# Patient Record
Sex: MALE | Race: BLACK OR AFRICAN AMERICAN | Employment: UNEMPLOYED | URBAN - METROPOLITAN AREA
[De-identification: names, ages, dates, MRNs, and addresses within clinical notes are randomized per-mention and may not be internally consistent; named-entity substitution may affect disease eponyms.]

---

## 2018-10-17 ENCOUNTER — HISTORICAL (OUTPATIENT)
Dept: ADMINISTRATIVE | Facility: HOSPITAL | Age: 28
End: 2018-10-17

## 2018-10-23 LAB
FINAL CULTURE: NORMAL
FINAL CULTURE: NORMAL

## 2018-10-29 ENCOUNTER — HOSPITAL ENCOUNTER (OUTPATIENT)
Dept: MEDSURG UNIT | Facility: HOSPITAL | Age: 28
End: 2018-10-31
Attending: INTERNAL MEDICINE | Admitting: FAMILY MEDICINE

## 2018-10-29 LAB
ABS NEUT (OLG): 3.84 X10(3)/MCL
ALBUMIN SERPL-MCNC: 2.3 GM/DL (ref 3.4–5)
ALBUMIN/GLOB SERPL: 1 RATIO (ref 1–2)
ALP SERPL-CCNC: 58 UNIT/L (ref 45–117)
ALT SERPL-CCNC: 16 UNIT/L (ref 12–78)
ANISOCYTOSIS BLD QL SMEAR: ABNORMAL
APPEARANCE, UA: CLEAR
APTT PPP: 33.1 SECOND(S) (ref 23.3–37)
AST SERPL-CCNC: 17 UNIT/L (ref 15–37)
BACTERIA #/AREA URNS AUTO: ABNORMAL /[HPF]
BASOPHILS NFR BLD MANUAL: 0 %
BILIRUB SERPL-MCNC: 0.2 MG/DL (ref 0.2–1)
BILIRUB UR QL STRIP: NEGATIVE
BILIRUBIN DIRECT+TOT PNL SERPL-MCNC: 0.1 MG/DL
BILIRUBIN DIRECT+TOT PNL SERPL-MCNC: 0.1 MG/DL
BUN SERPL-MCNC: 20 MG/DL (ref 7–18)
CALCIUM SERPL-MCNC: 7.8 MG/DL (ref 8.5–10.1)
CHLORIDE SERPL-SCNC: 112 MMOL/L (ref 98–107)
CK SERPL-CCNC: 83 UNIT/L (ref 39–308)
CO2 SERPL-SCNC: 31 MMOL/L (ref 21–32)
COLOR UR: ABNORMAL
CREAT SERPL-MCNC: 1 MG/DL (ref 0.6–1.3)
CRP SERPL-MCNC: 2.1 MG/DL
EOSINOPHIL NFR BLD MANUAL: 1 %
ERYTHROCYTE [DISTWIDTH] IN BLOOD BY AUTOMATED COUNT: 15.3 % (ref 11.5–14.5)
ERYTHROCYTE [SEDIMENTATION RATE] IN BLOOD: 40 MM/HR (ref 0–15)
GLOBULIN SER-MCNC: 3.5 GM/ML (ref 2.3–3.5)
GLUCOSE (UA): NORMAL
GLUCOSE SERPL-MCNC: 96 MG/DL (ref 74–106)
GRANULOCYTES NFR BLD MANUAL: 49 % (ref 43–75)
HCT VFR BLD AUTO: 25.6 % (ref 40–51)
HGB BLD-MCNC: 8.1 GM/DL (ref 13.5–17.5)
HGB UR QL STRIP: NEGATIVE
HYALINE CASTS #/AREA URNS LPF: ABNORMAL /[LPF]
HYPOCHROMIA BLD QL SMEAR: ABNORMAL
INR PPP: 1.18 (ref 0.9–1.2)
KETONES UR QL STRIP: NEGATIVE
LEUKOCYTE ESTERASE UR QL STRIP: NEGATIVE
LYMPHOCYTES NFR BLD MANUAL: 1 %
LYMPHOCYTES NFR BLD MANUAL: 39 % (ref 20.5–51.1)
MCH RBC QN AUTO: 29.3 PG (ref 26–34)
MCHC RBC AUTO-ENTMCNC: 31.6 GM/DL (ref 31–37)
MCV RBC AUTO: 92.8 FL (ref 80–100)
MONOCYTES NFR BLD MANUAL: 10 % (ref 2–9)
NITRITE UR QL STRIP: NEGATIVE
PH UR STRIP: 6 [PH] (ref 4.5–8)
PLATELET # BLD AUTO: 221 X10(3)/MCL (ref 130–400)
PLATELET # BLD EST: ADEQUATE 10*3/UL
PMV BLD AUTO: 11.1 FL (ref 7.4–10.4)
POIKILOCYTOSIS BLD QL SMEAR: ABNORMAL
POTASSIUM SERPL-SCNC: 3.9 MMOL/L (ref 3.5–5.1)
PROT SERPL-MCNC: 5.8 GM/DL (ref 6.4–8.2)
PROT UR QL STRIP: 70 MG/DL
PROTHROMBIN TIME: 14.2 SECOND(S) (ref 11.9–14.4)
RBC # BLD AUTO: 2.76 X10(6)/MCL (ref 4.5–5.9)
RBC #/AREA URNS AUTO: ABNORMAL /[HPF]
RBC MORPH BLD: ABNORMAL
SCHISTOCYTES BLD QL AUTO: ABNORMAL
SODIUM SERPL-SCNC: 145 MMOL/L (ref 136–145)
SP GR UR STRIP: 1.01 (ref 1–1.03)
SQUAMOUS #/AREA URNS LPF: ABNORMAL /[LPF]
UROBILINOGEN UR STRIP-ACNC: NORMAL
WBC # SPEC AUTO: 8 X10(3)/MCL (ref 4.5–11)
WBC #/AREA URNS AUTO: ABNORMAL /HPF

## 2018-10-30 LAB
ABS NEUT (OLG): 3.33 X10(3)/MCL (ref 2.1–9.2)
ALBUMIN SERPL-MCNC: 2.2 GM/DL (ref 3.4–5)
ALBUMIN/GLOB SERPL: 1 RATIO (ref 1–2)
ALP SERPL-CCNC: 61 UNIT/L (ref 45–117)
ALT SERPL-CCNC: 17 UNIT/L (ref 12–78)
AST SERPL-CCNC: 17 UNIT/L (ref 15–37)
BILIRUB SERPL-MCNC: 0.2 MG/DL (ref 0.2–1)
BILIRUBIN DIRECT+TOT PNL SERPL-MCNC: <0.1 MG/DL
BILIRUBIN DIRECT+TOT PNL SERPL-MCNC: ABNORMAL MG/DL
BUN SERPL-MCNC: 23 MG/DL (ref 7–18)
CALCIUM SERPL-MCNC: 7.7 MG/DL (ref 8.5–10.1)
CHLORIDE SERPL-SCNC: 109 MMOL/L (ref 98–107)
CO2 SERPL-SCNC: 29 MMOL/L (ref 21–32)
CREAT SERPL-MCNC: 1 MG/DL (ref 0.6–1.3)
EOSINOPHIL # BLD AUTO: 0.02 X10(3)/MCL
EOSINOPHIL NFR BLD AUTO: 0 %
ERYTHROCYTE [DISTWIDTH] IN BLOOD BY AUTOMATED COUNT: 14.7 % (ref 11.5–14.5)
GLOBULIN SER-MCNC: 3.5 GM/ML (ref 2.3–3.5)
GLUCOSE SERPL-MCNC: 119 MG/DL (ref 74–106)
HCT VFR BLD AUTO: 27.5 % (ref 40–51)
HGB BLD-MCNC: 8.6 GM/DL (ref 13.5–17.5)
IMM GRANULOCYTES # BLD AUTO: 0.01 10*3/UL
IMM GRANULOCYTES NFR BLD AUTO: 0 %
IRON SATN MFR SERPL: 20.3 % (ref 15–50)
IRON SERPL-MCNC: 45 MCG/DL (ref 65–175)
LYMPHOCYTES # BLD AUTO: 0.97 X10(3)/MCL
LYMPHOCYTES NFR BLD AUTO: 21 % (ref 13–40)
MCH RBC QN AUTO: 28.9 PG (ref 26–34)
MCHC RBC AUTO-ENTMCNC: 31.3 GM/DL (ref 31–37)
MCV RBC AUTO: 92.3 FL (ref 80–100)
MONOCYTES # BLD AUTO: 0.24 X10(3)/MCL
MONOCYTES NFR BLD AUTO: 5 % (ref 4–12)
NEUTROPHILS # BLD AUTO: 3.33 X10(3)/MCL
NEUTROPHILS NFR BLD AUTO: 73 X10(3)/MCL
PLATELET # BLD AUTO: 214 X10(3)/MCL (ref 130–400)
PMV BLD AUTO: 10.8 FL (ref 7.4–10.4)
POTASSIUM SERPL-SCNC: 4.8 MMOL/L (ref 3.5–5.1)
PROT SERPL-MCNC: 5.7 GM/DL (ref 6.4–8.2)
RBC # BLD AUTO: 2.98 X10(6)/MCL (ref 4.5–5.9)
SODIUM SERPL-SCNC: 142 MMOL/L (ref 136–145)
TIBC SERPL-MCNC: 222 MCG/DL (ref 250–450)
TRANSFERRIN SERPL-MCNC: 146 MG/DL (ref 200–360)
WBC # SPEC AUTO: 4.6 X10(3)/MCL (ref 4.5–11)

## 2018-10-31 LAB
ABS NEUT (OLG): 2.81 X10(3)/MCL (ref 2.1–9.2)
ALBUMIN SERPL-MCNC: 2.1 GM/DL (ref 3.4–5)
ALBUMIN/GLOB SERPL: 1 RATIO (ref 1–2)
ALP SERPL-CCNC: 59 UNIT/L (ref 45–117)
ALT SERPL-CCNC: 19 UNIT/L (ref 12–78)
APPEARANCE, UA: CLEAR
AST SERPL-CCNC: 18 UNIT/L (ref 15–37)
BACTERIA #/AREA URNS AUTO: ABNORMAL /[HPF]
BILIRUB SERPL-MCNC: 0.2 MG/DL (ref 0.2–1)
BILIRUB UR QL STRIP: NEGATIVE
BILIRUBIN DIRECT+TOT PNL SERPL-MCNC: <0.1 MG/DL
BILIRUBIN DIRECT+TOT PNL SERPL-MCNC: ABNORMAL MG/DL
BUN SERPL-MCNC: 36 MG/DL (ref 7–18)
CALCIUM SERPL-MCNC: 7.7 MG/DL (ref 8.5–10.1)
CHLORIDE SERPL-SCNC: 108 MMOL/L (ref 98–107)
CO2 SERPL-SCNC: 29 MMOL/L (ref 21–32)
COLOR UR: ABNORMAL
CREAT SERPL-MCNC: 1 MG/DL (ref 0.6–1.3)
CREAT UR-MCNC: 50 MG/DL
ERYTHROCYTE [DISTWIDTH] IN BLOOD BY AUTOMATED COUNT: 14.3 % (ref 11.5–14.5)
FERRITIN SERPL-MCNC: 664.4 NG/ML (ref 26–388)
GLOBULIN SER-MCNC: 3.4 GM/ML (ref 2.3–3.5)
GLUCOSE (UA): NORMAL
GLUCOSE SERPL-MCNC: 114 MG/DL (ref 74–106)
HAPTOGLOB SERPL-MCNC: 149 MG/DL (ref 31–200)
HBV CORE AB SERPL QL IA: REACTIVE
HBV SURFACE AG SERPL QL IA: NEGATIVE
HCT VFR BLD AUTO: 25.4 % (ref 40–51)
HGB BLD-MCNC: 7.9 GM/DL (ref 13.5–17.5)
HGB UR QL STRIP: NEGATIVE
HIV 1+2 AB+HIV1 P24 AG SERPL QL IA: NONREACTIVE
HYALINE CASTS #/AREA URNS LPF: ABNORMAL /[LPF]
IMM GRANULOCYTES # BLD AUTO: 0.02 10*3/UL
IMM GRANULOCYTES NFR BLD AUTO: 0 %
KETONES UR QL STRIP: NEGATIVE
LDH SERPL-CCNC: 210 UNIT/L (ref 87–241)
LEUKOCYTE ESTERASE UR QL STRIP: NEGATIVE
LYMPHOCYTES # BLD AUTO: 1.61 X10(3)/MCL
LYMPHOCYTES NFR BLD AUTO: 32 % (ref 13–40)
MCH RBC QN AUTO: 28.5 PG (ref 26–34)
MCHC RBC AUTO-ENTMCNC: 31.1 GM/DL (ref 31–37)
MCV RBC AUTO: 91.7 FL (ref 80–100)
MONOCYTES # BLD AUTO: 0.65 X10(3)/MCL
MONOCYTES NFR BLD AUTO: 13 % (ref 4–12)
NEG CONT SPOT COUNT: NORMAL
NEUTROPHILS # BLD AUTO: 2.81 X10(3)/MCL
NEUTROPHILS NFR BLD AUTO: 55 X10(3)/MCL
NITRITE UR QL STRIP: NEGATIVE
PANEL A SPOT COUNT: 0
PANEL B SPOT COUNT: 0
PH UR STRIP: 6.5 [PH] (ref 4.5–8)
PLATELET # BLD AUTO: 225 X10(3)/MCL (ref 130–400)
PMV BLD AUTO: 11.6 FL (ref 7.4–10.4)
POS CONT SPOT COUNT: NORMAL
POTASSIUM SERPL-SCNC: 4.1 MMOL/L (ref 3.5–5.1)
PROT SERPL-MCNC: 5.5 GM/DL (ref 6.4–8.2)
PROT UR QL STRIP: 30 MG/DL
PROT UR STRIP-MCNC: 48.9 MG/DL
PROT/CREAT UR-RTO: 978 MG/GM
RBC # BLD AUTO: 2.77 X10(6)/MCL (ref 4.5–5.9)
RBC #/AREA URNS AUTO: ABNORMAL /[HPF]
SODIUM SERPL-SCNC: 144 MMOL/L (ref 136–145)
SP GR UR STRIP: 1.01 (ref 1–1.03)
SQUAMOUS #/AREA URNS LPF: ABNORMAL /[LPF]
T-SPOT.TB: NORMAL
UROBILINOGEN UR STRIP-ACNC: NORMAL
WBC # SPEC AUTO: 5.1 X10(3)/MCL (ref 4.5–11)
WBC #/AREA URNS AUTO: ABNORMAL /HPF

## 2018-11-15 ENCOUNTER — HISTORICAL (OUTPATIENT)
Dept: ADMINISTRATIVE | Facility: HOSPITAL | Age: 28
End: 2018-11-15

## 2018-11-15 LAB
ABS NEUT (OLG): 9.8 X10(3)/MCL (ref 2.1–9.2)
ALBUMIN SERPL-MCNC: 2.9 GM/DL (ref 3.4–5)
ALBUMIN/GLOB SERPL: 1 RATIO (ref 1–2)
ALP SERPL-CCNC: 83 UNIT/L (ref 45–117)
ALT SERPL-CCNC: 17 UNIT/L (ref 12–78)
APPEARANCE, UA: CLEAR
AST SERPL-CCNC: 13 UNIT/L (ref 15–37)
BASOPHILS # BLD AUTO: 0.01 X10(3)/MCL
BASOPHILS NFR BLD AUTO: 0 %
BILIRUB SERPL-MCNC: 0.2 MG/DL (ref 0.2–1)
BILIRUB UR QL STRIP: NEGATIVE
BILIRUBIN DIRECT+TOT PNL SERPL-MCNC: <0.1 MG/DL
BILIRUBIN DIRECT+TOT PNL SERPL-MCNC: ABNORMAL MG/DL
BUN SERPL-MCNC: 23 MG/DL (ref 7–18)
CALCIUM SERPL-MCNC: 8.2 MG/DL (ref 8.5–10.1)
CHLORIDE SERPL-SCNC: 106 MMOL/L (ref 98–107)
CO2 SERPL-SCNC: 30 MMOL/L (ref 21–32)
COLOR UR: YELLOW
CREAT SERPL-MCNC: 0.9 MG/DL (ref 0.6–1.3)
CREAT UR-MCNC: 138 MG/DL
EOSINOPHIL # BLD AUTO: 0.05 10*3/UL
EOSINOPHIL NFR BLD AUTO: 0 %
ERYTHROCYTE [DISTWIDTH] IN BLOOD BY AUTOMATED COUNT: 15.5 % (ref 11.5–14.5)
ERYTHROCYTE [SEDIMENTATION RATE] IN BLOOD: 13 MM/HR (ref 0–15)
GLOBULIN SER-MCNC: 3.3 GM/ML (ref 2.3–3.5)
GLUCOSE (UA): NORMAL
GLUCOSE SERPL-MCNC: 79 MG/DL (ref 74–106)
HBV CORE IGM SERPL QL IA: NONREACTIVE
HCT VFR BLD AUTO: 29.7 % (ref 40–51)
HGB BLD-MCNC: 9.1 GM/DL (ref 13.5–17.5)
HGB UR QL STRIP: 0.03 MG/DL
IMM GRANULOCYTES # BLD AUTO: 0.05 10*3/UL
IMM GRANULOCYTES NFR BLD AUTO: 0 %
KETONES UR QL STRIP: NEGATIVE
LEUKOCYTE ESTERASE UR QL STRIP: NEGATIVE
LYMPHOCYTES # BLD AUTO: 2.22 X10(3)/MCL
LYMPHOCYTES NFR BLD AUTO: 17 % (ref 13–40)
MCH RBC QN AUTO: 29.4 PG (ref 26–34)
MCHC RBC AUTO-ENTMCNC: 30.6 GM/DL (ref 31–37)
MCV RBC AUTO: 95.8 FL (ref 80–100)
MONOCYTES # BLD AUTO: 1.21 X10(3)/MCL
MONOCYTES NFR BLD AUTO: 9 % (ref 4–12)
NEUTROPHILS # BLD AUTO: 9.8 X10(3)/MCL
NEUTROPHILS NFR BLD AUTO: 73 X10(3)/MCL
NITRITE UR QL STRIP: NEGATIVE
PH UR STRIP: 6 [PH] (ref 4.5–8)
PLATELET # BLD AUTO: 317 X10(3)/MCL (ref 130–400)
PMV BLD AUTO: 10.1 FL (ref 7.4–10.4)
POTASSIUM SERPL-SCNC: 3.8 MMOL/L (ref 3.5–5.1)
PROT SERPL-MCNC: 6.2 GM/DL (ref 6.4–8.2)
PROT UR QL STRIP: 100 MG/DL
PROT UR STRIP-MCNC: 131.8 MG/DL
PROT/CREAT UR-RTO: 955.1 MG/GM
RBC # BLD AUTO: 3.1 X10(6)/MCL (ref 4.5–5.9)
SODIUM SERPL-SCNC: 141 MMOL/L (ref 136–145)
SP GR UR STRIP: 1.02 (ref 1–1.03)
UROBILINOGEN UR STRIP-ACNC: NORMAL
WBC # SPEC AUTO: 13.3 X10(3)/MCL (ref 4.5–11)

## 2018-11-19 LAB
COLOR STL: ABNORMAL
CONSISTENCY STL: ABNORMAL
HEMOCCULT SP1 STL QL: POSITIVE

## 2018-11-20 ENCOUNTER — HISTORICAL (OUTPATIENT)
Dept: INTERNAL MEDICINE | Facility: CLINIC | Age: 28
End: 2018-11-20

## 2018-11-20 LAB
CREAT 24H UR-MCNC: 1.2 GM/24HR (ref 0.7–2.6)
CREAT UR-MCNC: 81 MG/DL
PROT 24H UR-MCNC: 1699.5 MG/24HR

## 2018-12-11 ENCOUNTER — HISTORICAL (OUTPATIENT)
Dept: ADMINISTRATIVE | Facility: HOSPITAL | Age: 28
End: 2018-12-11

## 2018-12-11 LAB
ABS NEUT (OLG): 9.09 X10(3)/MCL (ref 2.1–9.2)
ALBUMIN SERPL-MCNC: 2.5 GM/DL (ref 3.4–5)
ALBUMIN/GLOB SERPL: 1 RATIO (ref 1–2)
ALP SERPL-CCNC: 73 UNIT/L (ref 45–117)
ALT SERPL-CCNC: 15 UNIT/L (ref 12–78)
APPEARANCE, UA: CLEAR
AST SERPL-CCNC: 9 UNIT/L (ref 15–37)
BACTERIA #/AREA URNS AUTO: ABNORMAL /[HPF]
BASOPHILS # BLD AUTO: 0.01 X10(3)/MCL
BASOPHILS NFR BLD AUTO: 0 %
BILIRUB SERPL-MCNC: 0.2 MG/DL (ref 0.2–1)
BILIRUB UR QL STRIP: NEGATIVE
BILIRUBIN DIRECT+TOT PNL SERPL-MCNC: <0.1 MG/DL
BILIRUBIN DIRECT+TOT PNL SERPL-MCNC: ABNORMAL MG/DL
BUN SERPL-MCNC: 19 MG/DL (ref 7–18)
CALCIUM SERPL-MCNC: 8.5 MG/DL (ref 8.5–10.1)
CHLORIDE SERPL-SCNC: 107 MMOL/L (ref 98–107)
CO2 SERPL-SCNC: 29 MMOL/L (ref 21–32)
COLOR UR: YELLOW
CREAT SERPL-MCNC: 1 MG/DL (ref 0.6–1.3)
CREAT UR-MCNC: 303 MG/DL
EOSINOPHIL # BLD AUTO: 0.02 10*3/UL
EOSINOPHIL NFR BLD AUTO: 0 %
ERYTHROCYTE [DISTWIDTH] IN BLOOD BY AUTOMATED COUNT: 13.9 % (ref 11.5–14.5)
ERYTHROCYTE [SEDIMENTATION RATE] IN BLOOD: 85 MM/HR (ref 0–15)
GLOBULIN SER-MCNC: 4.1 GM/ML (ref 2.3–3.5)
GLUCOSE (UA): NORMAL
GLUCOSE SERPL-MCNC: 79 MG/DL (ref 74–106)
HCT VFR BLD AUTO: 31.7 % (ref 40–51)
HGB BLD-MCNC: 10 GM/DL (ref 13.5–17.5)
HGB UR QL STRIP: 0.03 MG/DL
HYALINE CASTS #/AREA URNS LPF: ABNORMAL /[LPF]
IMM GRANULOCYTES # BLD AUTO: 0.04 10*3/UL
IMM GRANULOCYTES NFR BLD AUTO: 0 %
KETONES UR QL STRIP: NEGATIVE
LEUKOCYTE ESTERASE UR QL STRIP: NEGATIVE
LYMPHOCYTES # BLD AUTO: 2.69 X10(3)/MCL
LYMPHOCYTES NFR BLD AUTO: 21 % (ref 13–40)
MCH RBC QN AUTO: 29.2 PG (ref 26–34)
MCHC RBC AUTO-ENTMCNC: 31.5 GM/DL (ref 31–37)
MCV RBC AUTO: 92.4 FL (ref 80–100)
MONOCYTES # BLD AUTO: 0.98 X10(3)/MCL
MONOCYTES NFR BLD AUTO: 8 % (ref 4–12)
NEUTROPHILS # BLD AUTO: 9.09 X10(3)/MCL
NEUTROPHILS NFR BLD AUTO: 71 X10(3)/MCL
NITRITE UR QL STRIP: NEGATIVE
PH UR STRIP: 6 [PH] (ref 4.5–8)
PLATELET # BLD AUTO: 552 X10(3)/MCL (ref 130–400)
PMV BLD AUTO: 10.1 FL (ref 7.4–10.4)
POTASSIUM SERPL-SCNC: 3.2 MMOL/L (ref 3.5–5.1)
PROT SERPL-MCNC: 6.6 GM/DL (ref 6.4–8.2)
PROT UR QL STRIP: 300 MG/DL
PROT UR STRIP-MCNC: 319.4 MG/DL
PROT/CREAT UR-RTO: 1054.1 MG/GM
RBC # BLD AUTO: 3.43 X10(6)/MCL (ref 4.5–5.9)
RBC #/AREA URNS AUTO: ABNORMAL /[HPF]
SODIUM SERPL-SCNC: 143 MMOL/L (ref 136–145)
SP GR UR STRIP: 1.03 (ref 1–1.03)
SQUAMOUS #/AREA URNS LPF: ABNORMAL /[LPF]
UROBILINOGEN UR STRIP-ACNC: NORMAL
WBC # SPEC AUTO: 12.8 X10(3)/MCL (ref 4.5–11)
WBC #/AREA URNS AUTO: ABNORMAL /HPF

## 2018-12-27 ENCOUNTER — HISTORICAL (OUTPATIENT)
Dept: RADIOLOGY | Facility: HOSPITAL | Age: 28
End: 2018-12-27

## 2019-02-01 ENCOUNTER — HISTORICAL (OUTPATIENT)
Dept: RADIOLOGY | Facility: HOSPITAL | Age: 29
End: 2019-02-01

## 2019-04-03 ENCOUNTER — HISTORICAL (OUTPATIENT)
Dept: ADMINISTRATIVE | Facility: HOSPITAL | Age: 29
End: 2019-04-03

## 2019-04-06 LAB — FINAL CULTURE: NORMAL

## 2019-05-28 ENCOUNTER — HISTORICAL (OUTPATIENT)
Dept: ADMINISTRATIVE | Facility: HOSPITAL | Age: 29
End: 2019-05-28

## 2019-05-30 LAB — FINAL CULTURE: NORMAL

## 2019-06-20 ENCOUNTER — HISTORICAL (OUTPATIENT)
Dept: ADMINISTRATIVE | Facility: HOSPITAL | Age: 29
End: 2019-06-20

## 2019-06-20 LAB
APPEARANCE, UA: CLEAR
BACTERIA #/AREA URNS AUTO: ABNORMAL /[HPF]
BILIRUB UR QL STRIP: NEGATIVE
COLOR UR: YELLOW
CREAT UR-MCNC: 299 MG/DL
GLUCOSE (UA): NORMAL
HGB UR QL STRIP: NEGATIVE
HYALINE CASTS #/AREA URNS LPF: ABNORMAL /[LPF]
KETONES UR QL STRIP: NEGATIVE
LEUKOCYTE ESTERASE UR QL STRIP: NEGATIVE
NITRITE UR QL STRIP: NEGATIVE
PH UR STRIP: 6.5 [PH] (ref 4.5–8)
PROT UR QL STRIP: 300 MG/DL
PROT UR STRIP-MCNC: 382.2 MG/DL
PROT/CREAT UR-RTO: 1278.3 MG/GM
RBC #/AREA URNS AUTO: ABNORMAL /[HPF]
SP GR UR STRIP: 1.03 (ref 1–1.03)
SQUAMOUS #/AREA URNS LPF: ABNORMAL /[LPF]
UROBILINOGEN UR STRIP-ACNC: NORMAL
WBC #/AREA URNS AUTO: ABNORMAL /HPF

## 2019-06-27 ENCOUNTER — HISTORICAL (OUTPATIENT)
Dept: ADMINISTRATIVE | Facility: HOSPITAL | Age: 29
End: 2019-06-27

## 2019-06-30 LAB — FINAL CULTURE: NORMAL

## 2019-07-02 ENCOUNTER — HISTORICAL (OUTPATIENT)
Dept: INFUSION THERAPY | Facility: HOSPITAL | Age: 29
End: 2019-07-02

## 2019-08-05 ENCOUNTER — HISTORICAL (OUTPATIENT)
Dept: ADMINISTRATIVE | Facility: HOSPITAL | Age: 29
End: 2019-08-05

## 2019-08-07 LAB — FINAL CULTURE: NORMAL

## 2019-12-16 ENCOUNTER — HISTORICAL (OUTPATIENT)
Dept: ADMINISTRATIVE | Facility: HOSPITAL | Age: 29
End: 2019-12-16

## 2019-12-16 LAB
ABS NEUT (OLG): 5.46 X10(3)/MCL (ref 2.1–9.2)
ALBUMIN SERPL-MCNC: 2.4 GM/DL (ref 3.4–5)
ALBUMIN/GLOB SERPL: 0.7 RATIO (ref 1.1–2)
ALP SERPL-CCNC: 57 UNIT/L (ref 45–117)
ALT SERPL-CCNC: 11 UNIT/L (ref 12–78)
APPEARANCE, UA: CLEAR
AST SERPL-CCNC: 20 UNIT/L (ref 15–37)
BACTERIA #/AREA URNS AUTO: ABNORMAL /HPF
BASOPHILS # BLD AUTO: 0 X10(3)/MCL (ref 0–0.2)
BASOPHILS NFR BLD AUTO: 0 %
BILIRUB SERPL-MCNC: 0.2 MG/DL (ref 0.2–1)
BILIRUB UR QL STRIP: NEGATIVE
BILIRUBIN DIRECT+TOT PNL SERPL-MCNC: <0.1 MG/DL (ref 0–0.2)
BILIRUBIN DIRECT+TOT PNL SERPL-MCNC: ABNORMAL MG/DL
BUN SERPL-MCNC: 21 MG/DL (ref 7–18)
CALCIUM SERPL-MCNC: 8.3 MG/DL (ref 8.5–10.1)
CHLORIDE SERPL-SCNC: 110 MMOL/L (ref 98–107)
CO2 SERPL-SCNC: 30 MMOL/L (ref 21–32)
COLOR UR: YELLOW
CREAT SERPL-MCNC: 1.3 MG/DL (ref 0.6–1.3)
CREAT UR-MCNC: 321 MG/DL
EOSINOPHIL # BLD AUTO: 0 X10(3)/MCL (ref 0–0.9)
EOSINOPHIL NFR BLD AUTO: 0 %
ERYTHROCYTE [DISTWIDTH] IN BLOOD BY AUTOMATED COUNT: 13.7 % (ref 11.5–14.5)
ERYTHROCYTE [SEDIMENTATION RATE] IN BLOOD: 43 MM/HR (ref 0–15)
GLOBULIN SER-MCNC: 3.4 GM/ML (ref 2.3–3.5)
GLUCOSE (UA): NEGATIVE
GLUCOSE SERPL-MCNC: 98 MG/DL (ref 74–106)
HCT VFR BLD AUTO: 28.7 % (ref 40–51)
HGB BLD-MCNC: 8.9 GM/DL (ref 13.5–17.5)
HGB UR QL STRIP: NEGATIVE
HYALINE CASTS #/AREA URNS LPF: ABNORMAL /LPF
IMM GRANULOCYTES # BLD AUTO: 0.03 10*3/UL
IMM GRANULOCYTES NFR BLD AUTO: 0 %
KETONES UR QL STRIP: ABNORMAL
LEUKOCYTE ESTERASE UR QL STRIP: NEGATIVE
LYMPHOCYTES # BLD AUTO: 1.8 X10(3)/MCL (ref 0.6–4.6)
LYMPHOCYTES NFR BLD AUTO: 22 %
MCH RBC QN AUTO: 28.6 PG (ref 26–34)
MCHC RBC AUTO-ENTMCNC: 31 GM/DL (ref 31–37)
MCV RBC AUTO: 92.3 FL (ref 80–100)
MONOCYTES # BLD AUTO: 0.8 X10(3)/MCL (ref 0.1–1.3)
MONOCYTES NFR BLD AUTO: 10 %
NEUTROPHILS # BLD AUTO: 5.46 X10(3)/MCL (ref 2.1–9.2)
NEUTROPHILS NFR BLD AUTO: 67 %
NITRITE UR QL STRIP: NEGATIVE
PH UR STRIP: 6 [PH] (ref 4.5–8)
PLATELET # BLD AUTO: 330 X10(3)/MCL (ref 130–400)
PMV BLD AUTO: 9.7 FL (ref 7.4–10.4)
POTASSIUM SERPL-SCNC: 4 MMOL/L (ref 3.5–5.1)
PROT SERPL-MCNC: 5.8 GM/DL (ref 6.4–8.2)
PROT UR QL STRIP: 600 MG/DL
PROT UR STRIP-MCNC: 366.6 MG/DL
PROT/CREAT UR-RTO: 1142.1 MG/GM
RBC # BLD AUTO: 3.11 X10(6)/MCL (ref 4.5–5.9)
RBC #/AREA URNS AUTO: >=100 /HPF
SODIUM SERPL-SCNC: 145 MMOL/L (ref 136–145)
SP GR UR STRIP: 1.02 (ref 1–1.03)
SQUAMOUS #/AREA URNS LPF: ABNORMAL /LPF
UROBILINOGEN UR STRIP-ACNC: 2 MG/DL
WBC # SPEC AUTO: 8.2 X10(3)/MCL (ref 4.5–11)
WBC #/AREA URNS AUTO: ABNORMAL /HPF

## 2020-01-08 ENCOUNTER — HISTORICAL (OUTPATIENT)
Dept: ADMINISTRATIVE | Facility: HOSPITAL | Age: 30
End: 2020-01-08

## 2020-01-10 LAB — FINAL CULTURE: NORMAL

## 2020-02-27 ENCOUNTER — HISTORICAL (OUTPATIENT)
Dept: ADMINISTRATIVE | Facility: HOSPITAL | Age: 30
End: 2020-02-27

## 2020-02-27 LAB
ABS NEUT (OLG): 4.75 X10(3)/MCL (ref 2.1–9.2)
ALBUMIN SERPL-MCNC: 2.6 GM/DL (ref 3.4–5)
ALBUMIN/GLOB SERPL: 0.7 RATIO (ref 1.1–2)
ALP SERPL-CCNC: 59 UNIT/L (ref 45–117)
ALT SERPL-CCNC: 20 UNIT/L (ref 12–78)
APPEARANCE, UA: CLEAR
AST SERPL-CCNC: 43 UNIT/L (ref 15–37)
BACTERIA #/AREA URNS AUTO: ABNORMAL /HPF
BASOPHILS # BLD AUTO: 0 X10(3)/MCL (ref 0–0.2)
BASOPHILS NFR BLD AUTO: 0 %
BILIRUB SERPL-MCNC: 0.2 MG/DL (ref 0.2–1)
BILIRUB UR QL STRIP: NEGATIVE
BILIRUBIN DIRECT+TOT PNL SERPL-MCNC: <0.1 MG/DL (ref 0–0.2)
BILIRUBIN DIRECT+TOT PNL SERPL-MCNC: ABNORMAL MG/DL
BUN SERPL-MCNC: 18 MG/DL (ref 7–18)
CALCIUM SERPL-MCNC: 8.5 MG/DL (ref 8.5–10.1)
CHLORIDE SERPL-SCNC: 109 MMOL/L (ref 98–107)
CO2 SERPL-SCNC: 32 MMOL/L (ref 21–32)
COLOR UR: YELLOW
CREAT SERPL-MCNC: 1 MG/DL (ref 0.6–1.3)
CREAT UR-MCNC: 167 MG/DL
CRP SERPL-MCNC: 0.4 MG/DL
ERYTHROCYTE [DISTWIDTH] IN BLOOD BY AUTOMATED COUNT: 14.1 % (ref 11.5–14.5)
ERYTHROCYTE [SEDIMENTATION RATE] IN BLOOD: 28 MM/HR (ref 0–15)
GLOBULIN SER-MCNC: 3.6 GM/ML (ref 2.3–3.5)
GLUCOSE (UA): NEGATIVE
GLUCOSE SERPL-MCNC: 92 MG/DL (ref 74–106)
HCT VFR BLD AUTO: 35.8 % (ref 40–51)
HGB BLD-MCNC: 11.1 GM/DL (ref 13.5–17.5)
HGB UR QL STRIP: 0.06 MG/DL
HYALINE CASTS #/AREA URNS LPF: ABNORMAL /LPF
IMM GRANULOCYTES # BLD AUTO: 0.01 10*3/UL
IMM GRANULOCYTES NFR BLD AUTO: 0 %
KETONES UR QL STRIP: NEGATIVE
LEUKOCYTE ESTERASE UR QL STRIP: NEGATIVE
LYMPHOCYTES # BLD AUTO: 0.9 X10(3)/MCL (ref 0.6–4.6)
LYMPHOCYTES NFR BLD AUTO: 14 %
MCH RBC QN AUTO: 28.8 PG (ref 26–34)
MCHC RBC AUTO-ENTMCNC: 31 GM/DL (ref 31–37)
MCV RBC AUTO: 92.7 FL (ref 80–100)
MONOCYTES # BLD AUTO: 0.4 X10(3)/MCL (ref 0.1–1.3)
MONOCYTES NFR BLD AUTO: 6 %
NEUTROPHILS # BLD AUTO: 4.75 X10(3)/MCL (ref 2.1–9.2)
NEUTROPHILS NFR BLD AUTO: 79 %
NITRITE UR QL STRIP: NEGATIVE
PH UR STRIP: 6.5 [PH] (ref 4.5–8)
PLATELET # BLD AUTO: 316 X10(3)/MCL (ref 130–400)
PMV BLD AUTO: 10.1 FL (ref 7.4–10.4)
POTASSIUM SERPL-SCNC: 4.2 MMOL/L (ref 3.5–5.1)
PROT SERPL-MCNC: 6.2 GM/DL (ref 6.4–8.2)
PROT UR QL STRIP: 300 MG/DL
PROT UR STRIP-MCNC: 360.5 MG/DL
PROT/CREAT UR-RTO: 2158.7 MG/GM
RBC # BLD AUTO: 3.86 X10(6)/MCL (ref 4.5–5.9)
RBC #/AREA URNS AUTO: ABNORMAL /HPF
SODIUM SERPL-SCNC: 141 MMOL/L (ref 136–145)
SP GR UR STRIP: 1.01 (ref 1–1.03)
SQUAMOUS #/AREA URNS LPF: ABNORMAL /LPF
URATE SERPL-MCNC: 8.1 MG/DL (ref 3.5–7.2)
UROBILINOGEN UR STRIP-ACNC: NORMAL
WBC # SPEC AUTO: 6 X10(3)/MCL (ref 4.5–11)
WBC #/AREA URNS AUTO: ABNORMAL /HPF

## 2020-09-03 ENCOUNTER — HISTORICAL (OUTPATIENT)
Dept: ADMINISTRATIVE | Facility: HOSPITAL | Age: 30
End: 2020-09-03

## 2020-09-03 LAB
ABS NEUT (OLG): 3.27 X10(3)/MCL (ref 2.1–9.2)
ALBUMIN SERPL-MCNC: 2.8 GM/DL (ref 3.4–5)
ALBUMIN/GLOB SERPL: 0.6 RATIO (ref 1.1–2)
ALP SERPL-CCNC: 80 UNIT/L (ref 45–117)
ALT SERPL-CCNC: 14 UNIT/L (ref 12–78)
APPEARANCE, UA: CLEAR
AST SERPL-CCNC: 30 UNIT/L (ref 15–37)
BACTERIA #/AREA URNS AUTO: ABNORMAL /HPF
BASOPHILS # BLD AUTO: 0 X10(3)/MCL (ref 0–0.2)
BASOPHILS NFR BLD AUTO: 1 %
BILIRUB SERPL-MCNC: 0.3 MG/DL (ref 0.2–1)
BILIRUB UR QL STRIP: NEGATIVE
BILIRUBIN DIRECT+TOT PNL SERPL-MCNC: 0.1 MG/DL (ref 0–0.2)
BILIRUBIN DIRECT+TOT PNL SERPL-MCNC: 0.2 MG/DL
BUN SERPL-MCNC: 25 MG/DL (ref 7–18)
C3 SERPL-MCNC: 95 MG/DL (ref 80–173)
C4 SERPL-MCNC: 46.8 MG/DL (ref 13–46)
CALCIUM SERPL-MCNC: 8.7 MG/DL (ref 8.5–10.1)
CHLORIDE SERPL-SCNC: 109 MMOL/L (ref 98–107)
CO2 SERPL-SCNC: 30 MMOL/L (ref 21–32)
COLOR UR: COLORLESS
CREAT SERPL-MCNC: 1.3 MG/DL (ref 0.6–1.3)
CREAT UR-MCNC: 23 MG/DL
EOSINOPHIL # BLD AUTO: 0.3 X10(3)/MCL (ref 0–0.9)
EOSINOPHIL NFR BLD AUTO: 4 %
ERYTHROCYTE [DISTWIDTH] IN BLOOD BY AUTOMATED COUNT: 13.9 % (ref 11.5–14.5)
ERYTHROCYTE [SEDIMENTATION RATE] IN BLOOD: 38 MM/HR (ref 0–15)
GLOBULIN SER-MCNC: 4.4 GM/ML (ref 2.3–3.5)
GLUCOSE (UA): NEGATIVE
GLUCOSE SERPL-MCNC: 66 MG/DL (ref 74–106)
HBV SURFACE AG SERPL QL IA: NONREACTIVE
HCT VFR BLD AUTO: 35.5 % (ref 40–51)
HGB BLD-MCNC: 11 GM/DL (ref 13.5–17.5)
HGB UR QL STRIP: 0.03 MG/DL
HYALINE CASTS #/AREA URNS LPF: ABNORMAL /LPF
IMM GRANULOCYTES # BLD AUTO: 0.01 10*3/UL
IMM GRANULOCYTES NFR BLD AUTO: 0 %
KETONES UR QL STRIP: NEGATIVE
LEUKOCYTE ESTERASE UR QL STRIP: NEGATIVE
LYMPHOCYTES # BLD AUTO: 2.2 X10(3)/MCL (ref 0.6–4.6)
LYMPHOCYTES NFR BLD AUTO: 33 %
MCH RBC QN AUTO: 28.4 PG (ref 26–34)
MCHC RBC AUTO-ENTMCNC: 31 GM/DL (ref 31–37)
MCV RBC AUTO: 91.7 FL (ref 80–100)
MONOCYTES # BLD AUTO: 0.9 X10(3)/MCL (ref 0.1–1.3)
MONOCYTES NFR BLD AUTO: 14 %
NEUTROPHILS # BLD AUTO: 3.27 X10(3)/MCL (ref 2.1–9.2)
NEUTROPHILS NFR BLD AUTO: 49 %
NITRITE UR QL STRIP: NEGATIVE
PH UR STRIP: 6.5 [PH] (ref 4.5–8)
PLATELET # BLD AUTO: 303 X10(3)/MCL (ref 130–400)
PMV BLD AUTO: 9.8 FL (ref 7.4–10.4)
POTASSIUM SERPL-SCNC: 4 MMOL/L (ref 3.5–5.1)
PROT SERPL-MCNC: 7.2 GM/DL (ref 6.4–8.2)
PROT UR QL STRIP: 100 MG/DL
PROT UR STRIP-MCNC: 85.5 MG/DL
PROT/CREAT UR-RTO: 3717.4 MG/GM
RBC # BLD AUTO: 3.87 X10(6)/MCL (ref 4.5–5.9)
RBC #/AREA URNS AUTO: ABNORMAL /HPF
SODIUM SERPL-SCNC: 143 MMOL/L (ref 136–145)
SP GR UR STRIP: 1 (ref 1–1.03)
SQUAMOUS #/AREA URNS LPF: ABNORMAL /LPF
UROBILINOGEN UR STRIP-ACNC: NORMAL
WBC # SPEC AUTO: 6.7 X10(3)/MCL (ref 4.5–11)
WBC #/AREA URNS AUTO: ABNORMAL /HPF

## 2020-09-21 ENCOUNTER — HISTORICAL (OUTPATIENT)
Dept: INFUSION THERAPY | Facility: HOSPITAL | Age: 30
End: 2020-09-21

## 2020-09-21 LAB
ALBUMIN SERPL-MCNC: 2.6 GM/DL (ref 3.4–5)
ALBUMIN/GLOB SERPL: 0.7 RATIO (ref 1.1–2)
ALP SERPL-CCNC: 63 UNIT/L (ref 45–117)
ALT SERPL-CCNC: 10 UNIT/L (ref 12–78)
AST SERPL-CCNC: 17 UNIT/L (ref 15–37)
BILIRUB SERPL-MCNC: 0.2 MG/DL (ref 0.2–1)
BILIRUBIN DIRECT+TOT PNL SERPL-MCNC: <0.1 MG/DL (ref 0–0.2)
BILIRUBIN DIRECT+TOT PNL SERPL-MCNC: >0.1 MG/DL
BUN SERPL-MCNC: 20 MG/DL (ref 7–18)
CALCIUM SERPL-MCNC: 8.5 MG/DL (ref 8.5–10.1)
CHLORIDE SERPL-SCNC: 112 MMOL/L (ref 98–107)
CO2 SERPL-SCNC: 30 MMOL/L (ref 21–32)
CREAT SERPL-MCNC: 1.1 MG/DL (ref 0.6–1.3)
GLOBULIN SER-MCNC: 3.5 GM/ML (ref 2.3–3.5)
GLUCOSE SERPL-MCNC: 92 MG/DL (ref 74–100)
POTASSIUM SERPL-SCNC: 4.3 MMOL/L (ref 3.5–5.1)
PROT SERPL-MCNC: 6.1 GM/DL (ref 6.4–8.2)
SODIUM SERPL-SCNC: 144 MMOL/L (ref 136–145)

## 2021-01-01 ENCOUNTER — HISTORICAL (OUTPATIENT)
Dept: ADMINISTRATIVE | Facility: HOSPITAL | Age: 31
End: 2021-01-01

## 2021-01-01 LAB
(HCYS)2 SERPL-MCNC: 22.91 UMOL/L (ref 5.08–15.39)
ABS NEUT (OLG): 2.03 X10(3)/MCL (ref 2.1–9.2)
ALBUMIN SERPL-MCNC: 3.3 GM/DL (ref 3.5–5)
ALBUMIN/GLOB SERPL: 0.9 RATIO (ref 1.1–2)
ALP SERPL-CCNC: 60 UNIT/L (ref 40–150)
ALT SERPL-CCNC: 12 UNIT/L (ref 0–55)
APPEARANCE, UA: CLEAR
AST SERPL-CCNC: 25 UNIT/L (ref 5–34)
BACTERIA #/AREA URNS AUTO: ABNORMAL /HPF
BASOPHILS # BLD AUTO: 0.1 X10(3)/MCL (ref 0–0.2)
BASOPHILS NFR BLD AUTO: 1 %
BILIRUB SERPL-MCNC: 0.3 MG/DL
BILIRUB UR QL STRIP: NEGATIVE
BILIRUBIN DIRECT+TOT PNL SERPL-MCNC: 0.1 MG/DL (ref 0–0.5)
BILIRUBIN DIRECT+TOT PNL SERPL-MCNC: 0.2 MG/DL (ref 0–0.8)
BUN SERPL-MCNC: 33.6 MG/DL (ref 8.9–20.6)
C3 SERPL-MCNC: 88 MG/DL (ref 80–173)
C4 SERPL-MCNC: 45.8 MG/DL (ref 13–46)
CALCIUM SERPL-MCNC: 9 MG/DL (ref 8.7–10.5)
CHLORIDE SERPL-SCNC: 110 MMOL/L (ref 98–107)
CO2 SERPL-SCNC: 25 MMOL/L (ref 22–29)
COLOR UR: YELLOW
CREAT SERPL-MCNC: 1.46 MG/DL (ref 0.73–1.18)
CREAT UR-MCNC: 314.6 MG/DL (ref 58–161)
EOSINOPHIL # BLD AUTO: 0.5 X10(3)/MCL (ref 0–0.9)
EOSINOPHIL NFR BLD AUTO: 9 %
ERYTHROCYTE [DISTWIDTH] IN BLOOD BY AUTOMATED COUNT: 13.8 % (ref 11.5–14.5)
ERYTHROCYTE [SEDIMENTATION RATE] IN BLOOD: 36 MM/HR (ref 0–15)
GLOBULIN SER-MCNC: 3.7 GM/DL (ref 2.4–3.5)
GLUCOSE (UA): NEGATIVE
GLUCOSE SERPL-MCNC: 80 MG/DL (ref 74–100)
HCT VFR BLD AUTO: 35.3 % (ref 40–51)
HGB BLD-MCNC: 11 GM/DL (ref 13.5–17.5)
HGB UR QL STRIP: 0.03 MG/DL
HYALINE CASTS #/AREA URNS LPF: ABNORMAL /LPF
IMM GRANULOCYTES # BLD AUTO: 0.01 10*3/UL
IMM GRANULOCYTES NFR BLD AUTO: 0 %
KETONES UR QL STRIP: NEGATIVE
LEUKOCYTE ESTERASE UR QL STRIP: NEGATIVE
LYMPHOCYTES # BLD AUTO: 2 X10(3)/MCL (ref 0.6–4.6)
LYMPHOCYTES NFR BLD AUTO: 38 %
MCH RBC QN AUTO: 28.1 PG (ref 26–34)
MCHC RBC AUTO-ENTMCNC: 31.2 GM/DL (ref 31–37)
MCV RBC AUTO: 90.1 FL (ref 80–100)
MONOCYTES # BLD AUTO: 0.7 X10(3)/MCL (ref 0.1–1.3)
MONOCYTES NFR BLD AUTO: 13 %
NEUTROPHILS # BLD AUTO: 2.03 X10(3)/MCL (ref 2.1–9.2)
NEUTROPHILS NFR BLD AUTO: 38 %
NITRITE UR QL STRIP: NEGATIVE
NRBC BLD AUTO-RTO: 0 % (ref 0–0.2)
PH UR STRIP: 6 [PH] (ref 4.5–8)
PLATELET # BLD AUTO: 341 X10(3)/MCL (ref 130–400)
PMV BLD AUTO: 10.8 FL (ref 7.4–10.4)
POTASSIUM SERPL-SCNC: 4.6 MMOL/L (ref 3.5–5.1)
PROT SERPL-MCNC: 7 GM/DL (ref 6.4–8.3)
PROT UR QL STRIP: 300 MG/DL
PROT UR STRIP-MCNC: 269.4 MG/DL
PROT/CREAT UR-RTO: 856.3 MG/GM CR
RBC # BLD AUTO: 3.92 X10(6)/MCL (ref 4.5–5.9)
RBC #/AREA URNS AUTO: ABNORMAL /HPF
SODIUM SERPL-SCNC: 141 MMOL/L (ref 136–145)
SP GR UR STRIP: 1.03 (ref 1–1.03)
SQUAMOUS #/AREA URNS LPF: ABNORMAL /LPF
UROBILINOGEN UR STRIP-ACNC: NORMAL
VIT B12 SERPL-MCNC: 649 PG/ML (ref 213–816)
WBC # SPEC AUTO: 5.3 X10(3)/MCL (ref 4.5–11)
WBC #/AREA URNS AUTO: ABNORMAL /HPF

## 2021-01-11 ENCOUNTER — HISTORICAL (OUTPATIENT)
Dept: ADMINISTRATIVE | Facility: HOSPITAL | Age: 31
End: 2021-01-11

## 2021-01-11 LAB
ABS NEUT (OLG): 2.14 X10(3)/MCL (ref 2.1–9.2)
ALBUMIN SERPL-MCNC: 2.8 GM/DL (ref 3.5–5)
ALBUMIN/GLOB SERPL: 0.8 RATIO (ref 1.1–2)
ALP SERPL-CCNC: 62 UNIT/L (ref 40–150)
ALT SERPL-CCNC: 11 UNIT/L (ref 0–55)
APPEARANCE, UA: CLEAR
AST SERPL-CCNC: 21 UNIT/L (ref 5–34)
BACTERIA #/AREA URNS AUTO: ABNORMAL /HPF
BASOPHILS # BLD AUTO: 0 X10(3)/MCL (ref 0–0.2)
BASOPHILS NFR BLD AUTO: 1 %
BILIRUB SERPL-MCNC: 0.2 MG/DL
BILIRUB UR QL STRIP: NEGATIVE
BILIRUBIN DIRECT+TOT PNL SERPL-MCNC: 0.1 MG/DL (ref 0–0.5)
BILIRUBIN DIRECT+TOT PNL SERPL-MCNC: 0.1 MG/DL (ref 0–0.8)
BUN SERPL-MCNC: 23 MG/DL (ref 8.9–20.6)
CALCIUM SERPL-MCNC: 8.6 MG/DL (ref 8.4–10.2)
CHLORIDE SERPL-SCNC: 107 MMOL/L (ref 98–107)
CO2 SERPL-SCNC: 29 MMOL/L (ref 22–29)
COLOR UR: YELLOW
CREAT SERPL-MCNC: 1.18 MG/DL (ref 0.73–1.18)
CREAT UR-MCNC: 200.3 MG/DL (ref 58–161)
CRP SERPL-MCNC: 0.19 MG/DL
EOSINOPHIL # BLD AUTO: 0.2 X10(3)/MCL (ref 0–0.9)
EOSINOPHIL NFR BLD AUTO: 5 %
ERYTHROCYTE [DISTWIDTH] IN BLOOD BY AUTOMATED COUNT: 14.2 % (ref 11.5–14.5)
ERYTHROCYTE [SEDIMENTATION RATE] IN BLOOD: 28 MM/HR (ref 0–15)
GLOBULIN SER-MCNC: 3.3 GM/DL (ref 2.4–3.5)
GLUCOSE (UA): NEGATIVE
GLUCOSE SERPL-MCNC: 78 MG/DL (ref 74–100)
HCT VFR BLD AUTO: 34.6 % (ref 40–51)
HGB BLD-MCNC: 11.2 GM/DL (ref 13.5–17.5)
HGB UR QL STRIP: 0.06 MG/DL
HYALINE CASTS #/AREA URNS LPF: ABNORMAL /LPF
IMM GRANULOCYTES # BLD AUTO: 0.01 10*3/UL
IMM GRANULOCYTES NFR BLD AUTO: 0 %
KETONES UR QL STRIP: NEGATIVE
LEUKOCYTE ESTERASE UR QL STRIP: NEGATIVE
LYMPHOCYTES # BLD AUTO: 1.9 X10(3)/MCL (ref 0.6–4.6)
LYMPHOCYTES NFR BLD AUTO: 37 %
MCH RBC QN AUTO: 29 PG (ref 26–34)
MCHC RBC AUTO-ENTMCNC: 32.4 GM/DL (ref 31–37)
MCV RBC AUTO: 89.6 FL (ref 80–100)
MONOCYTES # BLD AUTO: 0.7 X10(3)/MCL (ref 0.1–1.3)
MONOCYTES NFR BLD AUTO: 14 %
NEUTROPHILS # BLD AUTO: 2.14 X10(3)/MCL (ref 2.1–9.2)
NEUTROPHILS NFR BLD AUTO: 42 %
NITRITE UR QL STRIP: NEGATIVE
PH UR STRIP: 6 [PH] (ref 4.5–8)
PLATELET # BLD AUTO: 307 X10(3)/MCL (ref 130–400)
PMV BLD AUTO: 11.6 FL (ref 7.4–10.4)
POTASSIUM SERPL-SCNC: 4 MMOL/L (ref 3.5–5.1)
PROT SERPL-MCNC: 6.1 GM/DL (ref 6.4–8.3)
PROT UR QL STRIP: 300 MG/DL
PROT UR STRIP-MCNC: 352.1 MG/DL
PROT/CREAT UR-RTO: 1757.9 MG/GM
RBC # BLD AUTO: 3.86 X10(6)/MCL (ref 4.5–5.9)
RBC #/AREA URNS AUTO: ABNORMAL /HPF
SODIUM SERPL-SCNC: 143 MMOL/L (ref 136–145)
SP GR UR STRIP: 1.02 (ref 1–1.03)
SQUAMOUS #/AREA URNS LPF: ABNORMAL /LPF
UROBILINOGEN UR STRIP-ACNC: NORMAL
WBC # SPEC AUTO: 5 X10(3)/MCL (ref 4.5–11)
WBC #/AREA URNS AUTO: ABNORMAL /HPF

## 2021-02-14 ENCOUNTER — HOSPITAL ENCOUNTER (OUTPATIENT)
Dept: MEDSURG UNIT | Facility: HOSPITAL | Age: 31
End: 2021-02-15
Attending: INTERNAL MEDICINE | Admitting: INTERNAL MEDICINE

## 2021-02-14 LAB
ABS NEUT (OLG): 4.1 X10(3)/MCL (ref 2.1–9.2)
ALBUMIN SERPL-MCNC: 2.3 GM/DL (ref 3.5–5)
ALBUMIN SERPL-MCNC: 2.5 GM/DL (ref 3.5–5)
ALBUMIN/GLOB SERPL: 0.6 RATIO (ref 1.1–2)
ALBUMIN/GLOB SERPL: 0.7 RATIO (ref 1.1–2)
ALP SERPL-CCNC: 56 UNIT/L (ref 40–150)
ALP SERPL-CCNC: 63 UNIT/L (ref 40–150)
ALT SERPL-CCNC: 11 UNIT/L (ref 0–55)
ALT SERPL-CCNC: 12 UNIT/L (ref 0–55)
AST SERPL-CCNC: 22 UNIT/L (ref 5–34)
AST SERPL-CCNC: 29 UNIT/L (ref 5–34)
BASOPHILS # BLD AUTO: 0.1 X10(3)/MCL (ref 0–0.2)
BASOPHILS NFR BLD AUTO: 1 %
BILIRUB SERPL-MCNC: 0.2 MG/DL
BILIRUB SERPL-MCNC: 0.3 MG/DL
BILIRUBIN DIRECT+TOT PNL SERPL-MCNC: 0.1 MG/DL (ref 0–0.5)
BILIRUBIN DIRECT+TOT PNL SERPL-MCNC: 0.1 MG/DL (ref 0–0.5)
BILIRUBIN DIRECT+TOT PNL SERPL-MCNC: 0.1 MG/DL (ref 0–0.8)
BILIRUBIN DIRECT+TOT PNL SERPL-MCNC: 0.2 MG/DL (ref 0–0.8)
BNP BLD-MCNC: 1737.8 PG/ML
BUN SERPL-MCNC: 19 MG/DL (ref 8.9–20.6)
BUN SERPL-MCNC: 25 MG/DL (ref 8.9–20.6)
CALCIUM SERPL-MCNC: 8.2 MG/DL (ref 8.4–10.2)
CALCIUM SERPL-MCNC: 8.4 MG/DL (ref 8.4–10.2)
CHLORIDE SERPL-SCNC: 107 MMOL/L (ref 98–107)
CHLORIDE SERPL-SCNC: 110 MMOL/L (ref 98–107)
CO2 SERPL-SCNC: 24 MMOL/L (ref 22–29)
CO2 SERPL-SCNC: 24 MMOL/L (ref 22–29)
CREAT SERPL-MCNC: 1.06 MG/DL (ref 0.73–1.18)
CREAT SERPL-MCNC: 1.51 MG/DL (ref 0.73–1.18)
CRP SERPL-MCNC: 1.76 MG/DL
EOSINOPHIL # BLD AUTO: 0.4 X10(3)/MCL (ref 0–0.9)
EOSINOPHIL NFR BLD AUTO: 6 %
ERYTHROCYTE [DISTWIDTH] IN BLOOD BY AUTOMATED COUNT: 14.6 % (ref 11.5–14.5)
ERYTHROCYTE [SEDIMENTATION RATE] IN BLOOD: 72 MM/HR (ref 0–15)
GLOBULIN SER-MCNC: 3.2 GM/DL (ref 2.4–3.5)
GLOBULIN SER-MCNC: 3.9 GM/DL (ref 2.4–3.5)
GLUCOSE FLD-MCNC: 162 MG/DL
GLUCOSE SERPL-MCNC: 111 MG/DL (ref 74–100)
GLUCOSE SERPL-MCNC: 87 MG/DL (ref 74–100)
HCO3 UR-SCNC: 23.6 MMOL/L (ref 22–26)
HCT VFR BLD AUTO: 29.9 % (ref 40–51)
HGB BLD-MCNC: 9.2 GM/DL (ref 13.5–17.5)
IMM GRANULOCYTES # BLD AUTO: 0.02 10*3/UL
IMM GRANULOCYTES NFR BLD AUTO: 0 %
LACTATE SERPL-SCNC: 0.7 MMOL/L (ref 0.5–2.2)
LDH FLD-CCNC: 92 U/L
LDH SERPL-CCNC: 454 UNIT/L (ref 140–271)
LYMPHOCYTES # BLD AUTO: 2.4 X10(3)/MCL (ref 0.6–4.6)
LYMPHOCYTES NFR BLD AUTO: 30 %
MCH RBC QN AUTO: 28 PG (ref 26–34)
MCHC RBC AUTO-ENTMCNC: 30.8 GM/DL (ref 31–37)
MCV RBC AUTO: 91.2 FL (ref 80–100)
MONOCYTES # BLD AUTO: 1 X10(3)/MCL (ref 0.1–1.3)
MONOCYTES NFR BLD AUTO: 13 %
NEUTROPHILS # BLD AUTO: 4.1 X10(3)/MCL (ref 2.1–9.2)
NEUTROPHILS NFR BLD AUTO: 51 %
O2 HGB ARTERIAL: 85.7 % (ref 94–100)
PCO2 BLDA: 34 MMHG (ref 35–45)
PH BF TYPE: NORMAL
PH FLD: 8 [PH]
PH SMN: 7.45 [PH] (ref 7.35–7.45)
PLATELET # BLD AUTO: 395 X10(3)/MCL (ref 130–400)
PMV BLD AUTO: 10.8 FL (ref 7.4–10.4)
PO2 BLDA: 50 MMHG (ref 80–100)
POC ALLENS TEST: POSITIVE
POC BE: -0.1 (ref -2–2)
POC CO HGB: 3.1 %
POC CO2: 24.6 MMOL/L (ref 22–26)
POC MET HGB: 0.6 % (ref 0.4–1.5)
POC SAMPLESOURCE: ABNORMAL
POC SATURATED O2: 89 %
POC SITE: ABNORMAL
POC THB: 9.2 GM/DL (ref 12–16)
POC TREATMENT: ABNORMAL
POTASSIUM SERPL-SCNC: 3.8 MMOL/L (ref 3.5–5.1)
POTASSIUM SERPL-SCNC: 4.4 MMOL/L (ref 3.5–5.1)
PROT FLD-MCNC: 1 GM/DL
PROT SERPL-MCNC: 5.5 GM/DL (ref 6.4–8.3)
PROT SERPL-MCNC: 6.4 GM/DL (ref 6.4–8.3)
RBC # BLD AUTO: 3.28 X10(6)/MCL (ref 4.5–5.9)
SARS-COV-2 AG RESP QL IA.RAPID: NEGATIVE
SODIUM SERPL-SCNC: 143 MMOL/L (ref 136–145)
SODIUM SERPL-SCNC: 143 MMOL/L (ref 136–145)
TROPONIN I SERPL-MCNC: 0.06 NG/ML (ref 0–0.04)
TROPONIN I SERPL-MCNC: 0.06 NG/ML (ref 0–0.04)
TROPONIN I SERPL-MCNC: 0.07 NG/ML (ref 0–0.04)
TROPONIN I SERPL-MCNC: 0.07 NG/ML (ref 0–0.04)
WBC # SPEC AUTO: 8 X10(3)/MCL (ref 4.5–11)

## 2021-02-15 LAB
ABS NEUT (OLG): 7.61 X10(3)/MCL (ref 2.1–9.2)
ALBUMIN SERPL-MCNC: 2.2 GM/DL (ref 3.5–5)
ALBUMIN/GLOB SERPL: 0.7 RATIO (ref 1.1–2)
ALP SERPL-CCNC: 60 UNIT/L (ref 40–150)
ALT SERPL-CCNC: 9 UNIT/L (ref 0–55)
AST SERPL-CCNC: 18 UNIT/L (ref 5–34)
BASOPHILS # BLD AUTO: 0 X10(3)/MCL (ref 0–0.2)
BASOPHILS NFR BLD AUTO: 0 %
BILIRUB SERPL-MCNC: 0.2 MG/DL
BILIRUBIN DIRECT+TOT PNL SERPL-MCNC: 0.1 MG/DL (ref 0–0.5)
BILIRUBIN DIRECT+TOT PNL SERPL-MCNC: 0.1 MG/DL (ref 0–0.8)
BUN SERPL-MCNC: 34 MG/DL (ref 8.9–20.6)
CALCIUM SERPL-MCNC: 8.1 MG/DL (ref 8.4–10.2)
CHLORIDE SERPL-SCNC: 108 MMOL/L (ref 98–107)
CO2 SERPL-SCNC: 23 MMOL/L (ref 22–29)
CREAT SERPL-MCNC: 1.43 MG/DL (ref 0.73–1.18)
ERYTHROCYTE [DISTWIDTH] IN BLOOD BY AUTOMATED COUNT: 14.8 % (ref 11.5–14.5)
GLOBULIN SER-MCNC: 3.2 GM/DL (ref 2.4–3.5)
GLUCOSE SERPL-MCNC: 111 MG/DL (ref 74–100)
HCT VFR BLD AUTO: 27.6 % (ref 40–51)
HGB BLD-MCNC: 8.5 GM/DL (ref 13.5–17.5)
IMM GRANULOCYTES # BLD AUTO: 0.03 10*3/UL
IMM GRANULOCYTES NFR BLD AUTO: 0 %
LYMPHOCYTES # BLD AUTO: 1.5 X10(3)/MCL (ref 0.6–4.6)
LYMPHOCYTES NFR BLD AUTO: 15 %
MCH RBC QN AUTO: 28.1 PG (ref 26–34)
MCHC RBC AUTO-ENTMCNC: 30.8 GM/DL (ref 31–37)
MCV RBC AUTO: 91.1 FL (ref 80–100)
MONOCYTES # BLD AUTO: 1.2 X10(3)/MCL (ref 0.1–1.3)
MONOCYTES NFR BLD AUTO: 11 %
NEUTROPHILS # BLD AUTO: 7.61 X10(3)/MCL (ref 2.1–9.2)
NEUTROPHILS NFR BLD AUTO: 74 %
PLATELET # BLD AUTO: 422 X10(3)/MCL (ref 130–400)
PMV BLD AUTO: 10.8 FL (ref 7.4–10.4)
POTASSIUM SERPL-SCNC: 4.4 MMOL/L (ref 3.5–5.1)
PROT SERPL-MCNC: 5.4 GM/DL (ref 6.4–8.3)
RBC # BLD AUTO: 3.03 X10(6)/MCL (ref 4.5–5.9)
SODIUM SERPL-SCNC: 140 MMOL/L (ref 136–145)
WBC # SPEC AUTO: 10.4 X10(3)/MCL (ref 4.5–11)

## 2021-02-17 LAB — GRAM STN SPEC: NORMAL

## 2021-02-18 LAB
FINAL CULTURE: NORMAL
GRAM STN SPEC: NORMAL

## 2021-02-19 LAB
FINAL CULTURE: NORMAL

## 2021-02-21 LAB — FINAL CULTURE: NORMAL

## 2021-03-04 ENCOUNTER — HISTORICAL (OUTPATIENT)
Dept: ADMINISTRATIVE | Facility: HOSPITAL | Age: 31
End: 2021-03-04

## 2021-03-04 LAB
INR PPP: 4 (ref 0–1.3)
PROTHROMBIN TIME: 38.6 SECOND(S) (ref 11.1–13.7)

## 2021-03-08 ENCOUNTER — HISTORICAL (OUTPATIENT)
Dept: ADMINISTRATIVE | Facility: HOSPITAL | Age: 31
End: 2021-03-08

## 2021-03-08 LAB
INR PPP: 2.4 (ref 0–1.3)
PROTHROMBIN TIME: 25.7 SECOND(S) (ref 11.1–13.7)

## 2021-03-11 ENCOUNTER — HISTORICAL (OUTPATIENT)
Dept: ADMINISTRATIVE | Facility: HOSPITAL | Age: 31
End: 2021-03-11

## 2021-03-11 LAB
INR PPP: 3.9 (ref 0–1.3)
PROTHROMBIN TIME: 38.2 SECOND(S) (ref 11.1–13.7)

## 2021-03-22 LAB — FINAL CULTURE: NORMAL

## 2021-03-26 ENCOUNTER — HISTORICAL (OUTPATIENT)
Dept: LAB | Facility: HOSPITAL | Age: 31
End: 2021-03-26

## 2021-03-26 LAB
ABS NEUT (OLG): 6.24 X10(3)/MCL (ref 2.1–9.2)
ALBUMIN SERPL-MCNC: 2.4 GM/DL (ref 3.5–5)
ALBUMIN/GLOB SERPL: 0.7 RATIO (ref 1.1–2)
ALP SERPL-CCNC: 83 UNIT/L (ref 40–150)
ALT SERPL-CCNC: 11 UNIT/L (ref 0–55)
APPEARANCE, UA: CLEAR
AST SERPL-CCNC: 18 UNIT/L (ref 5–34)
BACTERIA #/AREA URNS AUTO: ABNORMAL /HPF
BASOPHILS # BLD AUTO: 0 X10(3)/MCL (ref 0–0.2)
BASOPHILS NFR BLD AUTO: 0 %
BILIRUB SERPL-MCNC: 0.2 MG/DL
BILIRUB UR QL STRIP: NEGATIVE
BILIRUBIN DIRECT+TOT PNL SERPL-MCNC: 0.1 MG/DL (ref 0–0.5)
BILIRUBIN DIRECT+TOT PNL SERPL-MCNC: 0.1 MG/DL (ref 0–0.8)
BUN SERPL-MCNC: 16.9 MG/DL (ref 8.9–20.6)
C3 SERPL-MCNC: 118 MG/DL (ref 80–173)
C4 SERPL-MCNC: 47.5 MG/DL (ref 13–46)
CALCIUM SERPL-MCNC: 8.1 MG/DL (ref 8.4–10.2)
CHLORIDE SERPL-SCNC: 106 MMOL/L (ref 98–107)
CO2 SERPL-SCNC: 29 MMOL/L (ref 22–29)
COLOR UR: YELLOW
CREAT SERPL-MCNC: 1.2 MG/DL (ref 0.73–1.18)
CREAT UR-MCNC: 200 MG/DL (ref 58–161)
EOSINOPHIL # BLD AUTO: 0.2 X10(3)/MCL (ref 0–0.9)
EOSINOPHIL NFR BLD AUTO: 2 %
ERYTHROCYTE [DISTWIDTH] IN BLOOD BY AUTOMATED COUNT: 14.8 % (ref 11.5–14.5)
ERYTHROCYTE [SEDIMENTATION RATE] IN BLOOD: 51 MM/HR (ref 0–15)
GLOBULIN SER-MCNC: 3.4 GM/DL (ref 2.4–3.5)
GLUCOSE (UA): NEGATIVE
GLUCOSE SERPL-MCNC: 81 MG/DL (ref 74–100)
HCT VFR BLD AUTO: 29.8 % (ref 40–51)
HGB BLD-MCNC: 9.2 GM/DL (ref 13.5–17.5)
HGB UR QL STRIP: 0.1
HYALINE CASTS #/AREA URNS LPF: ABNORMAL /LPF
IMM GRANULOCYTES # BLD AUTO: 0.04 10*3/UL
IMM GRANULOCYTES NFR BLD AUTO: 0 %
KETONES UR QL STRIP: NEGATIVE
LEUKOCYTE ESTERASE UR QL STRIP: NEGATIVE
LYMPHOCYTES # BLD AUTO: 2.3 X10(3)/MCL (ref 0.6–4.6)
LYMPHOCYTES NFR BLD AUTO: 23 %
MCH RBC QN AUTO: 27.9 PG (ref 26–34)
MCHC RBC AUTO-ENTMCNC: 30.9 GM/DL (ref 31–37)
MCV RBC AUTO: 90.3 FL (ref 80–100)
MONOCYTES # BLD AUTO: 1.4 X10(3)/MCL (ref 0.1–1.3)
MONOCYTES NFR BLD AUTO: 14 %
NEUTROPHILS # BLD AUTO: 6.24 X10(3)/MCL (ref 2.1–9.2)
NEUTROPHILS NFR BLD AUTO: 61 %
NITRITE UR QL STRIP: NEGATIVE
PH UR STRIP: 6 [PH] (ref 4.5–8)
PLATELET # BLD AUTO: 378 X10(3)/MCL (ref 130–400)
PMV BLD AUTO: 9.5 FL (ref 7.4–10.4)
POTASSIUM SERPL-SCNC: 4.1 MMOL/L (ref 3.5–5.1)
PROT SERPL-MCNC: 5.8 GM/DL (ref 6.4–8.3)
PROT UR QL STRIP: 300 MG/DL
PROT UR STRIP-MCNC: 391.1 MG/DL
PROT/CREAT UR-RTO: 1955.5 MG/GM CR
RBC # BLD AUTO: 3.3 X10(6)/MCL (ref 4.5–5.9)
RBC #/AREA URNS AUTO: ABNORMAL /HPF
SODIUM SERPL-SCNC: 144 MMOL/L (ref 136–145)
SP GR UR STRIP: 1.02 (ref 1–1.03)
SQUAMOUS #/AREA URNS LPF: ABNORMAL /LPF
UROBILINOGEN UR STRIP-ACNC: NORMAL
WBC # SPEC AUTO: 10.3 X10(3)/MCL (ref 4.5–11)
WBC #/AREA URNS AUTO: ABNORMAL /HPF

## 2021-03-28 LAB — FINAL CULTURE: NORMAL

## 2021-04-13 ENCOUNTER — HISTORICAL (OUTPATIENT)
Dept: ADMINISTRATIVE | Facility: HOSPITAL | Age: 31
End: 2021-04-13

## 2021-04-13 LAB
APTT PPP: 33.8 SECOND(S) (ref 23.2–33.7)
INR PPP: 1.7 (ref 0–1.3)
PROTHROMBIN TIME: 20 SECOND(S) (ref 11.1–13.7)

## 2021-04-20 ENCOUNTER — HISTORICAL (OUTPATIENT)
Dept: ADMINISTRATIVE | Facility: HOSPITAL | Age: 31
End: 2021-04-20

## 2021-04-20 LAB
INR PPP: 1.2 (ref 0–1.3)
PROTHROMBIN TIME: 14.5 SECOND(S) (ref 11.1–13.7)

## 2021-05-12 ENCOUNTER — HISTORICAL (OUTPATIENT)
Dept: CARDIOLOGY | Facility: HOSPITAL | Age: 31
End: 2021-05-12

## 2021-05-18 ENCOUNTER — HISTORICAL (OUTPATIENT)
Dept: CARDIOLOGY | Facility: HOSPITAL | Age: 31
End: 2021-05-18

## 2022-01-01 ENCOUNTER — ANESTHESIA (OUTPATIENT)
Dept: SURGERY | Facility: HOSPITAL | Age: 32
DRG: 219 | End: 2022-01-01
Payer: MEDICAID

## 2022-01-01 ENCOUNTER — HOSPITAL ENCOUNTER (EMERGENCY)
Facility: HOSPITAL | Age: 32
Discharge: HOME | End: 2022-05-10
Attending: STUDENT IN AN ORGANIZED HEALTH CARE EDUCATION/TRAINING PROGRAM
Payer: MEDICAID

## 2022-01-01 ENCOUNTER — ANESTHESIA EVENT (OUTPATIENT)
Dept: SURGERY | Facility: HOSPITAL | Age: 32
DRG: 219 | End: 2022-01-01
Payer: MEDICAID

## 2022-01-01 ENCOUNTER — HOSPITAL ENCOUNTER (EMERGENCY)
Facility: HOSPITAL | Age: 32
Discharge: ANOTHER HOSPITAL | End: 2022-09-23
Attending: STUDENT IN AN ORGANIZED HEALTH CARE EDUCATION/TRAINING PROGRAM
Payer: MEDICAID

## 2022-01-01 ENCOUNTER — HISTORICAL (OUTPATIENT)
Dept: ADMINISTRATIVE | Facility: HOSPITAL | Age: 32
End: 2022-01-01
Payer: MEDICAID

## 2022-01-01 ENCOUNTER — HISTORICAL (OUTPATIENT)
Dept: RADIOLOGY | Facility: HOSPITAL | Age: 32
End: 2022-01-01

## 2022-01-01 ENCOUNTER — HISTORICAL (OUTPATIENT)
Dept: ADMINISTRATIVE | Facility: HOSPITAL | Age: 32
End: 2022-01-01

## 2022-01-01 ENCOUNTER — HOSPITAL ENCOUNTER (INPATIENT)
Facility: HOSPITAL | Age: 32
LOS: 57 days | DRG: 219 | End: 2022-11-19
Attending: INTERNAL MEDICINE | Admitting: INTERNAL MEDICINE
Payer: MEDICAID

## 2022-01-01 ENCOUNTER — DOCUMENTATION ONLY (OUTPATIENT)
Dept: RHEUMATOLOGY | Facility: CLINIC | Age: 32
End: 2022-01-01
Payer: MEDICAID

## 2022-01-01 ENCOUNTER — HOSPITAL ENCOUNTER (EMERGENCY)
Facility: HOSPITAL | Age: 32
Discharge: HOME | End: 2022-06-12
Attending: INTERNAL MEDICINE
Payer: MEDICAID

## 2022-01-01 VITALS
OXYGEN SATURATION: 96 % | WEIGHT: 132.25 LBS | SYSTOLIC BLOOD PRESSURE: 156 MMHG | RESPIRATION RATE: 20 BRPM | BODY MASS INDEX: 21.25 KG/M2 | HEART RATE: 80 BPM | HEIGHT: 66 IN | TEMPERATURE: 97 F | DIASTOLIC BLOOD PRESSURE: 98 MMHG

## 2022-01-01 VITALS
HEIGHT: 67 IN | DIASTOLIC BLOOD PRESSURE: 103 MMHG | WEIGHT: 105.81 LBS | OXYGEN SATURATION: 100 % | BODY MASS INDEX: 16.61 KG/M2 | SYSTOLIC BLOOD PRESSURE: 156 MMHG

## 2022-01-01 VITALS
RESPIRATION RATE: 18 BRPM | OXYGEN SATURATION: 99 % | WEIGHT: 115 LBS | BODY MASS INDEX: 18.05 KG/M2 | DIASTOLIC BLOOD PRESSURE: 105 MMHG | HEART RATE: 90 BPM | HEIGHT: 67 IN | TEMPERATURE: 100 F | SYSTOLIC BLOOD PRESSURE: 182 MMHG

## 2022-01-01 VITALS
DIASTOLIC BLOOD PRESSURE: 119 MMHG | OXYGEN SATURATION: 100 % | TEMPERATURE: 98 F | RESPIRATION RATE: 18 BRPM | SYSTOLIC BLOOD PRESSURE: 185 MMHG | HEART RATE: 70 BPM

## 2022-01-01 VITALS
DIASTOLIC BLOOD PRESSURE: 53 MMHG | RESPIRATION RATE: 1 BRPM | BODY MASS INDEX: 22.16 KG/M2 | HEIGHT: 65 IN | TEMPERATURE: 97 F | WEIGHT: 133 LBS | OXYGEN SATURATION: 77 % | SYSTOLIC BLOOD PRESSURE: 84 MMHG

## 2022-01-01 DIAGNOSIS — I49.9 ARRHYTHMIA: ICD-10-CM

## 2022-01-01 DIAGNOSIS — R00.1 SYMPTOMATIC BRADYCARDIA: ICD-10-CM

## 2022-01-01 DIAGNOSIS — I82.409 DVT (DEEP VENOUS THROMBOSIS): ICD-10-CM

## 2022-01-01 DIAGNOSIS — R00.1 SINUS BRADYCARDIA: ICD-10-CM

## 2022-01-01 DIAGNOSIS — I50.9 CHF (CONGESTIVE HEART FAILURE): ICD-10-CM

## 2022-01-01 DIAGNOSIS — I71.03 DISSECTION OF THORACOABDOMINAL AORTA: ICD-10-CM

## 2022-01-01 DIAGNOSIS — I10 HYPERTENSION, UNSPECIFIED TYPE: Primary | ICD-10-CM

## 2022-01-01 DIAGNOSIS — R94.31 T WAVE INVERSION IN EKG: ICD-10-CM

## 2022-01-01 DIAGNOSIS — R10.13 EPIGASTRIC PAIN: ICD-10-CM

## 2022-01-01 DIAGNOSIS — I51.7 CARDIOMEGALY: ICD-10-CM

## 2022-01-01 DIAGNOSIS — I10 HYPERTENSION, UNSPECIFIED TYPE: Chronic | ICD-10-CM

## 2022-01-01 DIAGNOSIS — S31.109D OPEN WOUND OF ANTERIOR ABDOMINAL WALL, SUBSEQUENT ENCOUNTER: ICD-10-CM

## 2022-01-01 DIAGNOSIS — T14.8XXA POST-TRAUMATIC WOUND INFECTION: ICD-10-CM

## 2022-01-01 DIAGNOSIS — I16.1 HYPERTENSIVE EMERGENCY: ICD-10-CM

## 2022-01-01 DIAGNOSIS — K65.9 PERITONITIS: ICD-10-CM

## 2022-01-01 DIAGNOSIS — R00.1 BRADYCARDIA: ICD-10-CM

## 2022-01-01 DIAGNOSIS — R07.9 CHEST PAIN: ICD-10-CM

## 2022-01-01 DIAGNOSIS — N18.6 END STAGE RENAL DISEASE: ICD-10-CM

## 2022-01-01 DIAGNOSIS — R50.9 FUO (FEVER OF UNKNOWN ORIGIN): ICD-10-CM

## 2022-01-01 DIAGNOSIS — D68.9 COAGULOPATHY: ICD-10-CM

## 2022-01-01 DIAGNOSIS — S93.409A SPRAINED ANKLE: ICD-10-CM

## 2022-01-01 DIAGNOSIS — I71.00 AORTIC DISSECTION: ICD-10-CM

## 2022-01-01 DIAGNOSIS — S93.492A SPRAIN OF ANTERIOR TALOFIBULAR LIGAMENT OF LEFT ANKLE, INITIAL ENCOUNTER: Primary | ICD-10-CM

## 2022-01-01 DIAGNOSIS — L08.9 SOFT TISSUE INFECTION: ICD-10-CM

## 2022-01-01 DIAGNOSIS — I71.00 DISSECTION OF AORTA, UNSPECIFIED PORTION OF AORTA: Primary | ICD-10-CM

## 2022-01-01 DIAGNOSIS — K65.8 SEROSITIS: ICD-10-CM

## 2022-01-01 DIAGNOSIS — M32.9 SYSTEMIC LUPUS ERYTHEMATOSUS, UNSPECIFIED SLE TYPE, UNSPECIFIED ORGAN INVOLVEMENT STATUS: Primary | ICD-10-CM

## 2022-01-01 DIAGNOSIS — R00.9 HEART BEAT ABNORMALITY: ICD-10-CM

## 2022-01-01 DIAGNOSIS — L08.9 POST-TRAUMATIC WOUND INFECTION: ICD-10-CM

## 2022-01-01 LAB
ABO + RH BLD: NORMAL
ABS NEUT (OLG): 11.9 X10(3)/MCL (ref 2.1–9.2)
ABS NEUT (OLG): 12 X10(3)/MCL (ref 2.1–9.2)
ABS NEUT (OLG): 13.6 X10(3)/MCL (ref 2.1–9.2)
ABS NEUT (OLG): 13.87 X10(3)/MCL (ref 2.1–9.2)
ABS NEUT (OLG): 15.5 X10(3)/MCL (ref 2.1–9.2)
ABS NEUT (OLG): 16.8 X10(3)/MCL (ref 2.1–9.2)
ABS NEUT (OLG): 18.98 X10(3)/MCL (ref 2.1–9.2)
ABS NEUT (OLG): 20.06 X10(3)/MCL (ref 2.1–9.2)
ABS NEUT (OLG): 24.6 X10(3)/MCL (ref 2.1–9.2)
ABS NEUT (OLG): 26.09 X10(3)/MCL (ref 2.1–9.2)
ABS NEUT (OLG): 27.5 X10(3)/MCL (ref 2.1–9.2)
ABS NEUT (OLG): 8.06 X10(3)/MCL (ref 2.1–9.2)
ABS NEUT (OLG): ABNORMAL
ABS NEUT CALC (OHS): 3.46 X10(3)/MCL (ref 2.1–9.2)
ACANTHOCYTES (OLG): ABNORMAL
ALBUMIN FLD-MCNC: 1 G/DL
ALBUMIN SERPL-MCNC: 1.1 GM/DL (ref 3.5–5)
ALBUMIN SERPL-MCNC: 1.2 GM/DL (ref 3.5–5)
ALBUMIN SERPL-MCNC: 1.2 GM/DL (ref 3.5–5)
ALBUMIN SERPL-MCNC: 1.3 GM/DL (ref 3.5–5)
ALBUMIN SERPL-MCNC: 1.4 GM/DL (ref 3.5–5)
ALBUMIN SERPL-MCNC: 1.5 GM/DL (ref 3.5–5)
ALBUMIN SERPL-MCNC: 1.5 GM/DL (ref 3.5–5)
ALBUMIN SERPL-MCNC: 1.6 GM/DL (ref 3.5–5)
ALBUMIN SERPL-MCNC: 1.7 GM/DL (ref 3.5–5)
ALBUMIN SERPL-MCNC: 1.8 GM/DL (ref 3.5–5)
ALBUMIN SERPL-MCNC: 1.9 GM/DL (ref 3.5–5)
ALBUMIN SERPL-MCNC: 2 GM/DL (ref 3.5–5)
ALBUMIN SERPL-MCNC: 2.2 GM/DL (ref 3.5–5)
ALBUMIN SERPL-MCNC: 2.3 GM/DL (ref 3.5–5)
ALBUMIN SERPL-MCNC: 2.4 GM/DL (ref 3.5–5)
ALBUMIN SERPL-MCNC: 2.5 GM/DL (ref 3.5–5)
ALBUMIN/GLOB SERPL: 0.3 RATIO (ref 1.1–2)
ALBUMIN/GLOB SERPL: 0.4 RATIO (ref 1.1–2)
ALBUMIN/GLOB SERPL: 0.5 RATIO (ref 1.1–2)
ALBUMIN/GLOB SERPL: 0.6 RATIO (ref 1.1–2)
ALBUMIN/GLOB SERPL: 0.7 RATIO (ref 1.1–2)
ALBUMIN/GLOB SERPL: 0.8 RATIO (ref 1.1–2)
ALBUMIN/GLOB SERPL: 1 RATIO (ref 1.1–2)
ALBUMIN/GLOB SERPL: 1.1 RATIO (ref 1.1–2)
ALBUMIN/GLOB SERPL: 1.1 RATIO (ref 1.1–2)
ALP SERPL-CCNC: 100 UNIT/L (ref 40–150)
ALP SERPL-CCNC: 103 UNIT/L (ref 40–150)
ALP SERPL-CCNC: 106 UNIT/L (ref 40–150)
ALP SERPL-CCNC: 108 UNIT/L (ref 40–150)
ALP SERPL-CCNC: 108 UNIT/L (ref 40–150)
ALP SERPL-CCNC: 109 UNIT/L (ref 40–150)
ALP SERPL-CCNC: 110 UNIT/L (ref 40–150)
ALP SERPL-CCNC: 112 UNIT/L (ref 40–150)
ALP SERPL-CCNC: 114 UNIT/L (ref 40–150)
ALP SERPL-CCNC: 114 UNIT/L (ref 40–150)
ALP SERPL-CCNC: 120 UNIT/L (ref 40–150)
ALP SERPL-CCNC: 124 UNIT/L (ref 40–150)
ALP SERPL-CCNC: 127 UNIT/L (ref 40–150)
ALP SERPL-CCNC: 129 UNIT/L (ref 40–150)
ALP SERPL-CCNC: 133 UNIT/L (ref 40–150)
ALP SERPL-CCNC: 134 UNIT/L (ref 40–150)
ALP SERPL-CCNC: 137 UNIT/L (ref 40–150)
ALP SERPL-CCNC: 138 UNIT/L (ref 40–150)
ALP SERPL-CCNC: 141 UNIT/L (ref 40–150)
ALP SERPL-CCNC: 145 UNIT/L (ref 40–150)
ALP SERPL-CCNC: 158 UNIT/L (ref 40–150)
ALP SERPL-CCNC: 164 UNIT/L (ref 40–150)
ALP SERPL-CCNC: 170 UNIT/L (ref 40–150)
ALP SERPL-CCNC: 177 UNIT/L (ref 40–150)
ALP SERPL-CCNC: 206 UNIT/L (ref 40–150)
ALP SERPL-CCNC: 209 UNIT/L (ref 40–150)
ALP SERPL-CCNC: 254 UNIT/L (ref 40–150)
ALP SERPL-CCNC: 46 UNIT/L (ref 40–150)
ALP SERPL-CCNC: 53 UNIT/L (ref 40–150)
ALP SERPL-CCNC: 53 UNIT/L (ref 40–150)
ALP SERPL-CCNC: 62 UNIT/L (ref 40–150)
ALP SERPL-CCNC: 66 UNIT/L (ref 40–150)
ALP SERPL-CCNC: 69 UNIT/L (ref 40–150)
ALP SERPL-CCNC: 71 UNIT/L (ref 40–150)
ALP SERPL-CCNC: 74 UNIT/L (ref 40–150)
ALP SERPL-CCNC: 78 UNIT/L (ref 40–150)
ALP SERPL-CCNC: 81 UNIT/L (ref 40–150)
ALP SERPL-CCNC: 82 UNIT/L (ref 40–150)
ALP SERPL-CCNC: 82 UNIT/L (ref 40–150)
ALP SERPL-CCNC: 84 UNIT/L (ref 40–150)
ALP SERPL-CCNC: 86 UNIT/L (ref 40–150)
ALP SERPL-CCNC: 87 UNIT/L (ref 40–150)
ALP SERPL-CCNC: 97 UNIT/L (ref 40–150)
ALT SERPL-CCNC: 10 UNIT/L (ref 0–55)
ALT SERPL-CCNC: 11 UNIT/L (ref 0–55)
ALT SERPL-CCNC: 12 UNIT/L (ref 0–55)
ALT SERPL-CCNC: 13 UNIT/L (ref 0–55)
ALT SERPL-CCNC: 14 UNIT/L (ref 0–55)
ALT SERPL-CCNC: 14 UNIT/L (ref 0–55)
ALT SERPL-CCNC: 15 UNIT/L (ref 0–55)
ALT SERPL-CCNC: 16 UNIT/L (ref 0–55)
ALT SERPL-CCNC: 16 UNIT/L (ref 0–55)
ALT SERPL-CCNC: 17 UNIT/L (ref 0–55)
ALT SERPL-CCNC: 19 UNIT/L (ref 0–55)
ALT SERPL-CCNC: 19 UNIT/L (ref 0–55)
ALT SERPL-CCNC: 21 UNIT/L (ref 0–55)
ALT SERPL-CCNC: 22 UNIT/L (ref 0–55)
ALT SERPL-CCNC: 28 UNIT/L (ref 0–55)
ALT SERPL-CCNC: 6 UNIT/L (ref 0–55)
ALT SERPL-CCNC: 7 UNIT/L (ref 0–55)
ALT SERPL-CCNC: 7 UNIT/L (ref 0–55)
ALT SERPL-CCNC: 8 UNIT/L (ref 0–55)
ALT SERPL-CCNC: 9 UNIT/L (ref 0–55)
AMORPH URATE CRY URNS QL MICRO: ABNORMAL /HPF
AMYLASE FLD-CCNC: 6 U/L
AMYLASE SERPL-CCNC: 14 UNIT/L (ref 25–125)
AMYLASE SERPL-CCNC: 33 UNIT/L (ref 25–125)
ANION GAP SERPL CALC-SCNC: 10 MEQ/L
ANION GAP SERPL CALC-SCNC: 11 MEQ/L
ANION GAP SERPL CALC-SCNC: 12 MEQ/L
ANION GAP SERPL CALC-SCNC: 12 MEQ/L
ANION GAP SERPL CALC-SCNC: 13 MEQ/L
ANION GAP SERPL CALC-SCNC: 13 MEQ/L
ANION GAP SERPL CALC-SCNC: 14 MEQ/L
ANION GAP SERPL CALC-SCNC: 15 MMOL/L (ref 8–16)
ANION GAP SERPL CALC-SCNC: 18 MMOL/L (ref 8–16)
ANION GAP SERPL CALC-SCNC: 5 MEQ/L
ANION GAP SERPL CALC-SCNC: 7 MEQ/L
ANION GAP SERPL CALC-SCNC: 8 MEQ/L
ANION GAP SERPL CALC-SCNC: 9 MEQ/L
ANISOCYTOSIS BLD QL SMEAR: ABNORMAL
APPEARANCE UR: ABNORMAL
APPEARANCE UR: ABNORMAL
APTT PPP: 35.4 SECONDS (ref 23.2–33.7)
APTT PPP: 35.6 SECONDS (ref 23.2–33.7)
APTT PPP: 35.7 SECONDS (ref 23.2–33.7)
APTT PPP: 36.9 SECONDS (ref 23.2–33.7)
APTT PPP: 37.1 SECONDS (ref 23.2–33.7)
AST SERPL-CCNC: 13 UNIT/L (ref 5–34)
AST SERPL-CCNC: 13 UNIT/L (ref 5–34)
AST SERPL-CCNC: 14 UNIT/L (ref 5–34)
AST SERPL-CCNC: 14 UNIT/L (ref 5–34)
AST SERPL-CCNC: 15 UNIT/L (ref 5–34)
AST SERPL-CCNC: 17 UNIT/L (ref 5–34)
AST SERPL-CCNC: 19 UNIT/L (ref 5–34)
AST SERPL-CCNC: 20 UNIT/L (ref 5–34)
AST SERPL-CCNC: 21 UNIT/L (ref 5–34)
AST SERPL-CCNC: 21 UNIT/L (ref 5–34)
AST SERPL-CCNC: 22 UNIT/L (ref 5–34)
AST SERPL-CCNC: 22 UNIT/L (ref 5–34)
AST SERPL-CCNC: 26 UNIT/L (ref 5–34)
AST SERPL-CCNC: 27 UNIT/L (ref 5–34)
AST SERPL-CCNC: 28 UNIT/L (ref 5–34)
AST SERPL-CCNC: 28 UNIT/L (ref 5–34)
AST SERPL-CCNC: 29 UNIT/L (ref 5–34)
AST SERPL-CCNC: 30 UNIT/L (ref 5–34)
AST SERPL-CCNC: 31 UNIT/L (ref 5–34)
AST SERPL-CCNC: 31 UNIT/L (ref 5–34)
AST SERPL-CCNC: 32 UNIT/L (ref 5–34)
AST SERPL-CCNC: 32 UNIT/L (ref 5–34)
AST SERPL-CCNC: 33 UNIT/L (ref 5–34)
AST SERPL-CCNC: 34 UNIT/L (ref 5–34)
AST SERPL-CCNC: 36 UNIT/L (ref 5–34)
AST SERPL-CCNC: 36 UNIT/L (ref 5–34)
AST SERPL-CCNC: 37 UNIT/L (ref 5–34)
AST SERPL-CCNC: 38 UNIT/L (ref 5–34)
AST SERPL-CCNC: 41 UNIT/L (ref 5–34)
AST SERPL-CCNC: 41 UNIT/L (ref 5–34)
AST SERPL-CCNC: 46 UNIT/L (ref 5–34)
AST SERPL-CCNC: 55 UNIT/L (ref 5–34)
AST SERPL-CCNC: 77 UNIT/L (ref 5–34)
AST SERPL-CCNC: 89 UNIT/L (ref 5–34)
AST SERPL-CCNC: 93 UNIT/L (ref 5–34)
AV INDEX (PROSTH): 0.59
AV MEAN GRADIENT: 14 MMHG
AV PEAK GRADIENT: 26 MMHG
AV VALVE AREA: 2.05 CM2
AV VELOCITY RATIO: 0.5
B-OH-BUTYR SERPL-MCNC: 1.3 MMOL/L
BACTERIA #/AREA URNS AUTO: ABNORMAL /HPF
BACTERIA #/AREA URNS AUTO: ABNORMAL /HPF
BACTERIA ASPIRATE CULT: NORMAL
BACTERIA BLD CULT: ABNORMAL
BACTERIA BLD CULT: NORMAL
BACTERIA FLD CULT: ABNORMAL
BACTERIA FLD CULT: ABNORMAL
BACTERIA FLD CULT: NORMAL
BACTERIA SPEC ANAEROBE CULT: NORMAL
BACTERIA SPEC CULT: ABNORMAL
BACTERIA UR CULT: NO GROWTH
BASE EXCESS ARTERIAL: -6.4 MMOL/L (ref -2–2)
BASOPHILS # BLD AUTO: 0 X10(3)/MCL (ref 0–0.2)
BASOPHILS # BLD AUTO: 0.01 X10(3)/MCL (ref 0–0.2)
BASOPHILS # BLD AUTO: 0.02 X10(3)/MCL (ref 0–0.2)
BASOPHILS # BLD AUTO: 0.03 X10(3)/MCL (ref 0–0.2)
BASOPHILS # BLD AUTO: 0.04 X10(3)/MCL (ref 0–0.2)
BASOPHILS # BLD AUTO: 0.05 X10(3)/MCL (ref 0–0.2)
BASOPHILS # BLD AUTO: 0.06 X10(3)/MCL (ref 0–0.2)
BASOPHILS # BLD AUTO: 0.07 X10(3)/MCL (ref 0–0.2)
BASOPHILS # BLD AUTO: 0.09 X10(3)/MCL (ref 0–0.2)
BASOPHILS # BLD AUTO: 0.09 X10(3)/MCL (ref 0–0.2)
BASOPHILS # BLD AUTO: 0.1 X10(3)/MCL (ref 0–0.2)
BASOPHILS # BLD AUTO: 0.11 X10(3)/MCL (ref 0–0.2)
BASOPHILS # BLD AUTO: 0.11 X10(3)/MCL (ref 0–0.2)
BASOPHILS # BLD AUTO: 0.13 X10(3)/MCL (ref 0–0.2)
BASOPHILS # BLD AUTO: 0.13 X10(3)/MCL (ref 0–0.2)
BASOPHILS # BLD AUTO: 0.14 X10(3)/MCL (ref 0–0.2)
BASOPHILS NFR BLD AUTO: 0 %
BASOPHILS NFR BLD AUTO: 0.1 %
BASOPHILS NFR BLD AUTO: 0.2 %
BASOPHILS NFR BLD AUTO: 0.3 %
BASOPHILS NFR BLD AUTO: 0.4 %
BASOPHILS NFR BLD AUTO: 0.5 %
BASOPHILS NFR BLD AUTO: 0.6 %
BASOPHILS NFR BLD AUTO: 0.6 %
BASOPHILS NFR BLD AUTO: 0.9 %
BASOPHILS NFR BLD MANUAL: 0.09 X10(3)/MCL (ref 0–0.2)
BASOPHILS NFR BLD MANUAL: 0.22 X10(3)/MCL (ref 0–0.2)
BASOPHILS NFR BLD MANUAL: 0.24 X10(3)/MCL (ref 0–0.2)
BASOPHILS NFR BLD MANUAL: 1 %
BASOPHILS NFR BLD MANUAL: 1 %
BASOPHILS NFR BLD MANUAL: 1 % (ref 0–2)
BILIRUB UR QL STRIP.AUTO: ABNORMAL MG/DL
BILIRUB UR QL STRIP.AUTO: ABNORMAL MG/DL
BILIRUBIN DIRECT+TOT PNL SERPL-MCNC: 0.1 MG/DL (ref 0–0.5)
BILIRUBIN DIRECT+TOT PNL SERPL-MCNC: 0.2 MG/DL
BILIRUBIN DIRECT+TOT PNL SERPL-MCNC: 0.2 MG/DL (ref 0–0.8)
BILIRUBIN DIRECT+TOT PNL SERPL-MCNC: 0.3 MG/DL
BILIRUBIN DIRECT+TOT PNL SERPL-MCNC: 0.4 MG/DL
BILIRUBIN DIRECT+TOT PNL SERPL-MCNC: 0.5 MG/DL
BILIRUBIN DIRECT+TOT PNL SERPL-MCNC: 0.6 MG/DL
BILIRUBIN DIRECT+TOT PNL SERPL-MCNC: 0.7 MG/DL
BILIRUBIN DIRECT+TOT PNL SERPL-MCNC: 0.7 MG/DL
BILIRUBIN DIRECT+TOT PNL SERPL-MCNC: 0.8 MG/DL
BILIRUBIN DIRECT+TOT PNL SERPL-MCNC: 0.9 MG/DL
BILIRUBIN DIRECT+TOT PNL SERPL-MCNC: 1 MG/DL
BILIRUBIN DIRECT+TOT PNL SERPL-MCNC: 1.1 MG/DL
BILIRUBIN DIRECT+TOT PNL SERPL-MCNC: 1.2 MG/DL
BILIRUBIN DIRECT+TOT PNL SERPL-MCNC: 1.2 MG/DL
BILIRUBIN DIRECT+TOT PNL SERPL-MCNC: 1.3 MG/DL
BILIRUBIN DIRECT+TOT PNL SERPL-MCNC: 1.4 MG/DL
BLD PROD TYP BPU: NORMAL
BLOOD UNIT EXPIRATION DATE: NORMAL
BLOOD UNIT TYPE CODE: 5100
BLOOD UNIT TYPE CODE: 600
BLOOD UNIT TYPE CODE: 6200
BLOOD UNIT TYPE CODE: 9500
BNP BLD-MCNC: 2684.7 PG/ML
BODY FLD TYPE: NORMAL
BSA FOR ECHO PROCEDURE: 1.66 M2
BSA FOR ECHO PROCEDURE: 1.67 M2
BUN SERPL-MCNC: 102.8 MG/DL (ref 8.9–20.6)
BUN SERPL-MCNC: 13.9 MG/DL (ref 8.9–20.6)
BUN SERPL-MCNC: 17.1 MG/DL (ref 8.9–20.6)
BUN SERPL-MCNC: 19.8 MG/DL (ref 8.9–20.6)
BUN SERPL-MCNC: 21.5 MG/DL (ref 8.9–20.6)
BUN SERPL-MCNC: 22.8 MG/DL (ref 8.9–20.6)
BUN SERPL-MCNC: 24 MG/DL (ref 6–30)
BUN SERPL-MCNC: 24.2 MG/DL (ref 8.9–20.6)
BUN SERPL-MCNC: 27 MG/DL (ref 8.9–20.6)
BUN SERPL-MCNC: 28.4 MG/DL (ref 8.9–20.6)
BUN SERPL-MCNC: 31.6 MG/DL (ref 8.9–20.6)
BUN SERPL-MCNC: 31.8 MG/DL (ref 8.9–20.6)
BUN SERPL-MCNC: 32.2 MG/DL (ref 8.9–20.6)
BUN SERPL-MCNC: 35 MG/DL (ref 8.9–20.6)
BUN SERPL-MCNC: 37.9 MG/DL (ref 8.9–20.6)
BUN SERPL-MCNC: 39.5 MG/DL (ref 8.9–20.6)
BUN SERPL-MCNC: 40.1 MG/DL (ref 8.9–20.6)
BUN SERPL-MCNC: 40.9 MG/DL (ref 8.9–20.6)
BUN SERPL-MCNC: 42.9 MG/DL (ref 8.9–20.6)
BUN SERPL-MCNC: 43.5 MG/DL (ref 8.9–20.6)
BUN SERPL-MCNC: 44.4 MG/DL (ref 8.9–20.6)
BUN SERPL-MCNC: 44.4 MG/DL (ref 8.9–20.6)
BUN SERPL-MCNC: 46.8 MG/DL (ref 8.9–20.6)
BUN SERPL-MCNC: 47.3 MG/DL (ref 8.9–20.6)
BUN SERPL-MCNC: 47.6 MG/DL (ref 8.9–20.6)
BUN SERPL-MCNC: 48.7 MG/DL (ref 8.9–20.6)
BUN SERPL-MCNC: 48.7 MG/DL (ref 8.9–20.6)
BUN SERPL-MCNC: 49 MG/DL (ref 8.9–20.6)
BUN SERPL-MCNC: 49.7 MG/DL (ref 8.9–20.6)
BUN SERPL-MCNC: 50 MG/DL (ref 6–30)
BUN SERPL-MCNC: 50.7 MG/DL (ref 8.9–20.6)
BUN SERPL-MCNC: 50.9 MG/DL (ref 8.9–20.6)
BUN SERPL-MCNC: 51.8 MG/DL (ref 8.9–20.6)
BUN SERPL-MCNC: 53.9 MG/DL (ref 8.9–20.6)
BUN SERPL-MCNC: 55.2 MG/DL (ref 8.9–20.6)
BUN SERPL-MCNC: 55.6 MG/DL (ref 8.9–20.6)
BUN SERPL-MCNC: 56.1 MG/DL (ref 8.9–20.6)
BUN SERPL-MCNC: 56.7 MG/DL (ref 8.9–20.6)
BUN SERPL-MCNC: 57.3 MG/DL (ref 8.9–20.6)
BUN SERPL-MCNC: 58 MG/DL (ref 8.9–20.6)
BUN SERPL-MCNC: 58.4 MG/DL (ref 8.9–20.6)
BUN SERPL-MCNC: 58.8 MG/DL (ref 8.9–20.6)
BUN SERPL-MCNC: 61.2 MG/DL (ref 8.9–20.6)
BUN SERPL-MCNC: 61.5 MG/DL (ref 8.9–20.6)
BUN SERPL-MCNC: 62.9 MG/DL (ref 8.9–20.6)
BUN SERPL-MCNC: 63 MG/DL (ref 8.9–20.6)
BUN SERPL-MCNC: 63.1 MG/DL (ref 8.9–20.6)
BUN SERPL-MCNC: 64.2 MG/DL (ref 8.9–20.6)
BUN SERPL-MCNC: 65.1 MG/DL (ref 8.9–20.6)
BUN SERPL-MCNC: 67.9 MG/DL (ref 8.9–20.6)
BUN SERPL-MCNC: 68.1 MG/DL (ref 8.9–20.6)
BUN SERPL-MCNC: 68.6 MG/DL (ref 8.9–20.6)
BUN SERPL-MCNC: 68.8 MG/DL (ref 8.9–20.6)
BUN SERPL-MCNC: 69.7 MG/DL (ref 8.9–20.6)
BUN SERPL-MCNC: 70.3 MG/DL (ref 8.9–20.6)
BUN SERPL-MCNC: 73.5 MG/DL (ref 8.9–20.6)
BUN SERPL-MCNC: 74.7 MG/DL (ref 8.9–20.6)
BUN SERPL-MCNC: 79.4 MG/DL (ref 8.9–20.6)
BUN SERPL-MCNC: 79.4 MG/DL (ref 8.9–20.6)
BUN SERPL-MCNC: 79.7 MG/DL (ref 8.9–20.6)
BUN SERPL-MCNC: 81.4 MG/DL (ref 8.9–20.6)
BUN SERPL-MCNC: 84.9 MG/DL (ref 8.9–20.6)
BUN SERPL-MCNC: 90.6 MG/DL (ref 8.9–20.6)
BUN SERPL-MCNC: 93.1 MG/DL (ref 8.9–20.6)
BURR CELLS (OLG): ABNORMAL
C3 SERPL-MCNC: 60 MG/DL (ref 80–173)
C4 SERPL-MCNC: 23 MG/DL (ref 13–46)
CALCIUM SERPL-MCNC: 4.7 MG/DL (ref 8.4–10.2)
CALCIUM SERPL-MCNC: 5.9 MG/DL (ref 8.4–10.2)
CALCIUM SERPL-MCNC: 6.1 MG/DL (ref 8.4–10.2)
CALCIUM SERPL-MCNC: 6.3 MG/DL (ref 8.4–10.2)
CALCIUM SERPL-MCNC: 6.4 MG/DL (ref 8.4–10.2)
CALCIUM SERPL-MCNC: 7.1 MG/DL (ref 8.4–10.2)
CALCIUM SERPL-MCNC: 7.3 MG/DL (ref 8.4–10.2)
CALCIUM SERPL-MCNC: 7.5 MG/DL (ref 8.4–10.2)
CALCIUM SERPL-MCNC: 7.6 MG/DL (ref 8.4–10.2)
CALCIUM SERPL-MCNC: 7.7 MG/DL (ref 8.4–10.2)
CALCIUM SERPL-MCNC: 7.8 MG/DL (ref 8.4–10.2)
CALCIUM SERPL-MCNC: 7.9 MG/DL (ref 8.4–10.2)
CALCIUM SERPL-MCNC: 8 MG/DL (ref 8.4–10.2)
CALCIUM SERPL-MCNC: 8.1 MG/DL (ref 8.4–10.2)
CALCIUM SERPL-MCNC: 8.2 MG/DL (ref 8.4–10.2)
CALCIUM SERPL-MCNC: 8.3 MG/DL (ref 8.4–10.2)
CALCIUM SERPL-MCNC: 8.4 MG/DL (ref 8.4–10.2)
CALCIUM SERPL-MCNC: 8.4 MG/DL (ref 8.4–10.2)
CALCIUM SERPL-MCNC: 8.5 MG/DL (ref 8.4–10.2)
CALCIUM SERPL-MCNC: 8.6 MG/DL (ref 8.4–10.2)
CALCIUM SERPL-MCNC: 8.8 MG/DL (ref 8.4–10.2)
CALCIUM SERPL-MCNC: 9 MG/DL (ref 8.4–10.2)
CHLORIDE SERPL-SCNC: 100 MMOL/L (ref 98–107)
CHLORIDE SERPL-SCNC: 100 MMOL/L (ref 98–107)
CHLORIDE SERPL-SCNC: 104 MMOL/L (ref 98–107)
CHLORIDE SERPL-SCNC: 105 MMOL/L (ref 98–107)
CHLORIDE SERPL-SCNC: 106 MMOL/L (ref 98–107)
CHLORIDE SERPL-SCNC: 107 MMOL/L (ref 95–110)
CHLORIDE SERPL-SCNC: 108 MMOL/L (ref 98–107)
CHLORIDE SERPL-SCNC: 108 MMOL/L (ref 98–107)
CHLORIDE SERPL-SCNC: 89 MMOL/L (ref 98–107)
CHLORIDE SERPL-SCNC: 90 MMOL/L (ref 98–107)
CHLORIDE SERPL-SCNC: 90 MMOL/L (ref 98–107)
CHLORIDE SERPL-SCNC: 91 MMOL/L (ref 98–107)
CHLORIDE SERPL-SCNC: 92 MMOL/L (ref 98–107)
CHLORIDE SERPL-SCNC: 93 MMOL/L (ref 98–107)
CHLORIDE SERPL-SCNC: 94 MMOL/L (ref 95–110)
CHLORIDE SERPL-SCNC: 94 MMOL/L (ref 98–107)
CHLORIDE SERPL-SCNC: 95 MMOL/L (ref 98–107)
CHLORIDE SERPL-SCNC: 96 MMOL/L (ref 98–107)
CHLORIDE SERPL-SCNC: 97 MMOL/L (ref 98–107)
CHLORIDE SERPL-SCNC: 98 MMOL/L (ref 98–107)
CHLORIDE SERPL-SCNC: 99 MMOL/L (ref 98–107)
CHOLEST SERPL-MCNC: 57 MG/DL
CHOLEST/HDLC SERPL: 11 {RATIO} (ref 0–5)
CLARITY BODY FLUID (OHS): CLEAR
CO2 SERPL-SCNC: 14 MMOL/L (ref 22–29)
CO2 SERPL-SCNC: 15 MMOL/L (ref 22–29)
CO2 SERPL-SCNC: 16 MMOL/L (ref 22–29)
CO2 SERPL-SCNC: 16 MMOL/L (ref 22–29)
CO2 SERPL-SCNC: 17 MMOL/L (ref 22–29)
CO2 SERPL-SCNC: 18 MMOL/L (ref 22–29)
CO2 SERPL-SCNC: 19 MMOL/L (ref 22–29)
CO2 SERPL-SCNC: 20 MMOL/L (ref 22–29)
CO2 SERPL-SCNC: 21 MMOL/L (ref 22–29)
CO2 SERPL-SCNC: 22 MMOL/L (ref 22–29)
CO2 SERPL-SCNC: 23 MMOL/L (ref 22–29)
CO2 SERPL-SCNC: 24 MMOL/L (ref 22–29)
CO2 SERPL-SCNC: 25 MMOL/L (ref 22–29)
CO2 SERPL-SCNC: 26 MMOL/L (ref 22–29)
CO2 SERPL-SCNC: 27 MMOL/L (ref 22–29)
CO2 SERPL-SCNC: 27 MMOL/L (ref 22–29)
CO2 SERPL-SCNC: 28 MMOL/L (ref 22–29)
CO2 SERPL-SCNC: 29 MMOL/L (ref 22–29)
CO2 SERPL-SCNC: 8 MMOL/L (ref 22–29)
COLOR BODY FLUID (OHS): YELLOW
COLOR UR AUTO: ABNORMAL
COLOR UR AUTO: ABNORMAL
CORRECTED TEMPERATURE (PCO2): 30 MMHG (ref 35–45)
CORRECTED TEMPERATURE (PCO2): 31 MMHG (ref 35–45)
CORRECTED TEMPERATURE (PCO2): 33 MMHG (ref 35–45)
CORRECTED TEMPERATURE (PCO2): 37 MMHG (ref 35–45)
CORRECTED TEMPERATURE (PCO2): 37 MMHG (ref 35–45)
CORRECTED TEMPERATURE (PCO2): 44 MMHG (ref 35–45)
CORRECTED TEMPERATURE (PH): 7.22 (ref 7.29–7.61)
CORRECTED TEMPERATURE (PH): 7.41 (ref 7.35–7.45)
CORRECTED TEMPERATURE (PH): 7.43 (ref 7.35–7.45)
CORRECTED TEMPERATURE (PH): 7.44 (ref 7.35–7.45)
CORRECTED TEMPERATURE (PH): 7.48 (ref 7.35–7.45)
CORRECTED TEMPERATURE (PH): 7.54 (ref 7.35–7.45)
CORRECTED TEMPERATURE (PO2): 191 MMHG (ref 80–100)
CORRECTED TEMPERATURE (PO2): 55 MMHG (ref 80–100)
CORRECTED TEMPERATURE (PO2): 70 MMHG (ref 80–100)
CORRECTED TEMPERATURE (PO2): 72 MMHG (ref 80–100)
CORRECTED TEMPERATURE (PO2): 73 MMHG (ref 80–100)
CORRECTED TEMPERATURE (PO2): 75 MMHG (ref 80–100)
CREAT SERPL-MCNC: 0.95 MG/DL (ref 0.73–1.18)
CREAT SERPL-MCNC: 1.19 MG/DL (ref 0.73–1.18)
CREAT SERPL-MCNC: 1.31 MG/DL (ref 0.73–1.18)
CREAT SERPL-MCNC: 1.42 MG/DL (ref 0.73–1.18)
CREAT SERPL-MCNC: 1.43 MG/DL (ref 0.73–1.18)
CREAT SERPL-MCNC: 1.43 MG/DL (ref 0.73–1.18)
CREAT SERPL-MCNC: 1.46 MG/DL (ref 0.73–1.18)
CREAT SERPL-MCNC: 1.46 MG/DL (ref 0.73–1.18)
CREAT SERPL-MCNC: 1.52 MG/DL (ref 0.73–1.18)
CREAT SERPL-MCNC: 1.54 MG/DL (ref 0.73–1.18)
CREAT SERPL-MCNC: 1.59 MG/DL (ref 0.73–1.18)
CREAT SERPL-MCNC: 1.6 MG/DL (ref 0.5–1.4)
CREAT SERPL-MCNC: 1.6 MG/DL (ref 0.73–1.18)
CREAT SERPL-MCNC: 1.62 MG/DL (ref 0.73–1.18)
CREAT SERPL-MCNC: 1.67 MG/DL (ref 0.73–1.18)
CREAT SERPL-MCNC: 1.68 MG/DL (ref 0.73–1.18)
CREAT SERPL-MCNC: 1.71 MG/DL (ref 0.73–1.18)
CREAT SERPL-MCNC: 1.74 MG/DL (ref 0.73–1.18)
CREAT SERPL-MCNC: 1.74 MG/DL (ref 0.73–1.18)
CREAT SERPL-MCNC: 1.78 MG/DL (ref 0.73–1.18)
CREAT SERPL-MCNC: 1.8 MG/DL (ref 0.73–1.18)
CREAT SERPL-MCNC: 1.86 MG/DL (ref 0.73–1.18)
CREAT SERPL-MCNC: 1.91 MG/DL (ref 0.73–1.18)
CREAT SERPL-MCNC: 1.92 MG/DL (ref 0.73–1.18)
CREAT SERPL-MCNC: 2.11 MG/DL (ref 0.73–1.18)
CREAT SERPL-MCNC: 2.17 MG/DL (ref 0.73–1.18)
CREAT SERPL-MCNC: 2.22 MG/DL (ref 0.73–1.18)
CREAT SERPL-MCNC: 2.26 MG/DL (ref 0.73–1.18)
CREAT SERPL-MCNC: 2.3 MG/DL (ref 0.5–1.4)
CREAT SERPL-MCNC: 2.32 MG/DL (ref 0.73–1.18)
CREAT SERPL-MCNC: 2.35 MG/DL (ref 0.73–1.18)
CREAT SERPL-MCNC: 2.37 MG/DL (ref 0.73–1.18)
CREAT SERPL-MCNC: 2.39 MG/DL (ref 0.73–1.18)
CREAT SERPL-MCNC: 2.76 MG/DL (ref 0.73–1.18)
CREAT SERPL-MCNC: 2.76 MG/DL (ref 0.73–1.18)
CREAT SERPL-MCNC: 2.78 MG/DL (ref 0.73–1.18)
CREAT SERPL-MCNC: 2.82 MG/DL (ref 0.73–1.18)
CREAT SERPL-MCNC: 2.95 MG/DL (ref 0.73–1.18)
CREAT SERPL-MCNC: 3.01 MG/DL (ref 0.73–1.18)
CREAT SERPL-MCNC: 3.05 MG/DL (ref 0.73–1.18)
CREAT SERPL-MCNC: 3.06 MG/DL (ref 0.73–1.18)
CREAT SERPL-MCNC: 3.18 MG/DL (ref 0.73–1.18)
CREAT SERPL-MCNC: 3.28 MG/DL (ref 0.73–1.18)
CREAT SERPL-MCNC: 3.29 MG/DL (ref 0.73–1.18)
CREAT SERPL-MCNC: 3.59 MG/DL (ref 0.73–1.18)
CREAT SERPL-MCNC: 3.75 MG/DL (ref 0.73–1.18)
CREAT SERPL-MCNC: 3.93 MG/DL (ref 0.73–1.18)
CREAT SERPL-MCNC: 4.06 MG/DL (ref 0.73–1.18)
CREAT SERPL-MCNC: 4.07 MG/DL (ref 0.73–1.18)
CREAT SERPL-MCNC: 4.07 MG/DL (ref 0.73–1.18)
CREAT SERPL-MCNC: 4.14 MG/DL (ref 0.73–1.18)
CREAT SERPL-MCNC: 4.44 MG/DL (ref 0.73–1.18)
CREAT SERPL-MCNC: 4.45 MG/DL (ref 0.73–1.18)
CREAT SERPL-MCNC: 4.45 MG/DL (ref 0.73–1.18)
CREAT SERPL-MCNC: 4.57 MG/DL (ref 0.73–1.18)
CREAT SERPL-MCNC: 4.83 MG/DL (ref 0.73–1.18)
CREAT SERPL-MCNC: 5.16 MG/DL (ref 0.73–1.18)
CREAT SERPL-MCNC: 5.5 MG/DL (ref 0.73–1.18)
CREAT UR-MCNC: 138.2 MG/DL (ref 63–166)
CREAT/UREA NIT SERPL: 10
CREAT/UREA NIT SERPL: 15
CREAT/UREA NIT SERPL: 16
CREAT/UREA NIT SERPL: 20
CREAT/UREA NIT SERPL: 21
CREAT/UREA NIT SERPL: 22
CREAT/UREA NIT SERPL: 22
CREAT/UREA NIT SERPL: 24
CREAT/UREA NIT SERPL: 25
CREAT/UREA NIT SERPL: 27
CREAT/UREA NIT SERPL: 29
CREAT/UREA NIT SERPL: 30
CREAT/UREA NIT SERPL: 31
CREAT/UREA NIT SERPL: 36
CREAT/UREA NIT SERPL: 40
CREAT/UREA NIT SERPL: 42
CROSSMATCH INTERPRETATION: NORMAL
CRP SERPL-MCNC: 110.2 MG/L
CRP SERPL-MCNC: 132.2 MG/L
CRP SERPL-MCNC: 183.6 MG/L
CRP SERPL-MCNC: 58.7 MG/L
CRP SERPL-MCNC: >240 MG/L
CV ECHO LV RWT: 0.89 CM
CV ECHO LV RWT: 1.22 CM
DISPENSE STATUS: NORMAL
DOP CALC AO PEAK VEL: 2.56 M/S
DOP CALC AO VTI: 35.5 CM
DOP CALC LVOT AREA: 3.5 CM2
DOP CALC LVOT AREA: 3.5 CM2
DOP CALC LVOT DIAMETER: 2.1 CM
DOP CALC LVOT DIAMETER: 2.1 CM
DOP CALC LVOT PEAK VEL: 1.29 M/S
DOP CALC LVOT STROKE VOLUME: 72.7 CM3
DOP CALC MV VTI: 26.2 CM
DOP CALCLVOT PEAK VEL VTI: 21 CM
DSDNA ANTIBODY (OHS): POSITIVE
E WAVE DECELERATION TIME: 172 MSEC
E/A RATIO: 1.43
E/E' RATIO: 22 M/S
ECHO LV POSTERIOR WALL: 1.76 CM (ref 0.6–1.1)
ECHO LV POSTERIOR WALL: 2.57 CM (ref 0.6–1.1)
EJECTION FRACTION: 37 %
EJECTION FRACTION: 55 %
EOSINOPHIL # BLD AUTO: 0 X10(3)/MCL (ref 0–0.9)
EOSINOPHIL # BLD AUTO: 0.01 X10(3)/MCL (ref 0–0.9)
EOSINOPHIL # BLD AUTO: 0.02 X10(3)/MCL (ref 0–0.9)
EOSINOPHIL # BLD AUTO: 0.03 X10(3)/MCL (ref 0–0.9)
EOSINOPHIL # BLD AUTO: 0.04 X10(3)/MCL (ref 0–0.9)
EOSINOPHIL # BLD AUTO: 0.05 X10(3)/MCL (ref 0–0.9)
EOSINOPHIL # BLD AUTO: 0.05 X10(3)/MCL (ref 0–0.9)
EOSINOPHIL # BLD AUTO: 0.06 X10(3)/MCL (ref 0–0.9)
EOSINOPHIL # BLD AUTO: 0.22 X10(3)/MCL (ref 0–0.9)
EOSINOPHIL # BLD AUTO: 0.3 X10(3)/MCL (ref 0–0.9)
EOSINOPHIL # BLD AUTO: 0.43 X10(3)/MCL (ref 0–0.9)
EOSINOPHIL NFR BLD AUTO: 0 %
EOSINOPHIL NFR BLD AUTO: 0.1 %
EOSINOPHIL NFR BLD AUTO: 0.2 %
EOSINOPHIL NFR BLD AUTO: 0.3 %
EOSINOPHIL NFR BLD AUTO: 0.4 %
EOSINOPHIL NFR BLD AUTO: 0.4 %
EOSINOPHIL NFR BLD AUTO: 0.8 %
EOSINOPHIL NFR BLD AUTO: 1.2 %
EOSINOPHIL NFR BLD AUTO: 5.3 %
EOSINOPHIL NFR BLD MANUAL: 0.14 X10(3)/MCL (ref 0–0.9)
EOSINOPHIL NFR BLD MANUAL: 0.23 X10(3)/MCL (ref 0–0.9)
EOSINOPHIL NFR BLD MANUAL: 0.24 X10(3)/MCL (ref 0–0.9)
EOSINOPHIL NFR BLD MANUAL: 0.64 X10(3)/MCL (ref 0–0.9)
EOSINOPHIL NFR BLD MANUAL: 0.9 X10(3)/MCL (ref 0–0.9)
EOSINOPHIL NFR BLD MANUAL: 1 %
EOSINOPHIL NFR BLD MANUAL: 4 %
EOSINOPHIL NFR BLD MANUAL: 7 % (ref 0–8)
ERYTHROCYTE [DISTWIDTH] IN BLOOD BY AUTOMATED COUNT: 14.2 % (ref 11.5–17)
ERYTHROCYTE [DISTWIDTH] IN BLOOD BY AUTOMATED COUNT: 14.6 % (ref 11.5–17)
ERYTHROCYTE [DISTWIDTH] IN BLOOD BY AUTOMATED COUNT: 14.6 % (ref 11.5–17)
ERYTHROCYTE [DISTWIDTH] IN BLOOD BY AUTOMATED COUNT: 14.9 % (ref 11.5–17)
ERYTHROCYTE [DISTWIDTH] IN BLOOD BY AUTOMATED COUNT: 15 % (ref 11.5–17)
ERYTHROCYTE [DISTWIDTH] IN BLOOD BY AUTOMATED COUNT: 15 % (ref 11.5–17)
ERYTHROCYTE [DISTWIDTH] IN BLOOD BY AUTOMATED COUNT: 15.1 % (ref 11.5–17)
ERYTHROCYTE [DISTWIDTH] IN BLOOD BY AUTOMATED COUNT: 15.2 % (ref 11.5–17)
ERYTHROCYTE [DISTWIDTH] IN BLOOD BY AUTOMATED COUNT: 15.3 % (ref 11.5–17)
ERYTHROCYTE [DISTWIDTH] IN BLOOD BY AUTOMATED COUNT: 15.4 % (ref 11.5–17)
ERYTHROCYTE [DISTWIDTH] IN BLOOD BY AUTOMATED COUNT: 15.5 % (ref 11.5–17)
ERYTHROCYTE [DISTWIDTH] IN BLOOD BY AUTOMATED COUNT: 15.6 % (ref 11.5–17)
ERYTHROCYTE [DISTWIDTH] IN BLOOD BY AUTOMATED COUNT: 15.8 % (ref 11.5–17)
ERYTHROCYTE [DISTWIDTH] IN BLOOD BY AUTOMATED COUNT: 15.9 % (ref 11.5–17)
ERYTHROCYTE [DISTWIDTH] IN BLOOD BY AUTOMATED COUNT: 16 % (ref 11.5–17)
ERYTHROCYTE [DISTWIDTH] IN BLOOD BY AUTOMATED COUNT: 16 % (ref 11.5–17)
ERYTHROCYTE [DISTWIDTH] IN BLOOD BY AUTOMATED COUNT: 16.1 % (ref 11.5–17)
ERYTHROCYTE [DISTWIDTH] IN BLOOD BY AUTOMATED COUNT: 16.2 % (ref 11.5–17)
ERYTHROCYTE [DISTWIDTH] IN BLOOD BY AUTOMATED COUNT: 16.4 % (ref 11.5–17)
ERYTHROCYTE [DISTWIDTH] IN BLOOD BY AUTOMATED COUNT: 16.4 % (ref 11.5–17)
ERYTHROCYTE [DISTWIDTH] IN BLOOD BY AUTOMATED COUNT: 16.5 % (ref 11.5–17)
ERYTHROCYTE [DISTWIDTH] IN BLOOD BY AUTOMATED COUNT: 16.7 % (ref 11.5–17)
ERYTHROCYTE [DISTWIDTH] IN BLOOD BY AUTOMATED COUNT: 16.8 % (ref 11.5–17)
ERYTHROCYTE [DISTWIDTH] IN BLOOD BY AUTOMATED COUNT: 17.1 % (ref 11.5–17)
ERYTHROCYTE [DISTWIDTH] IN BLOOD BY AUTOMATED COUNT: 17.4 % (ref 11.5–17)
ERYTHROCYTE [DISTWIDTH] IN BLOOD BY AUTOMATED COUNT: 17.5 % (ref 11.5–17)
ERYTHROCYTE [DISTWIDTH] IN BLOOD BY AUTOMATED COUNT: 17.6 % (ref 11.5–17)
ERYTHROCYTE [DISTWIDTH] IN BLOOD BY AUTOMATED COUNT: 17.7 % (ref 11.5–17)
ERYTHROCYTE [DISTWIDTH] IN BLOOD BY AUTOMATED COUNT: 17.8 % (ref 11.5–17)
ERYTHROCYTE [DISTWIDTH] IN BLOOD BY AUTOMATED COUNT: 18 % (ref 11.5–17)
ERYTHROCYTE [DISTWIDTH] IN BLOOD BY AUTOMATED COUNT: 18 % (ref 11.5–17)
ERYTHROCYTE [DISTWIDTH] IN BLOOD BY AUTOMATED COUNT: 18.1 % (ref 11.5–17)
ERYTHROCYTE [DISTWIDTH] IN BLOOD BY AUTOMATED COUNT: 18.1 % (ref 11.5–17)
ERYTHROCYTE [DISTWIDTH] IN BLOOD BY AUTOMATED COUNT: 18.2 % (ref 11.5–17)
ERYTHROCYTE [DISTWIDTH] IN BLOOD BY AUTOMATED COUNT: 18.3 % (ref 11.5–17)
ERYTHROCYTE [DISTWIDTH] IN BLOOD BY AUTOMATED COUNT: 20.8 % (ref 11.5–17)
ERYTHROCYTE [DISTWIDTH] IN BLOOD BY AUTOMATED COUNT: 20.9 % (ref 11.5–17)
ERYTHROCYTE [SEDIMENTATION RATE] IN BLOOD: 14 MM/HR (ref 0–15)
ERYTHROCYTE [SEDIMENTATION RATE] IN BLOOD: 5 MM/HR (ref 0–15)
ERYTHROCYTE [SEDIMENTATION RATE] IN BLOOD: 9 MM/HR (ref 0–15)
EST. AVERAGE GLUCOSE BLD GHB EST-MCNC: 122.6 MG/DL
FERRITIN SERPL-MCNC: 3017.64 NG/ML (ref 21.81–274.66)
FLUAV AG UPPER RESP QL IA.RAPID: NOT DETECTED
FLUAV AG UPPER RESP QL IA.RAPID: NOT DETECTED
FLUBV AG UPPER RESP QL IA.RAPID: NOT DETECTED
FLUBV AG UPPER RESP QL IA.RAPID: NOT DETECTED
FOLATE SERPL-MCNC: 15.1 NG/ML (ref 7–31.4)
FRACTIONAL SHORTENING: 18 % (ref 28–44)
FRACTIONAL SHORTENING: 22 % (ref 28–44)
FUNGUS SPEC CULT: NORMAL
GFR SERPLBLD CREATININE-BSD FMLA CKD-EPI: 13 MLS/MIN/1.73/M2
GFR SERPLBLD CREATININE-BSD FMLA CKD-EPI: 14 MLS/MIN/1.73/M2
GFR SERPLBLD CREATININE-BSD FMLA CKD-EPI: 15 MLS/MIN/1.73/M2
GFR SERPLBLD CREATININE-BSD FMLA CKD-EPI: 16 MLS/MIN/1.73/M2
GFR SERPLBLD CREATININE-BSD FMLA CKD-EPI: 17 MLS/MIN/1.73/M2
GFR SERPLBLD CREATININE-BSD FMLA CKD-EPI: 19 MLS/MIN/1.73/M2
GFR SERPLBLD CREATININE-BSD FMLA CKD-EPI: 20 MLS/MIN/1.73/M2
GFR SERPLBLD CREATININE-BSD FMLA CKD-EPI: 21 MLS/MIN/1.73/M2
GFR SERPLBLD CREATININE-BSD FMLA CKD-EPI: 22 MLS/MIN/1.73/M2
GFR SERPLBLD CREATININE-BSD FMLA CKD-EPI: 24 MLS/MIN/1.73/M2
GFR SERPLBLD CREATININE-BSD FMLA CKD-EPI: 25 MLS/MIN/1.73/M2
GFR SERPLBLD CREATININE-BSD FMLA CKD-EPI: 25 MLS/MIN/1.73/M2
GFR SERPLBLD CREATININE-BSD FMLA CKD-EPI: 27 MLS/MIN/1.73/M2
GFR SERPLBLD CREATININE-BSD FMLA CKD-EPI: 28 MLS/MIN/1.73/M2
GFR SERPLBLD CREATININE-BSD FMLA CKD-EPI: 29 MLS/MIN/1.73/M2
GFR SERPLBLD CREATININE-BSD FMLA CKD-EPI: 30 MLS/MIN/1.73/M2
GFR SERPLBLD CREATININE-BSD FMLA CKD-EPI: 36 MLS/MIN/1.73/M2
GFR SERPLBLD CREATININE-BSD FMLA CKD-EPI: 36 MLS/MIN/1.73/M2
GFR SERPLBLD CREATININE-BSD FMLA CKD-EPI: 37 MLS/MIN/1.73/M2
GFR SERPLBLD CREATININE-BSD FMLA CKD-EPI: 37 MLS/MIN/1.73/M2
GFR SERPLBLD CREATININE-BSD FMLA CKD-EPI: 38 MLS/MIN/1.73/M2
GFR SERPLBLD CREATININE-BSD FMLA CKD-EPI: 39 MLS/MIN/1.73/M2
GFR SERPLBLD CREATININE-BSD FMLA CKD-EPI: 40 MLS/MIN/1.73/M2
GFR SERPLBLD CREATININE-BSD FMLA CKD-EPI: 42 MLS/MIN/1.73/M2
GFR SERPLBLD CREATININE-BSD FMLA CKD-EPI: 47 MLS/MIN/1.73/M2
GFR SERPLBLD CREATININE-BSD FMLA CKD-EPI: 47 MLS/MIN/1.73/M2
GFR SERPLBLD CREATININE-BSD FMLA CKD-EPI: 48 MLS/MIN/1.73/M2
GFR SERPLBLD CREATININE-BSD FMLA CKD-EPI: 50 MLS/MIN/1.73/M2
GFR SERPLBLD CREATININE-BSD FMLA CKD-EPI: 51 MLS/MIN/1.73/M2
GFR SERPLBLD CREATININE-BSD FMLA CKD-EPI: 52 MLS/MIN/1.73/M2
GFR SERPLBLD CREATININE-BSD FMLA CKD-EPI: 52 MLS/MIN/1.73/M2
GFR SERPLBLD CREATININE-BSD FMLA CKD-EPI: 54 MLS/MIN/1.73/M2
GFR SERPLBLD CREATININE-BSD FMLA CKD-EPI: 55 MLS/MIN/1.73/M2
GFR SERPLBLD CREATININE-BSD FMLA CKD-EPI: 57 MLS/MIN/1.73/M2
GFR SERPLBLD CREATININE-BSD FMLA CKD-EPI: 58 MLS/MIN/1.73/M2
GFR SERPLBLD CREATININE-BSD FMLA CKD-EPI: >60 MLS/MIN/1.73/M2
GLOBULIN SER-MCNC: 2 GM/DL (ref 2.4–3.5)
GLOBULIN SER-MCNC: 2.2 GM/DL (ref 2.4–3.5)
GLOBULIN SER-MCNC: 2.2 GM/DL (ref 2.4–3.5)
GLOBULIN SER-MCNC: 2.3 GM/DL (ref 2.4–3.5)
GLOBULIN SER-MCNC: 2.4 GM/DL (ref 2.4–3.5)
GLOBULIN SER-MCNC: 2.5 GM/DL (ref 2.4–3.5)
GLOBULIN SER-MCNC: 2.8 GM/DL (ref 2.4–3.5)
GLOBULIN SER-MCNC: 2.9 GM/DL (ref 2.4–3.5)
GLOBULIN SER-MCNC: 3.1 GM/DL (ref 2.4–3.5)
GLOBULIN SER-MCNC: 3.2 GM/DL (ref 2.4–3.5)
GLOBULIN SER-MCNC: 3.3 GM/DL (ref 2.4–3.5)
GLOBULIN SER-MCNC: 3.4 GM/DL (ref 2.4–3.5)
GLOBULIN SER-MCNC: 3.5 GM/DL (ref 2.4–3.5)
GLOBULIN SER-MCNC: 3.6 GM/DL (ref 2.4–3.5)
GLOBULIN SER-MCNC: 3.7 GM/DL (ref 2.4–3.5)
GLOBULIN SER-MCNC: 3.8 GM/DL (ref 2.4–3.5)
GLOBULIN SER-MCNC: 3.8 GM/DL (ref 2.4–3.5)
GLOBULIN SER-MCNC: 3.9 GM/DL (ref 2.4–3.5)
GLOBULIN SER-MCNC: 3.9 GM/DL (ref 2.4–3.5)
GLOBULIN SER-MCNC: 4 GM/DL (ref 2.4–3.5)
GLOBULIN SER-MCNC: 4.2 GM/DL (ref 2.4–3.5)
GLOBULIN SER-MCNC: 4.7 GM/DL (ref 2.4–3.5)
GLUCOSE FLD-MCNC: 92 MG/DL
GLUCOSE SERPL-MCNC: 103 MG/DL (ref 70–110)
GLUCOSE SERPL-MCNC: 106 MG/DL (ref 70–110)
GLUCOSE SERPL-MCNC: 107 MG/DL (ref 74–100)
GLUCOSE SERPL-MCNC: 107 MG/DL (ref 74–100)
GLUCOSE SERPL-MCNC: 109 MG/DL (ref 74–100)
GLUCOSE SERPL-MCNC: 113 MG/DL (ref 70–110)
GLUCOSE SERPL-MCNC: 114 MG/DL (ref 74–100)
GLUCOSE SERPL-MCNC: 122 MG/DL (ref 74–100)
GLUCOSE SERPL-MCNC: 122 MG/DL (ref 74–100)
GLUCOSE SERPL-MCNC: 127 MG/DL (ref 74–100)
GLUCOSE SERPL-MCNC: 139 MG/DL (ref 74–100)
GLUCOSE SERPL-MCNC: 141 MG/DL (ref 74–100)
GLUCOSE SERPL-MCNC: 145 MG/DL (ref 74–100)
GLUCOSE SERPL-MCNC: 147 MG/DL (ref 74–100)
GLUCOSE SERPL-MCNC: 147 MG/DL (ref 74–100)
GLUCOSE SERPL-MCNC: 152 MG/DL (ref 74–100)
GLUCOSE SERPL-MCNC: 159 MG/DL (ref 74–100)
GLUCOSE SERPL-MCNC: 165 MG/DL (ref 74–100)
GLUCOSE SERPL-MCNC: 170 MG/DL (ref 70–110)
GLUCOSE SERPL-MCNC: 172 MG/DL (ref 74–100)
GLUCOSE SERPL-MCNC: 173 MG/DL (ref 74–100)
GLUCOSE SERPL-MCNC: 174 MG/DL (ref 74–100)
GLUCOSE SERPL-MCNC: 176 MG/DL (ref 74–100)
GLUCOSE SERPL-MCNC: 181 MG/DL (ref 74–100)
GLUCOSE SERPL-MCNC: 184 MG/DL (ref 74–100)
GLUCOSE SERPL-MCNC: 185 MG/DL (ref 74–100)
GLUCOSE SERPL-MCNC: 194 MG/DL (ref 74–100)
GLUCOSE SERPL-MCNC: 201 MG/DL (ref 74–100)
GLUCOSE SERPL-MCNC: 205 MG/DL (ref 74–100)
GLUCOSE SERPL-MCNC: 205 MG/DL (ref 74–100)
GLUCOSE SERPL-MCNC: 211 MG/DL (ref 74–100)
GLUCOSE SERPL-MCNC: 212 MG/DL (ref 74–100)
GLUCOSE SERPL-MCNC: 216 MG/DL (ref 74–100)
GLUCOSE SERPL-MCNC: 239 MG/DL (ref 74–100)
GLUCOSE SERPL-MCNC: 242 MG/DL (ref 74–100)
GLUCOSE SERPL-MCNC: 247 MG/DL (ref 74–100)
GLUCOSE SERPL-MCNC: 252 MG/DL (ref 74–100)
GLUCOSE SERPL-MCNC: 262 MG/DL (ref 74–100)
GLUCOSE SERPL-MCNC: 278 MG/DL (ref 74–100)
GLUCOSE SERPL-MCNC: 280 MG/DL (ref 74–100)
GLUCOSE SERPL-MCNC: 285 MG/DL (ref 74–100)
GLUCOSE SERPL-MCNC: 290 MG/DL (ref 74–100)
GLUCOSE SERPL-MCNC: 291 MG/DL (ref 74–100)
GLUCOSE SERPL-MCNC: 295 MG/DL (ref 74–100)
GLUCOSE SERPL-MCNC: 304 MG/DL (ref 74–100)
GLUCOSE SERPL-MCNC: 305 MG/DL (ref 74–100)
GLUCOSE SERPL-MCNC: 323 MG/DL (ref 74–100)
GLUCOSE SERPL-MCNC: 335 MG/DL (ref 74–100)
GLUCOSE SERPL-MCNC: 338 MG/DL (ref 74–100)
GLUCOSE SERPL-MCNC: 347 MG/DL (ref 74–100)
GLUCOSE SERPL-MCNC: 39 MG/DL (ref 74–100)
GLUCOSE SERPL-MCNC: 48 MG/DL (ref 74–100)
GLUCOSE SERPL-MCNC: 506 MG/DL (ref 74–100)
GLUCOSE SERPL-MCNC: 606 MG/DL (ref 74–100)
GLUCOSE SERPL-MCNC: 613 MG/DL (ref 74–100)
GLUCOSE SERPL-MCNC: 71 MG/DL (ref 74–100)
GLUCOSE SERPL-MCNC: 77 MG/DL (ref 74–100)
GLUCOSE SERPL-MCNC: 79 MG/DL (ref 74–100)
GLUCOSE SERPL-MCNC: 81 MG/DL (ref 74–100)
GLUCOSE SERPL-MCNC: 86 MG/DL (ref 74–100)
GLUCOSE SERPL-MCNC: 91 MG/DL (ref 74–100)
GLUCOSE SERPL-MCNC: 92 MG/DL (ref 74–100)
GLUCOSE SERPL-MCNC: 99 MG/DL (ref 74–100)
GLUCOSE UR QL STRIP.AUTO: ABNORMAL MG/DL
GLUCOSE UR QL STRIP.AUTO: ABNORMAL MG/DL
GRAM STN SPEC: ABNORMAL
GRAM STN SPEC: NORMAL
GRAN CASTS URNS QL MICRO: ABNORMAL /HPF
GROUP & RH: NORMAL
HBA1C MFR BLD: 5.9 %
HBV CORE AB SERPL QL IA: REACTIVE
HBV CORE IGM SERPL QL IA: NONREACTIVE
HBV DNA SERPL NAA+PROBE-ACNC: NORMAL IU/ML
HBV SURFACE AB SER-ACNC: 368.51 MIU/ML
HBV SURFACE AB SERPL IA-ACNC: REACTIVE M[IU]/ML
HBV SURFACE AG SERPL QL IA: NONREACTIVE
HCO3 ARTERIAL: 18.1 MMOL/L (ref 18–23)
HCO3 UR-SCNC: 12.7 MMOL/L
HCO3 UR-SCNC: 21.9 MMOL/L (ref 22–26)
HCO3 UR-SCNC: 25.1 MMOL/L (ref 22–26)
HCO3 UR-SCNC: 25.7 MMOL/L (ref 22–26)
HCO3 UR-SCNC: 27.6 MMOL/L (ref 22–26)
HCO3 UR-SCNC: 27.9 MMOL/L (ref 22–26)
HCT VFR BLD AUTO: 10.4 % (ref 42–52)
HCT VFR BLD AUTO: 17.4 % (ref 42–52)
HCT VFR BLD AUTO: 19.8 % (ref 42–52)
HCT VFR BLD AUTO: 20.6 % (ref 42–52)
HCT VFR BLD AUTO: 22.1 % (ref 42–52)
HCT VFR BLD AUTO: 22.5 % (ref 42–52)
HCT VFR BLD AUTO: 22.6 % (ref 42–52)
HCT VFR BLD AUTO: 22.6 % (ref 42–52)
HCT VFR BLD AUTO: 22.7 % (ref 42–52)
HCT VFR BLD AUTO: 22.9 % (ref 42–52)
HCT VFR BLD AUTO: 23 % (ref 42–52)
HCT VFR BLD AUTO: 23.1 % (ref 42–52)
HCT VFR BLD AUTO: 23.2 % (ref 42–52)
HCT VFR BLD AUTO: 23.5 % (ref 42–52)
HCT VFR BLD AUTO: 23.5 % (ref 42–52)
HCT VFR BLD AUTO: 23.7 % (ref 42–52)
HCT VFR BLD AUTO: 23.7 % (ref 42–52)
HCT VFR BLD AUTO: 23.8 % (ref 42–52)
HCT VFR BLD AUTO: 24.2 % (ref 42–52)
HCT VFR BLD AUTO: 24.2 % (ref 42–52)
HCT VFR BLD AUTO: 24.5 % (ref 42–52)
HCT VFR BLD AUTO: 24.5 % (ref 42–52)
HCT VFR BLD AUTO: 24.6 % (ref 42–52)
HCT VFR BLD AUTO: 24.8 % (ref 42–52)
HCT VFR BLD AUTO: 25.1 % (ref 42–52)
HCT VFR BLD AUTO: 25.4 % (ref 42–52)
HCT VFR BLD AUTO: 25.5 % (ref 42–52)
HCT VFR BLD AUTO: 25.5 % (ref 42–52)
HCT VFR BLD AUTO: 25.6 % (ref 42–52)
HCT VFR BLD AUTO: 26.1 % (ref 42–52)
HCT VFR BLD AUTO: 26.1 % (ref 42–52)
HCT VFR BLD AUTO: 26.4 % (ref 42–52)
HCT VFR BLD AUTO: 26.6 % (ref 42–52)
HCT VFR BLD AUTO: 26.6 % (ref 42–52)
HCT VFR BLD AUTO: 26.8 % (ref 42–52)
HCT VFR BLD AUTO: 26.9 % (ref 42–52)
HCT VFR BLD AUTO: 27 % (ref 42–52)
HCT VFR BLD AUTO: 27.1 % (ref 42–52)
HCT VFR BLD AUTO: 27.2 % (ref 42–52)
HCT VFR BLD AUTO: 27.6 % (ref 42–52)
HCT VFR BLD AUTO: 27.6 % (ref 42–52)
HCT VFR BLD AUTO: 28.3 % (ref 42–52)
HCT VFR BLD AUTO: 28.4 % (ref 42–52)
HCT VFR BLD AUTO: 28.4 % (ref 42–52)
HCT VFR BLD AUTO: 28.6 % (ref 42–52)
HCT VFR BLD AUTO: 28.8 % (ref 42–52)
HCT VFR BLD AUTO: 28.8 % (ref 42–52)
HCT VFR BLD AUTO: 28.9 % (ref 42–52)
HCT VFR BLD AUTO: 29.1 % (ref 42–52)
HCT VFR BLD AUTO: 29.2 % (ref 42–52)
HCT VFR BLD AUTO: 29.3 % (ref 42–52)
HCT VFR BLD AUTO: 29.5 % (ref 42–52)
HCT VFR BLD AUTO: 29.8 % (ref 42–52)
HCT VFR BLD AUTO: 30 % (ref 42–52)
HCT VFR BLD AUTO: 30 % (ref 42–52)
HCT VFR BLD AUTO: 30.1 % (ref 42–52)
HCT VFR BLD AUTO: 30.2 % (ref 42–52)
HCT VFR BLD AUTO: 30.3 % (ref 42–52)
HCT VFR BLD AUTO: 30.4 % (ref 42–52)
HCT VFR BLD AUTO: 30.5 % (ref 42–52)
HCT VFR BLD AUTO: 30.7 % (ref 42–52)
HCT VFR BLD AUTO: 30.8 % (ref 42–52)
HCT VFR BLD AUTO: 31.2 % (ref 42–52)
HCT VFR BLD AUTO: 31.3 % (ref 42–52)
HCT VFR BLD AUTO: 31.4 % (ref 42–52)
HCT VFR BLD AUTO: 31.5 % (ref 42–52)
HCT VFR BLD AUTO: 31.8 % (ref 42–52)
HCT VFR BLD AUTO: 31.9 % (ref 42–52)
HCT VFR BLD AUTO: 32.3 % (ref 42–52)
HCT VFR BLD AUTO: 33.1 % (ref 42–52)
HCT VFR BLD AUTO: 33.7 % (ref 42–52)
HCT VFR BLD AUTO: 34.2 % (ref 42–52)
HCT VFR BLD AUTO: 35.9 % (ref 42–52)
HCT VFR BLD AUTO: 44.1 % (ref 42–52)
HCT VFR BLD CALC: 28 %PCV (ref 36–54)
HCT VFR BLD CALC: 29 %PCV (ref 36–54)
HCV AB SERPL QL IA: NONREACTIVE
HDLC SERPL-MCNC: 5 MG/DL (ref 35–60)
HEMATOLOGIST REVIEW: NORMAL
HEMATOLOGIST REVIEW: NORMAL
HGB BLD-MCNC: 10 G/DL
HGB BLD-MCNC: 10 G/DL
HGB BLD-MCNC: 10 GM/DL (ref 14–18)
HGB BLD-MCNC: 10.2 GM/DL (ref 14–18)
HGB BLD-MCNC: 10.2 GM/DL (ref 14–18)
HGB BLD-MCNC: 10.3 GM/DL (ref 14–18)
HGB BLD-MCNC: 10.4 GM/DL (ref 14–18)
HGB BLD-MCNC: 10.5 GM/DL (ref 14–18)
HGB BLD-MCNC: 10.6 G/DL (ref 12–16)
HGB BLD-MCNC: 10.6 GM/DL (ref 14–18)
HGB BLD-MCNC: 10.7 GM/DL (ref 14–18)
HGB BLD-MCNC: 10.8 GM/DL (ref 14–18)
HGB BLD-MCNC: 11.2 GM/DL (ref 14–18)
HGB BLD-MCNC: 11.2 GM/DL (ref 14–18)
HGB BLD-MCNC: 11.3 GM/DL (ref 14–18)
HGB BLD-MCNC: 11.6 GM/DL (ref 14–18)
HGB BLD-MCNC: 12.8 GM/DL (ref 14–18)
HGB BLD-MCNC: 15.4 GM/DL (ref 14–18)
HGB BLD-MCNC: 3.6 GM/DL (ref 14–18)
HGB BLD-MCNC: 5.8 GM/DL (ref 14–18)
HGB BLD-MCNC: 6.4 G/DL (ref 12–16)
HGB BLD-MCNC: 6.4 GM/DL (ref 14–18)
HGB BLD-MCNC: 6.7 GM/DL (ref 14–18)
HGB BLD-MCNC: 6.8 GM/DL (ref 14–18)
HGB BLD-MCNC: 7.2 GM/DL (ref 14–18)
HGB BLD-MCNC: 7.6 GM/DL (ref 14–18)
HGB BLD-MCNC: 7.7 GM/DL (ref 14–18)
HGB BLD-MCNC: 7.8 GM/DL (ref 14–18)
HGB BLD-MCNC: 7.9 G/DL (ref 12–16)
HGB BLD-MCNC: 8 GM/DL (ref 14–18)
HGB BLD-MCNC: 8 GM/DL (ref 14–18)
HGB BLD-MCNC: 8.1 GM/DL (ref 14–18)
HGB BLD-MCNC: 8.3 GM/DL (ref 14–18)
HGB BLD-MCNC: 8.4 GM/DL (ref 14–18)
HGB BLD-MCNC: 8.5 GM/DL (ref 14–18)
HGB BLD-MCNC: 8.6 GM/DL (ref 14–18)
HGB BLD-MCNC: 8.7 G/DL (ref 12–16)
HGB BLD-MCNC: 8.7 G/DL (ref 12–16)
HGB BLD-MCNC: 8.7 GM/DL (ref 14–18)
HGB BLD-MCNC: 8.8 GM/DL (ref 14–18)
HGB BLD-MCNC: 8.9 GM/DL (ref 14–18)
HGB BLD-MCNC: 8.9 GM/DL (ref 14–18)
HGB BLD-MCNC: 9.1 GM/DL (ref 14–18)
HGB BLD-MCNC: 9.1 GM/DL (ref 14–18)
HGB BLD-MCNC: 9.2 G/DL (ref 12–16)
HGB BLD-MCNC: 9.2 GM/DL (ref 14–18)
HGB BLD-MCNC: 9.2 GM/DL (ref 14–18)
HGB BLD-MCNC: 9.3 GM/DL (ref 14–18)
HGB BLD-MCNC: 9.3 GM/DL (ref 14–18)
HGB BLD-MCNC: 9.4 GM/DL (ref 14–18)
HGB BLD-MCNC: 9.4 GM/DL (ref 14–18)
HGB BLD-MCNC: 9.5 GM/DL (ref 14–18)
HGB BLD-MCNC: 9.6 GM/DL (ref 14–18)
HGB BLD-MCNC: 9.6 GM/DL (ref 14–18)
HGB BLD-MCNC: 9.8 GM/DL (ref 14–18)
HGB BLD-MCNC: 9.8 GM/DL (ref 14–18)
HGB BLD-MCNC: 9.9 GM/DL (ref 14–18)
HGB BLD-MCNC: ABNORMAL G/DL
HIV 1+2 AB+HIV1 P24 AG SERPL QL IA: NONREACTIVE
HYALINE CASTS URNS QL MICRO: ABNORMAL /HPF
HYPOCHROMIA BLD QL SMEAR: ABNORMAL
IMM GRANULOCYTES # BLD AUTO: 0.02 X10(3)/MCL (ref 0–0.02)
IMM GRANULOCYTES # BLD AUTO: 0.02 X10(3)/MCL (ref 0–0.04)
IMM GRANULOCYTES # BLD AUTO: 0.07 X10(3)/MCL (ref 0–0.04)
IMM GRANULOCYTES # BLD AUTO: 0.07 X10(3)/MCL (ref 0–0.04)
IMM GRANULOCYTES # BLD AUTO: 0.08 X10(3)/MCL (ref 0–0.04)
IMM GRANULOCYTES # BLD AUTO: 0.09 X10(3)/MCL (ref 0–0.04)
IMM GRANULOCYTES # BLD AUTO: 0.1 X10(3)/MCL (ref 0–0.04)
IMM GRANULOCYTES # BLD AUTO: 0.11 X10(3)/MCL (ref 0–0.04)
IMM GRANULOCYTES # BLD AUTO: 0.12 X10(3)/MCL (ref 0–0.04)
IMM GRANULOCYTES # BLD AUTO: 0.13 X10(3)/MCL (ref 0–0.04)
IMM GRANULOCYTES # BLD AUTO: 0.14 X10(3)/MCL (ref 0–0.04)
IMM GRANULOCYTES # BLD AUTO: 0.14 X10(3)/MCL (ref 0–0.04)
IMM GRANULOCYTES # BLD AUTO: 0.15 X10(3)/MCL (ref 0–0.04)
IMM GRANULOCYTES # BLD AUTO: 0.16 X10(3)/MCL (ref 0–0.04)
IMM GRANULOCYTES # BLD AUTO: 0.16 X10(3)/MCL (ref 0–0.04)
IMM GRANULOCYTES # BLD AUTO: 0.17 X10(3)/MCL (ref 0–0.04)
IMM GRANULOCYTES # BLD AUTO: 0.17 X10(3)/MCL (ref 0–0.04)
IMM GRANULOCYTES # BLD AUTO: 0.18 X10(3)/MCL (ref 0–0.04)
IMM GRANULOCYTES # BLD AUTO: 0.18 X10(3)/MCL (ref 0–0.04)
IMM GRANULOCYTES # BLD AUTO: 0.19 X10(3)/MCL (ref 0–0.04)
IMM GRANULOCYTES # BLD AUTO: 0.19 X10(3)/MCL (ref 0–0.04)
IMM GRANULOCYTES # BLD AUTO: 0.2 X10(3)/MCL (ref 0–0.04)
IMM GRANULOCYTES # BLD AUTO: 0.21 X10(3)/MCL (ref 0–0.04)
IMM GRANULOCYTES # BLD AUTO: 0.22 X10(3)/MCL (ref 0–0.04)
IMM GRANULOCYTES # BLD AUTO: 0.23 X10(3)/MCL (ref 0–0.04)
IMM GRANULOCYTES # BLD AUTO: 0.23 X10(3)/MCL (ref 0–0.04)
IMM GRANULOCYTES # BLD AUTO: 0.24 X10(3)/MCL (ref 0–0.04)
IMM GRANULOCYTES # BLD AUTO: 0.26 X10(3)/MCL (ref 0–0.04)
IMM GRANULOCYTES # BLD AUTO: 0.26 X10(3)/MCL (ref 0–0.04)
IMM GRANULOCYTES # BLD AUTO: 0.3 X10(3)/MCL (ref 0–0.04)
IMM GRANULOCYTES # BLD AUTO: 0.36 X10(3)/MCL (ref 0–0.04)
IMM GRANULOCYTES # BLD AUTO: 0.37 X10(3)/MCL (ref 0–0.04)
IMM GRANULOCYTES # BLD AUTO: 0.42 X10(3)/MCL (ref 0–0.04)
IMM GRANULOCYTES # BLD AUTO: 0.51 X10(3)/MCL (ref 0–0.04)
IMM GRANULOCYTES # BLD AUTO: 0.65 X10(3)/MCL (ref 0–0.04)
IMM GRANULOCYTES # BLD AUTO: 0.74 X10(3)/MCL (ref 0–0.04)
IMM GRANULOCYTES # BLD AUTO: 0.8 X10(3)/MCL (ref 0–0.04)
IMM GRANULOCYTES # BLD AUTO: 0.82 X10(3)/MCL (ref 0–0.04)
IMM GRANULOCYTES # BLD AUTO: 0.95 X10(3)/MCL (ref 0–0.04)
IMM GRANULOCYTES # BLD AUTO: 1.1 X10(3)/MCL (ref 0–0.04)
IMM GRANULOCYTES NFR BLD AUTO: 0.2 %
IMM GRANULOCYTES NFR BLD AUTO: 0.2 % (ref 0–0.43)
IMM GRANULOCYTES NFR BLD AUTO: 0.4 %
IMM GRANULOCYTES NFR BLD AUTO: 0.5 %
IMM GRANULOCYTES NFR BLD AUTO: 0.5 %
IMM GRANULOCYTES NFR BLD AUTO: 0.6 %
IMM GRANULOCYTES NFR BLD AUTO: 0.7 %
IMM GRANULOCYTES NFR BLD AUTO: 0.8 %
IMM GRANULOCYTES NFR BLD AUTO: 0.9 %
IMM GRANULOCYTES NFR BLD AUTO: 1 %
IMM GRANULOCYTES NFR BLD AUTO: 1.1 %
IMM GRANULOCYTES NFR BLD AUTO: 1.2 %
IMM GRANULOCYTES NFR BLD AUTO: 1.3 %
IMM GRANULOCYTES NFR BLD AUTO: 1.3 %
IMM GRANULOCYTES NFR BLD AUTO: 1.4 %
IMM GRANULOCYTES NFR BLD AUTO: 1.5 %
IMM GRANULOCYTES NFR BLD AUTO: 1.6 %
IMM GRANULOCYTES NFR BLD AUTO: 1.6 %
IMM GRANULOCYTES NFR BLD AUTO: 1.8 %
IMM GRANULOCYTES NFR BLD AUTO: 2.4 %
IMM GRANULOCYTES NFR BLD AUTO: 2.6 %
IMM GRANULOCYTES NFR BLD AUTO: 2.7 %
IMM GRANULOCYTES NFR BLD AUTO: 3.4 %
IMM GRANULOCYTES NFR BLD AUTO: 3.5 %
IMM GRANULOCYTES NFR BLD AUTO: 3.6 %
IMM GRANULOCYTES NFR BLD AUTO: 6.8 %
INDIRECT COOMBS GEL: NORMAL
INR BLD: 1.19 (ref 0–1.3)
INR BLD: 1.19 (ref 0–1.3)
INR BLD: 1.26 (ref 0–1.3)
INR BLD: 1.26 (ref 0–1.3)
INR BLD: 1.27 (ref 0–1.3)
INR BLD: 1.3 (ref 0–1.3)
INR BLD: 1.31 (ref 0–1.3)
INR BLD: 1.31 (ref 0–1.3)
INR BLD: 1.38 (ref 0–1.3)
INR BLD: 1.47 (ref 0–1.3)
INR BLD: 1.52 (ref 0–1.3)
INR BLD: 1.56 (ref 0–1.3)
INR BLD: 1.63 (ref 0–1.3)
INR BLD: 1.73 (ref 0–1.3)
INR BLD: 1.8 (ref 0–1.3)
INR BLD: 1.91 (ref 0–1.3)
INR BLD: 2.13 (ref 0–1.3)
INR BLD: 2.14 (ref 0–1.3)
INR BLD: 2.22 (ref 0–1.3)
INR BLD: 2.23 (ref 0–1.3)
INR BLD: 2.38 (ref 0–1.3)
INR BLD: 2.81 (ref 0–1.3)
INR BLD: 2.81 (ref 0–1.3)
INR BLD: 2.93 (ref 0–1.3)
INR BLD: 3.01 (ref 0–1.3)
INR BLD: 3.26 (ref 0–1.3)
INR BLD: 3.27 (ref 0–1.3)
INR BLD: 3.31 (ref 0–1.3)
INR BLD: 3.44 (ref 0–1.3)
INR BLD: 3.9 (ref 0–1.3)
INR BLD: 3.93 (ref 0–1.3)
INR BLD: 4.32 (ref 0–1.3)
INR BLD: 4.41 (ref 0–1.3)
INR BLD: 4.63 (ref 0–1.3)
INR BLD: 4.85 (ref 0–1.3)
INR BLD: 5.2 (ref 0–1.3)
INR BLD: 5.7 (ref 0–1.3)
INR BLD: 5.77 (ref 0–1.3)
INR BLD: 5.9 (ref 0–1.3)
INR BLD: 6.69 (ref 0–1.3)
INR BLD: 6.99 (ref 0–1.3)
INR BLD: 7 (ref 0–1.3)
INR BLD: 7.3 (ref 0–1.3)
INR BLD: 7.36 (ref 0–1.3)
INSTRUMENT WBC (OLG): 12.6 X10(3)/MCL
INSTRUMENT WBC (OLG): 14 X10(3)/MCL
INSTRUMENT WBC (OLG): 14.3 X10(3)/MCL
INSTRUMENT WBC (OLG): 16 X10(3)/MCL
INSTRUMENT WBC (OLG): 16 X10(3)/MCL
INSTRUMENT WBC (OLG): 18.9 X10(3)/MCL
INSTRUMENT WBC (OLG): 22.4 X10(3)/MCL
INSTRUMENT WBC (OLG): 22.6 X10(3)/MCL
INSTRUMENT WBC (OLG): 23.6 X10(3)/MCL
INSTRUMENT WBC (OLG): 26.9 X10(3)/MCL
INSTRUMENT WBC (OLG): 27.5 X10(3)/MCL
INSTRUMENT WBC (OLG): 28.6 X10(3)/MCL
INTERVENTRICULAR SEPTUM: 1.39 CM (ref 0.6–1.1)
INTERVENTRICULAR SEPTUM: 1.79 CM (ref 0.6–1.1)
IRON SATN MFR SERPL: 33 % (ref 20–50)
IRON SERPL-MCNC: 23 UG/DL (ref 65–175)
KETONES UR QL STRIP.AUTO: ABNORMAL MG/DL
KETONES UR QL STRIP.AUTO: NEGATIVE MG/DL
LACTATE SERPL-SCNC: 0.7 MMOL/L (ref 0.5–2.2)
LACTATE SERPL-SCNC: 1.5 MMOL/L (ref 0.5–2.2)
LACTATE SERPL-SCNC: 1.7 MMOL/L (ref 0.5–2.2)
LACTATE SERPL-SCNC: 2 MMOL/L (ref 0.5–2.2)
LACTATE SERPL-SCNC: 2.6 MMOL/L (ref 0.5–2.2)
LACTATE SERPL-SCNC: 3.1 MMOL/L (ref 0.5–2.2)
LACTATE SERPL-SCNC: 3.1 MMOL/L (ref 0.5–2.2)
LACTATE SERPL-SCNC: 3.3 MMOL/L (ref 0.5–2.2)
LACTATE SERPL-SCNC: 3.4 MMOL/L (ref 0.5–2.2)
LACTATE SERPL-SCNC: 3.5 MMOL/L (ref 0.5–2.2)
LACTATE SERPL-SCNC: 3.7 MMOL/L (ref 0.5–2.2)
LACTATE SERPL-SCNC: 3.8 MMOL/L (ref 0.5–2.2)
LACTATE SERPL-SCNC: 4.1 MMOL/L (ref 0.5–2.2)
LACTATE SERPL-SCNC: 4.1 MMOL/L (ref 0.5–2.2)
LACTATE SERPL-SCNC: 4.7 MMOL/L (ref 0.5–2.2)
LACTATE SERPL-SCNC: 4.9 MMOL/L (ref 0.5–2.2)
LACTATE SERPL-SCNC: 5.5 MMOL/L (ref 0.5–2.2)
LACTATE SERPL-SCNC: 5.7 MMOL/L (ref 0.5–2.2)
LACTATE SERPL-SCNC: 5.7 MMOL/L (ref 0.5–2.2)
LDH FLD L TO P-CCNC: NORMAL U/L
LDH SERPL-CCNC: 912 U/L (ref 125–220)
LDLC SERPL CALC-MCNC: -5 MG/DL (ref 50–140)
LEFT ATRIUM SIZE: 3.7 CM
LEFT ATRIUM SIZE: 5.7 CM
LEFT ATRIUM VOLUME INDEX MOD: 35.4 ML/M2
LEFT ATRIUM VOLUME MOD: 59.5 CM3
LEFT INTERNAL DIMENSION IN SYSTOLE: 3.08 CM (ref 2.1–4)
LEFT INTERNAL DIMENSION IN SYSTOLE: 3.46 CM (ref 2.1–4)
LEFT VENTRICLE DIASTOLIC VOLUME INDEX: 41.91 ML/M2
LEFT VENTRICLE DIASTOLIC VOLUME INDEX: 46.79 ML/M2
LEFT VENTRICLE DIASTOLIC VOLUME: 67.9 ML
LEFT VENTRICLE DIASTOLIC VOLUME: 78.6 ML
LEFT VENTRICLE MASS INDEX: 153 G/M2
LEFT VENTRICLE MASS INDEX: 274 G/M2
LEFT VENTRICLE SYSTOLIC VOLUME INDEX: 23 ML/M2
LEFT VENTRICLE SYSTOLIC VOLUME INDEX: 29.5 ML/M2
LEFT VENTRICLE SYSTOLIC VOLUME: 37.3 ML
LEFT VENTRICLE SYSTOLIC VOLUME: 49.5 ML
LEFT VENTRICULAR INTERNAL DIMENSION IN DIASTOLE: 3.95 CM (ref 3.5–6)
LEFT VENTRICULAR INTERNAL DIMENSION IN DIASTOLE: 4.2 CM (ref 3.5–6)
LEFT VENTRICULAR MASS: 247.11 G
LEFT VENTRICULAR MASS: 460.81 G
LEUKOCYTE ESTERASE UR QL STRIP.AUTO: ABNORMAL UNIT/L
LEUKOCYTE ESTERASE UR QL STRIP.AUTO: ABNORMAL UNIT/L
LIPASE SERPL-CCNC: 13 U/L
LIPASE SERPL-CCNC: 295 U/L
LIPASE SERPL-CCNC: <4 U/L
LIPASE SERPL-CCNC: <4 U/L
LV LATERAL E/E' RATIO: 16.5 M/S
LV SEPTAL E/E' RATIO: 33 M/S
LVOT MG: 3 MMHG
LVOT MV: 0.79 CM/S
LYMPHOCYTE MANUAL BF (OHS): 72 %
LYMPHOCYTES # BLD AUTO: 0.3 X10(3)/MCL (ref 0.6–4.6)
LYMPHOCYTES # BLD AUTO: 0.4 X10(3)/MCL (ref 0.6–4.6)
LYMPHOCYTES # BLD AUTO: 0.41 X10(3)/MCL (ref 0.6–4.6)
LYMPHOCYTES # BLD AUTO: 0.48 X10(3)/MCL (ref 0.6–4.6)
LYMPHOCYTES # BLD AUTO: 0.52 X10(3)/MCL (ref 0.6–4.6)
LYMPHOCYTES # BLD AUTO: 0.59 X10(3)/MCL (ref 0.6–4.6)
LYMPHOCYTES # BLD AUTO: 0.61 X10(3)/MCL (ref 0.6–4.6)
LYMPHOCYTES # BLD AUTO: 0.65 X10(3)/MCL (ref 0.6–4.6)
LYMPHOCYTES # BLD AUTO: 0.67 X10(3)/MCL (ref 0.6–4.6)
LYMPHOCYTES # BLD AUTO: 0.67 X10(3)/MCL (ref 0.6–4.6)
LYMPHOCYTES # BLD AUTO: 0.68 X10(3)/MCL (ref 0.6–4.6)
LYMPHOCYTES # BLD AUTO: 0.72 X10(3)/MCL (ref 0.6–4.6)
LYMPHOCYTES # BLD AUTO: 0.72 X10(3)/MCL (ref 0.6–4.6)
LYMPHOCYTES # BLD AUTO: 0.74 X10(3)/MCL (ref 0.6–4.6)
LYMPHOCYTES # BLD AUTO: 0.75 X10(3)/MCL (ref 0.6–4.6)
LYMPHOCYTES # BLD AUTO: 0.76 X10(3)/MCL (ref 0.6–4.6)
LYMPHOCYTES # BLD AUTO: 0.77 X10(3)/MCL (ref 0.6–4.6)
LYMPHOCYTES # BLD AUTO: 0.78 X10(3)/MCL (ref 0.6–4.6)
LYMPHOCYTES # BLD AUTO: 0.78 X10(3)/MCL (ref 0.6–4.6)
LYMPHOCYTES # BLD AUTO: 0.82 X10(3)/MCL (ref 0.6–4.6)
LYMPHOCYTES # BLD AUTO: 0.82 X10(3)/MCL (ref 0.6–4.6)
LYMPHOCYTES # BLD AUTO: 0.88 X10(3)/MCL (ref 0.6–4.6)
LYMPHOCYTES # BLD AUTO: 0.88 X10(3)/MCL (ref 0.6–4.6)
LYMPHOCYTES # BLD AUTO: 0.92 X10(3)/MCL (ref 0.6–4.6)
LYMPHOCYTES # BLD AUTO: 0.94 X10(3)/MCL (ref 0.6–4.6)
LYMPHOCYTES # BLD AUTO: 0.95 X10(3)/MCL (ref 0.6–4.6)
LYMPHOCYTES # BLD AUTO: 0.96 X10(3)/MCL (ref 0.6–4.6)
LYMPHOCYTES # BLD AUTO: 0.96 X10(3)/MCL (ref 0.6–4.6)
LYMPHOCYTES # BLD AUTO: 0.97 X10(3)/MCL (ref 0.6–4.6)
LYMPHOCYTES # BLD AUTO: 1.06 X10(3)/MCL (ref 0.6–4.6)
LYMPHOCYTES # BLD AUTO: 1.08 X10(3)/MCL (ref 0.6–4.6)
LYMPHOCYTES # BLD AUTO: 1.09 X10(3)/MCL (ref 0.6–4.6)
LYMPHOCYTES # BLD AUTO: 1.09 X10(3)/MCL (ref 0.6–4.6)
LYMPHOCYTES # BLD AUTO: 1.17 X10(3)/MCL (ref 0.6–4.6)
LYMPHOCYTES # BLD AUTO: 1.25 X10(3)/MCL (ref 0.6–4.6)
LYMPHOCYTES # BLD AUTO: 1.35 X10(3)/MCL (ref 0.6–4.6)
LYMPHOCYTES # BLD AUTO: 1.47 X10(3)/MCL (ref 0.6–4.6)
LYMPHOCYTES # BLD AUTO: 1.54 X10(3)/MCL (ref 0.6–4.6)
LYMPHOCYTES # BLD AUTO: 1.7 X10(3)/MCL (ref 0.6–4.6)
LYMPHOCYTES # BLD AUTO: 1.74 X10(3)/MCL (ref 0.6–4.6)
LYMPHOCYTES # BLD AUTO: 1.92 X10(3)/MCL (ref 0.6–4.6)
LYMPHOCYTES # BLD AUTO: 1.93 X10(3)/MCL (ref 0.6–4.6)
LYMPHOCYTES # BLD AUTO: 1.95 X10(3)/MCL (ref 0.6–4.6)
LYMPHOCYTES # BLD AUTO: 1.99 X10(3)/MCL (ref 0.6–4.6)
LYMPHOCYTES # BLD AUTO: 2.04 X10(3)/MCL (ref 0.6–4.6)
LYMPHOCYTES # BLD AUTO: 2.12 X10(3)/MCL (ref 0.6–4.6)
LYMPHOCYTES # BLD AUTO: 2.34 X10(3)/MCL (ref 0.6–4.6)
LYMPHOCYTES # BLD AUTO: 2.98 X10(3)/MCL (ref 0.6–4.6)
LYMPHOCYTES NFR BLD AUTO: 1.3 %
LYMPHOCYTES NFR BLD AUTO: 1.8 %
LYMPHOCYTES NFR BLD AUTO: 1.9 %
LYMPHOCYTES NFR BLD AUTO: 13.2 %
LYMPHOCYTES NFR BLD AUTO: 14 %
LYMPHOCYTES NFR BLD AUTO: 15.6 %
LYMPHOCYTES NFR BLD AUTO: 16 %
LYMPHOCYTES NFR BLD AUTO: 17.2 %
LYMPHOCYTES NFR BLD AUTO: 19.2 %
LYMPHOCYTES NFR BLD AUTO: 2 %
LYMPHOCYTES NFR BLD AUTO: 2.2 %
LYMPHOCYTES NFR BLD AUTO: 2.3 %
LYMPHOCYTES NFR BLD AUTO: 2.4 %
LYMPHOCYTES NFR BLD AUTO: 28.9 %
LYMPHOCYTES NFR BLD AUTO: 3.1 %
LYMPHOCYTES NFR BLD AUTO: 3.5 %
LYMPHOCYTES NFR BLD AUTO: 3.7 %
LYMPHOCYTES NFR BLD AUTO: 3.8 %
LYMPHOCYTES NFR BLD AUTO: 3.9 %
LYMPHOCYTES NFR BLD AUTO: 4 %
LYMPHOCYTES NFR BLD AUTO: 4.2 %
LYMPHOCYTES NFR BLD AUTO: 4.4 %
LYMPHOCYTES NFR BLD AUTO: 4.5 %
LYMPHOCYTES NFR BLD AUTO: 4.5 %
LYMPHOCYTES NFR BLD AUTO: 4.6 %
LYMPHOCYTES NFR BLD AUTO: 4.7 %
LYMPHOCYTES NFR BLD AUTO: 4.8 %
LYMPHOCYTES NFR BLD AUTO: 5 %
LYMPHOCYTES NFR BLD AUTO: 5.1 %
LYMPHOCYTES NFR BLD AUTO: 5.2 %
LYMPHOCYTES NFR BLD AUTO: 5.4 %
LYMPHOCYTES NFR BLD AUTO: 5.7 %
LYMPHOCYTES NFR BLD AUTO: 5.9 %
LYMPHOCYTES NFR BLD AUTO: 5.9 %
LYMPHOCYTES NFR BLD AUTO: 6 %
LYMPHOCYTES NFR BLD AUTO: 6.1 %
LYMPHOCYTES NFR BLD AUTO: 6.8 %
LYMPHOCYTES NFR BLD AUTO: 6.9 %
LYMPHOCYTES NFR BLD AUTO: 7.3 %
LYMPHOCYTES NFR BLD AUTO: 7.4 %
LYMPHOCYTES NFR BLD AUTO: 7.7 %
LYMPHOCYTES NFR BLD AUTO: 8 %
LYMPHOCYTES NFR BLD AUTO: 8.4 %
LYMPHOCYTES NFR BLD AUTO: 9.5 %
LYMPHOCYTES NFR BLD MANUAL: 0.43 X10(3)/MCL
LYMPHOCYTES NFR BLD MANUAL: 0.54 X10(3)/MCL
LYMPHOCYTES NFR BLD MANUAL: 0.64 X10(3)/MCL
LYMPHOCYTES NFR BLD MANUAL: 1.44 X10(3)/MCL
LYMPHOCYTES NFR BLD MANUAL: 1.65 X10(3)/MCL
LYMPHOCYTES NFR BLD MANUAL: 1.68 X10(3)/MCL
LYMPHOCYTES NFR BLD MANUAL: 1.81 X10(3)/MCL
LYMPHOCYTES NFR BLD MANUAL: 10 %
LYMPHOCYTES NFR BLD MANUAL: 12 %
LYMPHOCYTES NFR BLD MANUAL: 14 %
LYMPHOCYTES NFR BLD MANUAL: 14 %
LYMPHOCYTES NFR BLD MANUAL: 2 %
LYMPHOCYTES NFR BLD MANUAL: 2 %
LYMPHOCYTES NFR BLD MANUAL: 2.65 X10(3)/MCL
LYMPHOCYTES NFR BLD MANUAL: 2.86 X10(3)/MCL
LYMPHOCYTES NFR BLD MANUAL: 24 %
LYMPHOCYTES NFR BLD MANUAL: 3 %
LYMPHOCYTES NFR BLD MANUAL: 3.02 X10(3)/MCL
LYMPHOCYTES NFR BLD MANUAL: 3.14 X10(3)/MCL
LYMPHOCYTES NFR BLD MANUAL: 4 %
LYMPHOCYTES NFR BLD MANUAL: 4.09 X10(3)/MCL
LYMPHOCYTES NFR BLD MANUAL: 45 % (ref 13–40)
LYMPHOCYTES NFR BLD MANUAL: 7 %
LYMPHOCYTES NFR BLD MANUAL: 8 %
LYMPHOCYTES NFR BLD MANUAL: 9 %
MACROCYTES BLD QL SMEAR: ABNORMAL
MACROCYTES BLD QL SMEAR: SLIGHT
MAGNESIUM SERPL-MCNC: 1.34 MG/DL (ref 1.6–2.6)
MAGNESIUM SERPL-MCNC: 1.4 MG/DL (ref 1.6–2.6)
MAGNESIUM SERPL-MCNC: 1.6 MG/DL (ref 1.6–2.6)
MAGNESIUM SERPL-MCNC: 1.6 MG/DL (ref 1.6–2.6)
MAGNESIUM SERPL-MCNC: 1.7 MG/DL (ref 1.6–2.6)
MAGNESIUM SERPL-MCNC: 1.8 MG/DL (ref 1.6–2.6)
MAGNESIUM SERPL-MCNC: 1.9 MG/DL (ref 1.6–2.6)
MAGNESIUM SERPL-MCNC: 2 MG/DL (ref 1.6–2.6)
MAGNESIUM SERPL-MCNC: 2.1 MG/DL (ref 1.6–2.6)
MAGNESIUM SERPL-MCNC: 2.2 MG/DL (ref 1.6–2.6)
MAGNESIUM SERPL-MCNC: 2.3 MG/DL (ref 1.6–2.6)
MAGNESIUM SERPL-MCNC: 2.4 MG/DL (ref 1.6–2.6)
MAGNESIUM SERPL-MCNC: 3 MG/DL (ref 1.6–2.6)
MCH RBC QN AUTO: 27.6 PG (ref 27–31)
MCH RBC QN AUTO: 27.7 PG (ref 27–31)
MCH RBC QN AUTO: 27.8 PG (ref 27–31)
MCH RBC QN AUTO: 27.9 PG (ref 27–31)
MCH RBC QN AUTO: 28 PG (ref 27–31)
MCH RBC QN AUTO: 28.1 PG (ref 27–31)
MCH RBC QN AUTO: 28.2 PG (ref 27–31)
MCH RBC QN AUTO: 28.3 PG (ref 27–31)
MCH RBC QN AUTO: 28.4 PG (ref 27–31)
MCH RBC QN AUTO: 28.5 PG (ref 27–31)
MCH RBC QN AUTO: 28.6 PG (ref 27–31)
MCH RBC QN AUTO: 28.7 PG (ref 27–31)
MCH RBC QN AUTO: 28.8 PG (ref 27–31)
MCH RBC QN AUTO: 28.9 PG (ref 27–31)
MCH RBC QN AUTO: 29 PG (ref 27–31)
MCH RBC QN AUTO: 29.1 PG (ref 27–31)
MCH RBC QN AUTO: 29.2 PG (ref 27–31)
MCH RBC QN AUTO: 29.4 PG (ref 27–31)
MCH RBC QN AUTO: 29.5 PG (ref 27–31)
MCH RBC QN AUTO: 29.6 PG (ref 27–31)
MCH RBC QN AUTO: 29.6 PG (ref 27–31)
MCH RBC QN AUTO: 29.8 PG (ref 27–31)
MCH RBC QN AUTO: 29.9 PG (ref 27–31)
MCH RBC QN AUTO: 30 PG (ref 27–31)
MCH RBC QN AUTO: 30.1 PG (ref 27–31)
MCH RBC QN AUTO: 30.1 PG (ref 27–31)
MCH RBC QN AUTO: 30.3 PG (ref 27–31)
MCH RBC QN AUTO: 30.3 PG (ref 27–31)
MCH RBC QN AUTO: 30.4 PG (ref 27–31)
MCH RBC QN AUTO: 30.6 PG (ref 27–31)
MCH RBC QN AUTO: 30.6 PG (ref 27–31)
MCH RBC QN AUTO: 30.8 PG (ref 27–31)
MCH RBC QN AUTO: 30.8 PG (ref 27–31)
MCH RBC QN AUTO: 30.9 PG (ref 27–31)
MCH RBC QN AUTO: 31.1 PG (ref 27–31)
MCH RBC QN AUTO: 33 PG (ref 27–31)
MCHC RBC AUTO-ENTMCNC: 31.2 MG/DL (ref 33–36)
MCHC RBC AUTO-ENTMCNC: 31.3 MG/DL (ref 33–36)
MCHC RBC AUTO-ENTMCNC: 31.4 MG/DL (ref 33–36)
MCHC RBC AUTO-ENTMCNC: 31.5 MG/DL (ref 33–36)
MCHC RBC AUTO-ENTMCNC: 31.8 MG/DL (ref 33–36)
MCHC RBC AUTO-ENTMCNC: 32 MG/DL (ref 33–36)
MCHC RBC AUTO-ENTMCNC: 32 MG/DL (ref 33–36)
MCHC RBC AUTO-ENTMCNC: 32.1 MG/DL (ref 33–36)
MCHC RBC AUTO-ENTMCNC: 32.2 MG/DL (ref 33–36)
MCHC RBC AUTO-ENTMCNC: 32.3 MG/DL (ref 33–36)
MCHC RBC AUTO-ENTMCNC: 32.5 MG/DL (ref 33–36)
MCHC RBC AUTO-ENTMCNC: 32.5 MG/DL (ref 33–36)
MCHC RBC AUTO-ENTMCNC: 32.6 MG/DL (ref 33–36)
MCHC RBC AUTO-ENTMCNC: 32.7 MG/DL (ref 33–36)
MCHC RBC AUTO-ENTMCNC: 32.7 MG/DL (ref 33–36)
MCHC RBC AUTO-ENTMCNC: 32.8 MG/DL (ref 33–36)
MCHC RBC AUTO-ENTMCNC: 32.9 MG/DL (ref 33–36)
MCHC RBC AUTO-ENTMCNC: 33 MG/DL (ref 33–36)
MCHC RBC AUTO-ENTMCNC: 33 MG/DL (ref 33–36)
MCHC RBC AUTO-ENTMCNC: 33.1 MG/DL (ref 33–36)
MCHC RBC AUTO-ENTMCNC: 33.2 MG/DL (ref 33–36)
MCHC RBC AUTO-ENTMCNC: 33.3 MG/DL (ref 33–36)
MCHC RBC AUTO-ENTMCNC: 33.3 MG/DL (ref 33–36)
MCHC RBC AUTO-ENTMCNC: 33.5 MG/DL (ref 33–36)
MCHC RBC AUTO-ENTMCNC: 33.6 MG/DL (ref 33–36)
MCHC RBC AUTO-ENTMCNC: 33.7 MG/DL (ref 33–36)
MCHC RBC AUTO-ENTMCNC: 33.9 MG/DL (ref 33–36)
MCHC RBC AUTO-ENTMCNC: 33.9 MG/DL (ref 33–36)
MCHC RBC AUTO-ENTMCNC: 34 MG/DL (ref 33–36)
MCHC RBC AUTO-ENTMCNC: 34.1 MG/DL (ref 33–36)
MCHC RBC AUTO-ENTMCNC: 34.4 MG/DL (ref 33–36)
MCHC RBC AUTO-ENTMCNC: 34.6 MG/DL (ref 33–36)
MCHC RBC AUTO-ENTMCNC: 34.7 MG/DL (ref 33–36)
MCHC RBC AUTO-ENTMCNC: 34.7 MG/DL (ref 33–36)
MCHC RBC AUTO-ENTMCNC: 34.9 MG/DL (ref 33–36)
MCHC RBC AUTO-ENTMCNC: 35.1 MG/DL (ref 33–36)
MCHC RBC AUTO-ENTMCNC: 35.4 MG/DL (ref 33–36)
MCHC RBC AUTO-ENTMCNC: 35.5 MG/DL (ref 33–36)
MCHC RBC AUTO-ENTMCNC: 35.7 MG/DL (ref 33–36)
MCHC RBC AUTO-ENTMCNC: 35.8 MG/DL (ref 33–36)
MCHC RBC AUTO-ENTMCNC: 35.9 MG/DL (ref 33–36)
MCHC RBC AUTO-ENTMCNC: 36 MG/DL (ref 33–36)
MCHC RBC AUTO-ENTMCNC: 36.6 MG/DL (ref 33–36)
MCHC RBC AUTO-ENTMCNC: 36.7 MG/DL (ref 33–36)
MCHC RBC AUTO-ENTMCNC: 37 MG/DL (ref 33–36)
MCHC RBC AUTO-ENTMCNC: 37.2 MG/DL (ref 33–36)
MCHC RBC AUTO-ENTMCNC: 37.3 MG/DL (ref 33–36)
MCHC RBC AUTO-ENTMCNC: 37.3 MG/DL (ref 33–36)
MCV RBC AUTO: 82.3 FL (ref 80–94)
MCV RBC AUTO: 82.5 FL (ref 80–94)
MCV RBC AUTO: 82.6 FL (ref 80–94)
MCV RBC AUTO: 82.7 FL (ref 80–94)
MCV RBC AUTO: 83.1 FL (ref 80–94)
MCV RBC AUTO: 83.1 FL (ref 80–94)
MCV RBC AUTO: 83.2 FL (ref 80–94)
MCV RBC AUTO: 83.2 FL (ref 80–94)
MCV RBC AUTO: 83.3 FL (ref 80–94)
MCV RBC AUTO: 83.3 FL (ref 80–94)
MCV RBC AUTO: 83.7 FL (ref 80–94)
MCV RBC AUTO: 83.8 FL (ref 80–94)
MCV RBC AUTO: 83.9 FL (ref 80–94)
MCV RBC AUTO: 83.9 FL (ref 80–94)
MCV RBC AUTO: 84 FL (ref 80–94)
MCV RBC AUTO: 84 FL (ref 80–94)
MCV RBC AUTO: 84.1 FL (ref 80–94)
MCV RBC AUTO: 84.2 FL (ref 80–94)
MCV RBC AUTO: 84.3 FL (ref 80–94)
MCV RBC AUTO: 84.3 FL (ref 80–94)
MCV RBC AUTO: 84.4 FL (ref 80–94)
MCV RBC AUTO: 84.5 FL (ref 80–94)
MCV RBC AUTO: 84.7 FL (ref 80–94)
MCV RBC AUTO: 84.7 FL (ref 80–94)
MCV RBC AUTO: 85 FL (ref 80–94)
MCV RBC AUTO: 85.1 FL (ref 80–94)
MCV RBC AUTO: 85.2 FL (ref 80–94)
MCV RBC AUTO: 85.3 FL (ref 80–94)
MCV RBC AUTO: 85.3 FL (ref 80–94)
MCV RBC AUTO: 85.4 FL (ref 80–94)
MCV RBC AUTO: 85.4 FL (ref 80–94)
MCV RBC AUTO: 85.5 FL (ref 80–94)
MCV RBC AUTO: 85.6 FL (ref 80–94)
MCV RBC AUTO: 85.7 FL (ref 80–94)
MCV RBC AUTO: 86.2 FL (ref 80–94)
MCV RBC AUTO: 86.4 FL (ref 80–94)
MCV RBC AUTO: 86.4 FL (ref 80–94)
MCV RBC AUTO: 86.5 FL (ref 80–94)
MCV RBC AUTO: 86.5 FL (ref 80–94)
MCV RBC AUTO: 86.6 FL (ref 80–94)
MCV RBC AUTO: 86.7 FL (ref 80–94)
MCV RBC AUTO: 87.1 FL (ref 80–94)
MCV RBC AUTO: 87.1 FL (ref 80–94)
MCV RBC AUTO: 87.2 FL (ref 80–94)
MCV RBC AUTO: 87.6 FL (ref 80–94)
MCV RBC AUTO: 87.7 FL (ref 80–94)
MCV RBC AUTO: 88.1 FL (ref 80–94)
MCV RBC AUTO: 88.2 FL (ref 80–94)
MCV RBC AUTO: 88.3 FL (ref 80–94)
MCV RBC AUTO: 88.4 FL (ref 80–94)
MCV RBC AUTO: 89.6 FL (ref 80–94)
MCV RBC AUTO: 90.5 FL (ref 80–94)
MCV RBC AUTO: 90.8 FL (ref 80–94)
MCV RBC AUTO: 91.1 FL (ref 80–94)
MCV RBC AUTO: 92.1 FL (ref 80–94)
METAMYELOCYTES NFR BLD MANUAL: 18 %
METAMYELOCYTES NFR BLD MANUAL: 2 %
METAMYELOCYTES NFR BLD MANUAL: 8 %
METANEPH FREE SERPL-SCNC: 0.74 NMOL/L
MICROCYTES BLD QL SMEAR: ABNORMAL
MICROCYTES BLD QL SMEAR: ABNORMAL
MONOCYTES # BLD AUTO: 0.11 X10(3)/MCL (ref 0.1–1.3)
MONOCYTES # BLD AUTO: 0.25 X10(3)/MCL (ref 0.1–1.3)
MONOCYTES # BLD AUTO: 0.26 X10(3)/MCL (ref 0.1–1.3)
MONOCYTES # BLD AUTO: 0.39 X10(3)/MCL (ref 0.1–1.3)
MONOCYTES # BLD AUTO: 0.43 X10(3)/MCL (ref 0.1–1.3)
MONOCYTES # BLD AUTO: 0.46 X10(3)/MCL (ref 0.1–1.3)
MONOCYTES # BLD AUTO: 0.52 X10(3)/MCL (ref 0.1–1.3)
MONOCYTES # BLD AUTO: 0.52 X10(3)/MCL (ref 0.1–1.3)
MONOCYTES # BLD AUTO: 0.54 X10(3)/MCL (ref 0.1–1.3)
MONOCYTES # BLD AUTO: 0.55 X10(3)/MCL (ref 0.1–1.3)
MONOCYTES # BLD AUTO: 0.55 X10(3)/MCL (ref 0.1–1.3)
MONOCYTES # BLD AUTO: 0.56 X10(3)/MCL (ref 0.1–1.3)
MONOCYTES # BLD AUTO: 0.58 X10(3)/MCL (ref 0.1–1.3)
MONOCYTES # BLD AUTO: 0.6 X10(3)/MCL (ref 0.1–1.3)
MONOCYTES # BLD AUTO: 0.61 X10(3)/MCL (ref 0.1–1.3)
MONOCYTES # BLD AUTO: 0.61 X10(3)/MCL (ref 0.1–1.3)
MONOCYTES # BLD AUTO: 0.62 X10(3)/MCL (ref 0.1–1.3)
MONOCYTES # BLD AUTO: 0.62 X10(3)/MCL (ref 0.1–1.3)
MONOCYTES # BLD AUTO: 0.63 X10(3)/MCL (ref 0.1–1.3)
MONOCYTES # BLD AUTO: 0.63 X10(3)/MCL (ref 0.1–1.3)
MONOCYTES # BLD AUTO: 0.64 X10(3)/MCL (ref 0.1–1.3)
MONOCYTES # BLD AUTO: 0.64 X10(3)/MCL (ref 0.1–1.3)
MONOCYTES # BLD AUTO: 0.65 X10(3)/MCL (ref 0.1–1.3)
MONOCYTES # BLD AUTO: 0.66 X10(3)/MCL (ref 0.1–1.3)
MONOCYTES # BLD AUTO: 0.67 X10(3)/MCL (ref 0.1–1.3)
MONOCYTES # BLD AUTO: 0.68 X10(3)/MCL (ref 0.1–1.3)
MONOCYTES # BLD AUTO: 0.68 X10(3)/MCL (ref 0.1–1.3)
MONOCYTES # BLD AUTO: 0.7 X10(3)/MCL (ref 0.1–1.3)
MONOCYTES # BLD AUTO: 0.72 X10(3)/MCL (ref 0.1–1.3)
MONOCYTES # BLD AUTO: 0.74 X10(3)/MCL (ref 0.1–1.3)
MONOCYTES # BLD AUTO: 0.76 X10(3)/MCL (ref 0.1–1.3)
MONOCYTES # BLD AUTO: 0.8 X10(3)/MCL (ref 0.1–1.3)
MONOCYTES # BLD AUTO: 0.93 X10(3)/MCL (ref 0.1–1.3)
MONOCYTES # BLD AUTO: 0.96 X10(3)/MCL (ref 0.1–1.3)
MONOCYTES # BLD AUTO: 0.98 X10(3)/MCL (ref 0.1–1.3)
MONOCYTES # BLD AUTO: 0.98 X10(3)/MCL (ref 0.1–1.3)
MONOCYTES # BLD AUTO: 1 X10(3)/MCL (ref 0.1–1.3)
MONOCYTES # BLD AUTO: 1.06 X10(3)/MCL (ref 0.1–1.3)
MONOCYTES # BLD AUTO: 1.06 X10(3)/MCL (ref 0.1–1.3)
MONOCYTES # BLD AUTO: 1.11 X10(3)/MCL (ref 0.1–1.3)
MONOCYTES # BLD AUTO: 1.27 X10(3)/MCL (ref 0.1–1.3)
MONOCYTES # BLD AUTO: 1.3 X10(3)/MCL (ref 0.1–1.3)
MONOCYTES # BLD AUTO: 1.38 X10(3)/MCL (ref 0.1–1.3)
MONOCYTES # BLD AUTO: 1.87 X10(3)/MCL (ref 0.1–1.3)
MONOCYTES # BLD AUTO: 2.68 X10(3)/MCL (ref 0.1–1.3)
MONOCYTES # BLD AUTO: 2.84 X10(3)/MCL (ref 0.1–1.3)
MONOCYTES NFR BLD AUTO: 0.5 %
MONOCYTES NFR BLD AUTO: 1.3 %
MONOCYTES NFR BLD AUTO: 1.4 %
MONOCYTES NFR BLD AUTO: 1.6 %
MONOCYTES NFR BLD AUTO: 10.1 %
MONOCYTES NFR BLD AUTO: 10.4 %
MONOCYTES NFR BLD AUTO: 11.1 %
MONOCYTES NFR BLD AUTO: 11.5 %
MONOCYTES NFR BLD AUTO: 12 %
MONOCYTES NFR BLD AUTO: 12.3 %
MONOCYTES NFR BLD AUTO: 2.3 %
MONOCYTES NFR BLD AUTO: 2.4 %
MONOCYTES NFR BLD AUTO: 2.5 %
MONOCYTES NFR BLD AUTO: 2.6 %
MONOCYTES NFR BLD AUTO: 2.8 %
MONOCYTES NFR BLD AUTO: 2.9 %
MONOCYTES NFR BLD AUTO: 3 %
MONOCYTES NFR BLD AUTO: 3.1 %
MONOCYTES NFR BLD AUTO: 3.2 %
MONOCYTES NFR BLD AUTO: 3.3 %
MONOCYTES NFR BLD AUTO: 3.3 %
MONOCYTES NFR BLD AUTO: 3.4 %
MONOCYTES NFR BLD AUTO: 3.5 %
MONOCYTES NFR BLD AUTO: 3.5 %
MONOCYTES NFR BLD AUTO: 3.6 %
MONOCYTES NFR BLD AUTO: 3.6 %
MONOCYTES NFR BLD AUTO: 3.7 %
MONOCYTES NFR BLD AUTO: 3.8 %
MONOCYTES NFR BLD AUTO: 3.9 %
MONOCYTES NFR BLD AUTO: 4 %
MONOCYTES NFR BLD AUTO: 4.1 %
MONOCYTES NFR BLD AUTO: 4.1 %
MONOCYTES NFR BLD AUTO: 4.3 %
MONOCYTES NFR BLD AUTO: 4.4 %
MONOCYTES NFR BLD AUTO: 4.8 %
MONOCYTES NFR BLD AUTO: 6 %
MONOCYTES NFR BLD AUTO: 6.4 %
MONOCYTES NFR BLD AUTO: 7.4 %
MONOCYTES NFR BLD AUTO: 8.1 %
MONOCYTES NFR BLD AUTO: 9.9 %
MONOCYTES NFR BLD MANUAL: 0.14 X10(3)/MCL (ref 0.1–1.3)
MONOCYTES NFR BLD MANUAL: 0.27 X10(3)/MCL (ref 0.1–1.3)
MONOCYTES NFR BLD MANUAL: 0.42 X10(3)/MCL (ref 0.1–1.3)
MONOCYTES NFR BLD MANUAL: 0.73 X10(3)/MCL (ref 0.1–1.3)
MONOCYTES NFR BLD MANUAL: 1 %
MONOCYTES NFR BLD MANUAL: 1 %
MONOCYTES NFR BLD MANUAL: 1.13 X10(3)/MCL (ref 0.1–1.3)
MONOCYTES NFR BLD MANUAL: 1.14 X10(3)/MCL (ref 0.1–1.3)
MONOCYTES NFR BLD MANUAL: 1.34 X10(3)/MCL (ref 0.1–1.3)
MONOCYTES NFR BLD MANUAL: 1.36 X10(3)/MCL (ref 0.1–1.3)
MONOCYTES NFR BLD MANUAL: 1.39 X10(3)/MCL (ref 0.1–1.3)
MONOCYTES NFR BLD MANUAL: 1.65 X10(3)/MCL (ref 0.1–1.3)
MONOCYTES NFR BLD MANUAL: 1.92 X10(3)/MCL (ref 0.1–1.3)
MONOCYTES NFR BLD MANUAL: 11 %
MONOCYTES NFR BLD MANUAL: 12 %
MONOCYTES NFR BLD MANUAL: 16 %
MONOCYTES NFR BLD MANUAL: 2.56 X10(3)/MCL (ref 0.1–1.3)
MONOCYTES NFR BLD MANUAL: 3 %
MONOCYTES NFR BLD MANUAL: 4 %
MONOCYTES NFR BLD MANUAL: 4 %
MONOCYTES NFR BLD MANUAL: 6 %
MONOCYTES NFR BLD MANUAL: 7 %
MONOCYTES NFR BLD MANUAL: 8 % (ref 2–11)
MRSA PCR SCRN (OHS): NOT DETECTED
MV MEAN GRADIENT: 3 MMHG
MV PEAK A VEL: 0.69 M/S
MV PEAK E VEL: 0.99 M/S
MV PEAK GRADIENT: 5 MMHG
MV STENOSIS PRESSURE HALF TIME: 53 MS
MV VALVE AREA BY CONTINUITY EQUATION: 2.77 CM2
MV VALVE AREA P 1/2 METHOD: 4.15 CM2
MYELOCYTES NFR BLD MANUAL: 1 %
MYELOCYTES NFR BLD MANUAL: 4 %
NEUTROPHILS # BLD AUTO: 10.4 X10(3)/MCL (ref 2.1–9.2)
NEUTROPHILS # BLD AUTO: 10.5 X10(3)/MCL (ref 2.1–9.2)
NEUTROPHILS # BLD AUTO: 11.3 X10(3)/MCL (ref 2.1–9.2)
NEUTROPHILS # BLD AUTO: 12.2 X10(3)/MCL (ref 2.1–9.2)
NEUTROPHILS # BLD AUTO: 12.3 X10(3)/MCL (ref 2.1–9.2)
NEUTROPHILS # BLD AUTO: 13.3 X10(3)/MCL (ref 2.1–9.2)
NEUTROPHILS # BLD AUTO: 13.4 X10(3)/MCL (ref 2.1–9.2)
NEUTROPHILS # BLD AUTO: 14 X10(3)/MCL (ref 2.1–9.2)
NEUTROPHILS # BLD AUTO: 14.1 X10(3)/MCL (ref 2.1–9.2)
NEUTROPHILS # BLD AUTO: 14.2 X10(3)/MCL (ref 2.1–9.2)
NEUTROPHILS # BLD AUTO: 14.3 X10(3)/MCL (ref 2.1–9.2)
NEUTROPHILS # BLD AUTO: 14.9 X10(3)/MCL (ref 2.1–9.2)
NEUTROPHILS # BLD AUTO: 15 X10(3)/MCL (ref 2.1–9.2)
NEUTROPHILS # BLD AUTO: 15.6 X10(3)/MCL (ref 2.1–9.2)
NEUTROPHILS # BLD AUTO: 15.7 X10(3)/MCL (ref 2.1–9.2)
NEUTROPHILS # BLD AUTO: 15.7 X10(3)/MCL (ref 2.1–9.2)
NEUTROPHILS # BLD AUTO: 16.2 X10(3)/MCL (ref 2.1–9.2)
NEUTROPHILS # BLD AUTO: 16.3 X10(3)/MCL (ref 2.1–9.2)
NEUTROPHILS # BLD AUTO: 16.6 X10(3)/MCL (ref 2.1–9.2)
NEUTROPHILS # BLD AUTO: 16.7 X10(3)/MCL (ref 2.1–9.2)
NEUTROPHILS # BLD AUTO: 16.7 X10(3)/MCL (ref 2.1–9.2)
NEUTROPHILS # BLD AUTO: 17.1 X10(3)/MCL (ref 2.1–9.2)
NEUTROPHILS # BLD AUTO: 18.1 X10(3)/MCL (ref 2.1–9.2)
NEUTROPHILS # BLD AUTO: 18.3 X10(3)/MCL (ref 2.1–9.2)
NEUTROPHILS # BLD AUTO: 18.4 X10(3)/MCL (ref 2.1–9.2)
NEUTROPHILS # BLD AUTO: 18.4 X10(3)/MCL (ref 2.1–9.2)
NEUTROPHILS # BLD AUTO: 18.6 X10(3)/MCL (ref 2.1–9.2)
NEUTROPHILS # BLD AUTO: 18.9 X10(3)/MCL (ref 2.1–9.2)
NEUTROPHILS # BLD AUTO: 19.4 X10(3)/MCL (ref 2.1–9.2)
NEUTROPHILS # BLD AUTO: 19.7 X10(3)/MCL (ref 2.1–9.2)
NEUTROPHILS # BLD AUTO: 19.9 X10(3)/MCL (ref 2.1–9.2)
NEUTROPHILS # BLD AUTO: 20.2 X10(3)/MCL (ref 2.1–9.2)
NEUTROPHILS # BLD AUTO: 20.7 X10(3)/MCL (ref 2.1–9.2)
NEUTROPHILS # BLD AUTO: 20.8 X10(3)/MCL (ref 2.1–9.2)
NEUTROPHILS # BLD AUTO: 21.1 X10(3)/MCL (ref 2.1–9.2)
NEUTROPHILS # BLD AUTO: 21.7 X10(3)/MCL (ref 2.1–9.2)
NEUTROPHILS # BLD AUTO: 21.7 X10(3)/MCL (ref 2.1–9.2)
NEUTROPHILS # BLD AUTO: 21.8 X10(3)/MCL (ref 2.1–9.2)
NEUTROPHILS # BLD AUTO: 22.9 X10(3)/MCL (ref 2.1–9.2)
NEUTROPHILS # BLD AUTO: 23.2 X10(3)/MCL (ref 2.1–9.2)
NEUTROPHILS # BLD AUTO: 24.8 X10(3)/MCL (ref 2.1–9.2)
NEUTROPHILS # BLD AUTO: 25.8 X10(3)/MCL (ref 2.1–9.2)
NEUTROPHILS # BLD AUTO: 27.6 X10(3)/MCL (ref 2.1–9.2)
NEUTROPHILS # BLD AUTO: 4.2 X10(3)/MCL (ref 2.1–9.2)
NEUTROPHILS # BLD AUTO: 7.2 X10(3)/MCL (ref 2.1–9.2)
NEUTROPHILS # BLD AUTO: 7.4 X10(3)/MCL (ref 2.1–9.2)
NEUTROPHILS # BLD AUTO: 8 X10(3)/MCL (ref 2.1–9.2)
NEUTROPHILS # BLD AUTO: 9.7 X10(3)/MCL (ref 2.1–9.2)
NEUTROPHILS MAN BF (OHS): 28 %
NEUTROPHILS NFR BLD AUTO: 52.4 %
NEUTROPHILS NFR BLD AUTO: 66.8 %
NEUTROPHILS NFR BLD AUTO: 70.1 %
NEUTROPHILS NFR BLD AUTO: 72.5 %
NEUTROPHILS NFR BLD AUTO: 73.3 %
NEUTROPHILS NFR BLD AUTO: 76.5 %
NEUTROPHILS NFR BLD AUTO: 76.6 %
NEUTROPHILS NFR BLD AUTO: 77.9 %
NEUTROPHILS NFR BLD AUTO: 78.5 %
NEUTROPHILS NFR BLD AUTO: 85.3 %
NEUTROPHILS NFR BLD AUTO: 85.7 %
NEUTROPHILS NFR BLD AUTO: 86.1 %
NEUTROPHILS NFR BLD AUTO: 86.8 %
NEUTROPHILS NFR BLD AUTO: 88.1 %
NEUTROPHILS NFR BLD AUTO: 88.1 %
NEUTROPHILS NFR BLD AUTO: 88.3 %
NEUTROPHILS NFR BLD AUTO: 88.3 %
NEUTROPHILS NFR BLD AUTO: 88.6 %
NEUTROPHILS NFR BLD AUTO: 88.8 %
NEUTROPHILS NFR BLD AUTO: 89 %
NEUTROPHILS NFR BLD AUTO: 89.8 %
NEUTROPHILS NFR BLD AUTO: 89.8 %
NEUTROPHILS NFR BLD AUTO: 90.2 %
NEUTROPHILS NFR BLD AUTO: 90.2 %
NEUTROPHILS NFR BLD AUTO: 90.6 %
NEUTROPHILS NFR BLD AUTO: 90.7 %
NEUTROPHILS NFR BLD AUTO: 90.8 %
NEUTROPHILS NFR BLD AUTO: 91 %
NEUTROPHILS NFR BLD AUTO: 91.1 %
NEUTROPHILS NFR BLD AUTO: 91.2 %
NEUTROPHILS NFR BLD AUTO: 91.3 %
NEUTROPHILS NFR BLD AUTO: 91.5 %
NEUTROPHILS NFR BLD AUTO: 91.7 %
NEUTROPHILS NFR BLD AUTO: 91.8 %
NEUTROPHILS NFR BLD AUTO: 91.9 %
NEUTROPHILS NFR BLD AUTO: 92 %
NEUTROPHILS NFR BLD AUTO: 92.8 %
NEUTROPHILS NFR BLD AUTO: 93 %
NEUTROPHILS NFR BLD AUTO: 93.1 %
NEUTROPHILS NFR BLD AUTO: 93.3 %
NEUTROPHILS NFR BLD AUTO: 93.4 %
NEUTROPHILS NFR BLD AUTO: 94 %
NEUTROPHILS NFR BLD AUTO: 94.1 %
NEUTROPHILS NFR BLD AUTO: 94.3 %
NEUTROPHILS NFR BLD AUTO: 94.5 %
NEUTROPHILS NFR BLD AUTO: 94.9 %
NEUTROPHILS NFR BLD MANUAL: 100 %
NEUTROPHILS NFR BLD MANUAL: 38 % (ref 47–80)
NEUTROPHILS NFR BLD MANUAL: 42 %
NEUTROPHILS NFR BLD MANUAL: 75 %
NEUTROPHILS NFR BLD MANUAL: 75 %
NEUTROPHILS NFR BLD MANUAL: 76 %
NEUTROPHILS NFR BLD MANUAL: 81 %
NEUTROPHILS NFR BLD MANUAL: 82 %
NEUTROPHILS NFR BLD MANUAL: 84 %
NEUTROPHILS NFR BLD MANUAL: 84 %
NEUTROPHILS NFR BLD MANUAL: 86 %
NEUTROPHILS NFR BLD MANUAL: 94 %
NEUTROPHILS NFR BLD MANUAL: 97 %
NEUTROPHILS NFR BLD MANUAL: 97 %
NITRITE UR QL STRIP.AUTO: POSITIVE
NITRITE UR QL STRIP.AUTO: POSITIVE
NORMETANEPH FREE SERPL-SCNC: 1.7 NMOL/L
NRBC BLD AUTO-RTO: 0 %
NRBC BLD AUTO-RTO: 0.1 %
NRBC BLD AUTO-RTO: 0.2 %
NRBC BLD AUTO-RTO: 0.3 %
NRBC BLD AUTO-RTO: 0.4 %
NRBC BLD AUTO-RTO: 0.4 %
NRBC BLD AUTO-RTO: 1 %
NRBC BLD AUTO-RTO: 1.3 %
NRBC BLD AUTO-RTO: 2.4 %
NRBC BLD AUTO-RTO: 8.3 %
NRBC BLD MANUAL-RTO: 1 %
NRBC BLD MANUAL-RTO: 1 %
NRBC BLD MANUAL-RTO: 2 %
NRBC BLD MANUAL-RTO: 3 %
NRBC BLD MANUAL-RTO: 5 %
OSMOLALITY SERPL: 306 MOSM/KG (ref 280–300)
PCO2 BLDA: 27 MMHG
PCO2 BLDA: 30 MMHG (ref 35–45)
PCO2 BLDA: 31 MMHG (ref 35–45)
PCO2 BLDA: 32 MM[HG]
PCO2 BLDA: 33 MMHG (ref 35–45)
PCO2 BLDA: 34 MMHG
PCO2 BLDA: 35 MMHG
PCO2 BLDA: 37 MMHG (ref 35–45)
PCO2 BLDA: 37 MMHG (ref 35–45)
PCO2 BLDA: 38 MMHG
PCO2 BLDA: 39 MMHG
PCO2 BLDA: 41 MMHG
PCO2 BLDA: 41 MMHG
PCO2 BLDA: 44 MMHG (ref 35–45)
PCO2 BLDA: ABNORMAL MM[HG]
PEEP: 8 CM H2O
PH SMN: 7.22 [PH] (ref 7.29–7.61)
PH SMN: 7.27 [PH]
PH SMN: 7.28 [PH] (ref 7.29–7.61)
PH SMN: 7.36 [PH]
PH SMN: 7.37 [PH]
PH SMN: 7.39 [PH]
PH SMN: 7.41 [PH] (ref 7.35–7.45)
PH SMN: 7.42 [PH]
PH SMN: 7.43 [PH] (ref 7.35–7.45)
PH SMN: 7.44 [PH] (ref 7.35–7.45)
PH SMN: 7.45 [PH]
PH SMN: 7.46 [PH] (ref 7.35–7.45)
PH SMN: 7.48 [PH] (ref 7.35–7.45)
PH SMN: 7.54 [PH] (ref 7.35–7.45)
PH UR STRIP.AUTO: 5 [PH]
PH UR STRIP.AUTO: 5 [PH]
PHOSPHATE SERPL-MCNC: 2.4 MG/DL (ref 2.3–4.7)
PHOSPHATE SERPL-MCNC: 2.9 MG/DL (ref 2.3–4.7)
PHOSPHATE SERPL-MCNC: 3.2 MG/DL (ref 2.3–4.7)
PHOSPHATE SERPL-MCNC: 3.2 MG/DL (ref 2.3–4.7)
PHOSPHATE SERPL-MCNC: 3.3 MG/DL (ref 2.3–4.7)
PHOSPHATE SERPL-MCNC: 3.6 MG/DL (ref 2.3–4.7)
PHOSPHATE SERPL-MCNC: 3.7 MG/DL (ref 2.3–4.7)
PHOSPHATE SERPL-MCNC: 3.8 MG/DL (ref 2.3–4.7)
PHOSPHATE SERPL-MCNC: 3.8 MG/DL (ref 2.3–4.7)
PHOSPHATE SERPL-MCNC: 4 MG/DL (ref 2.3–4.7)
PHOSPHATE SERPL-MCNC: 4.1 MG/DL (ref 2.3–4.7)
PHOSPHATE SERPL-MCNC: 4.2 MG/DL (ref 2.3–4.7)
PHOSPHATE SERPL-MCNC: 4.4 MG/DL (ref 2.3–4.7)
PHOSPHATE SERPL-MCNC: 4.4 MG/DL (ref 2.3–4.7)
PHOSPHATE SERPL-MCNC: 4.5 MG/DL (ref 2.3–4.7)
PHOSPHATE SERPL-MCNC: 4.7 MG/DL (ref 2.3–4.7)
PHOSPHATE SERPL-MCNC: 5 MG/DL (ref 2.3–4.7)
PHOSPHATE SERPL-MCNC: 5 MG/DL (ref 2.3–4.7)
PHOSPHATE SERPL-MCNC: 5.2 MG/DL (ref 2.3–4.7)
PHOSPHATE SERPL-MCNC: 5.5 MG/DL (ref 2.3–4.7)
PHOSPHATE SERPL-MCNC: 5.7 MG/DL (ref 2.3–4.7)
PHOSPHATE SERPL-MCNC: 5.8 MG/DL (ref 2.3–4.7)
PHOSPHATE SERPL-MCNC: 5.9 MG/DL (ref 2.3–4.7)
PHOSPHATE SERPL-MCNC: 6.1 MG/DL (ref 2.3–4.7)
PHOSPHATE SERPL-MCNC: 6.2 MG/DL (ref 2.3–4.7)
PHOSPHATE SERPL-MCNC: 7 MG/DL (ref 2.3–4.7)
PHOSPHATE SERPL-MCNC: 7.4 MG/DL (ref 2.3–4.7)
PHOSPHATE SERPL-MCNC: 7.7 MG/DL (ref 2.3–4.7)
PHOSPHATE SERPL-MCNC: 8 MG/DL (ref 2.3–4.7)
PHOSPHATE SERPL-MCNC: 8.4 MG/DL (ref 2.3–4.7)
PHOSPHATE SERPL-MCNC: 9.1 MG/DL (ref 2.3–4.7)
PISA TR MAX VEL: 2.49 M/S
PISA TR MAX VEL: 2.95 M/S
PLATELET # BLD AUTO: 100 X10(3)/MCL (ref 130–400)
PLATELET # BLD AUTO: 102 X10(3)/MCL (ref 130–400)
PLATELET # BLD AUTO: 108 X10(3)/MCL (ref 130–400)
PLATELET # BLD AUTO: 111 X10(3)/MCL (ref 130–400)
PLATELET # BLD AUTO: 113 X10(3)/MCL (ref 130–400)
PLATELET # BLD AUTO: 114 X10(3)/MCL (ref 130–400)
PLATELET # BLD AUTO: 117 X10(3)/MCL (ref 130–400)
PLATELET # BLD AUTO: 127 X10(3)/MCL (ref 130–400)
PLATELET # BLD AUTO: 134 X10(3)/MCL (ref 130–400)
PLATELET # BLD AUTO: 145 X10(3)/MCL (ref 130–400)
PLATELET # BLD AUTO: 151 X10(3)/MCL (ref 130–400)
PLATELET # BLD AUTO: 152 X10(3)/MCL (ref 130–400)
PLATELET # BLD AUTO: 153 X10(3)/MCL (ref 130–400)
PLATELET # BLD AUTO: 154 X10(3)/MCL (ref 130–400)
PLATELET # BLD AUTO: 174 X10(3)/MCL (ref 130–400)
PLATELET # BLD AUTO: 180 X10(3)/MCL (ref 130–400)
PLATELET # BLD AUTO: 190 X10(3)/MCL (ref 130–400)
PLATELET # BLD AUTO: 192 X10(3)/MCL (ref 130–400)
PLATELET # BLD AUTO: 195 X10(3)/MCL (ref 130–400)
PLATELET # BLD AUTO: 195 X10(3)/MCL (ref 130–400)
PLATELET # BLD AUTO: 210 X10(3)/MCL (ref 130–400)
PLATELET # BLD AUTO: 214 X10(3)/MCL (ref 130–400)
PLATELET # BLD AUTO: 217 X10(3)/MCL (ref 130–400)
PLATELET # BLD AUTO: 217 X10(3)/MCL (ref 130–400)
PLATELET # BLD AUTO: 222 X10(3)/MCL (ref 130–400)
PLATELET # BLD AUTO: 228 X10(3)/MCL (ref 130–400)
PLATELET # BLD AUTO: 229 X10(3)/MCL (ref 130–400)
PLATELET # BLD AUTO: 237 X10(3)/MCL (ref 130–400)
PLATELET # BLD AUTO: 246 X10(3)/MCL (ref 130–400)
PLATELET # BLD AUTO: 249 X10(3)/MCL (ref 130–400)
PLATELET # BLD AUTO: 251 X10(3)/MCL (ref 130–400)
PLATELET # BLD AUTO: 261 X10(3)/MCL (ref 130–400)
PLATELET # BLD AUTO: 264 X10(3)/MCL (ref 130–400)
PLATELET # BLD AUTO: 267 X10(3)/MCL (ref 130–400)
PLATELET # BLD AUTO: 279 X10(3)/MCL (ref 130–400)
PLATELET # BLD AUTO: 287 X10(3)/MCL (ref 130–400)
PLATELET # BLD AUTO: 288 X10(3)/MCL (ref 130–400)
PLATELET # BLD AUTO: 305 X10(3)/MCL (ref 130–400)
PLATELET # BLD AUTO: 312 X10(3)/MCL (ref 130–400)
PLATELET # BLD AUTO: 313 X10(3)/MCL (ref 130–400)
PLATELET # BLD AUTO: 319 X10(3)/MCL (ref 130–400)
PLATELET # BLD AUTO: 330 X10(3)/MCL (ref 130–400)
PLATELET # BLD AUTO: 339 X10(3)/MCL (ref 130–400)
PLATELET # BLD AUTO: 375 X10(3)/MCL (ref 130–400)
PLATELET # BLD AUTO: 406 X10(3)/MCL (ref 130–400)
PLATELET # BLD AUTO: 430 X10(3)/MCL (ref 130–400)
PLATELET # BLD AUTO: 442 X10(3)/MCL (ref 130–400)
PLATELET # BLD AUTO: 443 X10(3)/MCL (ref 130–400)
PLATELET # BLD AUTO: 448 X10(3)/MCL (ref 130–400)
PLATELET # BLD AUTO: 498 X10(3)/MCL (ref 130–400)
PLATELET # BLD AUTO: 509 X10(3)/MCL (ref 130–400)
PLATELET # BLD AUTO: 512 X10(3)/MCL (ref 130–400)
PLATELET # BLD AUTO: 539 X10(3)/MCL (ref 130–400)
PLATELET # BLD AUTO: 586 X10(3)/MCL (ref 130–400)
PLATELET # BLD AUTO: 610 X10(3)/MCL (ref 130–400)
PLATELET # BLD AUTO: 647 X10(3)/MCL (ref 130–400)
PLATELET # BLD AUTO: 66 X10(3)/MCL (ref 130–400)
PLATELET # BLD AUTO: 73 X10(3)/MCL (ref 130–400)
PLATELET # BLD AUTO: 75 X10(3)/MCL (ref 130–400)
PLATELET # BLD AUTO: 79 X10(3)/MCL (ref 130–400)
PLATELET # BLD AUTO: 84 X10(3)/MCL (ref 130–400)
PLATELET # BLD AUTO: 90 X10(3)/MCL (ref 130–400)
PLATELET # BLD AUTO: 92 X10(3)/MCL (ref 130–400)
PLATELET # BLD AUTO: 94 X10(3)/MCL (ref 130–400)
PLATELET # BLD AUTO: 96 X10(3)/MCL (ref 130–400)
PLATELET # BLD AUTO: 98 X10(3)/MCL (ref 130–400)
PLATELET # BLD EST: ABNORMAL 10*3/UL
PLATELET # BLD EST: ADEQUATE 10*3/UL
PLATELET # BLD EST: NORMAL 10*3/UL
PMV BLD AUTO: 10 FL (ref 7.4–10.4)
PMV BLD AUTO: 10.1 FL (ref 7.4–10.4)
PMV BLD AUTO: 10.1 FL (ref 7.4–10.4)
PMV BLD AUTO: 10.2 FL (ref 7.4–10.4)
PMV BLD AUTO: 10.3 FL (ref 7.4–10.4)
PMV BLD AUTO: 10.4 FL (ref 7.4–10.4)
PMV BLD AUTO: 10.5 FL (ref 7.4–10.4)
PMV BLD AUTO: 10.5 FL (ref 7.4–10.4)
PMV BLD AUTO: 10.6 FL (ref 7.4–10.4)
PMV BLD AUTO: 10.7 FL (ref 7.4–10.4)
PMV BLD AUTO: 10.7 FL (ref 7.4–10.4)
PMV BLD AUTO: 10.8 FL (ref 7.4–10.4)
PMV BLD AUTO: 10.8 FL (ref 9.4–12.4)
PMV BLD AUTO: 10.9 FL (ref 7.4–10.4)
PMV BLD AUTO: 10.9 FL (ref 7.4–10.4)
PMV BLD AUTO: 11 FL (ref 7.4–10.4)
PMV BLD AUTO: 11.1 FL (ref 7.4–10.4)
PMV BLD AUTO: 11.2 FL (ref 7.4–10.4)
PMV BLD AUTO: 11.2 FL (ref 7.4–10.4)
PMV BLD AUTO: 11.3 FL (ref 7.4–10.4)
PMV BLD AUTO: 11.4 FL (ref 7.4–10.4)
PMV BLD AUTO: 11.4 FL (ref 7.4–10.4)
PMV BLD AUTO: 11.5 FL (ref 7.4–10.4)
PMV BLD AUTO: 11.6 FL (ref 7.4–10.4)
PMV BLD AUTO: 11.6 FL (ref 7.4–10.4)
PMV BLD AUTO: 11.8 FL (ref 7.4–10.4)
PMV BLD AUTO: 11.9 FL (ref 7.4–10.4)
PMV BLD AUTO: 12 FL (ref 7.4–10.4)
PMV BLD AUTO: 12.2 FL (ref 7.4–10.4)
PMV BLD AUTO: 12.3 FL (ref 7.4–10.4)
PMV BLD AUTO: 13.3 FL (ref 7.4–10.4)
PMV BLD AUTO: 13.5 FL (ref 7.4–10.4)
PMV BLD AUTO: 9.5 FL (ref 7.4–10.4)
PMV BLD AUTO: 9.7 FL (ref 7.4–10.4)
PMV BLD AUTO: 9.7 FL (ref 7.4–10.4)
PMV BLD AUTO: 9.8 FL (ref 7.4–10.4)
PMV BLD AUTO: 9.8 FL (ref 7.4–10.4)
PMV BLD AUTO: ABNORMAL FL
PO2 BLDA: 118 MMHG
PO2 BLDA: 122 MMHG
PO2 BLDA: 125 MMHG
PO2 BLDA: 160 MMHG
PO2 BLDA: 191 MMHG (ref 80–100)
PO2 BLDA: 55 MMHG (ref 80–100)
PO2 BLDA: 57 MMHG
PO2 BLDA: 70 MMHG (ref 80–100)
PO2 BLDA: 72 MMHG (ref 80–100)
PO2 BLDA: 73 MMHG (ref 80–100)
PO2 BLDA: 75 MMHG
PO2 BLDA: 75 MMHG (ref 80–100)
PO2 BLDA: 75 MM[HG]
PO2 BLDA: 79 MMHG
POC BASE DEFICIT: -0.2 MMOL/L
POC BASE DEFICIT: -12.5 MMOL/L
POC BASE DEFICIT: -13.8 MMOL/L (ref -2–2)
POC BASE DEFICIT: -2 MMOL/L (ref -2–2)
POC BASE DEFICIT: -4.8 MMOL/L
POC BASE DEFICIT: -9.9 MMOL/L
POC BASE DEFICIT: 0.8 MMOL/L
POC BASE DEFICIT: 1 MMOL/L (ref -2–2)
POC BASE DEFICIT: 2.4 MMOL/L
POC BASE DEFICIT: 2.9 MMOL/L (ref -2–2)
POC BASE DEFICIT: 3.5 MMOL/L (ref -2–2)
POC BASE DEFICIT: 3.8 MMOL/L (ref -2–2)
POC BASE DEFICIT: 4.9 MMOL/L
POC COHB: 1.3 %
POC COHB: 1.5 %
POC COHB: 1.7 %
POC COHB: 1.9 %
POC COHB: 1.9 %
POC COHB: 2.5 %
POC COHB: ABNORMAL
POC CREATININE: 1.4 (ref 0.6–1.3)
POC HCO3: 12.7 MMOL/L
POC HCO3: 16.1 MMOL/L
POC HCO3: 19.7 MMOL/L
POC HCO3: 24.8 MMOL/L
POC HCO3: 25.3 MMOL/L
POC HCO3: 26.4 MMOL/L
POC HCO3: 29.2 MMOL/L
POC IONIZED CALCIUM: 0.71 MMOL/L
POC IONIZED CALCIUM: 0.76 MMOL/L (ref 0.79–1.4)
POC IONIZED CALCIUM: 0.78 MMOL/L (ref 0.79–1.4)
POC IONIZED CALCIUM: 0.8 MMOL/L (ref 1.1–1.2)
POC IONIZED CALCIUM: 0.84 MMOL/L (ref 1.1–1.2)
POC IONIZED CALCIUM: 0.94 MMOL/L (ref 1.06–1.42)
POC IONIZED CALCIUM: 0.99 MMOL/L (ref 1.06–1.42)
POC IONIZED CALCIUM: 1.04 MMOL/L (ref 1.12–1.23)
POC IONIZED CALCIUM: 1.08 MMOL/L (ref 1.12–1.23)
POC IONIZED CALCIUM: 1.12 MMOL/L (ref 1.12–1.23)
POC IONIZED CALCIUM: 1.12 MMOL/L (ref 1.12–1.23)
POC IONIZED CALCIUM: 1.13 MMOL/L
POC IONIZED CALCIUM: 1.14 MMOL/L
POC IONIZED CALCIUM: 1.15 MMOL/L
POC IONIZED CALCIUM: 1.17 MMOL/L (ref 1.12–1.23)
POC IONIZED CALCIUM: 1.26
POC METHB: 0.2 % (ref 0.4–1.5)
POC METHB: 0.8 % (ref 0.4–1.5)
POC METHB: 0.8 % (ref 0.4–1.5)
POC METHB: 1 %
POC METHB: 1.2 % (ref 0.4–1.5)
POC METHB: 1.4 % (ref 0.4–1.5)
POC METHB: ABNORMAL
POC O2HB: 91.3 % (ref 94–97)
POC O2HB: 91.9 % (ref 94–97)
POC O2HB: 93.4 % (ref 94–97)
POC O2HB: 94.6 % (ref 94–97)
POC O2HB: 94.9 % (ref 94–97)
POC O2HB: 97.6 % (ref 94–97)
POC O2HB: ABNORMAL
POC SATURATED O2: 90.5 %
POC SATURATED O2: 90.5 %
POC SATURATED O2: 91 %
POC SATURATED O2: 95 %
POC SATURATED O2: 95 %
POC SATURATED O2: 95.1 %
POC SATURATED O2: 95.8 %
POC SATURATED O2: 96 %
POC SATURATED O2: 98 %
POC SATURATED O2: 99 %
POC SATURATED O2: 99.7 %
POC TCO2 (MEASURED): 21 MMOL/L (ref 23–29)
POC TCO2 (MEASURED): 25 MMOL/L (ref 23–29)
POC TEMPERATURE: 37 C
POC TEMPERATURE: 37 °C
POCT GLUCOSE: 100 MG/DL (ref 70–110)
POCT GLUCOSE: 103 MG/DL (ref 70–110)
POCT GLUCOSE: 103 MG/DL (ref 70–110)
POCT GLUCOSE: 105 MG/DL (ref 70–110)
POCT GLUCOSE: 105 MG/DL (ref 70–110)
POCT GLUCOSE: 106 MG/DL (ref 70–110)
POCT GLUCOSE: 106 MG/DL (ref 70–110)
POCT GLUCOSE: 107 MG/DL (ref 70–110)
POCT GLUCOSE: 107 MG/DL (ref 70–110)
POCT GLUCOSE: 108 MG/DL (ref 70–110)
POCT GLUCOSE: 109 MG/DL (ref 70–110)
POCT GLUCOSE: 112 MG/DL (ref 70–110)
POCT GLUCOSE: 113 MG/DL (ref 70–110)
POCT GLUCOSE: 113 MG/DL (ref 70–110)
POCT GLUCOSE: 116 MG/DL (ref 70–110)
POCT GLUCOSE: 118 MG/DL (ref 70–110)
POCT GLUCOSE: 123 MG/DL (ref 70–110)
POCT GLUCOSE: 124 MG/DL (ref 70–110)
POCT GLUCOSE: 128 MG/DL (ref 70–110)
POCT GLUCOSE: 128 MG/DL (ref 70–110)
POCT GLUCOSE: 129 MG/DL (ref 70–110)
POCT GLUCOSE: 130 MG/DL (ref 70–110)
POCT GLUCOSE: 131 MG/DL (ref 70–110)
POCT GLUCOSE: 133 MG/DL (ref 70–110)
POCT GLUCOSE: 135 MG/DL (ref 70–110)
POCT GLUCOSE: 138 MG/DL (ref 70–110)
POCT GLUCOSE: 140 MG/DL (ref 70–110)
POCT GLUCOSE: 142 MG/DL (ref 70–110)
POCT GLUCOSE: 143 MG/DL (ref 70–110)
POCT GLUCOSE: 144 MG/DL (ref 70–110)
POCT GLUCOSE: 144 MG/DL (ref 70–110)
POCT GLUCOSE: 145 MG/DL (ref 70–110)
POCT GLUCOSE: 147 MG/DL (ref 70–110)
POCT GLUCOSE: 148 MG/DL (ref 70–110)
POCT GLUCOSE: 149 MG/DL (ref 70–110)
POCT GLUCOSE: 150 MG/DL (ref 70–110)
POCT GLUCOSE: 151 MG/DL (ref 70–110)
POCT GLUCOSE: 154 MG/DL (ref 70–110)
POCT GLUCOSE: 155 MG/DL (ref 70–110)
POCT GLUCOSE: 156 MG/DL (ref 70–110)
POCT GLUCOSE: 158 MG/DL (ref 70–110)
POCT GLUCOSE: 161 MG/DL (ref 70–110)
POCT GLUCOSE: 161 MG/DL (ref 70–110)
POCT GLUCOSE: 164 MG/DL (ref 70–110)
POCT GLUCOSE: 167 MG/DL (ref 70–110)
POCT GLUCOSE: 167 MG/DL (ref 70–110)
POCT GLUCOSE: 168 MG/DL (ref 70–110)
POCT GLUCOSE: 170 MG/DL (ref 70–110)
POCT GLUCOSE: 172 MG/DL (ref 70–110)
POCT GLUCOSE: 174 MG/DL (ref 70–110)
POCT GLUCOSE: 175 MG/DL (ref 70–110)
POCT GLUCOSE: 176 MG/DL (ref 70–110)
POCT GLUCOSE: 178 MG/DL (ref 70–110)
POCT GLUCOSE: 179 MG/DL (ref 70–110)
POCT GLUCOSE: 180 MG/DL (ref 70–110)
POCT GLUCOSE: 186 MG/DL (ref 70–110)
POCT GLUCOSE: 187 MG/DL (ref 70–110)
POCT GLUCOSE: 187 MG/DL (ref 70–110)
POCT GLUCOSE: 188 MG/DL (ref 70–110)
POCT GLUCOSE: 190 MG/DL (ref 70–110)
POCT GLUCOSE: 191 MG/DL (ref 70–110)
POCT GLUCOSE: 192 MG/DL (ref 70–110)
POCT GLUCOSE: 194 MG/DL (ref 70–110)
POCT GLUCOSE: 197 MG/DL (ref 70–110)
POCT GLUCOSE: 197 MG/DL (ref 70–110)
POCT GLUCOSE: 199 MG/DL (ref 70–110)
POCT GLUCOSE: 199 MG/DL (ref 70–110)
POCT GLUCOSE: 201 MG/DL (ref 70–110)
POCT GLUCOSE: 202 MG/DL (ref 70–110)
POCT GLUCOSE: 203 MG/DL (ref 70–110)
POCT GLUCOSE: 206 MG/DL (ref 70–110)
POCT GLUCOSE: 211 MG/DL (ref 70–110)
POCT GLUCOSE: 213 MG/DL (ref 70–110)
POCT GLUCOSE: 215 MG/DL (ref 70–110)
POCT GLUCOSE: 215 MG/DL (ref 70–110)
POCT GLUCOSE: 216 MG/DL (ref 70–110)
POCT GLUCOSE: 217 MG/DL (ref 70–110)
POCT GLUCOSE: 218 MG/DL (ref 70–110)
POCT GLUCOSE: 218 MG/DL (ref 70–110)
POCT GLUCOSE: 221 MG/DL (ref 70–110)
POCT GLUCOSE: 223 MG/DL (ref 70–110)
POCT GLUCOSE: 225 MG/DL (ref 70–110)
POCT GLUCOSE: 231 MG/DL (ref 70–110)
POCT GLUCOSE: 232 MG/DL (ref 70–110)
POCT GLUCOSE: 232 MG/DL (ref 70–110)
POCT GLUCOSE: 233 MG/DL (ref 70–110)
POCT GLUCOSE: 233 MG/DL (ref 70–110)
POCT GLUCOSE: 236 MG/DL (ref 70–110)
POCT GLUCOSE: 236 MG/DL (ref 70–110)
POCT GLUCOSE: 237 MG/DL (ref 70–110)
POCT GLUCOSE: 238 MG/DL (ref 70–110)
POCT GLUCOSE: 243 MG/DL (ref 70–110)
POCT GLUCOSE: 245 MG/DL (ref 70–110)
POCT GLUCOSE: 248 MG/DL (ref 70–110)
POCT GLUCOSE: 250 MG/DL (ref 70–110)
POCT GLUCOSE: 252 MG/DL (ref 70–110)
POCT GLUCOSE: 252 MG/DL (ref 70–110)
POCT GLUCOSE: 260 MG/DL (ref 70–110)
POCT GLUCOSE: 261 MG/DL (ref 70–110)
POCT GLUCOSE: 261 MG/DL (ref 70–110)
POCT GLUCOSE: 263 MG/DL (ref 70–110)
POCT GLUCOSE: 268 MG/DL (ref 70–110)
POCT GLUCOSE: 272 MG/DL (ref 70–110)
POCT GLUCOSE: 278 MG/DL (ref 70–110)
POCT GLUCOSE: 281 MG/DL (ref 70–110)
POCT GLUCOSE: 281 MG/DL (ref 70–110)
POCT GLUCOSE: 285 MG/DL (ref 70–110)
POCT GLUCOSE: 285 MG/DL (ref 70–110)
POCT GLUCOSE: 292 MG/DL (ref 70–110)
POCT GLUCOSE: 292 MG/DL (ref 70–110)
POCT GLUCOSE: 293 MG/DL (ref 70–110)
POCT GLUCOSE: 302 MG/DL (ref 70–110)
POCT GLUCOSE: 304 MG/DL (ref 70–110)
POCT GLUCOSE: 305 MG/DL (ref 70–110)
POCT GLUCOSE: 308 MG/DL (ref 70–110)
POCT GLUCOSE: 310 MG/DL (ref 70–110)
POCT GLUCOSE: 311 MG/DL (ref 70–110)
POCT GLUCOSE: 312 MG/DL (ref 70–110)
POCT GLUCOSE: 319 MG/DL (ref 70–110)
POCT GLUCOSE: 320 MG/DL (ref 70–110)
POCT GLUCOSE: 321 MG/DL (ref 70–110)
POCT GLUCOSE: 323 MG/DL (ref 70–110)
POCT GLUCOSE: 333 MG/DL (ref 70–110)
POCT GLUCOSE: 338 MG/DL (ref 70–110)
POCT GLUCOSE: 34 MG/DL (ref 70–110)
POCT GLUCOSE: 35 MG/DL (ref 70–110)
POCT GLUCOSE: 352 MG/DL (ref 70–110)
POCT GLUCOSE: 353 MG/DL (ref 70–110)
POCT GLUCOSE: 356 MG/DL (ref 70–110)
POCT GLUCOSE: 36 MG/DL (ref 70–110)
POCT GLUCOSE: 365 MG/DL (ref 70–110)
POCT GLUCOSE: 368 MG/DL (ref 70–110)
POCT GLUCOSE: 369 MG/DL (ref 70–110)
POCT GLUCOSE: 379 MG/DL (ref 70–110)
POCT GLUCOSE: 383 MG/DL (ref 70–110)
POCT GLUCOSE: 39 MG/DL (ref 70–110)
POCT GLUCOSE: 39 MG/DL (ref 70–110)
POCT GLUCOSE: 392 MG/DL (ref 70–110)
POCT GLUCOSE: 397 MG/DL (ref 70–110)
POCT GLUCOSE: 40 MG/DL (ref 70–110)
POCT GLUCOSE: 410 MG/DL (ref 70–110)
POCT GLUCOSE: 424 MG/DL (ref 70–110)
POCT GLUCOSE: 441 MG/DL (ref 70–110)
POCT GLUCOSE: 449 MG/DL (ref 70–110)
POCT GLUCOSE: 456 MG/DL (ref 70–110)
POCT GLUCOSE: 47 MG/DL (ref 70–110)
POCT GLUCOSE: 47 MG/DL (ref 70–110)
POCT GLUCOSE: 478 MG/DL (ref 70–110)
POCT GLUCOSE: 493 MG/DL (ref 70–110)
POCT GLUCOSE: 54 MG/DL (ref 70–110)
POCT GLUCOSE: 55 MG/DL (ref 70–110)
POCT GLUCOSE: 56 MG/DL (ref 70–110)
POCT GLUCOSE: 57 MG/DL (ref 70–110)
POCT GLUCOSE: 59 MG/DL (ref 70–110)
POCT GLUCOSE: 60 MG/DL (ref 70–110)
POCT GLUCOSE: 60 MG/DL (ref 70–110)
POCT GLUCOSE: 62 MG/DL (ref 70–110)
POCT GLUCOSE: 64 MG/DL (ref 70–110)
POCT GLUCOSE: 64 MG/DL (ref 70–110)
POCT GLUCOSE: 65 MG/DL (ref 70–110)
POCT GLUCOSE: 69 MG/DL (ref 70–110)
POCT GLUCOSE: 72 MG/DL (ref 70–110)
POCT GLUCOSE: 73 MG/DL (ref 70–110)
POCT GLUCOSE: 74 MG/DL (ref 70–110)
POCT GLUCOSE: 74 MG/DL (ref 70–110)
POCT GLUCOSE: 75 MG/DL (ref 70–110)
POCT GLUCOSE: 78 MG/DL (ref 70–110)
POCT GLUCOSE: 78 MG/DL (ref 70–110)
POCT GLUCOSE: 80 MG/DL (ref 70–110)
POCT GLUCOSE: 84 MG/DL (ref 70–110)
POCT GLUCOSE: 85 MG/DL (ref 70–110)
POCT GLUCOSE: 85 MG/DL (ref 70–110)
POCT GLUCOSE: 89 MG/DL (ref 70–110)
POCT GLUCOSE: 92 MG/DL (ref 70–110)
POCT GLUCOSE: 92 MG/DL (ref 70–110)
POCT GLUCOSE: 95 MG/DL (ref 70–110)
POCT GLUCOSE: 97 MG/DL (ref 70–110)
POCT GLUCOSE: 97 MG/DL (ref 70–110)
POCT GLUCOSE: 99 MG/DL (ref 70–110)
POCT GLUCOSE: <20 MG/DL (ref 70–110)
POCT GLUCOSE: >500 MG/DL (ref 70–110)
POIKILOCYTOSIS BLD QL SMEAR: ABNORMAL
POLYCHROMASIA BLD QL SMEAR: ABNORMAL
POTASSIUM BLD-SCNC: 3.1 MMOL/L (ref 3.5–5)
POTASSIUM BLD-SCNC: 3.3 MMOL/L (ref 3.5–5)
POTASSIUM BLD-SCNC: 3.4 MMOL/L
POTASSIUM BLD-SCNC: 3.6 MMOL/L (ref 3.5–5.1)
POTASSIUM BLD-SCNC: 3.7 MMOL/L
POTASSIUM BLD-SCNC: 3.7 MMOL/L
POTASSIUM BLD-SCNC: 3.8 MMOL/L
POTASSIUM BLD-SCNC: 4 MMOL/L (ref 3.5–5)
POTASSIUM BLD-SCNC: 4.1 MMOL/L (ref 3.5–5)
POTASSIUM BLD-SCNC: 4.3 MMOL/L (ref 3.5–5.1)
POTASSIUM BLD-SCNC: 4.7 MMOL/L
POTASSIUM BLD-SCNC: 4.8 MMOL/L
POTASSIUM BLD-SCNC: 4.8 MMOL/L (ref 3.5–5)
POTASSIUM BLD-SCNC: 5.2 MMOL/L (ref 3.5–5)
POTASSIUM BLD-SCNC: 5.3 MMOL/L
POTASSIUM BLD-SCNC: 6 MMOL/L
POTASSIUM SERPL-SCNC: 3.3 MMOL/L (ref 3.5–5.1)
POTASSIUM SERPL-SCNC: 3.5 MMOL/L (ref 3.5–5.1)
POTASSIUM SERPL-SCNC: 3.5 MMOL/L (ref 3.5–5.1)
POTASSIUM SERPL-SCNC: 3.6 MMOL/L (ref 3.5–5.1)
POTASSIUM SERPL-SCNC: 3.7 MMOL/L (ref 3.5–5.1)
POTASSIUM SERPL-SCNC: 3.8 MMOL/L (ref 3.5–5.1)
POTASSIUM SERPL-SCNC: 3.8 MMOL/L (ref 3.5–5.1)
POTASSIUM SERPL-SCNC: 3.9 MMOL/L (ref 3.5–5.1)
POTASSIUM SERPL-SCNC: 3.9 MMOL/L (ref 3.5–5.1)
POTASSIUM SERPL-SCNC: 4.1 MMOL/L (ref 3.5–5.1)
POTASSIUM SERPL-SCNC: 4.2 MMOL/L (ref 3.5–5.1)
POTASSIUM SERPL-SCNC: 4.3 MMOL/L (ref 3.5–5.1)
POTASSIUM SERPL-SCNC: 4.4 MMOL/L (ref 3.5–5.1)
POTASSIUM SERPL-SCNC: 4.6 MMOL/L (ref 3.5–5.1)
POTASSIUM SERPL-SCNC: 4.7 MMOL/L (ref 3.5–5.1)
POTASSIUM SERPL-SCNC: 4.8 MMOL/L (ref 3.5–5.1)
POTASSIUM SERPL-SCNC: 4.8 MMOL/L (ref 3.5–5.1)
POTASSIUM SERPL-SCNC: 4.9 MMOL/L (ref 3.5–5.1)
POTASSIUM SERPL-SCNC: 5.1 MMOL/L (ref 3.5–5.1)
POTASSIUM SERPL-SCNC: 5.1 MMOL/L (ref 3.5–5.1)
POTASSIUM SERPL-SCNC: 5.2 MMOL/L (ref 3.5–5.1)
POTASSIUM SERPL-SCNC: 5.2 MMOL/L (ref 3.5–5.1)
POTASSIUM SERPL-SCNC: 5.4 MMOL/L (ref 3.5–5.1)
POTASSIUM SERPL-SCNC: 5.4 MMOL/L (ref 3.5–5.1)
POTASSIUM SERPL-SCNC: 5.5 MMOL/L (ref 3.5–5.1)
POTASSIUM SERPL-SCNC: 5.5 MMOL/L (ref 3.5–5.1)
POTASSIUM SERPL-SCNC: 5.6 MMOL/L (ref 3.5–5.1)
POTASSIUM SERPL-SCNC: 5.8 MMOL/L (ref 3.5–5.1)
POTASSIUM SERPL-SCNC: 5.8 MMOL/L (ref 3.5–5.1)
POTASSIUM SERPL-SCNC: 6.1 MMOL/L (ref 3.5–5.1)
POTASSIUM SERPL-SCNC: 6.1 MMOL/L (ref 3.5–5.1)
POTASSIUM SERPL-SCNC: 6.2 MMOL/L (ref 3.5–5.1)
POTASSIUM SERPL-SCNC: 6.3 MMOL/L (ref 3.5–5.1)
POTASSIUM SERPL-SCNC: 6.5 MMOL/L (ref 3.5–5.1)
POTASSIUM SERPL-SCNC: 6.7 MMOL/L (ref 3.5–5.1)
PREALB SERPL-MCNC: 13.4 MG/DL (ref 18–45)
PREALB SERPL-MCNC: 13.4 MG/DL (ref 18–45)
PREALB SERPL-MCNC: 18.1 MG/DL (ref 18–45)
PROCALCITONIN SERPL-MCNC: 0.41 NG/ML
PROCALCITONIN SERPL-MCNC: 0.68 NG/ML
PROCALCITONIN SERPL-MCNC: 1.6 NG/ML
PROMYELOCYTES # BLD MANUAL: 1 %
PROMYELOCYTES # BLD MANUAL: 2 %
PROT FLD-MCNC: NORMAL G/DL
PROT SERPL-MCNC: 3.6 GM/DL (ref 6.4–8.3)
PROT SERPL-MCNC: 3.9 GM/DL (ref 6.4–8.3)
PROT SERPL-MCNC: 4.1 GM/DL (ref 6.4–8.3)
PROT SERPL-MCNC: 4.2 GM/DL (ref 6.4–8.3)
PROT SERPL-MCNC: 4.2 GM/DL (ref 6.4–8.3)
PROT SERPL-MCNC: 4.3 GM/DL (ref 6.4–8.3)
PROT SERPL-MCNC: 4.3 GM/DL (ref 6.4–8.3)
PROT SERPL-MCNC: 4.4 GM/DL (ref 6.4–8.3)
PROT SERPL-MCNC: 4.5 GM/DL (ref 6.4–8.3)
PROT SERPL-MCNC: 4.5 GM/DL (ref 6.4–8.3)
PROT SERPL-MCNC: 4.6 GM/DL (ref 6.4–8.3)
PROT SERPL-MCNC: 4.7 GM/DL (ref 6.4–8.3)
PROT SERPL-MCNC: 4.8 GM/DL (ref 6.4–8.3)
PROT SERPL-MCNC: 4.9 GM/DL (ref 6.4–8.3)
PROT SERPL-MCNC: 5 GM/DL (ref 6.4–8.3)
PROT SERPL-MCNC: 5.1 GM/DL (ref 6.4–8.3)
PROT SERPL-MCNC: 5.3 GM/DL (ref 6.4–8.3)
PROT SERPL-MCNC: 5.8 GM/DL (ref 6.4–8.3)
PROT SERPL-MCNC: 5.9 GM/DL (ref 6.4–8.3)
PROT SERPL-MCNC: 5.9 GM/DL (ref 6.4–8.3)
PROT SERPL-MCNC: 6.2 GM/DL (ref 6.4–8.3)
PROT SERPL-MCNC: 6.5 GM/DL (ref 6.4–8.3)
PROT UR QL STRIP.AUTO: ABNORMAL MG/DL
PROT UR QL STRIP.AUTO: ABNORMAL MG/DL
PROTHROMBIN TIME: 15 SECONDS (ref 12.5–14.5)
PROTHROMBIN TIME: 15 SECONDS (ref 12.5–14.5)
PROTHROMBIN TIME: 15.6 SECONDS (ref 12.5–14.5)
PROTHROMBIN TIME: 15.7 SECONDS (ref 12.5–14.5)
PROTHROMBIN TIME: 15.7 SECONDS (ref 12.5–14.5)
PROTHROMBIN TIME: 16 SECONDS (ref 12.5–14.5)
PROTHROMBIN TIME: 16.1 SECONDS (ref 12.5–14.5)
PROTHROMBIN TIME: 16.1 SECONDS (ref 12.5–14.5)
PROTHROMBIN TIME: 16.8 SECONDS (ref 12.5–14.5)
PROTHROMBIN TIME: 17.6 SECONDS (ref 12.5–14.5)
PROTHROMBIN TIME: 18.1 SECONDS (ref 12.5–14.5)
PROTHROMBIN TIME: 18.4 SECONDS (ref 12.5–14.5)
PROTHROMBIN TIME: 19.1 SECONDS (ref 12.5–14.5)
PROTHROMBIN TIME: 19.9 SECONDS (ref 12.5–14.5)
PROTHROMBIN TIME: 20.6 SECONDS (ref 12.5–14.5)
PROTHROMBIN TIME: 21.6 SECONDS (ref 12.5–14.5)
PROTHROMBIN TIME: 23.4 SECONDS (ref 12.5–14.5)
PROTHROMBIN TIME: 23.5 SECONDS (ref 12.5–14.5)
PROTHROMBIN TIME: 24.2 SECONDS (ref 12.5–14.5)
PROTHROMBIN TIME: 24.3 SECONDS (ref 12.5–14.5)
PROTHROMBIN TIME: 25.5 SECONDS (ref 12.5–14.5)
PROTHROMBIN TIME: 29 SECONDS (ref 12.5–14.5)
PROTHROMBIN TIME: 29 SECONDS (ref 12.5–14.5)
PROTHROMBIN TIME: 29.9 SECONDS (ref 12.5–14.5)
PROTHROMBIN TIME: 30.6 SECONDS (ref 12.5–14.5)
PROTHROMBIN TIME: 32.5 SECONDS (ref 12.5–14.5)
PROTHROMBIN TIME: 32.6 SECONDS (ref 12.5–14.5)
PROTHROMBIN TIME: 32.9 SECONDS (ref 12.5–14.5)
PROTHROMBIN TIME: 33.9 SECONDS (ref 12.5–14.5)
PROTHROMBIN TIME: 37.3 SECONDS (ref 12.5–14.5)
PROTHROMBIN TIME: 37.6 SECONDS (ref 12.5–14.5)
PROTHROMBIN TIME: 40.4 SECONDS (ref 12.5–14.5)
PROTHROMBIN TIME: 41.1 SECONDS (ref 12.5–14.5)
PROTHROMBIN TIME: 42.6 SECONDS (ref 12.5–14.5)
PROTHROMBIN TIME: 44.1 SECONDS (ref 12.5–14.5)
PROTHROMBIN TIME: 46.5 SECONDS (ref 12.5–14.5)
PROTHROMBIN TIME: 49.7 SECONDS (ref 12.5–14.5)
PROTHROMBIN TIME: 50.5 SECONDS (ref 12.5–14.5)
PROTHROMBIN TIME: 51.3 SECONDS (ref 12.5–14.5)
PROTHROMBIN TIME: 56.5 SECONDS (ref 12.5–14.5)
PROTHROMBIN TIME: 58.3 SECONDS (ref 12.5–14.5)
PROTHROMBIN TIME: 58.5 SECONDS (ref 12.5–14.5)
PROTHROMBIN TIME: 60.4 SECONDS (ref 12.5–14.5)
PROTHROMBIN TIME: 60.9 SECONDS (ref 12.5–14.5)
PV PEAK VELOCITY: 1.18 CM/S
PV PEAK VELOCITY: 1.46 CM/S
RA MAJOR: 6.26 CM
RA PRESSURE: 8 MMHG
RA WIDTH: 5.14 CM
RBC # BLD AUTO: 1.24 X10(6)/MCL (ref 4.7–6.1)
RBC # BLD AUTO: 2.27 X10(6)/MCL (ref 4.7–6.1)
RBC # BLD AUTO: 2.38 X10(6)/MCL (ref 4.7–6.1)
RBC # BLD AUTO: 2.49 X10(6)/MCL (ref 4.7–6.1)
RBC # BLD AUTO: 2.59 X10(6)/MCL (ref 4.7–6.1)
RBC # BLD AUTO: 2.69 X10(6)/MCL (ref 4.7–6.1)
RBC # BLD AUTO: 2.7 X10(6)/MCL (ref 4.7–6.1)
RBC # BLD AUTO: 2.7 X10(6)/MCL (ref 4.7–6.1)
RBC # BLD AUTO: 2.71 X10(6)/MCL (ref 4.7–6.1)
RBC # BLD AUTO: 2.71 X10(6)/MCL (ref 4.7–6.1)
RBC # BLD AUTO: 2.72 X10(6)/MCL (ref 4.7–6.1)
RBC # BLD AUTO: 2.74 X10(6)/MCL (ref 4.7–6.1)
RBC # BLD AUTO: 2.74 X10(6)/MCL (ref 4.7–6.1)
RBC # BLD AUTO: 2.76 X10(6)/MCL (ref 4.7–6.1)
RBC # BLD AUTO: 2.77 X10(6)/MCL (ref 4.7–6.1)
RBC # BLD AUTO: 2.78 X10(6)/MCL (ref 4.7–6.1)
RBC # BLD AUTO: 2.81 X10(6)/MCL (ref 4.7–6.1)
RBC # BLD AUTO: 2.82 X10(6)/MCL (ref 4.7–6.1)
RBC # BLD AUTO: 2.86 X10(6)/MCL (ref 4.7–6.1)
RBC # BLD AUTO: 2.88 X10(6)/MCL (ref 4.7–6.1)
RBC # BLD AUTO: 2.91 X10(6)/MCL (ref 4.7–6.1)
RBC # BLD AUTO: 2.95 X10(6)/MCL (ref 4.7–6.1)
RBC # BLD AUTO: 3.01 X10(6)/MCL (ref 4.7–6.1)
RBC # BLD AUTO: 3.03 X10(6)/MCL (ref 4.7–6.1)
RBC # BLD AUTO: 3.04 X10(6)/MCL (ref 4.7–6.1)
RBC # BLD AUTO: 3.08 X10(6)/MCL (ref 4.7–6.1)
RBC # BLD AUTO: 3.11 X10(6)/MCL (ref 4.7–6.1)
RBC # BLD AUTO: 3.16 X10(6)/MCL (ref 4.7–6.1)
RBC # BLD AUTO: 3.17 X10(6)/MCL (ref 4.7–6.1)
RBC # BLD AUTO: 3.18 X10(6)/MCL (ref 4.7–6.1)
RBC # BLD AUTO: 3.19 X10(6)/MCL (ref 4.7–6.1)
RBC # BLD AUTO: 3.19 X10(6)/MCL (ref 4.7–6.1)
RBC # BLD AUTO: 3.22 X10(6)/MCL (ref 4.7–6.1)
RBC # BLD AUTO: 3.32 X10(6)/MCL (ref 4.7–6.1)
RBC # BLD AUTO: 3.33 X10(6)/MCL (ref 4.7–6.1)
RBC # BLD AUTO: 3.33 X10(6)/MCL (ref 4.7–6.1)
RBC # BLD AUTO: 3.34 X10(6)/MCL (ref 4.7–6.1)
RBC # BLD AUTO: 3.36 X10(6)/MCL (ref 4.7–6.1)
RBC # BLD AUTO: 3.38 X10(6)/MCL (ref 4.7–6.1)
RBC # BLD AUTO: 3.39 X10(6)/MCL (ref 4.7–6.1)
RBC # BLD AUTO: 3.45 X10(6)/MCL (ref 4.7–6.1)
RBC # BLD AUTO: 3.46 X10(6)/MCL (ref 4.7–6.1)
RBC # BLD AUTO: 3.53 X10(6)/MCL (ref 4.7–6.1)
RBC # BLD AUTO: 3.56 X10(6)/MCL (ref 4.7–6.1)
RBC # BLD AUTO: 3.57 X10(6)/MCL (ref 4.7–6.1)
RBC # BLD AUTO: 3.57 X10(6)/MCL (ref 4.7–6.1)
RBC # BLD AUTO: 3.62 X10(6)/MCL (ref 4.7–6.1)
RBC # BLD AUTO: 3.63 X10(6)/MCL (ref 4.7–6.1)
RBC # BLD AUTO: 3.66 X10(6)/MCL (ref 4.7–6.1)
RBC # BLD AUTO: 3.66 X10(6)/MCL (ref 4.7–6.1)
RBC # BLD AUTO: 3.68 X10(6)/MCL (ref 4.7–6.1)
RBC # BLD AUTO: 3.75 X10(6)/MCL (ref 4.7–6.1)
RBC # BLD AUTO: 3.77 X10(6)/MCL (ref 4.7–6.1)
RBC # BLD AUTO: 3.84 X10(6)/MCL (ref 4.7–6.1)
RBC # BLD AUTO: 3.91 X10(6)/MCL (ref 4.7–6.1)
RBC # BLD AUTO: 4.06 X10(6)/MCL (ref 4.7–6.1)
RBC # BLD AUTO: 4.07 X10(6)/MCL (ref 4.7–6.1)
RBC # BLD AUTO: 5.31 X10(6)/MCL (ref 4.7–6.1)
RBC #/AREA URNS AUTO: ABNORMAL /HPF
RBC #/AREA URNS AUTO: ABNORMAL /HPF
RBC MORPH BLD: ABNORMAL
RBC MORPH BLD: NORMAL
RBC UR QL AUTO: ABNORMAL UNIT/L
RBC UR QL AUTO: ABNORMAL UNIT/L
RBCS: NORMAL
RIGHT VENTRICULAR END-DIASTOLIC DIMENSION: 2.53 CM
RIGHT VENTRICULAR END-DIASTOLIC DIMENSION: 2.88 CM
RIGHT VENTRICULAR LENGTH IN DIASTOLE (APICAL 4-CHAMBER VIEW): 10.1 CM
RV MID DIAMA: 48.7 CM
SAMPLE: ABNORMAL
SAMPLE: ABNORMAL
SARS-COV-2 RDRP RESP QL NAA+PROBE: NEGATIVE
SARS-COV-2 RDRP RESP QL NAA+PROBE: NEGATIVE
SARS-COV-2 RNA RESP QL NAA+PROBE: NOT DETECTED
SARS-COV-2 RNA RESP QL NAA+PROBE: NOT DETECTED
SATURATED O2 ARTERIAL, I-STAT: 94
SODIUM BLD-SCNC: 118 MMOL/L (ref 119–161)
SODIUM BLD-SCNC: 120 MMOL/L
SODIUM BLD-SCNC: 120 MMOL/L (ref 137–145)
SODIUM BLD-SCNC: 120 MMOL/L (ref 137–145)
SODIUM BLD-SCNC: 122 MMOL/L (ref 137–145)
SODIUM BLD-SCNC: 124 MMOL/L (ref 137–145)
SODIUM BLD-SCNC: 125 MMOL/L (ref 137–145)
SODIUM BLD-SCNC: 126 MMOL/L (ref 137–145)
SODIUM BLD-SCNC: 127 MMOL/L (ref 136–145)
SODIUM BLD-SCNC: 127 MMOL/L (ref 137–145)
SODIUM BLD-SCNC: 127 MMOL/L (ref 137–145)
SODIUM BLD-SCNC: 128 MMOL/L (ref 137–145)
SODIUM BLD-SCNC: 129 MMOL/L
SODIUM BLD-SCNC: 142 MMOL/L (ref 136–145)
SODIUM SERPL-SCNC: 120 MMOL/L (ref 136–145)
SODIUM SERPL-SCNC: 121 MMOL/L (ref 136–145)
SODIUM SERPL-SCNC: 123 MMOL/L (ref 136–145)
SODIUM SERPL-SCNC: 124 MMOL/L (ref 136–145)
SODIUM SERPL-SCNC: 125 MMOL/L (ref 136–145)
SODIUM SERPL-SCNC: 126 MMOL/L (ref 136–145)
SODIUM SERPL-SCNC: 126 MMOL/L (ref 136–145)
SODIUM SERPL-SCNC: 127 MMOL/L (ref 136–145)
SODIUM SERPL-SCNC: 128 MMOL/L (ref 136–145)
SODIUM SERPL-SCNC: 129 MMOL/L (ref 136–145)
SODIUM SERPL-SCNC: 130 MMOL/L (ref 136–145)
SODIUM SERPL-SCNC: 132 MMOL/L (ref 136–145)
SODIUM SERPL-SCNC: 133 MMOL/L (ref 136–145)
SODIUM SERPL-SCNC: 135 MMOL/L (ref 136–145)
SODIUM SERPL-SCNC: 137 MMOL/L (ref 136–145)
SODIUM SERPL-SCNC: 142 MMOL/L (ref 136–145)
SODIUM UR-SCNC: 26 MMOL/L
SP GR UR STRIP.AUTO: 1.04 (ref 1–1.03)
SP GR UR STRIP.AUTO: >=1.03 (ref 1–1.03)
SPECIMEN SOURCE: ABNORMAL
SPECIMEN SOURCE: NORMAL
SQUAMOUS #/AREA URNS AUTO: <5 /HPF
SQUAMOUS #/AREA URNS AUTO: ABNORMAL /HPF
STOMATOCYTES (OLG): ABNORMAL
STOMATOCYTES (OLG): ABNORMAL
T PALLIDUM AB SER QL: NONREACTIVE
T PALLIDUM AB SER QL: NONREACTIVE
TARGETS BLD QL SMEAR: ABNORMAL
TDI LATERAL: 0.06 M/S
TDI SEPTAL: 0.03 M/S
TDI: 0.05 M/S
TEAR DROP CELL (OLG): ABNORMAL
TIBC SERPL-MCNC: 47 UG/DL (ref 69–240)
TIBC SERPL-MCNC: 70 UG/DL (ref 250–450)
TR MAX PG: 25 MMHG
TR MAX PG: 35 MMHG
TRICUSPID ANNULAR PLANE SYSTOLIC EXCURSION: 1.02 CM
TRIGL SERPL-MCNC: 285 MG/DL (ref 34–140)
TROPONIN I SERPL-MCNC: 0.06 NG/ML (ref 0–0.04)
TROPONIN I SERPL-MCNC: 0.07 NG/ML (ref 0–0.04)
TROPONIN I SERPL-MCNC: 1.07 NG/ML (ref 0–0.04)
TROPONIN I SERPL-MCNC: 1.31 NG/ML (ref 0–0.04)
TROPONIN I SERPL-MCNC: <0.01 NG/ML (ref 0–0.04)
TV REST PULMONARY ARTERY PRESSURE: 33 MMHG
UNIT NUMBER: NORMAL
UROBILINOGEN UR STRIP-ACNC: 0.2 MG/DL
UROBILINOGEN UR STRIP-ACNC: 0.2 MG/DL
VANCOMYCIN SERPL-MCNC: 23.9 UG/ML (ref 15–20)
VANCOMYCIN SERPL-MCNC: 29.4 UG/ML (ref 15–20)
VANCOMYCIN SERPL-MCNC: <1.4 UG/ML (ref 15–20)
VANCOMYCIN TROUGH SERPL-MCNC: 20 UG/ML (ref 15–20)
VANCOMYCIN TROUGH SERPL-MCNC: 26 UG/ML (ref 15–20)
VANCOMYCIN TROUGH SERPL-MCNC: 29.1 UG/ML (ref 15–20)
VIT B12 SERPL-MCNC: >2000 PG/ML (ref 213–816)
VLDLC SERPL CALC-MCNC: 57 MG/DL
WBC # FLD AUTO: 274 /UL
WBC # FLD AUTO: 80 /UL
WBC # SPEC AUTO: 11.4 X10(3)/MCL (ref 4.5–11.5)
WBC # SPEC AUTO: 12 X10(3)/MCL (ref 4.5–11.5)
WBC # SPEC AUTO: 12.6 X10(3)/MCL (ref 4.5–11.5)
WBC # SPEC AUTO: 12.7 X10(3)/MCL (ref 4.5–11.5)
WBC # SPEC AUTO: 13.3 X10(3)/MCL (ref 4.5–11.5)
WBC # SPEC AUTO: 13.8 X10(3)/MCL (ref 4.5–11.5)
WBC # SPEC AUTO: 13.8 X10(3)/MCL (ref 4.5–11.5)
WBC # SPEC AUTO: 14.1 X10(3)/MCL (ref 4.5–11.5)
WBC # SPEC AUTO: 14.3 X10(3)/MCL (ref 4.5–11.5)
WBC # SPEC AUTO: 14.3 X10(3)/MCL (ref 4.5–11.5)
WBC # SPEC AUTO: 14.4 X10(3)/MCL (ref 4.5–11.5)
WBC # SPEC AUTO: 14.9 X10(3)/MCL (ref 4.5–11.5)
WBC # SPEC AUTO: 15 X10(3)/MCL (ref 4.5–11.5)
WBC # SPEC AUTO: 15.4 X10(3)/MCL (ref 4.5–11.5)
WBC # SPEC AUTO: 15.6 X10(3)/MCL (ref 4.5–11.5)
WBC # SPEC AUTO: 15.6 X10(3)/MCL (ref 4.5–11.5)
WBC # SPEC AUTO: 15.7 X10(3)/MCL (ref 4.5–11.5)
WBC # SPEC AUTO: 16 X10(3)/MCL (ref 4.5–11.5)
WBC # SPEC AUTO: 16.2 X10(3)/MCL (ref 4.5–11.5)
WBC # SPEC AUTO: 16.5 X10(3)/MCL (ref 4.5–11.5)
WBC # SPEC AUTO: 17 X10(3)/MCL (ref 4.5–11.5)
WBC # SPEC AUTO: 17.1 X10(3)/MCL (ref 4.5–11.5)
WBC # SPEC AUTO: 17.6 X10(3)/MCL (ref 4.5–11.5)
WBC # SPEC AUTO: 17.7 X10(3)/MCL (ref 4.5–11.5)
WBC # SPEC AUTO: 17.8 X10(3)/MCL (ref 4.5–11.5)
WBC # SPEC AUTO: 17.8 X10(3)/MCL (ref 4.5–11.5)
WBC # SPEC AUTO: 18 X10(3)/MCL (ref 4.5–11.5)
WBC # SPEC AUTO: 18.6 X10(3)/MCL (ref 4.5–11.5)
WBC # SPEC AUTO: 18.6 X10(3)/MCL (ref 4.5–11.5)
WBC # SPEC AUTO: 18.7 X10(3)/MCL (ref 4.5–11.5)
WBC # SPEC AUTO: 18.9 X10(3)/MCL (ref 4.5–11.5)
WBC # SPEC AUTO: 19.2 X10(3)/MCL (ref 4.5–11.5)
WBC # SPEC AUTO: 19.5 X10(3)/MCL (ref 4.5–11.5)
WBC # SPEC AUTO: 19.6 X10(3)/MCL (ref 4.5–11.5)
WBC # SPEC AUTO: 19.8 X10(3)/MCL (ref 4.5–11.5)
WBC # SPEC AUTO: 19.8 X10(3)/MCL (ref 4.5–11.5)
WBC # SPEC AUTO: 20.3 X10(3)/MCL (ref 4.5–11.5)
WBC # SPEC AUTO: 21.1 X10(3)/MCL (ref 4.5–11.5)
WBC # SPEC AUTO: 21.2 X10(3)/MCL (ref 4.5–11.5)
WBC # SPEC AUTO: 21.2 X10(3)/MCL (ref 4.5–11.5)
WBC # SPEC AUTO: 21.4 X10(3)/MCL (ref 4.5–11.5)
WBC # SPEC AUTO: 21.5 X10(3)/MCL (ref 4.5–11.5)
WBC # SPEC AUTO: 22.3 X10(3)/MCL (ref 4.5–11.5)
WBC # SPEC AUTO: 22.4 X10(3)/MCL (ref 4.5–11.5)
WBC # SPEC AUTO: 22.6 X10(3)/MCL (ref 4.5–11.5)
WBC # SPEC AUTO: 22.7 X10(3)/MCL (ref 4.5–11.5)
WBC # SPEC AUTO: 22.9 X10(3)/MCL (ref 4.5–11.5)
WBC # SPEC AUTO: 23.1 X10(3)/MCL (ref 4.5–11.5)
WBC # SPEC AUTO: 23.6 X10(3)/MCL (ref 4.5–11.5)
WBC # SPEC AUTO: 23.7 X10(3)/MCL (ref 4.5–11.5)
WBC # SPEC AUTO: 24 X10(3)/MCL (ref 4.5–11.5)
WBC # SPEC AUTO: 24.1 X10(3)/MCL (ref 4.5–11.5)
WBC # SPEC AUTO: 24.6 X10(3)/MCL (ref 4.5–11.5)
WBC # SPEC AUTO: 25 X10(3)/MCL (ref 4.5–11.5)
WBC # SPEC AUTO: 26.2 X10(3)/MCL (ref 4.5–11.5)
WBC # SPEC AUTO: 26.9 X10(3)/MCL (ref 4.5–11.5)
WBC # SPEC AUTO: 27.5 X10(3)/MCL (ref 4.5–11.5)
WBC # SPEC AUTO: 28 X10(3)/MCL (ref 4.5–11.5)
WBC # SPEC AUTO: 28.1 X10(3)/MCL (ref 4.5–11.5)
WBC # SPEC AUTO: 28.6 X10(3)/MCL (ref 4.5–11.5)
WBC # SPEC AUTO: 29.7 X10(3)/MCL (ref 4.5–11.5)
WBC # SPEC AUTO: 33.4 X10(3)/MCL (ref 4.5–11.5)
WBC # SPEC AUTO: 8.1 X10(3)/MCL (ref 4.5–11.5)
WBC # SPEC AUTO: 9.1 X10(3)/MCL (ref 4.5–11.5)
WBC # SPEC AUTO: 9.5 X10(3)/MCL (ref 4.5–11.5)
WBC # SPEC AUTO: 9.9 X10(3)/MCL (ref 4.5–11.5)
WBC #/AREA URNS AUTO: <5 /HPF
WBC #/AREA URNS AUTO: ABNORMAL /HPF
WBC CLUMPS UR QL AUTO: ABNORMAL /HPF

## 2022-01-01 PROCEDURE — 85027 COMPLETE CBC AUTOMATED: CPT | Performed by: INTERNAL MEDICINE

## 2022-01-01 PROCEDURE — 83605 ASSAY OF LACTIC ACID: CPT

## 2022-01-01 PROCEDURE — 84484 ASSAY OF TROPONIN QUANT: CPT

## 2022-01-01 PROCEDURE — 25000003 PHARM REV CODE 250: Performed by: INTERNAL MEDICINE

## 2022-01-01 PROCEDURE — 63600175 PHARM REV CODE 636 W HCPCS: Performed by: INTERNAL MEDICINE

## 2022-01-01 PROCEDURE — P9017 PLASMA 1 DONOR FRZ W/IN 8 HR: HCPCS | Performed by: STUDENT IN AN ORGANIZED HEALTH CARE EDUCATION/TRAINING PROGRAM

## 2022-01-01 PROCEDURE — 90935 HEMODIALYSIS ONE EVALUATION: CPT

## 2022-01-01 PROCEDURE — 36415 COLL VENOUS BLD VENIPUNCTURE: CPT | Performed by: INTERNAL MEDICINE

## 2022-01-01 PROCEDURE — 86160 COMPLEMENT ANTIGEN: CPT | Performed by: GENERAL PRACTICE

## 2022-01-01 PROCEDURE — 84100 ASSAY OF PHOSPHORUS: CPT | Performed by: HOSPITALIST

## 2022-01-01 PROCEDURE — 25000003 PHARM REV CODE 250

## 2022-01-01 PROCEDURE — 85730 THROMBOPLASTIN TIME PARTIAL: CPT | Performed by: INTERNAL MEDICINE

## 2022-01-01 PROCEDURE — 85610 PROTHROMBIN TIME: CPT | Performed by: SURGERY

## 2022-01-01 PROCEDURE — A4216 STERILE WATER/SALINE, 10 ML: HCPCS | Performed by: INTERNAL MEDICINE

## 2022-01-01 PROCEDURE — 20000000 HC ICU ROOM

## 2022-01-01 PROCEDURE — 86923 COMPATIBILITY TEST ELECTRIC: CPT | Mod: 91 | Performed by: ANESTHESIOLOGY

## 2022-01-01 PROCEDURE — 63600175 PHARM REV CODE 636 W HCPCS: Performed by: STUDENT IN AN ORGANIZED HEALTH CARE EDUCATION/TRAINING PROGRAM

## 2022-01-01 PROCEDURE — 85384 FIBRINOGEN ACTIVITY: CPT | Performed by: STUDENT IN AN ORGANIZED HEALTH CARE EDUCATION/TRAINING PROGRAM

## 2022-01-01 PROCEDURE — 25000003 PHARM REV CODE 250: Performed by: NURSE PRACTITIONER

## 2022-01-01 PROCEDURE — P9016 RBC LEUKOCYTES REDUCED: HCPCS | Performed by: NURSE PRACTITIONER

## 2022-01-01 PROCEDURE — 93010 EKG 12-LEAD: ICD-10-PCS | Mod: ,,, | Performed by: STUDENT IN AN ORGANIZED HEALTH CARE EDUCATION/TRAINING PROGRAM

## 2022-01-01 PROCEDURE — 25000003 PHARM REV CODE 250: Performed by: PHYSICIAN ASSISTANT

## 2022-01-01 PROCEDURE — 85610 PROTHROMBIN TIME: CPT | Performed by: INTERNAL MEDICINE

## 2022-01-01 PROCEDURE — 97116 GAIT TRAINING THERAPY: CPT | Mod: CQ

## 2022-01-01 PROCEDURE — 25000003 PHARM REV CODE 250: Performed by: HOSPITALIST

## 2022-01-01 PROCEDURE — 27000221 HC OXYGEN, UP TO 24 HOURS

## 2022-01-01 PROCEDURE — 37000009 HC ANESTHESIA EA ADD 15 MINS: Performed by: SURGERY

## 2022-01-01 PROCEDURE — 63600175 PHARM REV CODE 636 W HCPCS: Performed by: NURSE PRACTITIONER

## 2022-01-01 PROCEDURE — 85018 HEMOGLOBIN: CPT | Performed by: INTERNAL MEDICINE

## 2022-01-01 PROCEDURE — 84100 ASSAY OF PHOSPHORUS: CPT

## 2022-01-01 PROCEDURE — 82803 BLOOD GASES ANY COMBINATION: CPT

## 2022-01-01 PROCEDURE — 99233 PR SUBSEQUENT HOSPITAL CARE,LEVL III: ICD-10-PCS | Mod: ,,, | Performed by: GENERAL PRACTICE

## 2022-01-01 PROCEDURE — 94003 VENT MGMT INPAT SUBQ DAY: CPT

## 2022-01-01 PROCEDURE — 25000003 PHARM REV CODE 250: Performed by: STUDENT IN AN ORGANIZED HEALTH CARE EDUCATION/TRAINING PROGRAM

## 2022-01-01 PROCEDURE — 83735 ASSAY OF MAGNESIUM: CPT

## 2022-01-01 PROCEDURE — 94660 CPAP INITIATION&MGMT: CPT

## 2022-01-01 PROCEDURE — 80053 COMPREHEN METABOLIC PANEL: CPT

## 2022-01-01 PROCEDURE — 36200 PLACE CATHETER IN AORTA: CPT | Mod: 51,,, | Performed by: THORACIC SURGERY (CARDIOTHORACIC VASCULAR SURGERY)

## 2022-01-01 PROCEDURE — 86900 BLOOD TYPING SEROLOGIC ABO: CPT | Performed by: INTERNAL MEDICINE

## 2022-01-01 PROCEDURE — C9113 INJ PANTOPRAZOLE SODIUM, VIA: HCPCS | Performed by: INTERNAL MEDICINE

## 2022-01-01 PROCEDURE — 80202 ASSAY OF VANCOMYCIN: CPT | Performed by: INTERNAL MEDICINE

## 2022-01-01 PROCEDURE — 63600175 PHARM REV CODE 636 W HCPCS

## 2022-01-01 PROCEDURE — 25000003 PHARM REV CODE 250: Performed by: NURSE ANESTHETIST, CERTIFIED REGISTERED

## 2022-01-01 PROCEDURE — 97530 THERAPEUTIC ACTIVITIES: CPT | Mod: CO

## 2022-01-01 PROCEDURE — 36415 COLL VENOUS BLD VENIPUNCTURE: CPT

## 2022-01-01 PROCEDURE — 84145 PROCALCITONIN (PCT): CPT | Performed by: INTERNAL MEDICINE

## 2022-01-01 PROCEDURE — 36000707: Performed by: COLON & RECTAL SURGERY

## 2022-01-01 PROCEDURE — 85730 THROMBOPLASTIN TIME PARTIAL: CPT | Performed by: SURGERY

## 2022-01-01 PROCEDURE — 25500020 PHARM REV CODE 255: Performed by: THORACIC SURGERY (CARDIOTHORACIC VASCULAR SURGERY)

## 2022-01-01 PROCEDURE — 87205 SMEAR GRAM STAIN: CPT | Performed by: INTERNAL MEDICINE

## 2022-01-01 PROCEDURE — C9248 INJ, CLEVIDIPINE BUTYRATE: HCPCS | Mod: JG | Performed by: STUDENT IN AN ORGANIZED HEALTH CARE EDUCATION/TRAINING PROGRAM

## 2022-01-01 PROCEDURE — 87075 CULTR BACTERIA EXCEPT BLOOD: CPT | Performed by: STUDENT IN AN ORGANIZED HEALTH CARE EDUCATION/TRAINING PROGRAM

## 2022-01-01 PROCEDURE — 80053 COMPREHEN METABOLIC PANEL: CPT | Performed by: INTERNAL MEDICINE

## 2022-01-01 PROCEDURE — 36000713 HC OR TIME LEV V EA ADD 15 MIN: Performed by: THORACIC SURGERY (CARDIOTHORACIC VASCULAR SURGERY)

## 2022-01-01 PROCEDURE — 82042 OTHER SOURCE ALBUMIN QUAN EA: CPT | Performed by: INTERNAL MEDICINE

## 2022-01-01 PROCEDURE — 84100 ASSAY OF PHOSPHORUS: CPT | Performed by: STUDENT IN AN ORGANIZED HEALTH CARE EDUCATION/TRAINING PROGRAM

## 2022-01-01 PROCEDURE — 99233 PR SUBSEQUENT HOSPITAL CARE,LEVL III: ICD-10-PCS | Mod: ,,, | Performed by: NURSE PRACTITIONER

## 2022-01-01 PROCEDURE — 84100 ASSAY OF PHOSPHORUS: CPT | Performed by: INTERNAL MEDICINE

## 2022-01-01 PROCEDURE — 83735 ASSAY OF MAGNESIUM: CPT | Performed by: INTERNAL MEDICINE

## 2022-01-01 PROCEDURE — 86803 HEPATITIS C AB TEST: CPT | Performed by: GENERAL PRACTICE

## 2022-01-01 PROCEDURE — 37799 UNLISTED PX VASCULAR SURGERY: CPT

## 2022-01-01 PROCEDURE — 97168 OT RE-EVAL EST PLAN CARE: CPT

## 2022-01-01 PROCEDURE — 36000705 HC OR TIME LEV I EA ADD 15 MIN: Performed by: COLON & RECTAL SURGERY

## 2022-01-01 PROCEDURE — 94002 VENT MGMT INPAT INIT DAY: CPT

## 2022-01-01 PROCEDURE — 0241U COVID/FLU A&B PCR: CPT | Performed by: INTERNAL MEDICINE

## 2022-01-01 PROCEDURE — 36415 COLL VENOUS BLD VENIPUNCTURE: CPT | Performed by: NURSE PRACTITIONER

## 2022-01-01 PROCEDURE — 80100016 HC MAINTENANCE HEMODIALYSIS

## 2022-01-01 PROCEDURE — 85025 COMPLETE CBC W/AUTO DIFF WBC: CPT | Performed by: STUDENT IN AN ORGANIZED HEALTH CARE EDUCATION/TRAINING PROGRAM

## 2022-01-01 PROCEDURE — 93005 ELECTROCARDIOGRAM TRACING: CPT

## 2022-01-01 PROCEDURE — 63600175 PHARM REV CODE 636 W HCPCS: Mod: JG | Performed by: STUDENT IN AN ORGANIZED HEALTH CARE EDUCATION/TRAINING PROGRAM

## 2022-01-01 PROCEDURE — 37000008 HC ANESTHESIA 1ST 15 MINUTES: Performed by: SURGERY

## 2022-01-01 PROCEDURE — 99900026 HC AIRWAY MAINTENANCE (STAT)

## 2022-01-01 PROCEDURE — 99223 1ST HOSP IP/OBS HIGH 75: CPT | Mod: ,,, | Performed by: INTERNAL MEDICINE

## 2022-01-01 PROCEDURE — 97530 THERAPEUTIC ACTIVITIES: CPT | Mod: CQ

## 2022-01-01 PROCEDURE — 94761 N-INVAS EAR/PLS OXIMETRY MLT: CPT

## 2022-01-01 PROCEDURE — P9035 PLATELET PHERES LEUKOREDUCED: HCPCS | Performed by: STUDENT IN AN ORGANIZED HEALTH CARE EDUCATION/TRAINING PROGRAM

## 2022-01-01 PROCEDURE — 63600175 PHARM REV CODE 636 W HCPCS: Performed by: ANESTHESIOLOGY

## 2022-01-01 PROCEDURE — 63600175 PHARM REV CODE 636 W HCPCS: Performed by: HOSPITALIST

## 2022-01-01 PROCEDURE — 37000009 HC ANESTHESIA EA ADD 15 MINS: Performed by: STUDENT IN AN ORGANIZED HEALTH CARE EDUCATION/TRAINING PROGRAM

## 2022-01-01 PROCEDURE — 36415 COLL VENOUS BLD VENIPUNCTURE: CPT | Performed by: STUDENT IN AN ORGANIZED HEALTH CARE EDUCATION/TRAINING PROGRAM

## 2022-01-01 PROCEDURE — 99900035 HC TECH TIME PER 15 MIN (STAT)

## 2022-01-01 PROCEDURE — 83735 ASSAY OF MAGNESIUM: CPT | Performed by: STUDENT IN AN ORGANIZED HEALTH CARE EDUCATION/TRAINING PROGRAM

## 2022-01-01 PROCEDURE — 21400001 HC TELEMETRY ROOM

## 2022-01-01 PROCEDURE — 82945 GLUCOSE OTHER FLUID: CPT | Performed by: INTERNAL MEDICINE

## 2022-01-01 PROCEDURE — P9047 ALBUMIN (HUMAN), 25%, 50ML: HCPCS | Mod: JG | Performed by: INTERNAL MEDICINE

## 2022-01-01 PROCEDURE — 99233 SBSQ HOSP IP/OBS HIGH 50: CPT | Mod: ,,,

## 2022-01-01 PROCEDURE — P9017 PLASMA 1 DONOR FRZ W/IN 8 HR: HCPCS | Performed by: SURGERY

## 2022-01-01 PROCEDURE — 63600175 PHARM REV CODE 636 W HCPCS: Performed by: NURSE ANESTHETIST, CERTIFIED REGISTERED

## 2022-01-01 PROCEDURE — 99024 PR POST-OP FOLLOW-UP VISIT: ICD-10-PCS | Mod: ,,, | Performed by: PHYSICIAN ASSISTANT

## 2022-01-01 PROCEDURE — 85007 BL SMEAR W/DIFF WBC COUNT: CPT | Performed by: STUDENT IN AN ORGANIZED HEALTH CARE EDUCATION/TRAINING PROGRAM

## 2022-01-01 PROCEDURE — 93010 ELECTROCARDIOGRAM REPORT: CPT | Mod: ,,, | Performed by: STUDENT IN AN ORGANIZED HEALTH CARE EDUCATION/TRAINING PROGRAM

## 2022-01-01 PROCEDURE — 87075 CULTR BACTERIA EXCEPT BLOOD: CPT | Performed by: SURGERY

## 2022-01-01 PROCEDURE — 63600175 PHARM REV CODE 636 W HCPCS: Performed by: GENERAL PRACTICE

## 2022-01-01 PROCEDURE — 99233 PR SUBSEQUENT HOSPITAL CARE,LEVL III: ICD-10-PCS | Mod: ,,,

## 2022-01-01 PROCEDURE — A9698 NON-RAD CONTRAST MATERIALNOC: HCPCS | Performed by: INTERNAL MEDICINE

## 2022-01-01 PROCEDURE — 80053 COMPREHEN METABOLIC PANEL: CPT | Performed by: STUDENT IN AN ORGANIZED HEALTH CARE EDUCATION/TRAINING PROGRAM

## 2022-01-01 PROCEDURE — 87635 SARS-COV-2 COVID-19 AMP PRB: CPT | Performed by: STUDENT IN AN ORGANIZED HEALTH CARE EDUCATION/TRAINING PROGRAM

## 2022-01-01 PROCEDURE — P9016 RBC LEUKOCYTES REDUCED: HCPCS | Performed by: STUDENT IN AN ORGANIZED HEALTH CARE EDUCATION/TRAINING PROGRAM

## 2022-01-01 PROCEDURE — 36000706: Performed by: COLON & RECTAL SURGERY

## 2022-01-01 PROCEDURE — B4185 PARENTERAL SOL 10 GM LIPIDS: HCPCS | Performed by: INTERNAL MEDICINE

## 2022-01-01 PROCEDURE — 99291 CRITICAL CARE FIRST HOUR: CPT | Mod: 25

## 2022-01-01 PROCEDURE — 71000033 HC RECOVERY, INTIAL HOUR: Performed by: STUDENT IN AN ORGANIZED HEALTH CARE EDUCATION/TRAINING PROGRAM

## 2022-01-01 PROCEDURE — 85025 COMPLETE CBC W/AUTO DIFF WBC: CPT

## 2022-01-01 PROCEDURE — 85610 PROTHROMBIN TIME: CPT | Performed by: NURSE PRACTITIONER

## 2022-01-01 PROCEDURE — 80100014 HC HEMODIALYSIS 1:1

## 2022-01-01 PROCEDURE — C1769 GUIDE WIRE: HCPCS | Performed by: THORACIC SURGERY (CARDIOTHORACIC VASCULAR SURGERY)

## 2022-01-01 PROCEDURE — 36415 COLL VENOUS BLD VENIPUNCTURE: CPT | Performed by: SURGERY

## 2022-01-01 PROCEDURE — 80048 BASIC METABOLIC PNL TOTAL CA: CPT | Performed by: INTERNAL MEDICINE

## 2022-01-01 PROCEDURE — 87517 HEPATITIS B DNA QUANT: CPT | Performed by: GENERAL PRACTICE

## 2022-01-01 PROCEDURE — 87205 SMEAR GRAM STAIN: CPT | Performed by: SURGERY

## 2022-01-01 PROCEDURE — 36000712 HC OR TIME LEV V 1ST 15 MIN: Performed by: THORACIC SURGERY (CARDIOTHORACIC VASCULAR SURGERY)

## 2022-01-01 PROCEDURE — 27200966 HC CLOSED SUCTION SYSTEM

## 2022-01-01 PROCEDURE — A4217 STERILE WATER/SALINE, 500 ML: HCPCS | Performed by: INTERNAL MEDICINE

## 2022-01-01 PROCEDURE — 86923 COMPATIBILITY TEST ELECTRIC: CPT | Mod: 91 | Performed by: STUDENT IN AN ORGANIZED HEALTH CARE EDUCATION/TRAINING PROGRAM

## 2022-01-01 PROCEDURE — 85025 COMPLETE CBC W/AUTO DIFF WBC: CPT | Performed by: INTERNAL MEDICINE

## 2022-01-01 PROCEDURE — 97530 THERAPEUTIC ACTIVITIES: CPT

## 2022-01-01 PROCEDURE — 80053 COMPREHEN METABOLIC PANEL: CPT | Performed by: NURSE PRACTITIONER

## 2022-01-01 PROCEDURE — 99233 SBSQ HOSP IP/OBS HIGH 50: CPT | Mod: ,,, | Performed by: GENERAL PRACTICE

## 2022-01-01 PROCEDURE — S0030 INJECTION, METRONIDAZOLE: HCPCS | Performed by: INTERNAL MEDICINE

## 2022-01-01 PROCEDURE — P9012 CRYOPRECIPITATE EACH UNIT: HCPCS | Performed by: STUDENT IN AN ORGANIZED HEALTH CARE EDUCATION/TRAINING PROGRAM

## 2022-01-01 PROCEDURE — 36620 INSERTION CATHETER ARTERY: CPT | Performed by: NURSE ANESTHETIST, CERTIFIED REGISTERED

## 2022-01-01 PROCEDURE — 83615 LACTATE (LD) (LDH) ENZYME: CPT | Performed by: INTERNAL MEDICINE

## 2022-01-01 PROCEDURE — 87340 HEPATITIS B SURFACE AG IA: CPT | Performed by: NURSE PRACTITIONER

## 2022-01-01 PROCEDURE — 63600175 PHARM REV CODE 636 W HCPCS: Performed by: PHYSICIAN ASSISTANT

## 2022-01-01 PROCEDURE — 99291 CRITICAL CARE FIRST HOUR: CPT | Mod: ,,, | Performed by: GENERAL PRACTICE

## 2022-01-01 PROCEDURE — 82150 ASSAY OF AMYLASE: CPT | Performed by: SURGERY

## 2022-01-01 PROCEDURE — 85018 HEMOGLOBIN: CPT | Performed by: NURSE PRACTITIONER

## 2022-01-01 PROCEDURE — 82150 ASSAY OF AMYLASE: CPT | Performed by: INTERNAL MEDICINE

## 2022-01-01 PROCEDURE — 82607 VITAMIN B-12: CPT | Performed by: INTERNAL MEDICINE

## 2022-01-01 PROCEDURE — 36600 WITHDRAWAL OF ARTERIAL BLOOD: CPT

## 2022-01-01 PROCEDURE — 93010 ELECTROCARDIOGRAM REPORT: CPT | Mod: ,,, | Performed by: INTERNAL MEDICINE

## 2022-01-01 PROCEDURE — 51798 US URINE CAPACITY MEASURE: CPT

## 2022-01-01 PROCEDURE — 85014 HEMATOCRIT: CPT | Performed by: INTERNAL MEDICINE

## 2022-01-01 PROCEDURE — 87040 BLOOD CULTURE FOR BACTERIA: CPT | Performed by: NURSE PRACTITIONER

## 2022-01-01 PROCEDURE — 97535 SELF CARE MNGMENT TRAINING: CPT | Mod: CO

## 2022-01-01 PROCEDURE — 25500020 PHARM REV CODE 255: Performed by: INTERNAL MEDICINE

## 2022-01-01 PROCEDURE — 27201423 OPTIME MED/SURG SUP & DEVICES STERILE SUPPLY: Performed by: SURGERY

## 2022-01-01 PROCEDURE — 25000242 PHARM REV CODE 250 ALT 637 W/ HCPCS: Performed by: INTERNAL MEDICINE

## 2022-01-01 PROCEDURE — 83690 ASSAY OF LIPASE: CPT | Performed by: STUDENT IN AN ORGANIZED HEALTH CARE EDUCATION/TRAINING PROGRAM

## 2022-01-01 PROCEDURE — 87641 MR-STAPH DNA AMP PROBE: CPT | Performed by: INTERNAL MEDICINE

## 2022-01-01 PROCEDURE — 99223 PR INITIAL HOSPITAL CARE,LEVL III: ICD-10-PCS | Mod: ,,, | Performed by: INTERNAL MEDICINE

## 2022-01-01 PROCEDURE — 86923 COMPATIBILITY TEST ELECTRIC: CPT | Performed by: INTERNAL MEDICINE

## 2022-01-01 PROCEDURE — 87102 FUNGUS ISOLATION CULTURE: CPT | Performed by: INTERNAL MEDICINE

## 2022-01-01 PROCEDURE — 86140 C-REACTIVE PROTEIN: CPT | Performed by: INTERNAL MEDICINE

## 2022-01-01 PROCEDURE — 27201423 OPTIME MED/SURG SUP & DEVICES STERILE SUPPLY: Performed by: STUDENT IN AN ORGANIZED HEALTH CARE EDUCATION/TRAINING PROGRAM

## 2022-01-01 PROCEDURE — 63600175 PHARM REV CODE 636 W HCPCS: Mod: JG | Performed by: INTERNAL MEDICINE

## 2022-01-01 PROCEDURE — 36000706: Performed by: STUDENT IN AN ORGANIZED HEALTH CARE EDUCATION/TRAINING PROGRAM

## 2022-01-01 PROCEDURE — 96375 TX/PRO/DX INJ NEW DRUG ADDON: CPT | Mod: 59

## 2022-01-01 PROCEDURE — P9047 ALBUMIN (HUMAN), 25%, 50ML: HCPCS | Mod: JG | Performed by: NURSE PRACTITIONER

## 2022-01-01 PROCEDURE — 36000706: Performed by: SURGERY

## 2022-01-01 PROCEDURE — 86923 COMPATIBILITY TEST ELECTRIC: CPT | Performed by: NURSE PRACTITIONER

## 2022-01-01 PROCEDURE — 82746 ASSAY OF FOLIC ACID SERUM: CPT | Performed by: INTERNAL MEDICINE

## 2022-01-01 PROCEDURE — 84134 ASSAY OF PREALBUMIN: CPT | Performed by: INTERNAL MEDICINE

## 2022-01-01 PROCEDURE — 87340 HEPATITIS B SURFACE AG IA: CPT | Performed by: GENERAL PRACTICE

## 2022-01-01 PROCEDURE — 84134 ASSAY OF PREALBUMIN: CPT

## 2022-01-01 PROCEDURE — 99233 SBSQ HOSP IP/OBS HIGH 50: CPT | Mod: ,,, | Performed by: INTERNAL MEDICINE

## 2022-01-01 PROCEDURE — 36000707: Performed by: STUDENT IN AN ORGANIZED HEALTH CARE EDUCATION/TRAINING PROGRAM

## 2022-01-01 PROCEDURE — 36415 COLL VENOUS BLD VENIPUNCTURE: CPT | Performed by: HOSPITALIST

## 2022-01-01 PROCEDURE — 11000001 HC ACUTE MED/SURG PRIVATE ROOM

## 2022-01-01 PROCEDURE — 36000707: Performed by: SURGERY

## 2022-01-01 PROCEDURE — 80202 ASSAY OF VANCOMYCIN: CPT | Performed by: GENERAL PRACTICE

## 2022-01-01 PROCEDURE — 71000033 HC RECOVERY, INTIAL HOUR: Performed by: THORACIC SURGERY (CARDIOTHORACIC VASCULAR SURGERY)

## 2022-01-01 PROCEDURE — 25000003 PHARM REV CODE 250: Performed by: RADIOLOGY

## 2022-01-01 PROCEDURE — 99232 PR SUBSEQUENT HOSPITAL CARE,LEVL II: ICD-10-PCS | Mod: ,,,

## 2022-01-01 PROCEDURE — 86704 HEP B CORE ANTIBODY TOTAL: CPT | Performed by: GENERAL PRACTICE

## 2022-01-01 PROCEDURE — 87040 BLOOD CULTURE FOR BACTERIA: CPT

## 2022-01-01 PROCEDURE — C9248 INJ, CLEVIDIPINE BUTYRATE: HCPCS | Mod: JG

## 2022-01-01 PROCEDURE — 83550 IRON BINDING TEST: CPT | Performed by: INTERNAL MEDICINE

## 2022-01-01 PROCEDURE — 85014 HEMATOCRIT: CPT | Performed by: HOSPITALIST

## 2022-01-01 PROCEDURE — 82150 ASSAY OF AMYLASE: CPT | Performed by: PHYSICIAN ASSISTANT

## 2022-01-01 PROCEDURE — 27201423 OPTIME MED/SURG SUP & DEVICES STERILE SUPPLY: Performed by: COLON & RECTAL SURGERY

## 2022-01-01 PROCEDURE — P9016 RBC LEUKOCYTES REDUCED: HCPCS | Performed by: INTERNAL MEDICINE

## 2022-01-01 PROCEDURE — 85730 THROMBOPLASTIN TIME PARTIAL: CPT | Performed by: STUDENT IN AN ORGANIZED HEALTH CARE EDUCATION/TRAINING PROGRAM

## 2022-01-01 PROCEDURE — 86709 HEPATITIS A IGM ANTIBODY: CPT | Performed by: GENERAL PRACTICE

## 2022-01-01 PROCEDURE — 83930 ASSAY OF BLOOD OSMOLALITY: CPT | Performed by: NURSE PRACTITIONER

## 2022-01-01 PROCEDURE — 27000249 HC VAPOTHERM CIRCUIT

## 2022-01-01 PROCEDURE — 80048 BASIC METABOLIC PNL TOTAL CA: CPT | Performed by: NURSE PRACTITIONER

## 2022-01-01 PROCEDURE — 99900031 HC PATIENT EDUCATION (STAT)

## 2022-01-01 PROCEDURE — 89051 BODY FLUID CELL COUNT: CPT | Performed by: INTERNAL MEDICINE

## 2022-01-01 PROCEDURE — 87077 CULTURE AEROBIC IDENTIFY: CPT | Performed by: GENERAL PRACTICE

## 2022-01-01 PROCEDURE — 97535 SELF CARE MNGMENT TRAINING: CPT

## 2022-01-01 PROCEDURE — 37000008 HC ANESTHESIA 1ST 15 MINUTES: Performed by: STUDENT IN AN ORGANIZED HEALTH CARE EDUCATION/TRAINING PROGRAM

## 2022-01-01 PROCEDURE — 75956 PR  ENDOVASC REPAIR THOR AORTA INCL SUBCLAVIAN: CPT | Mod: 26,,, | Performed by: THORACIC SURGERY (CARDIOTHORACIC VASCULAR SURGERY)

## 2022-01-01 PROCEDURE — 86900 BLOOD TYPING SEROLOGIC ABO: CPT | Performed by: NURSE PRACTITIONER

## 2022-01-01 PROCEDURE — 27000190 HC CPAP FULL FACE MASK W/VALVE

## 2022-01-01 PROCEDURE — C1887 CATHETER, GUIDING: HCPCS | Performed by: THORACIC SURGERY (CARDIOTHORACIC VASCULAR SURGERY)

## 2022-01-01 PROCEDURE — C1769 GUIDE WIRE: HCPCS | Performed by: INTERNAL MEDICINE

## 2022-01-01 PROCEDURE — 83690 ASSAY OF LIPASE: CPT | Performed by: PHYSICIAN ASSISTANT

## 2022-01-01 PROCEDURE — 94799 UNLISTED PULMONARY SVC/PX: CPT

## 2022-01-01 PROCEDURE — 34812 PR AAA REPR,EXPOSE FEMORAL ART,GROIN INCIS: ICD-10-PCS | Mod: RT,,, | Performed by: THORACIC SURGERY (CARDIOTHORACIC VASCULAR SURGERY)

## 2022-01-01 PROCEDURE — 71000039 HC RECOVERY, EACH ADD'L HOUR: Performed by: STUDENT IN AN ORGANIZED HEALTH CARE EDUCATION/TRAINING PROGRAM

## 2022-01-01 PROCEDURE — 99291 CRITICAL CARE FIRST HOUR: CPT | Mod: FS,,, | Performed by: GENERAL PRACTICE

## 2022-01-01 PROCEDURE — 87070 CULTURE OTHR SPECIMN AEROBIC: CPT | Performed by: INTERNAL MEDICINE

## 2022-01-01 PROCEDURE — 87077 CULTURE AEROBIC IDENTIFY: CPT | Performed by: SURGERY

## 2022-01-01 PROCEDURE — 87040 BLOOD CULTURE FOR BACTERIA: CPT | Performed by: STUDENT IN AN ORGANIZED HEALTH CARE EDUCATION/TRAINING PROGRAM

## 2022-01-01 PROCEDURE — 82947 ASSAY GLUCOSE BLOOD QUANT: CPT | Performed by: INTERNAL MEDICINE

## 2022-01-01 PROCEDURE — 80202 ASSAY OF VANCOMYCIN: CPT | Performed by: NURSE PRACTITIONER

## 2022-01-01 PROCEDURE — 85027 COMPLETE CBC AUTOMATED: CPT

## 2022-01-01 PROCEDURE — 81003 URINALYSIS AUTO W/O SCOPE: CPT | Performed by: INTERNAL MEDICINE

## 2022-01-01 PROCEDURE — 37000008 HC ANESTHESIA 1ST 15 MINUTES: Performed by: COLON & RECTAL SURGERY

## 2022-01-01 PROCEDURE — 83880 ASSAY OF NATRIURETIC PEPTIDE: CPT | Performed by: STUDENT IN AN ORGANIZED HEALTH CARE EDUCATION/TRAINING PROGRAM

## 2022-01-01 PROCEDURE — 80069 RENAL FUNCTION PANEL: CPT | Performed by: STUDENT IN AN ORGANIZED HEALTH CARE EDUCATION/TRAINING PROGRAM

## 2022-01-01 PROCEDURE — 25000242 PHARM REV CODE 250 ALT 637 W/ HCPCS

## 2022-01-01 PROCEDURE — 25000003 PHARM REV CODE 250: Performed by: GENERAL PRACTICE

## 2022-01-01 PROCEDURE — P9016 RBC LEUKOCYTES REDUCED: HCPCS | Performed by: SURGERY

## 2022-01-01 PROCEDURE — 85610 PROTHROMBIN TIME: CPT | Performed by: STUDENT IN AN ORGANIZED HEALTH CARE EDUCATION/TRAINING PROGRAM

## 2022-01-01 PROCEDURE — 86920 COMPATIBILITY TEST SPIN: CPT | Performed by: STUDENT IN AN ORGANIZED HEALTH CARE EDUCATION/TRAINING PROGRAM

## 2022-01-01 PROCEDURE — 85014 HEMATOCRIT: CPT | Performed by: STUDENT IN AN ORGANIZED HEALTH CARE EDUCATION/TRAINING PROGRAM

## 2022-01-01 PROCEDURE — C1729 CATH, DRAINAGE: HCPCS | Performed by: STUDENT IN AN ORGANIZED HEALTH CARE EDUCATION/TRAINING PROGRAM

## 2022-01-01 PROCEDURE — 99232 PR SUBSEQUENT HOSPITAL CARE,LEVL II: ICD-10-PCS | Mod: FS,,, | Performed by: GENERAL PRACTICE

## 2022-01-01 PROCEDURE — 87040 BLOOD CULTURE FOR BACTERIA: CPT | Performed by: INTERNAL MEDICINE

## 2022-01-01 PROCEDURE — 37000009 HC ANESTHESIA EA ADD 15 MINS: Performed by: COLON & RECTAL SURGERY

## 2022-01-01 PROCEDURE — 99284 EMERGENCY DEPT VISIT MOD MDM: CPT

## 2022-01-01 PROCEDURE — 36415 COLL VENOUS BLD VENIPUNCTURE: CPT | Performed by: THORACIC SURGERY (CARDIOTHORACIC VASCULAR SURGERY)

## 2022-01-01 PROCEDURE — 96372 THER/PROPH/DIAG INJ SC/IM: CPT | Performed by: STUDENT IN AN ORGANIZED HEALTH CARE EDUCATION/TRAINING PROGRAM

## 2022-01-01 PROCEDURE — 36000705 HC OR TIME LEV I EA ADD 15 MIN: Performed by: SURGERY

## 2022-01-01 PROCEDURE — 85651 RBC SED RATE NONAUTOMATED: CPT

## 2022-01-01 PROCEDURE — 99233 SBSQ HOSP IP/OBS HIGH 50: CPT | Mod: ,,, | Performed by: NURSE PRACTITIONER

## 2022-01-01 PROCEDURE — 96367 TX/PROPH/DG ADDL SEQ IV INF: CPT

## 2022-01-01 PROCEDURE — 99024 POSTOP FOLLOW-UP VISIT: CPT | Mod: ,,, | Performed by: PHYSICIAN ASSISTANT

## 2022-01-01 PROCEDURE — 86225 DNA ANTIBODY NATIVE: CPT

## 2022-01-01 PROCEDURE — 27201247 HC HEMODIALYSIS, SET-UP & CANCEL

## 2022-01-01 PROCEDURE — 36000704 HC OR TIME LEV I 1ST 15 MIN: Performed by: SURGERY

## 2022-01-01 PROCEDURE — 76937 US GUIDE VASCULAR ACCESS: CPT | Performed by: NURSE ANESTHETIST, CERTIFIED REGISTERED

## 2022-01-01 PROCEDURE — L8670 VASCULAR GRAFT, SYNTHETIC: HCPCS | Performed by: THORACIC SURGERY (CARDIOTHORACIC VASCULAR SURGERY)

## 2022-01-01 PROCEDURE — 99232 SBSQ HOSP IP/OBS MODERATE 35: CPT | Mod: FS,,, | Performed by: GENERAL PRACTICE

## 2022-01-01 PROCEDURE — C1894 INTRO/SHEATH, NON-LASER: HCPCS | Performed by: INTERNAL MEDICINE

## 2022-01-01 PROCEDURE — 63600175 PHARM REV CODE 636 W HCPCS: Mod: JG | Performed by: PHYSICIAN ASSISTANT

## 2022-01-01 PROCEDURE — 86900 BLOOD TYPING SEROLOGIC ABO: CPT | Performed by: THORACIC SURGERY (CARDIOTHORACIC VASCULAR SURGERY)

## 2022-01-01 PROCEDURE — 71000039 HC RECOVERY, EACH ADD'L HOUR: Performed by: SURGERY

## 2022-01-01 PROCEDURE — 80053 COMPREHEN METABOLIC PANEL: CPT | Performed by: HOSPITALIST

## 2022-01-01 PROCEDURE — 87077 CULTURE AEROBIC IDENTIFY: CPT | Performed by: STUDENT IN AN ORGANIZED HEALTH CARE EDUCATION/TRAINING PROGRAM

## 2022-01-01 PROCEDURE — 75956 PR  ENDOVASC REPAIR THOR AORTA INCL SUBCLAVIAN: ICD-10-PCS | Mod: 26,,, | Performed by: THORACIC SURGERY (CARDIOTHORACIC VASCULAR SURGERY)

## 2022-01-01 PROCEDURE — 36592 COLLECT BLOOD FROM PICC: CPT | Performed by: STUDENT IN AN ORGANIZED HEALTH CARE EDUCATION/TRAINING PROGRAM

## 2022-01-01 PROCEDURE — 36430 TRANSFUSION BLD/BLD COMPNT: CPT

## 2022-01-01 PROCEDURE — 85060 BLOOD SMEAR INTERPRETATION: CPT | Performed by: STUDENT IN AN ORGANIZED HEALTH CARE EDUCATION/TRAINING PROGRAM

## 2022-01-01 PROCEDURE — 83690 ASSAY OF LIPASE: CPT | Performed by: SURGERY

## 2022-01-01 PROCEDURE — 85730 THROMBOPLASTIN TIME PARTIAL: CPT | Performed by: NURSE PRACTITIONER

## 2022-01-01 PROCEDURE — C1729 CATH, DRAINAGE: HCPCS | Performed by: COLON & RECTAL SURGERY

## 2022-01-01 PROCEDURE — 87389 HIV-1 AG W/HIV-1&-2 AB AG IA: CPT | Performed by: GENERAL PRACTICE

## 2022-01-01 PROCEDURE — C1883 ADAPT/EXT, PACING/NEURO LEAD: HCPCS | Performed by: INTERNAL MEDICINE

## 2022-01-01 PROCEDURE — 97163 PT EVAL HIGH COMPLEX 45 MIN: CPT

## 2022-01-01 PROCEDURE — 87102 FUNGUS ISOLATION CULTURE: CPT | Performed by: SURGERY

## 2022-01-01 PROCEDURE — 96365 THER/PROPH/DIAG IV INF INIT: CPT

## 2022-01-01 PROCEDURE — 63600175 PHARM REV CODE 636 W HCPCS: Performed by: THORACIC SURGERY (CARDIOTHORACIC VASCULAR SURGERY)

## 2022-01-01 PROCEDURE — 87635 SARS-COV-2 COVID-19 AMP PRB: CPT | Performed by: SURGERY

## 2022-01-01 PROCEDURE — 84484 ASSAY OF TROPONIN QUANT: CPT | Performed by: STUDENT IN AN ORGANIZED HEALTH CARE EDUCATION/TRAINING PROGRAM

## 2022-01-01 PROCEDURE — 27201423 OPTIME MED/SURG SUP & DEVICES STERILE SUPPLY: Performed by: THORACIC SURGERY (CARDIOTHORACIC VASCULAR SURGERY)

## 2022-01-01 PROCEDURE — 93010 EKG 12-LEAD: ICD-10-PCS | Mod: ,,, | Performed by: INTERNAL MEDICINE

## 2022-01-01 PROCEDURE — 33880 EVASC RPR TA NDGFT COV LSA: CPT | Mod: ,,, | Performed by: THORACIC SURGERY (CARDIOTHORACIC VASCULAR SURGERY)

## 2022-01-01 PROCEDURE — P9045 ALBUMIN (HUMAN), 5%, 250 ML: HCPCS | Mod: JG | Performed by: STUDENT IN AN ORGANIZED HEALTH CARE EDUCATION/TRAINING PROGRAM

## 2022-01-01 PROCEDURE — 86140 C-REACTIVE PROTEIN: CPT | Performed by: GENERAL PRACTICE

## 2022-01-01 PROCEDURE — 85025 COMPLETE CBC W/AUTO DIFF WBC: CPT | Performed by: NURSE PRACTITIONER

## 2022-01-01 PROCEDURE — 85651 RBC SED RATE NONAUTOMATED: CPT | Performed by: INTERNAL MEDICINE

## 2022-01-01 PROCEDURE — 85060 BLOOD SMEAR INTERPRETATION: CPT | Performed by: INTERNAL MEDICINE

## 2022-01-01 PROCEDURE — 80048 BASIC METABOLIC PNL TOTAL CA: CPT | Performed by: HOSPITALIST

## 2022-01-01 PROCEDURE — 35606 BPG CAROTID-SUBCLAVIAN: CPT | Mod: 51,RT,, | Performed by: THORACIC SURGERY (CARDIOTHORACIC VASCULAR SURGERY)

## 2022-01-01 PROCEDURE — 84484 ASSAY OF TROPONIN QUANT: CPT | Performed by: INTERNAL MEDICINE

## 2022-01-01 PROCEDURE — 99223 PR INITIAL HOSPITAL CARE,LEVL III: ICD-10-PCS | Mod: 57,25,, | Performed by: PHYSICIAN ASSISTANT

## 2022-01-01 PROCEDURE — 86965 POOLING BLOOD PLATELETS: CPT | Performed by: STUDENT IN AN ORGANIZED HEALTH CARE EDUCATION/TRAINING PROGRAM

## 2022-01-01 PROCEDURE — 71000033 HC RECOVERY, INTIAL HOUR: Performed by: COLON & RECTAL SURGERY

## 2022-01-01 PROCEDURE — 99232 SBSQ HOSP IP/OBS MODERATE 35: CPT | Mod: ,,,

## 2022-01-01 PROCEDURE — 85384 FIBRINOGEN ACTIVITY: CPT | Performed by: INTERNAL MEDICINE

## 2022-01-01 PROCEDURE — 27100171 HC OXYGEN HIGH FLOW UP TO 24 HOURS

## 2022-01-01 PROCEDURE — S0030 INJECTION, METRONIDAZOLE: HCPCS | Performed by: NURSE PRACTITIONER

## 2022-01-01 PROCEDURE — 86140 C-REACTIVE PROTEIN: CPT

## 2022-01-01 PROCEDURE — 84300 ASSAY OF URINE SODIUM: CPT | Performed by: NURSE PRACTITIONER

## 2022-01-01 PROCEDURE — 33880 PR ENDOVASC TAA REINCL SUBCL: ICD-10-PCS | Mod: ,,, | Performed by: THORACIC SURGERY (CARDIOTHORACIC VASCULAR SURGERY)

## 2022-01-01 PROCEDURE — 83605 ASSAY OF LACTIC ACID: CPT | Performed by: GENERAL PRACTICE

## 2022-01-01 PROCEDURE — 86706 HEP B SURFACE ANTIBODY: CPT | Performed by: GENERAL PRACTICE

## 2022-01-01 PROCEDURE — 84157 ASSAY OF PROTEIN OTHER: CPT | Performed by: INTERNAL MEDICINE

## 2022-01-01 PROCEDURE — 86780 TREPONEMA PALLIDUM: CPT | Performed by: GENERAL PRACTICE

## 2022-01-01 PROCEDURE — 87205 SMEAR GRAM STAIN: CPT | Performed by: STUDENT IN AN ORGANIZED HEALTH CARE EDUCATION/TRAINING PROGRAM

## 2022-01-01 PROCEDURE — 99233 PR SUBSEQUENT HOSPITAL CARE,LEVL III: ICD-10-PCS | Mod: ,,, | Performed by: INTERNAL MEDICINE

## 2022-01-01 PROCEDURE — 71000039 HC RECOVERY, EACH ADD'L HOUR: Performed by: COLON & RECTAL SURGERY

## 2022-01-01 PROCEDURE — 97110 THERAPEUTIC EXERCISES: CPT | Mod: CQ

## 2022-01-01 PROCEDURE — 25500020 PHARM REV CODE 255: Performed by: STUDENT IN AN ORGANIZED HEALTH CARE EDUCATION/TRAINING PROGRAM

## 2022-01-01 PROCEDURE — 87075 CULTR BACTERIA EXCEPT BLOOD: CPT | Performed by: INTERNAL MEDICINE

## 2022-01-01 PROCEDURE — 85027 COMPLETE CBC AUTOMATED: CPT | Performed by: HOSPITALIST

## 2022-01-01 PROCEDURE — 99223 PR INITIAL HOSPITAL CARE,LEVL III: ICD-10-PCS | Mod: ,,, | Performed by: NURSE PRACTITIONER

## 2022-01-01 PROCEDURE — 99291 PR CRITICAL CARE, E/M 30-74 MINUTES: ICD-10-PCS | Mod: ,,, | Performed by: GENERAL PRACTICE

## 2022-01-01 PROCEDURE — 63600175 PHARM REV CODE 636 W HCPCS: Mod: JG | Performed by: NURSE PRACTITIONER

## 2022-01-01 PROCEDURE — 97110 THERAPEUTIC EXERCISES: CPT | Mod: CO

## 2022-01-01 PROCEDURE — 82570 ASSAY OF URINE CREATININE: CPT | Performed by: NURSE PRACTITIONER

## 2022-01-01 PROCEDURE — 85027 COMPLETE CBC AUTOMATED: CPT | Performed by: STUDENT IN AN ORGANIZED HEALTH CARE EDUCATION/TRAINING PROGRAM

## 2022-01-01 PROCEDURE — 99223 1ST HOSP IP/OBS HIGH 75: CPT | Mod: ,,, | Performed by: NURSE PRACTITIONER

## 2022-01-01 PROCEDURE — 97164 PT RE-EVAL EST PLAN CARE: CPT

## 2022-01-01 PROCEDURE — 99024 PR POST-OP FOLLOW-UP VISIT: ICD-10-PCS | Mod: ,,, | Performed by: THORACIC SURGERY (CARDIOTHORACIC VASCULAR SURGERY)

## 2022-01-01 PROCEDURE — 97110 THERAPEUTIC EXERCISES: CPT

## 2022-01-01 PROCEDURE — 97167 OT EVAL HIGH COMPLEX 60 MIN: CPT

## 2022-01-01 PROCEDURE — 86780 TREPONEMA PALLIDUM: CPT

## 2022-01-01 PROCEDURE — C1894 INTRO/SHEATH, NON-LASER: HCPCS | Performed by: THORACIC SURGERY (CARDIOTHORACIC VASCULAR SURGERY)

## 2022-01-01 PROCEDURE — 80061 LIPID PANEL: CPT | Performed by: STUDENT IN AN ORGANIZED HEALTH CARE EDUCATION/TRAINING PROGRAM

## 2022-01-01 PROCEDURE — 83835 ASSAY OF METANEPHRINES: CPT | Performed by: INTERNAL MEDICINE

## 2022-01-01 PROCEDURE — 83735 ASSAY OF MAGNESIUM: CPT | Performed by: HOSPITALIST

## 2022-01-01 PROCEDURE — 34812 OPN FEM ART EXPOS: CPT | Mod: RT,,, | Performed by: THORACIC SURGERY (CARDIOTHORACIC VASCULAR SURGERY)

## 2022-01-01 PROCEDURE — 86923 COMPATIBILITY TEST ELECTRIC: CPT | Mod: 91 | Performed by: SURGERY

## 2022-01-01 PROCEDURE — 35606 PR BYPASS GRAFT OTHR,CAROT-SUBCL: ICD-10-PCS | Mod: 51,RT,, | Performed by: THORACIC SURGERY (CARDIOTHORACIC VASCULAR SURGERY)

## 2022-01-01 PROCEDURE — 99223 1ST HOSP IP/OBS HIGH 75: CPT | Mod: 57,25,, | Performed by: PHYSICIAN ASSISTANT

## 2022-01-01 PROCEDURE — 37000008 HC ANESTHESIA 1ST 15 MINUTES: Performed by: THORACIC SURGERY (CARDIOTHORACIC VASCULAR SURGERY)

## 2022-01-01 PROCEDURE — 83605 ASSAY OF LACTIC ACID: CPT | Performed by: NURSE PRACTITIONER

## 2022-01-01 PROCEDURE — 86900 BLOOD TYPING SEROLOGIC ABO: CPT | Performed by: STUDENT IN AN ORGANIZED HEALTH CARE EDUCATION/TRAINING PROGRAM

## 2022-01-01 PROCEDURE — 92610 EVALUATE SWALLOWING FUNCTION: CPT

## 2022-01-01 PROCEDURE — 71000033 HC RECOVERY, INTIAL HOUR: Performed by: SURGERY

## 2022-01-01 PROCEDURE — 36000704 HC OR TIME LEV I 1ST 15 MIN: Performed by: COLON & RECTAL SURGERY

## 2022-01-01 PROCEDURE — 37000009 HC ANESTHESIA EA ADD 15 MINS: Performed by: THORACIC SURGERY (CARDIOTHORACIC VASCULAR SURGERY)

## 2022-01-01 PROCEDURE — C1729 CATH, DRAINAGE: HCPCS | Performed by: SURGERY

## 2022-01-01 PROCEDURE — P9047 ALBUMIN (HUMAN), 25%, 50ML: HCPCS | Mod: JG | Performed by: STUDENT IN AN ORGANIZED HEALTH CARE EDUCATION/TRAINING PROGRAM

## 2022-01-01 PROCEDURE — 36600 WITHDRAWAL OF ARTERIAL BLOOD: CPT | Performed by: INTERNAL MEDICINE

## 2022-01-01 PROCEDURE — 36200 PR PLACE CATH AORTA: ICD-10-PCS | Mod: 51,,, | Performed by: THORACIC SURGERY (CARDIOTHORACIC VASCULAR SURGERY)

## 2022-01-01 PROCEDURE — 81001 URINALYSIS AUTO W/SCOPE: CPT | Performed by: INTERNAL MEDICINE

## 2022-01-01 PROCEDURE — 82728 ASSAY OF FERRITIN: CPT | Performed by: INTERNAL MEDICINE

## 2022-01-01 PROCEDURE — 83735 ASSAY OF MAGNESIUM: CPT | Performed by: NURSE PRACTITIONER

## 2022-01-01 PROCEDURE — 99024 POSTOP FOLLOW-UP VISIT: CPT | Mod: ,,, | Performed by: THORACIC SURGERY (CARDIOTHORACIC VASCULAR SURGERY)

## 2022-01-01 PROCEDURE — 36573 INSJ PICC RS&I 5 YR+: CPT

## 2022-01-01 PROCEDURE — 63600175 PHARM REV CODE 636 W HCPCS: Performed by: RADIOLOGY

## 2022-01-01 PROCEDURE — 83036 HEMOGLOBIN GLYCOSYLATED A1C: CPT | Performed by: NURSE PRACTITIONER

## 2022-01-01 PROCEDURE — 92611 MOTION FLUOROSCOPY/SWALLOW: CPT

## 2022-01-01 PROCEDURE — 82010 KETONE BODYS QUAN: CPT | Performed by: NURSE PRACTITIONER

## 2022-01-01 PROCEDURE — 33210 INSERT ELECTRD/PM CATH SNGL: CPT | Performed by: INTERNAL MEDICINE

## 2022-01-01 PROCEDURE — 84484 ASSAY OF TROPONIN QUANT: CPT | Performed by: NURSE PRACTITIONER

## 2022-01-01 PROCEDURE — 85025 COMPLETE CBC W/AUTO DIFF WBC: CPT | Performed by: HOSPITALIST

## 2022-01-01 PROCEDURE — 80047 BASIC METABLC PNL IONIZED CA: CPT

## 2022-01-01 PROCEDURE — 87102 FUNGUS ISOLATION CULTURE: CPT | Performed by: STUDENT IN AN ORGANIZED HEALTH CARE EDUCATION/TRAINING PROGRAM

## 2022-01-01 PROCEDURE — 27100092 HC HIGH FLOW DELIVERY CANNULA

## 2022-01-01 PROCEDURE — 99291 PR CRITICAL CARE, E/M 30-74 MINUTES: ICD-10-PCS | Mod: FS,,, | Performed by: GENERAL PRACTICE

## 2022-01-01 DEVICE — IMPLANTABLE DEVICE: Type: IMPLANTABLE DEVICE | Site: AORTA | Status: FUNCTIONAL

## 2022-01-01 RX ORDER — LIDOCAINE HYDROCHLORIDE 10 MG/ML
1 INJECTION, SOLUTION EPIDURAL; INFILTRATION; INTRACAUDAL; PERINEURAL
Status: ACTIVE | OUTPATIENT
Start: 2022-01-01 | End: 2022-01-01

## 2022-01-01 RX ORDER — MYCOPHENOLATE MOFETIL 500 MG/1
1000 TABLET ORAL 2 TIMES DAILY
COMMUNITY
Start: 2022-01-01 | End: 2022-01-01 | Stop reason: SDUPTHER

## 2022-01-01 RX ORDER — HYDRALAZINE HYDROCHLORIDE 20 MG/ML
10 INJECTION INTRAMUSCULAR; INTRAVENOUS EVERY 4 HOURS PRN
Status: DISCONTINUED | OUTPATIENT
Start: 2022-01-01 | End: 2022-01-01

## 2022-01-01 RX ORDER — PROTAMINE SULFATE 10 MG/ML
INJECTION, SOLUTION INTRAVENOUS
Status: DISCONTINUED | OUTPATIENT
Start: 2022-01-01 | End: 2022-01-01

## 2022-01-01 RX ORDER — HYDROCODONE BITARTRATE AND ACETAMINOPHEN 500; 5 MG/1; MG/1
TABLET ORAL
Status: DISCONTINUED | OUTPATIENT
Start: 2022-01-01 | End: 2022-01-01

## 2022-01-01 RX ORDER — WARFARIN SODIUM 5 MG/1
5 TABLET ORAL ONCE
Status: COMPLETED | OUTPATIENT
Start: 2022-01-01 | End: 2022-01-01

## 2022-01-01 RX ORDER — ROCURONIUM BROMIDE 10 MG/ML
INJECTION, SOLUTION INTRAVENOUS
Status: DISCONTINUED | OUTPATIENT
Start: 2022-01-01 | End: 2022-01-01

## 2022-01-01 RX ORDER — CARVEDILOL 12.5 MG/1
12.5 TABLET ORAL 2 TIMES DAILY
Status: DISCONTINUED | OUTPATIENT
Start: 2022-01-01 | End: 2022-01-01

## 2022-01-01 RX ORDER — CALCIUM GLUCONATE 98 MG/ML
1 INJECTION, SOLUTION INTRAVENOUS ONCE
Status: COMPLETED | OUTPATIENT
Start: 2022-01-01 | End: 2022-01-01

## 2022-01-01 RX ORDER — METRONIDAZOLE 500 MG/1
500 TABLET ORAL EVERY 8 HOURS
Status: DISCONTINUED | OUTPATIENT
Start: 2022-01-01 | End: 2022-01-01

## 2022-01-01 RX ORDER — LOSARTAN POTASSIUM 25 MG/1
25 TABLET ORAL DAILY
Status: DISCONTINUED | OUTPATIENT
Start: 2022-01-01 | End: 2022-01-01

## 2022-01-01 RX ORDER — CLONIDINE 0.2 MG/24H
1 PATCH, EXTENDED RELEASE TRANSDERMAL
Status: DISCONTINUED | OUTPATIENT
Start: 2022-01-01 | End: 2022-01-01

## 2022-01-01 RX ORDER — DEXTROSE MONOHYDRATE 50 MG/ML
INJECTION, SOLUTION INTRAVENOUS CONTINUOUS
Status: DISCONTINUED | OUTPATIENT
Start: 2022-01-01 | End: 2022-01-01

## 2022-01-01 RX ORDER — CALCIUM GLUCONATE 20 MG/ML
1 INJECTION, SOLUTION INTRAVENOUS ONCE
Status: DISCONTINUED | OUTPATIENT
Start: 2022-01-01 | End: 2022-01-01

## 2022-01-01 RX ORDER — BISACODYL 10 MG
10 SUPPOSITORY, RECTAL RECTAL DAILY PRN
Status: DISCONTINUED | OUTPATIENT
Start: 2022-01-01 | End: 2022-01-01

## 2022-01-01 RX ORDER — SUCRALFATE 1 G/1
1 TABLET ORAL 2 TIMES DAILY
Qty: 60 TABLET | Refills: 0 | Status: SHIPPED | OUTPATIENT
Start: 2022-01-01 | End: 2022-01-01 | Stop reason: SDUPTHER

## 2022-01-01 RX ORDER — PROPOFOL 10 MG/ML
VIAL (ML) INTRAVENOUS
Status: DISCONTINUED | OUTPATIENT
Start: 2022-01-01 | End: 2022-01-01

## 2022-01-01 RX ORDER — AMLODIPINE BESYLATE 10 MG/1
10 TABLET ORAL
COMMUNITY
End: 2022-01-01 | Stop reason: SDUPTHER

## 2022-01-01 RX ORDER — CLONIDINE HYDROCHLORIDE 0.1 MG/1
0.2 TABLET ORAL
Status: COMPLETED | OUTPATIENT
Start: 2022-01-01 | End: 2022-01-01

## 2022-01-01 RX ORDER — FENTANYL CITRATE 50 UG/ML
INJECTION, SOLUTION INTRAMUSCULAR; INTRAVENOUS
Status: DISCONTINUED | OUTPATIENT
Start: 2022-01-01 | End: 2022-01-01

## 2022-01-01 RX ORDER — ALBUMIN HUMAN 250 G/1000ML
SOLUTION INTRAVENOUS CONTINUOUS PRN
Status: DISCONTINUED | OUTPATIENT
Start: 2022-01-01 | End: 2022-01-01

## 2022-01-01 RX ORDER — FAMOTIDINE 20 MG/1
20 TABLET, FILM COATED ORAL EVERY OTHER DAY
Qty: 15 TABLET | Refills: 11 | OUTPATIENT
Start: 2022-01-01 | End: 2023-10-18

## 2022-01-01 RX ORDER — DEXTROSE MONOHYDRATE AND SODIUM CHLORIDE 5; .9 G/100ML; G/100ML
INJECTION, SOLUTION INTRAVENOUS CONTINUOUS
Status: DISCONTINUED | OUTPATIENT
Start: 2022-01-01 | End: 2022-01-01

## 2022-01-01 RX ORDER — MIDAZOLAM HYDROCHLORIDE 1 MG/ML
INJECTION INTRAMUSCULAR; INTRAVENOUS
Status: DISCONTINUED | OUTPATIENT
Start: 2022-01-01 | End: 2022-01-01

## 2022-01-01 RX ORDER — SODIUM CHLORIDE 0.9 % (FLUSH) 0.9 %
10 SYRINGE (ML) INJECTION
Status: DISCONTINUED | OUTPATIENT
Start: 2022-01-01 | End: 2022-01-01

## 2022-01-01 RX ORDER — ONDANSETRON 2 MG/ML
4 INJECTION INTRAMUSCULAR; INTRAVENOUS DAILY PRN
Status: DISCONTINUED | OUTPATIENT
Start: 2022-01-01 | End: 2022-01-01

## 2022-01-01 RX ORDER — ALBUTEROL SULFATE 90 UG/1
2 AEROSOL, METERED RESPIRATORY (INHALATION) EVERY 4 HOURS PRN
Qty: 18 G | Refills: 0 | Status: SHIPPED | OUTPATIENT
Start: 2022-01-01

## 2022-01-01 RX ORDER — HYDROMORPHONE HYDROCHLORIDE 2 MG/ML
0.2 INJECTION, SOLUTION INTRAMUSCULAR; INTRAVENOUS; SUBCUTANEOUS EVERY 5 MIN PRN
Status: DISCONTINUED | OUTPATIENT
Start: 2022-01-01 | End: 2022-01-01

## 2022-01-01 RX ORDER — ATROPINE SULFATE 0.4 MG/ML
INJECTION, SOLUTION ENDOTRACHEAL; INTRAMEDULLARY; INTRAMUSCULAR; INTRAVENOUS; SUBCUTANEOUS
Status: DISCONTINUED | OUTPATIENT
Start: 2022-01-01 | End: 2022-01-01

## 2022-01-01 RX ORDER — PROPOFOL 10 MG/ML
INJECTION, EMULSION INTRAVENOUS
Status: DISCONTINUED | OUTPATIENT
Start: 2022-01-01 | End: 2022-01-01

## 2022-01-01 RX ORDER — MIDAZOLAM HYDROCHLORIDE 1 MG/ML
2 INJECTION INTRAMUSCULAR; INTRAVENOUS ONCE AS NEEDED
Status: DISCONTINUED | OUTPATIENT
Start: 2022-01-01 | End: 2022-01-01 | Stop reason: HOSPADM

## 2022-01-01 RX ORDER — METRONIDAZOLE 500 MG/100ML
500 INJECTION, SOLUTION INTRAVENOUS
Status: DISCONTINUED | OUTPATIENT
Start: 2022-01-01 | End: 2022-01-01

## 2022-01-01 RX ORDER — ALBUMIN HUMAN 250 G/1000ML
12.5 SOLUTION INTRAVENOUS ONCE
Status: COMPLETED | OUTPATIENT
Start: 2022-01-01 | End: 2022-01-01

## 2022-01-01 RX ORDER — SODIUM CHLORIDE 9 MG/ML
INJECTION, SOLUTION INTRAVENOUS CONTINUOUS
Status: DISCONTINUED | OUTPATIENT
Start: 2022-01-01 | End: 2022-01-01

## 2022-01-01 RX ORDER — HYDROMORPHONE HYDROCHLORIDE 2 MG/ML
0.5 INJECTION, SOLUTION INTRAMUSCULAR; INTRAVENOUS; SUBCUTANEOUS
Status: DISPENSED | OUTPATIENT
Start: 2022-01-01 | End: 2022-01-01

## 2022-01-01 RX ORDER — TORSEMIDE 20 MG/1
40 TABLET ORAL ONCE
Status: COMPLETED | OUTPATIENT
Start: 2022-01-01 | End: 2022-01-01

## 2022-01-01 RX ORDER — CARVEDILOL 3.12 MG/1
6.25 TABLET ORAL 2 TIMES DAILY
Status: DISCONTINUED | OUTPATIENT
Start: 2022-01-01 | End: 2022-01-01

## 2022-01-01 RX ORDER — DOCUSATE SODIUM 100 MG/1
100 CAPSULE, LIQUID FILLED ORAL 2 TIMES DAILY
Status: DISCONTINUED | OUTPATIENT
Start: 2022-01-01 | End: 2022-01-01

## 2022-01-01 RX ORDER — ATROPINE SULFATE 0.1 MG/ML
0.5 INJECTION INTRAVENOUS ONCE
Status: DISCONTINUED | OUTPATIENT
Start: 2022-01-01 | End: 2022-01-01 | Stop reason: HOSPADM

## 2022-01-01 RX ORDER — ONDANSETRON HYDROCHLORIDE 4 MG/5ML
4 SOLUTION ORAL EVERY 6 HOURS PRN
Status: DISCONTINUED | OUTPATIENT
Start: 2022-01-01 | End: 2022-01-01

## 2022-01-01 RX ORDER — METHOCARBAMOL 500 MG/1
500 TABLET, FILM COATED ORAL 4 TIMES DAILY
Status: DISCONTINUED | OUTPATIENT
Start: 2022-01-01 | End: 2022-01-01

## 2022-01-01 RX ORDER — METOLAZONE 5 MG/1
5 TABLET ORAL ONCE
Status: COMPLETED | OUTPATIENT
Start: 2022-01-01 | End: 2022-01-01

## 2022-01-01 RX ORDER — ALBUMIN HUMAN 250 G/1000ML
25 SOLUTION INTRAVENOUS ONCE
Status: COMPLETED | OUTPATIENT
Start: 2022-01-01 | End: 2022-01-01

## 2022-01-01 RX ORDER — GLUCAGON 1 MG
1 KIT INJECTION
Status: DISCONTINUED | OUTPATIENT
Start: 2022-01-01 | End: 2022-01-01

## 2022-01-01 RX ORDER — HYDROXYCHLOROQUINE SULFATE 200 MG/1
200 TABLET, FILM COATED ORAL 2 TIMES DAILY
Status: DISCONTINUED | OUTPATIENT
Start: 2022-01-01 | End: 2022-01-01

## 2022-01-01 RX ORDER — SODIUM CHLORIDE 0.9 % (FLUSH) 0.9 %
10 SYRINGE (ML) INJECTION
Status: DISCONTINUED | OUTPATIENT
Start: 2022-01-01 | End: 2022-01-01 | Stop reason: HOSPADM

## 2022-01-01 RX ORDER — NIFEDIPINE 30 MG/1
30 TABLET, EXTENDED RELEASE ORAL ONCE
Status: COMPLETED | OUTPATIENT
Start: 2022-01-01 | End: 2022-01-01

## 2022-01-01 RX ORDER — POLYETHYLENE GLYCOL 3350 17 G/17G
17 POWDER, FOR SOLUTION ORAL 2 TIMES DAILY PRN
Status: DISCONTINUED | OUTPATIENT
Start: 2022-01-01 | End: 2022-01-01 | Stop reason: HOSPADM

## 2022-01-01 RX ORDER — AMLODIPINE BESYLATE 5 MG/1
10 TABLET ORAL DAILY
Status: DISCONTINUED | OUTPATIENT
Start: 2022-01-01 | End: 2022-01-01

## 2022-01-01 RX ORDER — ETOMIDATE 2 MG/ML
INJECTION INTRAVENOUS
Status: DISCONTINUED | OUTPATIENT
Start: 2022-01-01 | End: 2022-01-01

## 2022-01-01 RX ORDER — ENOXAPARIN SODIUM 100 MG/ML
40 INJECTION SUBCUTANEOUS EVERY 24 HOURS
Status: DISCONTINUED | OUTPATIENT
Start: 2022-01-01 | End: 2022-01-01

## 2022-01-01 RX ORDER — HYDRALAZINE HYDROCHLORIDE 50 MG/1
50 TABLET, FILM COATED ORAL EVERY 8 HOURS
Qty: 90 TABLET | Refills: 11 | OUTPATIENT
Start: 2022-01-01 | End: 2023-10-17

## 2022-01-01 RX ORDER — GEMFIBROZIL 600 MG/1
600 TABLET, FILM COATED ORAL
Status: DISCONTINUED | OUTPATIENT
Start: 2022-01-01 | End: 2022-01-01

## 2022-01-01 RX ORDER — DEXMEDETOMIDINE HYDROCHLORIDE 4 UG/ML
0-1.4 INJECTION, SOLUTION INTRAVENOUS CONTINUOUS
Status: DISCONTINUED | OUTPATIENT
Start: 2022-01-01 | End: 2022-01-01

## 2022-01-01 RX ORDER — GABAPENTIN 300 MG/1
300 CAPSULE ORAL 3 TIMES DAILY
Status: DISCONTINUED | OUTPATIENT
Start: 2022-01-01 | End: 2022-01-01

## 2022-01-01 RX ORDER — HEPARIN SODIUM 5000 [USP'U]/ML
INJECTION, SOLUTION INTRAVENOUS; SUBCUTANEOUS
Status: DISCONTINUED | OUTPATIENT
Start: 2022-01-01 | End: 2022-01-01 | Stop reason: HOSPADM

## 2022-01-01 RX ORDER — SEVELAMER CARBONATE 800 MG/1
800 TABLET, FILM COATED ORAL
Status: DISCONTINUED | OUTPATIENT
Start: 2022-01-01 | End: 2022-01-01

## 2022-01-01 RX ORDER — FLUCONAZOLE 2 MG/ML
400 INJECTION, SOLUTION INTRAVENOUS
Status: DISCONTINUED | OUTPATIENT
Start: 2022-01-01 | End: 2022-01-01 | Stop reason: HOSPADM

## 2022-01-01 RX ORDER — ONDANSETRON 2 MG/ML
4 INJECTION INTRAMUSCULAR; INTRAVENOUS DAILY PRN
Status: DISCONTINUED | OUTPATIENT
Start: 2022-01-01 | End: 2022-01-01 | Stop reason: HOSPADM

## 2022-01-01 RX ORDER — VANCOMYCIN HCL IN 5 % DEXTROSE 1G/250ML
1000 PLASTIC BAG, INJECTION (ML) INTRAVENOUS
Status: DISCONTINUED | OUTPATIENT
Start: 2022-01-01 | End: 2022-01-01

## 2022-01-01 RX ORDER — INDOMETHACIN 25 MG/1
CAPSULE ORAL
Status: DISCONTINUED | OUTPATIENT
Start: 2022-01-01 | End: 2022-01-01

## 2022-01-01 RX ORDER — ENOXAPARIN SODIUM 100 MG/ML
1 INJECTION SUBCUTANEOUS
Status: DISCONTINUED | OUTPATIENT
Start: 2022-01-01 | End: 2022-01-01

## 2022-01-01 RX ORDER — ONDANSETRON 2 MG/ML
INJECTION INTRAMUSCULAR; INTRAVENOUS
Status: DISCONTINUED | OUTPATIENT
Start: 2022-01-01 | End: 2022-01-01

## 2022-01-01 RX ORDER — CALCIUM GLUCONATE 20 MG/ML
INJECTION, SOLUTION INTRAVENOUS
Status: DISPENSED
Start: 2022-01-01 | End: 2022-01-01

## 2022-01-01 RX ORDER — CARVEDILOL 25 MG/1
25 TABLET ORAL 2 TIMES DAILY
Qty: 60 TABLET | Refills: 11 | OUTPATIENT
Start: 2022-01-01 | End: 2023-10-17

## 2022-01-01 RX ORDER — CLONIDINE HYDROCHLORIDE 0.2 MG/1
0.2 TABLET ORAL 2 TIMES DAILY
Status: DISCONTINUED | OUTPATIENT
Start: 2022-01-01 | End: 2022-01-01

## 2022-01-01 RX ORDER — WARFARIN 2.5 MG/1
2.5 TABLET ORAL DAILY
Status: DISCONTINUED | OUTPATIENT
Start: 2022-01-01 | End: 2022-01-01

## 2022-01-01 RX ORDER — HYDROCODONE BITARTRATE AND ACETAMINOPHEN 5; 325 MG/1; MG/1
1 TABLET ORAL EVERY 6 HOURS PRN
Qty: 28 TABLET | Refills: 0 | OUTPATIENT
Start: 2022-01-01 | End: 2022-01-01

## 2022-01-01 RX ORDER — PROPOFOL 10 MG/ML
INJECTION, EMULSION INTRAVENOUS
Status: COMPLETED
Start: 2022-01-01 | End: 2022-01-01

## 2022-01-01 RX ORDER — HYDROMORPHONE HYDROCHLORIDE 2 MG/ML
1 INJECTION, SOLUTION INTRAMUSCULAR; INTRAVENOUS; SUBCUTANEOUS EVERY 6 HOURS PRN
Status: DISCONTINUED | OUTPATIENT
Start: 2022-01-01 | End: 2022-01-01

## 2022-01-01 RX ORDER — METOPROLOL TARTRATE 50 MG/1
50 TABLET ORAL 2 TIMES DAILY
Qty: 30 TABLET | Refills: 0 | Status: SHIPPED | OUTPATIENT
Start: 2022-01-01

## 2022-01-01 RX ORDER — LACTULOSE 10 G/15ML
10 SOLUTION ORAL 2 TIMES DAILY
Status: DISCONTINUED | OUTPATIENT
Start: 2022-01-01 | End: 2022-01-01

## 2022-01-01 RX ORDER — DEXTROSE MONOHYDRATE 100 MG/ML
25 INJECTION, SOLUTION INTRAVENOUS
Status: DISCONTINUED | OUTPATIENT
Start: 2022-01-01 | End: 2022-01-01

## 2022-01-01 RX ORDER — PHENYLEPHRINE HYDROCHLORIDE 10 MG/ML
INJECTION INTRAVENOUS
Status: DISCONTINUED
Start: 2022-01-01 | End: 2022-01-01 | Stop reason: WASHOUT

## 2022-01-01 RX ORDER — VANCOMYCIN HCL IN 5 % DEXTROSE 1G/250ML
1000 PLASTIC BAG, INJECTION (ML) INTRAVENOUS ONCE
Status: DISCONTINUED | OUTPATIENT
Start: 2022-01-01 | End: 2022-01-01

## 2022-01-01 RX ORDER — ASPIRIN 81 MG/1
81 TABLET ORAL DAILY
Qty: 30 TABLET | Refills: 0 | Status: SHIPPED | OUTPATIENT
Start: 2022-01-01

## 2022-01-01 RX ORDER — ESMOLOL HYDROCHLORIDE 20 MG/ML
0-300 INJECTION, SOLUTION INTRAVENOUS CONTINUOUS
Status: DISCONTINUED | OUTPATIENT
Start: 2022-01-01 | End: 2022-01-01

## 2022-01-01 RX ORDER — PANTOPRAZOLE SODIUM 40 MG/10ML
40 INJECTION, POWDER, LYOPHILIZED, FOR SOLUTION INTRAVENOUS DAILY
Status: DISCONTINUED | OUTPATIENT
Start: 2022-01-01 | End: 2022-01-01

## 2022-01-01 RX ORDER — PHENYLEPHRINE HYDROCHLORIDE 10 MG/ML
INJECTION INTRAVENOUS
Status: DISCONTINUED | OUTPATIENT
Start: 2022-01-01 | End: 2022-01-01

## 2022-01-01 RX ORDER — FUROSEMIDE 10 MG/ML
40 INJECTION INTRAMUSCULAR; INTRAVENOUS ONCE
Status: COMPLETED | OUTPATIENT
Start: 2022-01-01 | End: 2022-01-01

## 2022-01-01 RX ORDER — MORPHINE SULFATE 30 MG/1
30 TABLET, FILM COATED, EXTENDED RELEASE ORAL EVERY 12 HOURS
Qty: 30 TABLET | Refills: 0 | OUTPATIENT
Start: 2022-01-01 | End: 2022-01-01

## 2022-01-01 RX ORDER — EPINEPHRINE 1 MG/ML
INJECTION, SOLUTION INTRACARDIAC; INTRAMUSCULAR; INTRAVENOUS; SUBCUTANEOUS
Status: DISCONTINUED | OUTPATIENT
Start: 2022-01-01 | End: 2022-01-01

## 2022-01-01 RX ORDER — HYDROMORPHONE HYDROCHLORIDE 2 MG/ML
0.5 INJECTION, SOLUTION INTRAMUSCULAR; INTRAVENOUS; SUBCUTANEOUS EVERY 5 MIN PRN
Status: DISCONTINUED | OUTPATIENT
Start: 2022-01-01 | End: 2022-01-01

## 2022-01-01 RX ORDER — HYDROMORPHONE HYDROCHLORIDE 2 MG/ML
1 INJECTION, SOLUTION INTRAMUSCULAR; INTRAVENOUS; SUBCUTANEOUS EVERY 4 HOURS PRN
Status: DISCONTINUED | OUTPATIENT
Start: 2022-01-01 | End: 2022-01-01

## 2022-01-01 RX ORDER — CEFAZOLIN SODIUM 2 G/50ML
2 SOLUTION INTRAVENOUS
Status: CANCELLED | OUTPATIENT
Start: 2022-01-01

## 2022-01-01 RX ORDER — SODIUM CHLORIDE 9 MG/ML
INJECTION, SOLUTION INTRAVENOUS
Status: DISCONTINUED | OUTPATIENT
Start: 2022-01-01 | End: 2022-01-01 | Stop reason: HOSPADM

## 2022-01-01 RX ORDER — NOREPINEPHRINE BITARTRATE/D5W 8 MG/250ML
PLASTIC BAG, INJECTION (ML) INTRAVENOUS
Status: COMPLETED
Start: 2022-01-01 | End: 2022-01-01

## 2022-01-01 RX ORDER — EPINEPHRINE 1 MG/ML
INJECTION, SOLUTION INTRACARDIAC; INTRAMUSCULAR; INTRAVENOUS; SUBCUTANEOUS
Status: DISPENSED
Start: 2022-01-01 | End: 2022-01-01

## 2022-01-01 RX ORDER — CALCIUM GLUCONATE 20 MG/ML
1 INJECTION, SOLUTION INTRAVENOUS
Status: DISPENSED | OUTPATIENT
Start: 2022-01-01 | End: 2022-01-01

## 2022-01-01 RX ORDER — LISINOPRIL 20 MG/1
40 TABLET ORAL DAILY
Status: DISCONTINUED | OUTPATIENT
Start: 2022-01-01 | End: 2022-01-01

## 2022-01-01 RX ORDER — MORPHINE SULFATE 4 MG/ML
2 INJECTION, SOLUTION INTRAMUSCULAR; INTRAVENOUS
Status: DISCONTINUED | OUTPATIENT
Start: 2022-01-01 | End: 2022-01-01

## 2022-01-01 RX ORDER — NITROGLYCERIN 0.4 MG/1
0.4 TABLET SUBLINGUAL EVERY 5 MIN PRN
Status: DISCONTINUED | OUTPATIENT
Start: 2022-01-01 | End: 2022-01-01 | Stop reason: HOSPADM

## 2022-01-01 RX ORDER — AMLODIPINE BESYLATE 10 MG/1
10 TABLET ORAL DAILY
Qty: 30 TABLET | Refills: 0 | Status: SHIPPED | OUTPATIENT
Start: 2022-01-01

## 2022-01-01 RX ORDER — GLIMEPIRIDE 4 MG/1
4 TABLET ORAL
Qty: 90 TABLET | Refills: 3 | OUTPATIENT
Start: 2022-01-01 | End: 2023-10-18

## 2022-01-01 RX ORDER — FENTANYL CITRATE-0.9 % NACL/PF 10 MCG/ML
0-250 PLASTIC BAG, INJECTION (ML) INTRAVENOUS CONTINUOUS
Status: DISCONTINUED | OUTPATIENT
Start: 2022-01-01 | End: 2022-01-01 | Stop reason: HOSPADM

## 2022-01-01 RX ORDER — FENTANYL CITRATE 50 UG/ML
INJECTION, SOLUTION INTRAMUSCULAR; INTRAVENOUS
Status: DISPENSED
Start: 2022-01-01 | End: 2022-01-01

## 2022-01-01 RX ORDER — CALCIUM GLUCONATE 20 MG/ML
1 INJECTION, SOLUTION INTRAVENOUS
Status: COMPLETED | OUTPATIENT
Start: 2022-01-01 | End: 2022-01-01

## 2022-01-01 RX ORDER — INSULIN ASPART 100 [IU]/ML
0-5 INJECTION, SOLUTION INTRAVENOUS; SUBCUTANEOUS EVERY 6 HOURS PRN
Status: DISCONTINUED | OUTPATIENT
Start: 2022-01-01 | End: 2022-01-01

## 2022-01-01 RX ORDER — MEPERIDINE HYDROCHLORIDE 25 MG/ML
12.5 INJECTION INTRAMUSCULAR; INTRAVENOUS; SUBCUTANEOUS EVERY 10 MIN PRN
Status: ACTIVE | OUTPATIENT
Start: 2022-01-01 | End: 2022-01-01

## 2022-01-01 RX ORDER — ALBUTEROL SULFATE 90 UG/1
2 AEROSOL, METERED RESPIRATORY (INHALATION) EVERY 4 HOURS PRN
Qty: 18 G | Refills: 0 | OUTPATIENT
Start: 2022-01-01 | End: 2023-10-17

## 2022-01-01 RX ORDER — GABAPENTIN 100 MG/1
100 CAPSULE ORAL 3 TIMES DAILY
Status: DISCONTINUED | OUTPATIENT
Start: 2022-01-01 | End: 2022-01-01

## 2022-01-01 RX ORDER — CALCIUM CHLORIDE INJECTION 100 MG/ML
INJECTION, SOLUTION INTRAVENOUS
Status: DISCONTINUED | OUTPATIENT
Start: 2022-01-01 | End: 2022-01-01

## 2022-01-01 RX ORDER — FLUCONAZOLE 2 MG/ML
400 INJECTION, SOLUTION INTRAVENOUS
Status: DISCONTINUED | OUTPATIENT
Start: 2022-01-01 | End: 2022-01-01

## 2022-01-01 RX ORDER — PROPOFOL 10 MG/ML
0-50 INJECTION, EMULSION INTRAVENOUS CONTINUOUS
Status: DISCONTINUED | OUTPATIENT
Start: 2022-01-01 | End: 2022-01-01

## 2022-01-01 RX ORDER — METOCLOPRAMIDE HYDROCHLORIDE 5 MG/ML
10 INJECTION INTRAMUSCULAR; INTRAVENOUS EVERY 10 MIN PRN
Status: DISCONTINUED | OUTPATIENT
Start: 2022-01-01 | End: 2022-01-01 | Stop reason: HOSPADM

## 2022-01-01 RX ORDER — DEXTROSE MONOHYDRATE 100 MG/ML
12.5 INJECTION, SOLUTION INTRAVENOUS
Status: DISCONTINUED | OUTPATIENT
Start: 2022-01-01 | End: 2022-01-01

## 2022-01-01 RX ORDER — PHENYLEPHRINE HCL IN 0.9% NACL 1 MG/10 ML
SYRINGE (ML) INTRAVENOUS
Status: DISCONTINUED | OUTPATIENT
Start: 2022-01-01 | End: 2022-01-01

## 2022-01-01 RX ORDER — SUCRALFATE 1 G/1
1 TABLET ORAL 2 TIMES DAILY
Status: DISCONTINUED | OUTPATIENT
Start: 2022-01-01 | End: 2022-01-01

## 2022-01-01 RX ORDER — HYDRALAZINE HYDROCHLORIDE 20 MG/ML
20 INJECTION INTRAMUSCULAR; INTRAVENOUS EVERY 4 HOURS PRN
Status: DISCONTINUED | OUTPATIENT
Start: 2022-01-01 | End: 2022-01-01

## 2022-01-01 RX ORDER — METOPROLOL TARTRATE 50 MG/1
50 TABLET ORAL 2 TIMES DAILY
Status: DISCONTINUED | OUTPATIENT
Start: 2022-01-01 | End: 2022-01-01

## 2022-01-01 RX ORDER — DEXTROSE MONOHYDRATE 100 MG/ML
INJECTION, SOLUTION INTRAVENOUS CONTINUOUS
Status: DISCONTINUED | OUTPATIENT
Start: 2022-01-01 | End: 2022-01-01

## 2022-01-01 RX ORDER — HYDRALAZINE HYDROCHLORIDE 50 MG/1
100 TABLET, FILM COATED ORAL EVERY 8 HOURS
Status: DISCONTINUED | OUTPATIENT
Start: 2022-01-01 | End: 2022-01-01

## 2022-01-01 RX ORDER — ROCURONIUM BROMIDE 10 MG/ML
INJECTION, SOLUTION INTRAVENOUS CODE/TRAUMA/SEDATION MEDICATION
Status: COMPLETED | OUTPATIENT
Start: 2022-01-01 | End: 2022-01-01

## 2022-01-01 RX ORDER — MIDAZOLAM HYDROCHLORIDE 1 MG/ML
INJECTION INTRAMUSCULAR; INTRAVENOUS
Status: COMPLETED | OUTPATIENT
Start: 2022-01-01 | End: 2022-01-01

## 2022-01-01 RX ORDER — INSULIN ASPART 100 [IU]/ML
1-16 INJECTION, SOLUTION INTRAVENOUS; SUBCUTANEOUS EVERY 4 HOURS PRN
Status: DISCONTINUED | OUTPATIENT
Start: 2022-01-01 | End: 2022-01-01

## 2022-01-01 RX ORDER — SODIUM BICARBONATE 650 MG/1
1300 TABLET ORAL DAILY
Status: DISCONTINUED | OUTPATIENT
Start: 2022-01-01 | End: 2022-01-01 | Stop reason: HOSPADM

## 2022-01-01 RX ORDER — HEPARIN SODIUM 5000 [USP'U]/ML
5000 INJECTION, SOLUTION INTRAVENOUS; SUBCUTANEOUS EVERY 12 HOURS
Status: DISCONTINUED | OUTPATIENT
Start: 2022-01-01 | End: 2022-01-01

## 2022-01-01 RX ORDER — HYDROCODONE BITARTRATE AND ACETAMINOPHEN 5; 325 MG/1; MG/1
1 TABLET ORAL EVERY 4 HOURS PRN
Status: DISCONTINUED | OUTPATIENT
Start: 2022-01-01 | End: 2022-01-01

## 2022-01-01 RX ORDER — ALBUTEROL SULFATE 90 UG/1
AEROSOL, METERED RESPIRATORY (INHALATION)
COMMUNITY
Start: 2021-01-01 | End: 2022-01-01 | Stop reason: SDUPTHER

## 2022-01-01 RX ORDER — ALBUMIN HUMAN 250 G/1000ML
12.5 SOLUTION INTRAVENOUS ONCE
Status: DISCONTINUED | OUTPATIENT
Start: 2022-01-01 | End: 2022-01-01

## 2022-01-01 RX ORDER — PANTOPRAZOLE SODIUM 40 MG/10ML
40 INJECTION, POWDER, LYOPHILIZED, FOR SOLUTION INTRAVENOUS ONCE
Status: COMPLETED | OUTPATIENT
Start: 2022-01-01 | End: 2022-01-01

## 2022-01-01 RX ORDER — HYDROMORPHONE HYDROCHLORIDE 2 MG/ML
1 INJECTION, SOLUTION INTRAMUSCULAR; INTRAVENOUS; SUBCUTANEOUS
Status: DISCONTINUED | OUTPATIENT
Start: 2022-01-01 | End: 2022-01-01 | Stop reason: HOSPADM

## 2022-01-01 RX ORDER — HYDROMORPHONE HYDROCHLORIDE 2 MG/ML
INJECTION, SOLUTION INTRAMUSCULAR; INTRAVENOUS; SUBCUTANEOUS
Status: DISCONTINUED | OUTPATIENT
Start: 2022-01-01 | End: 2022-01-01

## 2022-01-01 RX ORDER — ONDANSETRON 2 MG/ML
4 INJECTION INTRAMUSCULAR; INTRAVENOUS EVERY 6 HOURS PRN
Status: DISCONTINUED | OUTPATIENT
Start: 2022-01-01 | End: 2022-01-01

## 2022-01-01 RX ORDER — KETOROLAC TROMETHAMINE 30 MG/ML
15 INJECTION, SOLUTION INTRAMUSCULAR; INTRAVENOUS
Status: COMPLETED | OUTPATIENT
Start: 2022-01-01 | End: 2022-01-01

## 2022-01-01 RX ORDER — HYDRALAZINE HYDROCHLORIDE 20 MG/ML
20 INJECTION INTRAMUSCULAR; INTRAVENOUS EVERY 4 HOURS PRN
Status: DISCONTINUED | OUTPATIENT
Start: 2022-01-01 | End: 2022-01-01 | Stop reason: HOSPADM

## 2022-01-01 RX ORDER — LIDOCAINE HCL/EPINEPHRINE/PF 2%-1:200K
20 VIAL (ML) INJECTION ONCE
Status: DISCONTINUED | OUTPATIENT
Start: 2022-01-01 | End: 2022-01-01

## 2022-01-01 RX ORDER — METOPROLOL TARTRATE 50 MG/1
50 TABLET ORAL 2 TIMES DAILY
COMMUNITY
Start: 2022-01-01 | End: 2022-01-01 | Stop reason: SDUPTHER

## 2022-01-01 RX ORDER — IBUPROFEN 200 MG
24 TABLET ORAL
Status: DISCONTINUED | OUTPATIENT
Start: 2022-01-01 | End: 2022-01-01

## 2022-01-01 RX ORDER — SODIUM CHLORIDE 0.9 % (FLUSH) 0.9 %
10 SYRINGE (ML) INJECTION EVERY 6 HOURS
Status: DISCONTINUED | OUTPATIENT
Start: 2022-01-01 | End: 2022-01-01 | Stop reason: HOSPADM

## 2022-01-01 RX ORDER — LIDOCAINE HYDROCHLORIDE 20 MG/ML
INJECTION, SOLUTION EPIDURAL; INFILTRATION; INTRACAUDAL; PERINEURAL
Status: DISCONTINUED | OUTPATIENT
Start: 2022-01-01 | End: 2022-01-01

## 2022-01-01 RX ORDER — PANTOPRAZOLE SODIUM 40 MG/1
40 TABLET, DELAYED RELEASE ORAL
Status: DISCONTINUED | OUTPATIENT
Start: 2022-01-01 | End: 2022-01-01

## 2022-01-01 RX ORDER — ETOMIDATE 2 MG/ML
INJECTION INTRAVENOUS CODE/TRAUMA/SEDATION MEDICATION
Status: COMPLETED | OUTPATIENT
Start: 2022-01-01 | End: 2022-01-01

## 2022-01-01 RX ORDER — FENTANYL CITRATE 50 UG/ML
50 INJECTION, SOLUTION INTRAMUSCULAR; INTRAVENOUS ONCE
Status: COMPLETED | OUTPATIENT
Start: 2022-01-01 | End: 2022-01-01

## 2022-01-01 RX ORDER — LABETALOL 300 MG/1
300 TABLET, FILM COATED ORAL 2 TIMES DAILY
COMMUNITY
Start: 2022-01-01 | End: 2022-01-01 | Stop reason: SDUPTHER

## 2022-01-01 RX ORDER — ACETAMINOPHEN 10 MG/ML
INJECTION, SOLUTION INTRAVENOUS
Status: DISCONTINUED | OUTPATIENT
Start: 2022-01-01 | End: 2022-01-01

## 2022-01-01 RX ORDER — FUROSEMIDE 10 MG/ML
40 INJECTION INTRAMUSCULAR; INTRAVENOUS EVERY 6 HOURS
Status: COMPLETED | OUTPATIENT
Start: 2022-01-01 | End: 2022-01-01

## 2022-01-01 RX ORDER — ENOXAPARIN SODIUM 100 MG/ML
55 INJECTION SUBCUTANEOUS ONCE
Status: COMPLETED | OUTPATIENT
Start: 2022-01-01 | End: 2022-01-01

## 2022-01-01 RX ORDER — FENTANYL CITRATE 50 UG/ML
INJECTION, SOLUTION INTRAMUSCULAR; INTRAVENOUS
Status: COMPLETED | OUTPATIENT
Start: 2022-01-01 | End: 2022-01-01

## 2022-01-01 RX ORDER — ALBUMIN HUMAN 50 G/1000ML
25 SOLUTION INTRAVENOUS ONCE
Status: COMPLETED | OUTPATIENT
Start: 2022-01-01 | End: 2022-01-01

## 2022-01-01 RX ORDER — PHYTONADIONE 5 MG/1
5 TABLET ORAL ONCE
Status: COMPLETED | OUTPATIENT
Start: 2022-01-01 | End: 2022-01-01

## 2022-01-01 RX ORDER — POLYETHYLENE GLYCOL 3350 17 G/17G
17 POWDER, FOR SOLUTION ORAL 2 TIMES DAILY
Status: DISCONTINUED | OUTPATIENT
Start: 2022-01-01 | End: 2022-01-01

## 2022-01-01 RX ORDER — NIFEDIPINE 90 MG/1
90 TABLET, EXTENDED RELEASE ORAL DAILY
Status: DISCONTINUED | OUTPATIENT
Start: 2022-01-01 | End: 2022-01-01

## 2022-01-01 RX ORDER — GEMFIBROZIL 600 MG/1
600 TABLET, FILM COATED ORAL
COMMUNITY

## 2022-01-01 RX ORDER — HYDRALAZINE HYDROCHLORIDE 10 MG/1
10 TABLET, FILM COATED ORAL EVERY 8 HOURS
Status: DISCONTINUED | OUTPATIENT
Start: 2022-01-01 | End: 2022-01-01

## 2022-01-01 RX ORDER — SUCRALFATE 1 G/1
1 TABLET ORAL 2 TIMES DAILY
Qty: 60 TABLET | Refills: 0 | Status: SHIPPED | OUTPATIENT
Start: 2022-01-01

## 2022-01-01 RX ORDER — DICYCLOMINE HYDROCHLORIDE 10 MG/1
20 CAPSULE ORAL ONCE
Status: COMPLETED | OUTPATIENT
Start: 2022-01-01 | End: 2022-01-01

## 2022-01-01 RX ORDER — LABETALOL HCL 20 MG/4 ML
20 SYRINGE (ML) INTRAVENOUS
Status: COMPLETED | OUTPATIENT
Start: 2022-01-01 | End: 2022-01-01

## 2022-01-01 RX ORDER — MORPHINE SULFATE 2 MG/ML
4 INJECTION, SOLUTION INTRAMUSCULAR; INTRAVENOUS
Status: COMPLETED | OUTPATIENT
Start: 2022-01-01 | End: 2022-01-01

## 2022-01-01 RX ORDER — SODIUM BICARBONATE 1 MEQ/ML
SYRINGE (ML) INTRAVENOUS
Status: DISCONTINUED | OUTPATIENT
Start: 2022-01-01 | End: 2022-01-01

## 2022-01-01 RX ORDER — MAG HYDROX/ALUMINUM HYD/SIMETH 200-200-20
30 SUSPENSION, ORAL (FINAL DOSE FORM) ORAL ONCE
Status: COMPLETED | OUTPATIENT
Start: 2022-01-01 | End: 2022-01-01

## 2022-01-01 RX ORDER — ALBUMIN HUMAN 250 G/1000ML
25 SOLUTION INTRAVENOUS ONCE
Status: DISCONTINUED | OUTPATIENT
Start: 2022-01-01 | End: 2022-01-01

## 2022-01-01 RX ORDER — MAGNESIUM SULFATE HEPTAHYDRATE 40 MG/ML
2 INJECTION, SOLUTION INTRAVENOUS
Status: DISCONTINUED | OUTPATIENT
Start: 2022-01-01 | End: 2022-01-01

## 2022-01-01 RX ORDER — GLYCOPYRROLATE 0.2 MG/ML
INJECTION INTRAMUSCULAR; INTRAVENOUS
Status: DISCONTINUED | OUTPATIENT
Start: 2022-01-01 | End: 2022-01-01

## 2022-01-01 RX ORDER — EPINEPHRINE 0.1 MG/ML
INJECTION INTRAVENOUS
Status: DISPENSED
Start: 2022-01-01 | End: 2022-01-01

## 2022-01-01 RX ORDER — HYDROXYCHLOROQUINE SULFATE 200 MG/1
200 TABLET, FILM COATED ORAL 2 TIMES DAILY
Qty: 60 TABLET | Refills: 11 | OUTPATIENT
Start: 2022-01-01 | End: 2023-10-17

## 2022-01-01 RX ORDER — AMOXICILLIN 250 MG
2 CAPSULE ORAL 2 TIMES DAILY
Status: DISCONTINUED | OUTPATIENT
Start: 2022-01-01 | End: 2022-01-01

## 2022-01-01 RX ORDER — MORPHINE SULFATE 30 MG/1
30 TABLET, FILM COATED, EXTENDED RELEASE ORAL EVERY 12 HOURS
Status: DISCONTINUED | OUTPATIENT
Start: 2022-01-01 | End: 2022-01-01 | Stop reason: HOSPADM

## 2022-01-01 RX ORDER — MIDAZOLAM HYDROCHLORIDE 1 MG/ML
INJECTION INTRAMUSCULAR; INTRAVENOUS
Status: DISPENSED
Start: 2022-01-01 | End: 2022-01-01

## 2022-01-01 RX ORDER — HEPARIN 100 UNIT/ML
SYRINGE INTRAVENOUS
Status: DISPENSED
Start: 2022-01-01 | End: 2022-01-01

## 2022-01-01 RX ORDER — IBUPROFEN 200 MG
16 TABLET ORAL
Status: DISCONTINUED | OUTPATIENT
Start: 2022-01-01 | End: 2022-01-01

## 2022-01-01 RX ORDER — ALBUMIN HUMAN 250 G/1000ML
25 SOLUTION INTRAVENOUS ONCE AS NEEDED
Status: DISCONTINUED | OUTPATIENT
Start: 2022-01-01 | End: 2022-01-01 | Stop reason: HOSPADM

## 2022-01-01 RX ORDER — LABETALOL 300 MG/1
300 TABLET, FILM COATED ORAL 2 TIMES DAILY
Qty: 30 TABLET | Refills: 0 | Status: SHIPPED | OUTPATIENT
Start: 2022-01-01

## 2022-01-01 RX ORDER — MORPHINE SULFATE 10 MG/ML
4 INJECTION INTRAMUSCULAR; INTRAVENOUS; SUBCUTANEOUS EVERY 5 MIN PRN
Status: DISCONTINUED | OUTPATIENT
Start: 2022-01-01 | End: 2022-01-01

## 2022-01-01 RX ORDER — HEPARIN SODIUM 1000 [USP'U]/ML
INJECTION, SOLUTION INTRAVENOUS; SUBCUTANEOUS
Status: DISCONTINUED | OUTPATIENT
Start: 2022-01-01 | End: 2022-01-01

## 2022-01-01 RX ORDER — HYDRALAZINE HYDROCHLORIDE 50 MG/1
50 TABLET, FILM COATED ORAL EVERY 8 HOURS
Status: DISCONTINUED | OUTPATIENT
Start: 2022-01-01 | End: 2022-01-01

## 2022-01-01 RX ORDER — ASPIRIN 81 MG/1
81 TABLET ORAL DAILY
COMMUNITY
Start: 2021-01-01 | End: 2022-01-01 | Stop reason: SDUPTHER

## 2022-01-01 RX ORDER — LISINOPRIL 40 MG/1
40 TABLET ORAL
COMMUNITY
End: 2022-01-01 | Stop reason: SDUPTHER

## 2022-01-01 RX ORDER — LIDOCAINE HYDROCHLORIDE 20 MG/ML
10 SOLUTION OROPHARYNGEAL ONCE
Status: COMPLETED | OUTPATIENT
Start: 2022-01-01 | End: 2022-01-01

## 2022-01-01 RX ORDER — ONDANSETRON 4 MG/1
4 TABLET, ORALLY DISINTEGRATING ORAL
Status: COMPLETED | OUTPATIENT
Start: 2022-01-01 | End: 2022-01-01

## 2022-01-01 RX ORDER — BELIMUMAB 120 MG/1.5ML
INJECTION, POWDER, LYOPHILIZED, FOR SOLUTION INTRAVENOUS
COMMUNITY

## 2022-01-01 RX ORDER — MYCOPHENOLATE MOFETIL 250 MG/1
1000 CAPSULE ORAL 2 TIMES DAILY
Status: DISCONTINUED | OUTPATIENT
Start: 2022-01-01 | End: 2022-01-01

## 2022-01-01 RX ORDER — LABETALOL HYDROCHLORIDE 5 MG/ML
20 INJECTION, SOLUTION INTRAVENOUS EVERY 8 HOURS PRN
Status: DISCONTINUED | OUTPATIENT
Start: 2022-01-01 | End: 2022-01-01

## 2022-01-01 RX ORDER — LIDOCAINE HYDROCHLORIDE 10 MG/ML
1 INJECTION, SOLUTION EPIDURAL; INFILTRATION; INTRACAUDAL; PERINEURAL ONCE
Status: DISCONTINUED | OUTPATIENT
Start: 2022-01-01 | End: 2022-01-01 | Stop reason: HOSPADM

## 2022-01-01 RX ORDER — GLIMEPIRIDE 4 MG/1
4 TABLET ORAL
Status: DISCONTINUED | OUTPATIENT
Start: 2022-01-01 | End: 2022-01-01

## 2022-01-01 RX ORDER — LACTULOSE 10 G/15ML
10 SOLUTION ORAL EVERY 6 HOURS PRN
Status: DISCONTINUED | OUTPATIENT
Start: 2022-01-01 | End: 2022-01-01 | Stop reason: HOSPADM

## 2022-01-01 RX ORDER — PANTOPRAZOLE SODIUM 40 MG/1
40 TABLET, DELAYED RELEASE ORAL
Qty: 60 TABLET | Refills: 11 | OUTPATIENT
Start: 2022-01-01 | End: 2023-10-17

## 2022-01-01 RX ORDER — WARFARIN 3 MG/1
3 TABLET ORAL DAILY
Qty: 30 TABLET | Refills: 0 | Status: SHIPPED | OUTPATIENT
Start: 2022-01-01 | End: 2022-01-01

## 2022-01-01 RX ORDER — INSULIN ASPART 100 [IU]/ML
1-10 INJECTION, SOLUTION INTRAVENOUS; SUBCUTANEOUS
Status: DISCONTINUED | OUTPATIENT
Start: 2022-01-01 | End: 2022-01-01

## 2022-01-01 RX ORDER — MORPHINE SULFATE 4 MG/ML
2 INJECTION, SOLUTION INTRAMUSCULAR; INTRAVENOUS ONCE
Status: COMPLETED | OUTPATIENT
Start: 2022-01-01 | End: 2022-01-01

## 2022-01-01 RX ORDER — SODIUM CHLORIDE, SODIUM GLUCONATE, SODIUM ACETATE, POTASSIUM CHLORIDE AND MAGNESIUM CHLORIDE 30; 37; 368; 526; 502 MG/100ML; MG/100ML; MG/100ML; MG/100ML; MG/100ML
INJECTION, SOLUTION INTRAVENOUS CONTINUOUS
Status: DISCONTINUED | OUTPATIENT
Start: 2022-01-01 | End: 2022-01-01

## 2022-01-01 RX ORDER — ACETAMINOPHEN 325 MG/1
650 TABLET ORAL EVERY 6 HOURS PRN
Status: DISCONTINUED | OUTPATIENT
Start: 2022-01-01 | End: 2022-01-01

## 2022-01-01 RX ORDER — HYDROMORPHONE HYDROCHLORIDE 2 MG/ML
INJECTION, SOLUTION INTRAMUSCULAR; INTRAVENOUS; SUBCUTANEOUS
Status: DISPENSED
Start: 2022-01-01 | End: 2022-01-01

## 2022-01-01 RX ORDER — MELOXICAM 7.5 MG/1
7.5 TABLET ORAL DAILY
Qty: 10 TABLET | Refills: 0 | Status: SHIPPED | OUTPATIENT
Start: 2022-01-01 | End: 2022-01-01

## 2022-01-01 RX ORDER — METOCLOPRAMIDE HYDROCHLORIDE 5 MG/ML
5 INJECTION INTRAMUSCULAR; INTRAVENOUS
Status: DISCONTINUED | OUTPATIENT
Start: 2022-01-01 | End: 2022-01-01

## 2022-01-01 RX ORDER — LABETALOL HCL 20 MG/4 ML
10 SYRINGE (ML) INTRAVENOUS
Status: COMPLETED | OUTPATIENT
Start: 2022-01-01 | End: 2022-01-01

## 2022-01-01 RX ORDER — ACETAMINOPHEN 325 MG/1
650 TABLET ORAL EVERY 6 HOURS
Status: DISCONTINUED | OUTPATIENT
Start: 2022-01-01 | End: 2022-01-01

## 2022-01-01 RX ORDER — LISINOPRIL 40 MG/1
40 TABLET ORAL DAILY
Qty: 30 TABLET | Refills: 0 | Status: SHIPPED | OUTPATIENT
Start: 2022-01-01

## 2022-01-01 RX ORDER — TALC
6 POWDER (GRAM) TOPICAL NIGHTLY PRN
Status: DISCONTINUED | OUTPATIENT
Start: 2022-01-01 | End: 2022-01-01 | Stop reason: HOSPADM

## 2022-01-01 RX ORDER — HYDROMORPHONE HYDROCHLORIDE 2 MG/ML
2 INJECTION, SOLUTION INTRAMUSCULAR; INTRAVENOUS; SUBCUTANEOUS ONCE
Status: COMPLETED | OUTPATIENT
Start: 2022-01-01 | End: 2022-01-01

## 2022-01-01 RX ORDER — NIFEDIPINE 30 MG/1
30 TABLET, EXTENDED RELEASE ORAL DAILY
Status: DISCONTINUED | OUTPATIENT
Start: 2022-01-01 | End: 2022-01-01

## 2022-01-01 RX ORDER — HYDROMORPHONE HYDROCHLORIDE 2 MG/ML
2 INJECTION, SOLUTION INTRAMUSCULAR; INTRAVENOUS; SUBCUTANEOUS EVERY 6 HOURS PRN
Status: DISCONTINUED | OUTPATIENT
Start: 2022-01-01 | End: 2022-01-01

## 2022-01-01 RX ORDER — LIDOCAINE HYDROCHLORIDE 10 MG/ML
INJECTION, SOLUTION EPIDURAL; INFILTRATION; INTRACAUDAL; PERINEURAL
Status: COMPLETED | OUTPATIENT
Start: 2022-01-01 | End: 2022-01-01

## 2022-01-01 RX ORDER — WARFARIN SODIUM 5 MG/1
5 TABLET ORAL DAILY
Status: DISCONTINUED | OUTPATIENT
Start: 2022-01-01 | End: 2022-01-01

## 2022-01-01 RX ORDER — MORPHINE SULFATE 4 MG/ML
2 INJECTION, SOLUTION INTRAMUSCULAR; INTRAVENOUS EVERY 4 HOURS PRN
Status: DISCONTINUED | OUTPATIENT
Start: 2022-01-01 | End: 2022-01-01

## 2022-01-01 RX ORDER — OXYCODONE HYDROCHLORIDE 5 MG/1
10 TABLET ORAL EVERY 6 HOURS PRN
Status: DISPENSED | OUTPATIENT
Start: 2022-01-01 | End: 2022-01-01

## 2022-01-01 RX ORDER — METOPROLOL TARTRATE 25 MG/1
25 TABLET, FILM COATED ORAL 2 TIMES DAILY
Status: DISCONTINUED | OUTPATIENT
Start: 2022-01-01 | End: 2022-01-01

## 2022-01-01 RX ORDER — FAMOTIDINE 20 MG/1
20 TABLET, FILM COATED ORAL EVERY OTHER DAY
Status: DISCONTINUED | OUTPATIENT
Start: 2022-01-01 | End: 2022-01-01

## 2022-01-01 RX ORDER — MUPIROCIN 20 MG/G
OINTMENT TOPICAL 2 TIMES DAILY
Status: DISPENSED | OUTPATIENT
Start: 2022-01-01 | End: 2022-01-01

## 2022-01-01 RX ORDER — VASOPRESSIN 20 [USP'U]/ML
INJECTION, SOLUTION INTRAMUSCULAR; SUBCUTANEOUS
Status: DISCONTINUED | OUTPATIENT
Start: 2022-01-01 | End: 2022-01-01

## 2022-01-01 RX ORDER — PROPOFOL 10 MG/ML
0-50 INJECTION, EMULSION INTRAVENOUS CONTINUOUS
Status: DISCONTINUED | OUTPATIENT
Start: 2022-01-01 | End: 2022-01-01 | Stop reason: HOSPADM

## 2022-01-01 RX ORDER — MYCOPHENOLATE MOFETIL 500 MG/1
1000 TABLET ORAL 2 TIMES DAILY
Qty: 30 TABLET | Refills: 0 | Status: SHIPPED | OUTPATIENT
Start: 2022-01-01

## 2022-01-01 RX ORDER — FAMOTIDINE 20 MG/1
20 TABLET, FILM COATED ORAL 2 TIMES DAILY
Qty: 60 TABLET | Refills: 0 | Status: SHIPPED | OUTPATIENT
Start: 2022-01-01 | End: 2022-01-01

## 2022-01-01 RX ORDER — HYDRALAZINE HYDROCHLORIDE 10 MG/1
10 TABLET, FILM COATED ORAL
COMMUNITY

## 2022-01-01 RX ORDER — CALCIUM GLUCONATE 98 MG/ML
INJECTION, SOLUTION INTRAVENOUS
Status: COMPLETED
Start: 2022-01-01 | End: 2022-01-01

## 2022-01-01 RX ORDER — SUCCINYLCHOLINE CHLORIDE 20 MG/ML
INJECTION INTRAMUSCULAR; INTRAVENOUS
Status: DISCONTINUED | OUTPATIENT
Start: 2022-01-01 | End: 2022-01-01

## 2022-01-01 RX ORDER — FENTANYL CITRATE-0.9 % NACL/PF 10 MCG/ML
0-200 PLASTIC BAG, INJECTION (ML) INTRAVENOUS CONTINUOUS
Status: DISCONTINUED | OUTPATIENT
Start: 2022-01-01 | End: 2022-01-01

## 2022-01-01 RX ORDER — LORAZEPAM 2 MG/ML
1 INJECTION INTRAMUSCULAR
Status: DISCONTINUED | OUTPATIENT
Start: 2022-01-01 | End: 2022-01-01

## 2022-01-01 RX ORDER — NIFEDIPINE 90 MG/1
90 TABLET, EXTENDED RELEASE ORAL DAILY
Qty: 30 TABLET | Refills: 11 | OUTPATIENT
Start: 2022-01-01 | End: 2023-10-18

## 2022-01-01 RX ORDER — LIDOCAINE HYDROCHLORIDE 20 MG/ML
INJECTION, SOLUTION EPIDURAL; INFILTRATION; INTRACAUDAL; PERINEURAL
Status: COMPLETED | OUTPATIENT
Start: 2022-01-01 | End: 2022-01-01

## 2022-01-01 RX ORDER — SODIUM CHLORIDE 9 MG/ML
INJECTION, SOLUTION INTRAVENOUS ONCE
Status: DISCONTINUED | OUTPATIENT
Start: 2022-01-01 | End: 2022-01-01

## 2022-01-01 RX ORDER — ASPIRIN 81 MG/1
81 TABLET ORAL DAILY
Status: DISCONTINUED | OUTPATIENT
Start: 2022-01-01 | End: 2022-01-01

## 2022-01-01 RX ORDER — FAMOTIDINE 20 MG/1
20 TABLET, FILM COATED ORAL 2 TIMES DAILY
Qty: 60 TABLET | Refills: 0 | Status: SHIPPED | OUTPATIENT
Start: 2022-01-01 | End: 2022-01-01 | Stop reason: SDUPTHER

## 2022-01-01 RX ORDER — CARVEDILOL 12.5 MG/1
25 TABLET ORAL 2 TIMES DAILY
Status: DISCONTINUED | OUTPATIENT
Start: 2022-01-01 | End: 2022-01-01

## 2022-01-01 RX ORDER — MAGNESIUM SULFATE HEPTAHYDRATE 40 MG/ML
2 INJECTION, SOLUTION INTRAVENOUS ONCE
Status: COMPLETED | OUTPATIENT
Start: 2022-01-01 | End: 2022-01-01

## 2022-01-01 RX ORDER — MAG HYDROX/ALUMINUM HYD/SIMETH 200-200-20
30 SUSPENSION, ORAL (FINAL DOSE FORM) ORAL
Status: DISCONTINUED | OUTPATIENT
Start: 2022-01-01 | End: 2022-01-01

## 2022-01-01 RX ORDER — ONDANSETRON 4 MG/1
4 TABLET, ORALLY DISINTEGRATING ORAL ONCE
Status: DISCONTINUED | OUTPATIENT
Start: 2022-01-01 | End: 2022-01-01 | Stop reason: HOSPADM

## 2022-01-01 RX ORDER — MEPERIDINE HYDROCHLORIDE 25 MG/ML
INJECTION INTRAMUSCULAR; INTRAVENOUS; SUBCUTANEOUS
Status: DISPENSED
Start: 2022-01-01 | End: 2022-01-01

## 2022-01-01 RX ORDER — AMLODIPINE BESYLATE 5 MG/1
5 TABLET ORAL
Status: COMPLETED | OUTPATIENT
Start: 2022-01-01 | End: 2022-01-01

## 2022-01-01 RX ORDER — WARFARIN 1 MG/1
3 TABLET ORAL DAILY
Status: DISCONTINUED | OUTPATIENT
Start: 2022-01-01 | End: 2022-01-01

## 2022-01-01 RX ORDER — ALBUTEROL SULFATE 90 UG/1
2 AEROSOL, METERED RESPIRATORY (INHALATION) EVERY 4 HOURS PRN
Status: DISCONTINUED | OUTPATIENT
Start: 2022-01-01 | End: 2022-01-01

## 2022-01-01 RX ORDER — CLONIDINE HYDROCHLORIDE 0.1 MG/1
0.1 TABLET ORAL 2 TIMES DAILY
Status: DISCONTINUED | OUTPATIENT
Start: 2022-01-01 | End: 2022-01-01

## 2022-01-01 RX ORDER — PREDNISONE 20 MG/1
20 TABLET ORAL 2 TIMES DAILY
Status: DISCONTINUED | OUTPATIENT
Start: 2022-01-01 | End: 2022-01-01 | Stop reason: HOSPADM

## 2022-01-01 RX ORDER — FENTANYL CITRATE 50 UG/ML
INJECTION, SOLUTION INTRAMUSCULAR; INTRAVENOUS
Status: COMPLETED
Start: 2022-01-01 | End: 2022-01-01

## 2022-01-01 RX ORDER — SEVELAMER CARBONATE 800 MG/1
800 TABLET, FILM COATED ORAL
Qty: 90 TABLET | Refills: 11 | OUTPATIENT
Start: 2022-01-01 | End: 2023-10-17

## 2022-01-01 RX ORDER — NITROGLYCERIN 0.4 MG/1
TABLET SUBLINGUAL
Status: COMPLETED
Start: 2022-01-01 | End: 2022-01-01

## 2022-01-01 RX ORDER — NOREPINEPHRINE BITARTRATE/D5W 8 MG/250ML
0-3 PLASTIC BAG, INJECTION (ML) INTRAVENOUS CONTINUOUS
Status: DISCONTINUED | OUTPATIENT
Start: 2022-01-01 | End: 2022-01-01 | Stop reason: HOSPADM

## 2022-01-01 RX ORDER — LABETALOL HYDROCHLORIDE 5 MG/ML
20 INJECTION, SOLUTION INTRAVENOUS EVERY 8 HOURS PRN
Status: DISCONTINUED | OUTPATIENT
Start: 2022-01-01 | End: 2022-01-01 | Stop reason: HOSPADM

## 2022-01-01 RX ORDER — MORPHINE SULFATE 4 MG/ML
3 INJECTION, SOLUTION INTRAMUSCULAR; INTRAVENOUS
Status: DISCONTINUED | OUTPATIENT
Start: 2022-01-01 | End: 2022-01-01 | Stop reason: HOSPADM

## 2022-01-01 RX ORDER — MORPHINE SULFATE 4 MG/ML
INJECTION, SOLUTION INTRAMUSCULAR; INTRAVENOUS
Status: DISPENSED
Start: 2022-01-01 | End: 2022-01-01

## 2022-01-01 RX ORDER — ESMOLOL HYDROCHLORIDE 20 MG/ML
0-300 INJECTION, SOLUTION INTRAVENOUS CONTINUOUS
Status: DISCONTINUED | OUTPATIENT
Start: 2022-01-01 | End: 2022-01-01 | Stop reason: HOSPADM

## 2022-01-01 RX ORDER — ATROPINE SULFATE 0.1 MG/ML
INJECTION INTRAVENOUS
Status: DISPENSED
Start: 2022-01-01 | End: 2022-01-01

## 2022-01-01 RX ORDER — DIPHENHYDRAMINE HYDROCHLORIDE 50 MG/ML
25 INJECTION INTRAMUSCULAR; INTRAVENOUS EVERY 6 HOURS PRN
Status: DISCONTINUED | OUTPATIENT
Start: 2022-01-01 | End: 2022-01-01 | Stop reason: HOSPADM

## 2022-01-01 RX ORDER — PREDNISONE 2 MG/1
1 TABLET, DELAYED RELEASE ORAL DAILY
COMMUNITY
Start: 2021-01-01

## 2022-01-01 RX ORDER — LACTOBACILLUS ACIDOPHILUS 500MM CELL
2 CAPSULE ORAL
Status: DISCONTINUED | OUTPATIENT
Start: 2022-01-01 | End: 2022-01-01

## 2022-01-01 RX ORDER — LIDOCAINE HYDROCHLORIDE 20 MG/ML
INJECTION, SOLUTION EPIDURAL; INFILTRATION; INTRACAUDAL; PERINEURAL
Status: COMPLETED
Start: 2022-01-01 | End: 2022-01-01

## 2022-01-01 RX ORDER — LIDOCAINE HYDROCHLORIDE 20 MG/ML
INJECTION INTRAVENOUS
Status: DISCONTINUED | OUTPATIENT
Start: 2022-01-01 | End: 2022-01-01

## 2022-01-01 RX ORDER — NOREPINEPHRINE BITARTRATE/D5W 8 MG/250ML
0-3 PLASTIC BAG, INJECTION (ML) INTRAVENOUS CONTINUOUS
Status: DISCONTINUED | OUTPATIENT
Start: 2022-01-01 | End: 2022-01-01

## 2022-01-01 RX ORDER — HYDROMORPHONE HYDROCHLORIDE 2 MG/ML
1 INJECTION, SOLUTION INTRAMUSCULAR; INTRAVENOUS; SUBCUTANEOUS ONCE
Status: COMPLETED | OUTPATIENT
Start: 2022-01-01 | End: 2022-01-01

## 2022-01-01 RX ORDER — WARFARIN 3 MG/1
3 TABLET ORAL DAILY
Qty: 30 TABLET | Refills: 11 | OUTPATIENT
Start: 2022-01-01 | End: 2023-10-17

## 2022-01-01 RX ORDER — FENTANYL CITRATE 50 UG/ML
100 INJECTION, SOLUTION INTRAMUSCULAR; INTRAVENOUS
Status: COMPLETED | OUTPATIENT
Start: 2022-01-01 | End: 2022-01-01

## 2022-01-01 RX ORDER — MAGNESIUM SULFATE 1 G/100ML
1 INJECTION INTRAVENOUS ONCE
Status: COMPLETED | OUTPATIENT
Start: 2022-01-01 | End: 2022-01-01

## 2022-01-01 RX ORDER — MYCOPHENOLATE MOFETIL 250 MG/1
1000 CAPSULE ORAL 2 TIMES DAILY
Qty: 240 CAPSULE | Refills: 11 | OUTPATIENT
Start: 2022-01-01 | End: 2023-10-17

## 2022-01-01 RX ORDER — DEXAMETHASONE SODIUM PHOSPHATE 4 MG/ML
INJECTION, SOLUTION INTRA-ARTICULAR; INTRALESIONAL; INTRAMUSCULAR; INTRAVENOUS; SOFT TISSUE
Status: DISCONTINUED | OUTPATIENT
Start: 2022-01-01 | End: 2022-01-01

## 2022-01-01 RX ORDER — LIDOCAINE HYDROCHLORIDE 20 MG/ML
INJECTION, SOLUTION EPIDURAL; INFILTRATION; INTRACAUDAL; PERINEURAL
Status: DISPENSED
Start: 2022-01-01 | End: 2022-01-01

## 2022-01-01 RX ORDER — POTASSIUM CHLORIDE 14.9 MG/ML
20 INJECTION INTRAVENOUS ONCE
Status: COMPLETED | OUTPATIENT
Start: 2022-01-01 | End: 2022-01-01

## 2022-01-01 RX ORDER — FAMOTIDINE 20 MG/1
20 TABLET, FILM COATED ORAL 2 TIMES DAILY
Status: DISCONTINUED | OUTPATIENT
Start: 2022-01-01 | End: 2022-01-01

## 2022-01-01 RX ADMIN — FUROSEMIDE 40 MG: 10 INJECTION, SOLUTION INTRAMUSCULAR; INTRAVENOUS at 06:11

## 2022-01-01 RX ADMIN — MORPHINE SULFATE 30 MG: 30 TABLET, FILM COATED, EXTENDED RELEASE ORAL at 08:11

## 2022-01-01 RX ADMIN — GABAPENTIN 100 MG: 100 CAPSULE ORAL at 09:11

## 2022-01-01 RX ADMIN — ALUMINUM HYDROXIDE, MAGNESIUM HYDROXIDE, AND DIMETHICONE 30 ML: 200; 20; 200 SUSPENSION ORAL at 01:10

## 2022-01-01 RX ADMIN — NIFEDIPINE 90 MG: 90 TABLET, FILM COATED, EXTENDED RELEASE ORAL at 09:10

## 2022-01-01 RX ADMIN — ALUMINUM HYDROXIDE, MAGNESIUM HYDROXIDE, AND DIMETHICONE 30 ML: 200; 20; 200 SUSPENSION ORAL at 05:10

## 2022-01-01 RX ADMIN — ONDANSETRON 4 MG: 2 INJECTION INTRAMUSCULAR; INTRAVENOUS at 07:09

## 2022-01-01 RX ADMIN — HYDROMORPHONE HYDROCHLORIDE 1 MG: 2 INJECTION INTRAMUSCULAR; INTRAVENOUS; SUBCUTANEOUS at 11:10

## 2022-01-01 RX ADMIN — HYDRALAZINE HYDROCHLORIDE 50 MG: 50 TABLET, FILM COATED ORAL at 05:10

## 2022-01-01 RX ADMIN — DOXYCYCLINE 100 MG: 100 INJECTION, POWDER, LYOPHILIZED, FOR SOLUTION INTRAVENOUS at 05:11

## 2022-01-01 RX ADMIN — CARVEDILOL 25 MG: 12.5 TABLET, FILM COATED ORAL at 09:10

## 2022-01-01 RX ADMIN — ASPIRIN 81 MG: 81 TABLET, COATED ORAL at 08:11

## 2022-01-01 RX ADMIN — SODIUM CHLORIDE, PRESERVATIVE FREE 10 ML: 5 INJECTION INTRAVENOUS at 12:10

## 2022-01-01 RX ADMIN — INSULIN DETEMIR 10 UNITS: 100 INJECTION, SOLUTION SUBCUTANEOUS at 10:10

## 2022-01-01 RX ADMIN — METOCLOPRAMIDE 5 MG: 5 INJECTION, SOLUTION INTRAMUSCULAR; INTRAVENOUS at 05:10

## 2022-01-01 RX ADMIN — HYDROMORPHONE HYDROCHLORIDE 1 MG: 2 INJECTION INTRAMUSCULAR; INTRAVENOUS; SUBCUTANEOUS at 11:11

## 2022-01-01 RX ADMIN — HYDROMORPHONE HYDROCHLORIDE 0.5 MG: 2 INJECTION, SOLUTION INTRAMUSCULAR; INTRAVENOUS; SUBCUTANEOUS at 08:11

## 2022-01-01 RX ADMIN — SODIUM ZIRCONIUM CYCLOSILICATE 5 G: 5 POWDER, FOR SUSPENSION ORAL at 04:10

## 2022-01-01 RX ADMIN — HYDROMORPHONE HYDROCHLORIDE 1 MG: 2 INJECTION INTRAMUSCULAR; INTRAVENOUS; SUBCUTANEOUS at 02:10

## 2022-01-01 RX ADMIN — MORPHINE SULFATE 30 MG: 30 TABLET, FILM COATED, EXTENDED RELEASE ORAL at 09:10

## 2022-01-01 RX ADMIN — VASOPRESSIN 1 UNITS: 20 INJECTION INTRAVENOUS at 05:11

## 2022-01-01 RX ADMIN — HYDRALAZINE HYDROCHLORIDE 50 MG: 50 TABLET, FILM COATED ORAL at 09:10

## 2022-01-01 RX ADMIN — MORPHINE SULFATE 2 MG: 4 INJECTION INTRAVENOUS at 11:11

## 2022-01-01 RX ADMIN — IOPAMIDOL 100 ML: 755 INJECTION, SOLUTION INTRAVENOUS at 04:10

## 2022-01-01 RX ADMIN — HYDRALAZINE HYDROCHLORIDE 100 MG: 50 TABLET, FILM COATED ORAL at 09:10

## 2022-01-01 RX ADMIN — INSULIN ASPART 10 UNITS: 100 INJECTION, SOLUTION INTRAVENOUS; SUBCUTANEOUS at 06:10

## 2022-01-01 RX ADMIN — SODIUM CHLORIDE, PRESERVATIVE FREE 10 ML: 5 INJECTION INTRAVENOUS at 12:11

## 2022-01-01 RX ADMIN — HYDROMORPHONE HYDROCHLORIDE 1 MG: 2 INJECTION INTRAMUSCULAR; INTRAVENOUS; SUBCUTANEOUS at 12:10

## 2022-01-01 RX ADMIN — SODIUM BICARBONATE 650 MG TABLET 1300 MG: at 02:11

## 2022-01-01 RX ADMIN — PANTOPRAZOLE SODIUM 40 MG: 40 INJECTION, POWDER, FOR SOLUTION INTRAVENOUS at 09:10

## 2022-01-01 RX ADMIN — CLEVIPIDINE 2 MG/HR: 0.5 EMULSION INTRAVENOUS at 09:09

## 2022-01-01 RX ADMIN — METHYLPREDNISOLONE SODIUM SUCCINATE 40 MG: 40 INJECTION, POWDER, FOR SOLUTION INTRAMUSCULAR; INTRAVENOUS at 09:10

## 2022-01-01 RX ADMIN — CARVEDILOL 25 MG: 12.5 TABLET, FILM COATED ORAL at 10:10

## 2022-01-01 RX ADMIN — CLONIDINE HYDROCHLORIDE 0.1 MG: 0.1 TABLET ORAL at 10:11

## 2022-01-01 RX ADMIN — ALUMINUM HYDROXIDE, MAGNESIUM HYDROXIDE, AND DIMETHICONE 30 ML: 200; 20; 200 SUSPENSION ORAL at 04:10

## 2022-01-01 RX ADMIN — SODIUM ZIRCONIUM CYCLOSILICATE 10 G: 10 POWDER, FOR SUSPENSION ORAL at 11:10

## 2022-01-01 RX ADMIN — NALOXEGOL OXALATE 25 MG: 25 TABLET, FILM COATED ORAL at 09:11

## 2022-01-01 RX ADMIN — SODIUM CHLORIDE, PRESERVATIVE FREE 10 ML: 5 INJECTION INTRAVENOUS at 06:11

## 2022-01-01 RX ADMIN — SODIUM CHLORIDE, PRESERVATIVE FREE 10 ML: 5 INJECTION INTRAVENOUS at 05:11

## 2022-01-01 RX ADMIN — INSULIN ASPART 12 UNITS: 100 INJECTION, SOLUTION INTRAVENOUS; SUBCUTANEOUS at 04:10

## 2022-01-01 RX ADMIN — HYDROXYCHLOROQUINE SULFATE 200 MG: 200 TABLET, FILM COATED ORAL at 08:10

## 2022-01-01 RX ADMIN — METRONIDAZOLE 500 MG: 500 INJECTION, SOLUTION INTRAVENOUS at 10:09

## 2022-01-01 RX ADMIN — METHYLPREDNISOLONE SODIUM SUCCINATE 40 MG: 40 INJECTION, POWDER, FOR SOLUTION INTRAMUSCULAR; INTRAVENOUS at 08:10

## 2022-01-01 RX ADMIN — LACTULOSE 10 G: 10 SOLUTION ORAL at 09:11

## 2022-01-01 RX ADMIN — HYDRALAZINE HYDROCHLORIDE 100 MG: 50 TABLET, FILM COATED ORAL at 03:11

## 2022-01-01 RX ADMIN — HYDRALAZINE HYDROCHLORIDE 50 MG: 50 TABLET, FILM COATED ORAL at 02:10

## 2022-01-01 RX ADMIN — MORPHINE SULFATE 3 MG: 4 INJECTION INTRAVENOUS at 06:11

## 2022-01-01 RX ADMIN — PROPOFOL 35 MCG/KG/MIN: 10 INJECTION, EMULSION INTRAVENOUS at 01:11

## 2022-01-01 RX ADMIN — METOCLOPRAMIDE 5 MG: 5 INJECTION, SOLUTION INTRAMUSCULAR; INTRAVENOUS at 12:10

## 2022-01-01 RX ADMIN — METHYLPREDNISOLONE SODIUM SUCCINATE 40 MG: 40 INJECTION, POWDER, FOR SOLUTION INTRAMUSCULAR; INTRAVENOUS at 03:10

## 2022-01-01 RX ADMIN — HYDRALAZINE HYDROCHLORIDE 50 MG: 50 TABLET, FILM COATED ORAL at 04:10

## 2022-01-01 RX ADMIN — PROPOFOL 50 MCG/KG/MIN: 10 INJECTION, EMULSION INTRAVENOUS at 12:09

## 2022-01-01 RX ADMIN — PANTOPRAZOLE SODIUM 40 MG: 40 INJECTION, POWDER, FOR SOLUTION INTRAVENOUS at 08:10

## 2022-01-01 RX ADMIN — MORPHINE SULFATE 30 MG: 30 TABLET, FILM COATED, EXTENDED RELEASE ORAL at 10:10

## 2022-01-01 RX ADMIN — SODIUM CHLORIDE, PRESERVATIVE FREE 10 ML: 5 INJECTION INTRAVENOUS at 11:11

## 2022-01-01 RX ADMIN — VASOPRESSIN 1 UNITS: 20 INJECTION INTRAVENOUS at 01:09

## 2022-01-01 RX ADMIN — SODIUM CHLORIDE, PRESERVATIVE FREE 10 ML: 5 INJECTION INTRAVENOUS at 01:11

## 2022-01-01 RX ADMIN — PANTOPRAZOLE SODIUM 40 MG: 40 TABLET, DELAYED RELEASE ORAL at 06:10

## 2022-01-01 RX ADMIN — DOCUSATE SODIUM 100 MG: 100 CAPSULE, LIQUID FILLED ORAL at 01:10

## 2022-01-01 RX ADMIN — MORPHINE SULFATE 2 MG: 4 INJECTION INTRAVENOUS at 09:09

## 2022-01-01 RX ADMIN — POLYETHYLENE GLYCOL 3350 17 G: 17 POWDER, FOR SOLUTION ORAL at 09:10

## 2022-01-01 RX ADMIN — LACTULOSE 10 G: 10 SOLUTION ORAL at 12:10

## 2022-01-01 RX ADMIN — INSULIN DETEMIR 15 UNITS: 100 INJECTION, SOLUTION SUBCUTANEOUS at 11:11

## 2022-01-01 RX ADMIN — SEVELAMER CARBONATE 800 MG: 800 TABLET, FILM COATED ORAL at 11:10

## 2022-01-01 RX ADMIN — HYDROMORPHONE HYDROCHLORIDE 1 MG: 2 INJECTION INTRAMUSCULAR; INTRAVENOUS; SUBCUTANEOUS at 01:10

## 2022-01-01 RX ADMIN — CARVEDILOL 25 MG: 12.5 TABLET, FILM COATED ORAL at 11:11

## 2022-01-01 RX ADMIN — FAMOTIDINE 20 MG: 20 TABLET, FILM COATED ORAL at 11:10

## 2022-01-01 RX ADMIN — CARVEDILOL 12.5 MG: 12.5 TABLET, FILM COATED ORAL at 08:10

## 2022-01-01 RX ADMIN — GABAPENTIN 100 MG: 100 CAPSULE ORAL at 08:11

## 2022-01-01 RX ADMIN — MYCOPHENOLATE MOFETIL 1000 MG: 250 CAPSULE ORAL at 09:10

## 2022-01-01 RX ADMIN — HYDRALAZINE HYDROCHLORIDE 100 MG: 50 TABLET, FILM COATED ORAL at 06:10

## 2022-01-01 RX ADMIN — HYDROMORPHONE HYDROCHLORIDE 2 MG: 2 INJECTION INTRAMUSCULAR; INTRAVENOUS; SUBCUTANEOUS at 11:10

## 2022-01-01 RX ADMIN — Medication 2 CAPSULE: at 04:10

## 2022-01-01 RX ADMIN — DEXMEDETOMIDINE HYDROCHLORIDE 0.06 MCG/KG/HR: 400 INJECTION INTRAVENOUS at 09:10

## 2022-01-01 RX ADMIN — HYDROMORPHONE HYDROCHLORIDE 1 MG: 2 INJECTION INTRAMUSCULAR; INTRAVENOUS; SUBCUTANEOUS at 04:10

## 2022-01-01 RX ADMIN — INSULIN DETEMIR 14 UNITS: 100 INJECTION, SOLUTION SUBCUTANEOUS at 08:10

## 2022-01-01 RX ADMIN — ROCURONIUM BROMIDE 50 MG: 10 SOLUTION INTRAVENOUS at 02:09

## 2022-01-01 RX ADMIN — CARVEDILOL 25 MG: 12.5 TABLET, FILM COATED ORAL at 08:11

## 2022-01-01 RX ADMIN — ALUMINUM HYDROXIDE, MAGNESIUM HYDROXIDE, AND DIMETHICONE 30 ML: 200; 20; 200 SUSPENSION ORAL at 06:10

## 2022-01-01 RX ADMIN — Medication: at 09:11

## 2022-01-01 RX ADMIN — MORPHINE SULFATE 30 MG: 30 TABLET, FILM COATED, EXTENDED RELEASE ORAL at 10:11

## 2022-01-01 RX ADMIN — MIDAZOLAM HYDROCHLORIDE 3 MG: 1 INJECTION, SOLUTION INTRAMUSCULAR; INTRAVENOUS at 11:09

## 2022-01-01 RX ADMIN — Medication 16 G: at 11:11

## 2022-01-01 RX ADMIN — METHYLPREDNISOLONE SODIUM SUCCINATE 40 MG: 40 INJECTION, POWDER, FOR SOLUTION INTRAMUSCULAR; INTRAVENOUS at 08:11

## 2022-01-01 RX ADMIN — POLYETHYLENE GLYCOL 3350 17 G: 17 POWDER, FOR SOLUTION ORAL at 08:10

## 2022-01-01 RX ADMIN — INSULIN ASPART 10 UNITS: 100 INJECTION, SOLUTION INTRAVENOUS; SUBCUTANEOUS at 05:11

## 2022-01-01 RX ADMIN — METHYLPREDNISOLONE SODIUM SUCCINATE 40 MG: 40 INJECTION, POWDER, FOR SOLUTION INTRAMUSCULAR; INTRAVENOUS at 10:10

## 2022-01-01 RX ADMIN — LEUCINE, PHENYLALANINE, LYSINE, METHIONINE, ISOLEUCINE, VALINE, HISTIDINE, THREONINE, TRYPTOPHAN, ALANINE, GLYCINE, ARGININE, PROLINE, SERINE, TYROSINE, SODIUM ACETATE, DIBASIC POTASSIUM PHOSPHATE, MAGNESIUM CHLORIDE, SODIUM CHLORIDE, CALCIUM CHLORIDE, DEXTROSE
365; 280; 290; 200; 300; 290; 240; 210; 90; 1035; 515; 575; 340; 250; 20; 340; 261; 51; 59; 33; 15 INJECTION INTRAVENOUS at 05:10

## 2022-01-01 RX ADMIN — HYDRALAZINE HYDROCHLORIDE 20 MG: 20 INJECTION INTRAMUSCULAR; INTRAVENOUS at 12:10

## 2022-01-01 RX ADMIN — IOPAMIDOL 100 ML: 755 INJECTION, SOLUTION INTRAVENOUS at 12:10

## 2022-01-01 RX ADMIN — MYCOPHENOLATE MOFETIL 1000 MG: 250 CAPSULE ORAL at 10:10

## 2022-01-01 RX ADMIN — ALUMINUM HYDROXIDE, MAGNESIUM HYDROXIDE, AND DIMETHICONE 30 ML: 200; 20; 200 SUSPENSION ORAL at 08:10

## 2022-01-01 RX ADMIN — FENTANYL CITRATE 100 MCG: 50 INJECTION, SOLUTION INTRAMUSCULAR; INTRAVENOUS at 04:10

## 2022-01-01 RX ADMIN — SUCRALFATE 1 G: 1 TABLET ORAL at 08:10

## 2022-01-01 RX ADMIN — SODIUM CHLORIDE, PRESERVATIVE FREE 10 ML: 5 INJECTION INTRAVENOUS at 03:11

## 2022-01-01 RX ADMIN — SODIUM BICARBONATE 50 MEQ: 84 INJECTION INTRAVENOUS at 01:09

## 2022-01-01 RX ADMIN — SODIUM CHLORIDE, PRESERVATIVE FREE 10 ML: 5 INJECTION INTRAVENOUS at 01:10

## 2022-01-01 RX ADMIN — INSULIN ASPART 2 UNITS: 100 INJECTION, SOLUTION INTRAVENOUS; SUBCUTANEOUS at 04:09

## 2022-01-01 RX ADMIN — CLONIDINE HYDROCHLORIDE 0.1 MG: 0.1 TABLET ORAL at 09:11

## 2022-01-01 RX ADMIN — MYCOPHENOLATE MOFETIL 1000 MG: 250 CAPSULE ORAL at 08:10

## 2022-01-01 RX ADMIN — MORPHINE SULFATE 2 MG: 4 INJECTION INTRAVENOUS at 02:09

## 2022-01-01 RX ADMIN — GEMFIBROZIL 600 MG: 600 TABLET ORAL at 04:09

## 2022-01-01 RX ADMIN — DOXYCYCLINE 100 MG: 100 INJECTION, POWDER, LYOPHILIZED, FOR SOLUTION INTRAVENOUS at 11:11

## 2022-01-01 RX ADMIN — WARFARIN SODIUM 3 MG: 2 TABLET ORAL at 03:10

## 2022-01-01 RX ADMIN — CLEVIPIDINE 32 MG/HR: 0.5 EMULSION INTRAVENOUS at 10:09

## 2022-01-01 RX ADMIN — INSULIN ASPART 10 UNITS: 100 INJECTION, SOLUTION INTRAVENOUS; SUBCUTANEOUS at 05:10

## 2022-01-01 RX ADMIN — PROPOFOL 25 MG: 10 INJECTION, EMULSION INTRAVENOUS at 01:11

## 2022-01-01 RX ADMIN — HYDROMORPHONE HYDROCHLORIDE 2 MG: 2 INJECTION INTRAMUSCULAR; INTRAVENOUS; SUBCUTANEOUS at 05:10

## 2022-01-01 RX ADMIN — HYDROCODONE BITARTRATE AND ACETAMINOPHEN 1 TABLET: 5; 325 TABLET ORAL at 06:10

## 2022-01-01 RX ADMIN — CEFEPIME 2 G: 2 INJECTION, POWDER, FOR SOLUTION INTRAVENOUS at 05:10

## 2022-01-01 RX ADMIN — SODIUM ZIRCONIUM CYCLOSILICATE 10 G: 10 POWDER, FOR SUSPENSION ORAL at 09:10

## 2022-01-01 RX ADMIN — SODIUM CHLORIDE: 9 INJECTION, SOLUTION INTRAVENOUS at 11:09

## 2022-01-01 RX ADMIN — GABAPENTIN 300 MG: 300 CAPSULE ORAL at 09:11

## 2022-01-01 RX ADMIN — VANCOMYCIN HYDROCHLORIDE 1000 MG: 1 INJECTION, POWDER, LYOPHILIZED, FOR SOLUTION INTRAVENOUS at 10:11

## 2022-01-01 RX ADMIN — SUCRALFATE 1 G: 1 TABLET ORAL at 08:09

## 2022-01-01 RX ADMIN — MORPHINE SULFATE 30 MG: 30 TABLET, FILM COATED, EXTENDED RELEASE ORAL at 08:10

## 2022-01-01 RX ADMIN — INSULIN ASPART 4 UNITS: 100 INJECTION, SOLUTION INTRAVENOUS; SUBCUTANEOUS at 05:11

## 2022-01-01 RX ADMIN — NOREPINEPHRINE BITARTRATE 0.03 MCG/KG/MIN: 8 INJECTION, SOLUTION INTRAVENOUS at 12:09

## 2022-01-01 RX ADMIN — SUCRALFATE 1 G: 1 TABLET ORAL at 09:10

## 2022-01-01 RX ADMIN — CARVEDILOL 25 MG: 12.5 TABLET, FILM COATED ORAL at 08:10

## 2022-01-01 RX ADMIN — TORSEMIDE 40 MG: 20 TABLET ORAL at 03:11

## 2022-01-01 RX ADMIN — INSULIN DETEMIR 14 UNITS: 100 INJECTION, SOLUTION SUBCUTANEOUS at 11:10

## 2022-01-01 RX ADMIN — HYDROMORPHONE HYDROCHLORIDE 1 MG: 2 INJECTION INTRAMUSCULAR; INTRAVENOUS; SUBCUTANEOUS at 03:10

## 2022-01-01 RX ADMIN — LIDOCAINE HYDROCHLORIDE 80 MG: 20 INJECTION INTRAVENOUS at 07:11

## 2022-01-01 RX ADMIN — Medication 24 G: at 05:10

## 2022-01-01 RX ADMIN — ESMOLOL HYDROCHLORIDE 300 MCG/KG/MIN: 20 INJECTION INTRAVENOUS at 02:09

## 2022-01-01 RX ADMIN — INSULIN ASPART 4 UNITS: 100 INJECTION, SOLUTION INTRAVENOUS; SUBCUTANEOUS at 06:11

## 2022-01-01 RX ADMIN — ERYTHROPOIETIN 10000 UNITS: 10000 INJECTION, SOLUTION INTRAVENOUS; SUBCUTANEOUS at 05:11

## 2022-01-01 RX ADMIN — ACETAMINOPHEN 650 MG: 325 TABLET, FILM COATED ORAL at 09:10

## 2022-01-01 RX ADMIN — DOCUSATE SODIUM 100 MG: 100 CAPSULE, LIQUID FILLED ORAL at 11:10

## 2022-01-01 RX ADMIN — METRONIDAZOLE 500 MG: 500 INJECTION, SOLUTION INTRAVENOUS at 05:09

## 2022-01-01 RX ADMIN — GLIMEPIRIDE 4 MG: 4 TABLET ORAL at 07:10

## 2022-01-01 RX ADMIN — HYDROMORPHONE HYDROCHLORIDE 1 MG: 2 INJECTION INTRAMUSCULAR; INTRAVENOUS; SUBCUTANEOUS at 12:11

## 2022-01-01 RX ADMIN — CLONIDINE HYDROCHLORIDE 0.1 MG: 0.1 TABLET ORAL at 08:10

## 2022-01-01 RX ADMIN — METOCLOPRAMIDE 5 MG: 5 INJECTION, SOLUTION INTRAMUSCULAR; INTRAVENOUS at 04:10

## 2022-01-01 RX ADMIN — INSULIN ASPART 4 UNITS: 100 INJECTION, SOLUTION INTRAVENOUS; SUBCUTANEOUS at 05:10

## 2022-01-01 RX ADMIN — MEROPENEM 1 G: 1 INJECTION, POWDER, FOR SOLUTION INTRAVENOUS at 11:10

## 2022-01-01 RX ADMIN — METOLAZONE 5 MG: 5 TABLET ORAL at 11:11

## 2022-01-01 RX ADMIN — DOCUSATE SODIUM 100 MG: 100 CAPSULE, LIQUID FILLED ORAL at 09:10

## 2022-01-01 RX ADMIN — SODIUM ZIRCONIUM CYCLOSILICATE 10 G: 10 POWDER, FOR SUSPENSION ORAL at 08:10

## 2022-01-01 RX ADMIN — MEROPENEM 1 G: 1 INJECTION, POWDER, FOR SOLUTION INTRAVENOUS at 11:11

## 2022-01-01 RX ADMIN — METOCLOPRAMIDE 5 MG: 5 INJECTION, SOLUTION INTRAMUSCULAR; INTRAVENOUS at 08:10

## 2022-01-01 RX ADMIN — HYDRALAZINE HYDROCHLORIDE 20 MG: 20 INJECTION INTRAMUSCULAR; INTRAVENOUS at 12:09

## 2022-01-01 RX ADMIN — HYDRALAZINE HYDROCHLORIDE 100 MG: 50 TABLET, FILM COATED ORAL at 01:11

## 2022-01-01 RX ADMIN — ATROPINE SULFATE 0.2 MG: 0.4 INJECTION, SOLUTION INTRAMUSCULAR; INTRAVENOUS; SUBCUTANEOUS at 01:09

## 2022-01-01 RX ADMIN — CLONIDINE HYDROCHLORIDE 0.1 MG: 0.1 TABLET ORAL at 10:10

## 2022-01-01 RX ADMIN — DOCUSATE SODIUM 100 MG: 100 CAPSULE, LIQUID FILLED ORAL at 08:10

## 2022-01-01 RX ADMIN — METRONIDAZOLE 500 MG: 500 INJECTION, SOLUTION INTRAVENOUS at 06:09

## 2022-01-01 RX ADMIN — SODIUM CHLORIDE: 9 INJECTION, SOLUTION INTRAVENOUS at 10:09

## 2022-01-01 RX ADMIN — METRONIDAZOLE 500 MG: 500 TABLET ORAL at 10:10

## 2022-01-01 RX ADMIN — SODIUM CHLORIDE, PRESERVATIVE FREE 10 ML: 5 INJECTION INTRAVENOUS at 10:11

## 2022-01-01 RX ADMIN — FLUCONAZOLE 400 MG: 2 INJECTION, SOLUTION INTRAVENOUS at 10:11

## 2022-01-01 RX ADMIN — SODIUM CHLORIDE, PRESERVATIVE FREE 10 ML: 5 INJECTION INTRAVENOUS at 06:10

## 2022-01-01 RX ADMIN — SODIUM BICARBONATE 650 MG TABLET 1300 MG: at 08:10

## 2022-01-01 RX ADMIN — HYDRALAZINE HYDROCHLORIDE 100 MG: 50 TABLET, FILM COATED ORAL at 02:10

## 2022-01-01 RX ADMIN — INSULIN ASPART 4 UNITS: 100 INJECTION, SOLUTION INTRAVENOUS; SUBCUTANEOUS at 09:11

## 2022-01-01 RX ADMIN — INSULIN DETEMIR 14 UNITS: 100 INJECTION, SOLUTION SUBCUTANEOUS at 09:10

## 2022-01-01 RX ADMIN — HYDROMORPHONE HYDROCHLORIDE 1 MG: 2 INJECTION INTRAMUSCULAR; INTRAVENOUS; SUBCUTANEOUS at 05:10

## 2022-01-01 RX ADMIN — METOPROLOL TARTRATE 25 MG: 25 TABLET, FILM COATED ORAL at 08:10

## 2022-01-01 RX ADMIN — METRONIDAZOLE 500 MG: 500 INJECTION, SOLUTION INTRAVENOUS at 02:09

## 2022-01-01 RX ADMIN — INSULIN DETEMIR 10 UNITS: 100 INJECTION, SOLUTION SUBCUTANEOUS at 09:10

## 2022-01-01 RX ADMIN — SODIUM ZIRCONIUM CYCLOSILICATE 5 G: 5 POWDER, FOR SUSPENSION ORAL at 10:10

## 2022-01-01 RX ADMIN — CARVEDILOL 12.5 MG: 12.5 TABLET, FILM COATED ORAL at 08:09

## 2022-01-01 RX ADMIN — ACETAMINOPHEN 650 MG: 325 TABLET, FILM COATED ORAL at 04:10

## 2022-01-01 RX ADMIN — INSULIN ASPART 2 UNITS: 100 INJECTION, SOLUTION INTRAVENOUS; SUBCUTANEOUS at 05:10

## 2022-01-01 RX ADMIN — GEMFIBROZIL 600 MG: 600 TABLET ORAL at 06:09

## 2022-01-01 RX ADMIN — NOREPINEPHRINE BITARTRATE 0.02 MCG/KG/MIN: 8 INJECTION, SOLUTION INTRAVENOUS at 09:11

## 2022-01-01 RX ADMIN — ALUMINUM HYDROXIDE, MAGNESIUM HYDROXIDE, AND DIMETHICONE 30 ML: 200; 20; 200 SUSPENSION ORAL at 10:10

## 2022-01-01 RX ADMIN — INSULIN ASPART 2 UNITS: 100 INJECTION, SOLUTION INTRAVENOUS; SUBCUTANEOUS at 09:09

## 2022-01-01 RX ADMIN — SUCRALFATE 1 G: 1 TABLET ORAL at 11:09

## 2022-01-01 RX ADMIN — PANTOPRAZOLE SODIUM 40 MG: 40 TABLET, DELAYED RELEASE ORAL at 03:10

## 2022-01-01 RX ADMIN — METHYLPREDNISOLONE SODIUM SUCCINATE 40 MG: 40 INJECTION, POWDER, FOR SOLUTION INTRAMUSCULAR; INTRAVENOUS at 04:10

## 2022-01-01 RX ADMIN — SUCRALFATE 1 G: 1 TABLET ORAL at 09:09

## 2022-01-01 RX ADMIN — HYDROXYCHLOROQUINE SULFATE 200 MG: 200 TABLET, FILM COATED ORAL at 10:10

## 2022-01-01 RX ADMIN — ENOXAPARIN SODIUM 60 MG: 80 INJECTION SUBCUTANEOUS at 05:11

## 2022-01-01 RX ADMIN — ONDANSETRON 4 MG: 2 INJECTION INTRAMUSCULAR; INTRAVENOUS at 12:09

## 2022-01-01 RX ADMIN — LACTULOSE 10 G: 10 SOLUTION ORAL at 09:10

## 2022-01-01 RX ADMIN — HYDROCODONE BITARTRATE AND ACETAMINOPHEN 1 TABLET: 5; 325 TABLET ORAL at 07:10

## 2022-01-01 RX ADMIN — MORPHINE SULFATE 30 MG: 30 TABLET, FILM COATED, EXTENDED RELEASE ORAL at 09:11

## 2022-01-01 RX ADMIN — MORPHINE SULFATE 30 MG: 30 TABLET, FILM COATED, EXTENDED RELEASE ORAL at 01:10

## 2022-01-01 RX ADMIN — PROPOFOL 20 MCG/KG/MIN: 10 INJECTION, EMULSION INTRAVENOUS at 10:09

## 2022-01-01 RX ADMIN — INSULIN DETEMIR 18 UNITS: 100 INJECTION, SOLUTION SUBCUTANEOUS at 09:10

## 2022-01-01 RX ADMIN — DEXTROSE MONOHYDRATE 250 ML: 100 INJECTION, SOLUTION INTRAVENOUS at 04:09

## 2022-01-01 RX ADMIN — SODIUM BICARBONATE 650 MG TABLET 1300 MG: at 09:11

## 2022-01-01 RX ADMIN — HYDRALAZINE HYDROCHLORIDE 100 MG: 50 TABLET, FILM COATED ORAL at 05:11

## 2022-01-01 RX ADMIN — PHYTONADIONE 10 MG: 10 INJECTION, EMULSION INTRAMUSCULAR; INTRAVENOUS; SUBCUTANEOUS at 10:11

## 2022-01-01 RX ADMIN — GLIMEPIRIDE 4 MG: 4 TABLET ORAL at 01:10

## 2022-01-01 RX ADMIN — PROPOFOL 30 MG: 10 INJECTION, EMULSION INTRAVENOUS at 11:09

## 2022-01-01 RX ADMIN — HYDROXYCHLOROQUINE SULFATE 200 MG: 200 TABLET, FILM COATED ORAL at 09:10

## 2022-01-01 RX ADMIN — HYDROMORPHONE HYDROCHLORIDE 1 MG: 2 INJECTION INTRAMUSCULAR; INTRAVENOUS; SUBCUTANEOUS at 08:10

## 2022-01-01 RX ADMIN — PANCRELIPASE 3 CAPSULE: 60000; 12000; 38000 CAPSULE, DELAYED RELEASE PELLETS ORAL at 09:11

## 2022-01-01 RX ADMIN — AMLODIPINE BESYLATE 10 MG: 5 TABLET ORAL at 08:10

## 2022-01-01 RX ADMIN — ENOXAPARIN SODIUM 60 MG: 80 INJECTION SUBCUTANEOUS at 06:11

## 2022-01-01 RX ADMIN — CLEVIPIDINE 32 MG/HR: 0.5 EMULSION INTRAVENOUS at 01:09

## 2022-01-01 RX ADMIN — PANTOPRAZOLE SODIUM 40 MG: 40 INJECTION, POWDER, FOR SOLUTION INTRAVENOUS at 10:11

## 2022-01-01 RX ADMIN — CLONIDINE HYDROCHLORIDE 0.1 MG: 0.1 TABLET ORAL at 11:11

## 2022-01-01 RX ADMIN — CLEVIPIDINE 3 MG/HR: 0.5 EMULSION INTRAVENOUS at 04:11

## 2022-01-01 RX ADMIN — DEXMEDETOMIDINE HYDROCHLORIDE 1.4 MCG/KG/HR: 400 INJECTION INTRAVENOUS at 08:09

## 2022-01-01 RX ADMIN — PROPOFOL 15 MCG/KG/MIN: 10 INJECTION, EMULSION INTRAVENOUS at 03:09

## 2022-01-01 RX ADMIN — INSULIN DETEMIR 25 UNITS: 100 INJECTION, SOLUTION SUBCUTANEOUS at 09:10

## 2022-01-01 RX ADMIN — INSULIN ASPART 5 UNITS: 100 INJECTION, SOLUTION INTRAVENOUS; SUBCUTANEOUS at 09:10

## 2022-01-01 RX ADMIN — PROPOFOL 50 MCG/KG/MIN: 10 INJECTION, EMULSION INTRAVENOUS at 03:09

## 2022-01-01 RX ADMIN — Medication 100 MCG: at 05:11

## 2022-01-01 RX ADMIN — HYDRALAZINE HYDROCHLORIDE 20 MG: 20 INJECTION INTRAMUSCULAR; INTRAVENOUS at 04:10

## 2022-01-01 RX ADMIN — ALUMINUM HYDROXIDE, MAGNESIUM HYDROXIDE, AND DIMETHICONE 30 ML: 200; 20; 200 SUSPENSION ORAL at 11:10

## 2022-01-01 RX ADMIN — MORPHINE SULFATE 2 MG: 4 INJECTION INTRAVENOUS at 12:10

## 2022-01-01 RX ADMIN — SODIUM ZIRCONIUM CYCLOSILICATE 5 G: 5 POWDER, FOR SUSPENSION ORAL at 12:10

## 2022-01-01 RX ADMIN — CLONIDINE HYDROCHLORIDE 0.1 MG: 0.1 TABLET ORAL at 08:11

## 2022-01-01 RX ADMIN — SODIUM CHLORIDE: 9 INJECTION, SOLUTION INTRAVENOUS at 01:10

## 2022-01-01 RX ADMIN — NALOXEGOL OXALATE 25 MG: 25 TABLET, FILM COATED ORAL at 08:11

## 2022-01-01 RX ADMIN — HYDROMORPHONE HYDROCHLORIDE 1 MG: 2 INJECTION INTRAMUSCULAR; INTRAVENOUS; SUBCUTANEOUS at 01:11

## 2022-01-01 RX ADMIN — PROPOFOL 30 MCG/KG/MIN: 10 INJECTION, EMULSION INTRAVENOUS at 06:09

## 2022-01-01 RX ADMIN — SODIUM BICARBONATE 650 MG TABLET 1300 MG: at 08:11

## 2022-01-01 RX ADMIN — POTASSIUM BICARBONATE 40 MEQ: 391 TABLET, EFFERVESCENT ORAL at 12:09

## 2022-01-01 RX ADMIN — ALBUMIN HUMAN: 250 SOLUTION INTRAVENOUS at 12:09

## 2022-01-01 RX ADMIN — INSULIN ASPART 8 UNITS: 100 INJECTION, SOLUTION INTRAVENOUS; SUBCUTANEOUS at 05:10

## 2022-01-01 RX ADMIN — LIDOCAINE HYDROCHLORIDE: 20 INJECTION, SOLUTION EPIDURAL; INFILTRATION; INTRACAUDAL; PERINEURAL at 11:10

## 2022-01-01 RX ADMIN — MAGNESIUM SULFATE HEPTAHYDRATE: 500 INJECTION, SOLUTION INTRAMUSCULAR; INTRAVENOUS at 12:11

## 2022-01-01 RX ADMIN — NALOXEGOL OXALATE 25 MG: 25 TABLET, FILM COATED ORAL at 10:11

## 2022-01-01 RX ADMIN — SODIUM CHLORIDE 500 ML: 9 INJECTION, SOLUTION INTRAVENOUS at 06:09

## 2022-01-01 RX ADMIN — HYDROMORPHONE HYDROCHLORIDE 1 MG: 2 INJECTION, SOLUTION INTRAMUSCULAR; INTRAVENOUS; SUBCUTANEOUS at 12:11

## 2022-01-01 RX ADMIN — HYDROMORPHONE HYDROCHLORIDE 1 MG: 2 INJECTION INTRAMUSCULAR; INTRAVENOUS; SUBCUTANEOUS at 07:10

## 2022-01-01 RX ADMIN — INSULIN ASPART 6 UNITS: 100 INJECTION, SOLUTION INTRAVENOUS; SUBCUTANEOUS at 04:10

## 2022-01-01 RX ADMIN — HYDROMORPHONE HYDROCHLORIDE 0.5 MG: 2 INJECTION INTRAMUSCULAR; INTRAVENOUS; SUBCUTANEOUS at 09:09

## 2022-01-01 RX ADMIN — MYCOPHENOLATE MOFETIL 1000 MG: 250 CAPSULE ORAL at 11:10

## 2022-01-01 RX ADMIN — MUPIROCIN: 20 OINTMENT TOPICAL at 09:09

## 2022-01-01 RX ADMIN — ACETAMINOPHEN 1000 MG: 10 INJECTION, SOLUTION INTRAVENOUS at 05:09

## 2022-01-01 RX ADMIN — PANCRELIPASE 3 CAPSULE: 60000; 12000; 38000 CAPSULE, DELAYED RELEASE PELLETS ORAL at 02:11

## 2022-01-01 RX ADMIN — CLEVIPIDINE 24 MG/HR: 0.5 EMULSION INTRAVENOUS at 01:09

## 2022-01-01 RX ADMIN — ALUMINUM HYDROXIDE, MAGNESIUM HYDROXIDE, AND DIMETHICONE 30 ML: 200; 20; 200 SUSPENSION ORAL at 09:10

## 2022-01-01 RX ADMIN — PROTAMINE SULFATE 100 MG: 10 INJECTION, SOLUTION INTRAVENOUS at 07:09

## 2022-01-01 RX ADMIN — I.V. FAT EMULSION 250 ML: 20 EMULSION INTRAVENOUS at 05:11

## 2022-01-01 RX ADMIN — CARVEDILOL 25 MG: 12.5 TABLET, FILM COATED ORAL at 09:11

## 2022-01-01 RX ADMIN — SODIUM CHLORIDE, PRESERVATIVE FREE 10 ML: 5 INJECTION INTRAVENOUS at 05:10

## 2022-01-01 RX ADMIN — HYDROMORPHONE HYDROCHLORIDE 1 MG: 2 INJECTION INTRAMUSCULAR; INTRAVENOUS; SUBCUTANEOUS at 09:10

## 2022-01-01 RX ADMIN — FENTANYL CITRATE 50 MCG: 50 INJECTION, SOLUTION INTRAMUSCULAR; INTRAVENOUS at 11:09

## 2022-01-01 RX ADMIN — INSULIN ASPART 4 UNITS: 100 INJECTION, SOLUTION INTRAVENOUS; SUBCUTANEOUS at 12:10

## 2022-01-01 RX ADMIN — INSULIN HUMAN 10 UNITS: 100 INJECTION, SOLUTION PARENTERAL at 09:10

## 2022-01-01 RX ADMIN — POLYETHYLENE GLYCOL 3350 17 G: 17 POWDER, FOR SOLUTION ORAL at 11:10

## 2022-01-01 RX ADMIN — ACETAMINOPHEN 650 MG: 325 TABLET, FILM COATED ORAL at 09:09

## 2022-01-01 RX ADMIN — HYDROMORPHONE HYDROCHLORIDE 1 MG: 2 INJECTION INTRAMUSCULAR; INTRAVENOUS; SUBCUTANEOUS at 06:10

## 2022-01-01 RX ADMIN — INSULIN HUMAN 10 UNITS: 100 INJECTION, SOLUTION PARENTERAL at 04:10

## 2022-01-01 RX ADMIN — NIFEDIPINE 90 MG: 90 TABLET, FILM COATED, EXTENDED RELEASE ORAL at 01:10

## 2022-01-01 RX ADMIN — OXYCODONE 10 MG: 5 TABLET ORAL at 03:09

## 2022-01-01 RX ADMIN — HYDROMORPHONE HYDROCHLORIDE 2 MG: 2 INJECTION INTRAMUSCULAR; INTRAVENOUS; SUBCUTANEOUS at 12:10

## 2022-01-01 RX ADMIN — METHYLPREDNISOLONE SODIUM SUCCINATE 40 MG: 40 INJECTION, POWDER, FOR SOLUTION INTRAMUSCULAR; INTRAVENOUS at 11:11

## 2022-01-01 RX ADMIN — HYDROMORPHONE HYDROCHLORIDE 2 MG: 2 INJECTION INTRAMUSCULAR; INTRAVENOUS; SUBCUTANEOUS at 09:10

## 2022-01-01 RX ADMIN — HYDRALAZINE HYDROCHLORIDE 20 MG: 20 INJECTION INTRAMUSCULAR; INTRAVENOUS at 07:09

## 2022-01-01 RX ADMIN — DEXMEDETOMIDINE HYDROCHLORIDE 0.2 MCG/KG/HR: 400 INJECTION INTRAVENOUS at 06:09

## 2022-01-01 RX ADMIN — CLEVIPIDINE 2 MG/HR: 0.5 EMULSION INTRAVENOUS at 05:11

## 2022-01-01 RX ADMIN — SODIUM ZIRCONIUM CYCLOSILICATE 10 G: 10 POWDER, FOR SUSPENSION ORAL at 11:11

## 2022-01-01 RX ADMIN — LEUCINE, PHENYLALANINE, LYSINE, METHIONINE, ISOLEUCINE, VALINE, HISTIDINE, THREONINE, TRYPTOPHAN, ALANINE, GLYCINE, ARGININE, PROLINE, SERINE, TYROSINE, SODIUM ACETATE, DIBASIC POTASSIUM PHOSPHATE, MAGNESIUM CHLORIDE, SODIUM CHLORIDE, CALCIUM CHLORIDE, DEXTROSE
365; 280; 290; 200; 300; 290; 240; 210; 90; 1035; 515; 575; 340; 250; 20; 340; 261; 51; 59; 33; 15 INJECTION INTRAVENOUS at 11:11

## 2022-01-01 RX ADMIN — SODIUM ZIRCONIUM CYCLOSILICATE 10 G: 10 POWDER, FOR SUSPENSION ORAL at 01:10

## 2022-01-01 RX ADMIN — PROTAMINE SULFATE 100 MG: 10 INJECTION, SOLUTION INTRAVENOUS at 05:09

## 2022-01-01 RX ADMIN — CLEVIPIDINE 4 MG/HR: 0.5 EMULSION INTRAVENOUS at 06:11

## 2022-01-01 RX ADMIN — CLEVIPIDINE 32 MG/HR: 0.5 EMULSION INTRAVENOUS at 07:09

## 2022-01-01 RX ADMIN — MORPHINE SULFATE 2 MG: 4 INJECTION INTRAVENOUS at 03:11

## 2022-01-01 RX ADMIN — CLEVIPIDINE 1 MG/HR: 0.5 EMULSION INTRAVENOUS at 03:11

## 2022-01-01 RX ADMIN — CLEVIPIDINE 20 MG/HR: 0.5 EMULSION INTRAVENOUS at 07:09

## 2022-01-01 RX ADMIN — FAMOTIDINE 20 MG: 20 TABLET, FILM COATED ORAL at 08:09

## 2022-01-01 RX ADMIN — FAMOTIDINE 20 MG: 20 TABLET, FILM COATED ORAL at 08:10

## 2022-01-01 RX ADMIN — CLONIDINE HYDROCHLORIDE 0.1 MG: 0.1 TABLET ORAL at 09:10

## 2022-01-01 RX ADMIN — RETINOL, ERGOCALCIFEROL, .ALPHA.-TOCOPHEROL ACETATE, DL-, PHYTONADIONE, ASCORBIC ACID, NIACINAMIDE, RIBOFLAVIN 5-PHOSPHATE SODIUM, THIAMINE HYDROCHLORIDE, PYRIDOXINE HYDROCHLORIDE, DEXPANTHENOL, BIOTIN, FOLIC ACID, AND CYANOCOBALAMIN: KIT at 09:09

## 2022-01-01 RX ADMIN — GLUCAGON 1 MG: 1 INJECTION, POWDER, LYOPHILIZED, FOR SOLUTION INTRAMUSCULAR; INTRAVENOUS at 09:10

## 2022-01-01 RX ADMIN — DEXMEDETOMIDINE HYDROCHLORIDE 1.4 MCG/KG/HR: 400 INJECTION INTRAVENOUS at 07:10

## 2022-01-01 RX ADMIN — Medication 2 CAPSULE: at 05:10

## 2022-01-01 RX ADMIN — METHYLPREDNISOLONE SODIUM SUCCINATE 80 MG: 40 INJECTION, POWDER, FOR SOLUTION INTRAMUSCULAR; INTRAVENOUS at 10:10

## 2022-01-01 RX ADMIN — GLIMEPIRIDE 4 MG: 4 TABLET ORAL at 09:10

## 2022-01-01 RX ADMIN — VANCOMYCIN HYDROCHLORIDE 1250 MG: 1.25 INJECTION, POWDER, LYOPHILIZED, FOR SOLUTION INTRAVENOUS at 03:09

## 2022-01-01 RX ADMIN — I.V. FAT EMULSION 250 ML: 20 EMULSION INTRAVENOUS at 11:11

## 2022-01-01 RX ADMIN — CLEVIPIDINE 18 MG/HR: 0.5 EMULSION INTRAVENOUS at 08:09

## 2022-01-01 RX ADMIN — PANTOPRAZOLE SODIUM 40 MG: 40 INJECTION, POWDER, FOR SOLUTION INTRAVENOUS at 09:11

## 2022-01-01 RX ADMIN — GABAPENTIN 100 MG: 100 CAPSULE ORAL at 03:11

## 2022-01-01 RX ADMIN — LABETALOL HYDROCHLORIDE 20 MG: 5 INJECTION, SOLUTION INTRAVENOUS at 04:09

## 2022-01-01 RX ADMIN — SODIUM ZIRCONIUM CYCLOSILICATE 10 G: 10 POWDER, FOR SUSPENSION ORAL at 08:11

## 2022-01-01 RX ADMIN — CEFTRIAXONE SODIUM 1 G: 1 INJECTION, POWDER, FOR SOLUTION INTRAMUSCULAR; INTRAVENOUS at 04:10

## 2022-01-01 RX ADMIN — CLEVIPIDINE 18 MG/HR: 0.5 EMULSION INTRAVENOUS at 07:09

## 2022-01-01 RX ADMIN — HYDRALAZINE HYDROCHLORIDE 50 MG: 50 TABLET, FILM COATED ORAL at 08:10

## 2022-01-01 RX ADMIN — GABAPENTIN 100 MG: 100 CAPSULE ORAL at 05:11

## 2022-01-01 RX ADMIN — INSULIN DETEMIR 15 UNITS: 100 INJECTION, SOLUTION SUBCUTANEOUS at 09:11

## 2022-01-01 RX ADMIN — CEFEPIME 1 G: 1 INJECTION, POWDER, FOR SOLUTION INTRAMUSCULAR; INTRAVENOUS at 02:09

## 2022-01-01 RX ADMIN — CALCIUM CHLORIDE INJECTION 1 G: 100 INJECTION, SOLUTION INTRAVENOUS at 01:11

## 2022-01-01 RX ADMIN — CLEVIPIDINE 10 MG/HR: 0.5 EMULSION INTRAVENOUS at 09:09

## 2022-01-01 RX ADMIN — ONDANSETRON 4 MG: 2 INJECTION INTRAMUSCULAR; INTRAVENOUS at 01:11

## 2022-01-01 RX ADMIN — HYDROMORPHONE HYDROCHLORIDE 1 MG: 2 INJECTION INTRAMUSCULAR; INTRAVENOUS; SUBCUTANEOUS at 04:11

## 2022-01-01 RX ADMIN — WARFARIN SODIUM 5 MG: 5 TABLET ORAL at 10:09

## 2022-01-01 RX ADMIN — ONDANSETRON 4 MG: 2 INJECTION INTRAMUSCULAR; INTRAVENOUS at 11:11

## 2022-01-01 RX ADMIN — NIFEDIPINE 90 MG: 90 TABLET, FILM COATED, EXTENDED RELEASE ORAL at 04:10

## 2022-01-01 RX ADMIN — WARFARIN SODIUM 5 MG: 5 TABLET ORAL at 04:10

## 2022-01-01 RX ADMIN — PHENYLEPHRINE HYDROCHLORIDE 50 MCG: 10 INJECTION INTRAVENOUS at 04:10

## 2022-01-01 RX ADMIN — HYDRALAZINE HYDROCHLORIDE 50 MG: 50 TABLET, FILM COATED ORAL at 03:10

## 2022-01-01 RX ADMIN — INSULIN ASPART 4 UNITS: 100 INJECTION, SOLUTION INTRAVENOUS; SUBCUTANEOUS at 10:10

## 2022-01-01 RX ADMIN — INSULIN ASPART 3 UNITS: 100 INJECTION, SOLUTION INTRAVENOUS; SUBCUTANEOUS at 10:11

## 2022-01-01 RX ADMIN — ESMOLOL HYDROCHLORIDE 125 MCG/KG/MIN: 20 INJECTION INTRAVENOUS at 12:09

## 2022-01-01 RX ADMIN — SODIUM ZIRCONIUM CYCLOSILICATE 5 G: 5 POWDER, FOR SUSPENSION ORAL at 09:10

## 2022-01-01 RX ADMIN — ALUMINUM HYDROXIDE, MAGNESIUM HYDROXIDE, AND DIMETHICONE 30 ML: 200; 20; 200 SUSPENSION ORAL at 07:10

## 2022-01-01 RX ADMIN — SODIUM ZIRCONIUM CYCLOSILICATE 5 G: 5 POWDER, FOR SUSPENSION ORAL at 05:10

## 2022-01-01 RX ADMIN — MEROPENEM 500 MG: 500 INJECTION, POWDER, FOR SOLUTION INTRAVENOUS at 12:11

## 2022-01-01 RX ADMIN — ALUMINUM HYDROXIDE, MAGNESIUM HYDROXIDE, AND SIMETHICONE 30 ML: 200; 200; 20 SUSPENSION ORAL at 02:05

## 2022-01-01 RX ADMIN — CLEVIPIDINE 16 MG/HR: 0.5 EMULSION INTRAVENOUS at 09:09

## 2022-01-01 RX ADMIN — ALBUMIN (HUMAN) 25 G: 12.5 SOLUTION INTRAVENOUS at 01:11

## 2022-01-01 RX ADMIN — LACTULOSE 10 G: 10 SOLUTION ORAL at 10:10

## 2022-01-01 RX ADMIN — SEVELAMER CARBONATE 800 MG: 800 TABLET, FILM COATED ORAL at 12:10

## 2022-01-01 RX ADMIN — HYDROCODONE BITARTRATE AND ACETAMINOPHEN 1 TABLET: 5; 325 TABLET ORAL at 11:10

## 2022-01-01 RX ADMIN — SODIUM CHLORIDE: 9 INJECTION, SOLUTION INTRAVENOUS at 07:11

## 2022-01-01 RX ADMIN — WARFARIN SODIUM 3 MG: 2 TABLET ORAL at 04:10

## 2022-01-01 RX ADMIN — NIFEDIPINE 90 MG: 90 TABLET, FILM COATED, EXTENDED RELEASE ORAL at 10:10

## 2022-01-01 RX ADMIN — ONDANSETRON 4 MG: 2 INJECTION INTRAMUSCULAR; INTRAVENOUS at 04:11

## 2022-01-01 RX ADMIN — ROCURONIUM BROMIDE 50 MG: 10 SOLUTION INTRAVENOUS at 01:09

## 2022-01-01 RX ADMIN — METHOCARBAMOL 500 MG: 500 TABLET ORAL at 08:11

## 2022-01-01 RX ADMIN — ESMOLOL HYDROCHLORIDE 350 MCG/KG/MIN: 20 INJECTION INTRAVENOUS at 08:09

## 2022-01-01 RX ADMIN — INSULIN DETEMIR 15 UNITS: 100 INJECTION, SOLUTION SUBCUTANEOUS at 08:11

## 2022-01-01 RX ADMIN — NIFEDIPINE 90 MG: 90 TABLET, FILM COATED, EXTENDED RELEASE ORAL at 09:11

## 2022-01-01 RX ADMIN — PHENYLEPHRINE HYDROCHLORIDE 100 MCG: 10 INJECTION INTRAVENOUS at 04:10

## 2022-01-01 RX ADMIN — ACETAMINOPHEN 650 MG: 325 TABLET, FILM COATED ORAL at 12:09

## 2022-01-01 RX ADMIN — INSULIN DETEMIR 10 UNITS: 100 INJECTION, SOLUTION SUBCUTANEOUS at 12:10

## 2022-01-01 RX ADMIN — CLEVIPIDINE 2 MG/HR: 0.5 EMULSION INTRAVENOUS at 09:11

## 2022-01-01 RX ADMIN — CEFEPIME 2 G: 2 INJECTION, POWDER, FOR SOLUTION INTRAVENOUS at 06:10

## 2022-01-01 RX ADMIN — INSULIN ASPART 16 UNITS: 100 INJECTION, SOLUTION INTRAVENOUS; SUBCUTANEOUS at 11:10

## 2022-01-01 RX ADMIN — FAMOTIDINE 20 MG: 20 TABLET, FILM COATED ORAL at 09:10

## 2022-01-01 RX ADMIN — LACTULOSE 10 G: 10 SOLUTION ORAL at 11:11

## 2022-01-01 RX ADMIN — ROCURONIUM BROMIDE 70 MG: 10 INJECTION, SOLUTION INTRAVENOUS at 11:09

## 2022-01-01 RX ADMIN — HEPARIN SODIUM 5000 UNITS: 5000 INJECTION, SOLUTION INTRAVENOUS; SUBCUTANEOUS at 09:10

## 2022-01-01 RX ADMIN — VANCOMYCIN HYDROCHLORIDE 1000 MG: 1 INJECTION, POWDER, LYOPHILIZED, FOR SOLUTION INTRAVENOUS at 04:11

## 2022-01-01 RX ADMIN — CARVEDILOL 25 MG: 12.5 TABLET, FILM COATED ORAL at 01:10

## 2022-01-01 RX ADMIN — Medication 0.02 MCG/KG/MIN: at 09:11

## 2022-01-01 RX ADMIN — SODIUM ZIRCONIUM CYCLOSILICATE 10 G: 10 POWDER, FOR SUSPENSION ORAL at 12:10

## 2022-01-01 RX ADMIN — HYDRALAZINE HYDROCHLORIDE 100 MG: 50 TABLET, FILM COATED ORAL at 09:11

## 2022-01-01 RX ADMIN — NITROGLYCERIN 0.4 MG: 0.4 TABLET, ORALLY DISINTEGRATING SUBLINGUAL at 07:10

## 2022-01-01 RX ADMIN — PANTOPRAZOLE SODIUM 40 MG: 40 TABLET, DELAYED RELEASE ORAL at 04:10

## 2022-01-01 RX ADMIN — CLEVIPIDINE 2 MG/HR: 0.5 EMULSION INTRAVENOUS at 07:11

## 2022-01-01 RX ADMIN — ALBUMIN (HUMAN) 25 G: 12.5 SOLUTION INTRAVENOUS at 09:11

## 2022-01-01 RX ADMIN — METHOCARBAMOL 500 MG: 500 TABLET ORAL at 09:11

## 2022-01-01 RX ADMIN — INSULIN ASPART 3 UNITS: 100 INJECTION, SOLUTION INTRAVENOUS; SUBCUTANEOUS at 09:10

## 2022-01-01 RX ADMIN — PROPOFOL 50 MCG/KG/MIN: 10 INJECTION, EMULSION INTRAVENOUS at 12:11

## 2022-01-01 RX ADMIN — HYDRALAZINE HYDROCHLORIDE 20 MG: 20 INJECTION INTRAMUSCULAR; INTRAVENOUS at 09:11

## 2022-01-01 RX ADMIN — HYDRALAZINE HYDROCHLORIDE 50 MG: 50 TABLET, FILM COATED ORAL at 01:10

## 2022-01-01 RX ADMIN — ROCURONIUM BROMIDE 50 MG: 10 INJECTION, SOLUTION INTRAVENOUS at 04:09

## 2022-01-01 RX ADMIN — INSULIN ASPART 4 UNITS: 100 INJECTION, SOLUTION INTRAVENOUS; SUBCUTANEOUS at 11:11

## 2022-01-01 RX ADMIN — ACETAMINOPHEN 650 MG: 325 TABLET, FILM COATED ORAL at 12:11

## 2022-01-01 RX ADMIN — ENOXAPARIN SODIUM 60 MG: 80 INJECTION SUBCUTANEOUS at 01:11

## 2022-01-01 RX ADMIN — MAGNESIUM SULFATE HEPTAHYDRATE: 500 INJECTION, SOLUTION INTRAMUSCULAR; INTRAVENOUS at 09:11

## 2022-01-01 RX ADMIN — NITROGLYCERIN: 0.4 TABLET, ORALLY DISINTEGRATING SUBLINGUAL at 06:10

## 2022-01-01 RX ADMIN — HYDROMORPHONE HYDROCHLORIDE 1 MG: 2 INJECTION INTRAMUSCULAR; INTRAVENOUS; SUBCUTANEOUS at 09:11

## 2022-01-01 RX ADMIN — SODIUM CHLORIDE: 0.9 INJECTION, SOLUTION INTRAVENOUS at 01:11

## 2022-01-01 RX ADMIN — GEMFIBROZIL 600 MG: 600 TABLET ORAL at 05:09

## 2022-01-01 RX ADMIN — SENNOSIDES AND DOCUSATE SODIUM 2 TABLET: 50; 8.6 TABLET ORAL at 09:11

## 2022-01-01 RX ADMIN — SEVELAMER CARBONATE 800 MG: 800 TABLET, FILM COATED ORAL at 04:10

## 2022-01-01 RX ADMIN — CARVEDILOL 25 MG: 12.5 TABLET, FILM COATED ORAL at 10:11

## 2022-01-01 RX ADMIN — PHYTONADIONE 5 MG: 5 TABLET ORAL at 12:11

## 2022-01-01 RX ADMIN — FENTANYL CITRATE 50 MCG: 50 INJECTION, SOLUTION INTRAMUSCULAR; INTRAVENOUS at 01:11

## 2022-01-01 RX ADMIN — FENTANYL CITRATE 50 MCG: 50 INJECTION, SOLUTION INTRAMUSCULAR; INTRAVENOUS at 02:11

## 2022-01-01 RX ADMIN — SODIUM CHLORIDE, PRESERVATIVE FREE 10 ML: 5 INJECTION INTRAVENOUS at 08:10

## 2022-01-01 RX ADMIN — FAMOTIDINE 20 MG: 20 TABLET, FILM COATED ORAL at 09:09

## 2022-01-01 RX ADMIN — PANTOPRAZOLE SODIUM 40 MG: 40 INJECTION, POWDER, FOR SOLUTION INTRAVENOUS at 11:11

## 2022-01-01 RX ADMIN — HYDRALAZINE HYDROCHLORIDE 50 MG: 50 TABLET, FILM COATED ORAL at 10:10

## 2022-01-01 RX ADMIN — MEROPENEM 500 MG: 500 INJECTION, POWDER, FOR SOLUTION INTRAVENOUS at 11:11

## 2022-01-01 RX ADMIN — HEPARIN SODIUM 10000 UNITS: 1000 INJECTION, SOLUTION INTRAVENOUS; SUBCUTANEOUS at 05:09

## 2022-01-01 RX ADMIN — METHOCARBAMOL 500 MG: 500 TABLET ORAL at 09:10

## 2022-01-01 RX ADMIN — CALCIUM GLUCONATE 1 G: 20 INJECTION, SOLUTION INTRAVENOUS at 05:09

## 2022-01-01 RX ADMIN — HYDROCODONE BITARTRATE AND ACETAMINOPHEN 1 TABLET: 5; 325 TABLET ORAL at 09:10

## 2022-01-01 RX ADMIN — INSULIN ASPART 2 UNITS: 100 INJECTION, SOLUTION INTRAVENOUS; SUBCUTANEOUS at 08:10

## 2022-01-01 RX ADMIN — GLIMEPIRIDE 4 MG: 4 TABLET ORAL at 10:10

## 2022-01-01 RX ADMIN — SODIUM BICARBONATE 650 MG TABLET 1300 MG: at 09:10

## 2022-01-01 RX ADMIN — INSULIN ASPART 2 UNITS: 100 INJECTION, SOLUTION INTRAVENOUS; SUBCUTANEOUS at 09:11

## 2022-01-01 RX ADMIN — MORPHINE SULFATE 2 MG: 4 INJECTION INTRAVENOUS at 06:10

## 2022-01-01 RX ADMIN — PROPOFOL 35 MCG/KG/MIN: 10 INJECTION, EMULSION INTRAVENOUS at 05:11

## 2022-01-01 RX ADMIN — LEUCINE, PHENYLALANINE, LYSINE, METHIONINE, ISOLEUCINE, VALINE, HISTIDINE, THREONINE, TRYPTOPHAN, ALANINE, GLYCINE, ARGININE, PROLINE, SERINE, TYROSINE, SODIUM ACETATE, DIBASIC POTASSIUM PHOSPHATE, MAGNESIUM CHLORIDE, SODIUM CHLORIDE, CALCIUM CHLORIDE, DEXTROSE
311; 238; 247; 170; 255; 247; 204; 179; 77; 880; 438; 489; 289; 213; 17; 297; 261; 51; 77; 33; 5 INJECTION INTRAVENOUS at 12:10

## 2022-01-01 RX ADMIN — MEPERIDINE HYDROCHLORIDE 12.5 MG: 25 INJECTION INTRAMUSCULAR; INTRAVENOUS; SUBCUTANEOUS at 06:10

## 2022-01-01 RX ADMIN — ASPIRIN 81 MG: 81 TABLET, COATED ORAL at 11:11

## 2022-01-01 RX ADMIN — PANCRELIPASE 3 CAPSULE: 60000; 12000; 38000 CAPSULE, DELAYED RELEASE PELLETS ORAL at 06:11

## 2022-01-01 RX ADMIN — ETOMIDATE 14 MG: 2 INJECTION INTRAVENOUS at 01:11

## 2022-01-01 RX ADMIN — NIFEDIPINE 90 MG: 90 TABLET, FILM COATED, EXTENDED RELEASE ORAL at 08:11

## 2022-01-01 RX ADMIN — MUPIROCIN: 20 OINTMENT TOPICAL at 08:09

## 2022-01-01 RX ADMIN — POTASSIUM CHLORIDE 20 MEQ: 14.9 INJECTION, SOLUTION INTRAVENOUS at 12:09

## 2022-01-01 RX ADMIN — CLEVIPIDINE 16 MG/HR: 0.5 EMULSION INTRAVENOUS at 10:09

## 2022-01-01 RX ADMIN — PROPOFOL 20 MCG/KG/MIN: 10 INJECTION, EMULSION INTRAVENOUS at 09:09

## 2022-01-01 RX ADMIN — HYDROMORPHONE HYDROCHLORIDE 1 MG: 2 INJECTION INTRAMUSCULAR; INTRAVENOUS; SUBCUTANEOUS at 08:11

## 2022-01-01 RX ADMIN — PROPOFOL 15 MCG/KG/MIN: 10 INJECTION, EMULSION INTRAVENOUS at 12:09

## 2022-01-01 RX ADMIN — DOCUSATE SODIUM 100 MG: 100 CAPSULE, LIQUID FILLED ORAL at 10:10

## 2022-01-01 RX ADMIN — PREDNISONE 20 MG: 20 TABLET ORAL at 09:11

## 2022-01-01 RX ADMIN — GABAPENTIN 100 MG: 100 CAPSULE ORAL at 11:11

## 2022-01-01 RX ADMIN — HYDRALAZINE HYDROCHLORIDE 100 MG: 50 TABLET, FILM COATED ORAL at 06:11

## 2022-01-01 RX ADMIN — CLEVIPIDINE 2 MG/HR: 0.5 EMULSION INTRAVENOUS at 06:11

## 2022-01-01 RX ADMIN — LABETALOL HYDROCHLORIDE 20 MG: 5 INJECTION INTRAVENOUS at 07:10

## 2022-01-01 RX ADMIN — MORPHINE SULFATE 2 MG: 4 INJECTION INTRAVENOUS at 12:09

## 2022-01-01 RX ADMIN — HYDROXYCHLOROQUINE SULFATE 200 MG: 200 TABLET, FILM COATED ORAL at 01:10

## 2022-01-01 RX ADMIN — MORPHINE SULFATE 2 MG: 4 INJECTION INTRAVENOUS at 03:10

## 2022-01-01 RX ADMIN — INSULIN ASPART 10 UNITS: 100 INJECTION, SOLUTION INTRAVENOUS; SUBCUTANEOUS at 11:10

## 2022-01-01 RX ADMIN — SEVELAMER CARBONATE 800 MG: 800 TABLET, FILM COATED ORAL at 07:10

## 2022-01-01 RX ADMIN — SUCRALFATE 1 G: 1 TABLET ORAL at 11:10

## 2022-01-01 RX ADMIN — HYDROMORPHONE HYDROCHLORIDE 2 MG: 2 INJECTION INTRAMUSCULAR; INTRAVENOUS; SUBCUTANEOUS at 08:10

## 2022-01-01 RX ADMIN — PROPOFOL 50 MCG/KG/MIN: 10 INJECTION, EMULSION INTRAVENOUS at 04:11

## 2022-01-01 RX ADMIN — ETOMIDATE 15 MG: 2 INJECTION INTRAVENOUS at 01:11

## 2022-01-01 RX ADMIN — HYDROMORPHONE HYDROCHLORIDE 2 MG: 2 INJECTION INTRAMUSCULAR; INTRAVENOUS; SUBCUTANEOUS at 04:10

## 2022-01-01 RX ADMIN — HYDRALAZINE HYDROCHLORIDE 20 MG: 20 INJECTION INTRAMUSCULAR; INTRAVENOUS at 06:10

## 2022-01-01 RX ADMIN — WARFARIN SODIUM 3 MG: 1 TABLET ORAL at 04:10

## 2022-01-01 RX ADMIN — MORPHINE SULFATE 2 MG: 4 INJECTION INTRAVENOUS at 10:09

## 2022-01-01 RX ADMIN — HYDROMORPHONE HYDROCHLORIDE 1 MG: 2 INJECTION INTRAMUSCULAR; INTRAVENOUS; SUBCUTANEOUS at 05:11

## 2022-01-01 RX ADMIN — FENTANYL CITRATE 25 MCG: 50 INJECTION, SOLUTION INTRAMUSCULAR; INTRAVENOUS at 05:11

## 2022-01-01 RX ADMIN — MORPHINE SULFATE 30 MG: 30 TABLET, FILM COATED, EXTENDED RELEASE ORAL at 11:10

## 2022-01-01 RX ADMIN — HYDRALAZINE HYDROCHLORIDE 10 MG: 10 TABLET ORAL at 09:10

## 2022-01-01 RX ADMIN — METHOCARBAMOL 500 MG: 500 TABLET ORAL at 12:10

## 2022-01-01 RX ADMIN — DEXTROSE MONOHYDRATE 12.5 G: 100 INJECTION, SOLUTION INTRAVENOUS at 05:10

## 2022-01-01 RX ADMIN — METHYLPREDNISOLONE SODIUM SUCCINATE 80 MG: 40 INJECTION, POWDER, FOR SOLUTION INTRAMUSCULAR; INTRAVENOUS at 08:10

## 2022-01-01 RX ADMIN — HYDRALAZINE HYDROCHLORIDE 10 MG: 10 TABLET ORAL at 06:10

## 2022-01-01 RX ADMIN — SODIUM CHLORIDE: 9 INJECTION, SOLUTION INTRAVENOUS at 01:09

## 2022-01-01 RX ADMIN — PANCRELIPASE 3 CAPSULE: 60000; 12000; 38000 CAPSULE, DELAYED RELEASE PELLETS ORAL at 12:11

## 2022-01-01 RX ADMIN — HYDRALAZINE HYDROCHLORIDE 100 MG: 50 TABLET, FILM COATED ORAL at 10:11

## 2022-01-01 RX ADMIN — PANTOPRAZOLE SODIUM 40 MG: 40 TABLET, DELAYED RELEASE ORAL at 05:10

## 2022-01-01 RX ADMIN — PREDNISONE 20 MG: 20 TABLET ORAL at 08:11

## 2022-01-01 RX ADMIN — SODIUM CHLORIDE, SODIUM GLUCONATE, SODIUM ACETATE, POTASSIUM CHLORIDE AND MAGNESIUM CHLORIDE: 526; 502; 368; 37; 30 INJECTION, SOLUTION INTRAVENOUS at 01:09

## 2022-01-01 RX ADMIN — NIFEDIPINE 90 MG: 90 TABLET, FILM COATED, EXTENDED RELEASE ORAL at 11:11

## 2022-01-01 RX ADMIN — METHYLPREDNISOLONE SODIUM SUCCINATE 40 MG: 40 INJECTION, POWDER, FOR SOLUTION INTRAMUSCULAR; INTRAVENOUS at 01:10

## 2022-01-01 RX ADMIN — CLEVIPIDINE 20 MG/HR: 0.5 EMULSION INTRAVENOUS at 09:09

## 2022-01-01 RX ADMIN — WARFARIN SODIUM 3 MG: 2 TABLET ORAL at 04:11

## 2022-01-01 RX ADMIN — METHYLPREDNISOLONE SODIUM SUCCINATE 40 MG: 40 INJECTION, POWDER, FOR SOLUTION INTRAMUSCULAR; INTRAVENOUS at 06:10

## 2022-01-01 RX ADMIN — FAMOTIDINE 20 MG: 20 TABLET, FILM COATED ORAL at 10:10

## 2022-01-01 RX ADMIN — MORPHINE SULFATE 2 MG: 4 INJECTION INTRAVENOUS at 01:11

## 2022-01-01 RX ADMIN — HYDROMORPHONE HYDROCHLORIDE 1 MG: 2 INJECTION INTRAMUSCULAR; INTRAVENOUS; SUBCUTANEOUS at 06:11

## 2022-01-01 RX ADMIN — Medication 25 MCG/HR: at 05:11

## 2022-01-01 RX ADMIN — SEVELAMER CARBONATE 800 MG: 800 TABLET, FILM COATED ORAL at 05:10

## 2022-01-01 RX ADMIN — FLUCONAZOLE 400 MG: 2 INJECTION, SOLUTION INTRAVENOUS at 08:10

## 2022-01-01 RX ADMIN — BISACODYL 10 MG: 10 SUPPOSITORY RECTAL at 04:10

## 2022-01-01 RX ADMIN — SEVELAMER CARBONATE 800 MG: 800 TABLET, FILM COATED ORAL at 01:10

## 2022-01-01 RX ADMIN — CLEVIPIDINE 32 MG/HR: 0.5 EMULSION INTRAVENOUS at 12:09

## 2022-01-01 RX ADMIN — ENOXAPARIN SODIUM 60 MG: 80 INJECTION SUBCUTANEOUS at 02:11

## 2022-01-01 RX ADMIN — GABAPENTIN 100 MG: 100 CAPSULE ORAL at 10:11

## 2022-01-01 RX ADMIN — DEXMEDETOMIDINE HYDROCHLORIDE 1.4 MCG/KG/HR: 400 INJECTION INTRAVENOUS at 03:09

## 2022-01-01 RX ADMIN — NIFEDIPINE 90 MG: 90 TABLET, FILM COATED, EXTENDED RELEASE ORAL at 08:10

## 2022-01-01 RX ADMIN — ROCURONIUM BROMIDE 50 MG: 10 INJECTION, SOLUTION INTRAVENOUS at 03:11

## 2022-01-01 RX ADMIN — KETOROLAC TROMETHAMINE 15 MG: 30 INJECTION, SOLUTION INTRAMUSCULAR at 03:06

## 2022-01-01 RX ADMIN — PANCRELIPASE 3 CAPSULE: 60000; 12000; 38000 CAPSULE, DELAYED RELEASE PELLETS ORAL at 05:11

## 2022-01-01 RX ADMIN — MORPHINE SULFATE 30 MG: 30 TABLET, FILM COATED, EXTENDED RELEASE ORAL at 11:11

## 2022-01-01 RX ADMIN — PROPOFOL 50 MCG/KG/MIN: 10 INJECTION, EMULSION INTRAVENOUS at 05:09

## 2022-01-01 RX ADMIN — INSULIN DETEMIR 10 UNITS: 100 INJECTION, SOLUTION SUBCUTANEOUS at 09:11

## 2022-01-01 RX ADMIN — HYDRALAZINE HYDROCHLORIDE 100 MG: 50 TABLET, FILM COATED ORAL at 10:10

## 2022-01-01 RX ADMIN — INSULIN ASPART 5 UNITS: 100 INJECTION, SOLUTION INTRAVENOUS; SUBCUTANEOUS at 02:10

## 2022-01-01 RX ADMIN — WARFARIN SODIUM 3 MG: 2 TABLET ORAL at 05:10

## 2022-01-01 RX ADMIN — HYDRALAZINE HYDROCHLORIDE 100 MG: 50 TABLET, FILM COATED ORAL at 11:11

## 2022-01-01 RX ADMIN — SODIUM BICARBONATE 650 MG TABLET 1300 MG: at 11:11

## 2022-01-01 RX ADMIN — ENOXAPARIN SODIUM 60 MG: 80 INJECTION SUBCUTANEOUS at 06:10

## 2022-01-01 RX ADMIN — AMLODIPINE BESYLATE 10 MG: 5 TABLET ORAL at 09:09

## 2022-01-01 RX ADMIN — PROTHROMBIN, COAGULATION FACTOR VII HUMAN, COAGULATION FACTOR IX HUMAN, COAGULATION FACTOR X HUMAN, PROTEIN C, PROTEIN S HUMAN, AND WATER 3015 UNITS: KIT at 12:11

## 2022-01-01 RX ADMIN — LIDOCAINE HYDROCHLORIDE 10 ML: 20 SOLUTION ORAL at 02:05

## 2022-01-01 RX ADMIN — MUPIROCIN: 20 OINTMENT TOPICAL at 10:09

## 2022-01-01 RX ADMIN — INSULIN ASPART 6 UNITS: 100 INJECTION, SOLUTION INTRAVENOUS; SUBCUTANEOUS at 12:11

## 2022-01-01 RX ADMIN — PROPOFOL 50 MCG/KG/MIN: 10 INJECTION, EMULSION INTRAVENOUS at 08:11

## 2022-01-01 RX ADMIN — PROPOFOL 30 MCG/KG/MIN: 10 INJECTION, EMULSION INTRAVENOUS at 01:09

## 2022-01-01 RX ADMIN — DEXMEDETOMIDINE HYDROCHLORIDE 0.6 MCG/KG/HR: 400 INJECTION INTRAVENOUS at 10:09

## 2022-01-01 RX ADMIN — INSULIN ASPART 2 UNITS: 100 INJECTION, SOLUTION INTRAVENOUS; SUBCUTANEOUS at 06:11

## 2022-01-01 RX ADMIN — SEVELAMER CARBONATE 800 MG: 800 TABLET, FILM COATED ORAL at 08:10

## 2022-01-01 RX ADMIN — ESMOLOL HYDROCHLORIDE 175 MCG/KG/MIN: 20 INJECTION INTRAVENOUS at 08:09

## 2022-01-01 RX ADMIN — CLEVIPIDINE 10 MG/HR: 0.5 EMULSION INTRAVENOUS at 02:09

## 2022-01-01 RX ADMIN — MAGNESIUM SULFATE HEPTAHYDRATE 2 G: 40 INJECTION, SOLUTION INTRAVENOUS at 11:09

## 2022-01-01 RX ADMIN — CLEVIPIDINE 2 MG/HR: 0.5 EMULSION INTRAVENOUS at 04:09

## 2022-01-01 RX ADMIN — Medication 200 MCG: at 12:09

## 2022-01-01 RX ADMIN — MORPHINE SULFATE 2 MG: 4 INJECTION INTRAVENOUS at 01:09

## 2022-01-01 RX ADMIN — LIDOCAINE HYDROCHLORIDE 5 ML: 20 INJECTION, SOLUTION EPIDURAL; INFILTRATION; INTRACAUDAL; PERINEURAL at 08:10

## 2022-01-01 RX ADMIN — DEXTROSE MONOHYDRATE 250 ML: 100 INJECTION, SOLUTION INTRAVENOUS at 09:10

## 2022-01-01 RX ADMIN — CLEVIPIDINE 10 MG/HR: 0.5 EMULSION INTRAVENOUS at 12:09

## 2022-01-01 RX ADMIN — FUROSEMIDE 40 MG: 10 INJECTION, SOLUTION INTRAMUSCULAR; INTRAVENOUS at 08:11

## 2022-01-01 RX ADMIN — DEXAMETHASONE SODIUM PHOSPHATE 4 MG: 4 INJECTION, SOLUTION INTRA-ARTICULAR; INTRALESIONAL; INTRAMUSCULAR; INTRAVENOUS; SOFT TISSUE at 05:11

## 2022-01-01 RX ADMIN — LACTULOSE 10 G: 10 SOLUTION ORAL at 08:11

## 2022-01-01 RX ADMIN — MEROPENEM 500 MG: 500 INJECTION, POWDER, FOR SOLUTION INTRAVENOUS at 11:10

## 2022-01-01 RX ADMIN — MORPHINE SULFATE 3 MG: 4 INJECTION INTRAVENOUS at 12:11

## 2022-01-01 RX ADMIN — ACETAMINOPHEN 1000 MG: 10 INJECTION, SOLUTION INTRAVENOUS at 06:11

## 2022-01-01 RX ADMIN — CLONIDINE HYDROCHLORIDE 0.1 MG: 0.1 TABLET ORAL at 12:10

## 2022-01-01 RX ADMIN — INSULIN ASPART 8 UNITS: 100 INJECTION, SOLUTION INTRAVENOUS; SUBCUTANEOUS at 06:10

## 2022-01-01 RX ADMIN — DEXMEDETOMIDINE HYDROCHLORIDE 1 MCG/KG/HR: 400 INJECTION INTRAVENOUS at 11:10

## 2022-01-01 RX ADMIN — SUGAMMADEX 200 MG: 100 INJECTION, SOLUTION INTRAVENOUS at 05:10

## 2022-01-01 RX ADMIN — HEPARIN SODIUM 10000 UNITS: 1000 INJECTION, SOLUTION INTRAVENOUS; SUBCUTANEOUS at 06:09

## 2022-01-01 RX ADMIN — Medication: at 08:11

## 2022-01-01 RX ADMIN — SODIUM ZIRCONIUM CYCLOSILICATE 5 G: 5 POWDER, FOR SUSPENSION ORAL at 08:10

## 2022-01-01 RX ADMIN — INSULIN ASPART 8 UNITS: 100 INJECTION, SOLUTION INTRAVENOUS; SUBCUTANEOUS at 07:11

## 2022-01-01 RX ADMIN — ASPIRIN 81 MG: 81 TABLET, COATED ORAL at 09:11

## 2022-01-01 RX ADMIN — SEVELAMER CARBONATE 800 MG: 800 TABLET, FILM COATED ORAL at 03:10

## 2022-01-01 RX ADMIN — MIDAZOLAM HYDROCHLORIDE 5 MG: 1 INJECTION, SOLUTION INTRAMUSCULAR; INTRAVENOUS at 11:09

## 2022-01-01 RX ADMIN — CALCIUM CHLORIDE INJECTION 250 MG: 100 INJECTION, SOLUTION INTRAVENOUS at 11:09

## 2022-01-01 RX ADMIN — ALBUMIN (HUMAN) 12.5 G: 5 SOLUTION INTRAVENOUS at 10:11

## 2022-01-01 RX ADMIN — SODIUM CHLORIDE, SODIUM GLUCONATE, SODIUM ACETATE, POTASSIUM CHLORIDE AND MAGNESIUM CHLORIDE: 526; 502; 368; 37; 30 INJECTION, SOLUTION INTRAVENOUS at 04:09

## 2022-01-01 RX ADMIN — HYDRALAZINE HYDROCHLORIDE 20 MG: 20 INJECTION INTRAMUSCULAR; INTRAVENOUS at 08:09

## 2022-01-01 RX ADMIN — HYDRALAZINE HYDROCHLORIDE 20 MG: 20 INJECTION INTRAMUSCULAR; INTRAVENOUS at 05:11

## 2022-01-01 RX ADMIN — MYCOPHENOLATE MOFETIL 1000 MG: 250 CAPSULE ORAL at 01:10

## 2022-01-01 RX ADMIN — HYDROMORPHONE HYDROCHLORIDE 0.5 MG: 2 INJECTION, SOLUTION INTRAMUSCULAR; INTRAVENOUS; SUBCUTANEOUS at 06:11

## 2022-01-01 RX ADMIN — AMLODIPINE BESYLATE 10 MG: 5 TABLET ORAL at 10:10

## 2022-01-01 RX ADMIN — DEXTROSE AND SODIUM CHLORIDE: 5; 900 INJECTION, SOLUTION INTRAVENOUS at 09:09

## 2022-01-01 RX ADMIN — WARFARIN SODIUM 3 MG: 2 TABLET ORAL at 05:11

## 2022-01-01 RX ADMIN — Medication 25 MCG/HR: at 07:11

## 2022-01-01 RX ADMIN — BISACODYL 10 MG: 10 SUPPOSITORY RECTAL at 10:10

## 2022-01-01 RX ADMIN — MORPHINE SULFATE 2 MG: 4 INJECTION INTRAVENOUS at 08:09

## 2022-01-01 RX ADMIN — FUROSEMIDE 40 MG: 10 INJECTION, SOLUTION INTRAMUSCULAR; INTRAVENOUS at 05:11

## 2022-01-01 RX ADMIN — PROPOFOL 25 MCG/KG/MIN: 10 INJECTION, EMULSION INTRAVENOUS at 11:09

## 2022-01-01 RX ADMIN — PROPOFOL 100 MG: 10 INJECTION, EMULSION INTRAVENOUS at 04:10

## 2022-01-01 RX ADMIN — HYDROMORPHONE HYDROCHLORIDE 2 MG: 2 INJECTION, SOLUTION INTRAMUSCULAR; INTRAVENOUS; SUBCUTANEOUS at 05:11

## 2022-01-01 RX ADMIN — GLIMEPIRIDE 4 MG: 4 TABLET ORAL at 06:10

## 2022-01-01 RX ADMIN — PROPOFOL 5 MCG/KG/MIN: 10 INJECTION, EMULSION INTRAVENOUS at 07:11

## 2022-01-01 RX ADMIN — HYDROMORPHONE HYDROCHLORIDE 1 MG: 2 INJECTION INTRAMUSCULAR; INTRAVENOUS; SUBCUTANEOUS at 10:10

## 2022-01-01 RX ADMIN — FAMOTIDINE 20 MG: 20 TABLET, FILM COATED ORAL at 01:10

## 2022-01-01 RX ADMIN — ALBUMIN HUMAN 25 G: 0.25 SOLUTION INTRAVENOUS at 05:10

## 2022-01-01 RX ADMIN — SODIUM CHLORIDE: 9 INJECTION, SOLUTION INTRAVENOUS at 09:09

## 2022-01-01 RX ADMIN — METHOCARBAMOL 500 MG: 500 TABLET ORAL at 01:11

## 2022-01-01 RX ADMIN — HYDROCODONE BITARTRATE AND ACETAMINOPHEN 1 TABLET: 5; 325 TABLET ORAL at 12:10

## 2022-01-01 RX ADMIN — HYDRALAZINE HYDROCHLORIDE 100 MG: 50 TABLET, FILM COATED ORAL at 05:10

## 2022-01-01 RX ADMIN — ENOXAPARIN SODIUM 60 MG: 80 INJECTION SUBCUTANEOUS at 10:11

## 2022-01-01 RX ADMIN — I.V. FAT EMULSION 250 ML: 20 EMULSION INTRAVENOUS at 10:10

## 2022-01-01 RX ADMIN — ROCURONIUM BROMIDE 42 MG: 10 INJECTION, SOLUTION INTRAVENOUS at 01:11

## 2022-01-01 RX ADMIN — CLEVIPIDINE 3 MG/HR: 0.5 EMULSION INTRAVENOUS at 10:11

## 2022-01-01 RX ADMIN — CLEVIPIDINE 24 MG/HR: 0.5 EMULSION INTRAVENOUS at 10:09

## 2022-01-01 RX ADMIN — CARVEDILOL 25 MG: 12.5 TABLET, FILM COATED ORAL at 02:11

## 2022-01-01 RX ADMIN — HYDROMORPHONE HYDROCHLORIDE 1 MG: 2 INJECTION, SOLUTION INTRAMUSCULAR; INTRAVENOUS; SUBCUTANEOUS at 08:11

## 2022-01-01 RX ADMIN — INSULIN ASPART 6 UNITS: 100 INJECTION, SOLUTION INTRAVENOUS; SUBCUTANEOUS at 12:10

## 2022-01-01 RX ADMIN — DEXTROSE AND SODIUM CHLORIDE: 5; 900 INJECTION, SOLUTION INTRAVENOUS at 10:09

## 2022-01-01 RX ADMIN — METOCLOPRAMIDE 5 MG: 5 INJECTION, SOLUTION INTRAMUSCULAR; INTRAVENOUS at 11:10

## 2022-01-01 RX ADMIN — BELIMUMAB 200 MG: 200 SOLUTION SUBCUTANEOUS at 05:10

## 2022-01-01 RX ADMIN — HYDRALAZINE HYDROCHLORIDE 20 MG: 20 INJECTION INTRAMUSCULAR; INTRAVENOUS at 05:10

## 2022-01-01 RX ADMIN — FLUCONAZOLE 400 MG: 2 INJECTION, SOLUTION INTRAVENOUS at 03:11

## 2022-01-01 RX ADMIN — PROPOFOL 50 MCG/KG/MIN: 10 INJECTION, EMULSION INTRAVENOUS at 04:09

## 2022-01-01 RX ADMIN — METRONIDAZOLE 500 MG: 5 INJECTION, SOLUTION INTRAVENOUS at 06:10

## 2022-01-01 RX ADMIN — GABAPENTIN 300 MG: 300 CAPSULE ORAL at 10:10

## 2022-01-01 RX ADMIN — AMLODIPINE BESYLATE 10 MG: 5 TABLET ORAL at 04:10

## 2022-01-01 RX ADMIN — MORPHINE SULFATE 3 MG: 4 INJECTION INTRAVENOUS at 11:11

## 2022-01-01 RX ADMIN — NITROGLYCERIN 0.4 MG: 0.4 TABLET, ORALLY DISINTEGRATING SUBLINGUAL at 07:11

## 2022-01-01 RX ADMIN — EPINEPHRINE 150 MCG: 1 INJECTION INTRAMUSCULAR; INTRAVENOUS; SUBCUTANEOUS at 01:09

## 2022-01-01 RX ADMIN — METHYLPREDNISOLONE SODIUM SUCCINATE 40 MG: 40 INJECTION, POWDER, FOR SOLUTION INTRAMUSCULAR; INTRAVENOUS at 11:10

## 2022-01-01 RX ADMIN — INSULIN ASPART 6 UNITS: 100 INJECTION, SOLUTION INTRAVENOUS; SUBCUTANEOUS at 05:10

## 2022-01-01 RX ADMIN — SODIUM CHLORIDE, SODIUM GLUCONATE, SODIUM ACETATE, POTASSIUM CHLORIDE AND MAGNESIUM CHLORIDE: 526; 502; 368; 37; 30 INJECTION, SOLUTION INTRAVENOUS at 01:11

## 2022-01-01 RX ADMIN — CLEVIPIDINE 8 MG/HR: 0.5 EMULSION INTRAVENOUS at 05:09

## 2022-01-01 RX ADMIN — MORPHINE SULFATE 2 MG: 4 INJECTION INTRAVENOUS at 10:11

## 2022-01-01 RX ADMIN — MEROPENEM 500 MG: 500 INJECTION, POWDER, FOR SOLUTION INTRAVENOUS at 01:11

## 2022-01-01 RX ADMIN — GABAPENTIN 100 MG: 100 CAPSULE ORAL at 04:11

## 2022-01-01 RX ADMIN — MELATONIN TAB 3 MG 6 MG: 3 TAB at 12:10

## 2022-01-01 RX ADMIN — NIFEDIPINE 30 MG: 30 TABLET, FILM COATED, EXTENDED RELEASE ORAL at 09:10

## 2022-01-01 RX ADMIN — FENTANYL CITRATE 100 MCG: 50 INJECTION, SOLUTION INTRAMUSCULAR; INTRAVENOUS at 04:09

## 2022-01-01 RX ADMIN — Medication: at 10:11

## 2022-01-01 RX ADMIN — ACETAMINOPHEN 650 MG: 325 TABLET, FILM COATED ORAL at 03:11

## 2022-01-01 RX ADMIN — LABETALOL HYDROCHLORIDE 20 MG: 5 INJECTION INTRAVENOUS at 03:10

## 2022-01-01 RX ADMIN — DEXMEDETOMIDINE HYDROCHLORIDE 1.4 MCG/KG/HR: 400 INJECTION INTRAVENOUS at 09:09

## 2022-01-01 RX ADMIN — Medication: at 11:11

## 2022-01-01 RX ADMIN — CLEVIPIDINE 32 MG/HR: 0.5 EMULSION INTRAVENOUS at 05:09

## 2022-01-01 RX ADMIN — ONDANSETRON 4 MG: 2 INJECTION INTRAMUSCULAR; INTRAVENOUS at 09:11

## 2022-01-01 RX ADMIN — METHYLPREDNISOLONE SODIUM SUCCINATE 40 MG: 40 INJECTION, POWDER, FOR SOLUTION INTRAMUSCULAR; INTRAVENOUS at 09:11

## 2022-01-01 RX ADMIN — MIDAZOLAM 2 MG: 1 INJECTION INTRAMUSCULAR; INTRAVENOUS at 07:11

## 2022-01-01 RX ADMIN — ESMOLOL HYDROCHLORIDE 350 MCG/KG/MIN: 20 INJECTION INTRAVENOUS at 10:09

## 2022-01-01 RX ADMIN — HYDROMORPHONE HYDROCHLORIDE 0.5 MG: 2 INJECTION, SOLUTION INTRAMUSCULAR; INTRAVENOUS; SUBCUTANEOUS at 07:11

## 2022-01-01 RX ADMIN — ROCURONIUM BROMIDE 25 MG: 10 INJECTION, SOLUTION INTRAVENOUS at 06:09

## 2022-01-01 RX ADMIN — CARVEDILOL 12.5 MG: 12.5 TABLET, FILM COATED ORAL at 12:09

## 2022-01-01 RX ADMIN — METHYLPREDNISOLONE SODIUM SUCCINATE 40 MG: 40 INJECTION, POWDER, FOR SOLUTION INTRAMUSCULAR; INTRAVENOUS at 10:11

## 2022-01-01 RX ADMIN — ACETAMINOPHEN 650 MG: 325 TABLET, FILM COATED ORAL at 08:10

## 2022-01-01 RX ADMIN — SUCRALFATE 1 G: 1 TABLET ORAL at 10:10

## 2022-01-01 RX ADMIN — HYDROCODONE BITARTRATE AND ACETAMINOPHEN 1 TABLET: 5; 325 TABLET ORAL at 04:10

## 2022-01-01 RX ADMIN — FLUCONAZOLE 400 MG: 2 INJECTION, SOLUTION INTRAVENOUS at 09:11

## 2022-01-01 RX ADMIN — INSULIN ASPART 4 UNITS: 100 INJECTION, SOLUTION INTRAVENOUS; SUBCUTANEOUS at 08:11

## 2022-01-01 RX ADMIN — SODIUM CHLORIDE: 234 INJECTION, SOLUTION INTRAVENOUS at 10:11

## 2022-01-01 RX ADMIN — PROPOFOL 40 MCG/KG/MIN: 10 INJECTION, EMULSION INTRAVENOUS at 01:11

## 2022-01-01 RX ADMIN — ROCURONIUM BROMIDE 50 MG: 10 INJECTION, SOLUTION INTRAVENOUS at 07:11

## 2022-01-01 RX ADMIN — INSULIN ASPART 2 UNITS: 100 INJECTION, SOLUTION INTRAVENOUS; SUBCUTANEOUS at 06:09

## 2022-01-01 RX ADMIN — FENTANYL CITRATE 100 MCG: 50 INJECTION, SOLUTION INTRAMUSCULAR; INTRAVENOUS at 06:09

## 2022-01-01 RX ADMIN — HYDROMORPHONE HYDROCHLORIDE 1 MG: 2 INJECTION INTRAMUSCULAR; INTRAVENOUS; SUBCUTANEOUS at 07:11

## 2022-01-01 RX ADMIN — MORPHINE SULFATE 2 MG: 4 INJECTION INTRAVENOUS at 05:11

## 2022-01-01 RX ADMIN — MIDAZOLAM HYDROCHLORIDE 1 MG: 1 INJECTION, SOLUTION INTRAMUSCULAR; INTRAVENOUS at 08:10

## 2022-01-01 RX ADMIN — EPINEPHRINE 0.4 MCG/KG/MIN: 1 INJECTION INTRAMUSCULAR; INTRAVENOUS; SUBCUTANEOUS at 07:09

## 2022-01-01 RX ADMIN — CLEVIPIDINE 8 MG/HR: 0.5 EMULSION INTRAVENOUS at 11:09

## 2022-01-01 RX ADMIN — WARFARIN SODIUM 3 MG: 1 TABLET ORAL at 06:10

## 2022-01-01 RX ADMIN — METHOCARBAMOL 500 MG: 500 TABLET ORAL at 10:10

## 2022-01-01 RX ADMIN — MORPHINE SULFATE 3 MG: 4 INJECTION INTRAVENOUS at 03:11

## 2022-01-01 RX ADMIN — CLEVIPIDINE 20 MG/HR: 0.5 EMULSION INTRAVENOUS at 03:09

## 2022-01-01 RX ADMIN — METRONIDAZOLE 500 MG: 500 TABLET ORAL at 06:10

## 2022-01-01 RX ADMIN — ONDANSETRON 4 MG: 2 INJECTION INTRAMUSCULAR; INTRAVENOUS at 10:11

## 2022-01-01 RX ADMIN — ACETAMINOPHEN 650 MG: 325 TABLET, FILM COATED ORAL at 05:10

## 2022-01-01 RX ADMIN — ONDANSETRON 4 MG: 4 TABLET, ORALLY DISINTEGRATING ORAL at 02:05

## 2022-01-01 RX ADMIN — ETOMIDATE 10 MG: 2 INJECTION INTRAVENOUS at 07:11

## 2022-01-01 RX ADMIN — WARFARIN SODIUM 3 MG: 1 TABLET ORAL at 05:10

## 2022-01-01 RX ADMIN — PROPOFOL 50 MCG/KG/MIN: 10 INJECTION, EMULSION INTRAVENOUS at 11:09

## 2022-01-01 RX ADMIN — METOPROLOL TARTRATE 25 MG: 25 TABLET, FILM COATED ORAL at 04:10

## 2022-01-01 RX ADMIN — FENTANYL CITRATE: 50 INJECTION, SOLUTION INTRAMUSCULAR; INTRAVENOUS at 09:09

## 2022-01-01 RX ADMIN — Medication 2 CAPSULE: at 08:10

## 2022-01-01 RX ADMIN — GEMFIBROZIL 600 MG: 600 TABLET ORAL at 06:10

## 2022-01-01 RX ADMIN — CLEVIPIDINE 3 MG/HR: 0.5 EMULSION INTRAVENOUS at 01:11

## 2022-01-01 RX ADMIN — FENTANYL CITRATE 100 MCG: 50 INJECTION, SOLUTION INTRAMUSCULAR; INTRAVENOUS at 11:09

## 2022-01-01 RX ADMIN — CEFTRIAXONE SODIUM 1 G: 1 INJECTION, POWDER, FOR SOLUTION INTRAMUSCULAR; INTRAVENOUS at 05:10

## 2022-01-01 RX ADMIN — CLEVIPIDINE 32 MG/HR: 0.5 EMULSION INTRAVENOUS at 11:09

## 2022-01-01 RX ADMIN — CLEVIPIDINE 32 MG/HR: 0.5 EMULSION INTRAVENOUS at 08:09

## 2022-01-01 RX ADMIN — MEROPENEM 1 G: 1 INJECTION, POWDER, FOR SOLUTION INTRAVENOUS at 10:11

## 2022-01-01 RX ADMIN — ACETAMINOPHEN 650 MG: 325 TABLET, FILM COATED ORAL at 06:10

## 2022-01-01 RX ADMIN — DEXMEDETOMIDINE HYDROCHLORIDE 0.2 MCG/KG/HR: 400 INJECTION INTRAVENOUS at 04:11

## 2022-01-01 RX ADMIN — IOPAMIDOL 100 ML: 755 INJECTION, SOLUTION INTRAVENOUS at 11:11

## 2022-01-01 RX ADMIN — VANCOMYCIN HYDROCHLORIDE 500 MG: 500 INJECTION, POWDER, LYOPHILIZED, FOR SOLUTION INTRAVENOUS at 08:10

## 2022-01-01 RX ADMIN — CLEVIPIDINE 32 MG/HR: 0.5 EMULSION INTRAVENOUS at 09:09

## 2022-01-01 RX ADMIN — MEROPENEM 1 G: 1 INJECTION, POWDER, FOR SOLUTION INTRAVENOUS at 02:11

## 2022-01-01 RX ADMIN — SODIUM CHLORIDE: 234 INJECTION, SOLUTION INTRAVENOUS at 11:11

## 2022-01-01 RX ADMIN — MAGNESIUM SULFATE IN DEXTROSE 1 G: 10 INJECTION, SOLUTION INTRAVENOUS at 03:11

## 2022-01-01 RX ADMIN — GABAPENTIN 100 MG: 100 CAPSULE ORAL at 02:11

## 2022-01-01 RX ADMIN — EPINEPHRINE 150 MCG: 1 INJECTION INTRAMUSCULAR; INTRAVENOUS; SUBCUTANEOUS at 02:09

## 2022-01-01 RX ADMIN — CLEVIPIDINE 20 MG/HR: 0.5 EMULSION INTRAVENOUS at 04:09

## 2022-01-01 RX ADMIN — PROPOFOL 200 MG: 10 INJECTION, EMULSION INTRAVENOUS at 04:09

## 2022-01-01 RX ADMIN — HYDRALAZINE HYDROCHLORIDE 20 MG: 20 INJECTION INTRAMUSCULAR; INTRAVENOUS at 09:10

## 2022-01-01 RX ADMIN — SEVELAMER CARBONATE 800 MG: 800 TABLET, FILM COATED ORAL at 06:10

## 2022-01-01 RX ADMIN — ALBUMIN (HUMAN) 25 G: 12.5 SOLUTION INTRAVENOUS at 08:11

## 2022-01-01 RX ADMIN — SUCRALFATE 1 G: 1 TABLET ORAL at 01:10

## 2022-01-01 RX ADMIN — GLIMEPIRIDE 4 MG: 4 TABLET ORAL at 08:10

## 2022-01-01 RX ADMIN — AMLODIPINE BESYLATE 5 MG: 5 TABLET ORAL at 03:06

## 2022-01-01 RX ADMIN — ROCURONIUM BROMIDE 60 MG: 10 INJECTION, SOLUTION INTRAVENOUS at 04:10

## 2022-01-01 RX ADMIN — INSULIN ASPART 3 UNITS: 100 INJECTION, SOLUTION INTRAVENOUS; SUBCUTANEOUS at 08:09

## 2022-01-01 RX ADMIN — MIDAZOLAM HYDROCHLORIDE 2 MG: 1 INJECTION, SOLUTION INTRAMUSCULAR; INTRAVENOUS at 04:09

## 2022-01-01 RX ADMIN — INSULIN DETEMIR 15 UNITS: 100 INJECTION, SOLUTION SUBCUTANEOUS at 10:11

## 2022-01-01 RX ADMIN — ESMOLOL HYDROCHLORIDE 175 MCG/KG/MIN: 20 INJECTION INTRAVENOUS at 05:09

## 2022-01-01 RX ADMIN — VASOPRESSIN 1 UNITS: 20 INJECTION INTRAVENOUS at 04:10

## 2022-01-01 RX ADMIN — PROPOFOL 45 MCG/KG/MIN: 10 INJECTION, EMULSION INTRAVENOUS at 05:11

## 2022-01-01 RX ADMIN — I.V. FAT EMULSION 250 ML: 20 EMULSION INTRAVENOUS at 10:11

## 2022-01-01 RX ADMIN — PREDNISONE 20 MG: 20 TABLET ORAL at 02:11

## 2022-01-01 RX ADMIN — INSULIN ASPART 2 UNITS: 100 INJECTION, SOLUTION INTRAVENOUS; SUBCUTANEOUS at 09:10

## 2022-01-01 RX ADMIN — Medication: at 12:11

## 2022-01-01 RX ADMIN — Medication 150 MCG/HR: at 12:11

## 2022-01-01 RX ADMIN — CLEVIPIDINE 24 MG/HR: 0.5 EMULSION INTRAVENOUS at 02:09

## 2022-01-01 RX ADMIN — CALCIUM GLUCONATE 1 G: 20 INJECTION, SOLUTION INTRAVENOUS at 06:09

## 2022-01-01 RX ADMIN — CALCIUM CHLORIDE INJECTION 1 G: 100 INJECTION, SOLUTION INTRAVENOUS at 02:11

## 2022-01-01 RX ADMIN — VASOPRESSIN 1 UNITS: 20 INJECTION INTRAVENOUS at 02:09

## 2022-01-01 RX ADMIN — Medication 2 CAPSULE: at 06:10

## 2022-01-01 RX ADMIN — ACETAMINOPHEN 650 MG: 325 TABLET, FILM COATED ORAL at 11:11

## 2022-01-01 RX ADMIN — SODIUM ZIRCONIUM CYCLOSILICATE 10 G: 10 POWDER, FOR SUSPENSION ORAL at 10:11

## 2022-01-01 RX ADMIN — DOXYCYCLINE 100 MG: 100 INJECTION, POWDER, LYOPHILIZED, FOR SOLUTION INTRAVENOUS at 06:11

## 2022-01-01 RX ADMIN — FENTANYL CITRATE 100 MCG: 50 INJECTION, SOLUTION INTRAMUSCULAR; INTRAVENOUS at 07:11

## 2022-01-01 RX ADMIN — PROPOFOL 35 MCG/KG/MIN: 10 INJECTION, EMULSION INTRAVENOUS at 02:11

## 2022-01-01 RX ADMIN — SODIUM NITROPRUSSIDE 0.3 MCG/KG/MIN: 25 INJECTION, SOLUTION, CONCENTRATE INTRAVENOUS at 04:09

## 2022-01-01 RX ADMIN — ESMOLOL HYDROCHLORIDE 300 MCG/KG/MIN: 20 INJECTION INTRAVENOUS at 06:09

## 2022-01-01 RX ADMIN — LOSARTAN POTASSIUM 25 MG: 25 TABLET, FILM COATED ORAL at 09:10

## 2022-01-01 RX ADMIN — CLEVIPIDINE 24 MG/HR: 0.5 EMULSION INTRAVENOUS at 12:09

## 2022-01-01 RX ADMIN — METRONIDAZOLE 500 MG: 5 INJECTION, SOLUTION INTRAVENOUS at 01:10

## 2022-01-01 RX ADMIN — FLUCONAZOLE 400 MG: 2 INJECTION, SOLUTION INTRAVENOUS at 03:10

## 2022-01-01 RX ADMIN — Medication 2 CAPSULE: at 11:10

## 2022-01-01 RX ADMIN — CLEVIPIDINE 32 MG/HR: 0.5 EMULSION INTRAVENOUS at 03:09

## 2022-01-01 RX ADMIN — CEFUROXIME SODIUM 1.5 G: 1.5 INJECTION, POWDER, FOR SOLUTION INTRAVENOUS at 05:09

## 2022-01-01 RX ADMIN — DESMOPRESSIN ACETATE 6.03 MCG: 4 SOLUTION INTRAVENOUS at 01:11

## 2022-01-01 RX ADMIN — HYDROMORPHONE HYDROCHLORIDE 1 MG: 2 INJECTION INTRAMUSCULAR; INTRAVENOUS; SUBCUTANEOUS at 10:11

## 2022-01-01 RX ADMIN — INSULIN ASPART 1 UNITS: 100 INJECTION, SOLUTION INTRAVENOUS; SUBCUTANEOUS at 09:10

## 2022-01-01 RX ADMIN — NALOXEGOL OXALATE 25 MG: 25 TABLET, FILM COATED ORAL at 06:11

## 2022-01-01 RX ADMIN — SUCCINYLCHOLINE CHLORIDE 90 MG: 20 INJECTION, SOLUTION INTRAMUSCULAR; INTRAVENOUS at 01:11

## 2022-01-01 RX ADMIN — CLONIDINE 1 PATCH: 0.2 PATCH TRANSDERMAL at 10:11

## 2022-01-01 RX ADMIN — INSULIN HUMAN 5 UNITS: 100 INJECTION, SOLUTION PARENTERAL at 10:10

## 2022-01-01 RX ADMIN — ESMOLOL HYDROCHLORIDE IN SODIUM CHLORIDE 50 MCG/KG/MIN: 20 INJECTION INTRAVENOUS at 03:09

## 2022-01-01 RX ADMIN — PROPOFOL 30 MG: 10 INJECTION, EMULSION INTRAVENOUS at 07:11

## 2022-01-01 RX ADMIN — PROPOFOL 5 MCG/KG/MIN: 10 INJECTION, EMULSION INTRAVENOUS at 04:11

## 2022-01-01 RX ADMIN — ACETAMINOPHEN 650 MG: 325 TABLET, FILM COATED ORAL at 06:11

## 2022-01-01 RX ADMIN — CARVEDILOL 12.5 MG: 12.5 TABLET, FILM COATED ORAL at 09:10

## 2022-01-01 RX ADMIN — INSULIN ASPART 10 UNITS: 100 INJECTION, SOLUTION INTRAVENOUS; SUBCUTANEOUS at 11:11

## 2022-01-01 RX ADMIN — DEXMEDETOMIDINE HYDROCHLORIDE 1.4 MCG/KG/HR: 400 INJECTION INTRAVENOUS at 02:10

## 2022-01-01 RX ADMIN — MEROPENEM 1 G: 1 INJECTION, POWDER, FOR SOLUTION INTRAVENOUS at 01:10

## 2022-01-01 RX ADMIN — CALCIUM CHLORIDE INJECTION 1 G: 100 INJECTION, SOLUTION INTRAVENOUS at 07:09

## 2022-01-01 RX ADMIN — INSULIN ASPART 10 UNITS: 100 INJECTION, SOLUTION INTRAVENOUS; SUBCUTANEOUS at 09:10

## 2022-01-01 RX ADMIN — CLEVIPIDINE 20 MG/HR: 0.5 EMULSION INTRAVENOUS at 08:09

## 2022-01-01 RX ADMIN — NALOXEGOL OXALATE 25 MG: 25 TABLET, FILM COATED ORAL at 02:11

## 2022-01-01 RX ADMIN — DOXYCYCLINE 100 MG: 100 INJECTION, POWDER, LYOPHILIZED, FOR SOLUTION INTRAVENOUS at 01:11

## 2022-01-01 RX ADMIN — GEMFIBROZIL 600 MG: 600 TABLET ORAL at 03:09

## 2022-01-01 RX ADMIN — CLEVIPIDINE 4 MG/HR: 0.5 EMULSION INTRAVENOUS at 02:11

## 2022-01-01 RX ADMIN — Medication 100 MCG: at 11:09

## 2022-01-01 RX ADMIN — SODIUM BICARBONATE 650 MG TABLET 1300 MG: at 10:11

## 2022-01-01 RX ADMIN — PHENYLEPHRINE HYDROCHLORIDE 100 MCG: 10 INJECTION INTRAVENOUS at 04:09

## 2022-01-01 RX ADMIN — CLEVIPIDINE 32 MG/HR: 0.5 EMULSION INTRAVENOUS at 02:09

## 2022-01-01 RX ADMIN — MEROPENEM 500 MG: 500 INJECTION, POWDER, FOR SOLUTION INTRAVENOUS at 02:11

## 2022-01-01 RX ADMIN — MEROPENEM 500 MG: 500 INJECTION, POWDER, FOR SOLUTION INTRAVENOUS at 10:11

## 2022-01-01 RX ADMIN — METHYLPREDNISOLONE SODIUM SUCCINATE 40 MG: 40 INJECTION, POWDER, FOR SOLUTION INTRAMUSCULAR; INTRAVENOUS at 02:10

## 2022-01-01 RX ADMIN — ESMOLOL HYDROCHLORIDE 175 MCG/KG/MIN: 20 INJECTION INTRAVENOUS at 02:09

## 2022-01-01 RX ADMIN — SODIUM CHLORIDE, SODIUM GLUCONATE, SODIUM ACETATE, POTASSIUM CHLORIDE AND MAGNESIUM CHLORIDE: 526; 502; 368; 37; 30 INJECTION, SOLUTION INTRAVENOUS at 03:10

## 2022-01-01 RX ADMIN — ENOXAPARIN SODIUM 60 MG: 80 INJECTION SUBCUTANEOUS at 04:11

## 2022-01-01 RX ADMIN — LABETALOL HYDROCHLORIDE 20 MG: 5 INJECTION INTRAVENOUS at 05:10

## 2022-01-01 RX ADMIN — PANTOPRAZOLE SODIUM 40 MG: 40 INJECTION, POWDER, FOR SOLUTION INTRAVENOUS at 08:11

## 2022-01-01 RX ADMIN — Medication 100 MCG/HR: at 10:11

## 2022-01-01 RX ADMIN — LABETALOL HYDROCHLORIDE 10 MG: 5 INJECTION, SOLUTION INTRAVENOUS at 04:09

## 2022-01-01 RX ADMIN — ONDANSETRON 4 MG: 2 INJECTION INTRAMUSCULAR; INTRAVENOUS at 12:11

## 2022-01-01 RX ADMIN — HYDRALAZINE HYDROCHLORIDE 50 MG: 50 TABLET, FILM COATED ORAL at 06:10

## 2022-01-01 RX ADMIN — PROPOFOL 30 MCG/KG/MIN: 10 INJECTION, EMULSION INTRAVENOUS at 10:09

## 2022-01-01 RX ADMIN — SODIUM CHLORIDE: 0.9 INJECTION, SOLUTION INTRAVENOUS at 04:11

## 2022-01-01 RX ADMIN — ROCURONIUM BROMIDE 50 MG: 10 SOLUTION INTRAVENOUS at 11:09

## 2022-01-01 RX ADMIN — AMLODIPINE BESYLATE 10 MG: 5 TABLET ORAL at 09:10

## 2022-01-01 RX ADMIN — HYDROXYCHLOROQUINE SULFATE 200 MG: 200 TABLET, FILM COATED ORAL at 11:10

## 2022-01-01 RX ADMIN — ROCURONIUM BROMIDE 8 MG: 10 INJECTION, SOLUTION INTRAVENOUS at 01:11

## 2022-01-01 RX ADMIN — INSULIN DETEMIR 10 UNITS: 100 INJECTION, SOLUTION SUBCUTANEOUS at 10:11

## 2022-01-01 RX ADMIN — PREDNISONE 20 MG: 20 TABLET ORAL at 10:11

## 2022-01-01 RX ADMIN — HYDRALAZINE HYDROCHLORIDE 10 MG: 20 INJECTION, SOLUTION INTRAMUSCULAR; INTRAVENOUS at 09:09

## 2022-01-01 RX ADMIN — ONDANSETRON 4 MG: 2 INJECTION INTRAMUSCULAR; INTRAVENOUS at 09:10

## 2022-01-01 RX ADMIN — SENNOSIDES AND DOCUSATE SODIUM 2 TABLET: 50; 8.6 TABLET ORAL at 10:11

## 2022-01-01 RX ADMIN — HYDROCODONE BITARTRATE AND ACETAMINOPHEN 1 TABLET: 5; 325 TABLET ORAL at 05:10

## 2022-01-01 RX ADMIN — ONDANSETRON 4 MG: 2 INJECTION INTRAMUSCULAR; INTRAVENOUS at 08:11

## 2022-01-01 RX ADMIN — PROPOFOL 40 MCG/KG/MIN: 10 INJECTION, EMULSION INTRAVENOUS at 06:11

## 2022-01-01 RX ADMIN — DOXYCYCLINE 100 MG: 100 INJECTION, POWDER, LYOPHILIZED, FOR SOLUTION INTRAVENOUS at 10:11

## 2022-01-01 RX ADMIN — INSULIN ASPART 8 UNITS: 100 INJECTION, SOLUTION INTRAVENOUS; SUBCUTANEOUS at 12:10

## 2022-01-01 RX ADMIN — INSULIN HUMAN 12 UNITS: 100 INJECTION, SOLUTION PARENTERAL at 06:10

## 2022-01-01 RX ADMIN — METHYLPREDNISOLONE SODIUM SUCCINATE 80 MG: 40 INJECTION, POWDER, FOR SOLUTION INTRAMUSCULAR; INTRAVENOUS at 04:10

## 2022-01-01 RX ADMIN — MORPHINE SULFATE 2 MG: 4 INJECTION INTRAVENOUS at 09:11

## 2022-01-01 RX ADMIN — MEROPENEM 500 MG: 500 INJECTION, POWDER, FOR SOLUTION INTRAVENOUS at 07:10

## 2022-01-01 RX ADMIN — Medication 50 MCG: at 05:11

## 2022-01-01 RX ADMIN — ETOMIDATE 20 MG: 2 INJECTION INTRAVENOUS at 02:09

## 2022-01-01 RX ADMIN — FUROSEMIDE 40 MG: 10 INJECTION, SOLUTION INTRAMUSCULAR; INTRAVENOUS at 11:11

## 2022-01-01 RX ADMIN — SODIUM ZIRCONIUM CYCLOSILICATE 5 G: 5 POWDER, FOR SUSPENSION ORAL at 03:10

## 2022-01-01 RX ADMIN — METRONIDAZOLE 500 MG: 500 TABLET ORAL at 02:10

## 2022-01-01 RX ADMIN — CALCIUM GLUCONATE 1 G: 98 INJECTION, SOLUTION INTRAVENOUS at 03:10

## 2022-01-01 RX ADMIN — ACETAMINOPHEN 650 MG: 325 TABLET, FILM COATED ORAL at 04:11

## 2022-01-01 RX ADMIN — IOPAMIDOL 100 ML: 755 INJECTION, SOLUTION INTRAVENOUS at 08:09

## 2022-01-01 RX ADMIN — METHOCARBAMOL 500 MG: 500 TABLET ORAL at 10:11

## 2022-01-01 RX ADMIN — HYDROCODONE BITARTRATE AND ACETAMINOPHEN 1 TABLET: 5; 325 TABLET ORAL at 08:10

## 2022-01-01 RX ADMIN — HYDRALAZINE HYDROCHLORIDE 10 MG: 10 TABLET ORAL at 12:10

## 2022-01-01 RX ADMIN — ACETAMINOPHEN 650 MG: 325 TABLET, FILM COATED ORAL at 07:10

## 2022-01-01 RX ADMIN — NIFEDIPINE 30 MG: 30 TABLET, FILM COATED, EXTENDED RELEASE ORAL at 01:10

## 2022-01-01 RX ADMIN — HYDRALAZINE HYDROCHLORIDE 50 MG: 50 TABLET, FILM COATED ORAL at 11:10

## 2022-01-01 RX ADMIN — CALCIUM GLUCONATE 1 G: 20 INJECTION, SOLUTION INTRAVENOUS at 02:09

## 2022-01-01 RX ADMIN — DEXTROSE: 10 SOLUTION INTRAVENOUS at 09:09

## 2022-01-01 RX ADMIN — CALCIUM CHLORIDE INJECTION 300 MG: 100 INJECTION, SOLUTION INTRAVENOUS at 07:11

## 2022-01-01 RX ADMIN — FLUCONAZOLE 400 MG: 2 INJECTION, SOLUTION INTRAVENOUS at 04:10

## 2022-01-01 RX ADMIN — INSULIN ASPART 10 UNITS: 100 INJECTION, SOLUTION INTRAVENOUS; SUBCUTANEOUS at 08:10

## 2022-01-01 RX ADMIN — INSULIN ASPART 4 UNITS: 100 INJECTION, SOLUTION INTRAVENOUS; SUBCUTANEOUS at 01:10

## 2022-01-01 RX ADMIN — MORPHINE SULFATE 2 MG: 4 INJECTION INTRAVENOUS at 07:11

## 2022-01-01 RX ADMIN — VASOPRESSIN 1 UNITS: 20 INJECTION INTRAVENOUS at 06:11

## 2022-01-01 RX ADMIN — PROPOFOL 50 MCG/KG/MIN: 10 INJECTION, EMULSION INTRAVENOUS at 09:11

## 2022-01-01 RX ADMIN — CLEVIPIDINE 3 MG/HR: 0.5 EMULSION INTRAVENOUS at 11:11

## 2022-01-01 RX ADMIN — DEXTROSE: 10 SOLUTION INTRAVENOUS at 02:11

## 2022-01-01 RX ADMIN — Medication 150 MCG: at 05:11

## 2022-01-01 RX ADMIN — SODIUM POLYSTYRENE SULFONATE 30 G: 15 SUSPENSION ORAL; RECTAL at 08:10

## 2022-01-01 RX ADMIN — HYDRALAZINE HYDROCHLORIDE 100 MG: 50 TABLET, FILM COATED ORAL at 04:10

## 2022-01-01 RX ADMIN — SODIUM CHLORIDE 500 ML: 9 INJECTION, SOLUTION INTRAVENOUS at 11:10

## 2022-01-01 RX ADMIN — ROCURONIUM BROMIDE 30 MG: 10 INJECTION, SOLUTION INTRAVENOUS at 01:11

## 2022-01-01 RX ADMIN — METHOCARBAMOL 500 MG: 500 TABLET ORAL at 12:11

## 2022-01-01 RX ADMIN — PROPOFOL 50 MCG/KG/MIN: 10 INJECTION, EMULSION INTRAVENOUS at 08:09

## 2022-01-01 RX ADMIN — CLEVIPIDINE 1 MG/HR: 0.5 EMULSION INTRAVENOUS at 12:11

## 2022-01-01 RX ADMIN — METRONIDAZOLE 500 MG: 500 TABLET ORAL at 09:10

## 2022-01-01 RX ADMIN — MEROPENEM 1 G: 1 INJECTION, POWDER, FOR SOLUTION INTRAVENOUS at 03:10

## 2022-01-01 RX ADMIN — DOXYCYCLINE 100 MG: 100 INJECTION, POWDER, LYOPHILIZED, FOR SOLUTION INTRAVENOUS at 08:11

## 2022-01-01 RX ADMIN — HYDRALAZINE HYDROCHLORIDE 100 MG: 50 TABLET, FILM COATED ORAL at 08:10

## 2022-01-01 RX ADMIN — METHYLNALTREXONE BROMIDE 8 MG: 12 INJECTION, SOLUTION SUBCUTANEOUS at 08:11

## 2022-01-01 RX ADMIN — HYDROMORPHONE HYDROCHLORIDE 2 MG: 2 INJECTION INTRAMUSCULAR; INTRAVENOUS; SUBCUTANEOUS at 04:11

## 2022-01-01 RX ADMIN — MIDAZOLAM HYDROCHLORIDE 2 MG: 1 INJECTION, SOLUTION INTRAMUSCULAR; INTRAVENOUS at 12:09

## 2022-01-01 RX ADMIN — POLYETHYLENE GLYCOL 3350 17 G: 17 POWDER, FOR SOLUTION ORAL at 10:10

## 2022-01-01 RX ADMIN — CLEVIPIDINE 6 MG/HR: 0.5 EMULSION INTRAVENOUS at 02:09

## 2022-01-01 RX ADMIN — LACTULOSE 10 G: 10 SOLUTION ORAL at 10:11

## 2022-01-01 RX ADMIN — ESMOLOL HYDROCHLORIDE 325 MCG/KG/MIN: 20 INJECTION INTRAVENOUS at 12:09

## 2022-01-01 RX ADMIN — ENOXAPARIN SODIUM 60 MG: 80 INJECTION SUBCUTANEOUS at 04:10

## 2022-01-01 RX ADMIN — MORPHINE SULFATE 2 MG: 4 INJECTION INTRAVENOUS at 06:09

## 2022-01-01 RX ADMIN — MEROPENEM 1 G: 1 INJECTION, POWDER, FOR SOLUTION INTRAVENOUS at 12:10

## 2022-01-01 RX ADMIN — LACTULOSE 10 G: 10 SOLUTION ORAL at 08:10

## 2022-01-01 RX ADMIN — DEXTROSE MONOHYDRATE: 50 INJECTION, SOLUTION INTRAVENOUS at 01:09

## 2022-01-01 RX ADMIN — MIDAZOLAM 5 MG: 1 INJECTION INTRAMUSCULAR; INTRAVENOUS at 07:11

## 2022-01-01 RX ADMIN — HYDROMORPHONE HYDROCHLORIDE 2 MG: 2 INJECTION INTRAMUSCULAR; INTRAVENOUS; SUBCUTANEOUS at 02:10

## 2022-01-01 RX ADMIN — DICYCLOMINE HYDROCHLORIDE 20 MG: 10 CAPSULE ORAL at 05:10

## 2022-01-01 RX ADMIN — METHYLNALTREXONE BROMIDE 8 MG: 12 INJECTION, SOLUTION SUBCUTANEOUS at 10:11

## 2022-01-01 RX ADMIN — MIDAZOLAM 2 MG: 1 INJECTION INTRAMUSCULAR; INTRAVENOUS at 03:10

## 2022-01-01 RX ADMIN — CLEVIPIDINE 3 MG/HR: 0.5 EMULSION INTRAVENOUS at 05:11

## 2022-01-01 RX ADMIN — BELIMUMAB 200 MG: 200 SOLUTION SUBCUTANEOUS at 12:10

## 2022-01-01 RX ADMIN — FENTANYL CITRATE 50 MCG: 50 INJECTION, SOLUTION INTRAMUSCULAR; INTRAVENOUS at 08:10

## 2022-01-01 RX ADMIN — INSULIN ASPART 8 UNITS: 100 INJECTION, SOLUTION INTRAVENOUS; SUBCUTANEOUS at 10:10

## 2022-01-01 RX ADMIN — HEPARIN SODIUM 5000 UNITS: 5000 INJECTION, SOLUTION INTRAVENOUS; SUBCUTANEOUS at 08:10

## 2022-01-01 RX ADMIN — BARIUM SULFATE 10 ML: 0.81 POWDER, FOR SUSPENSION ORAL at 11:10

## 2022-01-01 RX ADMIN — INSULIN ASPART 8 UNITS: 100 INJECTION, SOLUTION INTRAVENOUS; SUBCUTANEOUS at 06:11

## 2022-01-01 RX ADMIN — CLONIDINE HYDROCHLORIDE 0.2 MG: 0.1 TABLET ORAL at 03:06

## 2022-01-01 RX ADMIN — METHYLPREDNISOLONE SODIUM SUCCINATE 40 MG: 40 INJECTION, POWDER, FOR SOLUTION INTRAMUSCULAR; INTRAVENOUS at 05:10

## 2022-01-01 RX ADMIN — CLEVIPIDINE 32 MG/HR: 0.5 EMULSION INTRAVENOUS at 06:09

## 2022-01-01 RX ADMIN — SODIUM CHLORIDE, PRESERVATIVE FREE 10 ML: 5 INJECTION INTRAVENOUS at 02:11

## 2022-01-01 RX ADMIN — VANCOMYCIN HYDROCHLORIDE 1250 MG: 1.25 INJECTION, POWDER, LYOPHILIZED, FOR SOLUTION INTRAVENOUS at 12:10

## 2022-01-01 RX ADMIN — IOPAMIDOL 100 ML: 755 INJECTION, SOLUTION INTRAVENOUS at 10:11

## 2022-01-01 RX ADMIN — HYDROCODONE BITARTRATE AND ACETAMINOPHEN 1 TABLET: 5; 325 TABLET ORAL at 03:10

## 2022-01-01 RX ADMIN — LEUCINE, PHENYLALANINE, LYSINE, METHIONINE, ISOLEUCINE, VALINE, HISTIDINE, THREONINE, TRYPTOPHAN, ALANINE, GLYCINE, ARGININE, PROLINE, SERINE, TYROSINE, SODIUM ACETATE, DIBASIC POTASSIUM PHOSPHATE, MAGNESIUM CHLORIDE, SODIUM CHLORIDE, CALCIUM CHLORIDE, DEXTROSE
365; 280; 290; 200; 300; 290; 240; 210; 90; 1035; 515; 575; 340; 250; 20; 340; 261; 51; 59; 33; 15 INJECTION INTRAVENOUS at 02:10

## 2022-01-01 RX ADMIN — MUPIROCIN: 20 OINTMENT TOPICAL at 02:09

## 2022-01-01 RX ADMIN — ALBUMIN HUMAN: 250 SOLUTION INTRAVENOUS at 04:10

## 2022-01-01 RX ADMIN — SENNOSIDES AND DOCUSATE SODIUM 2 TABLET: 50; 8.6 TABLET ORAL at 02:11

## 2022-01-01 RX ADMIN — HYDROMORPHONE HYDROCHLORIDE 2 MG: 2 INJECTION INTRAMUSCULAR; INTRAVENOUS; SUBCUTANEOUS at 03:10

## 2022-01-01 RX ADMIN — PROPOFOL 45 MCG/KG/MIN: 10 INJECTION, EMULSION INTRAVENOUS at 11:11

## 2022-01-01 RX ADMIN — DEXTROSE MONOHYDRATE 12.5 G: 100 INJECTION, SOLUTION INTRAVENOUS at 03:10

## 2022-01-01 RX ADMIN — MIDAZOLAM HYDROCHLORIDE 2 MG: 1 INJECTION, SOLUTION INTRAMUSCULAR; INTRAVENOUS at 04:11

## 2022-01-01 RX ADMIN — METHOCARBAMOL 500 MG: 500 TABLET ORAL at 05:11

## 2022-01-01 RX ADMIN — SODIUM CHLORIDE, PRESERVATIVE FREE 10 ML: 5 INJECTION INTRAVENOUS at 04:10

## 2022-01-01 RX ADMIN — DEXTROSE MONOHYDRATE 12.5 G: 100 INJECTION, SOLUTION INTRAVENOUS at 08:10

## 2022-01-01 RX ADMIN — SEVELAMER CARBONATE 800 MG: 800 TABLET, FILM COATED ORAL at 09:10

## 2022-01-01 RX ADMIN — HEPARIN SODIUM 5000 UNITS: 5000 INJECTION, SOLUTION INTRAVENOUS; SUBCUTANEOUS at 09:09

## 2022-01-01 RX ADMIN — CALCIUM CHLORIDE INJECTION 250 MG: 100 INJECTION, SOLUTION INTRAVENOUS at 12:09

## 2022-01-01 RX ADMIN — CALCIUM CHLORIDE INJECTION 0.5 G: 100 INJECTION, SOLUTION INTRAVENOUS at 02:11

## 2022-01-01 RX ADMIN — PROPOFOL 50 MCG/KG/MIN: 10 INJECTION, EMULSION INTRAVENOUS at 02:11

## 2022-01-01 RX ADMIN — OXYCODONE 10 MG: 5 TABLET ORAL at 12:09

## 2022-01-01 RX ADMIN — HYDROMORPHONE HYDROCHLORIDE 2 MG: 2 INJECTION INTRAMUSCULAR; INTRAVENOUS; SUBCUTANEOUS at 10:10

## 2022-01-01 RX ADMIN — SUGAMMADEX 100 MG: 100 INJECTION, SOLUTION INTRAVENOUS at 08:11

## 2022-01-01 RX ADMIN — HYDROMORPHONE HYDROCHLORIDE 1 MG: 2 INJECTION, SOLUTION INTRAMUSCULAR; INTRAVENOUS; SUBCUTANEOUS at 11:11

## 2022-01-01 RX ADMIN — PROPOFOL 30 MCG/KG/MIN: 10 INJECTION, EMULSION INTRAVENOUS at 11:09

## 2022-01-01 RX ADMIN — SODIUM ZIRCONIUM CYCLOSILICATE 10 G: 10 POWDER, FOR SUSPENSION ORAL at 09:11

## 2022-01-01 RX ADMIN — INSULIN ASPART 3 UNITS: 100 INJECTION, SOLUTION INTRAVENOUS; SUBCUTANEOUS at 12:09

## 2022-01-01 RX ADMIN — INSULIN DETEMIR 20 UNITS: 100 INJECTION, SOLUTION SUBCUTANEOUS at 09:10

## 2022-01-01 RX ADMIN — INSULIN DETEMIR 10 UNITS: 100 INJECTION, SOLUTION SUBCUTANEOUS at 11:10

## 2022-01-01 RX ADMIN — INSULIN ASPART 5 UNITS: 100 INJECTION, SOLUTION INTRAVENOUS; SUBCUTANEOUS at 10:10

## 2022-01-01 RX ADMIN — TORSEMIDE 40 MG: 20 TABLET ORAL at 11:11

## 2022-01-01 RX ADMIN — INSULIN ASPART 4 UNITS: 100 INJECTION, SOLUTION INTRAVENOUS; SUBCUTANEOUS at 08:10

## 2022-01-01 RX ADMIN — INSULIN ASPART 2 UNITS: 100 INJECTION, SOLUTION INTRAVENOUS; SUBCUTANEOUS at 12:10

## 2022-01-01 RX ADMIN — MORPHINE SULFATE 2 MG: 4 INJECTION INTRAVENOUS at 08:10

## 2022-01-01 RX ADMIN — GLYCOPYRROLATE 0.2 MG: 0.2 INJECTION INTRAMUSCULAR; INTRAVENOUS at 05:11

## 2022-01-01 RX ADMIN — SODIUM CHLORIDE 1000 ML: 9 INJECTION, SOLUTION INTRAVENOUS at 09:11

## 2022-01-01 RX ADMIN — MELATONIN TAB 3 MG 6 MG: 3 TAB at 08:11

## 2022-01-01 RX ADMIN — LIDOCAINE HYDROCHLORIDE 4 ML: 20 INJECTION, SOLUTION EPIDURAL; INFILTRATION; INTRACAUDAL; PERINEURAL at 04:09

## 2022-01-01 RX ADMIN — HYDROCODONE BITARTRATE AND ACETAMINOPHEN 1 TABLET: 5; 325 TABLET ORAL at 01:10

## 2022-01-01 RX ADMIN — HYDRALAZINE HYDROCHLORIDE 20 MG: 20 INJECTION INTRAMUSCULAR; INTRAVENOUS at 03:10

## 2022-01-01 RX ADMIN — NIFEDIPINE 90 MG: 90 TABLET, FILM COATED, EXTENDED RELEASE ORAL at 11:10

## 2022-01-01 RX ADMIN — GABAPENTIN 300 MG: 300 CAPSULE ORAL at 02:10

## 2022-01-01 RX ADMIN — ASPIRIN 81 MG: 81 TABLET, COATED ORAL at 10:11

## 2022-01-01 RX ADMIN — INSULIN DETEMIR 20 UNITS: 100 INJECTION, SOLUTION SUBCUTANEOUS at 10:10

## 2022-01-01 RX ADMIN — METHYLPREDNISOLONE SODIUM SUCCINATE 40 MG: 40 INJECTION, POWDER, FOR SOLUTION INTRAMUSCULAR; INTRAVENOUS at 12:10

## 2022-01-01 RX ADMIN — CALCIUM GLUCONATE 1 G: 20 INJECTION, SOLUTION INTRAVENOUS at 04:09

## 2022-01-01 RX ADMIN — ASPIRIN 81 MG: 81 TABLET, COATED ORAL at 02:11

## 2022-01-01 RX ADMIN — MORPHINE SULFATE 2 MG: 4 INJECTION INTRAVENOUS at 11:09

## 2022-01-01 RX ADMIN — Medication 125 MCG/HR: at 04:11

## 2022-01-01 RX ADMIN — METRONIDAZOLE 500 MG: 500 INJECTION, SOLUTION INTRAVENOUS at 11:09

## 2022-01-01 RX ADMIN — PROPOFOL 50 MG: 10 INJECTION, EMULSION INTRAVENOUS at 04:11

## 2022-01-01 RX ADMIN — SEVELAMER CARBONATE 800 MG: 800 TABLET, FILM COATED ORAL at 10:10

## 2022-01-01 RX ADMIN — METOLAZONE 5 MG: 5 TABLET ORAL at 03:11

## 2022-01-01 RX ADMIN — CLEVIPIDINE 20 MG/HR: 0.5 EMULSION INTRAVENOUS at 05:09

## 2022-01-01 RX ADMIN — MORPHINE SULFATE 4 MG: 2 INJECTION, SOLUTION INTRAMUSCULAR; INTRAVENOUS at 03:09

## 2022-01-01 RX ADMIN — HYDROMORPHONE HYDROCHLORIDE 1 MG: 2 INJECTION INTRAMUSCULAR; INTRAVENOUS; SUBCUTANEOUS at 03:11

## 2022-01-01 RX ADMIN — CLEVIPIDINE 3 MG/HR: 0.5 EMULSION INTRAVENOUS at 09:09

## 2022-01-01 RX ADMIN — ENOXAPARIN SODIUM 60 MG: 80 INJECTION SUBCUTANEOUS at 05:10

## 2022-01-01 RX ADMIN — IOPAMIDOL 100 ML: 755 INJECTION, SOLUTION INTRAVENOUS at 03:09

## 2022-01-01 RX ADMIN — SODIUM BICARBONATE 650 MG TABLET 1300 MG: at 02:10

## 2022-01-01 RX ADMIN — PANCRELIPASE 3 CAPSULE: 60000; 12000; 38000 CAPSULE, DELAYED RELEASE PELLETS ORAL at 08:11

## 2022-01-01 RX ADMIN — HYDROMORPHONE HYDROCHLORIDE 1 MG: 2 INJECTION INTRAMUSCULAR; INTRAVENOUS; SUBCUTANEOUS at 02:11

## 2022-01-01 RX ADMIN — LIDOCAINE HYDROCHLORIDE 80 MG: 20 INJECTION, SOLUTION EPIDURAL; INFILTRATION; INTRACAUDAL; PERINEURAL at 04:11

## 2022-01-01 RX ADMIN — ALUMINUM HYDROXIDE, MAGNESIUM HYDROXIDE, AND DIMETHICONE 30 ML: 200; 20; 200 SUSPENSION ORAL at 12:10

## 2022-01-01 RX ADMIN — ACETAMINOPHEN 650 MG: 325 TABLET, FILM COATED ORAL at 11:09

## 2022-01-01 RX ADMIN — DEXTROSE MONOHYDRATE 12.5 G: 100 INJECTION, SOLUTION INTRAVENOUS at 12:10

## 2022-01-01 RX ADMIN — ESMOLOL HYDROCHLORIDE 175 MCG/KG/MIN: 20 INJECTION INTRAVENOUS at 11:09

## 2022-01-01 RX ADMIN — HYDROMORPHONE HYDROCHLORIDE 1 MG: 2 INJECTION, SOLUTION INTRAMUSCULAR; INTRAVENOUS; SUBCUTANEOUS at 05:10

## 2022-01-01 RX ADMIN — FLUCONAZOLE 400 MG: 2 INJECTION, SOLUTION INTRAVENOUS at 06:10

## 2022-01-01 RX ADMIN — SODIUM CHLORIDE, SODIUM GLUCONATE, SODIUM ACETATE, POTASSIUM CHLORIDE AND MAGNESIUM CHLORIDE: 526; 502; 368; 37; 30 INJECTION, SOLUTION INTRAVENOUS at 07:11

## 2022-01-01 RX ADMIN — FLUCONAZOLE 400 MG: 2 INJECTION, SOLUTION INTRAVENOUS at 04:11

## 2022-01-01 RX ADMIN — LABETALOL HYDROCHLORIDE 20 MG: 5 INJECTION INTRAVENOUS at 02:10

## 2022-01-01 RX ADMIN — SODIUM ZIRCONIUM CYCLOSILICATE 5 G: 5 POWDER, FOR SUSPENSION ORAL at 11:10

## 2022-01-01 RX ADMIN — PROPOFOL 20 MCG/KG/MIN: 10 INJECTION, EMULSION INTRAVENOUS at 03:09

## 2022-01-01 RX ADMIN — HYDRALAZINE HYDROCHLORIDE 100 MG: 50 TABLET, FILM COATED ORAL at 12:10

## 2022-01-01 RX ADMIN — METHYLNALTREXONE BROMIDE 8 MG: 12 INJECTION, SOLUTION SUBCUTANEOUS at 11:11

## 2022-01-01 RX ADMIN — GABAPENTIN 300 MG: 300 CAPSULE ORAL at 09:10

## 2022-01-01 RX ADMIN — HYDRALAZINE HYDROCHLORIDE 50 MG: 50 TABLET, FILM COATED ORAL at 07:10

## 2022-01-01 RX ADMIN — HYDRALAZINE HYDROCHLORIDE 20 MG: 20 INJECTION INTRAMUSCULAR; INTRAVENOUS at 01:11

## 2022-01-01 RX ADMIN — SODIUM ZIRCONIUM CYCLOSILICATE 10 G: 10 POWDER, FOR SUSPENSION ORAL at 04:10

## 2022-01-01 RX ADMIN — ONDANSETRON 4 MG: 2 INJECTION INTRAMUSCULAR; INTRAVENOUS at 05:11

## 2022-01-01 RX ADMIN — INSULIN ASPART 8 UNITS: 100 INJECTION, SOLUTION INTRAVENOUS; SUBCUTANEOUS at 09:10

## 2022-01-01 RX ADMIN — ASPIRIN 81 MG: 81 TABLET, COATED ORAL at 12:10

## 2022-01-01 RX ADMIN — INSULIN DETEMIR 18 UNITS: 100 INJECTION, SOLUTION SUBCUTANEOUS at 06:10

## 2022-01-01 RX ADMIN — LABETALOL HYDROCHLORIDE 20 MG: 5 INJECTION, SOLUTION INTRAVENOUS at 01:09

## 2022-01-01 RX ADMIN — MAGNESIUM SULFATE HEPTAHYDRATE: 500 INJECTION, SOLUTION INTRAMUSCULAR; INTRAVENOUS at 11:11

## 2022-01-01 RX ADMIN — ENOXAPARIN SODIUM 60 MG: 80 INJECTION SUBCUTANEOUS at 08:11

## 2022-01-01 RX ADMIN — CLONIDINE HYDROCHLORIDE 0.2 MG: 0.1 TABLET ORAL at 03:05

## 2022-04-30 NOTE — ED PROVIDER NOTES
Patient:   Stuart Guevara            MRN: [id number]            FIN: [id number]               Age:   31 years     Sex:  Male     :  1990   Associated Diagnoses:   Bacterial pneumonia   Author:   Kapil Moore MD      Basic Information   Additional information: Chief Complaint from Nursing Triage Note : Chief Complaint   2021 3:39 CST       Chief Complaint           SOB x today.  .      History of Present Illness   The patient presents with cough.  The onset was 5  days ago.  The course/duration of symptoms is constant.  Character dry.  The degree at onset was moderate.  The degree at present is moderate.  The exacerbating factor is none.  The relieving factor is none.  Risk factors consist of hypertension and Lupus on immunomodulators.        Review of Systems   Constitutional symptoms:  Negative except as documented in HPI, no fever, no chills.    Skin symptoms:  Negative except as documented in HPI, No rash,    Eye symptoms:  Negative except as documented in HPI, No blurred vision,    ENMT symptoms:  Negative except as documented in HPI, no sore throat, no nasal congestion.    Respiratory symptoms:  Cough, No shortness of breath,    Cardiovascular symptoms:  Negative except as documented in HPI, no chest pain, no palpitations, no peripheral edema.    Gastrointestinal symptoms:  Negative except as documented in HPI, no abdominal pain, no vomiting, no diarrhea.    Genitourinary symptoms:  Negative except as documented in HPI, No dysuria,    Musculoskeletal symptoms:  Negative except as documented in HPI, No back pain,    Neurologic symptoms:  Negative except as documented in HPI, No headache,    Psychiatric symptoms:  Negative except as documented in HPI, No depression,              Additional review of systems information: All other systems reviewed and otherwise negative.      Health Status   Allergies:    Allergic Reactions (Selected)  High  Sulfamethoxazole- Bactrim.  Severity Not  Documented  Doxycycline- Rash.  Levaquin- Unknown.,    Allergies (3) Active Reaction  sulfamethoxazole bactrim  doxycycline Rash  Levaquin unknown  .   Medications:  (Selected)   Prescriptions  Prescribed  Benlysta 200 mg/mL subcutaneous solution: 200 mg, Subcutaneous, qWeek, # 12 mL, 1 Refill(s), Pharmacy: [facility], 170, cm, Height/Length Dosing, 09/03/20 14:42:00 CDT, 48.7, kg, Weight Dosing, 09/03/20 14:42:00 CDT  CellCept 500 mg oral tablet: 1,000 mg = 2 tab(s), Oral, BID, # 360 tab(s), 1 Refill(s), Pharmacy: [facility], 170, cm, Height/Length Dosing, 09/03/20 14:42:00 CDT, 48.7, kg, Weight Dosing, 09/03/20 14:42:00 CDT  Pepcid 20 mg oral tablet: 20 mg = 1 tab(s), Oral, BID, # 28 tab(s), 0 Refill(s)  Plaquenil 200 mg oral tablet: 200 mg = 1 tab(s), Oral, Daily, # 90 tab(s), 1 Refill(s), Pharmacy: [facility], 170, cm, Height/Length Dosing, 09/03/20 14:42:00 CDT, 48.7, kg, Weight Dosing, 09/03/20 14:42:00 CDT  Radha 2 mg oral delayed release tablet: See Instructions, 1 tab(s) Oral Daily at night. Do not break. Take with food. Replaces prednisone, # 30 tab(s), 2 Refill(s), Pharmacy: [facility], 171, cm, Height/Length Dosing, 09/24/20 16:02:00 CDT, 49.25, kg, Weight Dosing, 09/...  Radha 2 mg oral delayed release tablet: See Instructions, TAKE 1 TABLET BY MOUTH DAILY TAKE WITH DINNER.(REPLACES PREDNISONE), # 30 tab(s), 2 Refill(s), Pharmacy: [facility], 171, cm, Height/Length Dosing, 09/24/20 16:02:00 CDT, 49.25, kg, Weight Dosing, 09/24/20 16:02:00 CDT  amLODIPine 10 mg oral tablet: See Instructions, TAKE 1 TABLET BY MOUTH EVERY DAY, # 30 tab(s), 0 Refill(s), Pharmacy: [facility], 171, cm, Height/Length Dosing, 09/24/20 16:02:00 CDT, 48.6, kg, Weight Dosing, 01/31/21 22:54:00 CST  diclofenac sodium 50 mg oral delayed release tablet: 50 mg = 1 tab(s), Oral, BID, PRN PRN as needed for pain, with food, # 14 tab(s), 0 Refill(s), Pharmacy: [facility], 171, cm, Height/Length Dosing, 09/24/20 16:02:00 CDT, 49.25, kg, Weight Dosing, 09/24/20 16:02:00 CDT  lisinopril 40 mg oral tablet: See Instructions, TAKE 1 TABLET BY MOUTH EVERY DAY, # 30 tab(s), 3 Refill(s), Pharmacy: [facility], 171, cm, Height/Length Dosing, 09/24/20 16:02:00 CDT, 48.6, kg, Weight Dosing, 01/31/21 22:54:00 CST  metoprolol tartrate 50 mg oral tab: 50 mg = 1 tab(s), Oral, BID, # 60 tab(s), 3 Refill(s), Pharmacy: [facility], 170, cm, Height/Length Dosing, 09/03/20 14:42:00 CDT, 48.7, kg, Weight Dosing, 09/03/20 14:42:00 CDT  omeprazole 20 mg oral DR capsule: 20 mg = 1 cap(s), Oral, Daily, take in am 30 min before breakfast on empty stomach to prevent heartburn, # 90 cap(s), 0 Refill(s)  Documented Medications  Documented  Aspir-Low 81 mg oral delayed release tablet: 81 mg = 1 tab(s), Oral, Daily, # 90 tab(s), 0 Refill(s).      Past Medical/ Family/ Social History   Medical history:    Active  Lupus ([id number]): Onset on 8/1/2010 at 20 years.  Hypertension ([id number])  Resolved  DVT of upper extremity (deep vein thrombosis) ([id number]): Onset on 5/31/2012 at 22 years.  Resolved.  Comments:  8/1/2012 CDT 12:26 CDT - Deny LONDON, Christopher HUI Pt still on Coumadin  Lupus ([id number]):  Resolved..   Surgical history:    LEFT MIDDLE FINGER AMPUTATION.  Right 2nd toe amputation..   Family history:    Hypertension.  Mother  .   Social history:    Social & Psychosocial Habits    Alcohol  08/01/2012 Risk Assessment: Denies Alcohol Use    10/27/2018  Use: Never    Employment/School  11/15/2018  Description: disabled.    Exercise  01/31/2019  Exercise type: Walking    Home/Environment  11/15/2018  Lives with: Children, Significant other    Nutrition/Health  11/15/2018  Type of diet: Regular    Sexual  01/31/2019  Do you think of your sexual orientation as: Straight or heterosexual    What is your current gender identity? (Check  all that apply) Identifies as male    Substance Use  12/26/2012 Risk Assessment: Medium Risk    12/26/2012  Use: Current    Type: Marijuana    09/24/2020  Use: Current    Type: Marijuana    Tobacco  09/24/2020  Use: 5-9 cigarettes (between 1    Patient Wants Consult For Cessation Counseling No    01/31/2021  Use: 5-9 cigarettes (between 1    Patient Wants Consult For Cessation Counseling No    02/14/2021  Use: 5-9 cigarettes (between 1    Patient Wants Consult For Cessation Counseling No    Abuse/Neglect  09/24/2020  SHX Any signs of abuse or neglect No    01/31/2021  SHX Any signs of abuse or neglect No    02/14/2021  SHX Any signs of abuse or neglect No    Spiritual/Cultural  12/16/2019  Christianity Preference [employer]oration  .   Problem list:    Active Problems (8)  Anemia   High risk medication use   Hypertension   Lupus   Microscopic hematuria   Pleural pain   Tobacco user   Tobacco user   .      Physical Examination               Vital Signs      Vital Signs (last 24 hrs)_____  Last Charted___________  Heart Rate Peripheral   68 bpm  (FEB 14 03:39)  Resp Rate         19 br/min  (FEB 14 04:30)  SBP      H 180mmHg  (FEB 14 03:39)  DBP      75 mmHg  (FEB 14 03:39)  SpO2      100 %  (FEB 14 04:30)  Weight      53 kg  (FEB 14 03:39)  Height      170 cm  (FEB 14 03:39)  BMI      18.34  (FEB 14 03:39)  .   Measurements   2/14/2021 3:39 CST       Weight Dosing             53 kg                             Weight Measured           53 kg                             Weight Measured and Calculated in Lbs     116.84 lb                             Height/Length Dosing      170 cm                             Height/Length Measured    170 cm                             Body Mass Index Measured  18.34 kg/m2  .   General:  Alert, no acute distress.    Skin:  Warm, dry.    Head:  Normocephalic, atraumatic.    Neck:  Supple.   Eye:  Pupils are equal, round and reactive to light, normal conjunctiva.    Ears, nose, mouth and throat:  Oral  mucosa moist, no pharyngeal erythema or exudate.    Cardiovascular:  Regular rate and rhythm, No murmur.    Respiratory:  Lungs are clear to auscultation, respirations are non-labored.    Gastrointestinal:  Soft, Nontender.    Back:  Nontender, Normal range of motion.    Musculoskeletal:  Normal ROM.   Neurological:  Alert and oriented to person, place, time, and situation, No focal neurological deficit observed.    Psychiatric:  Cooperative.      Medical Decision Making   Electrocardiogram:  Time 2/14/2021 12:55:00, rate 84, normal sinus rhythm, Anterior infarct of indeterminate age, nonspecific T and ST change..    Results review:  Lab results : Lab View   2/14/2021 6:30 CST       Est Creat Clearance Ser   93.31 mL/min    2/14/2021 5:55 CST       Sodium Lvl                143 mmol/L                             Potassium Lvl             3.8 mmol/L                             Chloride                  110 mmol/L  HI                             CO2                       24 mmol/L                             Calcium Lvl               8.2 mg/dL  LOW                             Glucose Lvl               87 mg/dL                             BUN                       19.0 mg/dL                             Creatinine                1.06 mg/dL                             eGFR-AA                   104                             eGFR-TERRA                  86 mL/min/1.73 m2  LOW                             Bili Total                0.3 mg/dL                             Bili Direct               0.1 mg/dL                             Bili Indirect             0.20 mg/dL                             AST                       29 unit/L                             ALT                       12 unit/L                             Alk Phos                  56 unit/L                             Total Protein             5.5 gm/dL  LOW                             Albumin Lvl               2.3 gm/dL  LOW                              Globulin                  3.2 gm/dL                             A/G Ratio                 0.7 ratio  LOW                             BNP                       1,737.8 pg/mL  HI                             Lactic Acid Lvl           0.7 mmol/L                             WBC                       8.0 x10(3)/mcL                             RBC                       3.28 x10(6)/mcL  LOW                             Hgb                       9.2 gm/dL  LOW                             Hct                       29.9 %  LOW                             Platelet                  395 x10(3)/mcL                             MCV                       91.2 fL                             MCH                       28.0 pg                             MCHC                      30.8 gm/dL  LOW                             RDW                       14.6 %  HI                             MPV                       10.8 fL  HI                             Abs Neut                  4.10 x10(3)/mcL                             Neutro Auto               51 %  NA                             Lymph Auto                30 %  NA                             Mono Auto                 13 %  NA                             Eos Auto                  6 %  NA                             Abs Eos                   0.4 x10(3)/mcL                             Basophil Auto             1 %  NA                             Abs Neutro                4.10 x10(3)/mcL                             Abs Lymph                 2.4 x10(3)/mcL                             Abs Mono                  1.0 x10(3)/mcL                             Abs Baso                  0.1 x10(3)/mcL                             IG%                       0 %  NA                             IG#                       0.020  NA    2/14/2021 5:55 CST       COVID-19 Rapid            NEGATIVE    2/14/2021 5:20 CST       Sample ABG                arterial                             Treatment                  room air                             Site                      Radial Lt                             pH Art                    7.45                             pCO2 Art                  34.0 mmHg  LOW                             pO2 Art                   50.0 mmHg  CRIT                             HCO3 Art                  23.6 mmol/L                             CO2 Totl Art              24.6 mmol/L                             O2 Sat Art                89.0 %  LOW                             D base                    -0.1                             THB ABG                   9.2 gm/dL  LOW                             CO Hgb                    3.1 %  NA                             Met Hgb Art               0.6 %                             O2 Hgb                    85.7 %  LOW                             Allens                    Positive                             FIO2 ABG                  21% %    ,    No qualifying data available.    Chest X-Ray:  IMPRESSION: Changes of right-sided pleural effusion.     Confluent airspace opacities in both bases might be related to  infiltrates.     Mild cardiomegaly    Signature Line  Electronically Signed By: Ramón Ying MD  Date/Time Signed: 02/14/2021 09:49      This document has an image    Result type: XR Chest 2 Views.   Notes:  Chest x-ray shows right lower lung field pneumonia.  Lungs fully inflated, normal diaphragms, normal cardiac silhouette.      Reexamination/ Reevaluation   Time: 2/14/2021 05:00:00 .   Notes: Initial O2 sat was 100%, however when I went in to see him, he was asleep, O2 sat was 85%, heart rate 90, excellent waveform.  He roused to voice, and is only very slightly dyspneic in conversation, and his O2 sat raised to 89%.  His initial O2 sat on presentation was 100%, however the nurse qualifies it it is difficult to get an accurate O2 sat on him due to Lupus.      Procedure   Critical care note   Total time: 30 minutes spent engaged in work  directly related to patient care and/ or available for direct patient care.   Critical condition(s) addressed for impending deterioration include: respiratory.   Associated risk factors: hypoxia.   Management: Interpretation (chest x-ray, electrocardiogram).      Impression and Plan   Diagnosis   Bacterial pneumonia ([id number] J15.9)      Calls-Consults   -  2/14/2021 06:49:00 , I d/w IM resident for admission..    Plan   Condition: Stable.    Disposition: Admit time  2/14/2021 06:49:00.

## 2022-04-30 NOTE — ED PROVIDER NOTES
Patient:   Stuart Guevara            MRN: [id number]            FIN: [id number]               Age:   28 years     Sex:  Male     :  1990   Associated Diagnoses:   DLE (disseminated lupus erythematosus)   Author:   Malachi Yeung MD      Basic Information   Time seen: Date 10/29/2018, Immediately upon arrival.   History source: Patient.   Arrival mode: Private vehicle.   History limitation: None.   Additional information: Chief Complaint from Nursing Triage Note : Chief Complaint   10/29/2018 12:21 CDT     Chief Complaint           PT C/O GENERALIZED RASH/BLISTERS X 1 MONTH, HX LUPUS. SAW PCP AND ED RECENTLY, GIVEN ABX AND PAIN MEDS, TOLD TO RETURN FOR ADMISSION IF WORSENS  .      History of Present Illness   The patient presents with rash and skin problem.  The onset was 4  weeks ago.  The course/duration of symptoms is constant and fluctuating in intensity.  Location: generalized . The character of symptoms is itching, swelling, redness and dry.  Radiating symptom(s): none.  The degree of symptoms is moderate.  Risk factors consist of Lupus .  Prior episodes: none.  Therapy today: see nurses notes.  Associated symptoms: denies fever, denies chills, denies shortness of breath, denies cough, denies headache, denies weakness, denies fatigue and denies myalgia.        Review of Systems   Constitutional symptoms:  No fever, no chills, no sweats, no weakness, no fatigue, no decreased activity.    Skin symptoms:  Rash, pruritus, breakdown.    Cardiovascular symptoms:  No chest pain, no palpitations, no tachycardia, no syncope, no peripheral edema.    Gastrointestinal symptoms:  No abdominal pain, no nausea, no vomiting, no diarrhea, no constipation, no rectal bleeding.    Genitourinary symptoms:  No dysuria, no hematuria.    Musculoskeletal symptoms:  No back pain, no Muscle pain, no Joint pain.    Neurologic symptoms:  No headache, no dizziness, no altered level of consciousness, no numbness, no tingling, no  weakness.              Additional review of systems information: All systems reviewed as documented in chart.      Health Status   Allergies:    Allergic Reactions (Selected)  No Known Allergies,    Allergies (1) Active Reaction  No Known Allergies None Documented.   Medications:  (Selected)   Documented Medications  Documented  ALLERGY (LORATADINE) 10 MG TAB: 10 mg = 1 tab(s), Oral, Daily  AMLODIPINE BESYLATE 10 MG TAB: 10 mg = 1 tab(s), Oral, Daily  CellCept 500 mg oral tablet: 0 Refill(s)  HYDROCHLOROTHIAZIDE 25 MG TAB: 25 mg = 1 tab(s), Oral, Daily  XARELTO 20 MG TABLET: 20 mg = 1 tab(s), Oral, qPM  aspirin 81 mg oral tablet: 81 mg, Oral, Daily, 0 Refill(s)  metoprolol TARTrate 50 mg oral tablet (for Lopressor): BID.      Past Medical/ Family/ Social History   Medical history:    Active  Lupus ([id number]): Onset on 8/1/2010 at 20 years.  Hypertension ([id number])  Resolved  DVT of upper extremity (deep vein thrombosis) ([id number]): Onset on 5/31/2012 at 22 years.  Resolved.  Comments:  8/1/2012 CDT 12:26 CDT - Deny LONDON, Christopher HUI  Pt still on Coumadin, Reviewed as documented in chart.   Surgical history:    LEFT MIDDLE FINGER AMPUTATION., Reviewed as documented in chart.   Family history:    No family history items have been selected or recorded., Reviewed as documented in chart.   Social history: Reviewed as documented in chart.      Physical Examination               Vital Signs   Vital Signs   10/29/2018 12:41 CDT     Peripheral Pulse Rate     90 bpm                             Respiratory Rate          20 br/min                             SpO2                      96 %                             Oxygen Therapy            Room air                             Systolic Blood Pressure   169 mmHg  HI                             Diastolic Blood Pressure  79 mmHg    10/29/2018 12:21 CDT     Temperature Oral          36.9 DegC                              "Temperature Oral (calculated)             98.42 DegF                             Peripheral Pulse Rate     93 bpm  (Modified)                            Respiratory Rate          20 br/min                             SpO2                      100 %                             Oxygen Therapy            Room air                             Systolic Blood Pressure   152 mmHg  HI                             Diastolic Blood Pressure  93 mmHg  HI  .   General:  Alert, no acute distress.    Skin:  diffuse generalized  dryness and flaking of skin- hypopigmentation and hyperpigmentation noted.  malar rash present, 1 bulla present to left anterior thigh .   Head:  Normocephalic.   Neck:  Supple.   Eye:  Normal conjunctiva.   Ears, nose, mouth and throat:  Oral mucosa moist.   Cardiovascular:  Regular rate and rhythm, Normal peripheral perfusion.    Respiratory:  Lungs are clear to auscultation, respirations are non-labored, breath sounds are equal.    Gastrointestinal:  Soft, Nontender, Non distended, Normal bowel sounds.    Neurological:  Alert and oriented to person, place, time, and situation, No focal neurological deficit observed.    Psychiatric:  Cooperative.      Medical Decision Making   Documents reviewed:  Emergency department nurses' notes, emergency department records, prior records.    Electrocardiogram:  Pt refused EKG secondary "to my skin peeling off" .   Results review:  Lab results : Lab View   10/29/2018 14:51 CDT     Total CK                  83 unit/L    10/29/2018 14:36 CDT     UA Appear                 Clear                             UA Color                  LIGHT YELLOW                             UA Spec Grav              1.007                             UA Bili                   Negative                             UA pH                     6.0                             UA Urobilinogen           Normal                             UA Blood                  Negative                             UA " Glucose                Normal                             UA Ketones                Negative                             UA Protein                70 mg/dL                             UA Nitrite                Negative                             UA Leuk Est               Negative                             UA WBC Interp             0-2 /HPF                             UA RBC Interp             3-5                             UA Bact Interp            None Seen                             UA Squam Epi Interp       2-20                             UA Hyal Cast Interp       0-2    10/29/2018 13:30 CDT     Sodium Lvl                145 mmol/L                             Potassium Lvl             3.9 mmol/L                             Chloride                  112 mmol/L  HI                             CO2                       31 mmol/L                             Calcium Lvl               7.8 mg/dL  LOW                             Glucose Lvl               96 mg/dL                             BUN                       20 mg/dL  HI                             Creatinine                1.00 mg/dL                             eGFR-AA                   >105 mL/min                             eGFR-TERRA                  94 mL/min                             Bili Total                0.2 mg/dL                             Bili Direct               0.1 mg/dL                             Bili Indirect             0.1 mg/dL                             AST                       17 unit/L                             ALT                       16 unit/L                             Alk Phos                  58 unit/L                             Total Protein             5.8 gm/dL  LOW                             Albumin Lvl               2.3 gm/dL  LOW                             Globulin                  3.50 gm/mL                             A/G Ratio                 1 ratio                             CRP                       2.1  mg/dL  HI                             PT                        14.2 second(s)                             INR                       1.18                             PTT                       33.1 second(s)                             WBC                       8.0 x10(3)/mcL                             RBC                       2.76 x10(6)/mcL  LOW                             Hgb                       8.1 gm/dL  LOW                             Hct                       25.6 %  LOW                             Platelet                  221 x10(3)/mcL                             MCV                       92.8 fL                             MCH                       29.3 pg                             MCHC                      31.6 gm/dL                             RDW                       15.3 %  HI                             MPV                       11.1 fL  HI                             Abs Neut                  3.84 x10(3)/mcL                             Segs Man                  49 %                             Lymph Man                 39.0 %                             Monocyte Man              10 %  HI                             Eos Man                   1 %                             Basophil Man              0 %                             Abn Lymph Man             1 %  HI                             Hypochrom                 1+                             Platelet Est              Adequate                             Anisocyte                 1+                             Poik                      1+                             RBC Morph                 Abnormal                             Schistocyte               1+                             Sed Rate                  40 mm/hr  HI  .   Radiology results:  Rad Results (ST)  < 12 hrs   Accession: [id number]  Order: XR Chest 2 Views  Report Dt/Tm: 10/29/2018 14:51  Report:      History:  Lupus     Reference:  14 September 2011     Findings:  Frontal  and lateral views of the chest were obtained. Heart and  mediastinum within normal limits. The lungs are clear. No pneumothorax  or significant effusion.     Impression:   No acute cardiopulmonary abnormality.    .      Reexamination/ Reevaluation   Time: 10/29/2018 15:20:00 .   Vital signs   results included from flowsheet : Vital Signs   10/29/2018 12:41 CDT     Peripheral Pulse Rate     90 bpm                             Respiratory Rate          20 br/min                             SpO2                      96 %                             Oxygen Therapy            Room air                             Systolic Blood Pressure   169 mmHg  HI                             Diastolic Blood Pressure  79 mmHg    10/29/2018 12:21 CDT     Temperature Oral          36.9 DegC                             Temperature Oral (calculated)             98.42 DegF                             Peripheral Pulse Rate     93 bpm  (Modified)                            Respiratory Rate          20 br/min                             SpO2                      100 %                             Oxygen Therapy            Room air                             Systolic Blood Pressure   152 mmHg  HI                             Diastolic Blood Pressure  93 mmHg  HI     Course: progressing as expected.   Assessment: Pt with SLE-diffuse- consult medicine for admission .      Impression and Plan   Diagnosis   DLE (disseminated lupus erythematosus) ([id number] M32.9)      Calls-Consults   -  10/29/2018 15:31:00 , Jadiel VANEGAS, [facility], Internal Medicine , recommends will come and evaluate patient in ER .    Plan   Condition: Stable.    Disposition: Place in Observation Telemetry Unit.    Counseled: Patient, Regarding diagnosis, Regarding diagnostic results, Regarding treatment plan, Patient indicated understanding of instructions.

## 2022-05-04 NOTE — HISTORICAL [facility]ER
This is a historical note converted from [employer]. Formatting and pictures may have been removed.  Please reference Pieter for original formatting and attached multimedia. Chief Complaint  PT C/O GENERALIZED RASH/BLISTERS X 1 MONTH, HX LUPUS. SAW PCP AND ED RECENTLY, GIVEN ABX AND PAIN MEDS, TOLD TO RETURN FOR ADMISSION IF WORSENS  History of Present Illness  28yoM with SLE,?HTN?and history of acute limb ischemia?secondary to SLE presenting with a diffuse, scaling rash. He reports the rash first appeared 1 month ago on his arms b/l and have since spread to his trunk, back, face, and lower extremities;?it has also?spread more diffusely this week and has become more painful the past 2-3 days. He also noticed that his skin began to peel and blister this week which prompted him to come to the ED for treatment.?The rash is painful with a burning quality, 7/10 intensity, and constant in duration. There have been no alleviating or aggravating factors for the rash. ?He also states that the rash has been?peeling?lately. ?he endorses no previous diffuse rashes in the past. He was diagnosed with Lupus 10 years ago and was originally treated with Plaquemil IV and Methylprednisone 20mg daily, however due to side effects he now takes mycophenelate which he states he is compliant with taking. He recently went to his PCP on 10/6 for treatment of the rash, and was prescribed bactrim and prednisone 10mg x7 days, which showed no improvement of his symptoms. he also went to the ED in [facility] 2 days prior and was given Norco for pain control which he states he did not take to completion.  ?   ED course: Labs:?WBC 8.0, H&H 8.1/25.6, platelets 221. ?BUN/CR 20/1.0, albumin 2.3, CRP 2.1. ?CXR shows no acute abnormality. ?Patient was consulted to medicine for management of acute SLE flare with disseminated rash.  ?  ?  Review of Systems  Constitutional:?Positive for nighttime?sweats.?Negative for significant unexpected weight change. No fever,  chills.  HENT: Negative for sore throat.?  Eyes: Negative for diplopia and pain.?  Respiratory: No SOB, no cough.  CV: Negative for chest pain, palpitations.  GI: No abdominal pain, diarrhea, emesis.?Positive for constipation  MSK: Negative for muscle ache and back pain.  Skin: +diffuse rash.  ?  Physical Exam  Vitals: Temperature?98.4, HR 85, RR 18, SPO2 100% on RA, /116  ?  Constitutional: A&Ox3. No acute distress.  HEENT: Atraumatic. PERRLA. No scleral icterus. No oropharyngeal exudate. Mucus membranes moist. Neck supple.?No mucosal blisters.?  Cardiovascular: Normal S1 snd S2. No murmur, rub or gallop.  Lungs: Clear to auscultation bilaterally.  Abdomen: Soft, nontender, nondistended, no guarding.  Extremities: No cyanosis, clubbing or edema. Radial pulse intact and regular. Capillary refill <2sec.  Skin:?Diffuse patches of hyperpigmentation with accompanying patches of erythema and?scaling involving torso, bilateral UE, Chest, back, neck and?face.?There are also diffuse hypopigmented macules.?There are dry, flaccid bullae predominantly on the buttocks and back. Nikolsky sign negative.?  ?  Assessment/Plan  1) Acute cutaneous lupus  - With h/o of SLE, will consider this to be Lupus Flare  - Start Solumedrol 80mg/day. First dose 80mg now, and will start 40mg BID tomorrow.  - Patient reports h/o arterial clots. Continue Xarelto and ASA.  - Rheumatology consult tomorrow  ?  2) HTN  - Continue metoprolol 50mg BID, Norvasc 10mg daily, HCTZ 25mg daily  - Hyralazine PRN  ?  Prophylaxis: Xarelto  ?  Disposition: 29 y/o AAM with SLE admitted to medicine for management of diffuse cutaneous lupus. Will start on IV steroids. Consult Rheumatology in AM.  ?   Problem List/Past Medical History  Ongoing  Hypertension  Lupus  Historical  DVT of upper extremity (deep vein thrombosis)  Procedure/Surgical History  LEFT MIDDLE FINGER AMPUTATION  Right 2nd toe amputation   Medications  Inpatient  amlodipine 10 mg oral tablet,  10 mg= 1 tab(s), Oral, Daily  aspirin 81 mg oral tablet, CHEWABLE, 81 mg= 1 tab(s), Oral, Daily  hydrALAZINE (Apresoline) Inj., 10 mg= 0.5 mL, IV Push, q2hr, PRN  hydrochlorothiazide 25 mg oral tablet, 25 mg= 1 tab(s), Oral, Daily  metoprolol tartrate 50 mg oral tab, 50 mg= 1 tab(s), Oral, BID  rivaroxaban 10mg tablet, 20 mg= 2 tab(s), Oral, qPM  Solumedrol IV push / IM, 40 mg= 1 mL, IV Push, BID  Solumedrol IV push / IM, 80 mg= 2 mL, IV Push, Once-NOW  Toradol 15 mg for IV Push, 15 mg= 0.5 mL, IV Push, q8hr, PRN  Home  ALLERGY (LORATADINE) 10 MG TAB, 10 mg= 1 tab(s), Oral, Daily  AMLODIPINE BESYLATE 10 MG TAB, 10 mg= 1 tab(s), Oral, Daily  aspirin 81 mg oral tablet, 81 mg, Oral, Daily  CellCept 500 mg oral tablet  HYDROCHLOROTHIAZIDE 25 MG TAB, 25 mg= 1 tab(s), Oral, Daily  metoprolol TARTrate 50 mg oral tablet (for Lopressor), BID  XARELTO 20 MG TABLET, 20 mg= 1 tab(s), Oral, qPM  Allergies  No Known Allergies  Social History  Alcohol - Denies Alcohol Use, 08/01/2012  Never, 10/27/2018  Substance Abuse - Medium Risk, 12/26/2012  Current, Marijuana, 12/26/2012  Tobacco  Current every day smoker, Cigarettes, N/A, 10/29/2018  Current every day smoker, No, 10/27/2018  Current, Cigarettes, 20 per day. Previous treatment: None. Ready to change: No. Household tobacco concerns: No., 10/29/2012  Current, Cigarettes, 10 per day., 08/01/2012  Family History  Hypertension.: Mother.      Labs:  Labs Last 24 Hours?  ?Chemistry? Hematology/Coagulation?   Sodium Lvl: 145 mmol/L (10/29/18 13:30:00) PT: 14.2 second(s) (10/29/18 13:30:00)   Potassium Lvl: 3.9 mmol/L (10/29/18 13:30:00) INR: 1.18 (10/29/18 13:30:00)   Chloride:?112 mmol/L?High (10/29/18 13:30:00) PTT: 33.1 second(s) (10/29/18 13:30:00)   CO2: 31 mmol/L (10/29/18 13:30:00) WBC: 8 x10(3)/mcL (10/29/18 13:30:00)   Calcium Lvl:?7.8 mg/dL?Low (10/29/18 13:30:00) RBC:?2.76 x10(6)/mcL?Low (10/29/18 13:30:00)   Glucose Lvl: 96 mg/dL (10/29/18 13:30:00) Hgb:?8.1 gm/dL?Low  (10/29/18 13:30:00)   BUN:?20 mg/dL?High (10/29/18 13:30:00) Hct:?25.6 %?Low (10/29/18 13:30:00)   Creatinine: 1 mg/dL (10/29/18 13:30:00) Platelet: 221 x10(3)/mcL (10/29/18 13:30:00)   eGFR-AA: >105 (10/29/18 13:30:00) MCV: 92.8 fL (10/29/18 13:30:00)   eGFR-TERRA: 94 mL/min (10/29/18 13:30:00) MCH: 29.3 pg (10/29/18 13:30:00)   Bili Total: 0.2 mg/dL (10/29/18 13:30:00) MCHC: 31.6 gm/dL (10/29/18 13:30:00)   Bili Direct: 0.1 mg/dL (10/29/18 13:30:00) RDW:?15.3 %?High (10/29/18 13:30:00)   Bili Indirect: 0.1 mg/dL (10/29/18 13:30:00) MPV:?11.1 fL?High (10/29/18 13:30:00)   AST: 17 unit/L (10/29/18 13:30:00) Abs Neut: 3.84 x10(3)/mcL (10/29/18 13:30:00)   ALT: 16 unit/L (10/29/18 13:30:00) Segs Man: 49 % (10/29/18 13:30:00)   Alk Phos: 58 unit/L (10/29/18 13:30:00) Lymph Man: 39 % (10/29/18 13:30:00)   Total Protein:?5.8 gm/dL?Low (10/29/18 13:30:00) Monocyte Man:?10 %?High (10/29/18 13:30:00)   Albumin Lvl:?2.3 gm/dL?Low (10/29/18 13:30:00) Eos Man: 1 % (10/29/18 13:30:00)   Globulin: 3.5 gm/mL (10/29/18 13:30:00) Basophil Man: 0 % (10/29/18 13:30:00)   A/G Ratio: 1 ratio (10/29/18 13:30:00) Abn Lymph Man:?1 %?High (10/29/18 13:30:00)   CRP:?2.1 mg/dL?High (10/29/18 13:30:00) Hypochrom: 1+ (10/29/18 13:30:00)   Total CK: 83 unit/L (10/29/18 14:51:00) Platelet Est: Adequate (10/29/18 13:30:00)    Anisocyte: 1+ (10/29/18 13:30:00)    Poik: 1+ (10/29/18 13:30:00)    RBC Morph: Abnormal (10/29/18 13:30:00)    Schistocyte: 1+ (10/29/18 13:30:00)    Sed Rate:?40 mm/hr?High (10/29/18 13:30:00)   ?  ?Imaging:  Accession:?[id number]  Order:?XR Chest 2 Views  Report Dt/Tm:?10/29/2018 14:51  Report:?  ?  History:  Lupus  ?  Reference:  14 September 2011  ?  Findings:  Frontal and lateral views of the chest were obtained. Heart and  mediastinum within normal limits. The lungs are clear. No pneumothorax  or significant effusion.  ?  Impression:?  No acute cardiopulmonary abnormality.  ?  ?  ?   Patient seen and examined together  with Dr. Alejandro. Agree with above assessment and plan. Patient with SLE who presents with diffuse worsening of rash x 1 month. Unsure what the source of rash is but suspecting possible lupus flare. Will consult Rheumatology tomorrow for rash and optimization of SLE therapy. We put him on Steroids for acute flare. Continue with home medications for co-morbidities. Continue to monitor closely in the medical unit.

## 2022-05-04 NOTE — HISTORICAL [facility] CERNER
This is a historical note converted from [place]. Formatting and pictures may have been removed.  Please reference [employer] for original formatting and attached multimedia. Chief Complaint  established  History of Present Illness  Mr. 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 He recalls following up at [place] and receiving an IV medication?every 6 months for 2 infusions (? Rituxan). After that he was transitioned to CellCept 1000 mg twice daily?and prednisone 20mg/d, which was working well until his previous primary care physician stopped the prednisone in May 2018. A few months after that, he developed ulcers on his?arms followed by patchy hair loss. He sought a new PCP who started Bactrim followed by Doxy for infxn. I saw him as an inpt 10/29/18 for evaluation of erythematous rash w/ bullous lesions. I favored drug rxn, and?he underwent skin biopsy that was read as possible SLE, but I think that may be is underlying discoid lupus.?His symptoms resolved over days w/ removal of bactrim and institution of steroids arguing for drug rxn.?I added plaquenil to his regimen.?At f/u in November, we discussed that he has been on 20 mg of prednisone for roughly 10 years in combination with CellCept?1000mg bid. With withdrawal of prednisone, his disease certainly has flared. ?Likewise, I increased immunosuppression?- maximized cellcept to 1500mg bid and started process for benlysta. He has been on baseline dose of 20mg/d prednisone. Work up did reveal +Hep Bc IgG, PCR and Hep B sAg negative.  ?  Today he f/u c/o cough productive green sputum, SOB. He also feels like his food is sticking when he swallows - only food, not liquids. He is vomiting q3-4d - doesnt seem to be related to any specific food. He thinks  maybe cellcept 1500mg bid may have worsened it. He missed a dose of cellcept, his GI symptoms dont change. Benlysta is still pending. Took BP meds this am - last few visits BP has been high. Not checking at home. Skin is stable, no new ulcers, LAD, hair loss, joint pain, weakness. Raynauds is slightly worse w/ cold weather. No ulcers.  Review of Systems  General: Denies chills and fever  Ophthalmologic: Denies discharge. ?Denies flashes of light in visual field  ENT: Denies decreased sense of smell. ?Denies nosebleeds.?  Endocrine: Denies excessive sweating. Denies weight loss  Respiratory:?Denies hemoptysis. ?Denies tuberculosis.  Cardiovascular:?Denies cyanosis. ?Denies rheumatic fever.  Gastrointestinal: Denies difficulty swallowing. ?Denies hematemesis.  Hematology: Denies bleeding problems.? Denies recent transfusion.  Peripheral vascular: Denies absent pulses in feet.? Denies ulceration of the feet  Skin: Denies blistering of skin. ?Denies skin cancer.  Neurologic denies seizures.? Denies stroke.  Psychiatric: Denies eating disorder. ?Denies loss of appetite  Physical Exam  Vitals & Measurements  T:?36.6? ?C (Oral)? HR:?84(Peripheral)? RR:?16? BP:?169/119?  HT:?172?cm? HT:?172?cm? WT:?46.8?kg? WT:?46.8?kg? BMI:?15.82?  General examination: Alert, pleasant, in no acute distress.  Head: patchy alopecia, NC/AT  Eyes:?EEOMI, Conjunctiva clear  Oral cavity: Mucosa moist, no lesions, fair dentition.  Throat: No exudate, no erythema.  Neck/thyroid: Neck supple, no lymphadenopathy, no thyroid nodules  Skin: +Raynauds. +hyper and hypopigmented skin lesions. Resolution of bullae  Heart:?S1, S2 normal, regular rate and rhythm. ?No JVD.  Lungs:?Coarse?BS?bases to?lower 1/3 lungs.?no accessory muscle use.  Musculoskeletal:?No synovitis,?no deformities. Amputation of toe and finger  Extremities: questionable clubbing and some active raynuads. Some digital pitting  Neurologic: Alert and oriented, cranial nerves II through  XII grossly intact.? Muscle strength grossly intact. ?Sensation grossly intact.  Psych:?Cognitive function intact, mood/affect full range.  ?   09/11/2011 00:36 CDT MRI Lumbar Spine W W/O Contrast  T2 hyperintensity is seen in the conus medullaris at T12. ?The cord is not significantly expanded. ?There is suggestion of minimal patchy enhancement in this area of edema. ?There is no epidural fluid collection. ?No extradural abnormality is seen. ?There is no spinal stenosis or disc herniation.  IMPRESSION:  CENTRAL CORD T2 HYPERINTENSITY IS CONSISTENT WITH EDEMA. ?CONSIDERATIONS INCLUDE INFECTIOUS OR NON-INFECTIOUS MYELITIS SUCH AS TRANSVERSE MYELITIS. ?OTHER ETIOLOGIES INCLUDING ISCHEMIC CHANGE, DEMYELINATION OR NEOPLASM.  ?  Kidney, biopsy:  ?? ? Lupus membranous nephritis, INS/RPS class V, see note.  ?   Comment/Note  In this case, both activity and chronicity indexes are lower.  ?  Microscopic  ?  Sections and special stains (PAS, trichrome and silver) reveal renal parenchyma that showed predominant medulla. ?Three glomeruli are identified. ?All glomeruli show diffuse thickening of capillary membranes and open capillary loops. ?There are no significant expansion of mesangium, increase in mesangial cellularity or proliferation of capillary cells. ?No crescents or necrosis of capillary lyndon are identified. ?The tubulars are in a back to back pattern. ?There is no significant interstitial fibrosis or inflammatory infiltration. ?The tubular epithelial cells are unremarkable. ?The vasculatures are unremarkable.  ?  Examination of 1 micron sections that are prepared for ultrastructural survey reveals two glomeruli. ?The glomeruli show similar morphologic changes as above described.  ?  IMMUNOFLUORESCENCE:  ?  The frozen tissue contains several glomeruli on albumin stained sections. ?There is a diffuse 2 to 3+ granular capillary loop and mesangium reaction for IgG, IgA, IgM, C3, C1q, kappa and lambda. ?There is no  significant reaction for fibrin.  ?  Interpretation:  ?  These findings indicate diffuse capillary loop immune complex deposits.  ?  ELECTRON MICROSCOPY:  ?  The one-micron sections contain 2 glomeruli and ultrastructural preservation is excellent. ?Ultrastructural examination shows diffuse effacement of podocyte foot processes. ?The capillary loops are thickened by the presence of numerous subepithelial electron dense deposits associated with adjacent basement membrane response. ?No subendothelial deposits, capillary loop sclerosis or hypercellularity is present. ?The mesangial matrix is minimally expanded without hypercellularity. ?Some small electron deposits are noted in the mesangium. Also several tubular reticular inclusions are noted in the endothelial cells.  ?  Interpretation:  These findings indicate diffuse subepitheliimmune and mesangial complex deposition.  ?  ?   09/15/2011 13:40 CDT [facility] Bone Imaging 3 Phase  26.2 mCi MDP was injected intravenously. ?There is uniform distribution of isotope. ?Only abnormality seen is symmetrical increased tracer activity in the growth plates throughout the skeletal structures. ?This can be seen in setting of sickle cell disease or other hemoglobinopathies. ?Study shows no evidence of infectious or inflammatory process [1]  Assessment/Plan  1. 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 He was also found to have?class V lupus nephritis. He recalls following up at [employer] and receiving an IV medication?every 6 months for 2 infusions (? Rituxan). After that he was transitioned to CellCept 1000 mg twice daily?and prednisone 20mg/d, which was working well until his previous primary care physician stopped the prednisone in May 2018. A few months after that, he developed ulcers on his?arms followed by patchy hair loss. He sought a new PCP who started  Bactrim followed by Doxy for infxn. I saw him as an inpt 10/29/18 for evaluation of erythematous rash w/ bullous lesions. I favored drug rxn, and?he underwent skin biopsy that was read as possible SLE, but I think that may be is underlying discoid lupus.?His symptoms resolved over days w/ removal of bactrim and institution of steroids arguing for drug rxn.?I added plaquenil to his regimen.?At f/u in November, we discussed that he has been on 20 mg of prednisone for roughly 10 years in combination with CellCept?1000mg bid. ?With withdrawal of prednisone, his disease certainly has flared. ?Likewise, I increased immunosuppression?- maximized cellcept to 1500mg bid. Today he c/o worsening GI symptoms, but it is not clearly associated. Will check continue plaquenil (will be beneficial for discoid lesions and prevention of lupus flares long-term) and await PA?for benlysta. Will try to very slowly taper prednisone from 20mg/d by 2.5mg every 3-4 wks as tolerated, holding at 10mg/d.  ?   2. URI, concern for PNA vs ILD given exam (crackles as bases). Check CXR now?and?review. If concerning, will check HRCT.?Hold cellcept for a few days until improves and start levaquin. Discussed that if symptoms worsen he should go to ED.  ?   3. HTN. Poorly controlled last few visits. DId not notice at visit - will ask staff to call pt to f/u w/ PCP re-check and optimize medication. Only on Norvasc 10mg/d and Metoprolol 50mg bid.  ?   4. Anemia. Consider hemoglobinopathy given results from [facility] bone scan 20011. MOJGAN negative. However, pt does report dark stools and GERD. ?He is on Xarelto and aspirin, and has been on steroids for many years. ?Likewise, he is at high risk for?gastritis and ulcerative disease. On PPI twice daily but hemoccult was +. Start iron supplementation and monitor closely. If worsens, refer to GI.  ?   5. ?History of lupus nephritis class V.? Patient reports increasing foamy urine over the last 2 months which coincides  with?cessation of 20 mg a day of prednisone.??24-hour urine 1.7g. ?Increased CellCept 1500 mg twice daily and monitor, as preivous? baseline unknown. Scr 0.9  ?   6. Hep Bc IgG +, Hep Bs Ag neg. Hep Bc IgM and PCR neg. For now monitor but will likely refer to ID for viread, particularly w/ prednisone use and risk of activation.  ?  7. Dysphagia and GERD.?On PPI bid. Will check esophagram?as pts w/ MCTD have features of systemic sclerosis?  ?  8. Hx of blood clot in arm. Pt reports having had surgery for removal in past. Will try to obtain records for further eval.? DRVVT and APLA by LC negative. Repeat in 12 wks w/ [facility].  ?  9. Hx of amputation of several digits. Sounds like severe raynauds or possibly related to clotting given above. Systemic Sclerosis panel negative but he does have MCTD.  ?  10. High risk med use. Will refer for eye exam w/ optho and follow labs monthly w/ Cellcept.  ?  11. Chronic steroid use. Discussed ca supplementation?and vitamin D. ?Will check DEXA today. Need vit D.  ?  12. Vaccinations. Had flu shot in Oct. Prevnar Nov 2018.   Problem List/Past Medical History  Ongoing  Hypertension  Lupus  Historical  DVT of upper extremity (deep vein thrombosis)  Procedure/Surgical History  LEFT MIDDLE FINGER AMPUTATION  Right 2nd toe amputation   Medications  ALLERGY (LORATADINE) 10 MG TAB, 10 mg= 1 tab(s), Oral, Daily  AMLODIPINE BESYLATE 10 MG TAB, 10 mg= 1 tab(s), Oral, Daily  Aquaphor topical ointment, 1 EA, TOP, BID, PRN, 2 refills  aspirin 81 mg oral tablet, 81 mg, Oral, Daily  Benlysta 200 mg/mL subcutaneous solution, 200 mg, Subcutaneous, qWeek, 11 refills  CellCept 500 mg oral tablet, 1500 mg= 3 tab(s), Oral, BID, 3 refills  HYDROCHLOROTHIAZIDE 25 MG TAB, 25 mg= 1 tab(s), Oral, Daily  Levaquin 500 mg oral tablet, 500 mg= 1 tab(s), Oral, q24hr  metoprolol TARTrate 50 mg oral tablet (for Lopressor), BID  omeprazole 20 mg oral DR capsule, 20 mg= 1 cap(s), Oral, BID, 6 refills  Plaquenil 200 mg  oral tablet, 200 mg= 1 tab(s), Oral, BID, 5 refills  prednisONE 10 mg oral tablet, 20 mg= 2 tab(s), Oral, Daily, 1 refills  prednisONE 5 mg oral tablet, See Instructions, 3 refills  SULFAMETHOXAZOLE-TMP DS TABLET, 1 tab(s), Oral, BID,? ?Not Taking, Completed Rx  XARELTO 20 MG TABLET, 20 mg= 1 tab(s), Oral, qPM  Allergies  sulfamethoxazole?(bactrim)  doxycycline?(Rash)  Social History  Alcohol - Denies Alcohol Use, 08/01/2012  Never, 10/27/2018  Employment/School  Work/School description: disabled.., 11/15/2018  Home/Environment  Lives with Children, Significant other., 11/15/2018  Nutrition/Health  Regular, 11/15/2018  Substance Abuse - Medium Risk, 12/26/2012  Current, Marijuana, 12/26/2012  Tobacco  Current some day smoker, N/A, Previous treatment: None. Ready to change: No., 12/11/2018  Family History  Hypertension.: Mother.  Immunizations  Vaccine Date Status   pneumococcal 13-valent conjugate vaccine 11/15/2018 Given   Health Maintenance  Health Maintenance  ???Pending?(in the next year)  ??? ??OverDue  ??? ? ? ?Smoking Cessation due??12/01/18??and every 1??year(s)  ??? ??Due?  ??? ? ? ?ADL Screening due??12/14/18??and every 1??year(s)  ??? ? ? ?Alcohol Misuse Screening due??12/14/18??and every 1??year(s)  ??? ? ? ?Hypertension Management-Education due??12/14/18??and every 1??year(s)  ??? ? ? ?Influenza Vaccine due??12/14/18??and every?  ??? ? ? ?Tetanus Vaccine due??12/14/18??and every 10??year(s)  ??? ??Due In Future?  ??? ? ? ?Blood Pressure Screening not due until??12/11/19??and every 1??year(s)  ??? ? ? ?Body Mass Index Check not due until??12/11/19??and every 1??year(s)  ??? ? ? ?Depression Screening not due until??12/11/19??and every 1??year(s)  ??? ? ? ?Hypertension Management-Blood Pressure not due until??12/11/19??and every 1??year(s)  ??? ? ? ?Hypertension Management-BMP not due until??12/11/19??and every 1??year(s)  ??? ? ? ?Obesity Screening not due until??12/11/19??and every  1??year(s)  ???Satisfied?(in the past 1 year)  ??? ??Satisfied?  ??? ? ? ?Blood Pressure Screening on??12/11/18.??Satisfied by Sheron Holbrook LPN  ??? ? ? ?Body Mass Index Check on??12/11/18.??Satisfied by Sheron Holbrook LPN  ??? ? ? ?Depression Screening on??12/11/18.??Satisfied by Sheron Holbrook LPN  ??? ? ? ?Hypertension Management-BMP on??12/11/18.??Satisfied by Christopher Diane Jr.  ??? ? ? ?Obesity Screening on??12/11/18.??Satisfied by Sheron Holbrook LPN  ?  ?  Lab Results  Test Name Test Result Date/Time Comments   Creatinine 0.90 mg/dL 11/15/2018 11:20 CST    AST 13 unit/L (Low) 11/15/2018 11:20 CST    ALT 17 unit/L 11/15/2018 11:20 CST    Alk Phos 83 unit/L 11/15/2018 11:20 CST    Total Protein 6.2 gm/dL (Low) 11/15/2018 11:20 CST    Albumin Lvl 2.9 gm/dL (Low) 11/15/2018 11:20 CST    C3 Complement-LC 66 mg/dL (Low) 11/15/2018 11:20 CST Performed At: [facility]<br/>[facility] [place], [place] [id number]<br/>Tessy PELLETIER MD Ph:[phone]   C4 Complement-LC 16 mg/dL 11/15/2018 11:20 CST Performed At:  [facility]<br/>[facility] [place], [place] [id number]<br/>Tessy PELLETIER MD Ph:[phone]   U Prot/Creat 955.1 mg/gm (High) 11/15/2018 11:20 CST    U24 Protein 1699.5 mg/24hr (High) 11/20/2018 11:34 CST    WBC 13.3 x10(3)/mcL (High) 11/15/2018 11:20 CST    Hgb 9.1 gm/dL (Low) 11/15/2018 11:20 CST    Platelet 317 x10(3)/mcL 11/15/2018 11:20 CST    Sed Rate 13 mm/hr 11/15/2018 11:20 CST Note: reference ranges for ages 50 and over have changed.   Occult Bld Stl Positive (Abnormal) 11/19/2018 10:09 CST    UA Blood 0.03 (Abnormal) 12/11/2018 10:55 CST    UA Blood 0.03 (Abnormal) 11/15/2018 11:20 CST    UA Protein 100 (Abnormal) 11/15/2018 11:20 CST       [1]?Rheumatology Office Visit Note; Laure Valiente DO 11/15/2018 11:14 CST   [facility] labs: +ALVIN 1:640 homo & 1:320 cytoplasmic?, +DsDNA 1:10, +Sm,+RNP. Neg?systemic sclerosis panel

## 2022-05-04 NOTE — HISTORICAL [facility] CERNER
This is a historical note converted from Pieter. Formatting and pictures may have been removed.  Please reference Pieter for original formatting and attached multimedia. Chief Complaint  PT C/O GENERALIZED RASH/BLISTERS X 1 MONTH, HX LUPUS. SAW PCP AND ED RECENTLY, GIVEN ABX AND PAIN MEDS, TOLD TO RETURN FOR ADMISSION IF WORSENS  History of Present Illness  Mr. Guevara is a 27 y/o AAM admitted w/ diffuse rash in the setting of SLE. ?He reports being diagnosed with SLE roughly 10 years ago when he woke up paralyzed from the neck down.??He reports being seen at [facility]?and being diagnosed with lupus at that time. ?He recalls undergoing a kidney biopsy and being?transfused PRBCs. 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 He recalls following up at [place] and receiving an IV medication?every 6 months for 2 infusions. ?After that he was transitioned to CellCept 1000 mg twice daily?and prednisone 20mg/d which she has been on since that time.?He does recall being?on plaquenil but?thinks it?was stopped a few years ago (usure why).?He was doing well until his previous primary care physician stopped the prednisone in May 2018. He noticed that he developed some ulcers on his?arms followed by patchy hair loss a few months later. He sought a new PCP who started Bactrim for his arm lesions in early Oct. ?He took it for a week?but the lesions persisted so PCP started Doxy on 10th. He noticed diffuse red rash w/ blisters on chest around 18th. The lesions were painful. He sought eval at [facility] but continued on the Doxy, completed 1 wk tx. He has had some lip crusting but no other mucocutaneous involvment. No hx of similar rash.  ?  AI ROS: + raynauds.?1 finger and 1 toe amputation d/t blood clots vs ulcerations. No DVTs or PE, ?seizures, serositis. + hx of leukopenia and anemia. No hx of oral  ulcers.  ?  No FH of AI dz  Review of Systems  General:?+ subj?fever, no night sweats  Ophthalmologic: Denies discharge. ?Denies flashes of light in visual field  ENT: Denies decreased sense of smell. ?Denies nosebleeds.?  Endocrine: Denies excessive sweating. Denies weight loss  Respiratory:?Denies hemoptysis. ?Denies tuberculosis.  Cardiovascular:?Denies cyanosis. ?Denies rheumatic fever.  Gastrointestinal: Denies difficulty swallowing. ?Denies hematemesis.  Hematology: Denies bleeding problems.? Denies recent transfusion.  Peripheral vascular: Denies absent pulses in feet.? No recent digital ulcers  Skin: Denies blistering of skin. ?Denies skin cancer.  Neurologic denies seizures.? Denies stroke.  Psychiatric: Denies eating disorder. ?Denies loss of appetite.  ?  ?  Physical Exam  Vitals & Measurements  T:?36.8? ?C (Oral)? TMIN:?36.5? ?C (Oral)? TMAX:?37.2? ?C (Oral)? HR:?66(Peripheral)? RR:?18? BP:?171/94? SpO2:?100%?  General examination: Alert, pleasant, in no acute distress.  Head: patchy alopecia, NC/AT  Eyes: swelling of lids w/ erythematous skin. Conjunctiva clear  Oral cavity: Some crusting on lips. Mucosa moist, no lesions, fair dentition.  Throat: No exudate, no erythema.  Neck/thyroid: Neck supple, no lymphadenopathy, no thyroid nodules  Skin: Non blanching erythematous rash w/ blisters scattered mainly on proximal appendages. Large macular erythematous lesions on chest and back w/ flaking.  Heart:?S1, S2 normal, regular rate and rhythm. ?No JVD.  Lungs: Clear to auscultation, no accessory muscle use.  Musculoskeletal:?No synovitis,?no deformities. Amputation of toe and finger  Extremities: questionable clubbing and some active raynuads. Some digital pitting  Neurologic: Alert and oriented, cranial nerves II through XII grossly intact.? Muscle strength grossly intact. ?Sensation grossly intact.  Psych:?Cognitive function intact, mood/affect full range.  ?  09/11/2011 00:36 CDT MRI Lumbar Spine W W/O  Contrast  T2 hyperintensity is seen in the conus medullaris at T12. ?The cord is not significantly expanded. ?There is suggestion of minimal patchy enhancement in this area of edema. ?There is no epidural fluid collection. ?No extradural abnormality is seen. ?There is no spinal stenosis or disc herniation.  IMPRESSION:  CENTRAL CORD T2 HYPERINTENSITY IS CONSISTENT WITH EDEMA. ?CONSIDERATIONS INCLUDE INFECTIOUS OR NON-INFECTIOUS MYELITIS SUCH AS TRANSVERSE MYELITIS. ?OTHER ETIOLOGIES INCLUDING ISCHEMIC CHANGE, DEMYELINATION OR NEOPLASM.  ?  Kidney, biopsy:  ?? ? Lupus membranous nephritis, INS/RPS class V, see note.  ?   Comment/Note  In this case, both activity and chronicity indexes are lower.  ?  Microscopic  ?  Sections and special stains (PAS, trichrome and silver) reveal renal parenchyma that showed predominant medulla. ?Three glomeruli are identified. ?All glomeruli show diffuse thickening of capillary membranes and open capillary loops. ?There are no significant expansion of mesangium, increase in mesangial cellularity or proliferation of capillary cells. ?No crescents or necrosis of capillary lyndon are identified. ?The tubulars are in a back to back pattern. ?There is no significant interstitial fibrosis or inflammatory infiltration. ?The tubular epithelial cells are unremarkable. ?The vasculatures are unremarkable.  ?  Examination of 1 micron sections that are prepared for ultrastructural survey reveals two glomeruli. ?The glomeruli show similar morphologic changes as above described.  ?  IMMUNOFLUORESCENCE:  ?  The frozen tissue contains several glomeruli on albumin stained sections. ?There is a diffuse 2 to 3+ granular capillary loop and mesangium reaction for IgG, IgA, IgM, C3, C1q, kappa and lambda. ?There is no significant reaction for fibrin.  ?  Interpretation:  ?  These findings indicate diffuse capillary loop immune complex deposits.  ?  ELECTRON MICROSCOPY:  ?  The one-micron sections contain 2  glomeruli and ultrastructural preservation is excellent. ?Ultrastructural examination shows diffuse effacement of podocyte foot processes. ?The capillary loops are thickened by the presence of numerous subepithelial electron dense deposits associated with adjacent basement membrane response. ?No subendothelial deposits, capillary loop sclerosis or hypercellularity is present. ?The mesangial matrix is minimally expanded without hypercellularity. ?Some small electron deposits are noted in the mesangium. Also several tubular reticular inclusions are noted in the endothelial cells.  ?  Interpretation:  ?  These findings indicate diffuse subepitheliimmune and mesangial complex deposition.  ?  ?  09/15/2011 13:40 CDT [facility] Bone Imaging 3 Phase  ?   26.2 mCi MDP was injected intravenously. ?There is uniform distribution of isotope. ?Only abnormality seen is symmetrical increased tracer activity in the growth plates throughout the skeletal structures. ?This can be seen in setting of sickle cell disease or other hemoglobinopathies. ?Study shows no evidence of infectious or inflammatory process  Assessment/Plan  Mr. Guevara is a 29 y/o AAM with SLE dx 2011 after he developed transverse myelitis. He was also found to have?class V lupus nephritis. He recalls following up at [place] and receiving an IV medication?every 6 months for 2 infusions (? Rituxan vs Cyclophosphamide). After that he was transitioned to CellCept 1000 mg twice daily?and prednisone 20mg/d, which was working well until his previous primary care physician stopped the prednisone in May 2018. A few months after that, he developed ulcers on his?arms followed by patchy hair loss. He sought a new PCP who started Bactrim followed by Doxy. He developed a much more diffuse, erythematous rash w/ blisters w/in a few wks of the abx. The rash is very itchy initially but has become painful.  ?   It is possible that he has a rather unusual variant,?bullous lupus, but I  wouldnt expect it to be itchy.?I think it is more likely that he has had a drug allergy. He underwent punch biopsy today and a specimen was sent for immunofluorescence. He rash has improved significantly w/ IV steroids, which I would continue. I have also added plaquenil 200mg bid, but he will likely require additional immunosuppression at f/u, possibly w/ benlysta.? Other than his anemia, his labs look reassuring. When his rash improves, I would recommend d/c on prednisone 30mg/d x7d, then back to 20mg/d until I see him in f/u.?I have ordered ALVIN panel, iron studies, luther, haptoglobin, LDH, C3/C4, dsDNA, UPC, t-spot, HIV, hep b/c for further eval and in preparation for additional immunosuppression.?I will arrange for him to f/u w/ me in clinic.   Problem List/Past Medical History  Ongoing  Hypertension  Lupus  Historical  DVT of upper extremity (deep vein thrombosis)  Procedure/Surgical History  LEFT MIDDLE FINGER AMPUTATION  Right 2nd toe amputation   Medications  Inpatient  amlodipine 10 mg oral tablet, 10 mg= 1 tab(s), Oral, Daily  aspirin 81 mg oral tablet, CHEWABLE, 81 mg= 1 tab(s), Oral, Daily  hydrALAZINE (Apresoline) Inj., 10 mg= 0.5 mL, IV Push, q2hr, PRN  hydrochlorothiazide 25 mg oral tablet, 25 mg= 1 tab(s), Oral, Daily  lidocaine 1% injectable solution, 20 mL, Subcutaneous, Once-NOW  metoprolol tartrate 50 mg oral tab, 50 mg= 1 tab(s), Oral, BID  MiraLax (polyethylene glycol 3350), 17 gm= 1 packet(s), Oral, BID  Plaquenil 200 mg oral tablet, 200 mg= 1 tab(s), Oral, BID  rivaroxaban 10mg tablet, 20 mg= 2 tab(s), Oral, qPM  Solumedrol IV push / IM, 40 mg= 1 mL, IV Push, BID  Toradol 15 mg for IV Push, 15 mg= 0.5 mL, IV Push, q8hr, PRN  Home  ALLERGY (LORATADINE) 10 MG TAB, 10 mg= 1 tab(s), Oral, Daily  AMLODIPINE BESYLATE 10 MG TAB, 10 mg= 1 tab(s), Oral, Daily  aspirin 81 mg oral tablet, 81 mg, Oral, Daily  CellCept 500 mg oral tablet  HYDROCHLOROTHIAZIDE 25 MG TAB, 25 mg= 1 tab(s), Oral,  Daily  metoprolol TARTrate 50 mg oral tablet (for Lopressor), BID  XARELTO 20 MG TABLET, 20 mg= 1 tab(s), Oral, qPM  Allergies  No Known Allergies  Social History  Alcohol - Denies Alcohol Use, 08/01/2012  Never, 10/27/2018  Substance Abuse - Medium Risk, 12/26/2012  Current, Marijuana, 12/26/2012  Tobacco  Current every day smoker, Cigarettes, N/A, 10/29/2018  Current every day smoker, No, 10/27/2018  Current, Cigarettes, 20 per day. Previous treatment: None. Ready to change: No. Household tobacco concerns: No., 10/29/2012  Current, Cigarettes, 10 per day., 08/01/2012  Family History  Hypertension.: Mother.

## 2022-05-04 NOTE — HISTORICAL [facility] CERNER
This is a historical note converted from [employer]ner. Formatting and pictures may have been removed.  Please reference [employer]ner for original formatting and attached multimedia. 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  Immunizations  No immunizations recorded for this visit.  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 ?Patient states he began experiencing chest tightness 3 days ago that was aggravated with coughing, constant, not associated with radiation/palpitations/nausea or vomiting. ?Describes it as a tight sensation, 5/10 at its worst. ?The pain did not increase intensity since its onset. ?He also states he began feeling short of breath around this time. ?Last night he was unable to fall asleep due to unable to breathe while lying flat, used his nieces nebulizer which gave him no relief. ?Denies PND, lower extremity edema, or history of heart failure in the past. ?He also endorses a productive cough with white frothy sputum that has been ongoing for the past 2 days as well. ?when he arrived to the ED he was noted to be hypoxic satting 90% on room air, improved with Ventimask to 95%. ?CXR done showed new  cardiomegaly, new right pleural effusion, and new bilateral infiltrates. ?He denies any sick contacts, recent travel, states he tested COVID-19 negative about a week ago. ?He denies any fever, chills, rashes, lower extremity pain. He reports he has been taking chronic prednisone 5mg for the past 13 years as he cannot be weaned off due to symptoms. Also endorses 2 black stools that occurred with diarrhea yesterday. Takes ibuprofen intermittently for pain although hasnt taken any in the past week.  Hospital Course  Patient was initially admitted for acute hypoxemic respiratory failure placed on Ventimask, new R pleural effusion, elevated BNP with concern for new onset CHF. CXR on admit showed new cardiomegaly and effusion compared to previous film in 09/2020 along with bilateral infiltrates. Patient was given 40mg Lasix IV once. There was concern for new pericardial effusion vs pericarditis. At the time, there was No clinical evidence of tamponade physiology as he lacks JVD, electrical alternans on EKG, and tachycardia at the moment. EKG also not low voltage, no t wave inversions, again no alternans present. CT thorax showed pericardial effusion and moderate-large R pleural effusion. The case was discussed with CIS yesterday, and CIS did not feel STAT Echo was warranted at the time given patient stable and no signs of tamponade physiology. Patient was started on Azithromycin and Rocephin for concerning CAP. Troponins have been downtrending since admission. Patient did undergo right thoracentesis on 2/14/21?with removal of 550 mL of serosanguineous fluid, fluid studies consistent with transudative effusion. His home meds plaquenil, cellcept, and Benlysta were held while inpatient, but patient did receive one dose of his home med Xarelto 20mg yesterday. This morning TTE was performed, which Echo tech informed me that he saw findings concerning for aortic root dissection. Cardiology, Dr. Pearson, was immediately  informed and asked to read Echo STAT. Dr. Pearson called to confirm that TTE showed proximal aortic dissection, and he recommended immediate transfer for CV surgery evaluation along with?tight BP and HR control in the meantime. Patient is stable at this point, he is on 3L NC with spO2 100%, HR noted to be in high 90s and BP 170s/90s. Patient was started on Esmolol gtt and upgraded to the ICU. CV surgery, Dr. Baker has accepted patient at [facility] and there are beds. Confirmed with CM that patient is being transferred to [facility] for further eval and management of likely aortic dissection. Patient was made NPO and has verbalized understanding of his condition and plan.  Significant Findings  See hospital course  Time Spent on discharge  > 30 minutes  Objective  Vitals & Measurements  T:?36.9? ?C (Oral)? TMIN:?36.6? ?C (Oral)? TMAX:?36.9? ?C (Oral)? HR:?91(Peripheral)? RR:?20? BP:?163/100? SpO2:?100%? WT:?53?kg? BMI:?18.34?  Physical Exam  General: AAOx3, NAD, alert and cooperative. Comfortable wearing 3L NC. Speaks in full sentences  HEENT: PERRL, EOMI  Neck: Supple, no JVD, no carotid bruit  CVS: S1/S2 nml, RRR, diastolic early decrescendo murmur heard best?in left lower sternal border  Resp: Crackles present in b/l lung bases, decreased breath sounds over R lower lung base. No wheezing.  GI: Not distended, not tender, BS+, no guarding  Skin: not jaundiced, warm, no rashes  Musculoskeletal: normal ROM  Extremities: No edema, peripheral pulses intact  Neuro: no focal neurological deficits  Patient Discharge Condition  Clinically and hemodynamically stable for transport to [facility] but with guarded prognosis.  Discharge Disposition  Being transferred to [facility] for CV surgery evaluation.   Follow up  Being transferred to [facility] for CV surgery eval.      I was present with the resident during the history and physical examination.  ???  [ x] I discussed the case with the resident and agree with the findings and plan as  documented in the residents note.  [ ] I discussed the case with the resident and agree with the findings and plan as documented in the residents note except:  Admitted for dypsnea on exertion and orthopnea for 2-3 days  PMHx MCTD- SLE/Scleroderama  163/100 pulse 91 afebrile  No JVD noted  heart-rrr with 3/6 diastolic murmor 2nd left intercostal space, S3  lungs-decreased BS at bases r>l  Ext- no edema  Cxr with r>L pleural effusions? thoracentesis-transudate  echo-ascending aortic route dissection, minimal pericardial effusion  Cardiology recommending transfer for cardiovascular evaluation  Esmolol drip for systolic BP goal of 120   Patient on xarelto for hx of DVT upper extremity, was discontinued pending CV surgery evaluation

## 2022-05-04 NOTE — HISTORICAL [facility] CERNER
This is a historical note converted from Pieter. Formatting and pictures may have been removed.  Please reference Pieter for original formatting and attached multimedia. Chief Complaint: SOB x2 days  ?   History of Present Illness  ?  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 ?Patient states he began experiencing chest tightness 3 days ago that was aggravated with coughing, constant, not associated with radiation/palpitations/nausea or vomiting. ?Describes it as a tight sensation, 5/10 at its worst. ?The pain did not increase intensity since its onset. ?He also states he began feeling short of breath around this time. ?Last night he was unable to fall asleep due to unable to breathe while lying flat, used his nieces nebulizer which gave him no relief. ?Denies PND, lower extremity edema, or history of heart failure in the past. ?He also endorses a productive cough with white frothy sputum that has been ongoing for the past 2 days as well. ?when he arrived to the ED he was noted to be hypoxic satting 90% on room air, improved with Ventimask to 95%. ?CXR done showed new cardiomegaly, new right pleural effusion, and new bilateral infiltrates. ?He denies any sick contacts, recent travel, states he tested COVID-19 negative about a week ago. ?He denies any fever, chills, rashes, lower extremity pain. He reports he has been taking chronic prednisone 5mg for the past 13 years as he cannot be weaned off due to symptoms. Also endorses 2 black stools that occurred with diarrhea yesterday. Takes ibuprofen intermittently for pain although hasnt taken any in the past week.  ?  ?  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  PSX:  LEFT MIDDLE FINGER AMPUTATION  Right 2nd toe  amputation  ?  FMH: Mother: Hypertension.  ?  Allergies: Levaquin; sulfamethoxazole; doxycycline  ?  Social  Tobacco: Currently smokes 4-5 cig/day  ETOH abuse: Denies  Illicit drug use: Denies  ?   ROS  General:?no fever, no night sweats, chills, fatigue, changes in weight.  Skin: no rashes, bruises, petechia  HEENT: no headache, head injury, no acute vision changes.  CVS: +chest pain, no palpitations, +orthopnea, no PND, edema  Resp: +SOB, +cough, no wheezing  GI: no dysphagia, +melena, no?hematochezia, abdominal pain, nausea, vomiting, constipation.  : no dysuria, hematuria, polyuria, CVA pain, nocturia  Musculoskeletal: no joint stiffness, pain, swelling or weakness  Neuro: no headaches, syncope, seizures, numbness, tingling, vertigo, dizziness  ?   Home medications  Home Medications (12) Active  amLODIPine 10 mg oral tablet?See Instructions  Aspir-Low 81 mg oral delayed release tablet?81 mg = 1 tab(s), Oral, Daily  Benlysta 200 mg/mL subcutaneous solution?200 mg, Subcutaneous, qWeek  CellCept 500 mg oral tablet?1,000 mg = 2 tab(s), Oral, BID  diclofenac sodium 50 mg oral delayed release tablet?50 mg = 1 tab(s), PRN, Oral, BID  lisinopril 40 mg oral tablet?See Instructions  metoprolol tartrate 50 mg oral tab?50 mg = 1 tab(s), Oral, BID  omeprazole 20 mg oral DR capsule?20 mg = 1 cap(s), Oral, Daily  Pepcid 20 mg oral tablet?20 mg = 1 tab(s), Oral, BID  Plaquenil 200 mg oral tablet?200 mg = 1 tab(s), Oral, Daily  Radha 2 mg oral delayed release tablet?See Instructions  Radha 2 mg oral delayed release tablet?See Instructions  ?  Physical exam  Vital Signs (last 24 hrs)_____??????????Last Charted___________  Heart Rate Peripheral?????????????????69 bpm ?(FEB 14 11:30)  Resp Rate???????????????????????H?28br/min ?(FEB 14 11:30)  SBP??????????????????????H?155mmHg ?(FEB 14 11:30)  DBP??????????????????????77 mmHg ?(FEB 14 11:30)  SpO2??????????????????????97 % ?(FEB 14 11:30)  Weight??????????????????????53 kg ?(FEB  14 03:39)  Height??????????????????????170 cm ?(FEB 14 03:39)  BMI??????????????????????18.34 ?(FEB 14 03:39)  ?  General: AAOx3, NAD, alert and cooperative. Comfortable wearning Ventimask. Speaks in full sentences  HEENT: PERRLA, EOMI  Neck: Supple  CVS: S1/S2 nml, RRR, no murmurs, rubs or gallops, no carotid bruits. No JVD  Resp: Crackles present in b/l lung bases, decreased breath sounds over R lower lung base. No wheezing.  GI: Not distended, not tender, BS+, no guarding  Skin: not jaundiced, warm, no rashes  Musculoskeletal: normal ROM  Extremities: No edema, peripheral pulses intact  Neuro: no focal neurological deficits  ?   Labs  Labs Last 24 Hours?  ?Chemistry? Hematology/Coagulation? Blood Gases?   Sodium Lvl: 143 mmol/L (02/14/21 05:55:12) WBC: 8 x10(3)/mcL (02/14/21 05:55:12) Sample ABG: arterial (02/14/21 05:20:00)   Potassium Lvl: 3.8 mmol/L (02/14/21 05:55:12) RBC:?3.28 x10(6)/mcL?Low (02/14/21 05:55:12) Treatment: room air (02/14/21 05:20:00)   Chloride:?110 mmol/L?High (02/14/21 05:55:12) Hgb:?9.2 gm/dL?Low (02/14/21 05:55:12) Site: Radial Lt (02/14/21 05:20:00)   CO2: 24 mmol/L (02/14/21 05:55:12) Hct:?29.9 %?Low (02/14/21 05:55:12) pH Art: 7.45 (02/14/21 05:20:00)   Calcium Lvl:?8.2 mg/dL?Low (02/14/21 05:55:12) Platelet: 395 x10(3)/mcL (02/14/21 05:55:12) pCO2 Art:?34 mmHg?Low (02/14/21 05:20:00)   Glucose Lvl: 87 mg/dL (02/14/21 05:55:12) MCV: 91.2 fL (02/14/21 05:55:12) pO2 Art:?50 mmHg?Critical (02/14/21 05:20:00)   BUN: 19 mg/dL (02/14/21 05:55:12) MCH: 28 pg (02/14/21 05:55:12) HCO3 Art: 23.6 mmol/L (02/14/21 05:20:00)   Creatinine: 1.06 mg/dL (02/14/21 05:55:12) MCHC:?30.8 gm/dL?Low (02/14/21 05:55:12) CO2 Totl Art: 24.6 mmol/L (02/14/21 05:20:00)   Est Creat Clearance Ser: 93.31 mL/min (02/14/21 06:30:12) RDW:?14.6 %?High (02/14/21 05:55:12) O2 Sat Art:?89 %?Low (02/14/21 05:20:00)   eGFR-AA: 104 (02/14/21 05:55:12) MPV:?10.8 fL?High (02/14/21 05:55:12) D base: -0.1 (02/14/21 05:20:00)    eGFR-TERRA:?86 mL/min/1.73 m2?Low (21 05:55:12) Abs Neut: 4.1 x10(3)/mcL (21 05:55:12) THB ABG:?9.2 gm/dL?Low (21 05:20:00)   Bili Total: 0.3 mg/dL (21 05:55:12) Neutro Auto: 51 % (21 05:55:12) CO Hgb: 3.1 % (21 05:20:00)   Bili Direct: 0.1 mg/dL (21 05:55:12) Lymph Auto: 30 % (21 05:55:12) Met Hgb Art: 0.6 % (21 05:20:00)   Bili Indirect: 0.2 mg/dL (21 05:55:12) Mono Auto: 13 % (21 05:55:12) O2 Hgb:?85.7 %?Low (21 05:20:00)   AST: 29 unit/L (21 05:55:12) Eos Auto: 6 % (21 05:55:12) Allens: Positive (21 05:20:00)   ALT: 12 unit/L (21 05:55:12) Abs Eos: 0.4 x10(3)/mcL (21 05:55:12) FIO2 AB% (21 05:20:00)   Alk Phos: 56 unit/L (21 05:55:12) Basophil Auto: 1 % (21 05:55:12)    Total Protein:?5.5 gm/dL?Low (21 05:55:12) Abs Neutro: 4.1 x10(3)/mcL (21 05:55:12)    Albumin Lvl:?2.3 gm/dL?Low (21 05:55:12) Abs Lymph: 2.4 x10(3)/mcL (21 05:55:12)    Globulin: 3.2 gm/dL (21 05:55:12) Abs Mono: 1 x10(3)/mcL (21 05:55:12)    A/G Ratio:?0.7 ratio?Low (21 05:55:12) Abs Baso: 0.1 x10(3)/mcL (21 05:55:12)    BNP:?1737.8 pg/mL?High (21 05:55:12) IG%: 0 % (21 05:55:12)    Lactic Acid Lvl: 0.7 mmol/L (21 05:55:12) IG#: 0.02 (21 05:55:12)    Troponin-I:?0.075 ng/mL?High (21 05:55:00)     ?  ?  Radiology  Accession:?[id number]  Order:?XR Chest 2 Views  Report Dt/Tm:?2021 09:48  Report:?  ?  CHEST X-RAYS (2 Views):  ?  CPT: 58589  ?  HISTORY:  Cough  ?  FINDINGS:  Examination reveals mediastinal silhouette to be within normal limits  cardiac silhouette is mildly enlarged.  ?  There is blunting of the right costophrenic angle which might indicate  the presence of a right-sided pleural effusion.  ?  Slightly more confluent airspace opacities identified in both bases  might represent early infiltrates.  ?  No other focal  consolidative changes.  ?  IMPRESSION: Changes of right-sided pleural effusion.  ?  Confluent airspace opacities in both bases might be related to  infiltrates.  ?  Mild cardiomegaly  ?  ?  Assessment and Plan  ?  Acute hypoxemic respiratory failure on Ventimask  Atypical chest pain  CAP  New R pleural effusion  Elevated BNP, concern for new onset CHF?  Elevated troponin  h/o MCTD with SLE features on chronic prednisone  Chronic normocytic anemia  h/o RUE DVT on chronic xarelto  h/o lupus nephritis  HTN  ?  - Etiology unknown at this time, CXR shows new cardiomegaly and effusion compared to previous film in 9/2020. Also b/l infiltrates give concern for developing pneumonia  - Concern for new pericardial effusion vs pericarditis. No clinical evidence of tamponade physiology as he lacks JVD, electrical alternans on EKG, and tachycardia at the moment  - EKG also not low voltage, no t wave inversions, again no alternans present?  - Will order CT thorax w/ PE protocol (given acute dyspnea and hypoxia) to assess size of pleural effusion, may warrant thoracentesis  - If pericardial effusion is present, will call [facility] for STAT echocardiogram to assess for tamponade  - COVID-19 negative in ED. However given patient is on biologics with h/o SLE with questionable infiltrates, will keep on precautions and reorder COVID-19 in 24 hours from initiate negative  - Starting Rocephin and azithromycin for CAP empiric coverage?  - Trending troponin and EKG q6h. If elevated can start ACS protocol  - Continue use of Ventimask to maintain oxygen saturation >92%  - Will DC 2L bolus ordered by ED as there is no clear indication. Giving 40mg IV lasix given effusion on CXR  - Ordering Echocardiogram in AM to assess for new dx of CHF  - Holding plaquenil, cellcept, and Benlysta while inpatient  - Continue home xarelto  ?  ?  Prophylaxis: Xarelto  Nutrition: Regular Diet  Antimicrobials:?Rocephin, azithromycin  Fluids: None  ?  Disposition: Admit  to telemetry unit for workup and treatment of new hypoxia with elevated troponin. Concern for pericarditis and effusion, will order CT thorax and assess for PE. Continue abx for CAP empiric coverage.  ?  ?   CT thorax shows pericardial effusion and moderate-large R pleural effusion. Called [facility] on call to discuss case for STAT echocardiogram to assess for tamponade physiology. Given that patient is stable and has no clinical signs of tamponade, [facility] does not feel echocardiogram is warranted in a STAT fashion, but agree to have one in AM tomorrow. If clinical status changes, can call back to discuss case.  ?  Will avoid further use of diurects, can give IVF if he becomes unstable. Will attempt R thoracentesis for symptomatic relief and for therapeutic needs given this is new.   I was present with the resident during the history and physical examination.  ???  [ x] I discussed the case with the resident and agree with the findings and plan as documented in the residents note.  [ ] I discussed the case with the resident and agree with the findings and plan as documented in the residents note except:  See discharge note as patient seen and discharged on same day   I have reviewed the patients history, residents? findings on physical examination, diagnosis and treatment plan. Care provided was reasonable and necessary.

## 2022-05-10 NOTE — ED PROVIDER NOTES
Encounter Date: 5/10/2022       History     Chief Complaint   Patient presents with    Abdominal Pain     Pt w co epigastric/abd pain w nausea x 3 days.  No diarrhea or constipation reported.  Pt hx of lupus/htn, out of meds x 4 days.  Bp 192/135.  Slight HA, NO BLURRED VISION.  DENIES CP/SOB.  EKG OBTAINED.      Hypertension     32-year-old male presents to the ED for epigastric pain/cramping. has been present for several days. associated nausea but no vomiting. No association with food. Reports normal bowel movements.  additionally has multiple chronic medical conditions and has been out of all of his medications for about 5 days.  denies any other complaints at this time.  no fever.  No shortness of breath.  No chest pains.  No trauma. He would like refills on his medications since he has been unable to get in touch with his primary care physician for the last several days. No other complaints at this time.        Review of patient's allergies indicates:   Allergen Reactions    Levofloxacin     Sulfamethoxazole-trimethoprim      Past Medical History:   Diagnosis Date    Hypertension     Systemic lupus erythematosus, organ or system involvement unspecified      Past Surgical History:   Procedure Laterality Date    CARDIAC SURGERY      FINGER AMPUTATION      TOE AMPUTATION       No family history on file.     Review of Systems   Constitutional: Negative for chills and fever.   HENT: Negative for congestion, rhinorrhea and sore throat.    Eyes: Negative for pain, discharge and itching.   Respiratory: Negative for chest tightness and shortness of breath.    Cardiovascular: Negative for chest pain and palpitations.   Gastrointestinal: Positive for abdominal pain and nausea. Negative for vomiting.        Cramping   Genitourinary: Negative for dysuria and hematuria.   Musculoskeletal: Negative for myalgias and neck pain.   Skin: Negative for color change and rash.   Neurological: Negative for dizziness,  weakness and headaches.   Psychiatric/Behavioral: Negative for confusion. The patient is not hyperactive.        Physical Exam     Initial Vitals   BP Pulse Resp Temp SpO2   05/10/22 1252 05/10/22 1252 05/10/22 1252 05/10/22 1252 05/10/22 1306   (!) 192/135 107 18 97.9 °F (36.6 °C) 95 %      MAP       --                Physical Exam    Constitutional: He appears well-developed and well-nourished. He is not diaphoretic. No distress.   HENT:   Head: Normocephalic and atraumatic.   Eyes: Conjunctivae and EOM are normal. Pupils are equal, round, and reactive to light.   Neck: Neck supple. No tracheal deviation present.   Normal range of motion.  Cardiovascular: Normal rate, regular rhythm and normal heart sounds.   Pulmonary/Chest: Breath sounds normal. No respiratory distress.   Abdominal: Abdomen is soft. Bowel sounds are normal. There is no abdominal tenderness.   No right CVA tenderness.  No left CVA tenderness. There is no rebound, no guarding, no tenderness at McBurney's point and negative Reyna's sign. negative Rovsing's sign  Musculoskeletal:         General: No tenderness. Normal range of motion.      Cervical back: Normal range of motion and neck supple.     Neurological: He is alert and oriented to person, place, and time. He has normal strength. GCS score is 15. GCS eye subscore is 4. GCS verbal subscore is 5. GCS motor subscore is 6.   Skin: Skin is warm and dry. Capillary refill takes less than 2 seconds. No rash noted.   Psychiatric: He has a normal mood and affect. His behavior is normal. Judgment and thought content normal.         ED Course   Procedures  Labs Reviewed   COMPREHENSIVE METABOLIC PANEL - Abnormal; Notable for the following components:       Result Value    Creatinine 1.31 (*)     Calcium Level Total 8.3 (*)     Protein Total 6.2 (*)     Albumin Level 2.3 (*)     Globulin 3.9 (*)     Albumin/Globulin Ratio 0.6 (*)     All other components within normal limits   CBC WITH DIFFERENTIAL -  Abnormal; Notable for the following components:    RBC 4.06 (*)     Hgb 11.2 (*)     Hct 35.9 (*)     MCHC 31.2 (*)     IG# 0.02 (*)     All other components within normal limits   LIPASE - Normal   CBC W/ AUTO DIFFERENTIAL    Narrative:     The following orders were created for panel order CBC W/ AUTO DIFFERENTIAL.  Procedure                               Abnormality         Status                     ---------                               -----------         ------                     CBC with Differential[[id number]]        Abnormal            Final result                 Please view results for these tests on the individual orders.   EXTRA TUBES    Narrative:     The following orders were created for panel order EXTRA TUBES.  Procedure                               Abnormality         Status                     ---------                               -----------         ------                     Light Blue Top Hold[[id number]]                              In process                 Gold Top Hold[[id number]]                                    In process                   Please view results for these tests on the individual orders.   LIGHT BLUE TOP HOLD   GOLD TOP HOLD     EKG Readings: (Independently Interpreted)   Initial Reading: No STEMI. Rhythm: Normal Sinus Rhythm. Ectopy: No Ectopy. Conduction: Normal. Axis: Normal. Clinical Impression: Normal Sinus Rhythm Other Impression: LVH     ECG Results          EKG 12-lead (Chest Pain) Age >30 (Edited Result - FINAL)  Result time 05/20/22 14:27:12    Edited Result - FINAL by Interface, Lab In [facility] (05/20/22 14:27:12)                 Narrative:    Test Reason : R07.9,    Vent. Rate : 096 BPM     Atrial Rate : 096 BPM     P-R Int : 172 ms          QRS Dur : 080 ms      QT Int : 360 ms       P-R-T Axes : 058 024 139 degrees     QTc Int : 454 ms    Normal sinus rhythm  LVH with repolarization abnormality  Abnormal ECG  No previous ECGs available  Confirmed by Swathi  Bella VANEGAS ([id number]) on 5/10/2022 1:24:25 PM  Also confirmed by eBlla Echevarria MD ([id number]),  CARLY WEBB ([id number])  on  5/20/2022 2:27:02 PM    Referred By:             Confirmed By:Bella Echevarria MD                            Imaging Results    None          Medications   aluminum-magnesium hydroxide-simethicone 200-200-20 mg/5 mL suspension 30 mL (30 mLs Oral Given 5/10/22 1415)     And   LIDOcaine HCl 2% oral solution 10 mL (10 mLs Oral Given 5/10/22 1414)   ondansetron disintegrating tablet 4 mg (4 mg Oral Given 5/10/22 1414)   cloNIDine tablet 0.2 mg (0.2 mg Oral Given 5/10/22 1534)     Medical Decision Making:   Clinical Tests:   Lab Tests: Ordered and Reviewed  Radiological Study: Ordered and Reviewed  Medical Tests: Ordered and Reviewed  ED Management:  32-year-old male who has been out of all of his medications for five days presents for vague abdominal cramping and discomfort.  Vital stable besides initial hypertension and tachycardia.  Tachycardia resolved without intervention.  Given clonidine which significantly improved pressure.  Patient upon multiple reexaminations was resting comfortably.  abdomen soft nontender without rebound guarding or rigidity.  Negative Reyna's.  No association with food.  Labs returned including abdominal labs grossly unremarkable.  no acute ischemic changes on EKG.  Patient requesting refills for his medicines which were provided.  Will follow-up closely with PCP.  Provided strict return precautions and discharged stable. (zmora)                      Clinical Impression:   Final diagnoses:  [R07.9] Chest pain  [I10] Hypertension, unspecified type (Primary)  [R10.13] Epigastric pain          ED Disposition Condition    Discharge Stable        ED Prescriptions     Medication Sig Dispense Start Date End Date Auth. Provider    albuterol (PROVENTIL/VENTOLIN HFA) 90 mcg/actuation inhaler Inhale 2 puffs into the lungs every 4 (four) hours as needed for Wheezing. Rescue 18 g  5/10/2022  Martín Nelson MD    amLODIPine (NORVASC) 10 MG tablet Take 1 tablet (10 mg total) by mouth once daily. 30 tablet 5/10/2022  Martín Nelson MD    aspirin (ECOTRIN) 81 MG EC tablet Take 1 tablet (81 mg total) by mouth once daily. 30 tablet 5/10/2022  Martín Nelson MD    labetaloL (NORMODYNE) 300 MG tablet Take 1 tablet (300 mg total) by mouth 2 (two) times daily. 30 tablet 5/10/2022  Martín Nelson MD    lisinopriL (PRINIVIL,ZESTRIL) 40 MG tablet Take 1 tablet (40 mg total) by mouth once daily. 30 tablet 5/10/2022  Martín Nelson MD    metoprolol tartrate (LOPRESSOR) 50 MG tablet Take 1 tablet (50 mg total) by mouth 2 (two) times daily. 30 tablet 5/10/2022  Martín Nelson MD    mycophenolate (CELLCEPT) 500 mg Tab Take 2 tablets (1,000 mg total) by mouth 2 (two) times daily. 30 tablet 5/10/2022  Martín Nelson MD    warfarin (COUMADIN) 3 MG tablet Take 1 tablet (3 mg total) by mouth Daily. 30 tablet 5/10/2022 6/9/2022 Martín Nelson MD    sucralfate (CARAFATE) 1 gram tablet  (Status: Discontinued) Take 1 tablet (1 g total) by mouth 2 (two) times daily. 60 tablet 5/10/2022 5/10/2022 Martín Nelson MD    famotidine (PEPCID) 20 MG tablet  (Status: Discontinued) Take 1 tablet (20 mg total) by mouth 2 (two) times daily. 60 tablet 5/10/2022 5/10/2022 Martín Nelson MD    famotidine (PEPCID) 20 MG tablet Take 1 tablet (20 mg total) by mouth 2 (two) times daily. 60 tablet 5/10/2022 6/9/2022 Martín Nelson MD    sucralfate (CARAFATE) 1 gram tablet Take 1 tablet (1 g total) by mouth 2 (two) times daily. 60 tablet 5/10/2022  Martín Nelson MD        Follow-up Information     Follow up With Specialties Details Why Contact Info    [facility] Emergency Medicine  As needed, If symptoms worsen [address] [facility] [id number] [phone]    PCP    see your primary doctor within 2-3 days.           Martín Nelson MD  06/03/22 1810       Martín Nelson MD  06/16/22 [id number]

## 2022-06-12 NOTE — ED PROVIDER NOTES
Encounter Date: 6/12/2022       History     Chief Complaint   Patient presents with    Ankle Pain     Pt reports his left ankle began to hurt around 1900 hrs yesterday, on 06/11/22, he reports waking up just prior to arrival with severe ankle pain, describes ankle as feeling hot and describes pain as constant and sharp. Pt denies recalling an injury to the ankle, pt's bp 214/124, reports he is compliant with bp meds.      32 year old male with pmh of HTN and lupus presents to ED with left lateral  ankle pain and swelling after rolling his ankle while he was walking around 11 am. He stated that he took 2 tylenol with not much improvement. He stated that he went to sleep right after but woke up with pain and swelling. He endorses fever at this time. Denies any leg weakness, numbness or tingling sensation on his foot, denies headache, blurry vision at this time.     The history is provided by the patient.     Review of patient's allergies indicates:   Allergen Reactions    Levofloxacin     Sulfamethoxazole-trimethoprim      Past Medical History:   Diagnosis Date    Hypertension     Systemic lupus erythematosus, organ or system involvement unspecified     Systemic lupus erythematosus, organ or system involvement unspecified      Past Surgical History:   Procedure Laterality Date    CARDIAC SURGERY      FINGER AMPUTATION      THROMBECTOMY Right     TOE AMPUTATION       History reviewed. No pertinent family history.  Social History     Tobacco Use    Smoking status: Current Every Day Smoker     Types: Cigarettes    Smokeless tobacco: Never Used     Review of Systems   Constitutional: Positive for chills and fever. Negative for diaphoresis.   Respiratory: Negative for apnea, chest tightness, shortness of breath and wheezing.    Cardiovascular: Negative for chest pain and palpitations.   Gastrointestinal: Negative for abdominal distention, abdominal pain, diarrhea and nausea.   Musculoskeletal: Positive for  joint swelling.        Lateral Ankle tenderness on palpation    Skin: Negative for color change and rash.   Psychiatric/Behavioral: Negative for confusion.       Physical Exam     Initial Vitals [06/12/22 0302]   BP Pulse Resp Temp SpO2   (!) 214/124 71 19 100 °F (37.8 °C) (!) 94 %      MAP       --         Physical Exam    Nursing note and vitals reviewed.  Constitutional: He appears well-developed and well-nourished. He is not diaphoretic. No distress.   HENT:   Head: Normocephalic and atraumatic.   Eyes: Conjunctivae are normal.   Cardiovascular: Normal rate, regular rhythm and normal heart sounds. Exam reveals no gallop and no friction rub.    No murmur heard.  Pulmonary/Chest: No respiratory distress. He has no wheezes. He has no rhonchi. He exhibits no tenderness.   Abdominal: He exhibits no distension. There is no abdominal tenderness.   Musculoskeletal:         General: Tenderness and edema present.      Left ankle: Swelling present. No ecchymosis or lacerations. Tenderness present over the lateral malleolus.      Left Achilles Tendon: Normal.     Neurological: He is alert and oriented to person, place, and time.   Skin: No abscess noted. No erythema.   Psychiatric: He has a normal mood and affect.         ED Course   Procedures  Labs Reviewed - No data to display          Imaging Results          X-Ray Ankle Complete Left (Preliminary result)  Result time 06/12/22 04:36:55    ED Interpretation by Sundeep Navarro MD (06/12/22 04:36:55, [facility], Emergency Medicine)    No acute traumatic injuries                             X-Ray Foot Complete Left (Preliminary result)  Result time 06/12/22 04:37:06    ED Interpretation by Sundeep Navarro MD (06/12/22 04:37:06, [facility], Emergency Medicine)    No acute traumatic injuries                            X-Rays:   Independently Interpreted Readings:   Other Readings:  Xray of left foot  and ankle  - No fracture, dislocation or  effusion noted per my read      Medications   ketorolac injection 15 mg (15 mg Intramuscular Given 6/12/22 0344)   cloNIDine tablet 0.2 mg (0.2 mg Oral Given 6/12/22 0344)   amLODIPine tablet 5 mg (5 mg Oral Given 6/12/22 0344)                Attending Attestation:   Physician Attestation Statement for Resident:  As the supervising MD   Physician Attestation Statement: I have personally seen and examined this patient.   I agree with the above history. -:   As the supervising MD I agree with the above PE.    As the supervising MD I agree with the above treatment, course, plan, and disposition.  I have reviewed and agree with the residents interpretation of the following: x-rays.            Attending ED Notes:   I performed a face to face evaluation of the patient Stuart Guevara and my history reveal ankle injury after twist while walking yesterday the physical exam was remarkable for tender to palpation, stable joint, no deformity.  I reviewed the history, physical exam and diagnostic studies performed by the resident Dr. Roger and I concur with the diagnosis and assessment. This case was initially evaluated by Dr. Roger  under my supervision and I agree with the documentation and plan.    Total teaching time: 10 min                 Clinical Impression:   Final diagnoses:  [S93.409A] Sprained ankle  [S93.492A] Sprain of anterior talofibular ligament of left ankle, initial encounter (Primary)          ED Disposition Condition    Discharge Stable        ED Prescriptions     Medication Sig Dispense Start Date End Date Auth. Provider    meloxicam (MOBIC) 7.5 MG tablet Take 1 tablet (7.5 mg total) by mouth once daily. for 10 days 10 tablet 6/12/2022 6/22/2022 Hossein Roger DO        Follow-up Information     Follow up With Specialties Details Why Contact Info    [facility] Emergency Medicine  If symptoms worsen [address]  [address]  [phone]    [facility] [place] - ED    [address] [address]           Hossein Roger DO  Resident  06/12/22 0453       Sundeep Navarro MD  06/12/22 0550

## 2022-06-12 NOTE — Clinical Note
"Stuart Guevara (Paul) was seen and treated in our emergency department on 6/12/2022.  He may return to work on 06/15/2022.       If you have any questions or concerns, please don't hesitate to call.       RN    "

## 2022-07-29 NOTE — PROGRESS NOTES
JATINDER Dickinson submitted on Cover my Meds  Key: [id number] - [place] Case ID: [id number]  PA approved - approval letter scanned into

## 2022-09-23 PROBLEM — M32.9 SYSTEMIC LUPUS ERYTHEMATOSUS: Chronic | Status: ACTIVE | Noted: 2022-01-01

## 2022-09-23 PROBLEM — I16.1 HYPERTENSIVE EMERGENCY: Status: ACTIVE | Noted: 2022-01-01

## 2022-09-23 PROBLEM — Z86.718 HISTORY OF DVT IN ADULTHOOD: Chronic | Status: ACTIVE | Noted: 2022-01-01

## 2022-09-23 PROBLEM — I71.00 AORTIC DISSECTION: Status: ACTIVE | Noted: 2022-01-01

## 2022-09-23 PROBLEM — I10 HTN (HYPERTENSION): Chronic | Status: ACTIVE | Noted: 2022-01-01

## 2022-09-23 NOTE — ANESTHESIA PROCEDURE NOTES
Intubation    Date/Time: 9/23/2022 4:50 PM  Performed by: Nolberto Hanson CRNA  Authorized by: Galen Dalton MD     Intubation:     Induction:  Intravenous    Intubated:  Postinduction    Mask Ventilation:  Easy mask    Attempts:  1    Attempted By:  CRNA    Method of Intubation:  Direct    Blade:  N[place]and 2    Laryngeal View Grade: Grade I - full view of cords      Difficult Airway Encountered?: No      Complications:  None    Airway Device:  Oral endotracheal tube    Airway Device Size:  8.0    Style/Cuff Inflation:  Cuffed    Tube secured:  22    Secured at:  The lips    Placement Verified By:  Capnometry    Complicating Factors:  None    Findings Post-Intubation:  BS equal bilateral and atraumatic/condition of teeth unchanged

## 2022-09-23 NOTE — CONSULTS
CT SURGERY PROGRESS NOTE  Stuart Guevara  32 y.o.  1990    Patients Procedure: * No surgery found *    Subjective  Interval History: 31yo male with history of systemic lupus erythematosus, type AI aortic dissection status post repair 02/2021, history of right upper extremity DVT presented with complaints of tearing chest pain and subsequent syncopal event.      He was found have a type B aortic dissection and significant hypertension noted on presentation.      He has since been initiated on appropriate medical therapy, with high-dose intravenous beta-blocker, intravenous nitrate and calcium channel blocker for blood pressure suppression.      Plan- continue aggressive medical management for type B aortic dissection, eval for TEVAR in progress    Review of Systems   Constitutional: Negative.    HENT: Negative.     Eyes: Negative.    Respiratory: Negative.     Cardiovascular:         HPI   Gastrointestinal: Negative.    Genitourinary: Negative.    Musculoskeletal: Negative.    Skin: Negative.    Neurological: Negative.    Psychiatric/Behavioral: Negative.       Medication List  Infusions   clevidipine 20 mg/hr (09/23/22 0821)    esmolol 350 mcg/kg/min (09/23/22 0833)     Scheduled   amLODIPine  10 mg Oral Daily    famotidine  20 mg Oral BID    gemfibroziL  600 mg Oral BID AC    hydrALAZINE  10 mg Oral Q8H    labetaloL  300 mg Oral BID    lisinopriL  40 mg Oral Daily    metoprolol tartrate  50 mg Oral BID    morphine        mupirocin   Nasal BID    sucralfate  1 g Oral BID       Objective:  Recent Vitals:  Temp:  [97.2 °F (36.2 °C)-98 °F (36.7 °C)] 98 °F (36.7 °C)  Pulse:  [] 70  Resp:  [12-28] 18  SpO2:  [90 %-100 %] 90 %  BP: ()/() 97/57    Physical Exam  Constitutional:       Appearance: Normal appearance.   HENT:      Head: Normocephalic.      Nose: Nose normal.   Cardiovascular:      Rate and Rhythm: Normal rate and regular rhythm.      Pulses: Normal pulses.   Pulmonary:      Effort:  Pulmonary effort is normal.      Breath sounds: Normal breath sounds.   Abdominal:      Palpations: Abdomen is soft.   Musculoskeletal:         General: Normal range of motion.      Cervical back: Normal range of motion.   Skin:     General: Skin is warm.      Capillary Refill: Capillary refill takes less than 2 seconds.   Neurological:      General: No focal deficit present.      Mental Status: He is alert and oriented to person, place, and time.   Psychiatric:         Mood and Affect: Mood normal.         Behavior: Behavior normal.        I/O last 24 hrs:  Intake/Output - Last 3 Shifts       None            Labs  BMP:   Recent Labs   Lab 09/23/22  0257      K 3.6   CO2 26   BUN 24.2*   CREATININE 1.59*   CALCIUM 8.3*     CBC:   Recent Labs   Lab 09/23/22 0257 09/23/22  0304   WBC 9.1  --    RBC 3.36*  --    HGB 9.5*  --    HCT 30.4* 28*     --    MCV 90.5  --    MCH 28.3  --    MCHC 31.3*  --      CMP:   Recent Labs   Lab 09/23/22  0257   CALCIUM 8.3*   ALBUMIN 2.3*      K 3.6   CO2 26   BUN 24.2*   CREATININE 1.59*   ALKPHOS 71   ALT 14   AST 22   BILITOT 0.2         Imaging:   CXR: No results found in the last 24 hours.    EXAMINATION:  CTA CHEST AORTA NON CORONARY     CLINICAL HISTORY:  severe chest pain, HTN, concern for dissection;     TECHNIQUE:  Helical acquisition through the chest without and with IV contrast targeting the arterial phase.  Multiplanar and 3D MIP reconstructed images were provided for review.  mGycm. Automatic exposure control, adjustment of mA/kV or iterative reconstruction technique was used to reduce radiation.     COMPARISON:  30 March 2022.     FINDINGS:  There is excellent opacification of the thoracic aorta and pulmonary arterial tree.  No pulmonary embolus.  There is aortic dissection which is new compared to prior.  This begins just distal to the left subclavian origin.  Dissection flap ends distal descending thoracic aorta.  Dissection does not involve  the great vessels.  Mild dilatation of the proximal descending thoracic aorta up to 3.6 cm.     There is no mediastinal, hilar or axillary lymphadenopathy by size criteria.     The heart is mildly enlarged.  There is prosthetic aortic valve.  No pericardial effusion.  There is some mild mediastinal stranding but no large hematoma.     Small bilateral pleural effusions, right larger than left.  There is some septal thickening suggestive of pulmonary edema.  No dense consolidation.     Upper abdomen discussed on same day CT.  No acute osseous findings.     Impression:     1. [place] type B aortic dissection which is new compared to March 2022.  2. Pulmonary edema with small bilateral pleural effusions, right larger than left.  3.  No significant discrepancy with the preliminary report.        Electronically signed by: Kapil Ayers  Date:                                            09/23/2022  Time:                                           09:10    ASSESSMENT/PLAN:    Cont aggressive med mgt of HTN for type B aortic dissection     Eval for TEVAR in progress Dr Espinal     Case and plan of care discussed with MD Grabiel Huerta PA-C

## 2022-09-23 NOTE — ANESTHESIA PREPROCEDURE EVALUATION
09/23/2022  Stuart Guevara is a 32 y.o., male.    Interval History: 33yo male with history of systemic lupus erythematosus, type AI aortic dissection status post repair 02/2021, history of right upper extremity DVT presented with complaints of tearing chest pain and subsequent syncopal event.       He was found have a type B aortic dissection and significant hypertension noted on presentation.       He has since been initiated on appropriate medical therapy, with high-dose intravenous beta-blocker, intravenous nitrate and calcium channel blocker for blood pressure suppression.       Plan- continue aggressive medical management for type B aortic dissection, eval for TEVAR in progress      Pre-op Assessment    I have reviewed the Patient Summary Reports.     I have reviewed the Nursing Notes. I have reviewed the NPO Status.   I have reviewed the Medications.     Review of Systems  Hematology/Oncology:         -- Anemia:   Cardiovascular:   Hypertension Valvular problems/Murmurs   Hx of Valve Repair   Hypertension    Renal/:   Chronic Renal Disease (LUPUS), CKD        Physical Exam  General: Well nourished    Airway:  Mallampati: II / II  Mouth Opening: Normal  TM Distance: Normal  Neck ROM: Normal ROM    Dental:  Periodontal disease    Chest/Lungs:  Clear to auscultation    Heart:  Rate: Normal        Anesthesia Plan  Type of Anesthesia, risks & benefits discussed:    Anesthesia Type: Gen ETT  Intra-op Monitoring Plan: Standard ASA Monitors and Art Line  Post Op Pain Control Plan: multimodal analgesia  Induction:  IV  Airway Plan: Direct  Informed Consent: Informed consent signed with the Patient and all parties understand the risks and agree with anesthesia plan.  All questions answered. Patient consented to blood products? Yes  ASA Score: 4 Emergent    Ready For Surgery From Anesthesia Perspective.      .

## 2022-09-23 NOTE — Clinical Note
The ECG showed paced rhythm. The patient has an external pacemaker. The pacemaker rate was 70 bpm. The pacemaker output was 82 mA.

## 2022-09-23 NOTE — ED PROVIDER NOTES
Encounter Date: 9/23/2022       History     Chief Complaint   Patient presents with    Chest Pain     CP, SOB, nausea while asleep PTA.      32-year-old male with past medical history of dissection, hypertension, lupus presenting today with chest pain.  He was laying in his bed when immediately prior to arrival he had a ripping tearing chest pain radiating to the back.  He came to the emergency department had a syncopal episode up in the waiting room.  All he can tell me is that his chest hurts at this time.    The history is provided by the patient. No  was used.   Chest Pain  The current episode started just prior to arrival. Duration of episode(s) is 30 minutes. Chest pain occurs constantly. The chest pain is unchanged. At its most intense, the chest pain is at 10/10. The chest pain is currently at 10/10. The quality of the pain is described as tearing. The pain radiates to the upper back. Primary symptoms include shortness of breath.   His past medical history is significant for aortic dissection.   Review of patient's allergies indicates:   Allergen Reactions    Levofloxacin     Sulfamethoxazole-trimethoprim      Past Medical History:   Diagnosis Date    Hypertension     Systemic lupus erythematosus, organ or system involvement unspecified     Systemic lupus erythematosus, organ or system involvement unspecified      Past Surgical History:   Procedure Laterality Date    CARDIAC SURGERY      FINGER AMPUTATION      THROMBECTOMY Right     TOE AMPUTATION       No family history on file.  Social History     Tobacco Use    Smoking status: Every Day     Types: Cigarettes    Smokeless tobacco: Never     Review of Systems   Unable to perform ROS: Acuity of condition   Respiratory:  Positive for shortness of breath.    Cardiovascular:  Positive for chest pain.     Physical Exam     Initial Vitals [09/23/22 0302]   BP Pulse Resp Temp SpO2   (!) 236/108 (!) 114 (!) 28 97.2 °F (36.2 °C) 98 %       MAP       --         Physical Exam    Nursing note and vitals reviewed.  Constitutional: He appears well-developed. He is diaphoretic. He appears distressed.   HENT:   Head: Normocephalic and atraumatic.   Eyes: Right eye exhibits no discharge. Left eye exhibits no discharge.   Neck: Neck supple.   Normal range of motion.  Cardiovascular:  Regular rhythm and normal heart sounds.   Tachycardia present.   Exam reveals no friction rub.       No murmur heard.  Unequal upper extremity pulses   Pulmonary/Chest: Breath sounds normal. No respiratory distress. He has no wheezes. He has no rhonchi. He has no rales.   Midline sternotomy scar   Abdominal: Abdomen is soft. Bowel sounds are normal. He exhibits no distension. There is no abdominal tenderness. There is no rebound.   Musculoskeletal:         General: No tenderness. Normal range of motion.      Cervical back: Normal range of motion and neck supple.     Neurological: He is alert and oriented to person, place, and time.   Skin: Skin is warm.       ED Course   Critical Care    Date/Time: 9/23/2022 4:15 AM  Performed by: Tom Lynn MD  Authorized by: Tom Lynn MD   Total critical care time (exclusive of procedural time) : 70 minutes  Critical care time was exclusive of separately billable procedures and treating other patients and teaching time.  Critical care was necessary to treat or prevent imminent or life-threatening deterioration of the following conditions: cardiac failure.  Critical care was time spent personally by me on the following activities: blood draw for specimens, evaluation of patient's response to treatment, examination of patient, obtaining history from patient or surrogate, ordering and performing treatments and interventions, ordering and review of laboratory studies, re-evaluation of patient's condition, development of treatment plan with patient or surrogate, discussions with consultants, ordering and review of radiographic  studies, pulse oximetry and review of old charts.      Labs Reviewed   COMPREHENSIVE METABOLIC PANEL - Abnormal; Notable for the following components:       Result Value    Chloride 108 (*)     Glucose Level 107 (*)     Blood Urea Nitrogen 24.2 (*)     Creatinine 1.59 (*)     Calcium Level Total 8.3 (*)     Albumin Level 2.3 (*)     Globulin 4.2 (*)     Albumin/Globulin Ratio 0.5 (*)     All other components within normal limits   TROPONIN I - Abnormal; Notable for the following components:    Troponin-I 0.067 (*)     All other components within normal limits   B-TYPE NATRIURETIC PEPTIDE - Abnormal; Notable for the following components:    Natriuretic Peptide 2,684.7 (*)     All other components within normal limits   CBC WITH DIFFERENTIAL - Abnormal; Notable for the following components:    RBC 3.36 (*)     Hgb 9.5 (*)     Hct 30.4 (*)     MCHC 31.3 (*)     MPV 11.5 (*)     All other components within normal limits   MANUAL DIFFERENTIAL - Abnormal; Notable for the following components:    Neut Man 38 (*)     Lymph Man 45 (*)     RBC Morph Abnormal (*)     Hypochrom 1+ (*)     All other components within normal limits   SARS-COV-2 RNA AMPLIFICATION, QUAL - Normal   CBC W/ AUTO DIFFERENTIAL    Narrative:     The following orders were created for panel order CBC auto differential.  Procedure                               Abnormality         Status                     ---------                               -----------         ------                     CBC with Differential[[id number]]        Abnormal            Final result               Manual Differential[[id number]]          Abnormal            Final result                 Please view results for these tests on the individual orders.   ISTAT CHEM8     EKG Readings: (Independently Interpreted)   Initial Reading: No STEMI. Rhythm: Normal Sinus Rhythm. Heart Rate: 84. Ectopy: No Ectopy. Conduction: Normal. ST Segments: Normal ST Segments. T Waves Flipped: I, II and V6.  Clinical Impression: Normal Sinus Rhythm     Imaging Results              CT Abdomen Pelvis With Contrast (Preliminary result)  Result time 09/23/22 03:27:08      Preliminary result by Ricki Tobin Jr., MD (09/23/22 03:27:08)                   Narrative:    START OF REPORT:  Technique: CT of the abdomen and pelvis was performed with axial images as well as sagittal and coronal reconstruction images with intravenous contrast.    Comparison: None available.    Clinical History: Abd pain severe chest pain, HTN, concern for dissection.    Dosage Information: Automated Exposure Control was utilized.    Findings:  Thorax: Chest findings are discussed separately in the CT chest report.  Abdomen:  Abdominal Wall: No abdominal wall pathology is seen.  Liver: The liver appears unremarkable.  Biliary System: No intrahepatic or extrahepatic biliary duct dilatation is seen.  Gallbladder: The gallbladder appears unremarkable.  Pancreas: The pancreas appears unremarkable.  Spleen: The spleen appears unremarkable.  Adrenals: The adrenal glands appear unremarkable.  Kidneys: There is mild bilateral renal parenchymal scarring. The kidneys otherwise appear unremarkable. On the excretory phase images, there is symmetric excretion of contrast and no filling defects are identified in the collecting systems.  Aorta: There is mild calcification of the abdominal aorta and its branches. No aortic aneurysm or dissection is seen.  IVC: Unremarkable.  Bowel:  Esophagus: The visualized esophagus appears unremarkable.  Stomach: The stomach appears unremarkable.  Duodenum: Unremarkable appearing duodenum.  Small Bowel: The small bowel appears unremarkable.  Colon: Nondistended.  Appendix: The appendix appears unremarkable.  Peritoneum: No intraperitoneal free air or ascites is seen.    Pelvis:  Bladder: The bladder appears unremarkable.  Male:  Prostate gland: The prostate gland appears unremarkable.    Bony structures:  Dorsal Spine: The  visualized dorsal spine appears unremarkable.      Impression:  1. No acute intraabdominal or pelvic pathology is identified. Details as above.                          Preliminary result by Interface, Rad Results In (09/23/22 03:27:08)                   Narrative:    START OF REPORT:  Technique: CT of the abdomen and pelvis was performed with axial images as well as sagittal and coronal reconstruction images with intravenous contrast.    Comparison: None available.    Clinical History: Abd pain severe chest pain, HTN, concern for dissection.    Dosage Information: Automated Exposure Control was utilized.    Findings:  Thorax: Chest findings are discussed separately in the CT chest report.  Abdomen:  Abdominal Wall: No abdominal wall pathology is seen.  Liver: The liver appears unremarkable.  Biliary System: No intrahepatic or extrahepatic biliary duct dilatation is seen.  Gallbladder: The gallbladder appears unremarkable.  Pancreas: The pancreas appears unremarkable.  Spleen: The spleen appears unremarkable.  Adrenals: The adrenal glands appear unremarkable.  Kidneys: There is mild bilateral renal parenchymal scarring. The kidneys otherwise appear unremarkable. On the excretory phase images, there is symmetric excretion of contrast and no filling defects are identified in the collecting systems.  Aorta: There is mild calcification of the abdominal aorta and its branches. No aortic aneurysm or dissection is seen.  IVC: Unremarkable.  Bowel:  Esophagus: The visualized esophagus appears unremarkable.  Stomach: The stomach appears unremarkable.  Duodenum: Unremarkable appearing duodenum.  Small Bowel: The small bowel appears unremarkable.  Colon: Nondistended.  Appendix: The appendix appears unremarkable.  Peritoneum: No intraperitoneal free air or ascites is seen.    Pelvis:  Bladder: The bladder appears unremarkable.  Male:  Prostate gland: The prostate gland appears unremarkable.    Bony structures:  Dorsal Spine:  The visualized dorsal spine appears unremarkable.      Impression:  1. No acute intraabdominal or pelvic pathology is identified. Details as above.                                         CTA Chest Aorta Non Coronary (Preliminary result)  Result time 09/23/22 03:23:01      Preliminary result by Ricki Tobin Jr., MD (09/23/22 03:23:01)                   Narrative:    START OF REPORT:  Technique: CT Scan of the chest was performed without and with intravenous contrast with direct axial images as well as sagittal and coronal reconstruction images.    Dosage Information: Automated Exposure Control was utilized.    Comparison: Comparison is with study dated 2022-03-30 16:03:42.    Clinical History: Severe chest pain, HTN, concern for dissection.    Findings:  Neck: The thyroid gland appear unremarkable.  Mediastinum: The mediastinal structures are within normal limits.  Heart: Mild stable cardiomegaly is seen. The patient is status post median sternotomy. There is reflux of contrast into the inferior vena cava and hepatic veins, suggestive of congestive hepatomegaly.  Aorta: There is Warren type B aortic dissection with its inferior extent in the mid descending thoracic aorta. The true and false lumen of the aorta are patent. These findings are new since the prior examination. There is dilatation of the descending thoracic aorta measuring 3.7 cm in the proximal segment and 3.4 cm in the mid segment, new since the prior examination. The distal descending segment measures 2.5 cm at the diaphragmatic hiatus. The origin of the left subclavian, left common carotid and right brachiocephalic arteries are unremarkable.  Pulmonary Arteries: The main pulmonary arteries are patent.  Lungs: There is mild diffuse interlobular interstitial septal thickening in both lungs, new since the prior examination. These findings may reflect interstitial edema.  Pleura: Again noted is small right pleural effusion. There is interval  development of trace left pleural effusion. No pneumothorax is seen.  Bony Structures:  Spine: The visualized dorsal spine appears unremarkable.  Abdomen: Abdominal findings will be discussed separately in the abdomen CT report.    Notifications: The results were discussed with the emergency room physician Dr. Lynn prior to dictation at 2022-09-23 04:04:46 CDT.      Impression:  1. There is Jacob type B aortic dissection with its inferior extent in the mid descending thoracic aorta. The true and false lumen of the aorta are patent. These findings are new since the prior examination. There is dilatation of the descending thoracic aorta measuring 3.7 cm in the proximal segment and 3.4 cm in the mid segment, new since the prior examination. The distal descending segment measures 2.5 cm at the diaphragmatic hiatus.  2. Again noted is small right pleural effusion. There is interval development of trace left pleural effusion.  3. There is mild diffuse interlobular interstitial septal thickening in both lungs, new since the prior examination. These findings may reflect interstitial edema.  4. Details as above.                          Preliminary result by Interface, Rad Results In (09/23/22 03:23:01)                   Narrative:    START OF REPORT:  Technique: CT Scan of the chest was performed without and with intravenous contrast with direct axial images as well as sagittal and coronal reconstruction images.    Dosage Information: Automated Exposure Control was utilized.    Comparison: Comparison is with study dated 2022-03-30 16:03:42.    Clinical History: Severe chest pain, HTN, concern for dissection.    Findings:  Neck: The thyroid gland appear unremarkable.  Mediastinum: The mediastinal structures are within normal limits.  Heart: Mild stable cardiomegaly is seen. The patient is status post median sternotomy. There is reflux of contrast into the inferior vena cava and hepatic veins, suggestive of congestive  hepatomegaly.  Aorta: There is Broadview type B aortic dissection with its inferior extent in the mid descending thoracic aorta. The true and false lumen of the aorta are patent. These findings are new since the prior examination. There is dilatation of the descending thoracic aorta measuring 3.7 cm in the proximal segment and 3.4 cm in the mid segment, new since the prior examination. The distal descending segment measures 2.5 cm at the diaphragmatic hiatus. The origin of the left subclavian, left common carotid and right brachiocephalic arteries are unremarkable.  Pulmonary Arteries: The main pulmonary arteries are patent.  Lungs: There is mild diffuse interlobular interstitial septal thickening in both lungs, new since the prior examination. These findings may reflect interstitial edema.  Pleura: Again noted is small right pleural effusion. There is interval development of trace left pleural effusion. No pneumothorax is seen.  Bony Structures:  Spine: The visualized dorsal spine appears unremarkable.  Abdomen: Abdominal findings will be discussed separately in the abdomen CT report.    Notifications: The results were discussed with the emergency room physician Dr. Lynn prior to dictation at 2022-09-23 04:04:46 CDT.      Impression:  1. There is Jacob type B aortic dissection with its inferior extent in the mid descending thoracic aorta. The true and false lumen of the aorta are patent. These findings are new since the prior examination. There is dilatation of the descending thoracic aorta measuring 3.7 cm in the proximal segment and 3.4 cm in the mid segment, new since the prior examination. The distal descending segment measures 2.5 cm at the diaphragmatic hiatus.  2. Again noted is small right pleural effusion. There is interval development of trace left pleural effusion.  3. There is mild diffuse interlobular interstitial septal thickening in both lungs, new since the prior examination. These findings may  reflect interstitial edema.  4. Details as above.                                         Medications   esmolol 2000 mg in sodium chloride 0.9% 100 mL (20 mg/mL) (300 mcg/kg/min × 60 kg Intravenous Rate/Dose Change 9/23/22 0348)   iopamidoL (ISOVUE-370) 76 % injection (has no administration in time range)   nitroPRUSSide (NIPRIDE) 0.5 mg/mL in dextrose 5 % 100 mL infusion (0.6 mcg/kg/min × 60 kg Intravenous Rate/Dose Change 9/23/22 0419)   iopamidoL (ISOVUE-370) injection 100 mL (100 mLs Intravenous Given 9/23/22 0324)   morphine injection 4 mg (4 mg Intravenous Given 9/23/22 0336)   labetalol 20 mg/4 mL (5 mg/mL) IV syring (10 mg Intravenous Given 9/23/22 0409)   labetalol 20 mg/4 mL (5 mg/mL) IV syring (20 mg Intravenous Given 9/23/22 0422)   fentaNYL injection 100 mcg (100 mcg Intravenous Given 9/23/22 0431)     Medical Decision Making:   ED Management:  Pt presents to the ED with complaints of chest pain    I immediately presented at the patient's bedside.  He was in severe pain and writhing on the bed.  His blood pressure was elevated he was tachycardic.  He had a midline sternotomy incision.  On chart review that was due to an old dissection.  I immediately took him to CT scanner for concern for dissection.  His CT scan showed a type B dissection.  He was started on esmolol drip and is currently maxed out on it.  His heart rate is still in the 80s so I am giving him floats of labetalol to try to bring his heart rate down.  He is also on a nitroprusside drip to try to bring his blood pressure down. The patient was accepted by Dr Santiago at [facility]. The patient is being transferred in a serious but stable condition.                           Clinical Impression:   Final diagnoses:  [R07.9] Chest pain  [I71.00] Dissection of aorta, unspecified portion of aorta (Primary)        ED Disposition Condition    Transfer to Another Facility Stable                Tom Lynn MD  09/23/22 0434       Tom Lynn  MD  09/23/22 0456

## 2022-09-23 NOTE — Clinical Note
The catheter was inserted in the right ventricle. The transvenous pacing lead was secured in place in the RV, was inserted into the RV and was placed and capture was confirmed. The pacer was paced at 60 BPM 5 mA 1 mV.

## 2022-09-23 NOTE — PLAN OF CARE
Problem: Adult Inpatient Plan of Care  Goal: Plan of Care Review  9/23/2022 1659 by Adis Feldman RN  Outcome: Ongoing, Progressing  9/23/2022 1658 by Adis Feldman RN  Outcome: Ongoing, Progressing  Goal: Patient-Specific Goal (Individualized)  9/23/2022 1659 by Adis Feldman RN  Outcome: Ongoing, Progressing  9/23/2022 1658 by Adis Feldman RN  Outcome: Ongoing, Progressing  Goal: Absence of Hospital-Acquired Illness or Injury  9/23/2022 1659 by Adis Feldman RN  Outcome: Ongoing, Progressing  9/23/2022 1658 by Adis Feldman RN  Outcome: Ongoing, Progressing  Goal: Optimal Comfort and Wellbeing  9/23/2022 1659 by Adis Feldman RN  Outcome: Ongoing, Progressing  9/23/2022 1658 by Adis Feldman RN  Outcome: Ongoing, Progressing  Goal: Readiness for Transition of Care  9/23/2022 1659 by Adis Feldman RN  Outcome: Ongoing, Progressing  9/23/2022 1658 by Adis Feldman RN  Outcome: Ongoing, Progressing     Problem: Infection  Goal: Absence of Infection Signs and Symptoms  9/23/2022 1659 by Adis Feldman RN  Outcome: Ongoing, Progressing  9/23/2022 1658 by Adis Feldman RN  Outcome: Ongoing, Progressing

## 2022-09-23 NOTE — ANESTHESIA PROCEDURE NOTES
Arterial    Diagnosis: AAA    Patient location during procedure: holding area  Procedure start time: 9/23/2022 4:23 PM  Timeout: 9/23/2022 4:20 PM  Procedure end time: 9/23/2022 1:25 PM    Staffing  Authorizing Provider: Galen Dalton MD  Performing Provider: Nolberto Hanson CRNA      Preanesthetic Checklist  Completed: patient identified, site marked, risks and benefits discussed, surgical consent, monitors and equipment checked, pre-op evaluation, timeout performed and anesthesia consent givenArterial  Skin Prep: chlorhexidine gluconate  Local Infiltration: lidocaine  Orientation: right  Location: radial    Catheter Size: 20 G  Catheter placement by Anatomical landmarks. Heme positive aspiration all ports. Insertion Attempts: 1  Assessment  Dressing: secured with tape and tegaderm  Patient: Tolerated well

## 2022-09-23 NOTE — H&P
"[facility]  Pulmonary Critical Care Note    Patient Name: Stuart Guevara  MRN: [id number]  Admission Date: 9/23/2022  Hospital Length of Stay: 0 days  Code Status: Full Code  Attending Provider: Nolberto Calvillo MD  Primary Care Provider: Primary Doctor No     Subjective:     HPI:   Stuart Guevara is a 32-year-old male with PMH of systemic lupus erythematosus, HTN, hx of Type A aortic dissection repaired in Feb 2021, hx of RUE DVT, who presented to [facility] ED on 9/23/22 with complaints of ripping, tearing chest pain that radiates to the back that began on 9/22. He was noted to have BP at that time of 232/126. He had a syncopal episode witnessed at the . He was brought immediately to CT scan where Type B aortic dissection (not involving aortic arch) was confirmed. Esmolol and nitroprusside were initiated at [facility]. Transferred to [facility] for CT surgery services and admitted to ICU directly.  On arrival to [facility] ICU his BP is 156/98, cleviprex initiated and BP is now 101/58. He is in significant pain, somewhat lethargic but redirectable and oriented x3. He states he keeps having "hot flashes" and feels that "he may pass out." Of note, he states that he has been out of all of his medications (including all antihypertensives and anticoagulants) for about 2 months.    Hospital Course/Significant events:  9/23/22 CTA chest confirmed [place] type B aortic dissection with its inferior extent in the mid descending thoracic aorta. 3.7 cm dilation. Transferred from [facility] to [facility]. Admitted to ICU.    24 Hour Interval History:  Please see above.    Past Medical History:   Diagnosis Date    Hypertension     Systemic lupus erythematosus, organ or system involvement unspecified     Systemic lupus erythematosus, organ or system involvement unspecified        Past Surgical History:   Procedure Laterality Date    CARDIAC SURGERY      FINGER AMPUTATION      THROMBECTOMY Right     TOE AMPUTATION   " "      Social History     Socioeconomic History    Marital status: Single   Tobacco Use    Smoking status: Every Day     Types: Cigarettes    Smokeless tobacco: Never           Current Outpatient Medications   Medication Instructions    albuterol (PROVENTIL/VENTOLIN HFA) 90 mcg/actuation inhaler 2 puffs, Inhalation, Every 4 hours PRN, Rescue    amLODIPine (NORVASC) 10 mg, Oral, Daily    aspirin (ECOTRIN) 81 mg, Oral, Daily    belimumab (BENLYSTA) 120 mg SolR Benlysta Take No date recorded No form recorded No frequency recorded No route recorded No set duration recorded No set duration amount recorded active No dosage strength recorded No dosage strength units of measure recorded    famotidine (PEPCID) 20 mg, Oral, 2 times daily    gemfibroziL (LOPID) 600 mg, Oral    hydrALAZINE (APRESOLINE) 10 mg, Oral    labetaloL (NORMODYNE) 300 mg, Oral, 2 times daily    lisinopriL (PRINIVIL,ZESTRIL) 40 mg, Oral, Daily    metoprolol tartrate (LOPRESSOR) 50 mg, Oral, 2 times daily    mycophenolate (CELLCEPT) 1,000 mg, Oral, 2 times daily    MEÑO 2 mg TbEC 1 tablet, Oral, Daily    rivaroxaban (XARELTO ORAL) Xarelto Take No date recorded No form recorded No frequency recorded No route recorded No set duration recorded No set duration amount recorded active No dosage strength recorded No dosage strength units of measure recorded    sucralfate (CARAFATE) 1 g, Oral, 2 times daily    warfarin (COUMADIN) 3 mg, Oral, Daily       Current Inpatient Medications   amLODIPine  10 mg Oral Daily    aspirin  81 mg Oral Daily    famotidine  20 mg Oral BID    gemfibroziL  600 mg Oral BID AC    hydrALAZINE  10 mg Oral Q8H    labetaloL  300 mg Oral BID    lisinopriL  40 mg Oral Daily    metoprolol tartrate  50 mg Oral BID    morphine        mupirocin   Nasal BID    sucralfate  1 g Oral BID       Current Intravenous Infusions   esmolol           Review of Systems   Constitutional:  Negative for chills, fever and weight loss.        Feels "like he is " "going to pass out"   HENT: Negative.     Eyes:  Negative for blurred vision and double vision.   Respiratory:  Negative for shortness of breath and wheezing.    Cardiovascular:  Positive for chest pain and palpitations.   Gastrointestinal:  Negative for abdominal pain, nausea and vomiting.   Genitourinary: Negative.    Musculoskeletal:  Positive for back pain.   Skin: Negative.    Neurological:  Positive for dizziness and weakness.   Psychiatric/Behavioral: Negative.          Objective:     No intake or output data in the 24 hours ending 09/23/22 0627      Vital Signs (Most Recent):     There is no height or weight on file to calculate BMI.    Vital Signs (24h Range):  Temp:  [97.2 °F (36.2 °C)] 97.2 °F (36.2 °C)  Pulse:  [] 80  Resp:  [12-28] 20  SpO2:  [90 %-100 %] 96 %  BP: (156-236)/() 156/98     Physical Exam  Constitutional:       Comments: Thin, lethargic male in no respiratory distress, complaining of pain   HENT:      Head: Normocephalic and atraumatic.      Nose: Nose normal.      Mouth/Throat:      Mouth: Mucous membranes are moist.      Pharynx: Oropharynx is clear.   Eyes:      Extraocular Movements: Extraocular movements intact.      Pupils: Pupils are equal, round, and reactive to light.   Neck:      Vascular: No carotid bruit.   Cardiovascular:      Rate and Rhythm: Normal rate and regular rhythm.      Pulses: Normal pulses.      Heart sounds: No murmur heard.  Pulmonary:      Effort: Pulmonary effort is normal. No respiratory distress.      Breath sounds: No wheezing.   Abdominal:      General: Abdomen is flat. Bowel sounds are normal. There is no distension.      Palpations: Abdomen is soft.      Tenderness: There is no abdominal tenderness. There is no guarding.   Musculoskeletal:         General: Normal range of motion.      Cervical back: Normal range of motion and neck supple.      Right lower leg: No edema.      Left lower leg: No edema.   Skin:     General: Skin is warm and dry. "      Capillary Refill: Capillary refill takes less than 2 seconds.   Neurological:      General: No focal deficit present.      Comments: Lethargic, falls asleep during interview, redirectable, no deficit noted   Psychiatric:         Mood and Affect: Mood normal.         Lines/Drains/Airways       Peripheral Intravenous Line  Duration                  Peripheral IV - Single Lumen 09/23/22 0300 18 G Right Antecubital <1 day         Peripheral IV - Single Lumen 09/23/22 0304 18 G Anterior;Left;Proximal Forearm <1 day                    Significant Labs:    Lab Results   Component Value Date    WBC 9.1 09/23/2022    HGB 9.5 (L) 09/23/2022    HCT 30.4 (L) 09/23/2022    MCV 90.5 09/23/2022     09/23/2022         BMP  Lab Results   Component Value Date     09/23/2022    K 3.6 09/23/2022    CO2 26 09/23/2022    BUN 24.2 (H) 09/23/2022    CREATININE 1.59 (H) 09/23/2022    CALCIUM 8.3 (L) 09/23/2022    EGFRNONAA 80 01/31/2022       ABG  No results for input(s): PH, PO2, PCO2, HCO3, BE in the last 168 hours.    Mechanical Ventilation Support:       Significant Imaging:  I have reviewed the pertinent imaging within the past 24 hours.        Assessment/Plan:     Assessment  Type B Aortic dissection  CT at [facility] ED showed Jacob type B aortic dissection with its inferior extent in the mid descending thoracic aorta. With 3.7 cm dilation  Hypertensive emergency  Has been out of all antihypertensive medications ~2 months  On presentation to [facility] /136, on esmolol  On presentation to [facility] maxed out on esmolol, BP now 101/58  Hx of RUE DVT, on xarelto, has not taken for past 2 months  Systemic lupus erythematosus, not currently seeing a rheumatologist or taking medication      Plan  - cleviprex   - maxed out on esmolol on arrival   - consult CT surgery placed, he saw pt this morning   - per CT surgery, surgery is not emergently indicated at this time. Will monitor BP closely and keep HR < 70, BP < 110. Keep NPO. If  arterial line is needed, right radial is preferred by CT surgeon  - morphine 2 q2h for pain control  - continue home BP meds  - hold blood thinners (on xarelto, hx of RUE DVT)    DVT Prophylaxis: none  GI Prophylaxis: pepcid     33 minutes of critical care was time spent personally by me on the following activities: development of treatment plan with patient or surrogate and bedside caregivers, discussions with consultants, evaluation of patient's response to treatment, examination of patient, ordering and performing treatments and interventions, ordering and review of laboratory studies, ordering and review of radiographic studies, pulse oximetry, re-evaluation of patient's condition.  This critical care time did not overlap with that of any other provider or involve time for any procedures.     Francia Marcus DO  Pulmonary Critical Care Medicine  [facility] ICU

## 2022-09-23 NOTE — Clinical Note
The left groin and right neck was prepped. The site was prepped with ChloraPrep. The patient was draped. The patient was positioned supine.

## 2022-09-24 NOTE — BRIEF OP NOTE
[facility]  Brief Operative Note    SUMMARY     Surgery Date: 9/23/2022     Surgeon(s) and Role:  Panel 1:     * Vic Martines MD - Primary  Panel 2:     * Vic Martines MD - Primary     * Evelio Marshall MD - Resident - Chief  Pre-op Diagnosis:  Dissection of aorta, unspecified portion of aorta [I71.00]    Post-op Diagnosis:  Post-Op Diagnosis Codes:     * Dissection of aorta, unspecified portion of aorta [I71.00]    Procedure(s) (LRB):  REPAIR, AORTA, THORACIC, ENDOVASCULAR (N/A)  CREATION, BYPASS, ARTERIAL, INTERNAL CAROTID TO SUBCLAVIAN (Left)    Anesthesia: General    Operative Findings: thoracic aortic dissection, covered valiant thoracic stent graft deployed. Completion angiography of aorta and arch showed good outflow to bovine arch and left subclavian artery. However, left arm blood pressure cuff showed significant discrepancy of blood pressures raising concern for diminshed outflow through left subclavian. Decision made to perform left carotid artery to left subclavian artery bypass with 8 mm ePTFE [place] conduit. LUE blood pressure WNL s/p left carotid to subclavian bypass.    Estimated Blood Loss: 250 mL    Estimated Blood Loss has been documented.         Specimens:   Specimen (24h ago, onward)      None            [id number]    Implant Name Type Inv. Item Serial No.  Lot No. LRB No. Used Action   valiant thoracic stent graft   [id number] [employer]  N/A 1 Implanted   valiant thoracic stent graft   [id number] [employer]  N/A 1 Implanted   GRAFT VASCULAR 8X40 CM EPTFE - [id number]  GRAFT VASCULAR 8X40 CM EPTFE  [employer] [id number] N/A 1 Implanted       Evelio Marshall MD   [facility] Surgery, PGY-5  09/23/2022

## 2022-09-24 NOTE — TRANSFER OF CARE
Anesthesia Transfer of Care Note    Patient: Stuart Guevara    Procedure(s) Performed: Procedure(s) (LRB):  REPAIR, AORTA, THORACIC, ENDOVASCULAR (N/A)  CREATION, BYPASS, ARTERIAL, INTERNAL CAROTID TO SUBCLAVIAN (Left)    Anesthesia Type: CSE          Last vitals:   Visit Vitals  /67   Pulse 60   Temp 37.1 °C (98.7 °F) (Oral)   Resp 14   Wt 60 kg (132 lb 4.4 oz)   SpO2 97%   BMI 21.35 kg/m²

## 2022-09-24 NOTE — PROGRESS NOTES
Cardiothoracic Surgery  Progress Note    Subjective:  POD 1 s/p TEVAR and left carotid to subclavian artery bypass.  NAEON, blood pressures controlled, some mild weakness to LUE but gross motor intact to LUE and L hand    Objective:  Temp:  [98 °F (36.7 °C)-98.8 °F (37.1 °C)] 98.7 °F (37.1 °C)  Pulse:  [58-85] 71  Resp:  [10-25] 18  SpO2:  [90 %-100 %] 99 %  BP: ()/(53-96) 116/65  Arterial Line BP: (127-184)/(48-87) 127/53    Physical Exam:  Gen: no acute distress. Alert and oriented x3.  HEENT: normocephalic and atraumatic. L neck incision dressings intact without strikethrough  Resp: unlabored respirations  CV: regular rate, distal pulses intact 2+ to bilateral radial and bilateral PT  Abd: soft, non-tender, non-distended, surgical incision well approximated C/D/I  Ext: warm and well perfused  MSK: strength grossly intact to bilateral upper and lower extremities.   Neuro: no focal deficits, normal sensation    I/O (last 24 hours):  UOP: 355 (0.2 cc/kg/hr)      Labs:   CBC:   Recent Labs     09/23/22  0257 09/23/22  0304 09/24/22  0126   WBC 9.1  --  13.8*   HGB 9.5*  --  9.6*   HCT 30.4*   < > 26.8*     --  261    < > = values in this interval not displayed.     CMP:  Recent Labs     09/24/22  0125   *   K 4.7   CO2 14*   BUN 31.6*   CREATININE 2.39*   CALCIUM 7.8*   BILITOT 0.2   ALKPHOS 62   AST 22   ALT 10     Recent Labs     09/24/22  0125   MG 1.80   PHOS 4.4     Assessment/Plan: 32 y.o. male w/ hx of SLE and Bent[place] procedure 2/15/21 admitted 9/23/2022 for type B aortic dissection now s/p TEVAR and left carotid artery to subclavian bypass with 8 mm GORE conduit    Neuro: pain control PRN  CV: blood pressure goals systolic <130  Resp: encourage IS, wean supplemental O2 as able  FEN/GI: ADAT  Heme: therapeutic lovenox tonight for bridging if patient continues to have no evidence of bleeding, will continue to require long term coumadin for mechanical aortic valve  Renal: hydrate, monitor  TAY  ID: no acute needs  PPx: SCDs    Dispo: continue ICU, additional management per ICU    Evelio Marshall MD   [employer] General Surgery, PGY-5  09/24/2022

## 2022-09-24 NOTE — ANESTHESIA POSTPROCEDURE EVALUATION
Anesthesia Post Evaluation    Patient: Stuart Guevara    Procedure(s) Performed: Procedure(s) (LRB):  REPAIR, AORTA, THORACIC, ENDOVASCULAR (N/A)  CREATION, BYPASS, ARTERIAL, INTERNAL CAROTID TO SUBCLAVIAN (Left)    Final Anesthesia Type: general      Patient location during evaluation: PACU  Patient participation: Yes- Able to Participate  Level of consciousness: awake and alert  Post-procedure vital signs: reviewed and stable  Pain management: adequate  Airway patency: patent  SATISH mitigation strategies: Multimodal analgesia    Anesthetic complications: no      Cardiovascular status: stable  Respiratory status: unassisted  Hydration status: euvolemic  Follow-up not needed.          Vitals Value Taken Time   /64 09/23/22 2215   Temp 37.1 °C (98.7 °F) 09/23/22 2200   Pulse 62 09/23/22 2229   Resp 13 09/23/22 2229   SpO2 96 % 09/23/22 2229   Vitals shown include unvalidated device data.      Event Time   Out of Recovery 20:43:00         Pain/Marcin Score: Pain Rating Prior to Med Admin: 6 (9/23/2022  8:56 PM)  Pain Rating Post Med Admin: 5 (9/23/2022  3:00 PM)  Marcin Score: 8 (9/23/2022  7:55 PM)

## 2022-09-24 NOTE — PROGRESS NOTES
[address]  Pulmonary Critical Care Note    Patient Name: Stuart Guevara  MRN: [id number]  Admission Date: 9/23/2022  Hospital Length of Stay: 1 days  Code Status: Full Code  Attending Provider: Nolberto Calvillo MD  Primary Care Provider: Primary Doctor No     Subjective:     HPI: Mr. Guevara is a 32-year-old male with past medical history significant for SLE, HTN, HX of type a aortic dissection repaired in February 2021, HX of right upper extremity DVT who presented to [facility] Emergency Department on 09/23/2022 with complaints of tearing, ripping chest pain with radiation to the back.  Initial BP noted to be 232/127.  CT scan significant for type B aortic dissection, started on esmolol drip and transferred to [facility] for CT surgery services.  Admitted to the ICU with CT surgery consulted.    Hospital Course/Significant events:  09/23/2022:  With concern for left arm blood pressure discrepancy and concern for diminished outflow through left subclavian patient was brought to OR with CT surgery for left carotid to left subclavian artery bypass    24 Hour Interval History: No acute events overnight.  Patient continues to report chest pain and incision site pain, minimal relief with PRN morphine.  He also reports left arm weakness with abduction since surgery.        Past Medical History:   Diagnosis Date    Hypertension     Systemic lupus erythematosus, organ or system involvement unspecified     Systemic lupus erythematosus, organ or system involvement unspecified        Past Surgical History:   Procedure Laterality Date    CARDIAC SURGERY      FINGER AMPUTATION      THROMBECTOMY Right     TOE AMPUTATION         Social History     Socioeconomic History    Marital status: Single   Tobacco Use    Smoking status: Every Day     Types: Cigarettes    Smokeless tobacco: Never           Current Outpatient Medications   Medication Instructions    albuterol (PROVENTIL/VENTOLIN HFA) 90 mcg/actuation  inhaler 2 puffs, Inhalation, Every 4 hours PRN, Rescue    amLODIPine (NORVASC) 10 mg, Oral, Daily    aspirin (ECOTRIN) 81 mg, Oral, Daily    belimumab (BENLYSTA) 120 mg SolR Benlysta Take No date recorded No form recorded No frequency recorded No route recorded No set duration recorded No set duration amount recorded active No dosage strength recorded No dosage strength units of measure recorded    famotidine (PEPCID) 20 mg, Oral, 2 times daily    gemfibroziL (LOPID) 600 mg, Oral    hydrALAZINE (APRESOLINE) 10 mg, Oral    labetaloL (NORMODYNE) 300 mg, Oral, 2 times daily    lisinopriL (PRINIVIL,ZESTRIL) 40 mg, Oral, Daily    metoprolol tartrate (LOPRESSOR) 50 mg, Oral, 2 times daily    mycophenolate (CELLCEPT) 1,000 mg, Oral, 2 times daily    MEÑO 2 mg TbEC 1 tablet, Oral, Daily    rivaroxaban (XARELTO ORAL) Xarelto Take No date recorded No form recorded No frequency recorded No route recorded No set duration recorded No set duration amount recorded active No dosage strength recorded No dosage strength units of measure recorded    sucralfate (CARAFATE) 1 g, Oral, 2 times daily    warfarin (COUMADIN) 3 mg, Oral, Daily       Current Inpatient Medications   cefUROXime (ZINACEF) IVPB  1.5 g Intravenous Once    famotidine  20 mg Oral BID    gemfibroziL  600 mg Oral BID AC    mupirocin   Nasal BID    sucralfate  1 g Oral BID       Current Intravenous Infusions   sodium chloride 0.9% 50 mL/hr at 09/23/22 2200    clevidipine 32 mg/hr (09/24/22 0320)    esmolol 175 mcg/kg/min (09/24/22 0220)         Review of Systems   Constitutional:  Negative for chills and fever.   Respiratory:  Negative for cough, shortness of breath and wheezing.    Cardiovascular:  Positive for chest pain. Negative for palpitations, orthopnea and leg swelling.   Gastrointestinal:  Negative for abdominal pain, heartburn, nausea and vomiting.   Musculoskeletal:  Positive for neck pain. Negative for back pain.   Skin:  Negative for rash.   Neurological:   Positive for weakness. Negative for sensory change and headaches.        Objective:       Intake/Output Summary (Last 24 hours) at 9/24/2022 0615  Last data filed at 9/24/2022 0600  Gross per 24 hour   Intake 3272 ml   Output 605 ml   Net 2667 ml         Vital Signs (Most Recent):  Temp: 98.8 °F (37.1 °C) (09/24/22 0000)  Pulse: 63 (09/23/22 2200)  Resp: 18 (09/24/22 0130)  BP: 117/66 (09/23/22 2200)  SpO2: (!) 93 % (09/23/22 2200)    Body mass index is 21.35 kg/m².  Weight: 60 kg (132 lb 4.4 oz) Vital Signs (24h Range):  Temp:  [98.1 °F (36.7 °C)-98.8 °F (37.1 °C)] 98.8 °F (37.1 °C)  Pulse:  [58-85] 63  Resp:  [10-27] 18  SpO2:  [88 %-100 %] 93 %  BP: ()/() 117/66  Arterial Line BP: (131-160)/(61-72) 131/61     Physical Exam  Constitutional:       General: He is not in acute distress.     Appearance: He is not ill-appearing.   HENT:      Head: Normocephalic and atraumatic.      Right Ear: External ear normal.      Left Ear: External ear normal.   Neck:      Comments: Left sided bandage in place without oozing or bleeding   Cardiovascular:      Rate and Rhythm: Normal rate and regular rhythm.      Heart sounds: No murmur heard.    No friction rub.   Pulmonary:      Effort: Pulmonary effort is normal. No respiratory distress.      Breath sounds: Normal breath sounds. No wheezing.   Chest:      Comments: Well healed mid-sternal surgical scar  Abdominal:      General: Abdomen is flat.      Palpations: Abdomen is soft.   Musculoskeletal:         General: No swelling, tenderness or deformity.   Skin:     General: Skin is warm and dry.   Neurological:      General: No focal deficit present.      Mental Status: He is alert and oriented to person, place, and time.      Sensory: No sensory deficit.   Psychiatric:         Mood and Affect: Mood normal.         Behavior: Behavior normal.         Thought Content: Thought content normal.         Judgment: Judgment normal.         Lines/Drains/Airways       Drain   Duration                  Urethral Catheter 09/23/22 1700 Silicone 16 Fr. <1 day              Arterial Line  Duration             Arterial Line 09/23/22 1623 Right Radial <1 day              Peripheral Intravenous Line  Duration                  Peripheral IV - Single Lumen 09/23/22 0300 18 G Right Antecubital 1 day         Peripheral IV - Single Lumen 09/23/22 0304 18 G Anterior;Left;Proximal Forearm 1 day         Peripheral IV - Single Lumen 09/23/22 0630 18 G Anterior;Distal;Right Forearm <1 day                    Significant Labs:    Lab Results   Component Value Date    WBC 13.8 (H) 09/24/2022    HGB 9.6 (L) 09/24/2022    HCT 26.8 (L) 09/24/2022    MCV 92.1 09/24/2022     09/24/2022         BMP  Lab Results   Component Value Date     (L) 09/24/2022    K 4.7 09/24/2022    CO2 14 (L) 09/24/2022    BUN 31.6 (H) 09/24/2022    CREATININE 2.39 (H) 09/24/2022    CALCIUM 7.8 (L) 09/24/2022    EGFRNONAA 80 01/31/2022       ABG  No results for input(s): PH, PO2, PCO2, HCO3, BE in the last 168 hours.    Mechanical Ventilation Support:       Significant Imaging:  I have reviewed the pertinent imaging within the past 24 hours.        Assessment/Plan:     Assessment  Type B aortic dissection  CT at outside ED significant for Jacob type B aortic dissection with anterior extensive mid descending thoracic aorta, 3.7 cm dilation  Hypertensive emergency  Acute renal failure  History right upper extremity DVT, previously on Xarelto, noncompliance for past 2 months  SLE, currently unmedicated      Plan  -postop day 1 left carotid artery to left subclavian artery bypass  -keep SBP <130, per CT surgery recommendations, currently maxed on Cleviprex, esmolol currently at 175 mcg/kg/min; PRN hydralazine  -restart home medications, in regards to previous Hx of non-compliance, will slowly titrate back to previously prescribed doses  -neurovascular checks q1hr per CT surgery  -pain control via PRN morphine 2mg  q2hr  -worsening renal indices this am with Cr. 2.39 (from 1.59 yesterday).  Have increased his maintenance IVF from 50 to 100ml/hr, will monitor urine output throughout the day  -RUE US not completed yesterday for unknown reason, given Hx of DVT, will order today    DVT Prophylaxis: SCDs  GI Prophylaxis: famotidine     33 minutes of critical care was time spent personally by me on the following activities: development of treatment plan with patient or surrogate and bedside caregivers, discussions with consultants, evaluation of patient's response to treatment, examination of patient, ordering and performing treatments and interventions, ordering and review of laboratory studies, ordering and review of radiographic studies, pulse oximetry, re-evaluation of patient's condition.  This critical care time did not overlap with that of any other provider or involve time for any procedures.     Cong Velez DO  Pulmonary Critical Care Medicine Resident  [facility] [address]

## 2022-09-25 NOTE — ANESTHESIA PREPROCEDURE EVALUATION
09/25/2022  Stuart Guevara is a 32 y.o., male.    Interval History: 31yo male with history of systemic lupus erythematosus, type AI aortic dissection status post repair 02/2021, history of right upper extremity DVT presented with complaints of tearing chest pain and subsequent syncopal event.       He was found have a type B aortic dissection and significant hypertension noted on presentation.       He has since been initiated on appropriate medical therapy, with high-dose intravenous beta-blocker, intravenous nitrate and calcium channel blocker for blood pressure suppression.         Subjective:    POD #2  Overnight patient developed worsening pain,  Shortness of breath and abdominal distension  He had a CT of the abdomen, reviewed by Dr. Martines showing stable stent with perfusion to renal and mesenteric arteries.  General surgery is being consulted for abdominal findings  TAY worsening  Transfusing PRBCs     Objective:   Awake.   HR: 75; BP: 109/70; Respirations 22; O2: 90s on BiPAP  Heart: RRR  Lungs: on BiPaP  Incisions: dressed, dry  Abd: Rigid, distended      Assesment/Plan:    S/P TEVAR; Left carotid to sublavian artery bypass NOW for emergent EX LAP        Pre-op Assessment    I have reviewed the Patient Summary Reports.     I have reviewed the Nursing Notes. I have reviewed the NPO Status.   I have reviewed the Medications.     Review of Systems  Hematology/Oncology:         -- Anemia:   Cardiovascular:   Hypertension Valvular problems/Murmurs   Hx of Valve Repair   Hypertension    Renal/:   Chronic Renal Disease (LUPUS), CKD        Physical Exam  General: Well nourished    Airway:  Mallampati: II / II  Mouth Opening: Normal  TM Distance: Normal  Pre-Existing Airway: Oral Endotracheal tube    Dental:  Periodontal disease    Chest/Lungs:  Clear to auscultation    Heart:  Rate:  Normal        Anesthesia Plan  Type of Anesthesia, risks & benefits discussed:    Anesthesia Type: Gen ETT  Intra-op Monitoring Plan: Standard ASA Monitors and Art Line  Post Op Pain Control Plan: multimodal analgesia  Induction:  IV  Airway Plan: Direct  Informed Consent: Informed consent signed with the Patient representative and all parties understand the risks and agree with anesthesia plan.  All questions answered. Patient consented to blood products? Yes  ASA Score: 4 Emergent    Ready For Surgery From Anesthesia Perspective.     .

## 2022-09-25 NOTE — PROGRESS NOTES
Pharmacokinetic Initial Assessment: IV Vancomycin    Assessment/Plan:  Due to patient's poor renal function, initiate pulse-dosing of intravenous vancomycin.  Give vancomycin 1000 mg IV x 1, scheduled for 1600  Desired empiric serum trough concentration is 10 to 20 mcg/mL  Draw vancomycin random level on 9/26 at 1500.  Pharmacy will continue to follow and monitor vancomycin.      Please contact pharmacy at extension [phone] with any questions regarding this assessment.     Thank you for the consult,   Namita Espinoza, WernerD       Patient brief summary:  Stuart Guevara is a 32 y.o. male initiated on antimicrobial therapy with IV Vancomycin for treatment of suspected intra-abdominal infection    Drug Allergies:   Review of patient's allergies indicates:   Allergen Reactions    Levofloxacin     Sulfamethoxazole-trimethoprim        Actual Body Weight:   60 kg    Renal Function:   Estimated Creatinine Clearance: 22 mL/min (A) (based on SCr of 4.06 mg/dL (H)).,     Dialysis Method (if applicable):  N/A    CBC (last 72 hours):  Recent Labs   Lab Result Units 09/23/22 0257 09/24/22  0126 09/25/22  0534 09/25/22  1200   WBC x10(3)/mcL 9.1 13.8* 18.9*  --    Hgb gm/dL 9.5* 9.6* 6.8* 12.8*   Hct % 30.4* 26.8* 22.6* 31.9*   Platelet x10(3)/mcL 319 261 246  --    Mono % %  --  8.1  --   --    Monocyte Man % 8  --  6  --    Eos % %  --  0.4  --   --    Eos Man % 7  --   --   --    Basophil % %  --  0.4  --   --    Basophil Man % 1  --   --   --        Metabolic Panel (last 72 hours):  Recent Labs   Lab Result Units 09/23/22  0257 09/24/22  0125 09/25/22  0533   Sodium Level mmol/L 142 135* 127*   Potassium Level mmol/L 3.6 4.7 4.7   Chloride mmol/L 108* 108* 105   Carbon Dioxide mmol/L 26 14* 8*   Glucose Level mg/dL 107* 109* 114*   Blood Urea Nitrogen mg/dL 24.2* 31.6* 40.9*   Creatinine mg/dL 1.59* 2.39* 4.06*   Albumin Level gm/dL 2.3* 2.0* 1.5*   Bilirubin Total mg/dL 0.2 0.2 0.2   Alkaline Phosphatase unit/L 71 62 46    Aspartate Aminotransferase unit/L 22 22 46*   Alanine Aminotransferase unit/L 14 10 13   Magnesium Level mg/dL  --  1.80 1.34*   Phosphorus Level mg/dL  --  4.4 5.7*       Drug levels (last 3 results):  No results for input(s): VANCOMYCINRA, VANCORANDOM, VANCOMYCINPE, VANCOPEAK, VANCOMYCINTR, VANCOTROUGH in the last 72 hours.    Microbiologic Results:  Microbiology Results (last 7 days)       Procedure Component Value Units Date/Time    Blood Culture [[id number]] Collected: 09/25/22 0226    Order Status: Resulted Specimen: Blood Updated: 09/25/22 0455    Blood Culture [[id number]] Collected: 09/25/22 0226    Order Status: Resulted Specimen: Blood Updated: 09/25/22 0455

## 2022-09-25 NOTE — BRIEF OP NOTE
[facility]  General Surgery  Brief Operative Note    Date of Procedure: 9/25/22     Procedure:   Exploratory laparotomy  Right chest tube insertion    Surgeon(s) and Role:  Staff: Saran Marr MD  Resident(s): Alexis Scheuermann, MD    Pre-Operative Diagnosis:   Acute abdomen  Right pleural effusion    Post-Operative Diagnosis: Same    Anesthesia: GETA    Operative Findings:   Clear/mucinous ascites in peritoneal cavity and in lesser sac  Mottled liver  Viable small bowel and colon  Simple right pleural effusion    Description of Technical Procedures: Refer to full dictated operative report    Estimated Blood Loss (EBL): 10 cc           Implants: None    Drains: 28 Fr chest tube    Specimens: None    Complications: None            Condition: Critical    Disposition: ICU    Alexis Scheuermann, MD   [employer] General Surgery, PGY4  9/25/22 2:33 PM

## 2022-09-25 NOTE — NURSING
1425: Pt returned from surgery with the surgery team. CRNA was pushing epi, atropine, and vasopressin upon entering the room. Epi was mixed into a a drip and initiated. 2 amps of atropine was given. Refer to V/S. Dr. Chopra was called to the bedside and a central line was placed and also began transcutaneous pacing via the Zoll, paced at 70. At this time we notified [facility] for a transvenous pacer. Pt is unstable at this time.    1515: central line placed. Xray and ABG were done. Labs and pleural fluid were sent for evaluation. Pt was not oxygenating well at this time and an Ambu was used to properly ventilate the patient. Oxygenation was achieved at this time and pressors are being titrated at this time. Pt is stable with the support of ventilation and pressor support. Family is being contacted by Dr. Chopra at this time.

## 2022-09-25 NOTE — CONSULTS
"[facility]   Consult Note  Trauma and Acute Care Surgery    Patient Name: Stuart Guevara  YOB: 1990  Date of Admission: 9/23/2022  Date: 09/25/2022 9:46 AM  Consulting Service: general surgery  Reason for Consult: rigid abdomen  HD#2  POD#2 Days Post-Op    PRESENTING HISTORY     Chief Complaint/Reason for Admission: Aortic dissection    History of Present Illness:   Mr. Guevara is a 32-year-old male with past medical history significant for SLE, HTN, HX of type a aortic dissection repaired in February 2021, HX of right upper extremity DVT who presented to [facility] on 09/23/2022 with complaints of tearing, ripping chest pain with radiation to the back.  Initial BP noted to be 232/127.  CT scan significant for type B aortic dissection, started on esmolol drip and transferred to [facility] for CT surgery services.  Admitted to the ICU with CT surgery consulted.    This morning upon initial evaluation around 0415 pt was sleeping, breathing deeply, unable to awaken despite multiple attempts. Approximately 5 minutes later morning labs came back notable for BG of 27, lab states sample was "milky" (drawn from arterial line, pt on cleviprex). POC glucose confirmed low at 20. Immediately initiated D10 250 bolus, after a few minutes pt woke up and began pulling at lines, was confused. After redirection, pt was able to state name and was oriented to place. Per nursing, neuro check around midnight revealed no changes in mental status. Patient has not had any issues with low glucose during this admission.    Pt was complaining of abdominal distension and pain this am.    Review of Systems:  12 point ROS negative except as stated in HPI    PAST HISTORY:   Past medical history:  Past Medical History:   Diagnosis Date    Hypertension     Systemic lupus erythematosus, organ or system involvement unspecified     Systemic lupus erythematosus, organ or system involvement unspecified      Past " Medical History:   Diagnosis Date    Hypertension     Systemic lupus erythematosus, organ or system involvement unspecified     Systemic lupus erythematosus, organ or system involvement unspecified        Past surgical history:  Past Surgical History:   Procedure Laterality Date    CARDIAC SURGERY      FINGER AMPUTATION      THROMBECTOMY Right     TOE AMPUTATION       Past Surgical History:   Procedure Laterality Date    CARDIAC SURGERY      FINGER AMPUTATION      THROMBECTOMY Right     TOE AMPUTATION         Family history:  No family history on file.    Social history:  Social History     Socioeconomic History    Marital status: Single   Tobacco Use    Smoking status: Every Day     Types: Cigarettes    Smokeless tobacco: Never     Social History     Tobacco Use   Smoking Status Every Day    Types: Cigarettes   Smokeless Tobacco Never         MEDICATIONS & ALLERGIES:   Allergies:   Review of patient's allergies indicates:   Allergen Reactions    Levofloxacin     Sulfamethoxazole-trimethoprim        No current facility-administered medications on file prior to encounter.     Current Outpatient Medications on File Prior to Encounter   Medication Sig    albuterol (PROVENTIL/VENTOLIN HFA) 90 mcg/actuation inhaler Inhale 2 puffs into the lungs every 4 (four) hours as needed for Wheezing. Rescue    amLODIPine (NORVASC) 10 MG tablet Take 1 tablet (10 mg total) by mouth once daily.    aspirin (ECOTRIN) 81 MG EC tablet Take 1 tablet (81 mg total) by mouth once daily.    belimumab (BENLYSTA) 120 mg SolR Benlysta Take No date recorded No form recorded No frequency recorded No route recorded No set duration recorded No set duration amount recorded active No dosage strength recorded No dosage strength units of measure recorded    famotidine (PEPCID) 20 MG tablet Take 1 tablet (20 mg total) by mouth 2 (two) times daily.    gemfibroziL (LOPID) 600 MG tablet Take 600 mg by mouth.    hydrALAZINE (APRESOLINE) 10 MG tablet Take 10 mg  by mouth.    labetaloL (NORMODYNE) 300 MG tablet Take 1 tablet (300 mg total) by mouth 2 (two) times daily.    lisinopriL (PRINIVIL,ZESTRIL) 40 MG tablet Take 1 tablet (40 mg total) by mouth once daily.    metoprolol tartrate (LOPRESSOR) 50 MG tablet Take 1 tablet (50 mg total) by mouth 2 (two) times daily.    mycophenolate (CELLCEPT) 500 mg Tab Take 2 tablets (1,000 mg total) by mouth 2 (two) times daily.    MEÑO 2 mg TbEC Take 1 tablet by mouth once daily.    rivaroxaban (XARELTO ORAL) Xarelto Take No date recorded No form recorded No frequency recorded No route recorded No set duration recorded No set duration amount recorded active No dosage strength recorded No dosage strength units of measure recorded    sucralfate (CARAFATE) 1 gram tablet Take 1 tablet (1 g total) by mouth 2 (two) times daily.    warfarin (COUMADIN) 3 MG tablet Take 1 tablet (3 mg total) by mouth Daily.       Scheduled Meds:   amLODIPine  10 mg Oral Daily    carvediloL  12.5 mg Oral BID    cefUROXime (ZINACEF) IVPB  1.5 g Intravenous Once    famotidine  20 mg Oral BID    gemfibroziL  600 mg Oral BID AC    mupirocin   Nasal BID    sucralfate  1 g Oral BID    warfarin  5 mg Oral Daily       Continuous Infusions:   sodium chloride 0.9% 150 mL/hr at 09/24/22 2246    clevidipine 0 mg/hr (09/25/22 0257)    esmolol 125 mcg/kg/min (09/24/22 1217)       PRN Meds:sodium chloride, acetaminophen, albuterol, hydrALAZINE, HYDROmorphone, lorazepam, morphine, ondansetron, oxyCODONE, sodium chloride 0.9%, sodium chloride 0.9%, sodium chloride 0.9%    OBJECTIVE:     Vital Signs:  Temp:  [98 °F (36.7 °C)-101.6 °F (38.7 °C)] 98 °F (36.7 °C)  Pulse:  [60-90] 75  Resp:  [13-35] 24  SpO2:  [84 %-100 %] 89 %  BP: ()/(53-77) 119/69  Arterial Line BP: (0-140)/(0-70) 113/62  Body mass index is 21.35 kg/m².     I/O last 3 completed shifts:  In: 4667 [P.O.:150; I.V.:2392; IV Piggyback:2125]  Out: 1010 [Urine:610; Emesis/NG output:150; Blood:250]  No intake/output  data recorded.    Physical Exam:  General:  Well developed, well nourished, no acute distress  HEENT:  Normocephalic, atraumatic  CVS:  RR  Resp:  on bipap  GI:  Abdomen rigid, distended, diffusely tender  :  Deferred  MSK: moving all extremities spontaneously  Skin:  No rashes, ulcers, erythema  Neuro:  CNII-XII grossly intact, alert and oriented to person, place, and time    Laboratory:  Recent Labs     09/23/22  0257 09/23/22  0304 09/24/22  0126 09/24/22 2059 09/25/22  0534   WBC 9.1  --  13.8*  --  18.9*   HGB 9.5*  --  9.6*  --  6.8*   HCT 30.4* 28* 26.8*  --  22.6*     --  261  --  246   INR  --   --   --  1.52*  --      Recent Labs     09/23/22 0257 09/24/22  0125 09/25/22  0533    135* 127*   K 3.6 4.7 4.7   CO2 26 14* 8*   BUN 24.2* 31.6* 40.9*   CREATININE 1.59* 2.39* 4.06*   CALCIUM 8.3* 7.8* 5.9*   MG  --  1.80 1.34*   PHOS  --  4.4 5.7*   ALBUMIN 2.3* 2.0* 1.5*   BILITOT 0.2 0.2 0.2   AST 22 22 46*   ALKPHOS 71 62 46   ALT 14 10 13     Troponin:  Recent Labs     09/23/22 0257 09/25/22  0533   TROPONINI 0.067* 1.074*     CBC:  Recent Labs     09/23/22 0257 09/23/22  0304 09/24/22 0126 09/25/22  0534   WBC 9.1  --  13.8* 18.9*   RBC 3.36*  --  2.91* 2.49*   HGB 9.5*  --  9.6* 6.8*   HCT 30.4*   < > 26.8* 22.6*     --  261 246   MCV 90.5  --  92.1 91.1   MCH 28.3  --  33.0* 30.4   MCHC 31.3*  --  35.8 33.3    < > = values in this interval not displayed.     CMP:  Recent Labs     09/23/22  0257 09/24/22  0125 09/25/22  0533   CALCIUM 8.3* 7.8* 5.9*   ALBUMIN 2.3* 2.0* 1.5*    135* 127*   K 3.6 4.7 4.7   CO2 26 14* 8*   BUN 24.2* 31.6* 40.9*   CREATININE 1.59* 2.39* 4.06*   ALKPHOS 71 62 46   ALT 14 10 13   AST 22 22 46*   BILITOT 0.2 0.2 0.2     Lactic Acid:  No results for input(s): LACTATE in the last 72 hours.  Etoh:  No results for input(s): ALCOHOLMEDIC in the last 72 hours.  Drug Screen:  No results for input(s): PCDSCOMETHA, COCAINEMETAB, OPIATESCREEN, BARBITURATES,  AMPHETAMINES, MARIJUANATHC, PCDSOPHENCYN, CREATRANDUR, TOXINFO in the last 72 hours.    ABG:  Recent Labs     09/25/22  0548   PH 7.22*   PCO2 31*   PO2 72*   HCO3 12.7*   POCSATURATED 90.5       Diagnostic Results:  X-Ray Chest 1 View    Result Date: 9/25/2022  Changes suggestive of right-sided pleural effusion. Increased left retrocardiac density and silhouetting of the left hemidiaphragm which might be related to an infiltrate/atelectasis. Interval placement of thoracic endograft. No focal consolidative changes Electronically signed by: Ramón Ying Date:    09/25/2022 Time:    09:10    CTA Chest Abdomen Non Coronary    Result Date: 9/25/2022  1. Thoracic aortic endograft placement since 09/23/2022 with the dissection flap extending about 3 cm inferior to the distal portion of the graft.  Opacification of the false lumen along the mid and distal portions of the graft. 2. Moderate volume hemoperitoneum.  Changes involving the liver, spleen, pancreas and bowel are suggestive of overall hypoperfusion.  Hyperdense kidneys suggest acute kidney injury. 3. Small to moderate bilateral pleural effusions, larger on the right. Findings discussed with Mr. Guevara ICU nurseDulce at 08:35 hours on 09/25/2022. Electronically signed by: Darius Rosario Date:    09/25/2022 Time:    08:42    CTA Chest Abdomen Non Coronary   Final Result      1. Thoracic aortic endograft placement since 09/23/2022 with the dissection flap extending about 3 cm inferior to the distal portion of the graft.  Opacification of the false lumen along the mid and distal portions of the graft.   2. Moderate volume hemoperitoneum.  Changes involving the liver, spleen, pancreas and bowel are suggestive of overall hypoperfusion.  Hyperdense kidneys suggest acute kidney injury.   3. Small to moderate bilateral pleural effusions, larger on the right.   Findings discussed with Mr. Guevara ICU nurseDulce at 08:35 hours on 09/25/2022.          Electronically signed by: Darius Rosario   Date:    09/25/2022   Time:    08:42      X-Ray Chest 1 View   Final Result      Changes suggestive of right-sided pleural effusion.      Increased left retrocardiac density and silhouetting of the left hemidiaphragm which might be related to an infiltrate/atelectasis.      Interval placement of thoracic endograft.      No focal consolidative changes         Electronically signed by: Ramón Ying   Date:    09/25/2022   Time:    09:10      IR Abdominal Aortobifemoral    (Results Pending)   X-Ray Abdomen AP 1 View    (Results Pending)       Microbiology:  Microbiology Results (last 7 days)       Procedure Component Value Units Date/Time    Blood Culture [[id number]] Collected: 09/25/22 0226    Order Status: Resulted Specimen: Blood Updated: 09/25/22 0455    Blood Culture [[id number]] Collected: 09/25/22 0226    Order Status: Resulted Specimen: Blood Updated: 09/25/22 0455             ASSESSMENT & PLAN:   Mr. Guevara is a 31 yo M w/ hx of Lupus, HTN, type A aortic dissection repaired in February 2021, and type B dissection w/ TEVAR this admission presenting w/ rigid abdomen and hypoperfusion of kidneys, liver, spleen, pancreas, and bowel on CT.    Plan:  - Continue NPO  - Transfuse 3 units; follow up labs and clinical status afterwards  - Possible ex lap today pending status after blood transfusion    Pearl Aguilera MD  Trauma/Acute Care Surgery  9/25/2022  9:46 AM

## 2022-09-25 NOTE — OP NOTE
[facility] - [facility] ICU  Operative Note      Date of Procedure: 9/25/2022     Procedure: 1. Exploratory laparotomy  2. Right chest tube placement     Surgeon(s) and Role:     * Saran Marr MD - Primary    Assisting Surgeon: Alexis Scheuermann, MD    Pre-Operative Diagnosis:   1. Acute abdomen  2. Lactic acidosis  3. Right pleural effusion    Post-Operative Diagnosis: Same    Anesthesia: General    Estimated Blood Loss (EBL): Less than 10 mL           Specimens: None     Description of Technical Procedures:  After informed consent was obtained from the patient's sister as he was intubated, he was brought directly to the operating room from the ICU.  He was placed on the operating room table in the supine position.  General anesthesia was administered by member of the anesthesia team.  The abdomen was prepped and draped in sterile surgical fashion.  A midline laparotomy incision was made with a 10 blade.  Incision was deepened with electrocautery.  The fascia was divided vertically in the midline and the peritoneum was incised sharply.  Clear thin fluid was noted throughout the abdominal cavity.  There was some gelatinous component to the fluid.  Ligament of Treitz was identified and run distally to the cecum.  Small bowel was all viable.  The entire colon in intra-abdominal rectum were closely inspected and appeared normal as well.  The liver was swollen, enlarged, and mottled but viable.  The majority of the gelatinous fluid was in the lesser sac.  At this point we terminated the procedure.  The midline fascial defect was repaired with running PDS suture.  Skin edges were reapproximated with skin staples.  The right chest wall was then prepped and draped in sterile surgical fashion.  A small transverse incision was made in the right anterior axillary line with a 10 blade and the right pleural cavity was entered with a Rosie clamp.  A 28 Burkinan chest tube was placed and similar appearing clear  fluid output was noted.  The tube was secured with silk suture, dry gauze, and tape.  There were no apparent complications.  Subsequently patient was brought back to the ICU intubated on vasopressors in critical condition.

## 2022-09-25 NOTE — ANESTHESIA POSTPROCEDURE EVALUATION
Anesthesia Post Evaluation    Patient: Stuart Guevara    Procedure(s) Performed: Procedure(s) (LRB):  LAPAROTOMY, EXPLORATORY (N/A)    Final Anesthesia Type: general      Patient location during evaluation: ICU  Patient participation: No - Unable to Participate, Coma/Other Inability to Communicate  Level of consciousness: sedated  Post-procedure vital signs: reviewed and stable  Pain management: adequate  Airway patency: patent  SATISH mitigation strategies: Multimodal analgesia    Anesthetic complications: no      Cardiovascular status: stable  Respiratory status: ventilator and intubated  Hydration status: euvolemic  Follow-up not needed.          Vitals Value Taken Time   /27 09/25/22 1617   Temp 36.5 °C (97.7 °F) 09/25/22 1600   Pulse 71 09/25/22 1630   Resp 32 09/25/22 1630   SpO2 78 % 09/25/22 1630         No case tracking events are documented in the log.      Pain/Marcin Score: Pain Rating Prior to Med Admin: 10 (9/25/2022  9:58 AM)  Pain Rating Post Med Admin: 3 (9/24/2022  8:00 AM)

## 2022-09-25 NOTE — PROCEDURES
Procedure: Central venous catheter insertion    Performed by: Ivan Chopra MD    Indication: Shock    Procedure description: The site was examined under bedside ultrasound and anatomic landmarks were identified. The site was prepped and draped in sterile fashion. The right femoral vein was accessed via direct ultrasound visualization and a 7Fr triple lumen catheter was inserted via Seldinger technique. All three ports demonstrated adequate blood return and were able to be flushed. The line was sutured in place and a clear dressing was applied.     The patient tolerated the procedure well. There were no complications.             Ivan Chopra MD  Pulmonary Disease and Critical Care Medicine

## 2022-09-25 NOTE — PROGRESS NOTES
"[facility] - [facility] ICU  Pulmonary Critical Care Note    Patient Name: Stuart Guevara  MRN: [id number]  Admission Date: 9/23/2022  Hospital Length of Stay: 2 days  Code Status: Full Code  Attending Provider: Nolberto Calvillo MD  Primary Care Provider: Primary Doctor No     Subjective:     HPI: Mr. Guevara is a 32-year-old male with past medical history significant for SLE, HTN, HX of type a aortic dissection repaired in February 2021, HX of right upper extremity DVT who presented to [facility] Emergency Department on 09/23/2022 with complaints of tearing, ripping chest pain with radiation to the back.  Initial BP noted to be 232/127.  CT scan significant for type B aortic dissection, started on esmolol drip and transferred to [facility] for CT surgery services.  Admitted to the ICU with CT surgery consulted.    Hospital Course/Significant events:  09/23/2022:  With concern for left arm blood pressure discrepancy and concern for diminished outflow through left subclavian patient was brought to OR with CT surgery for left carotid to left subclavian artery bypass    24 Hour Interval History:   This morning upon initial evaluation around 0415 pt was sleeping, breathing deeply, unable to awaken despite multiple attempts. Approximately 5 minutes later morning labs came back notable for BG of 27, lab states sample was "milky" (drawn from arterial line, pt on cleviprex). POC glucose confirmed low at 20. Immediately initiated D10 250 bolus, after a few minutes pt woke up and began pulling at lines, was confused. After redirection, pt was able to state name and was oriented to place. Per nursing, neuro check around midnight revealed no changes in mental status. Patient has not had any issues with low glucose during this admission.      Past Medical History:   Diagnosis Date    Hypertension     Systemic lupus erythematosus, organ or system involvement unspecified     Systemic lupus erythematosus, organ or system " involvement unspecified        Past Surgical History:   Procedure Laterality Date    CARDIAC SURGERY      FINGER AMPUTATION      THROMBECTOMY Right     TOE AMPUTATION     Mechanical aortic valve    Social History     Socioeconomic History    Marital status: Single   Tobacco Use    Smoking status: Every Day     Types: Cigarettes    Smokeless tobacco: Never           Current Outpatient Medications   Medication Instructions    albuterol (PROVENTIL/VENTOLIN HFA) 90 mcg/actuation inhaler 2 puffs, Inhalation, Every 4 hours PRN, Rescue    amLODIPine (NORVASC) 10 mg, Oral, Daily    aspirin (ECOTRIN) 81 mg, Oral, Daily    belimumab (BENLYSTA) 120 mg SolR Benlysta Take No date recorded No form recorded No frequency recorded No route recorded No set duration recorded No set duration amount recorded active No dosage strength recorded No dosage strength units of measure recorded    famotidine (PEPCID) 20 mg, Oral, 2 times daily    gemfibroziL (LOPID) 600 mg, Oral    hydrALAZINE (APRESOLINE) 10 mg, Oral    labetaloL (NORMODYNE) 300 mg, Oral, 2 times daily    lisinopriL (PRINIVIL,ZESTRIL) 40 mg, Oral, Daily    metoprolol tartrate (LOPRESSOR) 50 mg, Oral, 2 times daily    mycophenolate (CELLCEPT) 1,000 mg, Oral, 2 times daily    MEÑO 2 mg TbEC 1 tablet, Oral, Daily    rivaroxaban (XARELTO ORAL) Xarelto Take No date recorded No form recorded No frequency recorded No route recorded No set duration recorded No set duration amount recorded active No dosage strength recorded No dosage strength units of measure recorded    sucralfate (CARAFATE) 1 g, Oral, 2 times daily    warfarin (COUMADIN) 3 mg, Oral, Daily       Current Inpatient Medications   amLODIPine  10 mg Oral Daily    carvediloL  12.5 mg Oral BID    cefUROXime (ZINACEF) IVPB  1.5 g Intravenous Once    famotidine  20 mg Oral BID    gemfibroziL  600 mg Oral BID AC    mupirocin   Nasal BID    sucralfate  1 g Oral BID    warfarin  5 mg Oral Daily       Current Intravenous  Infusions   sodium chloride 0.9% 150 mL/hr at 09/24/22 2246    clevidipine 0 mg/hr (09/25/22 0257)    esmolol 125 mcg/kg/min (09/24/22 1217)         Review of Systems   Constitutional:  Negative for chills and fever.   Respiratory:  Negative for cough, shortness of breath and wheezing.    Cardiovascular:  Positive for chest pain. Negative for palpitations, orthopnea and leg swelling.   Gastrointestinal:  Negative for abdominal pain, heartburn, nausea and vomiting.   Musculoskeletal:  Positive for neck pain. Negative for back pain.   Skin:  Negative for rash.   Neurological:  Positive for weakness. Negative for sensory change and headaches.        Objective:       Intake/Output Summary (Last 24 hours) at 9/25/2022 0404  Last data filed at 9/25/2022 0200  Gross per 24 hour   Intake 2170 ml   Output 290 ml   Net 1880 ml           Vital Signs (Most Recent):  Temp: 98 °F (36.7 °C) (09/25/22 0000)  Pulse: 62 (09/25/22 0215)  Resp: 15 (09/25/22 0215)  BP: 109/64 (09/25/22 0215)  SpO2: 99 % (09/25/22 0215)    Body mass index is 21.35 kg/m².  Weight: 60 kg (132 lb 4.4 oz) Vital Signs (24h Range):  Temp:  [98 °F (36.7 °C)-101.6 °F (38.7 °C)] 98 °F (36.7 °C)  Pulse:  [60-90] 62  Resp:  [13-25] 15  SpO2:  [84 %-100 %] 99 %  BP: ()/(53-72) 109/64  Arterial Line BP: (0-163)/(0-73) 108/55     Physical Exam  Constitutional:       General: He is not in acute distress.     Appearance: He is not ill-appearing.      Comments: Pt oriented to self and place but confused, pulling at lines, redirectable   HENT:      Head: Normocephalic and atraumatic.      Right Ear: External ear normal.      Left Ear: External ear normal.   Neck:      Comments: Left sided bandage in place without oozing or bleeding   Cardiovascular:      Rate and Rhythm: Normal rate and regular rhythm.      Heart sounds: No murmur heard.    No friction rub.   Pulmonary:      Effort: Pulmonary effort is normal. No respiratory distress.      Breath sounds: Normal  breath sounds. No wheezing.   Chest:      Comments: Well healed mid-sternal surgical scar  Abdominal:      General: Abdomen is flat.      Palpations: Abdomen is soft.   Musculoskeletal:         General: No swelling, tenderness or deformity.   Skin:     General: Skin is warm and dry.   Neurological:      General: No focal deficit present.      Mental Status: He is oriented to person, place, and time.      Sensory: No sensory deficit.   Psychiatric:         Mood and Affect: Mood normal.         Behavior: Behavior normal.         Thought Content: Thought content normal.         Judgment: Judgment normal.         Lines/Drains/Airways       Drain  Duration                  Urethral Catheter 09/23/22 1700 Silicone 16 Fr. 1 day              Arterial Line  Duration             Arterial Line 09/23/22 1623 Right Radial 1 day              Peripheral Intravenous Line  Duration                  Peripheral IV - Single Lumen 09/23/22 0300 18 G Right Antecubital 2 days         Peripheral IV - Single Lumen 09/23/22 0630 18 G Anterior;Distal;Right Forearm 1 day                    Significant Labs:    Lab Results   Component Value Date    WBC 13.8 (H) 09/24/2022    HGB 9.6 (L) 09/24/2022    HCT 26.8 (L) 09/24/2022    MCV 92.1 09/24/2022     09/24/2022         BMP  Lab Results   Component Value Date     (L) 09/24/2022    K 4.7 09/24/2022    CO2 14 (L) 09/24/2022    BUN 31.6 (H) 09/24/2022    CREATININE 2.39 (H) 09/24/2022    CALCIUM 7.8 (L) 09/24/2022    EGFRNONAA 80 01/31/2022       ABG  No results for input(s): PH, PO2, PCO2, HCO3, BE in the last 168 hours.    Mechanical Ventilation Support:       Significant Imaging:  I have reviewed the pertinent imaging within the past 24 hours.        Assessment/Plan:     Assessment  Type B aortic dissection  CT at outside ED significant for [place] type B aortic dissection with anterior extensive mid descending thoracic aorta, 3.7 cm dilation  Hypertensive emergency  Acute renal  failure  History right upper extremity DVT, previously on Xarelto, noncompliance for past 2 months  RUE US done on 9/24/22 negative for thrombus  SLE, currently unmedicated  New onset hypoglycemia      Plan  - significantly hypoglycemic this morning, unsure of etiology at this time. Will check another CMP. Currently receiving D10 bolus of 250 cc. Will initiate q4h glucose checks.  - complaining of dyspnea this am, ordering CXR, ABG, troponin  -postop day 2 left carotid artery to left subclavian artery bypass  -keep SBP <130, per CT surgery recommendations  -currently on Cleviprex, off esmolol; PRN hydralazine   - was able to stay off for around ~2 hours overnight  -restart home medications, in regards to previous Hx of non-compliance, will slowly titrate back to previously prescribed doses  -neurovascular checks q4hr  -pain control via PRN morphine 2mg q2hr, oxycodone 10 mg q6h prn  -renal indices pending this AM    DVT Prophylaxis: SCDs  GI Prophylaxis: famotidine     33 minutes of critical care was time spent personally by me on the following activities: development of treatment plan with patient or surrogate and bedside caregivers, discussions with consultants, evaluation of patient's response to treatment, examination of patient, ordering and performing treatments and interventions, ordering and review of laboratory studies, ordering and review of radiographic studies, pulse oximetry, re-evaluation of patient's condition.  This critical care time did not overlap with that of any other provider or involve time for any procedures.     Francia Marcus DO  Pulmonary Critical Care Medicine Resident  [facility] - [address] [place] [address]

## 2022-09-25 NOTE — PROCEDURES
Procedure: Central venous catheter insertion    Indication:  Hemodialysis access    Performed by: Ivan Chopra MD    Procedure description:  The site was prepped and draped in sterile fashion.  The decision was made to exchange recently placed central venous catheter over guidewire in favor of 12 [place] 24 cm triple-lumen hemodialysis catheter for dialysis access.  The wire was advanced to the catheter easily without resistance, and the 7 [place] triple-lumen catheter was removed.  Serial dilation was performed to allow for passage of larger diameter catheter.  The dialysis catheter was easily placed within the vascular lumen over the guidewire.  The wire was removed, and all ports were confirmed to have adequate blood draw and flush.    The patient tolerated the procedure well.  There were no complications.          Ivan Chopra MD  Pulmonary Disease and Critical Care Medicine

## 2022-09-25 NOTE — SIGNIFICANT EVENT
Unfortunately, patient has continued to suffer clinical decline throughout the course of the morning and early afternoon.  Lactic acidosis has worsened and he has developed shock requiring norepinephrine support.  He was transfused 2 units PRBC with some improvement in lactate from 5.5-->4.7, but continued to suffer worsening abdominal distension, rigidity and progressive respiratory distress.  Decision was made to intubate patient for stabilization.  He continues to require norepinephrine for support of blood pressure, but is oxygenating well on the ventilator.  The case was discussed with general surgery and, upon review of imaging, feel that he has global hypoperfusion to his abdomen and will likely is suffering from severe ischemic disease of the liver and GI tract, with some hemoperitoneum noted on imaging.  After discussing with his family the risks, the decision has been made to take the patient for exploratory laparotomy.      I spent an additional 45 minutes providing critical care services to this patient.  This does not include time spent for separately billed procedures.      Ivan Chopra MD  Pulmonary Disease and Critical Care Medicine

## 2022-09-25 NOTE — TRANSFER OF CARE
Anesthesia Transfer of Care Note    Patient: Stuart Guevara    Procedure(s) Performed: Procedure(s) (LRB):  LAPAROTOMY, EXPLORATORY (N/A)    Patient location: ICU    Anesthesia Type: general    Transport from OR: Continuos invasive BP monitoring in transport. Transported from OR intubated on 100% O2 by AMBU with assisted ventilation. Continuous ECG monitoring in transport. Continuous SpO2 monitoring in transport. Upon arrival to PACU/ICU, patient attached to ventilator and auscultated to confirm bilateral breath sounds and adequate TV    Post pain: adequate analgesia    Post assessment: no apparent anesthetic complications    Post vital signs: stable    Level of consciousness: sedated    Nausea/Vomiting: no nausea/vomiting    Complications: none    Transfer of care protocol was followed      Last vitals:   Visit Vitals  /74   Pulse 70   Temp 37 °C (98.6 °F) (Oral)   Resp (!) 32   Wt 60 kg (132 lb 4.4 oz)   SpO2 99%   BMI 21.35 kg/m²

## 2022-09-25 NOTE — SIGNIFICANT EVENT
Was present at bedside upon arrival from the operating room after surgery.  Case was discussed with surgery team, who expressed that there was no evidence of significant bowel ischemia, with significant free fluid was noted within the abdomen.  His liver was noted to be enlarged and mottled appearing, but appeared viable.  There was concern for significant pancreatic swelling, but otherwise no surgical interventions were required.  His abdomen was closed he was subsequently returned to the intensive care unit.  Upon arrival, he was noted to have developed worsening hemodynamic instability and bradycardia.  On bedside telemetry review, there appeared to be complete atrioventricular dissociation consistent with 3rd degree heart block.  EKG confirmed independent conduction of P waves and QRS complex is consistent with complete heart block.  Attempts were made at trans cutaneous pacing, which ultimately was successful in obtaining both electrical and mechanical capture.  There was improvement noted in his heart rate, but minimal effect on his overall hemodynamics at this point.  The case was discussed with Cardiology and plans are being made for insertion of transvenous pacemaking wire.  Family called and updated on patient's critical illness, all questions answered.      Spent an additional 45 minutes providing critical care services to this patient.  This does not include time spent for separately billed procedures.          Ivan Chopra MD  Pulmonary Disease and Critical Care Medicine

## 2022-09-25 NOTE — CONSULTS
Nephrology Consultation      History of Present Illness:  This is a 32 y.o. male presented to the ER at [facility] on 09/23/2022 with complaints of tearing, ripping, chest pain with radiation to the back.  On CT scan and aortic dissection was noted and patient was transferred to [facility] for CT services.  On 09/23/2022 patient had a repair of aorta, thoracic, endovascular creation, bypass, arterial, anterior carotid to subclavian left per Dr Martines.  Today the patient had a clinical decline which required Levophed support.  Patient had worsening abdominal distention, rigidity and progressive respiratory distress.  Patient became unresponsive and was intubated. He was later brought to the OR for an exploratory lap and and was chemically resuscitated on the way back from OR.  No significant bowel ischemia was noted.  When patient returned to the ICU a third-degree heart block was noted and pacer was placed in cath lab.  We were consulted tonight for acute kidney injury with electrolyte imbalance.  Spoke with patient's sister Gil and she consented for dialysis.  We will do hemodialysis tonight.  Past medical history includes  SLE, HTN, HX of type a aortic dissection repaired in February 2021, HX of right upper extremity DVT.        Review of patient's allergies indicates:   Allergen Reactions    Levofloxacin     Sulfamethoxazole-trimethoprim        Review of systems:    Review of Systems   Unable to perform ROS: Medical condition     Past Medical History:   Past Medical History:   Diagnosis Date    Hypertension     Systemic lupus erythematosus, organ or system involvement unspecified     Systemic lupus erythematosus, organ or system involvement unspecified        Procedure History:   Past Surgical History:   Procedure Laterality Date    CARDIAC SURGERY      FINGER AMPUTATION      THROMBECTOMY Right     TOE AMPUTATION         Family History: family history is not on file.    Social History:  reports that he has been smoking  cigarettes. He has never used smokeless tobacco.    Home Medications:     Medications Prior to Admission   Medication Sig Dispense Refill Last Dose    albuterol (PROVENTIL/VENTOLIN HFA) 90 mcg/actuation inhaler Inhale 2 puffs into the lungs every 4 (four) hours as needed for Wheezing. Rescue 18 g 0     amLODIPine (NORVASC) 10 MG tablet Take 1 tablet (10 mg total) by mouth once daily. 30 tablet 0     aspirin (ECOTRIN) 81 MG EC tablet Take 1 tablet (81 mg total) by mouth once daily. 30 tablet 0     belimumab (BENLYSTA) 120 mg SolR Benlysta Take No date recorded No form recorded No frequency recorded No route recorded No set duration recorded No set duration amount recorded active No dosage strength recorded No dosage strength units of measure recorded       famotidine (PEPCID) 20 MG tablet Take 1 tablet (20 mg total) by mouth 2 (two) times daily. 60 tablet 0     gemfibroziL (LOPID) 600 MG tablet Take 600 mg by mouth.       hydrALAZINE (APRESOLINE) 10 MG tablet Take 10 mg by mouth.       labetaloL (NORMODYNE) 300 MG tablet Take 1 tablet (300 mg total) by mouth 2 (two) times daily. 30 tablet 0     lisinopriL (PRINIVIL,ZESTRIL) 40 MG tablet Take 1 tablet (40 mg total) by mouth once daily. 30 tablet 0     metoprolol tartrate (LOPRESSOR) 50 MG tablet Take 1 tablet (50 mg total) by mouth 2 (two) times daily. 30 tablet 0     mycophenolate (CELLCEPT) 500 mg Tab Take 2 tablets (1,000 mg total) by mouth 2 (two) times daily. 30 tablet 0     MEÑO 2 mg TbEC Take 1 tablet by mouth once daily.       rivaroxaban (XARELTO ORAL) Xarelto Take No date recorded No form recorded No frequency recorded No route recorded No set duration recorded No set duration amount recorded active No dosage strength recorded No dosage strength units of measure recorded       sucralfate (CARAFATE) 1 gram tablet Take 1 tablet (1 g total) by mouth 2 (two) times daily. 60 tablet 0     warfarin (COUMADIN) 3 MG tablet Take 1 tablet (3 mg total) by mouth Daily.  30 tablet 0        Inpatient Medications:       Current Facility-Administered Medications:     0.9%  NaCl infusion (for blood administration), , Intravenous, Q24H PRN, Alexis Scheuermann, MD    0.9%  NaCl infusion (for blood administration), , Intravenous, Q24H PRN, Luca Rae DO    0.9%  NaCl infusion (for blood administration), , Intravenous, Q24H PRN, Nolberto Calvillo MD    0.9%  NaCl infusion, , Intravenous, Continuous, Cong Velez DO, Last Rate: 150 mL/hr at 09/24/22 2246, Rate Change at 09/24/22 2246    0.9%  NaCl infusion, , Intravenous, Continuous, Adriana Tong, ACNP    acetaminophen tablet 650 mg, 650 mg, Oral, Q6H PRN, Nolberto Calvillo MD, 650 mg at 09/24/22 2101    albuterol inhaler 2 puff, 2 puff, Inhalation, Q4H PRN, Evelio Marshall MD    atropine 0.1 mg/mL injection, , , ,     ceFEPIme (MAXIPIME) 1 g in dextrose 5 % in water (D5W) 5 % 50 mL IVPB (MB+), 1 g, Intravenous, Q12H, Ivan Chopra MD    cefUROXime sodium 1.5 g in dextrose 5 % in water (D5W) 5 % 100 mL IVPB (MB+), 1.5 g, Intravenous, Once, Evelio Marshall MD    clevidipine (CLEVIPREX) 25 mg/50 mL infusion, 0-16 mg/hr, Intravenous, Continuous, Evelio Marshall MD, Last Rate: 0 mL/hr at 09/25/22 0257, 0 mg/hr at 09/25/22 0257    EPINEPHrine (ADRENALIN) 0.1 mg/mL injection, , , ,     EPINEPHrine (ADRENALIN) 1 mg/mL (1 mL) injection, , , ,     esmolol 2000 mg in sodium chloride 0.9% 100 mL (20 mg/mL), 0-300 mcg/kg/min, Intravenous, Continuous, Evelio Marshall MD, Last Rate: 22.5 mL/hr at 09/24/22 1217, 125 mcg/kg/min at 09/24/22 1217    famotidine tablet 20 mg, 20 mg, Oral, BID, Francia Marcus DO, 20 mg at 09/24/22 2023    gemfibroziL tablet 600 mg, 600 mg, Oral, BID AC, Francia Marcus DO, 600 mg at 09/24/22 0642    hydrALAZINE injection 20 mg, 20 mg, Intravenous, Q4H PRN, Cong Velez DO, 20 mg at 09/24/22 0013    HYDROmorphone (PF) injection 0.5 mg, 0.5 mg, Intravenous, Q2H PRN, Ivan Chopra MD, 0.5 mg  at 09/25/22 0958    LIDOcaine (PF) 10 mg/ml (1%) injection 10 mg, 1 mL, Other, Once Pre-Op, Ivan Chopra MD    LIDOcaine (PF) 10 mg/ml (1%) injection, , , PRN, Ruben Arciniega MD, 5 mL at 09/25/22 1656    lorazepam injection 1 mg, 1 mg, Intravenous, Q2H PRN, Cong Velez DO    magnesium sulfate 2g in water 50mL IVPB (premix), 2 g, Intravenous, PRN, OSMIN Pleitez    metronidazole IVPB 500 mg, 500 mg, Intravenous, Q8H, Ivan Chopra MD    morphine injection 2 mg, 2 mg, Intravenous, Q2H PRN, Evelio Marshall MD, 2 mg at 09/24/22 1028    mupirocin 2 % ointment, , Nasal, BID, Evelio Marshall MD, Given at 09/25/22 0900    NORepinephrine 8 mg in dextrose 5% 250 mL infusion, 0-3 mcg/kg/min, Intravenous, Continuous, Luca Rae DO, Last Rate: 3.4 mL/hr at 09/25/22 1220, 0.03 mcg/kg/min at 09/25/22 1220    ondansetron injection 4 mg, 4 mg, Intravenous, Q6H PRN, Evelio Marshall MD, 4 mg at 09/23/22 1236    oxyCODONE immediate release tablet 10 mg, 10 mg, Oral, Q6H PRN, Ivan Chopra MD, 10 mg at 09/24/22 1217    propofol (DIPRIVAN) 10 mg/mL infusion, 0-50 mcg/kg/min, Intravenous, Continuous, Luca Rae DO, Last Rate: 5.4 mL/hr at 09/25/22 1500, 15 mcg/kg/min at 09/25/22 1500    sodium chloride 0.9% flush 10 mL, 10 mL, Intravenous, PRN, Francia Marcus DO    sodium chloride 0.9% flush 10 mL, 10 mL, Intravenous, PRN, Evelio Marshall MD    sodium chloride 0.9% flush 10 mL, 10 mL, Intravenous, PRN, Evelio Marshall MD    sodium phosphate 20.01 mmol in dextrose 5 % 250 mL IVPB, 20.01 mmol, Intravenous, PRN, Adriana Tong, ACNP    sodium phosphate 30 mmol in dextrose 5 % 250 mL IVPB, 30 mmol, Intravenous, PRN, Adriana Tong, ACNP    sodium phosphate 39.99 mmol in dextrose 5 % 250 mL IVPB, 39.99 mmol, Intravenous, PRN, Adriana Tong, ACNP    sucralfate tablet 1 g, 1 g, Oral, BID, Francia Marcus DO, 1 g at 09/24/22 2023    Pharmacy to dose Vancomycin consult, , , Once **AND**  vancomycin - pharmacy to dose, , Intravenous, pharmacy to manage frequency, Ivan Chopra MD    vancomycin in dextrose 5 % 1 gram/250 mL IVPB 1,000 mg, 1,000 mg, Intravenous, Once, Ivan Chopra MD     Laboratory Data:         Component Value Date/Time    CHLORIDE 98 09/25/2022 1525    CHLORIDE 105 09/25/2022 0533    CO2 16 (L) 09/25/2022 1525    CO2 8 (LL) 09/25/2022 0533    BUN 40.1 (H) 09/25/2022 1525    BUN 40.9 (H) 09/25/2022 0533    CREATININE 4.14 (H) 09/25/2022 1525    CREATININE 4.06 (H) 09/25/2022 0533    CALCIUM 4.7 (LL) 09/25/2022 1525    CALCIUM 5.9 (LL) 09/25/2022 0533            Component Value Date/Time    WBC 14.1 (H) 09/25/2022 1525    WBC 18.9 (H) 09/25/2022 0534    HGB 9.9 (L) 09/25/2022 1525    HGB 12.8 (L) 09/25/2022 1200    HCT 30.7 (L) 09/25/2022 1525    HCT 31.9 (L) 09/25/2022 1200    HCT 28 (L) 09/23/2022 0304    HCT 30.0 (L) 02/15/2021 1751       Imaging:  X-Ray Chest 1 View   Final Result      Increased volume status      Interval intubation with endotracheal tube at the josie.  I recommend retracting      Right-sided chest tube in good position         Electronically signed by: Too Reyes   Date:    09/25/2022   Time:    15:49      X-Ray Abdomen AP 1 View   Final Result      There is slight increased density of the kidneys of questionable etiology and or significance.      Otherwise nonspecific gas pattern         Electronically signed by: Ramón Ying   Date:    09/25/2022   Time:    10:51      CTA Chest Abdomen Non Coronary   Final Result      1. Thoracic aortic endograft placement since 09/23/2022 with the dissection flap extending about 3 cm inferior to the distal portion of the graft.  Opacification of the false lumen along the mid and distal portions of the graft.   2. Moderate volume hemoperitoneum.  Changes involving the liver, spleen, pancreas and bowel are suggestive of overall hypoperfusion.  Hyperdense kidneys suggest acute kidney injury.   3. Small  to moderate bilateral pleural effusions, larger on the right.   Findings discussed with Mr. Guevara ICU nurse, Dulce at 08:35 hours on 09/25/2022.         Electronically signed by: Darius Rosario   Date:    09/25/2022   Time:    08:42      X-Ray Chest 1 View   Final Result      Changes suggestive of right-sided pleural effusion.      Increased left retrocardiac density and silhouetting of the left hemidiaphragm which might be related to an infiltrate/atelectasis.      Interval placement of thoracic endograft.      No focal consolidative changes         Electronically signed by: Ramón Ying   Date:    09/25/2022   Time:    09:10      IR Abdominal Aortobifemoral    (Results Pending)       Physical Exam:     Physical Exam  Vitals reviewed.   Constitutional:       General: He is not in acute distress.     Appearance: He is ill-appearing.   HENT:      Head: Normocephalic.   Cardiovascular:      Comments: paced  Right CT  Pulmonary:      Breath sounds: Normal breath sounds.      Comments: Intubated  coarse  Abdominal:      General: There is distension.      Comments: Rigid  Surgical wound with dressing     Musculoskeletal:      Right lower leg: No edema.      Left lower leg: No edema.      Comments: triple-lumen hemodialysis catheter right groin   Skin:     General: Skin is warm and dry.       VITAL SIGNS: 24 HR MIN & MAX LAST    Temp  Min: 97.7 °F (36.5 °C)  Max: 101.6 °F (38.7 °C)  97.7 °F (36.5 °C)        BP  Min: 85/70  Max: 171/27  (!) 171/27     Pulse  Min: 60  Max: 90  71     Resp  Min: 11  Max: 35  (!) 32    SpO2  Min: 75 %  Max: 100 %  (!) 78 %        Impression:     Acute kidney injury  Hyperkalemia - HD tonight  Third-degree heart block - paced  Metabolic acidosis - HD tonight  Hypocalcemia  - HD tonight  Pleural effusion - CT was placed    IVF - Levophed          Epinephrine          D10         Plan:    We will do hemodialysis this evening for 2 hours, plan to do a 1 K bath for 1 hour than 2 K bath.   Calcium gluconate IV x 1 given   Reevaluate labs    Thank you very much for your consultation

## 2022-09-25 NOTE — NURSING
04:15- Nurse went in to round with resident and perform 0400 neuro/neurovascular assessment. Pt was unresponsive to vigorous stimulation. Chemistry notified nurse that critical labs resulted of Glucose of 27, Calcium of 5.9. Nurse rechecked CBG and it read <20. Nurse gave 500cc bolus of D10 as instructed per resident. Nurse called back chemistry for a redraw of labs.   04:50- Pt is awake and oriented x3. Waiting on lab to repeat labs, new orders for q4h CBG put in per resident. D10 bolus still being given, will recheck CBG after bolus.     Lisbeth De Luna RN

## 2022-09-25 NOTE — PROGRESS NOTES
Stuart Guevara is a 32 y.o. male patient.   1. Aortic dissection    2. DVT (deep venous thrombosis)      Past Medical History:   Diagnosis Date    Hypertension     Systemic lupus erythematosus, organ or system involvement unspecified     Systemic lupus erythematosus, organ or system involvement unspecified      No past surgical history pertinent negatives on file.  Scheduled Meds:   amLODIPine  10 mg Oral Daily    carvediloL  12.5 mg Oral BID    cefUROXime (ZINACEF) IVPB  1.5 g Intravenous Once    famotidine  20 mg Oral BID    gemfibroziL  600 mg Oral BID AC    magnesium sulfate IVPB  2 g Intravenous Once    mupirocin   Nasal BID    sucralfate  1 g Oral BID    warfarin  5 mg Oral Daily     Continuous Infusions:   sodium chloride 0.9% 150 mL/hr at 09/24/22 2246    clevidipine 0 mg/hr (09/25/22 0257)    esmolol 125 mcg/kg/min (09/24/22 1217)     PRN Meds:sodium chloride, sodium chloride, acetaminophen, albuterol, hydrALAZINE, HYDROmorphone, lorazepam, morphine, ondansetron, oxyCODONE, sodium chloride 0.9%, sodium chloride 0.9%, sodium chloride 0.9%    Review of patient's allergies indicates:   Allergen Reactions    Levofloxacin     Sulfamethoxazole-trimethoprim      Active Hospital Problems    Diagnosis  POA    *Aortic dissection [I71.00]  Yes    Systemic lupus erythematosus [M32.9]  Yes     Chronic    HTN (hypertension) [I10]  Yes     Chronic    History of DVT in adulthood [Z86.718]  Not Applicable     Chronic    Hypertensive emergency [I16.1]  Yes      Resolved Hospital Problems   No resolved problems to display.     Blood pressure 113/65, pulse 79, temperature 98 °F (36.7 °C), temperature source Oral, resp. rate 19, weight 60 kg (132 lb 4.4 oz), SpO2 98 %.    Subjective:    POD #2  Overnight patient developed worsening pain,  Shortness of breath and abdominal distension  He had a CT of the abdomen, reviewed by Dr. Martines showing stable stent with perfusion to renal and mesenteric arteries.  General surgery is  being consulted for abdominal findings  TAY worsening  Transfusing PRBCs    Objective:   Awake.   HR: 75; BP: 109/70; Respirations 22; O2: 90s on BiPAP  Heart: RRR  Lungs: on BiPaP  Incisions: dressed, dry  Abd: Rigid, distended     Assesment/Plan:    S/P TEVAR; Left carotid to sublavian artery bypass  Await general surgery recommendations  Continue care  Patient seen by Dr. Martines and films reviewed and discussed with patient, family, Intensivist and General surgery            JATINDER Hodge  9/25/2022

## 2022-09-26 NOTE — NURSING
"0645: Dr. Chopra at bedside pt having a increase work of breathing and is having belly distension, CT was ordered of the abdomen. Pt stable but still in resp distress. Consulted general surgery and cardiothroacic.    0800: Dr. Martines rounded on pt and said he was a general surgery case due to the scan.     0850: Surgery resident rounded and said she needed to discuss the CT scan results with Dr. Marr and stated "we will not be taking a stone cold stable patient to surgery" Pt remains on bipap with increase work of breathing.    1000: respiratory status declining and ABG's did not improve with Bipap, spoke with Dr. Chopra to intubate and wanted to transfuse blood first.      1130: Transfused 2 units of PRBC, and pt became obtunded with minimal communication.    12:00: Pt was unresponsive and started to jonathon to the 20s. Dr. Chopra was at bedside to intubate. Pt was intubated at 1220. Surgery was recalled because pt was not hemodynamically stable at this point.     1315:pt was brought to surgery for exploratory surgery      1500: returned to the unit being chemically resuscitated by the anesthesia team; transcutaneous pacing initiated at 70 and [facility] was called for transvenous pacer.     1545: Cath lab present at bedside to take patient to cath lab; returned with a pacer at 60; pt remains on pressors 100% paced.    1630: Nephrology was consulted for CRRT due to worsening kidney function    1815: line was placed in R groin; HD started; pt remain stable on pressors, mechanically ventilated at this time.              "

## 2022-09-26 NOTE — PROGRESS NOTES
Inpatient Nutrition Assessment    Admit Date: 9/23/2022   Total duration of encounter: 3 days     Nutrition Recommendation/Prescription     If/when appropriate. Start tube feeding.  Tube feeding recommendation:  Peptamen Intense VHP goal rate 35 ml/hr to provide  700 kcal/d (35% est needs, 106% with meds)  65 g protein/d (72% est needs)  588 ml free water/d (29% est needs)  (calculations based on estimated 20 hr/d run time)     Communication of Recommendations: reviewed with nurse    Nutrition Assessment     Malnutrition Assessment/Nutrition-Focused Physical Exam    Malnutrition in the context of acute illness or injury  Degree of Malnutrition: does not meet criteria  Energy Intake: unable to obtain  Interpretation of Weight Loss: unable to obtain  Body Fat:does not meet criteria  Area of Body Fat Loss: does not meet criteria  Muscle Mass Loss: does not meet criteria  Area of Muscle Mass Loss: does not meet criteria  Fluid Accumulation: does not meet criteria  Edema: does not meet criteria   Reduced  Strength: unable to obtain  A minimum of two characteristics is recommended for diagnosis of either severe or non-severe malnutrition.    Chart Review    Reason Seen: continuous nutrition monitoring    Diagnosis:  Type B Aortic dissection  Hypertensive emergency  Systemic lupus erythematosus    Relevant Medical History: HTN, lupus    Nutrition-Related Medications: cleviprex, D5-1/2NS @ 50ml/hr, diprivan  Calorie Containing IV Medications: Diprivan @ 18 ml/hr (provides 475 kcal/d) and Cleviprex @ 20 ml/hr (provides 960 kcal/d)    Nutrition-Related Labs:  9/26 BUN 27, Crea 3.06, Glu 174, Phos 6.2    Diet/PN Order: Diet NPO  Oral Supplement Order: none at this time  Tube Feeding Order: none at this time  Appetite/Oral Intake: not applicable/not applicable  Factors Affecting Nutritional Intake: on mechanical ventilation  Food/Adventist/Cultural Preferences: unable to obtain at this time    Skin Integrity: blister,  "drain/device(s), incision  Wound(s):   incision noted    Comments    9/26/22: Discussed with RN. Will provide tube feeding recommendations for when appropriate to start tube feeding. Receiving kcal from meds. Noted Phos, will monitor for need for renal formula.     Anthropometrics    Height: 5' 5.98" (167.6 cm)    Last Weight: 60 kg (132 lb 4.4 oz) (09/23/22 1219)    BMI (Calculated): 21.4  BMI Classification: normal (BMI 18.5-24.9)        Ideal Body Weight (IBW), Male: 141.88 lb                                Usual Weight Provided By: unable to obtain usual weight at this time    Wt Readings from Last 5 Encounters:   09/23/22 60 kg (132 lb 4.4 oz)   09/23/22 60 kg (132 lb 4.4 oz)   12/09/21 48 kg (105 lb 13.1 oz)     Weight Change(s) Since Admission:  Admit Weight: 60 kg (132 lb 4.4 oz) (09/23/22 1219)    Estimated Needs    Weight Used For Calorie Calculations: 60 kg (132 lb 4.4 oz)  Energy Calorie Requirements (kcal): 2018kcal  Energy Need Method: Marcial State  Weight Used For Protein Calculations: 60 kg (132 lb 4.4 oz)  Protein Requirements: 90gm (1.5g/kg)  Fluid Requirements (mL): 2018ml (1ml/kcal)  Temp: (!) 100.8 °F (38.2 °C)  Vtot (L/Min) for Marcial State Equation Calculation: 13.5    Enteral Nutrition    Patient not receiving enteral nutrition at this time.    Parenteral Nutrition    Patient not receiving parenteral nutrition support at this time.    Evaluation of Received Nutrient Intake    Calories: not meeting estimated needs  Protein: not meeting estimated needs    Patient Education    Not applicable.    Nutrition Diagnosis     PES: Inadequate oral intake related to current condition   as evidenced by intubation since admit. (new)    Interventions/Goals     Intervention(s): modified composition of enteral nutrition, modified rate of enteral nutrition, and collaboration with other providers  Goal: Meet greater than 75% of nutritional needs by follow-up. (new)    Monitoring & Evaluation     Dietitian will " monitor energy intake.  Nutrition Risk/Follow-Up: high (follow-up in 1-4 days)

## 2022-09-26 NOTE — PROGRESS NOTES
09/25/22 9267   Post-Hemodialysis Assessment   Blood Volume Processed (Liters) 30 L   Dialyzer Clearance Clear   Duration of Treatment 120 minutes   Additional Fluid Intake (mL) 500 mL   Total UF (mL) 500 mL   Net Fluid Removal 0   Patient Response to Treatment tolerated well   Post-Hemodialysis Comments completed 2hr HD tx. No fluid removed. HD cath positional at times needed to be flushed.

## 2022-09-26 NOTE — PROGRESS NOTES
Pharmacist Renal Dose Adjustment Note    Stuart Guevara is a 32 y.o. male being treated with the medication famotidine    Patient Data:    Vital Signs (Most Recent):  Temp: (!) 100.8 °F (38.2 °C) (09/26/22 0915)  Pulse: 86 (09/26/22 1155)  Resp: 16 (09/26/22 1200)  BP: (!) 171/27 (09/25/22 1615)  SpO2: 100 % (09/26/22 1155)   Vital Signs (72h Range):  Temp:  [97.7 °F (36.5 °C)-101.6 °F (38.7 °C)]   Pulse:  [58-94]   Resp:  [10-35]   BP: ()/(27-96)   SpO2:  [75 %-100 %]   Arterial Line BP: (0-188)/(0-88)      Recent Labs   Lab 09/25/22  0533 09/25/22  1525 09/26/22  0012   CREATININE 4.06* 4.14* 3.06*     Serum creatinine: 3.06 mg/dL (H) 09/26/22 0012  Estimated creatinine clearance: 29.1 mL/min (A)    Medication:famotidine dose: 20mg frequency bid will be changed to medication:famotidine dose:20mg frequency:every other day    Pharmacist's Name: Manda Warren  Pharmacist's Extension: [phone]

## 2022-09-26 NOTE — PROCEDURES
Procedure:  Endotracheal intubation     Indication:  Shock, respiratory failure     Performed by: Ivan Chopra MD    Procedure description:  The patient was positioned appropriately in bed and remain on noninvasive positive pressure ventilation in preparation for procedure.  Anesthesia was induced with a total of 5 mg midazolam and 20 mg etomidate.  Paralysis was achieved with 50 mg rocuronium.  The vocal cords were visualized with video laryngoscopy and a size 7.5 endotracheal tube was easily passed through the cords under direct visualization.  Endotracheal tube placement confirmed by auscultation of bilateral breath sounds and color change.    The patient tolerated the procedure well.  There were no complications.        Ivan Chopra MD  Pulmonary Disease and Critical Care Medicine

## 2022-09-26 NOTE — PROGRESS NOTES
NEPHROLOGY: Progress Note    Hospital Course:    32-year-old a.m. with a history of systemic lupus, severe hypertension, previous type a aortic dissection repair in February of 2021 with aortic valve replacement at that time.  Initially presented to [facility] with severe tearing chest pain radiating into his back with a blood pressure of 232/127.  CT confirmed type B aortic dissection, he was transferred to [facility] ICU on esmolol drip and underwent TEVAR of the dissection as well as a subclavian artery to left carotid artery bypass on 09/23/2022.    On 09/25 due to worsening abdominal distention and rigidity and concern for ischemic bowel the patient underwent exploratory lap, right chest tube placement for pleural effusion without clear evidence of ischemic bowel.  He subsequently was noted to be severely hyperkalemic and urgent dialysis was performed late on the evening of the 25th via temporary right femoral cath.    Initially requiring vasopressors and now on Cleviprex.            Current Facility-Administered Medications:     0.9%  NaCl infusion, , Intravenous, Continuous, Cong Velez DO, Last Rate: 150 mL/hr at 09/24/22 2246, Rate Change at 09/24/22 2246    acetaminophen tablet 650 mg, 650 mg, Oral, Q6H PRN, Nolberto Calvillo MD, 650 mg at 09/24/22 2101    albuterol inhaler 2 puff, 2 puff, Inhalation, Q4H PRN, Evelio Marshall MD    ceFEPIme (MAXIPIME) 1 g in dextrose 5 % in water (D5W) 5 % 50 mL IVPB (MB+), 1 g, Intravenous, Q12H, Ivan Chopra MD, Stopped at 09/26/22 0254    cefUROXime sodium 1.5 g in dextrose 5 % in water (D5W) 5 % 100 mL IVPB (MB+), 1.5 g, Intravenous, Once, Evelio Marshall MD    clevidipine (CLEVIPREX) 25 mg/50 mL infusion, 0-16 mg/hr, Intravenous, Continuous, Evelio Marshall MD, Last Rate: 24 mL/hr at 09/26/22 0725, 12 mg/hr at 09/26/22 0725    dextrose 5 %  infusion, , Intravenous, Continuous, Cong Velez DO, Last Rate: 125 mL/hr at 09/26/22 0143, New Bag at 09/26/22 0143    EPINEPHrine (ADRENALIN) 5 mg in dextrose 5 % 250 mL infusion, 0-2 mcg/kg/min, Intravenous, Continuous, Ivan Chopra MD, Last Rate: 0 mL/hr at 09/25/22 2000, 0 mcg/kg/min at 09/25/22 2000    esmolol 2000 mg in sodium chloride 0.9% 100 mL (20 mg/mL), 0-300 mcg/kg/min, Intravenous, Continuous, Evelio Marshall MD, Last Rate: 22.5 mL/hr at 09/24/22 1217, 125 mcg/kg/min at 09/24/22 1217    famotidine tablet 20 mg, 20 mg, Oral, BID, Francia Marcus DO, 20 mg at 09/24/22 2023    gemfibroziL tablet 600 mg, 600 mg, Oral, BID AC, Francia Marcus DO, 600 mg at 09/24/22 0642    hydrALAZINE injection 20 mg, 20 mg, Intravenous, Q4H PRN, Cong Velez DO, 20 mg at 09/25/22 2026    HYDROmorphone (PF) injection 0.5 mg, 0.5 mg, Intravenous, Q2H PRN, Ivan Chopra MD, 0.5 mg at 09/25/22 2101    LIDOcaine (PF) 10 mg/ml (1%) injection, , , PRN, Ruben Arciniega MD, 5 mL at 09/25/22 1656    lorazepam injection 1 mg, 1 mg, Intravenous, Q2H PRN, Cong Velez DO    metronidazole IVPB 500 mg, 500 mg, Intravenous, Q8H, Ivan Chopra MD, Last Rate: 100 mL/hr at 09/26/22 0545, 500 mg at 09/26/22 0545    morphine injection 2 mg, 2 mg, Intravenous, Q2H PRN, Evelio Marshall MD, 2 mg at 09/25/22 2019    mupirocin 2 % ointment, , Nasal, BID, Evelio Marshall MD, Given at 09/26/22 0227    NORepinephrine 8 mg in dextrose 5% 250 mL infusion, 0-3 mcg/kg/min, Intravenous, Continuous, Luca Rae DO, Last Rate: 0 mL/hr at 09/25/22 2000, 0 mcg/kg/min at 09/25/22 2000    ondansetron injection 4 mg, 4 mg, Intravenous, Q6H PRN, Evelio Marshall MD, 4 mg at 09/23/22 1236    oxyCODONE immediate release tablet 10 mg, 10 mg, Oral, Q6H PRN, Ivan Chopra MD, 10 mg at 09/24/22 1217    propofol (DIPRIVAN) 10 mg/mL infusion, 0-50 mcg/kg/min, Intravenous, Continuous, Luca Rae DO, Last Rate: 18  mL/hr at 09/26/22 0524, 50 mcg/kg/min at 09/26/22 0524    sodium chloride 0.9% bolus 500 mL, 500 mL, Intravenous, Once, Francia Marcus DO    sodium chloride 0.9% flush 10 mL, 10 mL, Intravenous, PRN, Francia Marcus,     sodium chloride 0.9% flush 10 mL, 10 mL, Intravenous, PRN, Evelio Marshall MD    sodium chloride 0.9% flush 10 mL, 10 mL, Intravenous, PRN, Evelio Marshall MD    sucralfate tablet 1 g, 1 g, Oral, BID, Francia Marcus DO, 1 g at 09/24/22 2023    Pharmacy to dose Vancomycin consult, , , Once **AND** vancomycin - pharmacy to dose, , Intravenous, pharmacy to manage frequency, Ivan Chopra MD    vancomycin in dextrose 5 % 1 gram/250 mL IVPB 1,000 mg, 1,000 mg, Intravenous, Once, Ivan Chopra MD        BP (!) 171/27   Pulse 90   Temp 99.3 °F (37.4 °C) (Oral)   Resp (!) 32   Wt 60 kg (132 lb 4.4 oz)   SpO2 100%   BMI 21.35 kg/m²     Physical Exam:    GEN:  Patient is currently intubated, Cleviprex infusion and propofol sedation in progress.  Awake and appropriate.   HEENT: Atraumatic.  OG tube in place.  Pupils are equal and reactive.  NECK : No JVD, right-sided IJ Cordis  CARD : RRR with aortic click.  No appreciable rub.  LUNGS : Clear to auscultation.  Right chest tube in place  ABD :  Firm and silent.  No distention noted.  EXT : No pitting edema.   NEURO :  Unable to assess        Intake/Output Summary (Last 24 hours) at 9/26/2022 0853  Last data filed at 9/26/2022 0600  Gross per 24 hour   Intake 3272.7 ml   Output 1350 ml   Net 1922.7 ml         Laboratory:  Recent Results (from the past 24 hour(s))   Lactic Acid, Plasma    Collection Time: 09/25/22  9:26 AM   Result Value Ref Range    Lactic Acid Level 5.5 (HH) 0.5 - 2.2 mmol/L   POCT glucose    Collection Time: 09/25/22 11:03 AM   Result Value Ref Range    POCT Glucose 74 70 - 110 mg/dL   POCT ARTERIAL BLOOD GAS    Collection Time: 09/25/22 11:10 AM   Result Value Ref Range    POC PH 7.28 (AA) 7.29 - 7.61    POC PCO2 27  (A) mmHg    POC PO2 160 (A) mmHg    POC SATURATED O2 99 %    POC Potassium 5.3 (A) mmol/l    POC Sodium 120 (A) 137 - 145 mmol/l    POC Ionized Calcium 0.76 (AA) 0.79 - 1.4 mmol/l    POC HCO3 12.7 (AA) 14.9 - mmol/l    Base Deficit -12.5 mmol/l    POC Temp 37.0 C    Specimen source Arterial sample    Lactic Acid, Plasma    Collection Time: 09/25/22 12:00 PM   Result Value Ref Range    Lactic Acid Level 4.7 (HH) 0.5 - 2.2 mmol/L   Hemoglobin and Hematocrit    Collection Time: 09/25/22 12:00 PM   Result Value Ref Range    Hgb 12.8 (L) 14.0 - 18.0 gm/dL    Hct 31.9 (L) 42.0 - 52.0 %   Body Fluid Culture    Collection Time: 09/25/22 12:00 PM    Specimen: Pleural Fluid, Right; Body Fluid   Result Value Ref Range    Body Fluid Culture No Growth At 24 Hours    Gram Stain    Collection Time: 09/25/22 12:00 PM    Specimen: Pleural Fluid, Right; Body Fluid   Result Value Ref Range    GRAM STAIN Rare WBC observed     GRAM STAIN No bacteria seen    Cell Count, Body Fluid    Collection Time: 09/25/22 12:00 PM   Result Value Ref Range    WBC  /uL   POCT Capillary Blood Gas-Resp    Collection Time: 09/25/22  3:17 PM   Result Value Ref Range    POC PH 7.27     POC PCO2 35 mmHg    POC PO2 118 mmHg    POC SATURATED O2 98 %    POC Potassium 6.0 mmol/l    POC Sodium 120 mmol/l    POC Ionized Calcium 0.71 mmol/l    POC HCO3 16.1 mmol/l    Base Deficit -9.9 mmol/l    POC Temp 37.0 C    Specimen source Venous sample    Lipid Panel    Collection Time: 09/25/22  3:25 PM   Result Value Ref Range    Cholesterol Total 57 <=200 mg/dL    HDL Cholesterol 5 (L) 35 - 60 mg/dL    Triglyceride 285 (H) 34 - 140 mg/dL    Cholesterol/HDL Ratio 11 (H) 0 - 5    Very Low Density Lipoprotein 57     LDL Cholesterol -5.00 (L) 50.00 - 140.00 mg/dL   Lipase    Collection Time: 09/25/22  3:25 PM   Result Value Ref Range    Lipase Level 295 (H) <=60 U/L   Comprehensive Metabolic Panel    Collection Time: 09/25/22  3:25 PM   Result Value Ref Range    Sodium  Level 127 (L) 136 - 145 mmol/L    Potassium Level 6.7 (HH) 3.5 - 5.1 mmol/L    Chloride 98 98 - 107 mmol/L    Carbon Dioxide 16 (L) 22 - 29 mmol/L    Glucose Level 99 74 - 100 mg/dL    Blood Urea Nitrogen 40.1 (H) 8.9 - 20.6 mg/dL    Creatinine 4.14 (H) 0.73 - 1.18 mg/dL    Calcium Level Total 4.7 (LL) 8.4 - 10.2 mg/dL    Protein Total 4.5 (L) 6.4 - 8.3 gm/dL    Albumin Level 1.4 (L) 3.5 - 5.0 gm/dL    Globulin 3.1 2.4 - 3.5 gm/dL    Albumin/Globulin Ratio 0.5 (L) 1.1 - 2.0 ratio    Bilirubin Total 0.5 <=1.5 mg/dL    Alkaline Phosphatase 53 40 - 150 unit/L    Alanine Aminotransferase 28 0 - 55 unit/L    Aspartate Aminotransferase 77 (H) 5 - 34 unit/L    eGFR 19 mls/min/1.73/m2   Magnesium    Collection Time: 09/25/22  3:25 PM   Result Value Ref Range    Magnesium Level 1.90 1.60 - 2.60 mg/dL   Phosphorus    Collection Time: 09/25/22  3:25 PM   Result Value Ref Range    Phosphorus Level 5.7 (H) 2.3 - 4.7 mg/dL   Troponin I    Collection Time: 09/25/22  3:25 PM   Result Value Ref Range    Troponin-I 1.313 (H) 0.000 - 0.045 ng/mL   CBC with Differential    Collection Time: 09/25/22  3:25 PM   Result Value Ref Range    WBC 14.1 (H) 4.5 - 11.5 x10(3)/mcL    RBC 3.57 (L) 4.70 - 6.10 x10(6)/mcL    Hgb 9.9 (L) 14.0 - 18.0 gm/dL    Hct 30.7 (L) 42.0 - 52.0 %    MCV 90.8 80.0 - 94.0 fL    MCH 29.6 27.0 - 31.0 pg    MCHC 32.2 (L) 33.0 - 36.0 mg/dL    RDW 15.4 11.5 - 17.0 %    Platelet 237 130 - 400 x10(3)/mcL    MPV 11.8 (H) 7.4 - 10.4 fL    IG# 0.21 (H) 0 - 0.04 x10(3)/mcL    IG% 1.5 %    NRBC% 0.4 %   Lactate Dehydrogenase    Collection Time: 09/25/22  3:25 PM   Result Value Ref Range    Lactate Dehydrogenase 912 (H) 125 - 220 U/L   Manual Differential    Collection Time: 09/25/22  3:25 PM   Result Value Ref Range    Neut Man 76 %    Lymph Man 12 %    Monocyte Man 3 %    Eos Man 1 %    [place] Man 8 %    Myelo Man 1 %    Instr WBC 14 x10(3)/mcL    Abs Mono 0.42 0.1 - 1.3 x10(3)/mcL    Abs Eos  0.14 0 - 0.9 x10(3)/mcL    Abs  Lymp 1.68 0.6 - 4.6 x10(3)/mcL    Abs Neut 11.9 (H) 2.1 - 9.2 x10(3)/mcL    NRBC Man 1 %    RBC Morph Abnormal (A) Normal    Anisocyte 1+ (A) (none)    Poik 1+ (A) (none)    Macrocyte 1+ (A) (none)    [place] Cells 1+ (A) (none)    Platelet Est Normal Normal, Adequate   Hepatitis B Surface Antigen    Collection Time: 09/25/22  3:25 PM   Result Value Ref Range    Hepatitis B Surface Antigen Nonreactive Nonreactive   Lactic Acid, Plasma    Collection Time: 09/25/22  3:42 PM   Result Value Ref Range    Lactic Acid Level 4.1 (HH) 0.5 - 2.2 mmol/L   Lactic Acid, Plasma    Collection Time: 09/25/22  8:03 PM   Result Value Ref Range    Lactic Acid Level 2.0 0.5 - 2.2 mmol/L   POCT glucose    Collection Time: 09/25/22  8:29 PM   Result Value Ref Range    POCT Glucose 203 (H) 70 - 110 mg/dL   POCT glucose    Collection Time: 09/25/22 10:53 PM   Result Value Ref Range    POCT Glucose 158 (H) 70 - 110 mg/dL   Lactic Acid, Plasma    Collection Time: 09/26/22 12:12 AM   Result Value Ref Range    Lactic Acid Level 3.5 (HH) 0.5 - 2.2 mmol/L   Phosphorus    Collection Time: 09/26/22 12:12 AM   Result Value Ref Range    Phosphorus Level 6.2 (H) 2.3 - 4.7 mg/dL   Magnesium    Collection Time: 09/26/22 12:12 AM   Result Value Ref Range    Magnesium Level 1.70 1.60 - 2.60 mg/dL   Comprehensive Metabolic Panel    Collection Time: 09/26/22 12:12 AM   Result Value Ref Range    Sodium Level 125 (L) 136 - 145 mmol/L    Potassium Level 4.7 3.5 - 5.1 mmol/L    Chloride 98 98 - 107 mmol/L    Carbon Dioxide 16 (L) 22 - 29 mmol/L    Glucose Level 174 (H) 74 - 100 mg/dL    Blood Urea Nitrogen 27.0 (H) 8.9 - 20.6 mg/dL    Creatinine 3.06 (H) 0.73 - 1.18 mg/dL    Calcium Level Total 6.4 (LL) 8.4 - 10.2 mg/dL    Protein Total 4.7 (L) 6.4 - 8.3 gm/dL    Albumin Level 1.3 (L) 3.5 - 5.0 gm/dL    Globulin 3.4 2.4 - 3.5 gm/dL    Albumin/Globulin Ratio 0.4 (L) 1.1 - 2.0 ratio    Bilirubin Total 0.4 <=1.5 mg/dL    Alkaline Phosphatase 53 40 - 150 unit/L     Alanine Aminotransferase 21 0 - 55 unit/L    Aspartate Aminotransferase 93 (H) 5 - 34 unit/L    eGFR 27 mls/min/1.73/m2   CBC with Differential    Collection Time: 09/26/22 12:12 AM   Result Value Ref Range    WBC 12.6 (H) 4.5 - 11.5 x10(3)/mcL    RBC 3.53 (L) 4.70 - 6.10 x10(6)/mcL    Hgb 10.7 (L) 14.0 - 18.0 gm/dL    Hct 30.2 (L) 42.0 - 52.0 %    MCV 85.6 80.0 - 94.0 fL    MCH 30.3 27.0 - 31.0 pg    MCHC 35.4 33.0 - 36.0 mg/dL    RDW 15.1 11.5 - 17.0 %    Platelet 174 130 - 400 x10(3)/mcL    MPV      IG# 0.20 (H) 0 - 0.04 x10(3)/mcL    IG% 1.6 %    NRBC% 2.4 %   Manual Differential    Collection Time: 09/26/22 12:12 AM   Result Value Ref Range    Neut Man 42 %    Lymph Man 24 %    Monocyte Man 11 %    [place] Man 18 %    Myelo Man 4 %    Instr WBC 12.6 x10(3)/mcL    Abs Mono 1.386 (H) 0.1 - 1.3 x10(3)/mcL    Abs Lymp 3.024 0.6 - 4.6 x10(3)/mcL    Abs Neut 8.064 2.1 - 9.2 x10(3)/mcL    NRBC Man 5 %    RBC Morph Abnormal (A) Normal    Anisocyte 1+ (A) (none)    Poik 1+ (A) (none)    Platelet Est Adequate Normal, Adequate   Lactic Acid, Plasma    Collection Time: 09/26/22  4:18 AM   Result Value Ref Range    Lactic Acid Level 5.7 (HH) 0.5 - 2.2 mmol/L   POCT ARTERIAL BLOOD GAS    Collection Time: 09/26/22  4:59 AM   Result Value Ref Range    POC PH 7.37     POC PCO2 34 (A) mmHg    POC PO2 122 (A) mmHg    POC SATURATED O2 99 %    POC Potassium 4.7 mmol/l    POC Sodium 118 (AA) 119 - 161 mmol/l    POC Ionized Calcium 0.84 (A) 1.1 - 1.2 mmol/l    POC HCO3 19.7 mmol/l    Base Deficit -4.8 mmol/l    POC Temp 37.0 C    Specimen source Arterial sample    Sedimentation rate    Collection Time: 09/26/22  6:26 AM   Result Value Ref Range    Sed Rate 5 0 - 15 mm/hr   C-Reactive Protein    Collection Time: 09/26/22  6:26 AM   Result Value Ref Range    C-Reactive Protein >240.00 (H) <5.00 mg/L   SYPHILIS ANTIBODY (WITH REFLEX RPR)    Collection Time: 09/26/22  6:26 AM   Result Value Ref Range    Syphilis Antibody Nonreactive  Nonreactive, Equivocal         Assessment/Plan:   Acute kidney injury-multifactorial-shock-now aneuric   S/p TEVAR procedure for type A dissection  Prior history of SLE and type a dissection  Mild hyponatremia  Severe hypertension    Due to his hyponatremia I will recommend dialysis to prevent further drop in sodium and change fluids to D5 normal to continue to treat his hypoglycemia.  Patient will likely need prolonged dialysis for a period of time due to the multiple factors involved in his TAY.  We will continue to follow closely with you.      Shahzad Lundberg MD, PETERN

## 2022-09-26 NOTE — PROGRESS NOTES
Trauma/Acute Care Surgery   Daily Progress Note     HD#3  POD#1 Day Post-Op    Subjective  On Cleviprex, Propofol. LA increased this AM. Abd firm but does not appera tender. It is not rigid. Only 140 cc out urine overnight. 500cc out on HD. CT with 570 out.      Scheduled Meds:   calcium gluconate IVPB  1 g Intravenous Once    ceFEPime (MAXIPIME) IVPB  1 g Intravenous Q12H    cefUROXime (ZINACEF) IVPB  1.5 g Intravenous Once    famotidine  20 mg Oral BID    gemfibroziL  600 mg Oral BID AC    metronidazole  500 mg Intravenous Q8H    mupirocin   Nasal BID    sodium chloride 0.9%  500 mL Intravenous Once    sucralfate  1 g Oral BID    vancomycin (VANCOCIN) IVPB  1,000 mg Intravenous Once       Continuous Infusions:   sodium chloride 0.9% 150 mL/hr at 09/24/22 2246    clevidipine 8 mg/hr (09/26/22 0531)    dextrose 5 % 125 mL/hr at 09/26/22 0143    EPINEPHrine 0 mcg/kg/min (09/25/22 2000)    esmolol 125 mcg/kg/min (09/24/22 1217)    NORepinephrine bitartrate-D5W 0 mcg/kg/min (09/25/22 2000)    propofoL 50 mcg/kg/min (09/26/22 0524)       PRN Meds:sodium chloride, sodium chloride, sodium chloride, acetaminophen, albuterol, hydrALAZINE, HYDROmorphone, LIDOcaine (PF) 10 mg/ml (1%), lorazepam, morphine, ondansetron, oxyCODONE, sodium chloride 0.9%, sodium chloride 0.9%, sodium chloride 0.9%, Pharmacy to dose Vancomycin consult **AND** vancomycin - pharmacy to dose     Objective  Temp:  [97.7 °F (36.5 °C)-99.3 °F (37.4 °C)] 99.3 °F (37.4 °C)  Pulse:  [60-94] 81  Resp:  [15-33] 32  SpO2:  [75 %-100 %] 100 %  BP: ()/(27-91) 171/27  Arterial Line BP: ()/(51-88) 111/54     I/O last 3 completed shifts:  In: 4303.9 [I.V.:2812; Blood:600; Other:500; IV Piggyback:391.9]  Out: 1290 [Urine:220; Other:500; Chest Tube:570]  No intake/output data recorded.       Hemodialysis Catheter 09/25/22 1900 right femoral (Active)   Site Assessment No drainage;No redness;No swelling;No warmth 09/25/22 2000   Line Securement Device  Secured with sutures 09/25/22 2000   Dressing Type Central line dressing 09/25/22 2000   Dressing Status Clean;Dry;Intact 09/25/22 2000   Dressing Intervention First dressing 09/25/22 2000   Date on Dressing 09/25/22 09/25/22 2000   Number of days: 0            Peripheral IV - Single Lumen 09/23/22 0300 18 G Right Antecubital (Active)   Site Assessment Clean;Dry;Intact;No redness;No swelling;No warmth;No drainage 09/26/22 0400   Extremity Assessment Distal to IV No abnormal discoloration;No redness;No swelling;No warmth 09/26/22 0400   Line Status Infusing 09/26/22 0400   Dressing Status Clean;Dry;Intact 09/26/22 0400   Dressing Intervention Integrity maintained 09/26/22 0400   Number of days: 3            Peripheral IV - Single Lumen 09/23/22 0630 18 G Anterior;Distal;Right Forearm (Active)   Site Assessment Clean;Dry;Intact;No redness;No swelling;No warmth;No drainage 09/26/22 0400   Extremity Assessment Distal to IV No abnormal discoloration;No redness;No swelling;No warmth 09/26/22 0400   Line Status Infusing 09/26/22 0400   Dressing Status Clean;Dry;Intact 09/26/22 0400   Dressing Intervention Integrity maintained 09/26/22 0400   Number of days: 3            Peripheral IV - Single Lumen 09/25/22 1952 18 G;Other (Comments) Anterior;Left;Proximal Forearm (Active)   Site Assessment Clean;Dry;Intact;No redness;No swelling;No warmth;No drainage 09/26/22 0400   Extremity Assessment Distal to IV No abnormal discoloration;No redness;No swelling;No warmth 09/26/22 0400   Line Status Infusing 09/26/22 0400   Dressing Status Clean;Dry;Intact 09/26/22 0400   Dressing Intervention Integrity maintained 09/26/22 0400   Number of days: 0            Peripheral IV - Single Lumen 09/25/22 1953 20 G;Other (Comments) Anterior;Distal;Left Forearm (Active)   Site Assessment Clean;Dry;Intact;No redness;No swelling;No warmth;No drainage 09/26/22 0400   Extremity Assessment Distal to IV No abnormal discoloration;No redness;No  swelling;No warmth 09/26/22 0400   Line Status Infusing 09/26/22 0400   Dressing Status Clean;Dry;Intact 09/26/22 0400   Dressing Intervention Integrity maintained 09/26/22 0400   Number of days: 0       Arterial Line 09/23/22 1623 Right Radial (Active)   Site Assessment Clean;Dry;Intact;No redness;No swelling;No warmth;No drainage 09/26/22 0400   Line Status Pulsatile blood flow 09/26/22 0400   Art Line Waveform Appropriate;Square wave test performed 09/26/22 0400   Arterial Line Interventions Zeroed and calibrated;Leveled;Connections checked and tightened;Flushed per protocol;Line pulled back 09/26/22 0400   Color/Movement/Sensation Capillary refill less than 3 sec 09/26/22 0400   Dressing Type Central line dressing 09/26/22 0400   Dressing Status Clean;Dry;Intact 09/26/22 0400   Dressing Intervention Integrity maintained 09/26/22 0400   Number of days: 2            Chest Tube 09/25/22 1419 1 Right Mediastinal 28 Fr. (Active)   Chest Tube WDL WDL 09/25/22 2000   Function -20 cm H2O 09/25/22 2000   Air Leak/Fluctuation air leak not present 09/25/22 2000   Safety all tubing connections taped;all connections secured;suction checked 09/25/22 2000   Securement tubing secured to body distal to insertion site w/ tape 09/25/22 2000   Output (mL) 570 mL 09/25/22 2200   Number of days: 0            NG/OG Tube 09/25/22 1220 [place] sump 18 Fr. Left mouth (Active)   Placement Check placement verified by aspirate characteristics 09/25/22 2000   Distal Tube Length (cm) 60 09/25/22 2000   Tolerance no signs/symptoms of discomfort 09/25/22 2000   Securement secured to commercial device 09/25/22 2000   Clamp Status/Tolerance unclamped;abdominal distention 09/25/22 2000   Suction Setting/Drainage Method suction at the bedside;suction at;low;intermittent setting 09/25/22 2000   Insertion Site Appearance no redness, warmth, tenderness, skin breakdown, drainage 09/25/22 2000   Number of days: 0            Urethral Catheter 09/23/22 1700  "Silicone 16 Fr. (Active)   Site Assessment Clean;Intact 09/25/22 2000   Collection Container Urimeter 09/25/22 2000   Securement Method secured to top of thigh w/ adhesive device 09/25/22 2000   Catheter Care Performed yes 09/25/22 2000   Reason for Continuing Urinary Catheterization Critically ill in ICU and requiring hourly monitoring of intake/output 09/25/22 2000   CAUTI Prevention Bundle Securement Device in place with 1" slack;Intact seal between catheter & drainage tubing;Drainage bag/urimeter off the floor;Sheeting clip in use;No dependent loops or kinks;Drainage bag/urimeter not overfilled (<2/3 full);Drainage bag/urimeter below bladder 09/25/22 2000   Output (mL) 20 mL 09/26/22 0400   Number of days: 2            Sheath 09/25/22 1657 Right (Active)   Insertion Site clean and dry;dressing intact;suture(s) intact;no hematoma 09/25/22 2000   Drainage Amount None 09/25/22 2000   Number of days: 0        General:  Well developed, well nourished, no acute distress  HEENT:  Normocephalic, atraumatic  CVS:  RR, pacer in place but not currently paced  Resp:  on minimal vent settings   GI:  Abdomen firm but not rigid, distended  :  villalboos in place, some clear yellow urine in tubing  MSK: moving all extremities spontaneously  Skin:  No rashes, ulcers, erythema  Neuro:  Adequately sedated. RASS 0     Labs  Recent Labs     09/24/22  0126 09/24/22  2059 09/25/22  0534 09/25/22  1200 09/25/22  1525 09/26/22  0012   WBC 13.8*  --  18.9*  --  14.1* 12.6*   HGB 9.6*  --  6.8* 12.8* 9.9* 10.7*   HCT 26.8*  --  22.6* 31.9* 30.7* 30.2*     --  246  --  237 174   INR  --  1.52*  --   --   --   --      Recent Labs     09/24/22  0125 09/25/22  0533 09/25/22  1525 09/26/22  0012   * 127* 127* 125*   K 4.7 4.7 6.7* 4.7   CO2 14* 8* 16* 16*   BUN 31.6* 40.9* 40.1* 27.0*   CREATININE 2.39* 4.06* 4.14* 3.06*   CALCIUM 7.8* 5.9* 4.7* 6.4*   MG 1.80 1.34* 1.90 1.70   PHOS 4.4 5.7* 5.7* 6.2*   ALBUMIN 2.0* 1.5* 1.4* 1.3* "   BILITOT 0.2 0.2 0.5 0.4   AST 22 46* 77* 93*   ALKPHOS 62 46 53 53   ALT 10 13 28 21       Imaging  X-Ray Chest 1 View    Result Date: 9/25/2022  Increased volume status Interval intubation with endotracheal tube at the josie.  I recommend retracting Right-sided chest tube in good position Electronically signed by: Too Reyes Date:    09/25/2022 Time:    15:49    X-Ray Abdomen AP 1 View    Result Date: 9/25/2022  There is slight increased density of the kidneys of questionable etiology and or significance. Otherwise nonspecific gas pattern Electronically signed by: Ramón Ying Date:    09/25/2022 Time:    10:51    CTA Chest Abdomen Non Coronary    Result Date: 9/25/2022  1. Thoracic aortic endograft placement since 09/23/2022 with the dissection flap extending about 3 cm inferior to the distal portion of the graft.  Opacification of the false lumen along the mid and distal portions of the graft. 2. Moderate volume hemoperitoneum.  Changes involving the liver, spleen, pancreas and bowel are suggestive of overall hypoperfusion.  Hyperdense kidneys suggest acute kidney injury. 3. Small to moderate bilateral pleural effusions, larger on the right. Findings discussed with Mr. Guevara ICU nurse, [place] at 08:35 hours on 09/25/2022. Electronically signed by: Darius Rosario Date:    09/25/2022 Time:    08:42         Assessment/Plan  Mr. Guevara is a 31 yo M w/ hx of Lupus, HTN, type A aortic dissection repaired in February 2021, and type B dissection w/ TEVAR this admission presenting w/ rigid abdomen and hypoperfusion of kidneys, liver, spleen, pancreas, and bowel on CT.     Plan:  - Continue NPO  - Obtain KUB  - NG to LIWS  -Trend LA  - Will follow    Kale Gooden  Trauma/Acute Care Surgery   c - [phone]    9/26/2022  7:02 AM

## 2022-09-26 NOTE — OP NOTE
[facility]                       [address]                      [place] [address]    PATIENT NAME:      SANDOVAL FUENTES  YOB: 1990  CSN:               [id number]  MRN:               [id number]  ADMIT DATE:        09/23/2022 06:12:00  PHYSICIAN:         Vic Martines MD                          OPERATIVE REPORT      DATE OF SURGERY:    09/23/2022 00:00:00    SURGEON:  Vic Martines MD    PREOPERATIVE DIAGNOSIS:  Descending type-B aortic dissection.    PROCEDURES:    1. Open exposure of left common femoral artery.  2. Endovascular exclusion of type-B dissection.  3. Left carotid subclavian bypass.    POSTOPERATIVE DIAGNOSIS:  Type-B dissection.    BLOOD LOSS:  Per perfusion.    ANESTHESIA:  General.    ASSISTANT:  THOMAS Echevarria.    TECHNIQUE:  Under informed consent, the patient was taken to the OR and put in   supine position.  General anesthesia was induced and therefore maintained for   remainder of procedure.  All pressure points were padded equally.  The skin over   the chest, abdomen and left lower extremity was prepped and draped in the usual   sterile fashion.  IV antibiotics were administered.  A small incision was made   over the groin, followed by exposure of the common femoral artery, the SFA and   the profunda femoris.  Pursestring was placed on the common femoral artery.    This was followed by introduction of a needle and using Seldinger technique to   place an 8-Lithuanian sheath in the groin.  Wire was advanced all the way into   ascending aorta.  An aortogram was shot using the pigtail, revealing the area of   the dissection, the innominate trunk and the left subclavian.  Of note, the   patient had a Bovine arch.  After measurements, a 28 graft was placed and   deployed just distal to the innominate bovine branch.  This did cover the   subclavian.  An extension piece was placed all the way down to the mid  thoracic   area.  The completion angiogram performed at the end of the procedure revealed very   well seated graft with exclusion of the entry side.  The sheath was removed.    The pursestring was tied.  Heparin was reversed with protamine.  The wounds were   closed in layers of absorbable sutures.  Blood pressure on the left arm was   measuring really low in the 20s systolic, and there was really poor capillary   refill.  This patient will benefit from a carotid subclavian bypass.  The neck   and the chest were prepped.  Supraclavicular incision was made to expose the   subclavian artery.  Carotid incision was made to expose the common carotid   artery.  A tunnel was made between the 2 incisions.  The patient was heparinized   again.  Clamps were applied on the subclavian 3 minutes after, followed by   making an arteriotomy.  8 mm [place]-Carloz graft was used and sutured to the   subclavian.  There was actually good backflow from this vessel.  Next, the graft   was clamped.  It was cut to the appropriate length for the carotid.  The   carotid was clamped proximally and distally.  This was opened in an oblique way.    This was sutured to the graft in running Prolene fashion.  The whole graft,   carotids were de-aired.  All clamps were removed.  Heparin was reversed with   protamine.  The wounds were irrigated and closed in layers of absorbable   sutures.        ______________________________  MD BRIDGET Larson/VICKI  DD:  09/24/2022  Time:  11:14AM  DT:  09/24/2022  Time:  11:43AM  Job #:  [id number]      OPERATIVE REPORT

## 2022-09-26 NOTE — NURSING
09/26/22 1515   Post-Hemodialysis Assessment   Rinseback Volume (mL) 250 mL   Blood Volume Processed (Liters) 54.7 L   Dialyzer Clearance Lightly streaked   Duration of Treatment 180 minutes   Total UF (mL) 500 mL   Net Fluid Removal 0   Patient Response to Treatment Tolerated well.   Post-Hemodialysis Comments HD completed.  Net UF: 0. Left femeral CVC was accessed and used as per Pand P without difficulty.  Pt tolerated well.

## 2022-09-26 NOTE — PLAN OF CARE
Problem: Adult Inpatient Plan of Care  Goal: Plan of Care Review  Outcome: Ongoing, Progressing  Goal: Patient-Specific Goal (Individualized)  Outcome: Ongoing, Progressing  Goal: Absence of Hospital-Acquired Illness or Injury  Outcome: Ongoing, Progressing  Goal: Optimal Comfort and Wellbeing  Outcome: Ongoing, Progressing  Goal: Readiness for Transition of Care  Outcome: Ongoing, Progressing     Problem: Infection  Goal: Absence of Infection Signs and Symptoms  Outcome: Ongoing, Progressing     Problem: Skin Injury Risk Increased  Goal: Skin Health and Integrity  Outcome: Ongoing, Progressing     Problem: Fall Injury Risk  Goal: Absence of Fall and Fall-Related Injury  Outcome: Ongoing, Progressing     Problem: Communication Impairment (Mechanical Ventilation, Invasive)  Goal: Effective Communication  Outcome: Ongoing, Progressing     Problem: Device-Related Complication Risk (Mechanical Ventilation, Invasive)  Goal: Optimal Device Function  Outcome: Ongoing, Progressing     Problem: Inability to Wean (Mechanical Ventilation, Invasive)  Goal: Mechanical Ventilation Liberation  Outcome: Ongoing, Progressing     Problem: Skin and Tissue Injury (Mechanical Ventilation, Invasive)  Goal: Absence of Device-Related Skin and Tissue Injury  Outcome: Ongoing, Progressing     Problem: Ventilator-Induced Lung Injury (Mechanical Ventilation, Invasive)  Goal: Absence of Ventilator-Induced Lung Injury  Outcome: Ongoing, Progressing

## 2022-09-26 NOTE — CONSULTS
Inpatient consult to Cardiology  Consult performed by: BRITNI Cutler  Consult ordered by: DENIA Cochran  Reason for consult: complete heart block      [facility]  Cardiology  Consult Note    Patient Name: Stuart Guevara  MRN: [id number]  Admission Date: 9/23/2022  Hospital Length of Stay: 3 days  Code Status: Full Code   Attending Provider: Nolberto Calvillo MD   Consulting Provider: BRITNI Cutler  Primary Care Physician: Primary Doctor No  Principal Problem:Aortic dissection    Patient information was obtained from patient, past medical records, and ER records.     Subjective:     Chief Complaint:  Consulted for CHB     HPI:    This is a 32-year-old male, who is known to [facility] as an inpatient only,  with history of HTN, lupus nephritis, chronic anemia, right upper extremity DVT, SLE, type a aortic dissection with repair.  He presents to [facility] ER on 09/23/2022 with complaints of ripping /tearing chest pain that radiated to his back began on 09/22/2022.  He was noted to have a BP  of 232/126.  He had a syncopal episode witnessed at the .  Patient was brought immediately back to CT where he was found to have a type B aortic dissection.  Esmolol and nitroprusside were initiated at [facility].   Patient was transferred to [facility] for CT surgery  the services and was admitted directly to ICU.   On 09/25/2022 he was found to have high-grade AV block with bradycardia.  Dr. Maldonadoha the patient down for TVP.    Patient underwent exploratory laparotomy.  He h.ad to be intubated and placed on ventilator secondary to respiratory failure and shockCIS has been consulted for bradycardia.      PMH: HTN, lupus nephritis, chronic anemia, right upper extremity DVT, SLE  PSH: Left middle finger amputation, right second toe amputation  Social History: Denies EtOH and illicit drug use, current tobacco use approximately 4-5 cigarettes/day  Family History: Mother-hypertension      Previous Diagnostics:   2.15.21 Echo  Moderate concentric left ventricular hypertrophy.  Left ventricular ejection fraction is measured at approximately 55-60 %.  Structurally aortic valve is trileaflet.  Moderate aortic regurgitation is noted..  Turbulent flow in aortic root with possible aortic dissection flap visualized. (Best images at end of study)  Aortic root measures approximately 4.1cm.         Review of patient's allergies indicates:   Allergen Reactions    Levofloxacin     Sulfamethoxazole-trimethoprim        No current facility-administered medications on file prior to encounter.     Current Outpatient Medications on File Prior to Encounter   Medication Sig    albuterol (PROVENTIL/VENTOLIN HFA) 90 mcg/actuation inhaler Inhale 2 puffs into the lungs every 4 (four) hours as needed for Wheezing. Rescue    amLODIPine (NORVASC) 10 MG tablet Take 1 tablet (10 mg total) by mouth once daily.    aspirin (ECOTRIN) 81 MG EC tablet Take 1 tablet (81 mg total) by mouth once daily.    belimumab (BENLYSTA) 120 mg SolR Benlysta Take No date recorded No form recorded No frequency recorded No route recorded No set duration recorded No set duration amount recorded active No dosage strength recorded No dosage strength units of measure recorded    famotidine (PEPCID) 20 MG tablet Take 1 tablet (20 mg total) by mouth 2 (two) times daily.    gemfibroziL (LOPID) 600 MG tablet Take 600 mg by mouth.    hydrALAZINE (APRESOLINE) 10 MG tablet Take 10 mg by mouth.    labetaloL (NORMODYNE) 300 MG tablet Take 1 tablet (300 mg total) by mouth 2 (two) times daily.    lisinopriL (PRINIVIL,ZESTRIL) 40 MG tablet Take 1 tablet (40 mg total) by mouth once daily.    metoprolol tartrate (LOPRESSOR) 50 MG tablet Take 1 tablet (50 mg total) by mouth 2 (two) times daily.    mycophenolate (CELLCEPT) 500 mg Tab Take 2 tablets (1,000 mg total) by mouth 2 (two) times daily.    MEÑO 2 mg TbEC Take 1 tablet by mouth once daily.    rivaroxaban  (XARELTO ORAL) Xarelto Take No date recorded No form recorded No frequency recorded No route recorded No set duration recorded No set duration amount recorded active No dosage strength recorded No dosage strength units of measure recorded    sucralfate (CARAFATE) 1 gram tablet Take 1 tablet (1 g total) by mouth 2 (two) times daily.    warfarin (COUMADIN) 3 MG tablet Take 1 tablet (3 mg total) by mouth Daily.       Review of Systems:  Review of Systems   Unable to perform ROS: Acuity of condition     Objective:     Vital Signs (Most Recent):  Temp: 99.3 °F (37.4 °C) (09/26/22 0000)  Pulse: 90 (09/26/22 0700)  Resp: (!) 25 (09/26/22 0700)  BP: (!) 171/27 (09/25/22 1615)  SpO2: 96 % (09/26/22 0700)   Vital Signs (24h Range):  Temp:  [97.7 °F (36.5 °C)-99.3 °F (37.4 °C)] 99.3 °F (37.4 °C)  Pulse:  [60-94] 90  Resp:  [15-33] 25  SpO2:  [75 %-100 %] 96 %  BP: ()/(27-91) 171/27  Arterial Line BP: ()/(51-88) 123/59     Weight: 60 kg (132 lb 4.4 oz)  Body mass index is 21.35 kg/m².    SpO2: 96 %  O2 Device (Oxygen Therapy): ventilator      Intake/Output Summary (Last 24 hours) at 9/26/2022 0832  Last data filed at 9/26/2022 0600  Gross per 24 hour   Intake 3272.7 ml   Output 1350 ml   Net 1922.7 ml       Lines/Drains/Airways       Central Venous Catheter Line  Duration                  Hemodialysis Catheter 09/25/22 1900 right femoral <1 day              Drain  Duration                  Urethral Catheter 09/23/22 1700 Silicone 16 Fr. 2 days         Chest Tube 09/25/22 1419 1 Right Mediastinal 28 Fr. <1 day         NG/OG Tube 09/25/22 1220 Hudspeth sump 18 Fr. Left mouth <1 day         Sheath 09/25/22 1657 Right <1 day              Airway  Duration                  Airway - Non-Surgical 09/25/22 1220 Endotracheal Tube <1 day              Arterial Line  Duration             Arterial Line 09/23/22 1623 Right Radial 2 days              Peripheral Intravenous Line  Duration                  Peripheral IV - Single Lumen  09/23/22 0300 18 G Right Antecubital 3 days         Peripheral IV - Single Lumen 09/23/22 0630 18 G Anterior;Distal;Right Forearm 3 days         Peripheral IV - Single Lumen 09/25/22 1952 18 G;Other (Comments) Anterior;Left;Proximal Forearm <1 day         Peripheral IV - Single Lumen 09/25/22 1953 20 G;Other (Comments) Anterior;Distal;Left Forearm <1 day                      Significant Labs: CMP   Recent Labs   Lab 09/25/22  0533 09/25/22  1525 09/26/22  0012   * 127* 125*   K 4.7 6.7* 4.7   CO2 8* 16* 16*   BUN 40.9* 40.1* 27.0*   CREATININE 4.06* 4.14* 3.06*   CALCIUM 5.9* 4.7* 6.4*   ALBUMIN 1.5* 1.4* 1.3*   BILITOT 0.2 0.5 0.4   ALKPHOS 46 53 53   AST 46* 77* 93*   ALT 13 28 21    and CBC   Recent Labs   Lab 09/25/22  0534 09/25/22  1200 09/25/22  1525 09/26/22  0012   WBC 18.9*  --  14.1* 12.6*   HGB 6.8* 12.8* 9.9* 10.7*   HCT 22.6* 31.9* 30.7* 30.2*     --  237 174         Significant Imaging:       EKG:        Telemetry:  SR    Physical Exam:  Physical Exam  Constitutional:       Appearance: Normal appearance.      Comments: Sedated and mechanically ventilated   HENT:      Head: Atraumatic.   Cardiovascular:      Rate and Rhythm: Normal rate and regular rhythm.      Pulses: Normal pulses.      Heart sounds: Normal heart sounds.      Comments: Right IJ TVP in place  Pulmonary:      Comments: Orally intubated, mechanically ventilated.  Ventilator associated breath sounds  Abdominal:      Palpations: Abdomen is soft.   Musculoskeletal:      Cervical back: Neck supple.   Skin:     General: Skin is warm and dry.       Home Medications:   No current facility-administered medications on file prior to encounter.     Current Outpatient Medications on File Prior to Encounter   Medication Sig Dispense Refill    albuterol (PROVENTIL/VENTOLIN HFA) 90 mcg/actuation inhaler Inhale 2 puffs into the lungs every 4 (four) hours as needed for Wheezing. Rescue 18 g 0    amLODIPine (NORVASC) 10 MG tablet Take 1  tablet (10 mg total) by mouth once daily. 30 tablet 0    aspirin (ECOTRIN) 81 MG EC tablet Take 1 tablet (81 mg total) by mouth once daily. 30 tablet 0    belimumab (BENLYSTA) 120 mg SolR Benlysta Take No date recorded No form recorded No frequency recorded No route recorded No set duration recorded No set duration amount recorded active No dosage strength recorded No dosage strength units of measure recorded      famotidine (PEPCID) 20 MG tablet Take 1 tablet (20 mg total) by mouth 2 (two) times daily. 60 tablet 0    gemfibroziL (LOPID) 600 MG tablet Take 600 mg by mouth.      hydrALAZINE (APRESOLINE) 10 MG tablet Take 10 mg by mouth.      labetaloL (NORMODYNE) 300 MG tablet Take 1 tablet (300 mg total) by mouth 2 (two) times daily. 30 tablet 0    lisinopriL (PRINIVIL,ZESTRIL) 40 MG tablet Take 1 tablet (40 mg total) by mouth once daily. 30 tablet 0    metoprolol tartrate (LOPRESSOR) 50 MG tablet Take 1 tablet (50 mg total) by mouth 2 (two) times daily. 30 tablet 0    mycophenolate (CELLCEPT) 500 mg Tab Take 2 tablets (1,000 mg total) by mouth 2 (two) times daily. 30 tablet 0    MEÑO 2 mg TbEC Take 1 tablet by mouth once daily.      rivaroxaban (XARELTO ORAL) Xarelto Take No date recorded No form recorded No frequency recorded No route recorded No set duration recorded No set duration amount recorded active No dosage strength recorded No dosage strength units of measure recorded      sucralfate (CARAFATE) 1 gram tablet Take 1 tablet (1 g total) by mouth 2 (two) times daily. 60 tablet 0    warfarin (COUMADIN) 3 MG tablet Take 1 tablet (3 mg total) by mouth Daily. 30 tablet 0       Current Inpatient Medications:    Current Facility-Administered Medications:     0.9%  NaCl infusion, , Intravenous, Continuous, Cong Velez DO, Last Rate: 150 mL/hr at 09/24/22 2246, Rate Change at 09/24/22 2246    acetaminophen tablet 650 mg, 650 mg, Oral, Q6H PRN, Nolberto Calvillo MD, 650 mg at 09/24/22 2101    albuterol inhaler  2 puff, 2 puff, Inhalation, Q4H PRN, Evelio Marshall MD    ceFEPIme (MAXIPIME) 1 g in dextrose 5 % in water (D5W) 5 % 50 mL IVPB (MB+), 1 g, Intravenous, Q12H, Ivan Chopra MD, Stopped at 09/26/22 0254    cefUROXime sodium 1.5 g in dextrose 5 % in water (D5W) 5 % 100 mL IVPB (MB+), 1.5 g, Intravenous, Once, Evelio Marshall MD    clevidipine (CLEVIPREX) 25 mg/50 mL infusion, 0-16 mg/hr, Intravenous, Continuous, Evelio Marshall MD, Last Rate: 24 mL/hr at 09/26/22 0725, 12 mg/hr at 09/26/22 0725    dextrose 5 % infusion, , Intravenous, Continuous, Cong Velez DO, Last Rate: 125 mL/hr at 09/26/22 0143, New Bag at 09/26/22 0143    EPINEPHrine (ADRENALIN) 5 mg in dextrose 5 % 250 mL infusion, 0-2 mcg/kg/min, Intravenous, Continuous, Ivan Chopra MD, Last Rate: 0 mL/hr at 09/25/22 2000, 0 mcg/kg/min at 09/25/22 2000    esmolol 2000 mg in sodium chloride 0.9% 100 mL (20 mg/mL), 0-300 mcg/kg/min, Intravenous, Continuous, Evelio Marshall MD, Last Rate: 22.5 mL/hr at 09/24/22 1217, 125 mcg/kg/min at 09/24/22 1217    famotidine tablet 20 mg, 20 mg, Oral, BID, Francia Marcus DO, 20 mg at 09/24/22 2023    gemfibroziL tablet 600 mg, 600 mg, Oral, BID AC, Francia Marcus DO, 600 mg at 09/24/22 0642    hydrALAZINE injection 20 mg, 20 mg, Intravenous, Q4H PRN, Cong Velez DO, 20 mg at 09/25/22 2026    HYDROmorphone (PF) injection 0.5 mg, 0.5 mg, Intravenous, Q2H PRN, Ivan Chopra MD, 0.5 mg at 09/25/22 2101    LIDOcaine (PF) 10 mg/ml (1%) injection, , , PRN, Ruben Arciniega MD, 5 mL at 09/25/22 1656    lorazepam injection 1 mg, 1 mg, Intravenous, Q2H PRN, Cong Velez,     metronidazole IVPB 500 mg, 500 mg, Intravenous, Q8H, Ivan Chopra MD, Last Rate: 100 mL/hr at 09/26/22 0545, 500 mg at 09/26/22 0545    morphine injection 2 mg, 2 mg, Intravenous, Q2H PRN, Evelio Marshall MD, 2 mg at 09/25/22 2019    mupirocin 2 % ointment, , Nasal, BID, Evelio Marshall MD, Given at 09/26/22  0227    NORepinephrine 8 mg in dextrose 5% 250 mL infusion, 0-3 mcg/kg/min, Intravenous, Continuous, Luca Rae DO, Last Rate: 0 mL/hr at 09/25/22 2000, 0 mcg/kg/min at 09/25/22 2000    ondansetron injection 4 mg, 4 mg, Intravenous, Q6H PRN, Evelio Marshall MD, 4 mg at 09/23/22 1236    oxyCODONE immediate release tablet 10 mg, 10 mg, Oral, Q6H PRN, Ivan Chopra MD, 10 mg at 09/24/22 1217    propofol (DIPRIVAN) 10 mg/mL infusion, 0-50 mcg/kg/min, Intravenous, Continuous, Luca Rae DO, Last Rate: 18 mL/hr at 09/26/22 0524, 50 mcg/kg/min at 09/26/22 0524    sodium chloride 0.9% bolus 500 mL, 500 mL, Intravenous, Once, Francia Marcus DO    sodium chloride 0.9% flush 10 mL, 10 mL, Intravenous, PRN, Francia Marcus,     sodium chloride 0.9% flush 10 mL, 10 mL, Intravenous, PRN, Evelio Marshall MD    sodium chloride 0.9% flush 10 mL, 10 mL, Intravenous, PRN, Evelio Marshall MD    sucralfate tablet 1 g, 1 g, Oral, BID, Francia Marcus DO, 1 g at 09/24/22 2023    Pharmacy to dose Vancomycin consult, , , Once **AND** vancomycin - pharmacy to dose, , Intravenous, pharmacy to manage frequency, Ivan Chopra MD    vancomycin in dextrose 5 % 1 gram/250 mL IVPB 1,000 mg, 1,000 mg, Intravenous, Once, Ivan Chopra MD           Assessment:     IMPRESSION:  CHB/TVP likely secondary to electrolyte abnormalities  Respiratory failure requiring mechanical ventilation  Type B aortic dissection/TEVAR  Hypertensive emergency  Acute renal failure  Acute abdomen with evidence of global hypoperfusion/Exp Lap  SLE          PLAN:     PLAN:  Continue TVP for now, decrease rate to 40bpm  Wean vent per intensivist  Wean Cleviprex to keep SBP <130 and DBP <90  HD/CRRT per renal   Continue supportive care    Thank you for your consult.     Manuel Pandey [facility]  Cardiology  [facility]  09/26/2022 8:32 AM

## 2022-09-26 NOTE — PROGRESS NOTES
[facility] [address]  Pulmonary Critical Care Note    Patient Name: Stuart Guevara  MRN: [id number]  Admission Date: 9/23/2022  Hospital Length of Stay: 3 days  Code Status: Full Code  Attending Provider: Nolberto Calvillo MD  Primary Care Provider: Primary Doctor No     Subjective:     HPI: Mr. Guevara is a 32-year-old male with past medical history significant for SLE, HTN, HX of type a aortic dissection repaired in February 2021, HX of right upper extremity DVT who presented to [facility] Emergency Department on 09/23/2022 with complaints of tearing, ripping chest pain with radiation to the back.  Initial BP noted to be 232/127.  CT scan significant for type B aortic dissection, started on esmolol drip and transferred to [facility] for CT surgery services.  Admitted to the ICU with CT surgery consulted.    Hospital Course/Significant events:    09/23/2022:  With concern for left arm blood pressure discrepancy and concern for diminished outflow through left subclavian patient was brought to OR with CT surgery for TECAR; left carotid to left subclavian artery bypass  9/25/22: patient had significant abdominal pain, acute abdomen, shortness of breath. CT scan showed global hypoperfusion. Received 2 units pRBC. In the setting of shock and respiratory failure, intubated and placed on ventilator. Brought to OR with general surgery for exploratory laparotomy which revealed ascites in peritoneal cavity, mottled liver, viable small bowel and right pleural effusion. Chest tube placed. Vancomycin initiated. CRRT initiated due to electrolyte derangements.    24 Hour Interval History:   S/p exploratory laparotomy yesterday, now intubated on ventilator in the setting of shock and respiratory failure. Receiving CRRT due to worsening kidney function. Now being paced transcutaneously at 60. Not requiring pressors at this point, no major fluctuations in BP overnight. On cleviprex but requiring less than before. BP this  morning around 110/80. Pt is sedated. No acute events overnight.        Past Medical History:   Diagnosis Date    Hypertension     Systemic lupus erythematosus, organ or system involvement unspecified     Systemic lupus erythematosus, organ or system involvement unspecified        Past Surgical History:   Procedure Laterality Date    CARDIAC SURGERY      FINGER AMPUTATION      THROMBECTOMY Right     TOE AMPUTATION     Mechanical aortic valve    Social History     Socioeconomic History    Marital status: Single   Tobacco Use    Smoking status: Every Day     Types: Cigarettes    Smokeless tobacco: Never           Current Outpatient Medications   Medication Instructions    albuterol (PROVENTIL/VENTOLIN HFA) 90 mcg/actuation inhaler 2 puffs, Inhalation, Every 4 hours PRN, Rescue    amLODIPine (NORVASC) 10 mg, Oral, Daily    aspirin (ECOTRIN) 81 mg, Oral, Daily    belimumab (BENLYSTA) 120 mg SolR Benlysta Take No date recorded No form recorded No frequency recorded No route recorded No set duration recorded No set duration amount recorded active No dosage strength recorded No dosage strength units of measure recorded    famotidine (PEPCID) 20 mg, Oral, 2 times daily    gemfibroziL (LOPID) 600 mg, Oral    hydrALAZINE (APRESOLINE) 10 mg, Oral    labetaloL (NORMODYNE) 300 mg, Oral, 2 times daily    lisinopriL (PRINIVIL,ZESTRIL) 40 mg, Oral, Daily    metoprolol tartrate (LOPRESSOR) 50 mg, Oral, 2 times daily    mycophenolate (CELLCEPT) 1,000 mg, Oral, 2 times daily    MEÑO 2 mg TbEC 1 tablet, Oral, Daily    rivaroxaban (XARELTO ORAL) Xarelto Take No date recorded No form recorded No frequency recorded No route recorded No set duration recorded No set duration amount recorded active No dosage strength recorded No dosage strength units of measure recorded    sucralfate (CARAFATE) 1 g, Oral, 2 times daily    warfarin (COUMADIN) 3 mg, Oral, Daily       Current Inpatient Medications   calcium gluconate IVPB  1 g Intravenous Q1H     ceFEPime (MAXIPIME) IVPB  1 g Intravenous Q12H    cefUROXime (ZINACEF) IVPB  1.5 g Intravenous Once    EPINEPHrine        famotidine  20 mg Oral BID    gemfibroziL  600 mg Oral BID AC    LIDOcaine (PF) 10 mg/ml (1%)  1 mL Other Once Pre-Op    metronidazole  500 mg Intravenous Q8H    mupirocin   Nasal BID    sucralfate  1 g Oral BID    vancomycin (VANCOCIN) IVPB  1,000 mg Intravenous Once       Current Intravenous Infusions   sodium chloride 0.9% 150 mL/hr at 09/24/22 2246    clevidipine 10 mg/hr (09/26/22 0224)    dextrose 5 % 125 mL/hr at 09/26/22 0143    EPINEPHrine 0 mcg/kg/min (09/25/22 2000)    esmolol 125 mcg/kg/min (09/24/22 1217)    NORepinephrine bitartrate-D5W 0 mcg/kg/min (09/25/22 2000)    propofoL 50 mcg/kg/min (09/26/22 0045)         Review of Systems   Reason unable to perform ROS: intubated, sedated.        Objective:       Intake/Output Summary (Last 24 hours) at 9/26/2022 0440  Last data filed at 9/26/2022 0200  Gross per 24 hour   Intake 2133.9 ml   Output 1190 ml   Net 943.9 ml           Vital Signs (Most Recent):  Temp: 99.3 °F (37.4 °C) (09/26/22 0000)  Pulse: 83 (09/26/22 0400)  Resp: (!) 30 (09/26/22 0400)  BP: (!) 171/27 (09/25/22 1615)  SpO2: 100 % (09/26/22 0400)    Body mass index is 21.35 kg/m².  Weight: 60 kg (132 lb 4.4 oz) Vital Signs (24h Range):  Temp:  [97.7 °F (36.5 °C)-99.3 °F (37.4 °C)] 99.3 °F (37.4 °C)  Pulse:  [60-94] 83  Resp:  [11-33] 30  SpO2:  [75 %-100 %] 100 %  BP: ()/(27-91) 171/27  Arterial Line BP: ()/(46-88) 118/56     Physical Exam  Constitutional:       Appearance: He is ill-appearing.      Comments: Intubated, sedated, on ventilator   HENT:      Head: Normocephalic.      Right Ear: External ear normal.      Left Ear: External ear normal.   Neck:      Comments: Left sided bandage in place without oozing or bleeding   Cardiovascular:      Rate and Rhythm: Regular rhythm. Tachycardia present.      Heart sounds: No murmur heard.    No friction rub.    Pulmonary:      Breath sounds: Normal breath sounds. No wheezing.   Chest:      Comments: Well healed mid-sternal surgical scar  Abdominal:      General: Abdomen is flat.      Palpations: Abdomen is soft.   Musculoskeletal:         General: No swelling, tenderness or deformity.   Skin:     General: Skin is warm and dry.   Neurological:      General: No focal deficit present.      Mental Status: He is oriented to person, place, and time.      Sensory: No sensory deficit.   Psychiatric:         Mood and Affect: Mood normal.         Behavior: Behavior normal.         Thought Content: Thought content normal.         Judgment: Judgment normal.         Lines/Drains/Airways       Central Venous Catheter Line  Duration                  Hemodialysis Catheter 09/25/22 1900 right femoral <1 day              Drain  Duration                  Urethral Catheter 09/23/22 1700 Silicone 16 Fr. 2 days         Chest Tube 09/25/22 1419 1 Right Mediastinal 28 Fr. <1 day         NG/OG Tube 09/25/22 1220 [place] sump 18 Fr. Left mouth <1 day         Sheath 09/25/22 1657 Right <1 day              Airway  Duration                  Airway - Non-Surgical 09/25/22 1220 Endotracheal Tube <1 day              Arterial Line  Duration             Arterial Line 09/23/22 1623 Right Radial 2 days              Peripheral Intravenous Line  Duration                  Peripheral IV - Single Lumen 09/23/22 0300 18 G Right Antecubital 3 days         Peripheral IV - Single Lumen 09/23/22 0630 18 G Anterior;Distal;Right Forearm 2 days         Peripheral IV - Single Lumen 09/25/22 1952 18 G;Other (Comments) Anterior;Left;Proximal Forearm <1 day         Peripheral IV - Single Lumen 09/25/22 1953 20 G;Other (Comments) Anterior;Distal;Left Forearm <1 day                    Significant Labs:    Lab Results   Component Value Date    WBC 12.6 (H) 09/26/2022    HGB 10.7 (L) 09/26/2022    HCT 30.2 (L) 09/26/2022    MCV 85.6 09/26/2022     09/26/2022          BMP  Lab Results   Component Value Date     (L) 09/26/2022    K 4.7 09/26/2022    CO2 16 (L) 09/26/2022    BUN 27.0 (H) 09/26/2022    CREATININE 3.06 (H) 09/26/2022    CALCIUM 6.4 (LL) 09/26/2022    EGFRNONAA 80 01/31/2022       ABG  Recent Labs   Lab 09/25/22  1517   PH 7.27   PO2 118   PCO2 35   HCO3 16.1       Mechanical Ventilation Support:  Vent Mode: PRVC A/C (09/26/22 0220)  Set Rate: 32 BPM (09/26/22 0220)  Vt Set: 430 mL (09/26/22 0220)  PEEP/CPAP: 10 cmH20 (09/26/22 0220)  Oxygen Concentration (%): 80 (weaned per spo2, & protocol, tolerated well) (09/26/22 0023)  Peak Airway Pressure: 30 cmH2O (09/26/22 0220)  Total Ve: 13.5 mL (09/25/22 2215)  F/VT Ratio<105 (RSBI): (!) 73.11 (09/25/22 2215)    Significant Imaging:  I have reviewed the pertinent imaging within the past 24 hours.        Assessment/Plan:     Assessment  Type B aortic dissection  CT at outside ED significant for Jacob type B aortic dissection with anterior extensive mid descending thoracic aorta, 3.7 cm dilation  Hypertensive emergency  Acute renal failure  History right upper extremity DVT, previously on Xarelto, noncompliance for past 2 months  RUE US done on 9/24/22 negative for thrombus  SLE, currently unmedicated  S/p exploratory laparotomy 9/25/22 due to acute abdomen and evidence of global hypoperfusion on CT  Revealed mottled liver, ascites in peritoneal cavity, viable small bowel and right pleural effusion, pancreatic swelling  Chest tube placed  Acute hypoxemic respiratory failure  On ventilator        Plan  -postop day 3 left carotid artery to left subclavian artery bypass with CT surgery  -postop day 1 s/p exploratory laparotomy with general surgery   - chest tube output since surgery 690 mL   -BP parameters have increased in the setting of global hyperperfusion on CT abdomen, now SBP<130, HR<90.  -not requiring pressors at this point. On cleviprex for BP control.  -oxygenating well on ventilator.  -received 3  units pRBC 9/26/22, H/H stable this morning  -hyponatremic, hypocalcemic, elevated phosphorus on AM labs.  -hyperkalemia is resolved following initiation of CRRT, renal indices improved this morning.  -lactic acidosis worsening this morning. Ordering 500cc bolus NS  -CRRT yesterday, to receive HD as needed, nephrology on board, appreciate recs.  -continue NPO  -antibiotics initiated in the setting of shock (cefepime, flagyl)  -transcutaneously paced at 60 due to complete heart block, HR this AM around 80. cardiology on board, appreciate recs.  -continue neurovascular checks [id number]  -continue ICU care    DVT Prophylaxis: SCDs  GI Prophylaxis: famotidine     33 minutes of critical care was time spent personally by me on the following activities: development of treatment plan with patient or surrogate and bedside caregivers, discussions with consultants, evaluation of patient's response to treatment, examination of patient, ordering and performing treatments and interventions, ordering and review of laboratory studies, ordering and review of radiographic studies, pulse oximetry, re-evaluation of patient's condition.  This critical care time did not overlap with that of any other provider or involve time for any procedures.     Francia Marcus DO  Pulmonary Critical Care Medicine Resident  [facility] - [address] [place] [address]

## 2022-09-27 NOTE — PROGRESS NOTES
[facility] - [facility] ICU  Cardiology  Progress Note    Patient Name: Stuart Guevara  MRN: [id number]  Admission Date: 9/23/2022  Hospital Length of Stay: 4 days  Code Status: Full Code   Attending Physician: Ivan Chopra MD   Primary Care Physician: Primary Doctor No  Expected Discharge Date:   Principal Problem:Aortic dissection    Subjective:     Brief HPI:    This is a 32-year-old male, who is known to [facility] as an inpatient only,  with history of HTN, lupus nephritis, chronic anemia, right upper extremity DVT, SLE, type a aortic dissection with repair.  He presents to [facility] ER on 09/23/2022 with complaints of ripping /tearing chest pain that radiated to his back began on 09/22/2022.  He was noted to have a BP  of 232/126.  He had a syncopal episode witnessed at the .  Patient was brought immediately back to CT where he was found to have a type B aortic dissection.  Esmolol and nitroprusside were initiated at [facility].   Patient was transferred to [facility] for CT surgery  the services and was admitted directly to ICU.   On 09/25/2022 he was found to have high-grade AV block with bradycardia.  Dr. Maldonadoha the patient down for TVP.    Patient underwent exploratory laparotomy.  He h.ad to be intubated and placed on ventilator secondary to respiratory failure and shockCIS has been consulted for bradycardia.     Hospital Course:   9.27.22: Remains vented/sedated. SR on tele. NO further pacing noted. VSS.     PMH: HTN, lupus nephritis, chronic anemia, right upper extremity DVT, SLE  PSH: Left middle finger amputation, right second toe amputation  Social History: Denies EtOH and illicit drug use, current tobacco use approximately 4-5 cigarettes/day  Family History: Mother-hypertension     Previous Diagnostics:   Echo 9.26.22  HO AAA surgery and aortic valve replacement.  The left ventricle is normal in size with severe concentric hypertrophy and moderately decreased systolic function.  The estimated  ejection fraction is 37%.  There is moderate left ventricular global hypokinesis.  Grade I left ventricular diastolic dysfunction.  Moderate right ventricular enlargement with mildly to moderately reduced right ventricular systolic function.  There is a bioprosthetic aortic valve present. There is trivial paravalvular aortic insufficiency present. Prosthetic aortic valve is normal.  The estimated PA systolic pressure is 33 mmHg.  Intermediate central venous pressure (8 mmHg    2.15.21 Echo  Moderate concentric left ventricular hypertrophy.  Left ventricular ejection fraction is measured at approximately 55-60 %.  Structurally aortic valve is trileaflet.  Moderate aortic regurgitation is noted..  Turbulent flow in aortic root with possible aortic dissection flap visualized. (Best images at end of study)  Aortic root measures approximately 4.1cm.            Review of Systems   Unable to perform ROS: Acuity of condition         Objective:     Vital Signs (Most Recent):  Temp: 98.9 °F (37.2 °C) (09/27/22 0800)  Pulse: 90 (09/27/22 0808)  Resp: (!) 32 (09/27/22 0800)  BP: 100/61 (09/27/22 0800)  SpO2: 96 % (09/27/22 0808)   Vital Signs (24h Range):  Temp:  [98.2 °F (36.8 °C)-100.9 °F (38.3 °C)] 98.9 °F (37.2 °C)  Pulse:  [] 90  Resp:  [16-35] 32  SpO2:  [92 %-100 %] 96 %  BP: ()/(48-74) 100/61  Arterial Line BP: ()/(46-71) 111/67     Weight: 60 kg (132 lb 4.4 oz)  Body mass index is 21.36 kg/m².    SpO2: 96 %  O2 Device (Oxygen Therapy): ventilator      Intake/Output Summary (Last 24 hours) at 9/27/2022 0954  Last data filed at 9/27/2022 0637  Gross per 24 hour   Intake 1201.8 ml   Output 1242 ml   Net -40.2 ml       Lines/Drains/Airways       Central Venous Catheter Line  Duration                  Hemodialysis Catheter 09/25/22 1900 right femoral 1 day              Drain  Duration                  Urethral Catheter 09/23/22 1700 Silicone 16 Fr. 3 days         Chest Tube 09/25/22 1419 1 Right  Mediastinal 28 Fr. 1 day         NG/OG Tube 09/25/22 1220 [place] sump 18 Fr. Left mouth 1 day         Sheath 09/25/22 1657 Right 1 day              Airway  Duration                  Airway - Non-Surgical 09/25/22 1220 Endotracheal Tube 1 day              Arterial Line  Duration             Arterial Line 09/23/22 1623 Right Radial 3 days              Peripheral Intravenous Line  Duration                  Peripheral IV - Single Lumen 09/23/22 0300 18 G Right Antecubital 4 days         Peripheral IV - Single Lumen 09/23/22 0630 18 G Anterior;Distal;Right Forearm 4 days         Peripheral IV - Single Lumen 09/25/22 1952 18 G;Other (Comments) Anterior;Left;Proximal Forearm 1 day         Peripheral IV - Single Lumen 09/25/22 1953 20 G;Other (Comments) Anterior;Distal;Left Forearm 1 day                    Significant Labs: CMP   Recent Labs   Lab 09/25/22  1525 09/26/22  0012 09/27/22  0124   * 125* 127*   K 6.7* 4.7 4.1   CO2 16* 16* 20*   BUN 40.1* 27.0* 19.8   CREATININE 4.14* 3.06* 3.29*   CALCIUM 4.7* 6.4* 6.1*   ALBUMIN 1.4* 1.3* 1.1*   BILITOT 0.5 0.4 0.6   ALKPHOS 53 53 74   AST 77* 93* 89*   ALT 28 21 22    and CBC   Recent Labs   Lab 09/25/22  1525 09/26/22  0012 09/27/22  0124   WBC 14.1* 12.6* 17.7*   HGB 9.9* 10.7* 11.3*   HCT 30.7* 30.2* 33.1*    174 180       Telemetry:  SR    Physical Exam:  Physical Exam  Constitutional:       Appearance: Normal appearance.      Comments: Sedated and mechanically ventilated   HENT:      Head: Atraumatic.   Cardiovascular:      Rate and Rhythm: Normal rate and regular rhythm.      Pulses: Normal pulses.      Heart sounds: Normal heart sounds.      Comments: Right IJ TVP in place  Pulmonary:      Comments: Orally intubated, mechanically ventilated.  Ventilator associated breath sounds  Abdominal:      Palpations: Abdomen is soft.   Musculoskeletal:      Cervical back: Neck supple.   Skin:     General: Skin is warm and dry.       Current Inpatient  Medications:    Current Facility-Administered Medications:     acetaminophen tablet 650 mg, 650 mg, Oral, Q6H PRN, Nolberto Calvillo MD, 650 mg at 09/26/22 2301    albuterol inhaler 2 puff, 2 puff, Inhalation, Q4H PRN, Evelio Marshall MD    ceFEPIme (MAXIPIME) 1 g in dextrose 5 % in water (D5W) 5 % 50 mL IVPB (MB+), 1 g, Intravenous, Q12H, Ivan Chopra MD, Stopped at 09/27/22 0313    cefUROXime sodium 1.5 g in dextrose 5 % in water (D5W) 5 % 100 mL IVPB (MB+), 1.5 g, Intravenous, Once, Evelio Marshall MD    clevidipine (CLEVIPREX) 25 mg/50 mL infusion, 0-16 mg/hr, Intravenous, Continuous, Evelio Marshall MD, Last Rate: 0 mL/hr at 09/26/22 0918, 0 mg/hr at 09/26/22 0918    dextrose 5 % and 0.9 % NaCl infusion, , Intravenous, Continuous, Shahzad Lundberg MD, Last Rate: 50 mL/hr at 09/26/22 0918, New Bag at 09/26/22 0918    EPINEPHrine (ADRENALIN) 5 mg in dextrose 5 % 250 mL infusion, 0-2 mcg/kg/min, Intravenous, Continuous, Ivan Chopra MD, Last Rate: 0 mL/hr at 09/25/22 2000, 0 mcg/kg/min at 09/25/22 2000    esmolol 2000 mg in sodium chloride 0.9% 100 mL (20 mg/mL), 0-300 mcg/kg/min, Intravenous, Continuous, Evelio Marshall MD, Last Rate: 22.5 mL/hr at 09/24/22 1217, 125 mcg/kg/min at 09/24/22 1217    [START ON 9/28/2022] famotidine tablet 20 mg, 20 mg, Oral, Every other day, Ivan Chopra MD    gemfibroziL tablet 600 mg, 600 mg, Oral, BID AC, Francia Marcus DO, 600 mg at 09/26/22 1547    hydrALAZINE injection 20 mg, 20 mg, Intravenous, Q4H PRN, Cong Velez DO, 20 mg at 09/25/22 2026    HYDROmorphone (PF) injection 0.5 mg, 0.5 mg, Intravenous, Q2H PRN, Ivan Chopra MD, 0.5 mg at 09/25/22 2101    LIDOcaine (PF) 10 mg/ml (1%) injection, , , PRN, Ruben Arciniega MD, 5 mL at 09/25/22 1656    lorazepam injection 1 mg, 1 mg, Intravenous, Q2H PRN, Cong Velez DO    metronidazole IVPB 500 mg, 500 mg, Intravenous, Q8H, Ivan Chopra MD, Stopped at 09/27/22 0737    morphine  injection 2 mg, 2 mg, Intravenous, Q2H PRN, Evelio Marshall MD, 2 mg at 09/26/22 1416    mupirocin 2 % ointment, , Nasal, BID, Evelio Marshall MD, Given at 09/26/22 2100    NORepinephrine 8 mg in dextrose 5% 250 mL infusion, 0-3 mcg/kg/min, Intravenous, Continuous, Luca Rae DO, Last Rate: 0 mL/hr at 09/25/22 2000, 0 mcg/kg/min at 09/25/22 2000    ondansetron injection 4 mg, 4 mg, Intravenous, Q6H PRN, Evelio Marshall MD, 4 mg at 09/23/22 1236    oxyCODONE immediate release tablet 10 mg, 10 mg, Oral, Q6H PRN, Ivan Chopra MD, 10 mg at 09/26/22 1557    propofol (DIPRIVAN) 10 mg/mL infusion, 0-50 mcg/kg/min, Intravenous, Continuous, Luca Rae DO, Last Rate: 18 mL/hr at 09/27/22 0421, 50 mcg/kg/min at 09/27/22 0421    sodium chloride 0.9% bolus 250 mL, 250 mL, Intravenous, PRN, Shahzad Lundberg MD    sodium chloride 0.9% bolus 250 mL, 250 mL, Intravenous, PRN, Shahzad Lundberg MD    sodium chloride 0.9% flush 10 mL, 10 mL, Intravenous, PRN, Francia Marcus DO    sodium chloride 0.9% flush 10 mL, 10 mL, Intravenous, PRN, Evelio Marshall MD    sodium chloride 0.9% flush 10 mL, 10 mL, Intravenous, PRN, Evelio Marshlal MD    sucralfate tablet 1 g, 1 g, Oral, BID, Francia Marcus DO, 1 g at 09/26/22 2302    Pharmacy to dose Vancomycin consult, , , Once **AND** vancomycin - pharmacy to dose, , Intravenous, pharmacy to manage frequency, Ivan Chopra MD        Assessment:     IMPRESSION:  CHB/TVP likely secondary to electrolyte abnormalities  Respiratory failure requiring mechanical ventilation  Type B aortic dissection/TEVAR  CMO (unknown etiology)  Hypertensive emergency  Acute renal failure  Acute abdomen with evidence of global hypoperfusion/Exp Lap  SLE      Plan:     PLAN:  DC TVP   Wean vent per intensivist  Will start GDMT as tolerated prior to DC  Outpatient work-up of CMO  HD/CRRT per renal   Continue supportive care    Manuel Pandey, [facility]-BC  Cardiology  [facility]  [facility] - [facility]  09/27/2022

## 2022-09-27 NOTE — PROGRESS NOTES
Trauma/Acute Care Surgery   Daily Progress Note     HD#4  POD#2 Days Post-Op    Subjective  Increased abdominal distention.  Bladder pressures on paralyze this morning are 18 and 20.  Urine output continues decreasing.  Lactic acid back up to 5.7.  Creatinine elevated from yesterday despite dialysis in the interim.  No longer on pressors and no longer on Cleviprex either.  Minimal vent settings.  Does follow some commands to the bilateral upper extremities off sedation per nursing.  On propofol.  Calcium replace this morning.  Scheduled Meds:   ceFEPime (MAXIPIME) IVPB  1 g Intravenous Q12H    cefUROXime (ZINACEF) IVPB  1.5 g Intravenous Once    [START ON 9/28/2022] famotidine  20 mg Oral Every other day    gemfibroziL  600 mg Oral BID AC    metronidazole  500 mg Intravenous Q8H    mupirocin   Nasal BID    sucralfate  1 g Oral BID       Continuous Infusions:   clevidipine 0 mg/hr (09/26/22 0918)    dextrose 5 % and 0.9 % NaCl 50 mL/hr at 09/26/22 0918    EPINEPHrine 0 mcg/kg/min (09/25/22 2000)    esmolol 125 mcg/kg/min (09/24/22 1217)    NORepinephrine bitartrate-D5W 0 mcg/kg/min (09/25/22 2000)    propofoL 50 mcg/kg/min (09/27/22 0421)       PRN Meds:acetaminophen, albuterol, hydrALAZINE, HYDROmorphone, LIDOcaine (PF) 10 mg/ml (1%), lorazepam, morphine, ondansetron, oxyCODONE, sodium chloride 0.9%, sodium chloride 0.9%, sodium chloride 0.9%, sodium chloride 0.9%, sodium chloride 0.9%, Pharmacy to dose Vancomycin consult **AND** vancomycin - pharmacy to dose     Objective  Temp:  [98.2 °F (36.8 °C)-100.9 °F (38.3 °C)] 98.9 °F (37.2 °C)  Pulse:  [] 90  Resp:  [16-35] 32  SpO2:  [87 %-100 %] 96 %  BP: ()/(48-74) 100/61  Arterial Line BP: ()/(46-71) 111/67     I/O last 3 completed shifts:  In: 4892.5 [I.V.:3432.1; Other:650; IV Piggyback:810.4]  Out: 2632 [Urine:322; Drains:395; Other:1000; Chest Tube:915]  No intake/output data recorded.       Hemodialysis Catheter 09/25/22 1900 right femoral  (Active)   Site Assessment No drainage;No redness;No swelling;No warmth 09/26/22 0800   Line Securement Device Secured with sutures 09/26/22 0800   Dressing Type Central line dressing 09/26/22 1600   Dressing Status Clean;Dry;Intact 09/26/22 1600   Dressing Intervention Integrity maintained 09/26/22 1600   Date on Dressing 09/25/22 09/26/22 0800   Dressing Due to be Changed 10/05/22 09/26/22 0800   Line Necessity Review CRRT/HD 09/26/22 0800   Number of days: 1            Peripheral IV - Single Lumen 09/23/22 0300 18 G Right Antecubital (Active)   Site Assessment Clean;Dry;Intact;No redness;No swelling;No warmth;No drainage 09/27/22 0600   Extremity Assessment Distal to IV No abnormal discoloration;No redness;No swelling;No warmth 09/27/22 0600   Line Status Saline locked;Infusing 09/27/22 0600   Dressing Status Clean;Dry;Intact 09/27/22 0600   Dressing Intervention Integrity maintained 09/27/22 0600   Number of days: 4            Peripheral IV - Single Lumen 09/23/22 0630 18 G Anterior;Distal;Right Forearm (Active)   Site Assessment Clean;Dry;Intact;No redness;No swelling;No warmth;No drainage 09/27/22 0600   Extremity Assessment Distal to IV No abnormal discoloration;No redness;No swelling;No warmth 09/27/22 0600   Line Status Infusing 09/27/22 0600   Dressing Status Dry;Clean;Intact 09/27/22 0600   Dressing Intervention Integrity maintained 09/27/22 0600   Number of days: 4            Peripheral IV - Single Lumen 09/25/22 1952 18 G;Other (Comments) Anterior;Left;Proximal Forearm (Active)   Site Assessment Clean;Dry;Intact;No redness;No swelling;No warmth;No drainage 09/27/22 0600   Extremity Assessment Distal to IV No abnormal discoloration;No redness;No swelling;No warmth 09/27/22 0600   Line Status Infusing 09/27/22 0600   Dressing Status Clean;Dry;Intact 09/27/22 0600   Dressing Intervention Integrity maintained 09/27/22 0600   Number of days: 1            Peripheral IV - Single Lumen 09/25/22 1953 20 G;Other  (Comments) Anterior;Distal;Left Forearm (Active)   Site Assessment Clean;Dry;Intact;No redness;No swelling;No warmth;No drainage 09/27/22 0600   Extremity Assessment Distal to IV No abnormal discoloration;No redness;No swelling;No warmth 09/27/22 0600   Line Status Infusing 09/27/22 0600   Dressing Status Clean;Dry;Intact 09/27/22 0600   Dressing Intervention Integrity maintained 09/27/22 0600   Number of days: 1       Arterial Line 09/23/22 1623 Right Radial (Active)   Site Assessment Clean;Dry;Intact;No redness;No swelling;No warmth;No drainage 09/27/22 0600   Line Status Pulsatile blood flow 09/27/22 0600   Art Line Waveform Not being transduced 09/27/22 0600   Arterial Line Interventions Line pulled back 09/27/22 0600   Color/Movement/Sensation Capillary refill less than 3 sec 09/27/22 0600   Dressing Type Central line dressing 09/27/22 0600   Dressing Status Clean;Dry;Intact 09/27/22 0600   Dressing Intervention Integrity maintained 09/27/22 0600   Number of days: 3            Chest Tube 09/25/22 1419 1 Right Mediastinal 28 Fr. (Active)   Chest Tube WDL WDL 09/26/22 2000   Function -20 cm H2O 09/26/22 2000   Air Leak/Fluctuation air leak not present;connections tightened;dependent drainage cleared 09/26/22 2000   Safety all connections secured;suction checked 09/26/22 2000   Securement tubing secured to body distal to insertion site w/ tape 09/26/22 2000   Drainage Description Bright red;Serosanguineous 09/26/22 1600   Dressing Appearance occlusive gauze dressing intact;w/ dried drainage 09/26/22 2000   Dressing Care dressing changed 09/26/22 1600   Site Assessment Clean;Dry;Intact;No redness;No swelling;No warmth;No drainage 09/26/22 2000   Surrounding Skin Dry;Intact 09/26/22 2000   Output (mL) 140 mL 09/27/22 0600   Number of days: 1            NG/OG Tube 09/25/22 1220 [place] sump 18 Fr. Left mouth (Active)   Placement Check placement verified by aspirate characteristics 09/25/22 2000   Distal Tube Length  "(cm) 60 09/25/22 2000   Tolerance no signs/symptoms of discomfort 09/26/22 2000   Securement secured to commercial device 09/26/22 2000   Clamp Status/Tolerance abdominal distention 09/26/22 2000   Suction Setting/Drainage Method suction at the bedside;suction at;intermittent setting 09/26/22 2000   Insertion Site Appearance no redness, warmth, tenderness, skin breakdown, drainage 09/26/22 2000   Drainage Green 09/26/22 2000   Flush/Irrigation flushed w/;water;no resistance met 09/26/22 1600   Tube Output(mL)(Include Discarded Residual) 150 mL 09/27/22 0600   Number of days: 1            Urethral Catheter 09/23/22 1700 Silicone 16 Fr. (Active)   Site Assessment Clean;Intact 09/26/22 2000   Collection Container Urimeter 09/26/22 2000   Securement Method secured to top of thigh w/ adhesive device 09/26/22 2000   Catheter Care Performed yes 09/26/22 2000   Reason for Continuing Urinary Catheterization Critically ill in ICU and requiring hourly monitoring of intake/output 09/26/22 2000   CAUTI Prevention Bundle Securement Device in place with 1" slack;Intact seal between catheter & drainage tubing;Drainage bag/urimeter off the floor;Sheeting clip in use;No dependent loops or kinks;Drainage bag/urimeter not overfilled (<2/3 full);Drainage bag/urimeter below bladder 09/26/22 2000   Output (mL) 45 mL 09/27/22 0637   Number of days: 3            Sheath 09/25/22 1657 Right (Active)   Insertion Site dressing changed;clean and dry;dressing intact;no hematoma 09/26/22 1600   Drainage Amount None 09/26/22 0800   Number of days: 1      General:  Well developed, well nourished  HEENT:  Normocephalic, atraumatic  CVS:  RR, pacer in place   Resp:  on minimal vent settings   GI:  Abdomen more firm, distended. Midline c/d/I.    :  villalobos in place, decreased UOP  MSK: moving BUE to command off sedation per nursing  Skin:  No rashes, ulcers, erythema  Neuro:  Adequately sedated.       Labs  Recent Labs     09/24/22 2059 09/25/22  0534 " 09/25/22  0534 09/25/22  1200 09/25/22  1525 09/26/22  0012 09/27/22  0124   WBC  --  18.9*  --   --  14.1* 12.6* 17.7*   HGB  --  6.8*   < > 12.8* 9.9* 10.7* 11.3*   HCT  --  22.6*   < > 31.9* 30.7* 30.2* 33.1*   PLT  --  246  --   --  237 174 180   INR 1.52*  --   --   --   --   --   --     < > = values in this interval not displayed.     Recent Labs     09/25/22  0533 09/25/22  1525 09/26/22  0012 09/27/22  0124   * 127* 125* 127*   K 4.7 6.7* 4.7 4.1   CO2 8* 16* 16* 20*   BUN 40.9* 40.1* 27.0* 19.8   CREATININE 4.06* 4.14* 3.06* 3.29*   CALCIUM 5.9* 4.7* 6.4* 6.1*   MG 1.34* 1.90 1.70 1.60   PHOS 5.7* 5.7* 6.2* 4.0   ALBUMIN 1.5* 1.4* 1.3* 1.1*   BILITOT 0.2 0.5 0.4 0.6   AST 46* 77* 93* 89*   ALKPHOS 46 53 53 74   ALT 13 28 21 22       Imaging  X-Ray Abdomen AP 1 View    Result Date: 9/26/2022  Satisfactory central femoral line placement. Electronically signed by: Emily Candelario MD Date:    09/26/2022 Time:    13:54         Assessment/Plan  Mr. Guevara is a 31 yo M w/ hx of Lupus, HTN, type A aortic dissection repaired in February 2021, and type B dissection w/ TEVAR this admission presenting w/ rigid abdomen and hypoperfusion of kidneys, liver, spleen, pancreas, and bowel on CT.     Plan:  - To OR for decompressive laparotomy, more to follow. Mother Darling has given consent.     Kale Gooden  Trauma/Acute Care Surgery   c - [phone]    9/27/2022  8:48 AM

## 2022-09-27 NOTE — PROGRESS NOTES
Pharmacokinetic Assessment Follow Up: IV Vancomycin    Vancomycin serum concentration assessment(s):    The random level was drawn correctly and can be used to guide therapy at this time. The measurement is above the desired definitive target range of 15 to 20 mcg/mL.    Vancomycin Regimen Plan:  Pulse dosing due to poor renal function  Re-dose when the random level is less than 20 mcg/mL, next level to be drawn at 0300 on 09/28    Drug levels (last 3 results):  Recent Labs   Lab Result Units 09/26/22  1241 09/27/22  0124   Vanc Lvl Random ug/ml <1.4* 29.4*       Pharmacy will continue to follow and monitor vancomycin.    Please contact pharmacy at extension [phone] for questions regarding this assessment.    Thank you for the consult,   Dash Bess       Patient brief summary:  Stuart Guevara is a 32 y.o. male initiated on antimicrobial therapy with IV Vancomycin for treatment of intra-abdominal infection        Drug Allergies:   Review of patient's allergies indicates:   Allergen Reactions    Levofloxacin     Sulfamethoxazole-trimethoprim        Actual Body Weight:   60 kg    Renal Function:   Estimated Creatinine Clearance: 27.1 mL/min (A) (based on SCr of 3.29 mg/dL (H)).,     Dialysis Method (if applicable):  intermittent HD: as needed per Renal  Follow notes to determine when HD given    CBC (last 72 hours):  Recent Labs   Lab Result Units 09/25/22  0534 09/25/22  1200 09/25/22  1525 09/26/22  0012 09/27/22  0124   WBC x10(3)/mcL 18.9*  --  14.1* 12.6* 17.7*   Hgb gm/dL 6.8* 12.8* 9.9* 10.7* 11.3*   Hct % 22.6* 31.9* 30.7* 30.2* 33.1*   Platelet x10(3)/mcL 246  --  237 174 180   Monocyte Man % 6  --  3 11  --    Eos Man %  --   --  1  --   --        Metabolic Panel (last 72 hours):  Recent Labs   Lab Result Units 09/25/22  0533 09/25/22  1525 09/26/22  0012 09/27/22  0124   Sodium Level mmol/L 127* 127* 125* 127*   Potassium Level mmol/L 4.7 6.7* 4.7 4.1   Chloride mmol/L 105 98 98 94*   Carbon Dioxide mmol/L  8* 16* 16* 20*   Glucose Level mg/dL 114* 99 174* 141*   Blood Urea Nitrogen mg/dL 40.9* 40.1* 27.0* 19.8   Creatinine mg/dL 4.06* 4.14* 3.06* 3.29*   Albumin Level gm/dL 1.5* 1.4* 1.3* 1.1*   Bilirubin Total mg/dL 0.2 0.5 0.4 0.6   Alkaline Phosphatase unit/L 46 53 53 74   Aspartate Aminotransferase unit/L 46* 77* 93* 89*   Alanine Aminotransferase unit/L 13 28 21 22   Magnesium Level mg/dL 1.34* 1.90 1.70 1.60   Phosphorus Level mg/dL 5.7* 5.7* 6.2* 4.0       Vancomycin Administrations:  vancomycin given in the last 96 hours                     vancomycin 1.25 g in dextrose 5% 250 mL IVPB (ready to mix) (mg) 1,250 mg New Bag 09/26/22 1547                    Microbiologic Results:  Microbiology Results (last 7 days)       Procedure Component Value Units Date/Time    Respiratory Culture [[id number]]     Order Status: Sent Specimen: Sputum     Respiratory Culture [[id number]]     Order Status: Sent Specimen: Sputum from Lung Aspirate     Blood Culture [[id number]]  (Normal) Collected: 09/25/22 0226    Order Status: Completed Specimen: Blood Updated: 09/26/22 0900     CULTURE, BLOOD ([facility]) No Growth At 24 Hours    Blood Culture [[id number]]  (Normal) Collected: 09/25/22 0226    Order Status: Completed Specimen: Blood Updated: 09/26/22 0900     CULTURE, BLOOD ([facility]) No Growth At 24 Hours    Gram Stain [[id number]] Collected: 09/25/22 1200    Order Status: Completed Specimen: Body Fluid from Pleural Fluid, Right Updated: 09/26/22 0844     GRAM STAIN Rare WBC observed      No bacteria seen    Body Fluid Culture [[id number]] Collected: 09/25/22 1200    Order Status: Completed Specimen: Body Fluid from Pleural Fluid, Right Updated: 09/26/22 0700     Body Fluid Culture No Growth At 24 Hours    Mycobacteria and Nocardia Culture [[id number]] Updated: 09/25/22 1815    Order Status: Sent Specimen: Body Fluid from Pleural Fluid, Right     Fungal Culture [[id number]] Collected: 09/25/22 1200    Order Status: Sent Specimen: Body Fluid from  Pleural Fluid, Right Updated: 09/25/22 7022

## 2022-09-27 NOTE — PROGRESS NOTES
[facility] - [facility] ICU  Pulmonary Critical Care Note    Patient Name: Stuart Guevara  MRN: [id number]  Admission Date: 9/23/2022  Hospital Length of Stay: 4 days  Code Status: Full Code  Attending Provider: Ivan Chopra MD  Primary Care Provider: Primary Doctor No     Subjective:     HPI: Mr. Guevara is a 32-year-old male with past medical history significant for SLE, HTN, HX of type a aortic dissection repaired in February 2021, HX of right upper extremity DVT who presented to [facility] Emergency Department on 09/23/2022 with complaints of tearing, ripping chest pain with radiation to the back.  Initial BP noted to be 232/127.  CT scan significant for type B aortic dissection, started on esmolol drip and transferred to [facility] for CT surgery services.  Admitted to the ICU with CT surgery consulted.    Hospital Course/Significant events:    09/23/2022:  With concern for left arm blood pressure discrepancy and concern for diminished outflow through left subclavian patient was brought to OR with CT surgery for TECAR; left carotid to left subclavian artery bypass  9/25/22: patient had significant abdominal pain, acute abdomen, shortness of breath. CT scan showed global hypoperfusion. Received 2 units pRBC. In the setting of shock and respiratory failure, intubated and placed on ventilator. Brought to OR with general surgery for exploratory laparotomy which revealed ascites in peritoneal cavity, mottled liver, viable small bowel and right pleural effusion. Chest tube placed. Vancomycin initiated. HD initiated due to electrolyte derangements.    24 Hour Interval History:  Patient received dialysis yesterday, tolerated well. Labs this AM significant for hypocalcemia, lactate remains elevated but decreased from prior. Currently vital signs are stable. Not requiring pressors. Off cleviprex. Remains sedated. No acute events overnight.        Past Medical History:   Diagnosis Date    Hypertension     Systemic  lupus erythematosus, organ or system involvement unspecified     Systemic lupus erythematosus, organ or system involvement unspecified        Past Surgical History:   Procedure Laterality Date    CARDIAC SURGERY      FINGER AMPUTATION      THROMBECTOMY Right     TOE AMPUTATION     Mechanical aortic valve    Social History     Socioeconomic History    Marital status: Single   Tobacco Use    Smoking status: Every Day     Types: Cigarettes    Smokeless tobacco: Never           Current Outpatient Medications   Medication Instructions    albuterol (PROVENTIL/VENTOLIN HFA) 90 mcg/actuation inhaler 2 puffs, Inhalation, Every 4 hours PRN, Rescue    amLODIPine (NORVASC) 10 mg, Oral, Daily    aspirin (ECOTRIN) 81 mg, Oral, Daily    belimumab (BENLYSTA) 120 mg SolR Benlysta Take No date recorded No form recorded No frequency recorded No route recorded No set duration recorded No set duration amount recorded active No dosage strength recorded No dosage strength units of measure recorded    famotidine (PEPCID) 20 mg, Oral, 2 times daily    gemfibroziL (LOPID) 600 mg, Oral    hydrALAZINE (APRESOLINE) 10 mg, Oral    labetaloL (NORMODYNE) 300 mg, Oral, 2 times daily    lisinopriL (PRINIVIL,ZESTRIL) 40 mg, Oral, Daily    metoprolol tartrate (LOPRESSOR) 50 mg, Oral, 2 times daily    mycophenolate (CELLCEPT) 1,000 mg, Oral, 2 times daily    MEÑO 2 mg TbEC 1 tablet, Oral, Daily    rivaroxaban (XARELTO ORAL) Xarelto Take No date recorded No form recorded No frequency recorded No route recorded No set duration recorded No set duration amount recorded active No dosage strength recorded No dosage strength units of measure recorded    sucralfate (CARAFATE) 1 g, Oral, 2 times daily    warfarin (COUMADIN) 3 mg, Oral, Daily       Current Inpatient Medications   ceFEPime (MAXIPIME) IVPB  1 g Intravenous Q12H    cefUROXime (ZINACEF) IVPB  1.5 g Intravenous Once    [START ON 9/28/2022] famotidine  20 mg Oral Every other day    gemfibroziL  600  mg Oral BID AC    metronidazole  500 mg Intravenous Q8H    mupirocin   Nasal BID    sucralfate  1 g Oral BID       Current Intravenous Infusions   clevidipine 0 mg/hr (09/26/22 0918)    dextrose 5 % and 0.9 % NaCl 50 mL/hr at 09/26/22 0918    EPINEPHrine 0 mcg/kg/min (09/25/22 2000)    esmolol 125 mcg/kg/min (09/24/22 1217)    NORepinephrine bitartrate-D5W 0 mcg/kg/min (09/25/22 2000)    propofoL 50 mcg/kg/min (09/26/22 2346)         Review of Systems   Reason unable to perform ROS: intubated, sedated.        Objective:       Intake/Output Summary (Last 24 hours) at 9/27/2022 0102  Last data filed at 9/26/2022 1900  Gross per 24 hour   Intake 2799.8 ml   Output 1092 ml   Net 1707.8 ml           Vital Signs (Most Recent):  Temp: (!) 100.8 °F (38.2 °C) (09/27/22 0000)  Pulse: 100 (09/27/22 0030)  Resp: (!) 32 (09/27/22 0030)  BP: (!) 116/55 (09/27/22 0030)  SpO2: 95 % (09/27/22 0030)    Body mass index is 21.36 kg/m².  Weight: 60 kg (132 lb 4.4 oz) Vital Signs (24h Range):  Temp:  [98.6 °F (37 °C)-100.9 °F (38.3 °C)] 100.8 °F (38.2 °C)  Pulse:  [] 100  Resp:  [16-35] 32  SpO2:  [87 %-100 %] 95 %  BP: ()/(53-74) 116/55  Arterial Line BP: ()/(46-71) 111/67     Physical Exam  Constitutional:       Appearance: He is ill-appearing.      Comments: Intubated, sedated, on ventilator   HENT:      Head: Normocephalic.      Right Ear: External ear normal.      Left Ear: External ear normal.   Neck:      Comments: Left sided bandage in place without oozing or bleeding   Cardiovascular:      Rate and Rhythm: Normal rate and regular rhythm.      Heart sounds: No murmur heard.    No friction rub.   Pulmonary:      Breath sounds: Normal breath sounds. No wheezing.   Chest:      Comments: Well healed mid-sternal surgical scar  Abdominal:      General: Abdomen is flat.      Palpations: Abdomen is soft.   Musculoskeletal:         General: No swelling, tenderness or deformity.   Skin:     General: Skin is warm and dry.    Neurological:      General: No focal deficit present.      Mental Status: He is oriented to person, place, and time.      Sensory: No sensory deficit.   Psychiatric:         Mood and Affect: Mood normal.         Behavior: Behavior normal.         Thought Content: Thought content normal.         Judgment: Judgment normal.         Lines/Drains/Airways       Central Venous Catheter Line  Duration                  Hemodialysis Catheter 09/25/22 1900 right femoral 1 day              Drain  Duration                  Urethral Catheter 09/23/22 1700 Silicone 16 Fr. 3 days         Chest Tube 09/25/22 1419 1 Right Mediastinal 28 Fr. 1 day         NG/OG Tube 09/25/22 1220 [place] sump 18 Fr. Left mouth 1 day         Sheath 09/25/22 1657 Right 1 day              Airway  Duration                  Airway - Non-Surgical 09/25/22 1220 Endotracheal Tube 1 day              Arterial Line  Duration             Arterial Line 09/23/22 1623 Right Radial 3 days              Peripheral Intravenous Line  Duration                  Peripheral IV - Single Lumen 09/23/22 0300 18 G Right Antecubital 3 days         Peripheral IV - Single Lumen 09/23/22 0630 18 G Anterior;Distal;Right Forearm 3 days         Peripheral IV - Single Lumen 09/25/22 1952 18 G;Other (Comments) Anterior;Left;Proximal Forearm 1 day         Peripheral IV - Single Lumen 09/25/22 1953 20 G;Other (Comments) Anterior;Distal;Left Forearm 1 day                    Significant Labs:    Lab Results   Component Value Date    WBC 12.6 (H) 09/26/2022    HGB 10.7 (L) 09/26/2022    HCT 30.2 (L) 09/26/2022    MCV 85.6 09/26/2022     09/26/2022         BMP  Lab Results   Component Value Date     (L) 09/26/2022    K 4.7 09/26/2022    CO2 16 (L) 09/26/2022    BUN 27.0 (H) 09/26/2022    CREATININE 3.06 (H) 09/26/2022    CALCIUM 6.4 (LL) 09/26/2022    EGFRNONAA 80 01/31/2022       ABG  Recent Labs   Lab 09/26/22  0459   PH 7.37   PO2 122*   PCO2 34*   HCO3 19.7         Mechanical  Ventilation Support:  Vent Mode: PRVC A/C (09/27/22 0015)  Set Rate: 32 BPM (09/27/22 0015)  Vt Set: 430 mL (09/27/22 0015)  PEEP/CPAP: 8 cmH20 (09/27/22 0015)  Oxygen Concentration (%): 40 (09/26/22 1948)  Peak Airway Pressure: 33 cmH2O (09/27/22 0015)  Total Ve: 13.5 mL (09/27/22 0015)  F/VT Ratio<105 (RSBI): (!) 77.11 (09/27/22 0015)    Significant Imaging:  I have reviewed the pertinent imaging within the past 24 hours.        Assessment/Plan:     Assessment  Type B aortic dissection  CT at outside ED significant for Jacob type B aortic dissection with anterior extensive mid descending thoracic aorta, 3.7 cm dilation  Hypertensive emergency  Acute renal failure  History right upper extremity DVT, previously on Xarelto, noncompliance for past 2 months  RUE US done on 9/24/22 negative for thrombus  SLE, currently unmedicated  S/p exploratory laparotomy 9/25/22 due to acute abdomen and evidence of global hypoperfusion on CT  Revealed mottled liver, ascites in peritoneal cavity, viable small bowel and right pleural effusion, pancreatic swelling  Chest tube placed  Acute hypoxemic respiratory failure  On ventilator  Pancreatitis, presumptive   Elevated lipase, saponification of pancreas noted during exploratory laparotomy        Plan  -postop day 4 left carotid artery to left subclavian artery bypass with CT surgery  -postop day 2 s/p exploratory laparotomy with general surgery  -BP parameters have increased in the setting of global hyperperfusion on CT abdomen, now SBP<150 goal.  -not requiring pressors at this point. On cleviprex for BP control.  -oxygenating well on ventilator.  -due to development of severe bradycardia after surgery on 9/22, being transvenously paced at 40 bpm, current heart rate is 92. Cardiology following, appreciate recs.  - echo performed 9/26/22 significant for reduced EF of 34%.  -H/H stable this morning  -AM labs significant for hypocalcemia, will replete  - creatinine elevated from  yesterday (3.06 --> 3.29), BUN within normal limits now since dialysis  -lactate level 3.8 this morning. Trending.  -to receive HD as needed, nephrology following, appreciate recs  -fluids: receiving D5  -continue NPO  -antibiotics initiated in the setting of shock (cefepime, flagyl, cefuroxime). Cultures show no growth to date.  -continue ICU care    DVT Prophylaxis: SCDs  GI Prophylaxis: famotidine     33 minutes of critical care was time spent personally by me on the following activities: development of treatment plan with patient or surrogate and bedside caregivers, discussions with consultants, evaluation of patient's response to treatment, examination of patient, ordering and performing treatments and interventions, ordering and review of laboratory studies, ordering and review of radiographic studies, pulse oximetry, re-evaluation of patient's condition.  This critical care time did not overlap with that of any other provider or involve time for any procedures.     Francia Marcus DO  Pulmonary Critical Care Medicine Resident  [facility] - [address] [place] [address]

## 2022-09-27 NOTE — ANESTHESIA PREPROCEDURE EVALUATION
"                                                                                                             09/27/2022  Stuart Guevara is a 32 y.o., male.    "32-year-old a.m. with a history of SLE, severe hypertension, previous type A aortic dissection repair in February of 2021 with aortic valve replacement at that time.  Initially presented to [facility] with severe tearing chest pain radiating into his back with a blood pressure of 232/127.  CT confirmed type B aortic dissection, he was transferred to [facility] ICU on esmolol drip and underwent TEVAR  as well as a carotid artery to subclavian bypass on the left on 09/23/2022. On 09/25 due to worsening abdominal distention and rigidity and concern for ischemic bowel the patient underwent exploratory lap and right chest tube placement for pleural effusion .  No clear ischemic bowel was found.  Due to the development of  TAY 3 and hyperkalemia urgent dialysis was performed late on the evening of the 25th via temporary right femoral cath.  We repeated dialysis on the 26th for mild hyponatremia.  He currently remains intubated and sedated with propofol.  Cleviprex has been turned off."    TTE 9/26/22:   HO AAA surgery and aortic valve replacement.   The left ventricle is normal in size with severe concentric hypertrophy and moderately decreased systolic function.   The estimated ejection fraction is 37%.   There is moderate left ventricular global hypokinesis.   Grade I left ventricular diastolic dysfunction.   Moderate right ventricular enlargement with mildly to moderately reduced right ventricular systolic function.   There is a bioprosthetic aortic valve present. There is trivial paravalvular aortic insufficiency present. Prosthetic aortic valve is normal.   The estimated PA systolic pressure is 33 mmHg.   Intermediate central venous pressure (8 mmHg).      CURRENT VENT SETTINGS: A/C, 8 PEEP, 50% FiO2, PAP 29    Pre-op Assessment    I have reviewed the Patient " Summary Reports.     I have reviewed the Nursing Notes. I have reviewed the NPO Status.   I have reviewed the Medications.     Review of Systems  Hematology/Oncology:         -- Anemia:   Cardiovascular:   Hypertension Valvular problems/Murmurs   Hx of Valve Repair   Hypertension    Pulmonary:     Hypoxic respiratory failure; intubated and sedated   Renal/:   Chronic Renal Disease (LUPUS), Dialysis, ARF    Hepatic/GI:  Hepatic/GI Normal    Musculoskeletal:  Musculoskeletal Normal    Neurological:  Neurology Normal    Endocrine:  Endocrine Normal    Psych:  Psychiatric Normal           Physical Exam  General: Unconscious    Airway:  Mallampati: unable to assess   Pre-Existing Airway: Oral Endotracheal tube    Chest/Lungs:Mechanical breath sounds      Anesthesia Plan  Type of Anesthesia, risks & benefits discussed:    Anesthesia Type: Gen ETT  Intra-op Monitoring Plan: Standard ASA Monitors and Central Line  Post Op Pain Control Plan: IV/PO Opioids PRN  Induction:  IV and Inhalation  Informed Consent: Informed consent signed with the Patient representative and all parties understand the risks and agree with anesthesia plan.  All questions answered. Patient consented to blood products? Yes  ASA Score: 4  Day of Surgery Review of History & Physical: H&P Update referred to the surgeon/provider.  Anesthesia Plan Notes:   Complex patient with multiple comorbidities detailed above.  Briefly, hx of SLE, aortic dissection and AVR -- underwent TEVAR on 9/23 after type B dissection diagnosed.  Pt required exlap on 9/25 for an acute abdomen, along with chest tube placement for pleural effusion.  Pt is intubated and sedated currently, on 8 PEEP, 50% FiO2, PAP 29.  Pt has a reduced EF 35% and did previously require TVP for CHB, though discontinued this morning as native rhythm was present.  CHB thought to be 2/2 hyperkalemia which has since resolved with HD.      Na 127, Ca 6.0, potassium ~ 4  Lactic acid 5.7, trending  upwards  11/33/180k    Oj VANEGAS     Ready For Surgery From Anesthesia Perspective.     .

## 2022-09-27 NOTE — ANESTHESIA POSTPROCEDURE EVALUATION
Anesthesia Post Evaluation    Patient: Stuart Guevara    Procedure(s) Performed: Procedure(s) (LRB):  LAPAROTOMY, EXPLORATORY (N/A)    Final Anesthesia Type: general (/Regional//MAC)      Patient location during evaluation: PACU  Post-procedure mental status: @ basline.  Post-procedure vital signs: reviewed and stable  Pain management: adequate      Anesthetic complications: no      Cardiovascular status: blood pressure returned to baseline  Respiratory status: @ baseline.  Hydration status: euvolemic        Pt transported to ICU, post anesthesia note deferred.    Vitals Value Taken Time   /59 09/27/22 1331   Temp 36.8 °C (98.2 °F) 09/27/22 1300   Pulse 74 09/27/22 1335   Resp 32 09/27/22 1335   SpO2 94 % 09/27/22 1335   Vitals shown include unvalidated device data.      No case tracking events are documented in the log.      Pain/Marcin Score: Pain Rating Prior to Med Admin: 0 (9/26/2022 11:01 PM)  Pain Rating Post Med Admin: 0 (9/26/2022  4:57 PM)

## 2022-09-27 NOTE — PROCEDURES
TVP removal:    The patient was in NSR, HR in the 80s (intrinsic rhythm) without requiring any .  TVP lower rate set at 40 ppm.  Decision was to remove TVP as I strongly believe that his CHB was secondary to Hyperkalemia which is corrected now.  Suture removed.  TVP removed, slight pressure applied at vascular access site (Right IJ) without evidence of bleeding.  The patient remains intubated but tolerate TVP removal without any immediate complications.

## 2022-09-27 NOTE — PROGRESS NOTES
NEPHROLOGY: Progress Note    Hospital Course:    32-year-old a.m. with a history of SLE, severe hypertension, previous type A aortic dissection repair in February of 2021 with aortic valve replacement at that time.  Initially presented to [facility] with severe tearing chest pain radiating into his back with a blood pressure of 232/127.  CT confirmed type B aortic dissection, he was transferred to [facility] ICU on esmolol drip and underwent TEVAR  as well as a carotid artery to subclavian bypass on the left on 09/23/2022.    On 09/25 due to worsening abdominal distention and rigidity and concern for ischemic bowel the patient underwent exploratory lap and right chest tube placement for pleural effusion .  No clear ischemic bowel was found.  Due to the development of  TAY 3 and hyperkalemia urgent dialysis was performed late on the evening of the 25th via temporary right femoral cath.  We repeated dialysis on the 26th for mild hyponatremia.    He currently remains intubated and sedated with propofol.  Cleviprex has been turned off.            Current Facility-Administered Medications:     acetaminophen tablet 650 mg, 650 mg, Oral, Q6H PRN, Nolberto Calvillo MD, 650 mg at 09/26/22 2301    albuterol inhaler 2 puff, 2 puff, Inhalation, Q4H PRN, Evelio Marshall MD    ceFEPIme (MAXIPIME) 1 g in dextrose 5 % in water (D5W) 5 % 50 mL IVPB (MB+), 1 g, Intravenous, Q12H, Ivan Chopra MD, Stopped at 09/27/22 0313    cefUROXime sodium 1.5 g in dextrose 5 % in water (D5W) 5 % 100 mL IVPB (MB+), 1.5 g, Intravenous, Once, Evelio Marshall MD    clevidipine (CLEVIPREX) 25 mg/50 mL infusion, 0-16 mg/hr, Intravenous, Continuous, Evelio Marshall MD, Last Rate: 0 mL/hr at 09/26/22 0918, 0 mg/hr at 09/26/22 0918    dextrose 5 % and 0.9 % NaCl infusion, , Intravenous, Continuous, Shahzad Lundberg MD, Last Rate: 50 mL/hr  at 09/26/22 0918, New Bag at 09/26/22 0918    EPINEPHrine (ADRENALIN) 5 mg in dextrose 5 % 250 mL infusion, 0-2 mcg/kg/min, Intravenous, Continuous, Ivan Chopra MD, Last Rate: 0 mL/hr at 09/25/22 2000, 0 mcg/kg/min at 09/25/22 2000    esmolol 2000 mg in sodium chloride 0.9% 100 mL (20 mg/mL), 0-300 mcg/kg/min, Intravenous, Continuous, Evelio Marshall MD, Last Rate: 22.5 mL/hr at 09/24/22 1217, 125 mcg/kg/min at 09/24/22 1217    [START ON 9/28/2022] famotidine tablet 20 mg, 20 mg, Oral, Every other day, Ivan Chopra MD    gemfibroziL tablet 600 mg, 600 mg, Oral, BID AC, Francia Marcus DO, 600 mg at 09/26/22 1547    hydrALAZINE injection 20 mg, 20 mg, Intravenous, Q4H PRN, Cong Velez DO, 20 mg at 09/25/22 2026    HYDROmorphone (PF) injection 0.5 mg, 0.5 mg, Intravenous, Q2H PRN, Ivan Chopra MD, 0.5 mg at 09/25/22 2101    LIDOcaine (PF) 10 mg/ml (1%) injection, , , PRN, Ruben Arciniega MD, 5 mL at 09/25/22 1656    lorazepam injection 1 mg, 1 mg, Intravenous, Q2H PRN, Cong Velez DO    metronidazole IVPB 500 mg, 500 mg, Intravenous, Q8H, Ivan Chopra MD, Last Rate: 100 mL/hr at 09/27/22 0637, 500 mg at 09/27/22 0637    morphine injection 2 mg, 2 mg, Intravenous, Q2H PRN, Evelio Marshall MD, 2 mg at 09/26/22 1416    mupirocin 2 % ointment, , Nasal, BID, Evelio Marshall MD, Given at 09/26/22 2100    NORepinephrine 8 mg in dextrose 5% 250 mL infusion, 0-3 mcg/kg/min, Intravenous, Continuous, Luca Rae DO, Last Rate: 0 mL/hr at 09/25/22 2000, 0 mcg/kg/min at 09/25/22 2000    ondansetron injection 4 mg, 4 mg, Intravenous, Q6H PRN, Evelio Marshall MD, 4 mg at 09/23/22 1236    oxyCODONE immediate release tablet 10 mg, 10 mg, Oral, Q6H PRN, Ivan Chopra MD, 10 mg at 09/26/22 1557    propofol (DIPRIVAN) 10 mg/mL infusion, 0-50 mcg/kg/min, Intravenous, Continuous, Luca Rae DO, Last Rate: 18 mL/hr at 09/27/22 0421, 50 mcg/kg/min at 09/27/22 0421    sodium  "chloride 0.9% bolus 250 mL, 250 mL, Intravenous, PRN, Shahzad Lundberg MD    sodium chloride 0.9% flush 10 mL, 10 mL, Intravenous, PRN, Francia Marcus DO    sodium chloride 0.9% flush 10 mL, 10 mL, Intravenous, PRN, Evelio Marshall MD    sodium chloride 0.9% flush 10 mL, 10 mL, Intravenous, PRN, Evelio Marshall MD    sucralfate tablet 1 g, 1 g, Oral, BID, Francia Marcus DO, 1 g at 09/26/22 2302    Pharmacy to dose Vancomycin consult, , , Once **AND** vancomycin - pharmacy to dose, , Intravenous, pharmacy to manage frequency, Ivan Chopra MD        BP (!) 108/55   Pulse 91   Temp 98.2 °F (36.8 °C) (Oral)   Resp (!) 28   Ht 5' 5.98" (1.676 m)   Wt 60 kg (132 lb 4.4 oz)   SpO2 98%   BMI 21.36 kg/m²     Physical Exam:    GEN:  Patient is currently intubated, OG tube in place.    HEENT: Atraumatic.  Pupils are 2 mm but equal and reactive.  NECK : No JVD, right-sided IJ Cordis  CARD : RRR with aortic click.  No appreciable rub.  LUNGS : Clear to auscultation.  Right chest tube in place  ABD :  Firm and silent.   EXT : No pitting edema, but he is becoming diffusely puffy in his arms especially.  NEURO :  Unable to assess        Intake/Output Summary (Last 24 hours) at 9/27/2022 0734  Last data filed at 9/27/2022 0600  Gross per 24 hour   Intake 2069.8 ml   Output 1237 ml   Net 832.8 ml         Laboratory:  Recent Results (from the past 24 hour(s))   Lactic Acid, Plasma    Collection Time: 09/26/22  8:10 AM   Result Value Ref Range    Lactic Acid Level 4.1 (HH) 0.5 - 2.2 mmol/L   POCT glucose    Collection Time: 09/26/22  9:23 AM   Result Value Ref Range    POCT Glucose 203 (H) 70 - 110 mg/dL   Echo    Collection Time: 09/26/22  9:30 AM   Result Value Ref Range    BSA 1.67 m2    TDI SEPTAL 0.03 m/s    LV LATERAL E/E' RATIO 16.50 m/s    LV SEPTAL E/E' RATIO 33.00 m/s    Right Atrial Pressure (from IVC) 8 mmHg    RV mid diameter 48.70 cm    EF 37 %    Left Ventricular Outflow Tract Mean Velocity 0.79 cm/s    " Left Ventricular Outflow Tract Mean Gradient 3.00 mmHg    TDI LATERAL 0.06 m/s    PV PEAK VELOCITY 1.18 cm/s    LVIDd 4.20 3.5 - 6.0 cm    IVS 1.79 (A) 0.6 - 1.1 cm    Posterior Wall 2.57 (A) 0.6 - 1.1 cm    LVIDs 3.46 2.1 - 4.0 cm    FS 18 28 - 44 %    LV mass 460.81 g    LA size 5.70 cm    RVDD 2.53 cm    TAPSE 1.02 cm    Right ventricular length in diastole (apical 4-chamber view) 10.10 cm    Left Ventricle Relative Wall Thickness 1.22 cm    AV mean gradient 14 mmHg    AV valve area 2.05 cm2    AV Velocity Ratio 0.50     AV index (prosthetic) 0.59     MV mean gradient 3 mmHg    MV valve area p 1/2 method 4.15 cm2    MV valve area by continuity eq 2.77 cm2    E/A ratio 1.43     Mean e' 0.05 m/s    E wave deceleration time 172.00 msec    LVOT diameter 2.10 cm    LVOT area 3.5 cm2    LVOT peak jasmeet 1.29 m/s    LVOT peak VTI 21.00 cm    Ao peak jasmeet 2.56 m/s    Ao VTI 35.5 cm    LVOT stroke volume 72.70 cm3    AV peak gradient 26 mmHg    MV peak gradient 5 mmHg    TV rest pulmonary artery pressure 33 mmHg    E/E' ratio 22.00 m/s    MV Peak E Jasmeet 0.99 m/s    TR Max Jasmeet 2.49 m/s    MV VTI 26.2 cm    MV stenosis pressure 1/2 time 53.00 ms    MV Peak A Jasmeet 0.69 m/s    LV Systolic Volume 49.50 mL    LV Systolic Volume Index 29.5 mL/m2    LV Diastolic Volume 78.60 mL    LV Diastolic Volume Index 46.79 mL/m2    LV Mass Index 274 g/m2    RA Major Axis 6.26 cm    Triscuspid Valve Regurgitation Peak Gradient 25 mmHg    LA Volume Index (Mod) 35.4 mL/m2    LA volume (mod) 59.50 cm3    RA Width 5.14 cm   POCT glucose    Collection Time: 09/26/22 10:30 AM   Result Value Ref Range    POCT Glucose 186 (H) 70 - 110 mg/dL   POCT glucose    Collection Time: 09/26/22 12:13 PM   Result Value Ref Range    POCT Glucose 167 (H) 70 - 110 mg/dL   Vancomycin, Random    Collection Time: 09/26/22 12:41 PM   Result Value Ref Range    Vanc Lvl Random <1.4 (L) 15.0 - 20.0 ug/ml   Lactic Acid, Plasma    Collection Time: 09/26/22 12:41 PM   Result  Value Ref Range    Lactic Acid Level 3.7 (HH) 0.5 - 2.2 mmol/L   POCT glucose    Collection Time: 09/26/22  2:40 PM   Result Value Ref Range    POCT Glucose 133 (H) 70 - 110 mg/dL   Lactic Acid, Plasma    Collection Time: 09/26/22  4:51 PM   Result Value Ref Range    Lactic Acid Level 3.3 (H) 0.5 - 2.2 mmol/L   POCT glucose    Collection Time: 09/26/22  6:39 PM   Result Value Ref Range    POCT Glucose 154 (H) 70 - 110 mg/dL   Lactic Acid, Plasma    Collection Time: 09/26/22  9:19 PM   Result Value Ref Range    Lactic Acid Level 3.4 (H) 0.5 - 2.2 mmol/L   Comprehensive Metabolic Panel    Collection Time: 09/27/22  1:24 AM   Result Value Ref Range    Sodium Level 127 (L) 136 - 145 mmol/L    Potassium Level 4.1 3.5 - 5.1 mmol/L    Chloride 94 (L) 98 - 107 mmol/L    Carbon Dioxide 20 (L) 22 - 29 mmol/L    Glucose Level 141 (H) 74 - 100 mg/dL    Blood Urea Nitrogen 19.8 8.9 - 20.6 mg/dL    Creatinine 3.29 (H) 0.73 - 1.18 mg/dL    Calcium Level Total 6.1 (LL) 8.4 - 10.2 mg/dL    Protein Total 5.0 (L) 6.4 - 8.3 gm/dL    Albumin Level 1.1 (L) 3.5 - 5.0 gm/dL    Globulin 3.9 (H) 2.4 - 3.5 gm/dL    Albumin/Globulin Ratio 0.3 (L) 1.1 - 2.0 ratio    Bilirubin Total 0.6 <=1.5 mg/dL    Alkaline Phosphatase 74 40 - 150 unit/L    Alanine Aminotransferase 22 0 - 55 unit/L    Aspartate Aminotransferase 89 (H) 5 - 34 unit/L    eGFR 24 mls/min/1.73/m2   Magnesium    Collection Time: 09/27/22  1:24 AM   Result Value Ref Range    Magnesium Level 1.60 1.60 - 2.60 mg/dL   Phosphorus    Collection Time: 09/27/22  1:24 AM   Result Value Ref Range    Phosphorus Level 4.0 2.3 - 4.7 mg/dL   Vancomycin, Random    Collection Time: 09/27/22  1:24 AM   Result Value Ref Range    Vanc Lvl Random 29.4 (H) 15.0 - 20.0 ug/ml   Lactic Acid, Plasma    Collection Time: 09/27/22  1:24 AM   Result Value Ref Range    Lactic Acid Level 3.8 (HH) 0.5 - 2.2 mmol/L   CBC with Differential    Collection Time: 09/27/22  1:24 AM   Result Value Ref Range    WBC 17.7  (H) 4.5 - 11.5 x10(3)/mcL    RBC 3.84 (L) 4.70 - 6.10 x10(6)/mcL    Hgb 11.3 (L) 14.0 - 18.0 gm/dL    Hct 33.1 (L) 42.0 - 52.0 %    MCV 86.2 80.0 - 94.0 fL    MCH 29.4 27.0 - 31.0 pg    MCHC 34.1 33.0 - 36.0 mg/dL    RDW 15.4 11.5 - 17.0 %    Platelet 180 130 - 400 x10(3)/mcL    MPV 13.5 (H) 7.4 - 10.4 fL    IG# 0.20 (H) 0 - 0.04 x10(3)/mcL    IG% 1.1 %    NRBC% 1.3 %   POCT ARTERIAL BLOOD GAS    Collection Time: 09/27/22  4:38 AM   Result Value Ref Range    POC PH 7.45     POC PCO2 38 mmHg    POC PO2 57 (A) mmHg    POC SATURATED O2 91 %    POC Potassium 3.7 mmol/l    POC Sodium 122 (A) 137 - 145 mmol/l    POC Ionized Calcium 0.78 (AA) 0.79 - 1.4 mmol/l    POC HCO3 26.4 mmol/l    Base Deficit 2.4 mmol/l    POC Temp 37.0 C    Specimen source Arterial sample    Lactic Acid, Plasma    Collection Time: 09/27/22  5:29 AM   Result Value Ref Range    Lactic Acid Level 5.7 (HH) 0.5 - 2.2 mmol/L         Assessment/Plan:  Acute kidney injury-multifactorial shock-now anuric   Persistent lactic acidosis-probable regional ischemia.  s/p TEVAR procedure for type B dissection  Prior history of SLE and type A dissection  Mild hyponatremia  Severe hypertension-now off Cleviprex  Mild hypocalcemia    Patient had dialysis September 26th for mild hyponatremia and his D5 water was discontinued.    Patient will likely need prolonged dialysis due to the severity of his TAY.  His lactic acidosis is concerning for some regional ischemia.  Will plan on dialysis today with higher calcium dialysate and continue TTS thereafter.  We will continue to follow closely with you.      Shahzad Lundberg MD, BERNADETTE

## 2022-09-27 NOTE — TRANSFER OF CARE
"Anesthesia Transfer of Care Note    Patient: Stuart Guevara    Procedure(s) Performed: Procedure(s) (LRB):  LAPAROTOMY, EXPLORATORY (N/A)    Patient location: ICU    Anesthesia Type: general    Transport from OR: Transported from OR intubated on 100% O2 by AMBU with assisted ventilation    Post pain: adequate analgesia    Post assessment: no apparent anesthetic complications    Post vital signs: stable    Level of consciousness: sedated    Nausea/Vomiting: no nausea/vomiting    Complications: none    Transfer of care protocol was followed      Last vitals:   Visit Vitals  BP (!) 106/58 (BP Location: Right arm)   Pulse 82   Temp 37 °C (98.6 °F) (Skin)   Resp 14   Ht 5' 5.98" (1.676 m)   Wt 60 kg (132 lb 4.4 oz)   SpO2 100%   BMI 21.36 kg/m²     "

## 2022-09-27 NOTE — PROGRESS NOTES
09/27/22 1815   Post-Hemodialysis Assessment   Blood Volume Processed (Liters) 70 L   Dialyzer Clearance Clear   Duration of Treatment 210 minutes   Additional Fluid Intake (mL) 500 mL   Total UF (mL) 2000 mL   Net Fluid Removal 1500   Patient Response to Treatment tolerated well.   Post-Hemodialysis Comments attempted 2L of fluid removal, Pt became hypotensive and reduced UF goal to 1.5L net. HD cath was able to achieve BFR of 320      Surgeon: Brody

## 2022-09-27 NOTE — PLAN OF CARE
Called patient's mother, Darling.  Updated her on patient's current status and plan of care.  All questions were encouraged and answered.

## 2022-09-27 NOTE — OP NOTE
Operative Note     Date of Procedure: 09/27/2022      Procedure: decompressive laparotomy, Abthera wound vac placement      Surgeon(s) and Role:  Staff: Dr. Espinal   Resident(s): Keyonna Castro, PGY-2     Pre-Operative Diagnosis: abdominal compartment syndrome, pancreatitis     Post-Operative Diagnosis: Same     Anesthesia: GETA     Operative Findings: Large volume retroperitoneal ascitic fluid had accumulated, causing anterior displacement of abdominal viscera. Small openings were made into retroperitoneum to relieve fluid. There was no evidence of bowel compromise. Fascia was closed at superior and inferior aspects with Vicryl suture. Remainder of abdomen was dressed with ABThera.      Description of Technical Procedures:  patient was brought from the ICU to the operating room and transferred to the operating room table.  The patient came to the OR already intubated and his abdomen abdomen was prepped and draped in the usual sterile fashion     General anesthesia was administered and his abdominal staplers were removed and the running looped PDS suture and a previously been used to reapproximate his fascia was cut and the abdomen was re-entered     Upon entering the abdomen about a L and a half of thin ascites was suctioned out, the bowel however showed no signs of perforation necrosis or compromise blood flow.  The large and small intestine and stomach all appeared to have optimal blood flow with no evidence of ischemia or necrosis or perforation.  There was no succus entericus or fecal contamination of the abdomen     The abdominal viscera had been displaced anteriorly by the large amount of retroperitoneal thin ascitic fluid and retroperitoneal and mesenteric edema likely related to his pancreatitis     Some windows were created to alleviate the pressure in the retroperitoneal space and some additional fluid was suctioned out from this location which was clear in appearance and had no bilious tinge and did not appear  to be free pancreatic fluid     The ABThera wound VAC was readied     The liver gallbladder all appeared normal, the pancreas appeared thickened and there was opacification within the lesser sac about the pancreas     The abdomen was closed only partially with 2 superior and 2 inferior figure-of-eight sutures and then the ABThera was placed and hooked up to the suction device with good seal patient tolerated procedure well he does remain in critical guarded condition and will go back to the ICU intubated     Estimated Blood Loss (EBL): minimal            Implants: none     Drains: ABThera     Specimens: none     Complications: None            Condition: stable     Disposition: transferred to PACU in stable condition  - Will need surgical abdominal closure in near future

## 2022-09-27 NOTE — BRIEF OP NOTE
[facility]  General Surgery  Brief Operative Note    Date of Procedure: 09/27/2022     Procedure: decompressive laparotomy, Abthera wound vac placement     Surgeon(s) and Role:  Staff: Dr. Espinal   Resident(s): Keyonna Castro, PGY-2    Pre-Operative Diagnosis: abdominal compartment syndrome    Post-Operative Diagnosis: Same    Anesthesia: GETA    Operative Findings: Large volume retroperitoneal ascitic fluid had accumulated, causing anterior displacement of abdominal viscera. Small opening was made into retroperitoneum to relieve fluid. There was no evidence of bowel compromise. Fascia was closed at superior and inferior aspects with Vicryl suture. Remainder of abdomen was dressed with ABThera.     Description of Technical Procedures: Refer to full dictated operative report    Estimated Blood Loss (EBL): minimal            Implants: none    Drains: ABThera    Specimens: none    Complications: None            Condition: stable    Disposition: transferred to PACU in stable condition  - Will need surgical abdominal closure in near future      Keyonna Castro  U General Surgery, PGY2  09/27/2022 12:24 PM

## 2022-09-28 NOTE — PLAN OF CARE
Problem: Adult Inpatient Plan of Care  Goal: Plan of Care Review  Outcome: Ongoing, Progressing  Goal: Patient-Specific Goal (Individualized)  Outcome: Ongoing, Progressing  Goal: Absence of Hospital-Acquired Illness or Injury  Outcome: Ongoing, Progressing  Goal: Optimal Comfort and Wellbeing  Outcome: Ongoing, Progressing  Goal: Readiness for Transition of Care  Outcome: Ongoing, Progressing     Problem: Infection  Goal: Absence of Infection Signs and Symptoms  Outcome: Ongoing, Progressing     Problem: Skin Injury Risk Increased  Goal: Skin Health and Integrity  Outcome: Ongoing, Progressing     Problem: Fall Injury Risk  Goal: Absence of Fall and Fall-Related Injury  Outcome: Ongoing, Progressing     Problem: Communication Impairment (Mechanical Ventilation, Invasive)  Goal: Effective Communication  Outcome: Ongoing, Progressing     Problem: Device-Related Complication Risk (Mechanical Ventilation, Invasive)  Goal: Optimal Device Function  Outcome: Ongoing, Progressing     Problem: Inability to Wean (Mechanical Ventilation, Invasive)  Goal: Mechanical Ventilation Liberation  Outcome: Ongoing, Progressing     Problem: Nutrition Impairment (Mechanical Ventilation, Invasive)  Goal: Optimal Nutrition Delivery  Outcome: Ongoing, Progressing     Problem: Skin and Tissue Injury (Mechanical Ventilation, Invasive)  Goal: Absence of Device-Related Skin and Tissue Injury  Outcome: Ongoing, Progressing     Problem: Ventilator-Induced Lung Injury (Mechanical Ventilation, Invasive)  Goal: Absence of Ventilator-Induced Lung Injury  Outcome: Ongoing, Progressing     Problem: Communication Impairment (Artificial Airway)  Goal: Effective Communication  Outcome: Ongoing, Progressing     Problem: Device-Related Complication Risk (Artificial Airway)  Goal: Optimal Device Function  Outcome: Ongoing, Progressing     Problem: Skin and Tissue Injury (Artificial Airway)  Goal: Absence of Device-Related Skin or Tissue Injury  Outcome:  Ongoing, Progressing     Problem: Noninvasive Ventilation Acute  Goal: Effective Unassisted Ventilation and Oxygenation  Outcome: Ongoing, Progressing     Problem: Device-Related Complication Risk (CRRT (Continuous Renal Replacement Therapy))  Goal: Safe, Effective Therapy Delivery  Outcome: Ongoing, Progressing     Problem: Hypothermia (CRRT (Continuous Renal Replacement Therapy))  Goal: Body Temperature Maintained in Desired Range  Outcome: Ongoing, Progressing     Problem: Infection (CRRT (Continuous Renal Replacement Therapy))  Goal: Absence of Infection Signs and Symptoms  Outcome: Ongoing, Progressing     Problem: Device-Related Complication Risk (Hemodialysis)  Goal: Safe, Effective Therapy Delivery  Outcome: Ongoing, Progressing     Problem: Hemodynamic Instability (Hemodialysis)  Goal: Effective Tissue Perfusion  Outcome: Ongoing, Progressing     Problem: Infection (Hemodialysis)  Goal: Absence of Infection Signs and Symptoms  Outcome: Ongoing, Progressing

## 2022-09-28 NOTE — NURSING
Nurses Note -- 4 Eyes      9/23/2022   06:15 AM      Skin assessed during: Admit      [x] No Pressure Injuries Present    []Prevention Measures Documented      [] Yes- Altered Skin Integrity Present or Discovered   [] LDA Added if Not in Epic (Describe Wound)   [] New Altered Skin Integrity was Present on Admit and Documented in LDA   [] Wound Image Taken    Wound Care Consulted? No    Attending Nurse:  Luis Daniel Conley RN     Second RN/Staff Member:  Christine Giron RN

## 2022-09-28 NOTE — PROGRESS NOTES
[facility] - [facility] ICU  Cardiology  Progress Note    Patient Name: Stuart Guevara  MRN: [id number]  Admission Date: 9/23/2022  Hospital Length of Stay: 5 days  Code Status: Full Code   Attending Physician: Ivan Chopra MD   Primary Care Physician: Primary Doctor No  Expected Discharge Date:   Principal Problem:Aortic dissection    Subjective:     Brief HPI:    This is a 32-year-old male, who is known to [facility] as an inpatient only,  with history of HTN, lupus nephritis, chronic anemia, right upper extremity DVT, SLE, type a aortic dissection with repair.  He presents to [facility] ER on 09/23/2022 with complaints of ripping /tearing chest pain that radiated to his back began on 09/22/2022.  He was noted to have a BP  of 232/126.  He had a syncopal episode witnessed at the .  Patient was brought immediately back to CT where he was found to have a type B aortic dissection.  Esmolol and nitroprusside were initiated at [facility].   Patient was transferred to [facility] for CT surgery  the services and was admitted directly to ICU.   On 09/25/2022 he was found to have high-grade AV block with bradycardia.  Dr. Maldonadoha the patient down for TVP.    Patient underwent exploratory laparotomy.  He h.ad to be intubated and placed on ventilator secondary to respiratory failure and shockCIS has been consulted for bradycardia.     Hospital Course:   9.27.22: Remains vented/sedated. SR on tele. NO further pacing noted. VSS.   9.28.22: Vented/sedated. SR on tele. No bradycardia or arrhythmia noted.    PMH: HTN, lupus nephritis, chronic anemia, right upper extremity DVT, SLE  PSH: Left middle finger amputation, right second toe amputation  Social History: Denies EtOH and illicit drug use, current tobacco use approximately 4-5 cigarettes/day  Family History: Mother-hypertension     Previous Diagnostics:   Echo 9.26.22  HO AAA surgery and aortic valve replacement.  The left ventricle is normal in size with severe concentric  hypertrophy and moderately decreased systolic function.  The estimated ejection fraction is 37%.  There is moderate left ventricular global hypokinesis.  Grade I left ventricular diastolic dysfunction.  Moderate right ventricular enlargement with mildly to moderately reduced right ventricular systolic function.  There is a bioprosthetic aortic valve present. There is trivial paravalvular aortic insufficiency present. Prosthetic aortic valve is normal.  The estimated PA systolic pressure is 33 mmHg.  Intermediate central venous pressure (8 mmHg    2.15.21 Echo  Moderate concentric left ventricular hypertrophy.  Left ventricular ejection fraction is measured at approximately 55-60 %.  Structurally aortic valve is trileaflet.  Moderate aortic regurgitation is noted..  Turbulent flow in aortic root with possible aortic dissection flap visualized. (Best images at end of study)  Aortic root measures approximately 4.1cm.            Review of Systems   Unable to perform ROS: Acuity of condition         Objective:     Vital Signs (Most Recent):  Temp: 99.1 °F (37.3 °C) (09/28/22 0915)  Pulse: 82 (09/28/22 1100)  Resp: (!) 32 (09/28/22 1100)  BP: 124/64 (09/28/22 1100)  SpO2: 100 % (09/28/22 1100)   Vital Signs (24h Range):  Temp:  [98.2 °F (36.8 °C)-102.5 °F (39.2 °C)] 99.1 °F (37.3 °C)  Pulse:  [] 82  Resp:  [14-64] 32  SpO2:  [93 %-100 %] 100 %  BP: ()/(46-82) 124/64     Weight: 60 kg (132 lb 4.4 oz)  Body mass index is 21.36 kg/m².    SpO2: 100 %  O2 Device (Oxygen Therapy): ventilator      Intake/Output Summary (Last 24 hours) at 9/28/2022 1105  Last data filed at 9/28/2022 0606  Gross per 24 hour   Intake 2099.84 ml   Output 3141 ml   Net -1041.16 ml         Lines/Drains/Airways       Central Venous Catheter Line  Duration                  Hemodialysis Catheter 09/25/22 1900 right femoral 2 days              Drain  Duration                  Urethral Catheter 09/23/22 1700 Silicone 16 Fr. 4 days         Chest  Tube 09/25/22 1419 1 Right Mediastinal 28 Fr. 2 days         NG/OG Tube 09/25/22 1220 [place] sump 18 Fr. Left mouth 2 days              Airway  Duration                  Airway - Non-Surgical 09/25/22 1220 Endotracheal Tube 2 days              Peripheral Intravenous Line  Duration                  Peripheral IV - Single Lumen 09/23/22 0630 18 G Anterior;Distal;Right Forearm 5 days         Peripheral IV - Single Lumen 09/25/22 1952 18 G;Other (Comments) Anterior;Left;Proximal Forearm 2 days         Peripheral IV - Single Lumen 09/25/22 1953 20 G;Other (Comments) Anterior;Distal;Left Forearm 2 days                    Significant Labs: CMP   Recent Labs   Lab 09/27/22  0124 09/28/22  0233   * 130*   K 4.1 3.7   CO2 20* 23   BUN 19.8 13.9   CREATININE 3.29* 3.05*   CALCIUM 6.1* 8.0*   ALBUMIN 1.1* 1.3*   BILITOT 0.6 0.9   ALKPHOS 74 86   AST 89* 55*   ALT 22 19      and CBC   Recent Labs   Lab 09/27/22  0124 09/28/22  0233   WBC 17.7* 16.2*   HGB 11.3* 9.8*   HCT 33.1* 27.2*    195         Telemetry:  SR    Physical Exam:  Physical Exam  Constitutional:       Appearance: Normal appearance.      Comments: Sedated and mechanically ventilated   HENT:      Head: Atraumatic.   Cardiovascular:      Rate and Rhythm: Normal rate and regular rhythm.      Pulses: Normal pulses.      Heart sounds: Normal heart sounds.      Comments: Right IJ TVP in place  Pulmonary:      Comments: Orally intubated, mechanically ventilated.  Ventilator associated breath sounds  Abdominal:      Palpations: Abdomen is soft.   Musculoskeletal:      Cervical back: Neck supple.   Skin:     General: Skin is warm and dry.       Current Inpatient Medications:    Current Facility-Administered Medications:     0.9%  NaCl infusion, , Intravenous, Continuous, Alexander Simpson MD, Last Rate: 60 mL/hr at 09/28/22 0950, New Bag at 09/28/22 0950    acetaminophen tablet 650 mg, 650 mg, Oral, Q6H PRN, Nolberto Calvillo MD, 650 mg at 09/28/22 0025     albuterol inhaler 2 puff, 2 puff, Inhalation, Q4H PRN, Evelio Marshall MD    ceFEPIme (MAXIPIME) 1 g in dextrose 5 % in water (D5W) 5 % 50 mL IVPB (MB+), 1 g, Intravenous, Q12H, Ivan Chopra MD, Stopped at 09/28/22 0231    clevidipine (CLEVIPREX) 25 mg/50 mL infusion, 0-16 mg/hr, Intravenous, Continuous, Evelio Marshall MD, Last Rate: 0 mL/hr at 09/26/22 0918, 0 mg/hr at 09/26/22 0918    dextrose 10% bolus 125 mL, 12.5 g, Intravenous, PRN, Ivan Chopra MD    dextrose 5 % and 0.9 % NaCl infusion, , Intravenous, Continuous, Shahzad Lundberg MD, Last Rate: 50 mL/hr at 09/27/22 1000, New Bag at 09/27/22 1000    EPINEPHrine (ADRENALIN) 5 mg in dextrose 5 % 250 mL infusion, 0-2 mcg/kg/min, Intravenous, Continuous, Ivan Chopra MD, Last Rate: 0 mL/hr at 09/25/22 2000, 0 mcg/kg/min at 09/25/22 2000    esmolol 2000 mg in sodium chloride 0.9% 100 mL (20 mg/mL), 0-300 mcg/kg/min, Intravenous, Continuous, Evelio Marshall MD, Last Rate: 22.5 mL/hr at 09/24/22 1217, 125 mcg/kg/min at 09/24/22 1217    famotidine tablet 20 mg, 20 mg, Oral, Every other day, Ivan Chopra MD    gemfibroziL tablet 600 mg, 600 mg, Oral, BID AC, Francia Marcus DO, 600 mg at 09/28/22 0606    glucagon (human recombinant) injection 1 mg, 1 mg, Intramuscular, PRN, Francia Marcus DO    hydrALAZINE injection 20 mg, 20 mg, Intravenous, Q4H PRN, Cong Velez DO, 20 mg at 09/25/22 2026    insulin aspart U-100 injection 0-5 Units, 0-5 Units, Subcutaneous, Q6H PRN, Francia Marcus DO    LIDOcaine (PF) 10 mg/ml (1%) injection, , , PRN, Ruben Arciniega MD, 5 mL at 09/25/22 1656    lorazepam injection 1 mg, 1 mg, Intravenous, Q2H PRN, Cong Velez DO    metronidazole IVPB 500 mg, 500 mg, Intravenous, Q8H, Ivan Chopra MD, Stopped at 09/28/22 0706    morphine injection 2 mg, 2 mg, Intravenous, Q2H PRN, Evelio Marshall MD, 2 mg at 09/26/22 1416    NORepinephrine 8 mg in dextrose 5% 250 mL infusion, 0-3 mcg/kg/min,  Intravenous, Continuous, Luca Rae DO, Last Rate: 0 mL/hr at 09/25/22 2000, 0 mcg/kg/min at 09/25/22 2000    ondansetron injection 4 mg, 4 mg, Intravenous, Q6H PRN, Evelio Marshall MD, 4 mg at 09/23/22 1236    propofol (DIPRIVAN) 10 mg/mL infusion, 0-50 mcg/kg/min, Intravenous, Continuous, Luca Rae DO, Last Rate: 7.2 mL/hr at 09/28/22 0400, 20 mcg/kg/min at 09/28/22 0400    sodium chloride 0.9% bolus 250 mL, 250 mL, Intravenous, PRN, Shahzad Lundberg MD    sodium chloride 0.9% bolus 250 mL, 250 mL, Intravenous, PRN, Shahzad Lundberg MD    sodium chloride 0.9% flush 10 mL, 10 mL, Intravenous, PRN, Francia Marcus DO    sodium chloride 0.9% flush 10 mL, 10 mL, Intravenous, PRN, Evelio Marshall MD    sodium chloride 0.9% flush 10 mL, 10 mL, Intravenous, PRN, Evelio Marshall MD    sucralfate tablet 1 g, 1 g, Oral, BID, Francia Marcus DO, 1 g at 09/27/22 2111        Assessment:     IMPRESSION:  CHB/TVP likely secondary to electrolyte abnormalities  Respiratory failure requiring mechanical ventilation  Type B aortic dissection/TEVAR  CMO (unknown etiology)  Hypertensive emergency  Acute renal failure  Acute abdomen with evidence of global hypoperfusion/Exp Lap  SLE      Plan:     PLAN:  Wean vent per intensivist  Start GDMT as tolerated prior to DC (Toprol XL 25mg daily and Entresto 24/26mg BID)  Outpatient work-up of CMO   HD/CRRT per renal   Continue supportive care  F/U at [facility] cardiology clinic in 7-10 days  Will sign off. Thank you for allowing us to participate in the care of this patient. Please call us with any questions.      Manuel Pandey, [facility]  Cardiology  [facility] - [facility] ICU  09/28/2022

## 2022-09-28 NOTE — PROGRESS NOTES
Trauma/Acute Care Surgery   Daily Progress Note     HD#5  POD#1 Day Post-Op    Subjective  Postop day 1 decompressive laparotomy.  Febrile to 102.5 overnight.  White blood cell count slightly down.  Hemoglobin down as expected for postop day 1.  Creatinine slightly improved but did receive dialysis yesterday.  Still anuric.  Abdominal exam unchanged.  Patient is reaching for wound VAC. lactic acid has cleared.     Scheduled Meds:   ceFEPime (MAXIPIME) IVPB  1 g Intravenous Q12H    cefUROXime (ZINACEF) IVPB  1.5 g Intravenous Once    famotidine  20 mg Oral Every other day    gemfibroziL  600 mg Oral BID AC    metronidazole  500 mg Intravenous Q8H    mupirocin   Nasal BID    sucralfate  1 g Oral BID       Continuous Infusions:   clevidipine 0 mg/hr (09/26/22 0918)    dextrose 5 % and 0.9 % NaCl 50 mL/hr at 09/27/22 1000    EPINEPHrine 0 mcg/kg/min (09/25/22 2000)    esmolol 125 mcg/kg/min (09/24/22 1217)    NORepinephrine bitartrate-D5W 0 mcg/kg/min (09/25/22 2000)    propofoL 20 mcg/kg/min (09/28/22 0400)       PRN Meds:acetaminophen, albuterol, dextrose 10%, glucagon (human recombinant), hydrALAZINE, HYDROmorphone, insulin aspart U-100, LIDOcaine (PF) 10 mg/ml (1%), lorazepam, morphine, ondansetron, oxyCODONE, sodium chloride 0.9%, sodium chloride 0.9%, sodium chloride 0.9%, sodium chloride 0.9%, sodium chloride 0.9%     Objective  Temp:  [98.2 °F (36.8 °C)-102.5 °F (39.2 °C)] 99.5 °F (37.5 °C)  Pulse:  [] 93  Resp:  [14-64] 32  SpO2:  [93 %-100 %] 100 %  BP: ()/(46-76) 126/58     I/O last 3 completed shifts:  In: 3552.6 [I.V.:1984.6; Other:650; IV Piggyback:918]  Out: 3833 [Urine:183; Drains:395; Other:2950; Chest Tube:305]  I/O this shift:  In: 767 [I.V.:767]  Out: 590 [Urine:20; Other:500; Chest Tube:70]       Hemodialysis Catheter 09/25/22 1900 right femoral (Active)   Site Assessment No drainage;No redness;No swelling;No warmth 09/28/22 0400   Line Securement Device Secured with sutures 09/28/22  0400   Dressing Type Central line dressing 09/28/22 0400   Dressing Status Clean;Dry;Intact 09/28/22 0400   Dressing Intervention Integrity maintained 09/28/22 0400   Date on Dressing 09/28/22 09/28/22 0400   Dressing Due to be Changed 10/05/22 09/28/22 0400   Line Necessity Review CRRT/HD 09/28/22 0400   Number of days: 2            Peripheral IV - Single Lumen 09/23/22 0630 18 G Anterior;Distal;Right Forearm (Active)   Site Assessment Clean;Dry;Intact;No redness;No swelling 09/28/22 0600   Extremity Assessment Distal to IV No abnormal discoloration;No redness;No swelling;No warmth 09/28/22 0600   Line Status Infusing 09/28/22 0600   Dressing Status Clean;Dry;Intact 09/28/22 0600   Dressing Intervention Integrity maintained 09/28/22 0600   Number of days: 5            Peripheral IV - Single Lumen 09/25/22 1952 18 G;Other (Comments) Anterior;Left;Proximal Forearm (Active)   Site Assessment Clean;Dry;Intact;No redness;No swelling 09/28/22 0600   Extremity Assessment Distal to IV No abnormal discoloration;No redness;No swelling;No warmth 09/28/22 0600   Line Status Flushed;Saline locked 09/28/22 0600   Dressing Status Clean;Dry;Intact 09/28/22 0600   Dressing Intervention Integrity maintained 09/28/22 0600   Number of days: 2            Peripheral IV - Single Lumen 09/25/22 1953 20 G;Other (Comments) Anterior;Distal;Left Forearm (Active)   Site Assessment Clean;Dry;Intact;No redness;No swelling 09/28/22 0600   Extremity Assessment Distal to IV No abnormal discoloration;No redness;No swelling;No warmth 09/28/22 0600   Line Status Flushed;Saline locked 09/28/22 0600   Dressing Status Clean;Intact;Dry 09/28/22 0600   Dressing Intervention Integrity maintained 09/28/22 0600   Number of days: 2            Chest Tube 09/25/22 1419 1 Right Mediastinal 28 Fr. (Active)   Chest Tube WDL WDL 09/28/22 0400   Function -20 cm H2O 09/28/22 0400   Air Leak/Fluctuation air leak not present 09/28/22 0400   Safety all tubing connections  "taped;all connections secured;suction checked 09/28/22 0400   Securement tubing secured to body distal to insertion site w/ tape 09/28/22 0400   Patency Intervention Tip/tilt 09/28/22 0400   Drainage Description Yellow;Sanguineous 09/28/22 0400   Dressing Appearance clean and dry 09/28/22 0400   Dressing Care dressing changed 09/28/22 0400   Site Assessment Clean;Dry;Intact;No redness;No swelling 09/28/22 0400   Surrounding Skin Dry;Intact 09/28/22 0400   Output (mL) 10 mL 09/28/22 0400   Number of days: 2            NG/OG Tube 09/25/22 1220 [place] sump 18 Fr. Left mouth (Active)   Placement Check placement verified by aspirate characteristics 09/27/22 2000   Distal Tube Length (cm) 55 09/27/22 2000   Tolerance no signs/symptoms of discomfort 09/27/22 2000   Securement secured to commercial device 09/27/22 2000   Clamp Status/Tolerance abdominal distention 09/27/22 2000   Suction Setting/Drainage Method suction at;low;intermittent setting 09/27/22 2000   Insertion Site Appearance no redness, warmth, tenderness, skin breakdown, drainage 09/27/22 2000   Drainage Bile 09/27/22 2000   Flush/Irrigation flushed w/;water 09/27/22 2000   Tube Output(mL)(Include Discarded Residual) 150 mL 09/27/22 0600   Number of days: 2            Urethral Catheter 09/23/22 1700 Silicone 16 Fr. (Active)   Site Assessment Clean;Intact 09/27/22 2000   Collection Container Urimeter 09/27/22 2000   Securement Method secured to top of thigh w/ adhesive device 09/27/22 2000   Catheter Care Performed yes 09/27/22 2000   Reason for Continuing Urinary Catheterization Critically ill in ICU and requiring hourly monitoring of intake/output 09/27/22 2000   CAUTI Prevention Bundle Securement Device in place with 1" slack 09/27/22 2000   Output (mL) 10 mL 09/28/22 0606   Number of days: 4         General:  Well developed, well nourished  HEENT:  Normocephalic, atraumatic  CVS:  RR, pacer in place   Resp:  on minimal vent settings   GI:  Abdomen les firm, " distended. Midline Abthera with good seal  :  villalobos in place, decreased UOP  MSK: moving BUE to command off sedation per nursing  Skin:  No rashes, ulcers, erythema  Neuro:  Adequately sedated.     Labs  Recent Labs     09/25/22  1525 09/26/22  0012 09/27/22  0124 09/28/22  0233   WBC 14.1* 12.6* 17.7* 16.2*   HGB 9.9* 10.7* 11.3* 9.8*   HCT 30.7* 30.2* 33.1* 27.2*    174 180 195     Recent Labs     09/25/22  1525 09/26/22  0012 09/27/22  0124 09/28/22  0233   * 125* 127* 130*   K 6.7* 4.7 4.1 3.7   CO2 16* 16* 20* 23   BUN 40.1* 27.0* 19.8 13.9   CREATININE 4.14* 3.06* 3.29* 3.05*   CALCIUM 4.7* 6.4* 6.1* 8.0*   MG 1.90 1.70 1.60 1.80   PHOS 5.7* 6.2* 4.0 3.2   ALBUMIN 1.4* 1.3* 1.1* 1.3*   BILITOT 0.5 0.4 0.6 0.9   AST 77* 93* 89* 55*   ALKPHOS 53 53 74 86   ALT 28 21 22 19       Imaging  X-Ray Chest 1 View    Result Date: 9/28/2022  Similar appearance to prior exam with retrocardiac atelectasis Electronically signed by: Rocio White Date:    09/28/2022 Time:    06:05    X-Ray Chest 1 View    Result Date: 9/27/2022  No significant change Electronically signed by: Ramón Ying Date:    09/27/2022 Time:    08:47         Assessment/Plan  Mr. Guevara is a 31 yo M w/ hx of Lupus, HTN, type A aortic dissection repaired in February 2021, and type B dissection w/ TEVAR this admission presenting w/ rigid abdomen and hypoperfusion of kidneys, liver, spleen, pancreas, and bowel on CT.     Plan:  - continue Abthera VAC  - unclear source of fever  - dialysis per renal  - remainder of care per critical care team  - no need to continue trending lactic acid from surgical standpoint    Kale Gooden  Trauma/Acute Care Surgery   c - [phone]    9/28/2022  6:43 AM

## 2022-09-28 NOTE — PROGRESS NOTES
[facility] - [facility] ICU  Pulmonary Critical Care Note    Patient Name: Stuart Guevara  MRN: [id number]  Admission Date: 9/23/2022  Hospital Length of Stay: 5 days  Code Status: Full Code  Attending Provider: Ivan Chopra MD  Primary Care Provider: Primary Doctor No     Subjective:     HPI: Mr. Guevara is a 32-year-old male with past medical history significant for SLE, HTN, HX of type a aortic dissection repaired in February 2021, HX of right upper extremity DVT who presented to [facility] Emergency Department on 09/23/2022 with complaints of tearing, ripping chest pain with radiation to the back.  Initial BP noted to be 232/127.  CT scan significant for type B aortic dissection, started on esmolol drip and transferred to [facility] for CT surgery services.  Admitted to the ICU with CT surgery consulted.    Hospital Course/Significant events:    09/23/2022:  With concern for left arm blood pressure discrepancy and concern for diminished outflow through left subclavian patient was brought to OR with CT surgery for TECAR; left carotid to left subclavian artery bypass  9/25/22: patient had significant abdominal pain, acute abdomen, shortness of breath. CT scan showed global hypoperfusion. Received 2 units pRBC. In the setting of shock and respiratory failure, intubated and placed on ventilator. Brought to OR with general surgery for exploratory laparotomy which revealed ascites in peritoneal cavity, mottled liver, viable small bowel and right pleural effusion. Chest tube placed. Vancomycin initiated. HD initiated due to electrolyte derangements.  9/27/22 patient noted to have firm abdomen, brought to OR for decompressive laparotomy. Large volume retroperitoneal ascitic fluid drained.    24 Hour Interval History:  Patient brought to OR yesterday for decompressive laparotomy. It was found that ascitic fluid had accumulated retroperitoneally and was drained. Patient received dialysis yesterday, tolerated well.  Labs this AM significant for hyponatremia and elevated creatinine however both are improved from yesterday. No longer being transvenously paced, removed yesterday without difficulty. Current heart rate 86. Overnight, spiked a fever of 102.5 that resolved with ice and tylenol. No other issues.        Past Medical History:   Diagnosis Date    Hypertension     Systemic lupus erythematosus, organ or system involvement unspecified     Systemic lupus erythematosus, organ or system involvement unspecified        Past Surgical History:   Procedure Laterality Date    CARDIAC SURGERY      FINGER AMPUTATION      THROMBECTOMY Right     TOE AMPUTATION     Mechanical aortic valve    Social History     Socioeconomic History    Marital status: Single   Tobacco Use    Smoking status: Every Day     Types: Cigarettes    Smokeless tobacco: Never           Current Outpatient Medications   Medication Instructions    albuterol (PROVENTIL/VENTOLIN HFA) 90 mcg/actuation inhaler 2 puffs, Inhalation, Every 4 hours PRN, Rescue    amLODIPine (NORVASC) 10 mg, Oral, Daily    aspirin (ECOTRIN) 81 mg, Oral, Daily    belimumab (BENLYSTA) 120 mg SolR Benlysta Take No date recorded No form recorded No frequency recorded No route recorded No set duration recorded No set duration amount recorded active No dosage strength recorded No dosage strength units of measure recorded    famotidine (PEPCID) 20 mg, Oral, 2 times daily    gemfibroziL (LOPID) 600 mg, Oral    hydrALAZINE (APRESOLINE) 10 mg, Oral    labetaloL (NORMODYNE) 300 mg, Oral, 2 times daily    lisinopriL (PRINIVIL,ZESTRIL) 40 mg, Oral, Daily    metoprolol tartrate (LOPRESSOR) 50 mg, Oral, 2 times daily    mycophenolate (CELLCEPT) 1,000 mg, Oral, 2 times daily    MEÑO 2 mg TbEC 1 tablet, Oral, Daily    rivaroxaban (XARELTO ORAL) Xarelto Take No date recorded No form recorded No frequency recorded No route recorded No set duration recorded No set duration amount recorded active No dosage  strength recorded No dosage strength units of measure recorded    sucralfate (CARAFATE) 1 g, Oral, 2 times daily    warfarin (COUMADIN) 3 mg, Oral, Daily       Current Inpatient Medications   ceFEPime (MAXIPIME) IVPB  1 g Intravenous Q12H    cefUROXime (ZINACEF) IVPB  1.5 g Intravenous Once    famotidine  20 mg Oral Every other day    gemfibroziL  600 mg Oral BID AC    metronidazole  500 mg Intravenous Q8H    mupirocin   Nasal BID    sucralfate  1 g Oral BID       Current Intravenous Infusions   clevidipine 0 mg/hr (09/26/22 0918)    dextrose 5 % and 0.9 % NaCl 50 mL/hr at 09/27/22 1000    EPINEPHrine 0 mcg/kg/min (09/25/22 2000)    esmolol 125 mcg/kg/min (09/24/22 1217)    NORepinephrine bitartrate-D5W 0 mcg/kg/min (09/25/22 2000)    propofoL 20 mcg/kg/min (09/28/22 0400)         Review of Systems   Reason unable to perform ROS: intubated, sedated.        Objective:       Intake/Output Summary (Last 24 hours) at 9/28/2022 0544  Last data filed at 9/28/2022 0446  Gross per 24 hour   Intake 2658.64 ml   Output 3472 ml   Net -813.36 ml           Vital Signs (Most Recent):  Temp: 99.5 °F (37.5 °C) (09/28/22 0400)  Pulse: 91 (09/28/22 0430)  Resp: (!) 32 (09/28/22 0430)  BP: 128/64 (09/28/22 0415)  SpO2: 100 % (09/28/22 0430)    Body mass index is 21.36 kg/m².  Weight: 60 kg (132 lb 4.4 oz) Vital Signs (24h Range):  Temp:  [98.2 °F (36.8 °C)-102.5 °F (39.2 °C)] 99.5 °F (37.5 °C)  Pulse:  [] 91  Resp:  [14-64] 32  SpO2:  [93 %-100 %] 100 %  BP: ()/(46-76) 128/64     Physical Exam  Constitutional:       Appearance: He is ill-appearing.      Comments: Intubated, sedated, on ventilator   HENT:      Head: Normocephalic.      Right Ear: External ear normal.      Left Ear: External ear normal.   Neck:      Comments: Left sided bandage in place without oozing or bleeding   Cardiovascular:      Rate and Rhythm: Normal rate and regular rhythm.      Heart sounds: No murmur heard.    No friction rub.   Pulmonary:       Breath sounds: Normal breath sounds. No wheezing.   Chest:      Comments: Well healed mid-sternal surgical scar  Abdominal:      Comments: Firm. Wound vac in place.    Musculoskeletal:         General: No swelling, tenderness or deformity.      Comments: Symmetrical RUE and LUE edema noted    Skin:     General: Skin is warm and dry.      Comments: Bullae noted on right arm   Neurological:      General: No focal deficit present.      Sensory: No sensory deficit.      Comments: Opens eyes, localizes to pain   Psychiatric:         Mood and Affect: Mood normal.         Behavior: Behavior normal.         Thought Content: Thought content normal.         Judgment: Judgment normal.         Lines/Drains/Airways       Central Venous Catheter Line  Duration                  Hemodialysis Catheter 09/25/22 1900 right femoral 2 days              Drain  Duration                  Urethral Catheter 09/23/22 1700 Silicone 16 Fr. 4 days         Chest Tube 09/25/22 1419 1 Right Mediastinal 28 Fr. 2 days         NG/OG Tube 09/25/22 1220 [place] sump 18 Fr. Left mouth 2 days              Airway  Duration                  Airway - Non-Surgical 09/25/22 1220 Endotracheal Tube 2 days              Peripheral Intravenous Line  Duration                  Peripheral IV - Single Lumen 09/23/22 0630 18 G Anterior;Distal;Right Forearm 4 days         Peripheral IV - Single Lumen 09/25/22 1952 18 G;Other (Comments) Anterior;Left;Proximal Forearm 2 days         Peripheral IV - Single Lumen 09/25/22 1953 20 G;Other (Comments) Anterior;Distal;Left Forearm 2 days                    Significant Labs:    Lab Results   Component Value Date    WBC 16.2 (H) 09/28/2022    HGB 9.8 (L) 09/28/2022    HCT 27.2 (L) 09/28/2022    MCV 85.5 09/28/2022     09/28/2022         BMP  Lab Results   Component Value Date     (L) 09/28/2022    K 3.7 09/28/2022    CO2 23 09/28/2022    BUN 13.9 09/28/2022    CREATININE 3.05 (H) 09/28/2022    CALCIUM 8.0 (L) 09/28/2022     EGFRNONAA 80 01/31/2022       ABG  Recent Labs   Lab 09/27/22  0438   PH 7.45   PO2 57*   PCO2 38   HCO3 26.4         Mechanical Ventilation Support:  Vent Mode: A/C (09/28/22 0420)  Set Rate: 32 BPM (09/28/22 0420)  Vt Set: 430 mL (09/28/22 0420)  PEEP/CPAP: 8 cmH20 (09/28/22 0420)  Oxygen Concentration (%): 35 (09/28/22 0420)  Peak Airway Pressure: 29 cmH2O (09/28/22 0420)  Total Ve: 13.5 mL (09/28/22 0420)  F/VT Ratio<105 (RSBI): (!) 76.37 (09/28/22 0420)    Significant Imaging:  I have reviewed the pertinent imaging within the past 24 hours.        Assessment/Plan:     Assessment  Type B aortic dissection  CT at outside ED significant for [place] type B aortic dissection with anterior extensive mid descending thoracic aorta, 3.7 cm dilation  Hypertensive emergency  Acute renal failure  History right upper extremity DVT, previously on Xarelto, noncompliance for past 2 months  RUE US done on 9/24/22 negative for thrombus  SLE, currently unmedicated  S/p exploratory laparotomy 9/25/22 due to acute abdomen and evidence of global hypoperfusion on CT  Revealed mottled liver, ascites in peritoneal cavity, viable small bowel and right pleural effusion, pancreatic swelling  Chest tube placed  Acute hypoxemic respiratory failure  On ventilator  Pancreatitis, presumptive   Elevated lipase, saponification of pancreas noted during exploratory laparotomy        Plan  -postop day 4 left carotid artery to left subclavian artery bypass with CT surgery  -postop day 2 s/p exploratory laparotomy with general surgery, postop day 1 s/p decompressive laparotomy 2/2 ascitic fluid buildup.  -BP parameters have increased in the setting of global hyperperfusion on CT abdomen, now SBP<150 goal.  -now off of cleviprex, has been off of pressors. Maintaining BP well. Currently 133/69.  -no longer being transvenously paced.  - echo performed 9/26/22 significant for reduced EF of 34%.  -H/H stable this morning  -hypocalcemia has improved  -  dialyzed yesterday, kidney function slightly improved this morning. Minimal urine output noted.  -lactate slightly improved this am, 2.6  -to receive HD as needed, nephrology following, appreciate recs  -continue NPO  -antibiotics initiated in the setting of shock (cefepime and flagyl). Cultures show no growth to date. Now off of vanc.  -repeat right upper extremity ultrasound negative--> has a reported history of DVT but has been noncompliant with anticoagulation  -continue ICU care    DVT Prophylaxis: SCDs  GI Prophylaxis: famotidine     33 minutes of critical care was time spent personally by me on the following activities: development of treatment plan with patient or surrogate and bedside caregivers, discussions with consultants, evaluation of patient's response to treatment, examination of patient, ordering and performing treatments and interventions, ordering and review of laboratory studies, ordering and review of radiographic studies, pulse oximetry, re-evaluation of patient's condition.  This critical care time did not overlap with that of any other provider or involve time for any procedures.     Francia Marcus DO  Pulmonary Critical Care Medicine Resident  [facility] [address]

## 2022-09-28 NOTE — PROGRESS NOTES
NEPHROLOGY: Progress Note    Hospital Course:    32-year-old a.m. with a history of SLE, severe hypertension, previous type A aortic dissection repair in February of 2021 with aortic valve replacement at that time.  Initially presented to [facility] with severe tearing chest pain radiating into his back with a blood pressure of 232/127.  CT confirmed type B aortic dissection, he was transferred to [facility] ICU on esmolol drip and underwent TEVAR  as well as a carotid artery to subclavian bypass on the left on 09/23/2022.    On 09/25 due to worsening abdominal distention and rigidity and concern for ischemic bowel the patient underwent exploratory lap and right chest tube placement for pleural effusion .  No clear ischemic bowel was found.  Due to the development of  TAY 3 and hyperkalemia urgent dialysis was performed late on the evening of the 25th via temporary right femoral cath.  We repeated dialysis on the 26th for mild hyponatremia.    He currently remains intubated and sedated with propofol.      9/28/2022 remains intubated and sedated on the ventilator.  Comfortable.  On propofol.  No other IV fluids or IV nutrition going on.  Still has chest tube which is draining and wound VAC draining.  Nurses told me that there is a plan to take him to the operating room for abdominal wound closer.  And a new wound VAC placement.            Current Facility-Administered Medications:     acetaminophen tablet 650 mg, 650 mg, Oral, Q6H PRN, Nolberto Calvillo MD, 650 mg at 09/28/22 0025    albuterol inhaler 2 puff, 2 puff, Inhalation, Q4H PRN, Evelio Marshall MD    ceFEPIme (MAXIPIME) 1 g in dextrose 5 % in water (D5W) 5 % 50 mL IVPB (MB+), 1 g, Intravenous, Q12H, Iavn Chopra MD, Stopped at 09/28/22 0231    cefUROXime sodium 1.5 g in dextrose 5 % in water (D5W) 5 % 100 mL IVPB (MB+), 1.5 g, Intravenous,  Once, Evelio Marshall MD    clevidipine (CLEVIPREX) 25 mg/50 mL infusion, 0-16 mg/hr, Intravenous, Continuous, Evelio Marshall MD, Last Rate: 0 mL/hr at 09/26/22 0918, 0 mg/hr at 09/26/22 0918    dextrose 10% bolus 125 mL, 12.5 g, Intravenous, PRN, Ivan Chopra MD    dextrose 5 % and 0.9 % NaCl infusion, , Intravenous, Continuous, Shahzad Lundberg MD, Last Rate: 50 mL/hr at 09/27/22 1000, New Bag at 09/27/22 1000    EPINEPHrine (ADRENALIN) 5 mg in dextrose 5 % 250 mL infusion, 0-2 mcg/kg/min, Intravenous, Continuous, Ivan Chopra MD, Last Rate: 0 mL/hr at 09/25/22 2000, 0 mcg/kg/min at 09/25/22 2000    esmolol 2000 mg in sodium chloride 0.9% 100 mL (20 mg/mL), 0-300 mcg/kg/min, Intravenous, Continuous, Evelio Marshall MD, Last Rate: 22.5 mL/hr at 09/24/22 1217, 125 mcg/kg/min at 09/24/22 1217    famotidine tablet 20 mg, 20 mg, Oral, Every other day, Ivan Chopra MD    gemfibroziL tablet 600 mg, 600 mg, Oral, BID AC, Francia Marcus DO, 600 mg at 09/28/22 0606    glucagon (human recombinant) injection 1 mg, 1 mg, Intramuscular, PRN, Francia Marcus DO    hydrALAZINE injection 20 mg, 20 mg, Intravenous, Q4H PRN, Cong Velez DO, 20 mg at 09/25/22 2026    HYDROmorphone (PF) injection 0.5 mg, 0.5 mg, Intravenous, Q2H PRN, Ivan Chopra MD, 0.5 mg at 09/25/22 2101    insulin aspart U-100 injection 0-5 Units, 0-5 Units, Subcutaneous, Q6H PRN, Francia Marcus DO    LIDOcaine (PF) 10 mg/ml (1%) injection, , , PRN, Ruben Arciniega MD, 5 mL at 09/25/22 1656    lorazepam injection 1 mg, 1 mg, Intravenous, Q2H PRN, Cong Velez,     metronidazole IVPB 500 mg, 500 mg, Intravenous, Q8H, Ivan Chopra MD, Stopped at 09/28/22 0706    morphine injection 2 mg, 2 mg, Intravenous, Q2H PRN, Evelio Marshall MD, 2 mg at 09/26/22 1416    mupirocin 2 % ointment, , Nasal, BID, Evelio Marshall MD, Given at 09/27/22 2111    NORepinephrine 8 mg in dextrose 5% 250 mL infusion, 0-3 mcg/kg/min,  "Intravenous, Continuous, Luca Rae DO, Last Rate: 0 mL/hr at 09/25/22 2000, 0 mcg/kg/min at 09/25/22 2000    ondansetron injection 4 mg, 4 mg, Intravenous, Q6H PRN, Evelio Marshall MD, 4 mg at 09/23/22 1236    oxyCODONE immediate release tablet 10 mg, 10 mg, Oral, Q6H PRN, Ivan Chopra MD, 10 mg at 09/26/22 1557    propofol (DIPRIVAN) 10 mg/mL infusion, 0-50 mcg/kg/min, Intravenous, Continuous, Luca Rae DO, Last Rate: 7.2 mL/hr at 09/28/22 0400, 20 mcg/kg/min at 09/28/22 0400    sodium chloride 0.9% bolus 250 mL, 250 mL, Intravenous, PRN, Shahzad Lundberg MD    sodium chloride 0.9% bolus 250 mL, 250 mL, Intravenous, PRN, Shahzad Lundberg MD    sodium chloride 0.9% flush 10 mL, 10 mL, Intravenous, PRN, Francia Marcus DO    sodium chloride 0.9% flush 10 mL, 10 mL, Intravenous, PRN, Evelio Marshall MD    sodium chloride 0.9% flush 10 mL, 10 mL, Intravenous, PRN, Evelio Marshall MD    sucralfate tablet 1 g, 1 g, Oral, BID, Francia Marcus DO, 1 g at 09/27/22 2111        /61   Pulse 87   Temp 99.5 °F (37.5 °C) (Oral)   Resp (!) 32   Ht 5' 5.98" (1.676 m)   Wt 60 kg (132 lb 4.4 oz)   SpO2 100%   BMI 21.36 kg/m²     Physical Exam:    GEN:  Patient is currently intubated, OG tube in place.    HEENT: Atraumatic.  Pupils are  reactive.  NECK : No JVD,   CARD : RRR with aortic click.  No appreciable rub.  LUNGS : Clear to auscultation.  Right chest tube in place  ABD :  Firm and silent.  Wound VAC noted.  EXT : No pitting edema,   NEURO :  Unable to assess  1500 cc of fluid was removed with dialysis     yesterday and the rest came from the wound VAC and from the chest tube.      Intake/Output Summary (Last 24 hours) at 9/28/2022 0822  Last data filed at 9/28/2022 0606  Gross per 24 hour   Intake 2099.84 ml   Output 3141 ml   Net -1041.16 ml         Laboratory:  Recent Results (from the past 24 hour(s))   Lactic Acid, Plasma    Collection Time: 09/27/22  9:35 AM   Result Value Ref Range    " Lactic Acid Level 3.5 (HH) 0.5 - 2.2 mmol/L   Lactic Acid, Plasma    Collection Time: 09/27/22  2:30 PM   Result Value Ref Range    Lactic Acid Level 4.9 (HH) 0.5 - 2.2 mmol/L   POCT glucose    Collection Time: 09/27/22  4:28 PM   Result Value Ref Range    POCT Glucose 140 (H) 70 - 110 mg/dL   Lactic Acid, Plasma    Collection Time: 09/27/22  6:57 PM   Result Value Ref Range    Lactic Acid Level 3.1 (H) 0.5 - 2.2 mmol/L   POCT glucose    Collection Time: 09/27/22  8:01 PM   Result Value Ref Range    POCT Glucose 149 (H) 70 - 110 mg/dL   Lactic Acid, Plasma    Collection Time: 09/27/22 10:58 PM   Result Value Ref Range    Lactic Acid Level 3.1 (H) 0.5 - 2.2 mmol/L   POCT glucose    Collection Time: 09/28/22 12:34 AM   Result Value Ref Range    POCT Glucose 175 (H) 70 - 110 mg/dL   Lactic Acid, Plasma    Collection Time: 09/28/22  2:33 AM   Result Value Ref Range    Lactic Acid Level 2.6 (H) 0.5 - 2.2 mmol/L   Phosphorus    Collection Time: 09/28/22  2:33 AM   Result Value Ref Range    Phosphorus Level 3.2 2.3 - 4.7 mg/dL   Magnesium    Collection Time: 09/28/22  2:33 AM   Result Value Ref Range    Magnesium Level 1.80 1.60 - 2.60 mg/dL   Comprehensive Metabolic Panel    Collection Time: 09/28/22  2:33 AM   Result Value Ref Range    Sodium Level 130 (L) 136 - 145 mmol/L    Potassium Level 3.7 3.5 - 5.1 mmol/L    Chloride 98 98 - 107 mmol/L    Carbon Dioxide 23 22 - 29 mmol/L    Glucose Level 185 (H) 74 - 100 mg/dL    Blood Urea Nitrogen 13.9 8.9 - 20.6 mg/dL    Creatinine 3.05 (H) 0.73 - 1.18 mg/dL    Calcium Level Total 8.0 (L) 8.4 - 10.2 mg/dL    Protein Total 5.3 (L) 6.4 - 8.3 gm/dL    Albumin Level 1.3 (L) 3.5 - 5.0 gm/dL    Globulin 4.0 (H) 2.4 - 3.5 gm/dL    Albumin/Globulin Ratio 0.3 (L) 1.1 - 2.0 ratio    Bilirubin Total 0.9 <=1.5 mg/dL    Alkaline Phosphatase 86 40 - 150 unit/L    Alanine Aminotransferase 19 0 - 55 unit/L    Aspartate Aminotransferase 55 (H) 5 - 34 unit/L    eGFR 27 mls/min/1.73/m2   CBC with  Differential    Collection Time: 09/28/22  2:33 AM   Result Value Ref Range    WBC 16.2 (H) 4.5 - 11.5 x10(3)/mcL    RBC 3.18 (L) 4.70 - 6.10 x10(6)/mcL    Hgb 9.8 (L) 14.0 - 18.0 gm/dL    Hct 27.2 (L) 42.0 - 52.0 %    MCV 85.5 80.0 - 94.0 fL    MCH 30.8 27.0 - 31.0 pg    MCHC 36.0 33.0 - 36.0 mg/dL    RDW 15.3 11.5 - 17.0 %    Platelet 195 130 - 400 x10(3)/mcL    MPV 12.3 (H) 7.4 - 10.4 fL    IG# 1.10 (H) 0 - 0.04 x10(3)/mcL    IG% 6.8 %    NRBC% 1.0 %   Manual Differential    Collection Time: 09/28/22  2:33 AM   Result Value Ref Range    Neut Man 81 %    Lymph Man 4 %    Monocyte Man 12 %    [place] Man 2 %    Promyelo Man 2 %    Instr WBC 16 x10(3)/mcL    Abs Mono 1.92 (H) 0.1 - 1.3 x10(3)/mcL    Abs Lymp 0.64 0.6 - 4.6 x10(3)/mcL    Abs Neut 13.6 (H) 2.1 - 9.2 x10(3)/mcL    NRBC Man 2 %    Polychrom 1+ (A) (none)    RBC Morph Abnormal (A) Normal    Anisocyte 3+ (A) (none)    Poik 3+ (A) (none)    Microcyte 1+ (A) (none)    Macrocyte 1+ (A) (none)    Toi Cells 1+ (A) (none)    Acanthocytes 2+ (A) (none)    Platelet Est Adequate Normal, Adequate   POCT glucose    Collection Time: 09/28/22  4:07 AM   Result Value Ref Range    POCT Glucose 199 (H) 70 - 110 mg/dL   Lactic Acid, Plasma    Collection Time: 09/28/22  5:40 AM   Result Value Ref Range    Lactic Acid Level 1.7 0.5 - 2.2 mmol/L         Assessment/Plan:  TAY secondary to ATN secondary to multifactorial shock-now anuric   Improved metabolic acidosis  Improved hypocalcemia  s/p TEVAR procedure for type B dissection  Prior history of SLE and type A dissection  Mild hyponatremia  Severe hypertension-now the blood pressure 127/64.  Acute respiratory failure    Recommendations  1. Since patient is having lot of wound VAC output and chest tube output and clinically at present does not have any total body fluid excess,  I think it will be beneficial to give him some maintenance IV fluids.    2. Discussed with the intensivist  and he does not want to give  patient any IV nutrition.    3. Check labs tomorrow  4. No acute indication for any dialysis today.

## 2022-09-29 NOTE — NURSING
09/29/22 1008   Post-Hemodialysis Assessment   Rinseback Volume (mL) 250 mL   Blood Volume Processed (Liters) 84 L   Dialyzer Clearance Moderately streaked   Duration of Treatment 210 minutes   Total UF (mL) 2500 mL   Net Fluid Removal 2000

## 2022-09-29 NOTE — OP NOTE
Operation:  Abdominal closure     Surgeon:  Christopher Espinal MD    Indications:  This is a 32-year-old male with lupus, aortic dissection, pancreatitis, multiorgan failure, open abdomen for abdominal compartment syndrome    Preop diagnosis:  Multiorgan failure, abdominal compartment syndrome, lupus    Postop diagnosis same     Findings:  No bowel edema, no succus entericus or bowel perforation, bowel is well-perfused with with no evidence of ischemia, decreased retroperitoneal and mesenteric edema     Anesthesia:  General endotracheal     Complications:  None     Specimens:  None     Specimens:  None    Procedure patient was brought to the operating room from the ICU where he would already been intubated.  His abdomen had been open with an ABThera wound VAC.      General anesthesia was administered and the abdomen was prepped and draped in the usual sterile fashion after the ABThera wound VAC device was removed     The bowels were then inspected, there was no succus entericus or evidence of bowel ischemia and they appeared optimal with minimal 3rd spacing and the abdomen appeared ready to be closed and could be closed easily without undue tension     The lesser sac was again examined where there which had been the site of some pancreatic edema which seemed to be largely resolved however this seemed to be the area where most of the ascitic fluid was originating from so we therefore placed a 19 [address] Prabhu drain within the lesser sac near the pancreas    We then closed the abdomen with a running loop PDS suture, the at the abdominal fascia came together very easily without undue tension    The skin was closed with multiple skin staples    Patient tolerated procedure well he does remain intubated and will go back to the ICU he remains in critical guarded condition but however his clinical course continues to improve modestly each day

## 2022-09-29 NOTE — PROGRESS NOTES
Trauma/Acute Care Surgery   Daily Progress Note     HD#6  POD#2 Days Post-Op    SUBJECTIVE / INTERVAL EVENTS  NAEO, AF, tachy in the 90s, satting well on 30% A/C.  Waking up intermittently throughout the night. No agitation    OBJECTIVE    Vitals  Temp:  [98.6 °F (37 °C)-99.5 °F (37.5 °C)] 98.6 °F (37 °C)  Pulse:  [] 101  Resp:  [13-33] 26  SpO2:  [93 %-100 %] 94 %  BP: (114-194)/(54-98) 135/73    Intake / Output  Last Bowel Movement: 09/22/22  Drain Output: 400 cc from midline incision WV    Physical Exam:  GEN: Intubated, sedated. NAD  NEURO: Responsive to pain  HEENT: NCAT  RESP: On ventilator (30% FiO2 A/C)  CV: RRR  ABD: Firm, mildly distended, midline incision WV in place w good seal draining serosanguinous fluid  : Galarza  MSK: All extremities WWP     Labs    Hgb - 9.2 / 9.8  WBC - 21.5 / 16.2  Cr - 4.45 / 3.05    Micro  Resp Cx - NG at 24hr  Body fluid Cx - NG at 72 hr    Imaging  CXR  :  Possible mild left perihilar/lower lung atelectasis. Suspect tiny right apical pneumothorax    ASSESSMENT & PLAN  33 y/o M w Hx of Type B aortic dissection, s/p TEVAR + L carotid-subclavian bypass, p/w acute abdomen 2/2 abdominal compartment syndrome of undetermined etiology s/p ex-lap c/b abdominal compartment syndrome s/p decompressive ex-lap and R chest tube insertion for R pleural effusion.    - OR today for abd re-exploration, possible closure  - Nephrology following for HD needs  - Remainder of care per primary team    Trauma Surgery Checklist  DVT Ppx: Lovenox 40mg daily  Abx: Cefepime, Flagyl  Bowel Regimen: None  Diet: NPO  mIVF: NS @ 50 cc/hr  GI Ppx: Famotidine BID  MM Pain Control: Morphine,   Consulted Services: ANCA Alvarado MD  [facility]    9/29/2022  10:17 AM

## 2022-09-29 NOTE — ANESTHESIA PROCEDURE NOTES
Intubation    Date/Time: 9/29/2022 11:20 AM  Performed by: Bin Soler CRNA  Authorized by: Tito Miller DO     Intubation:     Induction:  Inhalational - ETT/trach    Intubated:  Arrived to OR intubated    Difficult Airway Encountered?: No      Airway Device:  Oral endotracheal tube    Airway Device Size:  7.5    Style/Cuff Inflation:  Cuffed (inflated to minimal occlusive pressure)    Tube secured:  23    Secured at:  The teeth    Placement Verified By:  Capnometry    Complicating Factors:  None    Findings Post-Intubation:  BS equal bilateral

## 2022-09-29 NOTE — ANESTHESIA PREPROCEDURE EVALUATION
09/29/2022  Stuart Guevara is a 32 y.o., male.      Pre-op Assessment    I have reviewed the Patient Summary Reports.     I have reviewed the Nursing Notes. I have reviewed the NPO Status.   I have reviewed the Medications.     Review of Systems  Hematology/Oncology:        Hematology Comments: Hx DVT   Cardiovascular:   Hypertension, poorly controlled Hx Aortic dissection   Pulmonary:   On vent, ETT in place   Renal/:   Currently getting dialysis  Dialysis to finish at 10:10am   Neurological:   sedated   Psych:   Sedated, on vent         Physical Exam  General: Unconscious    Airway:  Mallampati: unable to assess   Pre-Existing Airway: Oral Endotracheal tube        Anesthesia Plan  Type of Anesthesia, risks & benefits discussed:    Anesthesia Type: Gen ETT  Intra-op Monitoring Plan: Standard ASA Monitors  Induction:  Inhalation  Informed Consent: Informed consent signed with the Patient representative and all parties understand the risks and agree with anesthesia plan.  All questions answered.   ASA Score: 3 Emergent  Day of Surgery Review of History & Physical: H&P Update referred to the surgeon/provider.  Anesthesia Plan Notes: Phone consent with nursing and his sister on the chart.    Ready For Surgery From Anesthesia Perspective.     .

## 2022-09-29 NOTE — PROGRESS NOTES
NEPHROLOGY: Progress Note    Hospital Course:    32-year-old a.m. with a history of SLE, severe hypertension, previous type A aortic dissection repair in February of 2021 with aortic valve replacement at that time.  Initially presented to [facility] with severe tearing chest pain radiating into his back with a blood pressure of 232/127.  CT confirmed type B aortic dissection, he was transferred to [facility] ICU on esmolol drip and underwent TEVAR  as well as a carotid artery to subclavian bypass on the left on 09/23/2022.    On 09/25 due to worsening abdominal distention and rigidity and concern for ischemic bowel the patient underwent exploratory lap and right chest tube placement for pleural effusion .  No clear ischemic bowel was found, but, he was initiated on dialysis due to hyperkalemia via temporary right femoral cath.      He currently remains intubated and sedated with propofol.  Currently on dialysis.    Patient requiring repeat exploratory lap on the 27th for evacuation of a large retroperitoneal fluid collection with displaced abdominal viscera anteriorly fortunately no clear-cut evidence of ischemia.          Current Facility-Administered Medications:     0.9%  NaCl infusion, , Intravenous, Continuous, Alexander Simpson MD, Last Rate: 60 mL/hr at 09/28/22 1758, Rate Verify at 09/28/22 1758    acetaminophen tablet 650 mg, 650 mg, Oral, Q6H PRN, Nolberto Calvillo MD, 650 mg at 09/28/22 0025    albuterol inhaler 2 puff, 2 puff, Inhalation, Q4H PRN, Evelio Marshall MD    ceFEPIme (MAXIPIME) 1 g in dextrose 5 % in water (D5W) 5 % 50 mL IVPB (MB+), 1 g, Intravenous, Q12H, Ivan Chopra MD, Stopped at 09/29/22 0248    clevidipine (CLEVIPREX) 25 mg/50 mL infusion, 0-16 mg/hr, Intravenous, Continuous, Evelio Marshall MD, Last Rate: 0 mL/hr at 09/26/22 0918, 0 mg/hr at 09/26/22 0918    dextrose  10% bolus 125 mL, 12.5 g, Intravenous, PRN, Ivan Chopra MD    dextrose 5 % and 0.9 % NaCl infusion, , Intravenous, Continuous, Shahzad Lundberg MD, Last Rate: 50 mL/hr at 09/28/22 1758, Rate Verify at 09/28/22 1758    EPINEPHrine (ADRENALIN) 5 mg in dextrose 5 % 250 mL infusion, 0-2 mcg/kg/min, Intravenous, Continuous, Ivan Chopra MD, Last Rate: 0 mL/hr at 09/25/22 2000, 0 mcg/kg/min at 09/25/22 2000    esmolol 2000 mg in sodium chloride 0.9% 100 mL (20 mg/mL), 0-300 mcg/kg/min, Intravenous, Continuous, Evelio Marshall MD, Last Rate: 22.5 mL/hr at 09/28/22 1758, 125 mcg/kg/min at 09/28/22 1758    famotidine tablet 20 mg, 20 mg, Oral, Every other day, Ivan Chopra MD    gemfibroziL tablet 600 mg, 600 mg, Oral, BID AC, Francia Marcus DO, 600 mg at 09/29/22 0555    glucagon (human recombinant) injection 1 mg, 1 mg, Intramuscular, PRN, Francia Marcus DO    hydrALAZINE injection 20 mg, 20 mg, Intravenous, Q4H PRN, Cong Velez DO, 20 mg at 09/29/22 0733    insulin aspart U-100 injection 0-5 Units, 0-5 Units, Subcutaneous, Q6H PRN, Francia Marucs DO, 2 Units at 09/29/22 0432    LIDOcaine (PF) 10 mg/ml (1%) injection, , , PRN, Ruben Arciniega MD, 5 mL at 09/25/22 1656    lorazepam injection 1 mg, 1 mg, Intravenous, Q2H PRN, Cong Velez DO    metronidazole IVPB 500 mg, 500 mg, Intravenous, Q8H, Ivan Chopra MD, Stopped at 09/29/22 0655    morphine injection 2 mg, 2 mg, Intravenous, Q2H PRN, Evelio Marshall MD, 2 mg at 09/26/22 1416    NORepinephrine 8 mg in dextrose 5% 250 mL infusion, 0-3 mcg/kg/min, Intravenous, Continuous, Luca Rae DO, Last Rate: 0 mL/hr at 09/25/22 2000, 0 mcg/kg/min at 09/25/22 2000    ondansetron injection 4 mg, 4 mg, Intravenous, Q6H PRN, Evelio Marshall MD, 4 mg at 09/23/22 1236    propofol (DIPRIVAN) 10 mg/mL infusion, 0-50 mcg/kg/min, Intravenous, Continuous, Luca Rae DO, Last Rate: 18 mL/hr at 09/29/22 0736, 50 mcg/kg/min at 09/29/22  "0736    sodium chloride 0.9% bolus 250 mL, 250 mL, Intravenous, PRN, Shahzad Lundberg MD    sodium chloride 0.9% bolus 250 mL, 250 mL, Intravenous, PRN, Shhazad Lundberg MD    sodium chloride 0.9% flush 10 mL, 10 mL, Intravenous, PRN, Francia Marcus DO    sodium chloride 0.9% flush 10 mL, 10 mL, Intravenous, PRN, Evelio Marshall MD    sodium chloride 0.9% flush 10 mL, 10 mL, Intravenous, PRN, Evelio Marshall MD    sucralfate tablet 1 g, 1 g, Oral, BID, Francia Marcus DO, 1 g at 09/28/22 2030        BP (!) 190/93 Comment: hydralazine given  Pulse 108   Temp 99.5 °F (37.5 °C) (Oral)   Resp (!) 27   Ht 5' 5.98" (1.676 m)   Wt 60 kg (132 lb 4.4 oz)   SpO2 (!) 93%   BMI 21.36 kg/m²     Physical Exam:    GEN:  Patient is currently intubated, OG tube in place.    HEENT: Atraumatic.  Pupils are 2 mm but equal and reactive.  NECK : No JVD, right-sided IJ Cordis  CARD : RRR with aortic click.  No appreciable rub.  LUNGS : Clear to auscultation.  Right chest tube in place  ABD :  Soft, nondistended, midline wound VAC in place.   EXT : No pitting edema, but he is becoming diffusely puffy in his arms especially.  Right femoral temporary dialysis catheter currently functioning well for dialysis  NEURO :  Patient starting to open his eyes to verbal and tactile stimuli.      Intake/Output Summary (Last 24 hours) at 9/29/2022 0802  Last data filed at 9/29/2022 0600  Gross per 24 hour   Intake 3510.89 ml   Output 1145 ml   Net 2365.89 ml         Laboratory:  Recent Results (from the past 24 hour(s))   POCT ARTERIAL BLOOD GAS    Collection Time: 09/28/22 12:22 PM   Result Value Ref Range    POC PH 7.39     POC PCO2 41 mmHg    POC PO2 75 (A) mmHg    POC SATURATED O2 95 %    POC Potassium 3.8 mmol/l    POC Sodium 128 (A) 137 - 145 mmol/l    POC Ionized Calcium 1.13 mmol/l    POC HCO3 24.8 mmol/l    Base Deficit -0.20 mmol/l    POC Temp 37.0 C    Specimen source Arterial sample    POCT glucose    Collection Time: 09/28/22  8:06 PM "   Result Value Ref Range    POCT Glucose 292 (H) 70 - 110 mg/dL   COVID-19 Rapid Screening    Collection Time: 09/28/22 10:41 PM   Result Value Ref Range    SARS COV-2 MOLECULAR Negative Negative   Urinalysis    Collection Time: 09/28/22 10:55 PM   Result Value Ref Range    Color, UA Dark-Orange (A) Yellow, Colorless, Other, Clear    Appearance, UA Cloudy (A) Clear    Specific Gravity, UA >=1.030 1.001 - 1.030    pH, UA 5.0 5.0, 5.5, 6.0, 6.5, 7.0, 7.5, 8.0, 8.5    Protein, UA 3+ (A) Negative, 300  mg/dL    Glucose, UA Trace (A) Negative, Normal mg/dL    Ketones, UA 1+ (A) Negative, +1, +2, +3, +4, +5, >=160, >=80 mg/dL    Blood, UA 3+ (A) Negative unit/L    Bilirubin, UA 2+ (A) Negative mg/dL    Urobilinogen, UA 0.2 0.2, 1.0, Normal mg/dL    Nitrites, UA Positive (A) Negative    Leukocyte Esterase, UA Trace (A) Negative, 75  unit/L   Urinalysis, Microscopic    Collection Time: 09/28/22 10:55 PM   Result Value Ref Range    RBC, UA 11-20 (A) None Seen, 0-2, 3-5, 0-5 /HPF    WBC, UA 21-50 (A) None Seen, 0-2, 3-5, 0-5 /HPF    Squamous Epithelial Cells, UA 0-4 0-4, None Seen /HPF    Bacteria, UA Few (A) None Seen, Rare, Occasional /HPF    Hyaline Casts, UA Few (A) None Seen /HPF    Granular Casts, UA Moderate (A) None Seen /HPF    WBC Clumps, UA Occ (A) None Seen /HPF   POCT glucose    Collection Time: 09/29/22 12:18 AM   Result Value Ref Range    POCT Glucose 252 (H) 70 - 110 mg/dL   Comprehensive Metabolic Panel    Collection Time: 09/29/22  1:41 AM   Result Value Ref Range    Sodium Level 130 (L) 136 - 145 mmol/L    Potassium Level 3.5 3.5 - 5.1 mmol/L    Chloride 100 98 - 107 mmol/L    Carbon Dioxide 20 (L) 22 - 29 mmol/L    Glucose Level 252 (H) 74 - 100 mg/dL    Blood Urea Nitrogen 22.8 (H) 8.9 - 20.6 mg/dL    Creatinine 4.45 (H) 0.73 - 1.18 mg/dL    Calcium Level Total 8.1 (L) 8.4 - 10.2 mg/dL    Protein Total 4.9 (L) 6.4 - 8.3 gm/dL    Albumin Level 1.1 (L) 3.5 - 5.0 gm/dL    Globulin 3.8 (H) 2.4 - 3.5 gm/dL     Albumin/Globulin Ratio 0.3 (L) 1.1 - 2.0 ratio    Bilirubin Total 0.6 <=1.5 mg/dL    Alkaline Phosphatase 97 40 - 150 unit/L    Alanine Aminotransferase 17 0 - 55 unit/L    Aspartate Aminotransferase 38 (H) 5 - 34 unit/L    eGFR 17 mls/min/1.73/m2   Magnesium    Collection Time: 09/29/22  1:41 AM   Result Value Ref Range    Magnesium Level 2.10 1.60 - 2.60 mg/dL   Phosphorus    Collection Time: 09/29/22  1:41 AM   Result Value Ref Range    Phosphorus Level 3.6 2.3 - 4.7 mg/dL   CBC with Differential    Collection Time: 09/29/22  1:41 AM   Result Value Ref Range    WBC 21.5 (H) 4.5 - 11.5 x10(3)/mcL    RBC 3.16 (L) 4.70 - 6.10 x10(6)/mcL    Hgb 9.2 (L) 14.0 - 18.0 gm/dL    Hct 27.0 (L) 42.0 - 52.0 %    MCV 85.4 80.0 - 94.0 fL    MCH 29.1 27.0 - 31.0 pg    MCHC 34.1 33.0 - 36.0 mg/dL    RDW 15.6 11.5 - 17.0 %    Platelet 192 130 - 400 x10(3)/mcL    MPV 13.3 (H) 7.4 - 10.4 fL    IG# 0.51 (H) 0 - 0.04 x10(3)/mcL    IG% 2.4 %    NRBC% 0.4 %   Manual Differential    Collection Time: 09/29/22  1:41 AM   Result Value Ref Range    Neut Man 84 %    Lymph Man 8 %    Monocyte Man 6 %    Eos Man 1 %    Instr WBC 22.6 x10(3)/mcL    Abs Mono 1.356 (H) 0.1 - 1.3 x10(3)/mcL    Abs Eos  0.226 0 - 0.9 x10(3)/mcL    Abs Lymp 1.808 0.6 - 4.6 x10(3)/mcL    Abs Neut 18.984 (H) 2.1 - 9.2 x10(3)/mcL    NRBC Man 3 %    RBC Morph Abnormal (A) Normal    Anisocyte 1+ (A) (none)    Poik 2+ (A) (none)    Macrocyte 2+ (A) (none)    Target Cell 1+ (A) (none)    Tear drop cell 1+ (A) (none)    Platelet Est Normal Normal, Adequate   POCT ARTERIAL BLOOD GAS    Collection Time: 09/29/22  4:25 AM   Result Value Ref Range    POC PH 7.42     POC PCO2 39 mmHg    POC PO2 79 (A) mmHg    POC SATURATED O2 96 %    POC Potassium 3.4 (A) mmol/l    POC Sodium 128 (A) 137 - 145 mmol/l    POC Ionized Calcium 1.15 mmol/l    POC HCO3 25.3 mmol/l    Base Deficit 0.80 mmol/l    POC Temp 37.0 C    Specimen source Arterial sample    POCT glucose    Collection Time:  09/29/22  4:30 AM   Result Value Ref Range    POCT Glucose 232 (H) 70 - 110 mg/dL         Assessment/Plan:  Acute kidney injury-multifactorial shock-now anuric   Post exploratory lap for decompression 09/27/2022  s/p TEVAR procedure for type B dissection 09/23/2022  Prior history of SLE and type A dissection  Mild hyponatremia  Severe hypertension  Mild hypocalcemia    Patient was initiated on dialysis on the 25th.  He is currently being dialyzed on a 3.5 mEq calcium dialysate to prevent hypocalcemia.  He is set for 2 L UF today.  He remains hypertensive but still off Cleviprex.    We will continue dialysis as needed.   Will plan on dialysis today with higher calcium dialysate and continue TTS thereafter.  We will continue to follow closely with you.      Shahzad Lundberg MD, [facility]

## 2022-09-29 NOTE — PROGRESS NOTES
[facility] - 7 North ICU  Pulmonary Critical Care Note    Patient Name: Stuart Guevara  MRN: [id number]  Admission Date: 9/23/2022  Hospital Length of Stay: 6 days  Code Status: Full Code  Attending Provider: Ivan Chopra MD  Primary Care Provider: Primary Doctor No     Subjective:     HPI: Mr. Guevara is a 32-year-old male with past medical history significant for SLE, HTN, HX of type a aortic dissection repaired in February 2021, HX of right upper extremity DVT who presented to [facility] Emergency Department on 09/23/2022 with complaints of tearing, ripping chest pain with radiation to the back.  Initial BP noted to be 232/127.  CT scan significant for type B aortic dissection, started on esmolol drip and transferred to [facility] for CT surgery services.  Admitted to the ICU with CT surgery consulted.    Hospital Course/Significant events:    09/23/2022:  With concern for left arm blood pressure discrepancy and concern for diminished outflow through left subclavian patient was brought to OR with CT surgery for TECAR; left carotid to left subclavian artery bypass  9/25/22: patient had significant abdominal pain, acute abdomen, shortness of breath. CT scan showed global hypoperfusion. Received 2 units pRBC. In the setting of shock and respiratory failure, intubated and placed on ventilator. Brought to OR with general surgery for exploratory laparotomy which revealed ascites in peritoneal cavity, mottled liver, viable small bowel and right pleural effusion. Chest tube placed. Vancomycin initiated. HD initiated due to electrolyte derangements.  9/27/22 patient noted to have firm abdomen, brought to OR for decompressive laparotomy. Large volume retroperitoneal ascitic fluid drained.    24 Hour Interval History:  Overnight, no acute events, afebrile, vss. Patient has slowly become anuric during this admission. Chest tube was clamped yesterday, plans to remove later today. Patient is also planned for general  surgery for partial closure of wound later today, with  changing of wound vac. Patient scheduled for dialysis today, on TTS schedule.        Past Medical History:   Diagnosis Date    Hypertension     Systemic lupus erythematosus, organ or system involvement unspecified     Systemic lupus erythematosus, organ or system involvement unspecified        Past Surgical History:   Procedure Laterality Date    APPLICATION OF WOUND VACUUM-ASSISTED CLOSURE DEVICE  9/27/2022    Procedure: APPLICATION, WOUND VAC;  Surgeon: Christopher Espinal MD;  Location: [facility];  Service: General;;    CARDIAC SURGERY      FINGER AMPUTATION      THROMBECTOMY Right     TOE AMPUTATION     Mechanical aortic valve    Social History     Socioeconomic History    Marital status: Single   Tobacco Use    Smoking status: Every Day     Types: Cigarettes    Smokeless tobacco: Never           Current Outpatient Medications   Medication Instructions    albuterol (PROVENTIL/VENTOLIN HFA) 90 mcg/actuation inhaler 2 puffs, Inhalation, Every 4 hours PRN, Rescue    amLODIPine (NORVASC) 10 mg, Oral, Daily    aspirin (ECOTRIN) 81 mg, Oral, Daily    belimumab (BENLYSTA) 120 mg SolR Benlysta Take No date recorded No form recorded No frequency recorded No route recorded No set duration recorded No set duration amount recorded active No dosage strength recorded No dosage strength units of measure recorded    famotidine (PEPCID) 20 mg, Oral, 2 times daily    gemfibroziL (LOPID) 600 mg, Oral    hydrALAZINE (APRESOLINE) 10 mg, Oral    labetaloL (NORMODYNE) 300 mg, Oral, 2 times daily    lisinopriL (PRINIVIL,ZESTRIL) 40 mg, Oral, Daily    metoprolol tartrate (LOPRESSOR) 50 mg, Oral, 2 times daily    mycophenolate (CELLCEPT) 1,000 mg, Oral, 2 times daily    MEÑO 2 mg TbEC 1 tablet, Oral, Daily    rivaroxaban (XARELTO ORAL) Xarelto Take No date recorded No form recorded No frequency recorded No route recorded No set duration recorded No set duration amount recorded active No  dosage strength recorded No dosage strength units of measure recorded    sucralfate (CARAFATE) 1 g, Oral, 2 times daily    warfarin (COUMADIN) 3 mg, Oral, Daily       Current Inpatient Medications   ceFEPime (MAXIPIME) IVPB  1 g Intravenous Q12H    famotidine  20 mg Oral Every other day    gemfibroziL  600 mg Oral BID AC    metronidazole  500 mg Intravenous Q8H    sucralfate  1 g Oral BID       Current Intravenous Infusions   sodium chloride 0.9% 60 mL/hr at 09/28/22 1758    clevidipine 0 mg/hr (09/26/22 0918)    dextrose 5 % and 0.9 % NaCl 50 mL/hr at 09/28/22 1758    EPINEPHrine 0 mcg/kg/min (09/25/22 2000)    esmolol 125 mcg/kg/min (09/28/22 1758)    NORepinephrine bitartrate-D5W 0 mcg/kg/min (09/25/22 2000)    propofoL 35 mcg/kg/min (09/29/22 0000)         Review of Systems   Reason unable to perform ROS: intubated, sedated.        Objective:       Intake/Output Summary (Last 24 hours) at 9/29/2022 0426  Last data filed at 9/29/2022 0000  Gross per 24 hour   Intake 2483.89 ml   Output 1095 ml   Net 1388.89 ml           Vital Signs (Most Recent):  Temp: 99.5 °F (37.5 °C) (09/29/22 0400)  Pulse: 85 (09/29/22 0400)  Resp: (!) 32 (09/29/22 0400)  BP: 136/71 (09/29/22 0400)  SpO2: 100 % (09/29/22 0400)    Body mass index is 21.36 kg/m².  Weight: 60 kg (132 lb 4.4 oz) Vital Signs (24h Range):  Temp:  [98.9 °F (37.2 °C)-99.5 °F (37.5 °C)] 99.5 °F (37.5 °C)  Pulse:  [] 85  Resp:  [13-33] 32  SpO2:  [95 %-100 %] 100 %  BP: (100-194)/(48-96) 136/71     Physical Exam  Constitutional:       Appearance: He is ill-appearing.      Comments: Intubated, sedated, on ventilator   HENT:      Head: Normocephalic.      Right Ear: External ear normal.      Left Ear: External ear normal.   Neck:      Comments: Left sided bandage in place without oozing or bleeding   Cardiovascular:      Rate and Rhythm: Normal rate and regular rhythm.      Heart sounds: No murmur heard.    No friction rub.   Pulmonary:      Breath sounds: Normal  breath sounds. No wheezing.   Chest:      Comments: Well healed mid-sternal surgical scar  Abdominal:      Comments: Firm. Wound vac in place.    Musculoskeletal:         General: No swelling, tenderness or deformity.      Comments: Symmetrical RUE and LUE edema noted    Skin:     General: Skin is warm and dry.      Comments: Bullae noted on right arm   Neurological:      General: No focal deficit present.      Sensory: No sensory deficit.      Comments: Opens eyes, localizes to pain   Psychiatric:         Mood and Affect: Mood normal.         Behavior: Behavior normal.         Thought Content: Thought content normal.         Judgment: Judgment normal.         Lines/Drains/Airways       Central Venous Catheter Line  Duration                  Hemodialysis Catheter 09/25/22 1900 right femoral 3 days              Drain  Duration                  Chest Tube 09/25/22 1419 1 Right Mediastinal 28 Fr. 3 days         NG/OG Tube 09/25/22 1220 [place] sump 18 Fr. Left mouth 3 days         Urethral Catheter 09/28/22 2200 <1 day              Airway  Duration                  Airway - Non-Surgical 09/25/22 1220 Endotracheal Tube 3 days              Peripheral Intravenous Line  Duration                  Peripheral IV - Single Lumen 09/23/22 0630 18 G Anterior;Distal;Right Forearm 5 days         Peripheral IV - Single Lumen 09/25/22 1952 18 G;Other (Comments) Anterior;Left;Proximal Forearm 3 days         Peripheral IV - Single Lumen 09/25/22 1953 20 G;Other (Comments) Anterior;Distal;Left Forearm 3 days                    Significant Labs:    Lab Results   Component Value Date    WBC 21.5 (H) 09/29/2022    HGB 9.2 (L) 09/29/2022    HCT 27.0 (L) 09/29/2022    MCV 85.4 09/29/2022     09/29/2022         BMP  Lab Results   Component Value Date     (L) 09/29/2022    K 3.5 09/29/2022    CO2 20 (L) 09/29/2022    BUN 22.8 (H) 09/29/2022    CREATININE 4.45 (H) 09/29/2022    CALCIUM 8.1 (L) 09/29/2022    EGFRNONAA 80 01/31/2022        ABG  Recent Labs   Lab 09/29/22  0425   PH 7.42   PO2 79*   PCO2 39   HCO3 25.3         Mechanical Ventilation Support:  Vent Mode: PRVC A/C (09/29/22 0027)  Set Rate: 32 BPM (09/29/22 0027)  Vt Set: 430 mL (09/29/22 0027)  PEEP/CPAP: 8 cmH20 (09/29/22 0027)  Oxygen Concentration (%): 30 (09/29/22 0400)  Peak Airway Pressure: 28 cmH2O (09/29/22 0027)  Total Ve: 13.3 mL (09/29/22 0027)  F/VT Ratio<105 (RSBI): (!) 77.67 (09/28/22 1719)    Significant Imaging:  I have reviewed the pertinent imaging within the past 24 hours.        Assessment/Plan:     Assessment  Type B aortic dissection  CT at outside ED significant for [place] type B aortic dissection with anterior extensive mid descending thoracic aorta, 3.7 cm dilation  Hypertensive emergency  Acute renal failure  History right upper extremity DVT, previously on Xarelto, noncompliance for past 2 months  RUE US done on 9/24/22 negative for thrombus  SLE, currently unmedicated  S/p exploratory laparotomy 9/25/22 due to acute abdomen and evidence of global hypoperfusion on CT  Revealed mottled liver, ascites in peritoneal cavity, viable small bowel and right pleural effusion, pancreatic swelling  Chest tube placed  Acute hypoxemic respiratory failure  On ventilator  Pancreatitis, presumptive   Elevated lipase, saponification of pancreas noted during exploratory laparotomy        Plan  -postop day 5 left carotid artery to left subclavian artery bypass with CT surgery  -postop day 3 s/p exploratory laparotomy with general surgery which revealed viable bowel but some concern for pancreatitis due saponification , postop day 2 s/p decompressive laparotomy 2/2 ascitic fluid buildup.  - will return to OR today for partial closure of wound, change wound vac.  -off cleviprex, off pressors. BP has been stable. SBP <150 per thoracic surgery recs.  -no longer being transvenously paced. Stable sinus rhythm.  - echo performed 9/26/22 significant for reduced EF of 34%.  -H/H  stable this morning  - pt anuric. pt to receive dialysis this morning, worsening renal indices this morning. Continue TTS dialysis. Nephrology on board.  -continue NPO, unlikely that pt will be able to tolerate tube feeds at this time.  -IVF started yesterday  -antibiotics initiated in the setting of shock (cefepime and flagyl). Cultures show no growth to date. Now off of vanc.  -repeat right upper extremity ultrasound negative--> has a reported history of DVT but has been noncompliant with anticoagulation  -continue ICU care    DVT Prophylaxis: SCDs  GI Prophylaxis: famotidine     33 minutes of critical care was time spent personally by me on the following activities: development of treatment plan with patient or surrogate and bedside caregivers, discussions with consultants, evaluation of patient's response to treatment, examination of patient, ordering and performing treatments and interventions, ordering and review of laboratory studies, ordering and review of radiographic studies, pulse oximetry, re-evaluation of patient's condition.  This critical care time did not overlap with that of any other provider or involve time for any procedures.     Francia Marcus DO  Pulmonary Critical Care Medicine Resident  [facility] - [address] [place] [address]

## 2022-09-30 NOTE — PROGRESS NOTES
NEPHROLOGY: Progress Note    Hospital Course:    32-year-old a.m. with a history of SLE, severe hypertension, previous type A aortic dissection repair in February of 2021 with aortic valve replacement at that time.  Initially presented to [facility] with severe tearing chest pain radiating into his back with a blood pressure of 232/127.  CT confirmed type B aortic dissection, he was transferred to [facility] ICU on esmolol drip and underwent TEVAR  as well as a carotid artery to subclavian bypass on the left on 09/23/2022.    On 09/25 due to worsening abdominal distention and rigidity and concern for ischemic bowel the patient underwent exploratory lap and right chest tube placement for pleural effusion .  No clear ischemic bowel was found, but, he was initiated on dialysis due to hyperkalemia via temporary right femoral cath.      He currently remains intubated and sedated with propofol.  Currently on dialysis.    Patient requiring repeat exploratory lap on the 27th for evacuation of a large retroperitoneal fluid collection with displaced abdominal viscera anteriorly fortunately no clear-cut evidence of ischemia.          Current Facility-Administered Medications:     acetaminophen tablet 650 mg, 650 mg, Oral, Q6H PRN, Nolberto Calvillo MD, 650 mg at 09/28/22 0025    albuterol inhaler 2 puff, 2 puff, Inhalation, Q4H PRN, Evelio Marshall MD    Amino acid 4.25% - dextrose 5% (CLINIMIX-E) solution (1L provides 42.5 gm AA, 50 gm CHO (170 kcal/L dextrose), Na 35, K 30, Mg 5, Ca 4.5, Acetate 70, Cl 39, Phos 15), , Intravenous, Continuous, Shahzad Lundberg MD, Last Rate: 50 mL/hr at 09/30/22 0545, Rate Verify at 09/30/22 0545    clevidipine (CLEVIPREX) 25 mg/50 mL infusion, 0-16 mg/hr, Intravenous, Continuous, Bin Bell MD, Last Rate: 4 mL/hr at 09/30/22 0545, 2 mg/hr at 09/30/22 0545    dexmedetomidine  (PRECEDEX) 400mcg/100mL 0.9% NaCL infusion, 0-1.4 mcg/kg/hr, Intravenous, Continuous, Cong Velez DO, Last Rate: 9 mL/hr at 09/30/22 0545, 0.6 mcg/kg/hr at 09/30/22 0545    dextrose 10% bolus 125 mL, 12.5 g, Intravenous, PRN, Ivan Chopra MD    dextrose 5 % and 0.9 % NaCl infusion, , Intravenous, Continuous, Shahzad Lundberg MD, Stopped at 09/29/22 1900    famotidine tablet 20 mg, 20 mg, Oral, Every other day, Ivan Chopra MD    gemfibroziL tablet 600 mg, 600 mg, Oral, BID AC, Francia Marcus DO, 600 mg at 09/30/22 0645    glucagon (human recombinant) injection 1 mg, 1 mg, Intramuscular, PRN, Francia Marcus DO    hydrALAZINE injection 20 mg, 20 mg, Intravenous, Q4H PRN, Cong Velez DO, 20 mg at 09/29/22 0733    HYDROmorphone (PF) injection 1 mg, 1 mg, Intravenous, Q4H PRN, Cong Velez DO    insulin aspart U-100 injection 0-5 Units, 0-5 Units, Subcutaneous, Q6H PRN, Francia Marcus DO, 2 Units at 09/30/22 0607    labetaloL injection 20 mg, 20 mg, Intravenous, Q8H PRN, Shahzad Lundberg MD, 20 mg at 09/29/22 1303    LIDOcaine (PF) 10 mg/ml (1%) injection, , , PRN, Ruben Arciniega MD, 5 mL at 09/25/22 1656    lorazepam injection 1 mg, 1 mg, Intravenous, Q2H PRN, Cong Velez DO    morphine injection 2 mg, 2 mg, Intravenous, Q2H PRN, Evelio Marshall MD, 2 mg at 09/26/22 1416    NORepinephrine 8 mg in dextrose 5% 250 mL infusion, 0-3 mcg/kg/min, Intravenous, Continuous, Luca Rae DO, Last Rate: 0 mL/hr at 09/25/22 2000, 0 mcg/kg/min at 09/25/22 2000    ondansetron injection 4 mg, 4 mg, Intravenous, Q6H PRN, Evelio Marshall MD, 4 mg at 09/23/22 1236    propofol (DIPRIVAN) 10 mg/mL infusion, 0-50 mcg/kg/min, Intravenous, Continuous, Luca Rae DO, Last Rate: 10.8 mL/hr at 09/30/22 0545, 30 mcg/kg/min at 09/30/22 0545    sodium chloride 0.9% bolus 250 mL, 250 mL, Intravenous, PRN, Shahzad Lundberg MD    sodium chloride 0.9% bolus 250 mL, 250 mL, Intravenous, PRN, Shahzad Lundberg MD     "sodium chloride 0.9% flush 10 mL, 10 mL, Intravenous, PRN, Francia Marcus DO    sodium chloride 0.9% flush 10 mL, 10 mL, Intravenous, PRN, Evelio Marshall MD    sodium chloride 0.9% flush 10 mL, 10 mL, Intravenous, PRN, Evelio Marshall MD    sucralfate tablet 1 g, 1 g, Oral, BID, Francia Marcus DO, 1 g at 09/29/22 0900        BP (!) 140/72   Pulse 70   Temp 98.4 °F (36.9 °C) (Axillary)   Resp (!) 32   Ht 5' 5" (1.651 m)   Wt 60 kg (132 lb 4.4 oz)   SpO2 100%   BMI 22.01 kg/m²     Physical Exam:    GEN:  Patient is currently intubated, OG tube in place.  Sedated but does arouse and open eyes and looked at my direction when spoken to.    HEENT: Atraumatic.  Pupils are 2 mm but equal and reactive.  NECK : No JVD, right-sided IJ Cordis  CARD : RRR with aortic click.  No appreciable rub.  LUNGS : Clear to auscultation.  Right chest tube in place  ABD :  Soft, nondistended, midline wound VAC in place.   EXT : No pitting edema, but he is becoming diffusely puffy in his arms especially.  Right femoral temporary dialysis catheter currently functioning well for dialysis  NEURO :  Patient starting to open his eyes to verbal and tactile stimuli.      Intake/Output Summary (Last 24 hours) at 9/30/2022 0857  Last data filed at 9/30/2022 0545  Gross per 24 hour   Intake 2069.68 ml   Output 2835 ml   Net -765.32 ml         Laboratory:  Recent Results (from the past 24 hour(s))   POCT glucose    Collection Time: 09/29/22 10:40 PM   Result Value Ref Range    POCT Glucose 213 (H) 70 - 110 mg/dL   Comprehensive Metabolic Panel    Collection Time: 09/30/22  1:16 AM   Result Value Ref Range    Sodium Level 128 (L) 136 - 145 mmol/L    Potassium Level 3.3 (L) 3.5 - 5.1 mmol/L    Chloride 96 (L) 98 - 107 mmol/L    Carbon Dioxide 22 22 - 29 mmol/L    Glucose Level 205 (H) 74 - 100 mg/dL    Blood Urea Nitrogen 21.5 (H) 8.9 - 20.6 mg/dL    Creatinine 3.28 (H) 0.73 - 1.18 mg/dL    Calcium Level Total 8.5 8.4 - 10.2 mg/dL    Protein " Total 5.1 (L) 6.4 - 8.3 gm/dL    Albumin Level 1.1 (L) 3.5 - 5.0 gm/dL    Globulin 4.0 (H) 2.4 - 3.5 gm/dL    Albumin/Globulin Ratio 0.3 (L) 1.1 - 2.0 ratio    Bilirubin Total 0.6 <=1.5 mg/dL    Alkaline Phosphatase 133 40 - 150 unit/L    Alanine Aminotransferase 17 0 - 55 unit/L    Aspartate Aminotransferase 41 (H) 5 - 34 unit/L    eGFR 25 mls/min/1.73/m2   Magnesium    Collection Time: 09/30/22  1:16 AM   Result Value Ref Range    Magnesium Level 2.20 1.60 - 2.60 mg/dL   Phosphorus    Collection Time: 09/30/22  1:16 AM   Result Value Ref Range    Phosphorus Level 2.9 2.3 - 4.7 mg/dL   CBC with Differential    Collection Time: 09/30/22  1:16 AM   Result Value Ref Range    WBC 23.6 (H) 4.5 - 11.5 x10(3)/mcL    RBC 3.39 (L) 4.70 - 6.10 x10(6)/mcL    Hgb 10.2 (L) 14.0 - 18.0 gm/dL    Hct 28.6 (L) 42.0 - 52.0 %    MCV 84.4 80.0 - 94.0 fL    MCH 30.1 27.0 - 31.0 pg    MCHC 35.7 33.0 - 36.0 mg/dL    RDW 15.6 11.5 - 17.0 %    Platelet 151 130 - 400 x10(3)/mcL    MPV      IG# 0.82 (H) 0 - 0.04 x10(3)/mcL    IG% 3.5 %    NRBC% 0.3 %   Manual Differential    Collection Time: 09/30/22  1:16 AM   Result Value Ref Range    Neut Man 84 %    Lymph Man 7 %    Monocyte Man 7 %    Eos Man 1 %    Basophil Man 1 %    Promyelo Man 1 %    Instr WBC 23.6 x10(3)/mcL    Abs Mono 1.652 (H) 0.1 - 1.3 x10(3)/mcL    Abs Eos  0.236 0 - 0.9 x10(3)/mcL    Abs Baso 0.236 (H) 0 - 0.2 x10(3)/mcL    Abs Lymp 1.652 0.6 - 4.6 x10(3)/mcL    Abs Neut 20.06 (H) 2.1 - 9.2 x10(3)/mcL    RBC Morph Abnormal (A) Normal    Anisocyte 2+ (A) (none)    Poik 2+ (A) (none)    Macrocyte 2+ (A) (none)    Target Cell 1+ (A) (none)    Tear drop cell 2+ (A) (none)    Platelet Est Adequate Normal, Adequate   POCT glucose    Collection Time: 09/30/22  2:42 AM   Result Value Ref Range    POCT Glucose 232 (H) 70 - 110 mg/dL   POCT ARTERIAL BLOOD GAS    Collection Time: 09/30/22  4:45 AM   Result Value Ref Range    POC PH 7.54 (A) 7.35 - 7.45    POC PCO2 30 (A) 35 - 45 mmHg     POC PO2 70 (A) 80 - 100 mmHg    POC HEMOGLOBIN 10.6 (A) 12.0 - 16.0 g/dL    POC SATURATED O2 95.8 %    POC O2Hb 93.4 (A) 94.0 - 97.0 %    POC COHb 1.5 %    POC MetHb 1.4 0.40 - 1.5 %    POC Potassium 3.1 (A) 3.5 - 5.0 mmol/l    POC Sodium 127 (A) 137 - 145 mmol/l    POC Ionized Calcium 1.12 1.12 - 1.23 mmol/l    Correct Temperature (PH) 7.54 (A) 7.35 - 7.45    Corrected Temperature (pCO2) 30 (A) 35 - 45 mmHg    Corrected Temperature (pO2) 70 (A) 80 - 100 mmHg    POC HCO3 25.7 22.0 - 26.0 mmol/l    Base Deficit 3.5 (A) -2.0 - 2.0 mmol/l    POC Temp 37.0 °C    Specimen source Arterial sample     PEEP 8.0 cm H2O   POCT glucose    Collection Time: 09/30/22  5:15 AM   Result Value Ref Range    POCT Glucose 248 (H) 70 - 110 mg/dL   POCT glucose    Collection Time: 09/30/22  8:41 AM   Result Value Ref Range    POCT Glucose 250 (H) 70 - 110 mg/dL         Assessment/Plan:  Acute kidney injury-multifactorial shock-now anuric   Post exploratory lap for decompression 09/27/2022  s/p TEVAR procedure for type B dissection 09/23/2022  Prior history of SLE and type A dissection  Mild hyponatremia  Severe hypertension  Hypocalcemia-resolved  Profound hypoalbuminemia    Patient was initiated on dialysis on the 25th.  He is now on a Tuesday Thursday Saturday dialysis schedule and remains essentially anuric.  His respiratory status is improving and he may be extubated today.  He had been dialyzed on a 3.5 mEq calcium dialysate for hypocalcemia but this is no longer necessary.      We will continue dialysis on TTS schedule with a 2.5 mEq calcium dialysate now forward.     Once extubated we should look into moving the right femoral catheter to an IJ position.

## 2022-09-30 NOTE — ANESTHESIA POSTPROCEDURE EVALUATION
Anesthesia Post Evaluation    Patient: Stuart Guevara    Procedure(s) Performed: Procedure(s) (LRB):  LAPAROTOMY, EXPLORATORY (N/A)    Final Anesthesia Type: general      Patient location during evaluation: PACU  Patient participation: Yes- Able to Participate  Level of consciousness: awake and alert  Post-procedure vital signs: reviewed and stable  Pain management: adequate  Airway patency: patent    PONV status at discharge: No PONV  Anesthetic complications: no      Cardiovascular status: blood pressure returned to baseline  Respiratory status: spontaneous ventilation and unassisted  Hydration status: euvolemic  Follow-up needed           Vitals Value Taken Time   /71 09/30/22 0716   Temp 36.9 °C (98.4 °F) 09/30/22 0400   Pulse 73 09/30/22 0718   Resp 32 09/30/22 0718   SpO2 100 % 09/30/22 0718   Vitals shown include unvalidated device data.      No case tracking events are documented in the log.      Pain/Marcin Score: No data recorded

## 2022-09-30 NOTE — PROGRESS NOTES
[facility] - 7 North ICU  Pulmonary Critical Care Note    Patient Name: Stuart Guevara  MRN: [id number]  Admission Date: 9/23/2022  Hospital Length of Stay: 7 days  Code Status: Full Code  Attending Provider: Ivan Chopra MD  Primary Care Provider: Primary Doctor No     Subjective:     HPI: Mr. Guevara is a 32-year-old male with past medical history significant for SLE, HTN, HX of type a aortic dissection repaired in February 2021, HX of right upper extremity DVT who presented to [facility] Emergency Department on 09/23/2022 with complaints of tearing, ripping chest pain with radiation to the back.  Initial BP noted to be 232/127.  CT scan significant for type B aortic dissection, started on esmolol drip and transferred to [facility] for CT surgery services.  Admitted to the ICU with CT surgery consulted.    Hospital Course/Significant events:    09/23/2022:  With concern for left arm blood pressure discrepancy and concern for diminished outflow through left subclavian patient was brought to OR with CT surgery for TECAR; left carotid to left subclavian artery bypass  9/25/22: patient had significant abdominal pain, acute abdomen, shortness of breath. CT scan showed global hypoperfusion. Received 2 units pRBC. In the setting of shock and respiratory failure, intubated and placed on ventilator. Brought to OR with general surgery for exploratory laparotomy which revealed ascites in peritoneal cavity, mottled liver, viable small bowel and right pleural effusion. Chest tube placed. Vancomycin initiated. HD initiated due to electrolyte derangements.  9/27/22 patient noted to have firm abdomen, brought to OR for decompressive laparotomy. Large volume retroperitoneal ascitic fluid drained.  09/29/2022 abdominal closure    24 Hour Interval History:  No acute events overnight.  Afebrile with a T-max of 37.2° C.  Vital signs stable this morning with a blood pressure 142/72, heart rate 71, respiratory rate 32.   Remains mechanically ventilated on AC mode with tidal volume 430, respiratory rate 32, peep 8, FiO2 30%.  Sedated with Precedex at 0.6 mcg and propofol 30 mcg.        Past Medical History:   Diagnosis Date    Hypertension     Systemic lupus erythematosus, organ or system involvement unspecified     Systemic lupus erythematosus, organ or system involvement unspecified        Past Surgical History:   Procedure Laterality Date    APPLICATION OF WOUND VACUUM-ASSISTED CLOSURE DEVICE  9/27/2022    Procedure: APPLICATION, WOUND VAC;  Surgeon: Christopher Espinal MD;  Location: [facility];  Service: General;;    CARDIAC SURGERY      FINGER AMPUTATION      THROMBECTOMY Right     TOE AMPUTATION     Mechanical aortic valve    Social History     Socioeconomic History    Marital status: Single   Tobacco Use    Smoking status: Every Day     Types: Cigarettes    Smokeless tobacco: Never           Current Outpatient Medications   Medication Instructions    albuterol (PROVENTIL/VENTOLIN HFA) 90 mcg/actuation inhaler 2 puffs, Inhalation, Every 4 hours PRN, Rescue    amLODIPine (NORVASC) 10 mg, Oral, Daily    aspirin (ECOTRIN) 81 mg, Oral, Daily    belimumab (BENLYSTA) 120 mg SolR Benlysta Take No date recorded No form recorded No frequency recorded No route recorded No set duration recorded No set duration amount recorded active No dosage strength recorded No dosage strength units of measure recorded    famotidine (PEPCID) 20 mg, Oral, 2 times daily    gemfibroziL (LOPID) 600 mg, Oral    hydrALAZINE (APRESOLINE) 10 mg, Oral    labetaloL (NORMODYNE) 300 mg, Oral, 2 times daily    lisinopriL (PRINIVIL,ZESTRIL) 40 mg, Oral, Daily    metoprolol tartrate (LOPRESSOR) 50 mg, Oral, 2 times daily    mycophenolate (CELLCEPT) 1,000 mg, Oral, 2 times daily    MEÑO 2 mg TbEC 1 tablet, Oral, Daily    rivaroxaban (XARELTO ORAL) Xarelto Take No date recorded No form recorded No frequency recorded No route recorded No set duration recorded No set duration  amount recorded active No dosage strength recorded No dosage strength units of measure recorded    sucralfate (CARAFATE) 1 g, Oral, 2 times daily    warfarin (COUMADIN) 3 mg, Oral, Daily       Current Inpatient Medications   ceFEPime (MAXIPIME) IVPB  1 g Intravenous Q12H    famotidine  20 mg Oral Every other day    gemfibroziL  600 mg Oral BID AC    metronidazole  500 mg Intravenous Q8H    sucralfate  1 g Oral BID       Current Intravenous Infusions   Amino acid 4.25% - dextrose 5% (CLINIMIX-E) solution (1L provides 42.5 gm AA, 50 gm CHO (170 kcal/L dextrose), Na 35, K 30, Mg 5, Ca 4.5, Acetate 70, Cl 39, Phos 15) 50 mL/hr at 09/29/22 1659    clevidipine 2 mg/hr (09/29/22 2330)    dexmedetomidine (PRECEDEX) infusion 0.6 mcg/kg/hr (09/29/22 2222)    dextrose 5 % and 0.9 % NaCl Stopped (09/29/22 1900)    NORepinephrine bitartrate-D5W 0 mcg/kg/min (09/25/22 2000)    propofoL 30 mcg/kg/min (09/29/22 2222)         Review of Systems   Reason unable to perform ROS: intubated, sedated.        Objective:       Intake/Output Summary (Last 24 hours) at 9/30/2022 0211  Last data filed at 9/29/2022 1659  Gross per 24 hour   Intake 2025.11 ml   Output 2805 ml   Net -779.89 ml           Vital Signs (Most Recent):  Temp: 98.3 °F (36.8 °C) (09/30/22 0000)  Pulse: 83 (09/30/22 0200)  Resp: (!) 32 (09/30/22 0200)  BP: (!) 146/80 (09/30/22 0200)  SpO2: 100 % (09/30/22 0200)    Body mass index is 22.01 kg/m².  Weight: 60 kg (132 lb 4.4 oz) Vital Signs (24h Range):  Temp:  [98.3 °F (36.8 °C)-99.5 °F (37.5 °C)] 98.3 °F (36.8 °C)  Pulse:  [] 83  Resp:  [24-32] 32  SpO2:  [88 %-100 %] 100 %  BP: (101-206)/() 146/80     Physical Exam  Vitals and nursing note reviewed.   Constitutional:       Appearance: He is not ill-appearing or toxic-appearing.      Interventions: He is sedated and intubated.      Comments: Intubated, sedated, on ventilator   HENT:      Head: Normocephalic.      Right Ear: External ear normal.      Left Ear:  External ear normal.   Eyes:      General: No scleral icterus.     Extraocular Movements: Extraocular movements intact.      Conjunctiva/sclera: Conjunctivae normal.      Pupils: Pupils are equal, round, and reactive to light.   Cardiovascular:      Rate and Rhythm: Normal rate and regular rhythm.      Pulses: Decreased pulses.      Heart sounds: Murmur heard.     No friction rub. No gallop.   Pulmonary:      Effort: Pulmonary effort is normal. No respiratory distress. He is intubated.      Breath sounds: Normal breath sounds. No stridor. No wheezing, rhonchi or rales.   Chest:      Chest wall: No tenderness.      Comments: Well healed mid-sternal surgical scar  Abdominal:      Tenderness: There is no abdominal tenderness. There is no guarding or rebound.      Comments: Firm   Musculoskeletal:         General: No swelling, tenderness or deformity.      Comments: Symmetrical RUE and LUE edema noted    Skin:     General: Skin is warm and dry.      Comments: Bullae noted on right arm   Neurological:      Comments: Sedated therefore unable to fully perform neurological exam. However, he does track when eyes are open. Corneal reflex is good. Pupillary response is fair. Minimally withdraws to pain.          Lines/Drains/Airways       Central Venous Catheter Line  Duration                  Hemodialysis Catheter 09/25/22 1900 right femoral 4 days              Drain  Duration                  Chest Tube 09/25/22 1419 1 Right Mediastinal 28 Fr. 4 days         NG/OG Tube 09/25/22 1220 [place] sump 18 Fr. Left mouth 4 days         Closed/Suction Drain 09/29/22 [place];Midline Abdomen Bulb 19 Fr. 1 day         Urethral Catheter 09/28/22 2200 1 day              Airway  Duration                  Airway - Non-Surgical 09/25/22 1220 Endotracheal Tube 4 days              Peripheral Intravenous Line  Duration                  Peripheral IV - Single Lumen 20 G Anterior;Distal;Left Upper Arm -- days         Peripheral IV - Single Lumen  09/25/22 1952 18 G;Other (Comments) Anterior;Left;Proximal Forearm 4 days         Peripheral IV - Single Lumen 09/25/22 1953 20 G;Other (Comments) Anterior;Distal;Left Forearm 4 days                    Significant Labs:    Lab Results   Component Value Date    WBC 21.5 (H) 09/29/2022    HGB 9.2 (L) 09/29/2022    HCT 27.0 (L) 09/29/2022    MCV 85.4 09/29/2022     09/29/2022         BMP  Lab Results   Component Value Date     (L) 09/29/2022    K 3.5 09/29/2022    CO2 20 (L) 09/29/2022    BUN 22.8 (H) 09/29/2022    CREATININE 4.45 (H) 09/29/2022    CALCIUM 8.1 (L) 09/29/2022    EGFRNONAA 80 01/31/2022       ABG  Recent Labs   Lab 09/29/22 0425   PH 7.42   PO2 79*   PCO2 39   HCO3 25.3         Mechanical Ventilation Support:  Vent Mode: A/C (09/30/22 0030)  Set Rate: 32 BPM (09/30/22 0030)  Vt Set: 430 mL (09/30/22 0030)  PEEP/CPAP: 8 cmH20 (09/30/22 0030)  Oxygen Concentration (%): 30 (09/29/22 2215)  Peak Airway Pressure: 26 cmH2O (09/30/22 0030)  Total Ve: 13.3 mL (09/30/22 0030)  F/VT Ratio<105 (RSBI): (!) 75.65 (09/29/22 2215)    Significant Imaging:  I have reviewed the pertinent imaging within the past 24 hours.        Assessment/Plan:     Assessment  Type B aortic dissection  CT at outside ED significant for Delia type B aortic dissection with anterior extensive mid descending thoracic aorta, 3.7 cm dilation  Hypertensive emergency  Acute renal failure  History right upper extremity DVT, previously on Xarelto, noncompliance for past 2 months  RUE US done on 9/24/22 negative for thrombus  SLE, currently unmedicated  S/p exploratory laparotomy 9/25/22 due to acute abdomen and evidence of global hypoperfusion on CT  Revealed mottled liver, ascites in peritoneal cavity, viable small bowel and right pleural effusion, pancreatic swelling  Chest tube placed  Acute hypoxemic respiratory failure  On ventilator  Pancreatitis, presumptive   Elevated lipase, saponification of pancreas noted during  exploratory laparotomy        Plan  -postop day 7 from TEVAR of the left carotid artery to left subclavian bypass  -off cleviprex, off pressors. BP has been stable. SBP <150 per thoracic surgery recs.  -H/H stable this morning  -Continue TTS dialysis. Nephrology on board.  -blood cultures no growth to date.  Discontinue cefepime and Flagyl  -continue ICU care    DVT Prophylaxis: SCDs  GI Prophylaxis: famotidine     33 minutes of critical care was time spent personally by me on the following activities: development of treatment plan with patient or surrogate and bedside caregivers, discussions with consultants, evaluation of patient's response to treatment, examination of patient, ordering and performing treatments and interventions, ordering and review of laboratory studies, ordering and review of radiographic studies, pulse oximetry, re-evaluation of patient's condition.  This critical care time did not overlap with that of any other provider or involve time for any procedures.     Moise Cohen DO  Pulmonary Critical Care Medicine Resident  [facility] - [address] [place] [address]

## 2022-09-30 NOTE — PROGRESS NOTES
Acute Care Surgery  Daily Progress Note      Subjective:  NAEON.   Ventilated on 0.6 precedex, 30 of propofol.  LINNETTE drain with 30 cc serous output overnight.      Objective:    Vitals:  Temp: 98.3 - 99 F  HR   RRR 19-32  -206/  SpO2: %    Intake/Output:  TPN: 1012.5  UOP: 10 cc recorded in the last 24 hours  LINNETTE: 75 cc, serous   HD: 2500 cc       Physical Exam:  Gen: intubated, on precedex and propofol. Making eye contact.  CV: RRR  Resp: intubated: , RR 25, PEEP 8  Abd: soft, ND, NT; LINNETTE drain with serous output.   Ext: moves all 4 spontaneously and purposefully  Skin: warm, well perfused      Labs:  WBC: 23.6  H/H 10.2/28.6  Plt 151  Na 128  K 3.3  BUN/Cr 21.5/3.28        Imaging:  none    Assessment/Plan:  31 y/o M w Hx of Type B aortic dissection, s/p TEVAR + L carotid-subclavian bypass, p/w acute abdomen 2/2 abdominal compartment syndrome of undetermined etiology s/p ex-lap c/b abdominal compartment syndrome s/p decompressive ex-lap with subsequent abdominal closure and LINNETTE drain placement.    - Continue LINNETTE drain management; will likely pull LINNETTE drain once output is < 40 cc  - Continue antibiotics per primary  - Famotidine, Lov for DVT Ppx   - Remainder of care per primary     Keyonna Castro MD  [employer] General Surgery, PGY2  09/30/2022 06:30 AM

## 2022-09-30 NOTE — PLAN OF CARE
Problem: Adult Inpatient Plan of Care  Goal: Plan of Care Review  Outcome: Ongoing, Progressing  Goal: Patient-Specific Goal (Individualized)  Outcome: Ongoing, Progressing  Goal: Absence of Hospital-Acquired Illness or Injury  Outcome: Ongoing, Progressing  Goal: Optimal Comfort and Wellbeing  Outcome: Ongoing, Progressing  Goal: Readiness for Transition of Care  Outcome: Ongoing, Progressing     Problem: Infection  Goal: Absence of Infection Signs and Symptoms  Outcome: Ongoing, Progressing     Problem: Skin Injury Risk Increased  Goal: Skin Health and Integrity  Outcome: Ongoing, Progressing     Problem: Fall Injury Risk  Goal: Absence of Fall and Fall-Related Injury  Outcome: Ongoing, Progressing     Problem: Communication Impairment (Mechanical Ventilation, Invasive)  Goal: Effective Communication  Outcome: Ongoing, Progressing     Problem: Device-Related Complication Risk (Mechanical Ventilation, Invasive)  Goal: Optimal Device Function  Outcome: Ongoing, Progressing     Problem: Inability to Wean (Mechanical Ventilation, Invasive)  Goal: Mechanical Ventilation Liberation  Outcome: Ongoing, Progressing     Problem: Nutrition Impairment (Mechanical Ventilation, Invasive)  Goal: Optimal Nutrition Delivery  Outcome: Ongoing, Progressing     Problem: Skin and Tissue Injury (Mechanical Ventilation, Invasive)  Goal: Absence of Device-Related Skin and Tissue Injury  Outcome: Ongoing, Progressing     Problem: Ventilator-Induced Lung Injury (Mechanical Ventilation, Invasive)  Goal: Absence of Ventilator-Induced Lung Injury  Outcome: Ongoing, Progressing     Problem: Communication Impairment (Artificial Airway)  Goal: Effective Communication  Outcome: Ongoing, Progressing     Problem: Device-Related Complication Risk (Artificial Airway)  Goal: Optimal Device Function  Outcome: Ongoing, Progressing     Problem: Skin and Tissue Injury (Artificial Airway)  Goal: Absence of Device-Related Skin or Tissue Injury  Outcome:  Ongoing, Progressing     Problem: Noninvasive Ventilation Acute  Goal: Effective Unassisted Ventilation and Oxygenation  Outcome: Ongoing, Progressing

## 2022-09-30 NOTE — PLAN OF CARE
09/30/22 1419   Discharge Assessment   Assessment Type Discharge Planning Assessment   Confirmed/corrected address, phone number and insurance Yes   Confirmed Demographics   (Pt lives with his sisterGil in a single story home and 3 steps to enter with a rail)   Source of Information family  (pt's motherkena ([phone]))   Reason For Admission Aortic dissection   Lives With sibling(s)  (pt's sisterGil)   Do you expect to return to your current living situation?   (TBD pending therapy evals)   Do you have help at home or someone to help you manage your care at home? Yes   Who are your caregiver(s) and their phone number(s)? pt's sisterGil   Prior to hospitilization cognitive status: Alert/Oriented   Current cognitive status: Coma/Sedated/Intubated   Walking or Climbing Stairs Difficulty none   Dressing/Bathing Difficulty none   Home Layout Able to live on 1st floor   Equipment Currently Used at Home none   Patient currently being followed by outpatient case management? No   Do you currently have service(s) that help you manage your care at home? No   Who is going to help you get home at discharge? Family   How do you get to doctors appointments? car, drives self   Are you on dialysis? No   Discharge Plan A   (TBD pt still intubated)   Discharge Plan B   (TBD--pending therapy recs)   DME Needed Upon Discharge  other (see comments)  (TBD)   Discharge Plan discussed with: Parent(s)   Name(s) and Number(s) pt's motherKena--[phone]   Discharge Barriers Identified None   Housing Stability   In the last 12 months, was there a time when you were not able to pay the mortgage or rent on time? N   Transportation Needs   In the past 12 months, has lack of transportation kept you from medical appointments or from getting medications? no   Food Insecurity   Within the past 12 months, you worried that your food would run out before you got the money to buy more. Never true   Relationship/Environment    Name(s) of Who Lives With Patient Pt lives with his sister, Gil     Pt's mother reports he has a MD at the [facility] clinic. Pt uses [facility] off of [facility].   Spoke with pt's nurse, Mayi who reported pt will be extubated tomorrow. Therapy will need recs on placement or HH for pt. Follow for dc needs.

## 2022-09-30 NOTE — PROGRESS NOTES
Inpatient Nutrition Assessment    Admit Date: 9/23/2022   Total duration of encounter: 7 days     Nutrition Recommendation/Prescription     Need to consider starting TPN. Will need to run 1/2 strength TPN x 24 -48 hours since pt now at risk for refeeding syndrome. RD to provide TPN recommendations if started.    If/when appropriate. Start tube feeding.  Tube feeding recommendation:  Impact Peptide 1.5 goal rate 50 ml/hr to provide  1500 kcal/d (74% est needs, 98% with meds)  94 g protein/d (104% est needs)  770 ml free water/d   (calculations based on estimated 20 hr/d run time)     Communication of Recommendations: reviewed with nurse    Nutrition Assessment     Malnutrition Assessment/Nutrition-Focused Physical Exam    Malnutrition in the context of acute illness or injury  Degree of Malnutrition: does not meet criteria  Energy Intake: unable to obtain  Interpretation of Weight Loss: unable to obtain  Body Fat:does not meet criteria  Area of Body Fat Loss: does not meet criteria  Muscle Mass Loss: does not meet criteria  Area of Muscle Mass Loss: does not meet criteria  Fluid Accumulation: does not meet criteria  Edema: does not meet criteria   Reduced  Strength: unable to obtain  A minimum of two characteristics is recommended for diagnosis of either severe or non-severe malnutrition.    Chart Review    Reason Seen: continuous nutrition monitoring    Diagnosis:  Type B Aortic dissection  Hypertensive emergency  Systemic lupus erythematosus    Relevant Medical History: HTN, lupus    Nutrition-Related Medications: cleviprex, diprivan  Calorie Containing IV Medications: Diprivan @ 7 ml/hr (provides 185 kcal/d) and Cleviprex @ 20 ml/hr (provides 290 kcal/d)    Nutrition-Related Labs:  9/26 BUN 27, Crea 3.06, Glu 174, Phos 6.2  9/30 Na 128, K 3.3, Cl 96, BUN 21.5, Crea 3.28, Glu 205    Diet/PN Order: Diet NPO  Oral Supplement Order: none at this time  Tube Feeding Order: none at this time  Appetite/Oral Intake:  "not applicable/not applicable  Factors Affecting Nutritional Intake: on mechanical ventilation  Food/Nondenominational/Cultural Preferences: unable to obtain at this time    Skin Integrity: incision, blister, drain/device(s)  Wound(s):   incision noted    Comments    9/26/22: Discussed with RN. Will provide tube feeding recommendations for when appropriate to start tube feeding. Receiving kcal from meds. Noted Phos, will monitor for need for renal formula.   9/30/22: Discussed with RN. Need to consider TPN since pt now LOS day 7. If starting tube feeding, recs provided. If able to extubate, advance diet when appropriate.     Anthropometrics    Height: 5' 5" (165.1 cm) Height Method: Estimated  Last Weight: 60 kg (132 lb 4.4 oz) (09/29/22 1115) Weight Method: Bed Scale  BMI (Calculated): 22  BMI Classification: normal (BMI 18.5-24.9)        Ideal Body Weight (IBW), Male: 136 lb     % Ideal Body Weight, Male (lb): 97.26 %                          Usual Weight Provided By: unable to obtain usual weight at this time    Wt Readings from Last 5 Encounters:   09/29/22 60 kg (132 lb 4.4 oz)   09/23/22 60 kg (132 lb 4.4 oz)   12/09/21 48 kg (105 lb 13.1 oz)     Weight Change(s) Since Admission:  Admit Weight: 60 kg (132 lb 4.4 oz) (09/23/22 1219)    Estimated Needs    Weight Used For Calorie Calculations: 60 kg (132 lb 4.4 oz)  Energy Calorie Requirements (kcal): 2018kcal  Energy Need Method: Marcial [place]  Weight Used For Protein Calculations: 60 kg (132 lb 4.4 oz)  Protein Requirements: 90gm (1.5g/kg)  Fluid Requirements (mL): 1000ml + urinary output  Temp: 99 °F (37.2 °C)  Vtot (L/Min) for [place]cial [place] Equation Calculation: 13.5    Enteral Nutrition    Patient not receiving enteral nutrition at this time.    Parenteral Nutrition    Patient not receiving parenteral nutrition support at this time.    Evaluation of Received Nutrient Intake    Calories: not meeting estimated needs  Protein: not meeting estimated needs    Patient " Education    Not applicable.    Nutrition Diagnosis     PES: Inadequate oral intake related to current condition   as evidenced by intubation since admit. (continues)    Interventions/Goals     Intervention(s): modified composition of enteral nutrition, modified rate of enteral nutrition, and collaboration with other providers  Goal: Meet greater than 75% of nutritional needs by follow-up. (goal progressing)    Monitoring & Evaluation     Dietitian will monitor energy intake.  Nutrition Risk/Follow-Up: high (follow-up in 1-4 days)

## 2022-10-01 NOTE — PROGRESS NOTES
"   Nephrology Progress Note  Patient Name: Stuart Guevara  Age: 32 y.o.  : 1990  MRN: [id number]  Admission Date: 2022    Chief complaint:  Chest pain    Hospital Course   Mr Guevara is a 32-year-old  male with past medical history of aortic dissection, status post repair in , SLE, and hypertension, who presented to the emergency department with complaints of tearing chest pain and blood pressure of 232/127.  CT scan revealed findings consistent with type B aortic dissection, patient underwent TEVAR  as well as a carotid artery to subclavian bypass on the left on 2022.  He also underwent exploratory laparotomy on 2022 for concern of ischemic colitis, right chest tube was placed.  No ischemic bowel was found.  Patient's kidney function worsened, he developed severe hyperkalemia and was initiated on hemodialysis by way of right temporary femoral catheter on 2022.  We are continuing to follow for nephrology care.    Subjective   Patient examined while on hemodialysis, blood flow rates are adequate, vital signs are stable.    Review of systems   Unable to obtain secondary to patient's condition:  Patient is intubated and sedated.    Objective   /62   Pulse 70   Temp 98.6 °F (37 °C) (Axillary)   Resp (!) 25   Ht 5' 5" (1.651 m)   Wt 60 kg (132 lb 4.4 oz)   SpO2 100%   BMI 22.01 kg/m²     Intake/Output Summary (Last 24 hours) at 10/1/2022 0804  Last data filed at 10/1/2022 0534  Gross per 24 hour   Intake 1159.84 ml   Output 405 ml   Net 754.84 ml     Physical Exam  General appearance:  Chronically ill-appearing male, intubated and sedated.  HEENT: PERRLA, EOMI, no scleral icterus, no JVD. Neck is supple.  Chest:  Mechanically ventilated.  Lung sounds are diminished to auscultation.  Heart: S1, S2.   Abdomen:  Wound VAC in place.  OG tube in place with trickle feeding  : Deferred.  Extremities:  Trace bilateral lower extremity edema, peripheral pulses are " palpable.  Right groin temporary dialysis catheter is in place.  Neuro:  Sedated    Laboratory Data:   Serum labs:  Lab Results   Component Value Date     (L) 10/01/2022    K 4.1 10/01/2022    CHLORIDE 99 10/01/2022    CO2 25 10/01/2022    BUN 32.2 (H) 10/01/2022    CREATININE 3.93 (H) 10/01/2022    GLUCOSE 127 (H) 10/01/2022    CALCIUM 7.6 (L) 10/01/2022    ALBUMIN 1.1 (L) 10/01/2022    PHOS 3.7 10/01/2022    MG 2.20 10/01/2022    ALKPHOS 127 10/01/2022    ALT 16 10/01/2022    BILIDIR 0.1 05/10/2022    IBILI 0.20 05/10/2022    HGB 9.9 (L) 10/01/2022    HCT 29.2 (L) 10/01/2022     10/01/2022    WBC 22.4 (H) 10/01/2022    IRON 45 (L) 10/30/2018    TIBC 222 (L) 10/30/2018    LABIRON 20.3 10/30/2018    TRANS 146.0 (L) 10/30/2018    FERRITIN 664.4 (H) 10/31/2018    LABURIC 8.1 (H) 02/27/2020     Lab Results   Component Value Date    HIV Nonreactive 10/31/2018    HEPBSURFAG Nonreactive 09/25/2022    HEPBCAB Reactive (A) 10/31/2018      Urine studies:  Lab Results   Component Value Date    COLORUA Dark-Orange (A) 09/28/2022    APPEARANCEUA Cloudy (A) 09/28/2022    SGUA >=1.030 09/28/2022    PHUA 5.0 09/28/2022    PROTEINUA 3+ (A) 09/28/2022    GLUCOSEUA Trace (A) 09/28/2022    KETONESUA 1+ (A) 09/28/2022    BLOODUA 3+ (A) 09/28/2022    NITRITESUA Positive (A) 09/28/2022    LEUKOCYTESUR Trace (A) 09/28/2022    RBCUA 11-20 (A) 09/28/2022    WBCUA 21-50 (A) 09/28/2022    BACTERIA Few (A) 09/28/2022    SQEPUA  (A) 12/09/2021    HYALINECASTS 3-5 01/31/2022    CREATRANDUR 314.6 (H) 12/09/2021    PROTEINURINE 269.4 12/09/2021       Medications    Current Facility-Administered Medications:     acetaminophen tablet 650 mg, 650 mg, Oral, Q6H PRN, Nolberto Calvillo MD, 650 mg at 09/28/22 0025    albuterol inhaler 2 puff, 2 puff, Inhalation, Q4H PRN, Evelio Marshall MD    clevidipine (CLEVIPREX) 25 mg/50 mL infusion, 0-16 mg/hr, Intravenous, Continuous, Bin Bell MD, Last Rate: 6 mL/hr at 09/30/22 0917, 3  mg/hr at 09/30/22 0917    dexmedetomidine (PRECEDEX) 400mcg/100mL 0.9% NaCL infusion, 0-1.4 mcg/kg/hr, Intravenous, Continuous, Cong Velez DO, Last Rate: 21 mL/hr at 10/01/22 0700, 1.4 mcg/kg/hr at 10/01/22 0700    dextrose 10% bolus 125 mL, 12.5 g, Intravenous, PRN, Ivan Chopra MD    dextrose 5 % and 0.9 % NaCl infusion, , Intravenous, Continuous, Shahzad Lundberg MD, Stopped at 09/29/22 1900    famotidine tablet 20 mg, 20 mg, Oral, Every other day, Ivan Chopra MD, 20 mg at 09/30/22 0957    gemfibroziL tablet 600 mg, 600 mg, Oral, BID AC, Francia Marcus DO, 600 mg at 10/01/22 0645    glucagon (human recombinant) injection 1 mg, 1 mg, Intramuscular, PRN, Francia Marcus DO    heparin (porcine) injection 5,000 Units, 5,000 Units, Subcutaneous, Q12H, Lola Martinez MD, 5,000 Units at 09/30/22 2100    hydrALAZINE injection 20 mg, 20 mg, Intravenous, Q4H PRN, Cong Velez DO, 20 mg at 09/30/22 1210    HYDROmorphone (PF) injection 1 mg, 1 mg, Intravenous, Q4H PRN, Cong Velez DO    insulin aspart U-100 injection 0-5 Units, 0-5 Units, Subcutaneous, Q6H PRN, Francia Marcus DO, 2 Units at 09/30/22 0958    labetaloL injection 20 mg, 20 mg, Intravenous, Q8H PRN, Shahzad Lundberg MD, 20 mg at 09/29/22 1303    LIDOcaine (PF) 10 mg/ml (1%) injection, , , PRN, Ruben Arciniega MD, 5 mL at 09/25/22 1656    lorazepam injection 1 mg, 1 mg, Intravenous, Q2H PRN, Cong Velez DO    morphine injection 2 mg, 2 mg, Intravenous, Q2H PRN, Evelio Marshall MD, 2 mg at 09/30/22 2118    NORepinephrine 8 mg in dextrose 5% 250 mL infusion, 0-3 mcg/kg/min, Intravenous, Continuous, Luca Rae DO, Last Rate: 0 mL/hr at 09/25/22 2000, 0 mcg/kg/min at 09/25/22 2000    ondansetron injection 4 mg, 4 mg, Intravenous, Q6H PRN, Evelio Marshall MD, 4 mg at 09/23/22 1236    propofol (DIPRIVAN) 10 mg/mL infusion, 0-50 mcg/kg/min, Intravenous, Continuous, Luca Rae DO, Last Rate: 7.2 mL/hr at 10/01/22 0534, 20  mcg/kg/min at 10/01/22 0534    sodium chloride 0.9% bolus 250 mL, 250 mL, Intravenous, PRN, Shahzad Lundberg MD    sodium chloride 0.9% bolus 250 mL, 250 mL, Intravenous, PRN, Shahzad Lundberg MD    sodium chloride 0.9% flush 10 mL, 10 mL, Intravenous, PRN, Francia Marcus DO    sodium chloride 0.9% flush 10 mL, 10 mL, Intravenous, PRN, Evelio Marshall MD    sodium chloride 0.9% flush 10 mL, 10 mL, Intravenous, PRN, Evelio Marshall MD    sucralfate tablet 1 g, 1 g, Oral, BID, Francia Marcus DO, 1 g at 09/30/22 2100     Imaging:   Chest x-ray 09/30/2022:  Stable exam without significant interval change.     Impression    Oliguric acute kidney injury  Aortic dissection, status post TEVAR procedure   Hypertension   Respiratory failure   Anemia  Mild hyponatremia  History of right upper extremity DVT   History of SLE  Acute abdomen, status post exploratory laparotomy 09/25/2022    Plan   Will continue hemodialysis per Tuesday, Thursday, Saturday schedule.  Patient is mildly hypotensive, will decrease by for rate to 350 mL/min, decrease UF goal to 0.5-1 L as tolerated.  Will repeat labs in a.m..    Cindy Lora NP  [facility] Nephrology   10/1/2022 8:04 AM        Patient was seen and examined by me. I agree with above assessment and plan.   JAYLEN Soriano MD. 10/1/2022, [id number]

## 2022-10-01 NOTE — PROGRESS NOTES
Acute Care Surgery  Daily Progress Note      Subjective:  NAEON.   Ventilated on 0.6 precedex, 30 of propofol.  LINNETTE drain with 240 cc serous output overnight.      Objective:    Vitals:  Temp: 98.4 - 99.3 F  HR 63-92  RRR 19-27  -161/54-91  SpO2: %    Intake/Output:  UOP: 85 cc recorded in the last 24 hours  LINNETTE: 240 cc, serous       Physical Exam:  Gen: intubated, on precedex and propofol. Making eye contact.  CV: RRR  Resp: intubated: , RR 25, PEEP 8  Abd: soft, ND, NT; LINNETTE drain with serous output.   Ext: moves all 4 spontaneously and purposefully  Skin: warm, well perfused      Labs:        Imaging:  none    Assessment/Plan:  33 y/o M w Hx of Type B aortic dissection, s/p TEVAR + L carotid-subclavian bypass, p/w acute abdomen 2/2 abdominal compartment syndrome of undetermined etiology s/p ex-lap c/b abdominal compartment syndrome s/p decompressive ex-lap with subsequent abdominal closure and LINNETTE drain placement.    - Continue LINNETTE drain management; okay to pull LINNETTE drain once output is < 40 cc  - Continue antibiotics per primary  - Famotidine, Lov for DVT Ppx   - Remainder of care per primary   - Surgery will sign off; please call with questions or concerns     Keyonna Castro MD  [employer] General Surgery, PGY2  10/01/2022 06:30 AM

## 2022-10-01 NOTE — PLAN OF CARE
Problem: Adult Inpatient Plan of Care  Goal: Plan of Care Review  Outcome: Ongoing, Progressing  Goal: Patient-Specific Goal (Individualized)  Outcome: Ongoing, Progressing  Goal: Absence of Hospital-Acquired Illness or Injury  Outcome: Ongoing, Progressing  Goal: Optimal Comfort and Wellbeing  Outcome: Ongoing, Progressing  Goal: Readiness for Transition of Care  Outcome: Ongoing, Progressing     Problem: Infection  Goal: Absence of Infection Signs and Symptoms  Outcome: Ongoing, Progressing     Problem: Skin Injury Risk Increased  Goal: Skin Health and Integrity  Outcome: Ongoing, Progressing     Problem: Fall Injury Risk  Goal: Absence of Fall and Fall-Related Injury  Outcome: Ongoing, Progressing     Problem: Communication Impairment (Mechanical Ventilation, Invasive)  Goal: Effective Communication  Outcome: Ongoing, Progressing     Problem: Nutrition Impairment (Mechanical Ventilation, Invasive)  Goal: Optimal Nutrition Delivery  Outcome: Ongoing, Progressing     Problem: Skin and Tissue Injury (Mechanical Ventilation, Invasive)  Goal: Absence of Device-Related Skin and Tissue Injury  Outcome: Ongoing, Progressing     Problem: Ventilator-Induced Lung Injury (Mechanical Ventilation, Invasive)  Goal: Absence of Ventilator-Induced Lung Injury  Outcome: Ongoing, Progressing     Problem: Skin and Tissue Injury (Artificial Airway)  Goal: Absence of Device-Related Skin or Tissue Injury  Outcome: Ongoing, Progressing     Problem: Device-Related Complication Risk (Hemodialysis)  Goal: Safe, Effective Therapy Delivery  Outcome: Ongoing, Progressing     Problem: Hemodynamic Instability (Hemodialysis)  Goal: Effective Tissue Perfusion  Outcome: Ongoing, Progressing     Problem: Infection (Hemodialysis)  Goal: Absence of Infection Signs and Symptoms  Outcome: Ongoing, Progressing

## 2022-10-01 NOTE — NURSING
Spoke with pt's mother for an afternoon update. Notified her that pt was extubated & doing well as far as oxygenation and breathing. Mentioned that we are monitoring his blood pressure because it has been running high. All questions and concerns were address. Pt's mother verbalized understanding.   Kateryna Titus RN

## 2022-10-01 NOTE — PROGRESS NOTES
[facility] - [facility] ICU  Pulmonary Critical Care Note    Patient Name: Stuart Guevara  MRN: [id number]  Admission Date: 9/23/2022  Hospital Length of Stay: 8 days  Code Status: Full Code  Attending Provider: Ivan Chopra MD  Primary Care Provider: Primary Doctor No     Subjective:     HPI: Mr. Guevara is a 32-year-old male with past medical history significant for SLE, HTN, HX of type a aortic dissection repaired in February 2021, HX of right upper extremity DVT who presented to [facility] Emergency Department on 09/23/2022 with complaints of tearing, ripping chest pain with radiation to the back.  Initial BP noted to be 232/127.  CT scan significant for type B aortic dissection, started on esmolol drip and transferred to [facility] for CT surgery services.  Admitted to the ICU with CT surgery consulted.    Hospital Course/Significant events:    09/23/2022:  With concern for left arm blood pressure discrepancy and concern for diminished outflow through left subclavian patient was brought to OR with CT surgery for TECAR; left carotid to left subclavian artery bypass  9/25/22: patient had significant abdominal pain, acute abdomen, shortness of breath. CT scan showed global hypoperfusion. Received 2 units pRBC. In the setting of shock and respiratory failure, intubated and placed on ventilator. Brought to OR with general surgery for exploratory laparotomy which revealed ascites in peritoneal cavity, mottled liver, viable small bowel and right pleural effusion. Chest tube placed. Vancomycin initiated. HD initiated due to electrolyte derangements.  9/27/22 patient noted to have firm abdomen, brought to OR for decompressive laparotomy. Large volume retroperitoneal ascitic fluid drained.  09/29/2022 abdominal closure    24 Hour Interval History:  No acute events overnight.  Afebrile with a T-max of 37.2° C.  Vital signs stable this morning with a blood pressure 142/72, heart rate 71, respiratory rate 32.   Remains mechanically ventilated on AC mode with tidal volume 430, respiratory rate 32, peep 8, FiO2 30%.  Sedated with Precedex at 0.6 mcg and propofol 30 mcg.        Past Medical History:   Diagnosis Date    Hypertension     Systemic lupus erythematosus, organ or system involvement unspecified     Systemic lupus erythematosus, organ or system involvement unspecified        Past Surgical History:   Procedure Laterality Date    APPLICATION OF WOUND VACUUM-ASSISTED CLOSURE DEVICE  9/27/2022    Procedure: APPLICATION, WOUND VAC;  Surgeon: Christopher Espinal MD;  Location: [facility];  Service: General;;    CARDIAC SURGERY      FINGER AMPUTATION      THROMBECTOMY Right     TOE AMPUTATION     Mechanical aortic valve    Social History     Socioeconomic History    Marital status: Single   Tobacco Use    Smoking status: Every Day     Types: Cigarettes    Smokeless tobacco: Never     Social Determinants of Health     Food Insecurity: Unknown    Worried About Running Out of Food in the Last Year: Never true   Transportation Needs: Unknown    Lack of Transportation (Medical): No   Housing Stability: Unknown    Unable to Pay for Housing in the Last Year: No           Current Outpatient Medications   Medication Instructions    albuterol (PROVENTIL/VENTOLIN HFA) 90 mcg/actuation inhaler 2 puffs, Inhalation, Every 4 hours PRN, Rescue    amLODIPine (NORVASC) 10 mg, Oral, Daily    aspirin (ECOTRIN) 81 mg, Oral, Daily    belimumab (BENLYSTA) 120 mg SolR Benlysta Take No date recorded No form recorded No frequency recorded No route recorded No set duration recorded No set duration amount recorded active No dosage strength recorded No dosage strength units of measure recorded    famotidine (PEPCID) 20 mg, Oral, 2 times daily    gemfibroziL (LOPID) 600 mg, Oral    hydrALAZINE (APRESOLINE) 10 mg, Oral    labetaloL (NORMODYNE) 300 mg, Oral, 2 times daily    lisinopriL (PRINIVIL,ZESTRIL) 40 mg, Oral, Daily    metoprolol tartrate (LOPRESSOR) 50  mg, Oral, 2 times daily    mycophenolate (CELLCEPT) 1,000 mg, Oral, 2 times daily    MEÑO 2 mg TbEC 1 tablet, Oral, Daily    rivaroxaban (XARELTO ORAL) Xarelto Take No date recorded No form recorded No frequency recorded No route recorded No set duration recorded No set duration amount recorded active No dosage strength recorded No dosage strength units of measure recorded    sucralfate (CARAFATE) 1 g, Oral, 2 times daily    warfarin (COUMADIN) 3 mg, Oral, Daily       Current Inpatient Medications   famotidine  20 mg Oral Every other day    gemfibroziL  600 mg Oral BID AC    heparin (porcine)  5,000 Units Subcutaneous Q12H    sucralfate  1 g Oral BID       Current Intravenous Infusions   clevidipine 3 mg/hr (09/30/22 0917)    dexmedetomidine (PRECEDEX) infusion 1.4 mcg/kg/hr (10/01/22 0534)    dextrose 5 % and 0.9 % NaCl Stopped (09/29/22 1900)    NORepinephrine bitartrate-D5W 0 mcg/kg/min (09/25/22 2000)    propofoL 20 mcg/kg/min (10/01/22 0534)         Review of Systems   Reason unable to perform ROS: intubated, sedated.        Objective:       Intake/Output Summary (Last 24 hours) at 10/1/2022 0713  Last data filed at 10/1/2022 0534  Gross per 24 hour   Intake 1209.84 ml   Output 445 ml   Net 764.84 ml           Vital Signs (Most Recent):  Temp: 98.6 °F (37 °C) (10/01/22 0400)  Pulse: 75 (10/01/22 0600)  Resp: (!) 25 (10/01/22 0600)  BP: 113/63 (10/01/22 0600)  SpO2: 100 % (10/01/22 0600)    Body mass index is 22.01 kg/m².  Weight: 60 kg (132 lb 4.4 oz) Vital Signs (24h Range):  Temp:  [98.4 °F (36.9 °C)-99 °F (37.2 °C)] 98.6 °F (37 °C)  Pulse:  [] 75  Resp:  [19-32] 25  SpO2:  [98 %-100 %] 100 %  BP: (104-168)/(54-90) 113/63     Physical exam    General:  Ill-appearing Head: Normocephalic, atraumatic  Eyes: PERRL, EOMI, anicteric sclera  CVS:  RRR, S1 and S2 normal, systolic murmur , no added heart sounds, rubs, gallops, 2+ peripheral pulses  Resp:  Lungs clear to auscultation bilaterally, no wheezes,  rales, or rhonci  GI:  Abdomen non-tender, tense, normoactive bowel sounds  MSK:  No muscle atrophy, cyanosis, peripheral edema, full range of motion  Skin:  No rashes, ulcers, erythema  Neuro:  Sedated but able to follow commands          Lines/Drains/Airways       Central Venous Catheter Line  Duration                  Hemodialysis Catheter 09/25/22 1900 right femoral 5 days              Drain  Duration                  Chest Tube 09/25/22 1419 1 Right Mediastinal 28 Fr. 5 days         NG/OG Tube 09/25/22 1220 Bland sump 18 Fr. Left mouth 5 days         Closed/Suction Drain 09/29/22 Superior;Midline Abdomen Bulb 19 Fr. 2 days              Airway  Duration                  Airway - Non-Surgical 09/25/22 1220 Endotracheal Tube 5 days              Peripheral Intravenous Line  Duration                  Peripheral IV - Single Lumen 20 G Anterior;Distal;Left Upper Arm -- days         Peripheral IV - Single Lumen 09/25/22 1952 18 G;Other (Comments) Anterior;Left;Proximal Forearm 5 days         Peripheral IV - Single Lumen 09/25/22 1953 20 G;Other (Comments) Anterior;Distal;Left Forearm 5 days                    Significant Labs:    Lab Results   Component Value Date    WBC 22.4 (H) 10/01/2022    HGB 9.9 (L) 10/01/2022    HCT 29.2 (L) 10/01/2022    MCV 84.4 10/01/2022     10/01/2022         BMP  Lab Results   Component Value Date     (L) 10/01/2022    K 4.1 10/01/2022    CO2 25 10/01/2022    BUN 32.2 (H) 10/01/2022    CREATININE 3.93 (H) 10/01/2022    CALCIUM 7.6 (L) 10/01/2022    EGFRNONAA 80 01/31/2022       ABG  Recent Labs   Lab 10/01/22  0445   PH 7.46*   PO2 125*   PCO2 41   HCO3 29.2         Mechanical Ventilation Support:  Vent Mode: PRVC A/C (10/01/22 0500)  Set Rate: 25 BPM (10/01/22 0500)  Vt Set: 410 mL (10/01/22 0500)  PEEP/CPAP: 8 cmH20 (10/01/22 0500)  Oxygen Concentration (%): 40 (09/30/22 2018)  Peak Airway Pressure: 23 cmH2O (10/01/22 0500)  Total Ve: 10.1 mL (10/01/22 0500)  F/VT Ratio<105  (RSBI): (!) 62.81 (10/01/22 0500)    Significant Imaging:  I have reviewed the pertinent imaging within the past 24 hours.        Assessment/Plan:     Assessment  Type B aortic dissection  CT at outside ED significant for [place] type B aortic dissection with anterior extensive mid descending thoracic aorta, 3.7 cm dilation  Hypertensive emergency  Acute renal failure  History right upper extremity DVT, previously on Xarelto, noncompliance for past 2 months  RUE US done on 9/24/22 negative for thrombus  SLE, currently unmedicated  S/p exploratory laparotomy 9/25/22 due to acute abdomen and evidence of global hypoperfusion on CT  Revealed mottled liver, ascites in peritoneal cavity, viable small bowel and right pleural effusion, pancreatic swelling  Chest tube placed  Acute hypoxemic respiratory failure  On ventilator  Pancreatitis, presumptive   Elevated lipase, saponification of pancreas noted during exploratory laparotomy  9.          Plan  -postop day 8 from TEVAR of the left carotid artery to left subclavian bypass  -off cleviprex, off pressors. BP has been stable.  Keep SBP <150 per thoracic surgery recs.  -H/H 9.9 today  -Continue Tuesday, Thursday, Saturday dialysis. Nephrology on board.  -ABG pH 7.46, pCO2 41, PO2 125.    -will need to to wean propofol after dialysis and then extubate.  -continue to wean patient off ventilator  -surgery following LINNETTE drain will pull when output is less than 40 cc  -risk score 39 okay for activation  -continue ICU care    DVT Prophylaxis: SCDs  GI Prophylaxis: famotidine     33 minutes of critical care was time spent personally by me on the following activities: development of treatment plan with patient or surrogate and bedside caregivers, discussions with consultants, evaluation of patient's response to treatment, examination of patient, ordering and performing treatments and interventions, ordering and review of laboratory studies, ordering and review of radiographic  studies, pulse oximetry, re-evaluation of patient's condition.  This critical care time did not overlap with that of any other provider or involve time for any procedures.     Antolin Ybarra MD  Pulmonary Critical Care Medicine Resident  [facility] - [facility] ICU

## 2022-10-01 NOTE — NURSING
10/01/22 1125        Hemodialysis Catheter 09/25/22 1900 right femoral   Placement Date/Time: 09/25/22 1900   Present Prior to Hospital Arrival?: No  Hand Hygiene: Performed  Location: right femoral  Insertion attempts (enter comment if more than 2 attempts): 1   Site Assessment No redness;No swelling;No warmth   Dressing Type Central line dressing   Dressing Status Clean;Dry;Intact   Dressing Intervention Integrity maintained   Post-Hemodialysis Assessment   Rinseback Volume (mL) 250 mL   Blood Volume Processed (Liters) 72.6 L   Dialyzer Clearance Moderately streaked   Duration of Treatment 210 minutes   Total UF (mL) 1500 mL   Net Fluid Removal 1000   Patient Response to Treatment Tolerated well.   Post-Hemodialysis Comments Blood rinsed back per P&P. Lines capped and secured.

## 2022-10-02 NOTE — PT/OT/SLP EVAL
"Occupational Therapy   Evaluation    Name: Stuart Guevara  MRN: [id number]  Admitting Diagnosis:  Aortic dissection  Recent Surgery: Procedure(s) (LRB):  LAPAROTOMY, EXPLORATORY (N/A) 3 Days Post-Op    Recommendations:     Discharge Recommendations: nursing facility, skilled  Discharge Equipment Recommendations:  walker, rolling, bedside commode, bath bench, dressing device (TBD, pending progress.)  Barriers to discharge: Medical dx    Assessment:     Stuart Guevara is a 32 y.o. male with a medical diagnosis of Aortic dissection.  He presents with c/o dizziness with activity and pain at CT site. Performance deficits affecting function: weakness, impaired endurance, impaired self care skills, impaired functional mobility, impaired balance, decreased safety awareness, pain, decreased ROM.      Rehab Prognosis: Good; patient would benefit from acute skilled OT services to address these deficits and reach maximum level of function.       Plan:     Patient to be seen 6 x/week to address the above listed problems via self-care/home management, therapeutic activities, therapeutic exercises  Plan of Care Expires: 10/14/22  Plan of Care Reviewed with: patient    Subjective     Chief Complaint: Pt c/o dizziness with activity and pain at CT site 10/10. Pt's RN notified.  Patient/Family Comments/goals: Return to OF.    Occupational Profile:  Living Environment: Lives with "baby mom" and 2 children (3 and 8 y/o) in [facility] with 2 ZEE. Owns tub only.  Previous level of function: Independent with ADL/IADLs, was driving, and was working FT cleaning dishes at a restaurant.  Equipment Used at Home:  none  Assistance upon Discharge: Pt's "baby mom." However, pt would benefit from placement post-d/c to decrease burden of care and increase functional independence.    Patients cultural, spiritual, Church conflicts given the current situation: no    Objective:     Communicated with: RN prior to session.  Patient found HOB elevated " with telemetry, chest tube, LINNETTE drain, pulse ox (continuous), blood pressure cuff, SCDs and PRAFOs upon OT entry to room.    General Precautions: Standard, fall, abdominal pxns  Respiratory Status: Room air  Vitals:  BP:   At rest: 143/76, 140/79  Post-act: 164/87. RN notified.  HR: 84    Occupational Performance:    Bed Mobility:    Patient completed Rolling/Turning to Left with maximal assistance and with side rail  Patient completed Rolling/Turning to Right with maximal assistance and with side rail  Patient completed Supine to Sit via log roll technique with max A x2 for safety and vcs provided.  Patient completed Sit to Supine via log roll technique with max A x2 for safety and vcs provided.    Functional Mobility/Transfers:  Pt unable to perform sit <> stand at this time 2/2 c/o dizziness while seated EOB.    Activities of Daily Living:  Lower Body Dressing: Dep A to don B socks.    Cognitive/Visual Perceptual:  Cognitive/Psychosocial Skills:     -       Oriented to: Person, Situation, and required education on year and hospital   -       Follows Commands/attention:Follows one-step commands and Follows two-step commands  -       Safety awareness/insight to disability: impaired     Physical Exam:  Balance:    -       Mod A to maintain sitting balance while EOB with posterior trunk lean.  Sensation:    -       Intact  Upper Extremity Range of Motion:     -       Right Upper Extremity: Severe deficits noted in AROM of shoulder flex while seated EOB (with pt's increased pain serving as barrier). R elbow flex WFL with pt lying with HOB elevated.  -       Left Upper Extremity: Severe deficits noted in AROM of shoulder flex (with pt's increased pain serving as barrier). Mod deficits noted in AROM of L elbow flex while seated EOB.   Upper Extremity Strength:    -       Right Upper Extremity: Deficits: Shoulder flex=3-/5  -       Left Upper Extremity: Deficits: Shoulder and elbow flex=3-/5  Note: L digit III amputation  noted.    Treatment & Education:  Pt's RN inserted pt's suppository and OT/PT/RN changed pt's sofie pad while pt rolled toward R/L sides of bed with max A provided.     Patient left HOB elevated with all lines intact, call button in reach, and SCDs and B PRAFOs donned.    GOALS:   Multidisciplinary Problems       Occupational Therapy Goals          Problem: Occupational Therapy    Goal Priority Disciplines Outcome Interventions   Occupational Therapy Goal     OT, PT/OT Ongoing, Progressing    Description: Goals to be met by: 10/14/22    Patient will increase functional independence with ADLs by performing:    UE Dressing with Stand-by Assistance.  LB Dressing with Minimal Assistance and dressing AEDs.  Grooming while EOB with Stand-by Assistance.  Sitting at edge of bed x10 minutes with Stand-by Assistance.                         History:     Past Medical History:   Diagnosis Date    Hypertension     Systemic lupus erythematosus, organ or system involvement unspecified     Systemic lupus erythematosus, organ or system involvement unspecified          Past Surgical History:   Procedure Laterality Date    APPLICATION OF WOUND VACUUM-ASSISTED CLOSURE DEVICE  9/27/2022    Procedure: APPLICATION, WOUND VAC;  Surgeon: Christopher Espinal MD;  Location: [facility];  Service: General;;    CARDIAC SURGERY      FINGER AMPUTATION      THROMBECTOMY Right     TOE AMPUTATION         Time Tracking:     OT Date of Treatment: 10/2/22  OT Start Time: 1032  OT Stop Time: 1108  OT Total Time (min): 36 min    Billable Minutes:Evaluation High complexity 36 mins    10/2/2022

## 2022-10-02 NOTE — PROGRESS NOTES
"   Nephrology Progress Note  Patient Name: Stuart Guevara  Age: 32 y.o.  : 1990  MRN: [id number]  Admission Date: 2022    Chief complaint:  Chest pain    Hospital Course   Mr Guevara is a 32-year-old  male with past medical history of aortic dissection, status post repair in , SLE, and hypertension, who presented to the emergency department with complaints of tearing chest pain and blood pressure of 232/127.  CT scan revealed findings consistent with type B aortic dissection, patient underwent TEVAR  as well as a carotid artery to subclavian bypass on the left on 2022.  He also underwent exploratory laparotomy on 2022 for concern of ischemic colitis, right chest tube was placed.  No ischemic bowel was found.  Patient's kidney function worsened, he developed severe hyperkalemia and was initiated on hemodialysis by way of right temporary femoral catheter on 2022.  We are continuing to follow for nephrology care.    Subjective   Patient has been extubated, alert and oriented today.    Review of systems   Positive for weakness   Positive for lower extremity swelling and pain.    Objective   /71   Pulse 93   Temp 99 °F (37.2 °C) (Oral)   Resp (!) 21   Ht 5' 5" (1.651 m)   Wt 60 kg (132 lb 4.4 oz)   SpO2 98%   BMI 22.01 kg/m²     Intake/Output Summary (Last 24 hours) at 10/2/2022 0645  Last data filed at 10/1/2022 1800  Gross per 24 hour   Intake 562 ml   Output 1750 ml   Net -1188 ml       Physical Exam  General appearance:  Chronically ill-appearing male in no acute distress.    HEENT: PERRLA, EOMI, no scleral icterus, no JVD. Neck is supple.  Chest:  Lung sounds are diminished to auscultation.  Chest tube is in place  Heart: S1, S2.   Abdomen:  Wound VAC in place.    : Deferred.  Extremities:  Trace bilateral lower extremity edema, peripheral pulses are palpable.  Right groin temporary dialysis catheter is in place.  Neuro:  Alert and oriented x4, " weak.    Laboratory Data:   Serum labs:  Lab Results   Component Value Date     (L) 10/02/2022    K 4.2 10/02/2022    CHLORIDE 98 10/02/2022    CO2 22 10/02/2022    BUN 31.8 (H) 10/02/2022    CREATININE 3.59 (H) 10/02/2022    GLUCOSE 184 (H) 10/02/2022    CALCIUM 7.5 (L) 10/02/2022    ALBUMIN 1.1 (L) 10/02/2022    PHOS 4.2 10/02/2022    MG 2.10 10/02/2022    ALKPHOS 137 10/02/2022    ALT 17 10/02/2022    BILIDIR 0.1 05/10/2022    IBILI 0.20 05/10/2022    HGB 10.5 (L) 10/02/2022    HCT 30.8 (L) 10/02/2022     10/02/2022    WBC 28.0 (H) 10/02/2022    IRON 45 (L) 10/30/2018    TIBC 222 (L) 10/30/2018    LABIRON 20.3 10/30/2018    TRANS 146.0 (L) 10/30/2018    FERRITIN 664.4 (H) 10/31/2018    LABURIC 8.1 (H) 02/27/2020     Lab Results   Component Value Date    HIV Nonreactive 10/31/2018    HEPBSURFAG Nonreactive 09/25/2022    HEPBCAB Reactive (A) 10/31/2018      Urine studies:  Lab Results   Component Value Date    COLORUA Dark-Orange (A) 09/28/2022    APPEARANCEUA Cloudy (A) 09/28/2022    SGUA >=1.030 09/28/2022    PHUA 5.0 09/28/2022    PROTEINUA 3+ (A) 09/28/2022    GLUCOSEUA Trace (A) 09/28/2022    KETONESUA 1+ (A) 09/28/2022    BLOODUA 3+ (A) 09/28/2022    NITRITESUA Positive (A) 09/28/2022    LEUKOCYTESUR Trace (A) 09/28/2022    RBCUA 11-20 (A) 09/28/2022    WBCUA 21-50 (A) 09/28/2022    BACTERIA Few (A) 09/28/2022    SQEPUA  (A) 12/09/2021    HYALINECASTS 3-5 01/31/2022    CREATRANDUR 314.6 (H) 12/09/2021    PROTEINURINE 269.4 12/09/2021       Medications    Current Facility-Administered Medications:     acetaminophen tablet 650 mg, 650 mg, Oral, Q6H PRN, Nolberto Calvillo MD, 650 mg at 10/01/22 1630    albuterol inhaler 2 puff, 2 puff, Inhalation, Q4H PRN, Evelio Marshall MD    amLODIPine tablet 10 mg, 10 mg, Oral, Daily, Bin Bell MD, 10 mg at 10/01/22 1613    clevidipine (CLEVIPREX) 25 mg/50 mL infusion, 0-16 mg/hr, Intravenous, Continuous, Bin Bell MD, Last Rate: 6 mL/hr at  09/30/22 0917, 3 mg/hr at 09/30/22 0917    dexmedetomidine (PRECEDEX) 400mcg/100mL 0.9% NaCL infusion, 0-1.4 mcg/kg/hr, Intravenous, Continuous, Cong Velez DO, Last Rate: 0.9 mL/hr at 10/01/22 2110, 0.06 mcg/kg/hr at 10/01/22 2110    dextrose 10% bolus 125 mL, 12.5 g, Intravenous, PRN, Ivan Chopra MD    dextrose 5 % and 0.9 % NaCl infusion, , Intravenous, Continuous, Shahzad Lundberg MD, Stopped at 09/29/22 1900    famotidine tablet 20 mg, 20 mg, Oral, Every other day, Ivan Chopra MD, 20 mg at 09/30/22 0957    glucagon (human recombinant) injection 1 mg, 1 mg, Intramuscular, PRN, Francia Marcus DO    heparin (porcine) injection 5,000 Units, 5,000 Units, Subcutaneous, Q12H, Lola Martinez MD, 5,000 Units at 10/01/22 2100    hydrALAZINE injection 20 mg, 20 mg, Intravenous, Q4H PRN, Bin Bell MD, 20 mg at 10/01/22 1640    HYDROmorphone (PF) injection 1 mg, 1 mg, Intravenous, Q4H PRN, Cong Velez DO, 1 mg at 10/01/22 1129    insulin aspart U-100 injection 0-5 Units, 0-5 Units, Subcutaneous, Q6H PRN, Francia Marcus DO, 2 Units at 09/30/22 0958    labetaloL injection 20 mg, 20 mg, Intravenous, Q8H PRN, Bin Bell MD, 20 mg at 10/01/22 1407    LIDOcaine (PF) 10 mg/ml (1%) injection, , , PRN, Ruben Arciniega MD, 5 mL at 09/25/22 1656    lorazepam injection 1 mg, 1 mg, Intravenous, Q2H PRN, Cong Velez DO    metoprolol tartrate (LOPRESSOR) tablet 25 mg, 25 mg, Oral, BID, Bin Bell MD, 25 mg at 10/01/22 1614    morphine injection 2 mg, 2 mg, Intravenous, Q2H PRN, Evelio Marshall MD, 2 mg at 10/02/22 0004    NORepinephrine 8 mg in dextrose 5% 250 mL infusion, 0-3 mcg/kg/min, Intravenous, Continuous, Luca Rae DO, Last Rate: 0 mL/hr at 09/25/22 2000, 0 mcg/kg/min at 09/25/22 2000    ondansetron injection 4 mg, 4 mg, Intravenous, Q6H PRN, Evelio Marshall MD, 4 mg at 09/23/22 1236    polyethylene glycol packet 17 g, 17 g, Oral, BID, Bin Bell MD, 17 g at 10/01/22 2100     propofol (DIPRIVAN) 10 mg/mL infusion, 0-50 mcg/kg/min, Intravenous, Continuous, Luca Rae DO, Last Rate: 0 mL/hr at 10/01/22 1143, 0 mcg/kg/min at 10/01/22 1143    sodium chloride 0.9% bolus 250 mL, 250 mL, Intravenous, PRN, Shahzad Lundberg MD    sodium chloride 0.9% bolus 250 mL, 250 mL, Intravenous, PRN, Shahzad Lundberg MD    sodium chloride 0.9% bolus 250 mL, 250 mL, Intravenous, PRN, Cindy Lora, DENIA    sodium chloride 0.9% flush 10 mL, 10 mL, Intravenous, PRN, Francia Marcus DO    sodium chloride 0.9% flush 10 mL, 10 mL, Intravenous, PRN, Evelio Marshall MD    sodium chloride 0.9% flush 10 mL, 10 mL, Intravenous, PRN, Evelio Marshall MD    sucralfate tablet 1 g, 1 g, Oral, BID, Francia Marcus DO, 1 g at 10/01/22 2100     Imaging:   Chest x-ray 09/30/2022:  Stable exam without significant interval change.     Impression    Oliguric acute kidney injury  Aortic dissection, status post TEVAR procedure   Hypertension   Anemia  Mild hyponatremia  History of right upper extremity DVT   History of SLE  Acute abdomen, status post exploratory laparotomy 09/25/2022    Plan   Will continue hemodialysis per Tuesday, Thursday, Saturday schedule.  Agree with resuming oral antihypertensives.    Cindy Lora NP  [facility]   10/2/2022 6:45 AM        Patient was seen and examined by me. I agree with above assessment and plan.   JAYLEN Soriano MD. 10/2/2022, [id number]

## 2022-10-02 NOTE — PROGRESS NOTES
Pharmacokinetic Initial Assessment: IV Vancomycin    Assessment/Plan:  Will pulse dose due to poor renal function  Initiate intravenous vancomycin with loading dose of 1250 mg once   Desired empiric serum trough concentration is 15 to 20 mcg/mL  Draw vancomycin random level on 10/03 at 0300.  Pharmacy will continue to follow and monitor vancomycin.      Please contact pharmacy at extension [phone] with any questions regarding this assessment.     Thank you for the consult,   Dash Bess       Patient brief summary:  Stuart Guevara is a 32 y.o. male initiated on antimicrobial therapy with IV Vancomycin for treatment of suspected  fevers of unknown origin    Drug Allergies:   Review of patient's allergies indicates:   Allergen Reactions    Levofloxacin     Sulfamethoxazole-trimethoprim        Actual Body Weight:   60 kg    Renal Function:   Estimated Creatinine Clearance: 24.8 mL/min (A) (based on SCr of 3.59 mg/dL (H)).,     Dialysis Method (if applicable):  intermittent HD (TTS)    CBC (last 72 hours):  Recent Labs   Lab Result Units 09/30/22  0116 10/01/22  0138 10/02/22  0220   WBC x10(3)/mcL 23.6* 22.4* 28.0*   Hgb gm/dL 10.2* 9.9* 10.5*   Hct % 28.6* 29.2* 30.8*   Platelet x10(3)/mcL 151 154 228   Mono % %  --   --  10.1   Monocyte Man % 7 6  --    Eos % %  --   --  0.8   Eos Man % 1 4  --    Basophil % %  --   --  0.5   Basophil Man % 1 1  --        Metabolic Panel (last 72 hours):  Recent Labs   Lab Result Units 09/30/22  0116 10/01/22  0138 10/02/22  0220   Sodium Level mmol/L 128* 133* 132*   Potassium Level mmol/L 3.3* 4.1 4.2   Chloride mmol/L 96* 99 98   Carbon Dioxide mmol/L 22 25 22   Glucose Level mg/dL 205* 127* 184*   Blood Urea Nitrogen mg/dL 21.5* 32.2* 31.8*   Creatinine mg/dL 3.28* 3.93* 3.59*   Albumin Level gm/dL 1.1* 1.1* 1.1*   Bilirubin Total mg/dL 0.6 0.6 0.6   Alkaline Phosphatase unit/L 133 127 137   Aspartate Aminotransferase unit/L 41* 34 37*   Alanine Aminotransferase unit/L 17 16 17    Magnesium Level mg/dL 2.20 2.20 2.10   Phosphorus Level mg/dL 2.9 3.7 4.2       Drug levels (last 3 results):  No results for input(s): VANCOMYCINRA, VANCORANDOM, VANCOMYCINPE, VANCOPEAK, VANCOMYCINTR, VANCOTROUGH in the last 72 hours.    Microbiologic Results:  Microbiology Results (last 7 days)       Procedure Component Value Units Date/Time    Blood Culture [[id number]] Collected: 10/02/22 0605    Order Status: Resulted Specimen: Blood Updated: 10/02/22 0644    Urine culture [[id number]] Collected: 09/28/22 2255    Order Status: Completed Specimen: Urine, Catheterized Updated: 10/01/22 1008     Urine Culture No Growth    Mycobacteria and Nocardia Culture [[id number]] Updated: 09/30/22 1625    Order Status: Sent Specimen: Body Fluid from Pleural Fluid, Right     Body Fluid Culture [[id number]] Collected: 09/25/22 1200    Order Status: Completed Specimen: Body Fluid from Pleural Fluid, Right Updated: 09/30/22 1010     Body Fluid Culture No Growth at 5 days    Blood Culture [[id number]]  (Normal) Collected: 09/25/22 0226    Order Status: Completed Specimen: Blood Updated: 09/30/22 0900     CULTURE, BLOOD ([facility]) No Growth at 5 days    Blood Culture [[id number]]  (Normal) Collected: 09/25/22 0226    Order Status: Completed Specimen: Blood Updated: 09/30/22 0900     CULTURE, BLOOD ([facility]) No Growth at 5 days    Respiratory Culture [[id number]] Collected: 09/27/22 0851    Order Status: Completed Specimen: Sputum from Lung Aspirate Updated: 09/29/22 1053     Respiratory Culture Rare normal respiratory lacy     GRAM STAIN Quality 3+      No bacteria seen    Respiratory Culture [[id number]]     Order Status: Sent Specimen: Sputum     Gram Stain [[id number]] Collected: 09/25/22 1200    Order Status: Completed Specimen: Body Fluid from Pleural Fluid, Right Updated: 09/26/22 0844     GRAM STAIN Rare WBC observed      No bacteria seen    Fungal Culture [[id number]] Collected: 09/25/22 1200    Order Status: Sent Specimen: Body Fluid from  Pleural Fluid, Right Updated: 09/25/22 4770

## 2022-10-02 NOTE — PT/OT/SLP EVAL
Physical Therapy Evaluation    Patient Name:  Stuart Guevara   MRN:  [id number]    Recommendations:     Discharge Recommendations:  nursing facility, skilled   Discharge Equipment Recommendations:     Barriers to discharge: None    Assessment:     Stuart Guevara is a 32 y.o. male admitted with a medical diagnosis of Aortic dissection.  He presents with the following impairments/functional limitations:  weakness, impaired endurance, pain, impaired functional mobility. Pt with moderate weakness and complaints of dizziness limits his evaluation.      Rehab Prognosis: Good; patient would benefit from acute skilled PT services to address these deficits and reach maximum level of function.    Recent Surgery: Procedure(s) (LRB):  LAPAROTOMY, EXPLORATORY (N/A) 3 Days Post-Op    Plan:     During this hospitalization, patient to be seen daily to address the identified rehab impairments via gait training, therapeutic activities, therapeutic exercises, neuromuscular re-education and progress toward the following goals:    Plan of Care Expires:  11/02/22    Subjective     Chief Complaint: Pt reports moderate pain in his R abdomen.  Patient/Family Comments/goals: To get better so he can go home.   Pain/Comfort:  Pain Rating 1: 7/10  Location - Side 1: Right  Location 1: abdomen  Pain Addressed 1: Reposition, Distraction    Patients cultural, spiritual, Jew conflicts given the current situation: no    Living Environment:  Lives with his significant other and two children in a [facility] with 2 steps.   Prior to admission, patients level of function was independent with gait and ADLs.  Equipment used at home: none.  DME owned (not currently used): none.  Upon discharge, patient will have assistance from family.    Objective:     Communicated with nurse prior to session.  Patient found HOB elevated with chest tube, LINNETTE drain, telemetry, blood pressure cuff, pulse ox (continuous)  upon PT entry to room.    General Precautions:  Standard,     Orthopedic Precautions:    Braces:    Respiratory Status: Room air  /76  HR 84  Sp02 98%    Exams:  Cognitive Exam:  Patient is oriented to Person  RLE ROM: WFL  RLE Strength: Deficits: 3-/5 grossly  LLE ROM: WFL  LLE Strength: Deficits: 3-/5 grossly    Functional Mobility:  Bed Mobility:     Rolling Left:  maximal assistance  Rolling Right: maximal assistance  Bridging: maximal assistance  Supine to Sit: maximal assistance  Sit to Supine: maximal assistance  Pt becomes weak during exam and notes some dizziness which prevents further evaluation. Nurse present during episode. Pt requires suppository for BM, pt assists nursing staff as pt is max assist.    Therapeutic Activities and Exercises:       AM-PAC 6 CLICK MOBILITY  Total Score:      Patient left HOB elevated with all lines intact, call button in reach, nurse notified, and nurse present.    GOALS:   Multidisciplinary Problems       Physical Therapy Goals          Problem: Physical Therapy    Goal Priority Disciplines Outcome Goal Variances Interventions   Physical Therapy Goal     PT, PT/OT Ongoing, Progressing     Description: Goals to be met by: 11/2/22     Patient will increase functional independence with mobility by performing:    . Supine to sit with [place]  . Sit to supine with [place]  . Rolling to Left and Right with [place].                         History:     Past Medical History:   Diagnosis Date    Hypertension     Systemic lupus erythematosus, organ or system involvement unspecified     Systemic lupus erythematosus, organ or system involvement unspecified        Past Surgical History:   Procedure Laterality Date    APPLICATION OF WOUND VACUUM-ASSISTED CLOSURE DEVICE  9/27/2022    Procedure: APPLICATION, WOUND VAC;  Surgeon: Christopher Espinal MD;  Location: [facility];  Service: General;;    CARDIAC SURGERY      FINGER AMPUTATION      THROMBECTOMY Right     TOE AMPUTATION         Time Tracking:     PT Received On:  10/02/22  PT Start Time: 1032     PT Stop Time: 1110  PT Total Time (min): 38 min     Billable Minutes: Evaluation 38      10/02/2022

## 2022-10-02 NOTE — PLAN OF CARE
Problem: Adult Inpatient Plan of Care  Goal: Plan of Care Review  Outcome: Ongoing, Progressing  Goal: Patient-Specific Goal (Individualized)  Outcome: Ongoing, Progressing  Goal: Absence of Hospital-Acquired Illness or Injury  Outcome: Ongoing, Progressing  Goal: Optimal Comfort and Wellbeing  Outcome: Ongoing, Progressing  Goal: Readiness for Transition of Care  Outcome: Ongoing, Progressing     Problem: Infection  Goal: Absence of Infection Signs and Symptoms  Outcome: Ongoing, Progressing     Problem: Skin Injury Risk Increased  Goal: Skin Health and Integrity  Outcome: Ongoing, Progressing     Problem: Fall Injury Risk  Goal: Absence of Fall and Fall-Related Injury  Outcome: Ongoing, Progressing     Problem: Communication Impairment (Mechanical Ventilation, Invasive)  Goal: Effective Communication  Outcome: Ongoing, Progressing     Problem: Nutrition Impairment (Mechanical Ventilation, Invasive)  Goal: Optimal Nutrition Delivery  Outcome: Ongoing, Progressing     Problem: Skin and Tissue Injury (Mechanical Ventilation, Invasive)  Goal: Absence of Device-Related Skin and Tissue Injury  Outcome: Ongoing, Progressing     Problem: Ventilator-Induced Lung Injury (Mechanical Ventilation, Invasive)  Goal: Absence of Ventilator-Induced Lung Injury  Outcome: Ongoing, Progressing     Problem: Skin and Tissue Injury (Artificial Airway)  Goal: Absence of Device-Related Skin or Tissue Injury  Outcome: Ongoing, Progressing     Problem: Device-Related Complication Risk (CRRT (Continuous Renal Replacement Therapy))  Goal: Safe, Effective Therapy Delivery  Outcome: Ongoing, Progressing     Problem: Hypothermia (CRRT (Continuous Renal Replacement Therapy))  Goal: Body Temperature Maintained in Desired Range  Outcome: Ongoing, Progressing     Problem: Infection (CRRT (Continuous Renal Replacement Therapy))  Goal: Absence of Infection Signs and Symptoms  Outcome: Ongoing, Progressing     Problem: Device-Related Complication Risk  (Hemodialysis)  Goal: Safe, Effective Therapy Delivery  Outcome: Ongoing, Progressing     Problem: Hemodynamic Instability (Hemodialysis)  Goal: Effective Tissue Perfusion  Outcome: Ongoing, Progressing     Problem: Infection (Hemodialysis)  Goal: Absence of Infection Signs and Symptoms  Outcome: Ongoing, Progressing

## 2022-10-02 NOTE — PLAN OF CARE
Problem: Physical Therapy  Goal: Physical Therapy Goal  Description: Goals to be met by: 11/2/22     Patient will increase functional independence with mobility by performing:    . Supine to sit with [place]  . Sit to supine with [place]  . Rolling to Left and Right with [place].    Outcome: Ongoing, Progressing

## 2022-10-02 NOTE — H&P
[facility]  Hospital Medicine History & Physical Examination       Patient Name: Stuart Guevara  MRN: [id number]  Patient Class: IP- Inpatient   Admission Date: 9/23/2022   Admitting Physician: SHARITA Service   Length of Stay: 9  Attending Physician: Dr. Manjinder Carrillo  Primary Care Provider: [facility]  Face-to-Face encounter date: 10/02/2022  Code Status:Full code  Chief Complaint: chest pain SOB s/p TEVAR for aortic dissection      Patient information was obtained from patient, patient's family, past medical records and ER records.     HISTORY OF PRESENT ILLNESS:   Stuart Guevara is a 32 y.o. male who has PMH which includes HTN, SLE,  hepatitis B, aortic dissection with repair 2/2021, RUE DVT on Xarelto; presented to the ED at [facility] on 9/23/2022  with reports of  shortness of breath and chest pain radiating to his back.  PT has a history of aortic dissection 2/2021. Work up revealed a type B aortic dissection and significant hypertension noted on presentation.  He failed medical management and had progression of his symptoms despite adequate control of hemodynamic parameters.  PT was transferred from [facility] to [facility] for CV surgery evaluation which he was taken  to the OR and had TEVAR with left carotid to subclavian artery bypass. PT developed abdominal distension and rigidity of his abdomen and developed a lactic acidosis.  He repeat CT angiography of the chest/abdomen/pelvis without evidence of worsening dissection, but evidence of significant intra-abdominal pathology with some hemoperitoneum, free blood in the abdomen.  He underwent exploratory laparotomy with evidence of viable bowel and no significant bleeding identified, chest tube was placed. Postoperatively, he developed severe unstable bradycardia, and a transvenous pacemaker was placed by Cardiology.  Shortly afterwards, he was noted to have significant worsening of his renal failure and critical hyperkalemia. Renal services consulted  which catheter was placed and he was started on HD treatments.  PT has been cleared for transfer out of ICU to the floor. Hospital medicine services consulted for assumption of care.     PAST MEDICAL HISTORY:   HTN  Hx of aortic dissection  SLE  RUE DVT on Xarelto  Hepatitis B  HLD  GERD  Lupus nephritis    PAST SURGICAL HISTORY:     Past Surgical History:   Procedure Laterality Date    APPLICATION OF WOUND VACUUM-ASSISTED CLOSURE DEVICE  9/27/2022    Procedure: APPLICATION, WOUND VAC;  Surgeon: Christopher Espinal MD;  Location: [facility];  Service: General;;    CARDIAC SURGERY      FINGER AMPUTATION      THROMBECTOMY Right     TOE AMPUTATION         ALLERGIES:   Levofloxacin and Sulfamethoxazole-trimethoprim    FAMILY HISTORY:   Reviewed and negative    SOCIAL HISTORY:     Social History     Tobacco Use    Smoking status: Every Day     Types: Cigarettes    Smokeless tobacco: Never   Substance Use Topics    Alcohol use: Not on file        HOME MEDICATIONS:   As documented  Prior to Admission medications    Medication Sig Start Date End Date Taking? Authorizing Provider   albuterol (PROVENTIL/VENTOLIN HFA) 90 mcg/actuation inhaler Inhale 2 puffs into the lungs every 4 (four) hours as needed for Wheezing. Rescue 5/10/22   Martín Nelson MD   amLODIPine (NORVASC) 10 MG tablet Take 1 tablet (10 mg total) by mouth once daily. 5/10/22   Martín Nelson MD   aspirin (ECOTRIN) 81 MG EC tablet Take 1 tablet (81 mg total) by mouth once daily. 5/10/22   Martín Nelson MD   belimumab (BENLYSTA) 120 mg SolR Benlysta Take No date recorded No form recorded No frequency recorded No route recorded No set duration recorded No set duration amount recorded active No dosage strength recorded No dosage strength units of measure recorded    Historical Provider   famotidine (PEPCID) 20 MG tablet Take 1 tablet (20 mg total) by mouth 2 (two) times daily. 5/10/22 6/9/22  Martín Nelson MD   gemfibroziL (LOPID) 600 MG tablet Take 600 mg by mouth.     Historical Provider   hydrALAZINE (APRESOLINE) 10 MG tablet Take 10 mg by mouth.    Historical Provider   labetaloL (NORMODYNE) 300 MG tablet Take 1 tablet (300 mg total) by mouth 2 (two) times daily. 5/10/22   Martín Nelson MD   lisinopriL (PRINIVIL,ZESTRIL) 40 MG tablet Take 1 tablet (40 mg total) by mouth once daily. 5/10/22   Martín Nelson MD   metoprolol tartrate (LOPRESSOR) 50 MG tablet Take 1 tablet (50 mg total) by mouth 2 (two) times daily. 5/10/22   Martín Nelson MD   mycophenolate (CELLCEPT) 500 mg Tab Take 2 tablets (1,000 mg total) by mouth 2 (two) times daily. 5/10/22   Martín Nelson MD   MEÑO 2 mg TbEC Take 1 tablet by mouth once daily. 12/9/21   Historical Provider   rivaroxaban (XARELTO ORAL) Xarelto Take No date recorded No form recorded No frequency recorded No route recorded No set duration recorded No set duration amount recorded active No dosage strength recorded No dosage strength units of measure recorded    Historical Provider   sucralfate (CARAFATE) 1 gram tablet Take 1 tablet (1 g total) by mouth 2 (two) times daily. 5/10/22   Martín Nelson MD   warfarin (COUMADIN) 3 MG tablet Take 1 tablet (3 mg total) by mouth Daily. 5/10/22 6/9/22  Martín Nelson MD       REVIEW OF SYSTEMS:   Except as documented, all other systems reviewed and negative     PHYSICAL EXAM:     VITAL SIGNS: 24 HRS MIN & MAX LAST   Temp  Min: 98.3 °F (36.8 °C)  Max: 100.6 °F (38.1 °C) 98.5 °F (36.9 °C)   BP  Min: 130/65  Max: 177/101 (!) 147/79     Pulse  Min: 81  Max: 101  83   Resp  Min: 12  Max: 27 20   SpO2  Min: 93 %  Max: 100 % 100 %       General appearance: chronically ill appearing look older than stated age, non-toxic  HENT: Atraumatic head. Moist mucous membranes of oral cavity.  Eyes: PERRL  Lungs:  diminished bilaterally, fine scattered crackles, mildly labored respirations, stable saturations on supplemental therapy, chest tube in place to closed water seal system  Heart: Regular rate and rhythm. Systolic  murmur, cap refill brisk, trace edema  Abdomen: Soft, non-distended, non-tender. No rebound tenderness/guarding. Bowel sounds are normal.   Extremities: DEWEY, generalized weakness  Skin: warm and dry  Neuro: AAOx3, no focal deficits  Psych/mental status: Appropriate mood and affect. Responds appropriately to questions.     LABS AND IMAGING:     Recent Labs   Lab 09/28/22  0233 09/29/22  0141 09/30/22  0116 10/01/22  0138 10/02/22  0220   WBC 16.2* 21.5* 23.6* 22.4* 28.0*   RBC 3.18* 3.16* 3.39* 3.46* 3.66*   HGB 9.8* 9.2* 10.2* 9.9* 10.5*   HCT 27.2* 27.0* 28.6* 29.2* 30.8*   MCV 85.5 85.4 84.4 84.4 84.2   MCH 30.8 29.1 30.1 28.6 28.7   MCHC 36.0 34.1 35.7 33.9 34.1   RDW 15.3 15.6 15.6 16.1 16.5    192 151 154 228   MPV 12.3* 13.3*  --   --  11.8*       Recent Labs   Lab 09/29/22  0425 09/30/22  0116 09/30/22  0445 10/01/22  0138 10/01/22  0445 10/02/22  0220   NA  --  128*  --  133*  --  132*   K  --  3.3*  --  4.1  --  4.2   CO2  --  22  --  25  --  22   BUN  --  21.5*  --  32.2*  --  31.8*   CREATININE  --  3.28*  --  3.93*  --  3.59*   CALCIUM  --  8.5  --  7.6*  --  7.5*   PH 7.42  --  7.54*  --  7.46*  --    MG  --  2.20  --  2.20  --  2.10   ALBUMIN  --  1.1*  --  1.1*  --  1.1*   ALKPHOS  --  133  --  127  --  137   ALT  --  17  --  16  --  17   AST  --  41*  --  34  --  37*   BILITOT  --  0.6  --  0.6  --  0.6       Microbiology Results (last 7 days)       Procedure Component Value Units Date/Time    Blood Culture [[id number]] Collected: 10/02/22 0605    Order Status: Resulted Specimen: Blood Updated: 10/02/22 0644    Urine culture [[id number]] Collected: 09/28/22 2255    Order Status: Completed Specimen: Urine, Catheterized Updated: 10/01/22 1008     Urine Culture No Growth    Mycobacteria and Nocardia Culture [[id number]] Updated: 09/30/22 1625    Order Status: Sent Specimen: Body Fluid from Pleural Fluid, Right     Body Fluid Culture [[id number]] Collected: 09/25/22 1200    Order Status: Completed  Specimen: Body Fluid from Pleural Fluid, Right Updated: 09/30/22 1010     Body Fluid Culture No Growth at 5 days    Blood Culture [[id number]]  (Normal) Collected: 09/25/22 0226    Order Status: Completed Specimen: Blood Updated: 09/30/22 0900     CULTURE, BLOOD ([facility]) No Growth at 5 days    Blood Culture [[id number]]  (Normal) Collected: 09/25/22 0226    Order Status: Completed Specimen: Blood Updated: 09/30/22 0900     CULTURE, BLOOD ([facility]) No Growth at 5 days    Respiratory Culture [[id number]] Collected: 09/27/22 0851    Order Status: Completed Specimen: Sputum from Lung Aspirate Updated: 09/29/22 1053     Respiratory Culture Rare normal respiratory lacy     GRAM STAIN Quality 3+      No bacteria seen    Respiratory Culture [[id number]]     Order Status: Canceled Specimen: Sputum     Gram Stain [[id number]] Collected: 09/25/22 1200    Order Status: Completed Specimen: Body Fluid from Pleural Fluid, Right Updated: 09/26/22 0844     GRAM STAIN Rare WBC observed      No bacteria seen    Fungal Culture [[id number]] Collected: 09/25/22 1200    Order Status: Sent Specimen: Body Fluid from Pleural Fluid, Right Updated: 09/25/22 1724             X-ray Abdomen for NG Tube Placement (Nursing should notify Radiology after placement)  Narrative: EXAMINATION:  XR NON-RADIOLOGIST PERFORMED NG/GASTRIC TUBE CHECK    CLINICAL HISTORY:  NGT placement verification;    TECHNIQUE:  One view    COMPARISON:  September 26, 2022    FINDINGS:  Nasogastric tube traverses the gastroesophageal junction and tip of the tube is within the mid gastric body.  There is adequate length of the tube within the stomach.  The intestinal gas pattern is nonspecific and nonobstructive.  Impression: Optimal placement of the nasogastric tube.    Electronically signed by: Jameson Brumfield  Date:    10/01/2022  Time:    11:48  X-Ray Chest 1 View  Narrative: EXAMINATION:  XR CHEST 1 VIEW    CPT 80945    CLINICAL HISTORY:  chest tube;    COMPARISON:  September 30,  2020    FINDINGS:  Examination reveals cardiomediastinal silhouette and pleuroparenchymal changes to be essentially unchanged as compared with the previous exam.    Support catheters remain in place  Impression: No significant change    Electronically signed by: Ramón Ying  Date:    10/01/2022  Time:    08:05        ASSESSMENT & PLAN:   ASSESSMENT:  Acute respiratory failure with hypoxia- s/p mechanical ventilation, now extubated  Acute aortic dissection type B- s/p TEVAR with left carotid artery to left subclavian bypass 9/23/2022  HTN- emergency, resolving  ARF- s/p HD treatments  Cardiomyopathy- EF 37% unknown etiology  Acute abdominal pain generalized- secondary to abdominal compartment syndrome s/p exp lab; resolving  Leukocytosis- on vancomycin  SLE- on cellcept  Anemia- chronic disease  Acute pancreatitis- resolving with lipase trending down  RUE DVT- was on Xarelto, recent US no DVT  Weakness  HLD  Hx of hepatitis B  Hx of aortic dissection with repair 2/2021    PLAN:  Post op day 9, off pressors, sedation, extubated, and off vent  Appreciate assistance and recommendations from consults  Fall precautions  Accurate I&O  Blood pressure parameters SBP < 150  PT, OT eval and treat  Labs in the am  Pharmacy for vancomycin dosing    VTE Prophylaxis: SCD for DVT prophylaxis and will be advised to be as mobile as possible    Patient condition:  Fair    __________________________________________________________________________  INPATIENT LIST OF MEDICATIONS     Scheduled Meds:   amLODIPine  10 mg Oral Daily    docusate sodium  100 mg Oral BID    famotidine  20 mg Oral Every other day    heparin (porcine)  5,000 Units Subcutaneous Q12H    hydrALAZINE  10 mg Oral Q8H    metoprolol tartrate  25 mg Oral BID    polyethylene glycol  17 g Oral BID    sucralfate  1 g Oral BID    vancomycin (VANCOCIN) IVPB  1,250 mg Intravenous Once     Continuous Infusions:   clevidipine 3 mg/hr (09/30/22 0917)    dextrose 5 % and 0.9  % NaCl Stopped (09/29/22 1900)     PRN Meds:.acetaminophen, albuterol, bisacodyL, dextrose 10%, dextrose 50%, dextrose 50%, glucagon (human recombinant), glucose, glucose, hydrALAZINE, HYDROmorphone, insulin aspart U-100, labetaloL, LIDOcaine (PF) 10 mg/ml (1%), lorazepam, morphine, ondansetron, sodium chloride 0.9%, sodium chloride 0.9%, sodium chloride 0.9%, sodium chloride 0.9%, sodium chloride 0.9%, sodium chloride 0.9%, Pharmacy to dose Vancomycin consult **AND** vancomycin - pharmacy to dose      I, DENIA Winn have reviewed and discussed the case with Dr. Manjinder Carrillo. Please see the following addendum for further assessment and plan from there attending MD.  DENIA Lopez   10/02/2022    _

## 2022-10-02 NOTE — PROGRESS NOTES
[facility] ICU  Pulmonary Critical Care Note    Patient Name: Stuart Guevara  MRN: [id number]  Admission Date: 9/23/2022  Hospital Length of Stay: 9 days  Code Status: Full Code  Attending Provider: Ivan Chopra MD  Primary Care Provider: Primary Doctor No     Subjective:     HPI: Mr. Guevara is a 32-year-old male with past medical history significant for SLE, HTN, HX of type a aortic dissection repaired in February 2021, HX of right upper extremity DVT who presented to [facility] Emergency Department on 09/23/2022 with complaints of tearing, ripping chest pain with radiation to the back.  Initial BP noted to be 232/127.  CT scan significant for type B aortic dissection, started on esmolol drip and transferred to [facility] for CT surgery services.  Admitted to the ICU with CT surgery consulted.    Hospital Course/Significant events:    - 09/23/2022:  With concern for left arm blood pressure discrepancy and concern for diminished outflow through left subclavian patient was brought to OR with CT surgery for TECAR; left carotid to left subclavian artery bypass  - 9/25/22: patient had significant abdominal pain, acute abdomen, shortness of breath. CT scan showed global hypoperfusion. Received 2 units pRBC. In the setting of shock and respiratory failure, intubated and placed on ventilator. Brought to OR with general surgery for exploratory laparotomy which revealed ascites in peritoneal cavity, mottled liver, viable small bowel and right pleural effusion. Chest tube placed. Vancomycin initiated. HD initiated due to electrolyte derangements.  - 9/27/22 patient noted to have firm abdomen, brought to OR for decompressive laparotomy. Large volume retroperitoneal ascitic fluid drained.  - 09/29/2022 abdominal closure  - 10/01/2022- extubated    24 Hour Interval History:  Patient extubated yesterday, doing well. Nursing reports low grade fevers, 24 hr TMAX 37.9. Blood cultures were collected and patient  was restarted on Vancomycin.       Past Medical History:   Diagnosis Date    Hypertension     Systemic lupus erythematosus, organ or system involvement unspecified     Systemic lupus erythematosus, organ or system involvement unspecified        Past Surgical History:   Procedure Laterality Date    APPLICATION OF WOUND VACUUM-ASSISTED CLOSURE DEVICE  9/27/2022    Procedure: APPLICATION, WOUND VAC;  Surgeon: Christopher Espinal MD;  Location: [facility];  Service: General;;    CARDIAC SURGERY      FINGER AMPUTATION      THROMBECTOMY Right     TOE AMPUTATION     Mechanical aortic valve    Social History     Socioeconomic History    Marital status: Single   Tobacco Use    Smoking status: Every Day     Types: Cigarettes    Smokeless tobacco: Never     Social Determinants of Health     Food Insecurity: Unknown    Worried About Running Out of Food in the Last Year: Never true   Transportation Needs: Unknown    Lack of Transportation (Medical): No   Housing Stability: Unknown    Unable to Pay for Housing in the Last Year: No           Current Outpatient Medications   Medication Instructions    albuterol (PROVENTIL/VENTOLIN HFA) 90 mcg/actuation inhaler 2 puffs, Inhalation, Every 4 hours PRN, Rescue    amLODIPine (NORVASC) 10 mg, Oral, Daily    aspirin (ECOTRIN) 81 mg, Oral, Daily    belimumab (BENLYSTA) 120 mg SolR Benlysta Take No date recorded No form recorded No frequency recorded No route recorded No set duration recorded No set duration amount recorded active No dosage strength recorded No dosage strength units of measure recorded    famotidine (PEPCID) 20 mg, Oral, 2 times daily    gemfibroziL (LOPID) 600 mg, Oral    hydrALAZINE (APRESOLINE) 10 mg, Oral    labetaloL (NORMODYNE) 300 mg, Oral, 2 times daily    lisinopriL (PRINIVIL,ZESTRIL) 40 mg, Oral, Daily    metoprolol tartrate (LOPRESSOR) 50 mg, Oral, 2 times daily    mycophenolate (CELLCEPT) 1,000 mg, Oral, 2 times daily    MEÑO 2 mg TbEC 1 tablet, Oral, Daily     rivaroxaban (XARELTO ORAL) Xarelto Take No date recorded No form recorded No frequency recorded No route recorded No set duration recorded No set duration amount recorded active No dosage strength recorded No dosage strength units of measure recorded    sucralfate (CARAFATE) 1 g, Oral, 2 times daily    warfarin (COUMADIN) 3 mg, Oral, Daily       Current Inpatient Medications   amLODIPine  10 mg Oral Daily    famotidine  20 mg Oral Every other day    heparin (porcine)  5,000 Units Subcutaneous Q12H    metoprolol tartrate  25 mg Oral BID    polyethylene glycol  17 g Oral BID    sucralfate  1 g Oral BID       Current Intravenous Infusions   clevidipine 3 mg/hr (09/30/22 0917)    dexmedetomidine (PRECEDEX) infusion Stopped (10/02/22 0500)    dextrose 5 % and 0.9 % NaCl Stopped (09/29/22 1900)    NORepinephrine bitartrate-D5W 0 mcg/kg/min (09/25/22 2000)    propofoL 0 mcg/kg/min (10/01/22 1143)         Review of Systems   Reason unable to perform ROS: intubated, sedated.        Objective:       Intake/Output Summary (Last 24 hours) at 10/2/2022 1018  Last data filed at 10/2/2022 0655  Gross per 24 hour   Intake 858.25 ml   Output 1815 ml   Net -956.75 ml           Vital Signs (Most Recent):  Temp: 100.3 °F (37.9 °C) (10/02/22 0600)  Pulse: 85 (10/02/22 1000)  Resp: 19 (10/02/22 1000)  BP: (!) 149/77 (10/02/22 1000)  SpO2: 100 % (10/02/22 1000)    Body mass index is 22.01 kg/m².  Weight: 60 kg (132 lb 4.4 oz) Vital Signs (24h Range):  Temp:  [97.9 °F (36.6 °C)-100.6 °F (38.1 °C)] 100.3 °F (37.9 °C)  Pulse:  [] 85  Resp:  [13-27] 19  SpO2:  [93 %-100 %] 100 %  BP: (122-181)/() 149/77     Physical exam    General:  Ill-appearing Head: Normocephalic, atraumatic  Eyes: PERRL, EOMI, anicteric sclera  CVS:  RRR, S1 and S2 normal, systolic murmur , no added heart sounds, rubs, gallops, 2+ peripheral pulses  Resp:  Lungs clear to auscultation bilaterally, no wheezes, rales, or rhonci  GI:  Abdomen non-tender, tense,  normoactive bowel sounds  MSK:  No muscle atrophy, cyanosis, peripheral edema, full range of motion  Skin:  No rashes, ulcers, erythema  Neuro:  AAOx3    Lines/Drains/Airways       Central Venous Catheter Line  Duration                  Hemodialysis Catheter 09/25/22 1900 right femoral 6 days              Drain  Duration                  Chest Tube 09/25/22 1419 1 Right Mediastinal 28 Fr. 6 days         Closed/Suction Drain 09/29/22 Superior;Midline Abdomen Bulb 19 Fr. 3 days              Peripheral Intravenous Line  Duration                  Peripheral IV - Single Lumen 20 G Anterior;Distal;Left Upper Arm -- days         Peripheral IV - Single Lumen 09/25/22 1952 18 G;Other (Comments) Anterior;Left;Proximal Forearm 6 days         Peripheral IV - Single Lumen 09/25/22 1953 20 G;Other (Comments) Anterior;Distal;Left Forearm 6 days                    Significant Labs:    Lab Results   Component Value Date    WBC 28.0 (H) 10/02/2022    HGB 10.5 (L) 10/02/2022    HCT 30.8 (L) 10/02/2022    MCV 84.2 10/02/2022     10/02/2022         BMP  Lab Results   Component Value Date     (L) 10/02/2022    K 4.2 10/02/2022    CO2 22 10/02/2022    BUN 31.8 (H) 10/02/2022    CREATININE 3.59 (H) 10/02/2022    CALCIUM 7.5 (L) 10/02/2022    EGFRNONAA 80 01/31/2022       ABG  Recent Labs   Lab 10/01/22  0445   PH 7.46*   PO2 125*   PCO2 41   HCO3 29.2         Mechanical Ventilation Support:  N/A    Significant Imaging:  I have reviewed the pertinent imaging within the past 24 hours.        Assessment/Plan:     Assessment  Type B aortic dissection  CT at outside ED significant for Kennedy type B aortic dissection with anterior extensive mid descending thoracic aorta, 3.7 cm dilation  Hypertensive emergency  Acute renal failure  History right upper extremity DVT, previously on Xarelto, noncompliance for past 2 months  RUE US done on 9/24/22 negative for thrombus  SLE, currently unmedicated  S/p exploratory laparotomy 9/25/22  due to acute abdomen and evidence of global hypoperfusion on CT  Revealed mottled liver, ascites in peritoneal cavity, viable small bowel and right pleural effusion, pancreatic swelling  Chest tube placed  Acute hypoxemic respiratory failure; improved, extubated on 10/1  Pancreatitis, presumptive   Elevated lipase, saponification of pancreas noted during exploratory laparotomy  9.    Leukocytosis      Plan  - postop day 9 from TEVAR of the left carotid artery to left subclavian bypass  - BP has been stable.  Keep SBP <150 per thoracic surgery recs.  - HD per nephrology  - Increasing leukocytosis and low grade temps, blood cultures pending and patient was restarted on Vanc. Will remove dialysis line today, he will need it replaced after 1 day line holiday in anticipation of dialysis on Tuesday.   - Has not had a bowel movement and continues to complain of abdominal pain, received Miralax this AM. Will add colace BID, as well as Doculax suppository PRN.   - General surgery has signed off, Continue LINNETTE drain management; okay to pull LINNETTE drain once output is < 40 cc; However patient still have a chest tube that was placed by surgery in the OR. They will need to manage CT on the floor.  -Patient is stable for downgrade    DVT Prophylaxis: Heparin  GI Prophylaxis: famotidine     33 minutes of critical care was time spent personally by me on the following activities: development of treatment plan with patient or surrogate and bedside caregivers, discussions with consultants, evaluation of patient's response to treatment, examination of patient, ordering and performing treatments and interventions, ordering and review of laboratory studies, ordering and review of radiographic studies, pulse oximetry, re-evaluation of patient's condition.  This critical care time did not overlap with that of any other provider or involve time for any procedures.     DENIA Moon  Pulmonary Critical Care Medicine Resident  [facility]  [address]

## 2022-10-02 NOTE — PLAN OF CARE
Problem: Occupational Therapy  Goal: Occupational Therapy Goal  Description: Goals to be met by: 10/14/22    Patient will increase functional independence with ADLs by performing:    UE Dressing with Stand-by Assistance.  LB Dressing with Minimal Assistance and dressing AEDs.  Grooming while EOB with Stand-by Assistance.  Sitting at edge of bed x10 minutes with Stand-by Assistance.    Outcome: Ongoing, Progressing

## 2022-10-03 NOTE — PROGRESS NOTES
Trauma/Acute Care Surgery   Daily Progress Note     HD#10  POD#4 Days Post-Op    Subjective  NAEO  AFVSS  Tolerating CLD w/o n/v  R CT with 300cc serous output, to H2O seal  Midline LINNETTE with 100 cc serous output  BM x1     Objective  Temp:  [98.7 °F (37.1 °C)-100.2 °F (37.9 °C)] 100.2 °F (37.9 °C)  Pulse:  [78-98] 87  Resp:  [12-23] 12  SpO2:  [91 %-100 %] 95 %  BP: (121-163)/(63-84) 130/78     Physical Exam:  GEN: NAD  NEURO: alert, oriented, answers questions appropriately  RESP: unlabored breathing on RA  CV: RR, 2+ radial pulses BL; R sided CT in place  ABD: moderately distended, TTP, LINNETTE in place  EXT: moves extremities spontaneously     Labs  Labs reviewed    WBC 28.6 (28)  Hb 10.6 (10.5)    Na 126  K 5.6  BUN/Cr 48.7/4.44    Imaging  AM CXR reviewed  CT appropriately placed, no PTX noted     Assessment/Plan  33 y/o M w Hx of Type B aortic dissection, s/p TEVAR + L carotid-subclavian bypass, p/w acute abdomen 2/2 abdominal compartment syndrome of undetermined etiology s/p ex-lap c/b abdominal compartment syndrome s/p decompressive ex-lap with subsequent abdominal closure and LINNETTE drain placement    - ok for FLD from surgery standpoint  - continue CT given high output  - daily CXR  - continue LINNETTE, ok to d/c when < 40cc  - rest of care per primary       Bety Handley MD  [employer] General Surgery PGY2

## 2022-10-03 NOTE — PROGRESS NOTES
"   Nephrology Progress Note  Patient Name: Stuart Guevara  Age: 32 y.o.  : 1990  MRN: [id number]  Admission Date: 2022    Chief complaint:  Chest pain    Hospital Course   Mr Guevara is a 32-year-old  male with past medical history of aortic dissection, status post repair in , SLE, and hypertension, who presented to the emergency department with complaints of tearing chest pain and blood pressure of 232/127.  CT scan revealed findings consistent with type B aortic dissection, patient underwent TEVAR  as well as a carotid artery to subclavian bypass on the left on 2022.  He also underwent exploratory laparotomy on 2022 for concern of ischemic colitis, right chest tube was placed.  No ischemic bowel was found.  Patient's kidney function worsened, he developed severe hyperkalemia and was initiated on hemodialysis by way of right temporary femoral catheter on 2022.  We are continuing to follow for nephrology care.    Subjective   Patient has been extubated, alert and oriented today.    10/03/2022 very much awake alert oriented and responds appropriately.  No urine output noted.  He is able to tolerate some oral clear liquids so far.    Review of systems       Objective   BP (!) 149/80   Pulse 88   Temp 99.1 °F (37.3 °C) (Oral)   Resp 16   Ht 5' 5" (1.651 m)   Wt 60 kg (132 lb 4.4 oz)   SpO2 (!) 94%   BMI 22.01 kg/m²     Intake/Output Summary (Last 24 hours) at 10/3/2022 0909  Last data filed at 10/3/2022 0604  Gross per 24 hour   Intake 120 ml   Output 515 ml   Net -395 ml     Physical Exam  General appearance:  Chronically ill-appearing male in no acute distress.    HEENT: PERRLA, EOMI, no scleral icterus, no JVD. Neck is supple.  Chest:  Lung sounds are diminished to auscultation.  Chest tube is in place  Heart:  No rub or gallop.  Regular rate and rhythm.  Abdomen:  Wound VAC in place.  Lot of discomfort on palpation in the suprapubic area.  : " Deferred.  Extremities: peripheral pulses are palpable.  Right groin temporary dialysis catheter is in place.  Neuro:  Alert and oriented x4, weak.    Laboratory Data:   Serum labs:  Lab Results   Component Value Date     (L) 10/03/2022    K 5.6 (H) 10/03/2022    CHLORIDE 95 (L) 10/03/2022    CO2 23 10/03/2022    BUN 48.7 (H) 10/03/2022    CREATININE 4.44 (H) 10/03/2022    GLUCOSE 205 (H) 10/03/2022    CALCIUM 8.0 (L) 10/03/2022    ALBUMIN 1.2 (L) 10/03/2022    PHOS 4.5 10/03/2022    MG 2.30 10/03/2022    ALKPHOS 110 10/03/2022    ALT 16 10/03/2022    BILIDIR 0.1 05/10/2022    IBILI 0.20 05/10/2022    HGB 10.6 (L) 10/03/2022    HCT 31.2 (L) 10/03/2022     10/03/2022    WBC 28.6 (H) 10/03/2022    IRON 45 (L) 10/30/2018    TIBC 222 (L) 10/30/2018    LABIRON 20.3 10/30/2018    TRANS 146.0 (L) 10/30/2018    FERRITIN 664.4 (H) 10/31/2018    LABURIC 8.1 (H) 02/27/2020     Lab Results   Component Value Date    HIV Nonreactive 10/31/2018    HEPBSURFAG Nonreactive 09/25/2022    HEPBCAB Reactive (A) 10/31/2018      Urine studies:  Lab Results   Component Value Date    COLORUA Dark-Orange (A) 09/28/2022    APPEARANCEUA Cloudy (A) 09/28/2022    SGUA >=1.030 09/28/2022    PHUA 5.0 09/28/2022    PROTEINUA 3+ (A) 09/28/2022    GLUCOSEUA Trace (A) 09/28/2022    KETONESUA 1+ (A) 09/28/2022    BLOODUA 3+ (A) 09/28/2022    NITRITESUA Positive (A) 09/28/2022    LEUKOCYTESUR Trace (A) 09/28/2022    RBCUA 11-20 (A) 09/28/2022    WBCUA 21-50 (A) 09/28/2022    BACTERIA Few (A) 09/28/2022    SQEPUA  (A) 12/09/2021    HYALINECASTS 3-5 01/31/2022    CREATRANDUR 314.6 (H) 12/09/2021    PROTEINURINE 269.4 12/09/2021       Medications    Current Facility-Administered Medications:     acetaminophen tablet 650 mg, 650 mg, Oral, Q6H PRN, Nolberto Calvillo MD, 650 mg at 10/02/22 0618    albuterol inhaler 2 puff, 2 puff, Inhalation, Q4H PRN, Evelio Marshall MD    amLODIPine tablet 10 mg, 10 mg, Oral, Daily, Bin Bell MD, 10  mg at 10/03/22 0805    bisacodyL suppository 10 mg, 10 mg, Rectal, Daily PRN, Keena H Marlow, FNP, 10 mg at 10/02/22 1056    carvediloL tablet 12.5 mg, 12.5 mg, Oral, BID, Manjinder Carrillo MD, 12.5 mg at 10/03/22 0805    dextrose 10% bolus 125 mL, 12.5 g, Intravenous, PRN, Ivan Chopra MD    dextrose 50% injection 12.5 g, 12.5 g, Intravenous, PRN, Keena H Marlow, FNP    dextrose 50% injection 25 g, 25 g, Intravenous, PRN, Keena H Marlow, FNP    docusate sodium capsule 100 mg, 100 mg, Oral, BID, Keena H Marlow, FNP, 100 mg at 10/03/22 0805    famotidine tablet 20 mg, 20 mg, Oral, Every other day, Ivan Chopra MD, 20 mg at 10/02/22 0818    glucagon (human recombinant) injection 1 mg, 1 mg, Intramuscular, PRN, Keena H Marlow, FNP    glucose chewable tablet 16 g, 16 g, Oral, PRN, Keena H Marlow, FNP    glucose chewable tablet 24 g, 24 g, Oral, PRN, Keena H Marlow, FNP    heparin (porcine) injection 5,000 Units, 5,000 Units, Subcutaneous, Q12H, Lola Martinez MD, 5,000 Units at 10/03/22 0805    hydrALAZINE injection 20 mg, 20 mg, Intravenous, Q4H PRN, Bin Bell MD, 20 mg at 10/02/22 0633    hydrALAZINE tablet 50 mg, 50 mg, Oral, Q8H, Ronda Hamilton MD    HYDROmorphone (PF) injection 1 mg, 1 mg, Intravenous, Q4H PRN, Cong Velez DO, 1 mg at 10/03/22 0620    insulin aspart U-100 injection 1-10 Units, 1-10 Units, Subcutaneous, QID (AC + HS) PRN, Keena H Marlow, FNP, 4 Units at 10/03/22 0805    labetaloL injection 20 mg, 20 mg, Intravenous, Q8H PRN, Bin Bell MD, 20 mg at 10/03/22 0321    LIDOcaine (PF) 10 mg/ml (1%) injection, , , PRN, Ruben Arciniega MD, 5 mL at 09/25/22 1656    lorazepam injection 1 mg, 1 mg, Intravenous, Q2H PRN, Cong Velez DO    morphine injection 2 mg, 2 mg, Intravenous, Q2H PRN, Evelio Marshall MD, 2 mg at 10/03/22 0310    ondansetron injection 4 mg, 4 mg, Intravenous, Q6H PRN, Evelio Marshall MD, 4 mg at 09/23/22 1236    polyethylene glycol  packet 17 g, 17 g, Oral, BID, Bin Bell MD, 17 g at 10/03/22 0809    sodium chloride 0.9% bolus 250 mL, 250 mL, Intravenous, PRN, Shahzad Lundberg MD    sodium chloride 0.9% bolus 250 mL, 250 mL, Intravenous, PRN, Shahzad Lundberg MD    sodium chloride 0.9% bolus 250 mL, 250 mL, Intravenous, PRN, Cindy Lora, DENIA    sodium chloride 0.9% flush 10 mL, 10 mL, Intravenous, PRN, Francia Marcus DO    sodium chloride 0.9% flush 10 mL, 10 mL, Intravenous, PRN, Evelio Marshall MD    sodium chloride 0.9% flush 10 mL, 10 mL, Intravenous, PRN, Evelio Marshall MD    sodium zirconium cyclosilicate packet 10 g, 10 g, Oral, BID, Ronda Hamilton MD    sucralfate tablet 1 g, 1 g, Oral, BID, Francia Marcus DO, 1 g at 10/03/22 0805    Pharmacy to dose Vancomycin consult, , , Once **AND** vancomycin - pharmacy to dose, , Intravenous, pharmacy to manage frequency, Moise Cohen DO       Impression    Oliguric acute kidney injury, no urine output noted will check bladder scan.  Hyperkalemia noted.  Hospitalist has started patient on Lokelma already.  Aortic dissection, status post TEVAR procedure   Hypertension   Anemia  Mild hyponatremia  History of right upper extremity DVT   History of SLE  Acute abdomen, status post exploratory laparotomy 09/25/2022    Plan   Will continue hemodialysis per Tuesday, Thursday, Saturday schedule.  Agree with resuming oral antihypertensives.  Will check serum potassium later during the daytime and potassium keeps on going up, then he will need dialysis to be done today if not it can be done tomorrow.  Also if the bladder scan shows lot of urine in the bladder then he may need an indwelling Galarza catheter drain the bladder also.

## 2022-10-03 NOTE — PROGRESS NOTES
"[facility] - [facility] ICU  Cardiology  Progress Note    Patient Name: Stuart Guevara  MRN: [id number]  Admission Date: 9/23/2022  Hospital Length of Stay: 10 days  Code Status: Full Code   Attending Physician: Manjinder Carrillo MD   Primary Care Physician: Primary Doctor No  Expected Discharge Date:   Principal Problem:Aortic dissection    Subjective:     Brief HPI:    This is a 32-year-old male, who is known to [facility] as an inpatient only,  with history of HTN, lupus nephritis, chronic anemia, right upper extremity DVT, SLE, type a aortic dissection with repair.  He presents to [facility] ER on 09/23/2022 with complaints of ripping /tearing chest pain that radiated to his back began on 09/22/2022.  He was noted to have a BP  of 232/126.  He had a syncopal episode witnessed at the .  Patient was brought immediately back to CT where he was found to have a type B aortic dissection.  Esmolol and nitroprusside were initiated at [facility].   Patient was transferred to [facility] for CT surgery  the services and was admitted directly to ICU.   On 09/25/2022 he was found to have high-grade AV block with bradycardia.  Dr. Maldonadoha the patient down for TVP.    Patient underwent exploratory laparotomy.  He h.ad to be intubated and placed on ventilator secondary to respiratory failure and shockCIS has been consulted for bradycardia.     Hospital Course:   9.27.22: Remains vented/sedated. SR on tele. NO further pacing noted. VSS.   9.28.22: Vented/sedated. SR on tele. No bradycardia or arrhythmia noted.  10.3.22: Reconsulted for HTN/BP Control. "I am doing well." Denies CP, SOB and Palps. + Cough/No Sputum Production.     PMH: HTN, lupus nephritis, chronic anemia, right upper extremity DVT, SLE  PSH: Left middle finger amputation, right second toe amputation, Mechanical Aortic Valve with Graph/Dissection Repair  Social History: Denies EtOH and illicit drug use, current tobacco use approximately 4-5 cigarettes/day  Family History: " Mother-Hypertension     Previous Diagnostics:   Echo 9.26.22  HO AAA surgery and aortic valve replacement.  The left ventricle is normal in size with severe concentric hypertrophy and moderately decreased systolic function.  The estimated ejection fraction is 37%.  There is moderate left ventricular global hypokinesis.  Grade I left ventricular diastolic dysfunction.  Moderate right ventricular enlargement with mildly to moderately reduced right ventricular systolic function.  There is a bioprosthetic aortic valve present. There is trivial paravalvular aortic insufficiency present. Prosthetic aortic valve is normal.  The estimated PA systolic pressure is 33 mmHg.  Intermediate central venous pressure (8 mmHg    ECHO 2.15.21  Moderate concentric left ventricular hypertrophy.  Left ventricular ejection fraction is measured at approximately 55-60 %.  Structurally aortic valve is trileaflet.  Moderate aortic regurgitation is noted..  Turbulent flow in aortic root with possible aortic dissection flap visualized. (Best images at end of study)  Aortic root measures approximately 4.1cm.     Review of Systems   Unable to perform ROS: Acuity of condition   Constitutional: Positive for malaise/fatigue. Negative for fever.   Cardiovascular:  Negative for chest pain and irregular heartbeat.   Respiratory:  Positive for cough. Negative for hemoptysis, shortness of breath and sputum production.    All other systems reviewed and are negative.    Objective:     Vital Signs (Most Recent):  Temp: 98.3 °F (36.8 °C) (10/03/22 1200)  Pulse: 87 (10/03/22 1200)  Resp: 13 (10/03/22 1200)  BP: 136/81 (10/03/22 1200)  SpO2: 95 % (10/03/22 1200)   Vital Signs (24h Range):  Temp:  [98.3 °F (36.8 °C)-100.2 °F (37.9 °C)] 98.3 °F (36.8 °C)  Pulse:  [78-98] 87  Resp:  [12-23] 13  SpO2:  [91 %-100 %] 95 %  BP: (121-163)/(63-84) 136/81     Weight: 60 kg (132 lb 4.4 oz)  Body mass index is 22.01 kg/m².    SpO2: 95 %  O2 Device (Oxygen Therapy): room  air      Intake/Output Summary (Last 24 hours) at 10/3/2022 1258  Last data filed at 10/3/2022 0604  Gross per 24 hour   Intake 120 ml   Output 515 ml   Net -395 ml       Lines/Drains/Airways       Drain  Duration                  Chest Tube 09/25/22 1419 1 Right Mediastinal 28 Fr. 7 days         Closed/Suction Drain 09/29/22 Superior;Midline Abdomen Bulb 19 Fr. 4 days              Peripheral Intravenous Line  Duration                  Peripheral IV - Single Lumen 09/25/22 1953 20 G;Other (Comments) Anterior;Distal;Left Forearm 7 days                  Significant Labs: CMP   Recent Labs   Lab 10/02/22  0220 10/03/22  0305   * 126*   K 4.2 5.6*   CO2 22 23   BUN 31.8* 48.7*   CREATININE 3.59* 4.44*   CALCIUM 7.5* 8.0*   ALBUMIN 1.1* 1.2*   BILITOT 0.6 0.6   ALKPHOS 137 110   AST 37* 41*   ALT 17 16      and CBC   Recent Labs   Lab 10/02/22  0220 10/03/22  0305   WBC 28.0* 28.6*   HGB 10.5* 10.6*   HCT 30.8* 31.2*    267       Telemetry:  SR    Physical Exam  Constitutional:       Appearance: Normal appearance.      Comments: Sedated and mechanically ventilated   HENT:      Head: Normocephalic.      Mouth/Throat:      Mouth: Mucous membranes are moist.   Cardiovascular:      Rate and Rhythm: Normal rate and regular rhythm.      Pulses: Normal pulses.      Heart sounds: Normal heart sounds.   Pulmonary:      Effort: Pulmonary effort is normal.      Breath sounds: Decreased breath sounds present.   Abdominal:      Palpations: Abdomen is soft.   Skin:     General: Skin is warm and dry.      Capillary Refill: Capillary refill takes less than 2 seconds.   Neurological:      General: No focal deficit present.      Mental Status: He is alert and oriented to person, place, and time.   Psychiatric:         Mood and Affect: Mood normal.         Behavior: Behavior normal.     Current Inpatient Medications:    Current Facility-Administered Medications:     acetaminophen tablet 650 mg, 650 mg, Oral, Q6H PRNNolberto  MD Rajinder, 650 mg at 10/02/22 0618    albuterol inhaler 2 puff, 2 puff, Inhalation, Q4H PRN, Evelio Marshall MD    amLODIPine tablet 10 mg, 10 mg, Oral, Daily, Bin Bell MD, 10 mg at 10/03/22 0805    bisacodyL suppository 10 mg, 10 mg, Rectal, Daily PRN, Keena H Frankfort, FNP, 10 mg at 10/02/22 1056    carvediloL tablet 12.5 mg, 12.5 mg, Oral, BID, Manjinder Carrillo MD, 12.5 mg at 10/03/22 0805    dextrose 10% bolus 125 mL, 12.5 g, Intravenous, PRN, Ivan Chopra MD    dextrose 50% injection 12.5 g, 12.5 g, Intravenous, PRN, Keena H Frankfort, FNP    dextrose 50% injection 25 g, 25 g, Intravenous, PRN, Keena H Frankfort, FNP    docusate sodium capsule 100 mg, 100 mg, Oral, BID, Keena H Frankfort, FNP, 100 mg at 10/03/22 0805    famotidine tablet 20 mg, 20 mg, Oral, Every other day, Ivan Chopra MD, 20 mg at 10/02/22 0818    glucagon (human recombinant) injection 1 mg, 1 mg, Intramuscular, PRN, Keena H Frankfort, FNP    glucose chewable tablet 16 g, 16 g, Oral, PRN, Keena H Frankfort, FNP    glucose chewable tablet 24 g, 24 g, Oral, PRN, Keena H Frankfort, FNP    heparin (porcine) injection 5,000 Units, 5,000 Units, Subcutaneous, Q12H, Lola Martinez MD, 5,000 Units at 10/03/22 0805    hydrALAZINE injection 20 mg, 20 mg, Intravenous, Q4H PRN, Bin Bell MD, 20 mg at 10/02/22 0633    hydrALAZINE tablet 50 mg, 50 mg, Oral, Q8H, Ronda Hamilton MD    HYDROmorphone (PF) injection 1 mg, 1 mg, Intravenous, Q4H PRN, Cong Velez DO, 1 mg at 10/03/22 0620    insulin aspart U-100 injection 1-10 Units, 1-10 Units, Subcutaneous, QID (AC + HS) PRN, DENIA Caruso, 4 Units at 10/03/22 0805    labetaloL injection 20 mg, 20 mg, Intravenous, Q8H PRN, Bin Bell MD, 20 mg at 10/03/22 0321    LIDOcaine (PF) 10 mg/ml (1%) injection, , , PRN, Ruben Arciniega MD, 5 mL at 09/25/22 1656    lorazepam injection 1 mg, 1 mg, Intravenous, Q2H PRN, Cong Velez DO    morphine injection 2 mg, 2 mg,  Intravenous, Q2H PRN, Evelio Marshall MD, 2 mg at 10/03/22 0310    ondansetron injection 4 mg, 4 mg, Intravenous, Q6H PRN, Evelio Marshall MD, 4 mg at 09/23/22 1236    polyethylene glycol packet 17 g, 17 g, Oral, BID, Bin Bell MD, 17 g at 10/03/22 0809    sodium chloride 0.9% bolus 250 mL, 250 mL, Intravenous, PRN, Shahzad Lundberg MD    sodium chloride 0.9% bolus 250 mL, 250 mL, Intravenous, PRN, Shahzad Lundberg MD    sodium chloride 0.9% bolus 250 mL, 250 mL, Intravenous, PRN, DENIA Person    sodium chloride 0.9% flush 10 mL, 10 mL, Intravenous, PRN, Francia Marcus DO    sodium chloride 0.9% flush 10 mL, 10 mL, Intravenous, PRN, Evelio Marshall MD    sodium chloride 0.9% flush 10 mL, 10 mL, Intravenous, PRN, Evelio Marshall MD    sodium zirconium cyclosilicate packet 10 g, 10 g, Oral, BID, Ronda Hamilton MD, 10 g at 10/03/22 0900    sucralfate tablet 1 g, 1 g, Oral, BID, Francia Marcus DO, 1 g at 10/03/22 0805    Pharmacy to dose Vancomycin consult, , , Once **AND** vancomycin - pharmacy to dose, , Intravenous, pharmacy to manage frequency, Moise Cohen DO    Assessment:   CHB/TVP likely secondary to Electrolyte Abnormalities - Resolved  Acute Hypoxemic Respiratory Failure requiring Intubation/Ventilation - Resolved (On RA)  Type B Aortic Dissection/TEVAR  CMO (Unknown Etiology)    - ECHO (9.23.22) - LVEF 37%  Hypertensive Emergency  TAY requiring HD Treatment  Acute Abdomen with Evidence of Global Hypoperfusion/Exp Lap - Resolved  Acute Pancreatitis - Resolving  Anemia of Chronic Disease  Hx of RUE DVT (Recent US - Negative for DVT)  Hx of Hepatitis B  Hx of Aortic Dissection with Repair in 2/2021 s/p Mechanical Valve (Aortic)  SLE    Plan:   Continue Norvasc 10mg PO Qday  Continue Hydralazine 50mg PO Q8HR  Increase Coreg to 25mg PO BID  No ACEi/ARB/ARNI secondary to TAY - CRRT on Hold  Plans for OP Ischemic Workup for Newly Diagnosed CMO/EF 37%  Mechanical Aortic Valve Noted on CT of Chest  - Visualized by Dr. Dean on CT ( it was probably graft-valve composite)  Start FD Lovenox 1mg/kg SQ Daily (Renal Adjustment) if OK with Primary and Surgical Team  Start Warfarin 3mg PO Daily if OK with Primary and Surgical Team  Labs in AM: CBC, CMP, Mg and PT/INR    Maximino Yeh, MARIANNE  Cardiology  [facility] - [address] [place] [address]  10/03/2022

## 2022-10-03 NOTE — PROGRESS NOTES
[facility]  Hospital Medicine Progress Note        Chief Complaint: chest pain SOB s/p TEVAR for aortic dissection      Patient information was obtained from patient, patient's family, past medical records and ER records.      HISTORY OF PRESENT ILLNESS:   Stuart Guevara is a 32 y.o. male who has PMH which includes HTN, SLE,  hepatitis B, aortic dissection with repair 2/2021, RUE DVT on Xarelto; presented to the ED at [facility] on 9/23/2022  with reports of  shortness of breath and chest pain radiating to his back.  PT has a history of aortic dissection 2/2021. Work up revealed a type B aortic dissection and significant hypertension noted on presentation.  He failed medical management and had progression of his symptoms despite adequate control of hemodynamic parameters.  PT was transferred from [facility] to [facility] for CV surgery evaluation which he was taken  to the OR and had TEVAR with left carotid to subclavian artery bypass. PT developed abdominal distension and rigidity of his abdomen and developed a lactic acidosis.  He repeat CT angiography of the chest/abdomen/pelvis without evidence of worsening dissection, but evidence of significant intra-abdominal pathology with some hemoperitoneum, free blood in the abdomen.  He underwent exploratory laparotomy with evidence of viable bowel and no significant bleeding identified, chest tube was placed. Postoperatively, he developed severe unstable bradycardia, and a transvenous pacemaker was placed by Cardiology.  Shortly afterwards, he was noted to have significant worsening of his renal failure and critical hyperkalemia. Renal services consulted which catheter was placed and he was started on HD treatments.  PT has been cleared for transfer out of ICU to the floor. Hospital medicine services consulted for assumption of care.    Patient is s/p AAA surgery repair , course c/by worsening leucocytosis       Todays information  Seen and examined at  bedside   Has no complaints   Bp is Elevated  Wbc is severely elevated, no clear source , consult ID for recs   K slightly elevated , add on lokelma   Glucose elevated   Surgery on board managing tubes   Neph onboard helping with HD       Exam  General appearance: chronically ill appearing look older than stated age, non-toxic  HENT: Atraumatic head. Moist mucous membranes of oral cavity.  Eyes: PERRL  Lungs:  diminished bilaterally, fine scattered crackles, mildly labored respirations, stable saturations on supplemental therapy, chest tube in place to closed water seal system  Heart: Regular rate and rhythm. Systolic murmur, cap refill brisk, trace edema  Abdomen: Soft, non-distended, non-tender. No rebound tenderness/guarding. Bowel sounds are normal.   Extremities: DEWEY, generalized weakness  Skin: warm and dry  Neuro: AAOx3, no focal deficits  Psych/mental status: Appropriate mood and affect. Responds appropriately to questions.        ASSESSMENT:  sepsis , unclear source  Acute respiratory failure with hypoxia- s/p mechanical ventilation, now extubated  Acute aortic dissection type B- s/p TEVAR with left carotid artery to left subclavian bypass 9/23/2022  HTN- emergency, resolving  ARF- s/p HD treatments  Cardiomyopathy- EF 37% unknown etiology  Acute abdominal pain generalized- secondary to abdominal compartment syndrome s/p exp lab; resolving  Leukocytosis- on vancomycin  SLE- on cellcept  Anemia- chronic disease  Acute pancreatitis- resolving with lipase trending down  RUE DVT- was on Xarelto, recent US no DVT  Weakness  HLD  Hx of hepatitis B  Hx of aortic dissection with repair 2/2021     PLAN:  Bp control  Wbc is severely elevated, no clear source , consult ID for recs   add on lokelma   Glucose elevated   Surgery on board managing tubes   Neph onboard helping with HD   Fall precautions  Accurate I&O  Blood pressure parameters SBP < 150  PT, OT eval and treat  Labs in the am  Pharmacy for vancomycin  dosing     VTE Prophylaxis: SCD for DVT prophylaxis and will be advised to be as mobile as possible     Patient condition:  Fair       Critical care diagnosis - sepsis , unclear source  Critical care time > 35 mins     VITAL SIGNS: 24 HRS MIN & MAX LAST   Temp  Min: 98.5 °F (36.9 °C)  Max: 100.2 °F (37.9 °C) 100.2 °F (37.9 °C)   BP  Min: 121/63  Max: 163/84 130/78   Pulse  Min: 78  Max: 98  87   Resp  Min: 12  Max: 23 12   SpO2  Min: 91 %  Max: 100 % 95 %       Recent Labs   Lab 09/29/22  0141 09/30/22  0116 10/01/22  0138 10/02/22  0220 10/03/22  0305   WBC 21.5*   < > 22.4* 28.0* 28.6*   RBC 3.16*   < > 3.46* 3.66* 3.75*   HGB 9.2*   < > 9.9* 10.5* 10.6*   HCT 27.0*   < > 29.2* 30.8* 31.2*   MCV 85.4   < > 84.4 84.2 83.2   MCH 29.1   < > 28.6 28.7 28.3   MCHC 34.1   < > 33.9 34.1 34.0   RDW 15.6   < > 16.1 16.5 16.7      < > 154 228 267   MPV 13.3*  --   --  11.8* 11.9*    < > = values in this interval not displayed.       Recent Labs   Lab 09/29/22  0425 09/30/22  0116 09/30/22  0445 10/01/22  0138 10/01/22  0445 10/02/22  0220 10/03/22  0305   NA  --    < >  --  133*  --  132* 126*   K  --    < >  --  4.1  --  4.2 5.6*   CO2  --    < >  --  25  --  22 23   BUN  --    < >  --  32.2*  --  31.8* 48.7*   CREATININE  --    < >  --  3.93*  --  3.59* 4.44*   CALCIUM  --    < >  --  7.6*  --  7.5* 8.0*   PH 7.42  --  7.54*  --  7.46*  --   --    MG  --    < >  --  2.20  --  2.10 2.30   ALBUMIN  --    < >  --  1.1*  --  1.1* 1.2*   ALKPHOS  --    < >  --  127  --  137 110   ALT  --    < >  --  16  --  17 16   AST  --    < >  --  34  --  37* 41*   BILITOT  --    < >  --  0.6  --  0.6 0.6    < > = values in this interval not displayed.          Microbiology Results (last 7 days)       Procedure Component Value Units Date/Time    Blood Culture [[id number]]  (Normal) Collected: 10/02/22 0605    Order Status: Completed Specimen: Blood Updated: 10/03/22 1001     CULTURE, BLOOD ([facility]) No Growth At 24 Hours    Urine culture  [[id number]] Collected: 09/28/22 2255    Order Status: Completed Specimen: Urine, Catheterized Updated: 10/01/22 1008     Urine Culture No Growth    Mycobacteria and Nocardia Culture [[id number]] Updated: 09/30/22 1625    Order Status: Sent Specimen: Body Fluid from Pleural Fluid, Right     Body Fluid Culture [[id number]] Collected: 09/25/22 1200    Order Status: Completed Specimen: Body Fluid from Pleural Fluid, Right Updated: 09/30/22 1010     Body Fluid Culture No Growth at 5 days    Blood Culture [[id number]]  (Normal) Collected: 09/25/22 0226    Order Status: Completed Specimen: Blood Updated: 09/30/22 0900     CULTURE, BLOOD ([facility]) No Growth at 5 days    Blood Culture [[id number]]  (Normal) Collected: 09/25/22 0226    Order Status: Completed Specimen: Blood Updated: 09/30/22 0900     CULTURE, BLOOD ([facility]) No Growth at 5 days    Respiratory Culture [[id number]] Collected: 09/27/22 0851    Order Status: Completed Specimen: Sputum from Lung Aspirate Updated: 09/29/22 1053     Respiratory Culture Rare normal respiratory lacy     GRAM STAIN Quality 3+      No bacteria seen    Respiratory Culture [[id number]]     Order Status: Canceled Specimen: Sputum              See below for Radiology    Scheduled Med:   amLODIPine  10 mg Oral Daily    carvediloL  12.5 mg Oral BID    docusate sodium  100 mg Oral BID    famotidine  20 mg Oral Every other day    heparin (porcine)  5,000 Units Subcutaneous Q12H    hydrALAZINE  50 mg Oral Q8H    polyethylene glycol  17 g Oral BID    sodium zirconium cyclosilicate  10 g Oral BID    sucralfate  1 g Oral BID        Continuous Infusions:       PRN Meds:  acetaminophen, albuterol, bisacodyL, dextrose 10%, dextrose 50%, dextrose 50%, glucagon (human recombinant), glucose, glucose, hydrALAZINE, HYDROmorphone, insulin aspart U-100, labetaloL, LIDOcaine (PF) 10 mg/ml (1%), lorazepam, morphine, ondansetron, sodium chloride 0.9%, sodium chloride 0.9%, sodium chloride 0.9%, sodium chloride 0.9%, sodium  chloride 0.9%, sodium chloride 0.9%, Pharmacy to dose Vancomycin consult **AND** vancomycin - pharmacy to dose         VTE prophylaxis:     Patient condition:  Stable/Fair/Guarded/ Serious/ Critical    Anticipated discharge and Disposition:         All diagnosis and differential diagnosis have been reviewed; assessment and plan has been documented; I have personally reviewed the labs and test results that are presently available; I have reviewed the patients medication list; I have reviewed the consulting providers response and recommendations. I have reviewed or attempted to review medical records based upon their availability    All of the patient's questions have been  addressed and answered. Patient's is agreeable to the above stated plan. I will continue to monitor closely and make adjustments to medical management as needed.  _____________________________________________________________________    Nutrition Status:    Radiology:  X-Ray Chest 1 View  Narrative: EXAMINATION:  XR CHEST 1 VIEW    CLINICAL HISTORY:  CT in place;    TECHNIQUE:  One    COMPARISON:  October 2, 2020.    FINDINGS:  Cardiopericardial silhouette enlarged appearance similar.  Sternotomy changes.  Aortic stent graft.  Right chest tube is in similar location.  There no appearant residual right pneumothorax.  No pulmonary edema or consolidation.  No significant fluid within the pleural spaces  Impression: As above.    Electronically signed by: Jameson Brumfield  Date:    10/03/2022  Time:    07:09      Ronda Hamilton MD   10/03/2022

## 2022-10-03 NOTE — PLAN OF CARE
ICU DAILY GOALS       A: Awake    RASS: Goal - RASS Goal: 0-->alert and calm  Actual - RASS (Neves Agitation-Sedation Scale): 0-->alert and calm   Restraint necessity:NA    B: Breath   SBT: Not intubated   C: Coordinate A & B, analgesics/sedatives   Pain: managed    SAT: Not intubated  D: Delirium   CAM-ICU: Overall CAM-ICU: Negative  E: Early(intubated/ Progressive (non-intubated) Mobility   MOVE Screen: Pass   Activity: Activity Management: Rolling - L1  FAS: Feeding/Nutrition   Diet order: Diet/Nutrition Received: clear liquid,   Fluid restriction:     Nutritional Supplement Intake: Quantity , Type:  PO intake  T: Thrombus   DVT prophylaxis: VTE Required Core Measure: (SCDs) Sequential compression device initiated/maintained, Pharmacological prophylaxis initiated/maintained  H: HOB Elevation   Head of Bed (HOB) Positioning: HOB at 30 degrees  U: Ulcer Prophylaxis   GI: yes  G: Glucose control   managed    S: Skin   Bundle compliance: yes   Bathing/Skin Care: bath, complete Date: 10/2/22  B: Bowel Function   LBM 10/2/22    I: Indwelling Catheters   Galarza necessity: [REMOVED]      Urethral Catheter 09/28/22 2200-Reason for Continuing Urinary Catheterization: Critically ill in ICU and requiring hourly monitoring of intake/output  [REMOVED]      Urethral Catheter 09/23/22 1700 Silicone 16 Fr.-Reason for Continuing Urinary Catheterization: Critically ill in ICU and requiring hourly monitoring of intake/output   CVC necessity: No   IPAD offered: Not appropriate  D: De-escalation Antibx   NA  Plan for the day   Continue current care plan  Family/Goals of care/Code Status   Code Status: Full Code     No acute events throughout day, VS and assessment per flow sheet, patient progressing towards goals as tolerated, plan of care reviewed with Stuart Guevara and family, all concerns addressed, will continue to monitor.

## 2022-10-03 NOTE — CONSULTS
CONSULTATION DATE: 10/3/2022          CONSULTING PHYSICIAN: Dr. Ronda Hamilton     REASON FOR CONSULTATION:  Severe leukocytosis, sirs syndrome, unclear source             HISTORY OF PRESENT ILLNESS:  He is a 32-year-old male with a past medical history of lupus, appears to be on CellCept and belimumab per med history, who presented with chest pain with radiation to the back.  He was subsequently found to have an aortic dissection and underwent repair on 09/23.  He had significant worsening several days later and underwent an exploratory laparotomy.  He continued to worsen and developed abdominal compartment syndrome and underwent a decompressive laparotomy on 09/27.  Surgery performed abdominal closure on 09/29 and he was extubated successfully on 10/01.  He has had some ongoing issues with urinary retention and underwent an in and out catheterization earlier today.  Nephrology is following for TAY.  Patient denies any fevers, chills, or night sweats, however does report some chest pain with deep inspiration as well as some abdominal tenderness which has not worsened.  Blood cultures from 09/25 are negative.  Sputum culture from 09/27 showed normal respiratory lacy.  Urine culture from 09/28 is negative, and blood culture repeated on 10/02 is negative thus far.  He is currently receiving empiric vancomycin which was started yesterday.  We have been consulted for input regarding worsening leukocytosis.        PAST MEDICAL HISTORY:   Past Medical History:   Diagnosis Date    Hypertension     Systemic lupus erythematosus, organ or system involvement unspecified     Systemic lupus erythematosus, organ or system involvement unspecified             PAST SURGICAL HISTORY:   Past Surgical History:   Procedure Laterality Date    APPLICATION OF WOUND VACUUM-ASSISTED CLOSURE DEVICE  9/27/2022    Procedure: APPLICATION, WOUND VAC;  Surgeon: Christopher Espinal MD;  Location: [facility];  Service: General;;    CARDIAC SURGERY       FINGER AMPUTATION      THROMBECTOMY Right     TOE AMPUTATION              SOCIAL HISTORY:   Social History     Socioeconomic History    Marital status: Single   Tobacco Use    Smoking status: Every Day     Types: Cigarettes    Smokeless tobacco: Never     Social Determinants of Health     Food Insecurity: Unknown    Worried About Running Out of Food in the Last Year: Never true   Transportation Needs: Unknown    Lack of Transportation (Medical): No   Housing Stability: Unknown    Unable to Pay for Housing in the Last Year: No            FAMILY HISTORY:  Reviewed and noncontributory        ALLERGIES:   Review of patient's allergies indicates:   Allergen Reactions    Levofloxacin     Sulfamethoxazole-trimethoprim             REVIEW OF SYSTEMS:  Negative unless stated above         MEDICATIONS:   Reviewed in EMR        PHYSICAL EXAM:   Vitals:    10/03/22 1300   BP: 133/75   Pulse: 87   Resp: 14   Temp:       GENERAL:  Chronically ill-appearing; NAD; does not appear toxic  SKIN: no rash  HEENT: sclera non-icteric; PERRL; OP without erythema or exudates  NECK: supple; no LAD  CHEST: CTA, diminished greatly in bases; nonlabored, equal expansion; no adventitious BS; R CT noted  CARDIOVASCULAR: RRR, S1S2; + murmur / click; equal peripheral pulses; +BLE edema  ABDOMEN:  Hypoactive bowel sounds; abdomen soft, nondistended, appears appropriately TTP; midline incision noted; LINNETTE x1 with serous output  GENITOURINARY: no suprapubic tenderness; no CVA tenderness   EXTREMITIES: no cyanosis or clubbing  NEURO: AAO x4; CN II-XII grossly intact  PSYCH: Mentation and affect appropriate          LABORATORY DATA: Reviewed         RADIOLOGICAL DATA:  Reviewed          IMPRESSION:   He is a 32-year-old male with a past medical history of lupus who presented with severe chest pain was subsequently found to have a dissecting aortic aneurysm and underwent repair which was complicated by abdominal compartment syndrome and an TAY, now status  post decompressive laparotomy.  Now with worsening leukocytosis.  ID consulted for input.    Leukocytosis  Abdominal aortic dissection, s/p repair  Abdominal compartment syndrome, s/p ex-lap x2 last on 9/27 with abdominal closure on 10/1  H/o SLE - ?meds  TAY  VHD, s/p mechanical AVR      PLAN:  BCx repeated yesterday - will get additional set today.   Get bladder scan given report of retention and need for intermittent catheterization earlier today.   May need repeat abdominal imaging - would also add chest.   On vancomycin currently - ok to continue.  May broaden abx if any fevers or decompensation.   Discussed with patient and nursing.

## 2022-10-03 NOTE — PT/OT/SLP PROGRESS
Physical Therapy Treatment    Patient Name:  Stuart Guevara   MRN:  [id number]    Recommendations:     Discharge Recommendations:  LTACH (long-term acute care hospital), rehabilitation facility   Discharge Equipment Recommendations:     Barriers to discharge:  medical complexity    Assessment:     Stuart Guevara is a 32 y.o. male admitted with a medical diagnosis of Aortic dissection.  He presents with the following impairments/functional limitations:  weakness, impaired endurance, pain, impaired functional mobility, impaired self care skills, impaired balance, gait instability.    Pt sitting up in bed, agreeable to tx session despite pain. Rn reports they have been trying to avoid narcotics d/t decreased BM's. Pt has a flat affect and is difficult to read when seated at EOB regarding pain/dizziness.  Pt reported dizziness upon sitting at EOB with intermittent posterior lean/LOB that improved after t/f. Noted ARIES clonus while seated requiring therapist to apply pressure to stretch gastroc and decrease clonus.     Rehab Prognosis: Good; patient would benefit from acute skilled PT services to address these deficits and reach maximum level of function.    Recent Surgery: Procedure(s) (LRB):  LAPAROTOMY, EXPLORATORY (N/A) 4 Days Post-Op    Plan:     During this hospitalization, patient to be seen 6 x/week to address the identified rehab impairments via gait training, therapeutic activities, therapeutic exercises and progress toward the following goals:    Plan of Care Expires:  11/02/22    Subjective     Chief Complaint: pain  Patient/Family Comments/goals: to get stronger  Pain/Comfort:  Pain Rating 1: 9/10  Location 1: abdomen  Pain Addressed 1: Reposition, Distraction      Objective:     Communicated with RN prior to session.  Patient found HOB elevated with telemetry, blood pressure cuff, PRAFO, SCD, pulse ox (continuous), LINNETTE drain, chest tube upon PTA entry to room.     General Precautions: Standard, fall    Orthopedic Precautions:N/A   Braces: N/A  Respiratory Status: Room air  Vitals:   Supine   Sitting:    HR: 90  90   BP: 127/76 123/71   SPO2: 97% 95%        Functional Mobility:  Bed Mobility:    Supine<->sit with max/total assist  Transfers:   Sit<->stand with mod x 2 assist; ARIES knee flexion and inability to stand erect  Balance:   Sitting balance with mod progressing to SBA d/t intermittent posterior lean  Therex:   ARIES LAQ's and Ap's    Patient left HOB elevated with all lines intact, call button in reach, and wedge on L and SCD/PRAFO's donned ..    GOALS:   Multidisciplinary Problems       Physical Therapy Goals          Problem: Physical Therapy    Goal Priority Disciplines Outcome Goal Variances Interventions   Physical Therapy Goal     PT, PT/OT Ongoing, Progressing     Description: Goals to be met by: 11/2/22     Patient will increase functional independence with mobility by performing:    . Supine to sit with Hollis  . Sit to supine with Hollis  . Rolling to Left and Right with Hollis.                         Time Tracking:     PT Received On: 10/03/22  PT Start Time: 1348     PT Stop Time: 1412  PT Total Time (min): 24 min     Billable Minutes: Therapeutic Activity 2 units    Treatment Type: Treatment  PT/PTA: PTA     PTA Visit Number: 1     10/03/2022

## 2022-10-03 NOTE — PROGRESS NOTES
Pharmacokinetic Assessment Follow Up: IV Vancomycin    Vancomycin serum concentration assessment(s):    The random level was drawn correctly and can be used to guide therapy at this time. The measurement is above the desired definitive target range of 15 to 20 mcg/mL.    Vancomycin Regimen Plan:    Re-dose when the random level is less than 20 mcg/mL, next level to be drawn at 0300 on 10/04    Drug levels (last 3 results):  Recent Labs   Lab Result Units 10/03/22  0305   Vancomycin Trough ug/ml 26.0*       Pharmacy will continue to follow and monitor vancomycin.    Please contact pharmacy at extension  for questions regarding this assessment.    Thank you for the consult,   Oc Espinoza       Patient brief summary:  Stuart Guevara is a 32 y.o. male initiated on antimicrobial therapy with IV Vancomycin for treatment of  unknown fever origin    The patient's current regimen is pulse dosing due to poor renal function    Drug Allergies:   Review of patient's allergies indicates:   Allergen Reactions    Levofloxacin     Sulfamethoxazole-trimethoprim        Actual Body Weight:   60 kg    Renal Function:   Estimated Creatinine Clearance: 20.1 mL/min (A) (based on SCr of 4.44 mg/dL (H)).,     Dialysis Method (if applicable):  intermittent HD    CBC (last 72 hours):  Recent Labs   Lab Result Units 10/01/22  0138 10/02/22  0220 10/03/22  0305   WBC x10(3)/mcL 22.4* 28.0* 28.6*   Hgb gm/dL 9.9* 10.5* 10.6*   Hct % 29.2* 30.8* 31.2*   Platelet x10(3)/mcL 154 228 267   Mono % %  --  10.1  --    Monocyte Man % 6  --  4   Eos % %  --  0.8  --    Eos Man % 4  --   --    Basophil % %  --  0.5  --    Basophil Man % 1  --   --        Metabolic Panel (last 72 hours):  Recent Labs   Lab Result Units 10/01/22  0138 10/02/22  0220 10/03/22  0305   Sodium Level mmol/L 133* 132* 126*   Potassium Level mmol/L 4.1 4.2 5.6*   Chloride mmol/L 99 98 95*   Carbon Dioxide mmol/L 25 22 23   Glucose Level mg/dL 127* 184* 205*   Blood Urea  Nitrogen mg/dL 32.2* 31.8* 48.7*   Creatinine mg/dL 3.93* 3.59* 4.44*   Albumin Level gm/dL 1.1* 1.1* 1.2*   Bilirubin Total mg/dL 0.6 0.6 0.6   Alkaline Phosphatase unit/L 127 137 110   Aspartate Aminotransferase unit/L 34 37* 41*   Alanine Aminotransferase unit/L 16 17 16   Magnesium Level mg/dL 2.20 2.10 2.30   Phosphorus Level mg/dL 3.7 4.2 4.5       Vancomycin Administrations:  vancomycin given in the last 96 hours                     vancomycin 1.25 g in dextrose 5% 250 mL IVPB (ready to mix) (mg) 1,250 mg New Bag 10/02/22 1207                    Microbiologic Results:  Microbiology Results (last 7 days)       Procedure Component Value Units Date/Time    Blood Culture [[id number]] Collected: 10/02/22 0605    Order Status: Resulted Specimen: Blood Updated: 10/02/22 0644    Urine culture [[id number]] Collected: 09/28/22 2255    Order Status: Completed Specimen: Urine, Catheterized Updated: 10/01/22 1008     Urine Culture No Growth    Mycobacteria and Nocardia Culture [[id number]] Updated: 09/30/22 1625    Order Status: Sent Specimen: Body Fluid from Pleural Fluid, Right     Body Fluid Culture [[id number]] Collected: 09/25/22 1200    Order Status: Completed Specimen: Body Fluid from Pleural Fluid, Right Updated: 09/30/22 1010     Body Fluid Culture No Growth at 5 days    Blood Culture [[id number]]  (Normal) Collected: 09/25/22 0226    Order Status: Completed Specimen: Blood Updated: 09/30/22 0900     CULTURE, BLOOD ([facility]) No Growth at 5 days    Blood Culture [[id number]]  (Normal) Collected: 09/25/22 0226    Order Status: Completed Specimen: Blood Updated: 09/30/22 0900     CULTURE, BLOOD ([facility]) No Growth at 5 days    Respiratory Culture [[id number]] Collected: 09/27/22 0851    Order Status: Completed Specimen: Sputum from Lung Aspirate Updated: 09/29/22 1053     Respiratory Culture Rare normal respiratory lacy     GRAM STAIN Quality 3+      No bacteria seen    Respiratory Culture [[id number]]     Order Status: Canceled  Specimen: Sputum     Gram Stain [[id number]] Collected: 09/25/22 1200    Order Status: Completed Specimen: Body Fluid from Pleural Fluid, Right Updated: 09/26/22 0844     GRAM STAIN Rare WBC observed      No bacteria seen

## 2022-10-04 PROBLEM — K65.8 SEROSITIS: Status: ACTIVE | Noted: 2022-01-01

## 2022-10-04 NOTE — ASSESSMENT & PLAN NOTE
Start Solu-Medrol 80 mg IV t.i.d., restart CellCept 1 g p.o. b.i.d., restart Plaquenil 200 mg p.o. b.i.d..  I would defer the Benlysta for later.

## 2022-10-04 NOTE — PROCEDURES
"Stuart Guevara is a 32 y.o. male patient.    Temp: 97.8 °F (36.6 °C) (10/04/22 1200)  Pulse: 74 (10/04/22 1415)  Resp: 13 (10/04/22 1415)  BP: (!) 113/51 (10/04/22 1401)  SpO2: 98 % (10/04/22 1415)  Weight: 60 kg (132 lb 4.4 oz) (09/29/22 1115)  Height: 5' 5" (165.1 cm) (10/04/22 1243)       Central Line    Date/Time: 10/4/2022 3:48 PM  Performed by: Bety Handley MD  Authorized by: Bety Handley MD     Location procedure was performed:  [facility]  Indications:  Hemodialysis  Anesthesia:  Local infiltration  Preparation:  Skin prepped with ChloraPrep  Sterile barriers: All five maximal sterile barriers used - gloves, gown, cap, mask and large sterile sheet    Location:  Right internal jugular  Catheter type:  Triple lumen  Catheter size:  12 Fr  Ultrasound guidance: Yes     patent  Comprressibility:  Normal  Manometry: No    Number of attempts:  1  Securement:  Line sutured, sterile dressing applied and blood return through all ports  Complications: No    Specimens: No    XRay:  Placement verified by x-ray, no pneumothorax on x-ray and successful placementTermination Site: right atrium    10/4/2022    "

## 2022-10-04 NOTE — PROGRESS NOTES
Trauma/Acute Care Surgery   Daily Progress Note     HD#11  POD#5 Days Post-Op    Subjective  NAEO  Tmax 100.4, HDS  Not complaining of SOB  Tolerating PO, no n/v  CT with 120 cc serous output, no air leak  LINNETTE with 75 cc serosanguineous output  BM x1  Voiding, villalobos replaced overnight for low UOP    Objective  Temp:  [98.3 °F (36.8 °C)-100.4 °F (38 °C)] 99.5 °F (37.5 °C)  Pulse:  [75-89] 75  Resp:  [8-26] 12  SpO2:  [93 %-96 %] 95 %  BP: (102-136)/(52-81) 113/63     Physical Exam:  GEN: NAD  NEURO: Alert, oriented, answers questions appropriately  RESP: unlabored breathing, satting well  CV: RRR  ABD: soft, appropriately ttp, mildly distended, LINNETTE in place  : Villalobos in place  EXT: moves extremities spontaneously     Labs  Labs reviewed    WBC 24.1 (28.6)  Hb 9.5 (10.6)    Na 127  K 4.4  BUN/Cr 57.3/5.16    Micro  Blood cx pending    Imaging  CXR reviewed, stable from yesterday, no PTX or effusion noted     Assessment/Plan  33 y/o M w Hx of Type B aortic dissection, s/p TEVAR + L carotid-subclavian bypass, p/w acute abdomen 2/2 abdominal compartment syndrome of undetermined etiology s/p ex-lap c/b abdominal compartment syndrome s/p decompressive ex-lap with subsequent abdominal closure and LINNETTE drain placement     - will d/c CT today, will obtain post-pull CXR  - maintain LINNETTE  - strict I&Os  - patient in need of HD line, will place today  - rest of care per primary       Bety Handley MD  [employer] General Surgery PGY-2

## 2022-10-04 NOTE — CONSULTS
[facility] - [facility]  Rheumatology  Consult Note    Patient Name: Stuart Guevara  MRN: [id number]  Admission Date: 9/23/2022  Hospital Length of Stay: 11 days  Code Status: Full Code   Attending Provider: Manjinder Carrillo MD  Primary Care Physician: Primary Doctor No  Principal Problem:Aortic dissection    Consults  Subjective:     HPI: This is a 32-year-old gentleman diagnosed with systemic lupus by Dr. Emily Valiente, complicated by transverse myelitis, treated with CellCept, Benlysta, hydroxychloroquine, and steroids recently admitted to the hospital for possible infections.  During his hospitalization the patient was off of his lupus medications.  Currently he is in a flare up concerning his joints as well as serositis      Past Medical History:   Diagnosis Date    Hypertension     Systemic lupus erythematosus, organ or system involvement unspecified     Systemic lupus erythematosus, organ or system involvement unspecified        Past Surgical History:   Procedure Laterality Date    APPLICATION OF WOUND VACUUM-ASSISTED CLOSURE DEVICE  9/27/2022    Procedure: APPLICATION, WOUND VAC;  Surgeon: Christopher Espinal MD;  Location: [facility];  Service: General;;    CARDIAC SURGERY      FINGER AMPUTATION      THROMBECTOMY Right     TOE AMPUTATION           There is no immunization history on file for this patient.    Review of patient's allergies indicates:   Allergen Reactions    Levofloxacin     Sulfamethoxazole-trimethoprim      Current Facility-Administered Medications   Medication Frequency    acetaminophen tablet 650 mg Q6H PRN    albuterol inhaler 2 puff Q4H PRN    amLODIPine tablet 10 mg Daily    bisacodyL suppository 10 mg Daily PRN    carvediloL tablet 25 mg BID    ceFEPIme (MAXIPIME) 2 g in dextrose 5 % in water (D5W) 5 % 50 mL IVPB (MB+) Q24H    dextrose 10% bolus 125 mL PRN    dextrose 50% injection 12.5 g PRN    dextrose 50% injection 25 g PRN    docusate sodium capsule 100 mg  BID    enoxaparin injection 60 mg Q24H    famotidine tablet 20 mg Every other day    glucagon (human recombinant) injection 1 mg PRN    glucose chewable tablet 16 g PRN    glucose chewable tablet 24 g PRN    hydrALAZINE injection 20 mg Q4H PRN    hydrALAZINE tablet 50 mg Q8H    HYDROmorphone (PF) injection 1 mg Q4H PRN    hydrOXYchloroQUINE tablet 200 mg BID    insulin aspart U-100 injection 1-10 Units QID (AC + HS) PRN    labetaloL injection 20 mg Q8H PRN    LIDOcaine (PF) 10 mg/ml (1%) injection PRN    LIDOcaine-EPINEPHrine (PF) 2%-1:200,000 injection 20 mL Once    lorazepam injection 1 mg Q2H PRN    methylPREDNISolone sodium succinate (SOLU-MEDROL) 80 mg in dextrose 5 % IV syringe (conc 2.5 mg/mL) TID    metroNIDAZOLE tablet 500 mg Q8H    morphine injection 2 mg Q2H PRN    mycophenolate capsule 1,000 mg BID    ondansetron injection 4 mg Q6H PRN    polyethylene glycol packet 17 g BID    sodium chloride 0.9% bolus 250 mL PRN    sodium chloride 0.9% bolus 250 mL PRN    sodium chloride 0.9% bolus 250 mL PRN    sodium chloride 0.9% flush 10 mL PRN    sodium chloride 0.9% flush 10 mL PRN    sodium chloride 0.9% flush 10 mL PRN    sucralfate tablet 1 g BID    vancomycin - pharmacy to dose pharmacy to manage frequency    vancomycin 500 mg in dextrose 5 % 100 mL IVPB (ready to mix system) Once    warfarin (COUMADIN) tablet 3 mg Daily     Family History    None       Tobacco Use    Smoking status: Every Day     Types: Cigarettes    Smokeless tobacco: Never   Substance and Sexual Activity    Alcohol use: Not on file    Drug use: Not on file    Sexual activity: Not on file     Review of Systems   Constitutional:  Positive for fatigue.   HENT: Negative.     Eyes: Negative.    Respiratory:          Pleural effusion   Cardiovascular: Negative.    Gastrointestinal: Negative.    Genitourinary:         The patient is on hemodialysis   Neurological:         The patient had weakness of his lower  extremities but is not as bad as it was documented in his previous rheumatologist's notes   Objective:     Vital Signs (Most Recent):  Temp: 97.8 °F (36.6 °C) (10/04/22 1200)  Pulse: 74 (10/04/22 1415)  Resp: 13 (10/04/22 1415)  BP: (!) 113/51 (10/04/22 1401)  SpO2: 98 % (10/04/22 1415)  O2 Device (Oxygen Therapy): room air (10/03/22 0600)   Vital Signs (24h Range):  Temp:  [97.8 °F (36.6 °C)-100.4 °F (38 °C)] 97.8 °F (36.6 °C)  Pulse:  [73-86] 74  Resp:  [8-26] 13  SpO2:  [91 %-100 %] 98 %  BP: ()/(50-74) 113/51     Weight: 60 kg (132 lb 4.4 oz) (09/29/22 1115)  Body mass index is 22.01 kg/m².  Body surface area is 1.66 meters squared.      Intake/Output Summary (Last 24 hours) at 10/4/2022 1636  Last data filed at 10/4/2022 0835  Gross per 24 hour   Intake --   Output 200 ml   Net -200 ml       Physical Exam   Constitutional: He appears ill.   HENT:   Head: Normocephalic.   Mouth/Throat: Mucous membranes are moist.   Eyes: Pupils are equal, round, and reactive to light.   Cardiovascular: Regular rhythm and normal pulses.   Abdominal: Soft.   Musculoskeletal:      Right shoulder: Tenderness present.      Left shoulder: Tenderness present.      Right elbow: Tenderness present.      Left elbow: Tenderness present.      Right wrist: Tenderness present.      Left wrist: Tenderness present.      Cervical back: Neck supple.      Right hip: Tenderness present.      Left hip: Tenderness present.      Right knee: Tenderness present.      Left knee: Tenderness present.      Right ankle: Tenderness present.      Left ankle: Tenderness present.   Neurological: He is alert.       Right Side Rheumatological Exam     The patient is tender to palpation of the shoulder, elbow, wrist, knee, 1st PIP, 1st MCP, 2nd PIP, 2nd MCP, 3rd PIP, 3rd MCP, 4th PIP, 4th MCP, 5th PIP, hip, ankle, 1st MTP, 2nd MTP, 3rd MTP, 4th MTP, 5th MTP, 1st toe IP, 2nd toe IP, 3rd toe IP, 4th toe IP and 5th toe IP    Left Side Rheumatological Exam      The patient is tender to palpation of the shoulder, elbow, wrist, knee, 1st PIP, 1st MCP, 2nd PIP, 2nd MCP, 3rd PIP, 3rd MCP, 4th PIP, 4th MCP, 5th PIP, 5th MCP, hip, ankle, 1st MTP, 2nd MTP, 3rd MTP, 4th MTP, 5th MTP, 1st toe IP, 2nd toe IP, 3rd toe IP, 4th toe IP and 5th toe IP.        Significant Labs:  All pertinent lab results from the last 24 hours have been reviewed.    Significant Imaging:  Imaging results within the past 24 hours have been reviewed.    Assessment/Plan:     * Aortic dissection  Keep blood pressure low to normal    Systemic lupus erythematosus  Start Solu-Medrol 80 mg IV t.i.d., restart CellCept 1 g p.o. b.i.d., restart Plaquenil 200 mg p.o. b.i.d..  I would defer the Benlysta for later.    Serositis  Secondary to systemic lupus, start Solu-Medrol 80 mg IV t.i.d.    HTN (hypertension)  As per the medical team        Thank you for your consult. I will follow-up with patient. Please contact us if you have any additional questions.    Wing Thorpe MD  Rheumatology  [facility] - [facility]

## 2022-10-04 NOTE — SUBJECTIVE & OBJECTIVE
Past Medical History:   Diagnosis Date    Hypertension     Systemic lupus erythematosus, organ or system involvement unspecified     Systemic lupus erythematosus, organ or system involvement unspecified        Past Surgical History:   Procedure Laterality Date    APPLICATION OF WOUND VACUUM-ASSISTED CLOSURE DEVICE  9/27/2022    Procedure: APPLICATION, WOUND VAC;  Surgeon: Christopher Espinal MD;  Location: [facility] OR;  Service: General;;    CARDIAC SURGERY      FINGER AMPUTATION      THROMBECTOMY Right     TOE AMPUTATION           There is no immunization history on file for this patient.    Review of patient's allergies indicates:   Allergen Reactions    Levofloxacin     Sulfamethoxazole-trimethoprim      Current Facility-Administered Medications   Medication Frequency    acetaminophen tablet 650 mg Q6H PRN    albuterol inhaler 2 puff Q4H PRN    amLODIPine tablet 10 mg Daily    bisacodyL suppository 10 mg Daily PRN    carvediloL tablet 25 mg BID    ceFEPIme (MAXIPIME) 2 g in dextrose 5 % in water (D5W) 5 % 50 mL IVPB (MB+) Q24H    dextrose 10% bolus 125 mL PRN    dextrose 50% injection 12.5 g PRN    dextrose 50% injection 25 g PRN    docusate sodium capsule 100 mg BID    enoxaparin injection 60 mg Q24H    famotidine tablet 20 mg Every other day    glucagon (human recombinant) injection 1 mg PRN    glucose chewable tablet 16 g PRN    glucose chewable tablet 24 g PRN    hydrALAZINE injection 20 mg Q4H PRN    hydrALAZINE tablet 50 mg Q8H    HYDROmorphone (PF) injection 1 mg Q4H PRN    hydrOXYchloroQUINE tablet 200 mg BID    insulin aspart U-100 injection 1-10 Units QID (AC + HS) PRN    labetaloL injection 20 mg Q8H PRN    LIDOcaine (PF) 10 mg/ml (1%) injection PRN    LIDOcaine-EPINEPHrine (PF) 2%-1:200,000 injection 20 mL Once    lorazepam injection 1 mg Q2H PRN    methylPREDNISolone sodium succinate (SOLU-MEDROL) 80 mg in dextrose 5 % IV syringe (conc 2.5 mg/mL) TID    metroNIDAZOLE tablet 500 mg Q8H    morphine injection 2  mg Q2H PRN    mycophenolate capsule 1,000 mg BID    ondansetron injection 4 mg Q6H PRN    polyethylene glycol packet 17 g BID    sodium chloride 0.9% bolus 250 mL PRN    sodium chloride 0.9% bolus 250 mL PRN    sodium chloride 0.9% bolus 250 mL PRN    sodium chloride 0.9% flush 10 mL PRN    sodium chloride 0.9% flush 10 mL PRN    sodium chloride 0.9% flush 10 mL PRN    sucralfate tablet 1 g BID    vancomycin - pharmacy to dose pharmacy to manage frequency    vancomycin 500 mg in dextrose 5 % 100 mL IVPB (ready to mix system) Once    warfarin (COUMADIN) tablet 3 mg Daily     Family History    None       Tobacco Use    Smoking status: Every Day     Types: Cigarettes    Smokeless tobacco: Never   Substance and Sexual Activity    Alcohol use: Not on file    Drug use: Not on file    Sexual activity: Not on file     Review of Systems   Constitutional:  Positive for fatigue.   HENT: Negative.     Eyes: Negative.    Respiratory:          Pleural effusion   Cardiovascular: Negative.    Gastrointestinal: Negative.    Genitourinary:         The patient is on hemodialysis   Neurological:         The patient had weakness of his lower extremities but is not as bad as it was documented in his previous rheumatologist's notes   Objective:     Vital Signs (Most Recent):  Temp: 97.8 °F (36.6 °C) (10/04/22 1200)  Pulse: 74 (10/04/22 1415)  Resp: 13 (10/04/22 1415)  BP: (!) 113/51 (10/04/22 1401)  SpO2: 98 % (10/04/22 1415)  O2 Device (Oxygen Therapy): room air (10/03/22 0600)   Vital Signs (24h Range):  Temp:  [97.8 °F (36.6 °C)-100.4 °F (38 °C)] 97.8 °F (36.6 °C)  Pulse:  [73-86] 74  Resp:  [8-26] 13  SpO2:  [91 %-100 %] 98 %  BP: ()/(50-74) 113/51     Weight: 60 kg (132 lb 4.4 oz) (09/29/22 1115)  Body mass index is 22.01 kg/m².  Body surface area is 1.66 meters squared.      Intake/Output Summary (Last 24 hours) at 10/4/2022 1636  Last data filed at 10/4/2022 0835  Gross per 24 hour   Intake --   Output 200 ml   Net -200 ml        Physical Exam   Constitutional: He appears ill.   HENT:   Head: Normocephalic.   Mouth/Throat: Mucous membranes are moist.   Eyes: Pupils are equal, round, and reactive to light.   Cardiovascular: Regular rhythm and normal pulses.   Abdominal: Soft.   Musculoskeletal:      Right shoulder: Tenderness present.      Left shoulder: Tenderness present.      Right elbow: Tenderness present.      Left elbow: Tenderness present.      Right wrist: Tenderness present.      Left wrist: Tenderness present.      Cervical back: Neck supple.      Right hip: Tenderness present.      Left hip: Tenderness present.      Right knee: Tenderness present.      Left knee: Tenderness present.      Right ankle: Tenderness present.      Left ankle: Tenderness present.   Neurological: He is alert.       Right Side Rheumatological Exam     The patient is tender to palpation of the shoulder, elbow, wrist, knee, 1st PIP, 1st MCP, 2nd PIP, 2nd MCP, 3rd PIP, 3rd MCP, 4th PIP, 4th MCP, 5th PIP, hip, ankle, 1st MTP, 2nd MTP, 3rd MTP, 4th MTP, 5th MTP, 1st toe IP, 2nd toe IP, 3rd toe IP, 4th toe IP and 5th toe IP    Left Side Rheumatological Exam     The patient is tender to palpation of the shoulder, elbow, wrist, knee, 1st PIP, 1st MCP, 2nd PIP, 2nd MCP, 3rd PIP, 3rd MCP, 4th PIP, 4th MCP, 5th PIP, 5th MCP, hip, ankle, 1st MTP, 2nd MTP, 3rd MTP, 4th MTP, 5th MTP, 1st toe IP, 2nd toe IP, 3rd toe IP, 4th toe IP and 5th toe IP.        Significant Labs:  All pertinent lab results from the last 24 hours have been reviewed.    Significant Imaging:  Imaging results within the past 24 hours have been reviewed.

## 2022-10-04 NOTE — PT/OT/SLP PROGRESS
Physical Therapy      Patient Name:  Stuart Guevara   MRN:  [id number]    Patient on bedrest until 2 then HD this p.m. Will follow up tomorrow.

## 2022-10-04 NOTE — HPI
Joint pain and tenderness continues to improve and have significanly improved since restarting Cellcelpt and hydroxychloroquine on 10/4/22. Reports overall feeling better today. He received his first dose of Benlysta on Monday (10/17/22). Tunneled catheter placement has been cancelled. Lupus medications were previously discontinued at onset of symptoms of infection. Current status he is being treated with antimicrobials for infection.

## 2022-10-04 NOTE — PROGRESS NOTES
"[facility] - [facility] ICU  Cardiology  Progress Note    Patient Name: Stuart Guevara  MRN: [id number]  Admission Date: 9/23/2022  Hospital Length of Stay: 11 days  Code Status: Full Code   Attending Physician: Manjinder Carrillo MD   Primary Care Physician: Primary Doctor No  Expected Discharge Date:   Principal Problem:Aortic dissection    Subjective:     Brief HPI:    This is a 32-year-old male, who is known to [facility] as an inpatient only,  with history of HTN, lupus nephritis, chronic anemia, right upper extremity DVT, SLE, type a aortic dissection with repair.  He presents to [facility] ER on 09/23/2022 with complaints of ripping /tearing chest pain that radiated to his back began on 09/22/2022.  He was noted to have a BP  of 232/126.  He had a syncopal episode witnessed at the .  Patient was brought immediately back to CT where he was found to have a type B aortic dissection.  Esmolol and nitroprusside were initiated at [facility].   Patient was transferred to [facility] for CT surgery  the services and was admitted directly to ICU.   On 09/25/2022 he was found to have high-grade AV block with bradycardia.  Dr. Maldonadoha the patient down for TVP.    Patient underwent exploratory laparotomy.  He h.ad to be intubated and placed on ventilator secondary to respiratory failure and shockCIS has been consulted for bradycardia.     Hospital Course:   9.27.22: Remains vented/sedated. SR on tele. NO further pacing noted. VSS.   9.28.22: Vented/sedated. SR on tele. No bradycardia or arrhythmia noted.  10.3.22: Reconsulted for HTN/BP Control. "I am doing well." Denies CP, SOB and Palps. + Cough/No Sputum Production.   10.4.22: NAD. VSS/BP Stable on Current Medical Therapy. "I am feeling better." Denies CP, SOB and Palps.     PMH: HTN, lupus nephritis, chronic anemia, right upper extremity DVT, SLE  PSH: Left middle finger amputation, right second toe amputation, Mechanical Aortic Valve with Graph/Dissection Repair  Social " History: Denies EtOH and illicit drug use, current tobacco use approximately 4-5 cigarettes/day  Family History: Mother-Hypertension     Previous Diagnostics:   Echo 9.26.22  HO AAA surgery and aortic valve replacement.  The left ventricle is normal in size with severe concentric hypertrophy and moderately decreased systolic function.  The estimated ejection fraction is 37%.  There is moderate left ventricular global hypokinesis.  Grade I left ventricular diastolic dysfunction.  Moderate right ventricular enlargement with mildly to moderately reduced right ventricular systolic function.  There is a bioprosthetic aortic valve present. There is trivial paravalvular aortic insufficiency present. Prosthetic aortic valve is normal.  The estimated PA systolic pressure is 33 mmHg.  Intermediate central venous pressure (8 mmHg    ECHO 2.15.21  Moderate concentric left ventricular hypertrophy.  Left ventricular ejection fraction is measured at approximately 55-60 %.  Structurally aortic valve is trileaflet.  Moderate aortic regurgitation is noted..  Turbulent flow in aortic root with possible aortic dissection flap visualized. (Best images at end of study)  Aortic root measures approximately 4.1cm.     Review of Systems   Unable to perform ROS: Acuity of condition   Constitutional: Positive for malaise/fatigue.   Cardiovascular:  Negative for chest pain and irregular heartbeat.   Respiratory:  Positive for cough. Negative for hemoptysis, shortness of breath and sputum production.    All other systems reviewed and are negative.    Objective:     Vital Signs (Most Recent):  Temp: 97.8 °F (36.6 °C) (10/04/22 1200)  Pulse: 74 (10/04/22 1415)  Resp: 13 (10/04/22 1415)  BP: (!) 113/51 (10/04/22 1401)  SpO2: 98 % (10/04/22 1415)   Vital Signs (24h Range):  Temp:  [97.8 °F (36.6 °C)-100.4 °F (38 °C)] 97.8 °F (36.6 °C)  Pulse:  [73-89] 74  Resp:  [8-26] 13  SpO2:  [91 %-100 %] 98 %  BP: ()/(50-74) 113/51     Weight: 60 kg (132  lb 4.4 oz)  Body mass index is 22.01 kg/m².    SpO2: 98 %  O2 Device (Oxygen Therapy): room air      Intake/Output Summary (Last 24 hours) at 10/4/2022 1427  Last data filed at 10/4/2022 0835  Gross per 24 hour   Intake --   Output 200 ml   Net -200 ml       Lines/Drains/Airways       Central Venous Catheter Line  Duration             Trialysis (Dialysis) Catheter 10/04/22 1300 right internal jugular <1 day              Drain  Duration                  Closed/Suction Drain 09/29/22 Superior;Midline Abdomen Bulb 19 Fr. 5 days         Urethral Catheter 10/04/22 0530 <1 day              Peripheral Intravenous Line  Duration                  Peripheral IV - Single Lumen 09/25/22 1953 20 G;Other (Comments) Anterior;Distal;Left Forearm 8 days         Peripheral IV - Single Lumen 10/03/22 1615 18 G Anterior;Right Upper Arm <1 day                  Significant Labs: CMP   Recent Labs   Lab 10/03/22  0305 10/04/22  0151   * 127*   K 5.6* 4.4   CO2 23 24   BUN 48.7* 57.3*   CREATININE 4.44* 5.16*   CALCIUM 8.0* 7.7*   ALBUMIN 1.2* 1.1*   BILITOT 0.6 0.9   ALKPHOS 110 100   AST 41* 30   ALT 16 13      and CBC   Recent Labs   Lab 10/03/22  0305 10/04/22  0150   WBC 28.6* 24.1*   HGB 10.6* 9.5*   HCT 31.2* 28.4*    339       Telemetry:  SR    Physical Exam  Constitutional:       Appearance: Normal appearance.   HENT:      Head: Normocephalic.      Mouth/Throat:      Mouth: Mucous membranes are moist.   Cardiovascular:      Rate and Rhythm: Normal rate and regular rhythm.      Pulses: Normal pulses.      Heart sounds: Normal heart sounds.   Pulmonary:      Effort: Pulmonary effort is normal.      Breath sounds: Decreased breath sounds present.   Abdominal:      Palpations: Abdomen is soft.   Skin:     General: Skin is warm and dry.      Capillary Refill: Capillary refill takes less than 2 seconds.   Neurological:      General: No focal deficit present.      Mental Status: He is alert and oriented to person, place, and  time.   Psychiatric:         Mood and Affect: Mood normal.         Behavior: Behavior normal.     Current Inpatient Medications:    Current Facility-Administered Medications:     acetaminophen tablet 650 mg, 650 mg, Oral, Q6H PRN, Nolberto Calvillo MD, 650 mg at 10/03/22 1716    albuterol inhaler 2 puff, 2 puff, Inhalation, Q4H PRN, Evelio Marshall MD    amLODIPine tablet 10 mg, 10 mg, Oral, Daily, Bin Bell MD, 10 mg at 10/04/22 0833    bisacodyL suppository 10 mg, 10 mg, Rectal, Daily PRN, Keena Collinsge, FNP, 10 mg at 10/02/22 1056    carvediloL tablet 25 mg, 25 mg, Oral, BID, MARIANNE Castillo, 25 mg at 10/04/22 0833    ceFEPIme (MAXIPIME) 2 g in dextrose 5 % in water (D5W) 5 % 50 mL IVPB (MB+), 2 g, Intravenous, Q24H, Layton Nathan MD, Stopped at 10/03/22 1753    dextrose 10% bolus 125 mL, 12.5 g, Intravenous, PRN, Ivan Chopra MD    dextrose 50% injection 12.5 g, 12.5 g, Intravenous, PRN, Keena Collinsge, FNP    dextrose 50% injection 25 g, 25 g, Intravenous, PRN, Keena Richterrange, FNP    docusate sodium capsule 100 mg, 100 mg, Oral, BID, Keena Collinsge, FNP, 100 mg at 10/04/22 0832    enoxaparin injection 60 mg, 1 mg/kg, Subcutaneous, Q24H, MARIANNE Castillo, 60 mg at 10/03/22 1717    famotidine tablet 20 mg, 20 mg, Oral, Every other day, Ivan Chopra MD, 20 mg at 10/04/22 0833    glucagon (human recombinant) injection 1 mg, 1 mg, Intramuscular, PRN, Keena H Perry, FNP    glucose chewable tablet 16 g, 16 g, Oral, PRN, Keena H Perry, FNP    glucose chewable tablet 24 g, 24 g, Oral, PRN, DNEIA Caruso    hydrALAZINE injection 20 mg, 20 mg, Intravenous, Q4H PRN, Bin Bell MD, 20 mg at 10/02/22 0633    hydrALAZINE tablet 50 mg, 50 mg, Oral, Q8H, Ronda Hamilton MD, 50 mg at 10/04/22 0600    HYDROmorphone (PF) injection 1 mg, 1 mg, Intravenous, Q4H PRN, Cong Velez DO, 1 mg at 10/04/22 1137    insulin aspart U-100 injection 1-10 Units, 1-10 Units,  Subcutaneous, QID (AC + HS) PRN, Keena Shafer, DENIA, 2 Units at 10/04/22 0554    labetaloL injection 20 mg, 20 mg, Intravenous, Q8H PRN, Bin Bell MD, 20 mg at 10/03/22 0321    LIDOcaine (PF) 10 mg/ml (1%) injection, , , PRN, Ruben Arciniega MD, 5 mL at 09/25/22 1656    LIDOcaine-EPINEPHrine (PF) 2%-1:200,000 injection 20 mL, 20 mL, Other, Once, Keyonna Boggs MD    lorazepam injection 1 mg, 1 mg, Intravenous, Q2H PRN, Cong Velez DO    metroNIDAZOLE tablet 500 mg, 500 mg, Oral, Q8H, Layton Nathan MD, 500 mg at 10/04/22 0600    morphine injection 2 mg, 2 mg, Intravenous, Q2H PRN, Evelio Marshall MD, 2 mg at 10/03/22 0310    ondansetron injection 4 mg, 4 mg, Intravenous, Q6H PRN, Evelio Marshall MD, 4 mg at 09/23/22 1236    polyethylene glycol packet 17 g, 17 g, Oral, BID, Bin Bell MD, 17 g at 10/03/22 2109    sodium chloride 0.9% bolus 250 mL, 250 mL, Intravenous, PRN, Shahzad Lundberg MD    sodium chloride 0.9% bolus 250 mL, 250 mL, Intravenous, PRN, Shahzad Lundberg MD    sodium chloride 0.9% bolus 250 mL, 250 mL, Intravenous, PRN, Cindy Lora, FNP    sodium chloride 0.9% flush 10 mL, 10 mL, Intravenous, PRN, Francia Marcus, DO    sodium chloride 0.9% flush 10 mL, 10 mL, Intravenous, PRN, Evelio Marshall MD    sodium chloride 0.9% flush 10 mL, 10 mL, Intravenous, PRN, Evelio Marshall MD    sucralfate tablet 1 g, 1 g, Oral, BID, Francia Marcus, DO, 1 g at 10/04/22 0833    Pharmacy to dose Vancomycin consult, , , Once **AND** vancomycin - pharmacy to dose, , Intravenous, pharmacy to manage frequency, Moise Cohen, DO    vancomycin 500 mg in dextrose 5 % 100 mL IVPB (ready to mix system), 500 mg, Intravenous, Once, Moise Cohen, DO    warfarin (COUMADIN) tablet 3 mg, 3 mg, Oral, Daily, Maximino Yeh, ANP, 3 mg at 10/03/22 1713    Assessment:   Hypertensive Emergency - Now Controlled  CHB/TVP likely secondary to Electrolyte Abnormalities - Resolved  Acute Hypoxemic Respiratory Failure requiring  Intubation/Ventilation - Resolved (On RA)  Type B Aortic Dissection/TEVAR  CMO (Unknown Etiology)    - ECHO (9.23.22) - LVEF 37%  TAY requiring HD Treatment  Acute Abdomen with Evidence of Global Hypoperfusion/Exp Lap - Resolved  Acute Pancreatitis - Resolving  Anemia of Chronic Disease  Hx of RUE DVT (Recent US - Negative for DVT)  Hx of Hepatitis B  Hx of Aortic Dissection with Repair in 2/2021 s/p Mechanical Valve (Aortic)  Electrolyte Derangements - Hyponatremia   SLE    Plan:   Continue Norvasc 10mg PO Qday  Continue Hydralazine 50mg PO Q8HR  Continue Coreg to 25mg PO BID  No ACEi/ARB/ARNI secondary to TAY - CRRT on Hold  Plans for OP Ischemic Workup for Newly Diagnosed CMO/EF 37%  Mechanical Aortic Valve Noted on CT of Chest - Visualized by Dr. Dean on CT (it was probably Graft-Valve Composite)  Continue FD Lovenox 1mg/kg SQ Daily (Renal Adjustment)  Continue Warfarin 3mg PO Daily  Electrolyte Management per Primary Team  Labs in AM: CBC, CMP, Mg and PT/INR    MARIANNE Castillo  Cardiology  [facility] - [facility] ICU  10/04/2022

## 2022-10-04 NOTE — PROGRESS NOTES
Inpatient Nutrition Assessment    Admit Date: 9/23/2022   Total duration of encounter: 11 days     Nutrition Recommendation/Prescription     Restart diet when appropriate. Goal diet: regular.  Add Boost Plus (provides 360 kcal, 14 g protein per serving) TID for added protein and kcal.    Communication of Recommendations: reviewed with nurse    Nutrition Assessment     Malnutrition Assessment/Nutrition-Focused Physical Exam    Malnutrition in the context of acute illness or injury  Degree of Malnutrition: does not meet criteria  Energy Intake: unable to obtain  Interpretation of Weight Loss: unable to obtain  Body Fat:does not meet criteria  Area of Body Fat Loss: does not meet criteria  Muscle Mass Loss: does not meet criteria  Area of Muscle Mass Loss: does not meet criteria  Fluid Accumulation: does not meet criteria  Edema: does not meet criteria   Reduced  Strength: unable to obtain  A minimum of two characteristics is recommended for diagnosis of either severe or non-severe malnutrition.    Chart Review    Reason Seen: continuous nutrition monitoring    Diagnosis:  Type B Aortic dissection  Hypertensive emergency  Systemic lupus erythematosus    Relevant Medical History: HTN, lupus    Nutrition-Related Medications: docusate, miralax  Calorie Containing IV Medications: no significant kcals from medications at this time    Nutrition-Related Labs:  9/26 BUN 27, Crea 3.06, Glu 174, Phos 6.2  9/30 Na 128, K 3.3, Cl 96, BUN 21.5, Crea 3.28, Glu 205  10/4 Na 127, Cl 95, BUN 57.3, Crea 5.16, Glu 152, Phos 5    Diet/PN Order: Diet NPO  Oral Supplement Order: none at this time  Tube Feeding Order: none at this time  Appetite/Oral Intake: good/% of meals  Factors Affecting Nutritional Intake: NPO  Food/Restorationism/Cultural Preferences: unable to obtain at this time    Skin Integrity: incision, blister  Wound(s):   incision noted    Comments    9/26/22: Discussed with RN. Will provide tube feeding recommendations  "for when appropriate to start tube feeding. Receiving kcal from meds. Noted Phos, will monitor for need for renal formula.   9/30/22: Discussed with RN. Need to consider TPN since pt now LOS day 7. If starting tube feeding, recs provided. If able to extubate, advance diet when appropriate.   10/4/22: Pt previously eating 100% po intake of meals, good appetite. Currently NPO for procedure, plans to restart diet per RN. Will add ONS for added protein and kcal.    Anthropometrics    Height: 5' 5" (165.1 cm) Height Method: Estimated  Last Weight: 60 kg (132 lb 4.4 oz) (09/29/22 1115) Weight Method: Bed Scale  BMI (Calculated): 22  BMI Classification: normal (BMI 18.5-24.9)        Ideal Body Weight (IBW), Male: 136 lb     % Ideal Body Weight, Male (lb): 97.26 %                          Usual Weight Provided By: unable to obtain usual weight at this time    Wt Readings from Last 5 Encounters:   09/29/22 60 kg (132 lb 4.4 oz)   09/23/22 60 kg (132 lb 4.4 oz)   12/09/21 48 kg (105 lb 13.1 oz)     Weight Change(s) Since Admission:  Admit Weight: 60 kg (132 lb 4.4 oz) (09/23/22 1219)  10/4/22: no new wt    Estimated Needs    Weight Used For Calorie Calculations: 60 kg (132 lb 4.4 oz)  Energy Calorie Requirements (kcal): 2083kcal (1.4 stress factor)  Energy Need Method: [facility]  Weight Used For Protein Calculations: 60 kg (132 lb 4.4 oz)  Protein Requirements: 72-84gm (1.2-1.4g/kg)  Fluid Requirements (mL): 1000ml + urinary output  Temp: 99.5 °F (37.5 °C)  Vtot (L/Min) for [place] Equation Calculation: 13.5    Enteral Nutrition    Patient not receiving enteral nutrition at this time.    Parenteral Nutrition    Patient not receiving parenteral nutrition support at this time.    Evaluation of Received Nutrient Intake    Calories: not meeting estimated needs  Protein: not meeting estimated needs    Patient Education    Not applicable.    Nutrition Diagnosis     PES: Inadequate oral intake related to current condition " as evidenced by intubation since admit. (continues)    Interventions/Goals     Intervention(s): general/healthful diet, commercial beverage, and collaboration with other providers  Goal: Meet greater than 75% of nutritional needs by follow-up. (goal progressing)    Monitoring & Evaluation     Dietitian will monitor energy intake.  Nutrition Risk/Follow-Up: moderate (follow-up in 3-5 days)

## 2022-10-04 NOTE — PROGRESS NOTES
Nephrology consult follow up note    HPI:      Stuart Guevara is a 32 y.o. male  with past medical history of aortic dissection, status post repair in 2021, SLE, and hypertension, who presented to the emergency department with complaints of tearing chest pain and blood pressure of 232/127.  CT scan revealed findings consistent with type B aortic dissection, patient underwent TEVAR  as well as a carotid artery to subclavian bypass on the left on 09/23/2022.  He also underwent exploratory laparotomy on 09/25/2022 for concern of ischemic colitis, right chest tube was placed.  No ischemic bowel was found.  Patient's kidney function worsened, he developed severe hyperkalemia and was initiated on hemodialysis by way of right temporary femoral catheter on 09/25/2022.  We are continuing to follow for nephrology care.    Interval history:     Vascath was removed yesterday due to persistent fevers. Still with minimal UOP, 300ml yesterday. No CP, SOB, abd pain, N/V, or LE edema.      Review of Systems:     Comprehensive 10pt ROS negative except as noted per HPI.    Past medical, family, surgical, and social history reviewed and unchanged from initial consult note.     Objective:       VITAL SIGNS: 24 HR MIN & MAX LAST    Temp  Min: 98.3 °F (36.8 °C)  Max: 100.4 °F (38 °C)  99.5 °F (37.5 °C)        BP  Min: 102/52  Max: 151/83  113/63     Pulse  Min: 75  Max: 93  75     Resp  Min: 8  Max: 26  12    SpO2  Min: 93 %  Max: 96 %  95 %      GEN: Ill appearing AAM  HEENT: Conjunctiva anicteric, pupils equal  CV: RRR +S1,S2 without murmur  PULM: CTAB, unlabored  ABD: Soft, NT/ND abdomen with NABS  EXT: No cyanosis or edema  SKIN: Warm and dry  PSYCH: Awake, alert and appropriately conversant.   Vascular access: no HD access            Component Value Date/Time     (L) 10/04/2022 0151     (L) 10/03/2022 0305    K 4.4 10/04/2022 0151    K 5.6 (H) 10/03/2022 0305    CHLORIDE 95 (L) 10/04/2022 0151    CHLORIDE 95 (L)  10/03/2022 0305    CO2 24 10/04/2022 0151    CO2 23 10/03/2022 0305    BUN 57.3 (H) 10/04/2022 0151    BUN 48.7 (H) 10/03/2022 0305    CREATININE 5.16 (H) 10/04/2022 0151    CREATININE 4.44 (H) 10/03/2022 0305    CALCIUM 7.7 (L) 10/04/2022 0151    CALCIUM 8.0 (L) 10/03/2022 0305    PHOS 5.0 (H) 10/04/2022 0151            Component Value Date/Time    WBC 24.1 (H) 10/04/2022 0150    WBC 28.6 (H) 10/03/2022 0305    HGB 9.5 (L) 10/04/2022 0150    HGB 10.6 (L) 10/03/2022 0305    HCT 28.4 (L) 10/04/2022 0150    HCT 31.2 (L) 10/03/2022 0305    HCT 28 (L) 09/23/2022 0304    HCT 30.0 (L) 02/15/2021 1751     10/04/2022 0150     10/03/2022 0305         Imaging reviewed      Assessment / Plan:       Active Hospital Problems    Diagnosis  POA    *Aortic dissection [I71.00]  Yes    Systemic lupus erythematosus [M32.9]  Yes     Chronic    HTN (hypertension) [I10]  Yes     Chronic    History of DVT in adulthood [Z86.718]  Not Applicable     Chronic    Hypertensive emergency [I16.1]  Yes      Resolved Hospital Problems   No resolved problems to display.       Oliguric acute kidney injury  Hyperkalemia   Aortic dissection, status post TEVAR procedure   Hypertension   Anemia  Mild hyponatremia  History of right upper extremity DVT   History of SLE  Acute abdomen, status post exploratory laparotomy 09/25/2022      Plan:  Pt has not shown significant sign of renal recovery yet. Will need to continue dialysis for now. Vascath was pulled yesterday. Surgery consulted for placement of vascath to hopefully resume HD today on TTS schedule. Hyponatremia persistent, likely hypervolemic hyponatremia from poor UOP. Aim for 2-3L UF with HD.     Douglas Landsi DO  Nephrology  [facility]  Clinic number: [phone]

## 2022-10-04 NOTE — NURSING
10/04/22 1801   Post-Hemodialysis Assessment   Blood Volume Processed (Liters) 62.5 L   Dialyzer Clearance Lightly streaked   Duration of Treatment 180 minutes   Total UF (mL) 2000 mL   Patient Response to Treatment tolerated well

## 2022-10-04 NOTE — PROGRESS NOTES
Pharmacokinetic Assessment Follow Up: IV Vancomycin    Vancomycin serum concentration assessment(s):    The random level was drawn correctly and can be used to guide therapy at this time. The measurement is within the desired definitive target range of 15 to 20 mcg/mL.    Vancomycin Regimen Plan:  Continue to pulse dose due to poor renal function  Patient is on HD (TTS)  Give 500 mg IV vancomycin x 1 dose after HD this evening  Check random level on 10/06 @0300 pre-HD    Drug levels (last 3 results):  Recent Labs   Lab Result Units 10/03/22  0305 10/04/22  0151   Vancomycin Trough ug/ml 26.0* 20.0       Pharmacy will continue to follow and monitor vancomycin.    Please contact pharmacy at extension [phone] for questions regarding this assessment.    Thank you for the consult,   Dash Bess       Patient brief summary:  Stuart Guevara is a 32 y.o. male initiated on antimicrobial therapy with IV Vancomycin for treatment of  fever on unknown of orgiin    Drug Allergies:   Review of patient's allergies indicates:   Allergen Reactions    Levofloxacin     Sulfamethoxazole-trimethoprim        Actual Body Weight:   60 kg    Renal Function:   Estimated Creatinine Clearance: 17.3 mL/min (A) (based on SCr of 5.16 mg/dL (H)).,     Dialysis Method (if applicable):  intermittent HD (TTS)    CBC (last 72 hours):  Recent Labs   Lab Result Units 10/02/22  0220 10/03/22  0305 10/04/22  0150   WBC x10(3)/mcL 28.0* 28.6* 24.1*   Hgb gm/dL 10.5* 10.6* 9.5*   Hct % 30.8* 31.2* 28.4*   Platelet x10(3)/mcL 228 267 339   Mono % % 10.1  --  11.1   Monocyte Man %  --  4  --    Eos % % 0.8  --  1.2   Basophil % % 0.5  --  0.5       Metabolic Panel (last 72 hours):  Recent Labs   Lab Result Units 10/02/22  0220 10/03/22  0305 10/03/22  0822 10/04/22  0151   Sodium Level mmol/L 132* 126*  --  127*   Potassium Level mmol/L 4.2 5.6*  --  4.4   Chloride mmol/L 98 95*  --  95*   Carbon Dioxide mmol/L 22 23  --  24   Glucose Level mg/dL 184* 205*   --  152*   Glucose, UA mg/dL  --   --  Trace*  --    Blood Urea Nitrogen mg/dL 31.8* 48.7*  --  57.3*   Creatinine mg/dL 3.59* 4.44*  --  5.16*   Albumin Level gm/dL 1.1* 1.2*  --  1.1*   Bilirubin Total mg/dL 0.6 0.6  --  0.9   Alkaline Phosphatase unit/L 137 110  --  100   Aspartate Aminotransferase unit/L 37* 41*  --  30   Alanine Aminotransferase unit/L 17 16  --  13   Magnesium Level mg/dL 2.10 2.30  --  2.10   Phosphorus Level mg/dL 4.2 4.5  --  5.0*       Vancomycin Administrations:  vancomycin given in the last 96 hours                     vancomycin 1.25 g in dextrose 5% 250 mL IVPB (ready to mix) (mg) 1,250 mg New Bag 10/02/22 1207                    Microbiologic Results:  Microbiology Results (last 7 days)       Procedure Component Value Units Date/Time    Blood Culture [[id number]] Collected: 10/03/22 1752    Order Status: Resulted Specimen: Blood Updated: 10/03/22 1920    Blood Culture [[id number]] Collected: 10/03/22 1752    Order Status: Resulted Specimen: Blood Updated: 10/03/22 1920    Blood Culture [[id number]] Collected: 10/03/22 1615    Order Status: Resulted Specimen: Blood Updated: 10/03/22 1729    Respiratory Culture [[id number]]     Order Status: Sent Specimen: Sputum, Expectorated     Blood Culture [[id number]]  (Normal) Collected: 10/02/22 0605    Order Status: Completed Specimen: Blood Updated: 10/03/22 1001     CULTURE, BLOOD ([facility]) No Growth At 24 Hours    Urine culture [[id number]] Collected: 09/28/22 2255    Order Status: Completed Specimen: Urine, Catheterized Updated: 10/01/22 1008     Urine Culture No Growth    Mycobacteria and Nocardia Culture [[id number]] Updated: 09/30/22 1625    Order Status: Sent Specimen: Body Fluid from Pleural Fluid, Right     Body Fluid Culture [[id number]] Collected: 09/25/22 1200    Order Status: Completed Specimen: Body Fluid from Pleural Fluid, Right Updated: 09/30/22 1010     Body Fluid Culture No Growth at 5 days    Blood Culture [[id number]]  (Normal)  Collected: 09/25/22 0226    Order Status: Completed Specimen: Blood Updated: 09/30/22 0900     CULTURE, BLOOD ([facility]) No Growth at 5 days    Blood Culture [[id number]]  (Normal) Collected: 09/25/22 0226    Order Status: Completed Specimen: Blood Updated: 09/30/22 0900     CULTURE, BLOOD ([facility]) No Growth at 5 days    Respiratory Culture [[id number]] Collected: 09/27/22 0851    Order Status: Completed Specimen: Sputum from Lung Aspirate Updated: 09/29/22 1053     Respiratory Culture Rare normal respiratory lacy     GRAM STAIN Quality 3+      No bacteria seen

## 2022-10-04 NOTE — PROGRESS NOTES
[facility]  Hospital Medicine Progress Note      Chief Complaint: chest pain SOB s/p TEVAR for aortic dissection      Patient information was obtained from patient, patient's family, past medical records and ER records.      HISTORY OF PRESENT ILLNESS:   Stuart Guevara is a 32 y.o. male who has PMH which includes HTN, SLE,  hepatitis B, aortic dissection with repair 2/2021, RUE DVT on Xarelto; presented to the ED at [facility] on 9/23/2022  with reports of  shortness of breath and chest pain radiating to his back.  PT has a history of aortic dissection 2/2021. Work up revealed a type B aortic dissection and significant hypertension noted on presentation.  He failed medical management and had progression of his symptoms despite adequate control of hemodynamic parameters.  PT was transferred from [facility] to [facility] for CV surgery evaluation which he was taken  to the OR and had TEVAR with left carotid to subclavian artery bypass. PT developed abdominal distension and rigidity of his abdomen and developed a lactic acidosis.  He repeat CT angiography of the chest/abdomen/pelvis without evidence of worsening dissection, but evidence of significant intra-abdominal pathology with some hemoperitoneum, free blood in the abdomen.  He underwent exploratory laparotomy with evidence of viable bowel and no significant bleeding identified, chest tube was placed. Postoperatively, he developed severe unstable bradycardia, and a transvenous pacemaker was placed by Cardiology.  Shortly afterwards, he was noted to have significant worsening of his renal failure and critical hyperkalemia. Renal services consulted which catheter was placed and he was started on HD treatments.  PT has been cleared for transfer out of ICU to the floor. Hospital medicine services consulted for assumption of care.     Patient is s/p AAA surgery repair , course c/by sepsis syndrome with fever and worsening leucocytosis ,         Todays  information  Patient remains weak at bedside.  No much improvement in the last 24 hours   Vitals reviewed and stable on room air   Leukocytosis trending down slowly.  INR 1.31, apparently patient has a mechanical aortic valve was started on full-dose Lovenox and warfarin per [facility] on 10/03   CT of the chest abdomen and pelvis reviewed reviewed in anasarca, no IV contrast state so was not very revealing   Appreciate ID input antibiotics broadened to vancomycin Flagyl and cefepime, concern for SLE serositis however less likely complement levels a not suggestive.  Will consult Rheumatology for further input    General surgery planning to remove chest tube today, place a new hemodynamic dialysis line  Add on incentive spirometry            Exam  General appearance: chronically ill appearing look older than stated age, non-toxic  HENT: Atraumatic head. Moist mucous membranes of oral cavity.  Eyes: PERRL  Lungs:  diminished bilaterally, fine scattered crackles, mildly labored respirations, stable saturations on supplemental therapy, chest tube in place to closed water seal system  Heart: Regular rate and rhythm. Systolic murmur, cap refill brisk, trace edema  Abdomen: Soft, non-distended, non-tender. No rebound tenderness/guarding. Bowel sounds are normal.   Extremities: DEWEY, generalized weakness  Skin: warm and dry  Neuro: AAOx3, no focal deficits  Psych/mental status: Appropriate mood and affect. Responds appropriately to questions.         ASSESSMENT:  Fever and Leukocytosis  Acute respiratory failure with hypoxia- s/p mechanical ventilation, now extubated  Acute aortic dissection type B- s/p TEVAR with left carotid artery to left subclavian bypass 9/23/2022  HTN- emergency, resolving  ARF- s/p HD treatments  Cardiomyopathy- EF 37% unknown etiology  VHD, s/p mechanical AVR  Acute abdominal pain generalized  Abdominal compartment syndrome, s/p ex-lap x2 last on 9/27 with abdominal closure on 10/1  Leukocytosis- on  vancomycin  SLE- on cellcept  Anemia- chronic disease  Acute pancreatitis- resolving with lipase trending down  RUE DVT- was on Xarelto, recent US no DVT  Weakness  HLD  Hx of hepatitis B  Hx of aortic dissection with repair 2/2021  History of transverse myelitis  History of lupus nephritis         PLAN:  Vitals reviewed and stable on room air   Leukocytosis trending down slowly.    INR 1.31,  on full-dose Lovenox qd (renally dosed) and warfarin per [facility] on 10/03 , check inr daily to goal inr 2-3  CT of the chest abdomen and pelvis reviewed reviewed in anasarca, no IV contrast state so was not very revealing   Appreciate ID input antibiotics broadened to vancomycin Flagyl and cefepime, concern for SLE serositis however less likely complement levels a not suggestive.    Will consult Rheumatology for further input   General surgery planning to remove chest tube today, place a new hemodialysis line  Add on incentive spirometry  Neph onboard helping with HD   Fall precautions  Accurate I&O  Blood pressure parameters SBP < 150  PT, OT eval and treat     VTE Prophylaxis: SCD for DVT prophylaxis and will be advised to be as mobile as possible     Patient condition:  Fair        Critical care diagnosis - sepsis , unclear source  Critical care time > 35 mins     VITAL SIGNS: 24 HRS MIN & MAX LAST   Temp  Min: 99.5 °F (37.5 °C)  Max: 100.4 °F (38 °C) 99.5 °F (37.5 °C)   BP  Min: 98/50  Max: 133/75 125/63   Pulse  Min: 73  Max: 89  79   Resp  Min: 8  Max: 26 18   SpO2  Min: 91 %  Max: 97 % 95 %       Recent Labs   Lab 10/02/22  0220 10/03/22  0305 10/04/22  0150   WBC 28.0* 28.6* 24.1*   RBC 3.66* 3.75* 3.38*   HGB 10.5* 10.6* 9.5*   HCT 30.8* 31.2* 28.4*   MCV 84.2 83.2 84.0   MCH 28.7 28.3 28.1   MCHC 34.1 34.0 33.5   RDW 16.5 16.7 16.7    267 339   MPV 11.8* 11.9* 11.6*       Recent Labs   Lab 09/29/22  0425 09/30/22  0116 09/30/22  0445 10/01/22  0138 10/01/22  0445 10/02/22  0220 10/03/22  0305 10/04/22  0151   NA   --    < >  --    < >  --  132* 126* 127*   K  --    < >  --    < >  --  4.2 5.6* 4.4   CO2  --    < >  --    < >  --  22 23 24   BUN  --    < >  --    < >  --  31.8* 48.7* 57.3*   CREATININE  --    < >  --    < >  --  3.59* 4.44* 5.16*   CALCIUM  --    < >  --    < >  --  7.5* 8.0* 7.7*   PH 7.42  --  7.54*  --  7.46*  --   --   --    MG  --    < >  --    < >  --  2.10 2.30 2.10   ALBUMIN  --    < >  --    < >  --  1.1* 1.2* 1.1*   ALKPHOS  --    < >  --    < >  --  137 110 100   ALT  --    < >  --    < >  --  17 16 13   AST  --    < >  --    < >  --  37* 41* 30   BILITOT  --    < >  --    < >  --  0.6 0.6 0.9    < > = values in this interval not displayed.          Microbiology Results (last 7 days)       Procedure Component Value Units Date/Time    Blood Culture [[id number]]  (Normal) Collected: 10/02/22 0605    Order Status: Completed Specimen: Blood Updated: 10/04/22 1001     CULTURE, BLOOD ([facility]) No Growth At 48 Hours    Blood Culture [[id number]] Collected: 10/03/22 1752    Order Status: Resulted Specimen: Blood Updated: 10/03/22 1920    Blood Culture [[id number]] Collected: 10/03/22 1752    Order Status: Resulted Specimen: Blood Updated: 10/03/22 1920    Blood Culture [[id number]] Collected: 10/03/22 1615    Order Status: Resulted Specimen: Blood Updated: 10/03/22 1729    Respiratory Culture [[id number]]     Order Status: Sent Specimen: Sputum, Expectorated     Urine culture [[id number]] Collected: 09/28/22 2255    Order Status: Completed Specimen: Urine, Catheterized Updated: 10/01/22 1008     Urine Culture No Growth    Mycobacteria and Nocardia Culture [[id number]] Updated: 09/30/22 1625    Order Status: Sent Specimen: Body Fluid from Pleural Fluid, Right     Body Fluid Culture [[id number]] Collected: 09/25/22 1200    Order Status: Completed Specimen: Body Fluid from Pleural Fluid, Right Updated: 09/30/22 1010     Body Fluid Culture No Growth at 5 days    Blood Culture [[id number]]  (Normal) Collected: 09/25/22 0222     Order Status: Completed Specimen: Blood Updated: 09/30/22 0900     CULTURE, BLOOD ([facility]) No Growth at 5 days    Blood Culture [[id number]]  (Normal) Collected: 09/25/22 0226    Order Status: Completed Specimen: Blood Updated: 09/30/22 0900     CULTURE, BLOOD ([facility]) No Growth at 5 days    Respiratory Culture [[id number]] Collected: 09/27/22 0851    Order Status: Completed Specimen: Sputum from Lung Aspirate Updated: 09/29/22 1053     Respiratory Culture Rare normal respiratory lacy     GRAM STAIN Quality 3+      No bacteria seen             See below for Radiology    Scheduled Med:   LIDOcaine (PF) 20 mg/mL (2%)        amLODIPine  10 mg Oral Daily    carvediloL  25 mg Oral BID    ceFEPime (MAXIPIME) IVPB  2 g Intravenous Q24H    docusate sodium  100 mg Oral BID    enoxaparin  1 mg/kg Subcutaneous Q24H    famotidine  20 mg Oral Every other day    hydrALAZINE  50 mg Oral Q8H    LIDOcaine-EPINEPHrine (PF) 2%-1:200,000  20 mL Other Once    metroNIDAZOLE  500 mg Oral Q8H    polyethylene glycol  17 g Oral BID    sucralfate  1 g Oral BID    vancomycin (VANCOCIN) IVPB  500 mg Intravenous Once    warfarin  3 mg Oral Daily        Continuous Infusions:       PRN Meds:  acetaminophen, albuterol, bisacodyL, dextrose 10%, dextrose 50%, dextrose 50%, glucagon (human recombinant), glucose, glucose, hydrALAZINE, HYDROmorphone, insulin aspart U-100, labetaloL, LIDOcaine (PF) 10 mg/ml (1%), lorazepam, morphine, ondansetron, sodium chloride 0.9%, sodium chloride 0.9%, sodium chloride 0.9%, sodium chloride 0.9%, sodium chloride 0.9%, sodium chloride 0.9%, Pharmacy to dose Vancomycin consult **AND** vancomycin - pharmacy to dose     VTE prophylaxis:     Patient condition:  Stable/Fair/Guarded/ Serious/ Critical    Anticipated discharge and Disposition:         All diagnosis and differential diagnosis have been reviewed; assessment and plan has been documented; I have personally reviewed the labs and test results that are presently  available; I have reviewed the patients medication list; I have reviewed the consulting providers response and recommendations. I have reviewed or attempted to review medical records based upon their availability    All of the patient's questions have been  addressed and answered. Patient's is agreeable to the above stated plan. I will continue to monitor closely and make adjustments to medical management as needed.  _____________________________________________________________________    Nutrition Status:    Radiology:  X-Ray Chest 1 View  Narrative: EXAMINATION:  XR CHEST 1 VIEW    CLINICAL HISTORY:  evaluate CT placement;    COMPARISON:  Yesterday    FINDINGS:  Portable frontal view of the chest was obtained. Stable right chest tube.  Heart and mediastinum are unchanged.  Similar left basilar opacities with small pleural effusion.  No significant pneumothorax is seen.  Impression: No significant interval change.    Electronically signed by: Kapil Ayers  Date:    10/04/2022  Time:    06:54      Ronda Hamilton MD   10/04/2022

## 2022-10-04 NOTE — PROGRESS NOTES
Infectious Disease  Progress Note    Patient Name: Stuart Guevara   MRN: [id number]   Admission Date: 9/23/2022   Hospital Length of Stay: 11 days  Attending Physician: Manjinder Carrillo MD   Primary Care Provider: Primary Doctor No     Isolation Status: No active isolations       Subjective:     Principal Problem: Aortic dissection     Interval History:   Not worse but not better than yesterday, CT scan of chest, abdomen and pelvis fields, labs and cultures as highlighted below.  Continued abdominal pain, reports pleuritic chest pain.  No change in stool output.  Is making urine however low urine output.  Will need HD again today.  Remains diffusely weak.    Review of Systems   Review of Systems   All other systems reviewed and are negative.     Objective:     Vital Signs (Most Recent):  Temp: 99.5 °F (37.5 °C) (10/04/22 0500)  Pulse: 75 (10/04/22 0600)  Resp: 12 (10/04/22 0600)  BP: 113/63 (10/04/22 0833)  SpO2: 95 % (10/04/22 0552)  Vital Signs (24h Range):  Temp:  [98.3 °F (36.8 °C)-100.4 °F (38 °C)] 99.5 °F (37.5 °C)  Pulse:  [75-93] 75  Resp:  [8-26] 12  SpO2:  [93 %-96 %] 95 %  BP: (102-151)/(52-83) 113/63      Weight:   Wt Readings from Last 1 Encounters:   09/29/22 60 kg (132 lb 4.4 oz)      Body mass index is Body mass index is 22.01 kg/m².     Estimated Creatinine Clearance: Estimated Creatinine Clearance: 17.3 mL/min (A) (based on SCr of 5.16 mg/dL (H)).     Lines/Drains/Airways       Drain  Duration                  Chest Tube 09/25/22 1419 1 Right Mediastinal 28 Fr. 8 days         Closed/Suction Drain 09/29/22 Superior;Midline Abdomen Bulb 19 Fr. 5 days         Urethral Catheter 10/04/22 0530 <1 day              Peripheral Intravenous Line  Duration                  Peripheral IV - Single Lumen 09/25/22 1953 20 G;Other (Comments) Anterior;Distal;Left Forearm 8 days         Peripheral IV - Single Lumen 10/03/22 1615 18 G Anterior;Right Upper Arm <1 day                     Physical Exam  Physical  Exam  Constitutional:       Appearance: Normal appearance. He is ill-appearing.   HENT:      Head: Normocephalic and atraumatic.      Mouth/Throat:      Pharynx: No oropharyngeal exudate or posterior oropharyngeal erythema.   Eyes:      Extraocular Movements: Extraocular movements intact.      Pupils: Pupils are equal, round, and reactive to light.   Cardiovascular:      Rate and Rhythm: Normal rate and regular rhythm.      Heart sounds: Murmur heard.      Comments: Murmur and click from mechanical valve heard  Pulmonary:      Effort: No respiratory distress.      Breath sounds: No wheezing, rhonchi or rales.      Comments: Chest tube in place with serous drainage  Abdominal:      General: Bowel sounds are normal. There is distension.      Palpations: Abdomen is soft.      Tenderness: There is abdominal tenderness. There is no right CVA tenderness or left CVA tenderness.      Comments: Abdominal drain in place   Musculoskeletal:         General: No swelling or tenderness.      Cervical back: Neck supple. No rigidity or tenderness.   Lymphadenopathy:      Cervical: No cervical adenopathy.   Skin:     Findings: No lesion or rash.   Neurological:      General: No focal deficit present.      Mental Status: He is alert and oriented to person, place, and time. Mental status is at baseline.      Cranial Nerves: No cranial nerve deficit.      Motor: Weakness present.   Psychiatric:         Mood and Affect: Mood normal.         Behavior: Behavior normal.        Significant Labs: CBC:   Recent Labs   Lab 10/03/22  0305 10/04/22  0150   WBC 28.6* 24.1*   HGB 10.6* 9.5*   HCT 31.2* 28.4*    339     CMP:   Recent Labs   Lab 10/03/22  0305 10/04/22  0151   * 127*   K 5.6* 4.4   CO2 23 24   BUN 48.7* 57.3*   CREATININE 4.44* 5.16*   CALCIUM 8.0* 7.7*   ALBUMIN 1.2* 1.1*   BILITOT 0.6 0.9   ALKPHOS 110 100   AST 41* 30   ALT 16 13       Microbiology Results (last 7 days)       Procedure Component Value Units Date/Time     Blood Culture [[id number]] Collected: 10/03/22 1752    Order Status: Resulted Specimen: Blood Updated: 10/03/22 1920    Blood Culture [[id number]] Collected: 10/03/22 1752    Order Status: Resulted Specimen: Blood Updated: 10/03/22 1920    Blood Culture [[id number]] Collected: 10/03/22 1615    Order Status: Resulted Specimen: Blood Updated: 10/03/22 1729    Respiratory Culture [[id number]]     Order Status: Sent Specimen: Sputum, Expectorated     Blood Culture [[id number]]  (Normal) Collected: 10/02/22 0605    Order Status: Completed Specimen: Blood Updated: 10/03/22 1001     CULTURE, BLOOD ([facility]) No Growth At 24 Hours    Urine culture [[id number]] Collected: 09/28/22 2255    Order Status: Completed Specimen: Urine, Catheterized Updated: 10/01/22 1008     Urine Culture No Growth    Mycobacteria and Nocardia Culture [[id number]] Updated: 09/30/22 1625    Order Status: Sent Specimen: Body Fluid from Pleural Fluid, Right     Body Fluid Culture [[id number]] Collected: 09/25/22 1200    Order Status: Completed Specimen: Body Fluid from Pleural Fluid, Right Updated: 09/30/22 1010     Body Fluid Culture No Growth at 5 days    Blood Culture [[id number]]  (Normal) Collected: 09/25/22 0226    Order Status: Completed Specimen: Blood Updated: 09/30/22 0900     CULTURE, BLOOD ([facility]) No Growth at 5 days    Blood Culture [[id number]]  (Normal) Collected: 09/25/22 0226    Order Status: Completed Specimen: Blood Updated: 09/30/22 0900     CULTURE, BLOOD ([facility]) No Growth at 5 days    Respiratory Culture [[id number]] Collected: 09/27/22 0851    Order Status: Completed Specimen: Sputum from Lung Aspirate Updated: 09/29/22 1053     Respiratory Culture Rare normal respiratory lacy     GRAM STAIN Quality 3+      No bacteria seen             Significant Imaging: I have reviewed all pertinent imaging results/findings within the past 24 hours.    Assessment/Plan:        32-year-old male with a past medical history of lupus who presented with severe  chest pain was subsequently found to have a dissecting aortic aneurysm and underwent repair which was complicated by abdominal compartment syndrome and an TAY, now status post decompressive laparotomy.  Now with worsening leukocytosis.  ID consulted for input.     Fever and Leukocytosis  Abdominal aortic dissection, s/p repair  Abdominal compartment syndrome, s/p ex-lap x2 last on 9/27 with abdominal closure on 10/1  H/o SLE  History of transverse myelitis  History of lupus nephritis  TAY now on HD  VHD, s/p mechanical AVR  Chest tube in place    -no fevers since 10/03/2022   -feels about the same   -blood cultures with no growth so far, dsDNA antibodies positive, C3 is low however C4 within normal limits  -CT of the chest abdomen pelvis with no significant abnormalities to explain patient's leukocytosis and fever  -leukocytosis slightly improved today, no worsening diarrhea  -plan for another HD catheter placement today and resuming hemodialysis    Plan:  -continue vancomycin dosing per pharmacy for goal trough 15-20, trough today is at 20   -continue cefepime 2 g IV Q 24 hours and metronidazole 500 mg p.o. q.8 hours   -monitor leukocytosis and fever patterns for now, asked the patient to collect sputum cultures well  -I am concerned about this possibility of the abdominal pain and pleuritic chest pain being evidence of serositis especially in this patient who has had history severe SLE flare ups, although his kidney dysfunction can be explained by hypoperfusio presentation which was significant early on in his admission, this could be part of in another lupus flare and possibly lupus nephritis  -will reach out to rheumatology to obtain opinion on this case   -discussed with patient and nursing       ID will continue following. Please contact us with any questions or concerns.    Layton Nathan MD  Infectious Disease  [facility]

## 2022-10-05 NOTE — PT/OT/SLP PROGRESS
Physical Therapy Treatment    Patient Name:  Stuart Guevara   MRN:  [id number]    Recommendations:     Discharge Recommendations:  LTACH (long-term acute care hospital)   Discharge Equipment Recommendations: walker, rolling   Barriers to discharge:  medical complexity    Assessment:     Stuart Guevara is a 32 y.o. male admitted with a medical diagnosis of Aortic dissection.  He presents with the following impairments/functional limitations:  weakness, impaired endurance, gait instability, impaired functional mobility.    Pt sitting up in bed, agreeable to tx session despite pain. T/f to chair with max assist. Noted drainage from R CT site; RN called to assess. Pt reports feeling much better in the chair.     Rehab Prognosis: Good; patient would benefit from acute skilled PT services to address these deficits and reach maximum level of function.    Recent Surgery: Procedure(s) (LRB):  LAPAROTOMY, EXPLORATORY (N/A) 6 Days Post-Op    Plan:     During this hospitalization, patient to be seen 6 x/week to address the identified rehab impairments via gait training, therapeutic activities, therapeutic exercises and progress toward the following goals:    Plan of Care Expires:  11/02/22    Subjective     Chief Complaint: pain  Patient/Family Comments/goals: to get stronger  Pain/Comfort:  Pain Rating 1: other (see comments) (Reported generalized pain; did not rate pain)  Pain Addressed 1: Nurse notified      Objective:     Communicated with RN prior to session.  Patient found HOB elevated with blood pressure cuff, pulse ox (continuous), telemetry, PRAFO, LINNETTE drain, villalobos catheter upon PTA entry to room.     General Precautions: Standard, fall   Orthopedic Precautions:N/A   Braces: N/A  Respiratory Status: Room air  Vitals:   Supine       HR: 79     BP: 158/81    SPO2: 96%         Functional Mobility:  Bed Mobility:    Supine<->sit with max assist x 2  Transfers:   Sit<->stand with mod x 2 assist  Squat pivot with max  assist  Balance:   Sitting balance with min to SBA; 1 minor LOB posteriorly       Patient left up in chair with all lines intact and call button in reach.    GOALS:   Multidisciplinary Problems       Physical Therapy Goals          Problem: Physical Therapy    Goal Priority Disciplines Outcome Goal Variances Interventions   Physical Therapy Goal     PT, PT/OT Ongoing, Progressing     Description: Goals to be met by: 11/2/22     Patient will increase functional independence with mobility by performing:    . Supine to sit with Blair  . Sit to supine with Blair  . Rolling to Left and Right with Blair.                         Time Tracking:     PT Received On: 10/05/22  PT Start Time: 0845     PT Stop Time: 0923  PT Total Time (min): 38 min     Billable Minutes: Therapeutic Activity 2 units    Treatment Type: Treatment  PT/PTA: PTA     PTA Visit Number: 2     10/05/2022

## 2022-10-05 NOTE — PROGRESS NOTES
Infectious Disease  Progress Note    Patient Name: Stuart Guevara   MRN: [id number]   Admission Date: 9/23/2022   Hospital Length of Stay: 12 days  Attending Physician: Manjinder Carrillo MD   Primary Care Provider: Primary Doctor No     Isolation Status: No active isolations       Subjective:     Principal Problem: Aortic dissection     Interval History:   No fevers, no chills, leukocytosis ongoing.  Reporting significant diarrhea in the past few hours.  He has been on laxative regimen.  Abdominal pain is improved, energy level was improved.    Review of Systems   Review of Systems   All other systems reviewed and are negative.     Objective:     Vital Signs (Most Recent):  Temp: 99.5 °F (37.5 °C) (10/05/22 1315)  Pulse: 77 (10/05/22 1320)  Resp: 16 (10/05/22 1320)  BP: 129/74 (10/05/22 1315)  SpO2: 100 % (10/05/22 1320)  Vital Signs (24h Range):  Temp:  [99.5 °F (37.5 °C)-99.8 °F (37.7 °C)] 99.5 °F (37.5 °C)  Pulse:  [77-86] 77  Resp:  [14-18] 16  SpO2:  [93 %-100 %] 100 %  BP: (120-133)/(59-74) 129/74      Weight:   Wt Readings from Last 1 Encounters:   09/29/22 60 kg (132 lb 4.4 oz)      Body mass index is Body mass index is 22.01 kg/m².     Estimated Creatinine Clearance: Estimated Creatinine Clearance: 21.9 mL/min (A) (based on SCr of 4.07 mg/dL (H)).     Lines/Drains/Airways       Central Venous Catheter Line  Duration             Trialysis (Dialysis) Catheter 10/04/22 1300 right internal jugular 1 day              Drain  Duration                  Closed/Suction Drain 09/29/22 Superior;Midline Abdomen Bulb 19 Fr. 6 days         Urethral Catheter 10/04/22 0530 1 day              Peripheral Intravenous Line  Duration                  Peripheral IV - Single Lumen 09/25/22 1953 20 G;Other (Comments) Anterior;Distal;Left Forearm 9 days         Peripheral IV - Single Lumen 10/03/22 1615 18 G Anterior;Right Upper Arm 1 day                     Physical Exam  Physical Exam  Constitutional:       Appearance: Normal  appearance. He is ill-appearing.   HENT:      Head: Normocephalic and atraumatic.      Mouth/Throat:      Pharynx: No oropharyngeal exudate or posterior oropharyngeal erythema.   Eyes:      Extraocular Movements: Extraocular movements intact.      Pupils: Pupils are equal, round, and reactive to light.   Cardiovascular:      Rate and Rhythm: Normal rate and regular rhythm.      Heart sounds: Murmur heard.      Comments: Murmur and click from mechanical valve heard  Pulmonary:      Effort: No respiratory distress.      Breath sounds: No wheezing, rhonchi or rales.      Comments: Chest tube removed  Abdominal:      General: Bowel sounds are normal. There is distension.      Palpations: Abdomen is soft.      Tenderness: There is abdominal tenderness. There is no right CVA tenderness or left CVA tenderness.      Comments: Abdominal drain in place   Musculoskeletal:         General: No swelling or tenderness.      Cervical back: Neck supple. No rigidity or tenderness.   Lymphadenopathy:      Cervical: No cervical adenopathy.   Skin:     Findings: No lesion or rash.   Neurological:      General: No focal deficit present.      Mental Status: He is alert and oriented to person, place, and time. Mental status is at baseline.      Cranial Nerves: No cranial nerve deficit.      Motor: Weakness present.   Psychiatric:         Mood and Affect: Mood normal.         Behavior: Behavior normal.        Significant Labs: CBC:   Recent Labs   Lab 10/04/22  0150 10/05/22  0135   WBC 24.1* 24.0*   HGB 9.5* 8.9*   HCT 28.4* 26.9*    448*       CMP:   Recent Labs   Lab 10/04/22  0151 10/05/22  0135   * 127*   K 4.4 4.9   CO2 24 26   BUN 57.3* 35.0*   CREATININE 5.16* 4.07*   CALCIUM 7.7* 7.6*   ALBUMIN 1.1* 1.2*   BILITOT 0.9 0.6   ALKPHOS 100 109   AST 30 31   ALT 13 13         Microbiology Results (last 7 days)       Procedure Component Value Units Date/Time    Respiratory Culture [[id number]] Collected: 10/04/22 1002     Order Status: Completed Specimen: Sputum, Expectorated Updated: 10/05/22 1036     Respiratory Culture Normal respiratory lacy     GRAM STAIN Quality 2+      Moderate Gram positive cocci      Moderate Gram Negative Rods      Few Gram Positive Rods    Blood Culture [[id number]]  (Normal) Collected: 10/02/22 0605    Order Status: Completed Specimen: Blood Updated: 10/05/22 1001     CULTURE, BLOOD ([facility]) No Growth At 72 Hours    Blood Culture [[id number]]  (Normal) Collected: 10/03/22 1752    Order Status: Completed Specimen: Blood Updated: 10/04/22 2002     CULTURE, BLOOD ([facility]) No Growth At 24 Hours    Blood Culture [[id number]]  (Normal) Collected: 10/03/22 1752    Order Status: Completed Specimen: Blood Updated: 10/04/22 2002     CULTURE, BLOOD ([facility]) No Growth At 24 Hours    Blood Culture [[id number]]  (Normal) Collected: 10/03/22 1615    Order Status: Completed Specimen: Blood Updated: 10/04/22 1801     CULTURE, BLOOD ([facility]) No Growth At 24 Hours    Urine culture [[id number]] Collected: 09/28/22 2255    Order Status: Completed Specimen: Urine, Catheterized Updated: 10/01/22 1008     Urine Culture No Growth    Mycobacteria and Nocardia Culture [[id number]] Updated: 09/30/22 1625    Order Status: Sent Specimen: Body Fluid from Pleural Fluid, Right     Body Fluid Culture [[id number]] Collected: 09/25/22 1200    Order Status: Completed Specimen: Body Fluid from Pleural Fluid, Right Updated: 09/30/22 1010     Body Fluid Culture No Growth at 5 days    Blood Culture [[id number]]  (Normal) Collected: 09/25/22 0226    Order Status: Completed Specimen: Blood Updated: 09/30/22 0900     CULTURE, BLOOD ([facility]) No Growth at 5 days    Blood Culture [[id number]]  (Normal) Collected: 09/25/22 0226    Order Status: Completed Specimen: Blood Updated: 09/30/22 0900     CULTURE, BLOOD ([facility]) No Growth at 5 days    Respiratory Culture [[id number]] Collected: 09/27/22 0851    Order Status: Completed Specimen: Sputum from Lung Aspirate Updated: 09/29/22  1053     Respiratory Culture Rare normal respiratory lacy     GRAM STAIN Quality 3+      No bacteria seen             Significant Imaging: I have reviewed all pertinent imaging results/findings within the past 24 hours.    Assessment/Plan:        32-year-old male with a past medical history of lupus who presented with severe chest pain was subsequently found to have a dissecting aortic aneurysm and underwent repair which was complicated by abdominal compartment syndrome and an TAY, now status post decompressive laparotomy.  Now with worsening leukocytosis.  ID consulted for input.     Fever and Leukocytosis  Abdominal aortic dissection, s/p repair  Abdominal compartment syndrome, s/p ex-lap x2 last on 9/27 with abdominal closure on 10/1  SLE with active flare  History of transverse myelitis  History of lupus nephritis  TAY now on HD  VHD, s/p mechanical AVR  Chest tube in place    -Negative blood cultures, CT abdomen and pelvis with no abscess intra-abdominal and no evidence of infectious concerns, Respiratory cultures with normal lacy  -CT of the chest although with no evidence of  pneumonia  -Continued leukocytosis although slightly improved  -Rheum evaluated with concerns of active flare of SLE, started on Steroids and immune suppression  -At this time no evidence of active infection and patient has been having significant diarrhea (although on laxatives)    Plan:  -Discontinue antibiotics for now and monitor off  -monitor leukocytosis and fever patterns for now, asked the patient to collect sputum cultures well  -Stop laxatives and monitor diarrhea, recent CT with no evidence of colitis but if ongoing diarrhea and leukocytosis, will need to check for C diff  -discussed with patient and nursing   -complicated case of immunocompromised patient with extensive lupus and multiple complications, at this time, no evidence of active infection but he certainly remains at high risk of developing infectious  complications, monitor closely     ID will continue following. Please contact us with any questions or concerns.    Layton Nathan MD  Infectious Disease  [facility]

## 2022-10-05 NOTE — PROGRESS NOTES
"[facility] - [facility] ICU  Cardiology  Progress Note    Patient Name: Stuart Guevara  MRN: [id number]  Admission Date: 9/23/2022  Hospital Length of Stay: 12 days  Code Status: Full Code   Attending Physician: Manjinder Carrillo MD   Primary Care Physician: Primary Doctor No  Expected Discharge Date:   Principal Problem:Aortic dissection    Subjective:     Brief HPI:    This is a 32-year-old male, who is known to [facility] as an inpatient only,  with history of HTN, lupus nephritis, chronic anemia, right upper extremity DVT, SLE, type a aortic dissection with repair.  He presents to [facility] ER on 09/23/2022 with complaints of ripping /tearing chest pain that radiated to his back began on 09/22/2022.  He was noted to have a BP  of 232/126.  He had a syncopal episode witnessed at the .  Patient was brought immediately back to CT where he was found to have a type B aortic dissection.  Esmolol and nitroprusside were initiated at [facility].   Patient was transferred to [facility] for CT surgery  the services and was admitted directly to ICU.   On 09/25/2022 he was found to have high-grade AV block with bradycardia.  Dr. Maldonadoha the patient down for TVP.    Patient underwent exploratory laparotomy.  He h.ad to be intubated and placed on ventilator secondary to respiratory failure and shockCIS has been consulted for bradycardia.     Hospital Course:   9.27.22: Remains vented/sedated. SR on tele. NO further pacing noted. VSS.   9.28.22: Vented/sedated. SR on tele. No bradycardia or arrhythmia noted.  10.3.22: Reconsulted for HTN/BP Control. "I am doing well." Denies CP, SOB and Palps. + Cough/No Sputum Production.   10.4.22: NAD. VSS/BP Stable on Current Medical Therapy. "I am feeling better." Denies CP, SOB and Palps.   10.5.22: NAD noted. VSS. SR on tele. Denies CP/SOB/Palps.    PMH: HTN, lupus nephritis, chronic anemia, right upper extremity DVT, SLE  PSH: Left middle finger amputation, right second toe amputation, " Mechanical Aortic Valve with Graph/Dissection Repair  Social History: Denies EtOH and illicit drug use, current tobacco use approximately 4-5 cigarettes/day  Family History: Mother-Hypertension     Previous Diagnostics:   Echo 9.26.22  HO AAA surgery and aortic valve replacement.  The left ventricle is normal in size with severe concentric hypertrophy and moderately decreased systolic function.  The estimated ejection fraction is 37%.  There is moderate left ventricular global hypokinesis.  Grade I left ventricular diastolic dysfunction.  Moderate right ventricular enlargement with mildly to moderately reduced right ventricular systolic function.  There is a bioprosthetic aortic valve present. There is trivial paravalvular aortic insufficiency present. Prosthetic aortic valve is normal.  The estimated PA systolic pressure is 33 mmHg.  Intermediate central venous pressure (8 mmHg    ECHO 2.15.21  Moderate concentric left ventricular hypertrophy.  Left ventricular ejection fraction is measured at approximately 55-60 %.  Structurally aortic valve is trileaflet.  Moderate aortic regurgitation is noted..  Turbulent flow in aortic root with possible aortic dissection flap visualized. (Best images at end of study)  Aortic root measures approximately 4.1cm.     Review of Systems   Unable to perform ROS: Acuity of condition   Constitutional: Positive for malaise/fatigue.   Cardiovascular:  Negative for chest pain and irregular heartbeat.   Respiratory:  Positive for cough. Negative for hemoptysis, shortness of breath and sputum production.    All other systems reviewed and are negative.    Objective:     Vital Signs (Most Recent):  Temp: 99.7 °F (37.6 °C) (10/05/22 0405)  Pulse: 86 (10/05/22 0405)  Resp: 17 (10/05/22 0611)  BP: 133/72 (10/05/22 0405)  SpO2: 95 % (10/05/22 0405)   Vital Signs (24h Range):  Temp:  [97.8 °F (36.6 °C)-99.8 °F (37.7 °C)] 99.7 °F (37.6 °C)  Pulse:  [74-86] 86  Resp:  [9-19] 17  SpO2:  [93 %-100 %]  95 %  BP: (106-133)/(51-72) 133/72     Weight: 60 kg (132 lb 4.4 oz)  Body mass index is 22.01 kg/m².    SpO2: 95 %  O2 Device (Oxygen Therapy): room air      Intake/Output Summary (Last 24 hours) at 10/5/2022 0919  Last data filed at 10/5/2022 0602  Gross per 24 hour   Intake 100 ml   Output 2130 ml   Net -2030 ml       Lines/Drains/Airways       Central Venous Catheter Line  Duration             Trialysis (Dialysis) Catheter 10/04/22 1300 right internal jugular <1 day              Drain  Duration                  Closed/Suction Drain 09/29/22 Superior;Midline Abdomen Bulb 19 Fr. 6 days         Urethral Catheter 10/04/22 0530 1 day              Peripheral Intravenous Line  Duration                  Peripheral IV - Single Lumen 09/25/22 1953 20 G;Other (Comments) Anterior;Distal;Left Forearm 9 days         Peripheral IV - Single Lumen 10/03/22 1615 18 G Anterior;Right Upper Arm 1 day                  Significant Labs: CMP   Recent Labs   Lab 10/04/22  0151 10/05/22  0135   * 127*   K 4.4 4.9   CO2 24 26   BUN 57.3* 35.0*   CREATININE 5.16* 4.07*   CALCIUM 7.7* 7.6*   ALBUMIN 1.1* 1.2*   BILITOT 0.9 0.6   ALKPHOS 100 109   AST 30 31   ALT 13 13      and CBC   Recent Labs   Lab 10/04/22  0150 10/05/22  0135   WBC 24.1* 24.0*   HGB 9.5* 8.9*   HCT 28.4* 26.9*    448*       Telemetry:  SR    Physical Exam  Constitutional:       Appearance: Normal appearance.   HENT:      Head: Normocephalic.      Mouth/Throat:      Mouth: Mucous membranes are moist.   Cardiovascular:      Rate and Rhythm: Normal rate and regular rhythm.      Pulses: Normal pulses.      Heart sounds: Normal heart sounds.   Pulmonary:      Effort: Pulmonary effort is normal.      Breath sounds: Decreased breath sounds present.   Abdominal:      Palpations: Abdomen is soft.   Skin:     General: Skin is warm and dry.      Capillary Refill: Capillary refill takes less than 2 seconds.   Neurological:      General: No focal deficit present.       Mental Status: He is alert and oriented to person, place, and time.   Psychiatric:         Mood and Affect: Mood normal.         Behavior: Behavior normal.     Current Inpatient Medications:    Current Facility-Administered Medications:     acetaminophen tablet 650 mg, 650 mg, Oral, Q6H PRN, Nolberto Calvillo MD, 650 mg at 10/03/22 1716    albuterol inhaler 2 puff, 2 puff, Inhalation, Q4H PRN, Evelio Marshall MD    amLODIPine tablet 10 mg, 10 mg, Oral, Daily, Bin Bell MD, 10 mg at 10/04/22 0833    bisacodyL suppository 10 mg, 10 mg, Rectal, Daily PRN, Keena Shafer, FNP, 10 mg at 10/02/22 1056    carvediloL tablet 25 mg, 25 mg, Oral, BID, MARIANNE Castillo, 25 mg at 10/04/22 2025    ceFEPIme (MAXIPIME) 2 g in dextrose 5 % in water (D5W) 5 % 50 mL IVPB (MB+), 2 g, Intravenous, Q24H, Layton Nathan MD, Stopped at 10/04/22 1900    dextrose 10% bolus 125 mL, 12.5 g, Intravenous, PRN, Ivan Chopra MD    dextrose 50% injection 12.5 g, 12.5 g, Intravenous, PRN, Keena Richterrange, FNP    dextrose 50% injection 25 g, 25 g, Intravenous, PRN, Keena H Paris, FNP    docusate sodium capsule 100 mg, 100 mg, Oral, BID, Keena Collinsge, FNP, 100 mg at 10/04/22 2025    enoxaparin injection 60 mg, 1 mg/kg, Subcutaneous, Q24H, MARIANNE Castillo, 60 mg at 10/04/22 1829    famotidine tablet 20 mg, 20 mg, Oral, Every other day, Ivan Chopra MD, 20 mg at 10/04/22 0833    glucagon (human recombinant) injection 1 mg, 1 mg, Intramuscular, PRN, Keena H Paris, FNP    glucose chewable tablet 16 g, 16 g, Oral, PRN, Keena H Paris, FNP    glucose chewable tablet 24 g, 24 g, Oral, PRN, DENIA Caruso    hydrALAZINE injection 20 mg, 20 mg, Intravenous, Q4H PRN, Bin Bell MD, 20 mg at 10/02/22 0633    hydrALAZINE tablet 50 mg, 50 mg, Oral, Q8H, Ronda Hamilton MD, 50 mg at 10/04/22 2107    HYDROmorphone (PF) injection 1 mg, 1 mg, Intravenous, Q4H PRN, Cong Velez DO, 1 mg at 10/05/22 0611     hydrOXYchloroQUINE tablet 200 mg, 200 mg, Oral, BID, Wing Thorpe MD, 200 mg at 10/04/22 2025    insulin aspart U-100 injection 1-10 Units, 1-10 Units, Subcutaneous, QID (AC + HS) PRN, Keena Shafer, YASP, 3 Units at 10/04/22 2137    labetaloL injection 20 mg, 20 mg, Intravenous, Q8H PRN, Bin Bell MD, 20 mg at 10/03/22 0321    LIDOcaine (PF) 10 mg/ml (1%) injection, , , PRN, Ruben Arciniega MD, 5 mL at 09/25/22 1656    lorazepam injection 1 mg, 1 mg, Intravenous, Q2H PRN, Cong Velez DO    methylPREDNISolone sodium succinate injection 80 mg, 80 mg, Intravenous, TID, Manjinder Carrillo MD, 80 mg at 10/04/22 2025    metronidazole IVPB 500 mg, 500 mg, Intravenous, Q8H, DENIA Armando, Stopped at 10/05/22 0702    morphine injection 2 mg, 2 mg, Intravenous, Q2H PRN, Evelio Marshall MD, 2 mg at 10/03/22 0310    mycophenolate capsule 1,000 mg, 1,000 mg, Oral, BID, Wing Thorpe MD, 1,000 mg at 10/04/22 2025    ondansetron injection 4 mg, 4 mg, Intravenous, Q6H PRN, Evelio Marshall MD, 4 mg at 10/04/22 2114    polyethylene glycol packet 17 g, 17 g, Oral, BID, Bin Bell MD, 17 g at 10/04/22 2025    sodium chloride 0.9% bolus 250 mL, 250 mL, Intravenous, PRN, Shahzad Lundberg MD    sodium chloride 0.9% bolus 250 mL, 250 mL, Intravenous, PRN, Shahzad Lundberg MD    sodium chloride 0.9% bolus 250 mL, 250 mL, Intravenous, PRN, Cindy Lora, FNP    sodium chloride 0.9% flush 10 mL, 10 mL, Intravenous, PRN, Francia Marcus DO    sodium chloride 0.9% flush 10 mL, 10 mL, Intravenous, PRN, Evelio Marshall MD    sodium chloride 0.9% flush 10 mL, 10 mL, Intravenous, PRN, Evelio Marshall MD    sucralfate tablet 1 g, 1 g, Oral, BID, Francia Marcus DO, 1 g at 10/04/22 2025    Pharmacy to dose Vancomycin consult, , , Once **AND** vancomycin - pharmacy to dose, , Intravenous, pharmacy to manage frequency, Moise Cohen DO    warfarin (COUMADIN) tablet 3 mg, 3 mg, Oral, Daily, MARIANNE Castillo, 3 mg at  10/04/22 1830    Assessment:   Hypertensive Emergency - Now Controlled  CHB/TVP likely secondary to Electrolyte Abnormalities - Resolved  Acute Hypoxemic Respiratory Failure requiring Intubation/Ventilation - Resolved (On RA)  Type B Aortic Dissection/TEVAR  CMO (Unknown Etiology)    - ECHO (9.23.22) - LVEF 37%  TAY requiring HD Treatment  Acute Abdomen with Evidence of Global Hypoperfusion/Exp Lap - Resolved  Acute Pancreatitis - Resolving  Anemia of Chronic Disease  Hx of RUE DVT (Recent US - Negative for DVT)  Hx of Hepatitis B  Hx of Aortic Dissection with Repair in 2/2021 s/p Mechanical Valve (Aortic)  Electrolyte Derangements - Hyponatremia   SLE    Plan:   Continue Norvasc 10mg PO Qday  Continue Hydralazine 50mg PO Q8HR  Continue Coreg to 25mg PO BID  No ACEi/ARB/ARNI secondary to TAY - CRRT on Hold  Plans for OP Ischemic Workup for Newly Diagnosed CMO/EF 37%  Mechanical Aortic Valve Noted on CT of Chest - Visualized by Dr. Dean on CT (it was probably Graft-Valve Composite)  Continue FD Lovenox 1mg/kg SQ Daily (Renal Adjustment)  Continue Warfarin 3mg PO Daily, give extra 5mg today to equal 8mg.  Electrolyte Management per Primary Team  Labs in AM: CBC, CMP, Mg and PT/INR    VIRGILIO Cutler-BC  Cardiology  [facility] - [facility] ICU  10/05/2022

## 2022-10-05 NOTE — PT/OT/SLP PROGRESS
Occupational Therapy  Treatment    Stuart Guevara   MRN: [id number]   Admitting Diagnosis: Aortic dissection    OT Date of Treatment: 10/05/22   OT Start Time: 0844  OT Stop Time: 0922  OT Total Time (min): 38 min     Billable Minutes:  Self Care/Home Management 3  Total Minutes: 38           ADONAY Visit Number: 1    General Precautions: Standard, fall  Orthopedic Precautions:    Braces:      Spiritual, Cultural Beliefs, Jain Practices, Values that Affect Care: no    Subjective:  Communicated with RN prior to session.  80HR, 96o2, 142/73    Objective:  (Bed Mobility- Max A)   Pt. Requiring Min A progressing to CGA seated EOB. Pt. Performing grooming task (brushing teeth) with minimal assistance required for setup.  Pt. Limited due to andominal pain at times, requiring increased assistance for sitting balance.  Stand pivot t/f performed from EOB to BS chair with Mod A, with B LE blocked.  Pt. Repositioned in BS chair appropriately.       Functional Mobility:  Bed Mobility:   Supine to sit: Maximum Assistance   Sit to supine: Maximum Assistance   Rolling: Maximum Assistance   Scooting: Maximum Assistance    Patient left up in chair with all lines intact and call button in reach    ASSESSMENT:  Stuart Guevara is a 32 y.o. male with a medical diagnosis of Aortic dissection Pt. Tolerated session well overall limited due abdominal pain. Continue POC    Rehab potential is good    Activity tolerance: Good    Discharge recommendations: LTACH (long-term acute care hospital), rehabilitation facility     Equipment recommendations: walker, rolling     GOALS:   Multidisciplinary Problems       Occupational Therapy Goals          Problem: Occupational Therapy    Goal Priority Disciplines Outcome Interventions   Occupational Therapy Goal     OT, PT/OT Ongoing, Progressing    Description: Goals to be met by: 10/14/22    Patient will increase functional independence with ADLs by performing:    UE Dressing with Stand-by  Assistance.  LB Dressing with Minimal Assistance and dressing AEDs.  Grooming while EOB with Stand-by Assistance.  Sitting at edge of bed x10 minutes with Stand-by Assistance.                         Plan:  Patient to be seen 6 x/week to address the above listed problems via self-care/home management, therapeutic activities, therapeutic exercises  Plan of Care expires: 10/14/22  Plan of Care reviewed with: patient         10/05/2022

## 2022-10-05 NOTE — PROGRESS NOTES
Nephrology consult follow up note    HPI:      Stuart Guevara is a 32 y.o. male  with past medical history of aortic dissection, status post repair in 2021, SLE, and hypertension, who presented to the emergency department with complaints of tearing chest pain and blood pressure of 232/127.  CT scan revealed findings consistent with type B aortic dissection, patient underwent TEVAR  as well as a carotid artery to subclavian bypass on the left on 09/23/2022.  He also underwent exploratory laparotomy on 09/25/2022 for concern of ischemic colitis, right chest tube was placed.  No ischemic bowel was found.  Patient's kidney function worsened, he developed severe hyperkalemia and was initiated on hemodialysis by way of right temporary femoral catheter on 09/25/2022.  We are continuing to follow for nephrology care.    Interval history:     Vascath replaced yesterday, and he was dialyzed. No CP, SOB, abd pain, N/V, or LE edema.      Review of Systems:     Comprehensive 10pt ROS negative except as noted per HPI.    Past medical, family, surgical, and social history reviewed and unchanged from initial consult note.     Objective:       VITAL SIGNS: 24 HR MIN & MAX LAST    Temp  Min: 97.8 °F (36.6 °C)  Max: 99.8 °F (37.7 °C)  99.7 °F (37.6 °C)        BP  Min: 106/51  Max: 133/72  133/72     Pulse  Min: 74  Max: 86  86     Resp  Min: 9  Max: 19  17    SpO2  Min: 93 %  Max: 100 %  95 %      GEN: Ill appearing AAM  HEENT: Conjunctiva anicteric, pupils equal  CV: RRR +S1,S2 without murmur  PULM: CTAB, unlabored  ABD: Soft, NT/ND abdomen with NABS  EXT: No cyanosis or edema  SKIN: Warm and dry  PSYCH: Awake, alert and appropriately conversant.   Vascular access: RIJ vascath            Component Value Date/Time     (L) 10/05/2022 0135     (L) 10/04/2022 0151    K 4.9 10/05/2022 0135    K 4.4 10/04/2022 0151    CHLORIDE 94 (L) 10/05/2022 0135    CHLORIDE 95 (L) 10/04/2022 0151    CO2 26 10/05/2022 0135    CO2 24  10/04/2022 0151    BUN 35.0 (H) 10/05/2022 0135    BUN 57.3 (H) 10/04/2022 0151    CREATININE 4.07 (H) 10/05/2022 0135    CREATININE 5.16 (H) 10/04/2022 0151    CALCIUM 7.6 (L) 10/05/2022 0135    CALCIUM 7.7 (L) 10/04/2022 0151    PHOS 5.0 (H) 10/04/2022 0151            Component Value Date/Time    WBC 24.0 (H) 10/05/2022 0135    WBC 24.1 (H) 10/04/2022 0150    HGB 8.9 (L) 10/05/2022 0135    HGB 9.5 (L) 10/04/2022 0150    HCT 26.9 (L) 10/05/2022 0135    HCT 28.4 (L) 10/04/2022 0150    HCT 28 (L) 09/23/2022 0304    HCT 30.0 (L) 02/15/2021 1751     (H) 10/05/2022 0135     10/04/2022 0150         Imaging reviewed      Assessment / Plan:       Active Hospital Problems    Diagnosis  POA    *Aortic dissection [I71.00]  Yes    Serositis [K65.8]  Unknown    Systemic lupus erythematosus [M32.9]  Yes     Chronic    HTN (hypertension) [I10]  Yes     Chronic    History of DVT in adulthood [Z86.718]  Not Applicable     Chronic    Hypertensive emergency [I16.1]  Yes      Resolved Hospital Problems   No resolved problems to display.       Oliguric acute kidney injury  Hyperkalemia   Aortic dissection, status post TEVAR procedure   Hypertension   Anemia  Mild hyponatremia  History of right upper extremity DVT   History of SLE  Acute abdomen, status post exploratory laparotomy 09/25/2022      Plan:  UOP remain anuric-oliguric. Dilayzed yesterday without problem. Rheumatology consulted and restarted immunosuppression. C3 was previously mildly low, but C4 was normal. I cannot rule out lupus nephritis at this time but there is no longer RBC in his UA, and proteinuria is improving. His initial presentation was most consistent with ATN. Plan for next HD tomorrow. Monitor for renal recovery. Will follow.     Douglas Landis DO  Nephrology  [facility]  Clinic number: [phone]

## 2022-10-05 NOTE — PROGRESS NOTES
Trauma/Acute Care Surgery   Daily Progress Note     HD#12  POD#6 Days Post-Op    Subjective  NAEO  AFVSS  Received HD yesterday  Made NPO yesterday by primary team over concern for aspiration event  Not complaining of SOB  LINNETTE with 70 cc serous output  Good UOP    Objective  Temp:  [97.8 °F (36.6 °C)-99.8 °F (37.7 °C)] 99.7 °F (37.6 °C)  Pulse:  [74-86] 86  Resp:  [9-18] 17  SpO2:  [93 %-100 %] 95 %  BP: (106-133)/(51-72) 133/72     Physical Exam:  GEN: NAD  HEENT: RIJ HD catheter  RESP: Unlabored breathing on RA  CV: RRR  ABD: soft, minimally tender to palpation, LINNETTE in place, moderately distended  : Galarza in place, dark brown urine  EXT: Moves extremities spontaneously     Labs  Labs reviewed    WBC 24  Hb 8.9    K 4.9     Assessment/Plan  33 y/o M w Hx of Type B aortic dissection, s/p TEVAR + L carotid-subclavian bypass, p/w acute abdomen 2/2 abdominal compartment syndrome of undetermined etiology s/p ex-lap c/b abdominal compartment syndrome s/p decompressive ex-lap with subsequent abdominal closure and LINNETTE drain placement     - maintain LINNETTE, will re-evaluate tomorrow  - strict I&Os  - rest of care per primary       Bety Handley MD  [employer] General Surgery PGY-2

## 2022-10-05 NOTE — PROGRESS NOTES
[facility]  Hospital Medicine Progress Note        Chief Complaint: chest pain SOB s/p TEVAR for aortic dissection      Patient information was obtained from patient, patient's family, past medical records and ER records.      HISTORY OF PRESENT ILLNESS:   Stuart Guevara is a 32 y.o. male who has PMH which includes HTN, SLE,  hepatitis B, aortic dissection with repair 2/2021, RUE DVT on Xarelto; presented to the ED at [facility] on 9/23/2022  with reports of  shortness of breath and chest pain radiating to his back.  PT has a history of aortic dissection 2/2021. Work up revealed a type B aortic dissection and significant hypertension noted on presentation.  He failed medical management and had progression of his symptoms despite adequate control of hemodynamic parameters.  PT was transferred from [facility] to [facility] for CV surgery evaluation which he was taken  to the OR and had TEVAR with left carotid to subclavian artery bypass. PT developed abdominal distension and rigidity of his abdomen and developed a lactic acidosis.  He repeat CT angiography of the chest/abdomen/pelvis without evidence of worsening dissection, but evidence of significant intra-abdominal pathology with some hemoperitoneum, free blood in the abdomen.  He underwent exploratory laparotomy with evidence of viable bowel and no significant bleeding identified, abd was closed with flakito drain left in situ chest tube was placed. Postoperatively, he developed severe unstable bradycardia, and a transvenous pacemaker was placed by Cardiology.  Shortly afterwards, he was noted to have significant worsening of his renal failure and critical hyperkalemia. Renal services consulted which catheter was placed and he was started on HD treatments.  PT has been cleared for transfer out of ICU to the floor. Hospital medicine services consulted for assumption of care.     Patient is s/p AAA surgery repair , course c/by abd compartment syndrome s/p ex  lap and closure with flakito drain in situ, sepsis syndrome with fever and worsening leucocytosis , Appreciate ID input antibiotics broadened to vancomycin Flagyl and cefepime, concern for SLE serositis , rheum consulted started on high dose steori and immunosuppressants.         Todays information  Rheum recs - started on high-dose steroid and immunosuppressants   Vitals reviewed and stable   Labs reviewed INR 1.63 continue warfarin and full-dose Lovenox bridging   Hemoglobin levels trickling down slowly, send for anemia workup   Appreciate inputs of Nephrology, Cardiology, surgery on id.              Exam  General appearance: chronically ill appearing look older than stated age, non-toxic  HENT: Atraumatic head. Moist mucous membranes of oral cavity.  Eyes: PERRL  Lungs:  diminished bilaterally, fine scattered crackles, mildly labored respirations, stable saturations on supplemental therapy, chest tube in place to closed water seal system  Heart: Regular rate and rhythm. Systolic murmur, cap refill brisk, trace edema  Abdomen: Soft, non-distended, non-tender. No rebound tenderness/guarding. Bowel sounds are normal.   Extremities: DEWEY, generalized weakness  Skin: warm and dry  Neuro: AAOx3, no focal deficits  Psych/mental status: Appropriate mood and affect. Responds appropriately to questions.         ASSESSMENT:  Fever and Leukocytosis  SLE flare and serositis   Acute respiratory failure with hypoxia- s/p mechanical ventilation, now extubated  Acute aortic dissection type B- s/p TEVAR with left carotid artery to left subclavian bypass 9/23/2022  ARF- s/p HD treatments  New onset Cardiomyopathy- EF 37% unknown etiology  VHD, s/p mechanical AVR   Acute abdominal pain generalized  Abdominal compartment syndrome, s/p ex-lap x2 last on 9/27 with abdominal closure on 10/1  Leukocytosis- on vancomycin  SLE- on cellcept  Anemia- chronic disease  Acute pancreatitis- resolving with lipase trending down  RUE DVT- was on Xarelto,  recent US no DVT  Weakness  HTN- emergency, resolved   HLD  Hx of hepatitis B  Hx of aortic dissection with repair 2/2021  History of transverse myelitis  History of lupus nephritis           PLAN:  Rheum recs - started on high-dose steroid and immunosuppressants   Labs reviewed INR 1.63 continue warfarin and full-dose Lovenox bridging   Hemoglobin levels trickling down slowly, send for anemia workup   Appreciate inputs of Nephrology, Cardiology, surgery on id.  CT of the chest abdomen and pelvis reviewed reviewed in anasarca, no IV contrast state so was not very revealing   Appreciate ID input antibiotics broadened to vancomycin Flagyl and cefepime,   General surgery placed a new hemodialysis line on 10/4, flakito drain still with sig output, in situ    incentive spirometry  Neph onboard helping with HD   Fall precautions  Accurate I&O  Blood pressure parameters SBP < 150  PT, OT eval and treat     VTE Prophylaxis: SCD for DVT prophylaxis and will be advised to be as mobile as possible     Patient condition:  Fair         VITAL SIGNS: 24 HRS MIN & MAX LAST   Temp  Min: 99.5 °F (37.5 °C)  Max: 99.8 °F (37.7 °C) 99.5 °F (37.5 °C)   BP  Min: 113/51  Max: 133/72 129/74   Pulse  Min: 74  Max: 86  77   Resp  Min: 11  Max: 18 16   SpO2  Min: 93 %  Max: 100 % 100 %       Recent Labs   Lab 10/03/22  0305 10/04/22  0150 10/05/22  0135   WBC 28.6* 24.1* 24.0*   RBC 3.75* 3.38* 3.19*   HGB 10.6* 9.5* 8.9*   HCT 31.2* 28.4* 26.9*   MCV 83.2 84.0 84.3   MCH 28.3 28.1 27.9   MCHC 34.0 33.5 33.1   RDW 16.7 16.7 16.7    339 448*   MPV 11.9* 11.6* 11.6*       Recent Labs   Lab 09/29/22  0425 09/30/22  0116 09/30/22 0445 10/01/22  0138 10/01/22  0445 10/02/22  0220 10/03/22  0305 10/04/22  0151 10/05/22  0135   NA  --    < >  --    < >  --    < > 126* 127* 127*   K  --    < >  --    < >  --    < > 5.6* 4.4 4.9   CO2  --    < >  --    < >  --    < > 23 24 26   BUN  --    < >  --    < >  --    < > 48.7* 57.3* 35.0*   CREATININE  --     < >  --    < >  --    < > 4.44* 5.16* 4.07*   CALCIUM  --    < >  --    < >  --    < > 8.0* 7.7* 7.6*   PH 7.42  --  7.54*  --  7.46*  --   --   --   --    MG  --    < >  --    < >  --    < > 2.30 2.10 2.10   ALBUMIN  --    < >  --    < >  --    < > 1.2* 1.1* 1.2*   ALKPHOS  --    < >  --    < >  --    < > 110 100 109   ALT  --    < >  --    < >  --    < > 16 13 13   AST  --    < >  --    < >  --    < > 41* 30 31   BILITOT  --    < >  --    < >  --    < > 0.6 0.9 0.6    < > = values in this interval not displayed.          Microbiology Results (last 7 days)       Procedure Component Value Units Date/Time    Respiratory Culture [[id number]] Collected: 10/04/22 1842    Order Status: Completed Specimen: Sputum, Expectorated Updated: 10/05/22 1036     Respiratory Culture Normal respiratory lacy     GRAM STAIN Quality 2+      Moderate Gram positive cocci      Moderate Gram Negative Rods      Few Gram Positive Rods    Blood Culture [[id number]]  (Normal) Collected: 10/02/22 0605    Order Status: Completed Specimen: Blood Updated: 10/05/22 1001     CULTURE, BLOOD ([facility]) No Growth At 72 Hours    Blood Culture [[id number]]  (Normal) Collected: 10/03/22 1752    Order Status: Completed Specimen: Blood Updated: 10/04/22 2002     CULTURE, BLOOD ([facility]) No Growth At 24 Hours    Blood Culture [[id number]]  (Normal) Collected: 10/03/22 1752    Order Status: Completed Specimen: Blood Updated: 10/04/22 2002     CULTURE, BLOOD ([facility]) No Growth At 24 Hours    Blood Culture [[id number]]  (Normal) Collected: 10/03/22 1615    Order Status: Completed Specimen: Blood Updated: 10/04/22 1801     CULTURE, BLOOD ([facility]) No Growth At 24 Hours    Urine culture [[id number]] Collected: 09/28/22 2255    Order Status: Completed Specimen: Urine, Catheterized Updated: 10/01/22 1008     Urine Culture No Growth    Mycobacteria and Nocardia Culture [[id number]] Updated: 09/30/22 1620    Order Status: Sent Specimen: Body Fluid from Pleural Fluid, Right     Body  Fluid Culture [[id number]] Collected: 09/25/22 1200    Order Status: Completed Specimen: Body Fluid from Pleural Fluid, Right Updated: 09/30/22 1010     Body Fluid Culture No Growth at 5 days    Blood Culture [[id number]]  (Normal) Collected: 09/25/22 0226    Order Status: Completed Specimen: Blood Updated: 09/30/22 0900     CULTURE, BLOOD ([facility]) No Growth at 5 days    Blood Culture [[id number]]  (Normal) Collected: 09/25/22 0226    Order Status: Completed Specimen: Blood Updated: 09/30/22 0900     CULTURE, BLOOD ([facility]) No Growth at 5 days    Respiratory Culture [[id number]] Collected: 09/27/22 0851    Order Status: Completed Specimen: Sputum from Lung Aspirate Updated: 09/29/22 1053     Respiratory Culture Rare normal respiratory lacy     GRAM STAIN Quality 3+      No bacteria seen             See below for Radiology    Scheduled Med:   amLODIPine  10 mg Oral Daily    carvediloL  25 mg Oral BID    ceFEPime (MAXIPIME) IVPB  2 g Intravenous Q24H    docusate sodium  100 mg Oral BID    enoxaparin  1 mg/kg Subcutaneous Q24H    famotidine  20 mg Oral Every other day    hydrALAZINE  50 mg Oral Q8H    hydrOXYchloroQUINE  200 mg Oral BID    methylPREDNISolone sodium succinate injection  80 mg Intravenous TID    metronidazole  500 mg Intravenous Q8H    mycophenolate  1,000 mg Oral BID    polyethylene glycol  17 g Oral BID    sucralfate  1 g Oral BID    warfarin  3 mg Oral Daily    warfarin  5 mg Oral Once        Continuous Infusions:       PRN Meds:  acetaminophen, albuterol, bisacodyL, dextrose 10%, dextrose 50%, dextrose 50%, glucagon (human recombinant), glucose, glucose, hydrALAZINE, HYDROmorphone, insulin aspart U-100, labetaloL, LIDOcaine (PF) 10 mg/ml (1%), lorazepam, morphine, ondansetron, sodium chloride 0.9%, sodium chloride 0.9%, sodium chloride 0.9%, sodium chloride 0.9%, sodium chloride 0.9%, sodium chloride 0.9%, Pharmacy to dose Vancomycin consult **AND** vancomycin - pharmacy to dose       VTE prophylaxis:      Patient condition:  Stable/Fair/Guarded/ Serious/ Critical    Anticipated discharge and Disposition:         All diagnosis and differential diagnosis have been reviewed; assessment and plan has been documented; I have personally reviewed the labs and test results that are presently available; I have reviewed the patients medication list; I have reviewed the consulting providers response and recommendations. I have reviewed or attempted to review medical records based upon their availability    All of the patient's questions have been  addressed and answered. Patient's is agreeable to the above stated plan. I will continue to monitor closely and make adjustments to medical management as needed.  _____________________________________________________________________    Nutrition Status:    Radiology:  Fl Modified Barium Swallow Speech  See procedure notes from Speech Pathologist.    This procedure was auto-finalized.  X-Ray Chest 1 View  Narrative: EXAMINATION:  XR CHEST 1 VIEW    CLINICAL HISTORY:  evaluate CT placement;    TECHNIQUE:  Single frontal view of the chest was performed.    COMPARISON:  10/04/2022    FINDINGS:  LINES AND TUBES: Right IJ CVC tip projects over the superior cavoatrial junction. EKG/telemetry leads overlie the chest.    MEDIASTINUM AND RELL: Cardiac silhouette is enlarged. There is an aortic stent graft and aortic valve prosthesis.    LUNGS: Unchanged retrocardiac opacity with silhouetting of the diaphragm.    PLEURA:Cannot exclude small left pleural effusion.No pneumothorax.    BONES: No acute osseous abnormality.  Impression: Unchanged retrocardiac opacity suggesting atelectasis    Electronically signed by: Rocio White  Date:    10/05/2022  Time:    06:11      Ronda Hamilton MD   10/05/2022

## 2022-10-05 NOTE — PT/OT/SLP EVAL
Speech Language Pathology Department  Clinical Swallow Evaluation    Patient Name:  Stuart Guevara   MRN:  [id number]  Admitting Diagnosis: Aortic dissection    Recommendations:     General recommendations:  Modified Barium Swallow Study  Diet recommendations:  NPO , meds crushed in pudding      Precautions: Standard,      History:     Past Medical History:   Diagnosis Date    Hypertension     Systemic lupus erythematosus, organ or system involvement unspecified     Systemic lupus erythematosus, organ or system involvement unspecified        Past Surgical History:   Procedure Laterality Date    APPLICATION OF WOUND VACUUM-ASSISTED CLOSURE DEVICE  9/27/2022    Procedure: APPLICATION, WOUND VAC;  Surgeon: Christopher Espinal MD;  Location: [facility];  Service: General;;    CARDIAC SURGERY      FINGER AMPUTATION      THROMBECTOMY Right     TOE AMPUTATION             Home Diet: Regular  Current Method of Nutrition: NPO    Patient complaint: coughing while drinking    Subjective     Patient awake, alert, and oriented x4 .    Patient goals: to drink without coughing     Pain/Comfort:  denied      Objective:     Oral Musculature Evaluation      Consistency Fed By Oral Symptoms Pharyngeal Symptoms   Thin liquid by cup Self None Wet vocal quality after swallow  Throat clear x1 swallow  Cough multiple swallow   Thin liquid by straw SLP None Wet vocal quality after swallow  Throat clear   swallow  Cough multiple swallow   Puree SLP None None                             Assessment:     Pt presents with signs/sx of aspiration. MBSS warranted to further evaluate the swallow function.     Goals:   Multidisciplinary Problems       SLP Goals       Not on file                    Plan:     Patient to be seen:      Plan of Care expires:     Plan of Care reviewed with:      SLP Follow-Up:         Time Tracking:     SLP Treatment Date:    10/5/22  Speech Start Time:     Speech Stop Time:        Speech Total Time (min):   10    Billable  minutes:  Swallow and Oral Function Evaluation, 10      10/05/2022

## 2022-10-05 NOTE — PLAN OF CARE
Problem: Physical Therapy  Goal: Physical Therapy Goal  Description: Goals to be met by: 22     Patient will increase functional independence with mobility by performin. Supine to sit with moderate assistance  2. Sit to supine with moderate assistance  3. Sit <> stand with moderate assistance using RW/LRAD  4. Bed to chair with moderate assistance via squat pivot/stand pivot      Outcome: Ongoing, Progressing    POC adjusted following PT/PTA conference  Will continue to update as pt progresses

## 2022-10-05 NOTE — PROCEDURES
Speech Language Pathology Department  Modified Barium Swallow Study    Patient Name:  Stuart Guevara   MRN:  [id number]  Diagnosis: Aortic dissection     Recommendations:     General recommendations:  SLP intervention not indicated  Repeat MBS study: not warranted at this time  Diet recommendations:  Regular Diet - IDDSI Level 7, Liquid Diet Level: Thin liquids - IDDSI Level 0   Swallow strategies/precautions: small bites/sips and slow rate  General precautions: Standard,      History:     A Modified Barium Swallow Study was completed to assess the efficiency of his/her swallow function, rule out aspiration and make recommendations regarding safe dietary consistencies, effective compensatory strategies, and safe eating environment.     Past Medical History:   Diagnosis Date    Hypertension     Systemic lupus erythematosus, organ or system involvement unspecified     Systemic lupus erythematosus, organ or system involvement unspecified        Home Diet: Regular and thin liquids  Current Method of Nutrition: NPO    Patient complaint: coughing   Subjective:     Patient awake and alert.    Fluoroscopic Results:     Oral Musculature Evaluation:       Visualization  Patient was seen in the lateral view    Oral Phase:   Reduced bolus control    Pharyngeal Phase:   Swallow delay with spill to the    Reduced hyolaryngeal excursion  Base of tongue residue    Consistency Laryngeal Penetration Aspiration Residue Strategy   Mildly thick liquid by spoon None None     Mildly thick liquid by straw None None     Thin liquid by straw None None     Thin liquid by straw None None     Puree None None     Chewable solid None None         Assessment:     Pt presents with mild dysphagia characterized by swallow delay with spill to valleculae, reduced base of tongue retraction, and reduced laryngeal excursion resulting in mild pharyngeal residue which cleared with a double swallow. No laryngeal penetration or aspiration was visualized on  this study. No skilled SLP intervention is warranted at this time.     Goals:   Multidisciplinary Problems       SLP Goals       Not on file                    Plan:     Patient to be seen:      Plan of Care expires:     Plan of Care reviewed with:      SLP Follow-Up:         Time Tracking:     SLP Treatment Date:    10/5/22  Speech Start Time:   10:30  Speech Stop Time:      10:55  Speech Total Time (min):   25    Billable minutes: Motion Fluoroscopic Evaluation, Video Recording, 25        10/05/2022

## 2022-10-06 NOTE — PROGRESS NOTES
[facility]  Hospital Medicine Progress Note        Chief Complaint: Inpatient Follow-up for Aortic disscetion    HPI:   HISTORY OF PRESENT ILLNESS:   Stuart Guevara is a 32 y.o. male who has PMH which includes HTN, SLE,  hepatitis B, aortic dissection with repair 2/2021, RUE DVT on Xarelto; presented to the ED at [facility] on 9/23/2022  with reports of  shortness of breath and chest pain radiating to his back.  PT has a history of aortic dissection 2/2021. Work up revealed a type B aortic dissection and significant hypertension noted on presentation.  He failed medical management and had progression of his symptoms despite adequate control of hemodynamic parameters.  PT was transferred from [facility] to [facility] for CV surgery evaluation which he was taken  to the OR and had TEVAR with left carotid to subclavian artery bypass. PT developed abdominal distension and rigidity of his abdomen and developed a lactic acidosis.  He repeat CT angiography of the chest/abdomen/pelvis without evidence of worsening dissection, but evidence of significant intra-abdominal pathology with some hemoperitoneum, free blood in the abdomen.  He underwent exploratory laparotomy with evidence of viable bowel and no significant bleeding identified, abd was closed with flakito drain left in situ chest tube was placed. Postoperatively, he developed severe unstable bradycardia, and a transvenous pacemaker was placed by Cardiology.  Shortly afterwards, he was noted to have significant worsening of his renal failure and critical hyperkalemia. Renal services consulted which catheter was placed and he was started on HD treatments.  PT has been cleared for transfer out of ICU to the floor. Hospital medicine services consulted for assumption of care.     Patient is s/p AAA surgery repair , course c/by abd compartment syndrome s/p ex lap and closure with flakito drain in situ, sepsis syndrome with fever and worsening leucocytosis , Appreciate  ID input antibiotics broadened to vancomycin Flagyl and cefepime, concern for SLE serositis , rheum consulted started on high dose steori and immunosuppressants.     Interval Hx:   Hypertensive this morning. E was alert, oriented and resting in bed. Leukocytosis improving, hb stable and thrombocytosis continues. Hyperkalemic today , hyperglycemic as well. C/o abd soreness since removal of drain    Objective/physical exam:  Vitals:    10/06/22 0928 10/06/22 0930 10/06/22 0958 10/06/22 1000   BP: (!) 193/73 (!) 193/73 121/79 121/79   Pulse:  76  80   Resp:  10  16   Temp:       TempSrc:       SpO2:  96%  97%   Weight:       Height:         General: In no acute distress, afebrile  Respiratory: Clear to auscultation bilaterally  Cardiovascular: S1, S2, no appreciable murmur  Abdomen: Soft, nontender, BS +, dressing, TTP  MSK: Warm, no lower extremity edema, no clubbing or cyanosis  Neurologic: Alert and oriented x4, moving all extremities with good strength     Lab Results   Component Value Date     (L) 10/06/2022    K 6.3 (HH) 10/06/2022    CO2 17 (L) 10/06/2022    BUN 55.6 (H) 10/06/2022    CREATININE 5.50 (H) 10/06/2022    CALCIUM 8.1 (L) 10/06/2022    EGFRNONAA 80 01/31/2022      Lab Results   Component Value Date    ALT 13 10/05/2022    AST 31 10/05/2022    ALKPHOS 109 10/05/2022    BILITOT 0.6 10/05/2022      Lab Results   Component Value Date    WBC 21.4 (H) 10/06/2022    HGB 9.3 (L) 10/06/2022    HCT 28.8 (L) 10/06/2022    MCV 86.2 10/06/2022     (H) 10/06/2022      Medications:   aluminum-magnesium hydroxide-simethicone  30 mL Oral QID (AC & HS)    amLODIPine  10 mg Oral Daily    carvediloL  25 mg Oral BID    docusate sodium  100 mg Oral BID    famotidine  20 mg Oral Every other day    hydrALAZINE  50 mg Oral Q8H    hydrOXYchloroQUINE  200 mg Oral BID    methylPREDNISolone sodium succinate injection  80 mg Intravenous TID    mycophenolate  1,000 mg Oral BID    polyethylene glycol  17 g Oral BID     sucralfate  1 g Oral BID    warfarin  3 mg Oral Daily      acetaminophen, albuterol, bisacodyL, dextrose 10%, dextrose 50%, dextrose 50%, glucagon (human recombinant), glucose, glucose, hydrALAZINE, HYDROmorphone, insulin aspart U-100, labetaloL, LIDOcaine (PF) 10 mg/ml (1%), lorazepam, morphine, ondansetron, sodium chloride 0.9%, sodium chloride 0.9%, sodium chloride 0.9%, sodium chloride 0.9%, sodium chloride 0.9%, sodium chloride 0.9%     Assessment/Plan:      SLE flare and serositis   Acute respiratory failure with hypoxia- s/p mechanical ventilation, now extubated  Acute aortic dissection type B- s/p TEVAR with left carotid artery to left subclavian bypass 9/23/2022  ARF- s/p HD treatments  New onset Cardiomyopathy- EF 37% unknown etiology  VHD, s/p mechanical AVR   Acute abdominal pain generalized  Abdominal compartment syndrome, s/p ex-lap x2 last on 9/27 with abdominal closure on 10/1  Acute pancreatitis- resolving with lipase trending down  RUE DVT- was on Xarelto, recent US no DVT  HTN- emergency, resolved   SLE- on cellcept  Anemia- chronic disease  Weakness  HLD  Hx of hepatitis B  Hx of aortic dissection with repair 2/2021  History of transverse myelitis  History of lupus nephritis    Plan:  - CIS following. Continue coumadin and monitor INR. Therapeutic now. Goal 2-3. Continue current HTN meds  - Anuric. Nephrology following. HD as per schedule  - LINNETTE drain removed. Surgery signed off  - Rheum consulted, continue steroids, mycophenolate, plaquenil  - ID on board. Off abx.leukocytosis could be from steroids  - Hyperglycemia noted. Continue correction insulin. HbA1c  - Monitor BMs and get c. Diff if diarrhea continues.      PT  Guarded prognosis  Full code      Zach Shafer MD

## 2022-10-06 NOTE — NURSING
10/06/22 1310   Post-Hemodialysis Assessment   Rinseback Volume (mL) 250 mL   Blood Volume Processed (Liters) 60 L   Dialyzer Clearance Lightly streaked   Duration of Treatment 180 minutes   Total UF (mL) 2500 mL   Net Fluid Removal 2000

## 2022-10-06 NOTE — PLAN OF CARE
Problem: Adult Inpatient Plan of Care  Goal: Plan of Care Review  Outcome: Ongoing, Progressing  Goal: Patient-Specific Goal (Individualized)  Outcome: Ongoing, Progressing  Goal: Absence of Hospital-Acquired Illness or Injury  Outcome: Ongoing, Progressing  Goal: Optimal Comfort and Wellbeing  Outcome: Ongoing, Progressing  Goal: Readiness for Transition of Care  Outcome: Ongoing, Progressing     Problem: Infection  Goal: Absence of Infection Signs and Symptoms  Outcome: Ongoing, Progressing     Problem: Skin Injury Risk Increased  Goal: Skin Health and Integrity  Outcome: Ongoing, Progressing     Problem: Fall Injury Risk  Goal: Absence of Fall and Fall-Related Injury  Outcome: Ongoing, Progressing     Problem: Skin and Tissue Injury (Artificial Airway)  Goal: Absence of Device-Related Skin or Tissue Injury  Outcome: Ongoing, Progressing     Problem: Device-Related Complication Risk (CRRT (Continuous Renal Replacement Therapy))  Goal: Safe, Effective Therapy Delivery  Outcome: Ongoing, Progressing     Problem: Device-Related Complication Risk (Hemodialysis)  Goal: Safe, Effective Therapy Delivery  Outcome: Ongoing, Progressing     Problem: Hemodynamic Instability (Hemodialysis)  Goal: Effective Tissue Perfusion  Outcome: Ongoing, Progressing     Problem: Infection (Hemodialysis)  Goal: Absence of Infection Signs and Symptoms  Outcome: Ongoing, Progressing      SANDRA sent updated clinicals to [facility]. Spoke w/Otto who stated they are still working on insurance auth. Will follow up.

## 2022-10-06 NOTE — PROGRESS NOTES
Infectious Disease  Progress Note    Patient Name: Stuart Guevara   MRN: [id number]   Admission Date: 9/23/2022   Hospital Length of Stay: 13 days  Attending Physician: Manjinder Carrillo MD   Primary Care Provider: Primary Doctor No     Isolation Status: No active isolations       Subjective:     Principal Problem: Aortic dissection     Interval History:   No fevers, no chills.  Reports feeling much better today, abdominal pain is improved, drain has been removed.  Diarrhea as improved as well    Review of Systems   Review of Systems   All other systems reviewed and are negative.     Objective:     Vital Signs (Most Recent):  Temp: 98.6 °F (37 °C) (10/06/22 1600)  Pulse: 73 (10/06/22 1600)  Resp: 13 (10/06/22 1600)  BP: (!) 177/76 (10/06/22 1330)  SpO2: 97 % (10/06/22 1600)  Vital Signs (24h Range):  Temp:  [98.3 °F (36.8 °C)-98.6 °F (37 °C)] 98.6 °F (37 °C)  Pulse:  [] 73  Resp:  [10-24] 13  SpO2:  [59 %-100 %] 97 %  BP: (121-193)/(66-79) 177/76      Weight:   Wt Readings from Last 1 Encounters:   09/29/22 60 kg (132 lb 4.4 oz)      Body mass index is Body mass index is 22.01 kg/m².     Estimated Creatinine Clearance: Estimated Creatinine Clearance: 16.2 mL/min (A) (based on SCr of 5.5 mg/dL (H)).     Lines/Drains/Airways       Central Venous Catheter Line  Duration             Trialysis (Dialysis) Catheter 10/04/22 1300 right internal jugular 2 days              Drain  Duration                  Urethral Catheter 10/04/22 0530 2 days              Peripheral Intravenous Line  Duration                  Peripheral IV - Single Lumen 09/25/22 1953 20 G;Other (Comments) Anterior;Distal;Left Forearm 10 days         Peripheral IV - Single Lumen 10/03/22 1615 18 G Anterior;Right Upper Arm 3 days                     Physical Exam  Physical Exam  Constitutional:       Appearance: Normal appearance. He is ill-appearing.   HENT:      Head: Normocephalic and atraumatic.      Mouth/Throat:      Pharynx: No oropharyngeal  exudate or posterior oropharyngeal erythema.   Eyes:      Extraocular Movements: Extraocular movements intact.      Pupils: Pupils are equal, round, and reactive to light.   Cardiovascular:      Rate and Rhythm: Normal rate and regular rhythm.      Heart sounds: Murmur heard.      Comments: Murmur and click from mechanical valve heard  Pulmonary:      Effort: No respiratory distress.      Breath sounds: No wheezing, rhonchi or rales.      Comments: Chest tube removed  Abdominal:      General: Bowel sounds are normal. There is distension.      Palpations: Abdomen is soft.      Tenderness: There is no abdominal tenderness. There is no right CVA tenderness or left CVA tenderness.      Comments: Abdominal drain in place   Musculoskeletal:         General: No swelling or tenderness.      Cervical back: Neck supple. No rigidity or tenderness.   Lymphadenopathy:      Cervical: No cervical adenopathy.   Skin:     Findings: No lesion or rash.   Neurological:      General: No focal deficit present.      Mental Status: He is alert and oriented to person, place, and time. Mental status is at baseline.      Cranial Nerves: No cranial nerve deficit.      Motor: Weakness present.   Psychiatric:         Mood and Affect: Mood normal.         Behavior: Behavior normal.        Significant Labs: CBC:   Recent Labs   Lab 10/05/22  0135 10/06/22  0133   WBC 24.0* 21.4*   HGB 8.9* 9.3*   HCT 26.9* 28.8*   * 539*       CMP:   Recent Labs   Lab 10/05/22  0135 10/06/22  0133   * 120*   K 4.9 6.3*   CO2 26 17*   BUN 35.0* 55.6*   CREATININE 4.07* 5.50*   CALCIUM 7.6* 8.1*   ALBUMIN 1.2*  --    BILITOT 0.6  --    ALKPHOS 109  --    AST 31  --    ALT 13  --          Microbiology Results (last 7 days)       Procedure Component Value Units Date/Time    Respiratory Culture [[id number]] Collected: 10/04/22 1842    Order Status: Completed Specimen: Sputum, Expectorated Updated: 10/06/22 1228     Respiratory Culture Moderate Beta  hemolytic streptococci     Comment: with normal respiratory lacy        GRAM STAIN Quality 2+      Moderate Gram positive cocci      Moderate Gram Negative Rods      Few Gram Positive Rods    Blood Culture [[id number]]  (Normal) Collected: 10/02/22 0605    Order Status: Completed Specimen: Blood Updated: 10/06/22 1001     CULTURE, BLOOD ([facility]) No Growth At 96 Hours    Blood Culture [[id number]]  (Normal) Collected: 10/03/22 1752    Order Status: Completed Specimen: Blood Updated: 10/05/22 2002     CULTURE, BLOOD ([facility]) No Growth At 48 Hours    Blood Culture [[id number]]  (Normal) Collected: 10/03/22 1752    Order Status: Completed Specimen: Blood Updated: 10/05/22 2002     CULTURE, BLOOD ([facility]) No Growth At 48 Hours    Blood Culture [[id number]]  (Normal) Collected: 10/03/22 1615    Order Status: Completed Specimen: Blood Updated: 10/05/22 1801     CULTURE, BLOOD ([facility]) No Growth At 48 Hours    Urine culture [[id number]] Collected: 09/28/22 2255    Order Status: Completed Specimen: Urine, Catheterized Updated: 10/01/22 1008     Urine Culture No Growth    Mycobacteria and Nocardia Culture [[id number]] Updated: 09/30/22 1625    Order Status: Sent Specimen: Body Fluid from Pleural Fluid, Right     Body Fluid Culture [[id number]] Collected: 09/25/22 1200    Order Status: Completed Specimen: Body Fluid from Pleural Fluid, Right Updated: 09/30/22 1010     Body Fluid Culture No Growth at 5 days    Blood Culture [[id number]]  (Normal) Collected: 09/25/22 0226    Order Status: Completed Specimen: Blood Updated: 09/30/22 0900     CULTURE, BLOOD ([facility]) No Growth at 5 days    Blood Culture [[id number]]  (Normal) Collected: 09/25/22 0226    Order Status: Completed Specimen: Blood Updated: 09/30/22 0900     CULTURE, BLOOD ([facility]) No Growth at 5 days             Significant Imaging: I have reviewed all pertinent imaging results/findings within the past 24 hours.    Assessment/Plan:        32-year-old male with a past medical history of lupus who  presented with severe chest pain was subsequently found to have a dissecting aortic aneurysm and underwent repair which was complicated by abdominal compartment syndrome and an TAY, now status post decompressive laparotomy.  Now with worsening leukocytosis.  ID consulted for input.     Fever and Leukocytosis  Abdominal aortic dissection, s/p repair  Abdominal compartment syndrome, s/p ex-lap x2 last on 9/27 with abdominal closure on 10/1  SLE with active flare  History of transverse myelitis  History of lupus nephritis  TAY now on HD  VHD, s/p mechanical AVR  Chest tube in place  DM2    -diarrhea improved, continues to have no fevers, blood sugar with significant elevation after steroids initiation  -leukocytosis improving    Plan:  -continue monitoring off antibiotics   -complicated case of immunocompromised patient with extensive lupus and multiple complications, at this time, no evidence of active infection but he certainly remains at high risk of developing infectious complications, monitor closely   -would have low threshold of restarting antibiotics should his clinical condition change    ID will continue following. Please contact us with any questions or concerns.    Layton Nathan MD  Infectious Disease  [facility]

## 2022-10-06 NOTE — PROGRESS NOTES
Trauma/Acute Care Surgery   Daily Progress Note     HD#13  POD#7 Days Post-Op    Subjective  NAEO  AF VSS  Abdominal pain unchanged  Tolerating FLD  LINNETTE with 100cc serous output    Objective  Temp:  [98.3 °F (36.8 °C)-99.5 °F (37.5 °C)] 98.3 °F (36.8 °C)  Pulse:  [] 74  Resp:  [9-24] 24  SpO2:  [59 %-100 %] 96 %  BP: (124-169)/(64-79) 169/71     Physical Exam:  GEN: NAD  HEENT: RIJ HD catheter in place  RESP: Unlabored breathing on RA  CV: RR  ABD: soft, appropriately TTP, LINNETTE in place, ND  : Galarza in place, dark urine  EXT: Moves extremities spontaneously     Labs  Labs reviewed    K 6.3  Glucose 606    WBC 21.4  Hb 9.3    Assessment/Plan  33 y/o M w Hx of Type B aortic dissection, s/p TEVAR + L carotid-subclavian bypass, p/w acute abdomen 2/2 abdominal compartment syndrome of undetermined etiology s/p ex-lap c/b abdominal compartment syndrome s/p decompressive ex-lap with subsequent abdominal closure and LINNETTE drain placement     - d/c LINNETTE  - surgery will sign off at this time, please call with any questions    Bety Handley MD  [employer] General Surgery PGY-2

## 2022-10-06 NOTE — PLAN OF CARE
Problem: Adult Inpatient Plan of Care  Goal: Plan of Care Review  Outcome: Ongoing, Progressing  Goal: Patient-Specific Goal (Individualized)  Outcome: Ongoing, Progressing  Goal: Absence of Hospital-Acquired Illness or Injury  Outcome: Ongoing, Progressing  Goal: Optimal Comfort and Wellbeing  Outcome: Ongoing, Progressing  Goal: Readiness for Transition of Care  Outcome: Ongoing, Progressing     Problem: Infection  Goal: Absence of Infection Signs and Symptoms  Outcome: Ongoing, Progressing     Problem: Skin Injury Risk Increased  Goal: Skin Health and Integrity  Outcome: Ongoing, Progressing     Problem: Fall Injury Risk  Goal: Absence of Fall and Fall-Related Injury  Outcome: Ongoing, Progressing     Problem: Communication Impairment (Mechanical Ventilation, Invasive)  Goal: Effective Communication  Outcome: Ongoing, Progressing     Problem: Nutrition Impairment (Mechanical Ventilation, Invasive)  Goal: Optimal Nutrition Delivery  Outcome: Ongoing, Progressing     Problem: Skin and Tissue Injury (Mechanical Ventilation, Invasive)  Goal: Absence of Device-Related Skin and Tissue Injury  Outcome: Ongoing, Progressing     Problem: Ventilator-Induced Lung Injury (Mechanical Ventilation, Invasive)  Goal: Absence of Ventilator-Induced Lung Injury  Outcome: Ongoing, Progressing     Problem: Skin and Tissue Injury (Artificial Airway)  Goal: Absence of Device-Related Skin or Tissue Injury  Outcome: Ongoing, Progressing

## 2022-10-06 NOTE — PROGRESS NOTES
"[facility] - 7 North ICU  Cardiology  Progress Note    Patient Name: Stuart Guevara  MRN: [id number]  Admission Date: 9/23/2022  Hospital Length of Stay: 13 days  Code Status: Full Code   Attending Physician: Manjinder Carrillo MD   Primary Care Physician: Primary Doctor No  Expected Discharge Date:   Principal Problem:Aortic dissection    Subjective:     Brief HPI:    This is a 32-year-old male, who is known to [facility] as an inpatient only,  with history of HTN, lupus nephritis, chronic anemia, right upper extremity DVT, SLE, type a aortic dissection with repair.  He presents to [facility] ER on 09/23/2022 with complaints of ripping /tearing chest pain that radiated to his back began on 09/22/2022.  He was noted to have a BP  of 232/126.  He had a syncopal episode witnessed at the .  Patient was brought immediately back to CT where he was found to have a type B aortic dissection.  Esmolol and nitroprusside were initiated at [facility].   Patient was transferred to [facility] for CT surgery  the services and was admitted directly to ICU.   On 09/25/2022 he was found to have high-grade AV block with bradycardia.  Dr. Maldonadoha the patient down for TVP.    Patient underwent exploratory laparotomy.  He h.ad to be intubated and placed on ventilator secondary to respiratory failure and shockCIS has been consulted for bradycardia.     Hospital Course:   9.27.22: Remains vented/sedated. SR on tele. NO further pacing noted. VSS.   9.28.22: Vented/sedated. SR on tele. No bradycardia or arrhythmia noted.  10.3.22: Reconsulted for HTN/BP Control. "I am doing well." Denies CP, SOB and Palps. + Cough/No Sputum Production.   10.4.22: NAD. VSS/BP Stable on Current Medical Therapy. "I am feeling better." Denies CP, SOB and Palps.   10.5.22: NAD noted. VSS. SR on tele. Denies CP/SOB/Palps.  10.6.22: NAD noted. VSS. SR on tele. Denies CP/SOB/Palps. INR therapeutic.    PMH: HTN, lupus nephritis, chronic anemia, right upper extremity DVT, " SLE  PSH: Left middle finger amputation, right second toe amputation, Mechanical Aortic Valve with Graph/Dissection Repair  Social History: Denies EtOH and illicit drug use, current tobacco use approximately 4-5 cigarettes/day  Family History: Mother-Hypertension     Previous Diagnostics:   Echo 9.26.22  HO AAA surgery and aortic valve replacement.  The left ventricle is normal in size with severe concentric hypertrophy and moderately decreased systolic function.  The estimated ejection fraction is 37%.  There is moderate left ventricular global hypokinesis.  Grade I left ventricular diastolic dysfunction.  Moderate right ventricular enlargement with mildly to moderately reduced right ventricular systolic function.  There is a bioprosthetic aortic valve present. There is trivial paravalvular aortic insufficiency present. Prosthetic aortic valve is normal.  The estimated PA systolic pressure is 33 mmHg.  Intermediate central venous pressure (8 mmHg    ECHO 2.15.21  Moderate concentric left ventricular hypertrophy.  Left ventricular ejection fraction is measured at approximately 55-60 %.  Structurally aortic valve is trileaflet.  Moderate aortic regurgitation is noted..  Turbulent flow in aortic root with possible aortic dissection flap visualized. (Best images at end of study)  Aortic root measures approximately 4.1cm.     Review of Systems   Unable to perform ROS: Acuity of condition   Constitutional: Positive for malaise/fatigue.   Cardiovascular:  Negative for chest pain and irregular heartbeat.   Respiratory:  Positive for cough. Negative for hemoptysis, shortness of breath and sputum production.    All other systems reviewed and are negative.    Objective:     Vital Signs (Most Recent):  Temp: 98.6 °F (37 °C) (10/06/22 0800)  Pulse: 80 (10/06/22 1000)  Resp: 16 (10/06/22 1000)  BP: 121/79 (10/06/22 1000)  SpO2: 97 % (10/06/22 1000)   Vital Signs (24h Range):  Temp:  [98.3 °F (36.8 °C)-99.5 °F (37.5 °C)] 98.6 °F  (37 °C)  Pulse:  [] 80  Resp:  [9-24] 16  SpO2:  [59 %-100 %] 97 %  BP: (121-193)/(64-79) 121/79     Weight: 60 kg (132 lb 4.4 oz)  Body mass index is 22.01 kg/m².    SpO2: 97 %  O2 Device (Oxygen Therapy): room air      Intake/Output Summary (Last 24 hours) at 10/6/2022 1221  Last data filed at 10/6/2022 0800  Gross per 24 hour   Intake 920 ml   Output 215 ml   Net 705 ml       Lines/Drains/Airways       Central Venous Catheter Line  Duration             Trialysis (Dialysis) Catheter 10/04/22 1300 right internal jugular 1 day              Drain  Duration                  Urethral Catheter 10/04/22 0530 2 days              Peripheral Intravenous Line  Duration                  Peripheral IV - Single Lumen 09/25/22 1953 20 G;Other (Comments) Anterior;Distal;Left Forearm 10 days         Peripheral IV - Single Lumen 10/03/22 1615 18 G Anterior;Right Upper Arm 2 days                  Significant Labs: CMP   Recent Labs   Lab 10/05/22  0135 10/06/22  0133   * 120*   K 4.9 6.3*   CO2 26 17*   BUN 35.0* 55.6*   CREATININE 4.07* 5.50*   CALCIUM 7.6* 8.1*   ALBUMIN 1.2*  --    BILITOT 0.6  --    ALKPHOS 109  --    AST 31  --    ALT 13  --       and CBC   Recent Labs   Lab 10/05/22  0135 10/06/22  0133   WBC 24.0* 21.4*   HGB 8.9* 9.3*   HCT 26.9* 28.8*   * 539*       Telemetry:  SR    Physical Exam  Constitutional:       Appearance: Normal appearance.   HENT:      Head: Normocephalic.      Mouth/Throat:      Mouth: Mucous membranes are moist.   Cardiovascular:      Rate and Rhythm: Normal rate and regular rhythm.      Pulses: Normal pulses.      Heart sounds: Normal heart sounds.   Pulmonary:      Effort: Pulmonary effort is normal.      Breath sounds: Decreased breath sounds present.   Abdominal:      Palpations: Abdomen is soft.   Skin:     General: Skin is warm and dry.      Capillary Refill: Capillary refill takes less than 2 seconds.   Neurological:      General: No focal deficit present.      Mental  Status: He is alert and oriented to person, place, and time.   Psychiatric:         Mood and Affect: Mood normal.         Behavior: Behavior normal.     Current Inpatient Medications:    Current Facility-Administered Medications:     acetaminophen tablet 650 mg, 650 mg, Oral, Q6H PRN, Nolberto Calvillo MD, 650 mg at 10/03/22 1716    albuterol inhaler 2 puff, 2 puff, Inhalation, Q4H PRN, Evelio Marshall MD    aluminum-magnesium hydroxide-simethicone 200-200-20 mg/5 mL suspension 30 mL, 30 mL, Oral, QID (AC & HS), Ronda Hamilton MD, 30 mL at 10/06/22 0715    amLODIPine tablet 10 mg, 10 mg, Oral, Daily, Bin Bell MD, 10 mg at 10/06/22 0828    bisacodyL suppository 10 mg, 10 mg, Rectal, Daily PRN, Keena Richterrange, FNP, 10 mg at 10/02/22 1056    carvediloL tablet 25 mg, 25 mg, Oral, BID, MARIANNE Castillo, 25 mg at 10/06/22 0828    dextrose 10% bolus 125 mL, 12.5 g, Intravenous, PRN, Ivan Chopra MD    dextrose 50% injection 12.5 g, 12.5 g, Intravenous, PRN, Keena Richterrange, FNP    dextrose 50% injection 25 g, 25 g, Intravenous, PRN, Keena H Rochester, FNP    docusate sodium capsule 100 mg, 100 mg, Oral, BID, Keena Shafer, FNP, 100 mg at 10/04/22 2025    enoxaparin injection 60 mg, 1 mg/kg, Subcutaneous, Q24H, MARIANNE Castillo, 60 mg at 10/05/22 1616    famotidine tablet 20 mg, 20 mg, Oral, Every other day, Ivan Chopra MD, 20 mg at 10/06/22 0828    glucagon (human recombinant) injection 1 mg, 1 mg, Intramuscular, PRN, Keena H Rochester, FNP    glucose chewable tablet 16 g, 16 g, Oral, PRN, Keena H Rochester, FNP    glucose chewable tablet 24 g, 24 g, Oral, PRN, Keena Shafer, DENIA    hydrALAZINE injection 20 mg, 20 mg, Intravenous, Q4H PRN, Bin Bell MD, 20 mg at 10/06/22 0928    hydrALAZINE tablet 50 mg, 50 mg, Oral, Q8H, Ronda Hamilton MD, 50 mg at 10/06/22 0542    HYDROmorphone (PF) injection 1 mg, 1 mg, Intravenous, Q4H PRN, Cong Velez DO, 1 mg at 10/06/22 0836     hydrOXYchloroQUINE tablet 200 mg, 200 mg, Oral, BID, Wing Thorpe MD, 200 mg at 10/06/22 0828    insulin aspart U-100 injection 1-10 Units, 1-10 Units, Subcutaneous, QID (AC + HS) PRN, Keena Shafer, YASP, 5 Units at 10/06/22 0259    labetaloL injection 20 mg, 20 mg, Intravenous, Q8H PRN, Bin Bell MD, 20 mg at 10/03/22 0321    LIDOcaine (PF) 10 mg/ml (1%) injection, , , PRN, Ruben Arciniega MD, 5 mL at 09/25/22 1656    lorazepam injection 1 mg, 1 mg, Intravenous, Q2H PRN, Cong Velez DO    methylPREDNISolone sodium succinate injection 80 mg, 80 mg, Intravenous, TID, Manjinder Carrillo MD, 80 mg at 10/06/22 0828    morphine injection 2 mg, 2 mg, Intravenous, Q2H PRN, Evelio Marshall MD, 2 mg at 10/03/22 0310    mycophenolate capsule 1,000 mg, 1,000 mg, Oral, BID, Wing Thorpe MD, 1,000 mg at 10/06/22 0836    ondansetron injection 4 mg, 4 mg, Intravenous, Q6H PRN, Evelio Marshall MD, 4 mg at 10/04/22 2114    polyethylene glycol packet 17 g, 17 g, Oral, BID, Bin Bell MD, 17 g at 10/04/22 2025    sodium chloride 0.9% bolus 250 mL, 250 mL, Intravenous, PRN, Shahzad Lundberg MD    sodium chloride 0.9% bolus 250 mL, 250 mL, Intravenous, PRN, Shahzad Lundberg MD    sodium chloride 0.9% bolus 250 mL, 250 mL, Intravenous, PRN, Cindy Lora, FNP    sodium chloride 0.9% flush 10 mL, 10 mL, Intravenous, PRN, Francia Marcus,     sodium chloride 0.9% flush 10 mL, 10 mL, Intravenous, PRN, Evelio Marshall MD    sodium chloride 0.9% flush 10 mL, 10 mL, Intravenous, PRN, Evelio Marshall MD    sucralfate tablet 1 g, 1 g, Oral, BID, Francia Marcus DO, 1 g at 10/06/22 0828    warfarin (COUMADIN) tablet 3 mg, 3 mg, Oral, Daily, MARIANNE Castillo, 3 mg at 10/05/22 1618    Assessment:   Hypertensive Emergency - Now Controlled  CHB/TVP likely secondary to Electrolyte Abnormalities - Resolved  Acute Hypoxemic Respiratory Failure requiring Intubation/Ventilation - Resolved (On RA)  Type B Aortic  Dissection/TEVAR  CMO (Unknown Etiology)    - ECHO (9.23.22) - LVEF 37%  TAY requiring HD Treatment  Acute Abdomen with Evidence of Global Hypoperfusion/Exp Lap - Resolved  Acute Pancreatitis - Resolving  Anemia of Chronic Disease  Hx of RUE DVT (Recent US - Negative for DVT)  Hx of Hepatitis B  Hx of Aortic Dissection with Repair in 2/2021 s/p Mechanical Valve (Aortic)  Electrolyte Derangements - Hyponatremia   SLE    Plan:   Continue Norvasc 10mg PO Qday  Continue Hydralazine 50mg PO Q8HR  Continue Coreg to 25mg PO BID  No ACEi/ARB/ARNI secondary to TAY - on HD per renal  Plans for OP Ischemic Workup for Newly Diagnosed CMO/EF 37%  Mechanical Aortic Valve Noted on CT of Chest - Visualized by Dr. Dean on CT (it was probably Graft-Valve Composite)  DC Lovenox, now therapeutic  Continue Warfarin 3mg PO Daily  Goal INR 2.0-3.0  Electrolyte Management per Primary Team  F/U with [facility] cardiology clinic in 7-10 days  Will sign off. Thank you for allowing us to participate in the care of this patient. Please call us with any questions.      Manuel Pandey, [facility] Medical CenterCNP-BC  Cardiology  [facility] - [address] [place] [address] ICU  10/06/2022

## 2022-10-06 NOTE — PROGRESS NOTES
Nephrology consult follow up note    HPI:      Stuart Guevara is a 32 y.o. male  with past medical history of aortic dissection, status post repair in 2021, SLE, and hypertension, who presented to the emergency department with complaints of tearing chest pain and blood pressure of 232/127.  CT scan revealed findings consistent with type B aortic dissection, patient underwent TEVAR  as well as a carotid artery to subclavian bypass on the left on 09/23/2022.  He also underwent exploratory laparotomy on 09/25/2022 for concern of ischemic colitis, right chest tube was placed.  No ischemic bowel was found.  Patient's kidney function worsened, he developed severe hyperkalemia and was initiated on hemodialysis by way of right temporary femoral catheter on 09/25/2022.  We are continuing to follow for nephrology care.  Vascath replaced 10/04/2022.    Interval history:      No acute events overnight.  Patient remains mostly anuric.  No complaints today.  No chest pain, shortness of breath, abdominal pain, nausea, vomiting, or lower extremity edema     Review of Systems:     Comprehensive 10pt ROS negative except as noted per HPI.    Past medical, family, surgical, and social history reviewed and unchanged from initial consult note.     Objective:       VITAL SIGNS: 24 HR MIN & MAX LAST    Temp  Min: 98.3 °F (36.8 °C)  Max: 99.5 °F (37.5 °C)  98.6 °F (37 °C)        BP  Min: 121/79  Max: 193/73  121/79     Pulse  Min: 70  Max: 136  80     Resp  Min: 9  Max: 24  16    SpO2  Min: 59 %  Max: 100 %  97 %      GEN: Ill appearing AAM  HEENT: Conjunctiva anicteric, pupils equal  CV: RRR +S1,S2 without murmur  PULM: CTAB, unlabored  ABD: Soft, NT/ND abdomen with NABS  EXT: No cyanosis or edema  SKIN: Warm and dry  PSYCH: Awake, alert and appropriately conversant.   Vascular access: RIJ denilsoncat            Component Value Date/Time     (L) 10/06/2022 0133     (L) 10/05/2022 0135    K 6.3 (HH) 10/06/2022 0133    K 4.9  10/05/2022 0135    CHLORIDE 89 (L) 10/06/2022 0133    CHLORIDE 94 (L) 10/05/2022 0135    CO2 17 (L) 10/06/2022 0133    CO2 26 10/05/2022 0135    BUN 55.6 (H) 10/06/2022 0133    BUN 35.0 (H) 10/05/2022 0135    CREATININE 5.50 (H) 10/06/2022 0133    CREATININE 4.07 (H) 10/05/2022 0135    CALCIUM 8.1 (L) 10/06/2022 0133    CALCIUM 7.6 (L) 10/05/2022 0135    PHOS 5.0 (H) 10/04/2022 0151            Component Value Date/Time    WBC 21.4 (H) 10/06/2022 0133    WBC 24.0 (H) 10/05/2022 0135    HGB 9.3 (L) 10/06/2022 0133    HGB 8.9 (L) 10/05/2022 0135    HCT 28.8 (L) 10/06/2022 0133    HCT 26.9 (L) 10/05/2022 0135    HCT 28 (L) 09/23/2022 0304    HCT 30.0 (L) 02/15/2021 1751     (H) 10/06/2022 0133     (H) 10/05/2022 0135         Imaging reviewed      Assessment / Plan:       Active Hospital Problems    Diagnosis  POA    *Aortic dissection [I71.00]  Yes    Serositis [K65.8]  Unknown    Systemic lupus erythematosus [M32.9]  Yes     Chronic    HTN (hypertension) [I10]  Yes     Chronic    History of DVT in adulthood [Z86.718]  Not Applicable     Chronic    Hypertensive emergency [I16.1]  Yes      Resolved Hospital Problems   No resolved problems to display.       Acute kidney injury - likely multifactorial ATN in setting of aortic dissection, hypotension, and abdominal compartment syndrome.  Dialysis dependent since 09/25/2022.  Hyperkalemia   Aortic dissection, status post TEVAR procedure   Hypertension   Anemia  Mild hyponatremia  History of right upper extremity DVT   History of SLE  Acute abdomen, status post exploratory laparotomy 09/25/2022      Plan:  UOP remain anuric-oliguric.  No sign of renal recovery.  We will need to continue on dialysis for now.  Hemodialysis today on TTS schedule.  If patient is still requiring hemodialysis by next week his Vas-Cath will need to be changed to a PermCath.  We will follow along.    Douglas Landis DO  Nephrology  [facility]  Clinic number:  [phone]

## 2022-10-06 NOTE — PT/OT/SLP PROGRESS
Physical Therapy      Patient Name:  Stuart Guevara   MRN:  [id number]    Attempted tx twice, however pt declined d/t pain.

## 2022-10-07 NOTE — NURSING
Notified JEROME Garcia, follow-up accucheck 499, new order for regular insulin 5 units IVP.  Recheck accucheck 30min after administration.

## 2022-10-07 NOTE — PLAN OF CARE
Clinical updated submitted to [facility] via careport. Placement is pending decision of temp vs perm dialysis cath.

## 2022-10-07 NOTE — PROGRESS NOTES
Nephrology consult follow up note    HPI:      Stuart Guevara is a 32 y.o. male  with past medical history of aortic dissection, status post repair in 2021, SLE, and hypertension, who presented to the emergency department with complaints of tearing chest pain and blood pressure of 232/127.  CT scan revealed findings consistent with type B aortic dissection, patient underwent TEVAR  as well as a carotid artery to subclavian bypass on the left on 09/23/2022.  He also underwent exploratory laparotomy on 09/25/2022 for concern of ischemic colitis, right chest tube was placed.  No ischemic bowel was found.  Patient's kidney function worsened, he developed severe hyperkalemia and was initiated on hemodialysis by way of right temporary femoral catheter on 09/25/2022.  We are continuing to follow for nephrology care.  Vascath replaced 10/04/2022.    Interval history:     No acute events overnight.  Dialyzed yesterday without problem. Remains anuric.  No chest pain, shortness of breath, abdominal pain, nausea, vomiting, or lower extremity edema.     Review of Systems:     Comprehensive 10pt ROS negative except as noted per HPI.    Past medical, family, surgical, and social history reviewed and unchanged from initial consult note.     Objective:       VITAL SIGNS: 24 HR MIN & MAX LAST    Temp  Min: 98.2 °F (36.8 °C)  Max: 98.8 °F (37.1 °C)  98.2 °F (36.8 °C)        BP  Min: 121/79  Max: 177/76  (!) 147/80     Pulse  Min: 66  Max: 92  75     Resp  Min: 2  Max: 24  (!) 24    SpO2  Min: 97 %  Max: 99 %  98 %      GEN: Ill appearing AAM  HEENT: Conjunctiva anicteric, pupils equal  CV: RRR +S1,S2 without murmur  PULM: CTAB, unlabored  ABD: Soft, NT/ND abdomen with NABS  EXT: No cyanosis or edema  SKIN: Warm and dry  PSYCH: Awake, alert and appropriately conversant.   Vascular access: RIJ vascath            Component Value Date/Time     (L) 10/07/2022 0129     (L) 10/06/2022 2117    K 5.6 (H) 10/07/2022 0129    K 5.6  (H) 10/06/2022 2117    CHLORIDE 90 (L) 10/07/2022 0129    CHLORIDE 90 (L) 10/06/2022 2117    CO2 22 10/07/2022 0129    CO2 21 (L) 10/06/2022 2117    BUN 55.2 (H) 10/07/2022 0129    BUN 48.7 (H) 10/06/2022 2117    CREATININE 4.57 (H) 10/07/2022 0129    CREATININE 4.45 (H) 10/06/2022 2117    CALCIUM 7.9 (L) 10/07/2022 0129    CALCIUM 7.6 (L) 10/06/2022 2117    PHOS 8.0 (H) 10/07/2022 0129            Component Value Date/Time    WBC 23.7 (H) 10/07/2022 0129    WBC 23.1 (H) 10/06/2022 2117    HGB 8.8 (L) 10/07/2022 0129    HGB 8.7 (L) 10/06/2022 2117    HCT 25.6 (L) 10/07/2022 0129    HCT 26.1 (L) 10/06/2022 2117    HCT 28 (L) 09/23/2022 0304    HCT 30.0 (L) 02/15/2021 1751     (H) 10/07/2022 0129     (H) 10/06/2022 2117         Imaging reviewed      Assessment / Plan:       Active Hospital Problems    Diagnosis  POA    *Aortic dissection [I71.00]  Yes    Serositis [K65.8]  Unknown    Systemic lupus erythematosus [M32.9]  Yes     Chronic    HTN (hypertension) [I10]  Yes     Chronic    History of DVT in adulthood [Z86.718]  Not Applicable     Chronic    Hypertensive emergency [I16.1]  Yes      Resolved Hospital Problems   No resolved problems to display.       Acute kidney injury - likely multifactorial ATN in setting of aortic dissection, hypotension, and abdominal compartment syndrome.  Dialysis dependent since 09/25/2022.  Hyperkalemia   Aortic dissection, status post TEVAR procedure   Hypertension   Anemia  Mild hyponatremia  History of right upper extremity DVT   History of SLE  Acute abdomen, status post exploratory laparotomy 09/25/2022      Plan:  No sign of renal recovery yet. Pt remains anruic. Will need to continue dialysis. HD tomorrow on TTS schedule. Likely will need tunneled HD cath next week if he does not have renal recovery. Vascath was replaced 10/4/22. Will give lokelma 10g x1 today for mild hyperkalemia. Labs in AM. Will follow.     Douglas Landis, DO  Nephrology  [facility]  [facility] number: [phone]

## 2022-10-07 NOTE — PROGRESS NOTES
Infectious Disease  Progress Note    Patient Name: Stuart Guevara   MRN: [id number]   Admission Date: 9/23/2022   Hospital Length of Stay: 14 days  Attending Physician: Manjinder Carrillo MD   Primary Care Provider: Primary Doctor No     Isolation Status: No active isolations       Subjective:     Principal Problem: Aortic dissection     Interval History:   No fevers or chills but he is on high dose steroids. He reports generalized pain in his back, legs and abdomen, reportedly had serous drainage from site of previous CT placement when working with therapy. He had a formed bowel movement last night. His leukocytosis has been stable as well.     Review of Systems   Review of Systems   All other systems reviewed and are negative.     Objective:     Vital Signs (Most Recent):  Temp: 98.5 °F (36.9 °C) (10/07/22 0800)  Pulse: 70 (10/07/22 1000)  Resp: (!) 24 (10/07/22 1059)  BP: (!) 147/80 (10/07/22 0844)  SpO2: 99 % (10/07/22 1000)  Vital Signs (24h Range):  Temp:  [98.2 °F (36.8 °C)-98.8 °F (37.1 °C)] 98.5 °F (36.9 °C)  Pulse:  [66-92] 70  Resp:  [2-24] 24  SpO2:  [97 %-100 %] 99 %  BP: (132-177)/(59-88) 147/80      Weight:   Wt Readings from Last 1 Encounters:   09/29/22 60 kg (132 lb 4.4 oz)      Body mass index is Body mass index is 22.01 kg/m².     Estimated Creatinine Clearance: Estimated Creatinine Clearance: 19.5 mL/min (A) (based on SCr of 4.57 mg/dL (H)).     Lines/Drains/Airways       Central Venous Catheter Line  Duration             Trialysis (Dialysis) Catheter 10/04/22 1300 right internal jugular 2 days              Drain  Duration                  Urethral Catheter 10/04/22 0530 3 days              Peripheral Intravenous Line  Duration                  Peripheral IV - Single Lumen 10/03/22 1615 18 G Anterior;Right Upper Arm 3 days                     Physical Exam  Physical Exam  Constitutional:       Appearance: Normal appearance. He is ill-appearing.   HENT:      Head: Normocephalic and atraumatic.       Mouth/Throat:      Pharynx: No oropharyngeal exudate or posterior oropharyngeal erythema.   Eyes:      Extraocular Movements: Extraocular movements intact.      Pupils: Pupils are equal, round, and reactive to light.   Cardiovascular:      Rate and Rhythm: Normal rate and regular rhythm.      Heart sounds: Murmur heard.      Comments: Murmur and click from mechanical valve heard  Pulmonary:      Effort: No respiratory distress.      Breath sounds: No wheezing, rhonchi or rales.      Comments: Chest tube removed  Abdominal:      General: Bowel sounds are normal. There is distension.      Tenderness: There is no abdominal tenderness. There is no right CVA tenderness or left CVA tenderness.      Comments: Abdominal drain was removed, abdomen remains somewhat firm however no guarding    Musculoskeletal:         General: No swelling or tenderness.      Cervical back: Neck supple. No rigidity or tenderness.   Lymphadenopathy:      Cervical: No cervical adenopathy.   Skin:     Findings: No lesion or rash.   Neurological:      General: No focal deficit present.      Mental Status: He is alert and oriented to person, place, and time. Mental status is at baseline.      Cranial Nerves: No cranial nerve deficit.      Motor: Weakness present.   Psychiatric:         Mood and Affect: Mood normal.         Behavior: Behavior normal.        Significant Labs: CBC:   Recent Labs   Lab 10/06/22  0133 10/06/22  2117 10/07/22  0129   WBC 21.4* 23.1* 23.7*   HGB 9.3* 8.7* 8.8*   HCT 28.8* 26.1* 25.6*   * 586* 647*       CMP:   Recent Labs   Lab 10/06/22  0133 10/06/22  2117 10/07/22  0129   * 121* 123*   K 6.3* 5.6* 5.6*   CO2 17* 21* 22   BUN 55.6* 48.7* 55.2*   CREATININE 5.50* 4.45* 4.57*   CALCIUM 8.1* 7.6* 7.9*   ALBUMIN  --  1.3* 1.3*   BILITOT  --  0.4 0.4   ALKPHOS  --  108 103   AST  --  30 31   ALT  --  15 15         Microbiology Results (last 7 days)       Procedure Component Value Units Date/Time    Blood Culture  [[id number]]  (Normal) Collected: 10/02/22 0605    Order Status: Completed Specimen: Blood Updated: 10/07/22 1000     CULTURE, BLOOD ([facility]) No Growth at 5 days    Blood Culture [[id number]]  (Normal) Collected: 10/03/22 1752    Order Status: Completed Specimen: Blood Updated: 10/06/22 2002     CULTURE, BLOOD ([facility]) No Growth At 72 Hours    Blood Culture [[id number]]  (Normal) Collected: 10/03/22 1752    Order Status: Completed Specimen: Blood Updated: 10/06/22 2002     CULTURE, BLOOD ([facility]) No Growth At 72 Hours    Blood Culture [[id number]]  (Normal) Collected: 10/03/22 1615    Order Status: Completed Specimen: Blood Updated: 10/06/22 1801     CULTURE, BLOOD ([facility]) No Growth At 72 Hours    Respiratory Culture [[id number]] Collected: 10/04/22 1842    Order Status: Completed Specimen: Sputum, Expectorated Updated: 10/06/22 1228     Respiratory Culture Moderate Beta hemolytic streptococci     Comment: with normal respiratory lacy        GRAM STAIN Quality 2+      Moderate Gram positive cocci      Moderate Gram Negative Rods      Few Gram Positive Rods    Urine culture [[id number]] Collected: 09/28/22 2255    Order Status: Completed Specimen: Urine, Catheterized Updated: 10/01/22 1008     Urine Culture No Growth    Mycobacteria and Nocardia Culture [[id number]] Updated: 09/30/22 1625    Order Status: Sent Specimen: Body Fluid from Pleural Fluid, Right              Significant Imaging: I have reviewed all pertinent imaging results/findings within the past 24 hours.    Assessment/Plan:        32-year-old male with a past medical history of lupus who presented with severe chest pain was subsequently found to have a dissecting aortic aneurysm and underwent repair which was complicated by abdominal compartment syndrome and an TAY, now status post decompressive laparotomy.  Now with worsening leukocytosis.  ID consulted for input.     Fever and Leukocytosis  Abdominal aortic dissection, s/p repair  Abdominal compartment syndrome, s/p  ex-lap x2 last on 9/27 with abdominal closure on 10/1  SLE with active flare  History of transverse myelitis  History of lupus nephritis  TAY now on HD  VHD, s/p mechanical AVR  Chest tube in place  DM2    -Generally stable, generalized pain but no focal complaints  -Leukocytosis remains stable as well     Plan:  -continue monitoring off antibiotics   -complicated case of immunocompromised patient with extensive lupus and multiple complications, at this time, active infection remains less likely at this time however patient is at high risk of developing and infectious complication given immune suppression and significant co-morbidities   -Should clinical condition deteriorate, will need full infectious evaluation and potential initiation of antimicrobials accordingly  -Discussed with patient and nursing     ID will continue following. Please contact us with any questions or concerns.    Layton Nathan MD  Infectious Disease  [facility]

## 2022-10-07 NOTE — PT/OT/SLP PROGRESS
Occupational Therapy  Treatment    Stuart Guevara   MRN: [id number]   Admitting Diagnosis: Aortic dissection    OT Date of Treatment: 10/07/22   OT Start Time: 1020  OT Stop Time: 1050  OT Total Time (min): 30 min     Billable Minutes:  Self Care/Home Management 2  Total Minutes: 30     OT/ADONAY: ADONAY URIAS Visit Number: 2    General Precautions: Standard, fall  Orthopedic Precautions:    Braces:      Spiritual, Cultural Beliefs, Congregational Practices, Values that Affect Care: no    Subjective:  Communicated with RN prior to session.  70HR, 98o2, 147/80  10/10 Pain (Back)    Objective:  Pt. Agreeable to sit EOB. Pt. Requiring Mod A for sitting balance initially progressing to SBA with UE support. Posterior lean noted requiring correction.   Pt. Requiring Mod A x 2 for sit to stand B HHA. L UE swelling noted.  Pt. Repositioned in bed, total A required during toileting activity with pericare/ and dressing change per RN due to leakage noted on R side.       Functional Mobility:  Bed Mobility:   Supine to sit: Maximum Assistance   Sit to supine: Maximum Assistance   Rolling: Maximum Assistance   Scooting: Maximum Assistance    Patient left with bed in chair position with all lines intact and call button in reach    ASSESSMENT:  Stuart Guevara is a 32 y.o. male with a medical diagnosis of Aortic dissection Pt. Requiring increased assistance due to increased pain throughout mobility. Continue POC  Rehab potential is fair    Activity tolerance: Fair    Discharge recommendations: LTACH (long-term acute care hospital), rehabilitation facility     Equipment recommendations: walker, rolling     GOALS:   Multidisciplinary Problems       Occupational Therapy Goals          Problem: Occupational Therapy    Goal Priority Disciplines Outcome Interventions   Occupational Therapy Goal     OT, PT/OT Ongoing, Progressing    Description: Goals to be met by: 10/14/22    Patient will increase functional independence with ADLs by  performing:    UE Dressing with Stand-by Assistance.  LB Dressing with Minimal Assistance and dressing AEDs.  Grooming while EOB with Stand-by Assistance.  Sitting at edge of bed x10 minutes with Stand-by Assistance.                         Plan:  Patient to be seen 6 x/week to address the above listed problems via self-care/home management, therapeutic exercises, therapeutic activities  Plan of Care expires: 10/14/22  Plan of Care reviewed with: patient         10/07/2022

## 2022-10-07 NOTE — PLAN OF CARE
Problem: Adult Inpatient Plan of Care  Goal: Plan of Care Review  Outcome: Ongoing, Progressing  Goal: Optimal Comfort and Wellbeing  Outcome: Ongoing, Progressing     Problem: Hyperglycemia  Goal: Blood Glucose Level Within Targeted Range  Outcome: Ongoing, Progressing

## 2022-10-07 NOTE — PLAN OF CARE
Problem: Adult Inpatient Plan of Care  Goal: Plan of Care Review  Outcome: Ongoing, Progressing  Goal: Patient-Specific Goal (Individualized)  Outcome: Ongoing, Progressing  Goal: Absence of Hospital-Acquired Illness or Injury  Outcome: Ongoing, Progressing  Goal: Optimal Comfort and Wellbeing  Outcome: Ongoing, Progressing  Goal: Readiness for Transition of Care  Outcome: Ongoing, Progressing     Problem: Infection  Goal: Absence of Infection Signs and Symptoms  Outcome: Ongoing, Progressing     Problem: Skin Injury Risk Increased  Goal: Skin Health and Integrity  Outcome: Ongoing, Progressing     Problem: Fall Injury Risk  Goal: Absence of Fall and Fall-Related Injury  Outcome: Ongoing, Progressing     Problem: Hyperglycemia  Goal: Blood Glucose Level Within Targeted Range  Outcome: Ongoing, Progressing

## 2022-10-07 NOTE — PROGRESS NOTES
Inpatient Nutrition Assessment    Admit Date: 9/23/2022   Total duration of encounter: 14 days     Nutrition Recommendation/Prescription     Advance diet when appropriate. Goal diet: regular vs. GI soft.  Continue with Boost Plus (provides 360 kcal, 14 g protein per serving) TID for added protein and kcal.  Will needto consider appetite stimulant vs. NG placement per MD if po intake does not improve.     Communication of Recommendations: reviewed with nurse    Nutrition Assessment     Malnutrition Assessment/Nutrition-Focused Physical Exam    Malnutrition in the context of acute illness or injury  Degree of Malnutrition: does not meet criteria  Energy Intake: unable to obtain  Interpretation of Weight Loss: unable to obtain  Body Fat:does not meet criteria  Area of Body Fat Loss: does not meet criteria  Muscle Mass Loss: does not meet criteria  Area of Muscle Mass Loss: does not meet criteria  Fluid Accumulation: does not meet criteria  Edema: does not meet criteria   Reduced  Strength: unable to obtain  A minimum of two characteristics is recommended for diagnosis of either severe or non-severe malnutrition.    Chart Review    Reason Seen: continuous nutrition monitoring and follow-up    Diagnosis:  Type B Aortic dissection  Hypertensive emergency  Systemic lupus erythematosus    Relevant Medical History: HTN, lupus    Nutrition-Related Medications: detemir 10 Units BID, docusate, miralax  Calorie Containing IV Medications: no significant kcals from medications at this time    Nutrition-Related Labs:  9/26 BUN 27, Crea 3.06, Glu 174, Phos 6.2  9/30 Na 128, K 3.3, Cl 96, BUN 21.5, Crea 3.28, Glu 205  10/4 Na 127, Cl 95, BUN 57.3, Crea 5.16, Glu 152, Phos 5  10/7 K 5.6, BUN 55.2, Crea 4.57, Glu 347, Phos 8    Diet/PN Order: Diet full liquid  Oral Supplement Order: Boost Plus  Tube Feeding Order: none at this time  Appetite/Oral Intake: poor/0-25% of meals  Factors Affecting Nutritional Intake: decreased  "appetite  Food/Zoroastrianism/Cultural Preferences: unable to obtain at this time    Skin Integrity: bruised (ecchymotic)  Wound(s):   incision noted    Comments    9/26/22: Discussed with RN. Will provide tube feeding recommendations for when appropriate to start tube feeding. Receiving kcal from meds. Noted Phos, will monitor for need for renal formula.   9/30/22: Discussed with RN. Need to consider TPN since pt now LOS day 7. If starting tube feeding, recs provided. If able to extubate, advance diet when appropriate.   10/4/22: Pt previously eating 100% po intake of meals, good appetite. Currently NPO for procedure, plans to restart diet per RN. Will add ONS for added protein and kcal.  10/7/22: Pt now with decreased appetite/po intake. Noted elevated K and Phos, likely not diet related since pt with poor po intake of full liquid diet.     Anthropometrics    Height: 5' 5" (165.1 cm) Height Method: Estimated  Last Weight: 60 kg (132 lb 4.4 oz) (09/29/22 1115) Weight Method: Bed Scale  BMI (Calculated): 22  BMI Classification: normal (BMI 18.5-24.9)        Ideal Body Weight (IBW), Male: 136 lb     % Ideal Body Weight, Male (lb): 97.26 %                          Usual Weight Provided By: unable to obtain usual weight at this time    Wt Readings from Last 5 Encounters:   09/29/22 60 kg (132 lb 4.4 oz)   09/23/22 60 kg (132 lb 4.4 oz)   12/09/21 48 kg (105 lb 13.1 oz)     Weight Change(s) Since Admission:  Admit Weight: 60 kg (132 lb 4.4 oz) (09/23/22 1219)  10/4/22: no new wt  10/7/22: no new wt    Estimated Needs    Weight Used For Calorie Calculations: 60 kg (132 lb 4.4 oz)  Energy Calorie Requirements (kcal): 2083kcal (1.4 stress factor)  Energy Need Method: [place]- Jeor  Weight Used For Protein Calculations: 60 kg (132 lb 4.4 oz)  Protein Requirements: 72-84gm (1.2-1.4g/kg)  Fluid Requirements (mL): 1000ml + urinary output  Temp: 98.2 °F (36.8 °C)  Vtot (L/Min) for [place] Equation Calculation: 13.5    Enteral " Nutrition    Patient not receiving enteral nutrition at this time.    Parenteral Nutrition    Patient not receiving parenteral nutrition support at this time.    Evaluation of Received Nutrient Intake    Calories: not meeting estimated needs  Protein: not meeting estimated needs    Patient Education    Not applicable.    Nutrition Diagnosis     PES: Inadequate oral intake related to current condition as evidenced by poor po intake of liquid diet. (continues)    Interventions/Goals     Intervention(s): general/healthful diet, commercial beverage, and collaboration with other providers  Goal: Meet greater than 75% of nutritional needs by follow-up. (goal progressing)    Monitoring & Evaluation     Dietitian will monitor energy intake.  Nutrition Risk/Follow-Up: moderate (follow-up in 3-5 days)

## 2022-10-07 NOTE — PT/OT/SLP PROGRESS
Physical Therapy Treatment    Patient Name:  Stuart Guevara   MRN:  [id number]    Recommendations:     Discharge Recommendations:  LTACH (long-term acute care hospital), rehabilitation facility   Discharge Equipment Recommendations: walker, rolling   Barriers to discharge:  medical complexity    Assessment:     Stuart Guevara is a 32 y.o. male admitted with a medical diagnosis of Aortic dissection.  He presents with the following impairments/functional limitations:  weakness, impaired endurance, impaired functional mobility.    Pt sitting up in bed, reluctant to participate but agreed with encouragement. Declined t/f to chair but agreed to sit at EOB. Once at EOB, pt agreed to stand and take steps to HOB. Improved ability to stand erect. Assistance to weight shift to complete steps.     Rehab Prognosis: Good; patient would benefit from acute skilled PT services to address these deficits and reach maximum level of function.    Recent Surgery: Procedure(s) (LRB):  LAPAROTOMY, EXPLORATORY (N/A) 8 Days Post-Op    Plan:     During this hospitalization, patient to be seen 6 x/week to address the identified rehab impairments via gait training, therapeutic activities, therapeutic exercises and progress toward the following goals:    Plan of Care Expires:  11/02/22    Subjective     Chief Complaint: pain  Patient/Family Comments/goals: to get stronger  Pain/Comfort:  Pain Rating 1: 10/10 (Reported back and abdominal pain)  Location 1: abdomen  Pain Addressed 1: Nurse notified, Pre-medicate for activity  Pain Rating 2: 10/10  Location 2: back      Objective:     Communicated with RN prior to session.  Patient found HOB elevated with blood pressure cuff, pulse ox (continuous), telemetry, villalobos catheter upon PTA entry to room.     General Precautions: Standard, fall   Orthopedic Precautions:N/A   Braces: N/A  Respiratory Status: Room air  Vitals:   Supine       HR: 70     BP: 147/80    SPO2: 98%         Functional  Mobility:  Bed Mobility:    Supine<->sit with max assist x 2  Transfers:   Sit<->stand with mod x 2 assist and 2 trials.   First trial; pt unable to stand erect with ARIES knee flexion  Second trial; improved motivation and ability to stand erect and take steps to HOB  Balance:   Sitting balance with Mod progressing to SBA d/t posterior lean; poor initiation initially but improved to SBA after t/f trial  Pre-gati:   Side steps x 4 with assistance to weight shift; HHA x 2    Clonus still present  CT site at R flank began to drain; RN called to assess and bandage      Patient left HOB elevated with all lines intact and call button in reach.    GOALS:   Multidisciplinary Problems       Physical Therapy Goals          Problem: Physical Therapy    Goal Priority Disciplines Outcome Goal Variances Interventions   Physical Therapy Goal     PT, PT/OT Ongoing, Progressing     Description: Goals to be met by: 22     Patient will increase functional independence with mobility by performin. Supine to sit with moderate assistance  2. Sit to supine with moderate assistance  3. Sit <> stand with moderate assistance using RW/LRAD  4. Bed to chair with moderate assistance via squat pivot/stand pivot                           Time Tracking:     PT Received On: 10/07/22  PT Start Time: 1020     PT Stop Time: 1051  PT Total Time (min): 31 min     Billable Minutes: Therapeutic Activity 1 unit ; conference x 1 unit  Treatment Type: Treatment  PT/PTA: PTA     PTA Visit Number: 3     10/07/2022

## 2022-10-07 NOTE — NURSING
Notified JEROME Garcia for hospitalist, of accucheck >500, insulin 10 units give sq, updated on labs, steriod, diet, pt ssx.  New orders given for stat CBC,CMP,ABG,BOHB,serum osmo,urine Na and creat, Regular Insulin 10units IV and recheck accucheck 30min after injection.

## 2022-10-07 NOTE — PROGRESS NOTES
[facility]  Hospital Medicine Progress Note        Chief Complaint: Inpatient Follow-up for Aortic disscetion    HPI:   HISTORY OF PRESENT ILLNESS:   Stuart Guevara is a 32 y.o. male who has PMH which includes HTN, SLE,  hepatitis B, aortic dissection with repair 2/2021, RUE DVT on Xarelto; presented to the ED at [facility] on 9/23/2022  with reports of  shortness of breath and chest pain radiating to his back.  PT has a history of aortic dissection 2/2021. Work up revealed a type B aortic dissection and significant hypertension noted on presentation.  He failed medical management and had progression of his symptoms despite adequate control of hemodynamic parameters.  PT was transferred from [facility] to [facility] for CV surgery evaluation which he was taken  to the OR and had TEVAR with left carotid to subclavian artery bypass. PT developed abdominal distension and rigidity of his abdomen and developed a lactic acidosis.  He repeat CT angiography of the chest/abdomen/pelvis without evidence of worsening dissection, but evidence of significant intra-abdominal pathology with some hemoperitoneum, free blood in the abdomen.  He underwent exploratory laparotomy with evidence of viable bowel and no significant bleeding identified, abd was closed with flakito drain left in situ chest tube was placed. Postoperatively, he developed severe unstable bradycardia, and a transvenous pacemaker was placed by Cardiology.  Shortly afterwards, he was noted to have significant worsening of his renal failure and critical hyperkalemia. Renal services consulted which catheter was placed and he was started on HD treatments.  PT has been cleared for transfer out of ICU to the floor. Hospital medicine services consulted for assumption of care.     Patient is s/p AAA surgery repair , course c/by abd compartment syndrome s/p ex lap and closure with flakito drain in situ, sepsis syndrome with fever and worsening leucocytosis , Appreciate  ID input antibiotics broadened to vancomycin Flagyl and cefepime, concern for SLE serositis , rheum consulted started on high dose steori and immunosuppressants.     Interval Hx:     Afebrile overnight.  Complains of pain and constantly requiring Dilaudid around the clock.  Hemodynamically stable otherwise.  Tolerating p.o..  Labs today show leukocytosis, stable hemoglobin and thrombocytosis.  INR 2.38, mildly hyperkalemic today and hyponatremia continues.  Hyperglycemia improving      Objective/physical exam:  Vitals:    10/07/22 0628 10/07/22 0632 10/07/22 0700 10/07/22 0844   BP: (!) 161/86  (!) 161/86 (!) 147/80   Pulse:   70 75   Resp:  14 11    Temp:       TempSrc:       SpO2:   98%    Weight:       Height:         General: In no acute distress, afebrile  Respiratory: Clear to auscultation bilaterally  Cardiovascular: S1, S2, no appreciable murmur  Abdomen: Soft, nontender, BS +, dressing, TTP  MSK: Warm, no lower extremity edema, no clubbing or cyanosis  Neurologic: Alert and oriented x4, moving all extremities with good strength     Lab Results   Component Value Date     (L) 10/07/2022    K 5.6 (H) 10/07/2022    CO2 22 10/07/2022    BUN 55.2 (H) 10/07/2022    CREATININE 4.57 (H) 10/07/2022    CALCIUM 7.9 (L) 10/07/2022    EGFRNONAA 80 01/31/2022      Lab Results   Component Value Date    ALT 15 10/07/2022    AST 31 10/07/2022    ALKPHOS 103 10/07/2022    BILITOT 0.4 10/07/2022      Lab Results   Component Value Date    WBC 23.7 (H) 10/07/2022    HGB 8.8 (L) 10/07/2022    HCT 25.6 (L) 10/07/2022    MCV 84.2 10/07/2022     (H) 10/07/2022      Medications:   aluminum-magnesium hydroxide-simethicone  30 mL Oral QID (AC & HS)    amLODIPine  10 mg Oral Daily    carvediloL  25 mg Oral BID    docusate sodium  100 mg Oral BID    famotidine  20 mg Oral Every other day    hydrALAZINE  50 mg Oral Q8H    hydrOXYchloroQUINE  200 mg Oral BID    insulin detemir U-100  10 Units Subcutaneous BID     methylPREDNISolone sodium succinate injection  40 mg Intravenous TID    mycophenolate  1,000 mg Oral BID    polyethylene glycol  17 g Oral BID    sucralfate  1 g Oral BID    warfarin  3 mg Oral Daily      acetaminophen, albuterol, bisacodyL, dextrose 10%, dextrose 50%, dextrose 50%, glucagon (human recombinant), glucose, glucose, hydrALAZINE, HYDROmorphone, insulin aspart U-100, labetaloL, LIDOcaine (PF) 10 mg/ml (1%), lorazepam, morphine, ondansetron, sodium chloride 0.9%, sodium chloride 0.9%, sodium chloride 0.9%, sodium chloride 0.9%, sodium chloride 0.9%, sodium chloride 0.9%     Assessment/Plan:      SLE flare and serositis   Acute respiratory failure with hypoxia- s/p mechanical ventilation, now extubated  Acute aortic dissection type B- s/p TEVAR with left carotid artery to left subclavian bypass 9/23/2022  ARF- s/p HD treatments  New onset Cardiomyopathy- EF 37% unknown etiology  VHD, s/p mechanical AVR   Acute abdominal pain generalized  Abdominal compartment syndrome, s/p ex-lap x2 last on 9/27 with abdominal closure on 10/1  Acute pancreatitis- resolving with lipase trending down  RUE DVT- was on Xarelto, recent US no DVT  HTN- emergency, resolved   SLE- on cellcept  Anemia- chronic disease  Weakness  HLD  Hx of hepatitis B  Hx of aortic dissection with repair 2/2021  History of transverse myelitis  History of lupus nephritis    Plan:  - Continue coumadin and monitor INR. Therapeutic now. Goal 2-3. Continue current HTN meds  - Anuric. Nephrology following. HD as per schedule  - LINNETTE drain removed. Surgery signed off  - steroids, continue mycophenolate, plaquenil  - ID on board. Off abx.leukocytosis could be from steroids  - Hyperglycemia noted due to steroids. Continue correction insulin. HbA1c 5.9  - Monitor BMs      PT  Guarded prognosis  Full code    Zach Shafer MD

## 2022-10-08 NOTE — PLAN OF CARE
Problem: Adult Inpatient Plan of Care  Goal: Plan of Care Review  Outcome: Ongoing, Progressing  Goal: Patient-Specific Goal (Individualized)  Outcome: Ongoing, Progressing  Goal: Absence of Hospital-Acquired Illness or Injury  Outcome: Ongoing, Progressing  Goal: Optimal Comfort and Wellbeing  Outcome: Ongoing, Progressing  Goal: Readiness for Transition of Care  Outcome: Ongoing, Progressing     Problem: Infection  Goal: Absence of Infection Signs and Symptoms  Outcome: Ongoing, Progressing     Problem: Skin Injury Risk Increased  Goal: Skin Health and Integrity  Outcome: Ongoing, Progressing     Problem: Fall Injury Risk  Goal: Absence of Fall and Fall-Related Injury  Outcome: Ongoing, Progressing     Problem: Communication Impairment (Mechanical Ventilation, Invasive)  Goal: Effective Communication  Outcome: Ongoing, Progressing     Problem: Nutrition Impairment (Mechanical Ventilation, Invasive)  Goal: Optimal Nutrition Delivery  Outcome: Ongoing, Progressing     Problem: Skin and Tissue Injury (Mechanical Ventilation, Invasive)  Goal: Absence of Device-Related Skin and Tissue Injury  Outcome: Ongoing, Progressing     Problem: Ventilator-Induced Lung Injury (Mechanical Ventilation, Invasive)  Goal: Absence of Ventilator-Induced Lung Injury  Outcome: Ongoing, Progressing     Problem: Skin and Tissue Injury (Artificial Airway)  Goal: Absence of Device-Related Skin or Tissue Injury  Outcome: Ongoing, Progressing     Problem: Device-Related Complication Risk (CRRT (Continuous Renal Replacement Therapy))  Goal: Safe, Effective Therapy Delivery  Outcome: Ongoing, Progressing     Problem: Hypothermia (CRRT (Continuous Renal Replacement Therapy))  Goal: Body Temperature Maintained in Desired Range  Outcome: Ongoing, Progressing     Problem: Infection (CRRT (Continuous Renal Replacement Therapy))  Goal: Absence of Infection Signs and Symptoms  Outcome: Ongoing, Progressing     Problem: Device-Related Complication Risk  (Hemodialysis)  Goal: Safe, Effective Therapy Delivery  Outcome: Ongoing, Progressing     Problem: Hemodynamic Instability (Hemodialysis)  Goal: Effective Tissue Perfusion  Outcome: Ongoing, Progressing     Problem: Infection (Hemodialysis)  Goal: Absence of Infection Signs and Symptoms  Outcome: Ongoing, Progressing     Problem: Hyperglycemia  Goal: Blood Glucose Level Within Targeted Range  Outcome: Ongoing, Progressing

## 2022-10-08 NOTE — PROGRESS NOTES
NEPHROLOGY PROGRESS NOTE      Patient Demographics:  Name:  Stuart Guevara  Age: 32 y.o.  MRN:  [id number]  Admission Date: 9/23/2022      Subjective:      Patient seen on Hemodialysis  Tolerating Hemodialysis    Current Facility-Administered Medications   Medication Dose Route Frequency Provider Last Rate Last Admin    acetaminophen tablet 650 mg  650 mg Oral Q6H PRN Nolberto Calvillo MD   650 mg at 10/06/22 2031    albuterol inhaler 2 puff  2 puff Inhalation Q4H PRN Evelio Marshall MD        aluminum-magnesium hydroxide-simethicone 200-200-20 mg/5 mL suspension 30 mL  30 mL Oral QID (AC & HS) Ronda Hamilton MD   30 mL at 10/08/22 0914    amLODIPine tablet 10 mg  10 mg Oral Daily Bin Bell MD   10 mg at 10/08/22 0915    bisacodyL suppository 10 mg  10 mg Rectal Daily PRN Keena H Albertson, FNP   10 mg at 10/02/22 1056    carvediloL tablet 25 mg  25 mg Oral BID MARIANNE Castillo   25 mg at 10/08/22 0915    dextrose 10% bolus 125 mL  12.5 g Intravenous PRN Ivan Chpora MD        dextrose 50% injection 12.5 g  12.5 g Intravenous PRN Keena H Albertson, FNP        dextrose 50% injection 25 g  25 g Intravenous PRN Keena H Albertson, FNP        docusate sodium capsule 100 mg  100 mg Oral BID Keena H Albertson, FNP   100 mg at 10/08/22 0915    famotidine tablet 20 mg  20 mg Oral Every other day Ivan Chopra MD   20 mg at 10/08/22 0915    glucagon (human recombinant) injection 1 mg  1 mg Intramuscular PRN Keena H Albertson, FNP        glucose chewable tablet 16 g  16 g Oral PRN Keena H Albertson, FNP        glucose chewable tablet 24 g  24 g Oral PRN Keena H Albertson, FNP        hydrALAZINE injection 20 mg  20 mg Intravenous Q4H PRN Bin Bell MD   20 mg at 10/08/22 0609    hydrALAZINE tablet 50 mg  50 mg Oral Q8H Ronda Hamilton MD   50 mg at 10/08/22 0541    HYDROmorphone (PF) injection 1 mg  1 mg Intravenous Q4H PRN Cong Velez DO   1 mg at 10/08/22 0011    hydrOXYchloroQUINE tablet 200  mg  200 mg Oral BID Wing Thorpe MD   200 mg at 10/08/22 0915    insulin aspart U-100 injection 1-16 Units  1-16 Units Subcutaneous Q4H PRN DENIA Armando   4 Units at 10/08/22 0011    insulin detemir U-100 injection 14 Units  14 Units Subcutaneous BID Zach Shafer MD   14 Units at 10/08/22 0916    labetaloL injection 20 mg  20 mg Intravenous Q8H PRN Bin Bell MD   20 mg at 10/03/22 0321    LIDOcaine (PF) 10 mg/ml (1%) injection    PRN Ruben Arciniega MD   5 mL at 09/25/22 1656    lorazepam injection 1 mg  1 mg Intravenous Q2H PRN Cong Velez DO        methylPREDNISolone sodium succinate injection 40 mg  40 mg Intravenous TID Zach Shafer MD   40 mg at 10/07/22 2116    morphine 12 hr tablet 30 mg  30 mg Oral Q12H Manjinder Carrillo MD   30 mg at 10/08/22 0915    morphine injection 2 mg  2 mg Intravenous Q2H PRN Evelio Marshall MD   2 mg at 10/03/22 0310    mycophenolate capsule 1,000 mg  1,000 mg Oral BID Wing Thorpe MD   1,000 mg at 10/08/22 0915    ondansetron injection 4 mg  4 mg Intravenous Q6H PRN Evelio Marshall MD   4 mg at 10/04/22 2114    polyethylene glycol packet 17 g  17 g Oral BID Bin Bell MD   17 g at 10/08/22 0914    sodium chloride 0.9% bolus 250 mL  250 mL Intravenous PRN Shahzad Lundberg MD        sodium chloride 0.9% bolus 250 mL  250 mL Intravenous PRN Shahzad Lundberg MD        sodium chloride 0.9% bolus 250 mL  250 mL Intravenous PRN DENIA Person        sodium chloride 0.9% flush 10 mL  10 mL Intravenous PRN Francia Marcus DO        sodium chloride 0.9% flush 10 mL  10 mL Intravenous PRN Evelio Marshall MD        sodium chloride 0.9% flush 10 mL  10 mL Intravenous PRN Evelio Marshall MD        sucralfate tablet 1 g  1 g Oral BID Francia Marcus DO   1 g at 10/08/22 0915    warfarin (COUMADIN) tablet 3 mg  3 mg Oral Daily MARIANNE Castillo   3 mg at 10/07/22 1700           Review of Systems   Constitutional:  Positive for malaise/fatigue.  "  HENT: Negative.     Eyes: Negative.    Respiratory: Negative.     Cardiovascular: Negative.    Gastrointestinal: Negative.    Genitourinary: Negative.    Musculoskeletal: Negative.    Skin: Negative.    Neurological:  Positive for weakness.       Objective:    BP (!) 144/85   Pulse 65   Temp 97.8 °F (36.6 °C) (Oral)   Resp 18   Ht 5' 5" (1.651 m)   Wt 60 kg (132 lb 4.4 oz)   SpO2 96%   BMI 22.01 kg/m²       Intake/Output Summary (Last 24 hours) at 10/8/2022 1204  Last data filed at 10/8/2022 0545  Gross per 24 hour   Intake 120 ml   Output 280 ml   Net -160 ml             Physical Exam  Vitals reviewed.   Constitutional:       Appearance: Normal appearance.   HENT:      Head: Normocephalic and atraumatic.      Nose: Nose normal.   Eyes:      Extraocular Movements: Extraocular movements intact.      Pupils: Pupils are equal, round, and reactive to light.   Cardiovascular:      Rate and Rhythm: Normal rate and regular rhythm.      Pulses: Normal pulses.      Heart sounds: Normal heart sounds.   Pulmonary:      Effort: Pulmonary effort is normal.      Breath sounds: Normal breath sounds.   Abdominal:      General: Bowel sounds are normal.      Palpations: Abdomen is soft.      Comments: Incisions D/I   Musculoskeletal:         General: Normal range of motion.      Cervical back: Normal range of motion.      Comments: Some deconditioning   Skin:     General: Skin is warm and dry.   Neurological:      General: No focal deficit present.      Mental Status: He is alert and oriented to person, place, and time. Mental status is at baseline.   Psychiatric:         Mood and Affect: Mood normal.          Inpatient Diagnostics:  Recent Results (from the past 24 hour(s))   POCT glucose    Collection Time: 10/07/22  9:08 PM   Result Value Ref Range    POCT Glucose 191 (H) 70 - 110 mg/dL   POCT glucose    Collection Time: 10/08/22 12:00 AM   Result Value Ref Range    POCT Glucose 133 (H) 70 - 110 mg/dL   Renal Function " Panel    Collection Time: 10/08/22 12:32 AM   Result Value Ref Range    Sodium Level 124 (L) 136 - 145 mmol/L    Potassium Level 5.2 (H) 3.5 - 5.1 mmol/L    Chloride 91 (L) 98 - 107 mmol/L    Carbon Dioxide 23 22 - 29 mmol/L    Glucose Level 122 (H) 74 - 100 mg/dL    Blood Urea Nitrogen 79.4 (H) 8.9 - 20.6 mg/dL    Creatinine 4.83 (H) 0.73 - 1.18 mg/dL    Calcium Level Total 7.7 (L) 8.4 - 10.2 mg/dL    Albumin Level 1.3 (L) 3.5 - 5.0 gm/dL    Phosphorus Level 8.4 (H) 2.3 - 4.7 mg/dL    eGFR 15 mls/min/1.73/m2   Protime-INR    Collection Time: 10/08/22 12:32 AM   Result Value Ref Range    PT 29.0 (H) 12.5 - 14.5 seconds    INR 2.81 (H) 0.00 - 1.30   CBC with Differential    Collection Time: 10/08/22 12:32 AM   Result Value Ref Range    WBC 22.7 (H) 4.5 - 11.5 x10(3)/mcL    RBC 3.56 (L) 4.70 - 6.10 x10(6)/mcL    Hgb 10.0 (L) 14.0 - 18.0 gm/dL    Hct 29.3 (L) 42.0 - 52.0 %    MCV 82.3 80.0 - 94.0 fL    MCH 28.1 27.0 - 31.0 pg    MCHC 34.1 33.0 - 36.0 mg/dL    RDW 16.0 11.5 - 17.0 %    Platelet 610 (H) 130 - 400 x10(3)/mcL    MPV 10.3 7.4 - 10.4 fL    Neut % 86.8 %    Lymph % 7.7 %    Mono % 3.5 %    Eos % 0.1 %    Basophil % 0.3 %    Lymph # 1.74 0.6 - 4.6 x10(3)/mcL    Neut # 19.7 (H) 2.1 - 9.2 x10(3)/mcL    Mono # 0.80 0.1 - 1.3 x10(3)/mcL    Eos # 0.02 0 - 0.9 x10(3)/mcL    Baso # 0.06 0 - 0.2 x10(3)/mcL    IG# 0.36 (H) 0 - 0.04 x10(3)/mcL    IG% 1.6 %    NRBC% 0.1 %   POCT glucose    Collection Time: 10/08/22  4:18 AM   Result Value Ref Range    POCT Glucose 108 70 - 110 mg/dL   POCT glucose    Collection Time: 10/08/22  6:05 AM   Result Value Ref Range    POCT Glucose 107 70 - 110 mg/dL                A/P--NE 10/08    1---TAY req HD---Cont TTS schedule while inpatient  2--Hyponatremia--Increase Na setting with HD  3---Recent TEVAR ( Type B Aortic Dissection)---Stable  4---Abd Compartment Syndrome/ Exp Lap/ Abd Closure--per Gen Sx  5---HTN--Cont same management  6---Type II DM--Cont same management  7---Anemia  secondary to CKD---Follow H&H  Fe studies reviewed--Ferritin is high  8---Hx Mech AVR---INR is therapeutic/ Cont Coumadin    IV--SL    ORDERS:  HD today  CBC/CMP in artis Pizarro DNP, ANP-C    10/8/2022

## 2022-10-08 NOTE — PROGRESS NOTES
[facility]  Hospital Medicine Progress Note        Chief Complaint: Inpatient Follow-up for Aortic disscetion    HPI:   HISTORY OF PRESENT ILLNESS:   Stuart Guevara is a 32 y.o. male who has PMH which includes HTN, SLE,  hepatitis B, aortic dissection with repair 2/2021, RUE DVT on Xarelto; presented to the ED at [facility] on 9/23/2022  with reports of  shortness of breath and chest pain radiating to his back.  PT has a history of aortic dissection 2/2021. Work up revealed a type B aortic dissection and significant hypertension noted on presentation.  He failed medical management and had progression of his symptoms despite adequate control of hemodynamic parameters.  PT was transferred from [facility] to [facility] for CV surgery evaluation which he was taken  to the OR and had TEVAR with left carotid to subclavian artery bypass. PT developed abdominal distension and rigidity of his abdomen and developed a lactic acidosis.  He repeat CT angiography of the chest/abdomen/pelvis without evidence of worsening dissection, but evidence of significant intra-abdominal pathology with some hemoperitoneum, free blood in the abdomen.  He underwent exploratory laparotomy with evidence of viable bowel and no significant bleeding identified, abd was closed with flakito drain left in situ chest tube was placed. Postoperatively, he developed severe unstable bradycardia, and a transvenous pacemaker was placed by Cardiology.  Shortly afterwards, he was noted to have significant worsening of his renal failure and critical hyperkalemia. Renal services consulted which catheter was placed and he was started on HD treatments.  PT has been cleared for transfer out of ICU to the floor. Hospital medicine services consulted for assumption of care.     Patient is s/p AAA surgery repair , course c/by abd compartment syndrome s/p ex lap and closure with flakito drain in situ, sepsis syndrome with fever and worsening leucocytosis , Appreciate  ID input antibiotics broadened to vancomycin Flagyl and cefepime, concern for SLE serositis , rheum consulted started on high dose steori and immunosuppressants.     Interval Hx:     Seen during HD. Comfortably resting. Needs analgesics for pain control. WBC elevated likely a leukemoid reaction from steroids. Hb looking good.       Objective/physical exam:  Vitals:    10/08/22 0011 10/08/22 0402 10/08/22 0548 10/08/22 0605   BP:  137/82  (!) 176/86   Pulse:  61 66 64   Resp: 13 11 12 16   Temp:  97.6 °F (36.4 °C)  97.8 °F (36.6 °C)   TempSrc:  Oral  Oral   SpO2:  99% 98% 96%   Weight:       Height:         General: In no acute distress, afebrile  Respiratory: Clear to auscultation bilaterally  Cardiovascular: S1, S2, no appreciable murmur  Abdomen: Soft, nontender, BS +, dressing, TTP  MSK: Warm, no lower extremity edema, no clubbing or cyanosis  Neurologic: Alert and oriented x4, moving all extremities with good strength     Lab Results   Component Value Date     (L) 10/08/2022    K 5.2 (H) 10/08/2022    CO2 23 10/08/2022    BUN 79.4 (H) 10/08/2022    CREATININE 4.83 (H) 10/08/2022    CALCIUM 7.7 (L) 10/08/2022    EGFRNONAA 80 01/31/2022      Lab Results   Component Value Date    ALT 15 10/07/2022    AST 31 10/07/2022    ALKPHOS 103 10/07/2022    BILITOT 0.4 10/07/2022      Lab Results   Component Value Date    WBC 22.7 (H) 10/08/2022    HGB 10.0 (L) 10/08/2022    HCT 29.3 (L) 10/08/2022    MCV 82.3 10/08/2022     (H) 10/08/2022      Medications:   aluminum-magnesium hydroxide-simethicone  30 mL Oral QID (AC & HS)    amLODIPine  10 mg Oral Daily    carvediloL  25 mg Oral BID    docusate sodium  100 mg Oral BID    famotidine  20 mg Oral Every other day    hydrALAZINE  50 mg Oral Q8H    hydrOXYchloroQUINE  200 mg Oral BID    insulin detemir U-100  14 Units Subcutaneous BID    methylPREDNISolone sodium succinate injection  40 mg Intravenous TID    morphine  30 mg Oral Q12H    mycophenolate  1,000 mg Oral  BID    polyethylene glycol  17 g Oral BID    sucralfate  1 g Oral BID    warfarin  3 mg Oral Daily      acetaminophen, albuterol, bisacodyL, dextrose 10%, dextrose 50%, dextrose 50%, glucagon (human recombinant), glucose, glucose, hydrALAZINE, HYDROmorphone, insulin aspart U-100, labetaloL, LIDOcaine (PF) 10 mg/ml (1%), lorazepam, morphine, ondansetron, sodium chloride 0.9%, sodium chloride 0.9%, sodium chloride 0.9%, sodium chloride 0.9%, sodium chloride 0.9%, sodium chloride 0.9%     Assessment/Plan:      SLE flare and serositis   Acute respiratory failure with hypoxia- s/p mechanical ventilation, now extubated  Acute aortic dissection type B- s/p TEVAR with left carotid artery to left subclavian bypass 9/23/2022  ARF- s/p HD treatments  New onset Cardiomyopathy- EF 37% unknown etiology  VHD, s/p mechanical AVR   Acute abdominal pain generalized  Abdominal compartment syndrome, s/p ex-lap x2 last on 9/27 with abdominal closure on 10/1  Acute pancreatitis- resolving with lipase trending down  RUE DVT- was on Xarelto, recent US no DVT  HTN- emergency, resolved   SLE- on cellcept  Anemia- chronic disease  Weakness  HLD  Hx of hepatitis B  Hx of aortic dissection with repair 2/2021  History of transverse myelitis  History of lupus nephritis    Plan:  - Continue coumadin and monitor INR. Therapeutic now. Goal 2-3. Continue current HTN meds  - Anuric. Nephrology following. HD as per schedule  - LINNETTE drain removed. Surgery signed off  - steroids, continue mycophenolate, plaquenil. Wan steroids off gradually  - ID on board. Off abx.leukocytosis could be from steroids  - Hyperglycemia noted due to steroids. Continue correction insulin. HbA1c 5.9  - Monitor BMs   - Added Morphine. Can titrate up if needed and wean off IV analgesics as tolerated     PT  Guarded prognosis  Full code      Zach Shafer MD

## 2022-10-08 NOTE — NURSING
10/08/22 1300   [facility] (Dialysis) Catheter 10/04/22 1300 right internal jugular   Placement Date/Time: 10/04/22 1300   Present Prior to Hospital Arrival?: No  Hand Hygiene: Performed  Barrier Precautions: Performed  Skin Antisepsis: ChloraPrep  Location: right internal jugular  Insertion attempts (enter comment if more than 2 attem...   Line Necessity Review CRRT/HD   Site Assessment No drainage;No redness;No swelling;No warmth   Dressing Type Central line dressing   Dressing Status Clean;Dry;Intact   Dressing Intervention Integrity maintained   Post-Hemodialysis Assessment   Rinseback Volume (mL) 250 mL   Blood Volume Processed (Liters) 52.3 L   Dialyzer Clearance Moderately streaked   Duration of Treatment 180 minutes   Total UF (mL) 2500 mL   Net Fluid Removal 2000   Patient Response to Treatment Tolerated well   Post-Hemodialysis Comments Blood rinsed back per P&P. Lines capped and secured.

## 2022-10-08 NOTE — PLAN OF CARE
Problem: Adult Inpatient Plan of Care  Goal: Plan of Care Review  Outcome: Ongoing, Progressing  Goal: Readiness for Transition of Care  Outcome: Ongoing, Progressing     Problem: Fall Injury Risk  Goal: Absence of Fall and Fall-Related Injury  Outcome: Ongoing, Progressing     Problem: Hyperglycemia  Goal: Blood Glucose Level Within Targeted Range  Outcome: Ongoing, Progressing

## 2022-10-08 NOTE — NURSING
Report called to SURYA Reynolds, receiving nurse.  Pt transported to to room 957 via bed without difficulty.  Phone, wallet and  in pts hand.  Mother Darling notified of room change.

## 2022-10-09 NOTE — PROGRESS NOTES
[facility]  Hospital Medicine Progress Note        Chief Complaint: Inpatient Follow-up for Aortic disscetion    HPI:   Stuart Guevara is a 32 y.o. male who has PMH which includes HTN, SLE,  hepatitis B, aortic dissection with repair 2/2021, RUE DVT on Xarelto; presented to the ED at [facility] on 9/23/2022  with reports of  shortness of breath and chest pain radiating to his back.  PT has a history of aortic dissection 2/2021. Work up revealed a type B aortic dissection and significant hypertension noted on presentation.  He failed medical management and had progression of his symptoms despite adequate control of hemodynamic parameters.  PT was transferred from [facility] to [facility] for CV surgery evaluation which he was taken  to the OR and had TEVAR with left carotid to subclavian artery bypass. PT developed abdominal distension and rigidity of his abdomen and developed a lactic acidosis.  He repeat CT angiography of the chest/abdomen/pelvis without evidence of worsening dissection, but evidence of significant intra-abdominal pathology with some hemoperitoneum, free blood in the abdomen.  He underwent exploratory laparotomy with evidence of viable bowel and no significant bleeding identified, abd was closed with flakito drain left in situ chest tube was placed. Postoperatively, he developed severe unstable bradycardia, and a transvenous pacemaker was placed by Cardiology.  Shortly afterwards, he was noted to have significant worsening of his renal failure and critical hyperkalemia. Renal services consulted which catheter was placed and he was started on HD treatments.  PT has been cleared for transfer out of ICU to the floor. Hospital medicine services consulted for assumption of care.     Patient is s/p AAA surgery repair , course c/by abd compartment syndrome s/p ex lap and closure with flakito drain in situ, sepsis syndrome with fever and worsening leucocytosis , Appreciate ID input antibiotics broadened  to vancomycin Flagyl and cefepime, concern for SLE serositis , rheum consulted started on high dose steori and immunosuppressants.     Interval Hx:      Afebrile.  Hemodynamically stable.  Comfortably resting.  Doing well on room air.  Except for the pain he has no new issues.  Tolerating diet.  Mild hyponatremia.  Potassium improving.  INR therapeutic    Objective/physical exam:  Vitals:    10/08/22 2308 10/08/22 2313 10/09/22 0432 10/09/22 0735   BP: (!) 144/78  137/74 (!) 154/83   Pulse: 68  61 64   Resp: 20 20 20    Temp: 98.1 °F (36.7 °C)  98.1 °F (36.7 °C) 97.9 °F (36.6 °C)   TempSrc: Oral  Oral Oral   SpO2: (!) 93%  96% 95%   Weight:       Height:         General: In no acute distress, afebrile  Respiratory: Clear to auscultation bilaterally  Cardiovascular: S1, S2, no appreciable murmur  Abdomen: Soft, nontender, BS +, dressing, TTP  MSK: Warm, no lower extremity edema, no clubbing or cyanosis  Neurologic: Alert and oriented x4, moving all extremities with good strength     Lab Results   Component Value Date     (L) 10/09/2022    K 4.8 10/09/2022    CO2 24 10/09/2022    BUN 58.0 (H) 10/09/2022    CREATININE 3.75 (H) 10/09/2022    CALCIUM 7.6 (L) 10/09/2022    EGFRNONAA 80 01/31/2022      Lab Results   Component Value Date    ALT 15 10/09/2022    AST 32 10/09/2022    ALKPHOS 78 10/09/2022    BILITOT 0.3 10/09/2022      Lab Results   Component Value Date    WBC 15.6 (H) 10/09/2022    HGB 9.3 (L) 10/09/2022    HCT 28.4 (L) 10/09/2022    MCV 85.5 10/09/2022     (H) 10/09/2022      Medications:   aluminum-magnesium hydroxide-simethicone  30 mL Oral QID (AC & HS)    amLODIPine  10 mg Oral Daily    carvediloL  25 mg Oral BID    docusate sodium  100 mg Oral BID    famotidine  20 mg Oral Every other day    hydrALAZINE  50 mg Oral Q8H    hydrOXYchloroQUINE  200 mg Oral BID    insulin detemir U-100  14 Units Subcutaneous BID    methylPREDNISolone sodium succinate injection  40 mg Intravenous TID     morphine  30 mg Oral Q12H    mycophenolate  1,000 mg Oral BID    polyethylene glycol  17 g Oral BID    sucralfate  1 g Oral BID    warfarin  3 mg Oral Daily      acetaminophen, albuterol, bisacodyL, dextrose 10%, dextrose 50%, dextrose 50%, glucagon (human recombinant), glucose, glucose, hydrALAZINE, HYDROmorphone, insulin aspart U-100, labetaloL, LIDOcaine (PF) 10 mg/ml (1%), lorazepam, morphine, ondansetron, sodium chloride 0.9%, sodium chloride 0.9%, sodium chloride 0.9%, sodium chloride 0.9%, sodium chloride 0.9%, sodium chloride 0.9%     Assessment/Plan:      SLE flare and serositis   Acute respiratory failure with hypoxia- s/p mechanical ventilation, now extubated  Acute aortic dissection type B- s/p TEVAR with left carotid artery to left subclavian bypass 9/23/2022  ARF- s/p HD treatments  New onset Cardiomyopathy- EF 37% unknown etiology  VHD, s/p mechanical AVR   Acute abdominal pain generalized  Abdominal compartment syndrome, s/p ex-lap x2 last on 9/27 with abdominal closure on 10/1  Acute pancreatitis- resolving with lipase trending down  RUE DVT- was on Xarelto, recent US no DVT  HTN- emergency, resolved   SLE- on cellcept  Anemia- chronic disease  Weakness  HLD  Hx of hepatitis B  Hx of aortic dissection with repair 2/2021  History of transverse myelitis  History of lupus nephritis    Plan:  - Continue coumadin and monitor INR. Therapeutic now. Goal 2-3. Platelets improving.  - Continue current HTN meds  - Anuric. Nephrology following. HD as per schedule  - LINNETTE drain removed. Surgery signed off  - steroids, continue mycophenolate, plaquenil. Wan steroids off gradually  - ID on board. Off abx.leukocytosis could be from steroids  - Hyperglycemia noted due to steroids. Continue correction insulin. HbA1c 5.9  - Monitor BMs   - Added Morphine. Can titrate up if needed and wean off IV analgesics as tolerated     PT  Guarded prognosis  Full code      Zach Shafer MD

## 2022-10-10 NOTE — PT/OT/SLP PROGRESS
Physical Therapy Treatment    Patient Name:  Stuart Guevara   MRN:  [id number]    Recommendations:     Discharge Recommendations:  rehabilitation facility, nursing facility, skilled   Discharge Equipment Recommendations: walker, rolling     Subjective     Patient awake, alert, and cooperative.     Objective:     General Precautions: Standard, fall   Orthopedic Precautions:N/A   Braces:    Respiratory Status: Room air     Communicated with nurse prior to treatment.     Functional Mobility:    Rolling:Minimal Assistance  Supine to sit:Moderate Assistance  Sit to stand transfer: Moderate Assistance  Bed to chair transfer:Minimal Assistance  Gait 6 ft with RW min assist and limited gait distance due to weakness and endurance as well as drainage from abdomin.       AM-PAC 6 CLICK MOBILITY  Turning over in bed (including adjusting bedclothes, sheets and blankets)?: 2  Sitting down on and standing up from a chair with arms (e.g., wheelchair, bedside commode, etc.): 2  Moving from lying on back to sitting on the side of the bed?: 2  Moving to and from a bed to a chair (including a wheelchair)?: 3  Need to walk in hospital room?: 3  Climbing 3-5 steps with a railing?: 2  Basic Mobility Total Score: 14     Patient left up in chair with all lines intact and call button in reach..    GOALS:   Multidisciplinary Problems       Physical Therapy Goals          Problem: Physical Therapy    Goal Priority Disciplines Outcome Goal Variances Interventions   Physical Therapy Goal     PT, PT/OT Ongoing, Progressing     Description: Goals to be met by: 22     Patient will increase functional independence with mobility by performin. Supine to sit with moderate assistance  2. Sit to supine with moderate assistance  3. Sit <> stand with moderate assistance using RW/LRAD  4. Bed to chair with moderate assistance via squat pivot/stand pivot                           Assessment:     Stuart Guevara is a 32 y.o. male admitted with a  medical diagnosis of Aortic dissection.     Rehab Prognosis: Good; patient would benefit from acute skilled PT services to address these deficits and reach maximum level of function.    Recent Surgery: Procedure(s) (LRB):  LAPAROTOMY, EXPLORATORY (N/A) 11 Days Post-Op    Plan:     During this hospitalization, patient to be seen 6 x/week to address the identified rehab impairments via gait training, therapeutic activities, therapeutic exercises and progress toward the following goals:    Plan of Care Expires:  11/02/22    Billable Minutes     Billable Minutes: Gait Training 12 and Therapeutic Activity 13    Treatment Type: Treatment  PT/PTA: PTA     PTA Visit Number: 4     10/10/2022

## 2022-10-10 NOTE — PROGRESS NOTES
10/10/22 1420   Missed Time Reason   Missed Treatment Reason Dialysis   OT to f/u as schedule allows.

## 2022-10-10 NOTE — PROGRESS NOTES
[facility]  Hospital Medicine Progress Note        Chief Complaint: Inpatient Follow-up for Aortic disscetion    HPI:   Stuart Guevara is a 32 y.o. male who has PMH which includes HTN, SLE,  hepatitis B, aortic dissection with repair 2/2021, RUE DVT on Xarelto; presented to the ED at [facility] on 9/23/2022  with reports of  shortness of breath and chest pain radiating to his back.  PT has a history of aortic dissection 2/2021. Work up revealed a type B aortic dissection and significant hypertension noted on presentation.  He failed medical management and had progression of his symptoms despite adequate control of hemodynamic parameters.  PT was transferred from [facility] to [facility] for CV surgery evaluation which he was taken  to the OR and had TEVAR with left carotid to subclavian artery bypass. PT developed abdominal distension and rigidity of his abdomen and developed a lactic acidosis.  He repeat CT angiography of the chest/abdomen/pelvis without evidence of worsening dissection, but evidence of significant intra-abdominal pathology with some hemoperitoneum, free blood in the abdomen.  He underwent exploratory laparotomy with evidence of viable bowel and no significant bleeding identified, abd was closed with flakito drain left in situ chest tube was placed. Postoperatively, he developed severe unstable bradycardia, and a transvenous pacemaker was placed by Cardiology.  Shortly afterwards, he was noted to have significant worsening of his renal failure and critical hyperkalemia. Renal services consulted which catheter was placed and he was started on HD treatments.  PT has been cleared for transfer out of ICU to the floor. Hospital medicine services consulted for assumption of care.     Patient is s/p AAA surgery repair , course c/by abd compartment syndrome s/p ex lap and closure with flakito drain in situ, sepsis syndrome with fever and worsening leucocytosis , Appreciate ID input antibiotics broadened  to vancomycin Flagyl and cefepime, concern for SLE serositis , rheum consulted started on high dose steori and immunosuppressants.     Interval Hx:       Blood pressure minimally accelerated.  Doing well on room air.  Pain is well controlled with medications.  Tolerating diet.  Labs showing leukocytosis, stable hemoglobin, thrombocytosis. INR 2.93.  Hyponatremia and hyperkalemia continues.    Respiratory cultures grew Streptococcus with normal lacy.  Blood cultures obtained 10/03/2022 grew aerobic Gram-positive rods and speciation is pending.    Objective/physical exam:  Vitals:    10/10/22 0441 10/10/22 0441 10/10/22 0500 10/10/22 0749   BP:  (!) 161/79  (!) 151/83   Pulse:  64  64   Resp: 18 12  17   Temp:  98.7 °F (37.1 °C)  98.7 °F (37.1 °C)   TempSrc:  Oral  Oral   SpO2:  (!) 94%  (!) 94%   Weight:   62.6 kg (138 lb 0.1 oz)    Height:         General: In no acute distress, afebrile  Respiratory: Clear to auscultation bilaterally  Cardiovascular: S1, S2, no appreciable murmur  Abdomen: Soft, nontender, BS +, dressing, TTP  MSK: Warm, no lower extremity edema, no clubbing or cyanosis  Neurologic: Alert and oriented x4, moving all extremities with good strength     Lab Results   Component Value Date     (L) 10/10/2022    K 6.2 (H) 10/10/2022    CO2 28 10/10/2022    BUN 68.8 (H) 10/10/2022    CREATININE 4.07 (H) 10/10/2022    CALCIUM 7.7 (L) 10/10/2022    EGFRNONAA 80 01/31/2022      Lab Results   Component Value Date    ALT 14 10/10/2022    AST 30 10/10/2022    ALKPHOS 81 10/10/2022    BILITOT 0.3 10/10/2022      Lab Results   Component Value Date    WBC 14.3 (H) 10/10/2022    HGB 10.0 (L) 10/10/2022    HCT 30.5 (L) 10/10/2022    MCV 85.4 10/10/2022     (H) 10/10/2022      Medications:   aluminum-magnesium hydroxide-simethicone  30 mL Oral QID (AC & HS)    amLODIPine  10 mg Oral Daily    carvediloL  25 mg Oral BID    docusate sodium  100 mg Oral BID    famotidine  20 mg Oral Every other day     hydrALAZINE  50 mg Oral Q8H    hydrOXYchloroQUINE  200 mg Oral BID    insulin detemir U-100  14 Units Subcutaneous BID    methylPREDNISolone sodium succinate injection  40 mg Intravenous TID    morphine  30 mg Oral Q12H    mycophenolate  1,000 mg Oral BID    polyethylene glycol  17 g Oral BID    sucralfate  1 g Oral BID    warfarin  3 mg Oral Daily      acetaminophen, albuterol, bisacodyL, dextrose 10%, dextrose 50%, dextrose 50%, glucagon (human recombinant), glucose, glucose, hydrALAZINE, HYDROcodone-acetaminophen, HYDROmorphone, insulin aspart U-100, labetaloL, LIDOcaine (PF) 10 mg/ml (1%), lorazepam, morphine, ondansetron, sodium chloride 0.9%, sodium chloride 0.9%, sodium chloride 0.9%, sodium chloride 0.9%, sodium chloride 0.9%, sodium chloride 0.9%     Assessment/Plan:      SLE flare and serositis   Acute respiratory failure with hypoxia- s/p mechanical ventilation, now extubated  Acute aortic dissection type B- s/p TEVAR with left carotid artery to left subclavian bypass 9/23/2022  ARF- s/p HD treatments  New onset Cardiomyopathy- EF 37% unknown etiology  VHD, s/p mechanical AVR   Acute abdominal pain generalized  Abdominal compartment syndrome, s/p ex-lap x2 last on 9/27 with abdominal closure on 10/1  Acute pancreatitis- resolving with lipase trending down  RUE DVT- was on Xarelto, recent US no DVT  HTN- emergency, resolved   SLE- on cellcept  Anemia- chronic disease  Weakness  HLD  Hx of hepatitis B  Hx of aortic dissection with repair 2/2021  History of transverse myelitis  History of lupus nephritis    Plan:  - Anuric. Nephrology following. HD as per schedule. Lokelma for high potassium  - Continue coumadin and monitor INR. Therapeutic now. Goal 2-3. Platelets improving.  - Continue current HTN meds  - Will defer steroid taper to nephrology. Can consider rheum eval if apt  - LINNETTE drain removed. Surgery signed off  - ID on board. Off abx. Will review recommendations  - Hyperglycemia noted due to steroids.  Continue correction insulin. HbA1c 5.9  - Monitor BMs        PT  Full code      Zach Shafer MD

## 2022-10-10 NOTE — PROGRESS NOTES
10/10/22 1643   Post-Hemodialysis Assessment   Blood Volume Processed (Liters) 61.8 L   Dialyzer Clearance Lightly streaked   Duration of Treatment 180 minutes   Additional Fluid Intake (mL) 500 mL   Total UF (mL) 2500 mL   Net Fluid Removal 2000   Patient Response to Treatment pt tolerated well   Post-Hemodialysis Comments tx duration 3 hours, tx complete, pt reinfused, cvc deaccessed per p&p, new sterile caps applied

## 2022-10-10 NOTE — PROGRESS NOTES
Nephrology follow up progress note    HPI:      Stuart Guevara is a 32 y.o. male has significant past medical history.  Recently he had cardiovascular surgery and TEVAR with the left carotid to left subclavian artery bypass.  After that he developed abdominal distension and lactic acidosis.  He underwent exploratory lap with evidence of viable bowel and no significant bleeding and it was closed with LINNETTE drains.  Postop he developed bradycardia and had pacemaker placement per Cardiology.  After that he developed acute kidney injury and hyperkalemia and we have been dialyzing him for that.  Urine output has been low.  He has lack of energy.  Poor appetite.  No nausea.  No shortness of breath.  He also had lupus serositis for which Rheumatology has placed him on steroids and immunosuppressants.    Interval history:          Review of Systems:       Past medical, family, surgical, and social history reviewed and unchanged from initial consult note.     Objective:     Awake alert oriented to time person place.  Sitting in the recliner.  VITAL SIGNS: 24 HR MIN & MAX LAST    Temp  Min: 97.9 °F (36.6 °C)  Max: 98.7 °F (37.1 °C)  98.7 °F (37.1 °C)        BP  Min: 151/83  Max: 161/79  (!) 151/83     Pulse  Min: 64  Max: 72  64     Resp  Min: 12  Max: 20  20    SpO2  Min: 94 %  Max: 96 %  (!) 94 %      GEN: Chronically ill appearing in NAD  HEENT: Conjunctiva anicteric, pupils reactive.  Extraocular movements intact.  CV: RRR without rub or gallop.  PULM: CTAB, unlabored  ABD: Soft, NT/ND abdomen.  Positive bowel sounds.  EXT: No cyanosis or edema  SKIN: Warm and dry  PSYCH: Awake, alert, and appropriately conversant  Vascular access:  Right IJ temporary dialysis catheter.          Component Value Date/Time     (L) 10/10/2022 0324     (L) 10/09/2022 0519    K 6.2 (H) 10/10/2022 0324    K 4.8 10/09/2022 0519    CHLORIDE 91 (L) 10/10/2022 0324    CHLORIDE 94 (L) 10/09/2022 0519    CO2 28 10/10/2022 0324    CO2 24  10/09/2022 0519    BUN 68.8 (H) 10/10/2022 0324    BUN 58.0 (H) 10/09/2022 0519    CREATININE 4.07 (H) 10/10/2022 0324    CREATININE 3.75 (H) 10/09/2022 0519    CALCIUM 7.7 (L) 10/10/2022 0324    CALCIUM 7.6 (L) 10/09/2022 0519    PHOS 8.4 (H) 10/08/2022 0032            Component Value Date/Time    WBC 14.3 (H) 10/10/2022 0324    WBC 15.6 (H) 10/09/2022 0519    HGB 10.0 (L) 10/10/2022 0324    HGB 9.3 (L) 10/09/2022 0519    HCT 30.5 (L) 10/10/2022 0324    HCT 28.4 (L) 10/09/2022 0519    HCT 28 (L) 09/23/2022 0304    HCT 30.0 (L) 02/15/2021 1751     (H) 10/10/2022 0324     (H) 10/09/2022 0519       Imaging reviewed      Assessment / Plan:   1. TAY.  Low urine output.  Hyperkalemia.  Needs dialysis.  2. Hyperkalemia  3. Hypertension  4. Type 2 diabetes mellitus  5. SLE  6. Hyponatremia persistent.    Recommendation  Discussed with the patient all the labs and the need for dialysis therapy and he agreed to that.  Will check labs again tomorrow and if serum potassium stays low we will have to go up on the dialysis sodium on him.

## 2022-10-11 NOTE — PROGRESS NOTES
[facility]  Hospital Medicine Progress Note        Chief Complaint: Inpatient Follow-up for Aortic disscetion     HPI:   Stuart Guevara is a 32 y.o. male who has PMH which includes HTN, SLE,  hepatitis B, aortic dissection with repair 2/2021, RUE DVT on Xarelto; presented to the ED at [facility] on 9/23/2022  with reports of  shortness of breath and chest pain radiating to his back.  PT has a history of aortic dissection 2/2021. Work up revealed a type B aortic dissection and significant hypertension noted on presentation.  He failed medical management and had progression of his symptoms despite adequate control of hemodynamic parameters.  PT was transferred from [facility] to [facility] for CV surgery evaluation which he was taken  to the OR and had TEVAR with left carotid to subclavian artery bypass. PT developed abdominal distension and rigidity of his abdomen and developed a lactic acidosis.  He repeat CT angiography of the chest/abdomen/pelvis without evidence of worsening dissection, but evidence of significant intra-abdominal pathology with some hemoperitoneum, free blood in the abdomen.  He underwent exploratory laparotomy with evidence of viable bowel and no significant bleeding identified, abd was closed with flakito drain left in situ chest tube was placed. Postoperatively, he developed severe unstable bradycardia, and a transvenous pacemaker was placed by Cardiology.  Shortly afterwards, he was noted to have significant worsening of his renal failure and critical hyperkalemia. Renal services consulted which catheter was placed and he was started on HD treatments.  PT has been cleared for transfer out of ICU to the floor. Hospital medicine services consulted for assumption of care.     Patient is s/p AAA surgery repair , course c/by abd compartment syndrome s/p ex lap and closure with flakito drain in situ, sepsis syndrome with fever and worsening leucocytosis , Appreciate ID input antibiotics  broadened to vancomycin Flagyl and cefepime, concern for SLE serositis , rheum consulted started on high dose steori and immunosuppressants.      Interval Hx:    No changes overnight.  Resting comfortably in bed.  Went for dialysis yesterday without issue.  No pain.  Discussed with nursing.  Continue with antibiotics for now.  Surgery to come re-evaluate previous drain site.     Objective/physical exam:  General: In no acute distress, afebrile  Respiratory: Clear to auscultation bilaterally  Cardiovascular: S1, S2, no appreciable murmur  Abdomen: Soft, nontender, BS +, dressing, TTP  MSK: Warm, no lower extremity edema, no clubbing or cyanosis  Neurologic: Alert and oriented x4, moving all extremities with good strength     VITAL SIGNS: 24 HRS MIN & MAX LAST   Temp  Min: 97 °F (36.1 °C)  Max: 98.7 °F (37.1 °C) 98.7 °F (37.1 °C)   BP  Min: 122/75  Max: 157/88 138/78   Pulse  Min: 57  Max: 69  60   Resp  Min: 17  Max: 22 18   SpO2  Min: 94 %  Max: 97 % 95 %       Recent Labs   Lab 10/09/22  0519 10/10/22  0324 10/11/22  0448   WBC 15.6* 14.3* 9.5   RBC 3.32* 3.57* 3.91*   HGB 9.3* 10.0* 10.8*   HCT 28.4* 30.5* 33.7*   MCV 85.5 85.4 86.2   MCH 28.0 28.0 27.6   MCHC 32.7* 32.8* 32.0*   RDW 16.5 16.2 16.4   * 498* 509*   MPV 10.4 10.4 10.5*       Recent Labs   Lab 10/06/22  2336 10/07/22  0129 10/09/22  0519 10/10/22  0324 10/11/22  0448   NA  --    < > 128* 124* 126*   K  --    < > 4.8 6.2* 6.1*   CO2  --    < > 24 28 29   BUN  --    < > 58.0* 68.8* 49.0*   CREATININE  --    < > 3.75* 4.07* 3.18*   CALCIUM  --    < > 7.6* 7.7* 7.9*   PH 7.44  --   --   --   --    MG  --    < > 2.20 2.30 2.30   ALBUMIN  --    < > 1.3* 1.3* 1.4*   ALKPHOS  --    < > 78 81 82   ALT  --    < > 15 14 12   AST  --    < > 32 30 29   BILITOT  --    < > 0.3 0.3 0.3    < > = values in this interval not displayed.          Microbiology Results (last 7 days)       Procedure Component Value Units Date/Time    Blood Culture [[id number]]   (Abnormal) Collected: 10/03/22 1752    Order Status: Completed Specimen: Blood Updated: 10/10/22 0757     CULTURE, BLOOD ([facility]) ANAEROBIC GRAM POSITIVE ZANDER     GRAM STAIN Gram Positive Rods      Seen in gram stain of broth only      1 of 2 Anaerobic bottles positive    Blood Culture [[id number]]  (Normal) Collected: 10/03/22 1752    Order Status: Completed Specimen: Blood Updated: 10/08/22 2000     CULTURE, BLOOD ([facility]) No Growth at 5 days    Blood Culture [[id number]]  (Normal) Collected: 10/03/22 1615    Order Status: Completed Specimen: Blood Updated: 10/08/22 1801     CULTURE, BLOOD ([facility]) No Growth at 5 days    Respiratory Culture [[id number]]  (Abnormal) Collected: 10/04/22 1842    Order Status: Completed Specimen: Sputum, Expectorated Updated: 10/07/22 1307     Respiratory Culture Moderate Streptococcus constellatus     Comment: with normal respiratory lacy        GRAM STAIN Quality 2+      Moderate Gram positive cocci      Moderate Gram Negative Rods      Few Gram Positive Rods    Blood Culture [[id number]]  (Normal) Collected: 10/02/22 0605    Order Status: Completed Specimen: Blood Updated: 10/07/22 1000     CULTURE, BLOOD ([facility]) No Growth at 5 days             See below for Radiology    Scheduled Med:   aluminum-magnesium hydroxide-simethicone  30 mL Oral QID (AC & HS)    amLODIPine  10 mg Oral Daily    carvediloL  25 mg Oral BID    docusate sodium  100 mg Oral BID    famotidine  20 mg Oral Every other day    hydrALAZINE  50 mg Oral Q8H    hydrOXYchloroQUINE  200 mg Oral BID    insulin detemir U-100  14 Units Subcutaneous BID    losartan  25 mg Oral Daily    methylPREDNISolone sodium succinate injection  40 mg Intravenous TID    morphine  30 mg Oral Q12H    mycophenolate  1,000 mg Oral BID    polyethylene glycol  17 g Oral BID    sucralfate  1 g Oral BID    warfarin  3 mg Oral Daily        Continuous Infusions:       PRN Meds:  acetaminophen, albuterol, bisacodyL, dextrose 10%, dextrose 50%, dextrose 50%,  glucagon (human recombinant), glucose, glucose, hydrALAZINE, HYDROcodone-acetaminophen, HYDROmorphone, insulin aspart U-100, labetaloL, LIDOcaine (PF) 10 mg/ml (1%), lorazepam, morphine, nitroGLYCERIN, ondansetron, sodium chloride 0.9%, sodium chloride 0.9%, sodium chloride 0.9%, sodium chloride 0.9%, sodium chloride 0.9%, sodium chloride 0.9%       Assessment/Plan:   SLE flare and serositis   Acute respiratory failure with hypoxia- s/p mechanical ventilation, now extubated  Acute aortic dissection type B- s/p TEVAR with left carotid artery to left subclavian bypass 9/23/2022  ARF- s/p HD treatments  New onset Cardiomyopathy- EF 37% unknown etiology  VHD, s/p mechanical AVR   Acute abdominal pain generalized  Abdominal compartment syndrome, s/p ex-lap x2 last on 9/27 with abdominal closure on 10/1  Acute pancreatitis- resolving with lipase trending down  RUE DVT- was on Xarelto, recent US no DVT  HTN- emergency, resolved   SLE- on cellcept  Anemia- chronic disease  Weakness  HLD  Hx of hepatitis B  Hx of aortic dissection with repair 2/2021  History of transverse myelitis  History of lupus nephritis    Patient doing okay this morning.  Potassium still high after dialysis yesterday.  Currently 6.1.  May need to run again today.    Fortunately his leukocytosis has resolved.  Currently off of antibiotics.  Infectious Disease is following from afar.    General surgery has been asked to come re-evaluate his drain.  Drain sites started putting out some fluid.  May be just be a seroma formation.  Fluid is clear and nonbloody.  No pus.    He remains on high-dose IV steroids.  On a slow taper per Nephrology.    Continue with warfarin for previous dissection.  INR up to 3.44.  Will need to continue to make adjustments.  Currently on 3 mg daily.  Goal range 2 to 3    Patient condition:  Stable    Anticipated discharge and Disposition: TBD      All diagnosis and differential diagnosis have been reviewed; assessment and plan has  been documented; I have personally reviewed the labs and test results that are presently available; I have reviewed the patients medication list; I have reviewed the consulting providers response and recommendations. I have reviewed or attempted to review medical records based upon their availability    All of the patient's questions have been  addressed and answered. Patient's is agreeable to the above stated plan. I will continue to monitor closely and make adjustments to medical management as needed.  _____________________________________________________________________      Radiology:  Fl Modified Barium Swallow Speech  See procedure notes from Speech Pathologist.    This procedure was auto-finalized.  X-Ray Chest 1 View  Narrative: EXAMINATION:  XR CHEST 1 VIEW    CLINICAL HISTORY:  evaluate CT placement;    TECHNIQUE:  Single frontal view of the chest was performed.    COMPARISON:  10/04/2022    FINDINGS:  LINES AND TUBES: Right IJ CVC tip projects over the superior cavoatrial junction. EKG/telemetry leads overlie the chest.    MEDIASTINUM AND RELL: Cardiac silhouette is enlarged. There is an aortic stent graft and aortic valve prosthesis.    LUNGS: Unchanged retrocardiac opacity with silhouetting of the diaphragm.    PLEURA:Cannot exclude small left pleural effusion.No pneumothorax.    BONES: No acute osseous abnormality.  Impression: Unchanged retrocardiac opacity suggesting atelectasis    Electronically signed by: Rocio White  Date:    10/05/2022  Time:    06:11      Shamar Stewart MD   10/11/2022

## 2022-10-11 NOTE — PLAN OF CARE
Patient well known to service. Notified by nurse of drainage from old LINNETTE site. Patient endorses continued serous drainage from site with bandage in place. Denies any abdominal pain or discomfort. Wound evaluated. Small incision with scant purulent drainage with serous drainage. Will reevaluate on rounds in the morning. Recommend wound care.

## 2022-10-11 NOTE — PROGRESS NOTES
Inpatient Nutrition Assessment    Admit Date: 9/23/2022   Total duration of encounter: 18 days     Nutrition Recommendation/Prescription     Advance diet when appropriate. Goal diet: diabetic vs. GI soft.  Continue with Boost Plus (provides 360 kcal, 14 g protein per serving) TID for added protein and kcal.  Will need to consider appetite stimulant vs. NG placement per MD if po intake does not improve.     Communication of Recommendations: reviewed with nurse    Nutrition Assessment     Malnutrition Assessment/Nutrition-Focused Physical Exam    Malnutrition in the context of acute illness or injury  Degree of Malnutrition: does not meet criteria  Energy Intake: unable to obtain  Interpretation of Weight Loss: unable to obtain  Body Fat:does not meet criteria  Area of Body Fat Loss: does not meet criteria  Muscle Mass Loss: does not meet criteria  Area of Muscle Mass Loss: does not meet criteria  Fluid Accumulation: does not meet criteria  Edema: does not meet criteria   Reduced  Strength: unable to obtain  A minimum of two characteristics is recommended for diagnosis of either severe or non-severe malnutrition.    Chart Review    Reason Seen: continuous nutrition monitoring and follow-up    Diagnosis:  Fever and Leukocytosis  Abdominal aortic dissection, s/p repair  Abdominal compartment syndrome, s/p ex-lap x2 last on 9/27 with abdominal closure on 10/1  SLE with active flare  History of transverse myelitis  History of lupus nephritis  TAY now on HD  VHD, s/p mechanical AVR  Chest tube in place  DM2    Relevant Medical History: HTN, lupus    Nutrition-Related Medications: detemir 10 Units BID, docusate, miralax  Calorie Containing IV Medications: no significant kcals from medications at this time    Nutrition-Related Labs:  9/26 BUN 27, Crea 3.06, Glu 174, Phos 6.2  9/30 Na 128, K 3.3, Cl 96, BUN 21.5, Crea 3.28, Glu 205  10/4 Na 127, Cl 95, BUN 57.3, Crea 5.16, Glu 152, Phos 5  10/7 K 5.6, BUN 55.2, Crea 4.57,  "Glu 347, Phos 8  10/11: Na 126, K 6.1, Cl 91, BUN 49, Crea 3.18, GFR 25, Glu 139, Ca 7.9, Phos 7.4    Diet/PN Order: Diet full liquid  Oral Supplement Order: Boost Plus  Tube Feeding Order: none at this time  Appetite/Oral Intake: fair/50-75% of meals  Factors Affecting Nutritional Intake: decreased appetite  Food/Yazdanism/Cultural Preferences: unable to obtain at this time    Skin Integrity: incision  Wound(s):   incision noted    Comments    9/26/22: Discussed with RN. Will provide tube feeding recommendations for when appropriate to start tube feeding. Receiving kcal from meds. Noted Phos, will monitor for need for renal formula.   9/30/22: Discussed with RN. Need to consider TPN since pt now LOS day 7. If starting tube feeding, recs provided. If able to extubate, advance diet when appropriate.   10/4/22: Pt previously eating 100% po intake of meals, good appetite. Currently NPO for procedure, plans to restart diet per RN. Will add ONS for added protein and kcal.  10/7/22: Pt now with decreased appetite/po intake. Noted elevated K and Phos, likely not diet related since pt with poor po intake of full liquid diet.   10/11: pt sleeping, nurse reports tolerating full liquid diet, some abdominal distention noted, reports unsure of plans to advance diet as this time    Anthropometrics    Height: 5' 5" (165.1 cm) Height Method: Estimated  Last Weight: 62.6 kg (138 lb 0.1 oz) (10/10/22 0500) Weight Method: Bed Scale  BMI (Calculated): 23  BMI Classification: normal (BMI 18.5-24.9)        Ideal Body Weight (IBW), Male: 136 lb     % Ideal Body Weight, Male (lb): 97.26 %                          Usual Weight Provided By: unable to obtain usual weight at this time    Wt Readings from Last 5 Encounters:   10/10/22 62.6 kg (138 lb 0.1 oz)   09/23/22 60 kg (132 lb 4.4 oz)   12/09/21 48 kg (105 lb 13.1 oz)     Weight Change(s) Since Admission:  Admit Weight: 60 kg (132 lb 4.4 oz) (09/23/22 1219)  10/4/22: no new wt  10/7/22: " no new wt    Estimated Needs    Weight Used For Calorie Calculations: 60 kg (132 lb 4.4 oz)  Energy Calorie Requirements (kcal): 2083kcal (1.4 stress factor)  Energy Need Method: Geneva-St Jeor  Weight Used For Protein Calculations: 60 kg (132 lb 4.4 oz)  Protein Requirements: 72-84gm (1.2-1.4g/kg)  Fluid Requirements (mL): 1000ml + urinary output  Temp: 97.9 °F (36.6 °C)  Vtot (L/Min) for Marcial State Equation Calculation: 13.5    Enteral Nutrition    Patient not receiving enteral nutrition at this time.    Parenteral Nutrition    Patient not receiving parenteral nutrition support at this time.    Evaluation of Received Nutrient Intake    Calories: not meeting estimated needs  Protein: not meeting estimated needs    Patient Education    Not applicable.    Nutrition Diagnosis     PES: Inadequate oral intake related to current condition as evidenced by poor po intake of liquid diet. (continues)    Interventions/Goals     Intervention(s): general/healthful diet, commercial beverage, and collaboration with other providers  Goal: Meet greater than 75% of nutritional needs by follow-up. (goal progressing)    Monitoring & Evaluation     Dietitian will monitor energy intake.  Nutrition Risk/Follow-Up: high (follow-up in 1-4 days)

## 2022-10-11 NOTE — NURSING
10/11/22 1624   Post-Hemodialysis Assessment   Blood Volume Processed (Liters) 51.9 L   Dialyzer Clearance Clear   Total UF (mL) 1000 mL   Patient Response to Treatment Pt tolerated HD tx well; NAD/VSS. Total tx length 3hrs with 1 Liter UF goal.   Post-Hemodialysis Comments Pt deaccessed per P and P

## 2022-10-11 NOTE — PROGRESS NOTES
Infectious Disease  Progress Note    Patient Name: Stuart Guevara   MRN: [id number]   Admission Date: 9/23/2022   Hospital Length of Stay: 18 days  Attending Physician: Shamar Stewart MD   Primary Care Provider: Primary Doctor No     Isolation Status: No active isolations       Subjective:   AF, VSS.  Reports some mild abdominal distention although no tenderness.  Also c/o some heaviness to the chest.  No significant sputum production.        Review of Systems   Review of Systems   All other systems reviewed and are negative.     Objective:     Vital Signs (Most Recent):  Temp: 97.9 °F (36.6 °C) (10/11/22 1100)  Pulse: (!) 59 (10/11/22 1100)  Resp: 16 (10/11/22 1100)  BP: (!) 169/89 (10/11/22 1100)  SpO2: 97 % (10/11/22 0738)  Vital Signs (24h Range):  Temp:  [97 °F (36.1 °C)-98.7 °F (37.1 °C)] 97.9 °F (36.6 °C)  Pulse:  [57-60] 59  Resp:  [16-22] 16  SpO2:  [95 %-97 %] 97 %  BP: (122-169)/(75-90) 169/89      Weight:   Wt Readings from Last 1 Encounters:   10/10/22 62.6 kg (138 lb 0.1 oz)      Body mass index is Body mass index is 22.97 kg/m².     Estimated Creatinine Clearance: Estimated Creatinine Clearance: 28.7 mL/min (A) (based on SCr of 3.18 mg/dL (H)).     Lines/Drains/Airways       Central Venous Catheter Line  Duration             Trialysis (Dialysis) Catheter 10/04/22 1300 right internal jugular 6 days              Peripheral Intravenous Line  Duration                  Peripheral IV - Single Lumen 10/03/22 1615 18 G Anterior;Right Upper Arm 7 days                     Physical Exam  Physical Exam  Constitutional:       Appearance: Normal appearance. He is ill-appearing.   HENT:      Head: Normocephalic and atraumatic.      Mouth/Throat:      Pharynx: No oropharyngeal exudate or posterior oropharyngeal erythema.   Eyes:      Extraocular Movements: Extraocular movements intact.      Pupils: Pupils are equal, round, and reactive to light.   Cardiovascular:      Rate and Rhythm: Normal rate and regular rhythm.       Heart sounds: Murmur heard.      Comments: Murmur and click from mechanical valve heard  Pulmonary:      Effort: No respiratory distress.      Breath sounds: No wheezing, rhonchi or rales.      Comments: Chest tube removed  Abdominal:      General: Bowel sounds are normal. There is distension.      Tenderness: There is no abdominal tenderness. There is no right CVA tenderness or left CVA tenderness.      Comments: Abdominal drain was removed, abdomen remains somewhat firm however no guarding    Musculoskeletal:         General: No swelling or tenderness.      Cervical back: Neck supple. No rigidity or tenderness.   Lymphadenopathy:      Cervical: No cervical adenopathy.   Skin:     Findings: No lesion or rash.   Neurological:      General: No focal deficit present.      Mental Status: He is alert and oriented to person, place, and time. Mental status is at baseline.      Cranial Nerves: No cranial nerve deficit.      Motor: Weakness present.   Psychiatric:         Mood and Affect: Mood normal.         Behavior: Behavior normal.        Significant Labs: CBC:   Recent Labs   Lab 10/10/22  0324 10/11/22  0448   WBC 14.3* 9.5   HGB 10.0* 10.8*   HCT 30.5* 33.7*   * 509*       CMP:   Recent Labs   Lab 10/10/22  0324 10/11/22  0448   * 126*   K 6.2* 6.1*   CO2 28 29   BUN 68.8* 49.0*   CREATININE 4.07* 3.18*   CALCIUM 7.7* 7.9*   ALBUMIN 1.3* 1.4*   BILITOT 0.3 0.3   ALKPHOS 81 82   AST 30 29   ALT 14 12         Microbiology Results (last 7 days)       Procedure Component Value Units Date/Time    Blood Culture [[id number]]  (Abnormal) Collected: 10/03/22 1752    Order Status: Completed Specimen: Blood Updated: 10/10/22 0757     CULTURE, BLOOD ([facility]) ANAEROBIC GRAM POSITIVE ZANDER     GRAM STAIN Gram Positive Rods      Seen in gram stain of broth only      1 of 2 Anaerobic bottles positive    Blood Culture [[id number]]  (Normal) Collected: 10/03/22 1752    Order Status: Completed Specimen: Blood Updated:  10/08/22 2000     CULTURE, BLOOD ([facility]) No Growth at 5 days    Blood Culture [[id number]]  (Normal) Collected: 10/03/22 1615    Order Status: Completed Specimen: Blood Updated: 10/08/22 1801     CULTURE, BLOOD ([facility]) No Growth at 5 days    Respiratory Culture [[id number]]  (Abnormal) Collected: 10/04/22 1842    Order Status: Completed Specimen: Sputum, Expectorated Updated: 10/07/22 1307     Respiratory Culture Moderate Streptococcus constellatus     Comment: with normal respiratory lacy        GRAM STAIN Quality 2+      Moderate Gram positive cocci      Moderate Gram Negative Rods      Few Gram Positive Rods    Blood Culture [[id number]]  (Normal) Collected: 10/02/22 0605    Order Status: Completed Specimen: Blood Updated: 10/07/22 1000     CULTURE, BLOOD ([facility]) No Growth at 5 days             Significant Imaging: I have reviewed all pertinent imaging results/findings within the past 24 hours.    Assessment/Plan:        32-year-old male with a past medical history of lupus who presented with severe chest pain was subsequently found to have a dissecting aortic aneurysm and underwent repair which was complicated by abdominal compartment syndrome and an TAY, now status post decompressive laparotomy.  Now with worsening leukocytosis.  ID consulted for input.     Fever and Leukocytosis  Abdominal aortic dissection, s/p repair  Abdominal compartment syndrome, s/p ex-lap x2 last on 9/27 with abdominal closure on 10/1  SLE with active flare  History of transverse myelitis  History of lupus nephritis  TAY now on HD  VHD, s/p mechanical AVR  Chest tube in place  DM2      Plan:  Stable from ID standpoint.   Surgery evaluated prior drain site - drainage s/t ascites, should improve over time.   No indication for abx.    Encouraged mobilization and IS use as tolerated.   Discussed with patient.

## 2022-10-11 NOTE — ASSESSMENT & PLAN NOTE
On Solu-Medrol.,  CellCept 1 g p.o. b.i.d., Plaquenil 200 mg p.o. b.i.d..  Start Benlysta 200 mg subcutaneously once a week

## 2022-10-11 NOTE — SUBJECTIVE & OBJECTIVE
Past Medical History:   Diagnosis Date    Hypertension     Systemic lupus erythematosus, organ or system involvement unspecified     Systemic lupus erythematosus, organ or system involvement unspecified        Past Surgical History:   Procedure Laterality Date    APPLICATION OF WOUND VACUUM-ASSISTED CLOSURE DEVICE  9/27/2022    Procedure: APPLICATION, WOUND VAC;  Surgeon: Christopher Espinal MD;  Location: [facility];  Service: General;;    CARDIAC SURGERY      FINGER AMPUTATION      THROMBECTOMY Right     TOE AMPUTATION           There is no immunization history on file for this patient.    Review of patient's allergies indicates:   Allergen Reactions    Levofloxacin     Sulfamethoxazole-trimethoprim      Current Facility-Administered Medications   Medication Frequency    acetaminophen tablet 650 mg Q6H PRN    albuterol inhaler 2 puff Q4H PRN    aluminum-magnesium hydroxide-simethicone 200-200-20 mg/5 mL suspension 30 mL QID (AC & HS)    amLODIPine tablet 10 mg Daily    bisacodyL suppository 10 mg Daily PRN    carvediloL tablet 25 mg BID    dextrose 10% bolus 125 mL PRN    dextrose 50% injection 12.5 g PRN    dextrose 50% injection 25 g PRN    docusate sodium capsule 100 mg BID    famotidine tablet 20 mg Every other day    glucagon (human recombinant) injection 1 mg PRN    glucose chewable tablet 16 g PRN    glucose chewable tablet 24 g PRN    hydrALAZINE injection 20 mg Q4H PRN    hydrALAZINE tablet 50 mg Q8H    HYDROcodone-acetaminophen 5-325 mg per tablet 1 tablet Q4H PRN    HYDROmorphone (PF) injection 1 mg Q4H PRN    hydrOXYchloroQUINE tablet 200 mg BID    insulin aspart U-100 injection 1-16 Units Q4H PRN    insulin detemir U-100 injection 14 Units BID    labetaloL injection 20 mg Q8H PRN    LIDOcaine (PF) 10 mg/ml (1%) injection PRN    lorazepam injection 1 mg Q2H PRN    losartan tablet 25 mg Daily    methylPREDNISolone sodium succinate injection 40 mg TID    morphine 12 hr tablet 30 mg Q12H    morphine injection 2  mg Q2H PRN    mycophenolate capsule 1,000 mg BID    nitroGLYCERIN SL tablet 0.4 mg Q5 Min PRN    ondansetron injection 4 mg Q6H PRN    polyethylene glycol packet 17 g BID    sodium chloride 0.9% bolus 250 mL PRN    sodium chloride 0.9% bolus 250 mL PRN    sodium chloride 0.9% bolus 250 mL PRN    sodium chloride 0.9% flush 10 mL PRN    sodium chloride 0.9% flush 10 mL PRN    sodium chloride 0.9% flush 10 mL PRN    sucralfate tablet 1 g BID    warfarin (COUMADIN) tablet 3 mg Daily     Family History    None       Tobacco Use    Smoking status: Every Day     Types: Cigarettes    Smokeless tobacco: Never   Substance and Sexual Activity    Alcohol use: Not on file    Drug use: Not on file    Sexual activity: Not on file     Review of Systems   Constitutional:  Positive for fatigue.   HENT: Negative.     Eyes: Negative.    Respiratory: Negative.     Cardiovascular: Negative.    Gastrointestinal: Negative.    Genitourinary:         The patient is on hemodialysis   Neurological: Negative.    Objective:     Vital Signs (Most Recent):  Temp: 98.6 °F (37 °C) (10/11/22 0738)  Pulse: (!) 58 (10/11/22 0738)  Resp: 18 (10/11/22 0738)  BP: (!) 160/90 (10/11/22 0738)  SpO2: 97 % (10/11/22 0738)  O2 Device (Oxygen Therapy): room air (10/10/22 1737)   Vital Signs (24h Range):  Temp:  [97 °F (36.1 °C)-98.7 °F (37.1 °C)] 98.6 °F (37 °C)  Pulse:  [57-69] 58  Resp:  [18-22] 18  SpO2:  [95 %-97 %] 97 %  BP: (122-160)/(75-90) 160/90     Weight: 62.6 kg (138 lb 0.1 oz) (10/10/22 0500)  Body mass index is 22.97 kg/m².  Body surface area is 1.69 meters squared.      Intake/Output Summary (Last 24 hours) at 10/11/2022 0756  Last data filed at 10/10/2022 1643  Gross per 24 hour   Intake 500 ml   Output 2500 ml   Net -2000 ml       Physical Exam   Constitutional: He is oriented to person, place, and time. He appears well-developed and well-nourished. No distress.   HENT:   Head: Normocephalic and atraumatic.   Right Ear: External ear normal.    Left Ear: External ear normal.   Eyes: Pupils are equal, round, and reactive to light.   Cardiovascular: Normal rate, regular rhythm and normal heart sounds.   Pulmonary/Chest: Breath sounds normal.   Abdominal: Soft. There is no abdominal tenderness.   Musculoskeletal:      Right shoulder: Tenderness present.      Left shoulder: Tenderness present.      Right elbow: Tenderness present.      Left elbow: Tenderness present.      Right wrist: Tenderness present.      Left wrist: Tenderness present.      Cervical back: Neck supple.      Right hip: Tenderness present.      Left hip: Tenderness present.      Right knee: Tenderness present.      Left knee: Tenderness present.      Right ankle: Tenderness present.      Left ankle: Tenderness present.   Lymphadenopathy:     He has no cervical adenopathy.   Neurological: He is alert and oriented to person, place, and time. He displays normal reflexes. No cranial nerve deficit or sensory deficit. He exhibits normal muscle tone. Coordination normal.   Skin: No rash noted. No erythema.   Vitals reviewed.      Right Side Rheumatological Exam     The patient is tender to palpation of the shoulder, elbow, wrist, knee, 1st PIP, 1st MCP, 2nd PIP, 2nd MCP, 3rd PIP, 3rd MCP, 4th PIP, 4th MCP, 5th PIP, hip, ankle, 1st MTP, 2nd MTP, 3rd MTP, 4th MTP, 5th MTP, 1st toe IP, 2nd toe IP, 3rd toe IP, 4th toe IP and 5th toe IP    Left Side Rheumatological Exam     The patient is tender to palpation of the shoulder, elbow, wrist, knee, 1st PIP, 1st MCP, 2nd PIP, 2nd MCP, 3rd PIP, 3rd MCP, 4th PIP, 4th MCP, 5th PIP, 5th MCP, hip, ankle, 1st MTP, 2nd MTP, 3rd MTP, 4th MTP, 5th MTP, 1st toe IP, 2nd toe IP, 3rd toe IP, 4th toe IP and 5th toe IP.        Significant Labs:  All pertinent lab results from the last 24 hours have been reviewed.    Significant Imaging:  Imaging results within the past 24 hours have been reviewed.

## 2022-10-11 NOTE — PROGRESS NOTES
Nephrology follow up progress note    HPI:      Stuart Guevara is a 32 y.o. male has significant past medical history.  Recently he had cardiovascular surgery and TEVAR with the left carotid to left subclavian artery bypass.  After that he developed abdominal distension and lactic acidosis.  He underwent exploratory lap with evidence of viable bowel and no significant bleeding and it was closed with LINNETTE drains.  Postop he developed bradycardia and had pacemaker placement per Cardiology.  After that he developed acute kidney injury and hyperkalemia and we have been dialyzing him for that.  Urine output has been low.  He has lack of energy.  Poor appetite.  No nausea.  No shortness of breath.  He also had lupus serositis for which Rheumatology has placed him on steroids and immunosuppressants.    Interval history:   10/11/22 has no new complaint.  He was dialyzed yesterday.  2 L fluid was removed.  And he reports that he is making urine also.  Serum potassium was high and it is still high.  Serum sodium was low and it is still low.  Consult from Rheumatology noted and he has been placed on more medication for his lupus.     Review of Systems:       Past medical, family, surgical, and social history reviewed and unchanged from initial consult note.     Objective:     Awake alert oriented to time person place.  Lying down in his bed.  Oxygen is still going on.  Reports that he has been eating very well.  VITAL SIGNS: 24 HR MIN & MAX LAST    Temp  Min: 97 °F (36.1 °C)  Max: 98.7 °F (37.1 °C)  98.6 °F (37 °C)        BP  Min: 122/75  Max: 160/90  (!) 160/90     Pulse  Min: 57  Max: 69  (!) 58     Resp  Min: 18  Max: 22  18    SpO2  Min: 95 %  Max: 97 %  97 %      GEN: Chronically ill appearing in NAD  HEENT: Conjunctiva anicteric, pupils reactive.  Extraocular movements intact.  CV: RRR without rub or gallop.  PULM:  Occasional posterior basal crackles.  ABD: Soft, NT/ND abdomen.  Positive bowel sounds.  EXT: No cyanosis  or edema  SKIN: Warm and dry  PSYCH: Awake, alert, and appropriately conversant  Vascular access:  Right IJ temporary dialysis catheter.          Component Value Date/Time     (L) 10/11/2022 0448     (L) 10/10/2022 0324    K 6.1 (H) 10/11/2022 0448    K 6.2 (H) 10/10/2022 0324    CHLORIDE 91 (L) 10/11/2022 0448    CHLORIDE 91 (L) 10/10/2022 0324    CO2 29 10/11/2022 0448    CO2 28 10/10/2022 0324    BUN 49.0 (H) 10/11/2022 0448    BUN 68.8 (H) 10/10/2022 0324    CREATININE 3.18 (H) 10/11/2022 0448    CREATININE 4.07 (H) 10/10/2022 0324    CALCIUM 7.9 (L) 10/11/2022 0448    CALCIUM 7.7 (L) 10/10/2022 0324    PHOS 7.4 (H) 10/11/2022 0448            Component Value Date/Time    WBC 9.5 10/11/2022 0448    WBC 14.3 (H) 10/10/2022 0324    HGB 10.8 (L) 10/11/2022 0448    HGB 10.0 (L) 10/10/2022 0324    HCT 33.7 (L) 10/11/2022 0448    HCT 30.5 (L) 10/10/2022 0324    HCT 28 (L) 09/23/2022 0304    HCT 30.0 (L) 02/15/2021 1751     (H) 10/11/2022 0448     (H) 10/10/2022 0324       Imaging reviewed      Assessment / Plan:   1. TAY.  Persistent Hyperkalemia.  Needs dialysis again today.  2. Hyperkalemia, will need to discontinue losartan.  Patient has already received his losartan from this morning.  3. Hypertension  4. Type 2 diabetes mellitus  5. SLE  6. Hyponatremia persistent.    Recommendation  Hemodialysis again today to help correct electrolyte abnormalities.  Check labs tomorrow and will also order chest x-ray for tomorrow.  Discontinue losartan.  Although patient has received his losartan already today.

## 2022-10-11 NOTE — ASSESSMENT & PLAN NOTE
Secondary to systemic lupus, the patient is on CellCept, Solu-Medrol, Plaquenil, I will start him on Benlysta as a sample on the inpatient side and as a prescription as an outpatient

## 2022-10-11 NOTE — CONSULTS
[facility] [address] [facility]  Rheumatology  Consult Note    Patient Name: Stuart Guevara  MRN: [id number]  Admission Date: 9/23/2022  Hospital Length of Stay: 18 days  Code Status: Full Code   Attending Provider: Shamar Stewart MD  Primary Care Physician: Primary Doctor No  Principal Problem:Aortic dissection    Consults  Subjective:     HPI: This is a 32-year-old gentleman diagnosed with systemic lupus by Dr. Emily Valiente, complicated by transverse myelitis, treated with CellCept, Benlysta, hydroxychloroquine, and steroids.  During the last admission I restarted all his meds except for the Benlysta.  Today I spoke with the hospitalist concerning getting him to Benlysta as a sample from my clinic since it is not available on the inpatient side.  As an outpatient he can get on the Benlysta injections once a week since Medicaid accepts this treatment.  This medication is beneficial for all the issues he has including his kidneys.      Past Medical History:   Diagnosis Date    Hypertension     Systemic lupus erythematosus, organ or system involvement unspecified     Systemic lupus erythematosus, organ or system involvement unspecified        Past Surgical History:   Procedure Laterality Date    APPLICATION OF WOUND VACUUM-ASSISTED CLOSURE DEVICE  9/27/2022    Procedure: APPLICATION, WOUND VAC;  Surgeon: Christopher Espinal MD;  Location: [facility];  Service: General;;    CARDIAC SURGERY      FINGER AMPUTATION      THROMBECTOMY Right     TOE AMPUTATION           There is no immunization history on file for this patient.    Review of patient's allergies indicates:   Allergen Reactions    Levofloxacin     Sulfamethoxazole-trimethoprim      Current Facility-Administered Medications   Medication Frequency    acetaminophen tablet 650 mg Q6H PRN    albuterol inhaler 2 puff Q4H PRN    aluminum-magnesium hydroxide-simethicone 200-200-20 mg/5 mL suspension 30 mL QID (AC & HS)    amLODIPine tablet  10 mg Daily    bisacodyL suppository 10 mg Daily PRN    carvediloL tablet 25 mg BID    dextrose 10% bolus 125 mL PRN    dextrose 50% injection 12.5 g PRN    dextrose 50% injection 25 g PRN    docusate sodium capsule 100 mg BID    famotidine tablet 20 mg Every other day    glucagon (human recombinant) injection 1 mg PRN    glucose chewable tablet 16 g PRN    glucose chewable tablet 24 g PRN    hydrALAZINE injection 20 mg Q4H PRN    hydrALAZINE tablet 50 mg Q8H    HYDROcodone-acetaminophen 5-325 mg per tablet 1 tablet Q4H PRN    HYDROmorphone (PF) injection 1 mg Q4H PRN    hydrOXYchloroQUINE tablet 200 mg BID    insulin aspart U-100 injection 1-16 Units Q4H PRN    insulin detemir U-100 injection 14 Units BID    labetaloL injection 20 mg Q8H PRN    LIDOcaine (PF) 10 mg/ml (1%) injection PRN    lorazepam injection 1 mg Q2H PRN    losartan tablet 25 mg Daily    methylPREDNISolone sodium succinate injection 40 mg TID    morphine 12 hr tablet 30 mg Q12H    morphine injection 2 mg Q2H PRN    mycophenolate capsule 1,000 mg BID    nitroGLYCERIN SL tablet 0.4 mg Q5 Min PRN    ondansetron injection 4 mg Q6H PRN    polyethylene glycol packet 17 g BID    sodium chloride 0.9% bolus 250 mL PRN    sodium chloride 0.9% bolus 250 mL PRN    sodium chloride 0.9% bolus 250 mL PRN    sodium chloride 0.9% flush 10 mL PRN    sodium chloride 0.9% flush 10 mL PRN    sodium chloride 0.9% flush 10 mL PRN    sucralfate tablet 1 g BID    warfarin (COUMADIN) tablet 3 mg Daily     Family History    None       Tobacco Use    Smoking status: Every Day     Types: Cigarettes    Smokeless tobacco: Never   Substance and Sexual Activity    Alcohol use: Not on file    Drug use: Not on file    Sexual activity: Not on file     Review of Systems   Constitutional:  Positive for fatigue.   HENT: Negative.     Eyes: Negative.    Respiratory: Negative.     Cardiovascular: Negative.    Gastrointestinal: Negative.     Genitourinary:         The patient is on hemodialysis   Neurological: Negative.    Objective:     Vital Signs (Most Recent):  Temp: 98.6 °F (37 °C) (10/11/22 0738)  Pulse: (!) 58 (10/11/22 0738)  Resp: 18 (10/11/22 0738)  BP: (!) 160/90 (10/11/22 0738)  SpO2: 97 % (10/11/22 0738)  O2 Device (Oxygen Therapy): room air (10/10/22 1737)   Vital Signs (24h Range):  Temp:  [97 °F (36.1 °C)-98.7 °F (37.1 °C)] 98.6 °F (37 °C)  Pulse:  [57-69] 58  Resp:  [18-22] 18  SpO2:  [95 %-97 %] 97 %  BP: (122-160)/(75-90) 160/90     Weight: 62.6 kg (138 lb 0.1 oz) (10/10/22 0500)  Body mass index is 22.97 kg/m².  Body surface area is 1.69 meters squared.      Intake/Output Summary (Last 24 hours) at 10/11/2022 0756  Last data filed at 10/10/2022 1643  Gross per 24 hour   Intake 500 ml   Output 2500 ml   Net -2000 ml       Physical Exam   Constitutional: He is oriented to person, place, and time. He appears well-developed and well-nourished. No distress.   HENT:   Head: Normocephalic and atraumatic.   Right Ear: External ear normal.   Left Ear: External ear normal.   Eyes: Pupils are equal, round, and reactive to light.   Cardiovascular: Normal rate, regular rhythm and normal heart sounds.   Pulmonary/Chest: Breath sounds normal.   Abdominal: Soft. There is no abdominal tenderness.   Musculoskeletal:      Right shoulder: Tenderness present.      Left shoulder: Tenderness present.      Right elbow: Tenderness present.      Left elbow: Tenderness present.      Right wrist: Tenderness present.      Left wrist: Tenderness present.      Cervical back: Neck supple.      Right hip: Tenderness present.      Left hip: Tenderness present.      Right knee: Tenderness present.      Left knee: Tenderness present.      Right ankle: Tenderness present.      Left ankle: Tenderness present.   Lymphadenopathy:     He has no cervical adenopathy.   Neurological: He is alert and oriented to person, place, and time. He displays normal reflexes. No cranial  nerve deficit or sensory deficit. He exhibits normal muscle tone. Coordination normal.   Skin: No rash noted. No erythema.   Vitals reviewed.      Right Side Rheumatological Exam     The patient is tender to palpation of the shoulder, elbow, wrist, knee, 1st PIP, 1st MCP, 2nd PIP, 2nd MCP, 3rd PIP, 3rd MCP, 4th PIP, 4th MCP, 5th PIP, hip, ankle, 1st MTP, 2nd MTP, 3rd MTP, 4th MTP, 5th MTP, 1st toe IP, 2nd toe IP, 3rd toe IP, 4th toe IP and 5th toe IP    Left Side Rheumatological Exam     The patient is tender to palpation of the shoulder, elbow, wrist, knee, 1st PIP, 1st MCP, 2nd PIP, 2nd MCP, 3rd PIP, 3rd MCP, 4th PIP, 4th MCP, 5th PIP, 5th MCP, hip, ankle, 1st MTP, 2nd MTP, 3rd MTP, 4th MTP, 5th MTP, 1st toe IP, 2nd toe IP, 3rd toe IP, 4th toe IP and 5th toe IP.        Significant Labs:  All pertinent lab results from the last 24 hours have been reviewed.    Significant Imaging:  Imaging results within the past 24 hours have been reviewed.    Assessment/Plan:     * Aortic dissection  Keep blood pressure low to normal    Systemic lupus erythematosus  On Solu-Medrol.,  CellCept 1 g p.o. b.i.d., Plaquenil 200 mg p.o. b.i.d..  Start Benlysta 200 mg subcutaneously once a week    Serositis  Secondary to systemic lupus, the patient is on CellCept, Solu-Medrol, Plaquenil, I will start him on Benlysta as a sample on the inpatient side and as a prescription as an outpatient    Hypertensive emergency  Tight control of the blood pressure to prevent any worsening of the aortic dissection    HTN (hypertension)  As per the medical team        Thank you for your consult. I will follow-up with patient. Please contact us if you have any additional questions.    Wing Thorpe MD  Rheumatology  [facility] - [address] [facility]

## 2022-10-11 NOTE — PT/OT/SLP PROGRESS
Physical Therapy      Patient Name:  Stuart Guevara   MRN:  [id number]    Patient not seen today secondary to he just had ever other staple removed from abdominal incision and was in pain. I instructed him that despite the pain he needs to mobilize but still he refused. Will follow-up tomorrow.

## 2022-10-11 NOTE — PT/OT/SLP PROGRESS
Occupational Therapy      Patient Name:  Stuart Guevara   MRN:  [id number]    Patient refused first attempt this AM secondary to pain, nurse notified. Pt out of room for dialysis during second attempt at treatment. Will follow-up 10/12/2022.    10/11/2022

## 2022-10-12 NOTE — NURSING
10/12/22 1227   Post-Hemodialysis Assessment   Blood Volume Processed (Liters) 62.2 L   Dialyzer Clearance Lightly streaked   Duration of Treatment 180 minutes   Total UF (mL) 2500 mL   Patient Response to Treatment tolerated hd well

## 2022-10-12 NOTE — PT/OT/SLP PROGRESS
Occupational Therapy      Patient Name:  Stuart Guevara   MRN:  [id number]    Patient not seen today secondary to fatigue from Dialysis. Patient stated he would be ready tomorrow morning. Will follow-up tomorrow.    10/12/2022

## 2022-10-12 NOTE — PROGRESS NOTES
[facility]  Hospital Medicine Progress Note        Chief Complaint: Inpatient Follow-up for Aortic disscetion     HPI:   Stuart Guevara is a 32 y.o. male who has PMH which includes HTN, SLE,  hepatitis B, aortic dissection with repair 2/2021, RUE DVT on Xarelto; presented to the ED at [facility] on 9/23/2022  with reports of  shortness of breath and chest pain radiating to his back.  PT has a history of aortic dissection 2/2021. Work up revealed a type B aortic dissection and significant hypertension noted on presentation.  He failed medical management and had progression of his symptoms despite adequate control of hemodynamic parameters.  PT was transferred from [facility] to [facility] for CV surgery evaluation which he was taken  to the OR and had TEVAR with left carotid to subclavian artery bypass. PT developed abdominal distension and rigidity of his abdomen and developed a lactic acidosis.  He repeat CT angiography of the chest/abdomen/pelvis without evidence of worsening dissection, but evidence of significant intra-abdominal pathology with some hemoperitoneum, free blood in the abdomen.  He underwent exploratory laparotomy with evidence of viable bowel and no significant bleeding identified, abd was closed with flakito drain left in situ chest tube was placed. Postoperatively, he developed severe unstable bradycardia, and a transvenous pacemaker was placed by Cardiology.  Shortly afterwards, he was noted to have significant worsening of his renal failure and critical hyperkalemia. Renal services consulted which catheter was placed and he was started on HD treatments.  PT has been cleared for transfer out of ICU to the floor. Hospital medicine services consulted for assumption of care.     Patient is s/p AAA surgery repair , course c/by abd compartment syndrome s/p ex lap and closure with flakito drain in situ, sepsis syndrome with fever and worsening leucocytosis , Appreciate ID input antibiotics  broadened to vancomycin Flagyl and cefepime, concern for SLE serositis , rheum consulted started on high dose steori and immunosuppressants.      Interval Hx:   Feeling well.  NO current complaints.  Tolerating PO  Rheum came by yesterday and left recs.     Objective/physical exam:  General: In no acute distress, afebrile  Respiratory: Clear to auscultation bilaterally  Cardiovascular: S1, S2, no appreciable murmur  Abdomen: Soft, nontender, BS +, dressing, TTP  MSK: Warm, no lower extremity edema, no clubbing or cyanosis  Neurologic: Alert and oriented x4, moving all extremities with good strength     VITAL SIGNS: 24 HRS MIN & MAX LAST   Temp  Min: 97.3 °F (36.3 °C)  Max: 98.2 °F (36.8 °C) 98.2 °F (36.8 °C)   BP  Min: 134/73  Max: 169/89 (!) 155/83     Pulse  Min: 55  Max: 59  (!) 58     Resp  Min: 16  Max: 19 18   SpO2  Min: 96 %  Max: 98 % 97 %       Recent Labs   Lab 10/10/22  0324 10/11/22  0448 10/12/22  0507   WBC 14.3* 9.5 13.3*   RBC 3.57* 3.91* 3.38*   HGB 10.0* 10.8* 9.4*   HCT 30.5* 33.7* 29.8*   MCV 85.4 86.2 88.2   MCH 28.0 27.6 27.8   MCHC 32.8* 32.0* 31.5*   RDW 16.2 16.4 16.2   * 509* 442*   MPV 10.4 10.5* 10.4       Recent Labs   Lab 10/06/22  2336 10/07/22  0129 10/09/22  0519 10/10/22  0324 10/11/22  0448 10/12/22  0507   NA  --    < > 128* 124* 126* 124*   K  --    < > 4.8 6.2* 6.1* 5.1   CO2  --    < > 24 28 29 27   BUN  --    < > 58.0* 68.8* 49.0* 42.9*   CREATININE  --    < > 3.75* 4.07* 3.18* 2.37*   CALCIUM  --    < > 7.6* 7.7* 7.9* 7.8*   PH 7.44  --   --   --   --   --    MG  --    < > 2.20 2.30 2.30  --    ALBUMIN  --    < > 1.3* 1.3* 1.4* 1.3*   ALKPHOS  --    < > 78 81 82 66   ALT  --    < > 15 14 12 12   AST  --    < > 32 30 29 32   BILITOT  --    < > 0.3 0.3 0.3 0.3    < > = values in this interval not displayed.          Microbiology Results (last 7 days)       Procedure Component Value Units Date/Time    Blood Culture [[id number]]  (Abnormal) Collected: 10/03/22 1752    Order  Status: Completed Specimen: Blood Updated: 10/11/22 1322     CULTURE, BLOOD ([facility]) CUTIBACTERIUM ACNES     Comment: This organism is commonly considered a contaminant.  No further processing will be done.        GRAM STAIN Gram Positive Rods      Seen in gram stain of broth only      1 of 2 Anaerobic bottles positive    Blood Culture [[id number]]  (Normal) Collected: 10/03/22 1752    Order Status: Completed Specimen: Blood Updated: 10/08/22 2000     CULTURE, BLOOD ([facility]) No Growth at 5 days    Blood Culture [[id number]]  (Normal) Collected: 10/03/22 1615    Order Status: Completed Specimen: Blood Updated: 10/08/22 1801     CULTURE, BLOOD ([facility]) No Growth at 5 days    Respiratory Culture [[id number]]  (Abnormal) Collected: 10/04/22 1842    Order Status: Completed Specimen: Sputum, Expectorated Updated: 10/07/22 1307     Respiratory Culture Moderate Streptococcus constellatus     Comment: with normal respiratory lacy        GRAM STAIN Quality 2+      Moderate Gram positive cocci      Moderate Gram Negative Rods      Few Gram Positive Rods    Blood Culture [[id number]]  (Normal) Collected: 10/02/22 0605    Order Status: Completed Specimen: Blood Updated: 10/07/22 1000     CULTURE, BLOOD ([facility]) No Growth at 5 days             See below for Radiology    Scheduled Med:   aluminum-magnesium hydroxide-simethicone  30 mL Oral QID (AC & HS)    amLODIPine  10 mg Oral Daily    carvediloL  25 mg Oral BID    docusate sodium  100 mg Oral BID    famotidine  20 mg Oral Every other day    hydrALAZINE  50 mg Oral Q8H    hydrOXYchloroQUINE  200 mg Oral BID    insulin detemir U-100  14 Units Subcutaneous BID    methylPREDNISolone sodium succinate injection  40 mg Intravenous TID    morphine  30 mg Oral Q12H    mycophenolate  1,000 mg Oral BID    polyethylene glycol  17 g Oral BID    sevelamer carbonate  800 mg Oral TID WM    sucralfate  1 g Oral BID    warfarin  3 mg Oral Daily        Continuous Infusions:       PRN Meds:  acetaminophen,  albuterol, bisacodyL, dextrose 10%, dextrose 50%, dextrose 50%, glucagon (human recombinant), glucose, glucose, hydrALAZINE, HYDROcodone-acetaminophen, HYDROmorphone, insulin aspart U-100, labetaloL, LIDOcaine (PF) 10 mg/ml (1%), lorazepam, morphine, nitroGLYCERIN, ondansetron, sodium chloride 0.9%, sodium chloride 0.9%, sodium chloride 0.9%, sodium chloride 0.9%, sodium chloride 0.9%, sodium chloride 0.9%       Assessment/Plan:   SLE flare and serositis   Acute respiratory failure with hypoxia- s/p mechanical ventilation, now extubated  Acute aortic dissection type B- s/p TEVAR with left carotid artery to left subclavian bypass 9/23/2022  ARF- s/p HD treatments  New onset Cardiomyopathy- EF 37% unknown etiology  VHD, s/p mechanical AVR   Acute abdominal pain generalized  Abdominal compartment syndrome, s/p ex-lap x2 last on 9/27 with abdominal closure on 10/1  Acute pancreatitis- resolving with lipase trending down  RUE DVT- was on Xarelto, recent US no DVT  HTN- emergency, resolved   SLE- on cellcept  Anemia- chronic disease  Weakness  HLD  Hx of hepatitis B  Hx of aortic dissection with repair 2/2021  History of transverse myelitis  History of lupus nephritis     Appreciate Rheumatology recommendations.  Patient started on Solu-Medrol, CellCept, Plaquenil and benlysta.  Will need close follow-up as an outpatient.    continue tight blood pressure control in the setting of previous aortic dissection.  He remains on warfarin for anticoagulation.  INR pending this morning.  Yesterday was 3.4.  We are adjusting his Coumadin dosing.    Renal following along and monitoring for HD.  Creatinine down to 2.37 today.  He was dialyzed yesterday.  Potassium now within normal limits.  Also monitor his hyponatremia.    Needs tighter blood pressure control.  Last pressure was 180/90.  No ACE inhibitor due to renal function.  Will start some nifedipine this morning if okay with Nephrology.  He did require some IV antihypertensives  overnight.    Critical care diagnosis: hypertensive urgency requiring iv antihypertensives  Critical care interventions: hands on evaluation, review of labs/radiographs/records and discussions with family  Critical care time spent: >32 minutes    Patient condition:  Stable    Anticipated discharge and Disposition: TBD      All diagnosis and differential diagnosis have been reviewed; assessment and plan has been documented; I have personally reviewed the labs and test results that are presently available; I have reviewed the patients medication list; I have reviewed the consulting providers response and recommendations. I have reviewed or attempted to review medical records based upon their availability    All of the patient's questions have been  addressed and answered. Patient's is agreeable to the above stated plan. I will continue to monitor closely and make adjustments to medical management as needed.  _____________________________________________________________________      Radiology:  X-Ray Chest 1 View  Narrative: EXAMINATION:  XR CHEST 1 VIEW    CPT 25244    CLINICAL HISTORY:  TAY, SOB;    COMPARISON:  October 5, 2022    FINDINGS:  Examination reveals cardiomediastinal silhouette to be unchanged as compared with the previous exam.    Endo stent is identified in the descending aorta central line is seen with the tip at the junction of the superior vena cava and right atrium.    There is persistent increased left retrocardiac density and silhouetting of the left hemidiaphragm which might be related to an infiltrate/atelectasis    No focal consolidative changes otherwise identified no other significant change  Impression: Persistent increased left retrocardiac density and silhouetting of the left hemidiaphragm.    No other focal consolidative changes.    No other change    Electronically signed by: Ramón Ying  Date:    10/12/2022  Time:    08:15      Shamar Stewart MD   10/12/2022

## 2022-10-12 NOTE — PLAN OF CARE
Acute Care Surgery Plan of Care  Patient seen this morning on rounds; LLQ wound at prior LINNETTE site inspected. Still with clear drainage from this area, likely secondary to ESRD/ascites. Hole closed with a silk suture at bedside. OK to dress PRN if leakage continues. DC midline abdominal staples today.    Surgery will sign off, please reach out with future questions/concerns.     Dewayne Cason MD  [employer] General Surgery PGY-4  10/12/2022, 3:13 PM

## 2022-10-12 NOTE — PROGRESS NOTES
Nephrology follow up progress note    HPI:      Stuart Guevraa is a 32 y.o. male has significant past medical history.  Recently he had cardiovascular surgery and TEVAR with the left carotid to left subclavian artery bypass.  After that he developed abdominal distension and lactic acidosis.  He underwent exploratory lap with evidence of viable bowel and no significant bleeding and it was closed with LINNETTE drains.  Postop he developed bradycardia and had pacemaker placement per Cardiology.  After that he developed acute kidney injury and hyperkalemia and we have been dialyzing him for that.  Urine output has been low.  He has lack of energy.  Poor appetite.  No nausea.  No shortness of breath.  He also had lupus serositis for which Rheumatology has placed him on steroids and immunosuppressants.    Interval history:   10/11/22 has no new complaint.  He was dialyzed yesterday.  2 L fluid was removed.  And he reports that he is making urine also.  Serum potassium was high and it is still high.  Serum sodium was low and it is still low.  Consult from Rheumatology noted and he has been placed on more medication for his lupus.    10/12/22:  Patient completed first three HD treatments 9/25, 26 and 27. He is now dialyzing on TThSa schedule since 9/29 and tolerating well. No significant urine output.       Objective:       VITAL SIGNS: 24 HR MIN & MAX LAST    Temp  Min: 97.3 °F (36.3 °C)  Max: 98.2 °F (36.8 °C)  97.8 °F (36.6 °C)        BP  Min: 134/73  Max: 180/90  (!) 180/90     Pulse  Min: 55  Max: 61  61     Resp  Min: 12  Max: 19  12    SpO2  Min: 96 %  Max: 98 %  96 %      GEN: Chronically ill appearing in NAD  HEENT: Conjunctiva anicteric, pupils reactive.  Extraocular movements intact.  CV: RRR   PULM: CTAB  ABD: closed with midline staples, C/D/I, deferred direct exam  EXT: non-pitting BLE edema   SKIN: Warm and dry  PSYCH: Awake, alert, and appropriately conversant  Vascular access:  Right IJ temporary dialysis  catheter (placed 9/25)          Component Value Date/Time     (L) 10/12/2022 0507     (L) 10/11/2022 0448    K 5.1 10/12/2022 0507    K 6.1 (H) 10/11/2022 0448    CHLORIDE 94 (L) 10/12/2022 0507    CHLORIDE 91 (L) 10/11/2022 0448    CO2 27 10/12/2022 0507    CO2 29 10/11/2022 0448    BUN 42.9 (H) 10/12/2022 0507    BUN 49.0 (H) 10/11/2022 0448    CREATININE 2.37 (H) 10/12/2022 0507    CREATININE 3.18 (H) 10/11/2022 0448    CALCIUM 7.8 (L) 10/12/2022 0507    CALCIUM 7.9 (L) 10/11/2022 0448    PHOS 7.4 (H) 10/11/2022 0448            Component Value Date/Time    WBC 13.3 (H) 10/12/2022 0507    WBC 9.5 10/11/2022 0448    HGB 9.4 (L) 10/12/2022 0507    HGB 10.8 (L) 10/11/2022 0448    HCT 29.8 (L) 10/12/2022 0507    HCT 33.7 (L) 10/11/2022 0448    HCT 28 (L) 09/23/2022 0304    HCT 30.0 (L) 02/15/2021 1751     (H) 10/12/2022 0507     (H) 10/11/2022 0448     Assessment / Plan:   1. Postoperative TAY requiring hemodialysis initiation on 9/25 via temp HD catheter placed per Dr. Chopra  2. Abdominal aortic dissection s/p TEVAR   3. Post operative abdominal compartment syndrome s/p ex lap on 9/27 with closure on 10/1  4. Hyperkalemia. Losartan dc.   5. Hypertension  6. Type 2 diabetes mellitus  7. SLE  8. Hyponatremia persistent.      -Patient will continue on MWF schedule for HD. No sign of renal recovery as of now. I will consult vascular surgeon for tunneled Hd catheter placement.   -I have asked nurse to bladder scan after dialysis to assess for bladder emptying   -Noted persistent/worsening hyponatremia and 5kg weight gain since admit. I will increase UF

## 2022-10-12 NOTE — PT/OT/SLP PROGRESS
Physical Therapy      Patient Name:  Stuart Guevara   MRN:  [id number]    Attempted this morning but pt in dialysis and again in PM but pt too fatigued to participate. I assisted with getting patient to drink some boost and pt became emotional and stated to cry because he stated that he wanted to be the old Stuart meaning moving like he use too. I encouraged him with some words and pt to get OOB tomorrow.  Will follow-up tomorrow.

## 2022-10-13 NOTE — SUBJECTIVE & OBJECTIVE
Interval History:  Improving    Current Facility-Administered Medications   Medication Frequency    acetaminophen tablet 650 mg Q6H PRN    albuterol inhaler 2 puff Q4H PRN    aluminum-magnesium hydroxide-simethicone 200-200-20 mg/5 mL suspension 30 mL QID (AC & HS)    bisacodyL suppository 10 mg Daily PRN    carvediloL tablet 25 mg BID    dextrose 10% bolus 125 mL PRN    dextrose 50% injection 12.5 g PRN    dextrose 50% injection 25 g PRN    docusate sodium capsule 100 mg BID    famotidine tablet 20 mg Every other day    glucagon (human recombinant) injection 1 mg PRN    glucose chewable tablet 16 g PRN    glucose chewable tablet 24 g PRN    hydrALAZINE injection 20 mg Q4H PRN    hydrALAZINE tablet 50 mg Q8H    HYDROcodone-acetaminophen 5-325 mg per tablet 1 tablet Q4H PRN    HYDROmorphone (PF) injection 1 mg Q4H PRN    hydrOXYchloroQUINE tablet 200 mg BID    insulin aspart U-100 injection 1-16 Units Q4H PRN    insulin detemir U-100 injection 14 Units BID    labetaloL injection 20 mg Q8H PRN    LIDOcaine (PF) 10 mg/ml (1%) injection PRN    lorazepam injection 1 mg Q2H PRN    methylPREDNISolone sodium succinate injection 40 mg TID    morphine 12 hr tablet 30 mg Q12H    morphine injection 2 mg Q2H PRN    mycophenolate capsule 1,000 mg BID    NIFEdipine 24 hr tablet 30 mg Daily    nitroGLYCERIN SL tablet 0.4 mg Q5 Min PRN    ondansetron injection 4 mg Q6H PRN    polyethylene glycol packet 17 g BID    sevelamer carbonate tablet 800 mg TID WM    sodium chloride 0.9% bolus 250 mL PRN    sodium chloride 0.9% bolus 250 mL PRN    sodium chloride 0.9% bolus 250 mL PRN    sodium chloride 0.9% flush 10 mL PRN    sodium chloride 0.9% flush 10 mL PRN    sodium chloride 0.9% flush 10 mL PRN    sucralfate tablet 1 g BID     Objective:     Vital Signs (Most Recent):  Temp: 97.6 °F (36.4 °C) (10/13/22 1201)  Pulse: 61 (10/13/22 1201)  Resp: 16 (10/13/22 1201)  BP: (!) 182/92 (10/13/22 1201)  SpO2: 97 % (10/13/22 1201)  O2 Device  (Oxygen Therapy): room air (10/12/22 2101)   Vital Signs (24h Range):  Temp:  [97.6 °F (36.4 °C)-98.6 °F (37 °C)] 97.6 °F (36.4 °C)  Pulse:  [61-78] 61  Resp:  [16-18] 16  SpO2:  [96 %-98 %] 97 %  BP: (143-182)/(75-92) 182/92     Weight: 65 kg (143 lb 4.8 oz) (10/12/22 0500)  Body mass index is 23.85 kg/m².  Body surface area is 1.73 meters squared.      Intake/Output Summary (Last 24 hours) at 10/13/2022 1225  Last data filed at 10/13/2022 0400  Gross per 24 hour   Intake 120 ml   Output 2725 ml   Net -2605 ml       Physical Exam   Constitutional: normal appearance.   HENT:   Head: Normocephalic.   Nose: Nose normal.   Eyes: Pupils are equal, round, and reactive to light.   Cardiovascular: Regular rhythm and normal pulses.   Pulmonary/Chest: Breath sounds normal.   Abdominal: Soft.   Musculoskeletal:      Right shoulder: Tenderness present.      Left shoulder: Tenderness present.      Right elbow: Tenderness present.      Left elbow: Tenderness present.      Right wrist: Tenderness present.      Left wrist: Tenderness present.      Cervical back: Neck supple.      Right hip: Tenderness present.      Left hip: Tenderness present.      Right knee: Tenderness present.      Left knee: Tenderness present.      Right ankle: Tenderness present.      Left ankle: Tenderness present.   Neurological: He is alert.   Skin: Skin is warm.       Right Side Rheumatological Exam     The patient is tender to palpation of the shoulder, elbow, wrist, knee, 1st PIP, 1st MCP, 2nd PIP, 2nd MCP, 3rd PIP, 3rd MCP, 4th PIP, 4th MCP, 5th PIP, hip, ankle, 1st MTP, 2nd MTP, 3rd MTP, 4th MTP, 5th MTP, 1st toe IP, 2nd toe IP, 3rd toe IP, 4th toe IP and 5th toe IP    Left Side Rheumatological Exam     The patient is tender to palpation of the shoulder, elbow, wrist, knee, 1st PIP, 1st MCP, 2nd PIP, 2nd MCP, 3rd PIP, 3rd MCP, 4th PIP, 4th MCP, 5th PIP, 5th MCP, hip, ankle, 1st MTP, 2nd MTP, 3rd MTP, 4th MTP, 5th MTP, 1st toe IP, 2nd toe IP, 3rd toe  IP, 4th toe IP and 5th toe IP.        Significant Labs:  All pertinent lab results from the last 24 hours have been reviewed.    Significant Imaging:  Imaging results within the past 24 hours have been reviewed.

## 2022-10-13 NOTE — PROGRESS NOTES
[facility] Tele[facility]  Rheumatology  Progress Note    Patient Name: Stuart Guevara  MRN: [id number]  Admission Date: 9/23/2022  Hospital Length of Stay: 20 days  Code Status: Full Code   Attending Provider: Saurabh Curry MD  Primary Care Physician: Primary Doctor No  Principal Problem: Aortic dissection    Subjective:     HPI:  The patient is feeling better but he did not receive his Benlysta yet.  His joint pain is less than before.      Interval History:  Improving    Current Facility-Administered Medications   Medication Frequency    acetaminophen tablet 650 mg Q6H PRN    albuterol inhaler 2 puff Q4H PRN    aluminum-magnesium hydroxide-simethicone 200-200-20 mg/5 mL suspension 30 mL QID (AC & HS)    bisacodyL suppository 10 mg Daily PRN    carvediloL tablet 25 mg BID    dextrose 10% bolus 125 mL PRN    dextrose 50% injection 12.5 g PRN    dextrose 50% injection 25 g PRN    docusate sodium capsule 100 mg BID    famotidine tablet 20 mg Every other day    glucagon (human recombinant) injection 1 mg PRN    glucose chewable tablet 16 g PRN    glucose chewable tablet 24 g PRN    hydrALAZINE injection 20 mg Q4H PRN    hydrALAZINE tablet 50 mg Q8H    HYDROcodone-acetaminophen 5-325 mg per tablet 1 tablet Q4H PRN    HYDROmorphone (PF) injection 1 mg Q4H PRN    hydrOXYchloroQUINE tablet 200 mg BID    insulin aspart U-100 injection 1-16 Units Q4H PRN    insulin detemir U-100 injection 14 Units BID    labetaloL injection 20 mg Q8H PRN    LIDOcaine (PF) 10 mg/ml (1%) injection PRN    lorazepam injection 1 mg Q2H PRN    methylPREDNISolone sodium succinate injection 40 mg TID    morphine 12 hr tablet 30 mg Q12H    morphine injection 2 mg Q2H PRN    mycophenolate capsule 1,000 mg BID    NIFEdipine 24 hr tablet 30 mg Daily    nitroGLYCERIN SL tablet 0.4 mg Q5 Min PRN    ondansetron injection 4 mg Q6H PRN    polyethylene glycol packet 17 g BID    sevelamer  carbonate tablet 800 mg TID WM    sodium chloride 0.9% bolus 250 mL PRN    sodium chloride 0.9% bolus 250 mL PRN    sodium chloride 0.9% bolus 250 mL PRN    sodium chloride 0.9% flush 10 mL PRN    sodium chloride 0.9% flush 10 mL PRN    sodium chloride 0.9% flush 10 mL PRN    sucralfate tablet 1 g BID     Objective:     Vital Signs (Most Recent):  Temp: 97.6 °F (36.4 °C) (10/13/22 1201)  Pulse: 61 (10/13/22 1201)  Resp: 16 (10/13/22 1201)  BP: (!) 182/92 (10/13/22 1201)  SpO2: 97 % (10/13/22 1201)  O2 Device (Oxygen Therapy): room air (10/12/22 2101)   Vital Signs (24h Range):  Temp:  [97.6 °F (36.4 °C)-98.6 °F (37 °C)] 97.6 °F (36.4 °C)  Pulse:  [61-78] 61  Resp:  [16-18] 16  SpO2:  [96 %-98 %] 97 %  BP: (143-182)/(75-92) 182/92     Weight: 65 kg (143 lb 4.8 oz) (10/12/22 0500)  Body mass index is 23.85 kg/m².  Body surface area is 1.73 meters squared.      Intake/Output Summary (Last 24 hours) at 10/13/2022 1225  Last data filed at 10/13/2022 0400  Gross per 24 hour   Intake 120 ml   Output 2725 ml   Net -2605 ml       Physical Exam   Constitutional: normal appearance.   HENT:   Head: Normocephalic.   Nose: Nose normal.   Eyes: Pupils are equal, round, and reactive to light.   Cardiovascular: Regular rhythm and normal pulses.   Pulmonary/Chest: Breath sounds normal.   Abdominal: Soft.   Musculoskeletal:      Right shoulder: Tenderness present.      Left shoulder: Tenderness present.      Right elbow: Tenderness present.      Left elbow: Tenderness present.      Right wrist: Tenderness present.      Left wrist: Tenderness present.      Cervical back: Neck supple.      Right hip: Tenderness present.      Left hip: Tenderness present.      Right knee: Tenderness present.      Left knee: Tenderness present.      Right ankle: Tenderness present.      Left ankle: Tenderness present.   Neurological: He is alert.   Skin: Skin is warm.       Right Side Rheumatological Exam     The patient is tender to palpation of  the shoulder, elbow, wrist, knee, 1st PIP, 1st MCP, 2nd PIP, 2nd MCP, 3rd PIP, 3rd MCP, 4th PIP, 4th MCP, 5th PIP, hip, ankle, 1st MTP, 2nd MTP, 3rd MTP, 4th MTP, 5th MTP, 1st toe IP, 2nd toe IP, 3rd toe IP, 4th toe IP and 5th toe IP    Left Side Rheumatological Exam     The patient is tender to palpation of the shoulder, elbow, wrist, knee, 1st PIP, 1st MCP, 2nd PIP, 2nd MCP, 3rd PIP, 3rd MCP, 4th PIP, 4th MCP, 5th PIP, 5th MCP, hip, ankle, 1st MTP, 2nd MTP, 3rd MTP, 4th MTP, 5th MTP, 1st toe IP, 2nd toe IP, 3rd toe IP, 4th toe IP and 5th toe IP.        Significant Labs:  All pertinent lab results from the last 24 hours have been reviewed.    Significant Imaging:  Imaging results within the past 24 hours have been reviewed.    Assessment/Plan:     Systemic lupus erythematosus  On Solu-Medrol.,  CellCept 1 g p.o. b.i.d., Plaquenil 200 mg p.o. b.i.d..  Start Benlysta 200 mg subcutaneously once a week.  I placed the order as [facility] nursing order.  If the Benlysta is not available at the hospital, the patient can receive a sample from my clinic at [facility].    HTN (hypertension)  As per the medical team          Wing Thorpe MD  Rheumatology  [facility]

## 2022-10-13 NOTE — PT/OT/SLP PROGRESS
Occupational Therapy  Treatment    Stuart Guevara   MRN: [id number]   Admitting Diagnosis: Aortic dissection    OT Date of Treatment: 10/13/22   OT Start Time: 0931  OT Stop Time: 1000  OT Total Time (min): 29 min     Billable Minutes:  Self Care/Home Management 14 and Therapeutic Activity 15  Total Minutes: 29     OT/ADONAY: ADONAY     ADONAY Visit Number: 3    General Precautions: Standard, fall  Orthopedic Precautions: N/A  Braces: N/A    Spiritual, Cultural Beliefs, Catholic Practices, Values that Affect Care: no    Subjective:  Communicated with nurse prior to session.    Objective:  Patient found with: blood pressure cuff, pulse ox (continuous), telemetry    Functional Mobility:  Bed Mobility:   Supine to sit: Minimal Assistance   Sit to supine: Activity did not occur   Rolling: Minimal Assistance   Scooting: Contact Guard Assistance    Transfer Training:   Ambulate bed to toilet with Min A with RW, sit to stand from toilet with Mod A 1 st attempt.  Standing for hygiene, tolerating approx 2 min of static standing.  Sat for rest period.  Second sit to stand with Min A with RW.  Transfer toilet to chair with Min A with RW.    Patient left up in chair with all lines intact, call button in reach, and nurse present    ASSESSMENT:  Stuart Guevara is a 32 y.o. male with a medical diagnosis of Aortic dissection and presents with decreased AX tolerance, decreased strength, decreased functional mobility and decreased ADL skills.    Rehab potential is good    Activity tolerance: Good    Discharge recommendations: rehabilitation facility, nursing facility, skilled     Equipment recommendations: walker, rolling     GOALS:   Multidisciplinary Problems       Occupational Therapy Goals          Problem: Occupational Therapy    Goal Priority Disciplines Outcome Interventions   Occupational Therapy Goal     OT, PT/OT Ongoing, Progressing    Description: Goals to be met by: 10/14/22    Patient will increase functional independence  with ADLs by performing:    UE Dressing with Stand-by Assistance.  LB Dressing with Minimal Assistance and dressing AEDs.  Grooming while EOB with Stand-by Assistance.  Sitting at edge of bed x10 minutes with Stand-by Assistance.                         Plan:  Patient to be seen 6 x/week to address the above listed problems via self-care/home management, therapeutic activities, therapeutic exercises  Plan of Care expires: 10/14/22  Plan of Care reviewed with: patient      10/13/2022

## 2022-10-13 NOTE — ASSESSMENT & PLAN NOTE
On Solu-Medrol.,  CellCept 1 g p.o. b.i.d., Plaquenil 200 mg p.o. b.i.d..  Start Benlysta 200 mg subcutaneously once a week.  I placed the order as [facility] nursing order.  If the Benlysta is not available at the hospital, the patient can receive a sample from my clinic at [facility].

## 2022-10-13 NOTE — PT/OT/SLP PROGRESS
Physical Therapy Treatment    Patient Name:  Stuart Guevara   MRN:  [id number]    Recommendations:     Discharge Recommendations:  rehabilitation facility, nursing facility, skilled   Discharge Equipment Recommendations: walker, rolling     Subjective     Patient awake, alert, and cooperative.     Objective:     General Precautions: Standard, fall   Orthopedic Precautions:N/A   Braces:    Respiratory Status: Room air     Communicated with nurse prior to treatment.     Functional Mobility:    Rolling:Activity did not occur  Supine to sit:Activity did not occur  Sit to stand transfer: Minimal Assistance  Bed to chair transfer:Minimal Assistance  Gait 15 ft with RW min assist but left lower quadrant suture sit started draining so re secured with 4x4's so pt could ambulated additional 45 ft x 2 min assist. Nurse notified and I assisted with dressing. Pt motivated and did great with mobility today.       AM-PAC 6 CLICK MOBILITY        Patient left up in chair with  breakfast ..    GOALS:   Multidisciplinary Problems       Physical Therapy Goals          Problem: Physical Therapy    Goal Priority Disciplines Outcome Goal Variances Interventions   Physical Therapy Goal     PT, PT/OT Ongoing, Progressing     Description: Goals to be met by: 22     Patient will increase functional independence with mobility by performin. Supine to sit with moderate assistance  2. Sit to supine with moderate assistance  3. Sit <> stand with moderate assistance using RW/LRAD  4. Bed to chair with moderate assistance via squat pivot/stand pivot  5.Ambulate 200 ft with rolling walker and contact guard assistance                           Assessment:     Stuart Guevara is a 32 y.o. male admitted with a medical diagnosis of Aortic dissection.     Rehab Prognosis: Good; patient would benefit from acute skilled PT services to address these deficits and reach maximum level of function.    Recent Surgery: Procedure(s) (LRB):  LAPAROTOMY,  EXPLORATORY (N/A) 14 Days Post-Op    Plan:     During this hospitalization, patient to be seen 6 x/week to address the identified rehab impairments via gait training, therapeutic activities, therapeutic exercises and progress toward the following goals:    Plan of Care Expires:  11/02/22    Billable Minutes     Billable Minutes: Gait Training 25    Treatment Type: Treatment  PT/PTA: PTA     PTA Visit Number: 5     10/13/2022

## 2022-10-13 NOTE — PHYSICIAN QUERY
PT Name: Stuart Guevara  MR #: [id number]     Documentation Clarification      CDS/: Noemi Dockery RN CDIS            Contact information: [email or web address]  This form is a permanent document in the medical record.     Query Date: October 13, 2022    By submitting this query, we are merely seeking further clarification of documentation. Please utilize your independent clinical judgment when addressing the question(s) below.    The Medical Record reflects the following:    Supporting Clinical Findings Location in Medical Record   Type 2 diabetes mellitus     Latest Reference Range & Units 10/06/22 21:17   Hemoglobin A1C External <=7.0 % 5.9   Estimated Avg Glucose mg/dL 122.6   Beta-Hydroxybutyrate <=0.30 mmol/L 1.30 (H)    Nephrology note 10/13     Labs    PMH of systemic lupus erythematosus, HTN, hx of Type A aortic dissection repaired in Feb 2021, hx of RUE DVT    Hyperglycemia noted. Continue correction insulin. HbA1c  Rheum consulted, continue steroids, mycophenolate, plaquenil H&P 9/23         HM note 10/6    HM note 10/6                                                                            Provider, please provide diagnosis or diagnoses associated with above clinical findings.    [   ] Diabetes mellitus type 2   [  xxx ] Hyperglycemia due to steroids    [   ] Other (please specify): ____________   [  ] Clinically undetermined

## 2022-10-13 NOTE — PROGRESS NOTES
NEPHROLOGY: Progress     32-year-old  male we are following for acute kidney injury following TEVAR along with a left carotid to left subclavian artery bypass for subclavian artery occlusion.  The patient underwent exploratory lap for possibility of necrotic bowel and subsequently developed acute kidney injury with hyperkalemia and acidosis requiring initiation of dialysis.  He has now been started on a TTS schedule via right IJ temporary catheter.  Patient is awake, alert and in no distress.  He is starting to have some urine output.            Current Facility-Administered Medications:     acetaminophen tablet 650 mg, 650 mg, Oral, Q6H PRN, Nolberto Calvillo MD, 650 mg at 10/06/22 2031    albuterol inhaler 2 puff, 2 puff, Inhalation, Q4H PRN, Evelio Marshall MD    aluminum-magnesium hydroxide-simethicone 200-200-20 mg/5 mL suspension 30 mL, 30 mL, Oral, QID (AC & HS), Ronda Hamilton MD, 30 mL at 10/12/22 1715    bisacodyL suppository 10 mg, 10 mg, Rectal, Daily PRN, Keena [place], FNP, 10 mg at 10/02/22 1056    carvediloL tablet 25 mg, 25 mg, Oral, BID, MARIANNE Castillo, 25 mg at 10/13/22 0921    dextrose 10% bolus 125 mL, 12.5 g, Intravenous, PRN, Ivan Chopra MD    dextrose 50% injection 12.5 g, 12.5 g, Intravenous, PRN, Keena H [place], FNP    dextrose 50% injection 25 g, 25 g, Intravenous, PRN, Keena H [place], FNP    docusate sodium capsule 100 mg, 100 mg, Oral, BID, Keena H [place], FNP, 100 mg at 10/13/22 0922    famotidine tablet 20 mg, 20 mg, Oral, Every other day, Ivan Chopra MD, 20 mg at 10/12/22 1337    glucagon (human recombinant) injection 1 mg, 1 mg, Intramuscular, PRN, Keena H [place], FNP    glucose chewable tablet 16 g, 16 g, Oral, PRN, Keena H [place], FNP    glucose chewable tablet 24 g, 24 g, Oral, PRN, Keena Shafer,  FNP    hydrALAZINE injection 20 mg, 20 mg, Intravenous, Q4H PRN, Bin Bell MD, 20 mg at 10/08/22 0609    hydrALAZINE tablet 50 mg, 50 mg, Oral, Q8H, Ronda Hamilton MD, 50 mg at 10/13/22 0510    HYDROcodone-acetaminophen 5-325 mg per tablet 1 tablet, 1 tablet, Oral, Q4H PRN, Delmi Ortiz, YASP, 1 tablet at 10/13/22 0616    HYDROmorphone (PF) injection 1 mg, 1 mg, Intravenous, Q4H PRN, Cong Velez DO, 1 mg at 10/12/22 2101    hydrOXYchloroQUINE tablet 200 mg, 200 mg, Oral, BID, Wing Thorpe MD, 200 mg at 10/13/22 0921    insulin aspart U-100 injection 1-16 Units, 1-16 Units, Subcutaneous, Q4H PRN, Delmi Ortiz, DENIA, 10 Units at 10/13/22 0616    insulin detemir U-100 injection 14 Units, 14 Units, Subcutaneous, BID, Zach Shafer MD, 14 Units at 10/13/22 0922    labetaloL injection 20 mg, 20 mg, Intravenous, Q8H PRN, Bin Bell MD, 20 mg at 10/03/22 0321    LIDOcaine (PF) 10 mg/ml (1%) injection, , , PRN, Ruben Arciniega MD, 5 mL at 09/25/22 1656    lorazepam injection 1 mg, 1 mg, Intravenous, Q2H PRN, Cong Velez DO    methylPREDNISolone sodium succinate injection 40 mg, 40 mg, Intravenous, TID, Zach Shafer MD, 40 mg at 10/12/22 2050    morphine 12 hr tablet 30 mg, 30 mg, Oral, Q12H, Shamar Stewart MD, 30 mg at 10/13/22 0922    morphine injection 2 mg, 2 mg, Intravenous, Q2H PRN, Evelio Marshall MD, 2 mg at 10/03/22 0310    mycophenolate capsule 1,000 mg, 1,000 mg, Oral, BID, Wing Thorpe MD, 1,000 mg at 10/13/22 0921    NIFEdipine 24 hr tablet 30 mg, 30 mg, Oral, Daily, Shamar Stewart MD, 30 mg at 10/13/22 0922    nitroGLYCERIN SL tablet 0.4 mg, 0.4 mg, Sublingual, Q5 Min PRN, Zach Shafer MD    ondansetron injection 4 mg, 4 mg, Intravenous, Q6H PRN, Evelio Marshall MD, 4 mg at 10/04/22 2114    polyethylene glycol packet 17 g, 17 g, Oral, BID, Bin Bell MD, 17 g at 10/13/22 0920    sevelamer carbonate tablet 800 mg, 800 mg, Oral, TID WM, Alexander Simpson MD, 800  "mg at 10/13/22 0732    sodium chloride 0.9% bolus 250 mL, 250 mL, Intravenous, PRN, Shahzad Lundberg MD    sodium chloride 0.9% bolus 250 mL, 250 mL, Intravenous, PRN, Shahzad Lundberg MD    sodium chloride 0.9% bolus 250 mL, 250 mL, Intravenous, PRN, Cindy Lora, FNP    sodium chloride 0.9% flush 10 mL, 10 mL, Intravenous, PRN, Francia Marcus DO    sodium chloride 0.9% flush 10 mL, 10 mL, Intravenous, PRN, Evelio Marshall MD    sodium chloride 0.9% flush 10 mL, 10 mL, Intravenous, PRN, Evelio Marshall MD    sucralfate tablet 1 g, 1 g, Oral, BID, Francia Marcus DO, 1 g at 10/13/22 0922        BP (!) 173/84   Pulse 66   Temp 98 °F (36.7 °C) (Oral)   Resp 18   Ht 5' 5" (1.651 m)   Wt 65 kg (143 lb 4.8 oz)   SpO2 96%   BMI 23.85 kg/m²     Physical Exam:    GEN: Awake and appropriate.  Chronically ill-appearing black male   HEENT: Atraumatic. EOMI, no icterus  NECK : No JVD, right IJ temporary catheter noted  CARD : RRR s rub or gallop  LUNGS : Clear to auscultation  ABD : Soft,non-tender. BS active  EXT : No pitting edema.   NEURO : No obvious focal deficits        Intake/Output Summary (Last 24 hours) at 10/13/2022 0935  Last data filed at 10/13/2022 0400  Gross per 24 hour   Intake 120 ml   Output 2725 ml   Net -2605 ml         Laboratory:  Recent Results (from the past 24 hour(s))   POCT glucose    Collection Time: 10/12/22  8:47 PM   Result Value Ref Range    POCT Glucose 281 (H) 70 - 110 mg/dL   POCT glucose    Collection Time: 10/13/22  5:11 AM   Result Value Ref Range    POCT Glucose 221 (H) 70 - 110 mg/dL         Assessment/Plan:   1. Postoperative TAY requiring hemodialysis initiation on 9/25 via temp HD catheter placed per Dr. Chopra  2. Abdominal aortic dissection s/p TEVAR   3. Post operative abdominal compartment syndrome s/p ex lap on 9/27 with closure on 10/1  4. Hyperkalemia. Losartan dc.   5. Hypertension  6. Type 2 diabetes mellitus  7. SLE past history  8. Hyponatremia persistent.    We " will continue his dialysis on Monday Wednesday Friday.  He was given 1 dose of Lokelma 10 g for hyperkalemia today but he may require on a daily basis.  If his blood pressure is less than adequately controlled may need to resume his ACE inhibition and use Lokelma more regularly to control hyperkalemia.  He is currently being treated for active lupus by Rheumatology.    Shahzad Lundberg MD, BERNADETTE

## 2022-10-13 NOTE — PROGRESS NOTES
[facility]  Hospital Medicine Progress Note        Chief Complaint: Inpatient Follow-up for Aortic disscetion     HPI:   Stuart Guevara is a 32 y.o. male who has PMH which includes HTN, SLE,  hepatitis B, aortic dissection with repair 2/2021, RUE DVT on Xarelto; presented to the ED at [facility] on 9/23/2022  with reports of  shortness of breath and chest pain radiating to his back.  PT has a history of aortic dissection 2/2021. Work up revealed a type B aortic dissection and significant hypertension noted on presentation.  He failed medical management and had progression of his symptoms despite adequate control of hemodynamic parameters.  PT was transferred from [facility] to [facility] for CV surgery evaluation which he was taken  to the OR and had TEVAR with left carotid to subclavian artery bypass. PT developed abdominal distension and rigidity of his abdomen and developed a lactic acidosis.  He repeat CT angiography of the chest/abdomen/pelvis without evidence of worsening dissection, but evidence of significant intra-abdominal pathology with some hemoperitoneum, free blood in the abdomen.  He underwent exploratory laparotomy with evidence of viable bowel and no significant bleeding identified, abd was closed with flakito drain left in situ chest tube was placed. Postoperatively, he developed severe unstable bradycardia, and a transvenous pacemaker was placed by Cardiology.  Shortly afterwards, he was noted to have significant worsening of his renal failure and critical hyperkalemia. Renal services consulted which catheter was placed and he was started on HD treatments.  PT has been cleared for transfer out of ICU to the floor. Hospital medicine services consulted for assumption of care.     Patient is s/p AAA surgery repair , course c/by abd compartment syndrome s/p ex lap and closure with flakito drain in situ, sepsis syndrome with fever and worsening leucocytosis , Appreciate ID input antibiotics  broadened to vancomycin Flagyl and cefepime, concern for SLE serositis , rheum consulted started on high dose steori and immunosuppressants.      Interval Hx:   Patient seen and examined this morning sitting comfortably in the bed no issues reported  Objective/physical exam:  General: In no acute distress, afebrile  Respiratory: Clear to auscultation bilaterally  Cardiovascular: S1, S2, no appreciable murmur  Abdomen: Soft, nontender, BS +, dressing, TTP  MSK: Warm, no lower extremity edema, no clubbing or cyanosis  Neurologic: Alert and oriented x4,   VITAL SIGNS: 24 HRS MIN & MAX LAST   Temp  Min: 97.6 °F (36.4 °C)  Max: 98.6 °F (37 °C) 97.6 °F (36.4 °C)   BP  Min: 143/75  Max: 182/92 (!) 182/92     Pulse  Min: 61  Max: 78  61     Resp  Min: 16  Max: 18 16   SpO2  Min: 96 %  Max: 98 % 97 %       Recent Labs   Lab 10/11/22  0448 10/12/22  0507 10/13/22  1035   WBC 9.5 13.3* 15.6*   RBC 3.91* 3.38* 3.66*   HGB 10.8* 9.4* 10.4*   HCT 33.7* 29.8* 32.3*   MCV 86.2 88.2 88.3   MCH 27.6 27.8 28.4   MCHC 32.0* 31.5* 32.2*   RDW 16.4 16.2 16.1   * 442* 512*   MPV 10.5* 10.4 10.5*       Recent Labs   Lab 10/06/22  2336 10/07/22  0129 10/09/22  0519 10/10/22  0324 10/11/22  0448 10/12/22  0507 10/13/22  1035   NA  --    < > 128* 124* 126* 124* 127*   K  --    < > 4.8 6.2* 6.1* 5.1 5.5*   CO2  --    < > 24 28 29 27 26   BUN  --    < > 58.0* 68.8* 49.0* 42.9* 43.5*   CREATININE  --    < > 3.75* 4.07* 3.18* 2.37* 2.82*   CALCIUM  --    < > 7.6* 7.7* 7.9* 7.8* 8.1*   PH 7.44  --   --   --   --   --   --    MG  --    < > 2.20 2.30 2.30  --   --    ALBUMIN  --    < > 1.3* 1.3* 1.4* 1.3*  --    ALKPHOS  --    < > 78 81 82 66  --    ALT  --    < > 15 14 12 12  --    AST  --    < > 32 30 29 32  --    BILITOT  --    < > 0.3 0.3 0.3 0.3  --     < > = values in this interval not displayed.          Microbiology Results (last 7 days)       Procedure Component Value Units Date/Time    Blood Culture [[id number]]  (Abnormal)  Collected: 10/03/22 1752    Order Status: Completed Specimen: Blood Updated: 10/11/22 1322     CULTURE, BLOOD ([facility]) CUTIBACTERIUM ACNES     Comment: This organism is commonly considered a contaminant.  No further processing will be done.        GRAM STAIN Gram Positive Rods      Seen in gram stain of broth only      1 of 2 Anaerobic bottles positive    Blood Culture [[id number]]  (Normal) Collected: 10/03/22 1752    Order Status: Completed Specimen: Blood Updated: 10/08/22 2000     CULTURE, BLOOD ([facility]) No Growth at 5 days    Blood Culture [[id number]]  (Normal) Collected: 10/03/22 1615    Order Status: Completed Specimen: Blood Updated: 10/08/22 1801     CULTURE, BLOOD ([facility]) No Growth at 5 days    Respiratory Culture [[id number]]  (Abnormal) Collected: 10/04/22 1842    Order Status: Completed Specimen: Sputum, Expectorated Updated: 10/07/22 1307     Respiratory Culture Moderate Streptococcus constellatus     Comment: with normal respiratory lacy        GRAM STAIN Quality 2+      Moderate Gram positive cocci      Moderate Gram Negative Rods      Few Gram Positive Rods    Blood Culture [[id number]]  (Normal) Collected: 10/02/22 0605    Order Status: Completed Specimen: Blood Updated: 10/07/22 1000     CULTURE, BLOOD ([facility]) No Growth at 5 days             See below for Radiology    Scheduled Med:   aluminum-magnesium hydroxide-simethicone  30 mL Oral QID (AC & HS)    carvediloL  25 mg Oral BID    docusate sodium  100 mg Oral BID    famotidine  20 mg Oral Every other day    hydrALAZINE  50 mg Oral Q8H    hydrOXYchloroQUINE  200 mg Oral BID    insulin detemir U-100  14 Units Subcutaneous BID    methylPREDNISolone sodium succinate injection  40 mg Intravenous TID    morphine  30 mg Oral Q12H    mycophenolate  1,000 mg Oral BID    NIFEdipine  30 mg Oral Daily    polyethylene glycol  17 g Oral BID    sevelamer carbonate  800 mg Oral TID WM    sucralfate  1 g Oral BID        Continuous Infusions:       PRN Meds:  acetaminophen,  albuterol, bisacodyL, dextrose 10%, dextrose 50%, dextrose 50%, glucagon (human recombinant), glucose, glucose, hydrALAZINE, HYDROcodone-acetaminophen, HYDROmorphone, insulin aspart U-100, labetaloL, LIDOcaine (PF) 10 mg/ml (1%), lorazepam, morphine, nitroGLYCERIN, ondansetron, sodium chloride 0.9%, sodium chloride 0.9%, sodium chloride 0.9%, sodium chloride 0.9%, sodium chloride 0.9%, sodium chloride 0.9%       Assessment/Plan:   SLE flare and serositis   Acute respiratory failure with hypoxia- s/p mechanical ventilation, now extubated  Acute aortic dissection type B- s/p TEVAR with left carotid artery to left subclavian bypass 9/23/2022  Acute kidney injury now requiring hemodialysis since September 25th by a temporary hemodialysis catheter  New onset Cardiomyopathy- EF 37% unknown etiology  VHD, s/p mechanical AVR   Acute abdominal pain generalized  Abdominal compartment syndrome, s/p ex-lap x2 last on 9/27 with abdominal closure on 10/1  Acute pancreatitis- resolving with lipase trending down  RUE DVT- was on Xarelto, recent US no DVT  HTN- emergency, resolved   Anemia- chronic disease  Weakness  HLD  Hx of hepatitis B  Hx of aortic dissection with repair 2/2021  History of transverse myelitis  History of lupus nephritis     Patient was started on Solu-Medrol, CellCept, Plaquenil but has not received benlysta  Appreciate Rheumatology recommendations.   Potassium is still elevated will give Lokelma since today is not his dialysis day   INR yesterday was supratherapeutic I have held today's dose of Coumadin and I am waiting for PT and INR for today   H&H is stable   Blood pressure still significantly elevated I will increase the dose of nifedipine to 90 mg, give additional dose of 30 mg of nifedipine today   Blood culture grewCUTIBACTERIUM ACNES    There was some drainage at this surgical side most likely secondary to ascites surgery team was following.  No signs of infection so no indication for antibiotics id  signed off      Patient condition:  Stable    Anticipated discharge and Disposition: TBD      All diagnosis and differential diagnosis have been reviewed; assessment and plan has been documented; I have personally reviewed the labs and test results that are presently available; I have reviewed the patients medication list; I have reviewed the consulting providers response and recommendations. I have reviewed or attempted to review medical records based upon their availability    All of the patient's questions have been  addressed and answered. Patient's is agreeable to the above stated plan. I will continue to monitor closely and make adjustments to medical management as needed.  _____________________________________________________________________      Radiology:  X-Ray Chest 1 View  Narrative: EXAMINATION:  XR CHEST 1 VIEW    CPT 89838    CLINICAL HISTORY:  TAY, SOB;    COMPARISON:  October 5, 2022    FINDINGS:  Examination reveals cardiomediastinal silhouette to be unchanged as compared with the previous exam.    Endo stent is identified in the descending aorta central line is seen with the tip at the junction of the superior vena cava and right atrium.    There is persistent increased left retrocardiac density and silhouetting of the left hemidiaphragm which might be related to an infiltrate/atelectasis    No focal consolidative changes otherwise identified no other significant change  Impression: Persistent increased left retrocardiac density and silhouetting of the left hemidiaphragm.    No other focal consolidative changes.    No other change    Electronically signed by: Ramón Ying  Date:    10/12/2022  Time:    08:15      Verna Whipple MD   10/13/2022

## 2022-10-14 NOTE — PT/OT/SLP PROGRESS
Occupational Therapy  Treatment    Stuart Guevara   MRN: [id number]   Admitting Diagnosis: Aortic dissection    OT Date of Treatment: 10/14/22   OT Start Time: 1336  OT Stop Time: 1344  OT Total Time (min): 8 min     Billable Minutes:  Therapeutic Exercise 8  Total Minutes: 8     OT/ADONAY: ADONAY     ADONAY Visit Number: 4    General Precautions: Standard, fall  Orthopedic Precautions: N/A  Braces: N/A    Spiritual, Cultural Beliefs, Hindu Practices, Values that Affect Care: no    Subjective:  Communicated with nurse prior to session.    Objective:  Patient found with: blood pressure cuff, pulse ox (continuous), telemetry    Additional Treatment:  Issued blue  sponge for strengthening, educated patient on exercises 2 x 20 reps BUE    Patient left HOB elevated with call button in reach    ASSESSMENT:  Stuart Guevara is a 32 y.o. male with a medical diagnosis of Aortic dissection and presents with decreased functional mobility, decreased strength and decreased Ax tolerance.    Rehab potential is good    Activity tolerance: Fair    Discharge recommendations: rehabilitation facility, nursing facility, skilled     Equipment recommendations: walker, rolling     GOALS:   Multidisciplinary Problems       Occupational Therapy Goals          Problem: Occupational Therapy    Goal Priority Disciplines Outcome Interventions   Occupational Therapy Goal     OT, PT/OT Ongoing, Progressing    Description: Goals to be met by: 10/14/22    Patient will increase functional independence with ADLs by performing:    UE Dressing with Stand-by Assistance.  LB Dressing with Minimal Assistance and dressing AEDs.  Grooming while EOB with Stand-by Assistance.  Sitting at edge of bed x10 minutes with Stand-by Assistance.                         Plan:  Patient to be seen 6 x/week to address the above listed problems via self-care/home management, therapeutic activities, therapeutic exercises  Plan of Care expires: 10/14/22  Plan of Care reviewed  with: patient         10/14/2022

## 2022-10-14 NOTE — PROGRESS NOTES
[facility]  Hospital Medicine Progress Note        Chief Complaint: Inpatient Follow-up for Aortic disscetion     HPI:   Stuart Guevara is a 32 y.o. male who has PMH which includes HTN, SLE,  hepatitis B, aortic dissection with repair 2/2021, RUE DVT on Xarelto; presented to the ED at [facility] on 9/23/2022  with reports of  shortness of breath and chest pain radiating to his back.  PT has a history of aortic dissection 2/2021. Work up revealed a type B aortic dissection and significant hypertension noted on presentation.  He failed medical management and had progression of his symptoms despite adequate control of hemodynamic parameters.  PT was transferred from [facility] to [facility] for CV surgery evaluation which he was taken  to the OR and had TEVAR with left carotid to subclavian artery bypass. PT developed abdominal distension and rigidity of his abdomen and developed a lactic acidosis.  He repeat CT angiography of the chest/abdomen/pelvis without evidence of worsening dissection, but evidence of significant intra-abdominal pathology with some hemoperitoneum, free blood in the abdomen.  He underwent exploratory laparotomy with evidence of viable bowel and no significant bleeding identified, abd was closed with flakito drain left in situ chest tube was placed. Postoperatively, he developed severe unstable bradycardia, and a transvenous pacemaker was placed by Cardiology.  Shortly afterwards, he was noted to have significant worsening of his renal failure and critical hyperkalemia. Renal services consulted which catheter was placed and he was started on HD treatments.  PT has been cleared for transfer out of ICU to the floor. Hospital medicine services consulted for assumption of care.     Patient is s/p AAA surgery repair , course c/by abd compartment syndrome s/p ex lap and closure with flakito drain in situ, sepsis syndrome with fever and worsening leucocytosis , Appreciate ID input antibiotics  broadened to vancomycin Flagyl and cefepime, concern for SLE serositis , rheum consulted started on high dose steori and immunosuppressants.      Interval Hx:   Patient seen and examined this morning sitting comfortably in the bed no issues reported blood pressure better today a  Objective/physical exam:  General: In no acute distress, afebrile  Respiratory: Clear to auscultation bilaterally  Cardiovascular: S1, S2, no appreciable murmur  Abdomen: Soft, nontender, BS +, dressing,   MSK: Warm, no lower extremity edema, no clubbing or cyanosis  Neurologic: Alert and oriented x4,   VITAL SIGNS: 24 HRS MIN & MAX LAST   Temp  Min: 97.6 °F (36.4 °C)  Max: 98.6 °F (37 °C) 98 °F (36.7 °C)   BP  Min: 140/80  Max: 159/80 (!) 153/70     Pulse  Min: 60  Max: 69  67     Resp  Min: 12  Max: 18 12   SpO2  Min: 95 %  Max: 99 % 95 %       Recent Labs   Lab 10/12/22  0507 10/13/22  1035 10/14/22  0453   WBC 13.3* 15.6* 11.4   RBC 3.38* 3.66* 3.45*   HGB 9.4* 10.4* 9.8*   HCT 29.8* 32.3* 30.1*   MCV 88.2 88.3 87.2   MCH 27.8 28.4 28.4   MCHC 31.5* 32.2* 32.6*   RDW 16.2 16.1 16.0   * 512* 430*   MPV 10.4 10.5* 10.6*       Recent Labs   Lab 10/09/22  0519 10/10/22  0324 10/11/22  0448 10/12/22  0507 10/13/22  1035 10/14/22  0453   * 124* 126* 124* 127* 125*   K 4.8 6.2* 6.1* 5.1 5.5* 5.4*   CO2 24 28 29 27 26 25   BUN 58.0* 68.8* 49.0* 42.9* 43.5* 56.1*   CREATININE 3.75* 4.07* 3.18* 2.37* 2.82* 2.76*   CALCIUM 7.6* 7.7* 7.9* 7.8* 8.1* 8.1*   MG 2.20 2.30 2.30  --   --   --    ALBUMIN 1.3* 1.3* 1.4* 1.3*  --   --    ALKPHOS 78 81 82 66  --   --    ALT 15 14 12 12  --   --    AST 32 30 29 32  --   --    BILITOT 0.3 0.3 0.3 0.3  --   --           Microbiology Results (last 7 days)       Procedure Component Value Units Date/Time    Blood Culture [[id number]]  (Abnormal) Collected: 10/03/22 2580    Order Status: Completed Specimen: Blood Updated: 10/11/22 1322     CULTURE, BLOOD ([facility]) CUTIBACTERIUM ACNES     Comment: This  organism is commonly considered a contaminant.  No further processing will be done.        GRAM STAIN Gram Positive Rods      Seen in gram stain of broth only      1 of 2 Anaerobic bottles positive    Blood Culture [[id number]]  (Normal) Collected: 10/03/22 1752    Order Status: Completed Specimen: Blood Updated: 10/08/22 2000     CULTURE, BLOOD ([facility]) No Growth at 5 days    Blood Culture [[id number]]  (Normal) Collected: 10/03/22 1615    Order Status: Completed Specimen: Blood Updated: 10/08/22 1801     CULTURE, BLOOD ([facility]) No Growth at 5 days             See below for Radiology    Scheduled Med:   aluminum-magnesium hydroxide-simethicone  30 mL Oral QID (AC & HS)    carvediloL  25 mg Oral BID    docusate sodium  100 mg Oral BID    famotidine  20 mg Oral Every other day    hydrALAZINE  50 mg Oral Q8H    hydrOXYchloroQUINE  200 mg Oral BID    insulin detemir U-100  14 Units Subcutaneous BID    methylPREDNISolone sodium succinate injection  40 mg Intravenous TID    morphine  30 mg Oral Q12H    mycophenolate  1,000 mg Oral BID    NIFEdipine  90 mg Oral Daily    polyethylene glycol  17 g Oral BID    sevelamer carbonate  800 mg Oral TID WM    sucralfate  1 g Oral BID        Continuous Infusions:       PRN Meds:  acetaminophen, albuterol, bisacodyL, dextrose 10%, dextrose 50%, dextrose 50%, glucagon (human recombinant), glucose, glucose, hydrALAZINE, HYDROcodone-acetaminophen, HYDROmorphone, insulin aspart U-100, labetaloL, LIDOcaine (PF) 10 mg/ml (1%), lorazepam, melatonin, morphine, nitroGLYCERIN, ondansetron, sodium chloride 0.9%, sodium chloride 0.9%, sodium chloride 0.9%, sodium chloride 0.9%, sodium chloride 0.9%, sodium chloride 0.9%       Assessment/Plan:   SLE flare and serositis   Acute respiratory failure with hypoxia- s/p mechanical ventilation, now extubated  Acute aortic dissection type B- s/p TEVAR with left carotid artery to left subclavian bypass 9/23/2022  Acute kidney injury now requiring hemodialysis  since September 25th by a temporary hemodialysis catheter  New onset Cardiomyopathy- EF 37% unknown etiology  VHD, s/p mechanical AVR   Acute abdominal pain generalized  Abdominal compartment syndrome, s/p ex-lap x2 last on 9/27 with abdominal closure on 10/1  Acute pancreatitis- resolving with lipase trending down  RUE DVT- was on Xarelto, recent US no DVT  HTN- emergency, resolved   Anemia- chronic disease  Weakness  HLD  Hx of hepatitis B  Hx of aortic dissection with repair 2/2021  History of transverse myelitis  History of lupus nephritis       Patient was started on Solu-Medrol, CellCept, Plaquenil but has not received benlysta  Appreciate Rheumatology recommendations.   Potassium is still elevated will give Lokelma patient will be going for dialysis today   INR is still supratherapeutic at 5.7 patient is not bleeding from anywhere so for now we will keep a close watch  Coumadin on hold  Repeat PT and INR in a.m. and resume the does off Coumadin if INR is between 2-3  Blood pressure was significantly elevated yesterday today is the 1st dose of nifedipine 90 mg so will keep a close watch   Continue with other blood pressure medication  Blood culture grewCUTIBACTERIUM ACNES    There was some drainage at this surgical side most likely secondary to ascites surgery team was following.  No signs of infection so no indication for antibiotics id signed off      Patient condition:  Stable    Anticipated discharge and Disposition: TBD      All diagnosis and differential diagnosis have been reviewed; assessment and plan has been documented; I have personally reviewed the labs and test results that are presently available; I have reviewed the patients medication list; I have reviewed the consulting providers response and recommendations. I have reviewed or attempted to review medical records based upon their availability    All of the patient's questions have been  addressed and answered. Patient's is agreeable to the  above stated plan. I will continue to monitor closely and make adjustments to medical management as needed.  _____________________________________________________________________      Radiology:  X-Ray Chest 1 View  Narrative: EXAMINATION:  XR CHEST 1 VIEW    CPT 58779    CLINICAL HISTORY:  TAY, SOB;    COMPARISON:  October 5, 2022    FINDINGS:  Examination reveals cardiomediastinal silhouette to be unchanged as compared with the previous exam.    Endo stent is identified in the descending aorta central line is seen with the tip at the junction of the superior vena cava and right atrium.    There is persistent increased left retrocardiac density and silhouetting of the left hemidiaphragm which might be related to an infiltrate/atelectasis    No focal consolidative changes otherwise identified no other significant change  Impression: Persistent increased left retrocardiac density and silhouetting of the left hemidiaphragm.    No other focal consolidative changes.    No other change    Electronically signed by: Ramón Ying  Date:    10/12/2022  Time:    08:15      Verna Whipple MD   10/14/2022

## 2022-10-14 NOTE — PROGRESS NOTES
NEPHROLOGY: Progress     32-year-old  male we are following for acute kidney injury following TEVAR along with a left carotid to left subclavian artery bypass for subclavian artery occlusion.  The patient underwent exploratory lap for possibility of necrotic bowel and subsequently developed acute kidney injury with hyperkalemia and acidosis requiring initiation of dialysis.  He has now been started on a MWF schedule via right IJ temporary catheter.  Patient is awake, alert and in no distress.  He is starting to have some urine output.  He is currently tolerating dialysis well            Current Facility-Administered Medications:     acetaminophen tablet 650 mg, 650 mg, Oral, Q6H PRN, Nolberto Calvillo MD, 650 mg at 10/06/22 2031    albuterol inhaler 2 puff, 2 puff, Inhalation, Q4H PRN, Evelio Marshall MD    aluminum-magnesium hydroxide-simethicone 200-200-20 mg/5 mL suspension 30 mL, 30 mL, Oral, QID (AC & HS), Ronda Hamilton MD, 30 mL at 10/13/22 1643    bisacodyL suppository 10 mg, 10 mg, Rectal, Daily PRN, Keena Collinsge, FNP, 10 mg at 10/02/22 1056    carvediloL tablet 25 mg, 25 mg, Oral, BID, MARIANNE Castillo, 25 mg at 10/13/22 2111    dextrose 10% bolus 125 mL, 12.5 g, Intravenous, PRN, Ivan Chopra MD    dextrose 50% injection 12.5 g, 12.5 g, Intravenous, PRN, Keena H Encino, FNP    dextrose 50% injection 25 g, 25 g, Intravenous, PRN, Keena H Encino, FNP    docusate sodium capsule 100 mg, 100 mg, Oral, BID, Keena H Encino, FNP, 100 mg at 10/13/22 0922    famotidine tablet 20 mg, 20 mg, Oral, Every other day, Ivan Chopra MD, 20 mg at 10/12/22 1337    glucagon (human recombinant) injection 1 mg, 1 mg, Intramuscular, PRN, Keena H Encino, FNP    glucose chewable tablet 16 g, 16 g, Oral, PRN, Keena Shafer, DENIA    glucose chewable tablet 24  g, 24 g, Oral, PRN, Keena Shafer, DENIA    hydrALAZINE injection 20 mg, 20 mg, Intravenous, Q4H PRN, Bin Bell MD, 20 mg at 10/08/22 0609    hydrALAZINE tablet 50 mg, 50 mg, Oral, Q8H, Ronda Hamilton MD, 50 mg at 10/14/22 0556    HYDROcodone-acetaminophen 5-325 mg per tablet 1 tablet, 1 tablet, Oral, Q4H PRN, Delmi Ortiz, DENIA, 1 tablet at 10/13/22 0616    HYDROmorphone (PF) injection 1 mg, 1 mg, Intravenous, Q4H PRN, Cong Velez DO, 1 mg at 10/13/22 1917    hydrOXYchloroQUINE tablet 200 mg, 200 mg, Oral, BID, Wing Thorpe MD, 200 mg at 10/13/22 2111    insulin aspart U-100 injection 1-16 Units, 1-16 Units, Subcutaneous, Q4H PRN, DENIA Armando, 8 Units at 10/13/22 1704    insulin detemir U-100 injection 14 Units, 14 Units, Subcutaneous, BID, Zach Shafer MD, 14 Units at 10/13/22 2111    labetaloL injection 20 mg, 20 mg, Intravenous, Q8H PRN, Bin Bell MD, 20 mg at 10/03/22 0321    LIDOcaine (PF) 10 mg/ml (1%) injection, , , PRN, Ruben Arciniega MD, 5 mL at 09/25/22 1656    lorazepam injection 1 mg, 1 mg, Intravenous, Q2H PRN, Cong Velez DO    melatonin tablet 6 mg, 6 mg, Oral, Nightly PRN, Michelle Ferrera, [facility], 6 mg at 10/14/22 0000    methylPREDNISolone sodium succinate injection 40 mg, 40 mg, Intravenous, TID, Zach Shafer MD, 40 mg at 10/13/22 2111    morphine 12 hr tablet 30 mg, 30 mg, Oral, Q12H, Shamar Stewart MD, 30 mg at 10/13/22 2111    morphine injection 2 mg, 2 mg, Intravenous, Q2H PRN, Evelio Marshall MD, 2 mg at 10/03/22 0310    mycophenolate capsule 1,000 mg, 1,000 mg, Oral, BID, Wing Thorpe MD, 1,000 mg at 10/13/22 2209    NIFEdipine 24 hr tablet 90 mg, 90 mg, Oral, Daily, Verna Whipple MD    nitroGLYCERIN SL tablet 0.4 mg, 0.4 mg, Sublingual, Q5 Min PRN, Zach Shafer MD    ondansetron injection 4 mg, 4 mg, Intravenous, Q6H PRN, Evelio Marshall MD, 4 mg at 10/04/22 2114    polyethylene glycol packet 17 g, 17 g, Oral, BID, Bin  "MD Ray, 17 g at 10/13/22 0920    sevelamer carbonate tablet 800 mg, 800 mg, Oral, TID WM, Alexander Simpson MD, 800 mg at 10/13/22 1643    sodium chloride 0.9% bolus 250 mL, 250 mL, Intravenous, PRN, Shahzad Lundberg MD    sodium chloride 0.9% bolus 250 mL, 250 mL, Intravenous, PRN, Shahzad Lundberg MD    sodium chloride 0.9% bolus 250 mL, 250 mL, Intravenous, PRN, Cindy Lora, FNP    sodium chloride 0.9% flush 10 mL, 10 mL, Intravenous, PRN, Francia Marcus DO    sodium chloride 0.9% flush 10 mL, 10 mL, Intravenous, PRN, Evelio Marshall MD    sodium chloride 0.9% flush 10 mL, 10 mL, Intravenous, PRN, Evelio Marshall MD    sodium zirconium cyclosilicate packet 10 g, 10 g, Oral, Once, Verna Whipple MD    sucralfate tablet 1 g, 1 g, Oral, BID, Francia Marcus, , 1 g at 10/13/22 2111        BP (!) 159/80   Pulse 60   Temp 98.1 °F (36.7 °C) (Oral)   Resp 18   Ht 5' 5" (1.651 m)   Wt 65 kg (143 lb 4.8 oz)   SpO2 97%   BMI 23.85 kg/m²     Physical Exam:    GEN: Awake and appropriate.  Chronically ill-appearing black male   HEENT: Atraumatic. EOMI, no icterus  NECK : No JVD, right IJ temporary catheter noted  CARD : RRR s rub or gallop  LUNGS : Clear to auscultation  ABD : Soft,non-tender. BS active  EXT : No pitting edema.   NEURO : No obvious focal deficits        Intake/Output Summary (Last 24 hours) at 10/14/2022 1101  Last data filed at 10/13/2022 1358  Gross per 24 hour   Intake 720 ml   Output --   Net 720 ml         Laboratory:  Recent Results (from the past 24 hour(s))   Protime-INR    Collection Time: 10/13/22 12:25 PM   Result Value Ref Range    PT 46.5 (H) 12.5 - 14.5 seconds    INR 5.20 (HH) 0.00 - 1.30   POCT glucose    Collection Time: 10/13/22 12:32 PM   Result Value Ref Range    POCT Glucose 85 70 - 110 mg/dL   POCT glucose    Collection Time: 10/13/22  4:46 PM   Result Value Ref Range    POCT Glucose 174 (H) 70 - 110 mg/dL   POCT glucose    Collection Time: 10/13/22  8:19 PM   Result Value " Ref Range    POCT Glucose 143 (H) 70 - 110 mg/dL   POCT glucose    Collection Time: 10/14/22  4:21 AM   Result Value Ref Range    POCT Glucose 133 (H) 70 - 110 mg/dL   Basic Metabolic Panel    Collection Time: 10/14/22  4:53 AM   Result Value Ref Range    Sodium Level 125 (L) 136 - 145 mmol/L    Potassium Level 5.4 (H) 3.5 - 5.1 mmol/L    Chloride 91 (L) 98 - 107 mmol/L    Carbon Dioxide 25 22 - 29 mmol/L    Glucose Level 86 74 - 100 mg/dL    Blood Urea Nitrogen 56.1 (H) 8.9 - 20.6 mg/dL    Creatinine 2.76 (H) 0.73 - 1.18 mg/dL    BUN/Creatinine Ratio 20     Calcium Level Total 8.1 (L) 8.4 - 10.2 mg/dL    Anion Gap 9.0 mEq/L    eGFR 30 mls/min/1.73/m2   Protime-INR    Collection Time: 10/14/22  4:53 AM   Result Value Ref Range    PT 49.7 (H) 12.5 - 14.5 seconds    INR 5.70 (HH) 0.00 - 1.30   CBC with Differential    Collection Time: 10/14/22  4:53 AM   Result Value Ref Range    WBC 11.4 4.5 - 11.5 x10(3)/mcL    RBC 3.45 (L) 4.70 - 6.10 x10(6)/mcL    Hgb 9.8 (L) 14.0 - 18.0 gm/dL    Hct 30.1 (L) 42.0 - 52.0 %    MCV 87.2 80.0 - 94.0 fL    MCH 28.4 27.0 - 31.0 pg    MCHC 32.6 (L) 33.0 - 36.0 mg/dL    RDW 16.0 11.5 - 17.0 %    Platelet 430 (H) 130 - 400 x10(3)/mcL    MPV 10.6 (H) 7.4 - 10.4 fL    Neut % 70.1 %    Lymph % 17.2 %    Mono % 11.5 %    Eos % 0.0 %    Basophil % 0.1 %    Lymph # 1.95 0.6 - 4.6 x10(3)/mcL    Neut # 8.0 2.1 - 9.2 x10(3)/mcL    Mono # 1.30 0.1 - 1.3 x10(3)/mcL    Eos # 0.00 0 - 0.9 x10(3)/mcL    Baso # 0.01 0 - 0.2 x10(3)/mcL    IG# 0.12 (H) 0 - 0.04 x10(3)/mcL    IG% 1.1 %    NRBC% 0.0 %         Assessment/Plan:   1. Postoperative TAY requiring hemodialysis initiation on 9/25 via temp HD catheter placed per Dr. Chopra  2. Abdominal aortic dissection s/p TEVAR   3. Post operative abdominal compartment syndrome s/p ex lap on 9/27 with closure on 10/1  4. Hyperkalemia. Losartan dc.   5. Hypertension  6. Type 2 diabetes mellitus  7. SLE past history  8. Hyponatremia persistent.    We will continue  his dialysis on Monday Wednesday Friday.  He was given 1 dose of Lokelma 10 g for hyperkalemia October 13th.  He may required continued Lokelma on a daily basis.  If his blood pressure is less than adequately controlled may need to resume his ACE inhibition and use Lokelma more regularly to control hyperkalemia.  He is currently being treated for active lupus by Rheumatology.    Shahzad Lundberg MD, BERNA[facility]

## 2022-10-14 NOTE — PT/OT/SLP PROGRESS
Occupational Therapy      Patient Name:  Stuart Guevara   MRN:  [id number]    Patient not seen today secondary to Dialysis. Will follow-up as schedule allows.    10/14/2022

## 2022-10-14 NOTE — PT/OT/SLP PROGRESS
Physical Therapy      Patient Name:  Stuart Guevara   MRN:  [id number]    Pt in dialysis this AM. Pt also has critical INR. PT to f/u as appropriate

## 2022-10-14 NOTE — PROGRESS NOTES
Inpatient Nutrition Assessment    Admit Date: 9/23/2022   Total duration of encounter: 21 days     Nutrition Recommendation/Prescription     Continue Soft Fiber Restricted diet, bite sized  Continue with Boost Plus two per meal (provides 360 kcal, 14 g protein per serving) TID for added protein and kcal.  Consider adding appetite stimulant per MD if po intake does not improve.     Communication of Recommendations: reviewed with nurse    Nutrition Assessment     Malnutrition Assessment/Nutrition-Focused Physical Exam    Malnutrition in the context of acute illness or injury  Degree of Malnutrition: severe malnutrition  Energy Intake: < 75% of estimated energy requirement for > 7 days  Interpretation of Weight Loss: unable to obtain  Body Fat:moderate depletion  Area of Body Fat Loss: orbital region  and upper arm region - triceps / biceps  Muscle Mass Loss: moderate depletion  Area of Muscle Mass Loss: temple region - temporalis muscle, clavicle bone region - pectoralis major, deltoid, trapezius muscles, clavicle and acromion bone region - deltoid muscle, and scapular bone region - trapezius, supraspinus, infraspinus muscles  Fluid Accumulation: does not meet criteria  Edema: does not meet criteria   Reduced  Strength: unable to obtain  A minimum of two characteristics is recommended for diagnosis of either severe or non-severe malnutrition.    Chart Review    Reason Seen: continuous nutrition monitoring and follow-up    Diagnosis:  Fever and Leukocytosis  Abdominal aortic dissection, s/p repair  Abdominal compartment syndrome, s/p ex-lap x2 last on 9/27 with abdominal closure on 10/1  SLE with active flare  History of transverse myelitis  History of lupus nephritis  TAY now on HD  VHD, s/p mechanical AVR  Chest tube in place  DM2    Relevant Medical History: HTN, lupus    Nutrition-Related Medications: detemir 10 Units BID, docusate, miralax  Calorie Containing IV Medications: no significant kcals from  "medications at this time    Nutrition-Related Labs:  9/26 BUN 27, Crea 3.06, Glu 174, Phos 6.2  9/30 Na 128, K 3.3, Cl 96, BUN 21.5, Crea 3.28, Glu 205  10/4 Na 127, Cl 95, BUN 57.3, Crea 5.16, Glu 152, Phos 5  10/7 K 5.6, BUN 55.2, Crea 4.57, Glu 347, Phos 8  10/11: Na 126, K 6.1, Cl 91, BUN 49, Crea 3.18, GFR 25, Glu 139, Ca 7.9, Phos 7.4  10/14: Na 125, K 5.4, Cl 91, BUN 56.1, Crea 2.76, GFR 30, Ca 8.1    Diet/PN Order: Diet Soft & Bite Sized Soft (Fiber Restricted)  Oral Supplement Order: Boost Glucose Control  Tube Feeding Order: none at this time  Appetite/Oral Intake: fair/50-75% of meals  Factors Affecting Nutritional Intake: decreased appetite  Food/Zoroastrianism/Cultural Preferences: none reported    Skin Integrity: incision  Wound(s):   incision noted    Comments    9/26/22: Discussed with RN. Will provide tube feeding recommendations for when appropriate to start tube feeding. Receiving kcal from meds. Noted Phos, will monitor for need for renal formula.   9/30/22: Discussed with RN. Need to consider TPN since pt now LOS day 7. If starting tube feeding, recs provided. If able to extubate, advance diet when appropriate.   10/4/22: Pt previously eating 100% po intake of meals, good appetite. Currently NPO for procedure, plans to restart diet per RN. Will add ONS for added protein and kcal.  10/7/22: Pt now with decreased appetite/po intake. Noted elevated K and Phos, likely not diet related since pt with poor po intake of full liquid diet.   10/11: pt sleeping, nurse reports tolerating full liquid diet, some abdominal distention noted, reports unsure of plans to advance diet as this time  10/14: pt advanced to soft diet today; ate about 40% of lunch tray, drinking Boost, would like 2 per meal in vanilla flavor    Anthropometrics    Height: 5' 5" (165.1 cm) Height Method: Estimated  Last Weight: 65 kg (143 lb 4.8 oz) (10/12/22 0500) Weight Method: Bed Scale  BMI (Calculated): 23.8  BMI Classification: normal " (BMI 18.5-24.9)        Ideal Body Weight (IBW), Male: 136 lb     % Ideal Body Weight, Male (lb): 97.26 %                          Usual Weight Provided By: unable to obtain usual weight at this time    Wt Readings from Last 5 Encounters:   10/12/22 65 kg (143 lb 4.8 oz)   09/23/22 60 kg (132 lb 4.4 oz)   12/09/21 48 kg (105 lb 13.1 oz)     Weight Change(s) Since Admission:  Admit Weight: 60 kg (132 lb 4.4 oz) (09/23/22 1219)  10/4/22: no new wt  10/7/22: no new wt  10/12/22: 65kg; weight gain noted    Estimated Needs    Weight Used For Calorie Calculations: 60 kg (132 lb 4.4 oz)  Energy Calorie Requirements (kcal): 2083kcal (1.4 stress factor)  Energy Need Method: Thornton-St Jeor  Weight Used For Protein Calculations: 60 kg (132 lb 4.4 oz)  Protein Requirements: 72-84gm (1.2-1.4g/kg)  Fluid Requirements (mL): 1000ml + urinary output  Temp: 98 °F (36.7 °C)    Enteral Nutrition    Patient not receiving enteral nutrition at this time.    Parenteral Nutrition    Patient not receiving parenteral nutrition support at this time.    Evaluation of Received Nutrient Intake    Calories: not meeting estimated needs  Protein: not meeting estimated needs    Patient Education    Not applicable.    Nutrition Diagnosis     PES: Inadequate oral intake related to current condition as evidenced by <75% intake of meals. (continues)    Interventions/Goals     Intervention(s): general/healthful diet, commercial beverage, and collaboration with other providers  Goal: Meet greater than 75% of nutritional needs by follow-up. (goal progressing)    Monitoring & Evaluation     Dietitian will monitor energy intake.  Nutrition Risk/Follow-Up: high (follow-up in 1-4 days)

## 2022-10-14 NOTE — NURSING
10/14/22 1148   Post-Hemodialysis Assessment   Blood Volume Processed (Liters) 45.3 L   Dialyzer Clearance Moderately streaked   Duration of Treatment 180 minutes   Total UF (mL) 2000 mL   Patient Response to Treatment Tolerated well   Post-Hemodialysis Comments Tx completed, pt reinfused. CVC deaccessed per P&P, locked with NE to fill volume, new Clear Guard caps applied. Pt with excessive movements throughout treatment, only able to maintain 250BFR. MD aware.

## 2022-10-14 NOTE — PLAN OF CARE
10/14/22 1341   Discharge Reassessment   Assessment Type Discharge Planning Reassessment   Did the patient's condition or plan change since previous assessment? Yes   Discharge Plan discussed with: Patient   Discharge Plan A Rehab   Discharge Plan B Skilled Nursing Facility   Post-Acute Status   Post-Acute Authorization Placement   Post-Acute Placement Status Referrals Sent  ([facility])

## 2022-10-15 NOTE — PROGRESS NOTES
[facility]  Hospital Medicine Progress Note        Chief Complaint: Inpatient Follow-up for Aortic disscetion     HPI:   Stuart Guevara is a 32 y.o. male who has PMH which includes HTN, SLE,  hepatitis B, aortic dissection with repair 2/2021, RUE DVT on Xarelto; presented to the ED at [facility] on 9/23/2022  with reports of  shortness of breath and chest pain radiating to his back.  PT has a history of aortic dissection 2/2021. Work up revealed a type B aortic dissection and significant hypertension noted on presentation.  He failed medical management and had progression of his symptoms despite adequate control of hemodynamic parameters.  PT was transferred from [facility] to [facility] for CV surgery evaluation which he was taken  to the OR and had TEVAR with left carotid to subclavian artery bypass. PT developed abdominal distension and rigidity of his abdomen and developed a lactic acidosis.  He repeat CT angiography of the chest/abdomen/pelvis without evidence of worsening dissection, but evidence of significant intra-abdominal pathology with some hemoperitoneum, free blood in the abdomen.  He underwent exploratory laparotomy with evidence of viable bowel and no significant bleeding identified, abd was closed with flakito drain left in situ chest tube was placed. Postoperatively, he developed severe unstable bradycardia, and a transvenous pacemaker was placed by Cardiology.  Shortly afterwards, he was noted to have significant worsening of his renal failure and critical hyperkalemia. Renal services consulted which catheter was placed and he was started on HD treatments.  PT has been cleared for transfer out of ICU to the floor. Hospital medicine services consulted for assumption of care.     Patient is s/p AAA surgery repair , course c/by abd compartment syndrome s/p ex lap and closure with flakito drain in situ, sepsis syndrome with fever and worsening leucocytosis , Appreciate ID input antibiotics  broadened to vancomycin Flagyl and cefepime, concern for SLE serositis , rheum consulted started on high dose steori and immunosuppressants.      Interval Hx:   Patient seen and examined this morning sitting comfortably in the bed no issues  [facility]  Inr still elevated, hold coumadin, repeat inr in am      Objective/physical exam:  General: In no acute distress, afebrile  Respiratory: Clear to auscultation bilaterally  Cardiovascular: S1, S2, no appreciable murmur  Abdomen: Soft, nontender, BS +, dressing,   MSK: Warm, no lower extremity edema, no clubbing or cyanosis  Neurologic: Alert and oriented x4,       Assessment/Plan:   SLE flare and serositis   Acute respiratory failure with hypoxia- s/p mechanical ventilation, now extubated  Acute aortic dissection type B- s/p TEVAR with left carotid artery to left subclavian bypass 9/23/2022  Acute kidney injury now requiring hemodialysis since September 25th by a temporary hemodialysis catheter  New onset Cardiomyopathy- EF 37% unknown etiology  VHD, s/p mechanical AVR   Acute abdominal pain generalized  Abdominal compartment syndrome, s/p ex-lap x2 last on 9/27 with abdominal closure on 10/1  Acute pancreatitis- resolving with lipase trending down  RUE DVT- was on Xarelto, recent US no DVT  HTN- emergency, resolved   Anemia- chronic disease  Weakness  HLD  Hx of hepatitis B  Hx of aortic dissection with repair 2/2021  History of transverse myelitis  History of lupus nephritis        Patient was started on Solu-Medrol, CellCept, Plaquenil but has not received benlysta  Appreciate Rheumatology recommendations.   Potassium is still elevated will give Lokelma patient will be going for dialysis today   INR is still supratherapeutic at 4.4  patient is not bleeding from anywhere so for now we will keep a close watch  Coumadin on hold  Repeat PT and INR in a.m. and resume the does off Coumadin if INR is between 2-3  Blood pressure was significantly elevated  yesterday today is the 1st dose of nifedipine 90 mg so will keep a close watch   Continue with other blood pressure medication  Blood culture grewCUTIBACTERIUM ACNES    There was some drainage at this surgical side most likely secondary to ascites surgery team was following.  No signs of infection so no indication for antibiotics id signed off        Patient condition:  Stable     Anticipated discharge and Disposition: TBD        All diagnosis and differential diagnosis have been reviewed; assessment and plan has been documented; I have personally reviewed the labs and test results that are presently available; I have reviewed the patients medication list; I have reviewed the consulting providers response and recommendations. I have reviewed or attempted to review medical records based upon their availability     All of the patient's questions have been  addressed and answered. Patient's is agreeable to the above stated plan. I will continue to monitor closely and make adjustments to medical management as needed.  _________  VITAL SIGNS: 24 HRS MIN & MAX LAST   Temp  Min: 98 °F (36.7 °C)  Max: 98.6 °F (37 °C) (P) 98.6 °F (37 °C)   BP  Min: 132/69  Max: 161/79 (!) (P) 154/77     Pulse  Min: 63  Max: 70  (P) 63   Resp  Min: 12  Max: 18 (P) 18   SpO2  Min: 94 %  Max: 97 % (P) 96 %       Recent Labs   Lab 10/13/22  1035 10/14/22  0453 10/15/22  0355   WBC 15.6* 11.4 9.9   RBC 3.66* 3.45* 3.11*   HGB 10.4* 9.8* 8.8*   HCT 32.3* 30.1* 27.1*   MCV 88.3 87.2 87.1   MCH 28.4 28.4 28.3   MCHC 32.2* 32.6* 32.5*   RDW 16.1 16.0 15.9   * 430* 406*   MPV 10.5* 10.6* 9.5       Recent Labs   Lab 10/09/22  0519 10/10/22  0324 10/11/22  0448 10/12/22  0507 10/13/22  1035 10/14/22  0453 10/15/22  0355   * 124* 126* 124* 127* 125* 127*   K 4.8 6.2* 6.1* 5.1 5.5* 5.4* 5.8*   CO2 24 28 29 27 26 25 28   BUN 58.0* 68.8* 49.0* 42.9* 43.5* 56.1* 44.4*   CREATININE 3.75* 4.07* 3.18* 2.37* 2.82* 2.76* 2.76*   CALCIUM 7.6* 7.7*  7.9* 7.8* 8.1* 8.1* 7.8*   MG 2.20 2.30 2.30  --   --   --   --    ALBUMIN 1.3* 1.3* 1.4* 1.3*  --   --   --    ALKPHOS 78 81 82 66  --   --   --    ALT 15 14 12 12  --   --   --    AST 32 30 29 32  --   --   --    BILITOT 0.3 0.3 0.3 0.3  --   --   --           Microbiology Results (last 7 days)       Procedure Component Value Units Date/Time    Blood Culture [[id number]]  (Abnormal) Collected: 10/03/22 1752    Order Status: Completed Specimen: Blood Updated: 10/11/22 1322     CULTURE, BLOOD ([facility]) CUTIBACTERIUM ACNES     Comment: This organism is commonly considered a contaminant.  No further processing will be done.        GRAM STAIN Gram Positive Rods      Seen in gram stain of broth only      1 of 2 Anaerobic bottles positive    Blood Culture [[id number]]  (Normal) Collected: 10/03/22 1752    Order Status: Completed Specimen: Blood Updated: 10/08/22 2000     CULTURE, BLOOD ([facility]) No Growth at 5 days    Blood Culture [[id number]]  (Normal) Collected: 10/03/22 1615    Order Status: Completed Specimen: Blood Updated: 10/08/22 1801     CULTURE, BLOOD ([facility]) No Growth at 5 days             See below for Radiology    Scheduled Med:   aluminum-magnesium hydroxide-simethicone  30 mL Oral QID (AC & HS)    carvediloL  25 mg Oral BID    docusate sodium  100 mg Oral BID    famotidine  20 mg Oral Every other day    hydrALAZINE  50 mg Oral Q8H    hydrOXYchloroQUINE  200 mg Oral BID    insulin detemir U-100  14 Units Subcutaneous BID    methylPREDNISolone sodium succinate injection  40 mg Intravenous TID    morphine  30 mg Oral Q12H    mycophenolate  1,000 mg Oral BID    NIFEdipine  90 mg Oral Daily    polyethylene glycol  17 g Oral BID    sevelamer carbonate  800 mg Oral TID WM    sodium zirconium cyclosilicate  10 g Oral TID    sucralfate  1 g Oral BID        Continuous Infusions:       PRN Meds:  acetaminophen, albuterol, bisacodyL, dextrose 10%, dextrose 50%, dextrose 50%, glucagon (human recombinant), glucose, glucose,  hydrALAZINE, HYDROcodone-acetaminophen, HYDROmorphone, insulin aspart U-100, labetaloL, LIDOcaine (PF) 10 mg/ml (1%), lorazepam, melatonin, morphine, nitroGLYCERIN, ondansetron, sodium chloride 0.9%, sodium chloride 0.9%, sodium chloride 0.9%, sodium chloride 0.9%, sodium chloride 0.9%, sodium chloride 0.9%         VTE prophylaxis:     Patient condition:  Stable/Fair/Guarded/ Serious/ Critical    Anticipated discharge and Disposition:         All diagnosis and differential diagnosis have been reviewed; assessment and plan has been documented; I have personally reviewed the labs and test results that are presently available; I have reviewed the patients medication list; I have reviewed the consulting providers response and recommendations. I have reviewed or attempted to review medical records based upon their availability    All of the patient's questions have been  addressed and answered. Patient's is agreeable to the above stated plan. I will continue to monitor closely and make adjustments to medical management as needed.  _____________________________________________________________________    Nutrition Status:    Radiology:  X-Ray Chest 1 View  Narrative: EXAMINATION:  XR CHEST 1 VIEW    CPT 11988    CLINICAL HISTORY:  TAY, SOB;    COMPARISON:  October 5, 2022    FINDINGS:  Examination reveals cardiomediastinal silhouette to be unchanged as compared with the previous exam.    Endo stent is identified in the descending aorta central line is seen with the tip at the junction of the superior vena cava and right atrium.    There is persistent increased left retrocardiac density and silhouetting of the left hemidiaphragm which might be related to an infiltrate/atelectasis    No focal consolidative changes otherwise identified no other significant change  Impression: Persistent increased left retrocardiac density and silhouetting of the left hemidiaphragm.    No other focal consolidative changes.    No other  change    Electronically signed by: Ramón Ying  Date:    10/12/2022  Time:    08:15      Ronda Hamilton MD   10/15/2022

## 2022-10-16 NOTE — PROGRESS NOTES
Nephrology consult follow up note    HPI:      Stuart Guevara is a 32 y.o. 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  The patient underwent exploratory lap for possibility of necrotic bowel and subsequently developed acute kidney injury with hyperkalemia and acidosis requiring initiation of dialysis.  He has now been started on a MWF schedule via right IJ temporary catheter.     Interval history:     10/16/22-hyperkalemic today. Given kayexalate per hospitalist. Alert and oriented. No N/V. No SOB.      Review of Systems:     Comprehensive 10pt ROS negative except as noted per HPI.       Past medical, family, surgical, and social history reviewed and unchanged from initial consult note.     MAR reviewed. See bottom of note for current medications.    Objective:       VITAL SIGNS: 24 HR MIN & MAX LAST    Temp  Min: 97.9 °F (36.6 °C)  Max: 99 °F (37.2 °C)  98.1 °F (36.7 °C)        BP  Min: 125/77  Max: 170/88  (!) 152/80     Pulse  Min: 63  Max: 67  65     Resp  Min: 12  Max: 18  18    SpO2  Min: 95 %  Max: 97 %  95 %      GEN: Awake and appropriate.  Chronically ill-appearing black male   HEENT: Atraumatic. EOMI, no icterus  NECK : No JVD, right IJ temporary catheter noted  CARD : RRR   LUNGS : Clear to auscultation  ABD : Soft,non-tender. BS active  EXT : No edema.   NEURO : No obvious focal deficits            Component Value Date/Time     (L) 10/16/2022 0433     (L) 10/15/2022 0355    K 6.2 (H) 10/16/2022 0433    K 5.8 (H) 10/15/2022 0355    CO2 25 10/16/2022 0433    CO2 28 10/15/2022 0355    BUN 64.2 (H) 10/16/2022 0433    BUN 44.4 (H) 10/15/2022 0355    CREATININE 3.01 (H) 10/16/2022 0433    CREATININE 2.76 (H) 10/15/2022 0355    CALCIUM 8.5 10/16/2022 0433    CALCIUM 7.8 (L) 10/15/2022 0355    PHOS 7.4 (H) 10/11/2022 0448            Component Value  Date/Time    WBC 9.9 10/15/2022 0355    WBC 11.4 10/14/2022 0453    HGB 8.8 (L) 10/15/2022 0355    HGB 9.8 (L) 10/14/2022 0453    HCT 27.1 (L) 10/15/2022 0355    HCT 30.1 (L) 10/14/2022 0453    HCT 28 (L) 09/23/2022 0304    HCT 30.0 (L) 02/15/2021 1751     (H) 10/15/2022 0355     (H) 10/14/2022 0453     Imaging reviewed      Assessment / Plan:       Active Hospital Problems    Diagnosis  POA    *Aortic dissection [I71.00]  Yes    Serositis [K65.8]  Unknown    Systemic lupus erythematosus [M32.9]  Yes     Chronic    HTN (hypertension) [I10]  Yes     Chronic    History of DVT in adulthood [Z86.718]  Not Applicable     Chronic    Hypertensive emergency [I16.1]  Yes      Resolved Hospital Problems   No resolved problems to display.             Current Facility-Administered Medications   Medication Dose Route Frequency Provider Last Rate Last Admin    acetaminophen tablet 650 mg  650 mg Oral Q6H PRN Nolberto Calvillo MD   650 mg at 10/06/22 2031    albuterol inhaler 2 puff  2 puff Inhalation Q4H PRN Evelio Marshall MD        aluminum-magnesium hydroxide-simethicone 200-200-20 mg/5 mL suspension 30 mL  30 mL Oral QID (AC & HS) Ronda Hamilton MD   30 mL at 10/16/22 0535    bisacodyL suppository 10 mg  10 mg Rectal Daily PRN Keena Shafer FNP   10 mg at 10/02/22 1056    carvediloL tablet 25 mg  25 mg Oral BID Maximino Yeh, ANP   25 mg at 10/16/22 0840    dextrose 10% bolus 125 mL  12.5 g Intravenous PRN Ivan Chopra MD        dextrose 50% injection 12.5 g  12.5 g Intravenous PRN Keena Shafer, FNP        dextrose 50% injection 25 g  25 g Intravenous PRN Keena Shafer, FNP        docusate sodium capsule 100 mg  100 mg Oral BID Keena Shafer, FNP   100 mg at 10/15/22 0915    famotidine tablet 20 mg  20 mg Oral Every other day Ivan Chopra MD   20 mg at 10/16/22 0841    glucagon (human recombinant) injection 1 mg  1 mg Intramuscular PRN DENIA Caruso        glucose  chewable tablet 16 g  16 g Oral PRN Keena Shafer, DENIA        glucose chewable tablet 24 g  24 g Oral PRN Keena Shafer, YASP        hydrALAZINE injection 20 mg  20 mg Intravenous Q4H PRN Bin Bell MD   20 mg at 10/08/22 0609    hydrALAZINE tablet 50 mg  50 mg Oral Q8H Ronda Hamilton MD   50 mg at 10/16/22 0535    HYDROcodone-acetaminophen 5-325 mg per tablet 1 tablet  1 tablet Oral Q4H PRN DENIA Armando   1 tablet at 10/14/22 2026    HYDROmorphone (PF) injection 1 mg  1 mg Intravenous Q4H PRN Cong Velez DO   1 mg at 10/15/22 2021    hydrOXYchloroQUINE tablet 200 mg  200 mg Oral BID Wing Thorpe MD   200 mg at 10/16/22 0840    insulin aspart U-100 injection 1-16 Units  1-16 Units Subcutaneous Q4H PRN DENIA Armando   10 Units at 10/15/22 0515    insulin detemir U-100 injection 14 Units  14 Units Subcutaneous BID Zach Shafer MD   14 Units at 10/16/22 0840    labetaloL injection 20 mg  20 mg Intravenous Q8H PRN Bin Bell MD   20 mg at 10/03/22 0321    LIDOcaine (PF) 10 mg/ml (1%) injection    PRN Ruben Arciniega MD   5 mL at 09/25/22 1656    lorazepam injection 1 mg  1 mg Intravenous Q2H PRN Cong Velez DO        melatonin tablet 6 mg  6 mg Oral Nightly PRN Michelle Ferrera AGACNP-BC   6 mg at 10/14/22 0000    methylPREDNISolone sodium succinate injection 40 mg  40 mg Intravenous TID Zach Shafer MD   40 mg at 10/16/22 0841    morphine 12 hr tablet 30 mg  30 mg Oral Q12H Shamar Stewart MD   30 mg at 10/16/22 0840    morphine injection 2 mg  2 mg Intravenous Q2H PRN Evelio Marshall MD   2 mg at 10/03/22 0310    mycophenolate capsule 1,000 mg  1,000 mg Oral BID Wing Thorpe MD   1,000 mg at 10/16/22 0841    NIFEdipine 24 hr tablet 90 mg  90 mg Oral Daily Verna Whipple MD   90 mg at 10/16/22 0840    nitroGLYCERIN SL tablet 0.4 mg  0.4 mg Sublingual Q5 Min PRN Zach Shafer MD        ondansetron injection 4 mg  4 mg Intravenous Q6H PRN Evelio PAGAN  MD Rolando   4 mg at 10/04/22 2114    pantoprazole EC tablet 40 mg  40 mg Oral BID AC Ronda Hamilton MD   40 mg at 10/16/22 0535    polyethylene glycol packet 17 g  17 g Oral BID Bin Bell MD   17 g at 10/13/22 0920    sevelamer carbonate tablet 800 mg  800 mg Oral TID  Alexander Simpson MD   800 mg at 10/16/22 0840    sodium chloride 0.9% bolus 250 mL  250 mL Intravenous PRN Shahzad Lundberg MD        sodium chloride 0.9% bolus 250 mL  250 mL Intravenous PRN Shahzad Lundberg MD        sodium chloride 0.9% bolus 250 mL  250 mL Intravenous PRN DENIA Person        sodium chloride 0.9% flush 10 mL  10 mL Intravenous PRN Francia Marcus DO        sodium chloride 0.9% flush 10 mL  10 mL Intravenous PRN Evelio Marshall MD        sodium chloride 0.9% flush 10 mL  10 mL Intravenous PRN Evelio Marshall MD        sucralfate tablet 1 g  1 g Oral BID Francia Marcus, DO   1 g at 10/16/22 0840    warfarin tablet 3 mg  3 mg Oral Daily Ronda Hamilton MD           1. Postoperative TAY requiring hemodialysis initiation on 9/25 via temp HD catheter placed per Dr. Chopra  2. Abdominal aortic dissection s/p TEVAR   3. Post operative abdominal compartment syndrome s/p ex lap on 9/27 with closure on 10/1  4. Hyperkalemia. Losartan dc.   5. Hypertension  6. Type 2 diabetes mellitus  7. SLE past history  8. Hyponatremia persistent.    Reviewed and stressed low K diet. Start Lokelma 5mg 3 days a week on non-HD days, I.e. TTS. Plan to dialyze tomorrow on routine MWF schedule. Recheck labs in am.     Felicia Feldman, MARIANNE-C  [facility]

## 2022-10-16 NOTE — PROGRESS NOTES
[facility]  Hospital Medicine Progress Note           Chief Complaint: Inpatient Follow-up for Aortic disscetion     HPI:   Stuart Guevara is a 32 y.o. male who has PMH which includes HTN, SLE,  hepatitis B, aortic dissection with repair 2/2021, RUE DVT on Xarelto; presented to the ED at [facility] on 9/23/2022  with reports of  shortness of breath and chest pain radiating to his back.  PT has a history of aortic dissection 2/2021. Work up revealed a type B aortic dissection and significant hypertension noted on presentation.  He failed medical management and had progression of his symptoms despite adequate control of hemodynamic parameters.  PT was transferred from [facility] to [facility] for CV surgery evaluation which he was taken  to the OR and had TEVAR with left carotid to subclavian artery bypass. PT developed abdominal distension and rigidity of his abdomen and developed a lactic acidosis.  He repeat CT angiography of the chest/abdomen/pelvis without evidence of worsening dissection, but evidence of significant intra-abdominal pathology with some hemoperitoneum, free blood in the abdomen.  He underwent exploratory laparotomy with evidence of viable bowel and no significant bleeding identified, abd was closed with flakito drain left in situ chest tube was placed. Postoperatively, he developed severe unstable bradycardia, and a transvenous pacemaker was placed by Cardiology.  Shortly afterwards, he was noted to have significant worsening of his renal failure and critical hyperkalemia. Renal services consulted which catheter was placed and he was started on HD treatments.  PT has been cleared for transfer out of ICU to the floor. Hospital medicine services consulted for assumption of care.     Patient is s/p AAA surgery repair , course c/by abd compartment syndrome s/p ex lap and closure with flakito drain in situ, sepsis syndrome with fever and worsening leucocytosis , Appreciate ID input antibiotics  broadened to vancomycin Flagyl and cefepime, concern for SLE serositis , rheum consulted started on high dose steori and immunosuppressants.      Interval Hx:   Patient seen and examined this morning sitting comfortably in the bed no issues   potassium continues to trend upwards.  Give a dose of Kayexalate 30 g x1, repeat BMP at 2:00 p.m.  INR 3, resume Coumadin 3 mg this evening  Patient is on Coumadin for mechanical valve of the aortic valve          Objective/physical exam:  General: In no acute distress, afebrile  Respiratory: Clear to auscultation bilaterally  Cardiovascular: S1, S2, no appreciable murmur  Abdomen: Soft, nontender, BS +, dressing,   MSK: Warm, no lower extremity edema, no clubbing or cyanosis  Neurologic: Alert and oriented x4,         Assessment/Plan:   Hyperkalemia  SLE flare and serositis   Acute respiratory failure with hypoxia- s/p mechanical ventilation, now extubated  Acute aortic dissection type B- s/p TEVAR with left carotid artery to left subclavian bypass 9/23/2022  Acute kidney injury now requiring hemodialysis since September 25th by a temporary hemodialysis catheter  New onset Cardiomyopathy- EF 37% unknown etiology  VHD, s/p mechanical AVR   Acute abdominal pain generalized  Abdominal compartment syndrome, s/p ex-lap x2 last on 9/27 with abdominal closure on 10/1  Acute pancreatitis- resolving with lipase trending down  RUE DVT- was on Xarelto, recent US no DVT  HTN- emergency, resolved   Anemia- chronic disease  Weakness  HLD  Hx of hepatitis B  Hx of aortic dissection with repair 2/2021  History of transverse myelitis  History of lupus nephritis        Plan  Give a dose of Kayexalate 30 g x1, repeat BMP at 2:00 p.m.INR 3, resume Coumadin 3 mg this evening  Patient was started on Solu-Medrol, CellCept, Plaquenil but has not received benlysta  Appreciate Rheumatology recommendations.   Bp control   Nephrology- HD   Blood culture grewCUTIBACTERIUM ACNES   There was some drainage  at this surgical side most likely secondary to ascites surgery team was following.  No signs of infection so no indication for antibiotics id signed off        Patient condition:  Stable     Anticipated discharge and Disposition: TBD        All diagnosis and differential diagnosis have been reviewed; assessment and plan has been documented; I have personally reviewed the labs and test results that are presently available; I have reviewed the patients medication list; I have reviewed the consulting providers response and recommendations. I have reviewed or attempted to review medical records based upon their availability     All of the patient's questions have been  addressed and answered. Patient's is agreeable to the above stated plan. I will continue to monitor closely and make adjustments to medical management as needed.    Critical care diagnosis severe hyperkalemia requiring Kayexalate  critical care time greater than 35 minutes    VITAL SIGNS: 24 HRS MIN & MAX LAST   Temp  Min: 97.9 °F (36.6 °C)  Max: 99 °F (37.2 °C) 98.1 °F (36.7 °C)   BP  Min: 125/77  Max: 170/88 (!) 152/80     Pulse  Min: 63  Max: 67  65   Resp  Min: 12  Max: 18 18   SpO2  Min: 95 %  Max: 97 % 95 %       Recent Labs   Lab 10/13/22  1035 10/14/22  0453 10/15/22  0355   WBC 15.6* 11.4 9.9   RBC 3.66* 3.45* 3.11*   HGB 10.4* 9.8* 8.8*   HCT 32.3* 30.1* 27.1*   MCV 88.3 87.2 87.1   MCH 28.4 28.4 28.3   MCHC 32.2* 32.6* 32.5*   RDW 16.1 16.0 15.9   * 430* 406*   MPV 10.5* 10.6* 9.5       Recent Labs   Lab 10/10/22  0324 10/11/22  0448 10/12/22  0507 10/13/22  1035 10/14/22  0453 10/15/22  0355 10/16/22  0433   * 126* 124*   < > 125* 127* 125*   K 6.2* 6.1* 5.1   < > 5.4* 5.8* 6.2*   CO2 28 29 27   < > 25 28 25   BUN 68.8* 49.0* 42.9*   < > 56.1* 44.4* 64.2*   CREATININE 4.07* 3.18* 2.37*   < > 2.76* 2.76* 3.01*   CALCIUM 7.7* 7.9* 7.8*   < > 8.1* 7.8* 8.5   MG 2.30 2.30  --   --   --   --   --    ALBUMIN 1.3* 1.4* 1.3*  --   --   --    --    ALKPHOS 81 82 66  --   --   --   --    ALT 14 12 12  --   --   --   --    AST 30 29 32  --   --   --   --    BILITOT 0.3 0.3 0.3  --   --   --   --     < > = values in this interval not displayed.          Microbiology Results (last 7 days)       Procedure Component Value Units Date/Time    Blood Culture [[id number]]  (Abnormal) Collected: 10/03/22 0628    Order Status: Completed Specimen: Blood Updated: 10/11/22 1322     CULTURE, BLOOD ([facility]) CUTIBACTERIUM ACNES     Comment: This organism is commonly considered a contaminant.  No further processing will be done.        GRAM STAIN Gram Positive Rods      Seen in gram stain of broth only      1 of 2 Anaerobic bottles positive             See below for Radiology    Scheduled Med:   aluminum-magnesium hydroxide-simethicone  30 mL Oral QID (AC & HS)    carvediloL  25 mg Oral BID    docusate sodium  100 mg Oral BID    famotidine  20 mg Oral Every other day    hydrALAZINE  50 mg Oral Q8H    hydrOXYchloroQUINE  200 mg Oral BID    insulin detemir U-100  14 Units Subcutaneous BID    methylPREDNISolone sodium succinate injection  40 mg Intravenous TID    morphine  30 mg Oral Q12H    mycophenolate  1,000 mg Oral BID    NIFEdipine  90 mg Oral Daily    pantoprazole  40 mg Oral BID AC    polyethylene glycol  17 g Oral BID    sevelamer carbonate  800 mg Oral TID WM    sodium zirconium cyclosilicate  5 g Oral Q8H    sucralfate  1 g Oral BID    warfarin  3 mg Oral Daily        Continuous Infusions:       PRN Meds:  acetaminophen, albuterol, bisacodyL, dextrose 10%, dextrose 50%, dextrose 50%, glucagon (human recombinant), glucose, glucose, hydrALAZINE, HYDROcodone-acetaminophen, HYDROmorphone, insulin aspart U-100, labetaloL, LIDOcaine (PF) 10 mg/ml (1%), lorazepam, melatonin, morphine, nitroGLYCERIN, ondansetron, sodium chloride 0.9%, sodium chloride 0.9%, sodium chloride 0.9%, sodium chloride 0.9%, sodium chloride 0.9%, sodium chloride 0.9%         VTE prophylaxis:      Patient condition:  Stable/Fair/Guarded/ Serious/ Critical    Anticipated discharge and Disposition:         All diagnosis and differential diagnosis have been reviewed; assessment and plan has been documented; I have personally reviewed the labs and test results that are presently available; I have reviewed the patients medication list; I have reviewed the consulting providers response and recommendations. I have reviewed or attempted to review medical records based upon their availability    All of the patient's questions have been  addressed and answered. Patient's is agreeable to the above stated plan. I will continue to monitor closely and make adjustments to medical management as needed.  _____________________________________________________________________    Nutrition Status:    Radiology:  X-Ray Chest 1 View  Narrative: EXAMINATION:  XR CHEST 1 VIEW    CPT 50426    CLINICAL HISTORY:  TAY, SOB;    COMPARISON:  October 5, 2022    FINDINGS:  Examination reveals cardiomediastinal silhouette to be unchanged as compared with the previous exam.    Endo stent is identified in the descending aorta central line is seen with the tip at the junction of the superior vena cava and right atrium.    There is persistent increased left retrocardiac density and silhouetting of the left hemidiaphragm which might be related to an infiltrate/atelectasis    No focal consolidative changes otherwise identified no other significant change  Impression: Persistent increased left retrocardiac density and silhouetting of the left hemidiaphragm.    No other focal consolidative changes.    No other change    Electronically signed by: Ramón Ying  Date:    10/12/2022  Time:    08:15      Ronda Hamilton MD   10/16/2022

## 2022-10-16 NOTE — PT/OT/SLP RE-EVAL
Physical Therapy Re-evaluation    Patient Name:  Stuart Guevara   MRN:  [id number]    Recommendations:     Discharge Recommendations:  rehabilitation facility   Discharge Equipment Recommendations: walker, rolling   Barriers to discharge:  decreased fxn 'l mobility and weakness    Assessment:     Stuart Guevara is a 32 y.o. male admitted with a medical diagnosis of Aortic dissection.  He presents with the following impairments/functional limitations:  weakness, impaired functional mobility pt has made some progress with bed mobility, transfers, and gait. Still a good candidate for rehab.    Rehab Prognosis:  good; patient would benefit from acute skilled PT services to address these deficits and reach maximum level of function.      Recent Surgery: Procedure(s) (LRB):  LAPAROTOMY, EXPLORATORY (N/A) 17 Days Post-Op    Plan:     During this hospitalization, patient to be seen 6 x/week to address the above listed problems via gait training, therapeutic activities, therapeutic exercises  Plan of Care Expires:  11/02/22  Plan of Care Reviewed with: patient    Subjective     Communicated with nurse prior to session.  Patient found supine with   upon PT entry to room, agreeable to evaluation.      Chief Complaint: leg pain  Patient comments/goals: wants to get well enough to go back home eventually  Pain/Comfort:  Pain Rating 1: 8/10  Location 1:  (bilateral lower extremities)    Patients cultural, spiritual, Mandaen conflicts given the current situation: no      Objective:     Patient found with:       General Precautions: Standard, fall   Orthopedic Precautions:N/A   Braces:    Respiratory Status: Room air    Exams:  RLE ROM: limited hip flexion against gravity but full knee ext/flex  RLE Strength: 3-/5 hip, knee ext 4-/5, knee flex is 3/5  LLE ROM: same as RLE  LLE Strength: 3-/5 hip, knee is 3+/5    Functional Mobility:  Bed Mobility:     Rolling Left:  minimum assistance  Rolling Right: minimum assistance  Supine  to Sit: moderate assistance  Sit to Supine: moderate assistance  Transfers:     Sit to Stand:  minimum assistance with rolling walker  Bed to Chair: minimum assistance with  rolling walker  using  Step Transfer  Gait: pt amb 50ft in room with a rw cga/brandon    AM-PAC 6 CLICK MOBILITY  Total Score:13       Therapeutic Activities and Exercises:       Patient left up in chair with call button in reach.    GOALS:   Multidisciplinary Problems       Physical Therapy Goals          Problem: Physical Therapy    Goal Priority Disciplines Outcome Goal Variances Interventions   Physical Therapy Goal     PT, PT/OT Ongoing, Progressing     Description: Goals to be met by: 22     Patient will increase functional independence with mobility by performin. Supine to sit with brandon  2. Sit to supine with brandon  3. Sit <> stand with CGA using RW/LRAD  4. Bed to chair with CGA via squat pivot/stand pivot  5.Ambulate 200 ft with rolling walker and contact guard assistance                           History:     Past Medical History:   Diagnosis Date    Hypertension     Systemic lupus erythematosus, organ or system involvement unspecified     Systemic lupus erythematosus, organ or system involvement unspecified        Past Surgical History:   Procedure Laterality Date    APPLICATION OF WOUND VACUUM-ASSISTED CLOSURE DEVICE  2022    Procedure: APPLICATION, WOUND VAC;  Surgeon: Christopher Espinal MD;  Location: [facility];  Service: General;;    CARDIAC SURGERY      FINGER AMPUTATION      THROMBECTOMY Right     TOE AMPUTATION         Time Tracking:     PT Received On:    PT Start Time: 910     PT Stop Time: 927  PT Total Time (min): 17 min     Billable Minutes: Alvarado 17      10/16/2022

## 2022-10-17 NOTE — PROGRESS NOTES
Nephrology consult follow up note    HPI:      Stuart Guevara is a 32 y.o. 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  The patient underwent exploratory lap for possibility of necrotic bowel and subsequently developed acute kidney injury with hyperkalemia and acidosis requiring initiation of dialysis.  He has now been started on a MWF schedule via right IJ temporary catheter.     Interval history:     10/16/22-hyperkalemic today. Given kayexalate per hospitalist. Alert and oriented. No N/V. No SOB.     10/17/22 currently tolerating dialysis.  Potassium stays high.  He is on 1 K potassium bath.  But will give him 2 K bath in the last 30 minutes of his dialysis.     Review of Systems:     Comprehensive 10pt ROS negative except as noted per HPI.       Past medical, family, surgical, and social history reviewed and unchanged from initial consult note.     MAR reviewed. See bottom of note for current medications.    Objective:       VITAL SIGNS: 24 HR MIN & MAX LAST    Temp  Min: 98 °F (36.7 °C)  Max: 98.6 °F (37 °C)  98 °F (36.7 °C)        BP  Min: 134/73  Max: 158/79  134/73     Pulse  Min: 65  Max: 76  76     Resp  Min: 13  Max: 20  13    SpO2  Min: 94 %  Max: 97 %  (!) 94 %      GEN: Awake and appropriate.  Chronically ill-appearing black male   HEENT: Atraumatic. EOMI, no icterus  NECK : No JVD, right IJ temporary catheter noted  CARD : RRR   LUNGS : Clear to auscultation  ABD : Soft,non-tender. BS active  EXT : No edema.   NEURO : No obvious focal deficits            Component Value Date/Time     (L) 10/17/2022 0406     (L) 10/16/2022 1347    K 6.2 (H) 10/17/2022 0406    K 6.5 (HH) 10/16/2022 1347    CO2 24 10/17/2022 0406    CO2 28 10/16/2022 1347    BUN 74.7 (H) 10/17/2022 0406    BUN 70.3 (H) 10/16/2022 1347    CREATININE 2.78 (H) 10/17/2022 0406     CREATININE 2.95 (H) 10/16/2022 1347    CALCIUM 8.4 10/17/2022 0406    CALCIUM 8.8 10/16/2022 1347    PHOS 7.4 (H) 10/11/2022 0448            Component Value Date/Time    WBC 9.9 10/15/2022 0355    WBC 11.4 10/14/2022 0453    HGB 8.8 (L) 10/15/2022 0355    HGB 9.8 (L) 10/14/2022 0453    HCT 27.1 (L) 10/15/2022 0355    HCT 30.1 (L) 10/14/2022 0453    HCT 28 (L) 09/23/2022 0304    HCT 30.0 (L) 02/15/2021 1751     (H) 10/15/2022 0355     (H) 10/14/2022 0453     Imaging reviewed      Assessment / Plan:       Active Hospital Problems    Diagnosis  POA    *Aortic dissection [I71.00]  Yes    Serositis [K65.8]  Unknown    Systemic lupus erythematosus [M32.9]  Yes     Chronic    HTN (hypertension) [I10]  Yes     Chronic    History of DVT in adulthood [Z86.718]  Not Applicable     Chronic    Hypertensive emergency [I16.1]  Yes      Resolved Hospital Problems   No resolved problems to display.             Current Facility-Administered Medications   Medication Dose Route Frequency Provider Last Rate Last Admin    acetaminophen tablet 650 mg  650 mg Oral Q6H PRN Nolberto Calvillo MD   650 mg at 10/06/22 2031    albuterol inhaler 2 puff  2 puff Inhalation Q4H PRN Evelio Marshall MD        aluminum-magnesium hydroxide-simethicone 200-200-20 mg/5 mL suspension 30 mL  30 mL Oral QID (AC & HS) Ronda Hamilton MD   30 mL at 10/16/22 0535    bisacodyL suppository 10 mg  10 mg Rectal Daily PRN DENIA Caruso   10 mg at 10/02/22 1056    carvediloL tablet 25 mg  25 mg Oral BID MARIANNE Castillo   25 mg at 10/16/22 2104    dextrose 10% bolus 125 mL  12.5 g Intravenous PRN Ivan Chopra MD        docusate sodium capsule 100 mg  100 mg Oral BID DENIA Caruso   100 mg at 10/16/22 2105    famotidine tablet 20 mg  20 mg Oral Every other day Ivan Chopra MD   20 mg at 10/16/22 0841    glimepiride tablet 4 mg  4 mg Oral Daily with breakfast Ronda Hamilton MD        hydrALAZINE injection 20 mg  20 mg  Intravenous Q4H PRN Bin Bell MD   20 mg at 10/08/22 0609    hydrALAZINE tablet 50 mg  50 mg Oral Q8H Ronda Hamilton MD   50 mg at 10/17/22 0544    HYDROcodone-acetaminophen 5-325 mg per tablet 1 tablet  1 tablet Oral Q4H PRN DENIA Armando   1 tablet at 10/14/22 2026    HYDROmorphone (PF) injection 1 mg  1 mg Intravenous Q4H PRN Cong Velez DO   1 mg at 10/16/22 1854    hydrOXYchloroQUINE tablet 200 mg  200 mg Oral BID Wing Thorpe MD   200 mg at 10/16/22 2105    insulin detemir U-100 injection 18 Units  18 Units Subcutaneous BID Ronda Hamilton MD        labetaloL injection 20 mg  20 mg Intravenous Q8H PRN Bin Bell MD   20 mg at 10/03/22 0321    LIDOcaine (PF) 10 mg/ml (1%) injection    PRN Ruben Arciniega MD   5 mL at 09/25/22 1656    lorazepam injection 1 mg  1 mg Intravenous Q2H PRN Cong Velez DO        melatonin tablet 6 mg  6 mg Oral Nightly PRN Michelle Ferrera AGACN-BC   6 mg at 10/14/22 0000    methylPREDNISolone sodium succinate injection 40 mg  40 mg Intravenous TID Zach Shafer MD   40 mg at 10/16/22 2104    morphine 12 hr tablet 30 mg  30 mg Oral Q12H Shamar Stewart MD   30 mg at 10/16/22 2104    morphine injection 2 mg  2 mg Intravenous Q2H PRN Evelio Marshall MD   2 mg at 10/03/22 0310    mycophenolate capsule 1,000 mg  1,000 mg Oral BID Wing Thorpe MD   1,000 mg at 10/16/22 2104    NIFEdipine 24 hr tablet 90 mg  90 mg Oral Daily Verna Whipple MD   90 mg at 10/16/22 0840    nitroGLYCERIN SL tablet 0.4 mg  0.4 mg Sublingual Q5 Min PRN Zach Shafer MD        ondansetron injection 4 mg  4 mg Intravenous Q6H PRN Evelio Marshall MD   4 mg at 10/04/22 2114    pantoprazole EC tablet 40 mg  40 mg Oral BID AC Ronda Hamilton MD   40 mg at 10/17/22 0645    polyethylene glycol packet 17 g  17 g Oral BID Bin Bell MD   17 g at 10/16/22 2105    sevelamer carbonate tablet 800 mg  800 mg Oral TID  Alexander Simpson MD   800 mg at 10/16/22 1635     sodium chloride 0.9% bolus 250 mL  250 mL Intravenous PRN Shahzad Lundberg MD        sodium chloride 0.9% bolus 250 mL  250 mL Intravenous PRN Shahzad Lundberg MD        sodium chloride 0.9% bolus 250 mL  250 mL Intravenous PRN DENIA Person        sodium chloride 0.9% flush 10 mL  10 mL Intravenous PRN Evelio Marshall MD        [START ON 10/18/2022] sodium zirconium cyclosilicate packet 5 g  5 g Oral Every Tues, Thurs, Sat MARIANNE Herndon        sucralfate tablet 1 g  1 g Oral BID Francia Marcus DO   1 g at 10/16/22 2105    warfarin tablet 3 mg  3 mg Oral Daily Ronda Hamilton MD   3 mg at 10/16/22 1631       1. Postoperative TAY requiring hemodialysis initiation on 9/25 via temp HD catheter placed per Dr. Chopra  2. Abdominal aortic dissection s/p TEVAR   3. Post operative abdominal compartment syndrome s/p ex lap on 9/27 with closure on 10/1  4. Hyperkalemia. Losartan dc.   5. Hypertension  6. Type 2 diabetes mellitus  7. SLE past history  8. Hyponatremia persistent.    Recommendations  Continue to provide dialysis on as-needed basis and check labs tomorrow.

## 2022-10-17 NOTE — PROGRESS NOTES
[facility] - [facility] Tele[facility]  Rheumatology  Progress Note    Patient Name: Stuart Guevara  MRN: [id number]  Admission Date: 9/23/2022  Hospital Length of Stay: 24 days  Code Status: Full Code   Attending Provider: Saurabh Curry MD  Primary Care Physician: Primary Doctor No  Principal Problem: Aortic dissection    Subjective:     HPI:  Joint pain continues to improve. Overall feeling better. He has still not received his Benlysta yet.       Interval History:     Current Facility-Administered Medications   Medication Frequency    acetaminophen tablet 650 mg Q6H PRN    albuterol inhaler 2 puff Q4H PRN    aluminum-magnesium hydroxide-simethicone 200-200-20 mg/5 mL suspension 30 mL QID (AC & HS)    bisacodyL suppository 10 mg Daily PRN    carvediloL tablet 25 mg BID    dextrose 10% bolus 125 mL PRN    docusate sodium capsule 100 mg BID    famotidine tablet 20 mg Every other day    glimepiride tablet 4 mg Daily with breakfast    hydrALAZINE injection 20 mg Q4H PRN    hydrALAZINE tablet 50 mg Q8H    HYDROcodone-acetaminophen 5-325 mg per tablet 1 tablet Q4H PRN    HYDROmorphone (PF) injection 1 mg Q4H PRN    hydrOXYchloroQUINE tablet 200 mg BID    insulin detemir U-100 injection 18 Units BID    labetaloL injection 20 mg Q8H PRN    LIDOcaine (PF) 10 mg/ml (1%) injection PRN    lorazepam injection 1 mg Q2H PRN    melatonin tablet 6 mg Nightly PRN    methylPREDNISolone sodium succinate injection 40 mg TID    morphine 12 hr tablet 30 mg Q12H    morphine injection 2 mg Q2H PRN    mycophenolate capsule 1,000 mg BID    NIFEdipine 24 hr tablet 90 mg Daily    nitroGLYCERIN SL tablet 0.4 mg Q5 Min PRN    ondansetron injection 4 mg Q6H PRN    pantoprazole EC tablet 40 mg BID AC    polyethylene glycol packet 17 g BID    sevelamer carbonate tablet 800 mg TID WM    sodium chloride 0.9% bolus 250 mL PRN    sodium chloride 0.9% bolus 250 mL PRN    sodium chloride 0.9% bolus  250 mL PRN    sodium chloride 0.9% flush 10 mL PRN    [START ON 10/18/2022] sodium zirconium cyclosilicate packet 5 g Every Tues, Thurs, Sat    sucralfate tablet 1 g BID    warfarin tablet 3 mg Daily     Objective:     Vital Signs (Most Recent):  Temp: 98 °F (36.7 °C) (10/17/22 0736)  Pulse: 76 (10/17/22 0736)  Resp: 13 (10/17/22 0736)  BP: 134/73 (10/17/22 0736)  SpO2: (!) 94 % (10/17/22 0736)  O2 Device (Oxygen Therapy): room air (10/15/22 1900)   Vital Signs (24h Range):  Temp:  [98 °F (36.7 °C)-98.6 °F (37 °C)] 98 °F (36.7 °C)  Pulse:  [65-76] 76  Resp:  [13-20] 13  SpO2:  [94 %-97 %] 94 %  BP: (134-158)/(69-79) 134/73     Weight: 56.6 kg (124 lb 12.5 oz) (10/15/22 0500)  Body mass index is 20.76 kg/m².  Body surface area is 1.61 meters squared.      Intake/Output Summary (Last 24 hours) at 10/17/2022 1038  Last data filed at 10/16/2022 1440  Gross per 24 hour   Intake 360 ml   Output 550 ml   Net -190 ml     Review of Systems   Constitutional:  Positive for fatigue.   HENT: Negative.     Eyes: Negative.    Respiratory:          Pleural effusion   Cardiovascular: Negative.    Gastrointestinal: Negative.    Genitourinary:         The patient is on hemodialysis   Neurological:         The patient had weakness of his lower extremities but is not as bad as it was documented in his previous rheumatologist's notes .    Physical Exam   Constitutional: He is oriented to person, place, and time. He appears well-developed and well-nourished. No distress.   HENT:   Head: Normocephalic and atraumatic.   Right Ear: External ear normal.   Left Ear: External ear normal.   Eyes: Pupils are equal, round, and reactive to light.   Cardiovascular: Normal rate, regular rhythm and normal heart sounds.   Pulmonary/Chest: Breath sounds normal.   Abdominal: Soft. There is no abdominal tenderness.   Musculoskeletal:      Right shoulder: Tenderness present.      Left shoulder: Tenderness present.      Right elbow: Tenderness present.       Left elbow: Tenderness present.      Right wrist: Tenderness present.      Left wrist: Tenderness present.      Cervical back: Neck supple.      Right hip: Tenderness present.      Left hip: Tenderness present.      Right knee: Tenderness present.      Left knee: Tenderness present.      Right ankle: Tenderness present.      Left ankle: Tenderness present.   Lymphadenopathy:     He has no cervical adenopathy.   Neurological: He is alert and oriented to person, place, and time. He displays normal reflexes. No cranial nerve deficit or sensory deficit. He exhibits normal muscle tone. Coordination normal.   Skin: No rash noted. No erythema.   Vitals reviewed.      Right Side Rheumatological Exam     The patient is tender to palpation of the shoulder, elbow, wrist, knee, 1st PIP, 1st MCP, 2nd PIP, 2nd MCP, 3rd PIP, 3rd MCP, 4th PIP, 4th MCP, 5th PIP, hip, ankle, 1st MTP, 2nd MTP, 3rd MTP, 4th MTP, 5th MTP, 1st toe IP, 2nd toe IP, 3rd toe IP, 4th toe IP and 5th toe IP    Left Side Rheumatological Exam     The patient is tender to palpation of the shoulder, elbow, wrist, knee, 1st PIP, 1st MCP, 2nd PIP, 2nd MCP, 3rd PIP, 3rd MCP, 4th PIP, 4th MCP, 5th PIP, 5th MCP, hip, ankle, 1st MTP, 2nd MTP, 3rd MTP, 4th MTP, 5th MTP, 1st toe IP, 2nd toe IP, 3rd toe IP, 4th toe IP and 5th toe IP.        Significant Labs:  All pertinent lab results from the last 24 hours have been reviewed.    Significant Imaging:  Imaging results within the past 24 hours have been reviewed.    Assessment/Plan:     Serositis  Secondary to systemic lupus, the patient is on CellCept, Solu-Medrol, Plaquenil, Will place nursing communication order and will provide sample of Benlysta to start him on a sample on the inpatient side and as a prescription as an outpatient    Hypertensive emergency  As per medical team; Tight control of the blood pressure to prevent any worsening of the aortic dissection    HTN (hypertension)  Continue treatment as per the  medical team    Systemic lupus erythematosus  On Solu-Medrol.,  CellCept 1 g p.o. b.i.d., Plaquenil 200 mg p.o. b.i.d..  Start Benlysta 200 mg subcutaneously once a week.  I placed the order as [facility] nursing order. Spoke to Nurse and not received. Nurse did verify with pharmacy that Benlysta is not accessible in the hospital. Plan is to provide sample from clinic today to nurse to start Benlysta injections today..          DENIA Mg  Rheumatology  [facility] - [facility]

## 2022-10-17 NOTE — ASSESSMENT & PLAN NOTE
As per medical team; Tight control of the blood pressure to prevent any worsening of the aortic dissection

## 2022-10-17 NOTE — PT/OT/SLP PROGRESS
Physical Therapy      Patient Name:  Stuart Guevara   MRN:  [id number]    Patient not seen today secondary to Patient unwilling to participate, Pain. Will follow-up tomorrow.

## 2022-10-17 NOTE — NURSING
10/17/22 1301   Post-Hemodialysis Assessment   Blood Volume Processed (Liters) 62.7 L   Dialyzer Clearance Lightly streaked   Duration of Treatment 180 minutes   Total UF (mL) 2000 mL   Patient Response to Treatment Tolerated well   Post-Hemodialysis Comments Tx completed, pt reinfused. CVC deaccessed per P&P, locked with NS to fill volume, new Clear Guard caps applied. Pt ran for 3 hours, NET removed = 2000ml.

## 2022-10-17 NOTE — ASSESSMENT & PLAN NOTE
Secondary to systemic lupus, the patient is on CellCept, Solu-Medrol, Plaquenil, Will place nursing communication order and will provide sample of Benlysta to start him on a sample on the inpatient side and as a prescription as an outpatient   Adult

## 2022-10-17 NOTE — PLAN OF CARE
Problem: Adult Inpatient Plan of Care  Goal: Plan of Care Review  Outcome: Ongoing, Progressing  Goal: Patient-Specific Goal (Individualized)  Outcome: Ongoing, Progressing  Goal: Absence of Hospital-Acquired Illness or Injury  Outcome: Ongoing, Progressing  Goal: Optimal Comfort and Wellbeing  Outcome: Ongoing, Progressing  Goal: Readiness for Transition of Care  Outcome: Ongoing, Progressing     Problem: Infection  Goal: Absence of Infection Signs and Symptoms  Outcome: Ongoing, Progressing     Problem: Skin Injury Risk Increased  Goal: Skin Health and Integrity  Outcome: Ongoing, Progressing     Problem: Fall Injury Risk  Goal: Absence of Fall and Fall-Related Injury  Outcome: Ongoing, Progressing     Problem: Communication Impairment (Mechanical Ventilation, Invasive)  Goal: Effective Communication  Outcome: Ongoing, Progressing     Problem: Nutrition Impairment (Mechanical Ventilation, Invasive)  Goal: Optimal Nutrition Delivery  Outcome: Ongoing, Progressing     Problem: Skin and Tissue Injury (Mechanical Ventilation, Invasive)  Goal: Absence of Device-Related Skin and Tissue Injury  Outcome: Ongoing, Progressing     Problem: Ventilator-Induced Lung Injury (Mechanical Ventilation, Invasive)  Goal: Absence of Ventilator-Induced Lung Injury  Outcome: Ongoing, Progressing     Problem: Skin and Tissue Injury (Artificial Airway)  Goal: Absence of Device-Related Skin or Tissue Injury  Outcome: Ongoing, Progressing     Problem: Device-Related Complication Risk (CRRT (Continuous Renal Replacement Therapy))  Goal: Safe, Effective Therapy Delivery  Outcome: Ongoing, Progressing     Problem: Hypothermia (CRRT (Continuous Renal Replacement Therapy))  Goal: Body Temperature Maintained in Desired Range  Outcome: Ongoing, Progressing     Problem: Infection (CRRT (Continuous Renal Replacement Therapy))  Goal: Absence of Infection Signs and Symptoms  Outcome: Ongoing, Progressing     Problem: Device-Related Complication Risk  (Hemodialysis)  Goal: Safe, Effective Therapy Delivery  Outcome: Ongoing, Progressing     Problem: Hemodynamic Instability (Hemodialysis)  Goal: Effective Tissue Perfusion  Outcome: Ongoing, Progressing     Problem: Infection (Hemodialysis)  Goal: Absence of Infection Signs and Symptoms  Outcome: Ongoing, Progressing     Problem: Hyperglycemia  Goal: Blood Glucose Level Within Targeted Range  Outcome: Ongoing, Progressing     Problem: Impaired Wound Healing  Goal: Optimal Wound Healing  Outcome: Ongoing, Progressing

## 2022-10-17 NOTE — SUBJECTIVE & OBJECTIVE
Interval History:     Current Facility-Administered Medications   Medication Frequency    acetaminophen tablet 650 mg Q6H PRN    albuterol inhaler 2 puff Q4H PRN    aluminum-magnesium hydroxide-simethicone 200-200-20 mg/5 mL suspension 30 mL QID (AC & HS)    bisacodyL suppository 10 mg Daily PRN    carvediloL tablet 25 mg BID    dextrose 10% bolus 125 mL PRN    docusate sodium capsule 100 mg BID    famotidine tablet 20 mg Every other day    glimepiride tablet 4 mg Daily with breakfast    hydrALAZINE injection 20 mg Q4H PRN    hydrALAZINE tablet 50 mg Q8H    HYDROcodone-acetaminophen 5-325 mg per tablet 1 tablet Q4H PRN    HYDROmorphone (PF) injection 1 mg Q4H PRN    hydrOXYchloroQUINE tablet 200 mg BID    insulin detemir U-100 injection 18 Units BID    labetaloL injection 20 mg Q8H PRN    LIDOcaine (PF) 10 mg/ml (1%) injection PRN    lorazepam injection 1 mg Q2H PRN    melatonin tablet 6 mg Nightly PRN    methylPREDNISolone sodium succinate injection 40 mg TID    morphine 12 hr tablet 30 mg Q12H    morphine injection 2 mg Q2H PRN    mycophenolate capsule 1,000 mg BID    NIFEdipine 24 hr tablet 90 mg Daily    nitroGLYCERIN SL tablet 0.4 mg Q5 Min PRN    ondansetron injection 4 mg Q6H PRN    pantoprazole EC tablet 40 mg BID AC    polyethylene glycol packet 17 g BID    sevelamer carbonate tablet 800 mg TID WM    sodium chloride 0.9% bolus 250 mL PRN    sodium chloride 0.9% bolus 250 mL PRN    sodium chloride 0.9% bolus 250 mL PRN    sodium chloride 0.9% flush 10 mL PRN    [START ON 10/18/2022] sodium zirconium cyclosilicate packet 5 g Every Tues, Thurs, Sat    sucralfate tablet 1 g BID    warfarin tablet 3 mg Daily     Objective:     Vital Signs (Most Recent):  Temp: 98 °F (36.7 °C) (10/17/22 0736)  Pulse: 76 (10/17/22 0736)  Resp: 13 (10/17/22 0736)  BP: 134/73 (10/17/22 0736)  SpO2: (!) 94 % (10/17/22 0736)  O2 Device (Oxygen Therapy): room air (10/15/22 1900)   Vital Signs (24h Range):  Temp:  [98 °F (36.7 °C)-98.6  °F (37 °C)] 98 °F (36.7 °C)  Pulse:  [65-76] 76  Resp:  [13-20] 13  SpO2:  [94 %-97 %] 94 %  BP: (134-158)/(69-79) 134/73     Weight: 56.6 kg (124 lb 12.5 oz) (10/15/22 0500)  Body mass index is 20.76 kg/m².  Body surface area is 1.61 meters squared.      Intake/Output Summary (Last 24 hours) at 10/17/2022 1038  Last data filed at 10/16/2022 1440  Gross per 24 hour   Intake 360 ml   Output 550 ml   Net -190 ml     Review of Systems   Constitutional:  Positive for fatigue.   HENT: Negative.     Eyes: Negative.    Respiratory:          Pleural effusion   Cardiovascular: Negative.    Gastrointestinal: Negative.    Genitourinary:         The patient is on hemodialysis   Neurological:         The patient had weakness of his lower extremities but is not as bad as it was documented in his previous rheumatologist's notes .    Physical Exam   Constitutional: He is oriented to person, place, and time. He appears well-developed and well-nourished. No distress.   HENT:   Head: Normocephalic and atraumatic.   Right Ear: External ear normal.   Left Ear: External ear normal.   Eyes: Pupils are equal, round, and reactive to light.   Cardiovascular: Normal rate, regular rhythm and normal heart sounds.   Pulmonary/Chest: Breath sounds normal.   Abdominal: Soft. There is no abdominal tenderness.   Musculoskeletal:      Right shoulder: Tenderness present.      Left shoulder: Tenderness present.      Right elbow: Tenderness present.      Left elbow: Tenderness present.      Right wrist: Tenderness present.      Left wrist: Tenderness present.      Cervical back: Neck supple.      Right hip: Tenderness present.      Left hip: Tenderness present.      Right knee: Tenderness present.      Left knee: Tenderness present.      Right ankle: Tenderness present.      Left ankle: Tenderness present.   Lymphadenopathy:     He has no cervical adenopathy.   Neurological: He is alert and oriented to person, place, and time. He displays normal  reflexes. No cranial nerve deficit or sensory deficit. He exhibits normal muscle tone. Coordination normal.   Skin: No rash noted. No erythema.   Vitals reviewed.      Right Side Rheumatological Exam     The patient is tender to palpation of the shoulder, elbow, wrist, knee, 1st PIP, 1st MCP, 2nd PIP, 2nd MCP, 3rd PIP, 3rd MCP, 4th PIP, 4th MCP, 5th PIP, hip, ankle, 1st MTP, 2nd MTP, 3rd MTP, 4th MTP, 5th MTP, 1st toe IP, 2nd toe IP, 3rd toe IP, 4th toe IP and 5th toe IP    Left Side Rheumatological Exam     The patient is tender to palpation of the shoulder, elbow, wrist, knee, 1st PIP, 1st MCP, 2nd PIP, 2nd MCP, 3rd PIP, 3rd MCP, 4th PIP, 4th MCP, 5th PIP, 5th MCP, hip, ankle, 1st MTP, 2nd MTP, 3rd MTP, 4th MTP, 5th MTP, 1st toe IP, 2nd toe IP, 3rd toe IP, 4th toe IP and 5th toe IP.        Significant Labs:  All pertinent lab results from the last 24 hours have been reviewed.    Significant Imaging:  Imaging results within the past 24 hours have been reviewed.

## 2022-10-17 NOTE — PROGRESS NOTES
[facility]  Hospital Medicine Progress Note        Chief Complaint: Inpatient Follow-up for Aortic disscetion     HPI:   Stuart Guevara is a 32 y.o. male who has PMH which includes HTN, SLE,  hepatitis B, aortic dissection with repair 2/2021, RUE DVT on Xarelto; presented to the ED at [facility] on 9/23/2022  with reports of  shortness of breath and chest pain radiating to his back.  PT has a history of aortic dissection 2/2021. Work up revealed a type B aortic dissection and significant hypertension noted on presentation.  He failed medical management and had progression of his symptoms despite adequate control of hemodynamic parameters.  PT was transferred from [facility] to [facility] for CV surgery evaluation which he was taken  to the OR and had TEVAR with left carotid to subclavian artery bypass. PT developed abdominal distension and rigidity of his abdomen and developed a lactic acidosis.  He repeat CT angiography of the chest/abdomen/pelvis without evidence of worsening dissection, but evidence of significant intra-abdominal pathology with some hemoperitoneum, free blood in the abdomen.  He underwent exploratory laparotomy with evidence of viable bowel and no significant bleeding identified, abd was closed with flakito drain left in situ chest tube was placed. Postoperatively, he developed severe unstable bradycardia, and a transvenous pacemaker was placed by Cardiology.  Shortly afterwards, he was noted to have significant worsening of his renal failure and critical hyperkalemia. Renal services consulted which catheter was placed and he was started on HD treatments.  PT has been cleared for transfer out of ICU to the floor. Hospital medicine services consulted for assumption of care.     Patient is s/p AAA surgery repair , course c/by abd compartment syndrome s/p ex lap and closure with flakito drain in situ, sepsis syndrome with fever and worsening leucocytosis , Appreciate ID input antibiotics  broadened to vancomycin Flagyl and cefepime, concern for SLE serositis , rheum consulted started on high dose steori and immunosuppressants.      Interval Hx:   Patient at HD   Vitals stable   Labs reviewed  Bld gluc elevated- adjust insulin, add po glimepiride 4mg qam  F/up rheum recs            Objective/physical exam:  General: In no acute distress, afebrile  Respiratory: Clear to auscultation bilaterally  Cardiovascular: S1, S2, no appreciable murmur  Abdomen: Soft, nontender, BS +, dressing,   MSK: Warm, no lower extremity edema, no clubbing or cyanosis  Neurologic: Alert and oriented x4,         Assessment/Plan:   Hyperkalemia  SLE flare and serositis   Acute respiratory failure with hypoxia- s/p mechanical ventilation, now extubated  Acute aortic dissection type B- s/p TEVAR with left carotid artery to left subclavian bypass 9/23/2022  Acute kidney injury now requiring hemodialysis since September 25th by a temporary hemodialysis catheter  New onset Cardiomyopathy- EF 37% unknown etiology  VHD, s/p mechanical AVR   Acute abdominal pain generalized  Abdominal compartment syndrome, s/p ex-lap x2 last on 9/27 with abdominal closure on 10/1  Acute pancreatitis- resolving with lipase trending down  RUE DVT- was on Xarelto, recent US no DVT  HTN- emergency, resolved   Anemia- chronic disease  Weakness  HLD  Hx of hepatitis B  Hx of aortic dissection with repair 2/2021  History of transverse myelitis  History of lupus nephritis        Plan  Will get HD - help with control of k   Bld gluc elevated- adjust insulin, add po glimepiride 4mg qam  F/up rheum recs    resume Coumadin 3 mg , inr checks goal 2-3   Patient was started on Solu-Medrol, CellCept, Plaquenil but has not received benlysta  Rheum working on starting benlysta weekly   Nephrology- HD   There was some drainage at this surgical side most likely secondary to ascites surgery team was following.  No signs of infection so no indication for antibiotics id  signed off        Patient condition:  Stable     Anticipated discharge and Disposition: rehab when cleared by nephro and rheum         All diagnosis and differential diagnosis have been reviewed; assessment and plan has been documented; I have personally reviewed the labs and test results that are presently available; I have reviewed the patients medication list; I have reviewed the consulting providers response and recommendations. I have reviewed or attempted to review medical records based upon their availability     All of the patient's questions have been  addressed and answered. Patient's is agreeable to the above stated plan. I will continue to monitor closely and make adjustments to medical management as needed.     Critical care diagnosis severe hyperkalemia requiring Kayexalate  critical care time greater than 35 minutes      VITAL SIGNS: 24 HRS MIN & MAX LAST   Temp  Min: 98 °F (36.7 °C)  Max: 98.6 °F (37 °C) 98 °F (36.7 °C)   BP  Min: 134/73  Max: 153/77 134/73   Pulse  Min: 68  Max: 76  76   Resp  Min: 13  Max: 20 18   SpO2  Min: 94 %  Max: 96 % (!) 94 %         Recent Labs   Lab 10/13/22  1035 10/14/22  0453 10/15/22  0355   WBC 15.6* 11.4 9.9   RBC 3.66* 3.45* 3.11*   HGB 10.4* 9.8* 8.8*   HCT 32.3* 30.1* 27.1*   MCV 88.3 87.2 87.1   MCH 28.4 28.4 28.3   MCHC 32.2* 32.6* 32.5*   RDW 16.1 16.0 15.9   * 430* 406*   MPV 10.5* 10.6* 9.5       Recent Labs   Lab 10/11/22  0448 10/12/22  0507 10/13/22  1035 10/16/22  0433 10/16/22  1347 10/17/22  0406   * 124*   < > 125* 128* 125*   K 6.1* 5.1   < > 6.2* 6.5* 6.2*   CO2 29 27   < > 25 28 24   BUN 49.0* 42.9*   < > 64.2* 70.3* 74.7*   CREATININE 3.18* 2.37*   < > 3.01* 2.95* 2.78*   CALCIUM 7.9* 7.8*   < > 8.5 8.8 8.4   MG 2.30  --   --   --   --   --    ALBUMIN 1.4* 1.3*  --   --   --   --    ALKPHOS 82 66  --   --   --   --    ALT 12 12  --   --   --   --    AST 29 32  --   --   --   --    BILITOT 0.3 0.3  --   --   --   --     < > = values in  this interval not displayed.          Microbiology Results (last 7 days)       Procedure Component Value Units Date/Time    Blood Culture [[id number]]  (Abnormal) Collected: 10/03/22 1752    Order Status: Completed Specimen: Blood Updated: 10/11/22 1322     CULTURE, BLOOD ([facility]) CUTIBACTERIUM ACNES     Comment: This organism is commonly considered a contaminant.  No further processing will be done.        GRAM STAIN Gram Positive Rods      Seen in gram stain of broth only      1 of 2 Anaerobic bottles positive             See below for Radiology    Scheduled Med:   aluminum-magnesium hydroxide-simethicone  30 mL Oral QID (AC & HS)    carvediloL  25 mg Oral BID    docusate sodium  100 mg Oral BID    famotidine  20 mg Oral Every other day    glimepiride  4 mg Oral Daily with breakfast    hydrALAZINE  50 mg Oral Q8H    hydrOXYchloroQUINE  200 mg Oral BID    insulin detemir U-100  18 Units Subcutaneous BID    methylPREDNISolone sodium succinate injection  40 mg Intravenous TID    morphine  30 mg Oral Q12H    mycophenolate  1,000 mg Oral BID    NIFEdipine  90 mg Oral Daily    NON FORMULARY MEDICATION 1 Dose  1 Dose Subcutaneous Weekly    pantoprazole  40 mg Oral BID AC    polyethylene glycol  17 g Oral BID    sevelamer carbonate  800 mg Oral TID WM    sodium zirconium cyclosilicate  5 g Oral TID    sucralfate  1 g Oral BID    warfarin  3 mg Oral Daily        Continuous Infusions:       PRN Meds:  acetaminophen, albuterol, bisacodyL, dextrose 10%, hydrALAZINE, HYDROcodone-acetaminophen, HYDROmorphone, labetaloL, LIDOcaine (PF) 10 mg/ml (1%), lorazepam, melatonin, morphine, nitroGLYCERIN, ondansetron, sodium chloride 0.9%, sodium chloride 0.9%, sodium chloride 0.9%, sodium chloride 0.9%         VTE prophylaxis:     Patient condition:  Stable/Fair/Guarded/ Serious/ Critical    Anticipated discharge and Disposition:         All diagnosis and differential diagnosis have been reviewed; assessment and plan has been documented; I  have personally reviewed the labs and test results that are presently available; I have reviewed the patients medication list; I have reviewed the consulting providers response and recommendations. I have reviewed or attempted to review medical records based upon their availability    All of the patient's questions have been  addressed and answered. Patient's is agreeable to the above stated plan. I will continue to monitor closely and make adjustments to medical management as needed.  _____________________________________________________________________    Nutrition Status:    Radiology:  X-Ray Chest 1 View  Narrative: EXAMINATION:  XR CHEST 1 VIEW    CPT 52179    CLINICAL HISTORY:  TAY, SOB;    COMPARISON:  October 5, 2022    FINDINGS:  Examination reveals cardiomediastinal silhouette to be unchanged as compared with the previous exam.    Endo stent is identified in the descending aorta central line is seen with the tip at the junction of the superior vena cava and right atrium.    There is persistent increased left retrocardiac density and silhouetting of the left hemidiaphragm which might be related to an infiltrate/atelectasis    No focal consolidative changes otherwise identified no other significant change  Impression: Persistent increased left retrocardiac density and silhouetting of the left hemidiaphragm.    No other focal consolidative changes.    No other change    Electronically signed by: Ramón Ying  Date:    10/12/2022  Time:    08:15      Ronda Hamilton MD   10/17/2022

## 2022-10-18 NOTE — PROGRESS NOTES
[facility]  Rheumatology  Progress Note    Patient Name: Stuart Guevara  MRN: [id number]  Admission Date: 9/23/2022  Hospital Length of Stay: 25 days  Code Status: Full Code   Attending Provider: Ronda Hamilton MD  Primary Care Physician: Primary Doctor No  Principal Problem: Aortic dissection    Subjective:     HPI:  Joint pain and tenderness continues to improve and have significanly improved since restarting Cellcelpt and hydroxychloroquine on 10/4/22. Reports overall feeling better today. He received his first Benlysta dose yesterday afternoon (10/17/22).      Interval History:     Current Facility-Administered Medications   Medication Frequency    acetaminophen tablet 650 mg Q6H PRN    albuterol inhaler 2 puff Q4H PRN    aluminum-magnesium hydroxide-simethicone 200-200-20 mg/5 mL suspension 30 mL QID (AC & HS)    belimumab Syrg 200 mg Weekly    bisacodyL suppository 10 mg Daily PRN    carvediloL tablet 25 mg BID    dextrose 10 % infusion PRN    dextrose 10 % infusion PRN    dextrose 10% bolus 125 mL PRN    docusate sodium capsule 100 mg BID    famotidine tablet 20 mg Every other day    glimepiride tablet 4 mg Daily with breakfast    glucagon (human recombinant) injection 1 mg PRN    glucose chewable tablet 16 g PRN    glucose chewable tablet 24 g PRN    hydrALAZINE injection 20 mg Q4H PRN    hydrALAZINE tablet 50 mg Q8H    HYDROcodone-acetaminophen 5-325 mg per tablet 1 tablet Q4H PRN    HYDROmorphone (PF) injection 1 mg Q4H PRN    hydrOXYchloroQUINE tablet 200 mg BID    insulin aspart U-100 injection 1-10 Units QID (AC + HS) PRN    insulin detemir U-100 injection 20 Units BID    labetaloL injection 20 mg Q8H PRN    LIDOcaine (PF) 10 mg/ml (1%) injection PRN    lorazepam injection 1 mg Q2H PRN    melatonin tablet 6 mg Nightly PRN    methylPREDNISolone sodium succinate injection 40 mg TID    morphine 12 hr tablet 30 mg Q12H    morphine  injection 2 mg Q2H PRN    mycophenolate capsule 1,000 mg BID    NIFEdipine 24 hr tablet 90 mg Daily    nitroGLYCERIN SL tablet 0.4 mg Q5 Min PRN    ondansetron injection 4 mg Q6H PRN    pantoprazole EC tablet 40 mg BID AC    polyethylene glycol packet 17 g BID    sevelamer carbonate tablet 800 mg TID WM    sodium chloride 0.9% bolus 250 mL PRN    sodium chloride 0.9% bolus 250 mL PRN    sodium chloride 0.9% bolus 250 mL PRN    sodium chloride 0.9% flush 10 mL PRN    sodium zirconium cyclosilicate packet 5 g TID    sucralfate tablet 1 g BID    warfarin tablet 3 mg Daily     Objective:     Vital Signs (Most Recent):  Temp: 98.4 °F (36.9 °C) (10/18/22 0700)  Pulse: 67 (10/18/22 0700)  Resp: 18 (10/18/22 0700)  BP: (!) 159/79 (10/18/22 0700)  SpO2: 98 % (10/18/22 0700)  O2 Device (Oxygen Therapy): room air (10/15/22 1900)   Vital Signs (24h Range):  Temp:  [98.4 °F (36.9 °C)-98.8 °F (37.1 °C)] 98.4 °F (36.9 °C)  Pulse:  [67-87] 67  Resp:  [16-18] 18  SpO2:  [94 %-98 %] 98 %  BP: (134-159)/(71-79) 159/79     Weight: 56.6 kg (124 lb 12.5 oz) (10/15/22 0500)  Body mass index is 20.76 kg/m².  Body surface area is 1.61 meters squared.      Intake/Output Summary (Last 24 hours) at 10/18/2022 0956  Last data filed at 10/17/2022 1500  Gross per 24 hour   Intake 480 ml   Output 2000 ml   Net -1520 ml     Review of Systems   Constitutional:  Positive for fatigue.   HENT: Negative.     Eyes: Negative.    Cardiovascular: Negative.    Gastrointestinal: Negative.    Genitourinary:         The patient is on hemodialysis   Neurological:         The patient had weakness of his lower extremities but is not as bad as it was documented in his previous rheumatologist's notes .    Physical Exam   Constitutional: He is oriented to person, place, and time. He appears well-developed and well-nourished. No distress.   HENT:   Head: Normocephalic and atraumatic.   Right Ear: External ear normal.   Left Ear: External ear normal.    Eyes: Pupils are equal, round, and reactive to light.   Cardiovascular: Normal rate, regular rhythm and normal heart sounds.   Abdominal: Soft. There is no abdominal tenderness.   Musculoskeletal:      Right shoulder: Normal.      Left shoulder: Normal.      Right elbow: Normal.      Left elbow: Normal.      Right wrist: Normal.      Left wrist: Normal.      Cervical back: Neck supple.      Right hip: Normal.      Left hip: Normal.      Right knee: Normal.      Left knee: Normal.   Lymphadenopathy:     He has no cervical adenopathy.   Neurological: He is alert and oriented to person, place, and time. He displays normal reflexes. No cranial nerve deficit or sensory deficit. He exhibits normal muscle tone. Coordination normal.   Skin: No rash noted. No erythema.   Vitals reviewed.      Right Side Rheumatological Exam     Examination finds the shoulder, elbow, wrist, knee, 1st PIP, 1st MCP, 2nd PIP, 2nd MCP, 3rd PIP, 3rd MCP, 4th PIP, 4th MCP, 5th PIP, 5th MCP, temporomandibular, hip, ankle, 1st MTP, 2nd MTP, 3rd MTP, 4th MTP and 5th MTP normal.    Left Side Rheumatological Exam     Examination finds the shoulder, elbow, wrist, knee, 1st PIP, 1st MCP, 2nd PIP, 2nd MCP, 3rd PIP, 3rd MCP, 4th PIP, 4th MCP, 5th PIP, 5th MCP, temporomandibular, hip, ankle, 1st MTP, 2nd MTP, 3rd MTP, 4th MTP and 5th MTP normal.           Significant Labs:  All pertinent lab results from the last 24 hours have been reviewed.    Significant Imaging:  Imaging results within the past 24 hours have been reviewed.    Assessment/Plan:     * Aortic dissection  Keep blood pressure low to normal    Systemic lupus erythematosus  On Solu-Medrol.,  CellCept 1 g p.o. b.i.d., Plaquenil 200 mg p.o. b.i.d..  Start Benlysta 200 mg subcutaneously once a week. First dose of Benlysta received 10/17/22    Serositis  Secondary to systemic lupus, the patient is on CellCept, Solu-Medrol, Hydroxuchloroquine, and Benlysta. First dose of Benlysta was given  10/17/22.    HTN (hypertension)  Continue treatment as per the medical team        DENIA Mg  Rheumatology  [facility]

## 2022-10-18 NOTE — SUBJECTIVE & OBJECTIVE
Interval History:     Current Facility-Administered Medications   Medication Frequency    acetaminophen tablet 650 mg Q6H PRN    albuterol inhaler 2 puff Q4H PRN    aluminum-magnesium hydroxide-simethicone 200-200-20 mg/5 mL suspension 30 mL QID (AC & HS)    belimumab Syrg 200 mg Weekly    bisacodyL suppository 10 mg Daily PRN    carvediloL tablet 25 mg BID    dextrose 10 % infusion PRN    dextrose 10 % infusion PRN    dextrose 10% bolus 125 mL PRN    docusate sodium capsule 100 mg BID    famotidine tablet 20 mg Every other day    glimepiride tablet 4 mg Daily with breakfast    glucagon (human recombinant) injection 1 mg PRN    glucose chewable tablet 16 g PRN    glucose chewable tablet 24 g PRN    hydrALAZINE injection 20 mg Q4H PRN    hydrALAZINE tablet 50 mg Q8H    HYDROcodone-acetaminophen 5-325 mg per tablet 1 tablet Q4H PRN    HYDROmorphone (PF) injection 1 mg Q4H PRN    hydrOXYchloroQUINE tablet 200 mg BID    insulin aspart U-100 injection 1-10 Units QID (AC + HS) PRN    insulin detemir U-100 injection 20 Units BID    labetaloL injection 20 mg Q8H PRN    LIDOcaine (PF) 10 mg/ml (1%) injection PRN    lorazepam injection 1 mg Q2H PRN    melatonin tablet 6 mg Nightly PRN    methylPREDNISolone sodium succinate injection 40 mg TID    morphine 12 hr tablet 30 mg Q12H    morphine injection 2 mg Q2H PRN    mycophenolate capsule 1,000 mg BID    NIFEdipine 24 hr tablet 90 mg Daily    nitroGLYCERIN SL tablet 0.4 mg Q5 Min PRN    ondansetron injection 4 mg Q6H PRN    pantoprazole EC tablet 40 mg BID AC    polyethylene glycol packet 17 g BID    sevelamer carbonate tablet 800 mg TID WM    sodium chloride 0.9% bolus 250 mL PRN    sodium chloride 0.9% bolus 250 mL PRN    sodium chloride 0.9% bolus 250 mL PRN    sodium chloride 0.9% flush 10 mL PRN    sodium zirconium cyclosilicate packet 5 g TID    sucralfate tablet 1 g BID    warfarin tablet 3 mg Daily     Objective:     Vital Signs (Most Recent):  Temp: 98.4 °F (36.9 °C)  (10/18/22 0700)  Pulse: 67 (10/18/22 0700)  Resp: 18 (10/18/22 0700)  BP: (!) 159/79 (10/18/22 0700)  SpO2: 98 % (10/18/22 0700)  O2 Device (Oxygen Therapy): room air (10/15/22 1900)   Vital Signs (24h Range):  Temp:  [98.4 °F (36.9 °C)-98.8 °F (37.1 °C)] 98.4 °F (36.9 °C)  Pulse:  [67-87] 67  Resp:  [16-18] 18  SpO2:  [94 %-98 %] 98 %  BP: (134-159)/(71-79) 159/79     Weight: 56.6 kg (124 lb 12.5 oz) (10/15/22 0500)  Body mass index is 20.76 kg/m².  Body surface area is 1.61 meters squared.      Intake/Output Summary (Last 24 hours) at 10/18/2022 0956  Last data filed at 10/17/2022 1500  Gross per 24 hour   Intake 480 ml   Output 2000 ml   Net -1520 ml     Review of Systems   Constitutional:  Positive for fatigue.   HENT: Negative.     Eyes: Negative.    Cardiovascular: Negative.    Gastrointestinal: Negative.    Genitourinary:         The patient is on hemodialysis   Neurological:         The patient had weakness of his lower extremities but is not as bad as it was documented in his previous rheumatologist's notes .    Physical Exam   Constitutional: He is oriented to person, place, and time. He appears well-developed and well-nourished. No distress.   HENT:   Head: Normocephalic and atraumatic.   Right Ear: External ear normal.   Left Ear: External ear normal.   Eyes: Pupils are equal, round, and reactive to light.   Cardiovascular: Normal rate, regular rhythm and normal heart sounds.   Abdominal: Soft. There is no abdominal tenderness.   Musculoskeletal:      Right shoulder: Normal.      Left shoulder: Normal.      Right elbow: Normal.      Left elbow: Normal.      Right wrist: Normal.      Left wrist: Normal.      Cervical back: Neck supple.      Right hip: Normal.      Left hip: Normal.      Right knee: Normal.      Left knee: Normal.   Lymphadenopathy:     He has no cervical adenopathy.   Neurological: He is alert and oriented to person, place, and time. He displays normal reflexes. No cranial nerve deficit  or sensory deficit. He exhibits normal muscle tone. Coordination normal.   Skin: No rash noted. No erythema.   Vitals reviewed.      Right Side Rheumatological Exam     Examination finds the shoulder, elbow, wrist, knee, 1st PIP, 1st MCP, 2nd PIP, 2nd MCP, 3rd PIP, 3rd MCP, 4th PIP, 4th MCP, 5th PIP, 5th MCP, temporomandibular, hip, ankle, 1st MTP, 2nd MTP, 3rd MTP, 4th MTP and 5th MTP normal.    Left Side Rheumatological Exam     Examination finds the shoulder, elbow, wrist, knee, 1st PIP, 1st MCP, 2nd PIP, 2nd MCP, 3rd PIP, 3rd MCP, 4th PIP, 4th MCP, 5th PIP, 5th MCP, temporomandibular, hip, ankle, 1st MTP, 2nd MTP, 3rd MTP, 4th MTP and 5th MTP normal.           Significant Labs:  All pertinent lab results from the last 24 hours have been reviewed.    Significant Imaging:  Imaging results within the past 24 hours have been reviewed.

## 2022-10-18 NOTE — PT/OT/SLP EVAL
Occupational Therapy  Treatment    Stuart Guevara   MRN: [id number]   Admitting Diagnosis: Aortic dissection    OT Date of Treatment: 10/18/22   OT Start Time: 1120  OT Stop Time: 1137  OT Total Time (min): 17 min     Billable Minutes:  Therapeutic Activity 17  Total Minutes: 17     OT/ADONAY: ADONAY     ADONAY Visit Number: 5    General Precautions: Standard, fall  Orthopedic Precautions: N/A  Braces: N/A    Spiritual, Cultural Beliefs, Rastafarian Practices, Values that Affect Care: no    Subjective:  Communicated with nurse prior to session.      Objective:  Patient found with: blood pressure cuff, pulse ox (continuous), telemetry    Functional Mobility:  Bed Mobility:   Supine to sit: Activity did not occur   Sit to supine: Moderate Assistance   Rolling: Activity did not occur   Scooting: Minimal Assistance    Transfer Training:   Transfer chair to bed with Min A with RW    Balance:   Static Sit: FAIR: Maintains without assist, but unable to take any challenges     Additional Treatment:  Patient tolerated Tx well.    Patient left HOB elevated with all lines intact and call button in reach    ASSESSMENT:  Stuart Guevara is a 32 y.o. male with a medical diagnosis of Aortic dissection and presents with decreased ADL skills, decreased Ax tolerance and decreased strength.    Rehab potential is good    Activity tolerance: Good    Discharge recommendations: rehabilitation facility     Equipment recommendations: walker, rolling     GOALS:   Multidisciplinary Problems       Occupational Therapy Goals          Problem: Occupational Therapy    Goal Priority Disciplines Outcome Interventions   Occupational Therapy Goal     OT, PT/OT Ongoing, Progressing    Description: Goals to be met by: 10/14/22    Patient will increase functional independence with ADLs by performing:    UE Dressing with Stand-by Assistance.  LB Dressing with Minimal Assistance and dressing AEDs.  Grooming while EOB with Stand-by Assistance.  Sitting at edge of  bed x10 minutes with Stand-by Assistance.                         Plan:  Patient to be seen 6 x/week to address the above listed problems via self-care/home management, therapeutic activities, therapeutic exercises  Plan of Care expires: 10/14/22  Plan of Care reviewed with: patient         10/18/2022

## 2022-10-18 NOTE — ASSESSMENT & PLAN NOTE
On Sol-Medrol.,  CellCept 1 g p.o. b.i.d., Plaquenil 200 mg p.o. b.i.d..  Start Benlysta 200 mg subcutaneously once a week. First dose of Benlysta received 10/17/22

## 2022-10-18 NOTE — PROGRESS NOTES
Nephrology consult follow up note    HPI:      Stuart Guevara is a 32 y.o. 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  The patient underwent exploratory lap for possibility of necrotic bowel and subsequently developed acute kidney injury with hyperkalemia and acidosis requiring initiation of dialysis.  He has now been started on a MWF schedule via right IJ temporary catheter.     Interval history:     10/16/22-hyperkalemic today. Given kayexalate per hospitalist. Alert and oriented. No N/V. No SOB.     10/17/22 currently tolerating dialysis.  Potassium stays high.  He is on 1 K potassium bath.  But will give him 2 K bath in the last 30 minutes of his dialysis.     10/18: No change overnight. Still requiring dialysis on MWF schedule. Remains with initial temporary catheter placed 9/25.  Abdominal pain at rest.     Objective:       VITAL SIGNS: 24 HR MIN & MAX LAST    Temp  Min: 98.4 °F (36.9 °C)  Max: 98.8 °F (37.1 °C)  98.4 °F (36.9 °C)        BP  Min: 134/73  Max: 172/87  (!) 172/87     Pulse  Min: 67  Max: 87  71     Resp  Min: 16  Max: 18  18    SpO2  Min: 94 %  Max: 98 %  98 %      GEN: Awake and appropriate.  Chronically ill-appearing black male   HEENT: Atraumatic. EOMI, no icterus  NECK : No JVD, right IJ temporary catheter noted  CARD : RRR   LUNGS : Clear to auscultation  ABD : Soft, pain at rest  EXT : No edema.   NEURO : No obvious focal deficits            Component Value Date/Time     (L) 10/18/2022 0422     (L) 10/17/2022 0406    K 4.6 10/18/2022 0422    K 6.2 (H) 10/17/2022 0406    CO2 24 10/18/2022 0422    CO2 24 10/17/2022 0406    BUN 50.9 (H) 10/18/2022 0422    BUN 74.7 (H) 10/17/2022 0406    CREATININE 2.32 (H) 10/18/2022 0422    CREATININE 2.78 (H) 10/17/2022 0406    CALCIUM 7.9 (L) 10/18/2022 0422    CALCIUM 8.4 10/17/2022 0406    PHOS 7.4  (H) 10/11/2022 0448            Component Value Date/Time    WBC 9.9 10/15/2022 0355    WBC 11.4 10/14/2022 0453    HGB 8.8 (L) 10/15/2022 0355    HGB 9.8 (L) 10/14/2022 0453    HCT 27.1 (L) 10/15/2022 0355    HCT 30.1 (L) 10/14/2022 0453    HCT 28 (L) 09/23/2022 0304    HCT 30.0 (L) 02/15/2021 1751     (H) 10/15/2022 0355     (H) 10/14/2022 0453         Assessment / Plan:      1. Postoperative TAY requiring hemodialysis initiation on 9/25 via temp HD catheter placed per Dr. Chopra  2. Abdominal aortic dissection s/p TEVAR   3. Post operative abdominal compartment syndrome s/p ex lap on 9/27 with closure on 10/1  4. Hyperkalemia. Losartan dc.   5. Hypertension  6. Type 2 diabetes mellitus  7. SLE past history  8. Hyponatremia persistent.    Recommendations  Continue dialysis on MWF schedule  We will get tunneled dialysis catheter so he can continue dialysis for now at rehab facility.  working on [facility]. Dialysis coordinator consulted and will need to have patient placed at outpatient dialysis unit prior to dc.

## 2022-10-18 NOTE — PROGRESS NOTES
Inpatient Nutrition Assessment    Admit Date: 9/23/2022   Total duration of encounter: 25 days     Nutrition Recommendation/Prescription     Continue Renal HS / Diabetic diet as medically feasible / tolerated   Continue with Boost GC two per meal (provides 250 kcal, 14 g protein per serving) TID for added protein and kcal.  Consider adding appetite stimulant per MD if po intake does not improve.     Communication of Recommendations: reviewed with nurse    Nutrition Assessment     Malnutrition Assessment/Nutrition-Focused Physical Exam    Malnutrition in the context of acute illness or injury  Degree of Malnutrition: severe malnutrition  Energy Intake: < 75% of estimated energy requirement for > 7 days  Interpretation of Weight Loss: unable to obtain  Body Fat:moderate depletion  Area of Body Fat Loss: orbital region  and upper arm region - triceps / biceps  Muscle Mass Loss: moderate depletion  Area of Muscle Mass Loss: temple region - temporalis muscle, clavicle bone region - pectoralis major, deltoid, trapezius muscles, clavicle and acromion bone region - deltoid muscle, and scapular bone region - trapezius, supraspinus, infraspinus muscles  Fluid Accumulation: does not meet criteria  Edema: does not meet criteria   Reduced  Strength: unable to obtain  A minimum of two characteristics is recommended for diagnosis of either severe or non-severe malnutrition.    Chart Review    Reason Seen: continuous nutrition monitoring and follow-up    Diagnosis:  Fever and Leukocytosis  Abdominal aortic dissection, s/p repair  Abdominal compartment syndrome, s/p ex-lap x2 last on 9/27 with abdominal closure on 10/1  SLE with active flare  History of transverse myelitis  History of lupus nephritis  TAY now on HD  VHD, s/p mechanical AVR  Chest tube in place  DM2    Relevant Medical History: HTN, lupus    Nutrition-Related Medications: detemir 10 Units BID, docusate, miralax  Calorie Containing IV Medications: no significant  kcals from medications at this time    Nutrition-Related Labs:  9/26 BUN 27, Crea 3.06, Glu 174, Phos 6.2  9/30 Na 128, K 3.3, Cl 96, BUN 21.5, Crea 3.28, Glu 205  10/4 Na 127, Cl 95, BUN 57.3, Crea 5.16, Glu 152, Phos 5  10/7 K 5.6, BUN 55.2, Crea 4.57, Glu 347, Phos 8  10/11: Na 126, K 6.1, Cl 91, BUN 49, Crea 3.18, GFR 25, Glu 139, Ca 7.9, Phos 7.4  10/14: Na 125, K 5.4, Cl 91, BUN 56.1, Crea 2.76, GFR 30, Ca 8.1  10/18: Na 128, Cl 92, Ca 7.9, BUN 50.9, Cr 2.32     Diet/PN Order: DIET RENAL ON DIALYSIS Diabetic  Oral Supplement Order: Boost Glucose Control  Tube Feeding Order: none at this time  Appetite/Oral Intake: fair/50-75% of meals  Factors Affecting Nutritional Intake: decreased appetite  Food/Nondenominational/Cultural Preferences: none reported    Skin Integrity: incision  Wound(s):   incision noted    Comments    9/26/22: Discussed with RN. Will provide tube feeding recommendations for when appropriate to start tube feeding. Receiving kcal from meds. Noted Phos, will monitor for need for renal formula.   9/30/22: Discussed with RN. Need to consider TPN since pt now LOS day 7. If starting tube feeding, recs provided. If able to extubate, advance diet when appropriate.   10/4/22: Pt previously eating 100% po intake of meals, good appetite. Currently NPO for procedure, plans to restart diet per RN. Will add ONS for added protein and kcal.  10/7/22: Pt now with decreased appetite/po intake. Noted elevated K and Phos, likely not diet related since pt with poor po intake of full liquid diet.   10/11: pt sleeping, nurse reports tolerating full liquid diet, some abdominal distention noted, reports unsure of plans to advance diet as this time  10/14: pt advanced to soft diet today; ate about 40% of lunch tray, drinking Boost, would like 2 per meal in vanilla flavor  10/18: Pt advanced to regular texture renal/diabetic diet, he reports that his appetite is fair, drinks boost, feels he is chewing/swallowing well, does  "reports some abdominal pain 30-90 minutes after eating, feels better after several hours of no eating.     Anthropometrics    Height: 5' 5" (165.1 cm) Height Method: Estimated  Last Weight: 56.6 kg (124 lb 12.5 oz) (10/15/22 0500) Weight Method: Bed Scale  BMI (Calculated): 20.8  BMI Classification: normal (BMI 18.5-24.9)        Ideal Body Weight (IBW), Male: 136 lb     % Ideal Body Weight, Male (lb): 97.26 %                          Usual Weight Provided By: unable to obtain usual weight at this time    Wt Readings from Last 5 Encounters:   10/15/22 56.6 kg (124 lb 12.5 oz)   09/23/22 60 kg (132 lb 4.4 oz)   12/09/21 48 kg (105 lb 13.1 oz)     Weight Change(s) Since Admission:  Admit Weight: 60 kg (132 lb 4.4 oz) (09/23/22 1219)  10/4/22: no new wt  10/7/22: no new wt  10/12/22: 65kg; weight gain noted    Estimated Needs    Weight Used For Calorie Calculations: 60 kg (132 lb 4.4 oz)  Energy Calorie Requirements (kcal): 2083kcal (1.4 stress factor)  Energy Need Method: Ida-St Jeor  Weight Used For Protein Calculations: 60 kg (132 lb 4.4 oz)  Protein Requirements: 72-84gm (1.2-1.4g/kg)  Fluid Requirements (mL): 1000ml + urinary output  Temp: 98.8 °F (37.1 °C)    Enteral Nutrition    Patient not receiving enteral nutrition at this time.    Parenteral Nutrition    Patient not receiving parenteral nutrition support at this time.    Evaluation of Received Nutrient Intake    Calories: not meeting estimated needs  Protein: not meeting estimated needs    Patient Education    Not applicable.    Nutrition Diagnosis     PES: Inadequate oral intake related to current condition as evidenced by <75% intake of meals. (continues)    Interventions/Goals     Intervention(s): general/healthful diet, commercial beverage, and collaboration with other providers  Goal: Meet greater than 75% of nutritional needs by follow-up. (goal progressing)    Monitoring & Evaluation     Dietitian will monitor energy intake.  Nutrition " Risk/Follow-Up: high (follow-up in 1-4 days)

## 2022-10-18 NOTE — ASSESSMENT & PLAN NOTE
Secondary to systemic lupus, the patient is on CellCept, Solu-Medrol, Hydroxuchloroquine, and Benlysta. First dose of Benlysta was given 10/17/22.

## 2022-10-18 NOTE — PROGRESS NOTES
[facility]  Hospital Medicine Progress Note        Chief Complaint: Inpatient Follow-up for Aortic disscetion     HPI:   Stuart Guevara is a 32 y.o. male who has PMH which includes HTN, SLE,  hepatitis B, aortic dissection with repair 2/2021, RUE DVT on Xarelto; presented to the ED at [facility] on 9/23/2022  with reports of  shortness of breath and chest pain radiating to his back.  PT has a history of aortic dissection 2/2021. Work up revealed a type B aortic dissection and significant hypertension noted on presentation.  He failed medical management and had progression of his symptoms despite adequate control of hemodynamic parameters.  PT was transferred from [facility] to [facility] for CV surgery evaluation which he was taken  to the OR and had TEVAR with left carotid to subclavian artery bypass. PT developed abdominal distension and rigidity of his abdomen and developed a lactic acidosis.  He repeat CT angiography of the chest/abdomen/pelvis without evidence of worsening dissection, but evidence of significant intra-abdominal pathology with some hemoperitoneum, free blood in the abdomen.  He underwent exploratory laparotomy with evidence of viable bowel and no significant bleeding identified, abd was closed with flakito drain left in situ chest tube was placed. Postoperatively, he developed severe unstable bradycardia, and a transvenous pacemaker was placed by Cardiology.  Shortly afterwards, he was noted to have significant worsening of his renal failure and critical hyperkalemia. Renal services consulted which catheter was placed and he was started on HD treatments.  PT has been cleared for transfer out of ICU to the floor. Hospital medicine services consulted for assumption of care.     Patient is s/p AAA surgery repair , course c/by abd compartment syndrome s/p ex lap and closure with flakito drain in situ, sepsis syndrome with fever and worsening leucocytosis , Appreciate ID input antibiotics  broadened to vancomycin Flagyl and cefepime, concern for SLE serositis , rheum consulted started on high dose steori and immunosuppressants.      Interval Hx:   Patient seen at bedside   Vitals stable   Labs reviewed- k normal   S/p sc benylsta dose on 10/17 by rheum   F/up nephro recs  Awaits rehab            Objective/physical exam:  General: In no acute distress, afebrile  Respiratory: Clear to auscultation bilaterally  Cardiovascular: S1, S2, no appreciable murmur  Abdomen: Soft, nontender, BS +, dressing,   MSK: Warm, no lower extremity edema, no clubbing or cyanosis  Neurologic: Alert and oriented x4,         Assessment/Plan:   Hyperkalemia, resolved   SLE flare and serositis   Acute respiratory failure with hypoxia- s/p mechanical ventilation, now extubated  Acute aortic dissection type B- s/p TEVAR with left carotid artery to left subclavian bypass 9/23/2022  Acute kidney injury now requiring hemodialysis since September 25th by a temporary hemodialysis catheter  New onset Cardiomyopathy- EF 37% unknown etiology  VHD, s/p mechanical AVR   Acute abdominal pain generalized  Abdominal compartment syndrome, s/p ex-lap x2 last on 9/27 with abdominal closure on 10/1  Acute pancreatitis- resolving with lipase trending down  RUE DVT- was on Xarelto, recent US no DVT  HTN- emergency, resolved   Anemia- chronic disease  Weakness  HLD  Hx of hepatitis B  Hx of aortic dissection with repair 2/2021  History of transverse myelitis  History of lupus nephritis        Plan  Labs reviewed- k normal   S/p sc benylsta dose on 10/17 by rheum   F/up nephro recs  Awaits rehab   adjust insulin 20 bid, add po glimepiride 4mg qam, add diabetic diet  F/up rheum recs    resume Coumadin 3 mg , inr checks goal 2-3   Patient was started on Solu-Medrol, CellCept, Plaquenil   There was some drainage at this surgical side most likely secondary to ascites surgery team was following.  No signs of infection so no indication for antibiotics id  signed off        Patient condition:  Stable     Anticipated discharge and Disposition: rehab when cleared by nephro and rheum         All diagnosis and differential diagnosis have been reviewed; assessment and plan has been documented; I have personally reviewed the labs and test results that are presently available; I have reviewed the patients medication list; I have reviewed the consulting providers response and recommendations. I have reviewed or attempted to review medical records based upon their availability     All of the patient's questions have been  addressed and answered. Patient's is agreeable to the above stated plan. I will continue to monitor closely and make adjustments to medical management as needed.        VITAL SIGNS: 24 HRS MIN & MAX LAST   Temp  Min: 98.4 °F (36.9 °C)  Max: 98.8 °F (37.1 °C) 98.4 °F (36.9 °C)   BP  Min: 134/73  Max: 159/79 (!) 159/79     Pulse  Min: 67  Max: 87  67   Resp  Min: 16  Max: 18 18   SpO2  Min: 94 %  Max: 98 % 98 %       Recent Labs   Lab 10/13/22  1035 10/14/22  0453 10/15/22  0355   WBC 15.6* 11.4 9.9   RBC 3.66* 3.45* 3.11*   HGB 10.4* 9.8* 8.8*   HCT 32.3* 30.1* 27.1*   MCV 88.3 87.2 87.1   MCH 28.4 28.4 28.3   MCHC 32.2* 32.6* 32.5*   RDW 16.1 16.0 15.9   * 430* 406*   MPV 10.5* 10.6* 9.5       Recent Labs   Lab 10/12/22  0507 10/13/22  1035 10/16/22  1347 10/17/22  0406 10/18/22  0422   *   < > 128* 125* 128*   K 5.1   < > 6.5* 6.2* 4.6   CO2 27   < > 28 24 24   BUN 42.9*   < > 70.3* 74.7* 50.9*   CREATININE 2.37*   < > 2.95* 2.78* 2.32*   CALCIUM 7.8*   < > 8.8 8.4 7.9*   ALBUMIN 1.3*  --   --   --   --    ALKPHOS 66  --   --   --   --    ALT 12  --   --   --   --    AST 32  --   --   --   --    BILITOT 0.3  --   --   --   --     < > = values in this interval not displayed.          Microbiology Results (last 7 days)       Procedure Component Value Units Date/Time    Blood Culture [[id number]]  (Abnormal) Collected: 10/03/22 2518    Order Status:  Completed Specimen: Blood Updated: 10/11/22 1322     CULTURE, BLOOD ([facility]) CUTIBACTERIUM ACNES     Comment: This organism is commonly considered a contaminant.  No further processing will be done.        GRAM STAIN Gram Positive Rods      Seen in gram stain of broth only      1 of 2 Anaerobic bottles positive             See below for Radiology    Scheduled Med:   aluminum-magnesium hydroxide-simethicone  30 mL Oral QID (AC & HS)    belimumab  200 mg Subcutaneous Weekly    carvediloL  25 mg Oral BID    docusate sodium  100 mg Oral BID    famotidine  20 mg Oral Every other day    glimepiride  4 mg Oral Daily with breakfast    hydrALAZINE  50 mg Oral Q8H    hydrOXYchloroQUINE  200 mg Oral BID    insulin detemir U-100  20 Units Subcutaneous BID    methylPREDNISolone sodium succinate injection  40 mg Intravenous TID    morphine  30 mg Oral Q12H    mycophenolate  1,000 mg Oral BID    NIFEdipine  90 mg Oral Daily    pantoprazole  40 mg Oral BID AC    polyethylene glycol  17 g Oral BID    sevelamer carbonate  800 mg Oral TID WM    sodium zirconium cyclosilicate  5 g Oral TID    sucralfate  1 g Oral BID    warfarin  3 mg Oral Daily        Continuous Infusions:       PRN Meds:  acetaminophen, albuterol, bisacodyL, dextrose 10 % in water (D10W), dextrose 10 % in water (D10W), dextrose 10%, glucagon (human recombinant), glucose, glucose, hydrALAZINE, HYDROcodone-acetaminophen, HYDROmorphone, insulin aspart U-100, labetaloL, LIDOcaine (PF) 10 mg/ml (1%), lorazepam, melatonin, morphine, nitroGLYCERIN, ondansetron, sodium chloride 0.9%, sodium chloride 0.9%, sodium chloride 0.9%, sodium chloride 0.9%       VTE prophylaxis:     Patient condition:  Stable/Fair/Guarded/ Serious/ Critical    Anticipated discharge and Disposition:         All diagnosis and differential diagnosis have been reviewed; assessment and plan has been documented; I have personally reviewed the labs and test results that are presently available; I have reviewed  the patients medication list; I have reviewed the consulting providers response and recommendations. I have reviewed or attempted to review medical records based upon their availability    All of the patient's questions have been  addressed and answered. Patient's is agreeable to the above stated plan. I will continue to monitor closely and make adjustments to medical management as needed.  _____________________________________________________________________    Nutrition Status:    Radiology:  X-Ray Chest 1 View  Narrative: EXAMINATION:  XR CHEST 1 VIEW    CPT 80869    CLINICAL HISTORY:  TAY, SOB;    COMPARISON:  October 5, 2022    FINDINGS:  Examination reveals cardiomediastinal silhouette to be unchanged as compared with the previous exam.    Endo stent is identified in the descending aorta central line is seen with the tip at the junction of the superior vena cava and right atrium.    There is persistent increased left retrocardiac density and silhouetting of the left hemidiaphragm which might be related to an infiltrate/atelectasis    No focal consolidative changes otherwise identified no other significant change  Impression: Persistent increased left retrocardiac density and silhouetting of the left hemidiaphragm.    No other focal consolidative changes.    No other change    Electronically signed by: Ramón Ying  Date:    10/12/2022  Time:    08:15      Ronda Hamilton MD   10/18/2022

## 2022-10-18 NOTE — PT/OT/SLP PROGRESS
"Physical Therapy Treatment    Patient Name:  Stuart Guevara   MRN:  [id number]    Recommendations:     Discharge Recommendations:  rehabilitation facility   Discharge Equipment Recommendations: walker, rolling   Barriers to discharge:  medical status    Assessment:     Stuart Guevara is a 32 y.o. male admitted with a medical diagnosis of Aortic dissection.  He presents with the following impairments/functional limitations:  weakness, impaired endurance, impaired balance .    Rehab Prognosis: Good; patient would benefit from acute skilled PT services to address these deficits and reach maximum level of function.    Recent Surgery: Procedure(s) (LRB):  LAPAROTOMY, EXPLORATORY (N/A) 19 Days Post-Op    Plan:     During this hospitalization, patient to be seen 6 x/week to address the identified rehab impairments via gait training, therapeutic activities, therapeutic exercises and progress toward the following goals:    Plan of Care Expires:  11/02/22    Subjective     Chief Complaint: nausea "my stomach hurts"  Patient/Family Comments/goals:   Pain/Comfort:         Objective:     Communicated with RN prior to session.  Patient found HOB elevated with   upon PT entry to room.     General Precautions: Standard, fall   Orthopedic Precautions:N/A   Braces:    Respiratory Status: Room air     Functional Mobility:  Bed Mobility:     Supine to Sit: stand by assistance  Transfers:     Sit to Stand:  stand by assistance with rolling walker  Gait: Pt amb from EOB>recliner chair SBA with RW.        AM-PAC 6 CLICK MOBILITY          Treatment & Education:  Pt sat EOB while performing LAQ 15X1. Performed tandem stance 10"x2, Soila with one hand on RW. Single leg stance 10"x2 Soila one hand on RW, toe taps 10x1 Soila.    Patient left up in chair with all lines intact and call button in reach..    GOALS:   Multidisciplinary Problems       Physical Therapy Goals          Problem: Physical Therapy    Goal Priority Disciplines Outcome Goal " Variances Interventions   Physical Therapy Goal     PT, PT/OT Ongoing, Progressing     Description: Goals to be met by: 22     Patient will increase functional independence with mobility by performin. Supine to sit with brandon  2. Sit to supine with brandon  3. Sit <> stand with CGA using RW/LRAD  4. Bed to chair with CGA via squat pivot/stand pivot  5.Ambulate 200 ft with rolling walker and contact guard assistance                           Time Tracking:     PT Received On: 10/18/22  PT Start Time: 1009     PT Stop Time: 1032  PT Total Time (min): 23 min     Billable Minutes: Therapeutic Activity 23    Treatment Type: Treatment  PT/PTA: PTA     PTA Visit Number: 1     10/18/2022

## 2022-10-19 NOTE — PROGRESS NOTES
Nephrology consult follow up note    HPI:      Stuart Guevara is a 32 y.o. 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  The patient underwent exploratory lap for possibility of necrotic bowel and subsequently developed acute kidney injury with hyperkalemia and acidosis requiring initiation of dialysis.  He has now been started on a MWF schedule via right IJ temporary catheter.     Interval history:     10/16/22-hyperkalemic today. Given kayexalate per hospitalist. Alert and oriented. No N/V. No SOB.     10/17/22 currently tolerating dialysis.  Potassium stays high.  He is on 1 K potassium bath.  But will give him 2 K bath in the last 30 minutes of his dialysis.     10/18: No change overnight. Still requiring dialysis on MWF schedule. Remains with initial temporary catheter placed 9/25.  Abdominal pain at rest.     10/19: Patient on dialysis now. Plans for tunneled catheter today  Objective:       VITAL SIGNS: 24 HR MIN & MAX LAST    Temp  Min: 98.1 °F (36.7 °C)  Max: 98.9 °F (37.2 °C)  98.1 °F (36.7 °C)        BP  Min: 127/71  Max: 173/86  127/71     Pulse  Min: 70  Max: 82  73     Resp  Min: 17  Max: 20  20    SpO2  Min: 94 %  Max: 97 %  96 %      GEN: Awake and appropriate.  Chronically ill-appearing black male   HEENT: Atraumatic. EOMI, no icterus  NECK : No JVD, right IJ temporary catheter noted  CARD : RRR   LUNGS : Clear to auscultation  ABD : Soft, pain at rest  EXT : No edema.   NEURO : No obvious focal deficits          Component Value Date/Time     (L) 10/18/2022 0422     (L) 10/17/2022 0406    K 4.6 10/18/2022 0422    K 6.2 (H) 10/17/2022 0406    CO2 24 10/18/2022 0422    CO2 24 10/17/2022 0406    BUN 50.9 (H) 10/18/2022 0422    BUN 74.7 (H) 10/17/2022 0406    CREATININE 2.32 (H) 10/18/2022 0422    CREATININE 2.78 (H) 10/17/2022 0406    CALCIUM 7.9  (L) 10/18/2022 0422    CALCIUM 8.4 10/17/2022 0406    PHOS 7.4 (H) 10/11/2022 0448            Component Value Date/Time    WBC 9.9 10/15/2022 0355    WBC 11.4 10/14/2022 0453    HGB 8.8 (L) 10/15/2022 0355    HGB 9.8 (L) 10/14/2022 0453    HCT 27.1 (L) 10/15/2022 0355    HCT 30.1 (L) 10/14/2022 0453    HCT 28 (L) 09/23/2022 0304    HCT 30.0 (L) 02/15/2021 1751     (H) 10/15/2022 0355     (H) 10/14/2022 0453         Assessment / Plan:      1. Postoperative TAY requiring hemodialysis initiation on 9/25 via temp HD catheter placed per Dr. Chopra  2. Abdominal aortic dissection s/p TEVAR   3. Post operative abdominal compartment syndrome s/p ex lap on 9/27 with closure on 10/1  4. Hyperkalemia. Losartan dc.   5. Hypertension  6. Type 2 diabetes mellitus  7. SLE past history  8. Hyponatremia persistent.    Recommendations  Continue dialysis on MWF schedule  Waiting on tunneled dialysis catheter   [facility] Dialysis coordinator consulted and will need to have patient placed at outpatient dialysis unit prior to dc.

## 2022-10-19 NOTE — PLAN OF CARE
Brief Interventional Nephrology Note    Received consult within the past hour for conversion of a temporary RIJ dialysis catheter to tunneled dialysis catheter. Discussed case with RN via telephone. The patient was scheduled to have placement of a tunneled dialysis catheter with a different provider later today, however the patient was not able to maintain NPO status for the procedure this evening.     We have been able to secure an approximate start time for tunneled dialysis catheter placement tomorrow around noon-1 PM in the cath lab. The patient is to be made NPO at midnight. I plan on discussing catheter insertion and obtaining consents early tomorrow AM.    Thank you for allowing me the opportunity in taking care of this patient. Please reach me with any questions.    Yogesh Melton DO  Interventional Nephrology  [facility]  Cell: [phone]  Office: [phone]

## 2022-10-19 NOTE — PROGRESS NOTES
Pt unavailable in AM due to dialysis. PT attempted x2 in PM, pt declined due to fatigue and awaiting bath. PT to follow up tomorrow.

## 2022-10-19 NOTE — NURSING
10/19/22 1159   Post-Hemodialysis Assessment   Rinseback Volume (mL) 500 mL   Blood Volume Processed (Liters) 63.3 L   Dialyzer Clearance Clear   Duration of Treatment 180 minutes   Additional Fluid Intake (mL) 0 mL   Total UF (mL) 2500 mL   Net Fluid Removal 2000   Patient Response to Treatment pt tolerated well with no issues. Pt was given 1Mg dilaudid during tx for severe back pain. Dressing changed on cath, new caps applied.

## 2022-10-19 NOTE — PROGRESS NOTES
Vascular Surgery Progress Note:     I was consulted for tunneled line. I have made patient NPO with anticipation for tunneled line today, however he does not want to wait due to NPO period.     I can place tunneled line, however, patient will need to be NPO due availability of procedure rooms.     Please re consult if he want to proceed with tunneled line placement.     Joey Maravilla MD

## 2022-10-19 NOTE — PT/OT/SLP PROGRESS
Occupational Therapy      Patient Name:  Stuart Guevara   MRN:  [id number]    Patient not seen today secondary to Nurse/ NICOLE hold. Pt in dialysis in the am. OT attempted at 1245 nurse in the room states pt unable to tolerate therapy at this time. Reports pt just got back from dialysis and is weak. Possible pending tunneled cath placed today. Will follow-up tomorrow as schedule permits.    10/19/2022

## 2022-10-20 NOTE — PT/OT/SLP PROGRESS
Physical Therapy      Patient Name:  Stuart Guevara   MRN:  [id number]    Tx held at this time per RN. Pt had a difficult morning with severely low glucose (<20) and diaphoretic. Pt's tunneled catheter has been rescheduled d/t critical INR.

## 2022-10-20 NOTE — PROGRESS NOTES
[facility]  Hospital Medicine Progress Note        Chief Complaint: Inpatient Follow-up for Aortic disscetion     HPI:   Stuart Guevara is a 32 y.o. male who has PMH which includes HTN, SLE,  hepatitis B, aortic dissection with repair 2/2021, RUE DVT on Xarelto; presented to the ED at [facility] on 9/23/2022  with reports of  shortness of breath and chest pain radiating to his back.  PT has a history of aortic dissection 2/2021. Work up revealed a type B aortic dissection and significant hypertension noted on presentation.  He failed medical management and had progression of his symptoms despite adequate control of hemodynamic parameters.  PT was transferred from [facility] to [facility] for CV surgery evaluation which he was taken  to the OR and had TEVAR with left carotid to subclavian artery bypass. PT developed abdominal distension and rigidity of his abdomen and developed a lactic acidosis.  He repeat CT angiography of the chest/abdomen/pelvis without evidence of worsening dissection, but evidence of significant intra-abdominal pathology with some hemoperitoneum, free blood in the abdomen.  He underwent exploratory laparotomy with evidence of viable bowel and no significant bleeding identified, abd was closed with flakito drain left in situ chest tube was placed. Postoperatively, he developed severe unstable bradycardia, and a transvenous pacemaker was placed by Cardiology.  Shortly afterwards, he was noted to have significant worsening of his renal failure and critical hyperkalemia. Renal services consulted which catheter was placed and he was started on HD treatments.  PT has been cleared for transfer out of ICU to the floor. Hospital medicine services consulted for assumption of care.     Patient is s/p AAA surgery repair , course c/by abd compartment syndrome s/p ex lap and closure with flakito drain in situ, sepsis syndrome with fever and worsening leucocytosis , Appreciate ID input antibiotics  broadened to vancomycin Flagyl and cefepime, concern for SLE serositis , rheum consulted started on high dose steori and immunosuppressants.      Interval Hx:   INR checked for first time in a couple of days, now >7.  Tunneled dialysis catheter placement has been held secondary to to this.  Additionally was noted to be very hypoglycemia and hypothermic.        Objective/physical exam:  General: In no acute distress, afebrile  Respiratory: Clear to auscultation bilaterally  Cardiovascular: S1, S2, no appreciable murmur  Abdomen: Soft, nontender, BS +, dressing,   MSK: Warm, no lower extremity edema, no clubbing or cyanosis  Neurologic: Alert and oriented x4,         Assessment/Plan:   Supratherapeutic INR  Hyperkalemia, resolved   SLE flare and serositis   Acute respiratory failure with hypoxia- s/p mechanical ventilation, now extubated  Acute aortic dissection type B- s/p TEVAR with left carotid artery to left subclavian bypass 9/23/2022  Acute kidney injury now requiring hemodialysis since September 25th by a temporary hemodialysis catheter  New onset Cardiomyopathy- EF 37% unknown etiology  VHD, s/p mechanical AVR, on Coumadin  Acute abdominal pain generalized  Abdominal compartment syndrome, s/p ex-lap x2 last on 9/27 with abdominal closure on 10/1  Acute pancreatitis- resolving with lipase trending down  RUE DVT- was on Xarelto, recent US no DVT  HTN- emergency, resolved   Anemia- chronic disease  Weakness  HLD  Hx of hepatitis B  Hx of aortic dissection with repair 2/2021  History of transverse myelitis  History of lupus nephritis        Plan  Will hold Coumadin and allow INR to drift downward.  In the absence of bleeding would not recommend Vit K given mechanical valve  Can plan for tunneled cath once INR trend down appropriately  Hold long-acting insulin this evening, continue to monitor sugars closely, Continue with D10 on as needed basis.   Continue current immunosuppressive regimen at direction of  Rheum  Placement on holding pending placement of tunneled HD catheter  Otherwise continue current management        Patient condition:  Stable     Anticipated discharge and Disposition: rehab when cleared by nephro and rheum         All diagnosis and differential diagnosis have been reviewed; assessment and plan has been documented; I have personally reviewed the labs and test results that are presently available; I have reviewed the patients medication list; I have reviewed the consulting providers response and recommendations. I have reviewed or attempted to review medical records based upon their availability     All of the patient's questions have been  addressed and answered. Patient's is agreeable to the above stated plan. I will continue to monitor closely and make adjustments to medical management as needed.        VITAL SIGNS: 24 HRS MIN & MAX LAST   Temp  Min: 91 °F (32.8 °C)  Max: 98.2 °F (36.8 °C) 97.2 °F (36.2 °C)   BP  Min: 124/64  Max: 156/79 132/66     Pulse  Min: 55  Max: 82  (!) 55   Resp  Min: 16  Max: 18 16   SpO2  Min: 95 %  Max: 98 % 98 %       Recent Labs   Lab 10/14/22  0453 10/15/22  0355   WBC 11.4 9.9   RBC 3.45* 3.11*   HGB 9.8* 8.8*   HCT 30.1* 27.1*   MCV 87.2 87.1   MCH 28.4 28.3   MCHC 32.6* 32.5*   RDW 16.0 15.9   * 406*   MPV 10.6* 9.5         Recent Labs   Lab 10/17/22  0406 10/18/22  0422 10/20/22  0411   * 128* 130*   K 6.2* 4.6 4.3   CO2 24 24 24   BUN 74.7* 50.9* 51.8*   CREATININE 2.78* 2.32* 2.11*   CALCIUM 8.4 7.9* 8.3*            Microbiology Results (last 7 days)       ** No results found for the last 168 hours. **             See below for Radiology    Scheduled Med:   aluminum-magnesium hydroxide-simethicone  30 mL Oral QID (AC & HS)    belimumab  200 mg Subcutaneous Weekly    carvediloL  25 mg Oral BID    dextrose 50%        docusate sodium  100 mg Oral BID    famotidine  20 mg Oral Every other day    glimepiride  4 mg Oral Daily with breakfast     hydrALAZINE  50 mg Oral Q8H    hydrOXYchloroQUINE  200 mg Oral BID    insulin detemir U-100  20 Units Subcutaneous BID    methylPREDNISolone sodium succinate injection  40 mg Intravenous TID    morphine  30 mg Oral Q12H    mycophenolate  1,000 mg Oral BID    NIFEdipine  90 mg Oral Daily    pantoprazole  40 mg Oral BID AC    sevelamer carbonate  800 mg Oral TID WM    [START ON 10/21/2022] sodium zirconium cyclosilicate  10 g Oral Daily    sucralfate  1 g Oral BID        Continuous Infusions:  None    PRN Meds:  sodium chloride, sodium chloride, acetaminophen, albuterol, bisacodyL, dextrose 10 % in water (D10W), dextrose 10 % in water (D10W), dextrose 10%, glucagon (human recombinant), glucose, glucose, hydrALAZINE, HYDROcodone-acetaminophen, HYDROmorphone, insulin aspart U-100, labetaloL, LIDOcaine (PF) 10 mg/ml (1%), lorazepam, melatonin, morphine, nitroGLYCERIN, ondansetron, polyethylene glycol, sodium chloride 0.9%, sodium chloride 0.9%, sodium chloride 0.9%, sodium chloride 0.9%       VTE prophylaxis: On Warfarin    Patient condition:  Stable/Fair/Guarded/ Serious/ Critical    Anticipated discharge and Disposition:         All diagnosis and differential diagnosis have been reviewed; assessment and plan has been documented; I have personally reviewed the labs and test results that are presently available; I have reviewed the patients medication list; I have reviewed the consulting providers response and recommendations. I have reviewed or attempted to review medical records based upon their availability    All of the patient's questions have been  addressed and answered. Patient's is agreeable to the above stated plan. I will continue to monitor closely and make adjustments to medical management as needed.  _____________________________________________________________________    Nutrition Status:    Radiology:  X-Ray Chest 1 View  Narrative: EXAMINATION:  XR CHEST 1 VIEW    CPT 23207    CLINICAL HISTORY:  TAY,  SOB;    COMPARISON:  October 5, 2022    FINDINGS:  Examination reveals cardiomediastinal silhouette to be unchanged as compared with the previous exam.    Endo stent is identified in the descending aorta central line is seen with the tip at the junction of the superior vena cava and right atrium.    There is persistent increased left retrocardiac density and silhouetting of the left hemidiaphragm which might be related to an infiltrate/atelectasis    No focal consolidative changes otherwise identified no other significant change  Impression: Persistent increased left retrocardiac density and silhouetting of the left hemidiaphragm.    No other focal consolidative changes.    No other change    Electronically signed by: Ramón Ying  Date:    10/12/2022  Time:    08:15      Manjinder Carrillo MD   10/20/2022             Critical Care Time: 37 minutes spent in direct hands on care, review of labs, imaging and medical record, and discussion of diagnosis, treatment, prognosis with patient/family.  Patient remains at high-risk for clinical decompensation  Critical Care diagnosis: Hypoglycemia, hypothermia

## 2022-10-20 NOTE — PT/OT/SLP PROGRESS
Occupational Therapy      Patient Name:  Stuart Guevara   MRN:  [id number]    Patient not seen today secondary to Nurse/ NICOLE hold. OT attempted at 1014, RN stated pt not feeling well at this time. Pt with low blood sugar, diaphoretic unable to tolerate therapy at this time. Pt was scheduled for tunneled cath today although has to be rescheduled due to INR too high. Will follow-up as schedule permits.    10/20/2022

## 2022-10-20 NOTE — PROGRESS NOTES
[facility]  Hospital Medicine Progress Note        Chief Complaint: Inpatient Follow-up for Aortic disscetion     HPI:   Stuart Guevara is a 32 y.o. male who has PMH which includes HTN, SLE,  hepatitis B, aortic dissection with repair 2/2021, RUE DVT on Xarelto; presented to the ED at [facility] on 9/23/2022  with reports of  shortness of breath and chest pain radiating to his back.  PT has a history of aortic dissection 2/2021. Work up revealed a type B aortic dissection and significant hypertension noted on presentation.  He failed medical management and had progression of his symptoms despite adequate control of hemodynamic parameters.  PT was transferred from [facility] to [facility] for CV surgery evaluation which he was taken  to the OR and had TEVAR with left carotid to subclavian artery bypass. PT developed abdominal distension and rigidity of his abdomen and developed a lactic acidosis.  He repeat CT angiography of the chest/abdomen/pelvis without evidence of worsening dissection, but evidence of significant intra-abdominal pathology with some hemoperitoneum, free blood in the abdomen.  He underwent exploratory laparotomy with evidence of viable bowel and no significant bleeding identified, abd was closed with flakito drain left in situ chest tube was placed. Postoperatively, he developed severe unstable bradycardia, and a transvenous pacemaker was placed by Cardiology.  Shortly afterwards, he was noted to have significant worsening of his renal failure and critical hyperkalemia. Renal services consulted which catheter was placed and he was started on HD treatments.  PT has been cleared for transfer out of ICU to the floor. Hospital medicine services consulted for assumption of care.     Patient is s/p AAA surgery repair , course c/by abd compartment syndrome s/p ex lap and closure with flakito drain in situ, sepsis syndrome with fever and worsening leucocytosis , Appreciate ID input antibiotics  broadened to vancomycin Flagyl and cefepime, concern for SLE serositis , rheum consulted started on high dose steori and immunosuppressants.      Interval Hx:   No new complaints.  Plans for tunnelled dialysis catheter tomorrow.           Objective/physical exam:  General: In no acute distress, afebrile  Respiratory: Clear to auscultation bilaterally  Cardiovascular: S1, S2, no appreciable murmur  Abdomen: Soft, nontender, BS +, dressing,   MSK: Warm, no lower extremity edema, no clubbing or cyanosis  Neurologic: Alert and oriented x4,         Assessment/Plan:   Hyperkalemia, resolved   SLE flare and serositis   Acute respiratory failure with hypoxia- s/p mechanical ventilation, now extubated  Acute aortic dissection type B- s/p TEVAR with left carotid artery to left subclavian bypass 9/23/2022  Acute kidney injury now requiring hemodialysis since September 25th by a temporary hemodialysis catheter  New onset Cardiomyopathy- EF 37% unknown etiology  VHD, s/p mechanical AVR   Acute abdominal pain generalized  Abdominal compartment syndrome, s/p ex-lap x2 last on 9/27 with abdominal closure on 10/1  Acute pancreatitis- resolving with lipase trending down  RUE DVT- was on Xarelto, recent US no DVT  HTN- emergency, resolved   Anemia- chronic disease  Weakness  HLD  Hx of hepatitis B  Hx of aortic dissection with repair 2/2021  History of transverse myelitis  History of lupus nephritis        Plan  Continue current immunosuppressive regimen at direction of Rheum  INR therapeutic, continue warfarin and INR monitoring  Continues to await placement  Continue current diabetic regimen   Otherwise continue current management in the interim        Patient condition:  Stable     Anticipated discharge and Disposition: rehab when cleared by nephro and rheum         All diagnosis and differential diagnosis have been reviewed; assessment and plan has been documented; I have personally reviewed the labs and test results that are  presently available; I have reviewed the patients medication list; I have reviewed the consulting providers response and recommendations. I have reviewed or attempted to review medical records based upon their availability     All of the patient's questions have been  addressed and answered. Patient's is agreeable to the above stated plan. I will continue to monitor closely and make adjustments to medical management as needed.        VITAL SIGNS: 24 HRS MIN & MAX LAST   Temp  Min: 97.2 °F (36.2 °C)  Max: 98.9 °F (37.2 °C) 97.2 °F (36.2 °C)   BP  Min: 127/71  Max: 156/79 (!) 156/79     Pulse  Min: 73  Max: 82  82   Resp  Min: 18  Max: 20 18   SpO2  Min: 94 %  Max: 96 % 96 %       Recent Labs   Lab 10/13/22  1035 10/14/22  0453 10/15/22  0355   WBC 15.6* 11.4 9.9   RBC 3.66* 3.45* 3.11*   HGB 10.4* 9.8* 8.8*   HCT 32.3* 30.1* 27.1*   MCV 88.3 87.2 87.1   MCH 28.4 28.4 28.3   MCHC 32.2* 32.6* 32.5*   RDW 16.1 16.0 15.9   * 430* 406*   MPV 10.5* 10.6* 9.5         Recent Labs   Lab 10/16/22  1347 10/17/22  0406 10/18/22  0422   * 125* 128*   K 6.5* 6.2* 4.6   CO2 28 24 24   BUN 70.3* 74.7* 50.9*   CREATININE 2.95* 2.78* 2.32*   CALCIUM 8.8 8.4 7.9*            Microbiology Results (last 7 days)       ** No results found for the last 168 hours. **             See below for Radiology    Scheduled Med:   aluminum-magnesium hydroxide-simethicone  30 mL Oral QID (AC & HS)    belimumab  200 mg Subcutaneous Weekly    carvediloL  25 mg Oral BID    docusate sodium  100 mg Oral BID    famotidine  20 mg Oral Every other day    glimepiride  4 mg Oral Daily with breakfast    hydrALAZINE  50 mg Oral Q8H    hydrOXYchloroQUINE  200 mg Oral BID    insulin detemir U-100  20 Units Subcutaneous BID    methylPREDNISolone sodium succinate injection  40 mg Intravenous TID    morphine  30 mg Oral Q12H    mycophenolate  1,000 mg Oral BID    NIFEdipine  90 mg Oral Daily    pantoprazole  40 mg Oral BID AC    polyethylene glycol  17 g  Oral BID    sevelamer carbonate  800 mg Oral TID WM    sodium zirconium cyclosilicate  5 g Oral TID    sucralfate  1 g Oral BID    warfarin  3 mg Oral Daily        Continuous Infusions:  None    PRN Meds:  acetaminophen, albuterol, bisacodyL, dextrose 10 % in water (D10W), dextrose 10 % in water (D10W), dextrose 10%, glucagon (human recombinant), glucose, glucose, hydrALAZINE, HYDROcodone-acetaminophen, HYDROmorphone, insulin aspart U-100, labetaloL, LIDOcaine (PF) 10 mg/ml (1%), lorazepam, melatonin, morphine, nitroGLYCERIN, ondansetron, sodium chloride 0.9%, sodium chloride 0.9%, sodium chloride 0.9%, sodium chloride 0.9%       VTE prophylaxis: On Warfarin    Patient condition:  Stable/Fair/Guarded/ Serious/ Critical    Anticipated discharge and Disposition:         All diagnosis and differential diagnosis have been reviewed; assessment and plan has been documented; I have personally reviewed the labs and test results that are presently available; I have reviewed the patients medication list; I have reviewed the consulting providers response and recommendations. I have reviewed or attempted to review medical records based upon their availability    All of the patient's questions have been  addressed and answered. Patient's is agreeable to the above stated plan. I will continue to monitor closely and make adjustments to medical management as needed.  _____________________________________________________________________    Nutrition Status:    Radiology:  X-Ray Chest 1 View  Narrative: EXAMINATION:  XR CHEST 1 VIEW    CPT 28158    CLINICAL HISTORY:  TAY, SOB;    COMPARISON:  October 5, 2022    FINDINGS:  Examination reveals cardiomediastinal silhouette to be unchanged as compared with the previous exam.    Endo stent is identified in the descending aorta central line is seen with the tip at the junction of the superior vena cava and right atrium.    There is persistent increased left retrocardiac density and  silhouetting of the left hemidiaphragm which might be related to an infiltrate/atelectasis    No focal consolidative changes otherwise identified no other significant change  Impression: Persistent increased left retrocardiac density and silhouetting of the left hemidiaphragm.    No other focal consolidative changes.    No other change    Electronically signed by: Ramón Ying  Date:    10/12/2022  Time:    08:15      Manjinder Carrillo MD   10/19/2022

## 2022-10-20 NOTE — CONSULTS
INTERVENTIONAL NEPHROLOGY INITIAL CONSULT NOTE  [facility] Vascular [facility]     Patient Seen Date: 10/20/2022  Patient Seen Time: 6:50 AM     Consult requested by: Ronda Hamilton MD     Reason for consult: Need for tunneled dialysis access       HPI: 32 year-old male with history of SLE, HTN, hepatitis B and aortic dissection with repair in 2/2021 was seen at an OSH for shortness of breath and chest pain. He was found to have a type B aortic dissection and was transferred to [facility] for further care. His hospitalization to date has been 27 days, with multiple surgeries and care in the ICU. He has now been stabilized and is on the medical floor. Unfortunately, he developed significant renal failure requiring maintenance dialysis. He continues the need for dialysis, however has a temporary HD catheter located at his RIJ vein. Hence, interventional nephrology was consulted for conversion of catheter to tunneled version.    Pt seen and examined at bedside. Risks and benefits of insertion of a tunneled dialysis catheter was reviewed with the patient. The patient agrees to proceed with the intended procedure. Consents for both intravenous conscious sedation and procedure were signed and placed within the chart.     Review of Systems:  General:  No fatigue  Skin: No rashes  HEENT: No vision changes  CVS: No CP  RS: No SOB  GIT: No abdominal pain  Extremities: No swelling  Neurological:  No focal weakness  Psych: No depression    Past Medical History:   Diagnosis Date    Hypertension     Systemic lupus erythematosus, organ or system involvement unspecified     Systemic lupus erythematosus, organ or system involvement unspecified       Past Surgical History:   Procedure Laterality Date    APPLICATION OF WOUND VACUUM-ASSISTED CLOSURE DEVICE  9/27/2022    Procedure: APPLICATION, WOUND VAC;  Surgeon: Christopher Espinal MD;  Location: [facility] OR;  Service: General;;    CARDIAC SURGERY      FINGER AMPUTATION      THROMBECTOMY Right     TOE  AMPUTATION        Review of patient's allergies indicates:   Allergen Reactions    Levofloxacin     Sulfamethoxazole-trimethoprim       Social History     Tobacco Use    Smoking status: Every Day     Types: Cigarettes    Smokeless tobacco: Never      No family history on file.      Current Facility-Administered Medications:     acetaminophen tablet 650 mg, 650 mg, Oral, Q6H PRN, Nolberto Calvillo MD, 650 mg at 10/06/22 2031    albuterol inhaler 2 puff, 2 puff, Inhalation, Q4H PRN, Evelio Marshall MD    aluminum-magnesium hydroxide-simethicone 200-200-20 mg/5 mL suspension 30 mL, 30 mL, Oral, QID (AC & HS), Ronda Hamilton MD, 30 mL at 10/19/22 2150    belimumab Syrg 200 mg, 200 mg, Subcutaneous, Weekly, Ronda Hamilton MD, 200 mg at 10/17/22 1701    bisacodyL suppository 10 mg, 10 mg, Rectal, Daily PRN, YAS CarusoP, 10 mg at 10/02/22 1056    carvediloL tablet 25 mg, 25 mg, Oral, BID, Maximino Yeh, MARIANNE, 25 mg at 10/19/22 2150    dextrose 10 % infusion, 12.5 g, Intravenous, PRN, Dewayne Hughes PA-C    dextrose 10 % infusion, 25 g, Intravenous, PRN, Dewayne Hughes PA-C    dextrose 10% bolus 125 mL, 12.5 g, Intravenous, PRN, Ivan Chopra MD    docusate sodium capsule 100 mg, 100 mg, Oral, BID, YAS CarusoP, 100 mg at 10/18/22 1006    famotidine tablet 20 mg, 20 mg, Oral, Every other day, Ivan Chopra MD, 20 mg at 10/18/22 1006    glimepiride tablet 4 mg, 4 mg, Oral, Daily with breakfast, Ronda Hamilton MD, 4 mg at 10/18/22 1005    glucagon (human recombinant) injection 1 mg, 1 mg, Intramuscular, PRN, Dewayne Hughes PA-C    glucose chewable tablet 16 g, 16 g, Oral, PRN, Dewayne Hughes PA-C    glucose chewable tablet 24 g, 24 g, Oral, PRN, Dewayne Hughes PA-C    hydrALAZINE injection 20 mg, 20 mg, Intravenous, Q4H PRN, Bin Bell MD, 20 mg at 10/18/22 1224    hydrALAZINE tablet 50 mg, 50 mg, Oral, Q8H, oRnda Hamilton MD, 50 mg at 10/20/22 8307    HYDROcodone-acetaminophen  5-325 mg per tablet 1 tablet, 1 tablet, Oral, Q4H PRN, Delmi Ortiz, FNP, 1 tablet at 10/19/22 2150    HYDROmorphone (PF) injection 1 mg, 1 mg, Intravenous, Q4H PRN, Cong Velez DO, 1 mg at 10/20/22 0509    hydrOXYchloroQUINE tablet 200 mg, 200 mg, Oral, BID, Wing Thorpe MD, 200 mg at 10/19/22 2150    insulin aspart U-100 injection 1-10 Units, 1-10 Units, Subcutaneous, QID (AC + HS) PRN, Ronda Hamilton MD, 8 Units at 10/19/22 1752    insulin detemir U-100 injection 20 Units, 20 Units, Subcutaneous, BID, Ronda Hamilton MD, 20 Units at 10/19/22 2150    labetaloL injection 20 mg, 20 mg, Intravenous, Q8H PRN, Bin Bell MD, 20 mg at 10/03/22 0321    LIDOcaine (PF) 10 mg/ml (1%) injection, , , PRN, Ruben Arciniega MD, 5 mL at 09/25/22 1656    lorazepam injection 1 mg, 1 mg, Intravenous, Q2H PRN, Cong Velez DO    melatonin tablet 6 mg, 6 mg, Oral, Nightly PRN, Michelle Ferrera, [facility], 6 mg at 10/14/22 0000    methylPREDNISolone sodium succinate injection 40 mg, 40 mg, Intravenous, TID, Zach Shafer MD, 40 mg at 10/19/22 2150    morphine 12 hr tablet 30 mg, 30 mg, Oral, Q12H, Shamar Stewart MD, 30 mg at 10/19/22 2320    morphine injection 2 mg, 2 mg, Intravenous, Q2H PRN, Evelio Marshall MD, 2 mg at 10/03/22 0310    mycophenolate capsule 1,000 mg, 1,000 mg, Oral, BID, Wing Thorpe MD, 1,000 mg at 10/19/22 2150    NIFEdipine 24 hr tablet 90 mg, 90 mg, Oral, Daily, Verna Whipple MD, 90 mg at 10/19/22 1640    nitroGLYCERIN SL tablet 0.4 mg, 0.4 mg, Sublingual, Q5 Min PRN, Zach Shafer MD    ondansetron injection 4 mg, 4 mg, Intravenous, Q6H PRN, Evelio Marshall MD, 4 mg at 10/04/22 2114    pantoprazole EC tablet 40 mg, 40 mg, Oral, BID AC, Ronda Hamilton MD, 40 mg at 10/20/22 0509    polyethylene glycol packet 17 g, 17 g, Oral, BID, Bin Bell MD, 17 g at 10/18/22 1005    sevelamer carbonate tablet 800 mg, 800 mg, Oral, TID WM, Alexander Simpson MD, 800 mg at 10/19/22  1640    sodium chloride 0.9% bolus 250 mL, 250 mL, Intravenous, PRN, Shahzad Lundberg MD    sodium chloride 0.9% bolus 250 mL, 250 mL, Intravenous, PRN, Shahzad Lundberg MD    sodium chloride 0.9% bolus 250 mL, 250 mL, Intravenous, PRN, Cindy Lora, FNP    sodium chloride 0.9% flush 10 mL, 10 mL, Intravenous, PRN, Evelio Marshall MD    sodium zirconium cyclosilicate packet 5 g, 5 g, Oral, TID, Alexander Simpson MD, 5 g at 10/19/22 2150    sucralfate tablet 1 g, 1 g, Oral, BID, Francia Marcus DO, 1 g at 10/19/22 2150    warfarin tablet 3 mg, 3 mg, Oral, Daily, Ronda Hamilton MD, 3 mg at 10/19/22 1640    Vital Signs (24 h):  Temp:  [97.2 °F (36.2 °C)-98.2 °F (36.8 °C)] 98.1 °F (36.7 °C)  Pulse:  [72-82] 72  Resp:  [16-20] 18  SpO2:  [95 %-97 %] 95 %  BP: (127-156)/(65-79) 131/65   I/O last 3 completed shifts:  In: 600 [P.O.:600]  Out: 2500 [Other:2500]  No intake/output data recorded.        Physical Exam:  General: NAD, thin  HEENT: NC/AT, EOMI  CVS: S1/S2 present and normal. No murmur/rubs/gallop.      RS: Lung CTAB, No rales/rhonchi/wheeze.     Abdominal: Soft, NT/ND.  Back: No CVA tenderness.  Extremities: No edema b/l LE  Skin: No rash, no lesions.  Neurological: No focal deficits.  Psych: Normal affect  Dialysis Access: RIJ non-tunneled HD catheter.    Results:    Lab Results   Component Value Date     (L) 10/20/2022    K 4.3 10/20/2022    CO2 24 10/20/2022    BUN 51.8 (H) 10/20/2022    CREATININE 2.11 (H) 10/20/2022    CALCIUM 8.3 (L) 10/20/2022    PHOS 7.4 (H) 10/11/2022    WBC 9.9 10/15/2022    HGB 8.8 (L) 10/15/2022    HCT 27.1 (L) 10/15/2022     (H) 10/15/2022       Assessment and Plan:      TAY on HD via temporary RIJ HD catheter (since 9/25/22).  AA Dissection s/p TEVAR.  Abdominal compartment syndrome s/p ex lap and closure.  HTN.  SLE.  DM2.  Pt with TAY in need of continued dialysis per nephrology team. He is currently with a non-tunneled/temporary RIJ HD catheter.  - Risks and benefits of  insertion of a tunneled dialysis catheter was reviewed with the patient. The patient agrees to proceed with the intended procedure. Consents for both intravenous conscious sedation and procedure were signed and placed within the chart.   - Plan on placement of tunneled catheter in cath lab setting around noon-1PM today.  - Should remain NPO.  - Will be able to have dialysis afterwards if needed.    Thank you for your consult. Please feel free to reach me with any questions.  Plan for follow-up tomorrow.    Yogesh Melton DO  Interventional Nephrology  [facility] Vascular [facility]  Cell: [phone]  Office: [phone]

## 2022-10-20 NOTE — PLAN OF CARE
Problem: Adult Inpatient Plan of Care  Goal: Plan of Care Review  Outcome: Ongoing, Progressing  Goal: Patient-Specific Goal (Individualized)  Outcome: Ongoing, Progressing  Goal: Absence of Hospital-Acquired Illness or Injury  Outcome: Ongoing, Progressing  Goal: Optimal Comfort and Wellbeing  Outcome: Ongoing, Progressing     Problem: Infection  Goal: Absence of Infection Signs and Symptoms  Outcome: Ongoing, Progressing     Problem: Skin Injury Risk Increased  Goal: Skin Health and Integrity  Outcome: Ongoing, Progressing     Problem: Fall Injury Risk  Goal: Absence of Fall and Fall-Related Injury  Outcome: Ongoing, Progressing     Problem: Device-Related Complication Risk (CRRT (Continuous Renal Replacement Therapy))  Goal: Safe, Effective Therapy Delivery  Outcome: Ongoing, Progressing     Problem: Infection (CRRT (Continuous Renal Replacement Therapy))  Goal: Absence of Infection Signs and Symptoms  Outcome: Ongoing, Progressing     Problem: Device-Related Complication Risk (Hemodialysis)  Goal: Safe, Effective Therapy Delivery  Outcome: Ongoing, Progressing     Problem: Hemodynamic Instability (Hemodialysis)  Goal: Effective Tissue Perfusion  Outcome: Ongoing, Progressing     Problem: Infection (Hemodialysis)  Goal: Absence of Infection Signs and Symptoms  Outcome: Ongoing, Progressing     Problem: Hyperglycemia  Goal: Blood Glucose Level Within Targeted Range  Outcome: Ongoing, Progressing     Problem: Impaired Wound Healing  Goal: Optimal Wound Healing  Outcome: Ongoing, Progressing

## 2022-10-20 NOTE — PROGRESS NOTES
Interventional Nephrology Plan of Care Note    Pt with severely elevated INR this AM. Rechecked value remains elevated. We will postpone procedure to tomorrow morning at approximately 0930, pending reasonable INR. May have diet today. Will need to be NPO at midnight.    Yogesh Melton  Interventional Nephrology  [facility] Vascular [facility]  [phone]

## 2022-10-20 NOTE — PROGRESS NOTES
Nephrology consult follow up note    HPI:      Stuart Guevara is a 32 y.o. 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  The patient underwent exploratory lap for possibility of necrotic bowel and subsequently developed acute kidney injury with hyperkalemia and acidosis requiring initiation of dialysis.  He has now been started on a MWF schedule via right IJ temporary catheter.     Interval history:     10/16/22-hyperkalemic today. Given kayexalate per hospitalist. Alert and oriented. No N/V. No SOB.     10/17/22 currently tolerating dialysis.  Potassium stays high.  He is on 1 K potassium bath.  But will give him 2 K bath in the last 30 minutes of his dialysis.     10/18: No change overnight. Still requiring dialysis on MWF schedule. Remains with initial temporary catheter placed 9/25.  Abdominal pain at rest.     10/19: Patient on dialysis now. Plans for tunneled catheter today    10/20:  Tunnel catheter placement deferred yesterday secondary to patient not remaining NPO.  Today, INR was chronically elevated greater than 7. Now with bear hugger for hypothermia after noted critical hypoglycemia.   Objective:       VITAL SIGNS: 24 HR MIN & MAX LAST    Temp  Min: 97.2 °F (36.2 °C)  Max: 98.2 °F (36.8 °C)  98.1 °F (36.7 °C)        BP  Min: 124/64  Max: 156/79  132/66     Pulse  Min: 55  Max: 82  (!) 55     Resp  Min: 16  Max: 18  16    SpO2  Min: 95 %  Max: 98 %  98 %      GEN: Awake and appropriate.  Chronically ill-appearing black male   HEENT: Atraumatic. EOMI, no icterus  NECK : No JVD, right IJ temporary catheter noted  CARD : RRR   LUNGS : Clear to auscultation  ABD : Soft, pain at rest  EXT : No edema.   NEURO : No obvious focal deficits          Component Value Date/Time     (L) 10/20/2022 0411     (L) 10/18/2022 0422    K 4.3 10/20/2022 0411    K 4.6  10/18/2022 0422    CO2 24 10/20/2022 0411    CO2 24 10/18/2022 0422    BUN 51.8 (H) 10/20/2022 0411    BUN 50.9 (H) 10/18/2022 0422    CREATININE 2.11 (H) 10/20/2022 0411    CREATININE 2.32 (H) 10/18/2022 0422    CALCIUM 8.3 (L) 10/20/2022 0411    CALCIUM 7.9 (L) 10/18/2022 0422    PHOS 7.4 (H) 10/11/2022 0448            Component Value Date/Time    WBC 9.9 10/15/2022 0355    WBC 11.4 10/14/2022 0453    HGB 8.8 (L) 10/15/2022 0355    HGB 9.8 (L) 10/14/2022 0453    HCT 27.1 (L) 10/15/2022 0355    HCT 30.1 (L) 10/14/2022 0453    HCT 28 (L) 09/23/2022 0304    HCT 30.0 (L) 02/15/2021 1751     (H) 10/15/2022 0355     (H) 10/14/2022 0453         Assessment / Plan:      1. Postoperative TAY requiring hemodialysis initiation on 9/25 via temp HD catheter placed per Dr. Chopra  2. Abdominal aortic dissection s/p TEVAR   3. Post operative abdominal compartment syndrome s/p ex lap on 9/27 with closure on 10/1  4. Hyperkalemia. Losartan dc.   5. Hypertension  6. Type 2 diabetes mellitus  7. Serositis and SLE - rheumatology following  8. Hyponatremia persistent.      Recommendations  Continue dialysis on MWF schedule  Waiting on tunneled dialysis catheter. Now INR needs to decrease prior to placement    working on [facility]. Dialysis coordinator consulted and will need to have patient placed at outpatient dialysis unit prior to dc.

## 2022-10-21 NOTE — PROGRESS NOTES
Nephrology consult follow up note    HPI:      Stuart Guevara is a 32 y.o. 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  The patient underwent exploratory lap for possibility of necrotic bowel and subsequently developed acute kidney injury with hyperkalemia and acidosis requiring initiation of dialysis.  He has now been started on a MWF schedule via right IJ temporary catheter.     Interval history:     10/16/22-hyperkalemic today. Given kayexalate per hospitalist. Alert and oriented. No N/V. No SOB.     10/17/22 currently tolerating dialysis.  Potassium stays high.  He is on 1 K potassium bath.  But will give him 2 K bath in the last 30 minutes of his dialysis.     10/18: No change overnight. Still requiring dialysis on MWF schedule. Remains with initial temporary catheter placed 9/25.  Abdominal pain at rest.     10/19: Patient on dialysis now. Plans for tunneled catheter today    10/20:  Tunnel catheter placement deferred yesterday secondary to patient not remaining NPO.  Today, INR was chronically elevated greater than 7. Now with bear hugger for hypothermia after noted critical hypoglycemia.     10/21: Cr 2.3 today. He is on dialysis now tolerating well. INR remains critically elevated and procedure canceled again.   Objective:       VITAL SIGNS: 24 HR MIN & MAX LAST    Temp  Min: 97.2 °F (36.2 °C)  Max: 98.8 °F (37.1 °C)  98.7 °F (37.1 °C)        BP  Min: 118/70  Max: 169/78  (!) 148/85     Pulse  Min: 78  Max: 84  82     Resp  Min: 15  Max: 22  15    SpO2  Min: 96 %  Max: 98 %  96 %      GEN: Awake and appropriate.  Chronically ill-appearing black male   HEENT: Atraumatic. EOMI, no icterus  NECK : No JVD, right IJ temporary catheter noted  CARD : RRR   LUNGS : Clear to auscultation  ABD : Soft, pain at rest  EXT : No edema.   NEURO : No obvious focal deficits           Component Value Date/Time     (L) 10/21/2022 0537     (L) 10/20/2022 0411    K 4.8 10/21/2022 0537    K 4.3 10/20/2022 0411    CO2 22 10/21/2022 0537    CO2 24 10/20/2022 0411    BUN 68.1 (H) 10/21/2022 0537    BUN 51.8 (H) 10/20/2022 0411    CREATININE 2.35 (H) 10/21/2022 0537    CREATININE 2.11 (H) 10/20/2022 0411    CALCIUM 8.5 10/21/2022 0537    CALCIUM 8.3 (L) 10/20/2022 0411    PHOS 7.4 (H) 10/11/2022 0448            Component Value Date/Time    WBC 21.1 (H) 10/21/2022 0537    WBC 9.9 10/15/2022 0355    HGB 7.6 (L) 10/21/2022 0537    HGB 8.8 (L) 10/15/2022 0355    HCT 23.5 (L) 10/21/2022 0537    HCT 27.1 (L) 10/15/2022 0355    HCT 28 (L) 09/23/2022 0304    HCT 30.0 (L) 02/15/2021 1751     10/21/2022 0537     (H) 10/15/2022 0355         Assessment / Plan:      1. Postoperative TAY requiring hemodialysis initiation on 9/25 via temp HD catheter placed per Dr. Chopra  2. Abdominal aortic dissection s/p TEVAR   3. Post operative abdominal compartment syndrome s/p ex lap on 9/27 with closure on 10/1  4. Hyperkalemia. Losartan dc.   5. Hypertension  6. Type 2 diabetes mellitus  7. Serositis and SLE - rheumatology following  8. Hyponatremia persistent.      Recommendations  Continue dialysis on MWF schedule  The catheter was deferred again today for critical INR.  Insert informed patient that we will hold dialysis on Monday until after labs can be reviewed.  He is still in the recovery phase of TAY.   working on [facility]. Dialysis coordinator consulted and will need to have patient placed at outpatient dialysis unit prior to dc.

## 2022-10-21 NOTE — PROGRESS NOTES
Inpatient Nutrition Assessment    Admit Date: 9/23/2022   Total duration of encounter: 28 days     Nutrition Recommendation/Prescription     Continue Renal HS / Diabetic diet as medically feasible / tolerated   Continue with Boost GC two per meal (provides 250 kcal, 14 g protein per serving) TID for added protein and kcal.  Consider adding appetite stimulant per MD if po intake does not improve.     Communication of Recommendations: reviewed with nurse    Nutrition Assessment     Malnutrition Assessment/Nutrition-Focused Physical Exam    Malnutrition in the context of acute illness or injury  Degree of Malnutrition: severe malnutrition  Energy Intake: < 75% of estimated energy requirement for > 7 days  Interpretation of Weight Loss: unable to obtain  Body Fat:moderate depletion  Area of Body Fat Loss: orbital region  and upper arm region - triceps / biceps  Muscle Mass Loss: moderate depletion  Area of Muscle Mass Loss: temple region - temporalis muscle, clavicle bone region - pectoralis major, deltoid, trapezius muscles, clavicle and acromion bone region - deltoid muscle, and scapular bone region - trapezius, supraspinus, infraspinus muscles  Fluid Accumulation: does not meet criteria  Edema: does not meet criteria   Reduced  Strength: unable to obtain  A minimum of two characteristics is recommended for diagnosis of either severe or non-severe malnutrition.    Chart Review    Reason Seen: continuous nutrition monitoring and follow-up    Diagnosis:  Fever and Leukocytosis  Abdominal aortic dissection, s/p repair  Abdominal compartment syndrome, s/p ex-lap x2 last on 9/27 with abdominal closure on 10/1  SLE with active flare  History of transverse myelitis  History of lupus nephritis  TAY now on HD  VHD, s/p mechanical AVR  Chest tube in place  DM2    Relevant Medical History: HTN, lupus    Nutrition-Related Medications: detemir 10 Units BID, docusate, miralax  Calorie Containing IV Medications: no significant  kcals from medications at this time    Nutrition-Related Labs:  9/26 BUN 27, Crea 3.06, Glu 174, Phos 6.2  9/30 Na 128, K 3.3, Cl 96, BUN 21.5, Crea 3.28, Glu 205  10/4 Na 127, Cl 95, BUN 57.3, Crea 5.16, Glu 152, Phos 5  10/7 K 5.6, BUN 55.2, Crea 4.57, Glu 347, Phos 8  10/11: Na 126, K 6.1, Cl 91, BUN 49, Crea 3.18, GFR 25, Glu 139, Ca 7.9, Phos 7.4  10/14: Na 125, K 5.4, Cl 91, BUN 56.1, Crea 2.76, GFR 30, Ca 8.1  10/18: Na 128, Cl 92, Ca 7.9, BUN 50.9, Cr 2.32   10/21: Na 127, Cl 93, BUN 68.1, Crea 2.35, GFR 37, Glu 335    Diet/PN Order: DIET RENAL ON DIALYSIS  Oral Supplement Order: Boost Glucose Control  Tube Feeding Order: none at this time  Appetite/Oral Intake: fair/50-75% of meals  Factors Affecting Nutritional Intake: none identified  Food/Jain/Cultural Preferences: none reported    Skin Integrity: incision  Wound(s):   incision noted    Comments    9/26/22: Discussed with RN. Will provide tube feeding recommendations for when appropriate to start tube feeding. Receiving kcal from meds. Noted Phos, will monitor for need for renal formula.   9/30/22: Discussed with RN. Need to consider TPN since pt now LOS day 7. If starting tube feeding, recs provided. If able to extubate, advance diet when appropriate.   10/4/22: Pt previously eating 100% po intake of meals, good appetite. Currently NPO for procedure, plans to restart diet per RN. Will add ONS for added protein and kcal.  10/7/22: Pt now with decreased appetite/po intake. Noted elevated K and Phos, likely not diet related since pt with poor po intake of full liquid diet.   10/11: pt sleeping, nurse reports tolerating full liquid diet, some abdominal distention noted, reports unsure of plans to advance diet as this time  10/14: pt advanced to soft diet today; ate about 40% of lunch tray, drinking Boost, would like 2 per meal in vanilla flavor  10/18: Pt advanced to regular texture renal/diabetic diet, he reports that his appetite is fair, drinks  "boost, feels he is chewing/swallowing well, does reports some abdominal pain 30-90 minutes after eating, feels better after several hours of no eating.   10/21: appetite remains the same, drinking boost    Anthropometrics    Height: 5' 5" (165.1 cm) Height Method: Estimated  Last Weight: 56.6 kg (124 lb 12.5 oz) (10/15/22 0500) Weight Method: Bed Scale  BMI (Calculated): 20.8  BMI Classification: normal (BMI 18.5-24.9)        Ideal Body Weight (IBW), Male: 136 lb     % Ideal Body Weight, Male (lb): 97.26 %                          Usual Weight Provided By: unable to obtain usual weight at this time    Wt Readings from Last 5 Encounters:   10/15/22 56.6 kg (124 lb 12.5 oz)   09/23/22 60 kg (132 lb 4.4 oz)   12/09/21 48 kg (105 lb 13.1 oz)     Weight Change(s) Since Admission:  Admit Weight: 60 kg (132 lb 4.4 oz) (09/23/22 1219)  10/4/22: no new wt  10/7/22: no new wt  10/12/22: 65kg; weight gain noted  10/15/22: 56.6kg; fluctuating weights possible due to fluid    Estimated Needs    Weight Used For Calorie Calculations: 60 kg (132 lb 4.4 oz)  Energy Calorie Requirements (kcal): 2083kcal (1.4 stress factor)  Energy Need Method: [place]-St Jeor  Weight Used For Protein Calculations: 60 kg (132 lb 4.4 oz)  Protein Requirements: 72-84gm (1.2-1.4g/kg)  Fluid Requirements (mL): 1000ml + urinary output  Temp: 98.7 °F (37.1 °C)    Enteral Nutrition    Patient not receiving enteral nutrition at this time.    Parenteral Nutrition    Patient not receiving parenteral nutrition support at this time.    Evaluation of Received Nutrient Intake    Calories: not meeting estimated needs  Protein: not meeting estimated needs    Patient Education    Not applicable.    Nutrition Diagnosis     PES: Inadequate oral intake related to current condition as evidenced by <75% intake of meals. (continues)    Interventions/Goals     Intervention(s): general/healthful diet, commercial beverage, and collaboration with other providers  Goal: Meet " greater than 75% of nutritional needs by follow-up. (goal progressing)    Monitoring & Evaluation     Dietitian will monitor energy intake.  Nutrition Risk/Follow-Up: high (follow-up in 1-4 days)

## 2022-10-21 NOTE — SUBJECTIVE & OBJECTIVE
Interval History:     Current Facility-Administered Medications   Medication Frequency    0.9%  NaCl infusion (for blood administration) Q24H PRN    0.9%  NaCl infusion (for blood administration) Q24H PRN    acetaminophen tablet 650 mg Q6H PRN    albuterol inhaler 2 puff Q4H PRN    aluminum-magnesium hydroxide-simethicone 200-200-20 mg/5 mL suspension 30 mL QID (AC & HS)    belimumab Syrg 200 mg Weekly    bisacodyL suppository 10 mg Daily PRN    carvediloL tablet 25 mg BID    dextrose 10 % infusion PRN    dextrose 10 % infusion PRN    dextrose 10% bolus 125 mL PRN    docusate sodium capsule 100 mg BID    famotidine tablet 20 mg Every other day    glimepiride tablet 4 mg Daily with breakfast    glucagon (human recombinant) injection 1 mg PRN    glucose chewable tablet 16 g PRN    glucose chewable tablet 24 g PRN    hydrALAZINE injection 20 mg Q4H PRN    hydrALAZINE tablet 50 mg Q8H    HYDROcodone-acetaminophen 5-325 mg per tablet 1 tablet Q4H PRN    HYDROmorphone (PF) injection 1 mg Q4H PRN    hydrOXYchloroQUINE tablet 200 mg BID    insulin aspart U-100 injection 1-10 Units QID (AC + HS) PRN    insulin detemir U-100 injection 18 Units Daily    labetaloL injection 20 mg Q8H PRN    LIDOcaine (PF) 10 mg/ml (1%) injection PRN    lorazepam injection 1 mg Q2H PRN    melatonin tablet 6 mg Nightly PRN    methylPREDNISolone sodium succinate injection 40 mg TID    morphine 12 hr tablet 30 mg Q12H    morphine injection 2 mg Q2H PRN    mycophenolate capsule 1,000 mg BID    NIFEdipine 24 hr tablet 90 mg Daily    nitroGLYCERIN SL tablet 0.4 mg Q5 Min PRN    ondansetron injection 4 mg Q6H PRN    pantoprazole EC tablet 40 mg BID AC    polyethylene glycol packet 17 g BID PRN    sevelamer carbonate tablet 800 mg TID WM    sodium chloride 0.9% bolus 250 mL PRN    sodium chloride 0.9% bolus 250 mL PRN    sodium chloride 0.9% bolus 250 mL PRN    sodium chloride 0.9% flush 10 mL PRN    sodium zirconium cyclosilicate packet 10 g Daily     sucralfate tablet 1 g BID     Objective:     Vital Signs (Most Recent):  Temp: 98.2 °F (36.8 °C) (10/21/22 0750)  Pulse: 82 (10/21/22 0750)  Resp: 15 (10/21/22 0938)  BP: (!) 129/58 (10/21/22 0750)  SpO2: 96 % (10/21/22 0750)  O2 Device (Oxygen Therapy): room air (10/18/22 2000)   Vital Signs (24h Range):  Temp:  [91 °F (32.8 °C)-98.8 °F (37.1 °C)] 98.2 °F (36.8 °C)  Pulse:  [55-84] 82  Resp:  [15-22] 15  SpO2:  [96 %-98 %] 96 %  BP: (118-169)/(58-82) 129/58     Weight: 56.6 kg (124 lb 12.5 oz) (10/15/22 0500)  Body mass index is 20.76 kg/m².  Body surface area is 1.61 meters squared.      Intake/Output Summary (Last 24 hours) at 10/21/2022 1115  Last data filed at 10/20/2022 1427  Gross per 24 hour   Intake 240 ml   Output --   Net 240 ml       Review of Systems   Constitutional:  Positive for fatigue.   HENT: Negative.     Eyes: Negative.    Cardiovascular: Negative.    Gastrointestinal: Negative.    Genitourinary:         The patient is on hemodialysis   Neurological:         The patient had weakness of his lower extremities but is not as bad as it was documented in his previous rheumatologist's notes .    Physical Exam   Constitutional: He is oriented to person, place, and time. He appears well-developed and well-nourished. No distress.   HENT:   Head: Normocephalic and atraumatic.   Right Ear: External ear normal.   Left Ear: External ear normal.   Eyes: Pupils are equal, round, and reactive to light.   Cardiovascular: Normal rate, regular rhythm and normal heart sounds.   Abdominal: Soft. There is no abdominal tenderness.   Musculoskeletal:      Right shoulder: Normal.      Left shoulder: Normal.      Right elbow: Normal.      Left elbow: Normal.      Right wrist: Normal.      Left wrist: Normal.      Cervical back: Neck supple.      Right hip: Normal.      Left hip: Normal.      Right knee: Normal.      Left knee: Normal.   Lymphadenopathy:     He has no cervical adenopathy.   Neurological: He is alert and  oriented to person, place, and time. He displays normal reflexes. No cranial nerve deficit or sensory deficit. He exhibits normal muscle tone. Coordination normal.   Skin: No rash noted. No erythema.   Vitals reviewed.      Right Side Rheumatological Exam     Examination finds the shoulder, elbow, wrist, knee, 1st PIP, 1st MCP, 2nd PIP, 2nd MCP, 3rd PIP, 3rd MCP, 4th PIP, 4th MCP, 5th PIP, 5th MCP, temporomandibular, hip, ankle, 1st MTP, 2nd MTP, 3rd MTP, 4th MTP and 5th MTP normal.    Left Side Rheumatological Exam     Examination finds the shoulder, elbow, wrist, knee, 1st PIP, 1st MCP, 2nd PIP, 2nd MCP, 3rd PIP, 3rd MCP, 4th PIP, 4th MCP, 5th PIP, 5th MCP, temporomandibular, hip, ankle, 1st MTP, 2nd MTP, 3rd MTP, 4th MTP and 5th MTP normal.           Significant Labs:  All pertinent lab results from the last 24 hours have been reviewed.    Significant Imaging:  Imaging results within the past 24 hours have been reviewed.

## 2022-10-21 NOTE — PT/OT/SLP PROGRESS
Physical Therapy Treatment    Patient Name:  Stuart Guevara   MRN:  [id number]    Recommendations:     Discharge Recommendations:  rehabilitation facility   Discharge Equipment Recommendations: walker, rolling   Barriers to discharge: None    Assessment:     Stuart Guevara is a 32 y.o. male admitted with a medical diagnosis of Aortic dissection.  He presents with the following impairments/functional limitations:  weakness, impaired endurance, impaired balance.    Pt due to have tunneled dialysis catheter placed today however MD arrived and reported pt's INR remains elevated and procedure will have to be rescheduled. Pt initially declined tx session, but was in better spirits at conclusion of session. Tolerated increased activity well. Mobilizing with RW and CGA.     Rehab Prognosis: Good; patient would benefit from acute skilled PT services to address these deficits and reach maximum level of function.    Recent Surgery: Procedure(s) (LRB):  LAPAROTOMY, EXPLORATORY (N/A) 22 Days Post-Op    Plan:     During this hospitalization, patient to be seen 6 x/week to address the identified rehab impairments via gait training, therapeutic activities, therapeutic exercises and progress toward the following goals:    Plan of Care Expires:  11/02/22    Subjective     Chief Complaint: fatigue and abdominal pain  Patient/Family Comments/goals: to get stronger  Pain/Comfort:  Pain Rating 1: other (see comments) (Reported L abdominal/flank pain but did not rate pain)  Pain Addressed 1: Reposition      Objective:     Communicated with RN prior to session.  Patient found HOB elevated with blood pressure cuff, pulse ox (continuous), telemetry upon PTA entry to room.     General Precautions: Standard, fall   Orthopedic Precautions:N/A   Braces: N/A  Respiratory Status: Room air     Functional Mobility:  Bed Mobility:   Supine to sit; min assist  Sit to supine; mod assist for LE progression  Transfers:    Sit<->stand with min assist on  first trial progressing to SBA following cues for hand placement  Gait: 80'x2 trials with RW, CGA, chair following. Pt demonstrated slowed sissy and foot flat at IC. No LOB or unsteadiness observed. Limited by fatigue.      Patient left HOB elevated with all lines intact and call button in reach..    GOALS:   Multidisciplinary Problems       Physical Therapy Goals          Problem: Physical Therapy    Goal Priority Disciplines Outcome Goal Variances Interventions   Physical Therapy Goal     PT, PT/OT Ongoing, Progressing     Description: Goals to be met by: 22     Patient will increase functional independence with mobility by performin. Supine to sit with brandon  2. Sit to supine with brandon  3. Sit <> stand with CGA using RW/LRAD  4. Bed to chair with CGA via squat pivot/stand pivot  5.Ambulate 200 ft with rolling walker and contact guard assistance                           Time Tracking:     PT Received On: 10/21/22  PT Start Time: 857     PT Stop Time: 930  PT Total Time (min): 33 min     Billable Minutes: Gait Training 2 units    Treatment Type: Treatment  PT/PTA: PTA     PTA Visit Number: 2     10/21/2022

## 2022-10-21 NOTE — PLAN OF CARE
Interventional Nephrology Plan of Care Note    INR remains elevated today. Will cancel procedure as it would be unsafe to proceed with an INR outside therapeutic range. We have rescheduled the procedure for Monday @ 0830 in the cath lab. He will have to be NPO before the procedure.    This plan was reviewed with RN and patient.    Thank you for allowing me the opportunity in taking care of this patient. Please reach me with any questions.     Yogesh Melton DO  Interventional Nephrology  [facility]  Cell: [phone]  Office: [phone]

## 2022-10-21 NOTE — PLAN OF CARE
Problem: Occupational Therapy  Goal: Occupational Therapy Goal  Description: Goals to be met by: 11/4/2022  Goals Met and Upgraded/New goals added 10/21/2022    Patient will increase functional independence with ADLs by performing:      UE dressing with Modified [place]. - new goal  LB Dressing with Minimal Assistance and dressing AEDs. - continue/progressing  Grooming while standing at sink with Stand-by Assistance - new goal  Toileting from toilet with Modified [place] for hygiene and clothing management. - new goal  Bathing from  shower chair/bench with Modified [place]. - new goal  Toilet transfer to toilet with Modified [place]. - new goal    UE Dressing with Stand-by Assistance. - goal met  Grooming while EOB with Stand-by Assistance. - goal met  Sitting at edge of bed x10 minutes with Stand-by Assistance. - goal met    10/21/2022 1710 by Willa Garcia, OT  Outcome: Ongoing, Progressing  10/21/2022 1709 by Willa Garcia OT  Outcome: Ongoing, Progressing

## 2022-10-21 NOTE — ASSESSMENT & PLAN NOTE
Secondary to systemic lupus,Continue CellCept, Solu-Medrol, Hydroxuchloroquine, and Benlysta. First dose of Benlysta was given 10/17/22.

## 2022-10-21 NOTE — ASSESSMENT & PLAN NOTE
On Solu-Medrol., CellCept 1 g p.o. b.i.d., Plaquenil 200 mg p.o. b.i.d..  Continue Benlysta 200 mg subcutaneously once a week. First dose of Benlysta was 10/17/22. Plan is to give a Benlysta sample for the next dose on Monday and staff will arrange two Benlysta from pharmacy for him to take at discharge for rehab. Of note, plan is for patient to have a procedure in cath lab on Monday for tunneled dialysis catheter placement.

## 2022-10-21 NOTE — PROGRESS NOTES
[facility]  Hospital Medicine Progress Note        Chief Complaint: Inpatient Follow-up for Aortic disscetion     HPI:   Stuart Guevara is a 32 y.o. male who has PMH which includes HTN, SLE,  hepatitis B, aortic dissection with repair 2/2021, RUE DVT on Xarelto; presented to the ED at [facility] on 9/23/2022  with reports of  shortness of breath and chest pain radiating to his back.  PT has a history of aortic dissection 2/2021. Work up revealed a type B aortic dissection and significant hypertension noted on presentation.  He failed medical management and had progression of his symptoms despite adequate control of hemodynamic parameters.  PT was transferred from [facility] to [facility] for CV surgery evaluation which he was taken  to the OR and had TEVAR with left carotid to subclavian artery bypass. PT developed abdominal distension and rigidity of his abdomen and developed a lactic acidosis.  He repeat CT angiography of the chest/abdomen/pelvis without evidence of worsening dissection, but evidence of significant intra-abdominal pathology with some hemoperitoneum, free blood in the abdomen.  He underwent exploratory laparotomy with evidence of viable bowel and no significant bleeding identified, abd was closed with flakito drain left in situ chest tube was placed. Postoperatively, he developed severe unstable bradycardia, and a transvenous pacemaker was placed by Cardiology.  Shortly afterwards, he was noted to have significant worsening of his renal failure and critical hyperkalemia. Renal services consulted which catheter was placed and he was started on HD treatments.  PT has been cleared for transfer out of ICU to the floor. Hospital medicine services consulted for assumption of care.     Patient is s/p AAA surgery repair , course c/by abd compartment syndrome s/p ex lap and closure with flakito drain in situ, sepsis syndrome with fever and worsening leucocytosis , Appreciate ID input antibiotics  broadened to vancomycin Flagyl and cefepime, concern for SLE serositis , rheum consulted started on high dose steori and immunosuppressants.      Interval Hx:   INR 6.7 today.  No signs of bleeding.  Tunneled dialysis catheter on hold.  Sugars back up today.      Objective/physical exam:  General: In no acute distress, afebrile  Respiratory: Clear to auscultation bilaterally  Cardiovascular: S1, S2, no appreciable murmur  Abdomen: Soft, nontender, BS +, dressing,   MSK: Warm, no lower extremity edema, no clubbing or cyanosis  Neurologic: Alert and oriented x4,         Assessment/Plan:   Supratherapeutic INR  Hyperkalemia, resolved   SLE flare and serositis   Acute respiratory failure with hypoxia- s/p mechanical ventilation, now extubated  Acute aortic dissection type B- s/p TEVAR with left carotid artery to left subclavian bypass 9/23/2022  Acute kidney injury now requiring hemodialysis since September 25th by a temporary hemodialysis catheter  New onset Cardiomyopathy- EF 37% unknown etiology  VHD, s/p mechanical AVR, on Coumadin  Acute abdominal pain generalized  Abdominal compartment syndrome, s/p ex-lap x2 last on 9/27 with abdominal closure on 10/1  Acute pancreatitis- resolving with lipase trending down  RUE DVT- was on Xarelto, recent US no DVT  HTN- emergency, resolved   Anemia- chronic disease  Weakness  HLD  Hx of hepatitis B  Hx of aortic dissection with repair 2/2021  History of transverse myelitis  History of lupus nephritis        Plan  Continue to hold Coumadin, will allow INR to drift downward over the weekend.  In the absence of bleeding would not recommend Vit K due to mechanical valve  Can plan for tunneled cath once INR trend down appropriately, hopefully by Mon  Resume long-acting insulin at lower dose, continue to monitor sugars, Continue with D10 on as needed basis.   Continue current immunosuppressive regimen at direction of Rheum  Placement on holding pending placement of tunneled HD  catheter  Otherwise continue current management        Patient condition:  Stable     Anticipated discharge and Disposition: rehab when cleared by nephro and rheum         All diagnosis and differential diagnosis have been reviewed; assessment and plan has been documented; I have personally reviewed the labs and test results that are presently available; I have reviewed the patients medication list; I have reviewed the consulting providers response and recommendations. I have reviewed or attempted to review medical records based upon their availability     All of the patient's questions have been  addressed and answered. Patient's is agreeable to the above stated plan. I will continue to monitor closely and make adjustments to medical management as needed.        VITAL SIGNS: 24 HRS MIN & MAX LAST   Temp  Min: 94.6 °F (34.8 °C)  Max: 98.8 °F (37.1 °C) 98.7 °F (37.1 °C)   BP  Min: 118/70  Max: 169/78 (!) 148/85     Pulse  Min: 78  Max: 84  82   Resp  Min: 15  Max: 22 17   SpO2  Min: 96 %  Max: 98 % 96 %       Recent Labs   Lab 10/15/22  0355 10/21/22  0537   WBC 9.9 21.1*   RBC 3.11* 2.72*   HGB 8.8* 7.6*   HCT 27.1* 23.5*   MCV 87.1 86.4   MCH 28.3 27.9   MCHC 32.5* 32.3*   RDW 15.9 15.9   * 330   MPV 9.5 10.1         Recent Labs   Lab 10/18/22  0422 10/20/22  0411 10/21/22  0537   * 130* 127*   K 4.6 4.3 4.8   CO2 24 24 22   BUN 50.9* 51.8* 68.1*   CREATININE 2.32* 2.11* 2.35*   CALCIUM 7.9* 8.3* 8.5   ALBUMIN  --   --  1.6*   ALKPHOS  --   --  87   ALT  --   --  10   AST  --   --  14   BILITOT  --   --  0.3            Microbiology Results (last 7 days)       ** No results found for the last 168 hours. **             See below for Radiology    Scheduled Med:   aluminum-magnesium hydroxide-simethicone  30 mL Oral QID (AC & HS)    belimumab  200 mg Subcutaneous Weekly    carvediloL  25 mg Oral BID    docusate sodium  100 mg Oral BID    famotidine  20 mg Oral Every other day    glimepiride  4 mg Oral  Daily with breakfast    hydrALAZINE  50 mg Oral Q8H    hydrOXYchloroQUINE  200 mg Oral BID    insulin detemir U-100  18 Units Subcutaneous Daily    methylPREDNISolone sodium succinate injection  40 mg Intravenous TID    morphine  30 mg Oral Q12H    mycophenolate  1,000 mg Oral BID    NIFEdipine  90 mg Oral Daily    pantoprazole  40 mg Oral BID AC    sevelamer carbonate  800 mg Oral TID WM    sodium zirconium cyclosilicate  10 g Oral Daily    sucralfate  1 g Oral BID        Continuous Infusions:  None    PRN Meds:  sodium chloride, sodium chloride, acetaminophen, albuterol, bisacodyL, dextrose 10 % in water (D10W), dextrose 10 % in water (D10W), dextrose 10%, glucagon (human recombinant), glucose, glucose, hydrALAZINE, HYDROcodone-acetaminophen, HYDROmorphone, insulin aspart U-100, labetaloL, LIDOcaine (PF) 10 mg/ml (1%), lorazepam, melatonin, morphine, nitroGLYCERIN, ondansetron, polyethylene glycol, sodium chloride 0.9%, sodium chloride 0.9%, sodium chloride 0.9%, sodium chloride 0.9%       VTE prophylaxis: On Warfarin    Patient condition:  Stable/Fair/Guarded/ Serious/ Critical    Anticipated discharge and Disposition:         All diagnosis and differential diagnosis have been reviewed; assessment and plan has been documented; I have personally reviewed the labs and test results that are presently available; I have reviewed the patients medication list; I have reviewed the consulting providers response and recommendations. I have reviewed or attempted to review medical records based upon their availability    All of the patient's questions have been  addressed and answered. Patient's is agreeable to the above stated plan. I will continue to monitor closely and make adjustments to medical management as needed.  _____________________________________________________________________    Nutrition Status:    Radiology:  X-Ray Chest 1 View  Narrative: EXAMINATION:  XR CHEST 1 VIEW    CPT 06428    CLINICAL HISTORY:  TAY,  SOB;    COMPARISON:  October 5, 2022    FINDINGS:  Examination reveals cardiomediastinal silhouette to be unchanged as compared with the previous exam.    Endo stent is identified in the descending aorta central line is seen with the tip at the junction of the superior vena cava and right atrium.    There is persistent increased left retrocardiac density and silhouetting of the left hemidiaphragm which might be related to an infiltrate/atelectasis    No focal consolidative changes otherwise identified no other significant change  Impression: Persistent increased left retrocardiac density and silhouetting of the left hemidiaphragm.    No other focal consolidative changes.    No other change    Electronically signed by: Ramón Ying  Date:    10/12/2022  Time:    08:15      Manjinder Carrillo MD   10/21/2022             Critical Care Time: 37 minutes spent in direct hands on care, review of labs, imaging and medical record, and discussion of diagnosis, treatment, prognosis with patient/family.  Patient remains at high-risk for clinical decompensation  Critical Care diagnosis: Hypoglycemia, hypothermia

## 2022-10-21 NOTE — NURSING
10/21/22 1623   Trialysis (Dialysis) Catheter 10/04/22 1300 right internal jugular   Placement Date/Time: 10/04/22 1300   Present Prior to Hospital Arrival?: No  Hand Hygiene: Performed  Barrier Precautions: Performed  Skin Antisepsis: ChloraPrep  Location: right internal jugular  Insertion attempts (enter comment if more than 2 attem...   Line Necessity Review CRRT/HD   Verification by X-ray Yes   Site Assessment No drainage;No redness;No swelling;No warmth   Line Securement Device Secured with sutures   Dressing Type Biopatch in place;Central line dressing;Transparent (Tegaderm)   Dressing Status Clean;Dry;Intact   Dressing Intervention Integrity maintained   Date on Dressing 10/20/22   Dressing Due to be Changed 10/28/22   Venous Patency/Care normal saline locked   Arterial Patency/Care normal saline locked   Post-Hemodialysis Assessment   Blood Volume Processed (Liters) 62.7 L   Dialyzer Clearance Lightly streaked   Duration of Treatment 180 minutes   Total UF (mL) 0 mL   Patient Response to Treatment Dialyzed x 3 hours.  No fluid removed as per md order.  Tolerated well.  Vitals stable for duration of tx.   Post-Hemodialysis Comments Deaccessed per p and p.  Clearguard caps applied.

## 2022-10-21 NOTE — PLAN OF CARE
Problem: Adult Inpatient Plan of Care  Goal: Plan of Care Review  Outcome: Ongoing, Progressing  Goal: Patient-Specific Goal (Individualized)  Outcome: Ongoing, Progressing  Goal: Absence of Hospital-Acquired Illness or Injury  Outcome: Ongoing, Progressing  Goal: Optimal Comfort and Wellbeing  Outcome: Ongoing, Progressing  Goal: Readiness for Transition of Care  Outcome: Ongoing, Progressing     Problem: Infection  Goal: Absence of Infection Signs and Symptoms  Outcome: Ongoing, Progressing     Problem: Skin Injury Risk Increased  Goal: Skin Health and Integrity  Outcome: Ongoing, Progressing     Problem: Device-Related Complication Risk (Hemodialysis)  Goal: Safe, Effective Therapy Delivery  Outcome: Ongoing, Progressing     Problem: Hemodynamic Instability (Hemodialysis)  Goal: Effective Tissue Perfusion  Outcome: Ongoing, Progressing     Problem: Infection (Hemodialysis)  Goal: Absence of Infection Signs and Symptoms  Outcome: Ongoing, Progressing     Problem: Hyperglycemia  Goal: Blood Glucose Level Within Targeted Range  Outcome: Ongoing, Progressing

## 2022-10-21 NOTE — PROGRESS NOTES
[facility] - [facility] Medical Telemetry  Rheumatology  Progress Note    Patient Name: Stuart Guevara  MRN: [id number]  Admission Date: 9/23/2022  Hospital Length of Stay: 28 days  Code Status: Full Code   Attending Provider: Ronda Hamilton MD  Primary Care Physician: Primary Doctor No  Principal Problem: Aortic dissection    Subjective:     HPI:  Joint pain and tenderness continues to improve and have significanly improved since restarting Cellcelpt and hydroxychloroquine on 10/4/22. Reports overall feeling better today. He received his first dose of Benlysta on Monday (10/17/22). Current plan is for patient to have tunneled dialysis catheter placement on Monday.       Interval History:     Current Facility-Administered Medications   Medication Frequency    0.9%  NaCl infusion (for blood administration) Q24H PRN    0.9%  NaCl infusion (for blood administration) Q24H PRN    acetaminophen tablet 650 mg Q6H PRN    albuterol inhaler 2 puff Q4H PRN    aluminum-magnesium hydroxide-simethicone 200-200-20 mg/5 mL suspension 30 mL QID (AC & HS)    belimumab Syrg 200 mg Weekly    bisacodyL suppository 10 mg Daily PRN    carvediloL tablet 25 mg BID    dextrose 10 % infusion PRN    dextrose 10 % infusion PRN    dextrose 10% bolus 125 mL PRN    docusate sodium capsule 100 mg BID    famotidine tablet 20 mg Every other day    glimepiride tablet 4 mg Daily with breakfast    glucagon (human recombinant) injection 1 mg PRN    glucose chewable tablet 16 g PRN    glucose chewable tablet 24 g PRN    hydrALAZINE injection 20 mg Q4H PRN    hydrALAZINE tablet 50 mg Q8H    HYDROcodone-acetaminophen 5-325 mg per tablet 1 tablet Q4H PRN    HYDROmorphone (PF) injection 1 mg Q4H PRN    hydrOXYchloroQUINE tablet 200 mg BID    insulin aspart U-100 injection 1-10 Units QID (AC + HS) PRN    insulin detemir U-100 injection 18 Units Daily    labetaloL injection 20 mg Q8H PRN    LIDOcaine (PF) 10 mg/ml (1%)  injection PRN    lorazepam injection 1 mg Q2H PRN    melatonin tablet 6 mg Nightly PRN    methylPREDNISolone sodium succinate injection 40 mg TID    morphine 12 hr tablet 30 mg Q12H    morphine injection 2 mg Q2H PRN    mycophenolate capsule 1,000 mg BID    NIFEdipine 24 hr tablet 90 mg Daily    nitroGLYCERIN SL tablet 0.4 mg Q5 Min PRN    ondansetron injection 4 mg Q6H PRN    pantoprazole EC tablet 40 mg BID AC    polyethylene glycol packet 17 g BID PRN    sevelamer carbonate tablet 800 mg TID WM    sodium chloride 0.9% bolus 250 mL PRN    sodium chloride 0.9% bolus 250 mL PRN    sodium chloride 0.9% bolus 250 mL PRN    sodium chloride 0.9% flush 10 mL PRN    sodium zirconium cyclosilicate packet 10 g Daily    sucralfate tablet 1 g BID     Objective:     Vital Signs (Most Recent):  Temp: 98.2 °F (36.8 °C) (10/21/22 0750)  Pulse: 82 (10/21/22 0750)  Resp: 15 (10/21/22 0938)  BP: (!) 129/58 (10/21/22 0750)  SpO2: 96 % (10/21/22 0750)  O2 Device (Oxygen Therapy): room air (10/18/22 2000)   Vital Signs (24h Range):  Temp:  [91 °F (32.8 °C)-98.8 °F (37.1 °C)] 98.2 °F (36.8 °C)  Pulse:  [55-84] 82  Resp:  [15-22] 15  SpO2:  [96 %-98 %] 96 %  BP: (118-169)/(58-82) 129/58     Weight: 56.6 kg (124 lb 12.5 oz) (10/15/22 0500)  Body mass index is 20.76 kg/m².  Body surface area is 1.61 meters squared.      Intake/Output Summary (Last 24 hours) at 10/21/2022 1115  Last data filed at 10/20/2022 1427  Gross per 24 hour   Intake 240 ml   Output --   Net 240 ml       Review of Systems   Constitutional:  Positive for fatigue.   HENT: Negative.     Eyes: Negative.    Cardiovascular: Negative.    Gastrointestinal: Negative.    Genitourinary:         The patient is on hemodialysis   Neurological:         The patient had weakness of his lower extremities but is not as bad as it was documented in his previous rheumatologist's notes .    Physical Exam   Constitutional: He is oriented to person, place, and time. He  appears well-developed and well-nourished. No distress.   HENT:   Head: Normocephalic and atraumatic.   Right Ear: External ear normal.   Left Ear: External ear normal.   Eyes: Pupils are equal, round, and reactive to light.   Cardiovascular: Normal rate, regular rhythm and normal heart sounds.   Abdominal: Soft. There is no abdominal tenderness.   Musculoskeletal:      Right shoulder: Normal.      Left shoulder: Normal.      Right elbow: Normal.      Left elbow: Normal.      Right wrist: Normal.      Left wrist: Normal.      Cervical back: Neck supple.      Right hip: Normal.      Left hip: Normal.      Right knee: Normal.      Left knee: Normal.   Lymphadenopathy:     He has no cervical adenopathy.   Neurological: He is alert and oriented to person, place, and time. He displays normal reflexes. No cranial nerve deficit or sensory deficit. He exhibits normal muscle tone. Coordination normal.   Skin: No rash noted. No erythema.   Vitals reviewed.      Right Side Rheumatological Exam     Examination finds the shoulder, elbow, wrist, knee, 1st PIP, 1st MCP, 2nd PIP, 2nd MCP, 3rd PIP, 3rd MCP, 4th PIP, 4th MCP, 5th PIP, 5th MCP, temporomandibular, hip, ankle, 1st MTP, 2nd MTP, 3rd MTP, 4th MTP and 5th MTP normal.    Left Side Rheumatological Exam     Examination finds the shoulder, elbow, wrist, knee, 1st PIP, 1st MCP, 2nd PIP, 2nd MCP, 3rd PIP, 3rd MCP, 4th PIP, 4th MCP, 5th PIP, 5th MCP, temporomandibular, hip, ankle, 1st MTP, 2nd MTP, 3rd MTP, 4th MTP and 5th MTP normal.           Significant Labs:  All pertinent lab results from the last 24 hours have been reviewed.    Significant Imaging:  Imaging results within the past 24 hours have been reviewed.    Assessment/Plan:     * Aortic dissection  Keep blood pressure low to normal    Systemic lupus erythematosus  On Solu-Medrol., CellCept 1 g p.o. b.i.d., Plaquenil 200 mg p.o. b.i.d..  Continue Benlysta 200 mg subcutaneously once a week. First dose of Benlysta was  10/17/22. Plan is to give a Benlysta sample for the next dose on Monday and staff will arrange two Benlysta from pharmacy for him to take at discharge for rehab. Of note, plan is for patient to have a procedure in cath lab on Monday for tunneled dialysis catheter placement.     Serositis  Secondary to systemic lupus,Continue CellCept, Solu-Medrol, Hydroxuchloroquine, and Benlysta. First dose of Benlysta was given 10/17/22.    HTN (hypertension)  As per the medical team          DENIA Mg  Rheumatology  [facility] - [facility] Telemetry

## 2022-10-21 NOTE — PT/OT/SLP RE-EVAL
"Occupational Therapy   Re-evaluation    Name: Stuart Guevara  MRN: [id number]  Admitting Diagnosis:  Aortic dissection  Recent Surgery: Procedure(s) (LRB):  LAPAROTOMY, EXPLORATORY (N/A) 22 Days Post-Op    Recommendations:     Discharge Recommendations: rehabilitation facility  Discharge Equipment Recommendations:  walker, rolling  Barriers to discharge:  None    Assessment:     Stuatr Guevara is a 32 y.o. male with a medical diagnosis of Aortic dissection.  He presents with good progress towards goals.  Performance deficits affecting function are weakness, impaired endurance, impaired balance, impaired functional mobility, impaired self care skills.      Rehab Prognosis:  Good; patient would benefit from acute skilled OT services to address these deficits and reach maximum level of function.       Plan:     Patient to be seen 5 x/week, 6 x/week to address the above listed problems via self-care/home management, therapeutic activities, therapeutic exercises  Plan of Care Expires: 11/04/22  Plan of Care Reviewed with: patient    Subjective     Chief Complaint: R flank pain  Patient/Family stated goals: to take a shower and "to get myself stronger"  Communicated with: Nrsg prior to session.  Pain/Comfort:  Pain Rating 1:  (L abdominal flank pain; no number given)  Location - Side 1: Left  Location 1:  (flank)  Pain Addressed 1: Reposition    Objective:     Communicated with: nrsg prior to session.  Patient found HOB elevated with: pulse ox (continuous), telemetry upon OT entry to room.    General Precautions: Standard, fall   Orthopedic Precautions:N/A   Braces: N/A  Respiratory Status: Room air    Occupational Performance:    Bed Mobility:    Patient completed Supine to Sit with stand by assistance  Patient completed Sit to Supine with minimum assistance    Functional Mobility/Transfers:  Patient completed multiple Sit <> Stand Transfers throughout session with contact guard assistance progressing to stand by " assistance with  rolling walker   Patient completed Bed <> Chair Transfer using Step Transfer technique with contact guard assistance with rolling walker  Patient completed  Shower Transfer Step Transfer technique with minimum assistance with rolling walker  Functional Mobility: Pt ambulated 80ft x2 with one rest break in between. .    Activities of Daily Living:  Upper Body Dressing: supervision to [facility] gown seated EOB  Lower Body Dressing: moderate assistance . Pt unable to make figure four with LLE. Trial adaptive dressing equipment    Cognitive/Visual Perceptual:  Cognitive/Psychosocial Skills:     -       Oriented to: Person, Place, Time, and Situation   -       Follows Commands/attention:Follows multistep  commands  Visual/Perceptual:      -Intact      Physical Exam:  Upper Extremity Range of Motion:     -       Right Upper Extremity: WFL  -       Left Upper Extremity: WFL  Upper Extremity Strength:    -       Right Upper Extremity: generally 4+/5  -       Left Upper Extremity: generally 4+/5      Treatment & Education:  OT discussed goals for therapy with patient. Pt expressing desire to take a shower. Shower transfer practiced. Pt is safe to shower with nursing staff, nursing notified. Pt educated on OT POC, verbalizes understanding.     Patient left HOB elevated with all lines intact and call button in reach    GOALS:   Multidisciplinary Problems       Occupational Therapy Goals          Problem: Occupational Therapy    Goal Priority Disciplines Outcome Interventions   Occupational Therapy Goal     OT, PT/OT Ongoing, Progressing    Description: Goals to be met by: 11/4/2022  Goals Met and Upgraded/New goals added 10/21/2022    Patient will increase functional independence with ADLs by performing:      UE dressing with Modified [place]. - new goal  LB Dressing with Minimal Assistance and dressing AEDs. - continue/progressing  Grooming while standing at sink with Stand-by Assistance - new  goal  Toileting from toilet with Modified [place] for hygiene and clothing management. - new goal  Bathing from  shower chair/bench with Modified [place]. - new goal  Toilet transfer to toilet with Modified [place]. - new goal    UE Dressing with Stand-by Assistance. - goal met  Grooming while EOB with Stand-by Assistance. - goal met  Sitting at edge of bed x10 minutes with Stand-by Assistance. - goal met                         History:     Past Medical History:   Diagnosis Date    Hypertension     Systemic lupus erythematosus, organ or system involvement unspecified     Systemic lupus erythematosus, organ or system involvement unspecified          Past Surgical History:   Procedure Laterality Date    APPLICATION OF WOUND VACUUM-ASSISTED CLOSURE DEVICE  9/27/2022    Procedure: APPLICATION, WOUND VAC;  Surgeon: Christopher Espinal MD;  Location: [facility];  Service: General;;    CARDIAC SURGERY      FINGER AMPUTATION      THROMBECTOMY Right     TOE AMPUTATION         Time Tracking:     OT Date of Treatment: 10/21/22  OT Start Time: 0857  OT Stop Time: 0931  OT Total Time (min): 34 min    Billable Minutes:Re-eval 25 min  Self Care/Home Management 9 min    10/21/2022

## 2022-10-22 NOTE — PLAN OF CARE
Problem: Adult Inpatient Plan of Care  Goal: Plan of Care Review  Outcome: Ongoing, Progressing  Goal: Patient-Specific Goal (Individualized)  Outcome: Ongoing, Progressing  Goal: Absence of Hospital-Acquired Illness or Injury  Outcome: Ongoing, Progressing  Goal: Optimal Comfort and Wellbeing  Outcome: Ongoing, Progressing  Goal: Readiness for Transition of Care  Outcome: Ongoing, Progressing     Problem: Infection  Goal: Absence of Infection Signs and Symptoms  Outcome: Ongoing, Progressing     Problem: Fall Injury Risk  Goal: Absence of Fall and Fall-Related Injury  Outcome: Ongoing, Progressing     Problem: Impaired Wound Healing  Goal: Optimal Wound Healing  Outcome: Ongoing, Progressing

## 2022-10-22 NOTE — PROGRESS NOTES
[facility]  Hospital Medicine Progress Note        Chief Complaint: Inpatient Follow-up for Aortic disscetion     HPI:   Stuart Guevara is a 32 y.o. male who has PMH which includes HTN, SLE,  hepatitis B, aortic dissection with repair 2/2021, RUE DVT on Xarelto; presented to the ED at [facility] on 9/23/2022  with reports of  shortness of breath and chest pain radiating to his back.  PT has a history of aortic dissection 2/2021. Work up revealed a type B aortic dissection and significant hypertension noted on presentation.  He failed medical management and had progression of his symptoms despite adequate control of hemodynamic parameters.  PT was transferred from [facility] to [facility] for CV surgery evaluation which he was taken  to the OR and had TEVAR with left carotid to subclavian artery bypass. PT developed abdominal distension and rigidity of his abdomen and developed a lactic acidosis.  He repeat CT angiography of the chest/abdomen/pelvis without evidence of worsening dissection, but evidence of significant intra-abdominal pathology with some hemoperitoneum, free blood in the abdomen.  He underwent exploratory laparotomy with evidence of viable bowel and no significant bleeding identified, abd was closed with flakito drain left in situ chest tube was placed. Postoperatively, he developed severe unstable bradycardia, and a transvenous pacemaker was placed by Cardiology.  Shortly afterwards, he was noted to have significant worsening of his renal failure and critical hyperkalemia. Renal services consulted which catheter was placed and he was started on HD treatments.  PT has been cleared for transfer out of ICU to the floor. Hospital medicine services consulted for assumption of care.     Patient is s/p AAA surgery repair , course c/by abd compartment syndrome s/p ex lap and closure with flakito drain in situ, sepsis syndrome with fever and worsening leucocytosis , Appreciate ID input antibiotics  broadened to vancomycin Flagyl and cefepime, concern for SLE serositis , rheum consulted started on high dose steori and immunosuppressants.      Interval Hx:   Patient complaining of left-sided back pain started suddenly after a with the bathroom.     check  chest x-ray  INR still elevated.  Continue to hold Coumadin   Glucose is elevated increase Levemir to 25 units at bedside   Symptomatic management of back pain, ice packs     Objective/physical exam:  General: In no acute distress, afebrile  Respiratory: Clear to auscultation bilaterally  Cardiovascular: S1, S2, no appreciable murmur  Abdomen: Soft, nontender, BS +, dressing,   MSK: Warm, no lower extremity edema, no clubbing or cyanosis  Neurologic: Alert and oriented x4,         Assessment/Plan:   Acute left sided back pain  Supratherapeutic INR  Hyperkalemia, resolved   SLE flare and serositis   Acute respiratory failure with hypoxia- s/p mechanical ventilation, now extubated  Acute aortic dissection type B- s/p TEVAR with left carotid artery to left subclavian bypass 9/23/2022  Acute kidney injury now requiring hemodialysis since September 25th by a temporary hemodialysis catheter  New onset Cardiomyopathy- EF 37% unknown etiology  VHD, s/p mechanical AVR, on Coumadin  Acute abdominal pain generalized  Abdominal compartment syndrome, s/p ex-lap x2 last on 9/27 with abdominal closure on 10/1  Acute pancreatitis- resolving with lipase trending down  RUE DVT- was on Xarelto, recent US no DVT  HTN- emergency, resolved   Anemia- chronic disease  Weakness  HLD  Hx of hepatitis B  Hx of aortic dissection with repair 2/2021  History of transverse myelitis  History of lupus nephritis        Plan  check  chest x-ray  Symptomatic management of back pain, ice packs  Continue to hold Coumadin, will allow INR to drift downward over the weekend.  In the absence of bleeding would not recommend Vit K due to mechanical valve  Can plan for tunneled cath once INR trend down  appropriately, hopefully by Mon  Resume long-acting insulin at lower dose, continue to monitor sugars, Continue with D10 on as needed basis.   Continue current immunosuppressive regimen at direction of Rheum  Placement on holding pending placement of tunneled HD catheter  Otherwise continue current management        Patient condition:  Stable     Anticipated discharge and Disposition: rehab when cleared by nephro and rheum     VITAL SIGNS: 24 HRS MIN & MAX LAST   Temp  Min: 98.3 °F (36.8 °C)  Max: 99 °F (37.2 °C) 98.6 °F (37 °C)   BP  Min: 145/78  Max: 162/85 (!) 154/91     Pulse  Min: 81  Max: 87  85   Resp  Min: 15  Max: 19 18   SpO2  Min: 95 %  Max: 98 % 96 %       Recent Labs   Lab 10/21/22  0537 10/22/22  0426   WBC 21.1* 19.8*   RBC 2.72* 2.81*   HGB 7.6* 8.0*   HCT 23.5* 24.5*   MCV 86.4 87.2   MCH 27.9 28.5   MCHC 32.3* 32.7*   RDW 15.9 15.8    313   MPV 10.1 10.4       Recent Labs   Lab 10/20/22  0411 10/21/22  0537 10/22/22  0426   * 127* 128*   K 4.3 4.8 4.1   CO2 24 22 23   BUN 51.8* 68.1* 37.9*   CREATININE 2.11* 2.35* 1.74*   CALCIUM 8.3* 8.5 8.2*   ALBUMIN  --  1.6*  --    ALKPHOS  --  87  --    ALT  --  10  --    AST  --  14  --    BILITOT  --  0.3  --           Microbiology Results (last 7 days)       ** No results found for the last 168 hours. **             See below for Radiology    Scheduled Med:   aluminum-magnesium hydroxide-simethicone  30 mL Oral QID (AC & HS)    belimumab  200 mg Subcutaneous Weekly    carvediloL  25 mg Oral BID    docusate sodium  100 mg Oral BID    famotidine  20 mg Oral Every other day    glimepiride  4 mg Oral Daily with breakfast    hydrALAZINE  50 mg Oral Q8H    hydrOXYchloroQUINE  200 mg Oral BID    [START ON 10/23/2022] insulin detemir U-100  25 Units Subcutaneous Daily    methylPREDNISolone sodium succinate injection  40 mg Intravenous TID    morphine  30 mg Oral Q12H    mycophenolate  1,000 mg Oral BID    NIFEdipine  90 mg Oral Daily    pantoprazole   40 mg Oral BID AC    sevelamer carbonate  800 mg Oral TID WM    sodium zirconium cyclosilicate  10 g Oral Daily    sucralfate  1 g Oral BID        Continuous Infusions:       PRN Meds:  sodium chloride, sodium chloride, acetaminophen, albuterol, bisacodyL, dextrose 10 % in water (D10W), dextrose 10 % in water (D10W), dextrose 10%, glucagon (human recombinant), glucose, glucose, hydrALAZINE, HYDROcodone-acetaminophen, HYDROmorphone, insulin aspart U-100, labetaloL, LIDOcaine (PF) 10 mg/ml (1%), lorazepam, melatonin, morphine, nitroGLYCERIN, ondansetron, polyethylene glycol, sodium chloride 0.9%, sodium chloride 0.9%, sodium chloride 0.9%, sodium chloride 0.9%           VTE prophylaxis:     Patient condition:  Stable/Fair/Guarded/ Serious/ Critical    Anticipated discharge and Disposition:         All diagnosis and differential diagnosis have been reviewed; assessment and plan has been documented; I have personally reviewed the labs and test results that are presently available; I have reviewed the patients medication list; I have reviewed the consulting providers response and recommendations. I have reviewed or attempted to review medical records based upon their availability    All of the patient's questions have been  addressed and answered. Patient's is agreeable to the above stated plan. I will continue to monitor closely and make adjustments to medical management as needed.  _____________________________________________________________________    Nutrition Status:    Radiology:  X-Ray Chest 1 View  Narrative: EXAMINATION:  XR CHEST 1 VIEW    CPT 79457    CLINICAL HISTORY:  TAY, SOB;    COMPARISON:  October 5, 2022    FINDINGS:  Examination reveals cardiomediastinal silhouette to be unchanged as compared with the previous exam.    Endo stent is identified in the descending aorta central line is seen with the tip at the junction of the superior vena cava and right atrium.    There is persistent increased left  retrocardiac density and silhouetting of the left hemidiaphragm which might be related to an infiltrate/atelectasis    No focal consolidative changes otherwise identified no other significant change  Impression: Persistent increased left retrocardiac density and silhouetting of the left hemidiaphragm.    No other focal consolidative changes.    No other change    Electronically signed by: Ramón Ying  Date:    10/12/2022  Time:    08:15      Ronda Hamilton MD   10/22/2022

## 2022-10-23 NOTE — PROGRESS NOTES
Nephrology consult follow up note    HPI:      Stuart Guevara is a 32 y.o. 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  The patient underwent exploratory lap for possibility of necrotic bowel and subsequently developed acute kidney injury with hyperkalemia and acidosis requiring initiation of dialysis.  He has now been started on a MWF schedule via right IJ temporary catheter.     Interval history:     10/16/22-hyperkalemic today. Given kayexalate per hospitalist. Alert and oriented. No N/V. No SOB.     10/17/22 currently tolerating dialysis.  Potassium stays high.  He is on 1 K potassium bath.  But will give him 2 K bath in the last 30 minutes of his dialysis.     10/18: No change overnight. Still requiring dialysis on MWF schedule. Remains with initial temporary catheter placed 9/25.  Abdominal pain at rest.     10/19: Patient on dialysis now. Plans for tunneled catheter today    10/20:  Tunnel catheter placement deferred yesterday secondary to patient not remaining NPO.  Today, INR was chronically elevated greater than 7. Now with bear darwingger for hypothermia after noted critical hypoglycemia.     10/21: Cr 2.3 today. He is on dialysis now tolerating well. INR remains critically elevated and procedure canceled again.     10/23: Cr 1.7. Urinating well. Pain to Left side, other wise no acute complaints.   Objective:       VITAL SIGNS: 24 HR MIN & MAX LAST    Temp  Min: 98 °F (36.7 °C)  Max: 98.9 °F (37.2 °C)  98.9 °F (37.2 °C)        BP  Min: 163/84  Max: 181/97  (!) 181/97     Pulse  Min: 78  Max: 91  79     Resp  Min: 16  Max: 20  18    SpO2  Min: 96 %  Max: 99 %  96 %      GEN: Awake and appropriate.  Chronically ill-appearing black male   HEENT: Atraumatic. EOMI, no icterus  NECK : No JVD, right IJ temporary catheter noted  CARD : RRR   LUNGS : Clear to  auscultation  ABD : Soft, non-tender, pitting edema lower back and sides of abdomen but symmetrical   EXT : No edema.   NEURO : No obvious focal deficits          Component Value Date/Time     (L) 10/23/2022 0521     (L) 10/22/2022 0426    K 4.4 10/23/2022 0521    K 4.1 10/22/2022 0426    CO2 21 (L) 10/23/2022 0521    CO2 23 10/22/2022 0426    BUN 47.6 (H) 10/23/2022 0521    BUN 37.9 (H) 10/22/2022 0426    CREATININE 1.78 (H) 10/23/2022 0521    CREATININE 1.74 (H) 10/22/2022 0426    CALCIUM 8.4 10/23/2022 0521    CALCIUM 8.2 (L) 10/22/2022 0426    PHOS 7.4 (H) 10/11/2022 0448            Component Value Date/Time    WBC 19.8 (H) 10/22/2022 0426    WBC 21.1 (H) 10/21/2022 0537    HGB 8.0 (L) 10/22/2022 0426    HGB 7.6 (L) 10/21/2022 0537    HCT 24.5 (L) 10/22/2022 0426    HCT 23.5 (L) 10/21/2022 0537    HCT 28 (L) 09/23/2022 0304    HCT 30.0 (L) 02/15/2021 1751     10/22/2022 0426     10/21/2022 0537         Assessment / Plan:      1. Postoperative TAY requiring hemodialysis initiation on 9/25 via temp HD catheter placed per Dr. Chopra  2. Abdominal aortic dissection s/p TEVAR   3. Post operative abdominal compartment syndrome s/p ex lap on 9/27 with closure on 10/1  4. Hyperkalemia. Losartan dc.   5. Hypertension  6. Type 2 diabetes mellitus  7. Serositis and SLE - rheumatology following  8. Hyponatremia persistent.      Recommendations  Hold dialysis tomorrow at this point. Likely showing renal recovery with possible no further need for dialysis or tunneled dialysis cath placement. Will follow closely. Discussed with patient

## 2022-10-23 NOTE — PLAN OF CARE
Interventional Nephrology Plan of Care Note    Labs reviewed. Nephrology service note reviewed.    Pt remains with elevated INR, which will unlikely resolve by tomorrow morning. More importantly, Scr appears to be stable over the past two days. His last HD treatment was on 10/21/22. There is question as to whether his kidney function is returning.     Nephrology service plans to monitor closely. They no longer desire tunneled catheter placement. Will cancel tomorrow's case with cath lab.    Will monitor peripherally. If the situation arises for placement of a tunneled dialysis catheter, please re-consult PRN.    Thank you for allowing me the opportunity in taking care of this patient. Please reach me with any questions.    Yogesh Melton,   Interventional Nephrology  [facility]  Cell: [phone]  Office: [phone]

## 2022-10-23 NOTE — PROGRESS NOTES
[facility]  Hospital Medicine Progress Note        Chief Complaint: Inpatient Follow-up for Aortic disscetion     HPI:   Stuart Guevara is a 32 y.o. male who has PMH which includes HTN, SLE,  hepatitis B, aortic dissection with repair 2/2021, RUE DVT on Xarelto; presented to the ED at [facility] on 9/23/2022  with reports of  shortness of breath and chest pain radiating to his back.  PT has a history of aortic dissection 2/2021. Work up revealed a type B aortic dissection and significant hypertension noted on presentation.  He failed medical management and had progression of his symptoms despite adequate control of hemodynamic parameters.  PT was transferred from [facility] to [facility] for CV surgery evaluation which he was taken  to the OR and had TEVAR with left carotid to subclavian artery bypass. PT developed abdominal distension and rigidity of his abdomen and developed a lactic acidosis.  He repeat CT angiography of the chest/abdomen/pelvis without evidence of worsening dissection, but evidence of significant intra-abdominal pathology with some hemoperitoneum, free blood in the abdomen.  He underwent exploratory laparotomy with evidence of viable bowel and no significant bleeding identified, abd was closed with flakito drain left in situ chest tube was placed. Postoperatively, he developed severe unstable bradycardia, and a transvenous pacemaker was placed by Cardiology.  Shortly afterwards, he was noted to have significant worsening of his renal failure and critical hyperkalemia. Renal services consulted which catheter was placed and he was started on HD treatments.  PT has been cleared for transfer out of ICU to the floor. Hospital medicine services consulted for assumption of care.     Patient is s/p AAA surgery repair , course c/by abd compartment syndrome s/p ex lap and closure with flakito drain in situ, sepsis syndrome with fever and worsening leucocytosis , Appreciate ID input antibiotics  broadened to vancomycin Flagyl and cefepime, concern for SLE serositis , rheum consulted started on high dose steori and immunosuppressants.      Interval Hx:   Patient is still complaining of pain on his sides continue with Symptomatic management of back pain, ice packs   Continue to hold Coumadin.  Follow up with INR levels this morning.    Blood pressure elevated adjust hydralazine to 100 mg t.i.d.     Objective/physical exam:  General: In no acute distress, afebrile  Respiratory: Clear to auscultation bilaterally  Cardiovascular: S1, S2, no appreciable murmur  Abdomen: Soft, nontender, BS +, dressing,   MSK: Warm, no lower extremity edema, no clubbing or cyanosis  Neurologic: Alert and oriented x4,         Assessment/Plan:   Htn urgency  Acute left sided back pain  Supratherapeutic INR  Hyperkalemia, resolved   SLE flare and serositis   Acute respiratory failure with hypoxia- s/p mechanical ventilation, now extubated  Acute aortic dissection type B- s/p TEVAR with left carotid artery to left subclavian bypass 9/23/2022  Acute kidney injury now requiring hemodialysis since September 25th by a temporary hemodialysis catheter  New onset Cardiomyopathy- EF 37% unknown etiology  VHD, s/p mechanical AVR, on Coumadin  Acute abdominal pain generalized  Abdominal compartment syndrome, s/p ex-lap x2 last on 9/27 with abdominal closure on 10/1  Acute pancreatitis- resolving with lipase trending down  RUE DVT- was on Xarelto, recent US no DVT  HTN- emergency, resolved   Anemia- chronic disease  Weakness  HLD  Hx of hepatitis B  Hx of aortic dissection with repair 2/2021  History of transverse myelitis  History of lupus nephritis        Plan  Adjust bp meds   Symptomatic management of back pain, ice packs  Continue to hold Coumadin, will allow INR to drift downward over the weekend.  In the absence of bleeding would not recommend Vit K due to mechanical valve  Can plan for tunneled cath once INR trend down appropriately,  hopefully by Mon  Resume long-acting insulin at lower dose, continue to monitor sugars, Continue with D10 on as needed basis.   Continue current immunosuppressive regimen at direction of Rheum  Placement on holding pending placement of tunneled HD catheter  Otherwise continue current management        Patient condition:  Stable     Anticipated discharge and Disposition: rehab when cleared by nephro and rheum   es    VITAL SIGNS: 24 HRS MIN & MAX LAST   Temp  Min: 98 °F (36.7 °C)  Max: 98.9 °F (37.2 °C) 98.9 °F (37.2 °C)   BP  Min: 163/84  Max: 181/97 (!) 181/97     Pulse  Min: 78  Max: 91  79   Resp  Min: 16  Max: 20 18   SpO2  Min: 96 %  Max: 99 % 96 %       Recent Labs   Lab 10/21/22  0537 10/22/22  0426   WBC 21.1* 19.8*   RBC 2.72* 2.81*   HGB 7.6* 8.0*   HCT 23.5* 24.5*   MCV 86.4 87.2   MCH 27.9 28.5   MCHC 32.3* 32.7*   RDW 15.9 15.8    313   MPV 10.1 10.4       Recent Labs   Lab 10/21/22  0537 10/22/22  0426 10/23/22  0521   * 128* 130*   K 4.8 4.1 4.4   CO2 22 23 21*   BUN 68.1* 37.9* 47.6*   CREATININE 2.35* 1.74* 1.78*   CALCIUM 8.5 8.2* 8.4   ALBUMIN 1.6*  --   --    ALKPHOS 87  --   --    ALT 10  --   --    AST 14  --   --    BILITOT 0.3  --   --           Microbiology Results (last 7 days)       ** No results found for the last 168 hours. **             See below for Radiology    Scheduled Med:   aluminum-magnesium hydroxide-simethicone  30 mL Oral QID (AC & HS)    belimumab  200 mg Subcutaneous Weekly    carvediloL  25 mg Oral BID    docusate sodium  100 mg Oral BID    glimepiride  4 mg Oral Daily with breakfast    hydrALAZINE  100 mg Oral Q8H    hydrOXYchloroQUINE  200 mg Oral BID    insulin detemir U-100  25 Units Subcutaneous Daily    methylPREDNISolone sodium succinate injection  40 mg Intravenous TID    morphine  30 mg Oral Q12H    mycophenolate  1,000 mg Oral BID    NIFEdipine  90 mg Oral Daily    pantoprazole  40 mg Oral BID AC    sevelamer carbonate  800 mg Oral TID WM    sodium  zirconium cyclosilicate  10 g Oral Daily        Continuous Infusions:       PRN Meds:  sodium chloride, sodium chloride, acetaminophen, albuterol, bisacodyL, dextrose 10 % in water (D10W), dextrose 10 % in water (D10W), dextrose 10%, glucagon (human recombinant), glucose, glucose, hydrALAZINE, HYDROcodone-acetaminophen, HYDROmorphone, insulin aspart U-100, labetaloL, LIDOcaine (PF) 10 mg/ml (1%), lorazepam, melatonin, nitroGLYCERIN, ondansetron, polyethylene glycol, sodium chloride 0.9%, sodium chloride 0.9%, sodium chloride 0.9%, sodium chloride 0.9%           VTE prophylaxis:     Patient condition:  Stable/Fair/Guarded/ Serious/ Critical    Anticipated discharge and Disposition:         All diagnosis and differential diagnosis have been reviewed; assessment and plan has been documented; I have personally reviewed the labs and test results that are presently available; I have reviewed the patients medication list; I have reviewed the consulting providers response and recommendations. I have reviewed or attempted to review medical records based upon their availability    All of the patient's questions have been  addressed and answered. Patient's is agreeable to the above stated plan. I will continue to monitor closely and make adjustments to medical management as needed.  _____________________________________________________________________    Nutrition Status:    Radiology:  X-Ray Chest 1 View  Narrative: EXAMINATION:  XR CHEST 1 VIEW    CLINICAL HISTORY:  leftposterior rib pain;    COMPARISON:  12 October 2022    FINDINGS:  Portable frontal view of the chest was obtained. Stable right-sided central line.  Heart is not significantly enlarged.  Aortic stent graft is again noted.  Similar hazy bibasilar opacities with layering effusions suspected.  No pneumothorax seen  Impression: No significant interval change.    Electronically signed by: Kapil Ayers  Date:    10/22/2022  Time:    15:13      Ronda Hamilton MD    10/23/2022

## 2022-10-24 NOTE — SUBJECTIVE & OBJECTIVE
Interval History: improving    Current Facility-Administered Medications   Medication Frequency    acetaminophen tablet 650 mg Q6H PRN    albuterol inhaler 2 puff Q4H PRN    aluminum-magnesium hydroxide-simethicone 200-200-20 mg/5 mL suspension 30 mL QID (AC & HS)    belimumab Syrg 200 mg Weekly    bisacodyL suppository 10 mg Daily PRN    carvediloL tablet 25 mg BID    cloNIDine tablet 0.1 mg BID    dextrose 10 % infusion PRN    dextrose 10 % infusion PRN    dextrose 10% bolus 125 mL PRN    docusate sodium capsule 100 mg BID    glimepiride tablet 4 mg Daily with breakfast    glucagon (human recombinant) injection 1 mg PRN    glucose chewable tablet 16 g PRN    glucose chewable tablet 24 g PRN    hydrALAZINE injection 20 mg Q4H PRN    hydrALAZINE tablet 100 mg Q8H    HYDROcodone-acetaminophen 5-325 mg per tablet 1 tablet Q4H PRN    HYDROmorphone (PF) injection 1 mg Q4H PRN    hydrOXYchloroQUINE tablet 200 mg BID    insulin aspart U-100 injection 1-10 Units QID (AC + HS) PRN    insulin detemir U-100 injection 25 Units Daily    labetaloL injection 20 mg Q8H PRN    LIDOcaine (PF) 10 mg/ml (1%) injection PRN    lorazepam injection 1 mg Q2H PRN    melatonin tablet 6 mg Nightly PRN    methylPREDNISolone sodium succinate injection 40 mg TID    morphine 12 hr tablet 30 mg Q12H    mycophenolate capsule 1,000 mg BID    NIFEdipine 24 hr tablet 90 mg Daily    nitroGLYCERIN SL tablet 0.4 mg Q5 Min PRN    ondansetron injection 4 mg Q6H PRN    pantoprazole EC tablet 40 mg BID AC    polyethylene glycol packet 17 g BID PRN    sevelamer carbonate tablet 800 mg TID WM    sodium bicarbonate tablet 1,300 mg Daily    sodium chloride 0.9% bolus 250 mL PRN    sodium chloride 0.9% bolus 250 mL PRN    sodium chloride 0.9% bolus 250 mL PRN    sodium chloride 0.9% flush 10 mL PRN    sodium zirconium cyclosilicate packet 10 g Daily     Objective:     Vital Signs (Most Recent):  Temp: 98 °F (36.7 °C) (10/24/22 0300)  Pulse: 92 (10/24/22  0751)  Resp: 18 (10/24/22 0921)  BP: (!) 181/103 (10/24/22 0751)  SpO2: 100 % (10/24/22 0751)  O2 Device (Oxygen Therapy): room air (10/18/22 2000)   Vital Signs (24h Range):  Temp:  [98 °F (36.7 °C)-98.9 °F (37.2 °C)] 98 °F (36.7 °C)  Pulse:  [79-97] 92  Resp:  [16-22] 18  SpO2:  [96 %-100 %] 100 %  BP: (119-181)/() 181/103     Weight: 56.6 kg (124 lb 12.5 oz) (10/22/22 0528)  Body mass index is 20.76 kg/m².  Body surface area is 1.61 meters squared.    No intake or output data in the 24 hours ending 10/24/22 1018    Review of Systems   Constitutional:  Positive for fatigue.   HENT: Negative.     Eyes: Negative.    Cardiovascular: Negative.    Gastrointestinal: Negative.    Genitourinary:         The patient is on hemodialysis  Neurological:         The patient had weakness of his lower extremities but is not as bad as it was documented in his previous rheumatologist's notes .    Physical Exam   Constitutional: He is oriented to person, place, and time. He appears well-developed and well-nourished. No distress.   HENT:   Head: Normocephalic and atraumatic.   Right Ear: External ear normal.   Left Ear: External ear normal.   Eyes: Pupils are equal, round, and reactive to light.   Cardiovascular: Normal rate, regular rhythm and normal heart sounds.   Abdominal: Soft. There is no abdominal tenderness.   Musculoskeletal:      Right shoulder: Normal.      Left shoulder: Normal.      Right elbow: Normal.      Left elbow: Normal.      Right wrist: Normal.      Left wrist: Normal.      Cervical back: Neck supple.      Right hip: Normal.      Left hip: Normal.      Right knee: Normal.      Left knee: Normal.   Lymphadenopathy:     He has no cervical adenopathy.   Neurological: He is alert and oriented to person, place, and time. He displays normal reflexes. No cranial nerve deficit or sensory deficit. He exhibits normal muscle tone. Coordination normal.   Skin: No rash noted. No erythema.   Vitals reviewed.      Right  Side Rheumatological Exam     Examination finds the shoulder, elbow, wrist, knee, 1st PIP, 1st MCP, 2nd PIP, 2nd MCP, 3rd PIP, 3rd MCP, 4th PIP, 4th MCP, 5th PIP, 5th MCP, temporomandibular, hip, ankle, 1st MTP, 2nd MTP, 3rd MTP, 4th MTP and 5th MTP normal.    Left Side Rheumatological Exam     Examination finds the shoulder, elbow, wrist, knee, 1st PIP, 1st MCP, 2nd PIP, 2nd MCP, 3rd PIP, 3rd MCP, 4th PIP, 4th MCP, 5th PIP, 5th MCP, temporomandibular, hip, ankle, 1st MTP, 2nd MTP, 3rd MTP, 4th MTP and 5th MTP normal.           Significant Labs:  All pertinent lab results from the last 24 hours have been reviewed.    Significant Imaging:  Imaging results within the past 24 hours have been reviewed.

## 2022-10-24 NOTE — ASSESSMENT & PLAN NOTE
On Solu-Medrol., CellCept 1 g p.o. b.i.d., Plaquenil 200 mg p.o. b.i.d..  Continue Benlysta 200 mg subcutaneously once a week. First dose of Benlysta was 10/17/22. Next dose is due today 10/24/22. Tunneled Dialysis Catheter placement cancelled d/t improvement of kidney function; therefore no need to hold Benylsta. Can give Benlysta today. Can get from pharmacy. Please let me know if you are unable to get Benlysta and I can provide a sample from our Rheumatology Clinic in [facility].

## 2022-10-24 NOTE — ASSESSMENT & PLAN NOTE
Secondary to systemic lupus,Continue CellCept, Solu-Medrol, Hydroxuchloroquine, and Benlysta. First dose of Benlysta was given 10/17/22, OK to give Benlysta today 10/24/22

## 2022-10-24 NOTE — PT/OT/SLP PROGRESS
Physical Therapy      Patient Name:  Stuart Guevara   MRN:  [id number]    Patient refused PT services today 2/2 severe abdominal pain. Nurse aware. PT to f/u tomorrow.

## 2022-10-24 NOTE — PROGRESS NOTES
Nephrology consult follow up note    HPI:      Stuart Guevara is a 32 y.o. 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  The patient underwent exploratory lap for possibility of necrotic bowel and subsequently developed acute kidney injury with hyperkalemia and acidosis requiring initiation of dialysis.  He has now been started on a MWF schedule via right IJ temporary catheter.     Interval history:     10/16/22-hyperkalemic today. Given kayexalate per hospitalist. Alert and oriented. No N/V. No SOB.     10/17/22 currently tolerating dialysis.  Potassium stays high.  He is on 1 K potassium bath.  But will give him 2 K bath in the last 30 minutes of his dialysis.     10/18: No change overnight. Still requiring dialysis on MWF schedule. Remains with initial temporary catheter placed 9/25.  Abdominal pain at rest.     10/19: Patient on dialysis now. Plans for tunneled catheter today    10/20:  Tunnel catheter placement deferred yesterday secondary to patient not remaining NPO.  Today, INR was chronically elevated greater than 7. Now with bear darwingger for hypothermia after noted critical hypoglycemia.     10/21: Cr 2.3 today. He is on dialysis now tolerating well. INR remains critically elevated and procedure canceled again.     10/23: Cr 1.7. Urinating well. Pain to Left side, other wise no acute complaints.     10/24: Cr stable again at 1.7.   Objective:       VITAL SIGNS: 24 HR MIN & MAX LAST    Temp  Min: 98 °F (36.7 °C)  Max: 98.9 °F (37.2 °C)  98 °F (36.7 °C)        BP  Min: 119/62  Max: 181/103  (!) 181/103     Pulse  Min: 79  Max: 97  92     Resp  Min: 16  Max: 22  18    SpO2  Min: 96 %  Max: 100 %  100 %      GEN: Awake and appropriate.  Chronically ill-appearing black male sitting in chair asleep easily awakens   HEENT: Atraumatic. EOMI, no icterus  NECK : No JVD,  right IJ temporary catheter noted  CARD : RRR   LUNGS : Clear to auscultation  ABD : Soft, non-tender, pitting edema lower back and sides of abdomen but symmetrical   EXT : No edema.   NEURO : No obvious focal deficits          Component Value Date/Time     (L) 10/24/2022 0546     (L) 10/23/2022 0521    K 4.3 10/24/2022 0546    K 4.4 10/23/2022 0521    CO2 24 10/24/2022 0546    CO2 21 (L) 10/23/2022 0521    BUN 61.5 (H) 10/24/2022 0546    BUN 47.6 (H) 10/23/2022 0521    CREATININE 1.71 (H) 10/24/2022 0546    CREATININE 1.78 (H) 10/23/2022 0521    CALCIUM 8.5 10/24/2022 0546    CALCIUM 8.4 10/23/2022 0521    PHOS 7.4 (H) 10/11/2022 0448            Component Value Date/Time    WBC 19.8 (H) 10/22/2022 0426    WBC 21.1 (H) 10/21/2022 0537    HGB 8.0 (L) 10/22/2022 0426    HGB 7.6 (L) 10/21/2022 0537    HCT 24.5 (L) 10/22/2022 0426    HCT 23.5 (L) 10/21/2022 0537    HCT 28 (L) 09/23/2022 0304    HCT 30.0 (L) 02/15/2021 1751     10/22/2022 0426     10/21/2022 0537         Assessment / Plan:      1. Postoperative TAY requiring hemodialysis initiation on 9/25 via temp HD catheter placed per Dr. Chopra  2. Abdominal aortic dissection s/p TEVAR   3. Post operative abdominal compartment syndrome s/p ex lap on 9/27 with closure on 10/1  4. Hyperkalemia. Losartan dc.   5. Hypertension  6. Type 2 diabetes mellitus  7. Serositis and SLE - rheumatology following  8. Hyponatremia persistent.      Recommendations  Cancel tunneled catheter placement  Hold dialysis  Check labs tomorrow.   Would like dialysis catheter to stay in place until no dialysis needed at least 5 days. (Last treatment Friday, 10/21)

## 2022-10-24 NOTE — PROGRESS NOTES
[facility] - [facility] Tele[facility]  Rheumatology  Progress Note    Patient Name: Stuart Guevara  MRN: [id number]  Admission Date: 9/23/2022  Hospital Length of Stay: 31 days  Code Status: Full Code   Attending Provider: Ronda Hamilton MD  Primary Care Physician: Primary Doctor No  Principal Problem: Aortic dissection    Subjective:     HPI:  Joint pain and tenderness continues to improve and have significanly improved since restarting Cellcelpt and hydroxychloroquine on 10/4/22. Reports overall feeling better today. He received his first dose of Benlysta on Monday (10/17/22). Tunneled catheter placement has been cancelled      Interval History: improving    Current Facility-Administered Medications   Medication Frequency    acetaminophen tablet 650 mg Q6H PRN    albuterol inhaler 2 puff Q4H PRN    aluminum-magnesium hydroxide-simethicone 200-200-20 mg/5 mL suspension 30 mL QID (AC & HS)    belimumab Syrg 200 mg Weekly    bisacodyL suppository 10 mg Daily PRN    carvediloL tablet 25 mg BID    cloNIDine tablet 0.1 mg BID    dextrose 10 % infusion PRN    dextrose 10 % infusion PRN    dextrose 10% bolus 125 mL PRN    docusate sodium capsule 100 mg BID    glimepiride tablet 4 mg Daily with breakfast    glucagon (human recombinant) injection 1 mg PRN    glucose chewable tablet 16 g PRN    glucose chewable tablet 24 g PRN    hydrALAZINE injection 20 mg Q4H PRN    hydrALAZINE tablet 100 mg Q8H    HYDROcodone-acetaminophen 5-325 mg per tablet 1 tablet Q4H PRN    HYDROmorphone (PF) injection 1 mg Q4H PRN    hydrOXYchloroQUINE tablet 200 mg BID    insulin aspart U-100 injection 1-10 Units QID (AC + HS) PRN    insulin detemir U-100 injection 25 Units Daily    labetaloL injection 20 mg Q8H PRN    LIDOcaine (PF) 10 mg/ml (1%) injection PRN    lorazepam injection 1 mg Q2H PRN    melatonin tablet 6 mg Nightly PRN    methylPREDNISolone sodium succinate injection 40 mg TID    morphine 12  hr tablet 30 mg Q12H    mycophenolate capsule 1,000 mg BID    NIFEdipine 24 hr tablet 90 mg Daily    nitroGLYCERIN SL tablet 0.4 mg Q5 Min PRN    ondansetron injection 4 mg Q6H PRN    pantoprazole EC tablet 40 mg BID AC    polyethylene glycol packet 17 g BID PRN    sevelamer carbonate tablet 800 mg TID WM    sodium bicarbonate tablet 1,300 mg Daily    sodium chloride 0.9% bolus 250 mL PRN    sodium chloride 0.9% bolus 250 mL PRN    sodium chloride 0.9% bolus 250 mL PRN    sodium chloride 0.9% flush 10 mL PRN    sodium zirconium cyclosilicate packet 10 g Daily     Objective:     Vital Signs (Most Recent):  Temp: 98 °F (36.7 °C) (10/24/22 0300)  Pulse: 92 (10/24/22 0751)  Resp: 18 (10/24/22 0921)  BP: (!) 181/103 (10/24/22 0751)  SpO2: 100 % (10/24/22 0751)  O2 Device (Oxygen Therapy): room air (10/18/22 2000)   Vital Signs (24h Range):  Temp:  [98 °F (36.7 °C)-98.9 °F (37.2 °C)] 98 °F (36.7 °C)  Pulse:  [79-97] 92  Resp:  [16-22] 18  SpO2:  [96 %-100 %] 100 %  BP: (119-181)/() 181/103     Weight: 56.6 kg (124 lb 12.5 oz) (10/22/22 0528)  Body mass index is 20.76 kg/m².  Body surface area is 1.61 meters squared.    No intake or output data in the 24 hours ending 10/24/22 1018    Review of Systems   Constitutional:  Positive for fatigue.   HENT: Negative.     Eyes: Negative.    Cardiovascular: Negative.    Gastrointestinal: Negative.    Genitourinary:         The patient is on hemodialysis  Neurological:         The patient had weakness of his lower extremities but is not as bad as it was documented in his previous rheumatologist's notes .    Physical Exam   Constitutional: He is oriented to person, place, and time. He appears well-developed and well-nourished. No distress.   HENT:   Head: Normocephalic and atraumatic.   Right Ear: External ear normal.   Left Ear: External ear normal.   Eyes: Pupils are equal, round, and reactive to light.   Cardiovascular: Normal rate, regular rhythm and normal  heart sounds.   Abdominal: Soft. There is no abdominal tenderness.   Musculoskeletal:      Right shoulder: Normal.      Left shoulder: Normal.      Right elbow: Normal.      Left elbow: Normal.      Right wrist: Normal.      Left wrist: Normal.      Cervical back: Neck supple.      Right hip: Normal.      Left hip: Normal.      Right knee: Normal.      Left knee: Normal.   Lymphadenopathy:     He has no cervical adenopathy.   Neurological: He is alert and oriented to person, place, and time. He displays normal reflexes. No cranial nerve deficit or sensory deficit. He exhibits normal muscle tone. Coordination normal.   Skin: No rash noted. No erythema.   Vitals reviewed.      Right Side Rheumatological Exam     Examination finds the shoulder, elbow, wrist, knee, 1st PIP, 1st MCP, 2nd PIP, 2nd MCP, 3rd PIP, 3rd MCP, 4th PIP, 4th MCP, 5th PIP, 5th MCP, temporomandibular, hip, ankle, 1st MTP, 2nd MTP, 3rd MTP, 4th MTP and 5th MTP normal.    Left Side Rheumatological Exam     Examination finds the shoulder, elbow, wrist, knee, 1st PIP, 1st MCP, 2nd PIP, 2nd MCP, 3rd PIP, 3rd MCP, 4th PIP, 4th MCP, 5th PIP, 5th MCP, temporomandibular, hip, ankle, 1st MTP, 2nd MTP, 3rd MTP, 4th MTP and 5th MTP normal.           Significant Labs:  All pertinent lab results from the last 24 hours have been reviewed.    Significant Imaging:  Imaging results within the past 24 hours have been reviewed.    Assessment/Plan:     Systemic lupus erythematosus  On Solu-Medrol., CellCept 1 g p.o. b.i.d., Plaquenil 200 mg p.o. b.i.d..  Continue Benlysta 200 mg subcutaneously once a week. First dose of Benlysta was 10/17/22. Next dose is due today 10/24/22. Tunneled Dialysis Catheter placement cancelled d/t improvement of kidney function; therefore no need to hold Benylsta. Can give Benlysta today. Can get from pharmacy. Please let me know if you are unable to get Benlysta and I can provide a sample from our Rheumat[facility] in [place].    Serositis  Secondary to systemic lupus,Continue CellCept, Solu-Medrol, Hydroxuchloroquine, and Benlysta. First dose of Benlysta was given 10/17/22, OK to give Benlysta today 10/24/22    HTN (hypertension)  per the medical team          DENIA Mg  Rheumatology  [facility] - [facility] Tele[facility]

## 2022-10-24 NOTE — PROGRESS NOTES
[facility]  Hospital Medicine Progress Note        Chief Complaint: Inpatient Follow-up for Aortic disscetion     HPI:   Stuart Guevara is a 32 y.o. male who has PMH which includes HTN, SLE,  hepatitis B, aortic dissection with repair 2/2021, RUE DVT on Xarelto; presented to the ED at [facility] on 9/23/2022  with reports of  shortness of breath and chest pain radiating to his back.  PT has a history of aortic dissection 2/2021. Work up revealed a type B aortic dissection and significant hypertension noted on presentation.  He failed medical management and had progression of his symptoms despite adequate control of hemodynamic parameters.  PT was transferred from [facility] to [facility] for CV surgery evaluation which he was taken  to the OR and had TEVAR with left carotid to subclavian artery bypass. PT developed abdominal distension and rigidity of his abdomen and developed a lactic acidosis.  He repeat CT angiography of the chest/abdomen/pelvis without evidence of worsening dissection, but evidence of significant intra-abdominal pathology with some hemoperitoneum, free blood in the abdomen.  He underwent exploratory laparotomy with evidence of viable bowel and no significant bleeding identified, abd was closed with flakito drain left in situ chest tube was placed. Postoperatively, he developed severe unstable bradycardia, and a transvenous pacemaker was placed by Cardiology.  Shortly afterwards, he was noted to have significant worsening of his renal failure and critical hyperkalemia. Renal services consulted which catheter was placed and he was started on HD treatments.  PT has been cleared for transfer out of ICU to the floor. Hospital medicine services consulted for assumption of care.     Patient is s/p AAA surgery repair , course c/by abd compartment syndrome s/p ex lap and closure with flakito drain in situ, sepsis syndrome with fever and worsening leucocytosis , Appreciate ID input antibiotics  broadened to vancomycin Flagyl and cefepime, concern for SLE serositis , rheum consulted started on high dose steori and immunosuppressants.      Interval Hx:   Blood pressure still significantly elevated.  Add on clonidine 0.1 mg b.i.d.   INR still elevated continue to trend and hold Coumadin   Creatinine continues to improve and be stable.  Nephrology leaning to  emoving the temporary hemodialysis catheter and not replacing a tunneled line.         Objective/physical exam:  General: In no acute distress, afebrile  Respiratory: Clear to auscultation bilaterally  Cardiovascular: S1, S2, no appreciable murmur  Abdomen: Soft, nontender, BS +, dressing,   MSK: Warm, no lower extremity edema, no clubbing or cyanosis  Neurologic: Alert and oriented x4,         Assessment/Plan:   Htn urgency  Acute left sided back pain  Supratherapeutic INR  Hyperkalemia, resolved   SLE flare and serositis   Acute respiratory failure with hypoxia- s/p mechanical ventilation, now extubated  Acute aortic dissection type B- s/p TEVAR with left carotid artery to left subclavian bypass 9/23/2022  Acute kidney injury now requiring hemodialysis since September 25th by a temporary hemodialysis catheter  New onset Cardiomyopathy- EF 37% unknown etiology  VHD, s/p mechanical AVR, on Coumadin  Acute abdominal pain generalized  Abdominal compartment syndrome, s/p ex-lap x2 last on 9/27 with abdominal closure on 10/1  Acute pancreatitis- resolving with lipase trending down  RUE DVT- was on Xarelto, recent US no DVT  HTN- emergency, resolved   Anemia- chronic disease  Weakness  HLD  Hx of hepatitis B  Hx of aortic dissection with repair 2/2021  History of transverse myelitis  History of lupus nephritis        Plan  Add on clonidine 0.1 mg b.i.d.   INR still elevated continue to trend and hold Coumadin   Creatinine continues to improve and be stable.  Nephrology leaning to  emoving the temporary hemodialysis catheter and not replacing a tunneled line.     Symptomatic management of back pain, ice packs  Continue to hold Coumadin, will allow INR to drift downward over the weekend.  In the absence of bleeding would not recommend Vit K due to mechanical valve  Resume long-acting insulin at lower dose, continue to monitor sugars, Continue with D10 on as needed basis.   Continue current immunosuppressive regimen at direction of Rheum  Otherwise continue current management        Patient condition:  Stable     Anticipated discharge and Disposition: rehab when cleared by nephro and rheum   Es        VITAL SIGNS: 24 HRS MIN & MAX LAST   Temp  Min: 98 °F (36.7 °C)  Max: 98.5 °F (36.9 °C) 98 °F (36.7 °C)   BP  Min: 119/62  Max: 192/107 (!) 192/107     Pulse  Min: 84  Max: 97  88   Resp  Min: 16  Max: 22 18   SpO2  Min: 96 %  Max: 100 % 98 %       Recent Labs   Lab 10/21/22  0537 10/22/22  0426   WBC 21.1* 19.8*   RBC 2.72* 2.81*   HGB 7.6* 8.0*   HCT 23.5* 24.5*   MCV 86.4 87.2   MCH 27.9 28.5   MCHC 32.3* 32.7*   RDW 15.9 15.8    313   MPV 10.1 10.4       Recent Labs   Lab 10/21/22  0537 10/22/22  0426 10/23/22  0521 10/24/22  0546   * 128* 130* 132*   K 4.8 4.1 4.4 4.3   CO2 22 23 21* 24   BUN 68.1* 37.9* 47.6* 61.5*   CREATININE 2.35* 1.74* 1.78* 1.71*   CALCIUM 8.5 8.2* 8.4 8.5   ALBUMIN 1.6*  --   --   --    ALKPHOS 87  --   --   --    ALT 10  --   --   --    AST 14  --   --   --    BILITOT 0.3  --   --   --           Microbiology Results (last 7 days)       ** No results found for the last 168 hours. **             See below for Radiology    Scheduled Med:   aluminum-magnesium hydroxide-simethicone  30 mL Oral QID (AC & HS)    belimumab  200 mg Subcutaneous Weekly    carvediloL  25 mg Oral BID    cloNIDine  0.1 mg Oral BID    docusate sodium  100 mg Oral BID    glimepiride  4 mg Oral Daily with breakfast    hydrALAZINE  100 mg Oral Q8H    hydrOXYchloroQUINE  200 mg Oral BID    insulin detemir U-100  25 Units Subcutaneous Daily    methylPREDNISolone sodium  succinate injection  40 mg Intravenous TID    morphine  30 mg Oral Q12H    mycophenolate  1,000 mg Oral BID    NIFEdipine  90 mg Oral Daily    pantoprazole  40 mg Oral BID AC    sevelamer carbonate  800 mg Oral TID WM    sodium bicarbonate  1,300 mg Oral Daily    sodium zirconium cyclosilicate  10 g Oral Daily        Continuous Infusions:       PRN Meds:  acetaminophen, albuterol, bisacodyL, dextrose 10 % in water (D10W), dextrose 10 % in water (D10W), dextrose 10%, glucagon (human recombinant), glucose, glucose, hydrALAZINE, HYDROcodone-acetaminophen, HYDROmorphone, insulin aspart U-100, labetaloL, LIDOcaine (PF) 10 mg/ml (1%), lorazepam, melatonin, nitroGLYCERIN, ondansetron, polyethylene glycol, sodium chloride 0.9%, sodium chloride 0.9%, sodium chloride 0.9%, sodium chloride 0.9%         VTE prophylaxis:     Patient condition:  Stable/Fair/Guarded/ Serious/ Critical    Anticipated discharge and Disposition:         All diagnosis and differential diagnosis have been reviewed; assessment and plan has been documented; I have personally reviewed the labs and test results that are presently available; I have reviewed the patients medication list; I have reviewed the consulting providers response and recommendations. I have reviewed or attempted to review medical records based upon their availability    All of the patient's questions have been  addressed and answered. Patient's is agreeable to the above stated plan. I will continue to monitor closely and make adjustments to medical management as needed.  _____________________________________________________________________    Nutrition Status:    Radiology:  X-Ray Abdomen AP 1 View  Narrative: EXAMINATION:  XR ABDOMEN AP 1 VIEW    CLINICAL HISTORY:  Abdominal pain;    TECHNIQUE:  AP X-RAY OF THE ABDOMEN:    CPT 20825    COMPARISON:  October 1, 2022    FINDINGS:  Examination reveals some residual feces throughout the colon gas pattern is nonspecific with no clear  evidence of ileus or obstruction no abnormal masses or calcifications identified  Impression: Residual feces    Electronically signed by: Ramón Ying  Date:    10/24/2022  Time:    09:46      Ronda Hamilton MD   10/24/2022

## 2022-10-25 NOTE — PROGRESS NOTES
Inpatient Nutrition Assessment    Admit Date: 9/23/2022   Total duration of encounter: 32 days     Nutrition Recommendation/Prescription     Continue Renal HS / Diabetic diet as medically feasible / tolerated   Continue with Boost GC two per meal (provides 250 kcal, 14 g protein per serving) TID for added protein and kcal.  Consider adding appetite stimulant per MD if po intake does not improve.     Communication of Recommendations: reviewed with nurse    Nutrition Assessment     Malnutrition Assessment/Nutrition-Focused Physical Exam    Malnutrition in the context of acute illness or injury  Degree of Malnutrition: severe malnutrition  Energy Intake: < 75% of estimated energy requirement for > 7 days  Interpretation of Weight Loss: unable to obtain  Body Fat:moderate depletion  Area of Body Fat Loss: orbital region  and upper arm region - triceps / biceps  Muscle Mass Loss: moderate depletion  Area of Muscle Mass Loss: temple region - temporalis muscle, clavicle bone region - pectoralis major, deltoid, trapezius muscles, clavicle and acromion bone region - deltoid muscle, and scapular bone region - trapezius, supraspinus, infraspinus muscles  Fluid Accumulation: does not meet criteria  Edema: does not meet criteria   Reduced  Strength: unable to obtain  A minimum of two characteristics is recommended for diagnosis of either severe or non-severe malnutrition.    Chart Review    Reason Seen: continuous nutrition monitoring and follow-up    Diagnosis:  Fever and Leukocytosis  Abdominal aortic dissection, s/p repair  Abdominal compartment syndrome, s/p ex-lap x2 last on 9/27 with abdominal closure on 10/1  SLE with active flare  History of transverse myelitis  History of lupus nephritis  TAY now on HD  VHD, s/p mechanical AVR  Chest tube in place  DM2    Relevant Medical History: HTN, lupus    Nutrition-Related Medications: detemir 10 Units BID, docusate, miralax, lactobacillus acidophilus, glimepiride,  pantoprazole, sodium bicarbonate  Calorie Containing IV Medications: no significant kcals from medications at this time    Nutrition-Related Labs:   BUN 27, Crea 3.06, Glu 174, Phos 6.2   Na 128, K 3.3, Cl 96, BUN 21.5, Crea 3.28, Glu 205  10/4 Na 127, Cl 95, BUN 57.3, Crea 5.16, Glu 152, Phos 5  10/7 K 5.6, BUN 55.2, Crea 4.57, Glu 347, Phos 8  10/11: Na 126, K 6.1, Cl 91, BUN 49, Crea 3.18, GFR 25, Glu 139, Ca 7.9, Phos 7.4  10/14: Na 125, K 5.4, Cl 91, BUN 56.1, Crea 2.76, GFR 30, Ca 8.1  10/18: Na 128, Cl 92, Ca 7.9, BUN 50.9, Cr 2.32   10/21: Na 127, Cl 93, BUN 68.1, Crea 2.35, GFR 37, Glu 335  10/25: Na 128, Cl 96, BUN 65.1, Crea 1.62, GFR 57, Glu 39; CB-124    Diet/PN Order: DIET RENAL ON DIALYSIS  Oral Supplement Order: Boost Glucose Control  Tube Feeding Order: none at this time  Appetite/Oral Intake: fair/50-75% of meals  Factors Affecting Nutritional Intake: none identified  Food/Confucianism/Cultural Preferences: none reported    Skin Integrity: incision  Wound(s):   incision noted    Comments    22: Discussed with RN. Will provide tube feeding recommendations for when appropriate to start tube feeding. Receiving kcal from meds. Noted Phos, will monitor for need for renal formula.   22: Discussed with RN. Need to consider TPN since pt now LOS day 7. If starting tube feeding, recs provided. If able to extubate, advance diet when appropriate.   10/4/22: Pt previously eating 100% po intake of meals, good appetite. Currently NPO for procedure, plans to restart diet per RN. Will add ONS for added protein and kcal.  10/7/22: Pt now with decreased appetite/po intake. Noted elevated K and Phos, likely not diet related since pt with poor po intake of full liquid diet.   10/11: pt sleeping, nurse reports tolerating full liquid diet, some abdominal distention noted, reports unsure of plans to advance diet as this time  10/14: pt advanced to soft diet today; ate about 40% of lunch tray, drinking  "Boost, would like 2 per meal in vanilla flavor  10/18: Pt advanced to regular texture renal/diabetic diet, he reports that his appetite is fair, drinks boost, feels he is chewing/swallowing well, does reports some abdominal pain 30-90 minutes after eating, feels better after several hours of no eating.   10/21: appetite remains the same, drinking boost  10/25: fair appetite, says he is drinking some boost, would like to continue flavor    Anthropometrics    Height: 5' 5" (165.1 cm) Height Method: Estimated  Last Weight: 56.6 kg (124 lb 12.5 oz) (10/22/22 0528) Weight Method: Bed Scale  BMI (Calculated): 20.8  BMI Classification: normal (BMI 18.5-24.9)        Ideal Body Weight (IBW), Male: 136 lb     % Ideal Body Weight, Male (lb): 97.26 %                          Usual Weight Provided By: unable to obtain usual weight at this time    Wt Readings from Last 5 Encounters:   10/22/22 56.6 kg (124 lb 12.5 oz)   09/23/22 60 kg (132 lb 4.4 oz)   12/09/21 48 kg (105 lb 13.1 oz)     Weight Change(s) Since Admission:  Admit Weight: 60 kg (132 lb 4.4 oz) (09/23/22 1219)  10/4/22: no new wt  10/7/22: no new wt  10/12/22: 65kg; weight gain noted  10/15/22: 56.6kg; fluctuating weights possible due to fluid    Estimated Needs    Weight Used For Calorie Calculations: 60 kg (132 lb 4.4 oz)  Energy Calorie Requirements (kcal): 2083kcal (1.4 stress factor)  Energy Need Method: [facility]  Weight Used For Protein Calculations: 60 kg (132 lb 4.4 oz)  Protein Requirements: 72-84gm (1.2-1.4g/kg)  Fluid Requirements (mL): 1000ml + urinary output  Temp: 99.5 °F (37.5 °C)    Enteral Nutrition    Patient not receiving enteral nutrition at this time.    Parenteral Nutrition    Patient not receiving parenteral nutrition support at this time.    Evaluation of Received Nutrient Intake    Calories: not meeting estimated needs  Protein: not meeting estimated needs    Patient Education    Not applicable.    Nutrition Diagnosis     PES: " Inadequate oral intake related to current condition as evidenced by <75% intake of meals. (continues)    Interventions/Goals     Intervention(s): general/healthful diet, commercial beverage, and collaboration with other providers  Goal: Meet greater than 75% of nutritional needs by follow-up. (goal progressing)    Monitoring & Evaluation     Dietitian will monitor energy intake.  Nutrition Risk/Follow-Up: high (follow-up in 1-4 days)

## 2022-10-25 NOTE — PROGRESS NOTES
[facility]  Hospital Medicine Progress Note        Chief Complaint: Inpatient Follow-up for Aortic disscetion     HPI:   Stuart Guevara is a 32 y.o. male who has PMH which includes HTN, SLE,  hepatitis B, aortic dissection with repair 2/2021, RUE DVT on Xarelto; presented to the ED at [facility] on 9/23/2022  with reports of  shortness of breath and chest pain radiating to his back.  PT has a history of aortic dissection 2/2021. Work up revealed a type B aortic dissection and significant hypertension noted on presentation.  He failed medical management and had progression of his symptoms despite adequate control of hemodynamic parameters.  PT was transferred from [facility] to [facility] for CV surgery evaluation which he was taken  to the OR and had TEVAR with left carotid to subclavian artery bypass. PT developed abdominal distension and rigidity of his abdomen and developed a lactic acidosis.  He repeat CT angiography of the chest/abdomen/pelvis without evidence of worsening dissection, but evidence of significant intra-abdominal pathology with some hemoperitoneum, free blood in the abdomen.  He underwent exploratory laparotomy with evidence of viable bowel and no significant bleeding identified, abd was closed with flakito drain left in situ chest tube was placed. Postoperatively, he developed severe unstable bradycardia, and a transvenous pacemaker was placed by Cardiology.  Shortly afterwards, he was noted to have significant worsening of his renal failure and critical hyperkalemia. Renal services consulted which catheter was placed and he was started on HD treatments.  PT has been cleared for transfer out of ICU to the floor. Hospital medicine services consulted for assumption of care.     Patient is s/p AAA surgery repair , course c/by abd compartment syndrome s/p ex lap and closure with flakito drain in situ, sepsis syndrome with fever and worsening leucocytosis , Appreciate ID input antibiotics  broadened to vancomycin Flagyl and cefepime, concern for SLE serositis , rheum consulted started on high dose steori and immunosuppressants.      Interval Hx:   Patient seen and examined at bedside   Patient continues to look acutely ill with worsening abdominal pain.  CT scan of the abdomen and pelvis shows moderate ascites and anasarca.  Likely SBP.  Unable to tap this fluid at this time given elevated INR levels.  Will begin empiric IV ceftriaxone.  And when INR improves can have therapeutic drainage of the ascites.    Creatinine continues to improve off hemodialysis   Add on lactobacillus.         Objective/physical exam:  General: In no acute distress, afebrile  Respiratory: Clear to auscultation bilaterally  Cardiovascular: S1, S2, no appreciable murmur  Abdomen: Soft, nontender, BS +, dressing,   MSK: Warm, no lower extremity edema, no clubbing or cyanosis  Neurologic: Alert and oriented x4,         Assessment/Plan:   Abd pain and distension  R/o SBP  Htn urgency, Improved  Supratherapeutic INR  SLE flare and serositis   Acute respiratory failure with hypoxia- s/p mechanical ventilation, now extubated  Acute aortic dissection type B- s/p TEVAR with left carotid artery to left subclavian bypass 9/23/2022  Acute kidney injury now requiring hemodialysis since September 25th by a temporary hemodialysis catheter  New onset Cardiomyopathy- EF 37% unknown etiology  VHD, s/p mechanical AVR, on Coumadin  Abdominal compartment syndrome, s/p ex-lap x2 last on 9/27 with abdominal closure on 10/1  Acute pancreatitis- resolving with lipase trending down  RUE DVT- was on Xarelto, recent US no DVT  HTN- emergency, resolved   Anemia- chronic disease  Weakness  HLD  Hx of hepatitis B  Hx of aortic dissection with repair 2/2021  History of transverse myelitis  History of lupus nephritis        Plan  CT scan of the abdomen and pelvis shows moderate ascites and anasarca.  Likely SBP.   Unable to tap this fluid at this time given  elevated INR levels.    Will begin empiric IV ceftriaxone.  And when INR improves can have therapeutic drainage of the ascites.    Creatinine continues to improve off hemodialysis   Add on lactobacillus.    Continue po clonidine 0.1 mg b.i.d.   INR still elevated continue to trend and hold Coumadin   Creatinine continues to improve and be stable.  Nephrology leaning to  emoving the temporary hemodialysis catheter and not replacing a tunneled line.    In the absence of bleeding would not recommend Vit K due to mechanical valve  Resume long-acting insulin at lower dose, continue to monitor sugars, Continue with D10 on as needed basis.   Continue current immunosuppressive regimen at direction of Rheum  Otherwise continue current management        Patient condition:  Stable     Anticipated discharge and Disposition: rehab when cleared by nephro and rheum         VITAL SIGNS: 24 HRS MIN & MAX LAST   Temp  Min: 98.1 °F (36.7 °C)  Max: 99.5 °F (37.5 °C) 99.5 °F (37.5 °C)   BP  Min: 119/67  Max: 166/89 (!) 166/89     Pulse  Min: 78  Max: 96  96   Resp  Min: 16  Max: 22 17   SpO2  Min: 96 %  Max: 98 % 96 %       Recent Labs   Lab 10/21/22  0537 10/22/22  0426   WBC 21.1* 19.8*   RBC 2.72* 2.81*   HGB 7.6* 8.0*   HCT 23.5* 24.5*   MCV 86.4 87.2   MCH 27.9 28.5   MCHC 32.3* 32.7*   RDW 15.9 15.8    313   MPV 10.1 10.4       Recent Labs   Lab 10/21/22  0537 10/22/22  0426 10/23/22  0521 10/24/22  0546 10/25/22  0210   *   < > 130* 132* 128*   K 4.8   < > 4.4 4.3 4.6   CO2 22   < > 21* 24 23   BUN 68.1*   < > 47.6* 61.5* 65.1*   CREATININE 2.35*   < > 1.78* 1.71* 1.62*   CALCIUM 8.5   < > 8.4 8.5 8.5   ALBUMIN 1.6*  --   --   --   --    ALKPHOS 87  --   --   --   --    ALT 10  --   --   --   --    AST 14  --   --   --   --    BILITOT 0.3  --   --   --   --     < > = values in this interval not displayed.          Microbiology Results (last 7 days)       ** No results found for the last 168 hours. **             See  below for Radiology    Scheduled Med:   aluminum-magnesium hydroxide-simethicone  30 mL Oral QID (AC & HS)    belimumab  200 mg Subcutaneous Weekly    carvediloL  25 mg Oral BID    cefTRIAXone (ROCEPHIN) IVPB  1 g Intravenous Q24H    cloNIDine  0.1 mg Oral BID    docusate sodium  100 mg Oral BID    glimepiride  4 mg Oral Daily with breakfast    hydrALAZINE  100 mg Oral Q8H    hydrOXYchloroQUINE  200 mg Oral BID    insulin detemir U-100  10 Units Subcutaneous Daily    Lactobacillus acidophilus  2 capsule Oral TID WM    methylPREDNISolone sodium succinate injection  40 mg Intravenous TID    morphine  30 mg Oral Q12H    mycophenolate  1,000 mg Oral BID    NIFEdipine  90 mg Oral Daily    pantoprazole  40 mg Oral BID AC    sodium bicarbonate  1,300 mg Oral Daily    sodium zirconium cyclosilicate  10 g Oral Daily        Continuous Infusions:       PRN Meds:  acetaminophen, albuterol, bisacodyL, dextrose 10 % in water (D10W), dextrose 10 % in water (D10W), dextrose 10%, glucagon (human recombinant), glucose, glucose, hydrALAZINE, HYDROcodone-acetaminophen, HYDROmorphone, insulin aspart U-100, labetaloL, LIDOcaine (PF) 10 mg/ml (1%), lorazepam, melatonin, nitroGLYCERIN, ondansetron, polyethylene glycol, sodium chloride 0.9%, sodium chloride 0.9%, sodium chloride 0.9%, sodium chloride 0.9%         VTE prophylaxis:     Patient condition:  Stable/Fair/Guarded/ Serious/ Critical    Anticipated discharge and Disposition:         All diagnosis and differential diagnosis have been reviewed; assessment and plan has been documented; I have personally reviewed the labs and test results that are presently available; I have reviewed the patients medication list; I have reviewed the consulting providers response and recommendations. I have reviewed or attempted to review medical records based upon their availability    All of the patient's questions have been  addressed and answered. Patient's is agreeable to the above stated plan. I  will continue to monitor closely and make adjustments to medical management as needed.  _____________________________________________________________________    Nutrition Status:    Radiology:  Cardiac catheterization  Aborted invasive cardiology procedure- see Procedure Log link for details.      Ronda Hamilton MD   10/25/2022

## 2022-10-25 NOTE — PT/OT/SLP PROGRESS
Occupational Therapy  Treatment    Stuart Guevara   MRN: [id number]   Admitting Diagnosis: Aortic dissection    OT Date of Treatment: 10/25/22   OT Start Time: 1447  OT Stop Time: 1504  OT Total Time (min): 17 min     Billable Minutes:  Therapeutic Activity 17  Total Minutes: 17     OT/ADONAY: ADONAY     ADONAY Visit Number: 1    General Precautions: Standard, fall  Orthopedic Precautions: N/A  Braces: N/A    Spiritual, Cultural Beliefs, Muslim Practices, Values that Affect Care: no    Subjective:  Communicated with nurse prior to session.    Objective:  Patient found with: pulse ox (continuous), telemetry    Treatment:  Worked on trunk strengthening ax to aid in bed mobility and repositioning in bed.  Pt c/o back pain attempted to reposition several times before pt agreeing to being comfortable.    Patient left HOB elevated with all lines intact and call button in reach    ASSESSMENT:  Stuart Guevara is a 32 y.o. male with a medical diagnosis of Aortic dissection and presents with decreased Ax tolerance, decreased strength and decreased ADL skills.    Rehab potential is good    Activity tolerance: Good    Discharge recommendations: rehabilitation facility     Equipment recommendations: walker, rolling     GOALS:   Multidisciplinary Problems       Occupational Therapy Goals          Problem: Occupational Therapy    Goal Priority Disciplines Outcome Interventions   Occupational Therapy Goal     OT, PT/OT Ongoing, Progressing    Description: Goals to be met by: 11/4/2022  Goals Met and Upgraded/New goals added 10/21/2022    Patient will increase functional independence with ADLs by performing:      UE dressing with Modified Mower. - new goal  LB Dressing with Minimal Assistance and dressing AEDs. - continue/progressing  Grooming while standing at sink with Stand-by Assistance - new goal  Toileting from toilet with Modified Mower for hygiene and clothing management. - new goal  Bathing from  shower  chair/bench with Modified Alliance. - new goal  Toilet transfer to toilet with Modified Alliance. - new goal    UE Dressing with Stand-by Assistance. - goal met  Grooming while EOB with Stand-by Assistance. - goal met  Sitting at edge of bed x10 minutes with Stand-by Assistance. - goal met                         Plan:  Patient to be seen 5 x/week, 6 x/week to address the above listed problems via self-care/home management, therapeutic activities, therapeutic exercises  Plan of Care expires: 11/04/22  Plan of Care reviewed with: patient         10/25/2022

## 2022-10-25 NOTE — PT/OT/SLP PROGRESS
Physical Therapy Treatment    Patient Name:  Stuart Guevara   MRN:  [id number]    Recommendations:     Discharge Recommendations:  rehabilitation facility   Discharge Equipment Recommendations: walker, rolling     Subjective     Patient awake and alert.     Objective:     General Precautions: Standard, fall   Orthopedic Precautions:N/A   Braces:    Respiratory Status: Room air     Communicated with nurse prior to treatment.     Functional Mobility:    Rolling:Minimal Assistance  Supine to sit:Activity did not occur  Sit to stand transfer: Activity did not occur  Bed to chair transfer:Activity did not occur  Pt was in too much pain to mobilize but pt repositioned and he performed AAROM to BLE and shoaib strengthening to improve strength, ROM, and to decrease pain. PRAFO donned and instructed in wearing schedule.       AM-PAC 6 CLICK MOBILITY        Patient left with bed in chair position with all lines intact and call button in reach..    GOALS:   Multidisciplinary Problems       Physical Therapy Goals          Problem: Physical Therapy    Goal Priority Disciplines Outcome Goal Variances Interventions   Physical Therapy Goal     PT, PT/OT Ongoing, Progressing     Description: Goals to be met by: 22     Patient will increase functional independence with mobility by performin. Supine to sit with brandon  2. Sit to supine with brandon  3. Sit <> stand with CGA using RW/LRAD  4. Bed to chair with CGA via squat pivot/stand pivot  5.Ambulate 200 ft with rolling walker and contact guard assistance                           Assessment:     Stuart Guevara is a 32 y.o. male admitted with a medical diagnosis of Aortic dissection.     Rehab Prognosis: Good; patient would benefit from acute skilled PT services to address these deficits and reach maximum level of function.    Recent Surgery: Procedure(s) (LRB):  INSERTION, CATHETER, CENTRAL VENOUS, HEMODIALYSIS, TUNNELED (N/A) 1 Day Post-Op    Plan:     During this  hospitalization, patient to be seen 6 x/week to address the identified rehab impairments via gait training, therapeutic activities, therapeutic exercises and progress toward the following goals:    Plan of Care Expires:  11/02/22    Billable Minutes     Billable Minutes: Therapeutic Activity 12 and Therapeutic Exercise 12    Treatment Type: Treatment  PT/PTA: PTA     PTA Visit Number: 3     10/25/2022

## 2022-10-25 NOTE — NURSING
Patient sugar at 2000 was 57. D10 given and came up to 60. Additional d10 given and sugar up to 103.    Messaged Michelle Ferrera about maybe adding d10 drip or rechecking at 0000. She said to recheck.    Patient repeat glucose at 0003 was 39 and patient was sweaty and lethargic. D10 given and patient came up to 89.     Left message for Michelle at 0011 about incident and starting on D10. She called back and said the note said to continue to supplement d10 but did not want to do IV continuous b/c of patient being on dialysis     Lab glucose at 0210 was 39. POCT at 0313 56. 0317 NP michelle notified and said to follow protocol. They may need to dc lantus. Iv dextrose given and recheck at [phone]

## 2022-10-25 NOTE — PROCEDURES
"Stuart Guevara is a 32 y.o. male patient.    Temp: 98.9 °F (37.2 °C) (10/25/22 1527)  Pulse: 98 (10/25/22 1527)  Resp: 15 (10/25/22 1527)  BP: (!) 159/90 (10/25/22 1527)  SpO2: 97 % (10/25/22 1527)  Weight: 56.6 kg (124 lb 12.5 oz) (10/22/22 0528)  Height: 5' 5" (165.1 cm) (10/04/22 1243)    PICC  Date/Time: 10/25/2022 3:32 PM  Performed by: Pedro Leon RN  Consent Done: Yes  Time out: Immediately prior to procedure a time out was called to verify the correct patient, procedure, equipment, support staff and site/side marked as required  Preparation: skin prepped with ChloraPrep  Skin prep agent dried: skin prep agent completely dried prior to procedure  Sterile barriers: all five maximum sterile barriers used - cap, mask, sterile gown, sterile gloves, and large sterile sheet  Hand hygiene: hand hygiene performed prior to central venous catheter insertion  Location details: right brachial  Catheter type: double lumen  Catheter size: 5 Fr  Catheter Length: 34cm    Ultrasound guidance: yes  Vessel Caliber: small and patent, compressibility normal  Number of attempts: 1  Post-procedure: blood return through all ports and sterile dressing applied    Assessment: placement verified by x-ray and successful placement        Name Pedro Leon RN  10/25/2022    "

## 2022-10-25 NOTE — PROGRESS NOTES
Nephrology consult follow up note    HPI:      Stuart Guevara is a 33 y.o. 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  The patient underwent exploratory lap for possibility of necrotic bowel and subsequently developed acute kidney injury with hyperkalemia and acidosis requiring initiation of dialysis.  He has now been started on a MWF schedule via right IJ temporary catheter.     Interval history:     10/16/22-hyperkalemic today. Given kayexalate per hospitalist. Alert and oriented. No N/V. No SOB.     10/17/22 currently tolerating dialysis.  Potassium stays high.  He is on 1 K potassium bath.  But will give him 2 K bath in the last 30 minutes of his dialysis.     10/18: No change overnight. Still requiring dialysis on MWF schedule. Remains with initial temporary catheter placed 9/25.  Abdominal pain at rest.     10/19: Patient on dialysis now. Plans for tunneled catheter today    10/20:  Tunnel catheter placement deferred yesterday secondary to patient not remaining NPO.  Today, INR was chronically elevated greater than 7. Now with bear hugger for hypothermia after noted critical hypoglycemia.     10/21: Cr 2.3 today. He is on dialysis now tolerating well. INR remains critically elevated and procedure canceled again.     10/23: Cr 1.7. Urinating well. Pain to Left side, other wise no acute complaints.     10/24: Cr stable again at 1.7.     10/25/22 lying down in the bed.  Complains of abdominal distention and diffuse abdominal pains.  More so in the right upper quadrant and suprapubic area.  Denies nausea vomiting diarrhea or constipation.  Reports that he has been having normal bowel movements.  No dysuria.  Denies fever or chills.  CT of the abdomen was done yesterday patient reveals mostly pleural effusions and ascites and some feces in the colon.        Objective:        VITAL SIGNS: 24 HR MIN & MAX LAST    Temp  Min: 98.1 °F (36.7 °C)  Max: 99 °F (37.2 °C)  99 °F (37.2 °C)        BP  Min: 119/67  Max: 192/107  (!) 160/80     Pulse  Min: 78  Max: 93  93     Resp  Min: 16  Max: 22  17    SpO2  Min: 96 %  Max: 98 %  96 %      GEN: Awake and appropriate.  Chronically ill-appearing black male.  Looks like he is in pain as he is holding his stomach with both hands.  HEENT: Atraumatic. EOMI, no icterus  NECK : No JVD, right IJ temporary catheter noted  CARD : RRR   LUNGS : Clear to auscultation  ABD :  Distended abdomen.  Diffuse abdominal discomfort.  Tender in the right upper quadrant and right flank area and in the suprapubic area and patient does not let anybody touch that areas.  He does have at least 3+ ascites.  Bowel sounds are positive still.  EXT : No edema.   NEURO : No obvious focal deficits          Component Value Date/Time     (L) 10/25/2022 0210     (L) 10/24/2022 0546    K 4.6 10/25/2022 0210    K 4.3 10/24/2022 0546    CO2 23 10/25/2022 0210    CO2 24 10/24/2022 0546    BUN 65.1 (H) 10/25/2022 0210    BUN 61.5 (H) 10/24/2022 0546    CREATININE 1.62 (H) 10/25/2022 0210    CREATININE 1.71 (H) 10/24/2022 0546    CALCIUM 8.5 10/25/2022 0210    CALCIUM 8.5 10/24/2022 0546    PHOS 7.4 (H) 10/11/2022 0448            Component Value Date/Time    WBC 19.8 (H) 10/22/2022 0426    WBC 21.1 (H) 10/21/2022 0537    HGB 8.0 (L) 10/22/2022 0426    HGB 7.6 (L) 10/21/2022 0537    HCT 24.5 (L) 10/22/2022 0426    HCT 23.5 (L) 10/21/2022 0537    HCT 28 (L) 09/23/2022 0304    HCT 30.0 (L) 02/15/2021 1751     10/22/2022 0426     10/21/2022 0537         Assessment / Plan:      1. Postoperative TAY requiring hemodialysis initiation on 9/25 via temp HD catheter placed per Dr. Chopra.  Last dialysis was on 10/21/2022.  Now good urine output.  Stable kidney functions.  2. Abdominal aortic dissection s/p TEVAR   3. Post operative abdominal compartment syndrome s/p ex  lap on 9/27 with closure on 10/1  4. Hyperkalemia.  On Lokelma now.    5. Hypertension  6. Type 2 diabetes mellitus  7. Serositis and SLE - rheumatology following  8. Hyponatremia persistent.      Recommendations  1. I was about to order discontinuation of temporary dialysis catheter till patient told me about his abdominal pains.  Workup management is being done by the hospitalist.  At present there is no acute indication for any dialysis therapy.  Once cleared by hospitalist, dialysis catheter needs to be removed.  2. Check labs tomorrow.

## 2022-10-26 NOTE — PROGRESS NOTES
Trauma/Acute Care Surgery   Daily Progress Note     HD#33  POD#2 Days Post-Op    In brief, patient is a 32M with HTN, SLE, HBV, aortic dissection s/p repair 2/2021, RUE DVT on xarelto who was admitted 9/23 with aortic dissection and is s/p TEVAR. Pt developed abdominal compartment syndrome and is s/p multiple exlaps (9/25, 9/27, 9/29) now on HD for renal failure reconsulted for abdominal pain    SUBJECTIVE / INTERVAL EVENTS  Worsening lower abdominal pain and scrotal swelling and tenderness  Decreased appetite but denies nausea or vomiting  Describes having hard stool yesterday with straining and some blood when wiping  Voiding on own  Ambulating    OBJECTIVE  Vitals  Temp:  [97.9 °F (36.6 °C)-99.5 °F (37.5 °C)] 98.1 °F (36.7 °C)  Pulse:  [80-99] 80  Resp:  [15-22] 18  SpO2:  [95 %-98 %] 95 %  BP: (107-166)/(62-90) 123/62    Physical Exam:  GEN: no acute distress, uncomfortable  RESP: on room air, no increased work of breathing  ABD: soft, slightly distended, tender to palpation in lower quadrants, worst on L. Midline incision healing well with scar  : scrotal swelling tender to palpation. Most tender in L inguinal region. Exam limited by patient pain. Slight overlying erythema in L inguinal region  Rectal: normal external anus. No hard stool or masses on JARAD. Tenderness to palpation. Scant blood in rectum.     Labs  Lab Results   Component Value Date    WBC 26.9 (H) 10/26/2022    HGB 6.7 (L) 10/26/2022    HCT 20.6 (L) 10/26/2022    MCV 86.6 10/26/2022     10/26/2022     Recent Labs     10/24/22  0546 10/25/22  0210 10/26/22  0552   * 128* 127*   K 4.3 4.6 5.5*   CO2 24 23 23   BUN 61.5* 65.1* 81.4*   CREATININE 1.71* 1.62* 2.22*   CALCIUM 8.5 8.5 8.3*   ALBUMIN  --   --  1.4*   BILITOT  --   --  0.5   AST  --   --  17   ALKPHOS  --   --  114   ALT  --   --  10      Micro  Microbiology Results (last 7 days)       ** No results found for the last 168 hours. **           Imaging  CT A and P, scrotal  ultrasound stat ordered  CT A and P 10/24     Bilateral pleural effusions with moderate ascites and diffuse anasarca.  The epigastric and left mid abdominal ascites appears loculated and peritonitis cannot be excluded.    ASSESSMENT & PLAN  In brief, patient is a 32M with HTN, SLE, HBV, aortic dissection s/p repair 2/2021, RUE DVT on xarelto who was admitted 9/23 with aortic dissection and is s/p TEVAR. Pt developed abdominal compartment syndrome and is s/p multiple exlaps (9/25, 9/27, 9/29) now on HD for renal failure reconsulted for abdominal pain    -elevated white count to 26.9, hgb 6.7, INR 3.9, Creatinine 2.22  -concern for incarcerated L inguinal hernia vs soft tissue infection vs SBP. Will follow up stat CT and P and US scrotum. Patient NPO until final read back  -patient may require broad spectrum abx pending CT read  -RBCs ordered  -rest of care per primary team      Keyonna Boggs MD  [facility] PGY1    10/26/2022  10:31 AM      PGY-4 Addendum  CT imaging and report reviewed with surgery staff. Recommend correcting INR and placing  IR consult for multiple intra-abdominal fluid collections, possibly related to necrotizing pancreatitis. L inguinal pain related to L inguinal hernia containing ascitic fluid. Additionally we recommend GI consultation for possible endoscopic drainage of necrotizing pancreatitis via the step-up approach.     Given INR of 3.9, anemia, and multiple comorbid conditions the patient would be a very high risk candidate for operative exploration, so intervention with IR vs. GI with a less invasive treatment approach is recommended. Surgery will continue to follow along, please reach out with any questions or concerns.     Dewayne Cason MD  [facility] Surgery PGY-4  10/26/2022, 12:53 PM

## 2022-10-26 NOTE — PROGRESS NOTES
[facility]  Hospital Medicine Progress Note        Chief Complaint: Inpatient Follow-up for Aortic disscetion     HPI:   Stuart Guevara is a 32 y.o. male who has PMH which includes HTN, SLE,  hepatitis B, aortic dissection with repair 2/2021, RUE DVT on Xarelto; presented to the ED at [facility] on 9/23/2022  with reports of  shortness of breath and chest pain radiating to his back.  PT has a history of aortic dissection 2/2021. Work up revealed a type B aortic dissection and significant hypertension noted on presentation.  He failed medical management and had progression of his symptoms despite adequate control of hemodynamic parameters.  PT was transferred from [facility] to [facility] for CV surgery evaluation which he was taken  to the OR and had TEVAR with left carotid to subclavian artery bypass. PT developed abdominal distension and rigidity of his abdomen and developed a lactic acidosis.  He repeat CT angiography of the chest/abdomen/pelvis without evidence of worsening dissection, but evidence of significant intra-abdominal pathology with some hemoperitoneum, free blood in the abdomen.  He underwent exploratory laparotomy with evidence of viable bowel and no significant bleeding identified, abd was closed with flakito drain left in situ chest tube was placed. Postoperatively, he developed severe unstable bradycardia, and a transvenous pacemaker was placed by Cardiology.  Shortly afterwards, he was noted to have significant worsening of his renal failure and critical hyperkalemia. Renal services consulted which catheter was placed and he was started on HD treatments.  PT has been cleared for transfer out of ICU to the floor. Hospital medicine services consulted for assumption of care.     Patient is s/p AAA surgery repair , course c/by abd compartment syndrome s/p ex lap and closure with flakito drain in situ, sepsis syndrome with fever and worsening leucocytosis , Appreciate ID input antibiotics  broadened to vancomycin Flagyl and cefepime, concern for SLE serositis , rheum consulted started on high dose steori and immunosuppressants.   Patient continues to look acutely ill with worsening abdominal pain.  CT scan of the abdomen and pelvis shows moderate ascites and anasarca.  Likely SBP.  Unable to tap this fluid at this time given elevated INR levels.  Will begin empiric IV ceftriaxone.  And when INR improves can have therapeutic drainage of the ascites.        Interval Hx:   Patient seen and examined at bedside   Patient continues to decline at bedside.  Continues with lower abdominal pain, swelling, tenderness, warm to touch   Vitals reviewed afebrile   Hemoglobin levels trickling down slowly, at 6.7 grams/dL will transfuse with 2 units of PRBC, follow-up fecal occult blood testing   INR still elevated at 3.9   Potassium mildly elevated, creatinine is worse today.  Glucose is elevated   Will consult General surgery to evaluate given concerns of prior of abdominal compartment syndrome requiring ex lap.  Patient looks very tenuous at bedside  Continue ceftriaxone day 2   Consult [facility]- surgery recommending INR reversal for surgery to try to drain the ascites, patient has a mechanical valve will appreciate CIS input     Objective/physical exam:  General: In painful  distress, afebrile  Respiratory: Clear to auscultation bilaterally  Cardiovascular: S1, S2, no appreciable murmur  Abdomen: distended, shiny, warm to touch, tender, BS +, dressing,   MSK: Warm, no lower extremity edema, no clubbing or cyanosis  Neurologic: Alert and oriented x4,         Assessment/Plan:   Acute on chronic anemia   Severe leukocytosis patient is on steroid   Mild hyperkalemia   Acute kidney injury, worsening   Abd pain and distension  R/o SBP  Supratherapeutic INR  SLE flare and serositis   HTN,uncontrolled   Acute respiratory failure with hypoxia- s/p mechanical ventilation, now extubated  Acute aortic dissection type B- s/p TEVAR  with left carotid artery to left subclavian bypass 9/23/2022  Acute kidney injury now requiring hemodialysis since September 25th by a temporary hemodialysis catheter  New onset Cardiomyopathy- EF 37% unknown etiology  VHD, s/p mechanical AVR, on Coumadin  Abdominal compartment syndrome, s/p ex-lap x2 last on 9/27 with abdominal closure on 10/1  Acute pancreatitis- resolving with lipase trending down  RUE DVT- was on Xarelto, recent US no DVT  HTN- emergency, resolved   Anemia- chronic disease  Weakness  HLD  Hx of hepatitis B  Hx of aortic dissection with repair 2/2021  History of transverse myelitis  History of lupus nephritis        Plan  Patient continues to decline at bedside.  transfuse with 2 units of PRBC, follow-up fecal occult blood testing   Will consult General surgery to evaluate given concerns of prior of abdominal compartment syndrome requiring ex lap.   INR still elevated at 3.9   Consult [facility]- surgery recommending INR reversal for surgery to try to drain the ascites, patient has a mechanical valve will appreciate CIS input  Potassium mildly elevated, creatinine is worse today, defer to nephrology  Continue ceftriaxone day 2  CT scan of the abdomen and pelvis shows moderate ascites and anasarca.  Likely SBP.   Unable to tap this fluid at this time given elevated INR levels.    Add on lactobacillus.    Continue po clonidine 0.1 mg b.i.d.   hold Coumadin   Nephrology leaning to removing the temporary hemodialysis catheter and not replacing a tunneled line.    Resume long-acting insulin at lower dose, continue to monitor sugars, Continue with D10 on as needed basis.   Continue current immunosuppressive regimen at direction of Rheum  Otherwise continue current management        Patient condition:  Stable     Anticipated discharge and Disposition: tbd , patient is guarded at this time        Critical care diagnosis acute on chronic anemia requiring transfusion   Critical care time greater than 35 minutes        VITAL SIGNS: 24 HRS MIN & MAX LAST   Temp  Min: 97.9 °F (36.6 °C)  Max: 98.9 °F (37.2 °C) 98.4 °F (36.9 °C)   BP  Min: 107/64  Max: 159/90 129/68   Pulse  Min: 80  Max: 99  80   Resp  Min: 15  Max: 22 18   SpO2  Min: 95 %  Max: 98 % 98 %       Recent Labs   Lab 10/22/22  0426 10/25/22  1427 10/26/22  0552   WBC 19.8* 27.5* 26.9*   RBC 2.81* 2.71* 2.38*   HGB 8.0* 7.6* 6.7*   HCT 24.5* 23.1* 20.6*   MCV 87.2 85.2 86.6   MCH 28.5 28.0 28.2   MCHC 32.7* 32.9* 32.5*   RDW 15.8 15.8 15.9    210 222   MPV 10.4 10.0 11.3*       Recent Labs   Lab 10/21/22  0537 10/22/22  0426 10/24/22  0546 10/25/22  0210 10/26/22  0552   *   < > 132* 128* 127*   K 4.8   < > 4.3 4.6 5.5*   CO2 22   < > 24 23 23   BUN 68.1*   < > 61.5* 65.1* 81.4*   CREATININE 2.35*   < > 1.71* 1.62* 2.22*   CALCIUM 8.5   < > 8.5 8.5 8.3*   ALBUMIN 1.6*  --   --   --  1.4*   ALKPHOS 87  --   --   --  114   ALT 10  --   --   --  10   AST 14  --   --   --  17   BILITOT 0.3  --   --   --  0.5    < > = values in this interval not displayed.          Microbiology Results (last 7 days)       ** No results found for the last 168 hours. **             See below for Radiology    Scheduled Med:   aluminum-magnesium hydroxide-simethicone  30 mL Oral QID (AC & HS)    belimumab  200 mg Subcutaneous Weekly    carvediloL  25 mg Oral BID    cefTRIAXone (ROCEPHIN) IVPB  1 g Intravenous Q24H    cloNIDine  0.1 mg Oral BID    docusate sodium  100 mg Oral BID    glimepiride  4 mg Oral Daily with breakfast    hydrALAZINE  100 mg Oral Q8H    hydrOXYchloroQUINE  200 mg Oral BID    insulin detemir U-100  10 Units Subcutaneous BID    Lactobacillus acidophilus  2 capsule Oral TID WM    methylPREDNISolone sodium succinate injection  40 mg Intravenous TID    morphine  30 mg Oral Q12H    mycophenolate  1,000 mg Oral BID    NIFEdipine  90 mg Oral Daily    pantoprazole  40 mg Oral BID AC    sodium bicarbonate  1,300 mg Oral Daily    sodium chloride 0.9%  10 mL  Intravenous Q6H    sodium zirconium cyclosilicate  10 g Oral Daily        Continuous Infusions:       PRN Meds:  sodium chloride, acetaminophen, albuterol, bisacodyL, dextrose 10 % in water (D10W), dextrose 10 % in water (D10W), dextrose 10%, glucagon (human recombinant), glucose, glucose, hydrALAZINE, HYDROcodone-acetaminophen, HYDROmorphone, insulin aspart U-100, labetaloL, LIDOcaine (PF) 10 mg/ml (1%), lorazepam, melatonin, nitroGLYCERIN, ondansetron, polyethylene glycol, sodium chloride 0.9%, sodium chloride 0.9%, sodium chloride 0.9%, sodium chloride 0.9%, Flushing PICC Protocol **AND** sodium chloride 0.9% **AND** sodium chloride 0.9%       VTE prophylaxis:     Patient condition:  Stable/Fair/Guarded/ Serious/ Critical    Anticipated discharge and Disposition:         All diagnosis and differential diagnosis have been reviewed; assessment and plan has been documented; I have personally reviewed the labs and test results that are presently available; I have reviewed the patients medication list; I have reviewed the consulting providers response and recommendations. I have reviewed or attempted to review medical records based upon their availability    All of the patient's questions have been  addressed and answered. Patient's is agreeable to the above stated plan. I will continue to monitor closely and make adjustments to medical management as needed.  _____________________________________________________________________    Nutrition Status:    Radiology:  US Scrotum And Testicles  EXAMINATION  US SCROTUM AND TESTICLES    CLINICAL HISTORY  scrotal swelling and tenderness. Concern for nec fasc vs L incarcerated hernia;    TECHNIQUE  Grayscale and Doppler interrogation of the scrotum and testes.    COMPARISON  None available at the time of initial interpretation.    FINDINGS  Exam quality: adequate for evaluation    Right Testicle: Surface contour intact and smooth. No evidence of focal mass or infiltrative  parenchymal abnormality.  No significant enlargement or heterogeneity of the epididymis.    Left Testicle: Surface contour intact and smooth. No evidence of focal mass or infiltrative parenchymal abnormality.  No significant enlargement or heterogeneity of the epididymis.    Doppler Interrogation:  Bilateral spontaneous arterial flow is demonstrated within each testicle, with symmetric velocity and normal low-resistance spectral waveform.  Bilateral venous outflow is maintained.    Other findings: Moderate volume simple appearing left hydrocele is present.  No varicocele appreciated bilaterally. There is extensive bilateral scrotal edema, without evidence of focal lesion or drainable collection.    Measurements:    *Right testicle: 3 cm x 1.7 cm x 2.1 cm (6 cc)  *Left testicle: 3 cm x 1.8 cm x 1.8 cm (5 cc)    IMPRESSION  1. No evidence of active testicular torsion or other acute process, no findings to suggest focal testicular lesion.  2. Extensive regional subcutaneous edema without drainable collection.  3. Moderate volume simple appearing left hydrocele.    Electronically signed by: Bean Landaverde  Date:    10/26/2022  Time:    12:41  CT Abdomen Pelvis With Contrast  Narrative: EXAMINATION:  CT ABDOMEN PELVIS WITH CONTRAST    CLINICAL HISTORY:  Abdominal pain, post-op;    TECHNIQUE:  Helically acquired images with axial, sagittal and coronal reformations were obtained from the lung bases to the pubic symphysis after the IV administration of contrast.    Automated tube current modulation, weight-based exposure dosing, and/or iterative reconstruction technique utilized to reach lowest reasonably achievable exposure rate.    DLP: 635 mGy*cm    COMPARISON:  CT abdomen pelvis 10/24/2022    FINDINGS:  HEART: There is a partially visible aortic valve prosthesis.  Distal aspect of a central venous catheter is seen at the superior cavoatrial junction.    LUNG BASES: There are layering pleural effusions.  Dependent  atelectasis.    LIVER: Normal attenuation. No appreciable focal hepatic lesion.    BILIARY: Collapse gallbladder versus small cystic duct remnant.    PANCREAS: No normal pancreatic parenchyma seen.  At the retroperitoneum in the expected region of the bed of the pancreas there is loculated fluid and a few foci of gas.  Loculated collections extend along the pericolic gutters bilaterally.    SPLEEN: Normal in size    ADRENALS: No mass.    KIDNEYS/URETERS: The kidneys enhance symmetrically.  No hydronephrosis.    GI TRACT/MESENTERY: Evaluation of the bowel is limited without oral contrast. The stomach is decompressed.  Small bowel loops are not appreciably dilated.  There is a moderate colonic stool burden.     There is some mass effect on bowel loops due to the loculated nature abdominal fluid collections.    PERITONEUM: There is a large volume of loculated peritoneal and retroperitoneal fluid and there is peritoneal enhancement.  This extends into a left inguinal hernia.There is free air in the region of the pancreatic bed/lesser sac.    LYMPH NODES: Nonspecific small retroperitoneal lymph nodes.    VASCULATURE: There is an aortic dissection with stent graft partially imaged in the true lumen of the descending thoracic aorta.  Dissection flap not seen to extend into the abdominal aorta on this non angiographic exam.  There is patent runoff to the groin.  The portal, splenic and superior mesenteric veins are patent.    BLADDER: Nondistended bladder limits CT evaluation there are few foci of gas within the bladder lumen compatible with recent instrumentation.    REPRODUCTIVE ORGANS: Normal as visualized.    SOFT TISSUES: Diffuse, severe anasarca.    BONES: Biconvex deformities at the lower lumbar spine.  Impression: 1. No normal pancreatic parenchyma is seen.  At the level of the pancreas there is a large fluid collection with a few foci of gas.  Loculated, faintly peripherally enhancing fluid collections extend along  the pericolic gutters.  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  Foci of gas are nonspecific in the setting of prior intervention.  2. Extensive loculated peritoneal collections with peripheral enhancement compatible with peritonitis.Left inguinal hernia contains ascitic fluid with some peripheral enhancement similar to the peritoneal fluid.  There is no herniated bowel loop  3. There is a moderate colonic stool burden.  There is some mass effect on the bowel loops due to the loculated nature of the abdominal fluid collections.  4. Anasarca  5. Partially imaged known thoracic aortic dissection status post intervention.    Electronically signed by: Rocio White  Date:    10/26/2022  Time:    12:37      Ronda Hamilton MD   10/26/2022

## 2022-10-26 NOTE — PROGRESS NOTES
Nephrology consult follow up note    HPI:      Stuart Guevara is a 32 y.o. 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  The patient underwent exploratory lap for possibility of necrotic bowel and subsequently developed acute kidney injury with hyperkalemia and acidosis requiring initiation of dialysis.  He has now been started on a MWF schedule via right IJ temporary catheter.     Interval history:     10/16/22-hyperkalemic today. Given kayexalate per hospitalist. Alert and oriented. No N/V. No SOB.     10/17/22 currently tolerating dialysis.  Potassium stays high.  He is on 1 K potassium bath.  But will give him 2 K bath in the last 30 minutes of his dialysis.     10/18: No change overnight. Still requiring dialysis on MWF schedule. Remains with initial temporary catheter placed 9/25.  Abdominal pain at rest.     10/19: Patient on dialysis now. Plans for tunneled catheter today    10/20:  Tunnel catheter placement deferred yesterday secondary to patient not remaining NPO.  Today, INR was chronically elevated greater than 7. Now with bear hugger for hypothermia after noted critical hypoglycemia.     10/21: Cr 2.3 today. He is on dialysis now tolerating well. INR remains critically elevated and procedure canceled again.     10/23: Cr 1.7. Urinating well. Pain to Left side, other wise no acute complaints.     10/24: Cr stable again at 1.7.     10/25/22 lying down in the bed.  Complains of abdominal distention and diffuse abdominal pains.  More so in the right upper quadrant and suprapubic area.  Denies nausea vomiting diarrhea or constipation.  Reports that he has been having normal bowel movements.  No dysuria.  Denies fever or chills.  CT of the abdomen was done yesterday patient reveals mostly pleural effusions and ascites and some feces in the colon.    10/26/22 events  noted.  He is in the process of getting 2 units of packed RBCs today.  CT of the abdomen reveals that he may have necrotizing pancreatitis.  Surgery is on the case also.  Difficult situation very sick person.  Urine output is low than yesterday.  Still complains of abdominal pains.  And abdominal distention.    Objective:       VITAL SIGNS: 24 HR MIN & MAX LAST    Temp  Min: 97.9 °F (36.6 °C)  Max: 98.9 °F (37.2 °C)  98.2 °F (36.8 °C)        BP  Min: 107/64  Max: 159/90  (!) 142/68     Pulse  Min: 76  Max: 99  76     Resp  Min: 15  Max: 22  18    SpO2  Min: 95 %  Max: 98 %  97 %      GEN: Awake and appropriate.  Chronically ill-appearing black male.    HEENT: Atraumatic. EOMI, no icterus  NECK : No JVD, right IJ temporary catheter noted  CARD : RRR   LUNGS : Clear to auscultation  ABD :  Distended abdomen.  Diffuse abdominal discomfort.  Tender in now quadrants of the abdomen.  He does have at least 3+ ascites.  Bowel sounds are positive still.  EXT : No edema.   NEURO : No obvious focal deficits          Component Value Date/Time     (L) 10/26/2022 0552     (L) 10/25/2022 0210    K 5.5 (H) 10/26/2022 0552    K 4.6 10/25/2022 0210    CO2 23 10/26/2022 0552    CO2 23 10/25/2022 0210    BUN 81.4 (H) 10/26/2022 0552    BUN 65.1 (H) 10/25/2022 0210    CREATININE 2.22 (H) 10/26/2022 0552    CREATININE 1.62 (H) 10/25/2022 0210    CALCIUM 8.3 (L) 10/26/2022 0552    CALCIUM 8.5 10/25/2022 0210    PHOS 7.4 (H) 10/11/2022 0448            Component Value Date/Time    WBC 26.9 (H) 10/26/2022 0552    WBC 27.5 (H) 10/25/2022 1427    HGB 6.7 (L) 10/26/2022 0552    HGB 7.6 (L) 10/25/2022 1427    HCT 20.6 (L) 10/26/2022 0552    HCT 23.1 (L) 10/25/2022 1427    HCT 28 (L) 09/23/2022 0304    HCT 30.0 (L) 02/15/2021 1751     10/26/2022 0552     10/25/2022 1427         Assessment / Plan:      1. Postoperative TAY requiring hemodialysis initiation on 9/25 via temp HD catheter placed per Dr. Chopra.  Last dialysis  was on 10/21/2022.  Now worsening of BUN creatinine again.  2. Abdominal aortic dissection s/p TEVAR   3. Post operative abdominal compartment syndrome s/p ex lap on 9/27 with closure on 10/1  4. Hyperkalemia.  On Lokelma now.    5. Hypertension  6. Type 2 diabetes mellitus  7. Serositis and SLE - rheumatology following  8. Hyponatremia persistent.  9. Necrotizing pancreatitis per CT of the abdomen    Recommendations  Discussed with the patient and his lab results detailed him from the renal standpoint of view and told him that I will not be able to remove the dialysis catheter on him now as he is having worsening of the kidney functions again and he may need dialysis therapy again.  He expressed understanding.

## 2022-10-26 NOTE — PT/OT/SLP PROGRESS
Pt hemoglobin is at 6.7 this morning, RN states she is going to contact pt doctor. Will f/u in PM if schedule allows.

## 2022-10-27 NOTE — NURSING
10/27/22 1619   Post-Hemodialysis Assessment   Blood Volume Processed (Liters) 61.8 L   Dialyzer Clearance Lightly streaked   Duration of Treatment 180 minutes   Total UF (mL) 0 mL   Patient Response to Treatment Tolerated well   Post-Hemodialysis Comments Treatment completed. Right CVC positional at times. Able to meet ordered BFR. No complaints voiced

## 2022-10-27 NOTE — PT/OT/SLP PROGRESS
Occupational Therapy      Patient Name:  Stuart Guevara   MRN:  [id number]    Patient not seen today secondary to out of room for dialysis. Will follow-up 10/28/2022.    10/27/2022

## 2022-10-27 NOTE — PROGRESS NOTES
Nephrology consult follow up note    HPI:      Stuart Guevara is a 32 y.o. 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  The patient underwent exploratory lap for possibility of necrotic bowel and subsequently developed acute kidney injury with hyperkalemia and acidosis requiring initiation of dialysis.  He has now been started on a MWF schedule via right IJ temporary catheter.     Interval history:     10/16/22-hyperkalemic today. Given kayexalate per hospitalist. Alert and oriented. No N/V. No SOB.     10/17/22 currently tolerating dialysis.  Potassium stays high.  He is on 1 K potassium bath.  But will give him 2 K bath in the last 30 minutes of his dialysis.     10/18: No change overnight. Still requiring dialysis on MWF schedule. Remains with initial temporary catheter placed 9/25.  Abdominal pain at rest.     10/19: Patient on dialysis now. Plans for tunneled catheter today    10/20:  Tunnel catheter placement deferred yesterday secondary to patient not remaining NPO.  Today, INR was chronically elevated greater than 7. Now with bear hugger for hypothermia after noted critical hypoglycemia.     10/21: Cr 2.3 today. He is on dialysis now tolerating well. INR remains critically elevated and procedure canceled again.     10/23: Cr 1.7. Urinating well. Pain to Left side, other wise no acute complaints.     10/24: Cr stable again at 1.7.     10/25/22 lying down in the bed.  Complains of abdominal distention and diffuse abdominal pains.  More so in the right upper quadrant and suprapubic area.  Denies nausea vomiting diarrhea or constipation.  Reports that he has been having normal bowel movements.  No dysuria.  Denies fever or chills.  CT of the abdomen was done yesterday patient reveals mostly pleural effusions and ascites and some feces in the colon.    10/26/22 events  noted.  He is in the process of getting 2 units of packed RBCs today.  CT of the abdomen reveals that he may have necrotizing pancreatitis.  Surgery is on the case also.  Difficult situation very sick person.  Urine output is low than yesterday.  Still complains of abdominal pains.  And abdominal distention.    10/27: Possible intervention with surgery today vs IR. His INR remains elevated. BUN now 90. Urine output decreasing with now venita/tea colored appearing urine at bedside. Abdomen is distended and patient is in pain.     Objective:       VITAL SIGNS: 24 HR MIN & MAX LAST    Temp  Min: 98 °F (36.7 °C)  Max: 98.5 °F (36.9 °C)  98.3 °F (36.8 °C)        BP  Min: 129/68  Max: 159/84  (!) 154/85     Pulse  Min: 75  Max: 97  97     Resp  Min: 12  Max: 20  16    SpO2  Min: 94 %  Max: 98 %  95 %      GEN: Awake and appropriate.  Chronically ill-appearing black male.    HEENT: Atraumatic. EOMI, no icterus  NECK : No JVD, right IJ temporary catheter noted  CARD : RRR   LUNGS : Clear to auscultation  ABD :  Distended abdomen.  Diffuse abdominal discomfort.    EXT : No edema.   NEURO : No obvious focal deficits    Recent Labs   Lab 10/27/22  0510   *   K 5.4*   CO2 24   BUN 90.6*   CREATININE 2.17*   GLUCOSE 305*   CALCIUM 8.2*     Recent Labs   Lab 10/27/22  0510   WBC 33.4*   HGB 10.7*  10.7*   HCT 31.8*  31.3*            Assessment / Plan:      1. Postoperative TAY requiring hemodialysis initiation on 9/25 via temp HD catheter placed per Dr. Chopra.  Last dialysis was on 10/21/2022.    2. Abdominal aortic dissection s/p TEVAR   3. Post operative abdominal compartment syndrome s/p ex lap on 9/27 with closure on 10/1  4. Hyperkalemia.  On Lokelma now.    5. Hypertension  6. Type 2 diabetes mellitus  7. Serositis and SLE - rheumatology following  8. Hyponatremia persistent.  9. Necrotizing pancreatitis per CT of the abdomen    Recommendations  Patient will dialyze today for worsening azotemia.   Follow  plans for intervention with surgery team     We will assess need for HD on daily basis.

## 2022-10-27 NOTE — PROGRESS NOTES
[facility]  Hospital Medicine Progress Note        Chief Complaint: Inpatient Follow-up for Severe sepsis     HPI:   Stuart Guevara is a 32 y.o. 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    Patient has a history of aortic dissection 2/2021. Work up revealed a type B aortic dissection and significant hypertension noted on presentation.  He failed medical management and had progression of his symptoms despite adequate control of hemodynamic parameters.  PT was transferred from [facility] to [facility] for CV surgery evaluation which he was taken  to the OR and had TEVAR with left carotid to subclavian artery bypass.   Later Patient developed abdominal distension and rigidity of his abdomen and developed a lactic acidosis.  He had a repeat CT angiography of the chest/abdomen/pelvis without evidence of worsening dissection, but evidence of significant intra-abdominal pathology with some hemoperitoneum, (compartment syndrome)  free blood in the abdomen.  He underwent exploratory laparotomy with evidence of viable bowel and no significant bleeding identified, abd was closed with flakito drain left in situ chest tube was placed. Postoperatively, he developed severe unstable bradycardia, and a transvenous pacemaker was placed by Cardiology.  Shortly afterwards, he was noted to have significant worsening of his renal failure and critical hyperkalemia. Renal services consulted and HD catheter was placed and he was started on HD treatments.  Patient was cleared for transfer out of ICU to the floor.  He was seen by rheumatology for lupus flare up,. He was started on steroids and other immunomodulating drugs.   He started to have worsening abdominal pain. Rpt CT was done ans showed   1. No normal pancreatic parenchyma is seen.   At the level of the pancreas there is a large fluid collection with a few foci of gas.  Loculated, faintly peripherally enhancing fluid collections extend along the pericolic gutters.  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  Foci of gas are nonspecific in the setting of prior intervention.  2. Extensive loculated peritoneal collections with peripheral enhancement compatible with peritonitis.Left inguinal hernia contains ascitic fluid with some peripheral enhancement similar to the peritoneal fluid.  There is no herniated bowel loop  3. There is a moderate colonic stool burden.  There is some mass effect on the bowel loops due to the loculated nature of the abdominal fluid collections.  4. Anasarca  5. Partially imaged known thoracic aortic dissection status post intervention.    Patient was placed NPO, iv antibiotics was changed to merrem. Immumomodulating drugs was stopped. GI team was consulted for necrotizing pancreatitis. D/W surgery team and patient will be made NPO. Renal team will be continueing HD.     Interval Hx:   Patient awake but lethargic. C/O severe abdominal pain. No fever or chills, nausea, vomiting, chest pain or palpitation.     Objective/physical exam:  General: In no acute distress, frail/cachectic  Chest: Diminished breath sounds bilaterally  Heart: RRR, +S1, S2, no appreciable murmur  Abdomen: Soft, + distension, + Tenderness in all quadrants.    + penile and scrotal swelling   Neurologic: Cranial nerve II-XII intact, Strength 5/5 in all 4 extremities    VITAL SIGNS: 24 HRS MIN & MAX LAST   Temp  Min: 98 °F (36.7 °C)  Max: 98.6 °F (37 °C) 98.6 °F (37 °C)   BP  Min: 138/77  Max: 159/84 (!) 152/78     Pulse  Min: 75  Max: 97  82   Resp  Min: 12  Max: 20 16   SpO2  Min: 94 %  Max: 98 % (!) 94 %         Recent Labs   Lab 10/25/22  1427 10/26/22  0552 10/27/22  0510   WBC 27.5* 26.9* 33.4*   RBC 2.71* 2.38* 3.77*   HGB 7.6* 6.7* 10.7*   10.7*   HCT 23.1* 20.6* 31.8*  31.3*   MCV 85.2 86.6 84.4   MCH 28.0 28.2 28.4   MCHC 32.9* 32.5* 33.6   RDW 15.8 15.9 15.2    222 217   MPV 10.0 11.3* 10.1       Recent Labs   Lab 10/21/22  0537 10/22/22  0426 10/25/22  0210 10/26/22  0552 10/27/22  0510   *   < > 128* 127* 127*   K 4.8   < > 4.6 5.5* 5.4*   CO2 22   < > 23 23 24   BUN 68.1*   < > 65.1* 81.4* 90.6*   CREATININE 2.35*   < > 1.62* 2.22* 2.17*   CALCIUM 8.5   < > 8.5 8.3* 8.2*   ALBUMIN 1.6*  --   --  1.4*  --    ALKPHOS 87  --   --  114  --    ALT 10  --   --  10  --    AST 14  --   --  17  --    BILITOT 0.3  --   --  0.5  --     < > = values in this interval not displayed.          Microbiology Results (last 7 days)       Procedure Component Value Units Date/Time    Fungal Culture [[id number]]  (Normal) Collected: 09/25/22 1200    Order Status: Completed Specimen: Body Fluid from Pleural Fluid, Right Updated: 10/27/22 1254     Fungal Culture No Fungus Isolated at 4 Weeks             See below for Radiology    Scheduled Med:   belimumab  200 mg Subcutaneous Weekly    carvediloL  25 mg Oral BID    cloNIDine  0.1 mg Oral BID    hydrALAZINE  100 mg Oral Q8H    hydrOXYchloroQUINE  200 mg Oral BID    Lactobacillus acidophilus  2 capsule Oral TID WM    meropenem (MERREM) IVPB  500 mg Intravenous Q8H    methylPREDNISolone sodium succinate injection  40 mg Intravenous Q12H    morphine  30 mg Oral Q12H    mycophenolate  1,000 mg Oral BID    NIFEdipine  90 mg Oral Daily    pantoprazole  40 mg Intravenous Daily    sodium bicarbonate  1,300 mg Oral Daily    sodium chloride 0.9%  10 mL Intravenous Q6H        Continuous Infusions:   sodium chloride 0.9% 50 mL/hr at 10/27/22 1301    Amino acid 4.25% - dextrose 5% (CLINIMIX-E) solution (1L provides 42.5 gm AA, 50 gm CHO (170 kcal/L dextrose), Na 35, K 30, Mg 5, Ca 4.5, Acetate 70, Cl 39, Phos 15) 50 mL/hr at 10/27/22 1254        PRN Meds:  sodium chloride, acetaminophen, albuterol, bisacodyL, dextrose  10 % in water (D10W), dextrose 10 % in water (D10W), dextrose 10%, glucagon (human recombinant), glucose, glucose, hydrALAZINE, HYDROcodone-acetaminophen, HYDROmorphone, insulin aspart U-100, labetaloL, LIDOcaine (PF) 10 mg/ml (1%), lorazepam, melatonin, nitroGLYCERIN, ondansetron, polyethylene glycol, sodium chloride 0.9%, sodium chloride 0.9%, sodium chloride 0.9%, sodium chloride 0.9%, sodium chloride 0.9%, Flushing PICC Protocol **AND** sodium chloride 0.9% **AND** sodium chloride 0.9%       Assessment/Plan:  Severe sepsis   Severe necrotizing pancreatitis   Anasarca/hypoalbuminemia   Severe PCM  ARF on HD   Coagulopathy   HTN, benign   Acute aortic dissection type B- s/p TEVAR with left carotid artery to left subclavian bypass 9/23/2022  New onset Cardiomyopathy- EF 37% unknown etiology  VHD, s/p mechanical AVR, on Coumadin  Abdominal compartment syndrome, s/p ex-lap x2 last on 9/27 with abdominal closure on 10/1  Anemia NC/NC  Hx of hepatitis B  Lupus ? Flare up     Plan:  Patient is in severe sepsis and CT shows necrotizing pancreatitis   Will keep NPO  Consult GI team   Start on iv merrem, reconsult ID team   D/W surgery team and patient will have drainage of the loculated intra abdominal fluid collection by IR in am   Will f/u cultures when available   Will hold all immunomodulating agents, can worsen sepsis   Wean IV steroids to 40 mg BID   He has very poor nutrition and severe PCM. Will start iv clinimix and may need a NGT to start feeding in the near future   Will closely monitor patients daily weight, urine out put, renal parameters and volume status    Cont HD per renal team   Will cont Tele monitoring   His over all prognosis currently is very poor with high risk of clinical decompensation  No family at bedside  Will cont pain control with iv prn dilaudid     Labs in am      Critical care note:  Critical care diagnosis: severe sepsis needing iv antibiotics   Critical care interventions: Hands-on  evaluation, review of labs/radiographs/records and discussion with patient and family if present  Critical care time spent: 45 minutes           VTE prophylaxis: SCDs    Patient condition:  Serious    Anticipated discharge and Disposition:         All diagnosis and differential diagnosis have been reviewed; assessment and plan has been documented; I have personally reviewed the labs and test results that are presently available; I have reviewed the patients medication list; I have reviewed the consulting providers response and recommendations. I have reviewed or attempted to review medical records based upon their availability    All of the patient's questions have been  addressed and answered. Patient's is agreeable to the above stated plan. I will continue to monitor closely and make adjustments to medical management as needed.  _____________________________________________________________________    Nutrition Status:    Radiology:  US Scrotum And Testicles  EXAMINATION  US SCROTUM AND TESTICLES    CLINICAL HISTORY  scrotal swelling and tenderness. Concern for nec fasc vs L incarcerated hernia;    TECHNIQUE  Grayscale and Doppler interrogation of the scrotum and testes.    COMPARISON  None available at the time of initial interpretation.    FINDINGS  Exam quality: adequate for evaluation    Right Testicle: Surface contour intact and smooth. No evidence of focal mass or infiltrative parenchymal abnormality.  No significant enlargement or heterogeneity of the epididymis.    Left Testicle: Surface contour intact and smooth. No evidence of focal mass or infiltrative parenchymal abnormality.  No significant enlargement or heterogeneity of the epididymis.    Doppler Interrogation:  Bilateral spontaneous arterial flow is demonstrated within each testicle, with symmetric velocity and normal low-resistance spectral waveform.  Bilateral venous outflow is maintained.    Other findings: Moderate volume simple appearing left  hydrocele is present.  No varicocele appreciated bilaterally. There is extensive bilateral scrotal edema, without evidence of focal lesion or drainable collection.    Measurements:    *Right testicle: 3 cm x 1.7 cm x 2.1 cm (6 cc)  *Left testicle: 3 cm x 1.8 cm x 1.8 cm (5 cc)    IMPRESSION  1. No evidence of active testicular torsion or other acute process, no findings to suggest focal testicular lesion.  2. Extensive regional subcutaneous edema without drainable collection.  3. Moderate volume simple appearing left hydrocele.    Electronically signed by: Bean Landaverde  Date:    10/26/2022  Time:    12:41  CT Abdomen Pelvis With Contrast  Narrative: EXAMINATION:  CT ABDOMEN PELVIS WITH CONTRAST    CLINICAL HISTORY:  Abdominal pain, post-op;    TECHNIQUE:  Helically acquired images with axial, sagittal and coronal reformations were obtained from the lung bases to the pubic symphysis after the IV administration of contrast.    Automated tube current modulation, weight-based exposure dosing, and/or iterative reconstruction technique utilized to reach lowest reasonably achievable exposure rate.    DLP: 635 mGy*cm    COMPARISON:  CT abdomen pelvis 10/24/2022    FINDINGS:  HEART: There is a partially visible aortic valve prosthesis.  Distal aspect of a central venous catheter is seen at the superior cavoatrial junction.    LUNG BASES: There are layering pleural effusions.  Dependent atelectasis.    LIVER: Normal attenuation. No appreciable focal hepatic lesion.    BILIARY: Collapse gallbladder versus small cystic duct remnant.    PANCREAS: No normal pancreatic parenchyma seen.  At the retroperitoneum in the expected region of the bed of the pancreas there is loculated fluid and a few foci of gas.  Loculated collections extend along the pericolic gutters bilaterally.    SPLEEN: Normal in size    ADRENALS: No mass.    KIDNEYS/URETERS: The kidneys enhance symmetrically.  No hydronephrosis.    GI TRACT/MESENTERY: Evaluation  of the bowel is limited without oral contrast. The stomach is decompressed.  Small bowel loops are not appreciably dilated.  There is a moderate colonic stool burden.     There is some mass effect on bowel loops due to the loculated nature abdominal fluid collections.    PERITONEUM: There is a large volume of loculated peritoneal and retroperitoneal fluid and there is peritoneal enhancement.  This extends into a left inguinal hernia.There is free air in the region of the pancreatic bed/lesser sac.    LYMPH NODES: Nonspecific small retroperitoneal lymph nodes.    VASCULATURE: There is an aortic dissection with stent graft partially imaged in the true lumen of the descending thoracic aorta.  Dissection flap not seen to extend into the abdominal aorta on this non angiographic exam.  There is patent runoff to the groin.  The portal, splenic and superior mesenteric veins are patent.    BLADDER: Nondistended bladder limits CT evaluation there are few foci of gas within the bladder lumen compatible with recent instrumentation.    REPRODUCTIVE ORGANS: Normal as visualized.    SOFT TISSUES: Diffuse, severe anasarca.    BONES: Biconvex deformities at the lower lumbar spine.  Impression: 1. No normal pancreatic parenchyma is seen.  At the level of the pancreas there is a large fluid collection with a few foci of gas.  Loculated, faintly peripherally enhancing fluid collections extend along the pericolic gutters.  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  Foci of gas are nonspecific in the setting of prior intervention.  2. Extensive loculated peritoneal collections with peripheral enhancement compatible with peritonitis.Left inguinal hernia contains ascitic fluid with some peripheral enhancement similar to the peritoneal fluid.  There is no herniated bowel loop  3. There is a moderate colonic stool burden.  There is some mass effect on the bowel loops due to the  loculated nature of the abdominal fluid collections.  4. Anasarca  5. Partially imaged known thoracic aortic dissection status post intervention.    Electronically signed by: Rocio White  Date:    10/26/2022  Time:    12:37      Saurabh Curry MD   10/27/2022

## 2022-10-27 NOTE — PROGRESS NOTES
Trauma/Acute Care Surgery   Daily Progress Note     HD#34  POD#3 Days Post-Op    SUBJECTIVE / INTERVAL EVENTS    Worsening lower abdominal pain and scrotal swelling and tenderness  Decreased appetite but denies nausea or vomiting  NPO    OBJECTIVE  Vitals  Temp:  [98 °F (36.7 °C)-98.5 °F (36.9 °C)] 98.5 °F (36.9 °C)  Pulse:  [75-90] 90  Resp:  [12-18] 18  SpO2:  [94 %-98 %] 94 %  BP: (123-159)/(62-84) 159/84    Physical Exam:  GEN: no acute distress, uncomfortable  RESP: on room air, no increased work of breathing  ABD: distended, tender to palpation in lower quadrants, worst on L. Midline incision healing well with scar  : scrotal swelling tender to palpation. Most tender in L inguinal region. Exam limited by patient pain. Slight overlying erythema in L inguinal region      Labs  Lab Results   Component Value Date    WBC 26.9 (H) 10/26/2022    HGB 10.7 (L) 10/27/2022    HCT 31.3 (L) 10/27/2022    MCV 86.6 10/26/2022     10/26/2022     Recent Labs     10/24/22  0546 10/25/22  0210 10/26/22  0552   * 128* 127*   K 4.3 4.6 5.5*   CO2 24 23 23   BUN 61.5* 65.1* 81.4*   CREATININE 1.71* 1.62* 2.22*   CALCIUM 8.5 8.5 8.3*   ALBUMIN  --   --  1.4*   BILITOT  --   --  0.5   AST  --   --  17   ALKPHOS  --   --  114   ALT  --   --  10      Micro  Microbiology Results (last 7 days)       ** No results found for the last 168 hours. **           Imaging  US Scrotum and Testicles  IMPRESSION  1. No evidence of active testicular torsion or other acute process, no findings to suggest focal testicular lesion.  2. Extensive regional subcutaneous edema without drainable collection.  3. Moderate volume simple appearing left hydrocele.    CT A and P     1. No normal pancreatic parenchyma is seen.  At the level of the pancreas there is a large fluid collection with a few foci of gas.  Loculated, faintly peripherally enhancing fluid collections extend along the pericolic gutters.  Findings are compatible with sequela of  severe necrotizing pancreatitis with walled-off necrosis.  Foci of gas are nonspecific in the setting of prior intervention.  2. Extensive loculated peritoneal collections with peripheral enhancement compatible with peritonitis.Left inguinal hernia contains ascitic fluid with some peripheral enhancement similar to the peritoneal fluid.  There is no herniated bowel loop  3. There is a moderate colonic stool burden.  There is some mass effect on the bowel loops due to the loculated nature of the abdominal fluid collections.  4. Anasarca  5. Partially imaged known thoracic aortic dissection status post intervention.    ASSESSMENT & PLAN  In brief, patient is a 32M with HTN, SLE, HBV, aortic dissection s/p repair 2/2021, RUE DVT on xarelto who was admitted 9/23 with aortic dissection and is s/p TEVAR. Pt developed abdominal compartment syndrome and is s/p multiple exlaps (9/25, 9/27, 9/29) now on HD for renal failure now with necrotizing pancreatitis    -CBC pending this am, will follow. INR still elevated at 3.93  -IR consulted. Appreciate recs. Staff will discuss case with IR  -NPO for possible intervention  -patient may require broad spectrum abx pending CT read  -rest of care per primary team  -will continue to follow. Please call with any additional concerns    Keyonna Boggs MD  [employer] General Surgery PGY1    10/27/2022  10:31 AM      Agree with GI,  Diagnosis of necrotizing pancreatitis is not certain, he laboratory studies are not suggestive of this nor or his symptoms, IR will drain fluid and analyze for amylase,lipase, culture.  His prior surgeries demosntrated large amount of ascitic fluid and non necrotic sapnification of his pancreas     Hopefully draining his abdominal fluid will provide symptomatic relief    Christopher Underwood

## 2022-10-27 NOTE — PT/OT/SLP PROGRESS
Physical Therapy      Patient Name:  Stuart Guevara   MRN:  [id number]    Pt refused therapy stating increased pain in abdomen.

## 2022-10-27 NOTE — PLAN OF CARE
Problem: Adult Inpatient Plan of Care  Goal: Plan of Care Review  Outcome: Ongoing, Progressing  Goal: Patient-Specific Goal (Individualized)  Outcome: Ongoing, Progressing  Goal: Absence of Hospital-Acquired Illness or Injury  Outcome: Ongoing, Progressing  Goal: Optimal Comfort and Wellbeing  Outcome: Ongoing, Progressing  Goal: Readiness for Transition of Care  Outcome: Ongoing, Progressing     Problem: Infection  Goal: Absence of Infection Signs and Symptoms  Outcome: Ongoing, Progressing     Problem: Skin Injury Risk Increased  Goal: Skin Health and Integrity  Outcome: Ongoing, Progressing     Problem: Fall Injury Risk  Goal: Absence of Fall and Fall-Related Injury  Outcome: Ongoing, Progressing     Problem: Nutrition Impairment (Mechanical Ventilation, Invasive)  Goal: Optimal Nutrition Delivery  Outcome: Ongoing, Progressing     Problem: Skin and Tissue Injury (Mechanical Ventilation, Invasive)  Goal: Absence of Device-Related Skin and Tissue Injury  Outcome: Ongoing, Progressing     Problem: Device-Related Complication Risk (CRRT (Continuous Renal Replacement Therapy))  Goal: Safe, Effective Therapy Delivery  Outcome: Ongoing, Progressing     Problem: Infection (CRRT (Continuous Renal Replacement Therapy))  Goal: Absence of Infection Signs and Symptoms  Outcome: Ongoing, Progressing     Problem: Device-Related Complication Risk (Hemodialysis)  Goal: Safe, Effective Therapy Delivery  Outcome: Ongoing, Progressing     Problem: Hemodynamic Instability (Hemodialysis)  Goal: Effective Tissue Perfusion  Outcome: Ongoing, Progressing     Problem: Infection (Hemodialysis)  Goal: Absence of Infection Signs and Symptoms  Outcome: Ongoing, Progressing     Problem: Hyperglycemia  Goal: Blood Glucose Level Within Targeted Range  Outcome: Ongoing, Progressing     Problem: Impaired Wound Healing  Goal: Optimal Wound Healing  Outcome: Ongoing, Progressing

## 2022-10-27 NOTE — CONSULTS
Gastroenterology Consultation Note    Reason for Consult:  necrotizing pancreatitis    PCP:   Primary Doctor No    Referring MD: Dr. Wiley      History of Present Illness (HPI):    31 y/o male unknown to our group who has a past medical history of HTN, SLE,  hepatitis B, aortic dissection with repair 2/2021, RUE DVT on Xarelto.    Patient was initially admitted to [facility] on 9/23/2022 after presenting with chest pain radiating to his back and shortness of breath. He was found to have a type B aortic dissection and significant hypertension. He ultimately transferred to [facility] for CV surgery evaluation which he was taken  to the OR and had TEVAR with left carotid to subclavian artery bypass. Patient later developed hemoperitoneum and compartment syndrome of the abdomen exploratory laparotomy with evidence of viable bowel and no significant bleeding identified, abd was closed with flakito drain left in situ chest tube was placed. Postoperatively, he developed severe unstable bradycardia, and a transvenous pacemaker was placed by Cardiology. He then developed renal failure requiring initiation of HD. He was eventually started on steroids and other immunomodulating drugs for a lupus flare. Most recently, patient reported worsening abdominal pain and distention for which a CT abdomen and pelvis without contrast 10/26/22 was obtained. This revealed:  1. No normal pancreatic parenchyma is seen.  At the level of the pancreas there is a large fluid collection with a few foci of gas.  Loculated, faintly peripherally enhancing fluid collections extend along the pericolic gutters.  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  Foci of gas are nonspecific in the setting of prior intervention.  2. Extensive loculated peritoneal collections with peripheral enhancement compatible with peritonitis.Left inguinal hernia contains ascitic fluid with some peripheral enhancement  similar to the peritoneal fluid.  There is no herniated bowel loop  3. There is a moderate colonic stool burden.  There is some mass effect on the bowel loops due to the loculated nature of the abdominal fluid collections.  4. Anasarca  5. Partially imaged known thoracic aortic dissection status post intervention.    GI is now consulted to evaluate. He was NPO, abx changed to Merrem, and his immumomodulating drugs were stopped.    Patient currently undergoing dialysis. He states his abdomen remains distented and he has pain to palpation across the lower abdomen. He also reports being constipated. Denies fever, chills, nausea, or vomiting.    ROS:  Review of Systems   Constitutional:  Negative for fever, malaise/fatigue and weight loss.   Respiratory:  Negative for cough and shortness of breath.    Cardiovascular:  Negative for chest pain, palpitations and leg swelling.   Gastrointestinal:  Positive for abdominal pain and constipation. Negative for blood in stool, diarrhea, heartburn, melena, nausea and vomiting.        +abdominal distention   Musculoskeletal:  Negative for back pain and myalgias.   Skin:  Negative for rash.   Neurological:  Positive for weakness. Negative for speech change and focal weakness.   All other systems reviewed and are negative.    Medical History:   Past Medical History:   Diagnosis Date    Hypertension     Systemic lupus erythematosus, organ or system involvement unspecified     Systemic lupus erythematosus, organ or system involvement unspecified        Surgical History:   Past Surgical History:   Procedure Laterality Date    APPLICATION OF WOUND VACUUM-ASSISTED CLOSURE DEVICE  9/27/2022    Procedure: APPLICATION, WOUND VAC;  Surgeon: Christopher Espinal MD;  Location: [facility];  Service: General;;    CARDIAC SURGERY      CREATION OF BYPASS FROM INTERNAL CAROTID ARTERY TO SUBCLAVIAN ARTERY Left 9/23/2022    Procedure: CREATION, BYPASS, ARTERIAL, INTERNAL CAROTID TO SUBCLAVIAN;  Surgeon:  Vic Martines MD;  Location: [facility] OR;  Service: Cardiothoracic;  Laterality: Left;    ENDOVASCULAR REPAIR OF THORACIC AORTA N/A 9/23/2022    Procedure: REPAIR, AORTA, THORACIC, ENDOVASCULAR;  Surgeon: Vic Martines MD;  Location: [facility] OR;  Service: Cardiology;  Laterality: N/A;    FINGER AMPUTATION      INSERTION OF TUNNELED CENTRAL VENOUS HEMODIALYSIS CATHETER N/A 10/24/2022    Procedure: INSERTION, CATHETER, CENTRAL VENOUS, HEMODIALYSIS, TUNNELED;  Surgeon: Yogesh Melton DO;  Location: [facility] CATH LAB;  Service: Nephrology;  Laterality: N/A;  TUNNELED CATH INSERTION    THROMBECTOMY Right     TOE AMPUTATION         Family History:   No family history on file..     Social History:   Social History     Tobacco Use    Smoking status: Every Day     Types: Cigarettes    Smokeless tobacco: Never   Substance Use Topics    Alcohol use: Not on file       Allergies:  Review of patient's allergies indicates:   Allergen Reactions    Levofloxacin     Sulfamethoxazole-trimethoprim        Medications Prior to Admission   Medication Sig Dispense Refill Last Dose    albuterol (PROVENTIL/VENTOLIN HFA) 90 mcg/actuation inhaler Inhale 2 puffs into the lungs every 4 (four) hours as needed for Wheezing. Rescue 18 g 0     amLODIPine (NORVASC) 10 MG tablet Take 1 tablet (10 mg total) by mouth once daily. 30 tablet 0     aspirin (ECOTRIN) 81 MG EC tablet Take 1 tablet (81 mg total) by mouth once daily. 30 tablet 0     belimumab (BENLYSTA) 120 mg SolR Benlysta Take No date recorded No form recorded No frequency recorded No route recorded No set duration recorded No set duration amount recorded active No dosage strength recorded No dosage strength units of measure recorded       famotidine (PEPCID) 20 MG tablet Take 1 tablet (20 mg total) by mouth 2 (two) times daily. 60 tablet 0     gemfibroziL (LOPID) 600 MG tablet Take 600 mg by mouth.       hydrALAZINE (APRESOLINE) 10 MG tablet Take 10 mg by mouth.       labetaloL (NORMODYNE) 300 MG  "tablet Take 1 tablet (300 mg total) by mouth 2 (two) times daily. 30 tablet 0     lisinopriL (PRINIVIL,ZESTRIL) 40 MG tablet Take 1 tablet (40 mg total) by mouth once daily. 30 tablet 0     metoprolol tartrate (LOPRESSOR) 50 MG tablet Take 1 tablet (50 mg total) by mouth 2 (two) times daily. 30 tablet 0     mycophenolate (CELLCEPT) 500 mg Tab Take 2 tablets (1,000 mg total) by mouth 2 (two) times daily. 30 tablet 0     MEÑO 2 mg TbEC Take 1 tablet by mouth once daily.       rivaroxaban (XARELTO ORAL) Xarelto Take No date recorded No form recorded No frequency recorded No route recorded No set duration recorded No set duration amount recorded active No dosage strength recorded No dosage strength units of measure recorded       sucralfate (CARAFATE) 1 gram tablet Take 1 tablet (1 g total) by mouth 2 (two) times daily. 60 tablet 0     warfarin (COUMADIN) 3 MG tablet Take 1 tablet (3 mg total) by mouth Daily. 30 tablet 0          Objective Findings:    Vital Signs:  BP (!) 152/78   Pulse 82   Temp 98.6 °F (37 °C) (Oral)   Resp 16   Ht 5' 5" (1.651 m)   Wt 56.6 kg (124 lb 12.5 oz)   SpO2 (!) 94%   BMI 20.76 kg/m²   Body mass index is 20.76 kg/m².    Physical Exam:  Physical Exam  Constitutional:       General: He is not in acute distress.  HENT:      Head: Normocephalic and atraumatic.   Eyes:      General: No scleral icterus.     Conjunctiva/sclera: Conjunctivae normal.   Cardiovascular:      Rate and Rhythm: Normal rate and regular rhythm.   Pulmonary:      Effort: Pulmonary effort is normal. No respiratory distress.   Abdominal:      General: There is distension.      Palpations: Abdomen is soft.      Tenderness: There is abdominal tenderness (mainly across lower abdomen).      Comments: + bowel sounds throughout   Musculoskeletal:         General: No tenderness. Normal range of motion.   Skin:     General: Skin is warm and dry.   Neurological:      Mental Status: He is alert and oriented to person, place, and " time.   Psychiatric:         Mood and Affect: Mood normal.         Behavior: Behavior normal.       Labs:  Recent Results (from the past 48 hour(s))   POCT glucose    Collection Time: 10/25/22  4:48 PM   Result Value Ref Range    POCT Glucose 187 (H) 70 - 110 mg/dL   POCT glucose    Collection Time: 10/25/22  9:04 PM   Result Value Ref Range    POCT Glucose 223 (H) 70 - 110 mg/dL   POCT glucose    Collection Time: 10/26/22  5:42 AM   Result Value Ref Range    POCT Glucose 304 (H) 70 - 110 mg/dL   Protime-INR    Collection Time: 10/26/22  5:52 AM   Result Value Ref Range    PT 37.3 (H) 12.5 - 14.5 seconds    INR 3.90 (H) 0.00 - 1.30   Comprehensive Metabolic Panel    Collection Time: 10/26/22  5:52 AM   Result Value Ref Range    Sodium Level 127 (L) 136 - 145 mmol/L    Potassium Level 5.5 (H) 3.5 - 5.1 mmol/L    Chloride 93 (L) 98 - 107 mmol/L    Carbon Dioxide 23 22 - 29 mmol/L    Glucose Level 290 (H) 74 - 100 mg/dL    Blood Urea Nitrogen 81.4 (H) 8.9 - 20.6 mg/dL    Creatinine 2.22 (H) 0.73 - 1.18 mg/dL    Calcium Level Total 8.3 (L) 8.4 - 10.2 mg/dL    Protein Total 4.6 (L) 6.4 - 8.3 gm/dL    Albumin Level 1.4 (L) 3.5 - 5.0 gm/dL    Globulin 3.2 2.4 - 3.5 gm/dL    Albumin/Globulin Ratio 0.4 (L) 1.1 - 2.0 ratio    Bilirubin Total 0.5 <=1.5 mg/dL    Alkaline Phosphatase 114 40 - 150 unit/L    Alanine Aminotransferase 10 0 - 55 unit/L    Aspartate Aminotransferase 17 5 - 34 unit/L    eGFR 39 mls/min/1.73/m2   CBC with Differential    Collection Time: 10/26/22  5:52 AM   Result Value Ref Range    WBC 26.9 (H) 4.5 - 11.5 x10(3)/mcL    RBC 2.38 (L) 4.70 - 6.10 x10(6)/mcL    Hgb 6.7 (L) 14.0 - 18.0 gm/dL    Hct 20.6 (L) 42.0 - 52.0 %    MCV 86.6 80.0 - 94.0 fL    MCH 28.2 27.0 - 31.0 pg    MCHC 32.5 (L) 33.0 - 36.0 mg/dL    RDW 15.9 11.5 - 17.0 %    Platelet 222 130 - 400 x10(3)/mcL    MPV 11.3 (H) 7.4 - 10.4 fL    IG# 0.11 (H) 0 - 0.04 x10(3)/mcL    IG% 0.4 %    NRBC% 0.0 %   Manual Differential    Collection Time:  10/26/22  5:52 AM   Result Value Ref Range    Neut Man 97 %    Lymph Man 2 %    Monocyte Man 1 %    Instr WBC 26.9 x10(3)/mcL    Abs Mono 0.269 0.1 - 1.3 x10(3)/mcL    Abs Lymp 0.538 (L) 0.6 - 4.6 x10(3)/mcL    Abs Neut 26.093 (H) 2.1 - 9.2 x10(3)/mcL    RBC Morph Normal Normal    Platelet Est Normal Normal, Adequate   Prepare RBC 2 Units; to give    Collection Time: 10/26/22 10:03 AM   Result Value Ref Range    UNIT NUMBER [id number]     UNIT ABO/RH O POS     DISPENSE STATUS Transfused     Unit Expiration [id number]     Product Code R4809G01     Unit Blood Type Code 5100     CROSSMATCH INTERPRETATION Compatible     UNIT NUMBER [id number]     UNIT ABO/RH O POS     DISPENSE STATUS Transfused     Unit Expiration [id number]     Product Code P0657I18     Unit Blood Type Code 5100     CROSSMATCH INTERPRETATION Compatible    Type & Screen    Collection Time: 10/26/22 10:03 AM   Result Value Ref Range    Group & Rh O POS     Indirect Bishop GEL NEG    POCT glucose    Collection Time: 10/26/22 10:07 AM   Result Value Ref Range    POCT Glucose 281 (H) 70 - 110 mg/dL   POCT glucose    Collection Time: 10/26/22  4:51 PM   Result Value Ref Range    POCT Glucose 154 (H) 70 - 110 mg/dL   POCT glucose    Collection Time: 10/26/22  8:22 PM   Result Value Ref Range    POCT Glucose 128 (H) 70 - 110 mg/dL   Protime-INR    Collection Time: 10/27/22  5:10 AM   Result Value Ref Range    PT 37.6 (H) 12.5 - 14.5 seconds    INR 3.93 (H) 0.00 - 1.30   Basic Metabolic Panel    Collection Time: 10/27/22  5:10 AM   Result Value Ref Range    Sodium Level 127 (L) 136 - 145 mmol/L    Potassium Level 5.4 (H) 3.5 - 5.1 mmol/L    Chloride 92 (L) 98 - 107 mmol/L    Carbon Dioxide 24 22 - 29 mmol/L    Glucose Level 305 (H) 74 - 100 mg/dL    Blood Urea Nitrogen 90.6 (H) 8.9 - 20.6 mg/dL    Creatinine 2.17 (H) 0.73 - 1.18 mg/dL    BUN/Creatinine Ratio 42     Calcium Level Total 8.2 (L) 8.4 - 10.2 mg/dL    Anion Gap 11.0 mEq/L    eGFR 40  mls/min/1.73/m2   Hemoglobin and Hematocrit    Collection Time: 10/27/22  5:10 AM   Result Value Ref Range    Hgb 10.7 (L) 14.0 - 18.0 gm/dL    Hct 31.3 (L) 42.0 - 52.0 %   CBC Without Differential    Collection Time: 10/27/22  5:10 AM   Result Value Ref Range    WBC 33.4 (H) 4.5 - 11.5 x10(3)/mcL    RBC 3.77 (L) 4.70 - 6.10 x10(6)/mcL    Hgb 10.7 (L) 14.0 - 18.0 gm/dL    Hct 31.8 (L) 42.0 - 52.0 %    Platelet 217 130 - 400 x10(3)/mcL    MCV 84.4 80.0 - 94.0 fL    MCH 28.4 27.0 - 31.0 pg    MCHC 33.6 33.0 - 36.0 mg/dL    RDW 15.2 11.5 - 17.0 %    MPV 10.1 7.4 - 10.4 fL    NRBC% 0.0 %   Hepatitis B surface antigen    Collection Time: 10/27/22  5:10 AM   Result Value Ref Range    Hepatitis B Surface Antigen Nonreactive Nonreactive   POCT glucose    Collection Time: 10/27/22  5:42 AM   Result Value Ref Range    POCT Glucose 312 (H) 70 - 110 mg/dL       US Scrotum And Testicles   Final Result      CT Abdomen Pelvis With Contrast   Final Result      1. No normal pancreatic parenchyma is seen.  At the level of the pancreas there is a large fluid collection with a few foci of gas.  Loculated, faintly peripherally enhancing fluid collections extend along the pericolic gutters.  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  Foci of gas are nonspecific in the setting of prior intervention.   2. Extensive loculated peritoneal collections with peripheral enhancement compatible with peritonitis.Left inguinal hernia contains ascitic fluid with some peripheral enhancement similar to the peritoneal fluid.  There is no herniated bowel loop   3. There is a moderate colonic stool burden.  There is some mass effect on the bowel loops due to the loculated nature of the abdominal fluid collections.   4. Anasarca   5. Partially imaged known thoracic aortic dissection status post intervention.         Electronically signed by: Rocio White   Date:    10/26/2022   Time:    12:37       X-Ray Chest 1 View for Line/Tube Placement   Final Result      Right PICC tip overlies the mid SVC.         Electronically signed by: Kapil Ayers   Date:    10/25/2022   Time:    16:56      CT Abdomen Pelvis  Without Contrast   Final Result      Bilateral pleural effusions with moderate ascites and diffuse anasarca.  The epigastric and left mid abdominal ascites appears loculated and peritonitis cannot be excluded.         Electronically signed by: Grabiel Singleton MD   Date:    10/24/2022   Time:    23:30      X-Ray Abdomen AP 1 View   Final Result      Residual feces         Electronically signed by: Ramón Ying   Date:    10/24/2022   Time:    09:46      X-Ray Chest 1 View   Final Result      No significant interval change.         Electronically signed by: Kapil Ayers   Date:    10/22/2022   Time:    15:13      X-Ray Chest 1 View   Final Result      Persistent increased left retrocardiac density and silhouetting of the left hemidiaphragm.      No other focal consolidative changes.      No other change         Electronically signed by: Ramón Ying   Date:    10/12/2022   Time:    08:15      Fl Modified Barium Swallow Speech   Final Result      X-Ray Chest 1 View   Final Result      Unchanged retrocardiac opacity suggesting atelectasis         Electronically signed by: Rocio White   Date:    10/05/2022   Time:    06:11      X-Ray Chest 1 View   Final Result      New central venous catheter terminates within the proximal right atrium.         Electronically signed by: Jameson Brumfield   Date:    10/04/2022   Time:    14:16      X-Ray Chest 1 View   Final Result      No significant interval change.         Electronically signed by: Kapil Ayers   Date:    10/04/2022   Time:    06:54      CT Chest Abdomen Pelvis Without Contrast (XPD)   Final Result      Very limited study due the lack of IV contrast.      Anasarca.      Small amount of ascites.      Small left-sided pleural effusion with bibasilar  atelectasis versus infiltrate.         Electronically signed by: Joey Vidal MD   Date:    10/03/2022   Time:    21:30      X-Ray Chest 1 View   Final Result      As above.         Electronically signed by: Jameson Brumfield   Date:    10/03/2022   Time:    07:09      X-Ray Chest 1 View   Final Result      As above.         Electronically signed by: Jakob Boland   Date:    10/02/2022   Time:    14:50      X-ray Abdomen for NG Tube Placement (Nursing should notify Radiology after placement)   Final Result      Optimal placement of the nasogastric tube.         Electronically signed by: Jameson Brumfield   Date:    10/01/2022   Time:    11:48      X-Ray Chest 1 View   Final Result      No significant change         Electronically signed by: Ramón Ying   Date:    10/01/2022   Time:    08:05      X-Ray Chest 1 View   Final Result      No acute findings or significant interval change.         Electronically signed by: Kapil Ayers   Date:    09/30/2022   Time:    07:35      X-Ray Chest 1 View   Final Result      Stable exam without significant interval change.         Electronically signed by: Claribel Deal   Date:    09/29/2022   Time:    16:14      X-Ray Chest 1 View   Final Result      Possible mild left perihilar/lower lung atelectasis.  Suspect tiny right apical pneumothorax.         Electronically signed by: Darius Rosario   Date:    09/29/2022   Time:    06:09      X-Ray Chest 1 View   Final Result      No significant interval change.         Electronically signed by: Jameson Brumfield   Date:    09/28/2022   Time:    16:25      X-Ray Chest 1 View   Final Result      Similar appearance to prior exam with retrocardiac atelectasis         Electronically signed by: Rocio White   Date:    09/28/2022   Time:    06:05      X-Ray Chest 1 View   Final Result      No significant change         Electronically signed by: Ramón Ying   Date:    09/27/2022   Time:    08:47      X-Ray Abdomen AP 1 View   Final Result       Satisfactory central femoral line placement.         Electronically signed by: Emily Candelario MD   Date:    09/26/2022   Time:    13:54      X-Ray Chest 1 View   Final Result      Interval retraction of endotracheal tube, satisfactory position.  Cardiac lead placement in the interval.  No other significant change.         Electronically signed by: Emily Candelario MD   Date:    09/26/2022   Time:    08:00      X-Ray Chest 1 View   Final Result      Increased volume status      Interval intubation with endotracheal tube at the josie.  I recommend retracting      Right-sided chest tube in good position         Electronically signed by: Too Reyes   Date:    09/25/2022   Time:    15:49      X-Ray Abdomen AP 1 View   Final Result      There is slight increased density of the kidneys of questionable etiology and or significance.      Otherwise nonspecific gas pattern         Electronically signed by: Ramón Ying   Date:    09/25/2022   Time:    10:51      CTA Chest Abdomen Non Coronary   Final Result      1. Thoracic aortic endograft placement since 09/23/2022 with the dissection flap extending about 3 cm inferior to the distal portion of the graft.  Opacification of the false lumen along the mid and distal portions of the graft.   2. Moderate volume hemoperitoneum.  Changes involving the liver, spleen, pancreas and bowel are suggestive of overall hypoperfusion.  Hyperdense kidneys suggest acute kidney injury.   3. Small to moderate bilateral pleural effusions, larger on the right.   Findings discussed with Mr. Guevara ICU nurseDulce at 08:35 hours on 09/25/2022.         Electronically signed by: Darius Rosario   Date:    09/25/2022   Time:    08:42      X-Ray Chest 1 View   Final Result      Changes suggestive of right-sided pleural effusion.      Increased left retrocardiac density and silhouetting of the left hemidiaphragm which might be related to an infiltrate/atelectasis.      Interval  placement of thoracic endograft.      No focal consolidative changes         Electronically signed by: Ramón Ying   Date:    09/25/2022   Time:    09:10      IR Abdominal Aortobifemoral   Final Result      Fluoroscopic assistance provided as above.         Electronically signed by: Kapil Ayers   Date:    09/26/2022   Time:    09:56      CT Abscess Aspiration without Catheter    (Results Pending)       Imaging:    Procedure Component Value Units Date/Time   US Scrotum And Testicles [[id number]] Resulted: 10/26/22 1241   Order Status: Completed Updated: 10/26/22 1243   Narrative:     EXAMINATION   US SCROTUM AND TESTICLES     CLINICAL HISTORY   scrotal swelling and tenderness. Concern for nec fasc vs L incarcerated hernia;     TECHNIQUE   Grayscale and Doppler interrogation of the scrotum and testes.     COMPARISON   None available at the time of initial interpretation.     FINDINGS   Exam quality: adequate for evaluation     Right Testicle: Surface contour intact and smooth. No evidence of focal mass or infiltrative parenchymal abnormality.  No significant enlargement or heterogeneity of the epididymis.     Left Testicle: Surface contour intact and smooth. No evidence of focal mass or infiltrative parenchymal abnormality.  No significant enlargement or heterogeneity of the epididymis.     Doppler Interrogation:  Bilateral spontaneous arterial flow is demonstrated within each testicle, with symmetric velocity and normal low-resistance spectral waveform.  Bilateral venous outflow is maintained.     Other findings: Moderate volume simple appearing left hydrocele is present.  No varicocele appreciated bilaterally. There is extensive bilateral scrotal edema, without evidence of focal lesion or drainable collection.       Measurements:     *Right testicle: 3 cm x 1.7 cm x 2.1 cm (6 cc)   *Left testicle: 3 cm x 1.8 cm x 1.8 cm (5 cc)     IMPRESSION   1. No evidence of active testicular torsion or other acute process,  no findings to suggest focal testicular lesion.   2. Extensive regional subcutaneous edema without drainable collection.   3. Moderate volume simple appearing left hydrocele.       Electronically signed by: Bean Landaverde   Date: 10/26/2022   Time: 12:41   CT Abdomen Pelvis With Contrast [[id number]] Resulted: 10/26/22 1237   Order Status: Completed Updated: 10/26/22 1239   Narrative:     EXAMINATION:   CT ABDOMEN PELVIS WITH CONTRAST     CLINICAL HISTORY:   Abdominal pain, post-op;     TECHNIQUE:   Helically acquired images with axial, sagittal and coronal reformations were obtained from the lung bases to the pubic symphysis after the IV administration of contrast.     Automated tube current modulation, weight-based exposure dosing, and/or iterative reconstruction technique utilized to reach lowest reasonably achievable exposure rate.     DLP: 635 mGy*cm     COMPARISON:   CT abdomen pelvis 10/24/2022     FINDINGS:   HEART: There is a partially visible aortic valve prosthesis.  Distal aspect of a central venous catheter is seen at the superior cavoatrial junction.     LUNG BASES: There are layering pleural effusions.  Dependent atelectasis.     LIVER: Normal attenuation. No appreciable focal hepatic lesion.     BILIARY: Collapse gallbladder versus small cystic duct remnant.     PANCREAS: No normal pancreatic parenchyma seen.  At the retroperitoneum in the expected region of the bed of the pancreas there is loculated fluid and a few foci of gas.  Loculated collections extend along the pericolic gutters bilaterally.     SPLEEN: Normal in size     ADRENALS: No mass.     KIDNEYS/URETERS: The kidneys enhance symmetrically.  No hydronephrosis.     GI TRACT/MESENTERY: Evaluation of the bowel is limited without oral contrast. The stomach is decompressed.  Small bowel loops are not appreciably dilated.  There is a moderate colonic stool burden.     There is some mass effect on bowel loops due to the loculated nature abdominal  fluid collections.     PERITONEUM: There is a large volume of loculated peritoneal and retroperitoneal fluid and there is peritoneal enhancement.  This extends into a left inguinal hernia.There is free air in the region of the pancreatic bed/lesser sac.     LYMPH NODES: Nonspecific small retroperitoneal lymph nodes.     VASCULATURE: There is an aortic dissection with stent graft partially imaged in the true lumen of the descending thoracic aorta.  Dissection flap not seen to extend into the abdominal aorta on this non angiographic exam.  There is patent runoff to the groin.  The portal, splenic and superior mesenteric veins are patent.     BLADDER: Nondistended bladder limits CT evaluation there are few foci of gas within the bladder lumen compatible with recent instrumentation.     REPRODUCTIVE ORGANS: Normal as visualized.     SOFT TISSUES: Diffuse, severe anasarca.     BONES: Biconvex deformities at the lower lumbar spine.    Impression:       1. No normal pancreatic parenchyma is seen.  At the level of the pancreas there is a large fluid collection with a few foci of gas.  Loculated, faintly peripherally enhancing fluid collections extend along the pericolic gutters.  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  Foci of gas are nonspecific in the setting of prior intervention.   2. Extensive loculated peritoneal collections with peripheral enhancement compatible with peritonitis.Left inguinal hernia contains ascitic fluid with some peripheral enhancement similar to the peritoneal fluid.  There is no herniated bowel loop   3. There is a moderate colonic stool burden.  There is some mass effect on the bowel loops due to the loculated nature of the abdominal fluid collections.   4. Anasarca   5. Partially imaged known thoracic aortic dissection status post intervention.       Electronically signed by: Rocio White   Date: 10/26/2022   Time: 12:37      Assessment/Plan:  1. Systemic lupus erythematosus, unspecified SLE type, unspecified organ involvement status    2. Aortic dissection    3. DVT (deep venous thrombosis)    4. Arrhythmia    5. Symptomatic bradycardia    6. Cardiomegaly    7. FUO (fever of unknown origin)    8. Heart beat abnormality    9. Sinus bradycardia    10. End stage renal disease    11. Serositis    12. Hypertension, unspecified type         33 y/o male unknown to our group who has a past medical history of HTN, SLE,  hepatitis B, aortic dissection with repair 2/2021, RUE DVT on Xarelto. Admitted with aortic dissection requiring sx. He has had a very complex case since. GI consulted of possible necrotizing pancreatitis seen on CT.    Peritoneal fluid collection +/- peritonitis  Possible necrotizing pancreatitis  Abdominal distention  Constipation  -Plans for CT guided drainage of peritoneal fluid noted. We will order amylase body fluid to assess for pancreatic ductal leak as the cause of the fluid collection  -Will order serum amylase and lipase  -Keep NPO for now  -Continue IV nutrition  -Continue abx  -Supportive care  -Will discuss further with Dr. Juana Tijerina PA-C    Thank you for allowing us to participate in the care of Stuart Guevara.

## 2022-10-27 NOTE — PLAN OF CARE
Problem: Adult Inpatient Plan of Care  Goal: Plan of Care Review  10/27/2022 0437 by Alice Deal RN  Outcome: Ongoing, Progressing  10/27/2022 0436 by Alice Deal RN  Outcome: Ongoing, Progressing  Goal: Patient-Specific Goal (Individualized)  10/27/2022 0437 by Alice Deal RN  Outcome: Ongoing, Progressing  10/27/2022 0436 by Alice Deal RN  Outcome: Ongoing, Progressing  Goal: Absence of Hospital-Acquired Illness or Injury  10/27/2022 0437 by Alice Deal RN  Outcome: Ongoing, Progressing  10/27/2022 0436 by Alice Deal RN  Outcome: Ongoing, Progressing  Goal: Optimal Comfort and Wellbeing  10/27/2022 0437 by Alice Deal RN  Outcome: Ongoing, Progressing  10/27/2022 0436 by Alice Deal RN  Outcome: Ongoing, Progressing  Goal: Readiness for Transition of Care  10/27/2022 0437 by Alice Deal RN  Outcome: Ongoing, Progressing  10/27/2022 0436 by Alice Deal RN  Outcome: Ongoing, Progressing     Problem: Infection  Goal: Absence of Infection Signs and Symptoms  10/27/2022 0437 by Alice Deal RN  Outcome: Ongoing, Progressing  10/27/2022 0436 by Alice Deal RN  Outcome: Ongoing, Progressing     Problem: Skin Injury Risk Increased  Goal: Skin Health and Integrity  10/27/2022 0437 by Alice Deal RN  Outcome: Ongoing, Progressing  10/27/2022 0436 by Alice Deal RN  Outcome: Ongoing, Progressing     Problem: Fall Injury Risk  Goal: Absence of Fall and Fall-Related Injury  10/27/2022 0437 by Alice Deal RN  Outcome: Ongoing, Progressing  10/27/2022 0436 by Alice Deal RN  Outcome: Ongoing, Progressing     Problem: Nutrition Impairment (Mechanical Ventilation, Invasive)  Goal: Optimal Nutrition Delivery  Outcome: Ongoing, Progressing     Problem: Skin and Tissue Injury (Mechanical Ventilation, Invasive)  Goal: Absence of Device-Related Skin and Tissue Injury  Outcome: Ongoing, Progressing     Problem: Device-Related Complication Risk (CRRT (Continuous Renal Replacement Therapy))  Goal: Safe,  Effective Therapy Delivery  Outcome: Ongoing, Progressing     Problem: Hypothermia (CRRT (Continuous Renal Replacement Therapy))  Goal: Body Temperature Maintained in Desired Range  Outcome: Ongoing, Progressing     Problem: Infection (CRRT (Continuous Renal Replacement Therapy))  Goal: Absence of Infection Signs and Symptoms  10/27/2022 0437 by Alice Deal RN  Outcome: Ongoing, Progressing  10/27/2022 0436 by Alice Deal RN  Outcome: Ongoing, Progressing     Problem: Device-Related Complication Risk (Hemodialysis)  Goal: Safe, Effective Therapy Delivery  10/27/2022 0437 by Alice Deal RN  Outcome: Ongoing, Progressing  10/27/2022 0436 by Alice Deal RN  Outcome: Ongoing, Progressing     Problem: Hemodynamic Instability (Hemodialysis)  Goal: Effective Tissue Perfusion  10/27/2022 0437 by Alice Deal RN  Outcome: Ongoing, Progressing  10/27/2022 0436 by Alice Deal RN  Outcome: Ongoing, Progressing     Problem: Infection (Hemodialysis)  Goal: Absence of Infection Signs and Symptoms  10/27/2022 0437 by Alice Deal RN  Outcome: Ongoing, Progressing  10/27/2022 0436 by Alice Deal RN  Outcome: Ongoing, Progressing     Problem: Hyperglycemia  Goal: Blood Glucose Level Within Targeted Range  Outcome: Ongoing, Progressing     Problem: Impaired Wound Healing  Goal: Optimal Wound Healing  10/27/2022 0437 by Alice Deal RN  Outcome: Ongoing, Progressing  10/27/2022 0436 by Alice Deal RN  Outcome: Ongoing, Progressing

## 2022-10-28 NOTE — PT/OT/SLP PROGRESS
Occupational Therapy      Patient Name:  Stuart Guevara   MRN:  [id number]    Pt not seen today secondary to patient refusal on two attempts due to abdominal pain. Pt states he will work with therapy tomorrow.     10/28/2022

## 2022-10-28 NOTE — CONSULTS
Infectious Diseases Consultation       Inpatient consult to Infectious Diseases  Consult performed by: Aleah Calix MD  Consult ordered by: Saurabh Curry MD      HPI:   32-year-old male with past medical history of SLE, HTN, hepatitis-B, aortic dissection with repair in February 2021, DVT on Xarelto, initially presented to the [facility] ED on 09/23/2022 with shortness of breath and chest pain.  Evaluation revealed a aortic dissection type B, with progressive course requiring transferred to [facility], promptly taken to OR for TEVAR with left carotid to subclavian artery bypass.  He had a postoperative complicated course with development of compartment syndrome found on CT angiography of chest/abdomen and pelvis with hemoperitoneum.  This required exploratory laparotomy, source apparently unstable bradycardia requiring pacemaker and then acute kidney injury and initiation of hemodialysis.  He had been hospitalized in the ICU but since downgraded to the floor and out on the floor.  CT scan of the abdomen and pelvis done on 10/26 showed no normal pancreatic parenchyma seen with a large fluid collection with a few foci of gas noted at the level of the pancreas, loculated, faintly peripherally enhancing fluid collection extending along the pericolic gutter as findings compatible with sequela of severe necrotizing pancreatitis with walled-off necrosis, mass effect on bowel loops from abdominal fluid collections, anasarca.  Ultrasound of the scrotum with noted edema.  He has been pretty much afebrile lately with a low-grade temp of 99.5 noted on 10/25 with associated leukocytosis of 27.5 which worse today at 33.4.  He is currently on antimicrobial coverage with meropenem.    Past Medical and Surgical History  Allergies :   Levofloxacin and Sulfamethoxazole-trimethoprim    Medical :   He has a past medical history of Hypertension, Systemic lupus erythematosus, organ or system  involvement unspecified, and Systemic lupus erythematosus, organ or system involvement unspecified.    Surgical :   He has a past surgical history that includes Cardiac surgery; Toe amputation; Finger amputation; Thrombectomy (Right); Application of wound vacuum-assisted closure device (9/27/2022); Endovascular repair of thoracic aorta (N/A, 9/23/2022); Creation of bypass from internal carotid artery to subclavian artery (Left, 9/23/2022); and Insertion of tunneled central venous hemodialysis catheter (N/A, 10/24/2022).     Family History   reviewed and noncontributory    Social History  He reports that he has been smoking cigarettes. He has never used smokeless tobacco.     Review of Systems   Constitutional:  Positive for malaise/fatigue.   HENT: Negative.     Respiratory: Negative.     Gastrointestinal:  Positive for abdominal pain and nausea.   Genitourinary: Negative.    Musculoskeletal: Negative.    Neurological:  Positive for weakness.   Endo/Heme/Allergies: Negative.    Psychiatric/Behavioral: Negative.     All other Systems review done and negative.      Objective   Physical Exam  Vitals reviewed.   Constitutional:       General: He is not in acute distress.     Appearance: He is ill-appearing.      Comments: Weak in bed, mother at the bedside   HENT:      Head: Normocephalic and atraumatic.      Mouth/Throat:      Comments: Poor dentition  Eyes:      Pupils: Pupils are equal, round, and reactive to light.   Neck:      Comments: Right IJ vascular access noted  Cardiovascular:      Rate and Rhythm: Normal rate and regular rhythm.      Heart sounds: Normal heart sounds.   Pulmonary:      Effort: No respiratory distress.      Breath sounds: Normal breath sounds.   Abdominal:      General: Bowel sounds are normal. There is distension.      Palpations: Abdomen is soft.      Tenderness: There is abdominal tenderness.      Comments: Abdominal scars from previous surgery   Genitourinary:     Comments: Significant  "scrotal edema  Musculoskeletal:         General: No deformity.      Cervical back: Neck supple.      Right lower leg: Edema present.      Left lower leg: Edema present.   Skin:     Findings: No erythema or rash.      Comments:  RUE PICC site intact   Neurological:      Mental Status: He is alert.     VITAL SIGNS: 24 HR MIN & MAX LAST    Temp  Min: 98.1 °F (36.7 °C)  Max: 98.6 °F (37 °C)  98.6 °F (37 °C)        BP  Min: 138/77  Max: 167/87  (!) 167/87     Pulse  Min: 79  Max: 100  100     Resp  Min: 12  Max: 20  16    SpO2  Min: 94 %  Max: 95 %  95 %      HT: 5' 5" (165.1 cm)  WT: 56.6 kg (124 lb 12.5 oz)  BMI: 20.8     Recent Results (from the past 24 hour(s))   Protime-INR    Collection Time: 10/27/22  5:10 AM   Result Value Ref Range    PT 37.6 (H) 12.5 - 14.5 seconds    INR 3.93 (H) 0.00 - 1.30   Basic Metabolic Panel    Collection Time: 10/27/22  5:10 AM   Result Value Ref Range    Sodium Level 127 (L) 136 - 145 mmol/L    Potassium Level 5.4 (H) 3.5 - 5.1 mmol/L    Chloride 92 (L) 98 - 107 mmol/L    Carbon Dioxide 24 22 - 29 mmol/L    Glucose Level 305 (H) 74 - 100 mg/dL    Blood Urea Nitrogen 90.6 (H) 8.9 - 20.6 mg/dL    Creatinine 2.17 (H) 0.73 - 1.18 mg/dL    BUN/Creatinine Ratio 42     Calcium Level Total 8.2 (L) 8.4 - 10.2 mg/dL    Anion Gap 11.0 mEq/L    eGFR 40 mls/min/1.73/m2   Hemoglobin and Hematocrit    Collection Time: 10/27/22  5:10 AM   Result Value Ref Range    Hgb 10.7 (L) 14.0 - 18.0 gm/dL    Hct 31.3 (L) 42.0 - 52.0 %   CBC Without Differential    Collection Time: 10/27/22  5:10 AM   Result Value Ref Range    WBC 33.4 (H) 4.5 - 11.5 x10(3)/mcL    RBC 3.77 (L) 4.70 - 6.10 x10(6)/mcL    Hgb 10.7 (L) 14.0 - 18.0 gm/dL    Hct 31.8 (L) 42.0 - 52.0 %    Platelet 217 130 - 400 x10(3)/mcL    MCV 84.4 80.0 - 94.0 fL    MCH 28.4 27.0 - 31.0 pg    MCHC 33.6 33.0 - 36.0 mg/dL    RDW 15.2 11.5 - 17.0 %    MPV 10.1 7.4 - 10.4 fL    NRBC% 0.0 %   Hepatitis B surface antigen    Collection Time: 10/27/22  5:10 " AM   Result Value Ref Range    Hepatitis B Surface Antigen Nonreactive Nonreactive   POCT glucose    Collection Time: 10/27/22  5:42 AM   Result Value Ref Range    POCT Glucose 312 (H) 70 - 110 mg/dL   POCT glucose    Collection Time: 10/27/22  5:20 PM   Result Value Ref Range    POCT Glucose 187 (H) 70 - 110 mg/dL   Amylase    Collection Time: 10/27/22  6:15 PM   Result Value Ref Range    Amylase Level 14 (L) 25 - 125 unit/L   Lipase    Collection Time: 10/27/22  6:15 PM   Result Value Ref Range    Lipase Level <4 <=60 U/L   Prealbumin    Collection Time: 10/27/22  6:16 PM   Result Value Ref Range    Prealbumin 18.1 18.0 - 45.0 mg/dL       Imaging     US SCROTUM AND TESTICLES     CLINICAL HISTORY   scrotal swelling and tenderness. Concern for nec fasc vs L incarcerated hernia;     TECHNIQUE   Grayscale and Doppler interrogation of the scrotum and testes.     COMPARISON   None available at the time of initial interpretation.     FINDINGS   Exam quality: adequate for evaluation     Right Testicle: Surface contour intact and smooth. No evidence of focal mass or infiltrative parenchymal abnormality.  No significant enlargement or heterogeneity of the epididymis.     Left Testicle: Surface contour intact and smooth. No evidence of focal mass or infiltrative parenchymal abnormality.  No significant enlargement or heterogeneity of the epididymis.     Doppler Interrogation:  Bilateral spontaneous arterial flow is demonstrated within each testicle, with symmetric velocity and normal low-resistance spectral waveform.  Bilateral venous outflow is maintained.     Other findings: Moderate volume simple appearing left hydrocele is present.  No varicocele appreciated bilaterally. There is extensive bilateral scrotal edema, without evidence of focal lesion or drainable collection.       Measurements:     *Right testicle: 3 cm x 1.7 cm x 2.1 cm (6 cc)   *Left testicle: 3 cm x 1.8 cm x 1.8 cm (5 cc)     IMPRESSION   1. No evidence  of active testicular torsion or other acute process, no findings to suggest focal testicular lesion.   2. Extensive regional subcutaneous edema without drainable collection.   3. Moderate volume simple appearing left hydrocele.       Electronically signed by: Bean Landaverde   Date: 10/26/2022   Time: 12:41   CT Abdomen Pelvis With Contrast [[id number]] Resulted: 10/26/22 1237   Order Status: Completed Updated: 10/26/22 1239   Narrative:     EXAMINATION:   CT ABDOMEN PELVIS WITH CONTRAST     CLINICAL HISTORY:   Abdominal pain, post-op;     TECHNIQUE:   Helically acquired images with axial, sagittal and coronal reformations were obtained from the lung bases to the pubic symphysis after the IV administration of contrast.     Automated tube current modulation, weight-based exposure dosing, and/or iterative reconstruction technique utilized to reach lowest reasonably achievable exposure rate.     DLP: 635 mGy*cm     COMPARISON:   CT abdomen pelvis 10/24/2022     FINDINGS:   HEART: There is a partially visible aortic valve prosthesis.  Distal aspect of a central venous catheter is seen at the superior cavoatrial junction.     LUNG BASES: There are layering pleural effusions.  Dependent atelectasis.     LIVER: Normal attenuation. No appreciable focal hepatic lesion.     BILIARY: Collapse gallbladder versus small cystic duct remnant.     PANCREAS: No normal pancreatic parenchyma seen.  At the retroperitoneum in the expected region of the bed of the pancreas there is loculated fluid and a few foci of gas.  Loculated collections extend along the pericolic gutters bilaterally.     SPLEEN: Normal in size     ADRENALS: No mass.     KIDNEYS/URETERS: The kidneys enhance symmetrically.  No hydronephrosis.     GI TRACT/MESENTERY: Evaluation of the bowel is limited without oral contrast. The stomach is decompressed.  Small bowel loops are not appreciably dilated.  There is a moderate colonic stool burden.     There is some mass effect  on bowel loops due to the loculated nature abdominal fluid collections.     PERITONEUM: There is a large volume of loculated peritoneal and retroperitoneal fluid and there is peritoneal enhancement.  This extends into a left inguinal hernia.There is free air in the region of the pancreatic bed/lesser sac.     LYMPH NODES: Nonspecific small retroperitoneal lymph nodes.     VASCULATURE: There is an aortic dissection with stent graft partially imaged in the true lumen of the descending thoracic aorta.  Dissection flap not seen to extend into the abdominal aorta on this non angiographic exam.  There is patent runoff to the groin.  The portal, splenic and superior mesenteric veins are patent.     BLADDER: Nondistended bladder limits CT evaluation there are few foci of gas within the bladder lumen compatible with recent instrumentation.     REPRODUCTIVE ORGANS: Normal as visualized.     SOFT TISSUES: Diffuse, severe anasarca.     BONES: Biconvex deformities at the lower lumbar spine.    Impression:       1. No normal pancreatic parenchyma is seen.  At the level of the pancreas there is a large fluid collection with a few foci of gas.  Loculated, faintly peripherally enhancing fluid collections extend along the pericolic gutters.  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  Foci of gas are nonspecific in the setting of prior intervention.   2. Extensive loculated peritoneal collections with peripheral enhancement compatible with peritonitis.Left inguinal hernia contains ascitic fluid with some peripheral enhancement similar to the peritoneal fluid.  There is no herniated bowel loop   3. There is a moderate colonic stool burden.  There is some mass effect on the bowel loops due to the loculated nature of the abdominal fluid collections.   4. Anasarca   5. Partially imaged known thoracic aortic dissection status post intervention.       Electronically signed by:  Rocio White   Date: 10/26/2022   Time: 12:37   X-Ray Chest 1 View for Line/Tube Placement [[id number]] Resulted: 10/25/22 1656   Order Status: Completed Updated: 10/25/22 1658   Narrative:     EXAMINATION:   XR CHEST 1 VIEW FOR LINE/TUBE PLACEMENT     CLINICAL HISTORY:   picc placement;     COMPARISON:   22 October 2022     FINDINGS:   Portable frontal view of the chest was obtained. Right-sided PICC tip overlies the mid SVC.  Stable right-sided central line.  Cardiac silhouette unchanged.  Continued hazy bibasilar opacities.  No pneumothorax.    Impression:       Right PICC tip overlies the mid SVC.          Impression  1. Sepsis syndrome with severe leukocytosis  2.  Intra-abdominal infection/peritonitis/abscesses  3.  Severe necrotizing pancreatitis  4.  Acute kidney injury on hemodialysis  5. Abdominal compartment syndrome  6.  Anemia  7.  Protein calorie malnutrition  8.  History of hepatitis-B   9. Thoracic aortic dissection    Recommendations  I agree with the management of this patient.  Unfortunate young man with multiple comorbidities including severe necrotizing pancreatitis and compartment syndrome, associated sepsis syndrome due to intra-abdominal source.  We will continue aggressive intra-abdominal coverage with meropenem and will add fluconazole.  We will follow with labs in a.m. to include procalcitonin level.  Renal impairment noted and then hemodialysis per Nephrology.  He has been seen by multiple other specialists including GI, pulmonology, and surgery with inputs noted.  Hepatitis B evaluation and management to continue as outpatient.  Guarded prognosis.  Case is discussed with patient and mother at the bedside, questions solicited and answered.  I have also discussed the case with nursing staff.  I would like to thank the team very much for the opportunity to assist in the care of this patient.

## 2022-10-28 NOTE — PROGRESS NOTES
"Gastroenterology Progress Note    Subjective:  Initial Consult 10/27/22:  31 y/o male unknown to our group who has a past medical history of HTN, SLE,  hepatitis B, aortic dissection with repair 2/2021, RUE DVT on Xarelto.     Patient was initially admitted to [facility] on 9/23/2022 after presenting with chest pain radiating to his back and shortness of breath. He was found to have a type B aortic dissection and significant hypertension. He ultimately transferred to [facility] for CV surgery evaluation which he was taken  to the OR and had TEVAR with left carotid to subclavian artery bypass. Patient later developed hemoperitoneum and compartment syndrome of the abdomen exploratory laparotomy with evidence of viable bowel and no significant bleeding identified, abd was closed with flakito drain left in situ chest tube was placed. Postoperatively, he developed severe unstable bradycardia, and a transvenous pacemaker was placed by Cardiology. He then developed renal failure requiring initiation of HD. He was eventually started on steroids and other immunomodulating drugs for a lupus flare. Most recently, patient reported worsening abdominal pain and distention- See CT Abdomen/Pelvis from 10/26/22.    GI is now consulted to evaluate. He was NPO, abx changed to Merrem, and his immumomodulating drugs were stopped.    10/28/22:  Paracentesis with IR done this morning- Amylse body fluid pending.   He continues to endorse lower abdomial tenderness but this seems to be improving in nature compared to yesterday. He denies N/V.   He had a BM this morning described as formed and "pastey" in color. No signs of melena or hematochezia.     Objective:    ROS:    Review of Systems   Constitutional:  Negative for chills and fever.   HENT:  Negative for sore throat.    Respiratory:  Negative for shortness of breath and wheezing.    Gastrointestinal:  Negative for abdominal pain, blood in stool, constipation, diarrhea, heartburn, melena, " "nausea and vomiting.   Musculoskeletal:  Negative for falls.   Skin:  Negative for rash.   Neurological:  Negative for seizures and loss of consciousness.       Vital Signs:  BP (!) 180/100   Pulse 110   Temp 98.8 °F (37.1 °C)   Resp 18   Ht 5' 5" (1.651 m)   Wt 56.6 kg (124 lb 12.5 oz)   SpO2 98%   BMI 20.76 kg/m²   Body mass index is 20.76 kg/m².    Physical Exam:    Physical Exam  Constitutional:       General: He is not in acute distress.     Appearance: Normal appearance. He is not ill-appearing.   HENT:      Head: Normocephalic.      Nose: Nose normal.   Eyes:      Extraocular Movements: Extraocular movements intact.      Conjunctiva/sclera: Conjunctivae normal.      Pupils: Pupils are equal, round, and reactive to light.   Cardiovascular:      Rate and Rhythm: Normal rate.      Pulses: Normal pulses.   Pulmonary:      Effort: Pulmonary effort is normal. No respiratory distress.      Breath sounds: No stridor. No wheezing.   Abdominal:      General: There is distension.      Tenderness: There is abdominal tenderness (lower abdominal tenderness- improving). There is no guarding or rebound.      Comments: Bandages x2 to right and left abdomen s/p paracentesis    Musculoskeletal:      Cervical back: Normal range of motion.   Skin:     Coloration: Skin is not pale.   Neurological:      General: No focal deficit present.      Mental Status: Mental status is at baseline.   Psychiatric:         Mood and Affect: Mood normal.       Labs:  Recent Results (from the past 24 hour(s))   POCT glucose    Collection Time: 10/27/22  5:20 PM   Result Value Ref Range    POCT Glucose 187 (H) 70 - 110 mg/dL   Amylase    Collection Time: 10/27/22  6:15 PM   Result Value Ref Range    Amylase Level 14 (L) 25 - 125 unit/L   Lipase    Collection Time: 10/27/22  6:15 PM   Result Value Ref Range    Lipase Level <4 <=60 U/L   Prealbumin    Collection Time: 10/27/22  6:16 PM   Result Value Ref Range    Prealbumin 18.1 18.0 - 45.0 " mg/dL   Protime-INR    Collection Time: 10/28/22  5:16 AM   Result Value Ref Range    PT 32.6 (H) 12.5 - 14.5 seconds    INR 3.27 (H) 0.00 - 1.30   Comprehensive Metabolic Panel    Collection Time: 10/28/22  5:16 AM   Result Value Ref Range    Sodium Level 128 (L) 136 - 145 mmol/L    Potassium Level 4.9 3.5 - 5.1 mmol/L    Chloride 96 (L) 98 - 107 mmol/L    Carbon Dioxide 21 (L) 22 - 29 mmol/L    Glucose Level 304 (H) 74 - 100 mg/dL    Blood Urea Nitrogen 62.9 (H) 8.9 - 20.6 mg/dL    Creatinine 1.62 (H) 0.73 - 1.18 mg/dL    Calcium Level Total 7.9 (L) 8.4 - 10.2 mg/dL    Protein Total 4.8 (L) 6.4 - 8.3 gm/dL    Albumin Level 1.5 (L) 3.5 - 5.0 gm/dL    Globulin 3.3 2.4 - 3.5 gm/dL    Albumin/Globulin Ratio 0.5 (L) 1.1 - 2.0 ratio    Bilirubin Total 0.5 <=1.5 mg/dL    Alkaline Phosphatase 108 40 - 150 unit/L    Alanine Aminotransferase 9 0 - 55 unit/L    Aspartate Aminotransferase 15 5 - 34 unit/L    eGFR 57 mls/min/1.73/m2   CBC with Differential    Collection Time: 10/28/22  5:17 AM   Result Value Ref Range    WBC 28.1 (H) 4.5 - 11.5 x10(3)/mcL    RBC 3.62 (L) 4.70 - 6.10 x10(6)/mcL    Hgb 10.3 (L) 14.0 - 18.0 gm/dL    Hct 31.4 (L) 42.0 - 52.0 %    MCV 86.7 80.0 - 94.0 fL    MCH 28.5 27.0 - 31.0 pg    MCHC 32.8 (L) 33.0 - 36.0 mg/dL    RDW 15.5 11.5 - 17.0 %    Platelet 195 130 - 400 x10(3)/mcL    MPV 9.7 7.4 - 10.4 fL    Neut % 91.9 %    Lymph % 3.1 %    Mono % 3.8 %    Eos % 0.0 %    Basophil % 0.4 %    Lymph # 0.88 0.6 - 4.6 x10(3)/mcL    Neut # 25.8 (H) 2.1 - 9.2 x10(3)/mcL    Mono # 1.06 0.1 - 1.3 x10(3)/mcL    Eos # 0.01 0 - 0.9 x10(3)/mcL    Baso # 0.10 0 - 0.2 x10(3)/mcL    IG# 0.23 (H) 0 - 0.04 x10(3)/mcL    IG% 0.8 %    NRBC% 0.0 %         Assessment/Plan:  1. Systemic lupus erythematosus, unspecified SLE type, unspecified organ involvement status    2. Aortic dissection    3. DVT (deep venous thrombosis)    4. Arrhythmia    5. Symptomatic bradycardia    6. Cardiomegaly    7. FUO (fever of unknown origin)     8. Heart beat abnormality    9. Sinus bradycardia    10. End stage renal disease    11. Serositis    12. Hypertension, unspecified type    13. Peritonitis       31 y/o male unknown to our group who has a past medical history of HTN, SLE,  hepatitis B, aortic dissection with repair 2/2021, RUE DVT on Xarelto. Admitted with aortic dissection requiring sx. He has had a very complex case since. GI consulted of possible necrotizing pancreatitis seen on CT.     Peritoneal fluid collection +/- peritonitis  Possible necrotizing pancreatitis  Abdominal distention  Constipation, improving   -CT guided drainage of peritoneal fluid today.Pending amylase body fluid to assess for pancreatic ductal leak as the cause of the fluid collection  - Serum lipase normal (<4)  - Serum Amylase 14  -Continue IV nutrition  -Continue abx  -Supportive care       Aurora Calvillo PA-C  Gastroenertology  [facility]

## 2022-10-28 NOTE — PROGRESS NOTES
Acute Care Surgery   Progress Note  Admit Date: 9/23/2022  HD#35  POD#4 Days Post-Op    Subjective:   Interval history:  NAEON  AF, VSS  Paracentesis today with IR.     Scheduled Meds:   belimumab  200 mg Subcutaneous Weekly    carvediloL  25 mg Oral BID    cloNIDine  0.1 mg Oral BID    fluconazole (DIFLUCAN) IV (PEDS and ADULTS)  400 mg Intravenous Q24H    hydrALAZINE  100 mg Oral Q8H    hydrOXYchloroQUINE  200 mg Oral BID    Lactobacillus acidophilus  2 capsule Oral TID WM    meropenem (MERREM) IVPB  500 mg Intravenous Q8H    methylPREDNISolone sodium succinate injection  40 mg Intravenous Q12H    morphine  30 mg Oral Q12H    mycophenolate  1,000 mg Oral BID    NIFEdipine  90 mg Oral Daily    pantoprazole  40 mg Intravenous Daily    sodium bicarbonate  1,300 mg Oral Daily    sodium chloride 0.9%  10 mL Intravenous Q6H     Continuous Infusions:   sodium chloride 0.9% 50 mL/hr at 10/27/22 1301    Amino acid 4.25% - dextrose 5% (CLINIMIX-E) solution (1L provides 42.5 gm AA, 50 gm CHO (170 kcal/L dextrose), Na 35, K 30, Mg 5, Ca 4.5, Acetate 70, Cl 39, Phos 15) 50 mL/hr at 10/27/22 1254     PRN Meds:sodium chloride, acetaminophen, albuterol, bisacodyL, dextrose 10 % in water (D10W), dextrose 10 % in water (D10W), dextrose 10%, glucagon (human recombinant), glucose, glucose, hydrALAZINE, HYDROcodone-acetaminophen, HYDROmorphone, insulin aspart U-100, labetaloL, LIDOcaine (PF) 10 mg/ml (1%), lorazepam, melatonin, nitroGLYCERIN, ondansetron, polyethylene glycol, sodium chloride 0.9%, sodium chloride 0.9%, sodium chloride 0.9%, sodium chloride 0.9%, sodium chloride 0.9%, Flushing PICC Protocol **AND** sodium chloride 0.9% **AND** sodium chloride 0.9%     Objective:     VITAL SIGNS: 24 HR MIN & MAX LAST   Temp  Min: 98.1 °F (36.7 °C)  Max: 98.6 °F (37 °C)  98.6 °F (37 °C)   BP  Min: 145/77  Max: 167/87  (!) 167/87    Pulse  Min: 82  Max: 100  100    Resp  Min: 15  Max: 20  15    SpO2  Min: 94 %  Max: 95 %  95 %      HT:  "5' 5" (165.1 cm)  WT: 56.6 kg (124 lb 12.5 oz)  BMI: 20.8     Intake/output:  I/O last 3 completed shifts:  In: 375 [Blood:375]  Out: 575 [Urine:575]  No intake/output data recorded.    Intake/Output Summary (Last 24 hours) at 10/28/2022 0558  Last data filed at 10/27/2022 1619  Gross per 24 hour   Intake --   Output 575 ml   Net -575 ml        Lines/drains/airway:  PICC Double Lumen 10/25/22 1520 right brachial (Active)   Line Necessity Review Longterm central access required 10/26/22 2000   Site Assessment No drainage;No redness;No swelling;No warmth 10/27/22 0800   Extremity Assessment Distal to IV No abnormal discoloration;No redness;No swelling;No warmth 10/27/22 0800   Line Securement Device Secured with sutureless device 10/27/22 0800   Dressing Type Central line dressing 10/27/22 0800   Dressing Status Clean;Dry;Intact 10/27/22 0800   Dressing Intervention Integrity maintained 10/27/22 0800   Left Lumen Patency/Care Blood return present;Normal saline locked;Flushed w/o difficulty 10/26/22 2000   Right Lumen Patency/Care Blood return present;Normal saline locked;Flushed w/o difficulty 10/26/22 2000   Number of days: 2       Trialysis (Dialysis) Catheter 10/04/22 1300 right internal jugular (Active)   Line Necessity Review CRRT/HD 10/26/22 2000   Verification by X-ray Yes 10/21/22 1623   Site Assessment No drainage;No redness;No swelling;No warmth 10/27/22 0800   Line Securement Device Secured with sutures 10/27/22 0800   Dressing Type Central line dressing 10/27/22 0800   Dressing Status Clean;Dry;Intact 10/27/22 0800   Dressing Intervention Integrity maintained 10/27/22 0800   Date on Dressing 10/20/22 10/21/22 1623   Dressing Due to be Changed 10/27/22 10/24/22 2004   Venous Patency/Care normal saline locked 10/21/22 1623   Arterial Patency/Care normal saline locked 10/21/22 1623   Distal Patency/Care normal saline locked 10/10/22 0800   Number of days: 23       Physical examination:  GEN: no acute " distress, uncomfortable  RESP: on room air, no increased work of breathing  ABD: distended, tender to palpation in lower quadrants, worst on L. Midline incision healing well with scar  : scrotal swelling tender to palpation. Most tender in L inguinal region. Exam limited by patient pain. Slight overlying erythema in L inguinal region    Labs:  Renal:  Recent Labs     10/26/22  0552 10/27/22  0510   BUN 81.4* 90.6*   CREATININE 2.22* 2.17*     FENGI:  Recent Labs     10/26/22  0552 10/27/22  0510   * 127*   K 5.5* 5.4*   CO2 23 24   CALCIUM 8.3* 8.2*   ALBUMIN 1.4*  --    BILITOT 0.5  --    AST 17  --    ALKPHOS 114  --    ALT 10  --      Heme:  Recent Labs     10/25/22  1427 10/26/22  0552 10/27/22  0510   HGB 7.6* 6.7* 10.7*  10.7*   HCT 23.1* 20.6* 31.8*  31.3*    222 217   INR  --  3.90* 3.93*     ID:  Recent Labs     10/25/22  1427 10/26/22  0552 10/27/22  0510   WBC 27.5* 26.9* 33.4*   I have reviewed all pertinent lab results within the past 24 hours.    Imaging:  US Scrotum And Testicles   Final Result      CT Abdomen Pelvis With Contrast   Final Result      1. No normal pancreatic parenchyma is seen.  At the level of the pancreas there is a large fluid collection with a few foci of gas.  Loculated, faintly peripherally enhancing fluid collections extend along the pericolic gutters.  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  Foci of gas are nonspecific in the setting of prior intervention.   2. Extensive loculated peritoneal collections with peripheral enhancement compatible with peritonitis.Left inguinal hernia contains ascitic fluid with some peripheral enhancement similar to the peritoneal fluid.  There is no herniated bowel loop   3. There is a moderate colonic stool burden.  There is some mass effect on the bowel loops due to the loculated nature of the abdominal fluid collections.   4. Anasarca   5. Partially imaged known thoracic  aortic dissection status post intervention.         Electronically signed by: Rocio White   Date:    10/26/2022   Time:    12:37      X-Ray Chest 1 View for Line/Tube Placement   Final Result      Right PICC tip overlies the mid SVC.         Electronically signed by: Kapil Ayers   Date:    10/25/2022   Time:    16:56      CT Abdomen Pelvis  Without Contrast   Final Result      Bilateral pleural effusions with moderate ascites and diffuse anasarca.  The epigastric and left mid abdominal ascites appears loculated and peritonitis cannot be excluded.         Electronically signed by: Grabiel Singleton MD   Date:    10/24/2022   Time:    23:30      X-Ray Abdomen AP 1 View   Final Result      Residual feces         Electronically signed by: Ramón Ying   Date:    10/24/2022   Time:    09:46      X-Ray Chest 1 View   Final Result      No significant interval change.         Electronically signed by: Kapil Ayers   Date:    10/22/2022   Time:    15:13      X-Ray Chest 1 View   Final Result      Persistent increased left retrocardiac density and silhouetting of the left hemidiaphragm.      No other focal consolidative changes.      No other change         Electronically signed by: Ramón Ying   Date:    10/12/2022   Time:    08:15      Fl Modified Barium Swallow Speech   Final Result      X-Ray Chest 1 View   Final Result      Unchanged retrocardiac opacity suggesting atelectasis         Electronically signed by: Rocio White   Date:    10/05/2022   Time:    06:11      X-Ray Chest 1 View   Final Result      New central venous catheter terminates within the proximal right atrium.         Electronically signed by: Jameson Brumfield   Date:    10/04/2022   Time:    14:16      X-Ray Chest 1 View   Final Result      No significant interval change.         Electronically signed by: Kapil Ayers   Date:    10/04/2022   Time:    06:54      CT Chest Abdomen Pelvis Without Contrast (XPD)   Final Result      Very limited  study due the lack of IV contrast.      Anasarca.      Small amount of ascites.      Small left-sided pleural effusion with bibasilar atelectasis versus infiltrate.         Electronically signed by: Joey Vidal MD   Date:    10/03/2022   Time:    21:30      X-Ray Chest 1 View   Final Result      As above.         Electronically signed by: Jameson Brumfield   Date:    10/03/2022   Time:    07:09      X-Ray Chest 1 View   Final Result      As above.         Electronically signed by: Jakob Boland   Date:    10/02/2022   Time:    14:50      X-ray Abdomen for NG Tube Placement (Nursing should notify Radiology after placement)   Final Result      Optimal placement of the nasogastric tube.         Electronically signed by: Jameson Brumfield   Date:    10/01/2022   Time:    11:48      X-Ray Chest 1 View   Final Result      No significant change         Electronically signed by: Ramón Ying   Date:    10/01/2022   Time:    08:05      X-Ray Chest 1 View   Final Result      No acute findings or significant interval change.         Electronically signed by: Kapil Ayers   Date:    09/30/2022   Time:    07:35      X-Ray Chest 1 View   Final Result      Stable exam without significant interval change.         Electronically signed by: Claribel Deal   Date:    09/29/2022   Time:    16:14      X-Ray Chest 1 View   Final Result      Possible mild left perihilar/lower lung atelectasis.  Suspect tiny right apical pneumothorax.         Electronically signed by: Darius Rosario   Date:    09/29/2022   Time:    06:09      X-Ray Chest 1 View   Final Result      No significant interval change.         Electronically signed by: Jameson Brumfield   Date:    09/28/2022   Time:    16:25      X-Ray Chest 1 View   Final Result      Similar appearance to prior exam with retrocardiac atelectasis         Electronically signed by: Rocio White   Date:    09/28/2022   Time:    06:05      X-Ray Chest 1 View   Final Result      No significant change          Electronically signed by: Ramón Ying   Date:    09/27/2022   Time:    08:47      X-Ray Abdomen AP 1 View   Final Result      Satisfactory central femoral line placement.         Electronically signed by: Emily Candelario MD   Date:    09/26/2022   Time:    13:54      X-Ray Chest 1 View   Final Result      Interval retraction of endotracheal tube, satisfactory position.  Cardiac lead placement in the interval.  No other significant change.         Electronically signed by: Emily Candelario MD   Date:    09/26/2022   Time:    08:00      X-Ray Chest 1 View   Final Result      Increased volume status      Interval intubation with endotracheal tube at the josie.  I recommend retracting      Right-sided chest tube in good position         Electronically signed by: Too Reyes   Date:    09/25/2022   Time:    15:49      X-Ray Abdomen AP 1 View   Final Result      There is slight increased density of the kidneys of questionable etiology and or significance.      Otherwise nonspecific gas pattern         Electronically signed by: Ramón Ying   Date:    09/25/2022   Time:    10:51      CTA Chest Abdomen Non Coronary   Final Result      1. Thoracic aortic endograft placement since 09/23/2022 with the dissection flap extending about 3 cm inferior to the distal portion of the graft.  Opacification of the false lumen along the mid and distal portions of the graft.   2. Moderate volume hemoperitoneum.  Changes involving the liver, spleen, pancreas and bowel are suggestive of overall hypoperfusion.  Hyperdense kidneys suggest acute kidney injury.   3. Small to moderate bilateral pleural effusions, larger on the right.   Findings discussed with Mr. Guevara ICU nurse, [place] at 08:35 hours on 09/25/2022.         Electronically signed by: Darius Rosario   Date:    09/25/2022   Time:    08:42      X-Ray Chest 1 View   Final Result      Changes suggestive of right-sided pleural effusion.      Increased left  retrocardiac density and silhouetting of the left hemidiaphragm which might be related to an infiltrate/atelectasis.      Interval placement of thoracic endograft.      No focal consolidative changes         Electronically signed by: Ramón Ying   Date:    09/25/2022   Time:    09:10      IR Abdominal Aortobifemoral   Final Result      Fluoroscopic assistance provided as above.         Electronically signed by: Kapil Ayers   Date:    09/26/2022   Time:    09:56      CT Abscess Aspiration without Catheter    (Results Pending)      I have reviewed all pertinent imaging results/findings within the past 24 hours.    Assessment & Plan:   In brief, patient is a 32M with HTN, SLE, HBV, aortic dissection s/p repair 2/2021, RUE DVT on xarelto who was admitted 9/23 with aortic dissection and is s/p TEVAR. Pt developed abdominal compartment syndrome and is s/p multiple exlaps (9/25, 9/27, 9/29) now on HD for renal failure now with necrotizing pancreatitis     -Paracentesis today with IR  -NPO   -rest of care per primary team  -will continue to follow. Please call with any additional concerns       10/28/2022 5:58 AM    The above findings, diagnostics, and treatment plan were discussed with the physician who will follow with further assessments and recommendations.

## 2022-10-28 NOTE — PROGRESS NOTES
[facility]  HOSPITAL MEDICINE  PROGRESS NOTE        CHIEF COMPLAINT   Hospital follow up    HOSPITAL COURSE     Stuart Guevara is a 32 y.o. male who has PMH which includes HTN, SLE,  hepatitis B, type A aortic dissection with repair + mechanical aortic valve placement 2/2021 on coumadin, RUE DVT s/p Xarelto, class V lupus nephritis; presented to the ED at [facility] on 9/23/2022  with reports of  shortness of breath and chest pain radiating to his back. Work up revealed a type B aortic dissection and significant hypertension noted on presentation.  He failed medical management and had progression of his symptoms despite adequate control of hemodynamic parameters.  PT was transferred from [facility] to [facility] for CV surgery evaluation which he was taken  to the OR and had TEVAR with left carotid to subclavian artery bypass on 9/23.   Later Patient developed abdominal distension and rigidity of his abdomen and developed a lactic acidosis.  He had a repeat CT angiography on 9/25 of the chest/abdomen/pelvis without evidence of worsening dissection, but evidence of significant intra-abdominal pathology with some hemoperitoneum, signs of hypoperfusion of the abdominal organs (compartment syndrome)  free blood in the abdomen. He underwent exploratory laparotomy on 9/25 with evidence of viable bowel and no significant bleeding identified, abd was closed with flakito drain left in situ chest tube was placed. Had repeat ex-laps on 9/27, 9/29. Postoperatively, he developed severe unstable bradycardia, and a transvenous pacemaker was temporarily placed by Cardiology, with the bradycardia resolving within he next couple days.  Shortly afterwards, he was noted to have significant worsening of his renal failure and critical hyperkalemia. Renal services consulted and HD catheter was placed and he was started on HD treatments. Patient was cleared for transfer out of ICU to the floor.  He started having worsening  abdominal pain. He was seen by rheumatology for possible lupus flare up and serositis. He was started on steroids and other immunomodulating drugs. Drugs have since been discontinued. Repeat CT showed multiple loculated fluid collections, necrotizing pancreatitis, possible peritonitis. Surgery team is following with plan for IR drainage and analysis of fluid. GI also following. IV Abx changed from rocephin to merropenem. GI team was consulted.        Today  Mr. Guevara had IR drainage this morning. He tolerated it well and feels better after the procedure. Currently just complains of abdominal pain. Is quite weak in his lower extremities more-so than upper. No CP, SOB.        OBJECTIVE/PHYSICAL EXAM     VITAL SIGNS (MOST RECENT):  Temp: 98.8 °F (37.1 °C) (10/28/22 0518)  Pulse: (!) 115 (10/28/22 0756)  Resp: (!) 21 (10/28/22 0756)  BP: (!) 180/100 (10/28/22 0756)  SpO2: 98 % (10/28/22 0756)   VITAL SIGNS (24 HOUR RANGE):  Temp:  [98.1 °F (36.7 °C)-98.8 °F (37.1 °C)] 98.8 °F (37.1 °C)  Pulse:  [] 115  Resp:  [15-21] 21  SpO2:  [94 %-98 %] 98 %  BP: (145-180)/() 180/100   GENERAL: In no acute distress, afebrile, thin, tired  HEENT:no rhinorrhea  CHEST: Clear to auscultation bilaterally  HEART: S1, S2, mechanical click heard, no murmur  ABDOMEN: midline incision well-healed, tender more-so in RUQ, guarding present  MSK: pitting edema up to thighs  NEUROLOGIC: Alert and oriented x4, cannot really lift BLE off of bed, UE strength normal  INTEGUMENTARY: no rashes or skin breakdown seen  PSYCHIATRY: normal affect, appears tired, fluent and logical speech        ASSESSMENT/PLAN     Severe Sepsis  Severe necrotizing pancreatitis  Loculated ascitic fluid, concern for intra-abdominal infection  Peritonitis   Anasarca/hypoalbuminemia 2/2 malnutrition + proteinuria  Severe PCM  Acute on chronic renal failure, on HD  Hx Lupus Nephritis Class V, non-nephrotic proteinuria  Hx SLE 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   Appreciate GI recs  Fluid studies ordered  ID following. Pending recs.  Continue iv merrem, Diflucan started by ID  Surgery will continue to follow. Further recs pending result of IR drainage  Will hold all immunomodulating agents, can worsen sepsis   Wean IV steroids. Currently 40 mg BID   He has very poor nutrition and severe PCM. Continue iv clinimix, if tolerates diet today then can d/c Clinimix tomorrow  Will closely monitor patients daily weight, urine out put, renal parameters and volume status    Cont HD per renal team   Will cont Tele monitoring   His over all prognosis currently is very poor with high risk of clinical decompensation  Will cont pain control with iv prn dilaudid   Follow up w/ Cardiology outpatient for ischemic evaluation         Critical care note:  Critical care diagnosis: severe sepsis needing iv antibiotics   Critical care interventions: Hands-on evaluation, review of labs/radiographs/records and discussion with patient and family if present  Critical care time spent: 45 minutes       Anticipated discharge and disposition: Continue inpatient treatment. Will likely need rehab on discharge.  __________________________________________________________________________    LABS/MICRO/MEDS/DIAGNOSTICS       LABS  Recent Labs     10/28/22  0516   *   K 4.9   CHLORIDE 96*   CO2 21*   BUN 62.9*   CREATININE 1.62*   GLUCOSE  304*   CALCIUM 7.9*   ALKPHOS 108   AST 15   ALT 9   ALBUMIN 1.5*     Recent Labs     10/28/22  0517   WBC 28.1*   RBC 3.62*   HCT 31.4*   MCV 86.7          MICROBIOLOGY  Microbiology Results (last 7 days)       Procedure Component Value Units Date/Time    Gram Stain [[id number]]     Order Status: Sent Specimen: Peritoneal Fluid     Body Fluid Culture [[id number]]     Order Status: Sent Specimen: Peritoneal Fluid     Fungal Culture [[id number]]     Order Status: Sent Specimen: Body Fluid     Anaerobic Culture [[id number]]     Order Status: Sent Specimen: Body Fluid     Fungal Culture [[id number]]  (Normal) Collected: 09/25/22 1200    Order Status: Completed Specimen: Body Fluid from Pleural Fluid, Right Updated: 10/27/22 1254     Fungal Culture No Fungus Isolated at 4 Weeks            MEDICATIONS   belimumab  200 mg Subcutaneous Weekly    carvediloL  25 mg Oral BID    cloNIDine  0.1 mg Oral BID    fentaNYL        fluconazole (DIFLUCAN) IV (PEDS and ADULTS)  400 mg Intravenous Q24H    hydrALAZINE  100 mg Oral Q8H    hydrOXYchloroQUINE  200 mg Oral BID    Lactobacillus acidophilus  2 capsule Oral TID WM    LIDOcaine (PF) 20 mg/mL (2%)        meropenem (MERREM) IVPB  500 mg Intravenous Q8H    methylPREDNISolone sodium succinate injection  40 mg Intravenous Q12H    midazolam        morphine  30 mg Oral Q12H    mycophenolate  1,000 mg Oral BID    NIFEdipine  90 mg Oral Daily    pantoprazole  40 mg Intravenous Daily    sodium bicarbonate  1,300 mg Oral Daily    sodium chloride 0.9%  10 mL Intravenous Q6H      INFUSIONS   sodium chloride 0.9% 50 mL/hr at 10/27/22 1301    Amino acid 4.25% - dextrose 5% (CLINIMIX-E) solution (1L provides 42.5 gm AA, 50 gm CHO (170 kcal/L dextrose), Na 35, K 30, Mg 5, Ca 4.5, Acetate 70, Cl 39, Phos 15) 50 mL/hr at 10/27/22 1254       DIAGNOSTIC TESTS  US Scrotum And Testicles   Final Result      CT Abdomen Pelvis With Contrast   Final Result      1. No normal pancreatic parenchyma is seen.   At the level of the pancreas there is a large fluid collection with a few foci of gas.  Loculated, faintly peripherally enhancing fluid collections extend along the pericolic gutters.  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  Foci of gas are nonspecific in the setting of prior intervention.   2. Extensive loculated peritoneal collections with peripheral enhancement compatible with peritonitis.Left inguinal hernia contains ascitic fluid with some peripheral enhancement similar to the peritoneal fluid.  There is no herniated bowel loop   3. There is a moderate colonic stool burden.  There is some mass effect on the bowel loops due to the loculated nature of the abdominal fluid collections.   4. Anasarca   5. Partially imaged known thoracic aortic dissection status post intervention.         Electronically signed by: Rocio White   Date:    10/26/2022   Time:    12:37      X-Ray Chest 1 View for Line/Tube Placement   Final Result      Right PICC tip overlies the mid SVC.         Electronically signed by: Kapil Ayers   Date:    10/25/2022   Time:    16:56      CT Abdomen Pelvis  Without Contrast   Final Result      Bilateral pleural effusions with moderate ascites and diffuse anasarca.  The epigastric and left mid abdominal ascites appears loculated and peritonitis cannot be excluded.         Electronically signed by: Grabiel Singleton MD   Date:    10/24/2022   Time:    23:30      X-Ray Abdomen AP 1 View   Final Result      Residual feces         Electronically signed by: Ramón Ying   Date:    10/24/2022   Time:    09:46      X-Ray Chest 1 View   Final Result      No significant interval change.         Electronically signed by: Kapil Ayers   Date:    10/22/2022   Time:    15:13      X-Ray Chest 1 View   Final Result      Persistent increased left retrocardiac density and silhouetting of the left hemidiaphragm.      No other focal consolidative changes.       No other change         Electronically signed by: Ramón Ying   Date:    10/12/2022   Time:    08:15      Fl Modified Barium Swallow Speech   Final Result      X-Ray Chest 1 View   Final Result      Unchanged retrocardiac opacity suggesting atelectasis         Electronically signed by: Rocio White   Date:    10/05/2022   Time:    06:11      X-Ray Chest 1 View   Final Result      New central venous catheter terminates within the proximal right atrium.         Electronically signed by: Jameson Brumfield   Date:    10/04/2022   Time:    14:16      X-Ray Chest 1 View   Final Result      No significant interval change.         Electronically signed by: Kapil Ayers   Date:    10/04/2022   Time:    06:54      CT Chest Abdomen Pelvis Without Contrast (XPD)   Final Result      Very limited study due the lack of IV contrast.      Anasarca.      Small amount of ascites.      Small left-sided pleural effusion with bibasilar atelectasis versus infiltrate.         Electronically signed by: Joey Vidal MD   Date:    10/03/2022   Time:    21:30      X-Ray Chest 1 View   Final Result      As above.         Electronically signed by: Jameson Brumfield   Date:    10/03/2022   Time:    07:09      X-Ray Chest 1 View   Final Result      As above.         Electronically signed by: Jakob Boland   Date:    10/02/2022   Time:    14:50      X-ray Abdomen for NG Tube Placement (Nursing should notify Radiology after placement)   Final Result      Optimal placement of the nasogastric tube.         Electronically signed by: Jameson Brumfield   Date:    10/01/2022   Time:    11:48      X-Ray Chest 1 View   Final Result      No significant change         Electronically signed by: Ramón Ying   Date:    10/01/2022   Time:    08:05      X-Ray Chest 1 View   Final Result      No acute findings or significant interval change.         Electronically signed by: Kapil Ayers   Date:    09/30/2022   Time:    07:35      X-Ray Chest 1 View   Final  Result      Stable exam without significant interval change.         Electronically signed by: Claribel Deal   Date:    09/29/2022   Time:    16:14      X-Ray Chest 1 View   Final Result      Possible mild left perihilar/lower lung atelectasis.  Suspect tiny right apical pneumothorax.         Electronically signed by: Darius Rosario   Date:    09/29/2022   Time:    06:09      X-Ray Chest 1 View   Final Result      No significant interval change.         Electronically signed by: Jameson Brumfield   Date:    09/28/2022   Time:    16:25      X-Ray Chest 1 View   Final Result      Similar appearance to prior exam with retrocardiac atelectasis         Electronically signed by: Rocio White   Date:    09/28/2022   Time:    06:05      X-Ray Chest 1 View   Final Result      No significant change         Electronically signed by: Ramón Ying   Date:    09/27/2022   Time:    08:47      X-Ray Abdomen AP 1 View   Final Result      Satisfactory central femoral line placement.         Electronically signed by: Emily Candelario MD   Date:    09/26/2022   Time:    13:54      X-Ray Chest 1 View   Final Result      Interval retraction of endotracheal tube, satisfactory position.  Cardiac lead placement in the interval.  No other significant change.         Electronically signed by: Emily Candelario MD   Date:    09/26/2022   Time:    08:00      X-Ray Chest 1 View   Final Result      Increased volume status      Interval intubation with endotracheal tube at the josie.  I recommend retracting      Right-sided chest tube in good position         Electronically signed by: Too Reyes   Date:    09/25/2022   Time:    15:49      X-Ray Abdomen AP 1 View   Final Result      There is slight increased density of the kidneys of questionable etiology and or significance.      Otherwise nonspecific gas pattern         Electronically signed by: Ramón Ying   Date:    09/25/2022   Time:    10:51      CTA Chest Abdomen Non  Coronary   Final Result      1. Thoracic aortic endograft placement since 09/23/2022 with the dissection flap extending about 3 cm inferior to the distal portion of the graft.  Opacification of the false lumen along the mid and distal portions of the graft.   2. Moderate volume hemoperitoneum.  Changes involving the liver, spleen, pancreas and bowel are suggestive of overall hypoperfusion.  Hyperdense kidneys suggest acute kidney injury.   3. Small to moderate bilateral pleural effusions, larger on the right.   Findings discussed with Mr. Guevara ICU nurse, Dulce at 08:35 hours on 09/25/2022.         Electronically signed by: Darius Rosario   Date:    09/25/2022   Time:    08:42      X-Ray Chest 1 View   Final Result      Changes suggestive of right-sided pleural effusion.      Increased left retrocardiac density and silhouetting of the left hemidiaphragm which might be related to an infiltrate/atelectasis.      Interval placement of thoracic endograft.      No focal consolidative changes         Electronically signed by: Ramón Ying   Date:    09/25/2022   Time:    09:10      IR Abdominal Aortobifemoral   Final Result      Fluoroscopic assistance provided as above.         Electronically signed by: Kapil Ayers   Date:    09/26/2022   Time:    09:56      CT Abscess Aspiration without Catheter    (Results Pending)            All diagnosis and differential diagnosis have been reviewed; assessment and plan has been documented. I have personally reviewed the labs and test results that are presently available; I have reviewed the patients medication list. I have reviewed the consulting providers response and recommendations. I have reviewed or attempted to review medical records based upon their availability.  All of the patient's questions have been addressed and answered. Patient's is agreeable to the above stated plan. I will continue to monitor closely and make adjustments to medical management as  needed.  This document was created using M*Modal Fluency Direct.  Transcription errors may have been made.  Please contact me if any questions may rise regarding documentation to clarify verbiage.        Tom Slade MD   10/28/2022   Internal Medicine PGY 3 Resident

## 2022-10-28 NOTE — PT/OT/SLP PROGRESS
Physical Therapy      Patient Name:  Stuart Guevara   MRN:  [id number]    Pt refused tx stating that he was in too much pain. Asked pt if he would just sit up on EOB. Pt cont to refuse. Pt stated that he will work with therapy tomorrow.

## 2022-10-28 NOTE — PROGRESS NOTES
Nephrology consult follow up note    HPI:      Stuart Guevara is a 32 y.o. 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  The patient underwent exploratory lap X 3 (9/25, 9/27 and 9/29) for abdominal compartment syndrome and necrotizing pancreatitis.  He has been requiring renal replacement therapy now for some time due to TAY with some partial recovery of renal function.  He has now been started on a TTS schedule via right IJ temporary catheter.     Interval history:   10/28:  Patient underwent paracentesis per IR earlier this morning. Abdomen is distended and patient is in pain.     Objective:       VITAL SIGNS: 24 HR MIN & MAX LAST    Temp  Min: 98.1 °F (36.7 °C)  Max: 98.8 °F (37.1 °C)  98.8 °F (37.1 °C)        BP  Min: 145/77  Max: 180/100  (!) 180/100     Pulse  Min: 82  Max: 115  110     Resp  Min: 15  Max: 21  18    SpO2  Min: 94 %  Max: 98 %  98 %      GEN: Awake and appropriate.  Chronically ill-appearing black male.    HEENT: Atraumatic. EOMI, no icterus  NECK : No JVD, right IJ temporary catheter noted  CARD : RRR   LUNGS : Clear to auscultation  ABD :  Distended abdomen.  Diffuse abdominal discomfort.    EXT : No edema.   NEURO : No obvious focal deficits    Recent Labs   Lab 10/28/22  0516   *   K 4.9   CO2 21*   BUN 62.9*   CREATININE 1.62*   GLUCOSE 304*   CALCIUM 7.9*     Recent Labs   Lab 10/28/22  0517   WBC 28.1*   HGB 10.3*   HCT 31.4*            Assessment / Plan:      1. Postoperative TAY requiring hemodialysis initiation on 9/25 via temp HD catheter placed per Dr. Chopra.  Last dialysis was on 10/21/2022.    2. Abdominal aortic dissection s/p TEVAR   3. Post operative abdominal compartment syndrome s/p ex lap on 9/27 with closure on 10/1  4. Hyperkalemia.  On Lokelma now.    5. Hypertension  6. Type 2 diabetes mellitus  7. Serositis and  SLE - rheumatology following  8. Hyponatremia persistent.  9. Necrotizing pancreatitis per CT of the abdomen    Recommendations  Patient's urine output is beginning to improve.  His predialysis creatinine on 10/27 was 2.17 however that is deceiving since he has lost so much muscle and he is a small-framed individual to begin with.  It may be reasonable to dialyze on Saturday and then hold the dialysis and monitor for any further renal functional recovery .   Follow plans for intervention with surgery team

## 2022-10-28 NOTE — OP NOTE
Radiology Post-Procedure Note    Pre Op Diagnosis: Abdominal Fluid    Post Op Diagnosis: Post Drain    Procedure:  Drain pancreatic bed as well as free fluid    Procedure performed by: Jakob Boland M.D.    Written Informed Consent Obtained: Yes    Specimen Removed: YES x 2     Estimated Blood Loss: Minimal    Findings:   Bilateral fluid Aspiration    Patient tolerated procedure well.    Jakob Boland MD

## 2022-10-28 NOTE — CONSULTS
Inpatient Nutrition Assessment    Admit Date: 9/23/2022   Total duration of encounter: 35 days     Nutrition Recommendation/Prescription     - Continue Renal / Diabetic diet as medically feasible / tolerated     - Continue with Boost GC two per meal (provides 250 kcal, 14 g protein per serving) TID for added protein and kcal.    - begin TPN until pt able to tolerate increased oral intake; rec: Clinimix 5/15 @ 60ml/hr + IL 20% 250ml IVPB 3 times a week (1236 kcal, 72 gm protein, 216gm dextrose, GIR: 2.6); will adjust rate if pt tolerates or switch to custom TPN    - monitor labs, oral intake of meals/fluids    - once intake of meals and fluids increases, wean TPN    Communication of Recommendations: reviewed with physician, reviewed with patient/caregiver, and reviewed with nurse    Nutrition Assessment     Malnutrition Assessment/Nutrition-Focused Physical Exam    Malnutrition in the context of acute illness or injury  Degree of Malnutrition: severe malnutrition  Energy Intake: < 75% of estimated energy requirement for > 7 days  Interpretation of Weight Loss: unable to obtain  Body Fat:moderate depletion  Area of Body Fat Loss: orbital region  and upper arm region - triceps / biceps  Muscle Mass Loss: moderate depletion  Area of Muscle Mass Loss: temple region - temporalis muscle, clavicle bone region - pectoralis major, deltoid, trapezius muscles, clavicle and acromion bone region - deltoid muscle, and scapular bone region - trapezius, supraspinus, infraspinus muscles  Fluid Accumulation: does not meet criteria  Edema: does not meet criteria   Reduced  Strength: unable to obtain  A minimum of two characteristics is recommended for diagnosis of either severe or non-severe malnutrition.    Chart Review    Reason Seen: continuous nutrition monitoring and follow-up    Diagnosis:  Fever and Leukocytosis  Abdominal aortic dissection, s/p repair  Abdominal compartment syndrome, s/p ex-lap x2 last on 9/27 with  abdominal closure on 10/1  SLE with active flare  History of transverse myelitis  History of lupus nephritis  TAY now on HD  VHD, s/p mechanical AVR  Chest tube in place  DM2  Necrotizing pancreatitis    Relevant Medical History: HTN, lupus    Nutrition-Related Medications: detemir 10 Units BID, docusate, miralax, lactobacillus acidophilus, glimepiride, pantoprazole, sodium bicarbonate  Calorie Containing IV Medications: no significant kcals from medications at this time    Nutrition-Related Labs:   BUN 27, Crea 3.06, Glu 174, Phos 6.2   Na 128, K 3.3, Cl 96, BUN 21.5, Crea 3.28, Glu 205  10/4 Na 127, Cl 95, BUN 57.3, Crea 5.16, Glu 152, Phos 5  10/7 K 5.6, BUN 55.2, Crea 4.57, Glu 347, Phos 8  10/11: Na 126, K 6.1, Cl 91, BUN 49, Crea 3.18, GFR 25, Glu 139, Ca 7.9, Phos 7.4  10/14: Na 125, K 5.4, Cl 91, BUN 56.1, Crea 2.76, GFR 30, Ca 8.1  10/18: Na 128, Cl 92, Ca 7.9, BUN 50.9, Cr 2.32   10/21: Na 127, Cl 93, BUN 68.1, Crea 2.35, GFR 37, Glu 335  10/25: Na 128, Cl 96, BUN 65.1, Crea 1.62, GFR 57, Glu 39; CB-124  10/28: Na 128, Cl 96, BUN 62.9, Crea 1.62, GFR 57, glu 304, Ca 7.9    Diet/PN Order: DIET RENAL ON DIALYSIS Diabetic  Oral Supplement Order: Boost Glucose Control  Tube Feeding Order: none at this time  Appetite/Oral Intake: fair/50-75% of meals  Factors Affecting Nutritional Intake: none identified  Food/Sikh/Cultural Preferences: none reported    Skin Integrity: incision  Wound(s):   incision noted    Comments    22: Discussed with RN. Will provide tube feeding recommendations for when appropriate to start tube feeding. Receiving kcal from meds. Noted Phos, will monitor for need for renal formula.   22: Discussed with RN. Need to consider TPN since pt now LOS day 7. If starting tube feeding, recs provided. If able to extubate, advance diet when appropriate.   10/4/22: Pt previously eating 100% po intake of meals, good appetite. Currently NPO for procedure, plans to restart  "diet per RN. Will add ONS for added protein and kcal.  10/7/22: Pt now with decreased appetite/po intake. Noted elevated K and Phos, likely not diet related since pt with poor po intake of full liquid diet.   10/11: pt sleeping, nurse reports tolerating full liquid diet, some abdominal distention noted, reports unsure of plans to advance diet as this time  10/14: pt advanced to soft diet today; ate about 40% of lunch tray, drinking Boost, would like 2 per meal in vanilla flavor  10/18: Pt advanced to regular texture renal/diabetic diet, he reports that his appetite is fair, drinks boost, feels he is chewing/swallowing well, does reports some abdominal pain 30-90 minutes after eating, feels better after several hours of no eating.   10/21: appetite remains the same, drinking boost  10/25: fair appetite, says he is drinking some boost, would like to continue flavor    10/28: pt with decreased appetite, noted new dx necrotizing pancreatits; paracentesis done today; not drinking boost at this time either; plans to try and start eating more and drinking boost, says he feels a little better this afternoon. MD discussed starting TPN until pt able to tolerate increased oral intake. Still on dialysis as needed    Anthropometrics    Height: 5' 5" (165.1 cm) Height Method: Estimated  Last Weight: 56.6 kg (124 lb 12.5 oz) (10/22/22 0528) Weight Method: Bed Scale  BMI (Calculated): 20.8  BMI Classification: normal (BMI 18.5-24.9)        Ideal Body Weight (IBW), Male: 136 lb     % Ideal Body Weight, Male (lb): 97.26 %                          Usual Weight Provided By: unable to obtain usual weight at this time    Wt Readings from Last 5 Encounters:   10/22/22 56.6 kg (124 lb 12.5 oz)   09/23/22 60 kg (132 lb 4.4 oz)   12/09/21 48 kg (105 lb 13.1 oz)     Weight Change(s) Since Admission:  Admit Weight: 60 kg (132 lb 4.4 oz) (09/23/22 1219)  10/4/22: no new wt  10/7/22: no new wt  10/12/22: 65kg; weight gain noted  10/15/22: " 56.6kg; fluctuating weights possible due to fluid  10/22/22: 56.6kg    Estimated Needs    Weight Used For Calorie Calculations: 60 kg (132 lb 4.4 oz)  Energy Calorie Requirements (kcal): 2083kcal (1.4 stress factor)  Energy Need Method: Cannon-St Jeor  Weight Used For Protein Calculations: 60 kg (132 lb 4.4 oz)  Protein Requirements: 72-84gm (1.2-1.4g/kg)  Fluid Requirements (mL): 1000ml + urinary output  Temp: 99 °F (37.2 °C)    Enteral Nutrition    Patient not receiving enteral nutrition at this time.    Parenteral Nutrition    Patient not receiving parenteral nutrition support at this time.    Evaluation of Received Nutrient Intake    Calories: not meeting estimated needs  Protein: not meeting estimated needs    Patient Education    Not applicable.    Nutrition Diagnosis     PES: Inadequate oral intake related to current condition as evidenced by <75% intake of meals. (continues)    Interventions/Goals     Intervention(s): general/healthful diet, commercial beverage, and collaboration with other providers  Goal: Meet greater than 75% of nutritional needs by follow-up. (goal progressing)    Monitoring & Evaluation     Dietitian will monitor energy intake.  Nutrition Risk/Follow-Up: high (follow-up in 1-4 days)

## 2022-10-29 NOTE — PROGRESS NOTES
[facility]  HOSPITAL MEDICINE  PROGRESS NOTE        CHIEF COMPLAINT   Hospital follow up    HOSPITAL COURSE   Stuart Guevara is a 32 y.o. male who has PMH which includes HTN, SLE,  hepatitis B, type A aortic dissection with repair + mechanical aortic valve placement 2/2021 on coumadin, RUE DVT s/p Xarelto, class V lupus nephritis; presented to the ED at [facility] on 9/23/2022  with reports of  shortness of breath and chest pain radiating to his back. Work up revealed a type B aortic dissection and significant hypertension noted on presentation.  He failed medical management and had progression of his symptoms despite adequate control of hemodynamic parameters.  PT was transferred from [facility] to [facility] for CV surgery evaluation which he was taken  to the OR and had TEVAR with left carotid to subclavian artery bypass on 9/23.   Later Patient developed abdominal distension and rigidity of his abdomen and developed a lactic acidosis.  He had a repeat CT angiography on 9/25 of the chest/abdomen/pelvis without evidence of worsening dissection, but evidence of significant intra-abdominal pathology with some hemoperitoneum, signs of hypoperfusion of the abdominal organs (compartment syndrome)  free blood in the abdomen. He underwent exploratory laparotomy on 9/25 with evidence of viable bowel and no significant bleeding identified, abd was closed with flakito drain left in situ chest tube was placed. Had repeat ex-laps on 9/27, 9/29. Postoperatively, he developed severe unstable bradycardia, and a transvenous pacemaker was temporarily placed by Cardiology, with the bradycardia resolving within he next couple days.  Shortly afterwards, he was noted to have significant worsening of his renal failure and critical hyperkalemia. Renal services consulted and HD catheter was placed and he was started on HD treatments. Patient was cleared for transfer out of ICU to the floor.  He started having worsening abdominal  pain. He was seen by rheumatology for possible lupus flare up and serositis. He was started on steroids and other immunomodulating drugs. Drugs have since been discontinued due to concern of infection.  Repeat CT showed multiple loculated fluid collections, necrotizing pancreatitis, possible peritonitis. Surgery team is following with plan for IR drainage, which was performed October 28.      Today  Cultures from the peritoneal fluid is growing many Gram-negative rods.  Will continue to follow at out.  Continue on Merrem and fluconazole per ID, did have fever overnight.  Continues to complain of abdominal pain more so on the right side.  Not sure what the reason is but it does not appear that his medications are being given on a timely manner and did not receive any of the night medications...        OBJECTIVE/PHYSICAL EXAM     VITAL SIGNS (MOST RECENT):  Temp: (!) 100.8 °F (38.2 °C) (10/29/22 0402)  Pulse: 100 (10/29/22 0839)  Resp: 18 (10/29/22 0840)  BP: (!) 147/84 (10/29/22 0839)  SpO2: 95 % (10/29/22 0402)   VITAL SIGNS (24 HOUR RANGE):  Temp:  [98.2 °F (36.8 °C)-100.8 °F (38.2 °C)] 100.8 °F (38.2 °C)  Pulse:  [] 100  Resp:  [18-19] 18  SpO2:  [93 %-98 %] 95 %  BP: (145-152)/(82-84) 147/84   GENERAL: In no acute distress, afebrile, malnourished weight loss  HEENT:  CHEST: Clear to auscultation bilaterally  HEART: S1, S2, no appreciable murmur  ABDOMEN: diffuse tenderness more so on the right side, slightly distended, BS +  MSK: Warm, 3+ pitting edema both legs, no clubbing or cyanosis  NEUROLOGIC: Alert and oriented x4, both legs 2/5 strength, 5/5 upper extremity  INTEGUMENTARY:  PSYCHIATRY:        ASSESSMENT/PLAN   Severe necrotizing pancreatitis with loculated intra-abdominal fluid collection-status post IR drainage October 28  Sepsis  Acute kidney injury ATN  Critical illness myopathy  Severe protein caloric malnutrition     History of:  Type A aortic dissection status post repair with mechanical  aortic valve conduit, recent type B aortic dissection status post TAVR, lupus nephritis class 5, mixed connective tissue disorder predominant lupus, transverse myelitis recovered        General surgery following.    Gastroenterology following.  Recommends feeding.    Nephrology following.  Fluid management per them.  Has a history of class 5 lupus nephritis.  Likely need some fluid removal with albumin.  ID following.  Follow-up on body fluid culture.  Continue meropenem and fluconazole.  Rheumatology following.  Continue Solu-Medrol 40 b.i.d., all other immunosuppressant discontinued.  I doubt that this was a lupus flare-up to begin with, C3 was low, C4 was normal, ascites likely secondary to pancreatitis.  Start lactulose 10 g b.i.d., Reglan 5 q.i.d. to help with bowel movements and should help with abdominal pain and distension.  Glucose control with Levemir and sliding scale.  Continue antihypertensives and adjust as needed.    Continue to monitor INR.  Goal of 2 to 3.  Start Coumadin once back in range.  Continue TPN for IV nutrition.  He needs to also continue enteral nutrition.  Patient remains critically ill and condition remains guarded.  Many poor prognostic factors playing a role here.     Critical care diagnosis: All of the above  Critical care time spent:  50 minutes    Anticipated discharge and disposition:   __________________________________________________________________________    LABS/MICRO/MEDS/DIAGNOSTICS       LABS  Recent Labs     10/29/22  0547   *   K 4.9   CHLORIDE 99   CO2 22   BUN 79.7*   CREATININE 1.68*   GLUCOSE 212*   CALCIUM 8.0*   ALKPHOS 84   AST 14   ALT 9   ALBUMIN 1.7*     Recent Labs     10/29/22  0703   WBC 29.7*   RBC 2.69*   HCT 23.7*   MCV 88.1          MICROBIOLOGY  Microbiology Results (last 7 days)       Procedure Component Value Units Date/Time    Body Fluid Culture [[id number]]  (Abnormal) Collected: 10/28/22 0916    Order Status: Completed Specimen:  Peritoneal Fluid from Abdomen Updated: 10/29/22 0710     Body Fluid Culture Many Gram-negative Rods    Body Fluid Culture [[id number]] Collected: 10/28/22 0940    Order Status: Completed Specimen: Peritoneal Fluid from Abdomen Updated: 10/29/22 0644     Body Fluid Culture No Growth At 24 Hours    Gram Stain [[id number]] Collected: 10/28/22 0940    Order Status: Sent Specimen: Peritoneal Fluid from Abdomen Updated: 10/28/22 1326    Gram Stain [[id number]] Collected: 10/28/22 0940    Order Status: Sent Specimen: Peritoneal Fluid from Abdomen Updated: 10/28/22 1326    Anaerobic Culture [[id number]] Collected: 10/28/22 0940    Order Status: Sent Specimen: Body Fluid from Peritoneal Updated: 10/28/22 1326    Fungal Culture [[id number]] Collected: 10/28/22 0940    Order Status: Sent Specimen: Body Fluid from Peritoneal Updated: 10/28/22 1326    Anaerobic Culture [[id number]] Collected: 10/28/22 0940    Order Status: Sent Specimen: Body Fluid from Abdomen Updated: 10/28/22 1326    Fungal Culture [[id number]] Collected: 10/28/22 0940    Order Status: Sent Specimen: Body Fluid from Abdomen Updated: 10/28/22 1326    Anaerobic Culture [[id number]]     Order Status: Canceled Specimen: Body Fluid from Abdomen     Body Fluid Culture [[id number]]     Order Status: Canceled Specimen: Body Fluid from Abdomen     Fungal Culture [[id number]]     Order Status: Canceled Specimen: Body Fluid from Abdomen     Gram Stain [[id number]]     Order Status: Canceled Specimen: Body Fluid from Abdomen     Gram Stain [[id number]]     Order Status: Canceled Specimen: Peritoneal Fluid     Body Fluid Culture [[id number]]     Order Status: Canceled Specimen: Peritoneal Fluid     Fungal Culture [[id number]]     Order Status: Canceled Specimen: Body Fluid     Anaerobic Culture [[id number]]     Order Status: Canceled Specimen: Body Fluid     Fungal Culture [[id number]]  (Normal) Collected: 09/25/22 1200    Order Status: Completed Specimen: Body Fluid from Pleural Fluid,  Right Updated: 10/27/22 1254     Fungal Culture No Fungus Isolated at 4 Weeks            MEDICATIONS   carvediloL  25 mg Oral BID    cloNIDine  0.1 mg Oral BID    fat emulsion 20%  250 mL Intravenous Every Tues, Thurs, Sat    fluconazole (DIFLUCAN) IV (PEDS and ADULTS)  400 mg Intravenous Q24H    hydrALAZINE  100 mg Oral Q8H    insulin detemir U-100  10 Units Subcutaneous QHS    lactulose 10 gram/15 ml  10 g Oral BID    meropenem (MERREM) IVPB  500 mg Intravenous Q8H    methylPREDNISolone sodium succinate injection  40 mg Intravenous Q12H    metoclopramide HCl  5 mg Intravenous QID (AC & HS)    morphine  30 mg Oral Q12H    NIFEdipine  90 mg Oral Daily    pantoprazole  40 mg Intravenous Daily    sodium bicarbonate  1,300 mg Oral Daily    sodium chloride 0.9%  10 mL Intravenous Q6H      INFUSIONS   amino acid 5 % in 15% dextrose 60 mL/hr at 10/28/22 7084       DIAGNOSTIC TESTS  US Scrotum And Testicles   Final Result      CT Abdomen Pelvis With Contrast   Final Result      1. No normal pancreatic parenchyma is seen.  At the level of the pancreas there is a large fluid collection with a few foci of gas.  Loculated, faintly peripherally enhancing fluid collections extend along the pericolic gutters.  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  Foci of gas are nonspecific in the setting of prior intervention.   2. Extensive loculated peritoneal collections with peripheral enhancement compatible with peritonitis.Left inguinal hernia contains ascitic fluid with some peripheral enhancement similar to the peritoneal fluid.  There is no herniated bowel loop   3. There is a moderate colonic stool burden.  There is some mass effect on the bowel loops due to the loculated nature of the abdominal fluid collections.   4. Anasarca   5. Partially imaged known thoracic aortic dissection status post intervention.         Electronically signed by: Rocio White    Date:    10/26/2022   Time:    12:37      X-Ray Chest 1 View for Line/Tube Placement   Final Result      Right PICC tip overlies the mid SVC.         Electronically signed by: Kapil Ayers   Date:    10/25/2022   Time:    16:56      CT Abdomen Pelvis  Without Contrast   Final Result      Bilateral pleural effusions with moderate ascites and diffuse anasarca.  The epigastric and left mid abdominal ascites appears loculated and peritonitis cannot be excluded.         Electronically signed by: Grabiel Singleton MD   Date:    10/24/2022   Time:    23:30      X-Ray Abdomen AP 1 View   Final Result      Residual feces         Electronically signed by: Ramón Ying   Date:    10/24/2022   Time:    09:46      X-Ray Chest 1 View   Final Result      No significant interval change.         Electronically signed by: Kapil Ayers   Date:    10/22/2022   Time:    15:13      X-Ray Chest 1 View   Final Result      Persistent increased left retrocardiac density and silhouetting of the left hemidiaphragm.      No other focal consolidative changes.      No other change         Electronically signed by: Ramón Ying   Date:    10/12/2022   Time:    08:15      Fl Modified Barium Swallow Speech   Final Result      X-Ray Chest 1 View   Final Result      Unchanged retrocardiac opacity suggesting atelectasis         Electronically signed by: Rocio White   Date:    10/05/2022   Time:    06:11      X-Ray Chest 1 View   Final Result      New central venous catheter terminates within the proximal right atrium.         Electronically signed by: Jameson Brumfield   Date:    10/04/2022   Time:    14:16      X-Ray Chest 1 View   Final Result      No significant interval change.         Electronically signed by: Kapil Ayers   Date:    10/04/2022   Time:    06:54      CT Chest Abdomen Pelvis Without Contrast (XPD)   Final Result      Very limited study due the lack of IV contrast.      Anasarca.      Small amount of ascites.      Small  left-sided pleural effusion with bibasilar atelectasis versus infiltrate.         Electronically signed by: Joey Vidal MD   Date:    10/03/2022   Time:    21:30      X-Ray Chest 1 View   Final Result      As above.         Electronically signed by: Jameson Brumfield   Date:    10/03/2022   Time:    07:09      X-Ray Chest 1 View   Final Result      As above.         Electronically signed by: Jakob Boland   Date:    10/02/2022   Time:    14:50      X-ray Abdomen for NG Tube Placement (Nursing should notify Radiology after placement)   Final Result      Optimal placement of the nasogastric tube.         Electronically signed by: Jameson Brumfield   Date:    10/01/2022   Time:    11:48      X-Ray Chest 1 View   Final Result      No significant change         Electronically signed by: Ramón Ying   Date:    10/01/2022   Time:    08:05      X-Ray Chest 1 View   Final Result      No acute findings or significant interval change.         Electronically signed by: Kapil Ayers   Date:    09/30/2022   Time:    07:35      X-Ray Chest 1 View   Final Result      Stable exam without significant interval change.         Electronically signed by: Claribel Deal   Date:    09/29/2022   Time:    16:14      X-Ray Chest 1 View   Final Result      Possible mild left perihilar/lower lung atelectasis.  Suspect tiny right apical pneumothorax.         Electronically signed by: Darius Rosario   Date:    09/29/2022   Time:    06:09      X-Ray Chest 1 View   Final Result      No significant interval change.         Electronically signed by: Jameson Brumfield   Date:    09/28/2022   Time:    16:25      X-Ray Chest 1 View   Final Result      Similar appearance to prior exam with retrocardiac atelectasis         Electronically signed by: Rocio White   Date:    09/28/2022   Time:    06:05      X-Ray Chest 1 View   Final Result      No significant change         Electronically signed by: Ramón Ying   Date:    09/27/2022   Time:    08:47       X-Ray Abdomen AP 1 View   Final Result      Satisfactory central femoral line placement.         Electronically signed by: Emily Candelario MD   Date:    09/26/2022   Time:    13:54      X-Ray Chest 1 View   Final Result      Interval retraction of endotracheal tube, satisfactory position.  Cardiac lead placement in the interval.  No other significant change.         Electronically signed by: Emily Candelario MD   Date:    09/26/2022   Time:    08:00      X-Ray Chest 1 View   Final Result      Increased volume status      Interval intubation with endotracheal tube at the josie.  I recommend retracting      Right-sided chest tube in good position         Electronically signed by: Too Reyes   Date:    09/25/2022   Time:    15:49      X-Ray Abdomen AP 1 View   Final Result      There is slight increased density of the kidneys of questionable etiology and or significance.      Otherwise nonspecific gas pattern         Electronically signed by: Ramón Ying   Date:    09/25/2022   Time:    10:51      CTA Chest Abdomen Non Coronary   Final Result      1. Thoracic aortic endograft placement since 09/23/2022 with the dissection flap extending about 3 cm inferior to the distal portion of the graft.  Opacification of the false lumen along the mid and distal portions of the graft.   2. Moderate volume hemoperitoneum.  Changes involving the liver, spleen, pancreas and bowel are suggestive of overall hypoperfusion.  Hyperdense kidneys suggest acute kidney injury.   3. Small to moderate bilateral pleural effusions, larger on the right.   Findings discussed with Mr. Guevara ICU nurseDulce at 08:35 hours on 09/25/2022.         Electronically signed by: Darius Rosario   Date:    09/25/2022   Time:    08:42      X-Ray Chest 1 View   Final Result      Changes suggestive of right-sided pleural effusion.      Increased left retrocardiac density and silhouetting of the left hemidiaphragm which might be related to an  infiltrate/atelectasis.      Interval placement of thoracic endograft.      No focal consolidative changes         Electronically signed by: Ramón Ying   Date:    09/25/2022   Time:    09:10      IR Abdominal Aortobifemoral   Final Result      Fluoroscopic assistance provided as above.         Electronically signed by: Kapil Ayers   Date:    09/26/2022   Time:    09:56      CT Abscess Aspiration without Catheter    (Results Pending)   US Guided Needle Placement    (Results Pending)            All diagnosis and differential diagnosis have been reviewed; assessment and plan has been documented. I have personally reviewed the labs and test results that are presently available; I have reviewed the patients medication list. I have reviewed the consulting providers response and recommendations. I have reviewed or attempted to review medical records based upon their availability.  All of the patient's questions have been addressed and answered. Patient's is agreeable to the above stated plan. I will continue to monitor closely and make adjustments to medical management as needed.  This document was created using M*Modal Fluency Direct.  Transcription errors may have been made.  Please contact me if any questions may rise regarding documentation to clarify verbiage.        Luis F Hutson MD   10/29/2022   Internal Medicine

## 2022-10-29 NOTE — PT/OT/SLP PROGRESS
"Occupational Therapy  Treatment    Stuart Guevara   MRN: [id number]   Admitting Diagnosis: Aortic dissection        OT Start Time: 1119  OT Stop Time: 1149  OT Total Time (min): 30 min        OT/ADONAY: ADONAY     ADONAY Visit Number: 2    General Precautions: Standard, fall  Orthopedic Precautions:    Braces: N/A  Respiratory Status: Room air         Subjective:  Communicated with RN prior to session.  Pain/Comfort  Pain Rating 1: 10/10  Location 1: groin  Pain Addressed 1: Reposition, Distraction    S: pt reporting "No, no, I am already in too much pain, and you two can't help me like I need, where's Maximino?" Pt attempted to defer tx this date, but after max encouragement from FRANCOIS/mother pt reluctantly agreeable to attempt EOB ax.     O: pt sup>EOB c depx2 while using large sofie pad to swivel pt to EOB ( pt refused to attempt other methods for EOB 2/2 pain). Pt eob scooted hips to EOB c CGA for back support. Pt sate EOB c SBA while supporting self c BUE for ~5m for increased trunk control and strength needed for t/fs. Pt throughout requesting A from all present 2/2 pt reporting "I am falling, I can't do this, I need help." FRANCOIS/tech/mother all encouraging pt to continue as pt is more capable than pt believes. Pt retuend to sup c dep and positioned with pillow supporting lubar spine, pt req total for log roll this date.     A: pt req max encouragement this date, pt continued to request A even as pt performed task c SBA. Pt req cues for cessation of fw scooting of hips EOB for max safety. Pt motivation limited by increased pain.     P: cont c current OT POC        AM-PAC 6 CLICK ADL   How much help from another person does this patient currently need?   1 = Unable, Total/Dependent Assistance  2 = A lot, Maximum/Moderate Assistance  3 = A little, Minimum/Contact Guard/Supervision  4 = None, Modified [place]/Independent    Putting on and taking off regular lower body clothing? : 1  Bathing (including washing, rinsing, " "drying)?: 1  Toileting, which includes using toilet, bedpan, or urinal? : 1  Putting on and taking off regular upper body clothing?: 2  Taking care of personal grooming such as brushing teeth?: 3  Eating meals?: 4  Daily Activity Total Score: 12     AM-PAC Raw Score CMS "G-Code Modifier Level of Impairment Assistance   6 % Total / Unable   7 - 8 CM 80 - 100% Maximal Assist   9-13 CL 60 - 80% Moderate Assist   14 - 19 CK 40 - 60% Moderate Assist   20 - 22 CJ 20 - 40% Minimal Assist   23 CI 1-20% SBA / CGA   24 CH 0% Independent/ Mod I       Patient left supine with all lines intact, call button in reach, and mom present    ASSESSMENT:  Stuart Guevara is a 32 y.o. male with a medical diagnosis of Aortic dissection and presents with     Rehab identified problem list/impairments:      Rehab potential is fair.    Activity tolerance fair    Discharge recommendations: Discharge Facility/Level of Care Needs: rehabilitation facility     Barriers to discharge:      Equipment recommendations:       GOALS:   Multidisciplinary Problems       Occupational Therapy Goals          Problem: Occupational Therapy    Goal Priority Disciplines Outcome Interventions   Occupational Therapy Goal     OT, PT/OT Ongoing, Progressing    Description: Goals to be met by: 11/4/2022  Goals Met and Upgraded/New goals added 10/21/2022    Patient will increase functional independence with ADLs by performing:      UE dressing with Modified [place]. - new goal  LB Dressing with Minimal Assistance and dressing AEDs. - continue/progressing  Grooming while standing at sink with Stand-by Assistance - new goal  Toileting from toilet with Modified [place] for hygiene and clothing management. - new goal  Bathing from  shower chair/bench with Modified [place]. - new goal  Toilet transfer to toilet with Modified [place]. - new goal    UE Dressing with Stand-by Assistance. - goal met  Grooming while EOB with Stand-by Assistance. - " goal met  Sitting at edge of bed x10 minutes with Stand-by Assistance. - goal met                         Plan:  Patient to be seen 5 x/week, 6 x/week to address the above listed problems via self-care/home management, therapeutic activities, therapeutic exercises  Plan of Care expires: 11/04/22  Plan of Care reviewed with: patient         10/29/2022

## 2022-10-29 NOTE — PROGRESS NOTES
Trauma/Acute Care Surgery   Daily Progress Note     HD#36  POD#5 Days Post-Op    SUBJECTIVE / INTERVAL EVENTS  NAEON T max 100.8 BP max 180/100 Tachy to 115  Continued abdominal pain  Tolerating renal diet with no nausea or vomiting  Passing gas and having Bms  POD1 IR paracentesis-tolerated procedure    OBJECTIVE    Vitals  Temp:  [98.2 °F (36.8 °C)-100.8 °F (38.2 °C)] 100.8 °F (38.2 °C)  Pulse:  [] 101  Resp:  [15-21] 19  SpO2:  [93 %-98 %] 95 %  BP: (145-180)/() 147/84    Intake / Output  UO: 500 cc  Last Bowel Movement: 10/26/22    Physical Exam:  GEN: comfortable, no acute distress  RESP: breathing comfortably on room air  ABD: distended, diffusely tender, incision healed, indurated skin lower abdomen       Labs    Lab Results   Component Value Date    WBC 28.1 (H) 10/28/2022    HGB 10.3 (L) 10/28/2022    HCT 31.4 (L) 10/28/2022    MCV 86.7 10/28/2022     10/28/2022           Recent Labs     10/26/22  0552 10/27/22  0510 10/28/22  0516   * 127* 128*   K 5.5* 5.4* 4.9   CO2 23 24 21*   BUN 81.4* 90.6* 62.9*   CREATININE 2.22* 2.17* 1.62*   CALCIUM 8.3* 8.2* 7.9*   ALBUMIN 1.4*  --  1.5*   BILITOT 0.5  --  0.5   AST 17  --  15   ALKPHOS 114  --  108   ALT 10  --  9        Micro  Microbiology Results (last 7 days)       Procedure Component Value Units Date/Time    Gram Stain [[id number]] Collected: 10/28/22 0940    Order Status: Sent Specimen: Peritoneal Fluid from Abdomen Updated: 10/28/22 1326    Body Fluid Culture [[id number]] Collected: 10/28/22 0940    Order Status: Sent Specimen: Peritoneal Fluid from Abdomen Updated: 10/28/22 1326    Gram Stain [[id number]] Collected: 10/28/22 0940    Order Status: Sent Specimen: Peritoneal Fluid from Abdomen Updated: 10/28/22 1326    Body Fluid Culture [[id number]] Collected: 10/28/22 0940    Order Status: Sent Specimen: Peritoneal Fluid from Abdomen Updated: 10/28/22 1326    Anaerobic Culture [[id number]] Collected: 10/28/22 0940    Order Status: Sent  Specimen: Body Fluid from Peritoneal Updated: 10/28/22 1326    Fungal Culture [[id number]] Collected: 10/28/22 0940    Order Status: Sent Specimen: Body Fluid from Peritoneal Updated: 10/28/22 1326    Anaerobic Culture [[id number]] Collected: 10/28/22 0940    Order Status: Sent Specimen: Body Fluid from Abdomen Updated: 10/28/22 1326    Fungal Culture [[id number]] Collected: 10/28/22 0940    Order Status: Sent Specimen: Body Fluid from Abdomen Updated: 10/28/22 1326    Anaerobic Culture [[id number]]     Order Status: Canceled Specimen: Body Fluid from Abdomen     Body Fluid Culture [[id number]]     Order Status: Canceled Specimen: Body Fluid from Abdomen     Fungal Culture [[id number]]     Order Status: Canceled Specimen: Body Fluid from Abdomen     Gram Stain [[id number]]     Order Status: Canceled Specimen: Body Fluid from Abdomen     Gram Stain [[id number]]     Order Status: Canceled Specimen: Peritoneal Fluid     Body Fluid Culture [[id number]]     Order Status: Canceled Specimen: Peritoneal Fluid     Fungal Culture [[id number]]     Order Status: Canceled Specimen: Body Fluid     Anaerobic Culture [[id number]]     Order Status: Canceled Specimen: Body Fluid     Fungal Culture [[id number]]  (Normal) Collected: 09/25/22 1200    Order Status: Completed Specimen: Body Fluid from Pleural Fluid, Right Updated: 10/27/22 1254     Fungal Culture No Fungus Isolated at 4 Weeks             Imaging  ECHO EF 55%    ASSESSMENT & PLAN  In brief, patient is a 32M with HTN, SLE, HBV, aortic dissection s/p repair 2/2021, RUE DVT on xarelto who was admitted 9/23 with aortic dissection and is s/p TEVAR. Pt developed abdominal compartment syndrome and is s/p multiple exlaps (9/25, 9/27, 9/29) now on HD for renal failure now with necrotizing pancreatitis POD1 from IR paracentesis     -no acute surgical intervention indicated at this time. POD1 IR paracentesis. No surgery indicated to improve patient condition. Hemoglobin drop from 10.3 to 7.6  today noted. CT A and P reviewed with IR previously who recommended CTS should be made aware of partially imaged thoracic aortic dissection s/p TEVAR. Recommend ensuring follow up.  -continue abx, pain control  -rest of care per primary team  -recommend trending daily hemoglobin  -will follow      Keyonna Boggs MD  [employer] General Surgery PGY1    10/29/2022  5:11 AM

## 2022-10-30 PROBLEM — I71.03 DISSECTION OF THORACOABDOMINAL AORTA: Status: ACTIVE | Noted: 2022-01-01

## 2022-10-30 PROBLEM — I71.00 AORTIC DISSECTION: Status: RESOLVED | Noted: 2022-01-01 | Resolved: 2022-01-01

## 2022-10-30 NOTE — NURSING
10/30/22 1322   Post-Hemodialysis Assessment   Blood Volume Processed (Liters) 47.2 L   Dialyzer Clearance Lightly streaked   Duration of Treatment 120 minutes   Net Fluid Removal 500   Patient Response to Treatment tolerated well

## 2022-10-30 NOTE — ANESTHESIA PROCEDURE NOTES
Intubation    Date/Time: 10/30/2022 4:05 PM  Performed by: Danny Dominguez CRNA  Authorized by: Hang Root MD     Intubation:     Induction:  Intravenous    Intubated:  Postinduction    Mask Ventilation:  Easy with oral airway    Attempts:  1    Attempted By:  CRNA    Method of Intubation:  Direct    Blade:  Noland 2    Laryngeal View Grade: Grade IIA - cords partially seen      Difficult Airway Encountered?: No      Complications:  None    Airway Device:  Oral endotracheal tube    Airway Device Size:  7.5    Style/Cuff Inflation:  Cuffed (inflated to minimal occlusive pressure)    Inflation Amount (mL):  6    Tube secured:  21    Secured at:  The lips    Placement Verified By:  Capnometry    Complicating Factors:  None    Findings Post-Intubation:  BS equal bilateral

## 2022-10-30 NOTE — ANESTHESIA PROCEDURE NOTES
Arterial    Diagnosis: Ex Lap    Patient location during procedure: holding area  Procedure start time: 10/30/2022 3:33 PM  Timeout: 10/30/2022 3:32 PM  Procedure end time: 10/30/2022 3:36 PM    Staffing  Authorizing Provider: Hang Root MD  Performing Provider: Danny Dominguez CRNA    Anesthesiologist was present at the time of the procedure.    Preanesthetic Checklist  Completed: patient identified, IV checked, site marked, risks and benefits discussed, surgical consent, monitors and equipment checked, pre-op evaluation, timeout performed and anesthesia consent givenArterial  Skin Prep: chlorhexidine gluconate and isopropyl alcohol  Local Infiltration: lidocaine  Orientation: left  Location: radial    Catheter Size: 20 G  Catheter placement by Ultrasound guidance. Heme positive aspiration all ports.   Vessel Caliber: small, patent, compressibility normal  Vascular Doppler:  not done  Needle advanced into vessel with real time Ultrasound guidance.  Guidewire confirmed in vessel.  Sterile sheath used.  Image recorded and saved.Insertion Attempts: 1  Assessment  Dressing: secured with tape and tegaderm  Patient: Tolerated well

## 2022-10-30 NOTE — TRANSFER OF CARE
"Anesthesia Transfer of Care Note    Patient: Stuart Guevara    Procedure(s) Performed: Procedure(s) (LRB):  LAPAROTOMY, EXPLORATORY (N/A)    Patient location: PACU    Anesthesia Type: general    Transport from OR: Transported from OR on room air with adequate spontaneous ventilation    Post pain: adequate analgesia    Post assessment: no apparent anesthetic complications    Post vital signs: stable    Level of consciousness: responds to stimulation    Nausea/Vomiting: no nausea/vomiting    Complications: none    Transfer of care protocol was followed      Last vitals:   Visit Vitals  /72 (BP Location: Left arm, Patient Position: Lying)   Pulse 82   Temp 36.6 °C (97.9 °F) (Oral)   Resp 15   Ht 5' 5" (1.651 m)   Wt 56.6 kg (124 lb 12.5 oz)   SpO2 (!) 94%   BMI 20.76 kg/m²     "

## 2022-10-30 NOTE — PLAN OF CARE
Plan of Care:    Team notified of CT angio of abdomen/pelvis. Findings demonstrate a large fluid collection in the left paracolic gutter and pelvic sidewall with no contrast collection or pooling, likely representing a rectus sheath hematoma. In addition, there is interval increase in bilateral pleural effusions as well as pronounced ascites. There is no acute surgical intervention warranted for these findings at this time. Would recommend holding anticoagulation for 48 hours, and close monitoring of hematoma with serial H/H's and abdominal exams. If hematoma remains stable in that time period, would recommend starting a reversal anticoagulant agent like Lovenox. Will review imaging further with staff in the morning.     Keyonna Castro MD  [facility] Surgery, PGY-2

## 2022-10-30 NOTE — ASSESSMENT & PLAN NOTE
Patient with a history of repair of Jacob type a aortic dissection and stent graft repair of enlarging descending thoracic aorta with TEVAR.  Patient currently has anemia of unknown etiology but does not have any indication for surgical treatment and no obvious evidence of leakage for rupture on CTA.

## 2022-10-30 NOTE — PROGRESS NOTES
Trauma/Acute Care Surgery   Daily Progress Note     HD#37  POD#6 Days Post-Op    SUBJECTIVE / INTERVAL EVENTS  AF VSS  2u RBCs overnight for hgb 6.4  Continued abdominal pain  Tolerating renal diet with no nausea or vomiting  Passing gas and having Bms  Complained of CP this am, seen by cards and CTS      OBJECTIVE    Vitals  Temp:  [98.1 °F (36.7 °C)-98.8 °F (37.1 °C)] 98.8 °F (37.1 °C)  Pulse:  [80-92] 92  Resp:  [16-20] 16  SpO2:  [95 %-99 %] 96 %  BP: (103-142)/(49-81) 142/81    Physical Exam:  GEN: no acute distress  RESP: breathing comfortably on room air  ABD: distended, diffusely tender, incision healed, indurated skin lower abdomen  LE: peripheral pitting edema with DP and PT pulses heard on Doppler bilaterally       Labs    Lab Results   Component Value Date    WBC 25.0 (H) 10/30/2022    HGB 11.6 (L) 10/30/2022    HCT 34.2 (L) 10/30/2022    MCV 84.0 10/30/2022     10/30/2022           Recent Labs     10/28/22  0516 10/29/22  0547 10/30/22  0611   * 128* 127*   K 4.9 4.9 5.8*   CO2 21* 22 22   BUN 62.9* 79.7* 93.1*   CREATININE 1.62* 1.68* 1.92*   CALCIUM 7.9* 8.0* 9.0   MG  --  1.90 2.00   PHOS  --  3.8 4.4   ALBUMIN 1.5* 1.7* 1.8*   BILITOT 0.5 0.6 0.7   AST 15 14 20   ALKPHOS 108 84 120   ALT 9 9 7        Micro  Microbiology Results (last 7 days)       Procedure Component Value Units Date/Time    Anaerobic Culture [[id number]] Collected: 10/28/22 0940    Order Status: Completed Specimen: Body Fluid from Abdomen Updated: 10/30/22 0719     Anaerobe Culture No Anaerobes Isolated    Anaerobic Culture [[id number]] Collected: 10/28/22 0940    Order Status: Completed Specimen: Body Fluid from Peritoneal Updated: 10/30/22 0715     Anaerobe Culture No Anaerobes Isolated    Body Fluid Culture [[id number]] Collected: 10/28/22 0940    Order Status: Completed Specimen: Peritoneal Fluid from Abdomen Updated: 10/30/22 0649     Body Fluid Culture No Growth At 48 Hours    Body Fluid Culture [[id number]]   (Abnormal) Collected: 10/28/22 0940    Order Status: Completed Specimen: Peritoneal Fluid from Abdomen Updated: 10/29/22 1422     Body Fluid Culture Many Klebsiella pneumoniae    Gram Stain [[id number]] Collected: 10/28/22 0940    Order Status: Completed Specimen: Peritoneal Fluid from Abdomen Updated: 10/29/22 1130     GRAM STAIN No WBCs, No bacteria seen    Gram Stain [[id number]] Collected: 10/28/22 0940    Order Status: Completed Specimen: Peritoneal Fluid from Abdomen Updated: 10/29/22 1122     GRAM STAIN Rare WBC observed      Few Gram Negative Rods    Fungal Culture [[id number]] Collected: 10/28/22 0940    Order Status: Sent Specimen: Body Fluid from Peritoneal Updated: 10/28/22 1326    Fungal Culture [[id number]] Collected: 10/28/22 0940    Order Status: Sent Specimen: Body Fluid from Abdomen Updated: 10/28/22 1326    Anaerobic Culture [[id number]]     Order Status: Canceled Specimen: Body Fluid from Abdomen     Body Fluid Culture [[id number]]     Order Status: Canceled Specimen: Body Fluid from Abdomen     Fungal Culture [[id number]]     Order Status: Canceled Specimen: Body Fluid from Abdomen     Gram Stain [[id number]]     Order Status: Canceled Specimen: Body Fluid from Abdomen     Gram Stain [[id number]]     Order Status: Canceled Specimen: Peritoneal Fluid     Body Fluid Culture [[id number]]     Order Status: Canceled Specimen: Peritoneal Fluid     Fungal Culture [[id number]]     Order Status: Canceled Specimen: Body Fluid     Anaerobic Culture [[id number]]     Order Status: Canceled Specimen: Body Fluid     Fungal Culture [[id number]]  (Normal) Collected: 09/25/22 1200    Order Status: Completed Specimen: Body Fluid from Pleural Fluid, Right Updated: 10/27/22 1254     Fungal Culture No Fungus Isolated at 4 Weeks             Imaging  CTA Chest Abdomen Pelvis   1.  An aortic stent is again noted.  Caudal extension of the [place] type B aortic dissection 37.1 mm from the inferior margin of the graft is noted (Series  18, Image 55). Dilatation of the descending thoracic aorta is again noted, measuring 41.3 mm at its widest. The distal descending segment measures 23.4 mm at the diaphragmatic hiatus. The origin of the left subclavian, left common carotid and right brachiocephalic arteries are unremarkable.     2. Mild diffuse interlobular interstitial septal thickening in both lungs may be present. An element of congestive failure is not excluded. There is dense opacity at the dependent lung bases consistent with compressive atelectasis with the possibility of an infectious component not entirely excluded. Correlate clinically.     3. The IVC is nonenhanced and appears narrowed.     4. There is a pronounced ascites which is new compared to the prior study. Mild layering hyperdense fluid pelvic fluid is seen, likely reflecting hemoperitoneum. There is a large heterogeneous, predominantly hyperdense mass/collection along the left paracolic gutter and pelvic sidewall (Series 14 Image 90 and Series 22, Image 46), measuring 97 x 62 x 170 mm (AP, transverse, craniocaudal dimensions). This may reflect hematoma formation. No focal contrast collection or pooling is identified to suggest active hemorrhage at the time of imaging. There is a possible left rectus sheath hematoma seen on image 116 of series 14. Correlate with clinical and laboratory findings as regards additional evaluation and follow-up.     5.  Persistence of bilateral pleural effusions which are moderate-sized. Follow-up as clinically indicated.     6. Details and other findings as discussed above.     ASSESSMENT & PLAN  In brief, patient is a 32M with HTN, SLE, HBV, aortic dissection s/p repair 2/2021, RUE DVT on xarelto who was admitted 9/23 with aortic dissection and is s/p TEVAR. Pt developed abdominal compartment syndrome and is s/p multiple exlaps (9/25, 9/27, 9/29) now on HD for renal failure with re accumulation of ascites despite POD2 from IR paracentesis. CT has  shown concern for necrotizing fascitis.     -hemoglobin improved after 2 u, stable  -case discussed with staff. No indication for acute surgical intervention at this time.   -body cx from para positive for klebsiella. Continue abx  -rest of care per primary team  -will continue to follow      Keyonna Boggs MD  [facility] Surgery PGY1    10/30/2022  5:11 AM

## 2022-10-30 NOTE — PROGRESS NOTES
Nephrology consult follow up note    HPI:      Stuart Guevara is a 32 y.o. 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  The patient underwent exploratory lap X 3 (9/25, 9/27 and 9/29) for abdominal compartment syndrome and necrotizing pancreatitis.  He has been requiring renal replacement therapy now for some time due to TAY with some partial recovery of renal function.  He has now been started on a TTS schedule via right IJ temporary catheter.       Interval history:   10/28:  Patient underwent paracentesis per IR earlier this morning. Abdomen is distended and patient is in pain.       10/30/22  Patient with complaints of  lower abdominal pain yesterday.  He had CTA chest and with findings of large left-sided abdominal hematoma possibly even hemoperitoneum.  Surgery is following and recommended no surgical intervention at this time.  He is noted to be hyperkalemic with a potassium 5.8 this morning and 2 liters NC with O2 sats of 96%.  Nursing reported complaints of shortness of breath this morning however patient denies at present.  Objective:       VITAL SIGNS: 24 HR MIN & MAX LAST    Temp  Min: 98.1 °F (36.7 °C)  Max: 98.8 °F (37.1 °C)  98.8 °F (37.1 °C)        BP  Min: 103/49  Max: 142/81  (!) 142/81     Pulse  Min: 80  Max: 92  92     Resp  Min: 16  Max: 20  16    SpO2  Min: 95 %  Max: 99 %  96 %      GEN: Awake and appropriate.  Chronically ill-appearing black male.    HEENT: Atraumatic. EOMI, no icterus  NECK : No JVD, right IJ temporary catheter noted  CARD : RRR   LUNGS : Clear to auscultation  ABD :  Distended abdomen.  Diffuse abdominal discomfort.    EXT : No edema.   NEURO : No obvious focal deficits    Recent Labs   Lab 10/30/22  0611   *   K 5.8*   CO2 22   BUN 93.1*   CREATININE 1.92*   GLUCOSE 172*   CALCIUM 9.0   PHOS 4.4       Recent Labs   Lab  10/30/22  0611   WBC 25.0*   HGB 11.6*   HCT 34.2*              Assessment / Plan:      1. Postoperative TAY requiring hemodialysis initiation on 9/25 via temp HD catheter placed per Dr. Chopra.    2. Abdominal aortic dissection s/p TEVAR   3. Post operative abdominal compartment syndrome s/p ex lap on 9/27 with closure on 10/1  4. Hyperkalemia.  On Lokelma now.    5. Hypertension  6. Type 2 diabetes mellitus  7. Serositis and SLE - rheumatology following  8. Hyponatremia persistent.  9. Necrotizing pancreatitis per CT of the abdomen now with hematoma hemoperitoneum.     Recommendations  HD today for 2 hours then continue HD as needed.

## 2022-10-30 NOTE — NURSING
Pt returned from dialysis, Dr Espinal at bedside. Pt informed he needs to go to surgery for a washout of his abdomen. Consent signed . FFP x1 unit hung, pt ready.

## 2022-10-30 NOTE — ANESTHESIA PREPROCEDURE EVALUATION
"                                                                                                             10/30/2022  Stuart Guevara is a 32 y.o., male   Pre-operative evaluation for Procedure(s) (LRB):  LAPAROTOMY, EXPLORATORY (N/A)    /71   Pulse 85   Temp 36.6 °C (97.9 °F) (Oral)   Resp 16   Ht 5' 5" (1.651 m)   Wt 56.6 kg (124 lb 12.5 oz)   SpO2 95%   BMI 20.76 kg/m²     Past Medical History:   Diagnosis Date    Aortic dissection 9/23/2022    Hypertension     Systemic lupus erythematosus, organ or system involvement unspecified     Systemic lupus erythematosus, organ or system involvement unspecified        Patient Active Problem List   Diagnosis    Systemic lupus erythematosus    HTN (hypertension)    History of DVT in adulthood    Hypertensive emergency    Serositis    Dissection of thoracoabdominal aorta       Review of patient's allergies indicates:   Allergen Reactions    Levofloxacin     Sulfamethoxazole-trimethoprim        Current Outpatient Medications   Medication Instructions    albuterol (PROVENTIL/VENTOLIN HFA) 90 mcg/actuation inhaler 2 puffs, Inhalation, Every 4 hours PRN, Rescue    amLODIPine (NORVASC) 10 mg, Oral, Daily    aspirin (ECOTRIN) 81 mg, Oral, Daily    belimumab (BENLYSTA) 120 mg SolR Benlysta Take No date recorded No form recorded No frequency recorded No route recorded No set duration recorded No set duration amount recorded active No dosage strength recorded No dosage strength units of measure recorded    famotidine (PEPCID) 20 mg, Oral, 2 times daily    gemfibroziL (LOPID) 600 mg, Oral    hydrALAZINE (APRESOLINE) 10 mg, Oral    labetaloL (NORMODYNE) 300 mg, Oral, 2 times daily    lisinopriL (PRINIVIL,ZESTRIL) 40 mg, Oral, Daily    metoprolol tartrate (LOPRESSOR) 50 mg, Oral, 2 times daily    mycophenolate (CELLCEPT) 1,000 mg, Oral, 2 times daily    MEÑO 2 mg TbEC 1 tablet, Oral, Daily    rivaroxaban (XARELTO ORAL) Xarelto Take No date " recorded No form recorded No frequency recorded No route recorded No set duration recorded No set duration amount recorded active No dosage strength recorded No dosage strength units of measure recorded    sucralfate (CARAFATE) 1 g, Oral, 2 times daily    warfarin (COUMADIN) 3 mg, Oral, Daily       Past Surgical History:   Procedure Laterality Date    APPLICATION OF WOUND VACUUM-ASSISTED CLOSURE DEVICE  9/27/2022    Procedure: APPLICATION, WOUND VAC;  Surgeon: Christopher Espinal MD;  Location: [facility];  Service: General;;    CARDIAC SURGERY      CREATION OF BYPASS FROM INTERNAL CAROTID ARTERY TO SUBCLAVIAN ARTERY Left 9/23/2022    Procedure: CREATION, BYPASS, ARTERIAL, INTERNAL CAROTID TO SUBCLAVIAN;  Surgeon: Vic Martines MD;  Location: [facility] OR;  Service: Cardiothoracic;  Laterality: Left;    ENDOVASCULAR REPAIR OF THORACIC AORTA N/A 9/23/2022    Procedure: REPAIR, AORTA, THORACIC, ENDOVASCULAR;  Surgeon: Vic Martines MD;  Location: [facility] OR;  Service: Cardiology;  Laterality: N/A;    FINGER AMPUTATION      INSERTION OF TUNNELED CENTRAL VENOUS HEMODIALYSIS CATHETER N/A 10/24/2022    Procedure: INSERTION, CATHETER, CENTRAL VENOUS, HEMODIALYSIS, TUNNELED;  Surgeon: Yogesh Melton DO;  Location: [facility] CATH LAB;  Service: Nephrology;  Laterality: N/A;  TUNNELED CATH INSERTION    THROMBECTOMY Right     TOE AMPUTATION         Social History     Socioeconomic History    Marital status: Single   Tobacco Use    Smoking status: Every Day     Types: Cigarettes    Smokeless tobacco: Never     Social Determinants of Health     Food Insecurity: Unknown    Worried About Running Out of Food in the Last Year: Never true   Transportation Needs: Unknown    Lack of Transportation (Medical): No   Housing Stability: Unknown    Unable to Pay for Housing in the Last Year: No       Lab Results   Component Value Date    WBC 25.0 (H) 10/30/2022    HGB 11.6 (L) 10/30/2022    HCT 34.2 (L) 10/30/2022    MCV 84.0 10/30/2022      10/30/2022          BMP  Lab Results   Component Value Date     (L) 10/30/2022    K 5.8 (H) 10/30/2022    CHLORIDE 96 (L) 10/30/2022    CO2 22 10/30/2022    GLUCOSE 172 (H) 10/30/2022    BUN 93.1 (H) 10/30/2022    CREATININE 1.92 (H) 10/30/2022    CALCIUM 9.0 10/30/2022    EGFRNONAA 80 01/31/2022        INR  Recent Labs     10/28/22  0516 10/29/22  0548 10/30/22  0611   INR 3.27* 3.31* 2.14*   PROTIME 32.6* 32.9* 23.5*           Diagnostic Studies:      EKG:  Results for orders placed or performed during the hospital encounter of 09/23/22   EKG 12-lead    Collection Time: 10/30/22  6:50 AM    Narrative    Test Reason : R07.9,    Vent. Rate : 091 BPM     Atrial Rate : 091 BPM     P-R Int : 166 ms          QRS Dur : 092 ms      QT Int : 328 ms       P-R-T Axes : 015 -12 148 degrees     QTc Int : 403 ms    Normal sinus rhythm  LVH with secondary ST-T wave changes  Inferior infarct ,age undetermined  Abnormal ECG  Confirmed by Grabiel Bradley MD ([id number]) on 10/30/2022 1:05:00 PM    Referred By: ARTURO ABBOTT           Confirmed By:Grabiel Bradley MD        ASSESSMENT & PLAN  In brief, patient is a 32M with HTN, SLE, HBV, aortic dissection s/p repair 2/2021, RUE DVT on xarelto who was admitted 9/23 with aortic dissection and is s/p TEVAR. Pt developed abdominal compartment syndrome and is s/p multiple exlaps (9/25, 9/27, 9/29) now on HD for renal failure with re accumulation of ascites despite POD2 from IR paracentesis. CT has shown concern for necrotizing fascitis.     -hemoglobin improved after 2 u, stable  -case discussed with staff. No indication for acute surgical intervention at this time.   -body cx from para positive for klebsiella. Continue abx  -rest of care per primary team  -will continue to follow        Keyonna Boggs MD  [employer] General Surgery PGY1     10/30/2022  5:11 AM  .Accession #: [id number]  Transthoracic echo (TTE) limited  Order# [id number]  Reading physician: Ovidio Holm MD  Ordering physician: Luis F Hutson MD Study date: 10/28/22     Reason for Exam  Priority: Routine  Dx: CHF (congestive heart failure) [I50.9 (ICD-10-CM)]     View Images Vital Vitrea     Show images for Echo  Summary     The left ventricle is normal in size with moderate concentric hypertrophy and normal systolic function.   The estimated ejection fraction is 55%.   Indeterminate left ventricular diastolic function.   Normal right ventricular size with normal right ventricular systolic function.   Mild left atrial enlargement        Pre-op Assessment    I have reviewed the Patient Summary Reports.    I have reviewed the NPO Status.   I have reviewed the Medications.     Review of Systems  Anesthesia Hx:  No problems with previous Anesthesia  Denies Family Hx of Anesthesia complications.    Cardiovascular:  Cardiovascular Normal  No Cardiac Complaints   Pulmonary:  Pulmonary Normal No Pulmonary Complaints   Hepatic/GI:   No Current GERD Sx       Physical Exam  General: Alert and Oriented    Airway:  Mallampati: II   Mouth Opening: Normal  TM Distance: Normal  Tongue: Normal  Neck ROM: Normal ROM    Dental:  Periodontal disease  Denies loose teeth. Poor dentition. Numerous missing teeth  Chest/Lungs:  Clear to auscultation, Normal Respiratory Rate    Heart:  Rate: Normal  Rhythm: Regular Rhythm        Anesthesia Plan  Type of Anesthesia, risks & benefits discussed:    Anesthesia Type: Gen ETT  Intra-op Monitoring Plan: Standard ASA Monitors and Art Line  Post Op Pain Control Plan: multimodal analgesia  Induction:  IV and Inhalation  Airway Plan: Direct, Post-Induction  Informed Consent: Informed consent signed with the Patient and all parties understand the risks and agree with anesthesia plan.  All questions answered.   ASA Score: 4 Emergent  Day of Surgery Review of History & Physical: H&P Update referred to the surgeon/provider.  Anesthesia Plan Notes: Discussed Anesthetic Plan w/ Pt/Family. Questions  Entertained. Accepted.    Ready For Surgery From Anesthesia Perspective.     .

## 2022-10-30 NOTE — PROGRESS NOTES
"[facility] - [facility] ICU  Cardiology  Progress Note    Patient Name: Stuart Guevara  MRN: [id number]  Admission Date: 9/23/2022  Hospital Length of Stay: 37 days  Code Status: Full Code   Attending Physician: Ronda Hamilton MD   Primary Care Physician: Primary Doctor No  Expected Discharge Date:   Principal Problem:Aortic dissection    Subjective:     Brief HPI:    This is a 32-year-old male, who is known to [facility] as an inpatient only,  with history of HTN, lupus nephritis, chronic anemia, right upper extremity DVT, SLE, type a aortic dissection with repair.  He presents to [facility] ER on 09/23/2022 with complaints of ripping /tearing chest pain that radiated to his back began on 09/22/2022.  He was noted to have a BP  of 232/126.  He had a syncopal episode witnessed at the .  Patient was brought immediately back to CT where he was found to have a type B aortic dissection.  Esmolol and nitroprusside were initiated at [facility].   Patient was transferred to [facility] for CT surgery  the services and was admitted directly to ICU.   On 09/25/2022 he was found to have high-grade AV block with bradycardia.  Dr. Maldonadoha the patient down for TVP.    Patient underwent exploratory laparotomy.  He h.ad to be intubated and placed on ventilator secondary to respiratory failure and shockCIS has been consulted for bradycardia.     Hospital Course:   9.27.22: Remains vented/sedated. SR on tele. NO further pacing noted. VSS.   9.28.22: Vented/sedated. SR on tele. No bradycardia or arrhythmia noted.  10.3.22: Reconsulted for HTN/BP Control. "I am doing well." Denies CP, SOB and Palps. + Cough/No Sputum Production.   10.4.22: NAD. VSS/BP Stable on Current Medical Therapy. "I am feeling better." Denies CP, SOB and Palps.   10.5.22: NAD noted. VSS. SR on tele. Denies CP/SOB/Palps.  10.6.22: NAD noted. VSS. SR on tele. Denies CP/SOB/Palps. INR therapeutic.  10.30.22: Reconsulted for Chest pain and abnormal EKG. VSS. SR on tele. " Patient now being treated for necrotizing pancreatitis    PMH: HTN, lupus nephritis, chronic anemia, right upper extremity DVT, SLE  PSH: Left middle finger amputation, right second toe amputation, Mechanical Aortic Valve with Graph/Dissection Repair  Social History: Denies EtOH and illicit drug use, current tobacco use approximately 4-5 cigarettes/day  Family History: Mother-Hypertension     Previous Diagnostics:   Echo 9.26.22  HO AAA surgery and aortic valve replacement.  The left ventricle is normal in size with severe concentric hypertrophy and moderately decreased systolic function.  The estimated ejection fraction is 37%.  There is moderate left ventricular global hypokinesis.  Grade I left ventricular diastolic dysfunction.  Moderate right ventricular enlargement with mildly to moderately reduced right ventricular systolic function.  There is a bioprosthetic aortic valve present. There is trivial paravalvular aortic insufficiency present. Prosthetic aortic valve is normal.  The estimated PA systolic pressure is 33 mmHg.  Intermediate central venous pressure (8 mmHg    ECHO 2.15.21  Moderate concentric left ventricular hypertrophy.  Left ventricular ejection fraction is measured at approximately 55-60 %.  Structurally aortic valve is trileaflet.  Moderate aortic regurgitation is noted..  Turbulent flow in aortic root with possible aortic dissection flap visualized. (Best images at end of study)  Aortic root measures approximately 4.1cm.     Review of Systems   Constitutional: Positive for malaise/fatigue.   Cardiovascular:  Positive for chest pain. Negative for irregular heartbeat.   Respiratory:  Positive for cough. Negative for hemoptysis, shortness of breath and sputum production.    All other systems reviewed and are negative.    Objective:     Vital Signs (Most Recent):  Temp: 98.8 °F (37.1 °C) (10/30/22 0713)  Pulse: 92 (10/30/22 0713)  Resp: 20 (10/30/22 0713)  BP: (!) 142/81 (10/30/22 0713)  SpO2: 96  % (10/30/22 0713)   Vital Signs (24h Range):  Temp:  [98.1 °F (36.7 °C)-98.8 °F (37.1 °C)] 98.8 °F (37.1 °C)  Pulse:  [] 92  Resp:  [16-20] 20  SpO2:  [95 %-99 %] 96 %  BP: (103-147)/(49-84) 142/81     Weight: 56.6 kg (124 lb 12.5 oz)  Body mass index is 20.76 kg/m².    SpO2: 96 %  O2 Device (Oxygen Therapy): room air      Intake/Output Summary (Last 24 hours) at 10/30/2022 0813  Last data filed at 10/30/2022 0629  Gross per 24 hour   Intake 377.5 ml   Output 375 ml   Net 2.5 ml       Lines/Drains/Airways       Peripherally Inserted Central Catheter Line  Duration             PICC Double Lumen 10/25/22 1520 right brachial 4 days              Central Venous Catheter Line  Duration             Trialysis (Dialysis) Catheter 10/04/22 1300 right internal jugular 25 days                  Significant Labs: CMP   Recent Labs   Lab 10/29/22  0547   *   K 4.9   CO2 22   BUN 79.7*   CREATININE 1.68*   CALCIUM 8.0*   ALBUMIN 1.7*   BILITOT 0.6   ALKPHOS 84   AST 14   ALT 9      and CBC   Recent Labs   Lab 10/29/22  0703 10/29/22  1351 10/29/22  2158 10/29/22  2316 10/30/22  0611   WBC 29.7* 26.2*  --   --  25.0*   HGB 7.6* 6.4* 9.9* 9.6* 11.6*   HCT 23.7* 19.8* 30.0* 29.1* 34.2*    127*  --   --  152       Telemetry:  SR    Physical Exam  Constitutional:       Appearance: Normal appearance.   HENT:      Head: Normocephalic.      Mouth/Throat:      Mouth: Mucous membranes are moist.   Cardiovascular:      Rate and Rhythm: Normal rate and regular rhythm.      Pulses: Normal pulses.      Heart sounds: Normal heart sounds.   Pulmonary:      Effort: Pulmonary effort is normal.      Breath sounds: Decreased breath sounds present.   Skin:     General: Skin is warm and dry.      Capillary Refill: Capillary refill takes less than 2 seconds.   Neurological:      General: No focal deficit present.      Mental Status: He is alert and oriented to person, place, and time.   Psychiatric:         Mood and Affect: Mood  normal.         Behavior: Behavior normal.     Current Inpatient Medications:    Current Facility-Administered Medications:     acetaminophen tablet 650 mg, 650 mg, Oral, Q6H PRN, Nolberto Calvillo MD, 650 mg at 10/06/22 2031    albuterol inhaler 2 puff, 2 puff, Inhalation, Q4H PRN, Evelio Marshall MD    amino acid 5% in 15% dextrose (CLINIMIX-E) solution (1L provides 50gm AA, 150gm CHO (510 kcal/L dextrose), Na 35, K 30, Mg 5, Ca 4.5, Acetate 80, Cl 39, Phos 15) (710 total kcal/L), , Intravenous, Continuous, Ronda Hamilton MD, Last Rate: 60 mL/hr at 10/29/22 1747, New Bag at 10/29/22 1747    bisacodyL suppository 10 mg, 10 mg, Rectal, Daily PRN, Keena Shafer, FNP, 10 mg at 10/28/22 0400    carvediloL tablet 25 mg, 25 mg, Oral, BID, Maximino Yeh, ANP, 25 mg at 10/29/22 2123    cloNIDine tablet 0.1 mg, 0.1 mg, Oral, BID, Ronda Hamilton MD, 0.1 mg at 10/29/22 2100    dextrose 10 % infusion, 12.5 g, Intravenous, PRN, Dewayne Hughes PA-C, 12.5 g at 10/25/22 0317    dextrose 10 % infusion, 25 g, Intravenous, PRN, Dewayne Hughes PA-C    dextrose 10% bolus 125 mL, 12.5 g, Intravenous, PRN, Ivan Chopra MD, Stopped at 10/20/22 0953    fat emulsion 20% infusion 250 mL, 250 mL, Intravenous, Every Tues, Thurs, Sat, Luis F Hutson MD, Last Rate: 20.8 mL/hr at 10/29/22 2246, 250 mL at 10/29/22 2246    fluconazole (DIFLUCAN) IVPB 400 mg, 400 mg, Intravenous, Q24H, Aleah Calix MD, Last Rate: 100 mL/hr at 10/29/22 0457, 400 mg at 10/29/22 0457    glucagon (human recombinant) injection 1 mg, 1 mg, Intramuscular, PRN, Dewayne Hughes PA-C, 1 mg at 10/20/22 0925    glucose chewable tablet 16 g, 16 g, Oral, PRN, Dewayne Hughes PA-C    glucose chewable tablet 24 g, 24 g, Oral, PRN, Dewayne Hughes PA-C, 24 g at 10/24/22 1709    hydrALAZINE injection 20 mg, 20 mg, Intravenous, Q4H PRN, Bin Bell MD, 20 mg at 10/22/22 0319    hydrALAZINE tablet 100 mg, 100 mg, Oral, Q8H, Ronda Hamilton MD, 100 mg at  10/30/22 0600    HYDROcodone-acetaminophen 5-325 mg per tablet 1 tablet, 1 tablet, Oral, Q4H PRN, DENIA Armando, 1 tablet at 10/30/22 0018    HYDROmorphone (PF) injection 2 mg, 2 mg, Intravenous, Q6H PRN, Saurabh Curry MD, 2 mg at 10/30/22 0330    insulin aspart U-100 injection 1-10 Units, 1-10 Units, Subcutaneous, QID (AC + HS) PRN, Ronda Hamilton MD, 3 Units at 10/29/22 2106    insulin detemir U-100 injection 10 Units, 10 Units, Subcutaneous, QHS, Tom Slade MD, 10 Units at 10/29/22 2106    labetaloL injection 20 mg, 20 mg, Intravenous, Q8H PRN, Bin Bell MD, 20 mg at 10/03/22 0321    lactulose 10 gram/15 ml solution 10 g, 10 g, Oral, BID, Luis F Hutson MD, 10 g at 10/29/22 2123    lorazepam injection 1 mg, 1 mg, Intravenous, Q2H PRN, Cong Velez DO    melatonin tablet 6 mg, 6 mg, Oral, Nightly PRN, [facility], 6 mg at 10/14/22 0000    meropenem (MERREM) 1 g in sodium chloride 0.9 % 100 mL IVPB (MB+), 1 g, Intravenous, Q12H, Luis F Hutson MD, Stopped at 10/30/22 0421    methylPREDNISolone sodium succinate injection 40 mg, 40 mg, Intravenous, Q12H, Saurabh Curry MD, 40 mg at 10/29/22 2058    metoclopramide HCl injection 5 mg, 5 mg, Intravenous, QID (AC & HS), Luis F Hutson MD, 5 mg at 10/29/22 2058    morphine 12 hr tablet 30 mg, 30 mg, Oral, Q12H, Shamar Stewart MD, 30 mg at 10/29/22 2248    NIFEdipine 24 hr tablet 90 mg, 90 mg, Oral, Daily, Verna Whipple MD, 90 mg at 10/29/22 0839    nitroGLYCERIN SL tablet 0.4 mg, 0.4 mg, Sublingual, Q5 Min PRN, Zach Shafer MD, 0.4 mg at 10/30/22 0713    ondansetron injection 4 mg, 4 mg, Intravenous, Q6H PRN, Evelio Marshall MD, 4 mg at 10/04/22 2114    pantoprazole injection 40 mg, 40 mg, Intravenous, Daily, Saurabh Curry MD, 40 mg at 10/29/22 0839    polyethylene glycol packet 17 g, 17 g, Oral, BID PRN, Thalia Renteria, DEBBIE    sodium bicarbonate tablet 1,300 mg, 1,300 mg, Oral, Daily, Thalia Renteria,  AGNP, 1,300 mg at 10/29/22 0839    sodium chloride 0.9% bolus 250 mL, 250 mL, Intravenous, PRN, DEBBIE Pearl    Flushing PICC Protocol, , , Until Discontinued **AND** sodium chloride 0.9% flush 10 mL, 10 mL, Intravenous, Q6H, 10 mL at 10/30/22 0600 **AND** sodium chloride 0.9% flush 10 mL, 10 mL, Intravenous, PRN, Ronda Hamilton MD    Assessment:   Atypical chest pain likely secondary to necrotizing pancreatitis   -Troponin negative  Necrotizing pancreatitis  Sepsis  Hypertensive Emergency - Now Controlled  CHB/TVP likely secondary to Electrolyte Abnormalities - Resolved  Acute Hypoxemic Respiratory Failure requiring Intubation/Ventilation - Resolved (On RA)  Type B Aortic Dissection/TEVAR  CMO (Unknown Etiology)    - ECHO (9.23.22) - LVEF 37%  TAY requiring HD Treatment  Acute Abdomen with Evidence of Global Hypoperfusion/Exp Lap - Resolved  Acute Pancreatitis - Resolving  Anemia of Chronic Disease  Hx of RUE DVT (Recent US - Negative for DVT)  Hx of Hepatitis B  Hx of Aortic Dissection with Repair in 2/2021 s/p Mechanical Valve (Aortic)  Electrolyte Derangements - Hyponatremia   SLE    Plan:   Continue to trend CE  No invasive cardiac work-up at this time given pancreatitis and sepsis  Continue Norvasc 10mg PO Qday  Continue Hydralazine 50mg PO Q8HR  Continue Coreg to 25mg PO BID  No ACEi/ARB/ARNI secondary to TAY - on HD per renal  Plans for OP Ischemic Workup for Newly Diagnosed CMO/EF 37%, once current issues have resolved  Goal INR 2.0-3.0  Electrolyte Management per Primary Team  F/U with [facility] cardiology clinic in 7-10 days  Will sign off. Thank you for allowing us to participate in the care of this patient. Please call us with any questions.      Manuel Pandey, VIRGILIO-BC  Cardiology  [facility] - [facility] ICU  10/30/2022

## 2022-10-30 NOTE — CONSULTS
[facility] - [facility] Telemetry  Cardiothoracic Surgery  Consult Note    Patient Name: Stuart Guevara  MRN: [id number]  Admission Date: 9/23/2022  Attending Physician: Ronda Hamilton MD  Referring Provider: Tom Lynn MD    Patient information was obtained from patient, ER records, and primary team.     Inpatient consult to Cardiothoracic Surgery  Consult performed by: Jonnie Carrion MD  Consult ordered by: Luis F Hutson MD  Reason for consult: Patient with multiple medical problems and persistent anemia of unknown etiology  Assessment/Recommendations: Patient is status post [place]-A aortic dissection repair with most recent stent graft repair of descending aortic dissection .  Patient has no obvious evidence of leaking a aortic dissection or persistent blood loss related to the aortic dissection.  Would defer to the medical team for management of anemia of unknown etiology.  No indication for cardiovascular surgical treatment at this time.      Subjective:     Chief Complaint/Reason for Admission:  Anemia    History of Present Illness:  32-year-old patient with numerous medical issues sternal with persistent anemia.  Patient is status post surgical repair of type a aortic dissection and TEVAR repair of dilated descending aorta.  There is no evidence on CT scan of leaking or rupture.  We will been asked to comment on the possibility of anemia etiology being cardiovascular related.    New line  No current facility-administered medications on file prior to encounter.     Current Outpatient Medications on File Prior to Encounter   Medication Sig    albuterol (PROVENTIL/VENTOLIN HFA) 90 mcg/actuation inhaler Inhale 2 puffs into the lungs every 4 (four) hours as needed for Wheezing. Rescue    amLODIPine (NORVASC) 10 MG tablet Take 1 tablet (10 mg total) by mouth once daily.    aspirin (ECOTRIN) 81 MG EC tablet Take 1 tablet (81 mg total) by mouth once daily.    belimumab (BENLYSTA) 120 mg  SolR Benlysta Take No date recorded No form recorded No frequency recorded No route recorded No set duration recorded No set duration amount recorded active No dosage strength recorded No dosage strength units of measure recorded    famotidine (PEPCID) 20 MG tablet Take 1 tablet (20 mg total) by mouth 2 (two) times daily.    gemfibroziL (LOPID) 600 MG tablet Take 600 mg by mouth.    hydrALAZINE (APRESOLINE) 10 MG tablet Take 10 mg by mouth.    labetaloL (NORMODYNE) 300 MG tablet Take 1 tablet (300 mg total) by mouth 2 (two) times daily.    lisinopriL (PRINIVIL,ZESTRIL) 40 MG tablet Take 1 tablet (40 mg total) by mouth once daily.    metoprolol tartrate (LOPRESSOR) 50 MG tablet Take 1 tablet (50 mg total) by mouth 2 (two) times daily.    mycophenolate (CELLCEPT) 500 mg Tab Take 2 tablets (1,000 mg total) by mouth 2 (two) times daily.    MEÑO 2 mg TbEC Take 1 tablet by mouth once daily.    rivaroxaban (XARELTO ORAL) Xarelto Take No date recorded No form recorded No frequency recorded No route recorded No set duration recorded No set duration amount recorded active No dosage strength recorded No dosage strength units of measure recorded    sucralfate (CARAFATE) 1 gram tablet Take 1 tablet (1 g total) by mouth 2 (two) times daily.    warfarin (COUMADIN) 3 MG tablet Take 1 tablet (3 mg total) by mouth Daily.       Review of patient's allergies indicates:   Allergen Reactions    Levofloxacin     Sulfamethoxazole-trimethoprim        Past Medical History:   Diagnosis Date    Aortic dissection 9/23/2022    Hypertension     Systemic lupus erythematosus, organ or system involvement unspecified     Systemic lupus erythematosus, organ or system involvement unspecified      Past Surgical History:   Procedure Laterality Date    APPLICATION OF WOUND VACUUM-ASSISTED CLOSURE DEVICE  9/27/2022    Procedure: APPLICATION, WOUND VAC;  Surgeon: Christopher Espinal MD;  Location: [facility];  Service: General;;    CARDIAC SURGERY      CREATION  OF BYPASS FROM INTERNAL CAROTID ARTERY TO SUBCLAVIAN ARTERY Left 9/23/2022    Procedure: CREATION, BYPASS, ARTERIAL, INTERNAL CAROTID TO SUBCLAVIAN;  Surgeon: Vic Martines MD;  Location: [facility] OR;  Service: Cardiothoracic;  Laterality: Left;    ENDOVASCULAR REPAIR OF THORACIC AORTA N/A 9/23/2022    Procedure: REPAIR, AORTA, THORACIC, ENDOVASCULAR;  Surgeon: Vic Martines MD;  Location: [facility] OR;  Service: Cardiology;  Laterality: N/A;    FINGER AMPUTATION      INSERTION OF TUNNELED CENTRAL VENOUS HEMODIALYSIS CATHETER N/A 10/24/2022    Procedure: INSERTION, CATHETER, CENTRAL VENOUS, HEMODIALYSIS, TUNNELED;  Surgeon: Yogesh Melton DO;  Location: [facility] CATH LAB;  Service: Nephrology;  Laterality: N/A;  TUNNELED CATH INSERTION    THROMBECTOMY Right     TOE AMPUTATION       Family History    None       Tobacco Use    Smoking status: Every Day     Types: Cigarettes    Smokeless tobacco: Never   Substance and Sexual Activity    Alcohol use: Not on file    Drug use: Not on file    Sexual activity: Not on file     Review of Systems   Constitutional:  Negative for activity change, chills, diaphoresis and fever.   HENT:  Positive for sore throat. Negative for congestion, dental problem, drooling, mouth sores, trouble swallowing and voice change.    Eyes: Negative.    Respiratory:  Negative for apnea, cough, choking, chest tightness, shortness of breath, wheezing and stridor.    Cardiovascular:  Negative for chest pain, palpitations and leg swelling.   Gastrointestinal:  Positive for abdominal pain. Negative for abdominal distention, blood in stool, nausea and vomiting.   Endocrine: Negative.    Musculoskeletal:  Negative for arthralgias, back pain, gait problem, neck pain and neck stiffness.   Skin:  Negative for color change, pallor, rash and wound.   Allergic/Immunologic: Negative.    Neurological:  Negative for dizziness, seizures, syncope, facial asymmetry, weakness, light-headedness and headaches.   Hematological:   Negative for adenopathy.   Psychiatric/Behavioral:  Negative for agitation and hallucinations.    Objective:     Vital Signs (Most Recent):  Temp: 98.8 °F (37.1 °C) (10/30/22 0713)  Pulse: 92 (10/30/22 0713)  Resp: 20 (10/30/22 0844)  BP: (!) 142/81 (10/30/22 0844)  SpO2: 96 % (10/30/22 0713)   Vital Signs (24h Range):  Temp:  [98.1 °F (36.7 °C)-98.8 °F (37.1 °C)] 98.8 °F (37.1 °C)  Pulse:  [80-92] 92  Resp:  [16-20] 20  SpO2:  [95 %-99 %] 96 %  BP: (103-142)/(49-81) 142/81     Weight: 56.6 kg (124 lb 12.5 oz)  Body mass index is 20.76 kg/m².    SpO2: 96 %  O2 Device (Oxygen Therapy): room air     Intake/Output - Last 3 Shifts         10/28 0700  10/29 0659 10/29 0700  10/30 0659 10/30 0700  10/31 0659    Blood  377.5     Total Intake(mL/kg)  377.5 (6.7)     Urine (mL/kg/hr) 500 (0.4) 375 (0.3)     Other       Stool 0      Total Output 500 375     Net -500 +2.5            Stool Occurrence 1 x               Lines/Drains/Airways       Peripherally Inserted Central Catheter Line  Duration             PICC Double Lumen 10/25/22 1520 right brachial 4 days              Central Venous Catheter Line  Duration             Trialysis (Dialysis) Catheter 10/04/22 1300 right internal jugular 25 days                     STS Risk Score: n/a    Physical Exam  Vitals and nursing note reviewed.   Constitutional:       General: He is not in acute distress.     Appearance: Normal appearance. He is cachectic. He is ill-appearing.   HENT:      Head: Normocephalic and atraumatic.      Nose: Nose normal. No congestion.      Mouth/Throat:      Mouth: Mucous membranes are moist.   Eyes:      General: No scleral icterus.     Extraocular Movements: Extraocular movements intact.      Conjunctiva/sclera: Conjunctivae normal.      Pupils: Pupils are equal, round, and reactive to light.   Neck:      Thyroid: No thyroid mass or thyromegaly.      Vascular: No carotid bruit or JVD.   Cardiovascular:      Rate and Rhythm: Normal rate and regular  rhythm.      Pulses: Normal pulses.           Carotid pulses are 2+ on the right side and 2+ on the left side.       Radial pulses are 2+ on the right side and 2+ on the left side.        Femoral pulses are 2+ on the right side and 2+ on the left side.       Dorsalis pedis pulses are 2+ on the right side and 2+ on the left side.        Posterior tibial pulses are 2+ on the right side and 2+ on the left side.      Heart sounds: No murmur heard.    No friction rub. No gallop.   Pulmonary:      Effort: Pulmonary effort is normal. No respiratory distress.      Breath sounds: Normal breath sounds. No stridor. No wheezing, rhonchi or rales.   Chest:      Chest wall: No tenderness.   Abdominal:      General: Abdomen is protuberant. There is distension.      Palpations: Abdomen is soft. There is shifting dullness. There is no hepatomegaly, splenomegaly, mass or pulsatile mass.      Tenderness: There is abdominal tenderness in the right lower quadrant. There is no rebound.   Musculoskeletal:         General: No swelling. Normal range of motion.      Cervical back: Normal range of motion. No rigidity or tenderness.      Right lower leg: No edema.      Left lower leg: No edema.   Lymphadenopathy:      Cervical: No cervical adenopathy.      Upper Body:      Right upper body: No supraclavicular or axillary adenopathy.      Left upper body: No supraclavicular or axillary adenopathy.   Skin:     General: Skin is warm and dry.   Neurological:      General: No focal deficit present.      Mental Status: He is alert and oriented to person, place, and time. Mental status is at baseline.      Cranial Nerves: No cranial nerve deficit.      Sensory: No sensory deficit.      Motor: No weakness.      Gait: Gait normal.   Psychiatric:         Mood and Affect: Mood normal.       Significant Labs:  CBC:   Recent Labs   Lab 10/30/22  0611   WBC 25.0*   RBC 4.07*   HGB 11.6*   HCT 34.2*      MCV 84.0   MCH 28.5   MCHC 33.9     CMP:    Recent Labs   Lab 10/30/22  0611   CALCIUM 9.0   ALBUMIN 1.8*   *   K 5.8*   CO2 22   BUN 93.1*   CREATININE 1.92*   ALKPHOS 120   ALT 7   AST 20   BILITOT 0.7     All pertinent labs from the last 24 hours have been reviewed.    Significant Diagnostics:  CT: I have reviewed all pertinent results/findings within the past 24 hours  CXR: I have reviewed all pertinent results/findings within the past 24 hours  U/S: I have reviewed all pertinent results/findings within the past 24 hours  I have reviewed and interpreted all pertinent imaging results/findings within the past 24 hours.    Assessment/Plan:     NYHA Score: NYHA II: slight limitation of physical activity, comfortable at rest    Active Diagnoses:    Diagnosis Date Noted POA    Serositis [K65.8] 10/04/2022 Yes    Systemic lupus erythematosus [M32.9] 09/23/2022 Yes     Chronic    HTN (hypertension) [I10] 09/23/2022 Yes     Chronic    History of DVT in adulthood [Z86.718] 09/23/2022 Not Applicable     Chronic    Hypertensive emergency [I16.1] 09/23/2022 Yes      Problems Resolved During this Admission:    Diagnosis Date Noted Date Resolved POA    PRINCIPAL PROBLEM:  Aortic dissection [I71.00] 09/23/2022 10/30/2022 Yes       Thank you for your consult. I will sign off. Please contact us if you have any additional questions.    Jonnie Carrion MD  Cardiothoracic Surgery  [facility] - [facility] Tele[facility]

## 2022-10-31 NOTE — PROGRESS NOTES
Nephrology consult follow up note    HPI:      Stuart Guevara is a 32 y.o. 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  The patient underwent exploratory lap X 3 (9/25, 9/27 and 9/29) for abdominal compartment syndrome and necrotizing pancreatitis.  He has been requiring renal replacement therapy now for some time due to TAY with some partial recovery of renal function.  He has now been started on a TTS schedule via right IJ temporary catheter.   Ascites seemed to be reaccumulating and IR tap revealed growth of Klebsiella.  The patient underwent repeat exploratory on October 30th      Interval history:       Objective:       VITAL SIGNS: 24 HR MIN & MAX LAST    Temp  Min: 97.9 °F (36.6 °C)  Max: 98.9 °F (37.2 °C)  98.9 °F (37.2 °C)        BP  Min: 111/71  Max: 134/67  133/69     Pulse  Min: 80  Max: 87  87     Resp  Min: 1  Max: 22  (!) 1    SpO2  Min: 94 %  Max: 100 %  95 %      GEN: Awake and appropriate.  Chronically ill-appearing black male.    HEENT: Atraumatic. EOMI, no icterus  NECK : No JVD, right IJ temporary catheter noted  CARD : RRR   LUNGS : Clear to auscultation  ABD :  Distended abdomen.  Diffuse abdominal discomfort.  Bowel sounds absent   EXT :  Patient is diffusely edematous with 2 to 3+ pitting all the way up to his hips bilaterally   NEURO : No obvious focal deficits    Recent Labs   Lab 10/31/22  0424   *   K 6.1*   CO2 20*   BUN 73.5*   CREATININE 1.67*   GLUCOSE 285*   CALCIUM 8.5   PHOS 5.5*     Recent Labs   Lab 10/31/22  0424   WBC 22.9*   HGB 9.4*   HCT 28.3*            Assessment / Plan:      1. Postoperative TAY requiring hemodialysis initiation on 9/25 via temp HD catheter placed per Dr. Chopra.  Last dialysis was on 10/21/2022.    2. Abdominal aortic dissection s/p TEVAR   3. Post operative abdominal compartment syndrome s/p ex  lap on 9/27 with closure on 10/1-repeat washout 10/30/2022 for Klebsiella peritonitis  4. Hyperkalemia.  On Lokelma now.    5. Hypertension  6. Type 2 diabetes mellitus  7. Serositis and SLE - rheumatology following  8. Hyponatremia persistent.  9. Necrotizing pancreatitis per CT of the abdomen  10. Developing volume overload    Recommendations  Patient's urine output is beginning to improve but no evidence of improving clearances as of yet.  He is becoming significant fluid overloaded and will therefore require additional dialysis today and then will be continued on TTS.    Prognosis is extremely guarded for this very ill young man.

## 2022-10-31 NOTE — PROGRESS NOTES
10/31/22 1809   Post-Hemodialysis Assessment   Blood Volume Processed (Liters) 56 L   Dialyzer Clearance Clear   Duration of Treatment 180 minutes   Net Fluid Removal 3000   Patient Response to Treatment nad   Post-Hemodialysis Comments vss

## 2022-10-31 NOTE — PT/OT/SLP PROGRESS
Occupational Therapy   Treatment    Name: Stuart Guevara  MRN: [id number]  Admitting Diagnosis:  Aortic dissection  1 Day Post-Op    Recommendations:     Discharge Recommendations: rehabilitation facility  Discharge Equipment Recommendations:  walker, rolling  Barriers to discharge:  None    Assessment:     Stuart Guevara is a 32 y.o. male with a medical diagnosis of Aortic dissection. Pt is s/p multiple ex-laps with most recent on 10/30.  He presents with decreased mobility following sx. Performance deficits affecting function are weakness, impaired functional mobility, impaired balance, impaired self care skills, impaired endurance, gait instability, decreased lower extremity function.     Rehab Prognosis:  Fair; patient would benefit from acute skilled OT services to address these deficits and reach maximum level of function.       Plan:     Patient to be seen 5 x/week, 3 x/week to address the above listed problems via self-care/home management, therapeutic activities, therapeutic exercises  Plan of Care Expires: 11/04/22  Plan of Care Reviewed with: patient    POC frequency decreased due to patient's decreased tolerance of treatment.     Subjective     Pain/Comfort:  Pain Rating 1: 9/10  Pain Addressed 1: Reposition, Distraction  Pain Rating Post-Intervention 1: 6/10    Objective:     Communicated with: Nrsg prior to session.  Patient found supine with pulse ox (continuous), telemetry upon OT entry to room.    General Precautions: Standard, fall   Orthopedic Precautions:N/A   Braces: N/A  Respiratory Status: Nasal cannula, flow 5.5 L/min     Occupational Performance:     Bed Mobility:    Patient completed Scooting/Bridging with total assistance  Patient completed Supine to Sit with total assistance  Patient completed Sit to Supine with total assistance     Functional Mobility/Transfers:  Patient completed Sit <> Stand Transfer x3 with maximal assistance progressing with each stand with  rolling walker      Activities of Daily Living:  Grooming: setup seated in bed due to decreased pinch strength to open chapstick.       Treatment & Education:  Pt completed supine to/from sit with total A with two person assist. Pt completed sit to stand x3 with maximum A x2  with RW progressing with each stand.     Patient left supine with all lines intact, call button in reach, mother present, and PRAFO boots donned    GOALS:   Multidisciplinary Problems       Occupational Therapy Goals          Problem: Occupational Therapy    Goal Priority Disciplines Outcome Interventions   Occupational Therapy Goal     OT, PT/OT Ongoing, Progressing    Description: Goals to be met by: 11/4/2022  Goals Met and Upgraded/New goals added 10/21/2022    Patient will increase functional independence with ADLs by performing:      UE dressing with Modified [place]. - new goal  LB Dressing with Minimal Assistance and dressing AEDs. - continue/progressing  Grooming while standing at sink with Stand-by Assistance - new goal  Toileting from toilet with Modified [place] for hygiene and clothing management. - new goal  Bathing from  shower chair/bench with Modified [place]. - new goal  Toilet transfer to toilet with Modified [place]. - new goal    UE Dressing with Stand-by Assistance. - goal met  Grooming while EOB with Stand-by Assistance. - goal met  Sitting at edge of bed x10 minutes with Stand-by Assistance. - goal met                         Time Tracking:     OT Date of Treatment: 10/31/22  OT Start Time: 1319  OT Stop Time: 1348  OT Total Time (min): 29 min    Billable Minutes:Therapeutic Activity 29 min    OT/ADONAY: OT     ADONAY Visit Number: 3    10/31/2022

## 2022-10-31 NOTE — PROGRESS NOTES
"   Acute Care Surgery   Progress Note  Admit Date: 9/23/2022  HD#38  POD#1 Day Post-Op    Subjective:   Interval history:  NAEON  0 output from NG tube  AF, VSS  No flatus or BM   No appetite     Scheduled Meds:   acetaminophen  650 mg Oral Q6H    carvediloL  25 mg Oral BID    cloNIDine  0.1 mg Oral BID    fluconazole (DIFLUCAN) IV (PEDS and ADULTS)  400 mg Intravenous Q24H    gabapentin  300 mg Oral TID    hydrALAZINE  100 mg Oral Q8H    insulin detemir U-100  10 Units Subcutaneous QHS    lactulose 10 gram/15 ml  10 g Oral BID    meperidine (PF)        meropenem (MERREM) IVPB  1 g Intravenous Q12H    methocarbamoL  500 mg Oral QID    methylPREDNISolone sodium succinate injection  40 mg Intravenous Q12H    morphine  30 mg Oral Q12H    NIFEdipine  90 mg Oral Daily    pantoprazole  40 mg Intravenous Daily    sodium bicarbonate  1,300 mg Oral Daily    sodium chloride 0.9%  10 mL Intravenous Q6H     Continuous Infusions:   amino acid 5 % in 15% dextrose       PRN Meds:sodium chloride, acetaminophen, albuterol, bisacodyL, dextrose 10 % in water (D10W), dextrose 10 % in water (D10W), dextrose 10%, glucagon (human recombinant), glucose, glucose, hydrALAZINE, HYDROcodone-acetaminophen, HYDROmorphone, insulin aspart U-100, labetaloL, lorazepam, melatonin, morphine, nitroGLYCERIN, ondansetron, polyethylene glycol, sodium chloride 0.9%, [place] PICC Protocol **AND** sodium chloride 0.9% **AND** sodium chloride 0.9%, sodium chloride 0.9%     Objective:     VITAL SIGNS: 24 HR MIN & MAX LAST   Temp  Min: 97.9 °F (36.6 °C)  Max: 98.8 °F (37.1 °C)  98.6 °F (37 °C)   BP  Min: 111/71  Max: 142/81  134/67    Pulse  Min: 80  Max: 92  82    Resp  Min: 1  Max: 22  (!) 1    SpO2  Min: 94 %  Max: 100 %  98 %      HT: 5' 5" (165.1 cm)  WT: 56.6 kg (124 lb 12.5 oz)  BMI: 20.8     Intake/output:  I/O last 3 completed shifts:  In: 1117.5 [P.O.:240; I.V.:200; Blood:377.5; IV Piggyback:300]  Out: 575 [Urine:575]  I/O this shift:  In: -   Out: " 400 [Urine:400]    Intake/Output Summary (Last 24 hours) at 10/31/2022 0557  Last data filed at 10/31/2022 0514  Gross per 24 hour   Intake 740 ml   Output 975 ml   Net -235 ml        Lines/drains/airway:  PICC Double Lumen 10/25/22 1520 right brachial (Active)   Line Necessity Review Longterm central access required 10/30/22 0800   Site Assessment No drainage;No redness;No swelling;No warmth 10/30/22 2100   Extremity Assessment Distal to IV No abnormal discoloration 10/30/22 2100   Line Securement Device Secured with sutureless device 10/30/22 0800   Dressing Type Gauze;Central line dressing 10/30/22 2100   Dressing Status Clean;Dry;Intact 10/30/22 2100   Dressing Intervention Integrity maintained 10/30/22 2100   Left Lumen Patency/Care Blood return present;Infusing 10/30/22 0800   Right Lumen Patency/Care Blood return present;Infusing 10/30/22 0800   Number of days: 5       Trialysis (Dialysis) Catheter 10/04/22 1300 right internal jugular (Active)   Line Necessity Review CRRT/HD 10/26/22 2000   Verification by X-ray Yes 10/30/22 0800   Site Assessment No drainage;No warmth;No swelling;No redness 10/30/22 2100   Line Securement Device Secured with sutures 10/30/22 2100   Dressing Type Central line dressing;Transparent (Tegaderm) 10/30/22 2100   Dressing Status Clean;Dry;Intact 10/30/22 2100   Dressing Intervention Integrity maintained 10/30/22 2100   Date on Dressing 10/20/22 10/21/22 1623   Dressing Due to be Changed 10/27/22 10/24/22 2004   Venous Patency/Care normal saline locked 10/30/22 0800   Arterial Patency/Care normal saline locked 10/30/22 0800   Distal Patency/Care normal saline locked 10/30/22 0800   Number of days: 26       Arterial Line 10/30/22 1533 Left Radial (Active)   Number of days: 0       Physical examination:  Gen: NAD, AAOx3, answering questions appropriately  CV: RR  Resp: NWOB  Abd: S/distended, midline staples in place c/d/I, LLQ suture in place  Ext: moving all extremities spontaneously  and purposefully  Neuro: CN II-XII grossly intact    Labs:  Renal:  Recent Labs     10/29/22  0547 10/30/22  0611   BUN 79.7* 93.1*   CREATININE 1.68* 1.92*     FENGI:  Recent Labs     10/29/22  0547 10/30/22  0611   * 127*   K 4.9 5.8*   CO2 22 22   CALCIUM 8.0* 9.0   MG 1.90 2.00   PHOS 3.8 4.4   ALBUMIN 1.7* 1.8*   BILITOT 0.6 0.7   AST 14 20   ALKPHOS 84 120   ALT 9 7     Heme:  Recent Labs     10/29/22  0548 10/29/22  0703 10/29/22  0703 10/29/22  1351 10/29/22  2158 10/29/22  2316 10/30/22  0611 10/30/22  1957   HGB  --  7.6*   < > 6.4* 9.9* 9.6* 11.6* 8.6*   HCT  --  23.7*   < > 19.8* 30.0* 29.1* 34.2* 25.4*   PLT  --  134  --  127*  --   --  152  --    INR 3.31*  --   --   --   --   --  2.14*  --     < > = values in this interval not displayed.     ID:  Recent Labs     10/29/22  0703 10/29/22  1351 10/30/22  0611   WBC 29.7* 26.2* 25.0*     Cardiovascular:  Recent Labs   Lab 10/30/22  0707   TROPONINI <0.010     I have reviewed all pertinent lab results within the past 24 hours.    Imaging:  CTA Chest Abdomen Pelvis   Final Result   Impression:      1.  An aortic stent is again noted.  Caudal extension of the [place] type B aortic dissection 37.1 mm from the inferior margin of the graft is noted (Series 18, Image 55). Dilatation of the descending thoracic aorta is again noted, measuring 41.3 mm at its widest. The distal descending segment measures 23.4 mm at the diaphragmatic hiatus. The origin of the left subclavian, left common carotid and right brachiocephalic arteries are unremarkable.      2. Mild diffuse interlobular interstitial septal thickening in both lungs may be present. An element of congestive failure is not excluded. There is dense opacity at the dependent lung bases consistent with compressive atelectasis with the possibility of an infectious component not entirely excluded. Correlate clinically.      3. The IVC is nonenhanced and appears narrowed.      4. There is a pronounced ascites  which is new compared to the prior study. Mild layering hyperdense fluid pelvic fluid is seen, likely reflecting hemoperitoneum. There is a large heterogeneous, predominantly hyperdense mass/collection along the left paracolic gutter and pelvic sidewall (Series 14 Image 90 and Series 22, Image 46), measuring 97 x 62 x 170 mm (AP, transverse, craniocaudal dimensions). This may reflect hematoma formation. No focal contrast collection or pooling is identified to suggest active hemorrhage at the time of imaging. There is a possible left rectus sheath hematoma seen on image 116 of series 14. Correlate with clinical and laboratory findings as regards additional evaluation and follow-up.      5.  Persistence of bilateral pleural effusions which are moderate-sized. Follow-up as clinically indicated.      6. Details and other findings as discussed above.      No significant discrepancy with overnight report.         Electronically signed by: Jameson Brumfield   Date:    10/30/2022   Time:    09:57      US Scrotum And Testicles   Final Result      CT Abdomen Pelvis With Contrast   Final Result      1. No normal pancreatic parenchyma is seen.  At the level of the pancreas there is a large fluid collection with a few foci of gas.  Loculated, faintly peripherally enhancing fluid collections extend along the pericolic gutters.  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  Foci of gas are nonspecific in the setting of prior intervention.   2. Extensive loculated peritoneal collections with peripheral enhancement compatible with peritonitis.Left inguinal hernia contains ascitic fluid with some peripheral enhancement similar to the peritoneal fluid.  There is no herniated bowel loop   3. There is a moderate colonic stool burden.  There is some mass effect on the bowel loops due to the loculated nature of the abdominal fluid collections.   4. Anasarca   5. Partially imaged known  thoracic aortic dissection status post intervention.         Electronically signed by: Rocio White   Date:    10/26/2022   Time:    12:37      X-Ray Chest 1 View for Line/Tube Placement   Final Result      Right PICC tip overlies the mid SVC.         Electronically signed by: Kapil Ayers   Date:    10/25/2022   Time:    16:56      CT Abdomen Pelvis  Without Contrast   Final Result      Bilateral pleural effusions with moderate ascites and diffuse anasarca.  The epigastric and left mid abdominal ascites appears loculated and peritonitis cannot be excluded.         Electronically signed by: Grabiel Singleton MD   Date:    10/24/2022   Time:    23:30      X-Ray Abdomen AP 1 View   Final Result      Residual feces         Electronically signed by: Ramón Ying   Date:    10/24/2022   Time:    09:46      X-Ray Chest 1 View   Final Result      No significant interval change.         Electronically signed by: Kapil Ayers   Date:    10/22/2022   Time:    15:13      X-Ray Chest 1 View   Final Result      Persistent increased left retrocardiac density and silhouetting of the left hemidiaphragm.      No other focal consolidative changes.      No other change         Electronically signed by: Ramón Ying   Date:    10/12/2022   Time:    08:15      Fl Modified Barium Swallow Speech   Final Result      X-Ray Chest 1 View   Final Result      Unchanged retrocardiac opacity suggesting atelectasis         Electronically signed by: Rocio White   Date:    10/05/2022   Time:    06:11      X-Ray Chest 1 View   Final Result      New central venous catheter terminates within the proximal right atrium.         Electronically signed by: Jameson Brumfield   Date:    10/04/2022   Time:    14:16      X-Ray Chest 1 View   Final Result      No significant interval change.         Electronically signed by: Kapil Ayers   Date:    10/04/2022   Time:    06:54      CT Chest Abdomen Pelvis Without Contrast (XPD)   Final Result      Very  limited study due the lack of IV contrast.      Anasarca.      Small amount of ascites.      Small left-sided pleural effusion with bibasilar atelectasis versus infiltrate.         Electronically signed by: Joey Vdial MD   Date:    10/03/2022   Time:    21:30      X-Ray Chest 1 View   Final Result      As above.         Electronically signed by: Jameson rBumfield   Date:    10/03/2022   Time:    07:09      X-Ray Chest 1 View   Final Result      As above.         Electronically signed by: Jakob Boland   Date:    10/02/2022   Time:    14:50      X-ray Abdomen for NG Tube Placement (Nursing should notify Radiology after placement)   Final Result      Optimal placement of the nasogastric tube.         Electronically signed by: Jameson Brumfield   Date:    10/01/2022   Time:    11:48      X-Ray Chest 1 View   Final Result      No significant change         Electronically signed by: Ramón Ying   Date:    10/01/2022   Time:    08:05      X-Ray Chest 1 View   Final Result      No acute findings or significant interval change.         Electronically signed by: Kapil Ayers   Date:    09/30/2022   Time:    07:35      X-Ray Chest 1 View   Final Result      Stable exam without significant interval change.         Electronically signed by: Claribel Deal   Date:    09/29/2022   Time:    16:14      X-Ray Chest 1 View   Final Result      Possible mild left perihilar/lower lung atelectasis.  Suspect tiny right apical pneumothorax.         Electronically signed by: Darius Rosario   Date:    09/29/2022   Time:    06:09      X-Ray Chest 1 View   Final Result      No significant interval change.         Electronically signed by: Jameson Brumfield   Date:    09/28/2022   Time:    16:25      X-Ray Chest 1 View   Final Result      Similar appearance to prior exam with retrocardiac atelectasis         Electronically signed by: Rocio White   Date:    09/28/2022   Time:    06:05      X-Ray Chest 1 View   Final Result      No significant  change         Electronically signed by: Ramón Ying   Date:    09/27/2022   Time:    08:47      X-Ray Abdomen AP 1 View   Final Result      Satisfactory central femoral line placement.         Electronically signed by: Emily Candelario MD   Date:    09/26/2022   Time:    13:54      X-Ray Chest 1 View   Final Result      Interval retraction of endotracheal tube, satisfactory position.  Cardiac lead placement in the interval.  No other significant change.         Electronically signed by: Emily Candelario MD   Date:    09/26/2022   Time:    08:00      X-Ray Chest 1 View   Final Result      Increased volume status      Interval intubation with endotracheal tube at the josie.  I recommend retracting      Right-sided chest tube in good position         Electronically signed by: Too Reyes   Date:    09/25/2022   Time:    15:49      X-Ray Abdomen AP 1 View   Final Result      There is slight increased density of the kidneys of questionable etiology and or significance.      Otherwise nonspecific gas pattern         Electronically signed by: Ramón Ying   Date:    09/25/2022   Time:    10:51      CTA Chest Abdomen Non Coronary   Final Result      1. Thoracic aortic endograft placement since 09/23/2022 with the dissection flap extending about 3 cm inferior to the distal portion of the graft.  Opacification of the false lumen along the mid and distal portions of the graft.   2. Moderate volume hemoperitoneum.  Changes involving the liver, spleen, pancreas and bowel are suggestive of overall hypoperfusion.  Hyperdense kidneys suggest acute kidney injury.   3. Small to moderate bilateral pleural effusions, larger on the right.   Findings discussed with Mr. Guevara ICU nurse, [place] at 08:35 hours on 09/25/2022.         Electronically signed by: Darius Rosario   Date:    09/25/2022   Time:    08:42      X-Ray Chest 1 View   Final Result      Changes suggestive of right-sided pleural effusion.       Increased left retrocardiac density and silhouetting of the left hemidiaphragm which might be related to an infiltrate/atelectasis.      Interval placement of thoracic endograft.      No focal consolidative changes         Electronically signed by: Ramón Ying   Date:    09/25/2022   Time:    09:10      IR Abdominal Aortobifemoral   Final Result      Fluoroscopic assistance provided as above.         Electronically signed by: Kapil Ayers   Date:    09/26/2022   Time:    09:56      CT Abscess Aspiration without Catheter    (Results Pending)   US Guided Needle Placement    (Results Pending)      I have reviewed all pertinent imaging results/findings within the past 24 hours.    Micro/Path/Other:  Microbiology Results (last 7 days)       Procedure Component Value Units Date/Time    Body Fluid Culture [[id number]]  (Abnormal)  (Susceptibility) Collected: 10/28/22 0940    Order Status: Completed Specimen: Peritoneal Fluid from Abdomen Updated: 10/30/22 1351     Body Fluid Culture Many Klebsiella pneumoniae    Anaerobic Culture [[id number]] Collected: 10/28/22 0940    Order Status: Completed Specimen: Body Fluid from Abdomen Updated: 10/30/22 0719     Anaerobe Culture No Anaerobes Isolated    Anaerobic Culture [[id number]] Collected: 10/28/22 0940    Order Status: Completed Specimen: Body Fluid from Peritoneal Updated: 10/30/22 0715     Anaerobe Culture No Anaerobes Isolated    Body Fluid Culture [[id number]] Collected: 10/28/22 0940    Order Status: Completed Specimen: Peritoneal Fluid from Abdomen Updated: 10/30/22 0649     Body Fluid Culture No Growth At 48 Hours    Gram Stain [[id number]] Collected: 10/28/22 0940    Order Status: Completed Specimen: Peritoneal Fluid from Abdomen Updated: 10/29/22 1130     GRAM STAIN No WBCs, No bacteria seen    Gram Stain [[id number]] Collected: 10/28/22 0940    Order Status: Completed Specimen: Peritoneal Fluid from Abdomen Updated: 10/29/22 1122     GRAM STAIN Rare WBC observed       Few Gram Negative Rods    Fungal Culture [[id number]] Collected: 10/28/22 0940    Order Status: Sent Specimen: Body Fluid from Peritoneal Updated: 10/28/22 1326    Fungal Culture [[id number]] Collected: 10/28/22 0940    Order Status: Sent Specimen: Body Fluid from Abdomen Updated: 10/28/22 1326    Anaerobic Culture [[id number]]     Order Status: Canceled Specimen: Body Fluid from Abdomen     Body Fluid Culture [[id number]]     Order Status: Canceled Specimen: Body Fluid from Abdomen     Fungal Culture [[id number]]     Order Status: Canceled Specimen: Body Fluid from Abdomen     Gram Stain [[id number]]     Order Status: Canceled Specimen: Body Fluid from Abdomen     Gram Stain [[id number]]     Order Status: Canceled Specimen: Peritoneal Fluid     Body Fluid Culture [[id number]]     Order Status: Canceled Specimen: Peritoneal Fluid     Fungal Culture [[id number]]     Order Status: Canceled Specimen: Body Fluid     Anaerobic Culture [[id number]]     Order Status: Canceled Specimen: Body Fluid     Fungal Culture [[id number]]  (Normal) Collected: 09/25/22 1200    Order Status: Completed Specimen: Body Fluid from Pleural Fluid, Right Updated: 10/27/22 1254     Fungal Culture No Fungus Isolated at 4 Weeks           Assessment & Plan:   Stuart Guevara is a 32M with HTN, SLE, HBV, aortic dissection s/p repair 2/2021, 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 He developed abdominal compartment syndrome s/p multiple exlaps (9/25, 9/27, 9/29) now on HD for renal failure with re-accumulation of ascites despite IR paracentesis. CT has shown concern for necrotizing fascitis. Cultures from paracentesis resulted in Klebsiella growth. S/p washout 10/30.     - Continue NG tube to LIWS, possibly discontinue tomorrow   - Continue antibiotics  - Will remove LLQ suture today   - Midline staples out 11/13  - Repeat nutrition labs today   - Nutrition optimization  - Rest of care per primary        10/31/2022  5:57 AM    The above findings, diagnostics, and treatment plan were discussed with the physician who will follow with further assessments and recommendations.

## 2022-10-31 NOTE — OP NOTE
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  Because he had rapid reaccumulation of ascites with Klebsiella growing within the ascitic fluid we decided taken for an abdominal washout    Preop diagnosis: Bacterial peritonitis, ascites    Postop diagnosis:  Same paragraph findings:  Large amount of venita colored ascites, no succus entericus, no feces, no evidence of visceral perforation, no foul-smelling odor, minimal amount of clotted blood within the recesses of his peritoneal cavity     Anesthesia: General endotracheal     Blood loss:  5 cc     Complications:  None     Disposition: To PACU    Details of procedure: Patient was brought to the operating room laid supine on operating room table, general anesthesia was administered after he was intubated endotracheally    The abdomen was prepped and draped in usual sterile fashion and a 10. Skin knife was used to make a midline laparotomy incision going through his old laparotomy scar, the old loop PDS suture was incised sharply and the abdomen was entered     There were minimal adhesions however there was about 2-1/2-3 L of non foul-smelling venita colored ascites which was thin, there were some old blood clotted blood that had settled in the bilateral pericolic gutters, the retroperitoneum the lesser sac was explored and there seemed to be no necrotic pancreatic tissue within the space and furthermore there  was no evidence of of enteric injury or perforation.  The abdomen was irrigated with copious saline approximately 3-4 L and the we felt no need to leave a drain the abdomen was closed with a running loop PDS suture and skin staplers.      Anesthesia placed extubated bring with the PACU patient remained in stable condition for the duration of the operation and of dictation thanks

## 2022-10-31 NOTE — PROGRESS NOTES
"Gastroenterology Progress Note    Subjective:  Initial Consult 10/27/22:  33 y/o male unknown to our group who has a past medical history of HTN, SLE,  hepatitis B, aortic dissection with repair 2/2021, RUE DVT on Xarelto.     Patient was initially admitted to [facility] on 9/23/2022 after presenting with chest pain radiating to his back and shortness of breath. He was found to have a type B aortic dissection and significant hypertension. He ultimately transferred to [facility] for CV surgery evaluation which he was taken  to the OR and had TEVAR with left carotid to subclavian artery bypass. Patient later developed hemoperitoneum and compartment syndrome of the abdomen exploratory laparotomy with evidence of viable bowel and no significant bleeding identified, abd was closed with flakito drain left in situ chest tube was placed. Postoperatively, he developed severe unstable bradycardia, and a transvenous pacemaker was placed by Cardiology. He then developed renal failure requiring initiation of HD. He was eventually started on steroids and other immunomodulating drugs for a lupus flare. Most recently, patient reported worsening abdominal pain and distention- See CT Abdomen/Pelvis from 10/26/22.     GI is now consulted to evaluate. He was NPO, abx changed to Merrem, and his immumomodulating drugs were stopped.     10/28/22:  Paracentesis with IR done this morning- Amylse body fluid pending.   He continues to endorse lower abdomial tenderness but this seems to be improving in nature compared to yesterday. He denies N/V.   He had a BM this morning described as formed and "pastey" in color. No signs of melena or hematochezia.     10/30/22: Repeat exploratory lap (this is x3) with 3 L venita ascites, no signs of infection, necrotic tissue or bowel issues     10/31/22: Patient with improved abdominal pain this morning- He continues to endorse lower abdominal tendernss but overall improving in nature.  He has not had a BM in " "about 2 days- no signs of melena or hematochezia in this BM.  He denies N/V.     Objective:    Objective:    ROS:    Review of Systems   Constitutional:  Negative for chills.   Respiratory:  Negative for shortness of breath, wheezing and stridor.    Gastrointestinal:  Positive for abdominal pain (lower abdominal tenderness). Negative for constipation, diarrhea, heartburn and nausea. Vomiting: lower abdominal tenderness.  Skin:  Negative for rash.       Vital Signs:  BP (!) 148/77   Pulse 87   Temp 98.4 °F (36.9 °C) (Oral)   Resp 16   Ht 5' 5" (1.651 m)   Wt 56.6 kg (124 lb 12.5 oz)   SpO2 95%   BMI 20.76 kg/m²   Body mass index is 20.76 kg/m².    Physical Exam:    Physical Exam  Constitutional:       General: He is not in acute distress.     Appearance: Normal appearance.   HENT:      Head: Normocephalic.      Nose: Nose normal.      Comments: NG tube to suction  Eyes:      Conjunctiva/sclera: Conjunctivae normal.      Pupils: Pupils are equal, round, and reactive to light.   Cardiovascular:      Rate and Rhythm: Normal rate.      Pulses: Normal pulses.   Pulmonary:      Effort: Pulmonary effort is normal. No respiratory distress.      Breath sounds: No wheezing.   Abdominal:      General: There is distension.      Tenderness: There is abdominal tenderness (lower abdominal).      Comments: Midline scar s/p exploratory lap x3   Musculoskeletal:      Cervical back: Normal range of motion.   Skin:     Coloration: Skin is not pale.   Neurological:      General: No focal deficit present.      Mental Status: Mental status is at baseline.   Psychiatric:         Mood and Affect: Mood normal.       Labs:  Recent Results (from the past 24 hour(s))   Hemoglobin and Hematocrit    Collection Time: 10/30/22  7:57 PM   Result Value Ref Range    Hgb 8.6 (L) 14.0 - 18.0 gm/dL    Hct 25.4 (L) 42.0 - 52.0 %   POCT glucose    Collection Time: 10/30/22  9:40 PM   Result Value Ref Range    POCT Glucose 338 (H) 70 - 110 mg/dL "   Comprehensive Metabolic Panel    Collection Time: 10/31/22  4:24 AM   Result Value Ref Range    Sodium Level 129 (L) 136 - 145 mmol/L    Potassium Level 6.1 (H) 3.5 - 5.1 mmol/L    Chloride 99 98 - 107 mmol/L    Carbon Dioxide 20 (L) 22 - 29 mmol/L    Glucose Level 285 (H) 74 - 100 mg/dL    Blood Urea Nitrogen 73.5 (H) 8.9 - 20.6 mg/dL    Creatinine 1.67 (H) 0.73 - 1.18 mg/dL    Calcium Level Total 8.5 8.4 - 10.2 mg/dL    Protein Total 4.6 (L) 6.4 - 8.3 gm/dL    Albumin Level 2.4 (L) 3.5 - 5.0 gm/dL    Globulin 2.2 (L) 2.4 - 3.5 gm/dL    Albumin/Globulin Ratio 1.1 1.1 - 2.0 ratio    Bilirubin Total 0.8 <=1.5 mg/dL    Alkaline Phosphatase 82 40 - 150 unit/L    Alanine Aminotransferase 8 0 - 55 unit/L    Aspartate Aminotransferase 21 5 - 34 unit/L    eGFR 55 mls/min/1.73/m2   Magnesium    Collection Time: 10/31/22  4:24 AM   Result Value Ref Range    Magnesium Level 2.00 1.60 - 2.60 mg/dL   Phosphorus    Collection Time: 10/31/22  4:24 AM   Result Value Ref Range    Phosphorus Level 5.5 (H) 2.3 - 4.7 mg/dL   Protime-INR    Collection Time: 10/31/22  4:24 AM   Result Value Ref Range    PT 17.6 (H) 12.5 - 14.5 seconds    INR 1.47 (H) 0.00 - 1.30   CBC with Differential    Collection Time: 10/31/22  4:24 AM   Result Value Ref Range    WBC 22.9 (H) 4.5 - 11.5 x10(3)/mcL    RBC 3.33 (L) 4.70 - 6.10 x10(6)/mcL    Hgb 9.4 (L) 14.0 - 18.0 gm/dL    Hct 28.3 (L) 42.0 - 52.0 %    MCV 85.0 80.0 - 94.0 fL    MCH 28.2 27.0 - 31.0 pg    MCHC 33.2 33.0 - 36.0 mg/dL    RDW 15.8 11.5 - 17.0 %    Platelet 153 130 - 400 x10(3)/mcL    MPV 12.0 (H) 7.4 - 10.4 fL    Neut % 94.9 %    Lymph % 1.3 %    Mono % 2.8 %    Eos % 0.0 %    Basophil % 0.6 %    Lymph # 0.30 (L) 0.6 - 4.6 x10(3)/mcL    Neut # 21.7 (H) 2.1 - 9.2 x10(3)/mcL    Mono # 0.65 0.1 - 1.3 x10(3)/mcL    Eos # 0.00 0 - 0.9 x10(3)/mcL    Baso # 0.13 0 - 0.2 x10(3)/mcL    IG# 0.10 (H) 0 - 0.04 x10(3)/mcL    IG% 0.4 %    NRBC% 0.0 %   C-Reactive Protein    Collection Time: 10/31/22   9:07 AM   Result Value Ref Range    C-Reactive Protein 110.20 (H) <5.00 mg/L   Prealbumin    Collection Time: 10/31/22  9:07 AM   Result Value Ref Range    Prealbumin 13.4 (L) 18.0 - 45.0 mg/dL           Assessment/Plan:  1. Systemic lupus erythematosus, unspecified SLE type, unspecified organ involvement status    2. Aortic dissection    3. DVT (deep venous thrombosis)    4. Arrhythmia    5. Symptomatic bradycardia    6. Cardiomegaly    7. FUO (fever of unknown origin)    8. Heart beat abnormality    9. Sinus bradycardia    10. End stage renal disease    11. Serositis    12. Hypertension, unspecified type    13. Peritonitis    14. CHF (congestive heart failure)    15. Chest pain    16. Dissection of thoracoabdominal aorta       31 y/o male unknown to our group who has a past medical history of HTN, SLE,  hepatitis B, aortic dissection with repair 2/2021, RUE DVT on Xarelto. Admitted with aortic dissection requiring sx. He has had a very complex case since. GI consulted of possible necrotizing pancreatitis seen on CT.     Peritoneal fluid collection +/- peritonitis  Possible necrotizing pancreatitis  Abdominal distention  Constipation  -CT guided drainage of peritoneal fluid 10/28- Pending amylase body fluid to assess for pancreatic ductal leak as the cause of the fluid collection. Body fluid culture grew out Klebsiella.   -s/p Exploratory lap x3 with most recent done on 10/30  with findings of 3 L venita ascites, no signs of infection, necrotic tissue or bowel issues   - Serum lipase normal (<4)  - Serum Amylase 14  -Continue IV nutrition  -Continue abx  -Supportive care     Will discuss with Dr. Dahl regarding further treatment from a GI standpoint.     Aurora Calvillo PA-C  Gastroenertology  [facility]

## 2022-10-31 NOTE — PT/OT/SLP PROGRESS
Physical Therapy  Treatment    Stuart Guevara   MRN: [id number]   Admitting Diagnosis: Aortic dissection    PT Received On: 10/31/22  PT Start Time: 1319     PT Stop Time: 1349    PT Total Time (min): 30 min       Billable Minutes:  Therapeutic Activity 30    Treatment Type: Treatment  PT/PTA: PTA     PTA Visit Number: 4       General Precautions: Standard, fall, NPO  Orthopedic Precautions: N/A   Braces:    Respiratory Status: Nasal cannula, flow 5.5 L/min    Spiritual, Cultural Beliefs, Quaker Practices, Values that Affect Care: no    Subjective:  Communicated with nurse prior to session.  Patient reports increased pain in groin this PM.     Pain/Comfort  Pain Rating 1: 9/10  Location 1: groin  Pain Addressed 1: Reposition  Pain Rating Post-Intervention 1: 6/10    Objective:   Patient found with: NG tube, oxygen, pulse ox (continuous), telemetry    Functional Mobility:  Bed Mobility: supine<>sit tf with totalA and cues for patient participation and sequencing       Transfers:sit<>stand tf x3 trials with maxA and noted posterior lean throughout           Balance:   Static Sit: POOR: Needs MODERATE assist to maintain  Dynamic Sit: POOR: N/A  Static Stand: POOR: Needs MODERATE assist to maintain  Dynamic stand: 0: N/A       Treatment and Education:  Instructed patient on importance of participating in treatment.     AM-PAC 6 CLICK MOBILITY  How much help from another person does this patient currently need?   1 = Unable, Total/Dependent Assistance  2 = A lot, Maximum/Moderate Assistance  3 = A little, Minimum/Contact Guard/Supervision  4 = None, Modified [place]/Independent         AM-PAC Raw Score CMS G-Code Modifier Level of Impairment Assistance   6 % Total / Unable   7 - 9 CM 80 - 100% Maximal Assist   10 - 14 CL 60 - 80% Moderate Assist   15 - 19 CK 40 - 60% Moderate Assist   20 - 22 CJ 20 - 40% Minimal Assist   23 CI 1-20% SBA / CGA   24 CH 0% Independent/ Mod I     Patient left HOB elevated  with all lines intact and call button in reach.    Assessment:  Stuart Guevara is a 32 y.o. male with a medical diagnosis of Aortic dissection and presents with increased transfer ability.    Rehab identified problem list/impairments: Rehab identified problem list/impairments: weakness, gait instability, impaired balance, impaired endurance, decreased safety awareness, impaired functional mobility, pain    Rehab potential is fair.    Activity tolerance: Fair    Discharge recommendations: Discharge Facility/Level of Care Needs: rehabilitation facility, nursing facility, skilled     Barriers to discharge:      Equipment recommendations: Equipment Needed After Discharge: walker, rolling     GOALS:   Multidisciplinary Problems       Physical Therapy Goals          Problem: Physical Therapy    Goal Priority Disciplines Outcome Goal Variances Interventions   Physical Therapy Goal     PT, PT/OT Ongoing, Progressing     Description: Goals to be met by: 22     Patient will increase functional independence with mobility by performin. Supine to sit with brandon  2. Sit to supine with brandon  3. Sit <> stand with CGA using RW/LRAD  4. Bed to chair with CGA via squat pivot/stand pivot  5.Ambulate 200 ft with rolling walker and contact guard assistance                           PLAN:    Patient to be seen 3 x/week  to address the above listed problems via therapeutic activities  Plan of Care expires: 22  Plan of Care reviewed with: patient         10/31/2022

## 2022-10-31 NOTE — ANESTHESIA POSTPROCEDURE EVALUATION
Anesthesia Post Evaluation    Patient: Stuart Guevara    Procedure(s) Performed: Procedure(s) (LRB):  LAPAROTOMY, EXPLORATORY (N/A)    Final Anesthesia Type: general      Patient location during evaluation: PACU  Patient participation: Yes- Able to Participate  Level of consciousness: awake  Post-procedure vital signs: reviewed and stable  Pain management: adequate  Airway patency: patent    PONV status at discharge: vomiting (controlled) and nausea (controlled)  Anesthetic complications: no      Cardiovascular status: hemodynamically stable  Respiratory status: spontaneous ventilation and unassisted  Hydration status: euvolemic  Follow-up not needed.  Comments:              Vitals Value Taken Time   /67 10/31/22 0300   Temp 37 °C (98.6 °F) 10/31/22 0300   Pulse 83 10/30/22 1834   Resp 10 10/31/22 0429   SpO2 98 % 10/31/22 0300   Vitals shown include unvalidated device data.      Event Time   Out of Recovery 10/30/2022 18:45:00         Pain/Marcin Score: Pain Rating Prior to Med Admin: 9 (10/31/2022  4:29 AM)  Pain Rating Post Med Admin: 1 (10/31/2022  4:42 AM)  Modified Marcin Score: 13 (10/30/2022  5:21 PM)

## 2022-10-31 NOTE — PROGRESS NOTES
[facility]  HOSPITAL MEDICINE  PROGRESS NOTE        CHIEF COMPLAINT   Hospital follow up    HOSPITAL COURSE   Stuart Guevara is a 32 y.o. male who has PMH which includes HTN, SLE,  hepatitis B, type A aortic dissection with repair + mechanical aortic valve placement 2/2021 on coumadin, RUE DVT s/p Xarelto, class V lupus nephritis; presented to the ED at [facility] on 9/23/2022  with reports of  shortness of breath and chest pain radiating to his back. Work up revealed a type B aortic dissection and significant hypertension noted on presentation.  He failed medical management and had progression of his symptoms despite adequate control of hemodynamic parameters.  PT was transferred from [facility] to [facility] for CV surgery evaluation which he was taken  to the OR and had TEVAR with left carotid to subclavian artery bypass on 9/23.   Later Patient developed abdominal distension and rigidity of his abdomen and developed a lactic acidosis.  He had a repeat CT angiography on 9/25 of the chest/abdomen/pelvis without evidence of worsening dissection, but evidence of significant intra-abdominal pathology with some hemoperitoneum, signs of hypoperfusion of the abdominal organs (compartment syndrome)  free blood in the abdomen. He underwent exploratory laparotomy on 9/25 with evidence of viable bowel and no significant bleeding identified, abd was closed with flakito drain left in situ chest tube was placed. Had repeat ex-laps on 9/27, 9/29. Postoperatively, he developed severe unstable bradycardia, and a transvenous pacemaker was temporarily placed by Cardiology, with the bradycardia resolving within he next couple days.  Shortly afterwards, he was noted to have significant worsening of his renal failure and critical hyperkalemia. Renal services consulted and HD catheter was placed and he was started on HD treatments. Patient was cleared for transfer out of ICU to the floor.  He started having worsening abdominal  pain. He was seen by rheumatology for possible lupus flare up and serositis. He was started on steroids and other immunomodulating drugs. Drugs have since been discontinued due to concern of infection.  Repeat CT showed multiple loculated fluid collections, necrotizing pancreatitis, possible peritonitis. Surgery team is following with plan for IR drainage, which was performed October 28.  Body fluid culture grew out Klebsiella.  Surgical hospitalist took back to the OR October 30th due to continued pain in peritonitis with positive cultures, only found about 3 L of venita ascites, no other sign of infection, necrotic tissue, bowel issues or hematoma.    Today  No new issues, pain he states is improved but still there.  He currently has NG tube to suction.  He thinks he is passing gas but no bowel movement.  I also did discuss with mother that Mr. Davidson is very sick and if this was an elderly gentleman he may have already succumbed to the illness.        OBJECTIVE/PHYSICAL EXAM     VITAL SIGNS (MOST RECENT):  Temp: 98.4 °F (36.9 °C) (10/31/22 1023)  Pulse: 87 (10/31/22 1023)  Resp: 19 (10/31/22 0931)  BP: (!) 148/77 (10/31/22 1023)  SpO2: 95 % (10/31/22 1023)   VITAL SIGNS (24 HOUR RANGE):  Temp:  [97.9 °F (36.6 °C)-98.9 °F (37.2 °C)] 98.4 °F (36.9 °C)  Pulse:  [80-87] 87  Resp:  [1-22] 19  SpO2:  [94 %-100 %] 95 %  BP: (111-148)/(67-77) 148/77   GENERAL: In no acute distress, afebrile, malnourished weight loss  HEENT:  CHEST: Clear to auscultation bilaterally  HEART: S1, S2, no appreciable murmur  ABDOMEN: diffuse tenderness more so on the right side, slightly distended, BS +  MSK: Warm, 2+ pitting edema both legs, no clubbing or cyanosis  NEUROLOGIC: Alert and oriented x4, both legs 2/5 strength, 5/5 upper extremity  INTEGUMENTARY:  PSYCHIATRY:        ASSESSMENT/PLAN   Severe necrotizing pancreatitis with loculated intra-abdominal fluid collection-status post IR drainage October 28  Intra-abdominal abscess versus  infected necrotizing pancreatitis  Sepsis  Acute kidney injury ATN  Critical illness myopathy  Severe protein caloric malnutrition     History of:  Type A aortic dissection status post repair with mechanical aortic valve conduit, recent type B aortic dissection status post TAVR, lupus nephritis class 5, mixed connective tissue disorder predominant lupus, transverse myelitis recovered        General surgery following.  NG tube to suction, start diet once cleared by surgery.  Gastroenterology following.    Nephrology following.  Fluid management per them.  Has a history of class 5 lupus nephritis.  Need some fluid removal.  ID following.  Follow-up on body fluid culture.  Continue meropenem and fluconazole.  Rheumatology following.  Continue Solu-Medrol 40 b.i.d., all other immunosuppressant discontinued.    Glucose control with Levemir and sliding scale.  Continue antihypertensives and adjust as needed.    Continue to monitor INR.  Goal of 2 to 3.  Continue holding anticoagulation for now, likely resume anticoagulation tomorrow if remains stable.  Continue TPN for IV nutrition.  He needs to also continue enteral nutrition once okay from surgery standpoint.  Patient remains critically ill and condition remains guarded.  Many poor prognostic factors playing a role here.  Spoke with pharmacist to change TPN to without potassium.  He is going for dialysis today which should help with potassium removal as well.     Critical care diagnosis: All of the above  Critical care time spent:  50 minutes    Anticipated discharge and disposition:   __________________________________________________________________________    LABS/MICRO/MEDS/DIAGNOSTICS       LABS  Recent Labs     10/31/22  0424   *   K 6.1*   CHLORIDE 99   CO2 20*   BUN 73.5*   CREATININE 1.67*   GLUCOSE 285*   CALCIUM 8.5   ALKPHOS 82   AST 21   ALT 8   ALBUMIN 2.4*     Recent Labs     10/31/22  0424   WBC 22.9*   RBC 3.33*   HCT 28.3*   MCV 85.0           MICROBIOLOGY  Microbiology Results (last 7 days)       Procedure Component Value Units Date/Time    Body Fluid Culture [[id number]] Collected: 10/28/22 0940    Order Status: Completed Specimen: Peritoneal Fluid from Abdomen Updated: 10/31/22 1036     Body Fluid Culture No Growth At 72 Hours    Anaerobic Culture [[id number]] Collected: 10/28/22 0940    Order Status: Completed Specimen: Body Fluid from Abdomen Updated: 10/31/22 0843     Anaerobe Culture No Anaerobes Isolated    Anaerobic Culture [[id number]] Collected: 10/28/22 0940    Order Status: Completed Specimen: Body Fluid from Peritoneal Updated: 10/31/22 0842     Anaerobe Culture No Anaerobes Isolated    Body Fluid Culture [[id number]]  (Abnormal)  (Susceptibility) Collected: 10/28/22 0940    Order Status: Completed Specimen: Peritoneal Fluid from Abdomen Updated: 10/30/22 1351     Body Fluid Culture Many Klebsiella pneumoniae    Gram Stain [[id number]] Collected: 10/28/22 0940    Order Status: Completed Specimen: Peritoneal Fluid from Abdomen Updated: 10/29/22 1130     GRAM STAIN No WBCs, No bacteria seen    Gram Stain [[id number]] Collected: 10/28/22 0940    Order Status: Completed Specimen: Peritoneal Fluid from Abdomen Updated: 10/29/22 1122     GRAM STAIN Rare WBC observed      Few Gram Negative Rods    Fungal Culture [[id number]] Collected: 10/28/22 0940    Order Status: Sent Specimen: Body Fluid from Peritoneal Updated: 10/28/22 1326    Fungal Culture [[id number]] Collected: 10/28/22 0940    Order Status: Sent Specimen: Body Fluid from Abdomen Updated: 10/28/22 1326    Anaerobic Culture [[id number]]     Order Status: Canceled Specimen: Body Fluid from Abdomen     Body Fluid Culture [[id number]]     Order Status: Canceled Specimen: Body Fluid from Abdomen     Fungal Culture [[id number]]     Order Status: Canceled Specimen: Body Fluid from Abdomen     Gram Stain [[id number]]     Order Status: Canceled Specimen: Body Fluid from Abdomen     Gram Stain  [[id number]]     Order Status: Canceled Specimen: Peritoneal Fluid     Body Fluid Culture [[id number]]     Order Status: Canceled Specimen: Peritoneal Fluid     Fungal Culture [[id number]]     Order Status: Canceled Specimen: Body Fluid     Anaerobic Culture [[id number]]     Order Status: Canceled Specimen: Body Fluid     Fungal Culture [[id number]]  (Normal) Collected: 09/25/22 1200    Order Status: Completed Specimen: Body Fluid from Pleural Fluid, Right Updated: 10/27/22 1254     Fungal Culture No Fungus Isolated at 4 Weeks            MEDICATIONS   acetaminophen  650 mg Oral Q6H    carvediloL  25 mg Oral BID    cloNIDine  0.1 mg Oral BID    [START ON 11/1/2022] fat emulsion 20%  250 mL Intravenous Every Tues, Thurs, Sat    fluconazole (DIFLUCAN) IV (PEDS and ADULTS)  400 mg Intravenous Q24H    gabapentin  300 mg Oral TID    hydrALAZINE  100 mg Oral Q8H    insulin detemir U-100  10 Units Subcutaneous QHS    lactulose 10 gram/15 ml  10 g Oral BID    meropenem (MERREM) IVPB  1 g Intravenous Q12H    methocarbamoL  500 mg Oral QID    methylPREDNISolone sodium succinate injection  40 mg Intravenous Q12H    morphine  30 mg Oral Q12H    NIFEdipine  90 mg Oral Daily    pantoprazole  40 mg Intravenous Daily    sodium bicarbonate  1,300 mg Oral Daily    sodium chloride 0.9%  10 mL Intravenous Q6H      INFUSIONS   amino acid 5 % in 15% dextrose      TPN ADULT CENTRAL LINE CUSTOM         DIAGNOSTIC TESTS  X-Ray Abdomen AP 1 View   Final Result      As above.         Electronically signed by: Jakob Boland   Date:    10/31/2022   Time:    07:36      CTA Chest Abdomen Pelvis   Final Result   Impression:      1.  An aortic stent is again noted.  Caudal extension of the Jacob type B aortic dissection 37.1 mm from the inferior margin of the graft is noted (Series 18, Image 55). Dilatation of the descending thoracic aorta is again noted, measuring 41.3 mm at its widest. The distal descending segment measures 23.4 mm at the  diaphragmatic hiatus. The origin of the left subclavian, left common carotid and right brachiocephalic arteries are unremarkable.      2. Mild diffuse interlobular interstitial septal thickening in both lungs may be present. An element of congestive failure is not excluded. There is dense opacity at the dependent lung bases consistent with compressive atelectasis with the possibility of an infectious component not entirely excluded. Correlate clinically.      3. The IVC is nonenhanced and appears narrowed.      4. There is a pronounced ascites which is new compared to the prior study. Mild layering hyperdense fluid pelvic fluid is seen, likely reflecting hemoperitoneum. There is a large heterogeneous, predominantly hyperdense mass/collection along the left paracolic gutter and pelvic sidewall (Series 14 Image 90 and Series 22, Image 46), measuring 97 x 62 x 170 mm (AP, transverse, craniocaudal dimensions). This may reflect hematoma formation. No focal contrast collection or pooling is identified to suggest active hemorrhage at the time of imaging. There is a possible left rectus sheath hematoma seen on image 116 of series 14. Correlate with clinical and laboratory findings as regards additional evaluation and follow-up.      5.  Persistence of bilateral pleural effusions which are moderate-sized. Follow-up as clinically indicated.      6. Details and other findings as discussed above.      No significant discrepancy with overnight report.         Electronically signed by: Jameson Brumfield   Date:    10/30/2022   Time:    09:57      CT Abscess Aspiration without Catheter   Final Result      Successful aspiration of bilateral fluid with simple free fluid identified on the right and thick gelatinous loculated fluid identified in the left.  All fluid sent for laboratory analysis and clearly labeled.         Electronically signed by: Jakob Boland   Date:    10/31/2022   Time:    07:39      US Guided Needle Placement    Final Result      Successful aspiration of bilateral fluid with simple free fluid identified on the right and thick gelatinous loculated fluid identified in the left.  All fluid sent for laboratory analysis and clearly labeled.         Electronically signed by: Jakob Boland   Date:    10/31/2022   Time:    07:39      US Scrotum And Testicles   Final Result      CT Abdomen Pelvis With Contrast   Final Result      1. No normal pancreatic parenchyma is seen.  At the level of the pancreas there is a large fluid collection with a few foci of gas.  Loculated, faintly peripherally enhancing fluid collections extend along the pericolic gutters.  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  Foci of gas are nonspecific in the setting of prior intervention.   2. Extensive loculated peritoneal collections with peripheral enhancement compatible with peritonitis.Left inguinal hernia contains ascitic fluid with some peripheral enhancement similar to the peritoneal fluid.  There is no herniated bowel loop   3. There is a moderate colonic stool burden.  There is some mass effect on the bowel loops due to the loculated nature of the abdominal fluid collections.   4. Anasarca   5. Partially imaged known thoracic aortic dissection status post intervention.         Electronically signed by: Rocio White   Date:    10/26/2022   Time:    12:37      X-Ray Chest 1 View for Line/Tube Placement   Final Result      Right PICC tip overlies the mid SVC.         Electronically signed by: Kapil Ayers   Date:    10/25/2022   Time:    16:56      CT Abdomen Pelvis  Without Contrast   Final Result      Bilateral pleural effusions with moderate ascites and diffuse anasarca.  The epigastric and left mid abdominal ascites appears loculated and peritonitis cannot be excluded.         Electronically signed by: Grabiel Singleton MD   Date:    10/24/2022   Time:    23:30      X-Ray Abdomen AP 1 View    Final Result      Residual feces         Electronically signed by: Ramón Ying   Date:    10/24/2022   Time:    09:46      X-Ray Chest 1 View   Final Result      No significant interval change.         Electronically signed by: Kapil Ayers   Date:    10/22/2022   Time:    15:13      X-Ray Chest 1 View   Final Result      Persistent increased left retrocardiac density and silhouetting of the left hemidiaphragm.      No other focal consolidative changes.      No other change         Electronically signed by: Ramón Ying   Date:    10/12/2022   Time:    08:15      Fl Modified Barium Swallow Speech   Final Result      X-Ray Chest 1 View   Final Result      Unchanged retrocardiac opacity suggesting atelectasis         Electronically signed by: Rocio White   Date:    10/05/2022   Time:    06:11      X-Ray Chest 1 View   Final Result      New central venous catheter terminates within the proximal right atrium.         Electronically signed by: Jameson Brumfield   Date:    10/04/2022   Time:    14:16      X-Ray Chest 1 View   Final Result      No significant interval change.         Electronically signed by: Kapil Ayers   Date:    10/04/2022   Time:    06:54      CT Chest Abdomen Pelvis Without Contrast (XPD)   Final Result      Very limited study due the lack of IV contrast.      Anasarca.      Small amount of ascites.      Small left-sided pleural effusion with bibasilar atelectasis versus infiltrate.         Electronically signed by: Joey Vidal MD   Date:    10/03/2022   Time:    21:30      X-Ray Chest 1 View   Final Result      As above.         Electronically signed by: Jameson Brumfield   Date:    10/03/2022   Time:    07:09      X-Ray Chest 1 View   Final Result      As above.         Electronically signed by: Jakob Boalnd   Date:    10/02/2022   Time:    14:50      X-ray Abdomen for NG Tube Placement (Nursing should notify Radiology after placement)   Final Result      Optimal placement of the  nasogastric tube.         Electronically signed by: Jameson Brumfield   Date:    10/01/2022   Time:    11:48      X-Ray Chest 1 View   Final Result      No significant change         Electronically signed by: Ramón Ying   Date:    10/01/2022   Time:    08:05      X-Ray Chest 1 View   Final Result      No acute findings or significant interval change.         Electronically signed by: Kapil Ayers   Date:    09/30/2022   Time:    07:35      X-Ray Chest 1 View   Final Result      Stable exam without significant interval change.         Electronically signed by: Claribel Deal   Date:    09/29/2022   Time:    16:14      X-Ray Chest 1 View   Final Result      Possible mild left perihilar/lower lung atelectasis.  Suspect tiny right apical pneumothorax.         Electronically signed by: Darius Rosario   Date:    09/29/2022   Time:    06:09      X-Ray Chest 1 View   Final Result      No significant interval change.         Electronically signed by: Jameson Brumfield   Date:    09/28/2022   Time:    16:25      X-Ray Chest 1 View   Final Result      Similar appearance to prior exam with retrocardiac atelectasis         Electronically signed by: Rocio White   Date:    09/28/2022   Time:    06:05      X-Ray Chest 1 View   Final Result      No significant change         Electronically signed by: Ramón Ying   Date:    09/27/2022   Time:    08:47      X-Ray Abdomen AP 1 View   Final Result      Satisfactory central femoral line placement.         Electronically signed by: Emily Candelario MD   Date:    09/26/2022   Time:    13:54      X-Ray Chest 1 View   Final Result      Interval retraction of endotracheal tube, satisfactory position.  Cardiac lead placement in the interval.  No other significant change.         Electronically signed by: Emily Candelario MD   Date:    09/26/2022   Time:    08:00      X-Ray Chest 1 View   Final Result      Increased volume status      Interval intubation with endotracheal tube at the  josie.  I recommend retracting      Right-sided chest tube in good position         Electronically signed by: Too Reyes   Date:    09/25/2022   Time:    15:49      X-Ray Abdomen AP 1 View   Final Result      There is slight increased density of the kidneys of questionable etiology and or significance.      Otherwise nonspecific gas pattern         Electronically signed by: Ramón Ying   Date:    09/25/2022   Time:    10:51      CTA Chest Abdomen Non Coronary   Final Result      1. Thoracic aortic endograft placement since 09/23/2022 with the dissection flap extending about 3 cm inferior to the distal portion of the graft.  Opacification of the false lumen along the mid and distal portions of the graft.   2. Moderate volume hemoperitoneum.  Changes involving the liver, spleen, pancreas and bowel are suggestive of overall hypoperfusion.  Hyperdense kidneys suggest acute kidney injury.   3. Small to moderate bilateral pleural effusions, larger on the right.   Findings discussed with Mr. Guevara ICU nurseDulce at 08:35 hours on 09/25/2022.         Electronically signed by: Darius Rosario   Date:    09/25/2022   Time:    08:42      X-Ray Chest 1 View   Final Result      Changes suggestive of right-sided pleural effusion.      Increased left retrocardiac density and silhouetting of the left hemidiaphragm which might be related to an infiltrate/atelectasis.      Interval placement of thoracic endograft.      No focal consolidative changes         Electronically signed by: Ramón Ying   Date:    09/25/2022   Time:    09:10      IR Abdominal Aortobifemoral   Final Result      Fluoroscopic assistance provided as above.         Electronically signed by: Kapil Ayers   Date:    09/26/2022   Time:    09:56               All diagnosis and differential diagnosis have been reviewed; assessment and plan has been documented. I have personally reviewed the labs and test results that are presently available; I  have reviewed the patients medication list. I have reviewed the consulting providers response and recommendations. I have reviewed or attempted to review medical records based upon their availability.  All of the patient's questions have been addressed and answered. Patient's is agreeable to the above stated plan. I will continue to monitor closely and make adjustments to medical management as needed.  This document was created using M*Modal Fluency Direct.  Transcription errors may have been made.  Please contact me if any questions may rise regarding documentation to clarify verbiage.        Luis F Hutson MD   10/31/2022   Internal Medicine

## 2022-10-31 NOTE — PROGRESS NOTES
Inpatient Nutrition Assessment    Admit Date: 9/23/2022   Total duration of encounter: 38 days     Nutrition Recommendation/Prescription     - Resume oral diet once medically appropriate; goal diet diabetic    - continue TPN until pt able to tolerate increased oral intake  AA15% 95gm, D70 300gm, IL 20% IVPB 3x/week 50gm @60ml/hr  Provides:  1614kcal (80% est needs)  95gm protein (100% est needs)  1440ml fluid  300gm CHO  GIR: 3.6    - monitor labs, oral intake of meals/fluids    - once intake of meals and fluids increases, wean TPN    Communication of Recommendations: reviewed with physician, reviewed with patient/caregiver, and reviewed with nurse, reviewed with pharmacist    Nutrition Assessment     Malnutrition Assessment/Nutrition-Focused Physical Exam    Malnutrition in the context of acute illness or injury  Degree of Malnutrition: severe malnutrition  Energy Intake: < 75% of estimated energy requirement for > 7 days  Interpretation of Weight Loss: >5% in 1 month  Body Fat:moderate depletion  Area of Body Fat Loss: orbital region  and upper arm region - triceps / biceps  Muscle Mass Loss: moderate depletion  Area of Muscle Mass Loss: temple region - temporalis muscle, clavicle bone region - pectoralis major, deltoid, trapezius muscles, clavicle and acromion bone region - deltoid muscle, and scapular bone region - trapezius, supraspinus, infraspinus muscles  Fluid Accumulation: moderate to severe  Edema: ascites   Reduced  Strength: unable to obtain  A minimum of two characteristics is recommended for diagnosis of either severe or non-severe malnutrition.    Chart Review    Reason Seen: continuous nutrition monitoring and follow-up    Diagnosis:  Fever and Leukocytosis  Abdominal aortic dissection, s/p repair  Abdominal compartment syndrome, s/p ex-lap x2 last on 9/27 with abdominal closure on 10/1  SLE with active flare  History of transverse myelitis  History of lupus nephritis  TAY now on HD  VHD, s/p  mechanical AVR  Chest tube in place  DM2  Necrotizing pancreatitis  Ascites  Bacterial peritonitis    Relevant Medical History: HTN, lupus    Nutrition-Related Medications: detemir 10 Units BID, docusate, miralax, lactobacillus acidophilus, glimepiride, pantoprazole, sodium bicarbonate  Calorie Containing IV Medications: no significant kcals from medications at this time    Nutrition-Related Labs:   BUN 27, Crea 3.06, Glu 174, Phos 6.2   Na 128, K 3.3, Cl 96, BUN 21.5, Crea 3.28, Glu 205  10/4 Na 127, Cl 95, BUN 57.3, Crea 5.16, Glu 152, Phos 5  10/7 K 5.6, BUN 55.2, Crea 4.57, Glu 347, Phos 8  10/11: Na 126, K 6.1, Cl 91, BUN 49, Crea 3.18, GFR 25, Glu 139, Ca 7.9, Phos 7.4  10/14: Na 125, K 5.4, Cl 91, BUN 56.1, Crea 2.76, GFR 30, Ca 8.1  10/18: Na 128, Cl 92, Ca 7.9, BUN 50.9, Cr 2.32   10/21: Na 127, Cl 93, BUN 68.1, Crea 2.35, GFR 37, Glu 335  10/25: Na 128, Cl 96, BUN 65.1, Crea 1.62, GFR 57, Glu 39; CB-124  10/28: Na 128, Cl 96, BUN 62.9, Crea 1.62, GFR 57, glu 304, Ca 7.9  10/31: Na 129, K 6.1, BUN 73.5, Crea 1.67, GFR 55, Glu 285, Alb 2.4    Diet/PN Order: amino acid 5% in 15% dextrose (CLINIMIX-E) solution (1L provides 50gm AA, 150gm CHO (510 kcal/L dextrose), Na 35, K 30, Mg 5, Ca 4.5, Acetate 80, Cl 39, Phos 15) (710 total kcal/L)  Diet NPO  TPN ADULT CENTRAL LINE CUSTOM  Oral Supplement Order: none  Tube Feeding Order: none at this time  Appetite/Oral Intake: NPO/NPO  Factors Affecting Nutritional Intake: decreased appetite and NPO  Food/Latter day/Cultural Preferences: none reported    Skin Integrity: incision  Wound(s):   incision noted    Comments    22: Discussed with RN. Will provide tube feeding recommendations for when appropriate to start tube feeding. Receiving kcal from meds. Noted Phos, will monitor for need for renal formula.   22: Discussed with RN. Need to consider TPN since pt now LOS day 7. If starting tube feeding, recs provided. If able to extubate, advance diet  "when appropriate.   10/4/22: Pt previously eating 100% po intake of meals, good appetite. Currently NPO for procedure, plans to restart diet per RN. Will add ONS for added protein and kcal.  10/7/22: Pt now with decreased appetite/po intake. Noted elevated K and Phos, likely not diet related since pt with poor po intake of full liquid diet.   10/11: pt sleeping, nurse reports tolerating full liquid diet, some abdominal distention noted, reports unsure of plans to advance diet as this time  10/14: pt advanced to soft diet today; ate about 40% of lunch tray, drinking Boost, would like 2 per meal in vanilla flavor  10/18: Pt advanced to regular texture renal/diabetic diet, he reports that his appetite is fair, drinks boost, feels he is chewing/swallowing well, does reports some abdominal pain 30-90 minutes after eating, feels better after several hours of no eating.   10/21: appetite remains the same, drinking boost  10/25: fair appetite, says he is drinking some boost, would like to continue flavor    10/28: pt with decreased appetite, noted new dx necrotizing pancreatits; paracentesis done today; not drinking boost at this time either; plans to try and start eating more and drinking boost, says he feels a little better this afternoon. MD discussed starting TPN until pt able to tolerate increased oral intake. Still on dialysis as needed    10/31: now NPO with NG with LIWS after abdominal washout with surgery yesterday, possible necrotizing fascitis, will switch to custom TPN today due to abnormal electrolytes    Anthropometrics    Height: 5' 5" (165.1 cm) Height Method: Estimated  Last Weight: 56.6 kg (124 lb 12.5 oz) (10/31/22 0932) Weight Method: Bed Scale  BMI (Calculated): 20.8  BMI Classification: normal (BMI 18.5-24.9)        Ideal Body Weight (IBW), Male: 136 lb     % Ideal Body Weight, Male (lb): 91.75 %                 Usual Body Weight (UBW), k kg  % Usual Body Weight: 94.53  % Weight Change From Usual " Weight: -5.67 %  Usual Weight Provided By: unable to obtain usual weight at this time    Wt Readings from Last 5 Encounters:   10/31/22 56.6 kg (124 lb 12.5 oz)   09/23/22 60 kg (132 lb 4.4 oz)   12/09/21 48 kg (105 lb 13.1 oz)     Weight Change(s) Since Admission:  Admit Weight: 60 kg (132 lb 4.4 oz) (09/23/22 1219)  10/4/22: no new wt  10/7/22: no new wt  10/12/22: 65kg; weight gain noted  10/15/22: 56.6kg; fluctuating weights possible due to fluid  10/22/22: 56.6kg    Estimated Needs    Weight Used For Calorie Calculations: 56.6 kg (124 lb 12.5 oz)  Energy Calorie Requirements (kcal): 2011kcal (1.4stress factor)  Energy Need Method: [place]-St Jeor  Weight Used For Protein Calculations: 56.6 kg (124 lb 12.5 oz)  Protein Requirements: 85-96gm (1.5-1.7gm/kg)  Fluid Requirements (mL): 1000ml + urinary output  Temp: 98.9 °F (37.2 °C)    Enteral Nutrition    Patient not receiving enteral nutrition at this time.    Parenteral Nutrition    Standard Formula: not applicable  Custom Formula:  633 ml 15% amino acids and 428 ml 70% dextrose  Additives: none  Rate/Volume: 60  Lipids: 250 ml 20% IV lipid emulsion three times per week  Total Nutrition Provided by Parenteral Nutrition:  Calories Provided  1614 kcal/d, 80% needs   Protein Provided  95 g/d, 100% needs   Dextrose Provided  300 g/d, GIR 3.6 mg CHO/kg/min   Fluid Provided  1440 ml/d, 100% needs       Evaluation of Received Nutrient Intake    Calories: meeting estimated needs  Protein: meeting estimated needs    Patient Education    Not applicable.    Nutrition Diagnosis     PES: Inadequate oral intake related to current condition as evidenced by <75% intake of meals. (continues)    PES: Malnutrition related to pancreatitis, infection as evidenced by weight loss >5% in 1 month, appetite loss <50% intake of meals, severe fat and muscle loss. (new)     Interventions/Goals     Intervention(s): general/healthful diet, commercial beverage, and collaboration with other  providers  Goal: Meet greater than 75% of nutritional needs by follow-up. (goal progressing)    Monitoring & Evaluation     Dietitian will monitor energy intake, weight change, electrolyte/renal panel, and glucose/endocrine profile.  Nutrition Risk/Follow-Up: high (follow-up in 1-4 days)

## 2022-11-01 NOTE — PROGRESS NOTES
[facility]  HOSPITAL MEDICINE  PROGRESS NOTE        CHIEF COMPLAINT   Hospital follow up    HOSPITAL COURSE   Stuart Guevara is a 32 y.o. male who has PMH which includes HTN, SLE,  hepatitis B, type A aortic dissection with repair + mechanical aortic valve placement 2/2021 on coumadin, RUE DVT s/p Xarelto, class V lupus nephritis; presented to the ED at [facility] on 9/23/2022  with reports of  shortness of breath and chest pain radiating to his back. Work up revealed a type B aortic dissection and significant hypertension noted on presentation.  He failed medical management and had progression of his symptoms despite adequate control of hemodynamic parameters.  PT was transferred from [facility] to [facility] for CV surgery evaluation which he was taken  to the OR and had TEVAR with left carotid to subclavian artery bypass on 9/23.   Later Patient developed abdominal distension and rigidity of his abdomen and developed a lactic acidosis.  He had a repeat CT angiography on 9/25 of the chest/abdomen/pelvis without evidence of worsening dissection, but evidence of significant intra-abdominal pathology with some hemoperitoneum, signs of hypoperfusion of the abdominal organs (compartment syndrome)  free blood in the abdomen. He underwent exploratory laparotomy on 9/25 with evidence of viable bowel and no significant bleeding identified, abd was closed with flakito drain left in situ chest tube was placed. Had repeat ex-laps on 9/27, 9/29. Postoperatively, he developed severe unstable bradycardia, and a transvenous pacemaker was temporarily placed by Cardiology, with the bradycardia resolving within he next couple days.  Shortly afterwards, he was noted to have significant worsening of his renal failure and critical hyperkalemia. Renal services consulted and HD catheter was placed and he was started on HD treatments. Patient was cleared for transfer out of ICU to the floor.  He started having worsening abdominal  pain. He was seen by rheumatology for possible lupus flare up and serositis. He was started on steroids and other immunomodulating drugs. Drugs have since been discontinued due to concern of infection.  Repeat CT showed multiple loculated fluid collections, necrotizing pancreatitis, possible peritonitis. Surgery team is following with plan for IR drainage, which was performed October 28.  Body fluid culture grew out Klebsiella.  Surgical hospitalist took back to the OR October 30th due to continued pain in peritonitis with positive cultures, only found about 3 L of venita ascites, no other sign of infection, necrotic tissue, bowel issues or hematoma.    Today  Seen examined this morning.  He is feeling much better today and less pain requirements.  Passing gas but no bowel movement.        OBJECTIVE/PHYSICAL EXAM     VITAL SIGNS (MOST RECENT):  Temp: 98.9 °F (37.2 °C) (11/01/22 0449)  Pulse: 101 (11/01/22 0639)  Resp: 18 (11/01/22 0453)  BP: (!) 174/92 (11/01/22 0639)  SpO2: (!) 92 % (11/01/22 0449)   VITAL SIGNS (24 HOUR RANGE):  Temp:  [98.4 °F (36.9 °C)-98.9 °F (37.2 °C)] 98.9 °F (37.2 °C)  Pulse:  [] 101  Resp:  [16-19] 18  SpO2:  [92 %-95 %] 92 %  BP: (148-198)/(77-99) 174/92   GENERAL: In no acute distress, afebrile, malnourished weight loss  HEENT:  CHEST: Clear to auscultation bilaterally  HEART: S1, S2, no appreciable murmur  ABDOMEN: diffuse tenderness more so on the right side lower, slightly distended, BS +  MSK: Warm, 2+ pitting edema both legs, no clubbing or cyanosis  NEUROLOGIC: Alert and oriented x4, both legs 2/5 strength, 5/5 upper extremity  INTEGUMENTARY:  PSYCHIATRY:        ASSESSMENT/PLAN   Severe necrotizing pancreatitis with loculated intra-abdominal fluid collection-status post IR drainage October 28  Intra-abdominal abscess versus infected necrotizing pancreatitis  Sepsis  Acute kidney injury ATN  Critical illness myopathy  Severe protein caloric malnutrition     History of:  Type A  aortic dissection status post repair with mechanical aortic valve conduit, recent type B aortic dissection status post TAVR, lupus nephritis class 5, mixed connective tissue disorder predominant lupus, transverse myelitis recovered        General surgery following.  NG tube to suction, start diet once cleared by surgery.  Gastroenterology following.    Nephrology following.  Fluid management per them.  Has a history of class 5 lupus nephritis.  Needs continued fluid removal, pain is likely from swelling.  ID following.  Follow-up on body fluid culture.  Continue meropenem and fluconazole.  Rheumatology following.  Continue Solu-Medrol 40 b.i.d., all other immunosuppressant discontinued.    Glucose control with Levemir and sliding scale.  Continue antihypertensives and adjust as needed.    Continue to monitor INR.  Goal of 2 to 3.  Will give a trial with Lovenox today if labs are okay and stable.  Continue TPN for IV nutrition.  He needs to also continue enteral nutrition once okay from surgery standpoint.  Patient remains critically ill and condition remains guarded.  Many poor prognostic factors playing a role here.     Critical care diagnosis: All of the above  Critical care time spent:  35 minutes    Anticipated discharge and disposition:   __________________________________________________________________________    LABS/MICRO/MEDS/DIAGNOSTICS       LABS  Recent Labs     10/31/22  0424   *   K 6.1*   CHLORIDE 99   CO2 20*   BUN 73.5*   CREATININE 1.67*   GLUCOSE 285*   CALCIUM 8.5   ALKPHOS 82   AST 21   ALT 8   ALBUMIN 2.4*     Recent Labs     10/31/22  0424   WBC 22.9*   RBC 3.33*   HCT 28.3*   MCV 85.0          MICROBIOLOGY  Microbiology Results (last 7 days)       Procedure Component Value Units Date/Time    Body Fluid Culture [[id number]] Collected: 10/28/22 3813    Order Status: Completed Specimen: Peritoneal Fluid from Abdomen Updated: 10/31/22 1036     Body Fluid Culture No Growth At 72 Hours     Anaerobic Culture [[id number]] Collected: 10/28/22 0940    Order Status: Completed Specimen: Body Fluid from Abdomen Updated: 10/31/22 0843     Anaerobe Culture No Anaerobes Isolated    Anaerobic Culture [[id number]] Collected: 10/28/22 0940    Order Status: Completed Specimen: Body Fluid from Peritoneal Updated: 10/31/22 0842     Anaerobe Culture No Anaerobes Isolated    Body Fluid Culture [[id number]]  (Abnormal)  (Susceptibility) Collected: 10/28/22 0940    Order Status: Completed Specimen: Peritoneal Fluid from Abdomen Updated: 10/30/22 1351     Body Fluid Culture Many Klebsiella pneumoniae    Gram Stain [[id number]] Collected: 10/28/22 0940    Order Status: Completed Specimen: Peritoneal Fluid from Abdomen Updated: 10/29/22 1130     GRAM STAIN No WBCs, No bacteria seen    Gram Stain [[id number]] Collected: 10/28/22 0940    Order Status: Completed Specimen: Peritoneal Fluid from Abdomen Updated: 10/29/22 1122     GRAM STAIN Rare WBC observed      Few Gram Negative Rods    Fungal Culture [[id number]] Collected: 10/28/22 0940    Order Status: Sent Specimen: Body Fluid from Peritoneal Updated: 10/28/22 1326    Fungal Culture [[id number]] Collected: 10/28/22 0940    Order Status: Sent Specimen: Body Fluid from Abdomen Updated: 10/28/22 1326    Anaerobic Culture [[id number]]     Order Status: Canceled Specimen: Body Fluid from Abdomen     Body Fluid Culture [[id number]]     Order Status: Canceled Specimen: Body Fluid from Abdomen     Fungal Culture [[id number]]     Order Status: Canceled Specimen: Body Fluid from Abdomen     Gram Stain [[id number]]     Order Status: Canceled Specimen: Body Fluid from Abdomen     Gram Stain [[id number]]     Order Status: Canceled Specimen: Peritoneal Fluid     Body Fluid Culture [[id number]]     Order Status: Canceled Specimen: Peritoneal Fluid     Fungal Culture [[id number]]     Order Status: Canceled Specimen: Body Fluid     Anaerobic Culture [[id number]]     Order Status: Canceled Specimen:  Body Fluid     Fungal Culture [[id number]]  (Normal) Collected: 09/25/22 1200    Order Status: Completed Specimen: Body Fluid from Pleural Fluid, Right Updated: 10/27/22 1254     Fungal Culture No Fungus Isolated at 4 Weeks            MEDICATIONS   acetaminophen  650 mg Oral Q6H    aspirin  81 mg Oral Daily    carvediloL  25 mg Oral BID    cloNIDine  0.1 mg Oral BID    fat emulsion 20%  250 mL Intravenous Every Tues, Thurs, Sat    fluconazole (DIFLUCAN) IV (PEDS and ADULTS)  400 mg Intravenous Q24H    gabapentin  300 mg Oral TID    hydrALAZINE  100 mg Oral Q8H    insulin detemir U-100  10 Units Subcutaneous BID    lactulose 10 gram/15 ml  10 g Oral BID    meropenem (MERREM) IVPB  1 g Intravenous Q12H    methocarbamoL  500 mg Oral QID    methylPREDNISolone sodium succinate injection  40 mg Intravenous Q12H    morphine  30 mg Oral Q12H    NIFEdipine  90 mg Oral Daily    pantoprazole  40 mg Intravenous Daily    sodium bicarbonate  1,300 mg Oral Daily    sodium chloride 0.9%  10 mL Intravenous Q6H      INFUSIONS   amino acid 5 % in 15% dextrose 60 mL/hr at 10/31/22 1404    TPN ADULT CENTRAL LINE CUSTOM         DIAGNOSTIC TESTS  X-Ray Abdomen AP 1 View   Final Result      As above.         Electronically signed by: Jakob Boland   Date:    10/31/2022   Time:    07:36      CTA Chest Abdomen Pelvis   Final Result   Impression:      1.  An aortic stent is again noted.  Caudal extension of the Jacob type B aortic dissection 37.1 mm from the inferior margin of the graft is noted (Series 18, Image 55). Dilatation of the descending thoracic aorta is again noted, measuring 41.3 mm at its widest. The distal descending segment measures 23.4 mm at the diaphragmatic hiatus. The origin of the left subclavian, left common carotid and right brachiocephalic arteries are unremarkable.      2. Mild diffuse interlobular interstitial septal thickening in both lungs may be present. An element of congestive failure is not excluded. There  is dense opacity at the dependent lung bases consistent with compressive atelectasis with the possibility of an infectious component not entirely excluded. Correlate clinically.      3. The IVC is nonenhanced and appears narrowed.      4. There is a pronounced ascites which is new compared to the prior study. Mild layering hyperdense fluid pelvic fluid is seen, likely reflecting hemoperitoneum. There is a large heterogeneous, predominantly hyperdense mass/collection along the left paracolic gutter and pelvic sidewall (Series 14 Image 90 and Series 22, Image 46), measuring 97 x 62 x 170 mm (AP, transverse, craniocaudal dimensions). This may reflect hematoma formation. No focal contrast collection or pooling is identified to suggest active hemorrhage at the time of imaging. There is a possible left rectus sheath hematoma seen on image 116 of series 14. Correlate with clinical and laboratory findings as regards additional evaluation and follow-up.      5.  Persistence of bilateral pleural effusions which are moderate-sized. Follow-up as clinically indicated.      6. Details and other findings as discussed above.      No significant discrepancy with overnight report.         Electronically signed by: Jameson Brumfield   Date:    10/30/2022   Time:    09:57      CT Abscess Aspiration without Catheter   Final Result      Successful aspiration of bilateral fluid with simple free fluid identified on the right and thick gelatinous loculated fluid identified in the left.  All fluid sent for laboratory analysis and clearly labeled.         Electronically signed by: Jakob Boland   Date:    10/31/2022   Time:    07:39      US Guided Needle Placement   Final Result      Successful aspiration of bilateral fluid with simple free fluid identified on the right and thick gelatinous loculated fluid identified in the left.  All fluid sent for laboratory analysis and clearly labeled.         Electronically signed by: Jakob Boland    Date:    10/31/2022   Time:    07:39      US Scrotum And Testicles   Final Result      CT Abdomen Pelvis With Contrast   Final Result      1. No normal pancreatic parenchyma is seen.  At the level of the pancreas there is a large fluid collection with a few foci of gas.  Loculated, faintly peripherally enhancing fluid collections extend along the pericolic gutters.  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  Foci of gas are nonspecific in the setting of prior intervention.   2. Extensive loculated peritoneal collections with peripheral enhancement compatible with peritonitis.Left inguinal hernia contains ascitic fluid with some peripheral enhancement similar to the peritoneal fluid.  There is no herniated bowel loop   3. There is a moderate colonic stool burden.  There is some mass effect on the bowel loops due to the loculated nature of the abdominal fluid collections.   4. Anasarca   5. Partially imaged known thoracic aortic dissection status post intervention.         Electronically signed by: Rocio White   Date:    10/26/2022   Time:    12:37      X-Ray Chest 1 View for Line/Tube Placement   Final Result      Right PICC tip overlies the mid SVC.         Electronically signed by: Kapil Ayers   Date:    10/25/2022   Time:    16:56      CT Abdomen Pelvis  Without Contrast   Final Result      Bilateral pleural effusions with moderate ascites and diffuse anasarca.  The epigastric and left mid abdominal ascites appears loculated and peritonitis cannot be excluded.         Electronically signed by: Grabiel Singleton MD   Date:    10/24/2022   Time:    23:30      X-Ray Abdomen AP 1 View   Final Result      Residual feces         Electronically signed by: Ramón Ying   Date:    10/24/2022   Time:    09:46      X-Ray Chest 1 View   Final Result      No significant interval change.         Electronically signed by: Kapil Ayers   Date:    10/22/2022    Time:    15:13      X-Ray Chest 1 View   Final Result      Persistent increased left retrocardiac density and silhouetting of the left hemidiaphragm.      No other focal consolidative changes.      No other change         Electronically signed by: Ramón Ying   Date:    10/12/2022   Time:    08:15      Fl Modified Barium Swallow Speech   Final Result      X-Ray Chest 1 View   Final Result      Unchanged retrocardiac opacity suggesting atelectasis         Electronically signed by: Rocio White   Date:    10/05/2022   Time:    06:11      X-Ray Chest 1 View   Final Result      New central venous catheter terminates within the proximal right atrium.         Electronically signed by: Jameson Brumfield   Date:    10/04/2022   Time:    14:16      X-Ray Chest 1 View   Final Result      No significant interval change.         Electronically signed by: Kapil Ayers   Date:    10/04/2022   Time:    06:54      CT Chest Abdomen Pelvis Without Contrast (XPD)   Final Result      Very limited study due the lack of IV contrast.      Anasarca.      Small amount of ascites.      Small left-sided pleural effusion with bibasilar atelectasis versus infiltrate.         Electronically signed by: Joey Vidal MD   Date:    10/03/2022   Time:    21:30      X-Ray Chest 1 View   Final Result      As above.         Electronically signed by: Jameson Brumfield   Date:    10/03/2022   Time:    07:09      X-Ray Chest 1 View   Final Result      As above.         Electronically signed by: Jakob Boland   Date:    10/02/2022   Time:    14:50      X-ray Abdomen for NG Tube Placement (Nursing should notify Radiology after placement)   Final Result      Optimal placement of the nasogastric tube.         Electronically signed by: Jameson Brumfield   Date:    10/01/2022   Time:    11:48      X-Ray Chest 1 View   Final Result      No significant change         Electronically signed by: Ramón Ying   Date:    10/01/2022   Time:    08:05      X-Ray Chest  1 View   Final Result      No acute findings or significant interval change.         Electronically signed by: Kapil Ayers   Date:    09/30/2022   Time:    07:35      X-Ray Chest 1 View   Final Result      Stable exam without significant interval change.         Electronically signed by: Claribel Deal   Date:    09/29/2022   Time:    16:14      X-Ray Chest 1 View   Final Result      Possible mild left perihilar/lower lung atelectasis.  Suspect tiny right apical pneumothorax.         Electronically signed by: Darius Rosario   Date:    09/29/2022   Time:    06:09      X-Ray Chest 1 View   Final Result      No significant interval change.         Electronically signed by: Jameson Brumfield   Date:    09/28/2022   Time:    16:25      X-Ray Chest 1 View   Final Result      Similar appearance to prior exam with retrocardiac atelectasis         Electronically signed by: Rocio White   Date:    09/28/2022   Time:    06:05      X-Ray Chest 1 View   Final Result      No significant change         Electronically signed by: Ramón Ying   Date:    09/27/2022   Time:    08:47      X-Ray Abdomen AP 1 View   Final Result      Satisfactory central femoral line placement.         Electronically signed by: Emily Candelario MD   Date:    09/26/2022   Time:    13:54      X-Ray Chest 1 View   Final Result      Interval retraction of endotracheal tube, satisfactory position.  Cardiac lead placement in the interval.  No other significant change.         Electronically signed by: Emily Candelario MD   Date:    09/26/2022   Time:    08:00      X-Ray Chest 1 View   Final Result      Increased volume status      Interval intubation with endotracheal tube at the josie.  I recommend retracting      Right-sided chest tube in good position         Electronically signed by: Too Reyes   Date:    09/25/2022   Time:    15:49      X-Ray Abdomen AP 1 View   Final Result      There is slight increased density of the kidneys of  questionable etiology and or significance.      Otherwise nonspecific gas pattern         Electronically signed by: Ramón Ying   Date:    09/25/2022   Time:    10:51      CTA Chest Abdomen Non Coronary   Final Result      1. Thoracic aortic endograft placement since 09/23/2022 with the dissection flap extending about 3 cm inferior to the distal portion of the graft.  Opacification of the false lumen along the mid and distal portions of the graft.   2. Moderate volume hemoperitoneum.  Changes involving the liver, spleen, pancreas and bowel are suggestive of overall hypoperfusion.  Hyperdense kidneys suggest acute kidney injury.   3. Small to moderate bilateral pleural effusions, larger on the right.   Findings discussed with Cristopher Cuauhtemochillarynadine ICU nurse, [place] at 08:35 hours on 09/25/2022.         Electronically signed by: Darius Rosario   Date:    09/25/2022   Time:    08:42      X-Ray Chest 1 View   Final Result      Changes suggestive of right-sided pleural effusion.      Increased left retrocardiac density and silhouetting of the left hemidiaphragm which might be related to an infiltrate/atelectasis.      Interval placement of thoracic endograft.      No focal consolidative changes         Electronically signed by: Ramón Ying   Date:    09/25/2022   Time:    09:10      IR Abdominal Aortobifemoral   Final Result      Fluoroscopic assistance provided as above.         Electronically signed by: Kapil Ayers   Date:    09/26/2022   Time:    09:56               All diagnosis and differential diagnosis have been reviewed; assessment and plan has been documented. I have personally reviewed the labs and test results that are presently available; I have reviewed the patients medication list. I have reviewed the consulting providers response and recommendations. I have reviewed or attempted to review medical records based upon their availability.  All of the patient's questions have been addressed and answered.  Patient's is agreeable to the above stated plan. I will continue to monitor closely and make adjustments to medical management as needed.  This document was created using M*Modal Fluency Direct.  Transcription errors may have been made.  Please contact me if any questions may rise regarding documentation to clarify verbiage.        Luis F Hutson MD   11/01/2022   Internal Medicine

## 2022-11-01 NOTE — PT/OT/SLP PROGRESS
Occupational Therapy  Treatment    Stuart Guevara   MRN: [id number]   Admitting Diagnosis: Aortic dissection    OT Date of Treatment: 11/01/22   OT Start Time: 1018  OT Stop Time: 1044  OT Total Time (min): 26 min     Billable Minutes:  Self Care/Home Management 26  Total Minutes: 26     OT/ADONAY: ADONAY     ADONAY Visit Number: 4    General Precautions: Standard, fall  Orthopedic Precautions:    Braces:      Spiritual, Cultural Beliefs, Christianity Practices, Values that Affect Care: no    Subjective:  Communicated with RN prior to session.     Patient found in bed upon entry.    Objective:       Functional Mobility:  Bed Mobility:   Supine to sit: Moderate Assistance   Sit to supine: Moderate Assistance    UE Dressing:  Patient performed UE Dressing with Moderate Assistance with therapist assist at Edge of bed.    LE Dressing:  Patient don/doffed socks with Total Assistance and patient performing LB dressing unable to bend forward nor perform figure-4 position successfully zainab/doff socks.     Patient left HOB elevated with all lines intact and call button in reach    ASSESSMENT:  Stuart Guevara is a 32 y.o. male with a medical diagnosis of Aortic dissection.    Rehab potential is fair    Activity tolerance: Fair    Discharge recommendations: nursing facility, skilled     Equipment recommendations:       GOALS:   Multidisciplinary Problems       Occupational Therapy Goals          Problem: Occupational Therapy    Goal Priority Disciplines Outcome Interventions   Occupational Therapy Goal     OT, PT/OT Ongoing, Progressing    Description: Goals to be met by: 11/4/2022  Goals Met and Upgraded/New goals added 10/21/2022    Patient will increase functional independence with ADLs by performing:      UE dressing with Modified [place]. - new goal  LB Dressing with Minimal Assistance and dressing AEDs. - continue/progressing  Grooming while standing at sink with Stand-by Assistance - new goal  Toileting from toilet with  Modified [place] for hygiene and clothing management. - new goal  Bathing from  shower chair/bench with Modified [place]. - new goal  Toilet transfer to toilet with Modified [place]. - new goal    UE Dressing with Stand-by Assistance. - goal met  Grooming while EOB with Stand-by Assistance. - goal met  Sitting at edge of bed x10 minutes with Stand-by Assistance. - goal met                         Plan:  Patient to be seen 5 x/week, 3 x/week to address the above listed problems via self-care/home management, therapeutic activities  Plan of Care expires: 11/04/22  Plan of Care reviewed with: patient         11/01/2022

## 2022-11-01 NOTE — PROGRESS NOTES
Nephrology consult follow up note    HPI:      Stuart Guevara is a 32 y.o. 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  The patient underwent exploratory lap X 3 (9/25, 9/27 and 9/29) for abdominal compartment syndrome.   He has been requiring renal replacement therapy since 9/25/22 due to TAY with some partial recovery of renal function. Decision was made to stop dialysis but he again developed TAY coinciding with acute onset of necrotizing pancreatitis.     He has now been resumed on TTS schedule via the original temporary dialysis catheter.   Ascites seemed to be reaccumulating and IR tap revealed growth of Klebsiella.  The patient underwent repeat exploratory on October 30th    Interval history:   11/1/22: Patient tolerated dialysis yesterday with 3L fluid removed. He does have persistent edema to abdominal wall, hips and BLE. Respiratory status stable.     Objective:       VITAL SIGNS: 24 HR MIN & MAX LAST    Temp  Min: 98.4 °F (36.9 °C)  Max: 99 °F (37.2 °C)  99 °F (37.2 °C)        BP  Min: 148/77  Max: 198/99  (!) 162/87     Pulse  Min: 87  Max: 101  101     Resp  Min: 16  Max: 18  18    SpO2  Min: 92 %  Max: 95 %  (!) 93 %      GEN: Awake and appropriate.  Chronically ill-appearing black male.    HEENT: Atraumatic. EOMI, no icterus  NECK : No JVD, right IJ temporary catheter noted  CARD : RRR   LUNGS : Clear to auscultation  ABD :  Distended abdomen. No pain at rest, NG to LIS  EXT :  Patient is diffusely edematous with 2 to 3+ pitting all the way up to his hips bilaterally   NEURO : No obvious focal deficits    Recent Labs   Lab 11/01/22  0823   *   K 5.1   CO2 22   BUN 67.9*   CREATININE 1.54*   GLUCOSE 262*   CALCIUM 8.2*   PHOS 5.8*       Recent Labs   Lab 11/01/22  0822   WBC 21.2*   HGB 9.5*   HCT 28.9*              Assessment / Plan:      1.  Postoperative TAY requiring hemodialysis initiation on 9/25 via temp HD catheter placed per Dr. Chopra.    2. Abdominal aortic dissection s/p TEVAR   3. Post operative abdominal compartment syndrome s/p ex lap on 9/27 with closure on 10/1-repeat washout 10/30/2022 for Klebsiella peritonitis  4. Hyperkalemia.  On Lokelma now.    5. Hypertension  6. Type 2 diabetes mellitus  7. Serositis and SLE - rheumatology following  8. Hyponatremia persistent.  9. Necrotizing pancreatitis per CT of the abdomen  10. Developing volume overload    Recommendations  We will continue dialysis today on TTS schedule for now.   Dr. Melton was originally planning for tunneled dialysis catheter placement per our request over a week ago initially delayed due to critical supratherapeutic INR and then renal recovery. Now using temp dialysis catheter that was placed 9/25 but working well. Will discuss next steps with Dr. Lundberg.

## 2022-11-01 NOTE — PT/OT/SLP PROGRESS
Physical Therapy  Treatment    Stuart Guevara   MRN: [id number]   Admitting Diagnosis: Aortic dissection    PT Received On: 11/01/22  PT Start Time: 1017     PT Stop Time: 1045    PT Total Time (min): 28 min       Billable Minutes:  Therapeutic Activity 28    Treatment Type: Treatment  PT/PTA: PTA     PTA Visit Number: 5       General Precautions: Standard, fall, NPO  Orthopedic Precautions: N/A   Braces:    Respiratory Status: Nasal cannula, flow 3 L/min    Spiritual, Cultural Beliefs, Mormon Practices, Values that Affect Care: no    Subjective:  Communicated with nurse prior to session.         Objective:   Patient found with: oxygen, pulse ox (continuous), telemetry, NG tube    Functional Mobility:  Bed Mobility: supine<>sit tf with modA and cues for patient participation.        Transfers: sit<>stand tf with maxA and Rw with cues for upright posture       Gait: pt performed lateral stepping towards HOB with modA and cues for RW manipulation and step pattern.         Balance:   Static Sit: POOR+: Needs MINIMAL assist to maintain  Dynamic Sit: FAIR: Cannot move trunk without losing balance  Static Stand: POOR: Needs MODERATE assist to maintain  Dynamic stand: POOR: Needs MOD (moderate) assist during gait     AM-PAC 6 CLICK MOBILITY  How much help from another person does this patient currently need?   1 = Unable, Total/Dependent Assistance  2 = A lot, Maximum/Moderate Assistance  3 = A little, Minimum/Contact Guard/Supervision  4 = None, Modified [place]/Independent         AM-PAC Raw Score CMS G-Code Modifier Level of Impairment Assistance   6 % Total / Unable   7 - 9 CM 80 - 100% Maximal Assist   10 - 14 CL 60 - 80% Moderate Assist   15 - 19 CK 40 - 60% Moderate Assist   20 - 22 CJ 20 - 40% Minimal Assist   23 CI 1-20% SBA / CGA   24 CH 0% Independent/ Mod I     Patient left HOB elevated with all lines intact and call button in reach.    Assessment:  Stuart Guevara is a 32 y.o. male with a  medical diagnosis of Aortic dissection and presents with increased transfer training and endurance.    Rehab identified problem list/impairments: Rehab identified problem list/impairments: weakness, gait instability, impaired endurance, impaired balance, decreased safety awareness, impaired functional mobility    Rehab potential is good.    Activity tolerance: Good    Discharge recommendations: Discharge Facility/Level of Care Needs: nursing facility, skilled     Barriers to discharge:      Equipment recommendations: Equipment Needed After Discharge: walker, rolling     GOALS:   Multidisciplinary Problems       Physical Therapy Goals          Problem: Physical Therapy    Goal Priority Disciplines Outcome Goal Variances Interventions   Physical Therapy Goal     PT, PT/OT Ongoing, Progressing     Description: Goals to be met by: 22     Patient will increase functional independence with mobility by performin. Supine to sit with brandon  2. Sit to supine with brandon  3. Sit <> stand with CGA using RW/LRAD  4. Bed to chair with CGA via squat pivot/stand pivot  5.Ambulate 200 ft with rolling walker and contact guard assistance                           PLAN:    Patient to be seen 3 x/week  to address the above listed problems via therapeutic activities  Plan of Care expires: 22  Plan of Care reviewed with: patient         2022

## 2022-11-01 NOTE — PROGRESS NOTES
"   Acute Care Surgery   Progress Note  Admit Date: 9/23/2022  HD#39  POD#2 Days Post-Op    Subjective:   Interval history:  KIM  AF, VSS  Would like to eat     Scheduled Meds:   acetaminophen  650 mg Oral Q6H    aspirin  81 mg Oral Daily    carvediloL  25 mg Oral BID    cloNIDine  0.1 mg Oral BID    fat emulsion 20%  250 mL Intravenous Every Tues, Thurs, Sat    fluconazole (DIFLUCAN) IV (PEDS and ADULTS)  400 mg Intravenous Q24H    gabapentin  300 mg Oral TID    hydrALAZINE  100 mg Oral Q8H    insulin detemir U-100  10 Units Subcutaneous BID    lactulose 10 gram/15 ml  10 g Oral BID    meropenem (MERREM) IVPB  1 g Intravenous Q12H    methocarbamoL  500 mg Oral QID    methylPREDNISolone sodium succinate injection  40 mg Intravenous Q12H    morphine  30 mg Oral Q12H    NIFEdipine  90 mg Oral Daily    pantoprazole  40 mg Intravenous Daily    sodium bicarbonate  1,300 mg Oral Daily    sodium chloride 0.9%  10 mL Intravenous Q6H     Continuous Infusions:   amino acid 5 % in 15% dextrose 60 mL/hr at 10/31/22 1404    TPN ADULT CENTRAL LINE CUSTOM       PRN Meds:sodium chloride, acetaminophen, albuterol, bisacodyL, dextrose 10 % in water (D10W), dextrose 10 % in water (D10W), dextrose 10%, glucagon (human recombinant), glucose, glucose, hydrALAZINE, HYDROcodone-acetaminophen, HYDROmorphone, insulin aspart U-100, labetaloL, lorazepam, melatonin, morphine, nitroGLYCERIN, ondansetron, polyethylene glycol, sodium chloride 0.9%, sodium chloride 0.9%, Flushing PICC Protocol **AND** sodium chloride 0.9% **AND** sodium chloride 0.9%, sodium chloride 0.9%     Objective:     VITAL SIGNS: 24 HR MIN & MAX LAST   Temp  Min: 98.4 °F (36.9 °C)  Max: 98.9 °F (37.2 °C)  98.9 °F (37.2 °C)   BP  Min: 133/69  Max: 198/99  (!) 198/99    Pulse  Min: 87  Max: 96  96    Resp  Min: 16  Max: 19  18    SpO2  Min: 92 %  Max: 95 %  (!) 92 %      HT: 5' 5" (165.1 cm)  WT: 56.6 kg (124 lb 12.5 oz)  BMI: 20.8     Intake/output:  I/O last 3 completed " shifts:  In: 740 [P.O.:240; I.V.:200; IV Piggyback:300]  Out: 600 [Urine:600]  I/O this shift:  In: 656 [P.O.:120]  Out: -     Intake/Output Summary (Last 24 hours) at 11/1/2022 0604  Last data filed at 10/31/2022 2300  Gross per 24 hour   Intake 656 ml   Output --   Net 656 ml      Lines/drains/airway:  PICC Double Lumen 10/25/22 1520 right brachial (Active)   Line Necessity Review Longterm central access required 10/30/22 0800   Site Assessment No drainage;No redness;No swelling;No warmth 10/31/22 0800   Extremity Assessment Distal to IV No abnormal discoloration 10/31/22 2145   Line Securement Device Secured with sutureless device 10/31/22 0800   Dressing Type Biopatch in place 10/31/22 2145   Dressing Status Clean;Dry;Intact 10/31/22 2145   Dressing Intervention Integrity maintained 10/31/22 2145   Left Lumen Patency/Care Blood return present;Infusing 10/30/22 0800   Right Lumen Patency/Care Blood return present;Infusing 10/30/22 0800   Number of days: 6       Trialysis (Dialysis) Catheter 10/04/22 1300 right internal jugular (Active)   Line Necessity Review CRRT/HD 10/26/22 2000   Verification by X-ray Yes 10/30/22 0800   Site Assessment No drainage;No redness;No swelling;No warmth 10/31/22 0800   Line Securement Device Secured with sutures 10/31/22 0800   Dressing Type Biopatch in place;Central line dressing 10/31/22 0800   Dressing Status Clean;Dry;Intact 10/31/22 0800   Dressing Intervention Integrity maintained 10/31/22 0800   Date on Dressing 10/20/22 10/21/22 1623   Dressing Due to be Changed 10/27/22 10/24/22 2004   Venous Patency/Care normal saline locked 10/30/22 0800   Arterial Patency/Care normal saline locked 10/30/22 0800   Distal Patency/Care normal saline locked 10/30/22 0800   Number of days: 27       Arterial Line 10/30/22 1533 Left Radial (Active)   Number of days: 1     Physical examination:  Gen: NAD, AAOx3, answering questions appropriately  CV: RR  Resp: NWOB  Abd: S/distended, midline  staples in place c/d/I, LLQ suture in place  Ext: moving all extremities spontaneously and purposefully  Neuro: CN II-XII grossly intact    Labs:  Renal:  Recent Labs     10/30/22  0611 10/31/22  0424   BUN 93.1* 73.5*   CREATININE 1.92* 1.67*     FENGI:  Recent Labs     10/30/22  0611 10/31/22  0424   * 129*   K 5.8* 6.1*   CO2 22 20*   CALCIUM 9.0 8.5   MG 2.00 2.00   PHOS 4.4 5.5*   ALBUMIN 1.8* 2.4*   BILITOT 0.7 0.8   AST 20 21   ALKPHOS 120 82   ALT 7 8     Heme:  Recent Labs     10/29/22  0703 10/29/22  1351 10/29/22  2158 10/29/22  2316 10/30/22  0611 10/30/22  1957 10/31/22  0424   HGB 7.6* 6.4*   < > 9.6* 11.6* 8.6* 9.4*   HCT 23.7* 19.8*   < > 29.1* 34.2* 25.4* 28.3*    127*  --   --  152  --  153   INR  --   --   --   --  2.14*  --  1.47*    < > = values in this interval not displayed.     ID:  Recent Labs     10/29/22  0703 10/29/22  1351 10/30/22  0611 10/31/22  0424   WBC 29.7* 26.2* 25.0* 22.9*       Cardiovascular:  Recent Labs   Lab 10/30/22  0707   TROPONINI <0.010     I have reviewed all pertinent lab results within the past 24 hours.    Imaging:  None new     Micro/Path/Other:  Microbiology Results (last 7 days)       Procedure Component Value Units Date/Time    Body Fluid Culture [[id number]] Collected: 10/28/22 0940    Order Status: Completed Specimen: Peritoneal Fluid from Abdomen Updated: 10/31/22 1036     Body Fluid Culture No Growth At 72 Hours    Anaerobic Culture [[id number]] Collected: 10/28/22 0940    Order Status: Completed Specimen: Body Fluid from Abdomen Updated: 10/31/22 0843     Anaerobe Culture No Anaerobes Isolated    Anaerobic Culture [[id number]] Collected: 10/28/22 0940    Order Status: Completed Specimen: Body Fluid from Peritoneal Updated: 10/31/22 0842     Anaerobe Culture No Anaerobes Isolated    Body Fluid Culture [[id number]]  (Abnormal)  (Susceptibility) Collected: 10/28/22 0940    Order Status: Completed Specimen: Peritoneal Fluid from Abdomen Updated:  10/30/22 1351     Body Fluid Culture Many Klebsiella pneumoniae    Gram Stain [[id number]] Collected: 10/28/22 0940    Order Status: Completed Specimen: Peritoneal Fluid from Abdomen Updated: 10/29/22 1130     GRAM STAIN No WBCs, No bacteria seen    Gram Stain [[id number]] Collected: 10/28/22 0940    Order Status: Completed Specimen: Peritoneal Fluid from Abdomen Updated: 10/29/22 1122     GRAM STAIN Rare WBC observed      Few Gram Negative Rods    Fungal Culture [[id number]] Collected: 10/28/22 0940    Order Status: Sent Specimen: Body Fluid from Peritoneal Updated: 10/28/22 1326    Fungal Culture [[id number]] Collected: 10/28/22 0940    Order Status: Sent Specimen: Body Fluid from Abdomen Updated: 10/28/22 1326    Anaerobic Culture [[id number]]     Order Status: Canceled Specimen: Body Fluid from Abdomen     Body Fluid Culture [[id number]]     Order Status: Canceled Specimen: Body Fluid from Abdomen     Fungal Culture [[id number]]     Order Status: Canceled Specimen: Body Fluid from Abdomen     Gram Stain [[id number]]     Order Status: Canceled Specimen: Body Fluid from Abdomen     Gram Stain [[id number]]     Order Status: Canceled Specimen: Peritoneal Fluid     Body Fluid Culture [[id number]]     Order Status: Canceled Specimen: Peritoneal Fluid     Fungal Culture [[id number]]     Order Status: Canceled Specimen: Body Fluid     Anaerobic Culture [[id number]]     Order Status: Canceled Specimen: Body Fluid     Fungal Culture [[id number]]  (Normal) Collected: 09/25/22 1200    Order Status: Completed Specimen: Body Fluid from Pleural Fluid, Right Updated: 10/27/22 1254     Fungal Culture No Fungus Isolated at 4 Weeks          Assessment & Plan:   Stuart Guevara is a 32M with HTN, SLE, HBV, aortic dissection s/p repair 2/2021, 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 He developed abdominal compartment syndrome s/p multiple exlaps (9/25, 9/27, 9/29) now on HD for renal failure with  re-accumulation of ascites despite IR paracentesis. CT has shown concern for necrotizing fascitis. Cultures from paracentesis resulted in Klebsiella growth. S/p washout 10/30. Poor prognosis.      - Discontinue NG tube  - Resume previous diet   - Continue antibiotics  - LLQ suture removed yesterday  - Midline staples out 11/13  - Nutrition optimization  - Rest of care per primary        11/1/2022 6:04 AM    The above findings, diagnostics, and treatment plan were discussed with the physician who will follow with further assessments and recommendations.

## 2022-11-01 NOTE — PROGRESS NOTES
Progress Note         Hospital follow up    Subjective:     Reports continued improvement. WBC trending down. Remains on Abx. Sodium 130. K wnl today. Cr improving. Bun Improving. Phosphorus 8.2. Calcium 8.2. LFTs wnl. Renal reporting that Given the current peritonitis with Klebsiella would avoid placing a tunneled catheter now.  His right IJ catheter although it has been in since September 25th and although it is functioning well he still has significant leukocytosis. Would favor exchanging this catheter for a new 1 over a guidewire.  Renal to contact Dr. Melton regarding coordinating this procedure.      11/1/22: Patient tolerated dialysis yesterday with 3L fluid removed.   On clear liquids. Tolerating.     Still no bm but is passing gas.    11/1/22: Patient tolerated dialysis yesterday with 3L fluid removed. He does have persistent edema to abdominal wall, hips and BLE. Respiratory status stable.      I still dont see anything about the results of the Pending amylase body fluid.         Review of Systems:     Review of Systems    Objective:     Scheduled Meds:   acetaminophen  650 mg Oral Q6H    aspirin  81 mg Oral Daily    carvediloL  25 mg Oral BID    cloNIDine  0.1 mg Oral BID    enoxaparin  60 mg Subcutaneous Once    fat emulsion 20%  250 mL Intravenous Every Tues, Thurs, Sat    fluconazole (DIFLUCAN) IV (PEDS and ADULTS)  400 mg Intravenous Q24H    gabapentin  100 mg Oral TID    hydrALAZINE  100 mg Oral Q8H    insulin detemir U-100  10 Units Subcutaneous BID    lactulose 10 gram/15 ml  10 g Oral BID    meropenem (MERREM) IVPB  1 g Intravenous Q12H    methocarbamoL  500 mg Oral QID    methylPREDNISolone sodium succinate injection  40 mg Intravenous Q12H    morphine  30 mg Oral Q12H    NIFEdipine  90 mg Oral Daily    pantoprazole  40 mg Intravenous Daily    sodium bicarbonate  1,300 mg Oral Daily    sodium chloride 0.9%  10 mL Intravenous Q6H     Continuous Infusions:   TPN ADULT CENTRAL LINE CUSTOM      TPN  ADULT CENTRAL LINE CUSTOM       PRN Meds:  sodium chloride, acetaminophen, albuterol, bisacodyL, dextrose 10 % in water (D10W), dextrose 10 % in water (D10W), dextrose 10%, glucagon (human recombinant), glucose, glucose, hydrALAZINE, HYDROcodone-acetaminophen, HYDROmorphone, insulin aspart U-100, labetaloL, lorazepam, melatonin, morphine, nitroGLYCERIN, ondansetron, polyethylene glycol, sodium chloride 0.9%, sodium chloride 0.9%, Flushing PICC Protocol **AND** sodium chloride 0.9% **AND** sodium chloride 0.9%, sodium chloride 0.9%      VITAL SIGNS: 24 HR MIN & MAX LAST    Temp  Min: 98 °F (36.7 °C)  Max: 99 °F (37.2 °C)  98 °F (36.7 °C)        BP  Min: 155/77  Max: 198/99  (!) 158/93     Pulse  Min: 87  Max: 101  88     Resp  Min: 18  Max: 18  18    SpO2  Min: 92 %  Max: 94 %  (!) 92 %        Intake/Output Summary (Last 24 hours) at 11/1/2022 1239  Last data filed at 11/1/2022 0600  Gross per 24 hour   Intake 1236 ml   Output 450 ml   Net 786 ml       Physical Exam  Constitutional:       Appearance: He is not ill-appearing.   Cardiovascular:      Rate and Rhythm: Normal rate and regular rhythm.      Comments: BLE noted  Pulmonary:      Effort: No respiratory distress.   Abdominal:      General: There is no distension.      Tenderness: There is no guarding or rebound.      Comments: Tenderness noted,mostly in lower abdomen   Neurological:      General: No focal deficit present.   Psychiatric:         Mood and Affect: Mood normal.         Recent Results (from the past 24 hour(s))   POCT glucose    Collection Time: 10/31/22 11:38 PM   Result Value Ref Range    POCT Glucose 161 (H) 70 - 110 mg/dL   POCT glucose    Collection Time: 11/01/22  6:34 AM   Result Value Ref Range    POCT Glucose 352 (H) 70 - 110 mg/dL   CBC with Differential    Collection Time: 11/01/22  8:22 AM   Result Value Ref Range    WBC 21.2 (H) 4.5 - 11.5 x10(3)/mcL    RBC 3.38 (L) 4.70 - 6.10 x10(6)/mcL    Hgb 9.5 (L) 14.0 - 18.0 gm/dL    Hct 28.9 (L)  42.0 - 52.0 %    MCV 85.5 80.0 - 94.0 fL    MCH 28.1 27.0 - 31.0 pg    MCHC 32.9 (L) 33.0 - 36.0 mg/dL    RDW 15.6 11.5 - 17.0 %    Platelet 190 130 - 400 x10(3)/mcL    MPV 11.4 (H) 7.4 - 10.4 fL    Neut % 94.3 %    Lymph % 1.9 %    Mono % 2.9 %    Eos % 0.0 %    Basophil % 0.3 %    Lymph # 0.41 (L) 0.6 - 4.6 x10(3)/mcL    Neut # 19.9 (H) 2.1 - 9.2 x10(3)/mcL    Mono # 0.61 0.1 - 1.3 x10(3)/mcL    Eos # 0.00 0 - 0.9 x10(3)/mcL    Baso # 0.06 0 - 0.2 x10(3)/mcL    IG# 0.13 (H) 0 - 0.04 x10(3)/mcL    IG% 0.6 %    NRBC% 0.0 %   Comprehensive Metabolic Panel    Collection Time: 11/01/22  8:23 AM   Result Value Ref Range    Sodium Level 130 (L) 136 - 145 mmol/L    Potassium Level 5.1 3.5 - 5.1 mmol/L    Chloride 100 98 - 107 mmol/L    Carbon Dioxide 22 22 - 29 mmol/L    Glucose Level 262 (H) 74 - 100 mg/dL    Blood Urea Nitrogen 67.9 (H) 8.9 - 20.6 mg/dL    Creatinine 1.54 (H) 0.73 - 1.18 mg/dL    Calcium Level Total 8.2 (L) 8.4 - 10.2 mg/dL    Protein Total 4.4 (L) 6.4 - 8.3 gm/dL    Albumin Level 2.0 (L) 3.5 - 5.0 gm/dL    Globulin 2.4 2.4 - 3.5 gm/dL    Albumin/Globulin Ratio 0.8 (L) 1.1 - 2.0 ratio    Bilirubin Total 0.7 <=1.5 mg/dL    Alkaline Phosphatase 106 40 - 150 unit/L    Alanine Aminotransferase 8 0 - 55 unit/L    Aspartate Aminotransferase 20 5 - 34 unit/L    eGFR >60 mls/min/1.73/m2   Magnesium    Collection Time: 11/01/22  8:23 AM   Result Value Ref Range    Magnesium Level 2.10 1.60 - 2.60 mg/dL   Phosphorus    Collection Time: 11/01/22  8:23 AM   Result Value Ref Range    Phosphorus Level 5.8 (H) 2.3 - 4.7 mg/dL   Protime-INR    Collection Time: 11/01/22  8:23 AM   Result Value Ref Range    PT 16.0 (H) 12.5 - 14.5 seconds    INR 1.30 0.00 - 1.30   POCT glucose    Collection Time: 11/01/22  8:51 AM   Result Value Ref Range    POCT Glucose 261 (H) 70 - 110 mg/dL   POCT glucose    Collection Time: 11/01/22 11:38 AM   Result Value Ref Range    POCT Glucose 236 (H) 70 - 110 mg/dL       X-Ray Chest 1  View    Result Date: 10/22/2022  EXAMINATION: XR CHEST 1 VIEW CLINICAL HISTORY: leftposterior rib pain; COMPARISON: 12 October 2022 FINDINGS: Portable frontal view of the chest was obtained. Stable right-sided central line.  Heart is not significantly enlarged.  Aortic stent graft is again noted.  Similar hazy bibasilar opacities with layering effusions suspected.  No pneumothorax seen     No significant interval change. Electronically signed by: Kapil Ayers Date:    10/22/2022 Time:    15:13    X-Ray Chest 1 View    Result Date: 10/12/2022  EXAMINATION: XR CHEST 1 VIEW CPT 73381 CLINICAL HISTORY: TAY, SOB; COMPARISON: October 5, 2022 FINDINGS: Examination reveals cardiomediastinal silhouette to be unchanged as compared with the previous exam. Endo stent is identified in the descending aorta central line is seen with the tip at the junction of the superior vena cava and right atrium. There is persistent increased left retrocardiac density and silhouetting of the left hemidiaphragm which might be related to an infiltrate/atelectasis No focal consolidative changes otherwise identified no other significant change     Persistent increased left retrocardiac density and silhouetting of the left hemidiaphragm. No other focal consolidative changes. No other change Electronically signed by: Ramón Ying Date:    10/12/2022 Time:    08:15    X-Ray Chest 1 View    Result Date: 10/5/2022  EXAMINATION: XR CHEST 1 VIEW CLINICAL HISTORY: evaluate CT placement; TECHNIQUE: Single frontal view of the chest was performed. COMPARISON: 10/04/2022 FINDINGS: LINES AND TUBES: Right IJ CVC tip projects over the superior cavoatrial junction. EKG/telemetry leads overlie the chest. MEDIASTINUM AND RELL: Cardiac silhouette is enlarged. There is an aortic stent graft and aortic valve prosthesis. LUNGS: Unchanged retrocardiac opacity with silhouetting of the diaphragm. PLEURA:Cannot exclude small left pleural effusion.No pneumothorax.  BONES: No acute osseous abnormality.     Unchanged retrocardiac opacity suggesting atelectasis Electronically signed by: Rocio White Date:    10/05/2022 Time:    06:11    X-Ray Chest 1 View    Result Date: 10/4/2022  EXAMINATION: XR CHEST 1 VIEW CLINICAL HISTORY: confirm HD line placement (RIJ) and no PTX after post CT pull; TECHNIQUE: One COMPARISON: October 4, 2022. FINDINGS: Right transjugular approach central venous catheter terminates within the proximal right atrium.  Interval removal of the right chest tube.  There is an aortic stent graft.   Cardiopericardial silhouette is similar.   Mild lungs congestive changes are about similar.  There appears left-sided pleural effusion.  No pneumothorax.     New central venous catheter terminates within the proximal right atrium. Electronically signed by: Jameson Brumfield Date:    10/04/2022 Time:    14:16    X-Ray Chest 1 View    Result Date: 10/4/2022  EXAMINATION: XR CHEST 1 VIEW CLINICAL HISTORY: evaluate CT placement; COMPARISON: Yesterday FINDINGS: Portable frontal view of the chest was obtained. Stable right chest tube.  Heart and mediastinum are unchanged.  Similar left basilar opacities with small pleural effusion.  No significant pneumothorax is seen.     No significant interval change. Electronically signed by: Kapil Ayers Date:    10/04/2022 Time:    06:54    X-Ray Chest 1 View    Result Date: 10/3/2022  EXAMINATION: XR CHEST 1 VIEW CLINICAL HISTORY: CT in place; TECHNIQUE: One COMPARISON: October 2, 2020. FINDINGS: Cardiopericardial silhouette enlarged appearance similar.  Sternotomy changes.  Aortic stent graft.  Right chest tube is in similar location.  There no appearant residual right pneumothorax.  No pulmonary edema or consolidation.  No significant fluid within the pleural spaces     As above. Electronically signed by: Jameson Brumfield Date:    10/03/2022 Time:    07:09    X-Ray Chest 1 View    Result Date: 10/2/2022  EXAMINATION: XR CHEST 1 VIEW  CLINICAL HISTORY: chest tube; TECHNIQUE: Single view of the chest COMPARISON: 10/01/2022 FINDINGS: Interval extubation.  Right-sided thoracostomy tube remains in place with trace lateral pneumothorax.  Possibly related to mass effect from the tube.     As above. Electronically signed by: Jakob Boland Date:    10/02/2022 Time:    14:50    X-Ray Abdomen AP 1 View    Result Date: 10/31/2022  EXAMINATION: XR ABDOMEN AP 1 VIEW CLINICAL HISTORY: NG TUBE ;NG tube placement check; TECHNIQUE: Single-view of the abdomen COMPARISON: 10/24/2022 FINDINGS: Gas-filled loops of bowel noted.  NG tube projects over the position of the stomach.  Patchy opacities of the lungs are noted.     As above. Electronically signed by: Jakob Boland Date:    10/31/2022 Time:    07:36    X-Ray Abdomen AP 1 View    Result Date: 10/24/2022  EXAMINATION: XR ABDOMEN AP 1 VIEW CLINICAL HISTORY: Abdominal pain; TECHNIQUE: AP X-RAY OF THE ABDOMEN: CPT 74876 COMPARISON: October 1, 2022 FINDINGS: Examination reveals some residual feces throughout the colon gas pattern is nonspecific with no clear evidence of ileus or obstruction no abnormal masses or calcifications identified     Residual feces Electronically signed by: Ramón Ying Date:    10/24/2022 Time:    09:46    US Scrotum And Testicles    Result Date: 10/26/2022  EXAMINATION US SCROTUM AND TESTICLES CLINICAL HISTORY scrotal swelling and tenderness. Concern for nec fasc vs L incarcerated hernia; TECHNIQUE Grayscale and Doppler interrogation of the scrotum and testes. COMPARISON None available at the time of initial interpretation. FINDINGS Exam quality: adequate for evaluation Right Testicle: Surface contour intact and smooth. No evidence of focal mass or infiltrative parenchymal abnormality.  No significant enlargement or heterogeneity of the epididymis. Left Testicle: Surface contour intact and smooth. No evidence of focal mass or infiltrative parenchymal abnormality.  No significant  enlargement or heterogeneity of the epididymis. Doppler Interrogation:  Bilateral spontaneous arterial flow is demonstrated within each testicle, with symmetric velocity and normal low-resistance spectral waveform.  Bilateral venous outflow is maintained. Other findings: Moderate volume simple appearing left hydrocele is present.  No varicocele appreciated bilaterally. There is extensive bilateral scrotal edema, without evidence of focal lesion or drainable collection. Measurements: *Right testicle: 3 cm x 1.7 cm x 2.1 cm (6 cc) *Left testicle: 3 cm x 1.8 cm x 1.8 cm (5 cc) IMPRESSION 1. No evidence of active testicular torsion or other acute process, no findings to suggest focal testicular lesion. 2. Extensive regional subcutaneous edema without drainable collection. 3. Moderate volume simple appearing left hydrocele. Electronically signed by: Bean Landaverde Date:    10/26/2022 Time:    12:41    US Guided Needle Placement    Result Date: 10/31/2022  EXAMINATION: CT ABSCESS ASPIRATION WITHOUT CATHETER; US GUIDED NEEDLE PLACEMENT CLINICAL HISTORY: necrotizing pancreatitis;; ascites; TECHNIQUE: CT guided drainage of a abdominal collection. DLP: 347 COMPARISON: No relevant prior available for comparison. FINDINGS: The risks, benefits, and potential complications were discussed including bleeding, infection, end organ damage. Informed consent was obtained. The patient was positioned in the supine position. The abdominal collection was localized with CT.  The skin was marked and the area was prepped and draped in a sterile fashion. The site was anesthetized with 1% lidocaine solution.  Under continuous ultrasound guidance a 5 [place] Yueh needle was placed in the left abdominal collection which appeared complex and loculated under ultrasound.  Approximately 200 cc of thick gelatinous material was removed. Attention was directed towards the more free simple fluid identified over the right hemiabdomen.  Under continuous  ultrasound guidance following lidocaine a 5 [place] Yueh needle was placed in the fluid collection approximately 1 L of clear fluid removed. The patient tolerated the procedure without difficulty. Post-procedure imaging demonstrated decreased intra-abdominal fluid.  There were no immediate post-procedure complications. The patient was discharged to his room in stable condition.     Successful aspiration of bilateral fluid with simple free fluid identified on the right and thick gelatinous loculated fluid identified in the left.  All fluid sent for laboratory analysis and clearly labeled. Electronically signed by: Jakob Boland Date:    10/31/2022 Time:    07:39    CT Abdomen Pelvis With Contrast    Result Date: 10/26/2022  EXAMINATION: CT ABDOMEN PELVIS WITH CONTRAST CLINICAL HISTORY: Abdominal pain, post-op; TECHNIQUE: Helically acquired images with axial, sagittal and coronal reformations were obtained from the lung bases to the pubic symphysis after the IV administration of contrast. Automated tube current modulation, weight-based exposure dosing, and/or iterative reconstruction technique utilized to reach lowest reasonably achievable exposure rate. DLP: 635 mGy*cm COMPARISON: CT abdomen pelvis 10/24/2022 FINDINGS: HEART: There is a partially visible aortic valve prosthesis.  Distal aspect of a central venous catheter is seen at the superior cavoatrial junction. LUNG BASES: There are layering pleural effusions.  Dependent atelectasis. LIVER: Normal attenuation. No appreciable focal hepatic lesion. BILIARY: Collapse gallbladder versus small cystic duct remnant. PANCREAS: No normal pancreatic parenchyma seen.  At the retroperitoneum in the expected region of the bed of the pancreas there is loculated fluid and a few foci of gas.  Loculated collections extend along the pericolic gutters bilaterally. SPLEEN: Normal in size ADRENALS: No mass. KIDNEYS/URETERS: The kidneys enhance symmetrically.  No hydronephrosis. GI  TRACT/MESENTERY: Evaluation of the bowel is limited without oral contrast. The stomach is decompressed.  Small bowel loops are not appreciably dilated.  There is a moderate colonic stool burden.     There is some mass effect on bowel loops due to the loculated nature abdominal fluid collections. PERITONEUM: There is a large volume of loculated peritoneal and retroperitoneal fluid and there is peritoneal enhancement.  This extends into a left inguinal hernia.There is free air in the region of the pancreatic bed/lesser sac. LYMPH NODES: Nonspecific small retroperitoneal lymph nodes. VASCULATURE: There is an aortic dissection with stent graft partially imaged in the true lumen of the descending thoracic aorta.  Dissection flap not seen to extend into the abdominal aorta on this non angiographic exam.  There is patent runoff to the groin.  The portal, splenic and superior mesenteric veins are patent. BLADDER: Nondistended bladder limits CT evaluation there are few foci of gas within the bladder lumen compatible with recent instrumentation. REPRODUCTIVE ORGANS: Normal as visualized. SOFT TISSUES: Diffuse, severe anasarca. BONES: Biconvex deformities at the lower lumbar spine.     1. No normal pancreatic parenchyma is seen.  At the level of the pancreas there is a large fluid collection with a few foci of gas.  Loculated, faintly peripherally enhancing fluid collections extend along the pericolic gutters.  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  Foci of gas are nonspecific in the setting of prior intervention. 2. Extensive loculated peritoneal collections with peripheral enhancement compatible with peritonitis.Left inguinal hernia contains ascitic fluid with some peripheral enhancement similar to the peritoneal fluid.  There is no herniated bowel loop 3. There is a moderate colonic stool burden.  There is some mass effect on the bowel loops due to the loculated  nature of the abdominal fluid collections. 4. Anasarca 5. Partially imaged known thoracic aortic dissection status post intervention. Electronically signed by: Rocio White Date:    10/26/2022 Time:    12:37    Fl Modified Barium Swallow Speech    Result Date: 10/5/2022  See procedure notes from Speech Pathologist. This procedure was auto-finalized.    Echo    Result Date: 10/28/2022  · The left ventricle is normal in size with moderate concentric hypertrophy and normal systolic function. · The estimated ejection fraction is 55%. · Indeterminate left ventricular diastolic function. · Normal right ventricular size with normal right ventricular systolic function. · Mild left atrial enlargement.      Cardiac catheterization    Result Date: 10/25/2022  Aborted invasive cardiology procedure- see Procedure Log link for details.    CT Chest Abdomen Pelvis Without Contrast (XPD)    Result Date: 10/3/2022  EXAMINATION: CT CHEST ABDOMEN PELVIS WITHOUT CONTRAST(XPD) CLINICAL HISTORY: Sepsis; TECHNIQUE: Low dose axial images, sagittal and coronal reformations were obtained from the thoracic inlet to the pubic symphysis Automatic exposure control (AEC) was utilized for dose reduction. Dose: 573 mGycm COMPARISON: None FINDINGS: Right thoracotomy tubes in place.  There is a small left-sided pleural effusion with bibasilar atelectasis.  There is an endograft in place.  Liver grossly appears normal.  Spleen is not well visualized.  Kidneys appear normal.  There is a surgical drain in the abdomen.  There is a small amount of free fluid.  There are changes consistent with anasarca.     Very limited study due the lack of IV contrast. Anasarca. Small amount of ascites. Small left-sided pleural effusion with bibasilar atelectasis versus infiltrate. Electronically signed by: Joey Vidal MD Date:    10/03/2022 Time:    21:30    CT Abscess Aspiration without Catheter    Result Date: 10/31/2022  EXAMINATION: CT ABSCESS ASPIRATION  WITHOUT CATHETER; US GUIDED NEEDLE PLACEMENT CLINICAL HISTORY: necrotizing pancreatitis;; ascites; TECHNIQUE: CT guided drainage of a abdominal collection. DLP: 347 COMPARISON: No relevant prior available for comparison. FINDINGS: The risks, benefits, and potential complications were discussed including bleeding, infection, end organ damage. Informed consent was obtained. The patient was positioned in the supine position. The abdominal collection was localized with CT.  The skin was marked and the area was prepped and draped in a sterile fashion. The site was anesthetized with 1% lidocaine solution.  Under continuous ultrasound guidance a 5 [place] Yueh needle was placed in the left abdominal collection which appeared complex and loculated under ultrasound.  Approximately 200 cc of thick gelatinous material was removed. Attention was directed towards the more free simple fluid identified over the right hemiabdomen.  Under continuous ultrasound guidance following lidocaine a 5 [place] Yueh needle was placed in the fluid collection approximately 1 L of clear fluid removed. The patient tolerated the procedure without difficulty. Post-procedure imaging demonstrated decreased intra-abdominal fluid.  There were no immediate post-procedure complications. The patient was discharged to his room in stable condition.     Successful aspiration of bilateral fluid with simple free fluid identified on the right and thick gelatinous loculated fluid identified in the left.  All fluid sent for laboratory analysis and clearly labeled. Electronically signed by: Jakob Boland Date:    10/31/2022 Time:    07:39    CT Abdomen Pelvis  Without Contrast    Result Date: 10/24/2022  EXAMINATION: CT ABDOMEN PELVIS WITHOUT CONTRAST CLINICAL HISTORY: LLQ abdominal pain;Peritonitis or perforation suspected;RLQ abdominal pain (Age >= 14y);lower abd pain, getting worse, abd distenstion, erythema, tenderness; TECHNIQUE: Axial images of the abdomen and  pelvis were obtained without IV contrast administration.  Coronal and sagittal reconstructions were provided.  Three dimensional and MIP images were obtained and evaluated.  Total DLP was 677 mGy-cm. Dose lowering technique and automated exposure control were utilized for this exam. COMPARISON: CT of the chest abdomen and pelvis 10/03/2022. FINDINGS: There are bilateral pleural effusions with lower lobe atelectasis.  The heart is enlarged. There is a moderate volume of intra-abdominal ascites.  The ascites appears loculated particularly within the epigastric region along the posterior stomach and extending inferiorly along the left paracolic gutter.  The liver is homogeneous.  The gallbladder, spleen, pancreas, and adrenal glands are normal.  The bilateral kidneys are normal.  There is no hydronephrosis or nephrolithiasis. The stomach and small bowel are decompressed.  The appendix is normal.  There is a large amount of stool throughout the colon.  The urinary bladder is normal.  There is no pelvic or retroperitoneal adenopathy.  There is anasarca.  There is no lytic or blastic osseous lesion.     Bilateral pleural effusions with moderate ascites and diffuse anasarca.  The epigastric and left mid abdominal ascites appears loculated and peritonitis cannot be excluded. Electronically signed by: Grabiel Singleton MD Date:    10/24/2022 Time:    23:30    CTA Chest Abdomen Pelvis    Result Date: 10/30/2022  Technique:CT Scan of the chest abdomen and pelvis was performed with intravenous contrast with axial as well as sagittal and, coronal images. Dosage Information:Automated Exposure Control was utilized 564.00 mGy.cm. Comparison:Comparison is with studies dated October 26, 2022 Clinical History:Big hemoglobin drop, eval for bleeding. recent TEVAR 1month ago, yesterday had IR fluid drainage from abd. Findings: Neck:The thyroid gland appear unremarkable. Heart:Mild stable cardiomegaly is seen. The patient is status post median  sternotomy. An aortic stent is noted. Caudal extension of the [place] type B aortic dissection 37.1 mm from the inferior margin of the graft is noted (Series 18, Image 55). Dilatation of the descending thoracic aorta is again noted, measuring 41.3 mm at its widest. The distal descending segment measures 23.4 mm at the diaphragmatic hiatus. The origin of the left subclavian, left common carotid and right brachiocephalic arteries are unremarkable. Pulmonary Arteries:No filling defects are seen in the pulmonary arteries to suggest pulmonary embolus. Trauma:Mild diffuse interlobular interstitial septal thickening in both lungs may be present. There is dense opacity at the dependent lung bases consistent with compressive atelectasis with the possibility of an infectious component not entirely excluded. Pleura:No pneumothorax is seen. There is persistence  bilateral pleural effusions which are now moderate-sized. Bony Structures: Spine:The visualized dorsal spine appears unremarkable. Abdomen: Abdominal Wall:There is extensive stranding of the subcutaneous fat suggesting an element of anasarca edema. Correlate with clinical and laboratory findings. Liver:The liver appears unremarkable. Biliary System:No intrahepatic or extrahepatic biliary duct dilatation is seen. Gallbladder:The gallbladder appears unremarkable. Pancreas:Pancreatic atrophy with similar appearance. Spleen:The spleen appears unremarkable. Kidneys:Parenchymal scarring of both kidneys is again noted. Aorta:There is mild calcification of the abdominal aorta and its branches. No aortic aneurysm or dissection is seen. IVC:The IVC is nonenhanced and appears narrowed. Bowel: Esophagus:The visualized esophagus appears unremarkable. Stomach:The stomach appears unremarkable. Duodenum:Unremarkable appearing duodenum. Small Bowel:The small bowel appears unremarkable. Peritoneum:There is a pronounced ascites which is new compared to the prior study. Mild layering  hyperdense fluid pelvic fluid is seen, likely reflecting hemoperitoneum. There is a large heterogeneous, predominantly hyperdense mass/collection along the left paracolic gutter and pelvic sidewall (Series 14 Image 90 and Series 22, Image 46), measuring 97 x 62 x 170 mm (AP, transverse, craniocaudal dimensions). This may reflect hematoma formation. No focal contrast collection or pooling is identified to suggest active hemorrhage at the time of imaging. There is a possible left rectus sheath hematoma seen on image 116 of series 14. Pelvis: Bladder:Contrast is seen within the distended bladder. No contrast extravasation is noted. Male: Prostate gland:The prostate gland appears unremarkable. Bony structures: Dorsal Spine:The visualized dorsal spine appears unremarkable. Notifications:The results were discussed prior to dictation with the patient nurse (Kaelyn) at 2022-10-29 20:47:17 CDT.     Impression: 1.  An aortic stent is again noted.  Caudal extension of the [place] type B aortic dissection 37.1 mm from the inferior margin of the graft is noted (Series 18, Image 55). Dilatation of the descending thoracic aorta is again noted, measuring 41.3 mm at its widest. The distal descending segment measures 23.4 mm at the diaphragmatic hiatus. The origin of the left subclavian, left common carotid and right brachiocephalic arteries are unremarkable. 2. Mild diffuse interlobular interstitial septal thickening in both lungs may be present. An element of congestive failure is not excluded. There is dense opacity at the dependent lung bases consistent with compressive atelectasis with the possibility of an infectious component not entirely excluded. Correlate clinically. 3. The IVC is nonenhanced and appears narrowed. 4. There is a pronounced ascites which is new compared to the prior study. Mild layering hyperdense fluid pelvic fluid is seen, likely reflecting hemoperitoneum. There is a large heterogeneous, predominantly  hyperdense mass/collection along the left paracolic gutter and pelvic sidewall (Series 14 Image 90 and Series 22, Image 46), measuring 97 x 62 x 170 mm (AP, transverse, craniocaudal dimensions). This may reflect hematoma formation. No focal contrast collection or pooling is identified to suggest active hemorrhage at the time of imaging. There is a possible left rectus sheath hematoma seen on image 116 of series 14. Correlate with clinical and laboratory findings as regards additional evaluation and follow-up. 5.  Persistence of bilateral pleural effusions which are moderate-sized. Follow-up as clinically indicated. 6. Details and other findings as discussed above. No significant discrepancy with overnight report. Electronically signed by: Jameson Brumfield Date:    10/30/2022 Time:    09:57    X-Ray Chest 1 View for Line/Tube Placement    Result Date: 10/25/2022  EXAMINATION: XR CHEST 1 VIEW FOR LINE/TUBE PLACEMENT CLINICAL HISTORY: picc placement; COMPARISON: 22 October 2022 FINDINGS: Portable frontal view of the chest was obtained. Right-sided PICC tip overlies the mid SVC.  Stable right-sided central line.  Cardiac silhouette unchanged.  Continued hazy bibasilar opacities.  No pneumothorax.     Right PICC tip overlies the mid SVC. Electronically signed by: Kapil Ayers Date:    10/25/2022 Time:    16:56      Assessment / Plan:     Assessment & Plan:   31 y/o male unknown to our group who has a past medical history of HTN, SLE,  hepatitis B, aortic dissection with repair 2/2021, RUE DVT on Xarelto. Admitted with aortic dissection requiring sx. He has had a very complex case since. GI consulted of possible necrotizing pancreatitis seen on CT.     Peritoneal fluid collection +/- peritonitis  Possible necrotizing pancreatitis  Abdominal distention  Constipation  -CT guided drainage of peritoneal fluid 10/28- Pending amylase body fluid to assess for pancreatic ductal leak as the cause of the fluid collection. Body fluid  culture grew out Klebsiella.   -s/p Exploratory lap x3 with most recent done on 10/30  with findings of 3 L venita ascites, no signs of infection, necrotic tissue or bowel issues   - Fluid with no growth in four days   - Serum lipase normal (<4)  - Serum Amylase 14  - On lactulose  - Sx, Renal, Rheum all on case        Continue IV nutrition  Continue abx  Supportive care  Consider adding on Relistor with renal dosing if no bms soon, as pt is taking narcotic pain meds         @[facility]@

## 2022-11-02 NOTE — NURSING
11/01/22 1910   Post-Hemodialysis Assessment   Rinseback Volume (mL) 250 mL   Blood Volume Processed (Liters) 43.7 L   Dialyzer Clearance Clotted   Duration of Treatment 150 minutes   Total UF (mL) 2265 mL   Patient Response to Treatment pt tolerated tx well   Post-Hemodialysis Comments dializer clotted off, NAD noted, VSS, Net UF 2265 mL

## 2022-11-02 NOTE — PROGRESS NOTES
INTERVENTIONAL NEPHROLOGY PROGRESS NOTE  [facility]     Patient Seen Date: 11/02/2022  Patient Seen Time: 12:20 PM     Reason for consult: Exchange of temporary HD catheter       HPI: 32 year-old male who developed TAY after TEVAR, had also underwent several surgeries during this admission, who  has required dialysis during this hospitalization. The patient is currently being maintained on dialysis, with some expectation of returning kidney function. Interventional nephrology service was previously requested to convert the patient's non-tunneled RIJ HD catheter to a tunneled version, however, because of improving kidney function the plan to do so was aborted. Unfortunately, the patient has required intermittent HD treatments without having the original catheter replaced. Hence, interventional nephrology service was re-consulted for exchange of catheter of guidewire.    Interval History: Pt seen and examined at bedside. Has some abdominal distention and discomfort, but otherwise feels well. Risks and benefits of catheter exchange was reviewed with the patient. The patient agrees to proceed with the intended procedure.        Review of Systems:  General:  + fatigue  Skin: No rashes  HEENT: No vision changes  CVS: No CP  RS: No SOB  GIT: + abdominal pain  Extremities: No swelling  Neurological:  No focal weakness  Psych: No depression        Current Facility-Administered Medications:     acetaminophen tablet 650 mg, 650 mg, Oral, Q6H PRN, Nolberto Calvillo MD, 650 mg at 11/01/22 2310    albuterol inhaler 2 puff, 2 puff, Inhalation, Q4H PRN, Evelio Marshall MD    aspirin EC tablet 81 mg, 81 mg, Oral, Daily, Luis F Hutson MD, 81 mg at 11/02/22 0900    bisacodyL suppository 10 mg, 10 mg, Rectal, Daily PRN, YAS CarusoP, 10 mg at 10/28/22 0400    carvediloL tablet 25 mg, 25 mg, Oral, BID, MARIANNE Castillo, 25 mg at 11/02/22 0900    cloNIDine tablet 0.1 mg, 0.1 mg, Oral, BID, Ronda Hamilton MD,  0.1 mg at 11/02/22 0859    dextrose 10 % infusion, 12.5 g, Intravenous, PRN, Dewayne Hughes PA-C, 12.5 g at 10/25/22 0317    dextrose 10 % infusion, 25 g, Intravenous, PRN, Dewayne Hughes PA-C    dextrose 10% bolus 125 mL, 12.5 g, Intravenous, PRN, Ivan Chopra MD, Stopped at 10/20/22 0953    enoxaparin injection 60 mg, 1 mg/kg, Subcutaneous, Q24H, Luis F Hutson MD    fluconazole (DIFLUCAN) IVPB 400 mg, 400 mg, Intravenous, Q24H, Aleah Calix MD, Stopped at 11/02/22 0512    gabapentin capsule 100 mg, 100 mg, Oral, TID, Shahzad Lundberg MD, 100 mg at 11/02/22 0859    glucagon (human recombinant) injection 1 mg, 1 mg, Intramuscular, PRN, Dewayne Hughes PA-C, 1 mg at 10/20/22 0925    glucose chewable tablet 16 g, 16 g, Oral, PRN, Dewayne Hughes PA-C    glucose chewable tablet 24 g, 24 g, Oral, PRN, Dewayne Hughes PA-C, 24 g at 10/24/22 1709    hydrALAZINE injection 20 mg, 20 mg, Intravenous, Q4H PRN, Bin Bell MD, 20 mg at 10/22/22 0319    hydrALAZINE tablet 100 mg, 100 mg, Oral, Q8H, oRnda Hamilton MD, 100 mg at 11/02/22 0634    HYDROmorphone (PF) injection 1 mg, 1 mg, Intravenous, Q6H PRN, Luis F Hutson MD, 1 mg at 11/02/22 0930    insulin aspart U-100 injection 1-10 Units, 1-10 Units, Subcutaneous, QID (AC + HS) PRN, Ronda Hamilton MD, 8 Units at 11/02/22 0651    insulin detemir U-100 injection 15 Units, 15 Units, Subcutaneous, BID, Luis F Hutson MD, 15 Units at 11/02/22 0900    labetaloL injection 20 mg, 20 mg, Intravenous, Q8H PRN, Bin Bell MD, 20 mg at 10/03/22 0321    lactulose 10 gram/15 ml solution 10 g, 10 g, Oral, BID, Luis F Hutson MD, 10 g at 11/02/22 0930    melatonin tablet 6 mg, 6 mg, Oral, Nightly PRN, Michelle Ferrera, AGACNP-BC, 6 mg at 10/14/22 0000    meropenem (MERREM) 1 g in sodium chloride 0.9 % 100 mL IVPB (MB+), 1 g, Intravenous, Q12H, Luis F Hutson MD, Stopped at 11/02/22 1232    methocarbamoL tablet 500 mg, 500 mg, Oral, QID, Keyonna Boggs MD, 500 mg at 11/02/22 0859     methylPREDNISolone sodium succinate injection 40 mg, 40 mg, Intravenous, Q12H, Saurabh Curry MD, 40 mg at 11/02/22 0901    morphine 12 hr tablet 30 mg, 30 mg, Oral, Q12H, Shamar Stewart MD, 30 mg at 11/02/22 0930    NIFEdipine 24 hr tablet 90 mg, 90 mg, Oral, Daily, Verna Whipple MD, 90 mg at 11/02/22 0930    nitroGLYCERIN SL tablet 0.4 mg, 0.4 mg, Sublingual, Q5 Min PRN, Zach Shafer MD, 0.4 mg at 10/30/22 0713    ondansetron injection 4 mg, 4 mg, Intravenous, Q6H PRN, Evelio Marshall MD, 4 mg at 11/01/22 2022    pantoprazole injection 40 mg, 40 mg, Intravenous, Daily, Saurabh Curry MD, 40 mg at 11/02/22 0901    polyethylene glycol packet 17 g, 17 g, Oral, BID PRN, Thalia Chawlaaver, AGNP    sodium bicarbonate tablet 1,300 mg, 1,300 mg, Oral, Daily, Thalia S Renteria, AGNP, 1,300 mg at 11/02/22 0859    sodium chloride 0.9% bolus 250 mL, 250 mL, Intravenous, PRN, Thalia S Renteria, AGNP    sodium chloride 0.9% bolus 250 mL, 250 mL, Intravenous, PRN, Shahzad Lundberg MD    Flushing PICC Protocol, , , Until Discontinued **AND** sodium chloride 0.9% flush 10 mL, 10 mL, Intravenous, Q6H, 10 mL at 11/02/22 0628 **AND** sodium chloride 0.9% flush 10 mL, 10 mL, Intravenous, PRN, Ronda Hamilton MD    sodium zirconium cyclosilicate packet 10 g, 10 g, Oral, Daily, Luis F Hutson MD, 10 g at 11/02/22 0859    TPN ADULT CENTRAL LINE CUSTOM, , Intravenous, Continuous, Ronda Hamilton MD, Last Rate: 60 mL/hr at 11/02/22 1008, New Bag at 11/02/22 1008    TPN ADULT CENTRAL LINE CUSTOM, , Intravenous, Continuous, Ronda Hamilton MD    warfarin tablet 3 mg, 3 mg, Oral, Daily, Luis F Hutson MD    Vital Signs (24 h):  Temp:  [97.9 °F (36.6 °C)-98.8 °F (37.1 °C)] 98.2 °F (36.8 °C)  Pulse:  [] 91  Resp:  [17-20] 18  SpO2:  [90 %-93 %] 93 %  BP: (118-180)/(69-94) 118/69   I/O last 3 completed shifts:  In: 3081.6 [P.O.:420; IV Piggyback:700]  Out: 2715 [Urine:450; Other:2265]  I/O this shift:  In: -   Out: 250  [Urine:250]        Physical Exam:  General: NAD, thin  HEENT: NC/AT, EOMI  CVS: S1/S2 present and normal. No murmur/rubs/gallop.      RS: Easily breathing  Abdominal: Soft, NT/ND.  Extremities: No edema b/l LE  Skin: No rash, no lesions.  Neurological: No focal deficits.  Psych: Normal affect  Dialysis Access: RIJ non-tunneled HD catheter without signs of infection/bleeding.    Results:    Lab Results   Component Value Date     (L) 11/02/2022    K 5.2 (H) 11/02/2022    CO2 21 (L) 11/02/2022    BUN 63.1 (H) 11/02/2022    CREATININE 1.62 (H) 11/02/2022    CALCIUM 8.1 (L) 11/02/2022    PHOS 5.7 (H) 11/02/2022    WBC 22.3 (H) 11/02/2022    HGB 9.1 (L) 11/02/2022    HCT 27.6 (L) 11/02/2022     11/02/2022       Assessment and Plan:    TAY requiring IHD.  RIJ non-tunneled HD catheter due for exchange.  Pt with RIJ non-tunneled HD catheter in place for >2 weeks. Nephrology team concerned for eventual CRBSI if catheter is not exchanged past it's prime.   - Plan on exchange of catheter over guidewire at bedside.  - Risks and benefits described to patient who agrees to proceed.  - Will need CXR after procedure to confirm tip placement.    Thank you for your consult. Please feel free to reach me with any questions.    Yogesh Melton,   Interventional Nephrology  [facility]  Cell: [phone]  Office: [phone]

## 2022-11-02 NOTE — PT/OT/SLP RE-EVAL
Physical Therapy Re-evaluation    Patient Name:  Stuart Guevara   MRN:  [id number]    Recommendations:     Discharge Recommendations:  nursing facility, skilled   Discharge Equipment Recommendations: walker, rolling   Barriers to discharge: None    Assessment:     Stuart Guevara is a 32 y.o. male admitted with a medical diagnosis of Aortic dissection.  He presents with the following impairments/functional limitations:  weakness, gait instability, impaired endurance, impaired balance, impaired functional mobility. The pt tolerated session well. POC adjusted to 5x/week. Pt would benefit from rehab placement upon discharge. He needs significant assistance for mobility at this time, and participates well with PT intervention.    Rehab Prognosis:  Good; patient would benefit from acute skilled PT services to address these deficits and reach maximum level of function.      Recent Surgery: Procedure(s) (LRB):  LAPAROTOMY, EXPLORATORY (N/A) 3 Days Post-Op    Plan:     During this hospitalization, patient to be seen 6 x/week to address the above listed problems via gait training, therapeutic activities, therapeutic exercises  Plan of Care Expires:  11/03/22  Plan of Care Reviewed with: patient    Subjective     Communicated with NSG prior to session.  Patient found supine with NG tube, peripheral IV, pulse ox (continuous) upon PT entry to room, agreeable to evaluation.      Chief Complaint: pain   Patient comments/goals: to return to PLOF  Pain/Comfort:  Location - Orientation 1: lower  Location 1: abdomen    Patients cultural, spiritual, Nondenominational conflicts given the current situation: no      Objective:     Patient found with: NG tube, peripheral IV, pulse ox (continuous)     General Precautions: Standard, fall, NPO   Orthopedic Precautions:N/A   Braces: N/A  Respiratory Status: Nasal cannula    Exams:  RLE ROM: WFL  RLE Strength: WFL  LLE ROM: WFL  LLE Strength: WFL    Functional Mobility:  Bed Mobility:     Scooting:  minimum assistance  Supine to Sit: maximal assistance  Transfers:     Sit to Stand:  moderate assistance with rolling walker- requires cues for 3 rocks and hand placement.   Bed to Chair: moderate assistance with  rolling walker  using  Step Transfer- cues for technique and walker manipulation  Gait: 5 pivot steps to bedside chair      Patient left supine with all lines intact and call button in reach.    GOALS:   Multidisciplinary Problems       Physical Therapy Goals          Problem: Physical Therapy    Goal Priority Disciplines Outcome Goal Variances Interventions   Physical Therapy Goal     PT, PT/OT Ongoing, Progressing     Description: Goals to be met by: 22     Patient will increase functional independence with mobility by performin. Supine to sit with brandon  2. Sit to supine with brandon  3. Sit <> stand with CGA using RW/LRAD  4. Bed to chair with CGA via squat pivot/stand pivot  5.Ambulate 200 ft with rolling walker and contact guard assistance                           History:     Past Medical History:   Diagnosis Date    Aortic dissection 2022    Hypertension     Systemic lupus erythematosus, organ or system involvement unspecified     Systemic lupus erythematosus, organ or system involvement unspecified        Past Surgical History:   Procedure Laterality Date    APPLICATION OF WOUND VACUUM-ASSISTED CLOSURE DEVICE  2022    Procedure: APPLICATION, WOUND VAC;  Surgeon: Christopher Espinal MD;  Location: [facility];  Service: General;;    CARDIAC SURGERY      CREATION OF BYPASS FROM INTERNAL CAROTID ARTERY TO SUBCLAVIAN ARTERY Left 2022    Procedure: CREATION, BYPASS, ARTERIAL, INTERNAL CAROTID TO SUBCLAVIAN;  Surgeon: Vic Martines MD;  Location: [facility];  Service: Cardiothoracic;  Laterality: Left;    ENDOVASCULAR REPAIR OF THORACIC AORTA N/A 2022    Procedure: REPAIR, AORTA, THORACIC, ENDOVASCULAR;  Surgeon: Vic Martines MD;  Location: [facility];  Service: Cardiology;   Laterality: N/A;    FINGER AMPUTATION      INSERTION OF TUNNELED CENTRAL VENOUS HEMODIALYSIS CATHETER N/A 10/24/2022    Procedure: INSERTION, CATHETER, CENTRAL VENOUS, HEMODIALYSIS, TUNNELED;  Surgeon: Yogesh Melton DO;  Location: [facility] CATH LAB;  Service: Nephrology;  Laterality: N/A;  TUNNELED CATH INSERTION    THROMBECTOMY Right     TOE AMPUTATION         Time Tracking:     PT Received On: 11/02/22  PT Start Time: 0953     PT Stop Time: 1017  PT Total Time (min): 24 min     Billable Minutes: Re-eval 15 and Therapeutic Activity 9      11/02/2022

## 2022-11-02 NOTE — PROGRESS NOTES
Nephrology consult follow up note    HPI:      Stuart Guevara is a 32 y.o. 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  The patient underwent exploratory lap X 3 (9/25, 9/27 and 9/29) for abdominal compartment syndrome.   He has been requiring renal replacement therapy since 9/25/22 due to TAY with some partial recovery of renal function. Decision was made to stop dialysis but he again developed TAY coinciding with acute onset of necrotizing pancreatitis.     He has now been resumed on TTS schedule via the original temporary dialysis catheter.   Ascites seemed to be reaccumulating and IR tap revealed growth of Klebsiella.  The patient underwent repeat exploratory on October 30th    Interval history:   11/1/22: Patient tolerated dialysis yesterday with 3L fluid removed. He does have persistent edema to abdominal wall, hips and BLE. Respiratory status stable.     11/2/22 awake alert sitting up in the recliner and still has NG tube.  Reports that his abdomen is feeling better.  He is getting TPN for nutrition.  He is making some urine.  No shortness of breath.  No fever or chills.  Denies dysuria.    Objective:     Patient father present in the room.  VITAL SIGNS: 24 HR MIN & MAX LAST    Temp  Min: 97.9 °F (36.6 °C)  Max: 98.8 °F (37.1 °C)  98.3 °F (36.8 °C)        BP  Min: 158/93  Max: 180/94  (!) 167/92     Pulse  Min: 88  Max: 101  101     Resp  Min: 17  Max: 20  18    SpO2  Min: 90 %  Max: 93 %  (!) 93 %      GEN: Awake and appropriate.  Chronically ill-appearing black male.    HEENT: Atraumatic. EOMI, no icterus  NECK : No JVD, right IJ temporary catheter noted  CARD : RRR without rub or gallop   LUNGS : Clear to auscultation  ABD :  Still Distended abdomen. No pain at rest, NG to LIS  EXT :  At present there is 1+ edema  NEURO : No obvious focal deficits    Recent Labs    Lab 11/02/22  0415   *   K 5.2*   CO2 21*   BUN 63.1*   CREATININE 1.62*   GLUCOSE 295*   CALCIUM 8.1*   PHOS 5.7*     Recent Labs   Lab 11/02/22  0415   WBC 22.3*   HGB 9.1*   HCT 27.6*            Assessment / Plan:      1. Postoperative TAY requiring hemodialysis initiation on 9/25 via temp HD catheter placed per Dr. Chopra.    2. Abdominal aortic dissection s/p TEVAR   3. Post operative abdominal compartment syndrome s/p ex lap on 9/27 with closure on 10/1-repeat washout 10/30/2022 for Klebsiella peritonitis  4. Hyperkalemia.   5. Hypertension  6. Type 2 diabetes mellitus  7. Serositis and SLE - rheumatology following  8. Hyponatremia persistent.  9. Necrotizing pancreatitis per CT of the abdomen      Recommendations  1. Patient and patient's father detailed on patient renal status.  And there is no need for any dialysis therapy today.  We will be assessing the urine output and electrolytes on a daily basis and decide on dialysis as needed.  2. Check labs tomorrow

## 2022-11-02 NOTE — PROGRESS NOTES
[facility]  HOSPITAL MEDICINE  PROGRESS NOTE        CHIEF COMPLAINT   Hospital follow up    HOSPITAL COURSE   Stuart Guevara is a 32 y.o. male who has PMH which includes HTN, SLE,  hepatitis B, type A aortic dissection with repair + mechanical aortic valve placement 2/2021 on coumadin, RUE DVT s/p Xarelto, class V lupus nephritis; presented to the ED at [facility] on 9/23/2022  with reports of  shortness of breath and chest pain radiating to his back. Work up revealed a type B aortic dissection and significant hypertension noted on presentation.  He failed medical management and had progression of his symptoms despite adequate control of hemodynamic parameters.  PT was transferred from [facility] to [facility] for CV surgery evaluation which he was taken  to the OR and had TEVAR with left carotid to subclavian artery bypass on 9/23.   Later Patient developed abdominal distension and rigidity of his abdomen and developed a lactic acidosis.  He had a repeat CT angiography on 9/25 of the chest/abdomen/pelvis without evidence of worsening dissection, but evidence of significant intra-abdominal pathology with some hemoperitoneum, signs of hypoperfusion of the abdominal organs (compartment syndrome)  free blood in the abdomen. He underwent exploratory laparotomy on 9/25 with evidence of viable bowel and no significant bleeding identified, abd was closed with flakito drain left in situ chest tube was placed. Had repeat ex-laps on 9/27, 9/29. Postoperatively, he developed severe unstable bradycardia, and a transvenous pacemaker was temporarily placed by Cardiology, with the bradycardia resolving within he next couple days.  Shortly afterwards, he was noted to have significant worsening of his renal failure and critical hyperkalemia. Renal services consulted and HD catheter was placed and he was started on HD treatments. Patient was cleared for transfer out of ICU to the floor.  He started having worsening abdominal  pain. He was seen by rheumatology for possible lupus flare up and serositis. He was started on steroids and other immunomodulating drugs. Drugs have since been discontinued due to concern of infection.  Repeat CT showed multiple loculated fluid collections, necrotizing pancreatitis, possible peritonitis. Surgery team is following with plan for IR drainage, which was performed October 28.  Body fluid culture grew out Klebsiella.  Surgical hospitalist took back to the OR October 30th due to continued pain in peritonitis with positive cultures, only found about 3 L of venita ascites, no other sign of infection, necrotic tissue, bowel issues or hematoma.    Today  Seen and examined this morning.  Pain is controlled with IV narcotic.  He is able to tolerate his boost, we will remove the NG tube.  Not much urine output with IV Lasix 40 mg given yesterday.  Chest x-ray ordered for this morning noting right-sided opacities.        OBJECTIVE/PHYSICAL EXAM     VITAL SIGNS (MOST RECENT):  Temp: 98.3 °F (36.8 °C) (11/02/22 0822)  Pulse: 101 (11/02/22 0822)  Resp: 18 (11/02/22 0312)  BP: (!) 167/92 (11/02/22 0822)  SpO2: (!) 93 % (11/02/22 0822)   VITAL SIGNS (24 HOUR RANGE):  Temp:  [97.9 °F (36.6 °C)-98.8 °F (37.1 °C)] 98.3 °F (36.8 °C)  Pulse:  [] 101  Resp:  [17-20] 18  SpO2:  [90 %-93 %] 93 %  BP: (158-180)/(85-94) 167/92   GENERAL: In no acute distress, afebrile, malnourished weight loss  HEENT:  CHEST: Clear to auscultation bilaterally  HEART: S1, S2, no appreciable murmur  ABDOMEN: diffuse tenderness more so on the right side lower, slightly distended, BS +, significant gravity dependent edema in the pelvis  MSK: Warm, 2+ pitting edema both legs, no clubbing or cyanosis  NEUROLOGIC: Alert and oriented x4, both legs 2/5 strength, 5/5 upper extremity  INTEGUMENTARY:  PSYCHIATRY:        ASSESSMENT/PLAN   Severe necrotizing pancreatitis with loculated intra-abdominal fluid collection-status post IR drainage October  28  Intra-abdominal abscess versus infected necrotizing pancreatitis  Hospital acquired pneumonia-right-sided  Sepsis  Severely edematous/anasarca  Acute kidney injury ATN  Critical illness myopathy  Severe protein caloric malnutrition     History of:  Type A aortic dissection status post repair with mechanical aortic valve conduit, recent type B aortic dissection status post TAVR, lupus nephritis class 5, mixed connective tissue disorder predominant lupus, transverse myelitis recovered        General surgery following.    Gastroenterology following.    Nephrology following.  Needs continued fluid removal, would appreciate if renal can do daily UF.  I will give trial of metolazone 5 and torsemide 40 today.   ID following.  Continue meropenem and fluconazole.  Check MRSA PCR may need to add vancomycin with developing pneumonia on current antibiotics.  Rheumatology following.  Continue Solu-Medrol 40 b.i.d., all other immunosuppressant discontinued.    Glucose control with Levemir and sliding scale.  Continue antihypertensives and adjust as needed.    Continue Lovenox once daily, start Coumadin.  Continue to monitor INR.  Goal of 2 to 3.  Continue TPN for IV nutrition.  Encouraged increased oral intake, improving.  Soon to discontinue TPN if increased intake.   Patient remains critically ill and condition remains guarded.  Many poor prognostic factors playing a role here.    At this time plan is to remove NG tube, increase oral intake.  Trial of diuretic therapy, additional UF if renal would be willing.  Mobilize.  Continue current abx per ID.  I believe a lot of his issues are stemming form severe edema diffusely causing pain as well.       Critical care diagnosis: All of the above  Critical care time spent:  35 minutes    Anticipated discharge and disposition:   __________________________________________________________________________    LABS/MICRO/MEDS/DIAGNOSTICS       LABS  Recent Labs     11/02/22  0415   PHILIP  130*   K 5.2*   CHLORIDE 99   CO2 21*   BUN 63.1*   CREATININE 1.62*   GLUCOSE 295*   CALCIUM 8.1*   ALKPHOS 114   AST 19   ALT 9   ALBUMIN 2.0*     Recent Labs     11/02/22  0415   WBC 22.3*   RBC 3.19*   HCT 27.6*   MCV 86.5          MICROBIOLOGY  Microbiology Results (last 7 days)       Procedure Component Value Units Date/Time    Body Fluid Culture [[id number]] Collected: 10/28/22 0940    Order Status: Completed Specimen: Peritoneal Fluid from Abdomen Updated: 11/01/22 1050     Body Fluid Culture No growth at 4 days    Anaerobic Culture [[id number]] Collected: 10/28/22 0940    Order Status: Completed Specimen: Body Fluid from Abdomen Updated: 10/31/22 0843     Anaerobe Culture No Anaerobes Isolated    Anaerobic Culture [[id number]] Collected: 10/28/22 0940    Order Status: Completed Specimen: Body Fluid from Peritoneal Updated: 10/31/22 0842     Anaerobe Culture No Anaerobes Isolated    Body Fluid Culture [[id number]]  (Abnormal)  (Susceptibility) Collected: 10/28/22 0940    Order Status: Completed Specimen: Peritoneal Fluid from Abdomen Updated: 10/30/22 1351     Body Fluid Culture Many Klebsiella pneumoniae    Gram Stain [[id number]] Collected: 10/28/22 0940    Order Status: Completed Specimen: Peritoneal Fluid from Abdomen Updated: 10/29/22 1130     GRAM STAIN No WBCs, No bacteria seen    Gram Stain [[id number]] Collected: 10/28/22 0940    Order Status: Completed Specimen: Peritoneal Fluid from Abdomen Updated: 10/29/22 1122     GRAM STAIN Rare WBC observed      Few Gram Negative Rods    Fungal Culture [[id number]] Collected: 10/28/22 0940    Order Status: Sent Specimen: Body Fluid from Peritoneal Updated: 10/28/22 1326    Fungal Culture [[id number]] Collected: 10/28/22 0940    Order Status: Sent Specimen: Body Fluid from Abdomen Updated: 10/28/22 1326    Anaerobic Culture [[id number]]     Order Status: Canceled Specimen: Body Fluid from Abdomen     Body Fluid Culture [[id number]]     Order Status: Canceled  Specimen: Body Fluid from Abdomen     Fungal Culture [[id number]]     Order Status: Canceled Specimen: Body Fluid from Abdomen     Gram Stain [[id number]]     Order Status: Canceled Specimen: Body Fluid from Abdomen     Gram Stain [[id number]]     Order Status: Canceled Specimen: Peritoneal Fluid     Body Fluid Culture [[id number]]     Order Status: Canceled Specimen: Peritoneal Fluid     Fungal Culture [[id number]]     Order Status: Canceled Specimen: Body Fluid     Anaerobic Culture [[id number]]     Order Status: Canceled Specimen: Body Fluid     Fungal Culture [[id number]]  (Normal) Collected: 09/25/22 1200    Order Status: Completed Specimen: Body Fluid from Pleural Fluid, Right Updated: 10/27/22 1254     Fungal Culture No Fungus Isolated at 4 Weeks            MEDICATIONS   aspirin  81 mg Oral Daily    carvediloL  25 mg Oral BID    cloNIDine  0.1 mg Oral BID    enoxaparin  1 mg/kg Subcutaneous Q24H    fat emulsion 20%  250 mL Intravenous Every Tues, Thurs, Sat    fluconazole (DIFLUCAN) IV (PEDS and ADULTS)  400 mg Intravenous Q24H    gabapentin  100 mg Oral TID    hydrALAZINE  100 mg Oral Q8H    insulin detemir U-100  15 Units Subcutaneous BID    lactulose 10 gram/15 ml  10 g Oral BID    meropenem (MERREM) IVPB  1 g Intravenous Q12H    methocarbamoL  500 mg Oral QID    methylPREDNISolone sodium succinate injection  40 mg Intravenous Q12H    morphine  30 mg Oral Q12H    NIFEdipine  90 mg Oral Daily    pantoprazole  40 mg Intravenous Daily    sodium bicarbonate  1,300 mg Oral Daily    sodium chloride 0.9%  10 mL Intravenous Q6H    sodium zirconium cyclosilicate  10 g Oral Daily    warfarin  3 mg Oral Daily      INFUSIONS   TPN ADULT CENTRAL LINE CUSTOM 60 mL/hr at 11/01/22 2200       DIAGNOSTIC TESTS  X-Ray Chest 1 View   Final Result      Postsurgical changes again noted with increasing right lung opacification in left pleural effusion.  Developing infectious process is not excluded.         Electronically signed  by: Jkaob Boland   Date:    11/02/2022   Time:    08:52      X-Ray Abdomen Flat And Erect   Final Result      Postsurgical changes. NG tube projects over the right upper quadrant in the expected location of the stomach.         Electronically signed by: Jakob Boland   Date:    11/02/2022   Time:    08:39      X-Ray Abdomen AP 1 View   Final Result      As above.         Electronically signed by: Jakob Boland   Date:    10/31/2022   Time:    07:36      CTA Chest Abdomen Pelvis   Final Result   Impression:      1.  An aortic stent is again noted.  Caudal extension of the [place] type B aortic dissection 37.1 mm from the inferior margin of the graft is noted (Series 18, Image 55). Dilatation of the descending thoracic aorta is again noted, measuring 41.3 mm at its widest. The distal descending segment measures 23.4 mm at the diaphragmatic hiatus. The origin of the left subclavian, left common carotid and right brachiocephalic arteries are unremarkable.      2. Mild diffuse interlobular interstitial septal thickening in both lungs may be present. An element of congestive failure is not excluded. There is dense opacity at the dependent lung bases consistent with compressive atelectasis with the possibility of an infectious component not entirely excluded. Correlate clinically.      3. The IVC is nonenhanced and appears narrowed.      4. There is a pronounced ascites which is new compared to the prior study. Mild layering hyperdense fluid pelvic fluid is seen, likely reflecting hemoperitoneum. There is a large heterogeneous, predominantly hyperdense mass/collection along the left paracolic gutter and pelvic sidewall (Series 14 Image 90 and Series 22, Image 46), measuring 97 x 62 x 170 mm (AP, transverse, craniocaudal dimensions). This may reflect hematoma formation. No focal contrast collection or pooling is identified to suggest active hemorrhage at the time of imaging. There is a possible left rectus sheath  hematoma seen on image 116 of series 14. Correlate with clinical and laboratory findings as regards additional evaluation and follow-up.      5.  Persistence of bilateral pleural effusions which are moderate-sized. Follow-up as clinically indicated.      6. Details and other findings as discussed above.      No significant discrepancy with overnight report.         Electronically signed by: Jameson Brumfield   Date:    10/30/2022   Time:    09:57      CT Abscess Aspiration without Catheter   Final Result      Successful aspiration of bilateral fluid with simple free fluid identified on the right and thick gelatinous loculated fluid identified in the left.  All fluid sent for laboratory analysis and clearly labeled.         Electronically signed by: Jakob Boland   Date:    10/31/2022   Time:    07:39      US Guided Needle Placement   Final Result      Successful aspiration of bilateral fluid with simple free fluid identified on the right and thick gelatinous loculated fluid identified in the left.  All fluid sent for laboratory analysis and clearly labeled.         Electronically signed by: Jakob Boland   Date:    10/31/2022   Time:    07:39      US Scrotum And Testicles   Final Result      CT Abdomen Pelvis With Contrast   Final Result      1. No normal pancreatic parenchyma is seen.  At the level of the pancreas there is a large fluid collection with a few foci of gas.  Loculated, faintly peripherally enhancing fluid collections extend along the pericolic gutters.  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  Foci of gas are nonspecific in the setting of prior intervention.   2. Extensive loculated peritoneal collections with peripheral enhancement compatible with peritonitis.Left inguinal hernia contains ascitic fluid with some peripheral enhancement similar to the peritoneal fluid.  There is no herniated bowel loop   3. There is a moderate colonic stool burden.   There is some mass effect on the bowel loops due to the loculated nature of the abdominal fluid collections.   4. Anasarca   5. Partially imaged known thoracic aortic dissection status post intervention.         Electronically signed by: Rocio White   Date:    10/26/2022   Time:    12:37      X-Ray Chest 1 View for Line/Tube Placement   Final Result      Right PICC tip overlies the mid SVC.         Electronically signed by: Kapil Ayers   Date:    10/25/2022   Time:    16:56      CT Abdomen Pelvis  Without Contrast   Final Result      Bilateral pleural effusions with moderate ascites and diffuse anasarca.  The epigastric and left mid abdominal ascites appears loculated and peritonitis cannot be excluded.         Electronically signed by: Grabiel Singleton MD   Date:    10/24/2022   Time:    23:30      X-Ray Abdomen AP 1 View   Final Result      Residual feces         Electronically signed by: Ramón Ying   Date:    10/24/2022   Time:    09:46      X-Ray Chest 1 View   Final Result      No significant interval change.         Electronically signed by: Kapil Ayers   Date:    10/22/2022   Time:    15:13      X-Ray Chest 1 View   Final Result      Persistent increased left retrocardiac density and silhouetting of the left hemidiaphragm.      No other focal consolidative changes.      No other change         Electronically signed by: Ramón Ying   Date:    10/12/2022   Time:    08:15      Fl Modified Barium Swallow Speech   Final Result      X-Ray Chest 1 View   Final Result      Unchanged retrocardiac opacity suggesting atelectasis         Electronically signed by: Rocio White   Date:    10/05/2022   Time:    06:11      X-Ray Chest 1 View   Final Result      New central venous catheter terminates within the proximal right atrium.         Electronically signed by: Jameson Brumfield   Date:    10/04/2022   Time:    14:16      X-Ray Chest 1 View   Final Result      No significant interval change.          Electronically signed by: Kapil Ayers   Date:    10/04/2022   Time:    06:54      CT Chest Abdomen Pelvis Without Contrast (XPD)   Final Result      Very limited study due the lack of IV contrast.      Anasarca.      Small amount of ascites.      Small left-sided pleural effusion with bibasilar atelectasis versus infiltrate.         Electronically signed by: Joey Vidal MD   Date:    10/03/2022   Time:    21:30      X-Ray Chest 1 View   Final Result      As above.         Electronically signed by: Jameson Brumfield   Date:    10/03/2022   Time:    07:09      X-Ray Chest 1 View   Final Result      As above.         Electronically signed by: Jakob Boland   Date:    10/02/2022   Time:    14:50      X-ray Abdomen for NG Tube Placement (Nursing should notify Radiology after placement)   Final Result      Optimal placement of the nasogastric tube.         Electronically signed by: Jameson Brumfield   Date:    10/01/2022   Time:    11:48      X-Ray Chest 1 View   Final Result      No significant change         Electronically signed by: Ramón Ying   Date:    10/01/2022   Time:    08:05      X-Ray Chest 1 View   Final Result      No acute findings or significant interval change.         Electronically signed by: Kapil Ayers   Date:    09/30/2022   Time:    07:35      X-Ray Chest 1 View   Final Result      Stable exam without significant interval change.         Electronically signed by: Claribel Deal   Date:    09/29/2022   Time:    16:14      X-Ray Chest 1 View   Final Result      Possible mild left perihilar/lower lung atelectasis.  Suspect tiny right apical pneumothorax.         Electronically signed by: Darius Rosario   Date:    09/29/2022   Time:    06:09      X-Ray Chest 1 View   Final Result      No significant interval change.         Electronically signed by: Jameson Brumfield   Date:    09/28/2022   Time:    16:25      X-Ray Chest 1 View   Final Result      Similar appearance to prior exam with retrocardiac  atelectasis         Electronically signed by: Rocio White   Date:    09/28/2022   Time:    06:05      X-Ray Chest 1 View   Final Result      No significant change         Electronically signed by: Ramón Ying   Date:    09/27/2022   Time:    08:47      X-Ray Abdomen AP 1 View   Final Result      Satisfactory central femoral line placement.         Electronically signed by: Emily Candelario MD   Date:    09/26/2022   Time:    13:54      X-Ray Chest 1 View   Final Result      Interval retraction of endotracheal tube, satisfactory position.  Cardiac lead placement in the interval.  No other significant change.         Electronically signed by: Emily Candelario MD   Date:    09/26/2022   Time:    08:00      X-Ray Chest 1 View   Final Result      Increased volume status      Interval intubation with endotracheal tube at the josie.  I recommend retracting      Right-sided chest tube in good position         Electronically signed by: Too Reyes   Date:    09/25/2022   Time:    15:49      X-Ray Abdomen AP 1 View   Final Result      There is slight increased density of the kidneys of questionable etiology and or significance.      Otherwise nonspecific gas pattern         Electronically signed by: Ramón Ying   Date:    09/25/2022   Time:    10:51      CTA Chest Abdomen Non Coronary   Final Result      1. Thoracic aortic endograft placement since 09/23/2022 with the dissection flap extending about 3 cm inferior to the distal portion of the graft.  Opacification of the false lumen along the mid and distal portions of the graft.   2. Moderate volume hemoperitoneum.  Changes involving the liver, spleen, pancreas and bowel are suggestive of overall hypoperfusion.  Hyperdense kidneys suggest acute kidney injury.   3. Small to moderate bilateral pleural effusions, larger on the right.   Findings discussed with Mr. Guevara ICU nurse, [place] at 08:35 hours on 09/25/2022.         Electronically signed  by: Darius Rosario   Date:    09/25/2022   Time:    08:42      X-Ray Chest 1 View   Final Result      Changes suggestive of right-sided pleural effusion.      Increased left retrocardiac density and silhouetting of the left hemidiaphragm which might be related to an infiltrate/atelectasis.      Interval placement of thoracic endograft.      No focal consolidative changes         Electronically signed by: Ramón Ying   Date:    09/25/2022   Time:    09:10      IR Abdominal Aortobifemoral   Final Result      Fluoroscopic assistance provided as above.         Electronically signed by: Kapil Ayers   Date:    09/26/2022   Time:    09:56               All diagnosis and differential diagnosis have been reviewed; assessment and plan has been documented. I have personally reviewed the labs and test results that are presently available; I have reviewed the patients medication list. I have reviewed the consulting providers response and recommendations. I have reviewed or attempted to review medical records based upon their availability.  All of the patient's questions have been addressed and answered. Patient's is agreeable to the above stated plan. I will continue to monitor closely and make adjustments to medical management as needed.  This document was created using [employer] Fluency Direct.  Transcription errors may have been made.  Please contact me if any questions may rise regarding documentation to clarify verbiage.        Luis F Hutson MD   11/02/2022   Internal Medicine

## 2022-11-02 NOTE — PROGRESS NOTES
Inpatient Nutrition Assessment    Admit Date: 9/23/2022   Total duration of encounter: 40 days     Nutrition Recommendation/Prescription     - Continue current diet as tolerated.   - Continue TPN until pt able to tolerate increased oral intake  AA15% 95gm, D70 300gm, IL 20% IVPB 3x/week 50gm @60ml/hr  Provides:  1614kcal (80% est needs)  95gm protein (100% est needs)  1440ml fluid  300gm CHO  GIR: 3.6  -Electrolytes per pharmacy.   - Monitor labs, oral intake of meals/fluids  - Once intake of meals and fluids increases, wean TPN    Communication of Recommendations: reviewed with patient/caregiver, reviewed with pharmacist    Nutrition Assessment     Malnutrition Assessment/Nutrition-Focused Physical Exam    Malnutrition in the context of acute illness or injury  Degree of Malnutrition: severe malnutrition  Energy Intake: < 75% of estimated energy requirement for > 7 days  Interpretation of Weight Loss: >5% in 1 month  Body Fat:moderate depletion  Area of Body Fat Loss: orbital region  and upper arm region - triceps / biceps  Muscle Mass Loss: moderate depletion  Area of Muscle Mass Loss: temple region - temporalis muscle, clavicle bone region - pectoralis major, deltoid, trapezius muscles, clavicle and acromion bone region - deltoid muscle, and scapular bone region - trapezius, supraspinus, infraspinus muscles  Fluid Accumulation: moderate to severe  Edema: ascites   Reduced  Strength: unable to obtain  A minimum of two characteristics is recommended for diagnosis of either severe or non-severe malnutrition.    Chart Review    Reason Seen: continuous nutrition monitoring and follow-up    Diagnosis:  Fever and Leukocytosis  Abdominal aortic dissection, s/p repair  Abdominal compartment syndrome, s/p ex-lap x2 last on 9/27 with abdominal closure on 10/1  SLE with active flare  History of transverse myelitis  History of lupus nephritis  TAY now on HD  VHD, s/p mechanical AVR  Chest tube in place  DM2  Necrotizing  pancreatitis  Ascites  Bacterial peritonitis    Relevant Medical History: HTN, lupus    Nutrition-Related Medications: furosemide, IL custom TPN, lactulose, sodium bicarbonate  Calorie Containing IV Medications: no significant kcals from medications at this time    Nutrition-Related Labs:   BUN 27, Crea 3.06, Glu 174, Phos 6.2   Na 128, K 3.3, Cl 96, BUN 21.5, Crea 3.28, Glu 205  10/4 Na 127, Cl 95, BUN 57.3, Crea 5.16, Glu 152, Phos 5  10/7 K 5.6, BUN 55.2, Crea 4.57, Glu 347, Phos 8  10/11: Na 126, K 6.1, Cl 91, BUN 49, Crea 3.18, GFR 25, Glu 139, Ca 7.9, Phos 7.4  10/14: Na 125, K 5.4, Cl 91, BUN 56.1, Crea 2.76, GFR 30, Ca 8.1  10/18: Na 128, Cl 92, Ca 7.9, BUN 50.9, Cr 2.32   10/21: Na 127, Cl 93, BUN 68.1, Crea 2.35, GFR 37, Glu 335  10/25: Na 128, Cl 96, BUN 65.1, Crea 1.62, GFR 57, Glu 39; CB-124  10/28: Na 128, Cl 96, BUN 62.9, Crea 1.62, GFR 57, glu 304, Ca 7.9  10/31: Na 129, K 6.1, BUN 73.5, Crea 1.67, GFR 55, Glu 285, Alb 2.4  : Na 130, K 5.2, phos 5.7, GFR 57    Diet/PN Order: TPN ADULT CENTRAL LINE CUSTOM  DIET RENAL NON-DIALYSIS  TPN ADULT CENTRAL LINE CUSTOM  Oral Supplement Order: none  Tube Feeding Order: none at this time  Appetite/Oral Intake: poor/0-25% of meals  Factors Affecting Nutritional Intake: decreased appetite, nausea, and vomiting  Food/Taoism/Cultural Preferences: none reported    Skin Integrity: incision  Wound(s):   incision noted    Comments    22: Discussed with RN. Will provide tube feeding recommendations for when appropriate to start tube feeding. Receiving kcal from meds. Noted Phos, will monitor for need for renal formula.   22: Discussed with RN. Need to consider TPN since pt now LOS day 7. If starting tube feeding, recs provided. If able to extubate, advance diet when appropriate.   10/4/22: Pt previously eating 100% po intake of meals, good appetite. Currently NPO for procedure, plans to restart diet per RN. Will add ONS for added protein and  "kcal.  10/7/22: Pt now with decreased appetite/po intake. Noted elevated K and Phos, likely not diet related since pt with poor po intake of full liquid diet.   10/11: pt sleeping, nurse reports tolerating full liquid diet, some abdominal distention noted, reports unsure of plans to advance diet as this time  10/14: pt advanced to soft diet today; ate about 40% of lunch tray, drinking Boost, would like 2 per meal in vanilla flavor  10/18: Pt advanced to regular texture renal/diabetic diet, he reports that his appetite is fair, drinks boost, feels he is chewing/swallowing well, does reports some abdominal pain 30-90 minutes after eating, feels better after several hours of no eating.   10/21: appetite remains the same, drinking boost  10/25: fair appetite, says he is drinking some boost, would like to continue flavor    10/28: pt with decreased appetite, noted new dx necrotizing pancreatits; paracentesis done today; not drinking boost at this time either; plans to try and start eating more and drinking boost, says he feels a little better this afternoon. MD discussed starting TPN until pt able to tolerate increased oral intake. Still on dialysis as needed    10/31: now NPO with NG with LIWS after abdominal washout with surgery yesterday, possible necrotizing fascitis, will switch to custom TPN today due to abnormal electrolytes    : Pt's diet was advanced yesterday; however, pt reports still just taking in liquids and no solids. Pt reports vomiting last night. TPN running as ordered.     Anthropometrics    Height: 5' 5" (165.1 cm) Height Method: Estimated  Last Weight: 56.6 kg (124 lb 12.5 oz) (10/31/22 0932) Weight Method: Bed Scale  BMI (Calculated): 20.8  BMI Classification: normal (BMI 18.5-24.9)        Ideal Body Weight (IBW), Male: 136 lb     % Ideal Body Weight, Male (lb): 91.75 %                 Usual Body Weight (UBW), k kg  % Usual Body Weight: 94.53  % Weight Change From Usual Weight: -5.67 " %  Usual Weight Provided By: unable to obtain usual weight at this time    Wt Readings from Last 5 Encounters:   10/31/22 56.6 kg (124 lb 12.5 oz)   09/23/22 60 kg (132 lb 4.4 oz)   12/09/21 48 kg (105 lb 13.1 oz)     Weight Change(s) Since Admission:  Admit Weight: 60 kg (132 lb 4.4 oz) (09/23/22 1219)  10/4/22: no new wt  10/7/22: no new wt  10/12/22: 65kg; weight gain noted  10/15/22: 56.6kg; fluctuating weights possible due to fluid  10/22/22: 56.6kg  11/02/22: no new wt noted    Estimated Needs    Weight Used For Calorie Calculations: 56.6 kg (124 lb 12.5 oz)  Energy Calorie Requirements (kcal): 2011kcal (1.4stress factor)  Energy Need Method: [place]-St Jeor  Weight Used For Protein Calculations: 56.6 kg (124 lb 12.5 oz)  Protein Requirements: 85-96gm (1.5-1.7gm/kg)  Fluid Requirements (mL): 1000ml + urinary output  Temp: 98.2 °F (36.8 °C)    Enteral Nutrition    Patient not receiving enteral nutrition at this time.    Parenteral Nutrition    Standard Formula: not applicable  Custom Formula:  633 ml 15% amino acids and 428 ml 70% dextrose  Additives: none  Rate/Volume: 60  Lipids: 250 ml 20% IV lipid emulsion three times per week  Total Nutrition Provided by Parenteral Nutrition:  Calories Provided  1614 kcal/d, 80% needs   Protein Provided  95 g/d, 100% needs   Dextrose Provided  300 g/d, GIR 3.6 mg CHO/kg/min   Fluid Provided  1440 ml/d, 100% needs       Evaluation of Received Nutrient Intake    Calories: meeting estimated needs  Protein: meeting estimated needs    Patient Education    Not applicable.    Nutrition Diagnosis     PES: Inadequate oral intake related to current condition as evidenced by <75% intake of meals. (continues)    PES: Malnutrition related to pancreatitis, infection as evidenced by weight loss >5% in 1 month, appetite loss <50% intake of meals, severe fat and muscle loss. (continues)     Interventions/Goals     Intervention(s): general/healthful diet, modified composition of parenteral  nutrition, modified rate of parenteral nutrition, commercial beverage, and collaboration with other providers  Goal: Meet greater than 75% of nutritional needs by follow-up. (goal progressing)    Monitoring & Evaluation     Dietitian will monitor energy intake, weight change, electrolyte/renal panel, and glucose/endocrine profile.  Nutrition Risk/Follow-Up: high (follow-up in 1-4 days)

## 2022-11-02 NOTE — PROCEDURES
"  INTERVENTIONAL NEPHROLOGY PROCEDURE NOTE  [facility]     Procedure Date: 11/2/2022    Staff:  Dr. Melton    Access History: Stuart Guevara is a male with History of TAY in need of HD.    Procedure: Temporary dialysis catheter exchange.    Indication: RIJ catheter in place for >2 weeks.    Anatomical Site: RIJ    The patient is evaluated in the pre-op area with assessment including the American Society of Anesthesia risk classification. The procedure is discussed in detail including risks, benefits alternatives and options and the patient agrees to proceed.    Maximum sterile barrier technique: The patient was prepped and draped using chlorhexidine prep and maximum sterile barrier technique.    Sedation Note:    Risks and benefits of sedation were reviewed with Stuart Guevara including bleeding, infection, nausea, vomiting, dizziness, instability, damage to a nerve, damage to a blood vessel, cellulitis, reaction to medications, amnesia, loss of consciousness, respiratory arrest, cardiac arrest.     The patient received the following medications: None; patient did remain alert, responsive, and conversational throughout the procedure.      Procedure Steps:     After informed consent was obtained the patient was placed on the procedure table and then prepped and draped in sterile fashion. The RIJ venotomy site and attachment of the catheter hub to skin areas were infiltrated with lidocaine. The old catheter was noted to be sutured to the neck with silk sutures, which were removed. A J-wire was passed through the accessory "pigtail" port of the old catheter without resistance. The old catheter was then removed and placed outside of the sterile field, leaving the J-wire in place.    The new 16 cm temporary dialysis catheter was inserted over guidewire and the catheter was tested for function. The catheter megan back and  flushed easily with a 10 cc syringe.  Line was sewn down with 2-0 nylon and dressing " applied. Each port of the catheter was filled with saline and heparin.    ASSESSMENT/PLAN:    1. Successful placement of [employer] Elite 16 cm triple lumen temporary HD catheter. OK to use catheter.    EBL: 1 ml    Complications: None    Thank you for allowing me the opportunity in taking care of this patient. Please reach me with any questions.    Yogesh Melton DO  Interventional Nephrology  [facility]  Cell: [phone]  Office: [phone]

## 2022-11-02 NOTE — PROGRESS NOTES
Progress Note         Hospital follow up    Subjective:       NGT removed. Tolerating liquids when seen. No bm in at least three days. On Narcotic pain meds.     WBC yesterday 21.2 today 22.3 stable overall. Sodium 130. Potassium 5.2.     Amylase of body fluid still pending    Review of Systems:     Review of Systems    Objective:     Scheduled Meds:   aspirin  81 mg Oral Daily    carvediloL  25 mg Oral BID    cloNIDine  0.1 mg Oral BID    enoxaparin  1 mg/kg Subcutaneous Q24H    fluconazole (DIFLUCAN) IV (PEDS and ADULTS)  400 mg Intravenous Q24H    gabapentin  100 mg Oral TID    hydrALAZINE  100 mg Oral Q8H    insulin detemir U-100  15 Units Subcutaneous BID    lactulose 10 gram/15 ml  10 g Oral BID    meropenem (MERREM) IVPB  1 g Intravenous Q12H    methocarbamoL  500 mg Oral QID    methylPREDNISolone sodium succinate injection  40 mg Intravenous Q12H    morphine  30 mg Oral Q12H    NIFEdipine  90 mg Oral Daily    pantoprazole  40 mg Intravenous Daily    sodium bicarbonate  1,300 mg Oral Daily    sodium chloride 0.9%  10 mL Intravenous Q6H    sodium zirconium cyclosilicate  10 g Oral Daily    warfarin  3 mg Oral Daily     Continuous Infusions:   TPN ADULT CENTRAL LINE CUSTOM 60 mL/hr at 11/02/22 1008    TPN ADULT CENTRAL LINE CUSTOM       PRN Meds:  acetaminophen, albuterol, bisacodyL, dextrose 10 % in water (D10W), dextrose 10 % in water (D10W), dextrose 10%, glucagon (human recombinant), glucose, glucose, hydrALAZINE, HYDROmorphone, insulin aspart U-100, labetaloL, melatonin, nitroGLYCERIN, ondansetron, polyethylene glycol, sodium chloride 0.9%, sodium chloride 0.9%, Flushing PICC Protocol **AND** sodium chloride 0.9% **AND** sodium chloride 0.9%      VITAL SIGNS: 24 HR MIN & MAX LAST    Temp  Min: 97.9 °F (36.6 °C)  Max: 98.8 °F (37.1 °C)  98.2 °F (36.8 °C)        BP  Min: 118/69  Max: 180/94  118/69     Pulse  Min: 88  Max: 101  91     Resp  Min: 17  Max: 20  18    SpO2  Min: 90 %  Max: 93 %  (!) 93 %         Intake/Output Summary (Last 24 hours) at 11/2/2022 1401  Last data filed at 11/2/2022 1015  Gross per 24 hour   Intake 1845.6 ml   Output 2515 ml   Net -669.4 ml       Physical Exam  Constitutional:       General: He is not in acute distress.     Comments: Thin frail with muscle wasting   HENT:      Head: Atraumatic.   Eyes:      Extraocular Movements: Extraocular movements intact.   Pulmonary:      Effort: No respiratory distress.   Abdominal:      Comments: Noted to have some distention but mild, tender mostly in the lower abdomen L>R   Neurological:      General: No focal deficit present.      Mental Status: He is oriented to person, place, and time.   Psychiatric:         Mood and Affect: Mood normal.         Recent Results (from the past 24 hour(s))   POCT glucose    Collection Time: 11/01/22 10:57 PM   Result Value Ref Range    POCT Glucose 321 (H) 70 - 110 mg/dL   Comprehensive Metabolic Panel    Collection Time: 11/02/22  4:15 AM   Result Value Ref Range    Sodium Level 130 (L) 136 - 145 mmol/L    Potassium Level 5.2 (H) 3.5 - 5.1 mmol/L    Chloride 99 98 - 107 mmol/L    Carbon Dioxide 21 (L) 22 - 29 mmol/L    Glucose Level 295 (H) 74 - 100 mg/dL    Blood Urea Nitrogen 63.1 (H) 8.9 - 20.6 mg/dL    Creatinine 1.62 (H) 0.73 - 1.18 mg/dL    Calcium Level Total 8.1 (L) 8.4 - 10.2 mg/dL    Protein Total 4.8 (L) 6.4 - 8.3 gm/dL    Albumin Level 2.0 (L) 3.5 - 5.0 gm/dL    Globulin 2.8 2.4 - 3.5 gm/dL    Albumin/Globulin Ratio 0.7 (L) 1.1 - 2.0 ratio    Bilirubin Total 0.8 <=1.5 mg/dL    Alkaline Phosphatase 114 40 - 150 unit/L    Alanine Aminotransferase 9 0 - 55 unit/L    Aspartate Aminotransferase 19 5 - 34 unit/L    eGFR 57 mls/min/1.73/m2   Magnesium    Collection Time: 11/02/22  4:15 AM   Result Value Ref Range    Magnesium Level 2.00 1.60 - 2.60 mg/dL   Phosphorus    Collection Time: 11/02/22  4:15 AM   Result Value Ref Range    Phosphorus Level 5.7 (H) 2.3 - 4.7 mg/dL   Protime-INR    Collection Time:  11/02/22  4:15 AM   Result Value Ref Range    PT 16.8 (H) 12.5 - 14.5 seconds    INR 1.38 (H) 0.00 - 1.30   CBC with Differential    Collection Time: 11/02/22  4:15 AM   Result Value Ref Range    WBC 22.3 (H) 4.5 - 11.5 x10(3)/mcL    RBC 3.19 (L) 4.70 - 6.10 x10(6)/mcL    Hgb 9.1 (L) 14.0 - 18.0 gm/dL    Hct 27.6 (L) 42.0 - 52.0 %    MCV 86.5 80.0 - 94.0 fL    MCH 28.5 27.0 - 31.0 pg    MCHC 33.0 33.0 - 36.0 mg/dL    RDW 15.3 11.5 - 17.0 %    Platelet 217 130 - 400 x10(3)/mcL    MPV 10.7 (H) 7.4 - 10.4 fL    Neut % 93.3 %    Lymph % 2.3 %    Mono % 3.3 %    Eos % 0.0 %    Basophil % 0.2 %    Lymph # 0.52 (L) 0.6 - 4.6 x10(3)/mcL    Neut # 20.8 (H) 2.1 - 9.2 x10(3)/mcL    Mono # 0.74 0.1 - 1.3 x10(3)/mcL    Eos # 0.00 0 - 0.9 x10(3)/mcL    Baso # 0.05 0 - 0.2 x10(3)/mcL    IG# 0.21 (H) 0 - 0.04 x10(3)/mcL    IG% 0.9 %    NRBC% 0.0 %   POCT glucose    Collection Time: 11/02/22  6:43 AM   Result Value Ref Range    POCT Glucose 320 (H) 70 - 110 mg/dL   MRSA PCR    Collection Time: 11/02/22 11:20 AM   Result Value Ref Range    MRSA PCR SCRN ([facility]) Not Detected Not Detected   POCT glucose    Collection Time: 11/02/22 11:23 AM   Result Value Ref Range    POCT Glucose 218 (H) 70 - 110 mg/dL       X-Ray Chest 1 View    Result Date: 11/2/2022  EXAMINATION: XR CHEST 1 VIEW CLINICAL HISTORY: RIJ temp HD catheter exchange; TECHNIQUE: Single view of the chest COMPARISON: 11/02/2022 FINDINGS: Right-sided central line remains in place.  Left pleural effusion is grossly unchanged.  Right mid lung zone opacification is similar to prior.     No adverse interval change. Electronically signed by: Jakob Boland Date:    11/02/2022 Time:    13:48    X-Ray Chest 1 View    Result Date: 11/2/2022  EXAMINATION: XR CHEST 1 VIEW CLINICAL HISTORY: Cough; TECHNIQUE: Single view of the chest COMPARISON: 10/25/2022 FINDINGS: Postsurgical changes again noted with increasing right lung opacification in left pleural effusion.  Developing infectious  process is not excluded.     Postsurgical changes again noted with increasing right lung opacification in left pleural effusion.  Developing infectious process is not excluded. Electronically signed by: Jakob Boland Date:    11/02/2022 Time:    08:52    X-Ray Chest 1 View    Result Date: 10/22/2022  EXAMINATION: XR CHEST 1 VIEW CLINICAL HISTORY: leftposterior rib pain; COMPARISON: 12 October 2022 FINDINGS: Portable frontal view of the chest was obtained. Stable right-sided central line.  Heart is not significantly enlarged.  Aortic stent graft is again noted.  Similar hazy bibasilar opacities with layering effusions suspected.  No pneumothorax seen     No significant interval change. Electronically signed by: Kapil Ayers Date:    10/22/2022 Time:    15:13    X-Ray Chest 1 View    Result Date: 10/12/2022  EXAMINATION: XR CHEST 1 VIEW CPT 50872 CLINICAL HISTORY: TAY, SOB; COMPARISON: October 5, 2022 FINDINGS: Examination reveals cardiomediastinal silhouette to be unchanged as compared with the previous exam. Endo stent is identified in the descending aorta central line is seen with the tip at the junction of the superior vena cava and right atrium. There is persistent increased left retrocardiac density and silhouetting of the left hemidiaphragm which might be related to an infiltrate/atelectasis No focal consolidative changes otherwise identified no other significant change     Persistent increased left retrocardiac density and silhouetting of the left hemidiaphragm. No other focal consolidative changes. No other change Electronically signed by: Rmaón Ying Date:    10/12/2022 Time:    08:15    X-Ray Chest 1 View    Result Date: 10/5/2022  EXAMINATION: XR CHEST 1 VIEW CLINICAL HISTORY: evaluate CT placement; TECHNIQUE: Single frontal view of the chest was performed. COMPARISON: 10/04/2022 FINDINGS: LINES AND TUBES: Right IJ CVC tip projects over the superior cavoatrial junction. EKG/telemetry leads overlie  the chest. MEDIASTINUM AND RELL: Cardiac silhouette is enlarged. There is an aortic stent graft and aortic valve prosthesis. LUNGS: Unchanged retrocardiac opacity with silhouetting of the diaphragm. PLEURA:Cannot exclude small left pleural effusion.No pneumothorax. BONES: No acute osseous abnormality.     Unchanged retrocardiac opacity suggesting atelectasis Electronically signed by: Rocoi White Date:    10/05/2022 Time:    06:11    X-Ray Chest 1 View    Result Date: 10/4/2022  EXAMINATION: XR CHEST 1 VIEW CLINICAL HISTORY: confirm HD line placement (RIJ) and no PTX after post CT pull; TECHNIQUE: One COMPARISON: October 4, 2022. FINDINGS: Right transjugular approach central venous catheter terminates within the proximal right atrium.  Interval removal of the right chest tube.  There is an aortic stent graft.   Cardiopericardial silhouette is similar.   Mild lungs congestive changes are about similar.  There appears left-sided pleural effusion.  No pneumothorax.     New central venous catheter terminates within the proximal right atrium. Electronically signed by: Jameson Brumfield Date:    10/04/2022 Time:    14:16    X-Ray Chest 1 View    Result Date: 10/4/2022  EXAMINATION: XR CHEST 1 VIEW CLINICAL HISTORY: evaluate CT placement; COMPARISON: Yesterday FINDINGS: Portable frontal view of the chest was obtained. Stable right chest tube.  Heart and mediastinum are unchanged.  Similar left basilar opacities with small pleural effusion.  No significant pneumothorax is seen.     No significant interval change. Electronically signed by: Kapil Ayers Date:    10/04/2022 Time:    06:54    X-Ray Abdomen AP 1 View    Result Date: 10/31/2022  EXAMINATION: XR ABDOMEN AP 1 VIEW CLINICAL HISTORY: NG TUBE ;NG tube placement check; TECHNIQUE: Single-view of the abdomen COMPARISON: 10/24/2022 FINDINGS: Gas-filled loops of bowel noted.  NG tube projects over the position of the stomach.  Patchy opacities of the lungs are noted.     As  above. Electronically signed by: Jakob Boland Date:    10/31/2022 Time:    07:36    X-Ray Abdomen AP 1 View    Result Date: 10/24/2022  EXAMINATION: XR ABDOMEN AP 1 VIEW CLINICAL HISTORY: Abdominal pain; TECHNIQUE: AP X-RAY OF THE ABDOMEN: CPT 61261 COMPARISON: October 1, 2022 FINDINGS: Examination reveals some residual feces throughout the colon gas pattern is nonspecific with no clear evidence of ileus or obstruction no abnormal masses or calcifications identified     Residual feces Electronically signed by: Ramón Ying Date:    10/24/2022 Time:    09:46    X-Ray Abdomen Flat And Erect    Result Date: 11/2/2022  EXAMINATION: XR ABDOMEN FLAT AND ERECT CLINICAL HISTORY: abdominal distention.; TECHNIQUE: Flat and upright imaging of the abdomen COMPARISON: None FINDINGS: Postsurgical changes.  NG tube projects over the right upper quadrant in the expected location of the stomach.     Postsurgical changes. NG tube projects over the right upper quadrant in the expected location of the stomach. Electronically signed by: Jakob Boland Date:    11/02/2022 Time:    08:39    US Scrotum And Testicles    Result Date: 10/26/2022  EXAMINATION US SCROTUM AND TESTICLES CLINICAL HISTORY scrotal swelling and tenderness. Concern for nec fasc vs L incarcerated hernia; TECHNIQUE Grayscale and Doppler interrogation of the scrotum and testes. COMPARISON None available at the time of initial interpretation. FINDINGS Exam quality: adequate for evaluation Right Testicle: Surface contour intact and smooth. No evidence of focal mass or infiltrative parenchymal abnormality.  No significant enlargement or heterogeneity of the epididymis. Left Testicle: Surface contour intact and smooth. No evidence of focal mass or infiltrative parenchymal abnormality.  No significant enlargement or heterogeneity of the epididymis. Doppler Interrogation:  Bilateral spontaneous arterial flow is demonstrated within each testicle, with symmetric velocity  and normal low-resistance spectral waveform.  Bilateral venous outflow is maintained. Other findings: Moderate volume simple appearing left hydrocele is present.  No varicocele appreciated bilaterally. There is extensive bilateral scrotal edema, without evidence of focal lesion or drainable collection. Measurements: *Right testicle: 3 cm x 1.7 cm x 2.1 cm (6 cc) *Left testicle: 3 cm x 1.8 cm x 1.8 cm (5 cc) IMPRESSION 1. No evidence of active testicular torsion or other acute process, no findings to suggest focal testicular lesion. 2. Extensive regional subcutaneous edema without drainable collection. 3. Moderate volume simple appearing left hydrocele. Electronically signed by: Bean Landaverde Date:    10/26/2022 Time:    12:41    US Guided Needle Placement    Result Date: 10/31/2022  EXAMINATION: CT ABSCESS ASPIRATION WITHOUT CATHETER; US GUIDED NEEDLE PLACEMENT CLINICAL HISTORY: necrotizing pancreatitis;; ascites; TECHNIQUE: CT guided drainage of a abdominal collection. DLP: 347 COMPARISON: No relevant prior available for comparison. FINDINGS: The risks, benefits, and potential complications were discussed including bleeding, infection, end organ damage. Informed consent was obtained. The patient was positioned in the supine position. The abdominal collection was localized with CT.  The skin was marked and the area was prepped and draped in a sterile fashion. The site was anesthetized with 1% lidocaine solution.  Under continuous ultrasound guidance a 5 Uzbek Yueh needle was placed in the left abdominal collection which appeared complex and loculated under ultrasound.  Approximately 200 cc of thick gelatinous material was removed. Attention was directed towards the more free simple fluid identified over the right hemiabdomen.  Under continuous ultrasound guidance following lidocaine a 5 Uzbek Yueh needle was placed in the fluid collection approximately 1 L of clear fluid removed. The patient tolerated the procedure  without difficulty. Post-procedure imaging demonstrated decreased intra-abdominal fluid.  There were no immediate post-procedure complications. The patient was discharged to his room in stable condition.     Successful aspiration of bilateral fluid with simple free fluid identified on the right and thick gelatinous loculated fluid identified in the left.  All fluid sent for laboratory analysis and clearly labeled. Electronically signed by: Jakob Boland Date:    10/31/2022 Time:    07:39    CT Abdomen Pelvis With Contrast    Result Date: 10/26/2022  EXAMINATION: CT ABDOMEN PELVIS WITH CONTRAST CLINICAL HISTORY: Abdominal pain, post-op; TECHNIQUE: Helically acquired images with axial, sagittal and coronal reformations were obtained from the lung bases to the pubic symphysis after the IV administration of contrast. Automated tube current modulation, weight-based exposure dosing, and/or iterative reconstruction technique utilized to reach lowest reasonably achievable exposure rate. DLP: 635 mGy*cm COMPARISON: CT abdomen pelvis 10/24/2022 FINDINGS: HEART: There is a partially visible aortic valve prosthesis.  Distal aspect of a central venous catheter is seen at the superior cavoatrial junction. LUNG BASES: There are layering pleural effusions.  Dependent atelectasis. LIVER: Normal attenuation. No appreciable focal hepatic lesion. BILIARY: Collapse gallbladder versus small cystic duct remnant. PANCREAS: No normal pancreatic parenchyma seen.  At the retroperitoneum in the expected region of the bed of the pancreas there is loculated fluid and a few foci of gas.  Loculated collections extend along the pericolic gutters bilaterally. SPLEEN: Normal in size ADRENALS: No mass. KIDNEYS/URETERS: The kidneys enhance symmetrically.  No hydronephrosis. GI TRACT/MESENTERY: Evaluation of the bowel is limited without oral contrast. The stomach is decompressed.  Small bowel loops are not appreciably dilated.  There is a moderate  colonic stool burden.     There is some mass effect on bowel loops due to the loculated nature abdominal fluid collections. PERITONEUM: There is a large volume of loculated peritoneal and retroperitoneal fluid and there is peritoneal enhancement.  This extends into a left inguinal hernia.There is free air in the region of the pancreatic bed/lesser sac. LYMPH NODES: Nonspecific small retroperitoneal lymph nodes. VASCULATURE: There is an aortic dissection with stent graft partially imaged in the true lumen of the descending thoracic aorta.  Dissection flap not seen to extend into the abdominal aorta on this non angiographic exam.  There is patent runoff to the groin.  The portal, splenic and superior mesenteric veins are patent. BLADDER: Nondistended bladder limits CT evaluation there are few foci of gas within the bladder lumen compatible with recent instrumentation. REPRODUCTIVE ORGANS: Normal as visualized. SOFT TISSUES: Diffuse, severe anasarca. BONES: Biconvex deformities at the lower lumbar spine.     1. No normal pancreatic parenchyma is seen.  At the level of the pancreas there is a large fluid collection with a few foci of gas.  Loculated, faintly peripherally enhancing fluid collections extend along the pericolic gutters.  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  Foci of gas are nonspecific in the setting of prior intervention. 2. Extensive loculated peritoneal collections with peripheral enhancement compatible with peritonitis.Left inguinal hernia contains ascitic fluid with some peripheral enhancement similar to the peritoneal fluid.  There is no herniated bowel loop 3. There is a moderate colonic stool burden.  There is some mass effect on the bowel loops due to the loculated nature of the abdominal fluid collections. 4. Anasarca 5. Partially imaged known thoracic aortic dissection status post intervention. Electronically signed by: Rocio  Christopher Date:    10/26/2022 Time:    12:37    Fl Modified Barium Swallow Speech    Result Date: 10/5/2022  See procedure notes from Speech Pathologist. This procedure was auto-finalized.    Echo    Result Date: 10/28/2022  · The left ventricle is normal in size with moderate concentric hypertrophy and normal systolic function. · The estimated ejection fraction is 55%. · Indeterminate left ventricular diastolic function. · Normal right ventricular size with normal right ventricular systolic function. · Mild left atrial enlargement.      Cardiac catheterization    Result Date: 10/25/2022  Aborted invasive cardiology procedure- see Procedure Log link for details.    CT Chest Abdomen Pelvis Without Contrast (XPD)    Result Date: 10/3/2022  EXAMINATION: CT CHEST ABDOMEN PELVIS WITHOUT CONTRAST(XPD) CLINICAL HISTORY: Sepsis; TECHNIQUE: Low dose axial images, sagittal and coronal reformations were obtained from the thoracic inlet to the pubic symphysis Automatic exposure control (AEC) was utilized for dose reduction. Dose: 573 mGycm COMPARISON: None FINDINGS: Right thoracotomy tubes in place.  There is a small left-sided pleural effusion with bibasilar atelectasis.  There is an endograft in place.  Liver grossly appears normal.  Spleen is not well visualized.  Kidneys appear normal.  There is a surgical drain in the abdomen.  There is a small amount of free fluid.  There are changes consistent with anasarca.     Very limited study due the lack of IV contrast. Anasarca. Small amount of ascites. Small left-sided pleural effusion with bibasilar atelectasis versus infiltrate. Electronically signed by: Joey Vidal MD Date:    10/03/2022 Time:    21:30    CT Abscess Aspiration without Catheter    Result Date: 10/31/2022  EXAMINATION: CT ABSCESS ASPIRATION WITHOUT CATHETER; US GUIDED NEEDLE PLACEMENT CLINICAL HISTORY: necrotizing pancreatitis;; ascites; TECHNIQUE: CT guided drainage of a abdominal collection. DLP: 347  COMPARISON: No relevant prior available for comparison. FINDINGS: The risks, benefits, and potential complications were discussed including bleeding, infection, end organ damage. Informed consent was obtained. The patient was positioned in the supine position. The abdominal collection was localized with CT.  The skin was marked and the area was prepped and draped in a sterile fashion. The site was anesthetized with 1% lidocaine solution.  Under continuous ultrasound guidance a 5 Thai Yueh needle was placed in the left abdominal collection which appeared complex and loculated under ultrasound.  Approximately 200 cc of thick gelatinous material was removed. Attention was directed towards the more free simple fluid identified over the right hemiabdomen.  Under continuous ultrasound guidance following lidocaine a 5 Thai Yueh needle was placed in the fluid collection approximately 1 L of clear fluid removed. The patient tolerated the procedure without difficulty. Post-procedure imaging demonstrated decreased intra-abdominal fluid.  There were no immediate post-procedure complications. The patient was discharged to his room in stable condition.     Successful aspiration of bilateral fluid with simple free fluid identified on the right and thick gelatinous loculated fluid identified in the left.  All fluid sent for laboratory analysis and clearly labeled. Electronically signed by: Jakob Boland Date:    10/31/2022 Time:    07:39    CT Abdomen Pelvis  Without Contrast    Result Date: 10/24/2022  EXAMINATION: CT ABDOMEN PELVIS WITHOUT CONTRAST CLINICAL HISTORY: LLQ abdominal pain;Peritonitis or perforation suspected;RLQ abdominal pain (Age >= 14y);lower abd pain, getting worse, abd distenstion, erythema, tenderness; TECHNIQUE: Axial images of the abdomen and pelvis were obtained without IV contrast administration.  Coronal and sagittal reconstructions were provided.  Three dimensional and MIP images were obtained and  evaluated.  Total DLP was 677 mGy-cm. Dose lowering technique and automated exposure control were utilized for this exam. COMPARISON: CT of the chest abdomen and pelvis 10/03/2022. FINDINGS: There are bilateral pleural effusions with lower lobe atelectasis.  The heart is enlarged. There is a moderate volume of intra-abdominal ascites.  The ascites appears loculated particularly within the epigastric region along the posterior stomach and extending inferiorly along the left paracolic gutter.  The liver is homogeneous.  The gallbladder, spleen, pancreas, and adrenal glands are normal.  The bilateral kidneys are normal.  There is no hydronephrosis or nephrolithiasis. The stomach and small bowel are decompressed.  The appendix is normal.  There is a large amount of stool throughout the colon.  The urinary bladder is normal.  There is no pelvic or retroperitoneal adenopathy.  There is anasarca.  There is no lytic or blastic osseous lesion.     Bilateral pleural effusions with moderate ascites and diffuse anasarca.  The epigastric and left mid abdominal ascites appears loculated and peritonitis cannot be excluded. Electronically signed by: Grabiel Singleton MD Date:    10/24/2022 Time:    23:30    CTA Chest Abdomen Pelvis    Result Date: 10/30/2022  Technique:CT Scan of the chest abdomen and pelvis was performed with intravenous contrast with axial as well as sagittal and, coronal images. Dosage Information:Automated Exposure Control was utilized 564.00 mGy.cm. Comparison:Comparison is with studies dated October 26, 2022 Clinical History:Big hemoglobin drop, eval for bleeding. recent TEVAR 1month ago, yesterday had IR fluid drainage from abd. Findings: Neck:The thyroid gland appear unremarkable. Heart:Mild stable cardiomegaly is seen. The patient is status post median sternotomy. An aortic stent is noted. 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 Dilatation of the descending thoracic aorta is again noted, measuring 41.3 mm at its widest. The distal descending segment measures 23.4 mm at the diaphragmatic hiatus. The origin of the left subclavian, left common carotid and right brachiocephalic arteries are unremarkable. Pulmonary Arteries:No filling defects are seen in the pulmonary arteries to suggest pulmonary embolus. Trauma:Mild diffuse interlobular interstitial septal thickening in both lungs may be present. There is dense opacity at the dependent lung bases consistent with compressive atelectasis with the possibility of an infectious component not entirely excluded. Pleura:No pneumothorax is seen. There is persistence  bilateral pleural effusions which are now moderate-sized. Bony Structures: Spine:The visualized dorsal spine appears unremarkable. Abdomen: Abdominal Wall:There is extensive stranding of the subcutaneous fat suggesting an element of anasarca edema. Correlate with clinical and laboratory findings. Liver:The liver appears unremarkable. Biliary System:No intrahepatic or extrahepatic biliary duct dilatation is seen. Gallbladder:The gallbladder appears unremarkable. Pancreas:Pancreatic atrophy with similar appearance. Spleen:The spleen appears unremarkable. Kidneys:Parenchymal scarring of both kidneys is again noted. Aorta:There is mild calcification of the abdominal aorta and its branches. No aortic aneurysm or dissection is seen. IVC:The IVC is nonenhanced and appears narrowed. Bowel: Esophagus:The visualized esophagus appears unremarkable. Stomach:The stomach appears unremarkable. Duodenum:Unremarkable appearing duodenum. Small Bowel:The small bowel appears unremarkable. Peritoneum:There is a pronounced ascites which is new compared to the prior study. Mild layering hyperdense fluid pelvic fluid is seen, likely reflecting hemoperitoneum. There is a large heterogeneous, predominantly hyperdense mass/collection along the left  paracolic gutter and pelvic sidewall (Series 14 Image 90 and Series 22, Image 46), measuring 97 x 62 x 170 mm (AP, transverse, craniocaudal dimensions). This may reflect hematoma formation. No focal contrast collection or pooling is identified to suggest active hemorrhage at the time of imaging. There is a possible left rectus sheath hematoma seen on image 116 of series 14. Pelvis: Bladder:Contrast is seen within the distended bladder. No contrast extravasation is noted. Male: Prostate gland:The prostate gland appears unremarkable. Bony structures: Dorsal Spine:The visualized dorsal spine appears unremarkable. Notifications:The results were discussed prior to dictation with the patient nurse (Kaelyn) at 2022-10-29 20:47:17 CDT.     Impression: 1.  An aortic stent is again noted.  Caudal extension of the [place] type B aortic dissection 37.1 mm from the inferior margin of the graft is noted (Series 18, Image 55). Dilatation of the descending thoracic aorta is again noted, measuring 41.3 mm at its widest. The distal descending segment measures 23.4 mm at the diaphragmatic hiatus. The origin of the left subclavian, left common carotid and right brachiocephalic arteries are unremarkable. 2. Mild diffuse interlobular interstitial septal thickening in both lungs may be present. An element of congestive failure is not excluded. There is dense opacity at the dependent lung bases consistent with compressive atelectasis with the possibility of an infectious component not entirely excluded. Correlate clinically. 3. The IVC is nonenhanced and appears narrowed. 4. There is a pronounced ascites which is new compared to the prior study. Mild layering hyperdense fluid pelvic fluid is seen, likely reflecting hemoperitoneum. There is a large heterogeneous, predominantly hyperdense mass/collection along the left paracolic gutter and pelvic sidewall (Series 14 Image 90 and Series 22, Image 46), measuring 97 x 62 x 170 mm (AP,  transverse, craniocaudal dimensions). This may reflect hematoma formation. No focal contrast collection or pooling is identified to suggest active hemorrhage at the time of imaging. There is a possible left rectus sheath hematoma seen on image 116 of series 14. Correlate with clinical and laboratory findings as regards additional evaluation and follow-up. 5.  Persistence of bilateral pleural effusions which are moderate-sized. Follow-up as clinically indicated. 6. Details and other findings as discussed above. No significant discrepancy with overnight report. Electronically signed by: Jameson Brumfield Date:    10/30/2022 Time:    09:57    X-Ray Chest 1 View for Line/Tube Placement    Result Date: 10/25/2022  EXAMINATION: XR CHEST 1 VIEW FOR LINE/TUBE PLACEMENT CLINICAL HISTORY: picc placement; COMPARISON: 22 October 2022 FINDINGS: Portable frontal view of the chest was obtained. Right-sided PICC tip overlies the mid SVC.  Stable right-sided central line.  Cardiac silhouette unchanged.  Continued hazy bibasilar opacities.  No pneumothorax.     Right PICC tip overlies the mid SVC. Electronically signed by: Kapil Ayers Date:    10/25/2022 Time:    16:56      Assessment / Plan:     Assessment & Plan:   33 y/o male unknown to our group who has a past medical history of HTN, SLE,  hepatitis B, aortic dissection with repair 2/2021, RUE DVT on Xarelto. Admitted with aortic dissection requiring sx. He has had a very complex case since. GI consulted of possible necrotizing pancreatitis seen on CT.     Peritoneal fluid collection +/- peritonitis  Possible necrotizing pancreatitis  Abdominal distention  Constipation  -CT guided drainage of peritoneal fluid 10/28- Pending amylase body fluid to assess for pancreatic ductal leak as the cause of the fluid collection. Body fluid culture grew out Klebsiella.   -s/p Exploratory lap x3 with most recent done on 10/30  with findings of 3 L venita ascites, no signs of infection, necrotic  tissue or bowel issues   - Serum lipase normal (<4)  - Serum Amylase 14  - On lactulose   - Sx, Renal, Rheum all on case           Continue IV nutrition  NO bm in three days, recommend Relistor and miralax be added on.   Continue abx  Supportive care  GI available if needed please call       @[facility]@

## 2022-11-02 NOTE — PT/OT/SLP PROGRESS
"Occupational Therapy  Treatment    Stuart Guevara   MRN: [id number]   Admitting Diagnosis: Aortic dissection    OT Date of Treatment: 11/02/22   OT Start Time: 1142  OT Stop Time: 1159  OT Total Time (min): 17 min    Billable Minutes:  Therapeutic Activity 17    OT/ADONAY: OT     ADONAY Visit Number: 5    General Precautions: Standard, fall, NPO  Orthopedic Precautions:    Braces:    Respiratory Status: Nasal cannula, flow 3 L/min         Subjective:  Communicated with nurse and PT prior to session.  PT assisted pt OOB to recliner chair at 10:15 and tolerated almost 2 hours before requesting assist back to bed d/t fatigue and pain.  Pain/Comfort  Pain Rating 1: 5/10  Location 1: abdomen  Pain Addressed 1: Pre-medicate for activity, Reposition, Cessation of Activity  Pain Rating Post-Intervention 1: 3/10    Objective:  Patient found with: oxygen, NG tube, peripheral IV, pulse ox (continuous)     Functional Mobility:  Bed Mobility:  max A tf sitting EOB to supine       Transfers:max A with cues to extend BLE for weightbearing in standing, pt unable to take steps to turn d/t fatigue        Functional Ambulation: NA    Static Sit: POOR: Needs MODERATE assist to maintain  Dynamic Sit: POOR: N/A  Static Stand: POOR: Needs MODERATE assist to maintain  Dynamic stand: 0: N/A    Therapeutic Activities and Exercises:  Transfer training, balance training seated EOB (required BUE support at bed at all times), bed mobility training.    AM-PAC 6 CLICK ADL   How much help from another person does this patient currently need?   1 = Unable, Total/Dependent Assistance  2 = A lot, Maximum/Moderate Assistance  3 = A little, Minimum/Contact Guard/Supervision  4 = None, Modified Ramsey/Independent          AM-PAC Raw Score CMS "G-Code Modifier Level of Impairment Assistance   6 % Total / Unable   7 - 8 CM 80 - 100% Maximal Assist   9-13 CL 60 - 80% Moderate Assist   14 - 19 CK 40 - 60% Moderate Assist   20 - 22 CJ 20 - 40% Minimal " Assist   23 CI 1-20% SBA / CGA   24 CH 0% Independent/ Mod I       Patient left HOB elevated with all lines intact and call button in reach    ASSESSMENT:  Stuart Guevara is a 32 y.o. male with a medical diagnosis of Aortic dissection and presents with significant decline.    Rehab identified problem list/impairments: Rehab identified problem list/impairments: weakness, impaired endurance, impaired self care skills, impaired functional mobility, impaired balance, decreased lower extremity function, pain, impaired cardiopulmonary response to activity    Rehab potential is good.    Activity tolerance: Fair    Discharge recommendations: Discharge Facility/Level of Care Needs: nursing facility, skilled     Barriers to discharge:      Equipment recommendations:       GOALS:   Multidisciplinary Problems       Occupational Therapy Goals          Problem: Occupational Therapy    Goal Priority Disciplines Outcome Interventions   Occupational Therapy Goal     OT, PT/OT Ongoing, Progressing    Description: Goals to be met by: 11/4/2022  Goals Met and Upgraded/New goals added 10/21/2022    Patient will increase functional independence with ADLs by performing:      UE dressing with Modified [place]. - new goal  LB Dressing with Minimal Assistance and dressing AEDs. - continue/progressing  Grooming while standing at sink with Stand-by Assistance - new goal  Toileting from toilet with Modified Davidson for hygiene and clothing management. - new goal  Bathing from  shower chair/bench with Modified [place]. - new goal  Toilet transfer to toilet with Modified Davidson. - new goal    UE Dressing with Stand-by Assistance. - goal met  Grooming while EOB with Stand-by Assistance. - goal met  Sitting at edge of bed x10 minutes with Stand-by Assistance. - goal met                         Plan:  Patient to be seen 3 x/week, 5 x/week to address the above listed problems via self-care/home management, therapeutic  activities, therapeutic exercises  Plan of Care expires: 11/04/22  Plan of Care reviewed with: patient         11/02/2022

## 2022-11-03 NOTE — PROGRESS NOTES
Pharmacist Renal Dose Adjustment Note    Stuart Guevara is a 32 y.o. male being treated with the medication meropenem    Patient Data:    Vital Signs (Most Recent):  Temp: 98.5 °F (36.9 °C) (11/03/22 1054)  Pulse: 94 (11/03/22 1054)  Resp: 18 (11/03/22 1040)  BP: (!) 163/79 (11/03/22 1054)  SpO2: 95 % (11/03/22 1054)   Vital Signs (72h Range):  Temp:  [97.9 °F (36.6 °C)-99 °F (37.2 °C)]   Pulse:  []   Resp:  [16-20]   BP: (102-198)/(59-99)   SpO2:  [89 %-95 %]      Recent Labs   Lab 11/01/22  0823 11/02/22  0415 11/03/22  0349   CREATININE 1.54* 1.62* 1.80*     Serum creatinine: 1.8 mg/dL (H) 11/03/22 0349  Estimated creatinine clearance: 46.7 mL/min (A)    Meropenem 1000 mg Q12H will be changed to meropenem 500 mg IV Q24H for dialysis patient.    Pharmacist's Name: Joel Sánchez PharmD  Pharmacist's Extension: [phone]

## 2022-11-03 NOTE — PLAN OF CARE
Problem: Occupational Therapy  Goal: Occupational Therapy Goal  Description: Goals to be met by: 11/17/2022  Goals Met and Upgraded/New goals added 10/21/2022  Care Plan Revised due to patient decline in functional status following surgery 11/3/2022    Patient will increase functional independence with ADLs by performing:      UE dressing with Modified Wh[place]aker. - continue  LB Dressing with Minimal Assistance and dressing AEDs. - continue  Grooming while EOB with Modified Wh[place]aker. - new goal  Sitting at edge of bed x10 minutes with Modified Whittaker - new goal  Bathing from  shower chair/bench with Moderate Assistance. - continue  Toileting from bedside commode with Moderate Assistance for hygiene and clothing management. - new goal  Toilet transfer to bedside commode with Minimum Assistance. - new goal      Outcome: Ongoing, Progressing

## 2022-11-03 NOTE — PROGRESS NOTES
[facility]  Hospital Medicine Progress Note        Chief Complaint: Inpatient Follow-up for Pancreatitis     HPI:   Stuart Guevara is a 32 y.o. male who has PMH which includes HTN, SLE,  hepatitis B, type A aortic dissection with repair + mechanical aortic valve placement 2/2021 on coumadin, RUE DVT s/p Xarelto, class V lupus nephritis; presented to the ED at [facility] on 9/23/2022  with reports of  shortness of breath and chest pain radiating to his back. Work up revealed a type B aortic dissection and significant hypertension noted on presentation.  He failed medical management and had progression of his symptoms despite adequate control of hemodynamic parameters.  PT was transferred from [facility] to [facility] for CV surgery evaluation which he was taken  to the OR and had TEVAR with left carotid to subclavian artery bypass on 9/23.   Later Patient developed abdominal distension and rigidity of his abdomen and developed a lactic acidosis.  He had a repeat CT angiography on 9/25 of the chest/abdomen/pelvis without evidence of worsening dissection, but evidence of significant intra-abdominal pathology with some hemoperitoneum, signs of hypoperfusion of the abdominal organs (compartment syndrome)  free blood in the abdomen. He underwent exploratory laparotomy on 9/25 with evidence of viable bowel and no significant bleeding identified, abd was closed with flakito drain left in situ chest tube was placed. Had repeat ex-laps on 9/27, 9/29. Postoperatively, he developed severe unstable bradycardia, and a transvenous pacemaker was temporarily placed by Cardiology, with the bradycardia resolving within he next couple days.  Shortly afterwards, he was noted to have significant worsening of his renal failure and critical hyperkalemia. Renal services consulted and HD catheter was placed and he was started on HD treatments. Patient was cleared for transfer out of ICU to the floor.  He started having worsening  abdominal pain. He was seen by rheumatology for possible lupus flare up and serositis. He was started on steroids and other immunomodulating drugs. Drugs have since been discontinued due to concern of infection.  Repeat CT showed multiple loculated fluid collections, necrotizing pancreatitis, possible peritonitis. Surgery team is following with plan for IR drainage, which was performed October 28.  Body fluid culture grew out Klebsiella.  Surgical hospitalist took back to the OR October 30th due to continued pain in peritonitis with positive cultures, only found about 3 L of venita ascites, no other sign of infection, necrotic tissue, bowel issues or hematoma.    Interval Hx:   Patient today awake and comfortable. Has persistent abdominal pain but now well controlled. Passing flatus. No BM x 3 days. Tolerating po diet, Afebrile.     Objective/physical exam:  General: In no acute distress, frail  Chest: Clear to auscultation bilaterally  Heart: RRR, +S1, S2, no appreciable murmur  Abdomen: + distension, Firm, + tenderness in all quadrants + Bowel sounds   MSK: severe muscle masting    : penile and scrotal edema   Neurologic: Alert and oriented x4, Cranial nerve II-XII intact, Strength 5/5 in all 4 extremities    VITAL SIGNS: 24 HRS MIN & MAX LAST   Temp  Min: 98.1 °F (36.7 °C)  Max: 98.7 °F (37.1 °C) 98.1 °F (36.7 °C)   BP  Min: 121/71  Max: 170/97 (!) 170/97     Pulse  Min: 88  Max: 97  92   Resp  Min: 16  Max: 20 16   SpO2  Min: 90 %  Max: 96 % 96 %       Recent Labs   Lab 11/01/22  0822 11/02/22 0415 11/03/22  0349   WBC 21.2* 22.3* 19.6*   RBC 3.38* 3.19* 3.22*   HGB 9.5* 9.1* 9.1*   HCT 28.9* 27.6* 27.6*   MCV 85.5 86.5 85.7   MCH 28.1 28.5 28.3   MCHC 32.9* 33.0 33.0   RDW 15.6 15.3 15.0    217 251   MPV 11.4* 10.7* 11.0*       Recent Labs   Lab 11/01/22  0823 11/02/22 0415 11/03/22  0349   * 130* 129*   K 5.1 5.2* 4.9   CO2 22 21* 27   BUN 67.9* 63.1* 84.9*   CREATININE 1.54* 1.62* 1.80*    CALCIUM 8.2* 8.1* 8.2*   MG 2.10 2.00 1.90   ALBUMIN 2.0* 2.0* 1.8*   ALKPHOS 106 114 134   ALT 8 9 8   AST 20 19 21   BILITOT 0.7 0.8 0.9          Microbiology Results (last 7 days)       Procedure Component Value Units Date/Time    Body Fluid Culture [[id number]] Collected: 10/28/22 0940    Order Status: Completed Specimen: Peritoneal Fluid from Abdomen Updated: 11/02/22 1130     Body Fluid Culture Final Report: At 5 days. No growth    Anaerobic Culture [[id number]] Collected: 10/28/22 0940    Order Status: Completed Specimen: Body Fluid from Abdomen Updated: 10/31/22 0843     Anaerobe Culture No Anaerobes Isolated    Anaerobic Culture [[id number]] Collected: 10/28/22 0940    Order Status: Completed Specimen: Body Fluid from Peritoneal Updated: 10/31/22 0842     Anaerobe Culture No Anaerobes Isolated    Body Fluid Culture [[id number]]  (Abnormal)  (Susceptibility) Collected: 10/28/22 0940    Order Status: Completed Specimen: Peritoneal Fluid from Abdomen Updated: 10/30/22 1351     Body Fluid Culture Many Klebsiella pneumoniae    Gram Stain [[id number]] Collected: 10/28/22 0940    Order Status: Completed Specimen: Peritoneal Fluid from Abdomen Updated: 10/29/22 1130     GRAM STAIN No WBCs, No bacteria seen    Gram Stain [[id number]] Collected: 10/28/22 0940    Order Status: Completed Specimen: Peritoneal Fluid from Abdomen Updated: 10/29/22 1122     GRAM STAIN Rare WBC observed      Few Gram Negative Rods    Fungal Culture [[id number]] Collected: 10/28/22 0940    Order Status: Sent Specimen: Body Fluid from Peritoneal Updated: 10/28/22 1326    Fungal Culture [[id number]] Collected: 10/28/22 0940    Order Status: Sent Specimen: Body Fluid from Abdomen Updated: 10/28/22 1326    Anaerobic Culture [[id number]]     Order Status: Canceled Specimen: Body Fluid from Abdomen     Body Fluid Culture [[id number]]     Order Status: Canceled Specimen: Body Fluid from Abdomen     Fungal Culture [[id number]]     Order Status: Canceled  Specimen: Body Fluid from Abdomen     Gram Stain [[id number]]     Order Status: Canceled Specimen: Body Fluid from Abdomen     Gram Stain [[id number]]     Order Status: Canceled Specimen: Peritoneal Fluid     Body Fluid Culture [[id number]]     Order Status: Canceled Specimen: Peritoneal Fluid     Fungal Culture [[id number]]     Order Status: Canceled Specimen: Body Fluid     Anaerobic Culture [[id number]]     Order Status: Canceled Specimen: Body Fluid              See below for Radiology    Scheduled Med:   aspirin  81 mg Oral Daily    carvediloL  25 mg Oral BID    cloNIDine  0.1 mg Oral BID    enoxaparin  1 mg/kg Subcutaneous Q24H    fat emulsion 20%  250 mL Intravenous Every Tues, Thurs, Sat    [START ON 11/5/2022] fluconazole (DIFLUCAN) IV (PEDS and ADULTS)  400 mg Intravenous Every Tues, Thurs, Sat    gabapentin  100 mg Oral TID    hydrALAZINE  100 mg Oral Q8H    insulin detemir U-100  15 Units Subcutaneous BID    lactulose 10 gram/15 ml  10 g Oral BID    [START ON 11/4/2022] meropenem (MERREM) IVPB  500 mg Intravenous Q24H    [START ON 11/4/2022] methylPREDNISolone sodium succinate injection  40 mg Intravenous Daily    morphine  30 mg Oral Q12H    NIFEdipine  90 mg Oral Daily    pantoprazole  40 mg Intravenous Daily    sodium bicarbonate  1,300 mg Oral Daily    sodium chloride 0.9%  10 mL Intravenous Q6H    sodium zirconium cyclosilicate  10 g Oral Daily    warfarin  3 mg Oral Daily        Continuous Infusions:   TPN ADULT CENTRAL LINE CUSTOM 60 mL/hr at 11/03/22 1006    TPN ADULT CENTRAL LINE CUSTOM          PRN Meds:  acetaminophen, albuterol, bisacodyL, dextrose 10 % in water (D10W), dextrose 10 % in water (D10W), dextrose 10%, glucagon (human recombinant), glucose, glucose, hydrALAZINE, HYDROmorphone, insulin aspart U-100, labetaloL, melatonin, nitroGLYCERIN, ondansetron, polyethylene glycol, sodium chloride 0.9%, sodium chloride 0.9%, Flushing PICC Protocol **AND** sodium chloride 0.9% **AND** sodium chloride  0.9%       Assessment/Plan:  Severe necrotizing pancreatitis with loculated intra-abdominal fluid collection-status post IR drainage October 28  Intra-abdominal abscess versus infected necrotizing pancreatitis  Hospital acquired pneumonia-right-sided  Sepsis  Anasarca  Acute kidney injury ATN  Critical illness myopathy  Severe protein caloric malnutrition     History of:  Type A aortic dissection status post repair with mechanical aortic valve conduit, recent type B aortic dissection status post TAVR, lupus nephritis class 5, mixed connective tissue disorder predominant lupus, transverse myelitis recovered    Plan:  Patient making a slow clinical recovery   Abdomen is still distended and tender   He is now tolerating po diet   Will add relistor x 1 dose today, Cont other scheduled laxatives   Cont IV merrem per ID team   He has anasarca and will Cont HD per renal team   Will closely monitor patients daily weight, urine out put, renal parameters and volume status   He has poor nutritional status, cont TPN for now   Pain now well controlled  Encouraged patient to start participating more with PT  Cont FD lovenox and coumadin, monitor daily INR   Cont supportive care     Continue strict aspiration, fall and decubitus precautions      Labs in am    Critical care note:  Critical care diagnosis: Sepsis needing iv antibiotics   Critical care interventions: Hands-on evaluation, review of labs/radiographs/records and discussion with patient and family if present  Critical care time spent: 35 minutes       VTE prophylaxis: Lovenox    Patient condition:  Guarded    Anticipated discharge and Disposition:   LTAC, in formed CM      All diagnosis and differential diagnosis have been reviewed; assessment and plan has been documented; I have personally reviewed the labs and test results that are presently available; I have reviewed the patients medication list; I have reviewed the consulting providers response and recommendations. I  have reviewed or attempted to review medical records based upon their availability    All of the patient's questions have been  addressed and answered. Patient's is agreeable to the above stated plan. I will continue to monitor closely and make adjustments to medical management as needed.  _____________________________________________________________________    Nutrition Status:    Radiology:  X-Ray Chest 1 View  Narrative: EXAMINATION:  XR CHEST 1 VIEW    CLINICAL HISTORY:  RIJ temp HD catheter exchange;    TECHNIQUE:  Single view of the chest    COMPARISON:  11/02/2022    FINDINGS:  Right-sided central line remains in place.  Left pleural effusion is grossly unchanged.  Right mid lung zone opacification is similar to prior.  Impression: No adverse interval change.    Electronically signed by: Jakob Boland  Date:    11/02/2022  Time:    13:48  X-Ray Chest 1 View  Narrative: EXAMINATION:  XR CHEST 1 VIEW    CLINICAL HISTORY:  Cough;    TECHNIQUE:  Single view of the chest    COMPARISON:  10/25/2022    FINDINGS:  Postsurgical changes again noted with increasing right lung opacification in left pleural effusion.  Developing infectious process is not excluded.  Impression: Postsurgical changes again noted with increasing right lung opacification in left pleural effusion.  Developing infectious process is not excluded.    Electronically signed by: Jakob Boland  Date:    11/02/2022  Time:    08:52  X-Ray Abdomen Flat And Erect  Narrative: EXAMINATION:  XR ABDOMEN FLAT AND ERECT    CLINICAL HISTORY:  abdominal distention.;    TECHNIQUE:  Flat and upright imaging of the abdomen    COMPARISON:  None    FINDINGS:  Postsurgical changes.  NG tube projects over the right upper quadrant in the expected location of the stomach.  Impression: Postsurgical changes. NG tube projects over the right upper quadrant in the expected location of the stomach.    Electronically signed by: Jakob  Krystian  Date:    11/02/2022  Time:    08:39      Saurabh Curry MD   11/03/2022

## 2022-11-03 NOTE — PROGRESS NOTES
Infectious Diseases Progress Note  32-year-old male with past medical history of SLE, HTN, hepatitis-B, aortic dissection with repair in February 2021, DVT on Xarelto, initially presented to the [facility] ED on 09/23/2022 with shortness of breath and chest pain.  Evaluation revealed a aortic dissection type B, with progressive course requiring transferred to [facility], promptly taken to OR for TEVAR with left carotid to subclavian artery bypass.  He had a postoperative complicated course with development of compartment syndrome found on CT angiography of chest/abdomen and pelvis with hemoperitoneum.  This required exploratory laparotomy, source apparently unstable bradycardia requiring pacemaker and then acute kidney injury and initiation of hemodialysis.  He had been hospitalized in the ICU but since downgraded to the floor and out on the floor.  CT scan of the abdomen and pelvis done on 10/26 showed no normal pancreatic parenchyma seen with a large fluid collection with a few foci of gas noted at the level of the pancreas, loculated, faintly peripherally enhancing fluid collection extending along the pericolic gutter as findings compatible with sequela of severe necrotizing pancreatitis with walled-off necrosis, mass effect on bowel loops from abdominal fluid collections, anasarca.  Ultrasound of the scrotum with noted edema.  He has been pretty much afebrile lately with a low-grade temp of 99.5 noted on 10/25 with associated leukocytosis of 27.5 which worse today at 33.4.    He is currently on antimicrobial coverage with meropenem    Subjective:  No new complaints, no fevers, doing about the same.  Lying in bed in no acute distress      Past Medical History:   Diagnosis Date    Aortic dissection 9/23/2022    Hypertension     Systemic lupus erythematosus, organ or system involvement unspecified     Systemic lupus erythematosus, organ or system involvement unspecified      Past Surgical  History:   Procedure Laterality Date    APPLICATION OF WOUND VACUUM-ASSISTED CLOSURE DEVICE  9/27/2022    Procedure: APPLICATION, WOUND VAC;  Surgeon: Christopher Espinal MD;  Location: [facility];  Service: General;;    CARDIAC SURGERY      CREATION OF BYPASS FROM INTERNAL CAROTID ARTERY TO SUBCLAVIAN ARTERY Left 9/23/2022    Procedure: CREATION, BYPASS, ARTERIAL, INTERNAL CAROTID TO SUBCLAVIAN;  Surgeon: Vic Martines MD;  Location: [facility] OR;  Service: Cardiothoracic;  Laterality: Left;    ENDOVASCULAR REPAIR OF THORACIC AORTA N/A 9/23/2022    Procedure: REPAIR, AORTA, THORACIC, ENDOVASCULAR;  Surgeon: Vic Martines MD;  Location: [facility] OR;  Service: Cardiology;  Laterality: N/A;    FINGER AMPUTATION      INSERTION OF TUNNELED CENTRAL VENOUS HEMODIALYSIS CATHETER N/A 10/24/2022    Procedure: INSERTION, CATHETER, CENTRAL VENOUS, HEMODIALYSIS, TUNNELED;  Surgeon: Yogesh Melton DO;  Location: [facility] CATH LAB;  Service: Nephrology;  Laterality: N/A;  TUNNELED CATH INSERTION    THROMBECTOMY Right     TOE AMPUTATION       Social History     Socioeconomic History    Marital status: Single   Tobacco Use    Smoking status: Every Day     Types: Cigarettes    Smokeless tobacco: Never     Social Determinants of Health     Food Insecurity: Unknown    Worried About Running Out of Food in the Last Year: Never true   Transportation Needs: Unknown    Lack of Transportation (Medical): No   Housing Stability: Unknown    Unable to Pay for Housing in the Last Year: No       ROS  Constitutional:  Positive for malaise/fatigue.   HENT: Negative.     Respiratory: Negative.     Gastrointestinal:  Positive for abdominal pain and nausea.   Genitourinary: Negative.    Musculoskeletal: Negative.    Neurological:  Positive for weakness.   Endo/Heme/Allergies: Negative.    Psychiatric/Behavioral: Negative.    All other Systems review done and negative.    Review of patient's allergies indicates:   Allergen Reactions    Levofloxacin      "Sulfamethoxazole-trimethoprim          Scheduled Meds:   aspirin  81 mg Oral Daily    carvediloL  25 mg Oral BID    cloNIDine  0.1 mg Oral BID    enoxaparin  1 mg/kg Subcutaneous Q24H    fluconazole (DIFLUCAN) IV (PEDS and ADULTS)  400 mg Intravenous Q24H    gabapentin  100 mg Oral TID    hydrALAZINE  100 mg Oral Q8H    insulin detemir U-100  15 Units Subcutaneous BID    lactulose 10 gram/15 ml  10 g Oral BID    meropenem (MERREM) IVPB  1 g Intravenous Q12H    methocarbamoL  500 mg Oral QID    methylPREDNISolone sodium succinate injection  40 mg Intravenous Q12H    morphine  30 mg Oral Q12H    NIFEdipine  90 mg Oral Daily    pantoprazole  40 mg Intravenous Daily    sodium bicarbonate  1,300 mg Oral Daily    sodium chloride 0.9%  10 mL Intravenous Q6H    sodium zirconium cyclosilicate  10 g Oral Daily    warfarin  3 mg Oral Daily     Continuous Infusions:   TPN ADULT CENTRAL LINE CUSTOM 60 mL/hr at 11/02/22 1008    TPN ADULT CENTRAL LINE CUSTOM       PRN Meds:acetaminophen, albuterol, bisacodyL, dextrose 10 % in water (D10W), dextrose 10 % in water (D10W), dextrose 10%, glucagon (human recombinant), glucose, glucose, hydrALAZINE, HYDROmorphone, insulin aspart U-100, labetaloL, melatonin, nitroGLYCERIN, ondansetron, polyethylene glycol, sodium chloride 0.9%, sodium chloride 0.9%, Flushing PICC Protocol **AND** sodium chloride 0.9% **AND** sodium chloride 0.9%    Objective:  /71   Pulse 88   Temp 98.1 °F (36.7 °C) (Oral)   Resp 18   Ht 5' 5" (1.651 m)   Wt 56.6 kg (124 lb 12.5 oz)   SpO2 (!) 90%   BMI 20.76 kg/m²     Physical Exam:   Physical Exam  Vitals reviewed.   Constitutional:       General: He is not in acute distress.  HENT:      Head: Normocephalic and atraumatic.  Neck:      Comments: Right IJ vascular access noted  Cardiovascular:      Rate and Rhythm: Normal rate and regular rhythm.      Heart sounds: Normal heart sounds.   Pulmonary:      Effort: No respiratory distress.      Breath sounds: " Normal breath sounds.   Abdominal:      General: Bowel sounds are normal.     Palpations: Abdomen is soft.      Tenderness: There is abdominal tenderness.   Genitourinary:     Comments: Scrotal edema  Musculoskeletal:         General: No deformity.      Cervical back: Neck supple.      Right lower leg: Edema present.      Left lower leg: Edema present.   Skin:     Findings: No erythema or rash.   Neurological:      Mental Status: He is alert.     Imaging       Lab Review   Recent Results (from the past 24 hour(s))   POCT glucose    Collection Time: 11/01/22 10:57 PM   Result Value Ref Range    POCT Glucose 321 (H) 70 - 110 mg/dL   Comprehensive Metabolic Panel    Collection Time: 11/02/22  4:15 AM   Result Value Ref Range    Sodium Level 130 (L) 136 - 145 mmol/L    Potassium Level 5.2 (H) 3.5 - 5.1 mmol/L    Chloride 99 98 - 107 mmol/L    Carbon Dioxide 21 (L) 22 - 29 mmol/L    Glucose Level 295 (H) 74 - 100 mg/dL    Blood Urea Nitrogen 63.1 (H) 8.9 - 20.6 mg/dL    Creatinine 1.62 (H) 0.73 - 1.18 mg/dL    Calcium Level Total 8.1 (L) 8.4 - 10.2 mg/dL    Protein Total 4.8 (L) 6.4 - 8.3 gm/dL    Albumin Level 2.0 (L) 3.5 - 5.0 gm/dL    Globulin 2.8 2.4 - 3.5 gm/dL    Albumin/Globulin Ratio 0.7 (L) 1.1 - 2.0 ratio    Bilirubin Total 0.8 <=1.5 mg/dL    Alkaline Phosphatase 114 40 - 150 unit/L    Alanine Aminotransferase 9 0 - 55 unit/L    Aspartate Aminotransferase 19 5 - 34 unit/L    eGFR 57 mls/min/1.73/m2   Magnesium    Collection Time: 11/02/22  4:15 AM   Result Value Ref Range    Magnesium Level 2.00 1.60 - 2.60 mg/dL   Phosphorus    Collection Time: 11/02/22  4:15 AM   Result Value Ref Range    Phosphorus Level 5.7 (H) 2.3 - 4.7 mg/dL   Protime-INR    Collection Time: 11/02/22  4:15 AM   Result Value Ref Range    PT 16.8 (H) 12.5 - 14.5 seconds    INR 1.38 (H) 0.00 - 1.30   CBC with Differential    Collection Time: 11/02/22  4:15 AM   Result Value Ref Range    WBC 22.3 (H) 4.5 - 11.5 x10(3)/mcL    RBC 3.19 (L) 4.70  - 6.10 x10(6)/mcL    Hgb 9.1 (L) 14.0 - 18.0 gm/dL    Hct 27.6 (L) 42.0 - 52.0 %    MCV 86.5 80.0 - 94.0 fL    MCH 28.5 27.0 - 31.0 pg    MCHC 33.0 33.0 - 36.0 mg/dL    RDW 15.3 11.5 - 17.0 %    Platelet 217 130 - 400 x10(3)/mcL    MPV 10.7 (H) 7.4 - 10.4 fL    Neut % 93.3 %    Lymph % 2.3 %    Mono % 3.3 %    Eos % 0.0 %    Basophil % 0.2 %    Lymph # 0.52 (L) 0.6 - 4.6 x10(3)/mcL    Neut # 20.8 (H) 2.1 - 9.2 x10(3)/mcL    Mono # 0.74 0.1 - 1.3 x10(3)/mcL    Eos # 0.00 0 - 0.9 x10(3)/mcL    Baso # 0.05 0 - 0.2 x10(3)/mcL    IG# 0.21 (H) 0 - 0.04 x10(3)/mcL    IG% 0.9 %    NRBC% 0.0 %   POCT glucose    Collection Time: 11/02/22  6:43 AM   Result Value Ref Range    POCT Glucose 320 (H) 70 - 110 mg/dL   MRSA PCR    Collection Time: 11/02/22 11:20 AM   Result Value Ref Range    MRSA PCR SCRN ([facility]) Not Detected Not Detected   POCT glucose    Collection Time: 11/02/22 11:23 AM   Result Value Ref Range    POCT Glucose 218 (H) 70 - 110 mg/dL   POCT glucose    Collection Time: 11/02/22  5:29 PM   Result Value Ref Range    POCT Glucose 238 (H) 70 - 110 mg/dL   POCT glucose    Collection Time: 11/02/22  8:37 PM   Result Value Ref Range    POCT Glucose 206 (H) 70 - 110 mg/dL             Assessment/Plan:  1. Sepsis syndrome with severe leukocytosis  2.  Intra-abdominal infection/peritonitis/abscesses-Klebsiella  3.  Severe necrotizing pancreatitis  4.  Acute kidney injury on hemodialysis  5. Abdominal compartment syndrome  6.  Anemia  7.  Protein calorie malnutrition  8.  History of hepatitis-B   9. Thoracic aortic dissection    -Continue Merrem and Diflucan  -No fevers and with leukocytosis about the same, on steroids, follow  -S/p CT abscess aspiration, with free fluid noted on the right and thick gelatinous loculated fluid on the left, cultures with Klebsiella  -11/2 chest x-ray with increasing right opacity and left effusion  -10/28 Procalcitonin elevated at 1.6  -Hemodialysis per Nephrology  -Seen by GI, pulmonology, and  surgery with inputs noted  -Hepatitis B evaluation and management to continue as outpatient  -Discussed with patient and nursing staff

## 2022-11-03 NOTE — PT/OT/SLP RE-EVAL
Occupational Therapy   Re-evaluation    Name: Stuart Guevara  MRN: [id number]  Admitting Diagnosis:  Aortic dissection  Recent Surgery: Procedure(s) (LRB):  LAPAROTOMY, EXPLORATORY (N/A) 4 Days Post-Op    Recommendations:     Discharge Recommendations: rehabilitation facility  Discharge Equipment Recommendations:  walker, rolling  Barriers to discharge:  None    Assessment:     Stuart Guevara is a 32 y.o. male with a medical diagnosis of Aortic dissection, systemic lupus erythematosus. Pt is s/p multiple abdominal surgeries with most recent on 10/30.  He presents with decline in functional status following washout on 10/30.  Performance deficits affecting function are weakness, impaired self care skills, impaired functional mobility, gait instability, impaired balance, impaired endurance, impaired cognition, decreased coordination, decreased lower extremity function, edema.      Rehab Prognosis:  Good; patient would benefit from acute skilled OT services to address these deficits and reach maximum level of function.       Plan:     Patient to be seen 5 x/week to address the above listed problems via self-care/home management, therapeutic activities, therapeutic exercises  Plan of Care Expires: 11/17/22  Plan of Care Reviewed with: patient    Plan of care frequency increased to 5x/week due to patient's decline in functional status following recent surgery.    Subjective     Chief Complaint: pain in inguinal area  Patient/Family stated goals: to walk again  Communicated with: nrsg prior to session.  Pain/Comfort:  Pain Rating 1: 7/10  Location 1:  (inguinal area)  Pain Addressed 1: Reposition, Distraction, Nurse notified    Objective:     Communicated with: nrsg prior to session.  Patient found supine with: peripheral IV, pulse ox (continuous), telemetry upon OT entry to room.    General Precautions: Standard, fall   Orthopedic Precautions:N/A   Braces: N/A  Respiratory Status: Nasal cannula, flow 3  L/min    Occupational Performance:    Bed Mobility:    Patient completed Rolling/Turning to Left with  maximal assistance  Patient completed Scooting/Bridging with total assistance  Patient completed Supine to Sit with maximal assistance  Patient completed Sit to Supine with total assistance    Functional Mobility/Transfers:  Patient completed Sit <> Stand Transfer with maximal assistance x2 with  rolling walker. Pt attempted sit to stand 2 more times but was unable to stand fully.   Functional Mobility: Pt unable to take steps.     Activities of Daily Living:  Upper Body Dressing: maximal assistance to zainab shirt sitting EOB. Pt requires one hand for support on bed to maintain static sitting balance.   Lower Body Dressing: total assistance to zainab socks    Cognitive/Visual Perceptual:  Cognitive/Psychosocial Skills:     -       Oriented to: Person, Place, Time, and Situation   -       Follows Commands/attention:Follows multistep  commands    Physical Exam:  Balance:    -       Requires UE support for static sitting balance with posterior lean. Pt demonstrates difficulty coming out of reclined position/posterior lean due to pain in abdomen and inguinal area.   Upper Extremity Strength:    -       Right Upper Extremity: full AROM; pain with formal MMT  -       Left Upper Extremity: full AROM; pain with formal MMT      Treatment & Education:  OT re-eval. Self-care training during dressing tasks. Pt educated on bedside commode. Will attempt transfer when appropriate.     Patient left HOB elevated with all lines intact and call button in reach    GOALS:   Multidisciplinary Problems       Occupational Therapy Goals          Problem: Occupational Therapy    Goal Priority Disciplines Outcome Interventions   Occupational Therapy Goal     OT, PT/OT Ongoing, Progressing    Description: Goals to be met by: 11/17/2022  Goals Met and Upgraded/New goals added 10/21/2022  Care Plan Revised due to patient decline in functional  status following surgery 11/3/2022    Patient will increase functional independence with ADLs by performing:      UE dressing with Modified Armstrong. - continue  LB Dressing with Minimal Assistance and dressing AEDs. - continue  Grooming while EOB with Modified Armstrong. - new goal  Sitting at edge of bed x10 minutes with Modified Armstrong - new goal  Bathing from  shower chair/bench with Moderate Assistance. - continue  Toileting from bedside commode with Moderate Assistance for hygiene and clothing management. - new goal  Toilet transfer to bedside commode with Minimum Assistance. - new goal                           History:     Past Medical History:   Diagnosis Date    Aortic dissection 9/23/2022    Hypertension     Systemic lupus erythematosus, organ or system involvement unspecified     Systemic lupus erythematosus, organ or system involvement unspecified          Past Surgical History:   Procedure Laterality Date    APPLICATION OF WOUND VACUUM-ASSISTED CLOSURE DEVICE  9/27/2022    Procedure: APPLICATION, WOUND VAC;  Surgeon: Christopher Espinal MD;  Location: [facility];  Service: General;;    CARDIAC SURGERY      CREATION OF BYPASS FROM INTERNAL CAROTID ARTERY TO SUBCLAVIAN ARTERY Left 9/23/2022    Procedure: CREATION, BYPASS, ARTERIAL, INTERNAL CAROTID TO SUBCLAVIAN;  Surgeon: Vic Martines MD;  Location: [facility];  Service: Cardiothoracic;  Laterality: Left;    ENDOVASCULAR REPAIR OF THORACIC AORTA N/A 9/23/2022    Procedure: REPAIR, AORTA, THORACIC, ENDOVASCULAR;  Surgeon: Vic Martines MD;  Location: [facility];  Service: Cardiology;  Laterality: N/A;    FINGER AMPUTATION      INSERTION OF TUNNELED CENTRAL VENOUS HEMODIALYSIS CATHETER N/A 10/24/2022    Procedure: INSERTION, CATHETER, CENTRAL VENOUS, HEMODIALYSIS, TUNNELED;  Surgeon: Yogesh Melton DO;  Location: [facility] CATH LAB;  Service: Nephrology;  Laterality: N/A;  TUNNELED CATH INSERTION    THROMBECTOMY Right     TOE AMPUTATION         Time  Tracking:     OT Date of Treatment: 11/03/22  OT Start Time: 0912  OT Stop Time: 0938  OT Total Time (min): 26 min    Billable Minutes:Re-eval 15 min  Self Care/Home Management 11 min    11/3/2022

## 2022-11-03 NOTE — PROGRESS NOTES
"   Acute Care Surgery   Progress Note  Admit Date: 9/23/2022  HD#41  POD#4 Days Post-Op    Subjective:   Interval history:  NAEON  AF, VSS  Looking much better today. Improving daily. Pain controlled. Tolerating diet. Passing flatus, no bowel movement yet.     Scheduled Meds:   aspirin  81 mg Oral Daily    carvediloL  25 mg Oral BID    cloNIDine  0.1 mg Oral BID    enoxaparin  1 mg/kg Subcutaneous Q24H    fluconazole (DIFLUCAN) IV (PEDS and ADULTS)  400 mg Intravenous Q24H    gabapentin  100 mg Oral TID    hydrALAZINE  100 mg Oral Q8H    insulin detemir U-100  15 Units Subcutaneous BID    lactulose 10 gram/15 ml  10 g Oral BID    meropenem (MERREM) IVPB  1 g Intravenous Q12H    methocarbamoL  500 mg Oral QID    methylPREDNISolone sodium succinate injection  40 mg Intravenous Q12H    morphine  30 mg Oral Q12H    NIFEdipine  90 mg Oral Daily    pantoprazole  40 mg Intravenous Daily    sodium bicarbonate  1,300 mg Oral Daily    sodium chloride 0.9%  10 mL Intravenous Q6H    sodium zirconium cyclosilicate  10 g Oral Daily    warfarin  3 mg Oral Daily     Continuous Infusions:   TPN ADULT CENTRAL LINE CUSTOM 60 mL/hr at 11/02/22 1008    TPN ADULT CENTRAL LINE CUSTOM       PRN Meds:acetaminophen, albuterol, bisacodyL, dextrose 10 % in water (D10W), dextrose 10 % in water (D10W), dextrose 10%, glucagon (human recombinant), glucose, glucose, hydrALAZINE, HYDROmorphone, insulin aspart U-100, labetaloL, melatonin, nitroGLYCERIN, ondansetron, polyethylene glycol, sodium chloride 0.9%, sodium chloride 0.9%, Flushing PICC Protocol **AND** sodium chloride 0.9% **AND** sodium chloride 0.9%     Objective:     VITAL SIGNS: 24 HR MIN & MAX LAST   Temp  Min: 98.1 °F (36.7 °C)  Max: 98.8 °F (37.1 °C)  98.7 °F (37.1 °C)   BP  Min: 102/59  Max: 180/94  (!) 160/77    Pulse  Min: 82  Max: 101  93    Resp  Min: 16  Max: 20  18    SpO2  Min: 89 %  Max: 95 %  95 %      HT: 5' 5" (165.1 cm)  WT: 56.6 kg (124 lb 12.5 oz)  BMI: 20.8 "     Intake/output:  I/O last 3 completed shifts:  In: 1845.6 [P.O.:240; IV Piggyback:600]  Out: 2515 [Urine:250; Other:2265]  I/O this shift:  In: 720 [P.O.:720]  Out: 400 [Urine:400]    Intake/Output Summary (Last 24 hours) at 11/3/2022 0606  Last data filed at 11/2/2022 2300  Gross per 24 hour   Intake 720 ml   Output 650 ml   Net 70 ml        Lines/drains/airway:  PICC Double Lumen 10/25/22 1520 right brachial (Active)   Line Necessity Review Longterm central access required 11/02/22 2210   Site Assessment No drainage;No redness;No swelling;No warmth 11/02/22 2210   Extremity Assessment Distal to IV No abnormal discoloration;No redness;No swelling;No warmth 11/02/22 2210   Line Securement Device Secured with sutureless device 11/02/22 2210   Dressing Type Central line dressing 11/02/22 2210   Dressing Status Clean;Dry;Intact 11/02/22 2210   Dressing Intervention Integrity maintained 11/02/22 2210   Date on Dressing 11/02/22 11/02/22 1247   Dressing Due to be Changed 11/09/22 11/02/22 1247   Left Lumen Patency/Care Blood return present;Flushed w/o difficulty;Normal saline locked;Other (Comment) 11/02/22 1247   Right Lumen Patency/Care Blood return present;Flushed w/o difficulty;Infusing;Other (Comment) 11/02/22 1247   Number of days: 8       Trialysis (Dialysis) Catheter 10/04/22 1300 right internal jugular (Active)   Line Necessity Review CRRT/HD 11/02/22 2210   Verification by X-ray Yes 11/02/22 0930   Site Assessment No drainage;No redness;No swelling;No warmth 11/02/22 2210   Line Securement Device Secured with sutures 11/02/22 2210   Dressing Type Biopatch in place 11/02/22 2210   Dressing Status Clean;Dry;Intact 11/02/22 2210   Dressing Intervention Integrity maintained 11/02/22 2210   Date on Dressing 10/20/22 10/21/22 1623   Dressing Due to be Changed 10/27/22 10/24/22 2004   Venous Patency/Care normal saline locked 10/30/22 0800   Arterial Patency/Care normal saline locked 10/30/22 0800   Distal  Patency/Care normal saline locked 10/30/22 0800   Number of days: 29     Physical examination:  Gen: NAD, AAOx3, answering questions appropriately  CV: RR  Resp: NWOB  Abd: depressible, distended, rigid, tender to palpation, midline staples in place c/d/I,  Ext: moving all extremities spontaneously and purposefully  Neuro: CN II-XII grossly intact    Labs:  Renal:  Recent Labs     11/01/22 0823 11/02/22 0415 11/03/22 0349   BUN 67.9* 63.1* 84.9*   CREATININE 1.54* 1.62* 1.80*     FENGI:  Recent Labs     11/01/22 0823 11/02/22 0415 11/03/22 0349   * 130* 129*   K 5.1 5.2* 4.9   CO2 22 21* 27   CALCIUM 8.2* 8.1* 8.2*   MG 2.10 2.00 1.90   PHOS 5.8* 5.7* 4.7   ALBUMIN 2.0* 2.0* 1.8*   BILITOT 0.7 0.8 0.9   AST 20 19 21   ALKPHOS 106 114 134   ALT 8 9 8     Heme:  Recent Labs     11/01/22 0822 11/01/22 0823 11/02/22 0415 11/03/22 0349   HGB 9.5*  --  9.1* 9.1*   HCT 28.9*  --  27.6* 27.6*     --  217 251   INR  --  1.30 1.38* 1.26     ID:  Recent Labs     11/01/22 0822 11/02/22 0415 11/03/22 0349   WBC 21.2* 22.3* 19.6*     Cardiovascular:  Recent Labs   Lab 10/30/22  0707   TROPONINI <0.010     I have reviewed all pertinent lab results within the past 24 hours.    Imaging:  X-Ray Chest 1 View   Final Result      No adverse interval change.         Electronically signed by: Jakob Boland   Date:    11/02/2022   Time:    13:48      X-Ray Chest 1 View   Final Result      Postsurgical changes again noted with increasing right lung opacification in left pleural effusion.  Developing infectious process is not excluded.         Electronically signed by: Jakob Boland   Date:    11/02/2022   Time:    08:52      X-Ray Abdomen Flat And Erect   Final Result      Postsurgical changes. NG tube projects over the right upper quadrant in the expected location of the stomach.         Electronically signed by: Jakob Boland   Date:    11/02/2022   Time:    08:39      X-Ray Abdomen AP 1 View   Final Result       As above.         Electronically signed by: Jakob Boland   Date:    10/31/2022   Time:    07:36      CTA Chest Abdomen Pelvis   Final Result   Impression:      1.  An aortic stent is again noted.  Caudal extension of the [place] type B aortic dissection 37.1 mm from the inferior margin of the graft is noted (Series 18, Image 55). Dilatation of the descending thoracic aorta is again noted, measuring 41.3 mm at its widest. The distal descending segment measures 23.4 mm at the diaphragmatic hiatus. The origin of the left subclavian, left common carotid and right brachiocephalic arteries are unremarkable.      2. Mild diffuse interlobular interstitial septal thickening in both lungs may be present. An element of congestive failure is not excluded. There is dense opacity at the dependent lung bases consistent with compressive atelectasis with the possibility of an infectious component not entirely excluded. Correlate clinically.      3. The IVC is nonenhanced and appears narrowed.      4. There is a pronounced ascites which is new compared to the prior study. Mild layering hyperdense fluid pelvic fluid is seen, likely reflecting hemoperitoneum. There is a large heterogeneous, predominantly hyperdense mass/collection along the left paracolic gutter and pelvic sidewall (Series 14 Image 90 and Series 22, Image 46), measuring 97 x 62 x 170 mm (AP, transverse, craniocaudal dimensions). This may reflect hematoma formation. No focal contrast collection or pooling is identified to suggest active hemorrhage at the time of imaging. There is a possible left rectus sheath hematoma seen on image 116 of series 14. Correlate with clinical and laboratory findings as regards additional evaluation and follow-up.      5.  Persistence of bilateral pleural effusions which are moderate-sized. Follow-up as clinically indicated.      6. Details and other findings as discussed above.      No significant discrepancy with overnight report.          Electronically signed by: Jameson Brumfield   Date:    10/30/2022   Time:    09:57      CT Abscess Aspiration without Catheter   Final Result      Successful aspiration of bilateral fluid with simple free fluid identified on the right and thick gelatinous loculated fluid identified in the left.  All fluid sent for laboratory analysis and clearly labeled.         Electronically signed by: Jakob Boland   Date:    10/31/2022   Time:    07:39      US Guided Needle Placement   Final Result      Successful aspiration of bilateral fluid with simple free fluid identified on the right and thick gelatinous loculated fluid identified in the left.  All fluid sent for laboratory analysis and clearly labeled.         Electronically signed by: Jakob Boland   Date:    10/31/2022   Time:    07:39      US Scrotum And Testicles   Final Result      CT Abdomen Pelvis With Contrast   Final Result      1. No normal pancreatic parenchyma is seen.  At the level of the pancreas there is a large fluid collection with a few foci of gas.  Loculated, faintly peripherally enhancing fluid collections extend along the pericolic gutters.  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  Foci of gas are nonspecific in the setting of prior intervention.   2. Extensive loculated peritoneal collections with peripheral enhancement compatible with peritonitis.Left inguinal hernia contains ascitic fluid with some peripheral enhancement similar to the peritoneal fluid.  There is no herniated bowel loop   3. There is a moderate colonic stool burden.  There is some mass effect on the bowel loops due to the loculated nature of the abdominal fluid collections.   4. Anasarca   5. Partially imaged known thoracic aortic dissection status post intervention.         Electronically signed by: Rocio White   Date:    10/26/2022   Time:    12:37      X-Ray Chest 1 View for Line/Tube Placement   Final Result       Right PICC tip overlies the mid SVC.         Electronically signed by: Kapil Ayers   Date:    10/25/2022   Time:    16:56      CT Abdomen Pelvis  Without Contrast   Final Result      Bilateral pleural effusions with moderate ascites and diffuse anasarca.  The epigastric and left mid abdominal ascites appears loculated and peritonitis cannot be excluded.         Electronically signed by: Grabiel Singleton MD   Date:    10/24/2022   Time:    23:30      X-Ray Abdomen AP 1 View   Final Result      Residual feces         Electronically signed by: Ramón Ying   Date:    10/24/2022   Time:    09:46      X-Ray Chest 1 View   Final Result      No significant interval change.         Electronically signed by: Kapil Ayers   Date:    10/22/2022   Time:    15:13      X-Ray Chest 1 View   Final Result      Persistent increased left retrocardiac density and silhouetting of the left hemidiaphragm.      No other focal consolidative changes.      No other change         Electronically signed by: Ramón Ying   Date:    10/12/2022   Time:    08:15      Fl Modified Barium Swallow Speech   Final Result      X-Ray Chest 1 View   Final Result      Unchanged retrocardiac opacity suggesting atelectasis         Electronically signed by: Rocio White   Date:    10/05/2022   Time:    06:11      X-Ray Chest 1 View   Final Result      New central venous catheter terminates within the proximal right atrium.         Electronically signed by: Jameson Brumfield   Date:    10/04/2022   Time:    14:16      X-Ray Chest 1 View   Final Result      No significant interval change.         Electronically signed by: Kapil Ayers   Date:    10/04/2022   Time:    06:54      CT Chest Abdomen Pelvis Without Contrast (XPD)   Final Result      Very limited study due the lack of IV contrast.      Anasarca.      Small amount of ascites.      Small left-sided pleural effusion with bibasilar atelectasis versus infiltrate.         Electronically signed by: Joey  MD Young   Date:    10/03/2022   Time:    21:30      X-Ray Chest 1 View   Final Result      As above.         Electronically signed by: Jameson Brumfield   Date:    10/03/2022   Time:    07:09      X-Ray Chest 1 View   Final Result      As above.         Electronically signed by: Jakob Boland   Date:    10/02/2022   Time:    14:50      X-ray Abdomen for NG Tube Placement (Nursing should notify Radiology after placement)   Final Result      Optimal placement of the nasogastric tube.         Electronically signed by: Jameson Brumfield   Date:    10/01/2022   Time:    11:48      X-Ray Chest 1 View   Final Result      No significant change         Electronically signed by: Ramón Ying   Date:    10/01/2022   Time:    08:05      X-Ray Chest 1 View   Final Result      No acute findings or significant interval change.         Electronically signed by: Kapil Ayers   Date:    09/30/2022   Time:    07:35      X-Ray Chest 1 View   Final Result      Stable exam without significant interval change.         Electronically signed by: Claribel Deal   Date:    09/29/2022   Time:    16:14      X-Ray Chest 1 View   Final Result      Possible mild left perihilar/lower lung atelectasis.  Suspect tiny right apical pneumothorax.         Electronically signed by: Darius Rosario   Date:    09/29/2022   Time:    06:09      X-Ray Chest 1 View   Final Result      No significant interval change.         Electronically signed by: Jameson Brumfield   Date:    09/28/2022   Time:    16:25      X-Ray Chest 1 View   Final Result      Similar appearance to prior exam with retrocardiac atelectasis         Electronically signed by: Rocio White   Date:    09/28/2022   Time:    06:05      X-Ray Chest 1 View   Final Result      No significant change         Electronically signed by: Ramón Ying   Date:    09/27/2022   Time:    08:47      X-Ray Abdomen AP 1 View   Final Result      Satisfactory central femoral line placement.         Electronically  signed by: Emily Candelario MD   Date:    09/26/2022   Time:    13:54      X-Ray Chest 1 View   Final Result      Interval retraction of endotracheal tube, satisfactory position.  Cardiac lead placement in the interval.  No other significant change.         Electronically signed by: Emily Candelario MD   Date:    09/26/2022   Time:    08:00      X-Ray Chest 1 View   Final Result      Increased volume status      Interval intubation with endotracheal tube at the josie.  I recommend retracting      Right-sided chest tube in good position         Electronically signed by: Too Reyes   Date:    09/25/2022   Time:    15:49      X-Ray Abdomen AP 1 View   Final Result      There is slight increased density of the kidneys of questionable etiology and or significance.      Otherwise nonspecific gas pattern         Electronically signed by: Ramón Ying   Date:    09/25/2022   Time:    10:51      CTA Chest Abdomen Non Coronary   Final Result      1. Thoracic aortic endograft placement since 09/23/2022 with the dissection flap extending about 3 cm inferior to the distal portion of the graft.  Opacification of the false lumen along the mid and distal portions of the graft.   2. Moderate volume hemoperitoneum.  Changes involving the liver, spleen, pancreas and bowel are suggestive of overall hypoperfusion.  Hyperdense kidneys suggest acute kidney injury.   3. Small to moderate bilateral pleural effusions, larger on the right.   Findings discussed with Mr. Guevara ICU nurseDulce at 08:35 hours on 09/25/2022.         Electronically signed by: Darius Rosario   Date:    09/25/2022   Time:    08:42      X-Ray Chest 1 View   Final Result      Changes suggestive of right-sided pleural effusion.      Increased left retrocardiac density and silhouetting of the left hemidiaphragm which might be related to an infiltrate/atelectasis.      Interval placement of thoracic endograft.      No focal consolidative changes          Electronically signed by: Ramón Ying   Date:    09/25/2022   Time:    09:10      IR Abdominal Aortobifemoral   Final Result      Fluoroscopic assistance provided as above.         Electronically signed by: Kapil Ayers   Date:    09/26/2022   Time:    09:56      I have reviewed all pertinent imaging results/findings within the past 24 hours.      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 He developed abdominal compartment syndrome s/p multiple exlaps (9/25, 9/27, 9/29) now on HD for renal failure with re-accumulation of ascites despite IR paracentesis. CT has shown concern for necrotizing fascitis. Cultures from paracentesis resulted in Klebsiella growth. S/p washout 10/30.     - Nutrition optimization  - Midline staples out 11/13  - Continue ongoing care per primary. No new changes from surgical stadnpoint.  - Will continue to follow       11/3/2022 6:06 AM    The above findings, diagnostics, and treatment plan were discussed with the physician who will follow with further assessments and recommendations.

## 2022-11-03 NOTE — PROGRESS NOTES
NEPHROLOGY: Progress      32-year-old  male with TAY following TEVAR for a type B aortic dissection on 09/23/2022.  (patient had had a type A repair via TEVAR in February 2022).    During the type B repair he also required a left carotid to left subclavian artery bypass for subclavian artery occlusion.    Since then he has required several exploratory laparotomies due to concerns for ischemic /necrotizing bowel and abdominal compartment syndrome on 9/25, 9/27 and 9/29.    He has SLE with serositis and inflammatory pancreatitis with the last abdominal washout on October 30th with Klebsiella growth on the culture but peritoneal fluid white count at the time of washout was only 80 cells.  He remains on meropenem and Diflucan.    He has been maintained on TTS dialysis schedule via right IJ temp catheter originally placed 2 weeks ago and exchanged on November 2nd over a guidewire by Dr. Melton due to concern for the potential for catheter-related bloodstream infection.  Still requiring Lokelma for control of hyperkalemia.  His urine output has been improving gradually and renal functions may be improving as well.    His NG tube was removed on November 2nd and he is currently tolerating some oral fluids and passing gas without any nausea.                       Current Facility-Administered Medications:     acetaminophen tablet 650 mg, 650 mg, Oral, Q6H PRN, Nolberto Calvillo MD, 650 mg at 11/01/22 2310    albuterol inhaler 2 puff, 2 puff, Inhalation, Q4H PRN, Evelio Marshall MD    aspirin EC tablet 81 mg, 81 mg, Oral, Daily, Luis F Hutson MD, 81 mg at 11/02/22 0900    bisacodyL suppository 10 mg, 10 mg, Rectal, Daily PRN, DENIA Caruso, 10 mg at 10/28/22 0400    carvediloL tablet 25 mg, 25 mg, Oral, BID, MARIANNE Castillo, 25 mg at 11/02/22 2109    cloNIDine tablet 0.1 mg, 0.1  mg, Oral, BID, Ronda Hamilton MD, 0.1 mg at 11/02/22 2110    dextrose 10 % infusion, 12.5 g, Intravenous, PRN, Dewayne Hughes PA-C, 12.5 g at 10/25/22 0317    dextrose 10 % infusion, 25 g, Intravenous, PRN, Dewayne Hughes PA-C    dextrose 10% bolus 125 mL, 12.5 g, Intravenous, PRN, Ivan Chopra MD, Stopped at 10/20/22 0953    enoxaparin injection 60 mg, 1 mg/kg, Subcutaneous, Q24H, Luis F Hutson MD, 60 mg at 11/02/22 1324    fluconazole (DIFLUCAN) IVPB 400 mg, 400 mg, Intravenous, Q24H, Aleah Calix MD, Stopped at 11/03/22 0556    gabapentin capsule 100 mg, 100 mg, Oral, TID, Shahzad Lundberg MD, 100 mg at 11/02/22 2110    glucagon (human recombinant) injection 1 mg, 1 mg, Intramuscular, PRN, Dewayne Hughes PA-C, 1 mg at 10/20/22 0925    glucose chewable tablet 16 g, 16 g, Oral, PRN, Dewayne Hughes PA-C    glucose chewable tablet 24 g, 24 g, Oral, PRN, Dewayne Hughes PA-C, 24 g at 10/24/22 1709    hydrALAZINE injection 20 mg, 20 mg, Intravenous, Q4H PRN, Bin Bell MD, 20 mg at 10/22/22 0319    hydrALAZINE tablet 100 mg, 100 mg, Oral, Q8H, Ronda Hamilton MD, 100 mg at 11/03/22 0510    HYDROmorphone (PF) injection 1 mg, 1 mg, Intravenous, Q6H PRN, Luis F Hutson MD, 1 mg at 11/03/22 0510    insulin aspart U-100 injection 1-10 Units, 1-10 Units, Subcutaneous, QID (AC + HS) PRN, Ronda Hamilton MD, 4 Units at 11/03/22 0606    insulin detemir U-100 injection 15 Units, 15 Units, Subcutaneous, BID, Luis F Hutson MD, 15 Units at 11/02/22 2111    labetaloL injection 20 mg, 20 mg, Intravenous, Q8H PRN, Bin Bell MD, 20 mg at 10/03/22 0321    lactulose 10 gram/15 ml solution 10 g, 10 g, Oral, BID, Luis F Hutson MD, 10 g at 11/02/22 2110    melatonin tablet 6 mg, 6 mg, Oral, Nightly PRN, Michelle Ferrera, [facility], 6 mg at 10/14/22 0000    meropenem (MERREM) 1 g in sodium chloride 0.9 % 100 mL IVPB (MB+), 1 g, Intravenous, Q12H, Luis F Hutson MD, Stopped at 11/03/22 0010    methocarbamoL tablet 500 mg, 500  "mg, Oral, QID, Keyonna Boggs MD, 500 mg at 11/02/22 2109    methylPREDNISolone sodium succinate injection 40 mg, 40 mg, Intravenous, Q12H, Saurabh Curry MD, 40 mg at 11/02/22 2110    morphine 12 hr tablet 30 mg, 30 mg, Oral, Q12H, Shamar Stewart MD, 30 mg at 11/02/22 2109    NIFEdipine 24 hr tablet 90 mg, 90 mg, Oral, Daily, Verna Whipple MD, 90 mg at 11/02/22 0930    nitroGLYCERIN SL tablet 0.4 mg, 0.4 mg, Sublingual, Q5 Min PRN, Zach Shafer MD, 0.4 mg at 10/30/22 0713    ondansetron injection 4 mg, 4 mg, Intravenous, Q6H PRN, Evelio Marshall MD, 4 mg at 11/01/22 2022    pantoprazole injection 40 mg, 40 mg, Intravenous, Daily, Saurabh Curry MD, 40 mg at 11/02/22 0901    polyethylene glycol packet 17 g, 17 g, Oral, BID PRN, Thalia S Renteria, AGNP    sodium bicarbonate tablet 1,300 mg, 1,300 mg, Oral, Daily, Thalia S Renteria, AGNP, 1,300 mg at 11/02/22 0859    sodium chloride 0.9% bolus 250 mL, 250 mL, Intravenous, PRN, Thalia S Renteria, AGNP    sodium chloride 0.9% bolus 250 mL, 250 mL, Intravenous, PRN, Shahzad Lundberg MD    Flushing PICC Protocol, , , Until Discontinued **AND** sodium chloride 0.9% flush 10 mL, 10 mL, Intravenous, Q6H, 10 mL at 11/03/22 0511 **AND** sodium chloride 0.9% flush 10 mL, 10 mL, Intravenous, PRN, Ronda Hamilton MD    sodium zirconium cyclosilicate packet 10 g, 10 g, Oral, Daily, Luis F Hutson MD, 10 g at 11/02/22 0859    TPN ADULT CENTRAL LINE CUSTOM, , Intravenous, Continuous, Ronda Hamilton MD, Last Rate: 60 mL/hr at 11/02/22 1008, New Bag at 11/02/22 1008    TPN ADULT CENTRAL LINE CUSTOM, , Intravenous, Continuous, Ronda Hamilton MD    warfarin tablet 3 mg, 3 mg, Oral, Daily, Luis F Hutson MD, 3 mg at 11/02/22 1727        BP (!) 158/85   Pulse 97   Temp 98.2 °F (36.8 °C) (Oral)   Resp 18   Ht 5' 5" (1.651 m)   Wt 56.6 kg (124 lb 12.5 oz)   SpO2 (!) 91%   BMI 20.76 kg/m²     Physical Exam:    GEN:  Awake and appropriate.  No distress noted.  He appears " chronically ill and somewhat emaciated    HEENT: Atraumatic. EOMI, no icterus  NECK : No JVD, temporary right IJ catheter in place  CARD : RRR s rub or gallop  LUNGS : Clear to auscultation  ABD :  Distended and somewhat tender to palpation.  Midline wound intact.  Soft,non-tender. BS active  EXT : No pitting edema.   NEURO : No obvious focal deficits        Intake/Output Summary (Last 24 hours) at 11/3/2022 0802  Last data filed at 11/3/2022 0648  Gross per 24 hour   Intake 720 ml   Output 1500 ml   Net -780 ml         Laboratory:  Recent Results (from the past 24 hour(s))   MRSA PCR    Collection Time: 11/02/22 11:20 AM   Result Value Ref Range    MRSA PCR SCRN ([facility]) Not Detected Not Detected   POCT glucose    Collection Time: 11/02/22 11:23 AM   Result Value Ref Range    POCT Glucose 218 (H) 70 - 110 mg/dL   POCT glucose    Collection Time: 11/02/22  5:29 PM   Result Value Ref Range    POCT Glucose 238 (H) 70 - 110 mg/dL   POCT glucose    Collection Time: 11/02/22  8:37 PM   Result Value Ref Range    POCT Glucose 206 (H) 70 - 110 mg/dL   Comprehensive Metabolic Panel    Collection Time: 11/03/22  3:49 AM   Result Value Ref Range    Sodium Level 129 (L) 136 - 145 mmol/L    Potassium Level 4.9 3.5 - 5.1 mmol/L    Chloride 97 (L) 98 - 107 mmol/L    Carbon Dioxide 27 22 - 29 mmol/L    Glucose Level 201 (H) 74 - 100 mg/dL    Blood Urea Nitrogen 84.9 (H) 8.9 - 20.6 mg/dL    Creatinine 1.80 (H) 0.73 - 1.18 mg/dL    Calcium Level Total 8.2 (L) 8.4 - 10.2 mg/dL    Protein Total 4.7 (L) 6.4 - 8.3 gm/dL    Albumin Level 1.8 (L) 3.5 - 5.0 gm/dL    Globulin 2.9 2.4 - 3.5 gm/dL    Albumin/Globulin Ratio 0.6 (L) 1.1 - 2.0 ratio    Bilirubin Total 0.9 <=1.5 mg/dL    Alkaline Phosphatase 134 40 - 150 unit/L    Alanine Aminotransferase 8 0 - 55 unit/L    Aspartate Aminotransferase 21 5 - 34 unit/L    eGFR 50 mls/min/1.73/m2   Magnesium    Collection Time: 11/03/22  3:49 AM   Result Value Ref Range    Magnesium Level 1.90 1.60 -  2.60 mg/dL   Phosphorus    Collection Time: 11/03/22  3:49 AM   Result Value Ref Range    Phosphorus Level 4.7 2.3 - 4.7 mg/dL   Protime-INR    Collection Time: 11/03/22  3:49 AM   Result Value Ref Range    PT 15.7 (H) 12.5 - 14.5 seconds    INR 1.26 0.00 - 1.30   CBC with Differential    Collection Time: 11/03/22  3:49 AM   Result Value Ref Range    WBC 19.6 (H) 4.5 - 11.5 x10(3)/mcL    RBC 3.22 (L) 4.70 - 6.10 x10(6)/mcL    Hgb 9.1 (L) 14.0 - 18.0 gm/dL    Hct 27.6 (L) 42.0 - 52.0 %    MCV 85.7 80.0 - 94.0 fL    MCH 28.3 27.0 - 31.0 pg    MCHC 33.0 33.0 - 36.0 mg/dL    RDW 15.0 11.5 - 17.0 %    Platelet 251 130 - 400 x10(3)/mcL    MPV 11.0 (H) 7.4 - 10.4 fL    Neut % 94.1 %    Lymph % 2.0 %    Mono % 2.8 %    Eos % 0.0 %    Basophil % 0.3 %    Lymph # 0.40 (L) 0.6 - 4.6 x10(3)/mcL    Neut # 18.4 (H) 2.1 - 9.2 x10(3)/mcL    Mono # 0.55 0.1 - 1.3 x10(3)/mcL    Eos # 0.00 0 - 0.9 x10(3)/mcL    Baso # 0.05 0 - 0.2 x10(3)/mcL    IG# 0.15 (H) 0 - 0.04 x10(3)/mcL    IG% 0.8 %    NRBC% 0.0 %   POCT glucose    Collection Time: 11/03/22  5:24 AM   Result Value Ref Range    POCT Glucose 203 (H) 70 - 110 mg/dL         Assessment/Plan:  TAY- nonoliguric- he may be recovering   Systemic lupus erythematosus with serositis/pancreatitis  Status post multiple abdominal washouts for inflammatory serositis  Status post type B aortic dissection repair 09/23/2022   Hyperkalemia on Lokelma  Hypertension  Modest hypokalemia-on TPN  Protein calorie malnutrition    I will go ahead and hold dialysis today.  Consult nutritionist to add sodium chloride to TPN to prevent further hyponatremia.  Will monitor day-to-day for dialysis needs.  Patient understands the plan and is agreeable.    He remains on Solu-Medrol 40 mg IV q.12 hours      Shahzad Lundberg MD, [facility]

## 2022-11-03 NOTE — PROGRESS NOTES
"   Acute Care Surgery   Progress Note  Admit Date: 9/23/2022  HD#40  POD#3 Days Post-Op    Subjective:   Interval history:  NAEON  AF,Tachycardic to 101  No nausea/vomiting over night  Passing flatus, no BM     Scheduled Meds:   aspirin  81 mg Oral Daily    carvediloL  25 mg Oral BID    cloNIDine  0.1 mg Oral BID    enoxaparin  1 mg/kg Subcutaneous Q24H    fluconazole (DIFLUCAN) IV (PEDS and ADULTS)  400 mg Intravenous Q24H    gabapentin  100 mg Oral TID    hydrALAZINE  100 mg Oral Q8H    insulin detemir U-100  15 Units Subcutaneous BID    lactulose 10 gram/15 ml  10 g Oral BID    meropenem (MERREM) IVPB  1 g Intravenous Q12H    methocarbamoL  500 mg Oral QID    methylPREDNISolone sodium succinate injection  40 mg Intravenous Q12H    morphine  30 mg Oral Q12H    NIFEdipine  90 mg Oral Daily    pantoprazole  40 mg Intravenous Daily    sodium bicarbonate  1,300 mg Oral Daily    sodium chloride 0.9%  10 mL Intravenous Q6H    sodium zirconium cyclosilicate  10 g Oral Daily    warfarin  3 mg Oral Daily     Continuous Infusions:   TPN ADULT CENTRAL LINE CUSTOM 60 mL/hr at 11/02/22 1008    TPN ADULT CENTRAL LINE CUSTOM       PRN Meds:acetaminophen, albuterol, bisacodyL, dextrose 10 % in water (D10W), dextrose 10 % in water (D10W), dextrose 10%, glucagon (human recombinant), glucose, glucose, hydrALAZINE, HYDROmorphone, insulin aspart U-100, labetaloL, melatonin, nitroGLYCERIN, ondansetron, polyethylene glycol, sodium chloride 0.9%, sodium chloride 0.9%, Flushing PICC Protocol **AND** sodium chloride 0.9% **AND** sodium chloride 0.9%     Objective:     VITAL SIGNS: 24 HR MIN & MAX LAST   Temp  Min: 98.1 °F (36.7 °C)  Max: 98.8 °F (37.1 °C)  98.1 °F (36.7 °C)   BP  Min: 102/59  Max: 180/94  121/71    Pulse  Min: 82  Max: 101  88    Resp  Min: 17  Max: 18  18    SpO2  Min: 89 %  Max: 93 %  (!) 90 %      HT: 5' 5" (165.1 cm)  WT: 56.6 kg (124 lb 12.5 oz)  BMI: 20.8     Intake/output:  I/O last 3 completed shifts:  In: 1845.6 " [P.O.:240; IV Piggyback:600]  Out: 2515 [Urine:250; Other:2265]  I/O this shift:  In: -   Out: 175 [Urine:175]    Intake/Output Summary (Last 24 hours) at 11/2/2022 2007  Last data filed at 11/2/2022 1921  Gross per 24 hour   Intake 1845.6 ml   Output 425 ml   Net 1420.6 ml        Lines/drains/airway:  PICC Double Lumen 10/25/22 1520 right brachial (Active)   Line Necessity Review Longterm central access required 10/30/22 0800   Site Assessment No drainage;No redness;No swelling;No warmth 10/31/22 0800   Extremity Assessment Distal to IV No abnormal discoloration 10/31/22 2145   Line Securement Device Secured with sutureless device 10/31/22 0800   Dressing Type Biopatch in place 10/31/22 2145   Dressing Status Clean;Dry;Intact 10/31/22 2145   Dressing Intervention Integrity maintained 10/31/22 2145   Left Lumen Patency/Care Blood return present;Infusing 10/30/22 0800   Right Lumen Patency/Care Blood return present;Infusing 10/30/22 0800   Number of days: 6       Trialysis (Dialysis) Catheter 10/04/22 1300 right internal jugular (Active)   Line Necessity Review CRRT/HD 10/26/22 2000   Verification by X-ray Yes 10/30/22 0800   Site Assessment No drainage;No redness;No swelling;No warmth 10/31/22 0800   Line Securement Device Secured with sutures 10/31/22 0800   Dressing Type Biopatch in place;Central line dressing 10/31/22 0800   Dressing Status Clean;Dry;Intact 10/31/22 0800   Dressing Intervention Integrity maintained 10/31/22 0800   Date on Dressing 10/20/22 10/21/22 1623   Dressing Due to be Changed 10/27/22 10/24/22 2004   Venous Patency/Care normal saline locked 10/30/22 0800   Arterial Patency/Care normal saline locked 10/30/22 0800   Distal Patency/Care normal saline locked 10/30/22 0800   Number of days: 27       Arterial Line 10/30/22 1533 Left Radial (Active)   Number of days: 1     Physical examination:  Gen: NAD, AAOx3, answering questions appropriately  CV: RR  Resp: NWOB  Abd: depressible, distended,  rigid, tender to palpation, midline staples in place c/d/I,  Ext: moving all extremities spontaneously and purposefully  Neuro: CN II-XII grossly intact    Labs:  Renal:  Recent Labs     10/31/22  0424 11/01/22  0823 11/02/22  0415   BUN 73.5* 67.9* 63.1*   CREATININE 1.67* 1.54* 1.62*       FENGI:  Recent Labs     10/31/22  0424 11/01/22  0823 11/02/22  0415   * 130* 130*   K 6.1* 5.1 5.2*   CO2 20* 22 21*   CALCIUM 8.5 8.2* 8.1*   MG 2.00 2.10 2.00   PHOS 5.5* 5.8* 5.7*   ALBUMIN 2.4* 2.0* 2.0*   BILITOT 0.8 0.7 0.8   AST 21 20 19   ALKPHOS 82 106 114   ALT 8 8 9       Heme:  Recent Labs     10/31/22  0424 11/01/22  0822 11/01/22  0823 11/02/22  0415   HGB 9.4* 9.5*  --  9.1*   HCT 28.3* 28.9*  --  27.6*    190  --  217   INR 1.47*  --  1.30 1.38*       ID:  Recent Labs     10/31/22  0424 11/01/22  0822 11/02/22  0415   WBC 22.9* 21.2* 22.3*         Cardiovascular:  Recent Labs   Lab 10/30/22  0707   TROPONINI <0.010       I have reviewed all pertinent lab results within the past 24 hours.    Imaging:  None new     Micro/Path/Other:  Microbiology Results (last 7 days)       Procedure Component Value Units Date/Time    Body Fluid Culture [[id number]] Collected: 10/28/22 0940    Order Status: Completed Specimen: Peritoneal Fluid from Abdomen Updated: 11/02/22 1130     Body Fluid Culture Final Report: At 5 days. No growth    Anaerobic Culture [[id number]] Collected: 10/28/22 0940    Order Status: Completed Specimen: Body Fluid from Abdomen Updated: 10/31/22 0843     Anaerobe Culture No Anaerobes Isolated    Anaerobic Culture [[id number]] Collected: 10/28/22 0940    Order Status: Completed Specimen: Body Fluid from Peritoneal Updated: 10/31/22 0842     Anaerobe Culture No Anaerobes Isolated    Body Fluid Culture [[id number]]  (Abnormal)  (Susceptibility) Collected: 10/28/22 0940    Order Status: Completed Specimen: Peritoneal Fluid from Abdomen Updated: 10/30/22 1351     Body Fluid Culture Many Klebsiella  pneumoniae    Gram Stain [[id number]] Collected: 10/28/22 0940    Order Status: Completed Specimen: Peritoneal Fluid from Abdomen Updated: 10/29/22 1130     GRAM STAIN No WBCs, No bacteria seen    Gram Stain [[id number]] Collected: 10/28/22 0940    Order Status: Completed Specimen: Peritoneal Fluid from Abdomen Updated: 10/29/22 1122     GRAM STAIN Rare WBC observed      Few Gram Negative Rods    Fungal Culture [[id number]] Collected: 10/28/22 0940    Order Status: Sent Specimen: Body Fluid from Peritoneal Updated: 10/28/22 1326    Fungal Culture [[id number]] Collected: 10/28/22 0940    Order Status: Sent Specimen: Body Fluid from Abdomen Updated: 10/28/22 1326    Anaerobic Culture [[id number]]     Order Status: Canceled Specimen: Body Fluid from Abdomen     Body Fluid Culture [[id number]]     Order Status: Canceled Specimen: Body Fluid from Abdomen     Fungal Culture [[id number]]     Order Status: Canceled Specimen: Body Fluid from Abdomen     Gram Stain [[id number]]     Order Status: Canceled Specimen: Body Fluid from Abdomen     Gram Stain [[id number]]     Order Status: Canceled Specimen: Peritoneal Fluid     Body Fluid Culture [[id number]]     Order Status: Canceled Specimen: Peritoneal Fluid     Fungal Culture [[id number]]     Order Status: Canceled Specimen: Body Fluid     Anaerobic Culture [[id number]]     Order Status: Canceled Specimen: Body Fluid     Fungal Culture [[id number]]  (Normal) Collected: 09/25/22 1200    Order Status: Completed Specimen: Body Fluid from Pleural Fluid, Right Updated: 10/27/22 1254     Fungal Culture No Fungus Isolated at 4 Weeks          Assessment & Plan:   Stuart Guevara is a 32M with HTN, SLE, HBV, aortic dissection s/p repair 2/2021, 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 He developed abdominal compartment syndrome s/p multiple exlaps (9/25, 9/27, 9/29) now on HD for renal failure with re-accumulation of ascites despite IR paracentesis. CT has shown  concern for necrotizing fascitis. Cultures from paracentesis resulted in Klebsiella growth. S/p washout 10/30. Poor prognosis. Abdominal XR obtained today =, normal post-surgical changes.      - Clamp trial for NGT  - Continue antibiotics  - Midline staples out 11/13  - Nutrition optimization  - Rest of care per primary     Blayne Franklin MD  [employer] General Surgery PGY-1   11/2/2022

## 2022-11-03 NOTE — PT/OT/SLP PROGRESS
Physical Therapy         Treatment        Stuart Guevara   MRN: [id number]     PT Received On: 11/03/22  PT Start Time: 1356     PT Stop Time: 1420    PT Total Time (min): 24 min       Billable Minutes:  Therapeutic Activity 15 and Therapeutic Exercise 9  Total Minutes: 24    Treatment Type: Treatment  PT/PTA: PTA     PTA Visit Number: 1       General Precautions: Standard, fall  Orthopedic Precautions: Orthopedic Precautions : N/A   Braces:      Spiritual, Cultural Beliefs, Scientologist Practices, Values that Affect Care: no    Subjective:  Communicated with NSG prior to session.    Pain/Comfort  Location - Orientation 1: lower  Location 1: abdomen  Pain Addressed 1: Reposition, Distraction  Location - Side 2: Bilateral  Location 2: groin  Pain Addressed 2: Distraction, Reposition    Objective:  Patient found in bed with HOB slightly elevated, with Patient found with: peripheral IV, pulse ox (continuous), telemetry    Functional Mobility:  Bed Mobility:   Supine to sit: Maximum Assistance. maxAx2   Sit to supine: Maximum Assistance. maxAx2   Rolling: Activity did not occur   Scooting: Moderate Assistance    Balance:   Static Sit: FAIR: Maintains without assist, but unable to take any challenges   Dynamic Sit:  FAIR: Cannot move trunk without losing balance  Static Stand: 0: Needs MAXIMAL assist to maintain   Dynamic stand: 0: N/A    Transfer Training:  Sit to stand:Maximum Assistance with No Assistive Device . 3 stands completed from EOB maxAx2 BL HHA. Blocked pt BL feet/knees to prevent sliding/buckling. Pt declined to stand with RW at this time.      Additional Treatment:  BLE AROM/AAROM: ankle pumps, knee flex/ext, hip add/abd. 15 reps BLE.     Activity Tolerance:  Patient tolerated treatment well, Patient limited by fatigue, and Patient limited by pain    Patient left HOB elevated with all lines intact and call button in reach. Pt wanting to stay on his back in bed. Therapist educated pt on turning in bed to  prevent bed sores.     Assessment:  Stuart Guevara is a 32 y.o. male with a medical diagnosis of Aortic dissection. Pt required heavy encouragement to participate in PT tx. Pt eventually agreeable to attempt EOB tx. Pt with decreased BLE strength.   Pt would benefit from further rehab therapy services to increase overall independence level and assist in getting pt closer to PLOF.      Rehab potential is good.    Activity tolerance: Good    Discharge recommendations: Discharge Facility/Level of Care Needs: rehabilitation facility     Equipment recommendations: Equipment Needed After Discharge: walker, rolling     GOALS:   Multidisciplinary Problems       Physical Therapy Goals          Problem: Physical Therapy    Goal Priority Disciplines Outcome Goal Variances Interventions   Physical Therapy Goal     PT, PT/OT Ongoing, Progressing     Description: Goals to be met by: 22     Patient will increase functional independence with mobility by performin. Supine to sit with brandon  2. Sit to supine with brandon  3. Sit <> stand with CGA using RW/LRAD  4. Bed to chair with CGA via squat pivot/stand pivot  5.Ambulate 200 ft with rolling walker and contact guard assistance                           PLAN:    Patient to be seen 6 x/week  to address the above listed problems via gait training, therapeutic activities, therapeutic exercises  Plan of Care expires: 22  Plan of Care reviewed with: patient         11/3/2022

## 2022-11-04 NOTE — NURSING
11/04/22 1432   Post-Hemodialysis Assessment   Blood Volume Processed (Liters) 52.8 L   Dialyzer Clearance Clear   Total UF (mL) 1000 mL   Patient Response to Treatment Pt tolerated HD tx well; NAD/VSS. Total tx length 3hrs with 1 liter UF goal.   Post-Hemodialysis Comments Pt deaccessed per P and P

## 2022-11-04 NOTE — PT/OT/SLP PROGRESS
Occupational Therapy      Patient Name:  Stuart Guevara   MRN:  [id number]    Patient not seen today secondary to patient santiago off floor Dialysis. Will follow-up as able.    11/4/2022

## 2022-11-04 NOTE — PHYSICIAN QUERY
PT Name: Stuart Guevara  MR #: [id number]     DOCUMENTATION CLARIFICATION      CDS/: Noemi Dockery RN CDIS           Contact information: [email or web address]    This form is a permanent document in the medical record.    Query Date: November 4, 2022    By submitting this query, we are merely seeking further clarification of documentation to reflect the severity of illness of your patient. Please utilize your independent clinical judgment when addressing the question(s) below.     The Medical Record contains the following:   Indicators   Supporting Clinical Findings Location in Medical Record   x Documentation of Sepsis, Septic Shock 1. Sepsis syndrome with severe leukocytosis    Sepsis ID note 11/3     HM note 11/3     Blood Culture      Respiratory Culture      Urine Culture     x Other Culture -S/p CT abscess aspiration, with free fluid noted on the right and thick gelatinous loculated fluid on the left, cultures with Klebsiella ID note 11/3    x Acute/Chronic Illness 2.  Intra-abdominal infection/peritonitis/abscesses-Klebsiella  3.  Severe necrotizing pancreatitis ID note 11/3    x Medication/Treatment meropenem (MERREM) 500 mg in sodium chloride 0.9 % 100 mL IVPB (MB+)  Dose: 500 mg  Freq: Every 24 hours (non-standard times) Route: IV  Indications of Use: Intra-abdominal  Start: 11/04/22 1040    meropenem (MERREM) 1 g in sodium chloride 0.9 % 100 mL IVPB (MB+)  Dose: 1 g  Freq: Every 12 hours (non-standard times) Route: IV  Indications of Use: Intra-abdominal  Start: 10/29/22 2320 End: 11/03/22 1327 MAR               MAR     Other        Provider, please further specify the _Sepsis__ diagnosis:   [  x ] Due to K. Pneumoniae   [   ] Due to (please specify): __________________________________   [   ] Other specification (please specify): ____________________   [   ] Clinically Undetermined       Present on admission (POA) status:  [   ] Yes (Y)   [   ] No (N)   [   ] Documentation insufficient to  determine if condition is POA (U)   [   ]  Clinically Undetermined (W)     Please document in your progress notes daily for the duration of treatment until resolved, and include in your discharge summary.  Form No. [id number]

## 2022-11-04 NOTE — PROGRESS NOTES
Infectious Diseases Progress Note  32-year-old male with past medical history of SLE, HTN, hepatitis-B, aortic dissection with repair in February 2021, DVT on Xarelto, initially presented to the [facility] [place] ED on 09/23/2022 with shortness of breath and chest pain.  Evaluation revealed a aortic dissection type B, with progressive course requiring transferred to [facility], promptly taken to OR for TEVAR with left carotid to subclavian artery bypass.  He had a postoperative complicated course with development of compartment syndrome found on CT angiography of chest/abdomen and pelvis with hemoperitoneum.  This required exploratory laparotomy, source apparently unstable bradycardia requiring pacemaker and then acute kidney injury and initiation of hemodialysis.  He had been hospitalized in the ICU but since downgraded to the floor and out on the floor.  CT scan of the abdomen and pelvis done on 10/26 showed no normal pancreatic parenchyma seen with a large fluid collection with a few foci of gas noted at the level of the pancreas, loculated, faintly peripherally enhancing fluid collection extending along the pericolic gutter as findings compatible with sequela of severe necrotizing pancreatitis with walled-off necrosis, mass effect on bowel loops from abdominal fluid collections, anasarca.  Ultrasound of the scrotum with noted edema.  He has been pretty much afebrile lately with a low-grade temp of 99.5 noted on 10/25 with associated leukocytosis of 27.5 which worse today at 33.4.    He is currently on antimicrobial coverage with meropenem    Subjective:  No new complaints, no fevers, doing about the same. Lying in bed in no acute distress    ROS  Constitutional:  Positive for malaise/fatigue.   HENT: Negative.     Respiratory: Negative.     Gastrointestinal:  Positive for abdominal pain and nausea.   Genitourinary: Negative.    Musculoskeletal: Negative.    Neurological:  Positive for weakness.    Endo/Heme/Allergies: Negative.    Psychiatric/Behavioral: Negative.    All other Systems review done and negative.    Review of patient's allergies indicates:   Allergen Reactions    Levofloxacin     Sulfamethoxazole-trimethoprim        Past Medical History:   Diagnosis Date    Aortic dissection 9/23/2022    Hypertension     Systemic lupus erythematosus, organ or system involvement unspecified     Systemic lupus erythematosus, organ or system involvement unspecified        Past Surgical History:   Procedure Laterality Date    APPLICATION OF WOUND VACUUM-ASSISTED CLOSURE DEVICE  9/27/2022    Procedure: APPLICATION, WOUND VAC;  Surgeon: Christopher Espinal MD;  Location: [facility];  Service: General;;    CARDIAC SURGERY      CREATION OF BYPASS FROM INTERNAL CAROTID ARTERY TO SUBCLAVIAN ARTERY Left 9/23/2022    Procedure: CREATION, BYPASS, ARTERIAL, INTERNAL CAROTID TO SUBCLAVIAN;  Surgeon: Vic Martines MD;  Location: [facility] OR;  Service: Cardiothoracic;  Laterality: Left;    ENDOVASCULAR REPAIR OF THORACIC AORTA N/A 9/23/2022    Procedure: REPAIR, AORTA, THORACIC, ENDOVASCULAR;  Surgeon: Vic Martines MD;  Location: [facility] OR;  Service: Cardiology;  Laterality: N/A;    FINGER AMPUTATION      INSERTION OF TUNNELED CENTRAL VENOUS HEMODIALYSIS CATHETER N/A 10/24/2022    Procedure: INSERTION, CATHETER, CENTRAL VENOUS, HEMODIALYSIS, TUNNELED;  Surgeon: Yogesh Melton DO;  Location: [facility] CATH LAB;  Service: Nephrology;  Laterality: N/A;  TUNNELED CATH INSERTION    THROMBECTOMY Right     TOE AMPUTATION         Social History     Socioeconomic History    Marital status: Single   Tobacco Use    Smoking status: Every Day     Types: Cigarettes    Smokeless tobacco: Never     Social Determinants of Health     Food Insecurity: Unknown    Worried About Running Out of Food in the Last Year: Never true   Transportation Needs: Unknown    Lack of Transportation (Medical): No   Housing Stability: Unknown    Unable to Pay for Housing in  "the Last Year: No         Scheduled Meds:   aspirin  81 mg Oral Daily    carvediloL  25 mg Oral BID    cloNIDine  0.1 mg Oral BID    enoxaparin  1 mg/kg Subcutaneous Q24H    fat emulsion 20%  250 mL Intravenous Every Tues, Thurs, Sat    [START ON 11/5/2022] fluconazole (DIFLUCAN) IV (PEDS and ADULTS)  400 mg Intravenous Every Tues, Thurs, Sat    gabapentin  100 mg Oral TID    hydrALAZINE  100 mg Oral Q8H    insulin detemir U-100  15 Units Subcutaneous BID    lactulose 10 gram/15 ml  10 g Oral BID    [START ON 11/4/2022] meropenem (MERREM) IVPB  500 mg Intravenous Q24H    [START ON 11/4/2022] methylPREDNISolone sodium succinate injection  40 mg Intravenous Daily    morphine  30 mg Oral Q12H    NIFEdipine  90 mg Oral Daily    pantoprazole  40 mg Intravenous Daily    sodium bicarbonate  1,300 mg Oral Daily    sodium chloride 0.9%  10 mL Intravenous Q6H    sodium zirconium cyclosilicate  10 g Oral Daily    warfarin  3 mg Oral Daily     Continuous Infusions:   TPN ADULT CENTRAL LINE CUSTOM 60 mL/hr at 11/03/22 1006    TPN ADULT CENTRAL LINE CUSTOM       PRN Meds:acetaminophen, albuterol, bisacodyL, dextrose 10 % in water (D10W), dextrose 10 % in water (D10W), dextrose 10%, glucagon (human recombinant), glucose, glucose, hydrALAZINE, HYDROmorphone, insulin aspart U-100, labetaloL, melatonin, nitroGLYCERIN, ondansetron, polyethylene glycol, sodium chloride 0.9%, sodium chloride 0.9%, Flushing PICC Protocol **AND** sodium chloride 0.9% **AND** sodium chloride 0.9%    Objective:  BP (!) 170/97   Pulse 92   Temp 98.1 °F (36.7 °C) (Oral)   Resp 18   Ht 5' 5" (1.651 m)   Wt 56.6 kg (124 lb 12.5 oz)   SpO2 96%   BMI 20.76 kg/m²     Physical Exam:   Physical Exam  Vitals reviewed.   Constitutional:       General: He is not in acute distress.  HENT:      Head: Normocephalic and atraumatic.  Neck:      Comments: Right IJ vascular access noted  Cardiovascular:      Rate and Rhythm: Normal rate and regular rhythm.      Heart " sounds: Normal heart sounds.   Pulmonary:      Effort: No respiratory distress.      Breath sounds: Normal breath sounds.   Abdominal:      General: Bowel sounds are normal.     Palpations: Abdomen is soft.      Tenderness: There is abdominal tenderness.   Genitourinary:     Comments: Scrotal edema  Musculoskeletal:         General: No deformity.      Cervical back: Neck supple.      Right lower leg: Edema present.      Left lower leg: Edema present.   Skin:     Findings: No erythema or rash.   Neurological:      Mental Status: He is alert.     Imaging      Lab Review   Recent Results (from the past 24 hour(s))   POCT glucose    Collection Time: 11/02/22  8:37 PM   Result Value Ref Range    POCT Glucose 206 (H) 70 - 110 mg/dL   Comprehensive Metabolic Panel    Collection Time: 11/03/22  3:49 AM   Result Value Ref Range    Sodium Level 129 (L) 136 - 145 mmol/L    Potassium Level 4.9 3.5 - 5.1 mmol/L    Chloride 97 (L) 98 - 107 mmol/L    Carbon Dioxide 27 22 - 29 mmol/L    Glucose Level 201 (H) 74 - 100 mg/dL    Blood Urea Nitrogen 84.9 (H) 8.9 - 20.6 mg/dL    Creatinine 1.80 (H) 0.73 - 1.18 mg/dL    Calcium Level Total 8.2 (L) 8.4 - 10.2 mg/dL    Protein Total 4.7 (L) 6.4 - 8.3 gm/dL    Albumin Level 1.8 (L) 3.5 - 5.0 gm/dL    Globulin 2.9 2.4 - 3.5 gm/dL    Albumin/Globulin Ratio 0.6 (L) 1.1 - 2.0 ratio    Bilirubin Total 0.9 <=1.5 mg/dL    Alkaline Phosphatase 134 40 - 150 unit/L    Alanine Aminotransferase 8 0 - 55 unit/L    Aspartate Aminotransferase 21 5 - 34 unit/L    eGFR 50 mls/min/1.73/m2   Magnesium    Collection Time: 11/03/22  3:49 AM   Result Value Ref Range    Magnesium Level 1.90 1.60 - 2.60 mg/dL   Phosphorus    Collection Time: 11/03/22  3:49 AM   Result Value Ref Range    Phosphorus Level 4.7 2.3 - 4.7 mg/dL   Protime-INR    Collection Time: 11/03/22  3:49 AM   Result Value Ref Range    PT 15.7 (H) 12.5 - 14.5 seconds    INR 1.26 0.00 - 1.30   CBC with Differential    Collection Time: 11/03/22  3:49  AM   Result Value Ref Range    WBC 19.6 (H) 4.5 - 11.5 x10(3)/mcL    RBC 3.22 (L) 4.70 - 6.10 x10(6)/mcL    Hgb 9.1 (L) 14.0 - 18.0 gm/dL    Hct 27.6 (L) 42.0 - 52.0 %    MCV 85.7 80.0 - 94.0 fL    MCH 28.3 27.0 - 31.0 pg    MCHC 33.0 33.0 - 36.0 mg/dL    RDW 15.0 11.5 - 17.0 %    Platelet 251 130 - 400 x10(3)/mcL    MPV 11.0 (H) 7.4 - 10.4 fL    Neut % 94.1 %    Lymph % 2.0 %    Mono % 2.8 %    Eos % 0.0 %    Basophil % 0.3 %    Lymph # 0.40 (L) 0.6 - 4.6 x10(3)/mcL    Neut # 18.4 (H) 2.1 - 9.2 x10(3)/mcL    Mono # 0.55 0.1 - 1.3 x10(3)/mcL    Eos # 0.00 0 - 0.9 x10(3)/mcL    Baso # 0.05 0 - 0.2 x10(3)/mcL    IG# 0.15 (H) 0 - 0.04 x10(3)/mcL    IG% 0.8 %    NRBC% 0.0 %   POCT glucose    Collection Time: 11/03/22  5:24 AM   Result Value Ref Range    POCT Glucose 203 (H) 70 - 110 mg/dL   POCT glucose    Collection Time: 11/03/22 11:32 AM   Result Value Ref Range    POCT Glucose 197 (H) 70 - 110 mg/dL       Assessment/Plan:  1. Sepsis syndrome with severe leukocytosis  2.  Intra-abdominal infection/peritonitis/abscesses-Klebsiella  3.  Severe necrotizing pancreatitis  4.  Acute kidney injury on hemodialysis  5. Abdominal compartment syndrome  6.  Anemia  7.  Protein calorie malnutrition  8.  History of hepatitis-B   9. Thoracic aortic dissection     -Continue Merrem #8 and Diflucan #6  -No fevers and with leukocytosis trending down, on steroids, follow  -S/p CT abscess aspiration, with free fluid noted on the right and thick gelatinous loculated fluid on the left, cultures with Klebsiella  -11/2 chest x-ray with increasing right opacity and left effusion  -10/28 Procalcitonin elevated at 1.6  -Hemodialysis per Nephrology  -MRSA PCR (-)  -Seen by GI, pulmonology, and surgery with inputs noted  -Hepatitis B evaluation and management to continue as outpatient  -Discussed with patient and nursing staff

## 2022-11-04 NOTE — NURSING
Nurses Note -- 4 Eyes      11/4/2022   5:53 PM      Skin assessed during: Transfer        No Pressure Injuries Present    []Prevention Measures Documented      [] Yes- Altered Skin Integrity Present or Discovered   [] LDA Added if Not in Epic (Describe Wound)   [] New Altered Skin Integrity was Present on Admit and Documented in LDA   [] Wound Image Taken    Wound Care Consulted? No    Attending Nurse:  Darling Heller RN     Second RN/Staff Member:  Rosie Smith RN

## 2022-11-04 NOTE — PROGRESS NOTES
[facility]  Hospital Medicine Progress Note        Chief Complaint: Inpatient Follow-up for Pancreatitis     HPI:   Stuart Guevara is a 32 y.o. male who has PMH which includes HTN, SLE,  hepatitis B, type A aortic dissection with repair + mechanical aortic valve placement 2/2021 on coumadin, RUE DVT s/p Xarelto, class V lupus nephritis; presented to the ED at [facility] on 9/23/2022  with reports of  shortness of breath and chest pain radiating to his back. Work up revealed a type B aortic dissection and significant hypertension noted on presentation.  He failed medical management and had progression of his symptoms despite adequate control of hemodynamic parameters.  PT was transferred from [facility] to [facility] for CV surgery evaluation which he was taken  to the OR and had TEVAR with left carotid to subclavian artery bypass on 9/23.   Later Patient developed abdominal distension and rigidity of his abdomen and developed a lactic acidosis.  He had a repeat CT angiography on 9/25 of the chest/abdomen/pelvis without evidence of worsening dissection, but evidence of significant intra-abdominal pathology with some hemoperitoneum, signs of hypoperfusion of the abdominal organs (compartment syndrome)  free blood in the abdomen. He underwent exploratory laparotomy on 9/25 with evidence of viable bowel and no significant bleeding identified, abd was closed with flakito drain left in situ chest tube was placed. Had repeat ex-laps on 9/27, 9/29. Postoperatively, he developed severe unstable bradycardia, and a transvenous pacemaker was temporarily placed by Cardiology, with the bradycardia resolving within he next couple days.  Shortly afterwards, he was noted to have significant worsening of his renal failure and critical hyperkalemia. Renal services consulted and HD catheter was placed and he was started on HD treatments. Patient was cleared for transfer out of ICU to the floor.  He started having worsening  abdominal pain. He was seen by rheumatology for possible lupus flare up and serositis. He was started on steroids and other immunomodulating drugs. Drugs have since been discontinued due to concern of infection.  Repeat CT showed multiple loculated fluid collections, necrotizing pancreatitis, possible peritonitis. Surgery team is following with plan for IR drainage, which was performed October 28.  Body fluid culture grew out Klebsiella.  Surgical hospitalist took back to the OR October 30th due to continued pain in peritonitis with positive cultures, only found about 3 L of venita ascites, no other sign of infection, necrotic tissue, bowel issues or hematoma.    Interval Hx:   Patient today awake and comfortable. Has persistent abdominal pain. No BM today. Passing flatus. He has a moderate appetite.     Objective/physical exam:  General: In no acute distress, frail  Chest: Clear to auscultation bilaterally  Heart: RRR, +S1, S2, no appreciable murmur  Abdomen: + distension, Firm, + tenderness in LLQ + Bowel sounds   MSK: severe muscle masting    : penile and scrotal edema   Neurologic: Alert and oriented x4, Cranial nerve II-XII intact, Strength 5/5 in all 4 extremities    VITAL SIGNS: 24 HRS MIN & MAX LAST   Temp  Min: 98.1 °F (36.7 °C)  Max: 98.9 °F (37.2 °C) 98.9 °F (37.2 °C)   BP  Min: 142/73  Max: 170/97 (!) 148/77     Pulse  Min: 92  Max: 105  105   Resp  Min: 16  Max: 20 16   SpO2  Min: 89 %  Max: 96 % (!) 90 %       Recent Labs   Lab 11/02/22 0415 11/03/22 0349 11/04/22  0408   WBC 22.3* 19.6* 21.2*   RBC 3.19* 3.22* 3.68*   HGB 9.1* 9.1* 10.6*   HCT 27.6* 27.6* 31.5*   MCV 86.5 85.7 85.6   MCH 28.5 28.3 28.8   MCHC 33.0 33.0 33.7   RDW 15.3 15.0 15.0    251 312   MPV 10.7* 11.0* 10.8*       Recent Labs   Lab 11/02/22 0415 11/03/22 0349 11/04/22  0409   * 129* 129*   K 5.2* 4.9 4.3   CO2 21* 27 25   BUN 63.1* 84.9* 102.8*   CREATININE 1.62* 1.80* 1.59*   CALCIUM 8.1* 8.2* 8.6   MG 2.00 1.90  1.60   ALBUMIN 2.0* 1.8* 1.9*   ALKPHOS 114 134 158*   ALT 9 8 10   AST 19 21 26   BILITOT 0.8 0.9 1.0          Microbiology Results (last 7 days)       Procedure Component Value Units Date/Time    Body Fluid Culture [[id number]] Collected: 10/28/22 0940    Order Status: Completed Specimen: Peritoneal Fluid from Abdomen Updated: 11/02/22 1130     Body Fluid Culture Final Report: At 5 days. No growth    Anaerobic Culture [[id number]] Collected: 10/28/22 0940    Order Status: Completed Specimen: Body Fluid from Abdomen Updated: 10/31/22 0843     Anaerobe Culture No Anaerobes Isolated    Anaerobic Culture [[id number]] Collected: 10/28/22 0940    Order Status: Completed Specimen: Body Fluid from Peritoneal Updated: 10/31/22 0842     Anaerobe Culture No Anaerobes Isolated    Body Fluid Culture [[id number]]  (Abnormal)  (Susceptibility) Collected: 10/28/22 0940    Order Status: Completed Specimen: Peritoneal Fluid from Abdomen Updated: 10/30/22 1351     Body Fluid Culture Many Klebsiella pneumoniae    Gram Stain [[id number]] Collected: 10/28/22 0940    Order Status: Completed Specimen: Peritoneal Fluid from Abdomen Updated: 10/29/22 1130     GRAM STAIN No WBCs, No bacteria seen    Gram Stain [[id number]] Collected: 10/28/22 0940    Order Status: Completed Specimen: Peritoneal Fluid from Abdomen Updated: 10/29/22 1122     GRAM STAIN Rare WBC observed      Few Gram Negative Rods             See below for Radiology    Scheduled Med:   aspirin  81 mg Oral Daily    carvediloL  25 mg Oral BID    cloNIDine  0.1 mg Oral BID    enoxaparin  1 mg/kg Subcutaneous Q24H    [START ON 11/5/2022] fluconazole (DIFLUCAN) IV (PEDS and ADULTS)  400 mg Intravenous Every Tues, Thurs, Sat    gabapentin  100 mg Oral TID    hydrALAZINE  100 mg Oral Q8H    insulin detemir U-100  15 Units Subcutaneous BID    lactulose 10 gram/15 ml  10 g Oral BID    meropenem (MERREM) IVPB  500 mg Intravenous Q24H    methylPREDNISolone sodium succinate injection  40 mg  Intravenous Daily    morphine  30 mg Oral Q12H    NIFEdipine  90 mg Oral Daily    pantoprazole  40 mg Intravenous Daily    sodium bicarbonate  1,300 mg Oral Daily    sodium chloride 0.9%  10 mL Intravenous Q6H    sodium zirconium cyclosilicate  10 g Oral Daily    warfarin  3 mg Oral Daily        Continuous Infusions:   TPN ADULT CENTRAL LINE CUSTOM 60 mL/hr at 11/03/22 2307    TPN ADULT CENTRAL LINE CUSTOM          PRN Meds:  acetaminophen, albuterol, bisacodyL, dextrose 10 % in water (D10W), dextrose 10 % in water (D10W), dextrose 10%, glucagon (human recombinant), glucose, glucose, hydrALAZINE, HYDROmorphone, insulin aspart U-100, labetaloL, melatonin, nitroGLYCERIN, ondansetron, polyethylene glycol, sodium chloride 0.9%, sodium chloride 0.9%, Flushing PICC Protocol **AND** sodium chloride 0.9% **AND** sodium chloride 0.9%       Assessment/Plan:  Severe necrotizing pancreatitis with loculated intra-abdominal fluid collection-status post IR drainage October 28  Intra-abdominal abscess versus infected necrotizing pancreatitis  Hospital acquired pneumonia-right-sided  Sepsis  Anasarca  Acute kidney injury ATN  Critical illness myopathy  Severe protein caloric malnutrition     History of:  Type A aortic dissection status post repair with mechanical aortic valve conduit, recent type B aortic dissection status post TAVR, lupus nephritis class 5, mixed connective tissue disorder predominant lupus, transverse myelitis recovered    Plan:  Patient having more tenderness in the LLQ and now has a small superficial swelling. Seen by surgery team and US abdomen has been ordered.   No BM so far, Will cont relistor q 48 hrs and Cont other scheduled laxatives   Cont IV merrem per ID team   He has anasarca and will Cont HD per renal team   Will closely monitor patients daily weight, urine out put, renal parameters and volume status   He has poor nutritional status, cont TPN for now   Pain now well controlled  Encouraged patient to  start participating more with PT  Cont FD lovenox and coumadin, monitor daily INR   Cont supportive care     Continue strict aspiration, fall and decubitus precautions      Labs in am    Critical care note:  Critical care diagnosis: Sepsis needing iv antibiotics   Critical care interventions: Hands-on evaluation, review of labs/radiographs/records and discussion with patient and family if present  Critical care time spent: 35 minutes       VTE prophylaxis: Lovenox    Patient condition:  Guarded    Anticipated discharge and Disposition:   LTAC, in formed CM      All diagnosis and differential diagnosis have been reviewed; assessment and plan has been documented; I have personally reviewed the labs and test results that are presently available; I have reviewed the patients medication list; I have reviewed the consulting providers response and recommendations. I have reviewed or attempted to review medical records based upon their availability    All of the patient's questions have been  addressed and answered. Patient's is agreeable to the above stated plan. I will continue to monitor closely and make adjustments to medical management as needed.  _____________________________________________________________________    Nutrition Status:    Radiology:  X-Ray Chest 1 View  Narrative: EXAMINATION:  XR CHEST 1 VIEW    CLINICAL HISTORY:  RIJ temp HD catheter exchange;    TECHNIQUE:  Single view of the chest    COMPARISON:  11/02/2022    FINDINGS:  Right-sided central line remains in place.  Left pleural effusion is grossly unchanged.  Right mid lung zone opacification is similar to prior.  Impression: No adverse interval change.    Electronically signed by: Jakob Boland  Date:    11/02/2022  Time:    13:48  X-Ray Chest 1 View  Narrative: EXAMINATION:  XR CHEST 1 VIEW    CLINICAL HISTORY:  Cough;    TECHNIQUE:  Single view of the chest    COMPARISON:  10/25/2022    FINDINGS:  Postsurgical changes again noted with  increasing right lung opacification in left pleural effusion.  Developing infectious process is not excluded.  Impression: Postsurgical changes again noted with increasing right lung opacification in left pleural effusion.  Developing infectious process is not excluded.    Electronically signed by: Jakob Boland  Date:    11/02/2022  Time:    08:52  X-Ray Abdomen Flat And Erect  Narrative: EXAMINATION:  XR ABDOMEN FLAT AND ERECT    CLINICAL HISTORY:  abdominal distention.;    TECHNIQUE:  Flat and upright imaging of the abdomen    COMPARISON:  None    FINDINGS:  Postsurgical changes.  NG tube projects over the right upper quadrant in the expected location of the stomach.  Impression: Postsurgical changes. NG tube projects over the right upper quadrant in the expected location of the stomach.    Electronically signed by: Jakob Boland  Date:    11/02/2022  Time:    08:39      Saurabh Curry MD   11/04/2022

## 2022-11-04 NOTE — PROGRESS NOTES
NEPHROLOGY: Progress      32-year-old  male with TAY following TEVAR for a type B aortic dissection on 09/23/2022.  (patient had had a type A repair via TEVAR in February 2022).    During the type B repair he also required a left carotid to left subclavian artery bypass for subclavian artery occlusion.    Since then he has required several exploratory laparotomies due to concerns for ischemic /necrotizing bowel and abdominal compartment syndrome on 9/25, 9/27 and 9/29.    He has SLE with serositis and inflammatory pancreatitis with the last abdominal washout on October 30th with Klebsiella growth on the culture but peritoneal fluid white count at the time of washout was only 80 cells.  He remains on meropenem and Diflucan.    He has been maintained on TTS dialysis schedule via right IJ temp catheter originally placed 2 weeks ago and exchanged on November 2nd over a guidewire by Dr. Melton due to concern for the potential for catheter-related bloodstream infection.  Still requiring Lokelma for control of hyperkalemia.  His urine output has been improving gradually and renal functions may be improving as well.    His NG tube was removed on November 2nd and he is currently tolerating some oral fluids and passing gas without any nausea.      11/4/22 patient last dialysis was on 11/01/2022.  This morning he is complaining of shortness of breath and nausea.  Loss of appetite.  Just not feeling good.  Breakfast tray is at his bedside and he has not tested.  He does have some low abdominal pain for which surgery team has ordered ultrasound which has not been done yet.  But he does have bowel sounds.                 Current Facility-Administered Medications:     acetaminophen tablet 650 mg, 650 mg, Oral, Q6H PRN, Nolberto Calvillo MD, 650 mg at 11/01/22 2310    albuterol inhaler 2 puff, 2  puff, Inhalation, Q4H PRN, Evelio Marshall MD    aspirin EC tablet 81 mg, 81 mg, Oral, Daily, Luis F Hutson MD, 81 mg at 11/04/22 0845    bisacodyL suppository 10 mg, 10 mg, Rectal, Daily PRN, Keena Shafer, FNP, 10 mg at 10/28/22 0400    carvediloL tablet 25 mg, 25 mg, Oral, BID, Maximino Yeh, ANP, 25 mg at 11/04/22 0844    cloNIDine tablet 0.1 mg, 0.1 mg, Oral, BID, Ronda Hamilton MD, 0.1 mg at 11/04/22 0844    dextrose 10 % infusion, 12.5 g, Intravenous, PRN, Dewayne Hughes PA-C, 12.5 g at 10/25/22 0317    dextrose 10 % infusion, 25 g, Intravenous, PRN, Dewayne Hughes PA-C    dextrose 10% bolus 125 mL, 12.5 g, Intravenous, PRN, Ivan Chopra MD, Stopped at 10/20/22 0953    enoxaparin injection 60 mg, 1 mg/kg, Subcutaneous, Q24H, Luis F Hutson MD, 60 mg at 11/04/22 0845    [START ON 11/5/2022] fluconazole (DIFLUCAN) IVPB 400 mg, 400 mg, Intravenous, Every Tues, Thurs, Sat, Aleah Calix MD    gabapentin capsule 100 mg, 100 mg, Oral, TID, Shahzad Lundberg MD, 100 mg at 11/04/22 0845    glucagon (human recombinant) injection 1 mg, 1 mg, Intramuscular, PRN, Dewayne Hughes PA-C, 1 mg at 10/20/22 0925    glucose chewable tablet 16 g, 16 g, Oral, PRN, Dewayne Hughes PA-C    glucose chewable tablet 24 g, 24 g, Oral, PRN, Dewayne Hughes PA-C, 24 g at 10/24/22 1709    hydrALAZINE injection 20 mg, 20 mg, Intravenous, Q4H PRN, Bin Bell MD, 20 mg at 10/22/22 0319    hydrALAZINE tablet 100 mg, 100 mg, Oral, Q8H, Ronda Hamilton MD, 100 mg at 11/04/22 0509    HYDROmorphone (PF) injection 1 mg, 1 mg, Intravenous, Q6H PRN, Luis F Hutson MD, 1 mg at 11/04/22 0509    insulin aspart U-100 injection 1-10 Units, 1-10 Units, Subcutaneous, QID (AC + HS) PRN, Ronda Hamilton MD, 4 Units at 11/04/22 0520    insulin detemir U-100 injection 15 Units, 15 Units, Subcutaneous, BID, Luis F Hutson MD, 15 Units at 11/04/22 0847    labetaloL injection 20 mg, 20 mg, Intravenous, Q8H PRN, Bin Bell MD, 20 mg at 10/03/22  0321    lactulose 10 gram/15 ml solution 10 g, 10 g, Oral, BID, Luis F Hutson MD, 10 g at 11/04/22 0845    melatonin tablet 6 mg, 6 mg, Oral, Nightly PRN, Michelle Ferrera, AGACNP-BC, 6 mg at 10/14/22 0000    meropenem (MERREM) 500 mg in sodium chloride 0.9 % 100 mL IVPB (MB+), 500 mg, Intravenous, Q24H, Saurabh Curry MD    methylPREDNISolone sodium succinate injection 40 mg, 40 mg, Intravenous, Daily, Saurabh Curry MD, 40 mg at 11/04/22 0844    morphine 12 hr tablet 30 mg, 30 mg, Oral, Q12H, Shamar Stewart MD, 30 mg at 11/04/22 0844    NIFEdipine 24 hr tablet 90 mg, 90 mg, Oral, Daily, Verna Whipple MD, 90 mg at 11/04/22 0844    nitroGLYCERIN SL tablet 0.4 mg, 0.4 mg, Sublingual, Q5 Min PRN, Zach Shafer MD, 0.4 mg at 10/30/22 0713    ondansetron injection 4 mg, 4 mg, Intravenous, Q6H PRN, Evelio Marshall MD, 4 mg at 11/01/22 2022    pantoprazole injection 40 mg, 40 mg, Intravenous, Daily, Saurabh Curry MD, 40 mg at 11/04/22 0844    polyethylene glycol packet 17 g, 17 g, Oral, BID PRN, Thalia Renteria, AGNREGINALD    sodium bicarbonate tablet 1,300 mg, 1,300 mg, Oral, Daily, Thalia Renteria, AGNP, 1,300 mg at 11/04/22 0844    sodium chloride 0.9% bolus 250 mL, 250 mL, Intravenous, PRN, Thalia Renteria, AGNP    sodium chloride 0.9% bolus 250 mL, 250 mL, Intravenous, PRN, Shahzad Lundberg MD    Flushing PICC Protocol, , , Until Discontinued **AND** sodium chloride 0.9% flush 10 mL, 10 mL, Intravenous, Q6H, 10 mL at 11/04/22 0510 **AND** sodium chloride 0.9% flush 10 mL, 10 mL, Intravenous, PRN, Ronda Hamilton MD    sodium zirconium cyclosilicate packet 10 g, 10 g, Oral, Daily, Luis F Hutson MD, 10 g at 11/03/22 0900    TPN ADULT CENTRAL LINE CUSTOM, , Intravenous, Continuous, Ronda Hamilton MD, Last Rate: 60 mL/hr at 11/03/22 1006, New Bag at 11/03/22 1006    TPN ADULT CENTRAL LINE CUSTOM, , Intravenous, Continuous, Ronda Hamilton MD, Last Rate: 60 mL/hr at 11/03/22 2307, New Bag at 11/03/22  "2307    TPN ADULT CENTRAL LINE CUSTOM, , Intravenous, Continuous, Ronda Hamilton MD    warfarin tablet 3 mg, 3 mg, Oral, Daily, Luis F Hutson MD, 3 mg at 11/03/22 1730        BP (!) 148/77   Pulse 105   Temp 98.9 °F (37.2 °C) (Oral)   Resp 20   Ht 5' 5" (1.651 m)   Wt 56.6 kg (124 lb 12.5 oz)   SpO2 (!) 90%   BMI 20.76 kg/m²     Physical Exam:    GEN:  Awake and appropriate.  He appears chronically ill and somewhat emaciated , looks a little dyspneic.  HEENT: Atraumatic. EOMI, no icterus  NECK : No JVD, temporary right IJ catheter in place  CARD : RRR s rub or gallop  LUNGS : Clear to auscultation  ABD :  Distended and somewhat tender to palpation.  Midline wound intact.  Soft,non-tender. BS active  EXT : No pitting edema.   NEURO : No obvious focal deficits        Intake/Output Summary (Last 24 hours) at 11/4/2022 0944  Last data filed at 11/4/2022 0623  Gross per 24 hour   Intake 1200 ml   Output 1950 ml   Net -750 ml         Laboratory:  Recent Results (from the past 24 hour(s))   POCT glucose    Collection Time: 11/03/22 11:32 AM   Result Value Ref Range    POCT Glucose 197 (H) 70 - 110 mg/dL   POCT glucose    Collection Time: 11/03/22  4:45 PM   Result Value Ref Range    POCT Glucose 216 (H) 70 - 110 mg/dL   POCT glucose    Collection Time: 11/03/22 10:07 PM   Result Value Ref Range    POCT Glucose 260 (H) 70 - 110 mg/dL   CBC with Differential    Collection Time: 11/04/22  4:08 AM   Result Value Ref Range    WBC 21.2 (H) 4.5 - 11.5 x10(3)/mcL    RBC 3.68 (L) 4.70 - 6.10 x10(6)/mcL    Hgb 10.6 (L) 14.0 - 18.0 gm/dL    Hct 31.5 (L) 42.0 - 52.0 %    MCV 85.6 80.0 - 94.0 fL    MCH 28.8 27.0 - 31.0 pg    MCHC 33.7 33.0 - 36.0 mg/dL    RDW 15.0 11.5 - 17.0 %    Platelet 312 130 - 400 x10(3)/mcL    MPV 10.8 (H) 7.4 - 10.4 fL    Neut % 91.8 %    Lymph % 3.9 %    Mono % 3.0 %    Eos % 0.0 %    Basophil % 0.2 %    Lymph # 0.82 0.6 - 4.6 x10(3)/mcL    Neut # 19.4 (H) 2.1 - 9.2 x10(3)/mcL    Mono # 0.63 0.1 - 1.3 " x10(3)/mcL    Eos # 0.01 0 - 0.9 x10(3)/mcL    Baso # 0.04 0 - 0.2 x10(3)/mcL    IG# 0.24 (H) 0 - 0.04 x10(3)/mcL    IG% 1.1 %    NRBC% 0.0 %   Comprehensive Metabolic Panel    Collection Time: 11/04/22  4:09 AM   Result Value Ref Range    Sodium Level 129 (L) 136 - 145 mmol/L    Potassium Level 4.3 3.5 - 5.1 mmol/L    Chloride 94 (L) 98 - 107 mmol/L    Carbon Dioxide 25 22 - 29 mmol/L    Glucose Level 247 (H) 74 - 100 mg/dL    Blood Urea Nitrogen 102.8 (H) 8.9 - 20.6 mg/dL    Creatinine 1.59 (H) 0.73 - 1.18 mg/dL    Calcium Level Total 8.6 8.4 - 10.2 mg/dL    Protein Total 5.9 (L) 6.4 - 8.3 gm/dL    Albumin Level 1.9 (L) 3.5 - 5.0 gm/dL    Globulin 4.0 (H) 2.4 - 3.5 gm/dL    Albumin/Globulin Ratio 0.5 (L) 1.1 - 2.0 ratio    Bilirubin Total 1.0 <=1.5 mg/dL    Alkaline Phosphatase 158 (H) 40 - 150 unit/L    Alanine Aminotransferase 10 0 - 55 unit/L    Aspartate Aminotransferase 26 5 - 34 unit/L    eGFR 58 mls/min/1.73/m2   Magnesium    Collection Time: 11/04/22  4:09 AM   Result Value Ref Range    Magnesium Level 1.60 1.60 - 2.60 mg/dL   Phosphorus    Collection Time: 11/04/22  4:09 AM   Result Value Ref Range    Phosphorus Level 3.8 2.3 - 4.7 mg/dL   Protime-INR    Collection Time: 11/04/22  4:09 AM   Result Value Ref Range    PT 16.1 (H) 12.5 - 14.5 seconds    INR 1.31 (H) 0.00 - 1.30   POCT glucose    Collection Time: 11/04/22  5:08 AM   Result Value Ref Range    POCT Glucose 245 (H) 70 - 110 mg/dL         Assessment/Plan:  TAY- nonoliguric.  Significant rising BUN and he may be nauseous and short of breath because of uremia.  Will need dialysis for uremic toxin removal today.  Will do that again tomorrow.  Systemic lupus erythematosus with serositis/pancreatitis  Status post multiple abdominal washouts for inflammatory serositis  Status post type B aortic dissection repair 09/23/2022   Hyperkalemia on Lokelma  Hypertension  Protein calorie malnutrition    Recommendations  1. Hemodialysis for to remove uremic  toxins and some fluids.  Repeat again tomorrow.  2. Check labs tomorrow.

## 2022-11-04 NOTE — PROGRESS NOTES
Acute Care Surgery   Progress Note  Admit Date: 9/23/2022  HD#42  POD#5 Days Post-Op    Subjective:   Interval history:  No acute events overnight.  Afebrile, vital signs stable.  Patient resting comfortably in bed.  However complains of lower abdominal tenderness.     Scheduled Meds:   aspirin  81 mg Oral Daily    carvediloL  25 mg Oral BID    cloNIDine  0.1 mg Oral BID    enoxaparin  1 mg/kg Subcutaneous Q24H    fat emulsion 20%  250 mL Intravenous Every Tues, Thurs, Sat    [START ON 11/5/2022] fluconazole (DIFLUCAN) IV (PEDS and ADULTS)  400 mg Intravenous Every Tues, Thurs, Sat    gabapentin  100 mg Oral TID    hydrALAZINE  100 mg Oral Q8H    insulin detemir U-100  15 Units Subcutaneous BID    lactulose 10 gram/15 ml  10 g Oral BID    meropenem (MERREM) IVPB  500 mg Intravenous Q24H    methylPREDNISolone sodium succinate injection  40 mg Intravenous Daily    morphine  30 mg Oral Q12H    NIFEdipine  90 mg Oral Daily    pantoprazole  40 mg Intravenous Daily    sodium bicarbonate  1,300 mg Oral Daily    sodium chloride 0.9%  10 mL Intravenous Q6H    sodium zirconium cyclosilicate  10 g Oral Daily    warfarin  3 mg Oral Daily     Continuous Infusions:   TPN ADULT CENTRAL LINE CUSTOM 60 mL/hr at 11/03/22 1006    TPN ADULT CENTRAL LINE CUSTOM 60 mL/hr at 11/03/22 2307     PRN Meds:acetaminophen, albuterol, bisacodyL, dextrose 10 % in water (D10W), dextrose 10 % in water (D10W), dextrose 10%, glucagon (human recombinant), glucose, glucose, hydrALAZINE, HYDROmorphone, insulin aspart U-100, labetaloL, melatonin, nitroGLYCERIN, ondansetron, polyethylene glycol, sodium chloride 0.9%, sodium chloride 0.9%, Flushing PICC Protocol **AND** sodium chloride 0.9% **AND** sodium chloride 0.9%     Objective:     VITAL SIGNS: 24 HR MIN & MAX LAST   Temp  Min: 98.1 °F (36.7 °C)  Max: 98.6 °F (37 °C)  98.2 °F (36.8 °C)   BP  Min: 142/73  Max: 170/97  (!) 142/73    Pulse  Min: 92  Max: 101  101    Resp  Min: 16  Max: 20  18    SpO2   "Min: 89 %  Max: 96 %  (!) 89 %      HT: 5' 5" (165.1 cm)  WT: 56.6 kg (124 lb 12.5 oz)  BMI: 20.8     Intake/output:  I/O last 3 completed shifts:  In: 720 [P.O.:720]  Out: 2250 [Urine:2250]  I/O this shift:  In: 720 [P.O.:720]  Out: 600 [Urine:600]    Intake/Output Summary (Last 24 hours) at 11/4/2022 0555  Last data filed at 11/3/2022 2300  Gross per 24 hour   Intake 720 ml   Output 2200 ml   Net -1480 ml        Lines/drains/airway:  PICC Double Lumen 10/25/22 1520 right brachial (Active)   Line Necessity Review Longterm central access required 11/03/22 2000   Site Assessment No drainage;No redness;No swelling;No warmth 11/03/22 2000   Extremity Assessment Distal to IV No abnormal discoloration 11/03/22 2000   Line Securement Device Secured with sutureless device 11/03/22 2000   Dressing Type Central line dressing 11/03/22 2000   Dressing Status Clean;Dry;Intact 11/03/22 2000   Dressing Intervention Integrity maintained 11/03/22 2000   Date on Dressing 11/02/22 11/03/22 0846   Dressing Due to be Changed 11/09/22 11/03/22 0846   Left Lumen Patency/Care Blood return present;Flushed w/o difficulty;Normal saline locked;Other (Comment) 11/02/22 1247   Right Lumen Patency/Care Blood return present;Flushed w/o difficulty;Infusing;Other (Comment) 11/02/22 1247   Number of days: 9       Trialysis (Dialysis) Catheter 10/04/22 1300 right internal jugular (Active)   Line Necessity Review CRRT/HD 11/03/22 0846   Verification by X-ray Yes 11/03/22 0846   Site Assessment No drainage;No redness;No swelling;No warmth 11/03/22 2000   Line Securement Device Secured with sutureless device 11/03/22 2000   Dressing Type Biopatch in place 11/03/22 2000   Dressing Status Clean;Dry;Intact 11/03/22 2000   Dressing Intervention Integrity maintained 11/03/22 2000   Date on Dressing 11/02/22 11/03/22 0846   Dressing Due to be Changed 11/09/22 11/03/22 0846   Venous Patency/Care normal saline locked 10/30/22 0800   Arterial Patency/Care normal " saline locked 10/30/22 0800   Distal Patency/Care normal saline locked 10/30/22 0800   Number of days: 30       Physical examination:  Gen: NAD, AAOx3, answering questions appropriately  CV: RR  Resp: NWOB  Abd: depressible, distended, rigid, tender to palpation, midline staples in place c/d/I,  Ext: moving all extremities spontaneously and purposefully  Neuro: CN II-XII grossly intact    Labs:  Renal:  Recent Labs     11/01/22 0823 11/02/22 0415 11/03/22 0349 11/04/22  0409   BUN 67.9* 63.1* 84.9* 102.8*   CREATININE 1.54* 1.62* 1.80* 1.59*     FENGI:  Recent Labs     11/01/22 0823 11/02/22 0415 11/03/22 0349 11/04/22  0409   * 130* 129* 129*   K 5.1 5.2* 4.9 4.3   CO2 22 21* 27 25   CALCIUM 8.2* 8.1* 8.2* 8.6   MG 2.10 2.00 1.90 1.60   PHOS 5.8* 5.7* 4.7 3.8   ALBUMIN 2.0* 2.0* 1.8* 1.9*   BILITOT 0.7 0.8 0.9 1.0   AST 20 19 21 26   ALKPHOS 106 114 134 158*   ALT 8 9 8 10     Heme:  Recent Labs     11/01/22 0822 11/01/22 0823 11/02/22 0415 11/03/22 0349 11/04/22 0408 11/04/22 0409   HGB 9.5*  --  9.1* 9.1* 10.6*  --    HCT 28.9*  --  27.6* 27.6* 31.5*  --      --  217 251 312  --    INR  --  1.30 1.38* 1.26  --  1.31*     ID:  Recent Labs     11/01/22 0822 11/02/22 0415 11/03/22 0349 11/04/22 0408   WBC 21.2* 22.3* 19.6* 21.2*     Cardiovascular:  Recent Labs   Lab 10/30/22  0707   TROPONINI <0.010     I have reviewed all pertinent lab results within the past 24 hours.    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 He developed abdominal compartment syndrome s/p multiple exlaps (9/25, 9/27, 9/29) now on HD for renal failure with re-accumulation of ascites despite IR paracentesis. CT has shown concern for necrotizing fascitis. Cultures from paracentesis resulted in Klebsiella growth. S/p washout 10/30.      - will order an abdominal  ultrasound to evaluate lower abdominal tenderness and swelling possible suspicion of fluid collection in the abdominal wall.  - Nutrition optimization  - Midline staples out 11/13  - Continue ongoing care per primary  - Will continue to follow       11/4/2022 5:55 AM    The above findings, diagnostics, and treatment plan were discussed with the physician who will follow with further assessments and recommendations.

## 2022-11-04 NOTE — PHYSICIAN QUERY
PT Name: Stuart Guevara  MR #: [id number]     DOCUMENTATION CLARIFICATION      CDS/: Noemi Dockery RN CDIS            Contact information: [email or web address]  This form is a permanent document in the medical record.     Query Date: November 4, 2022    Dear Provider,  By submitting this query, we are merely seeking further clarification of documentation.  Please utilize your independent clinical judgment when addressing the question(s) below.     The Medical Record contains the following:    Supporting Clinical Findings Location in Medical Record   Severe necrotizing pancreatitis with loculated intra-abdominal fluid collection-status post IR drainage October 28 HM note 11/3     CT has shown concern for necrotizing fascitis. Cultures from paracentesis resulted in Klebsiella growth. S/p washout 10/30.  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  Because he had rapid reaccumulation of ascites with Klebsiella growing within the ascitic fluid we decided taken for an abdominal washout    Preop diagnosis: Bacterial peritonitis, ascites    Postop diagnosis:  Same paragraph findings:  Large amount of venita colored ascites, no succus entericus, no feces, no evidence of visceral perforation, no foul-smelling odor, minimal amount of clotted blood within the recesses of his peritoneal cavity   Op note 10/30          Please clarify if the __pancreatitis__ diagnosis has been:    [ x ] Ruled In   [  ] Ruled Out   [  ] Other/Clarification of findings (please specify): _______________    [   ] Clinically undetermined           Please document in your progress notes daily for the duration of treatment, until resolved, and  include in your discharge summary.    Form No. [id number]

## 2022-11-04 NOTE — PROGRESS NOTES
Inpatient Nutrition Assessment    Admit Date: 9/23/2022   Total duration of encounter: 42 days     Nutrition Recommendation/Prescription     - Continue current diet as tolerated.     - Add Novasource Renal BID. Provides 475 kcals and 22 g protein per serving.     Patient reported to me that he did eat 100% yesterday, but it sounds like that was the first day that he had been able to do so. He skipped breakfast this morning due to some mild nausea and generally feeling unwell. While he could benefit from the extra energy and protein found in the TPN, he is experiencing significant hyperglycemia per his labs, and reports that he is experiencing persistent and dramatic dry mouth despite drinking large amounts of fluid and receiving fluid infusion through TPN. I am concerned that this hyperglycemia could be contributing to some degree to his clinical picture.     -Given 100% po intake yesterday, consider weaning TPN after this bag, and seeing how he does with po only. We can always re-start the TPN if consistency of PO intake does not improve.    Current TPN:  AA15% 95gm, D70 300gm, IL 20% IVPB 3x/week 50gm @60ml/hr  Provides:  1614kcal (80% est needs)  95gm protein (100% est needs)  1440ml fluid  300gm CHO  GIR: 3.6  -Electrolytes per pharmacy.   - Monitor labs, oral intake of meals/fluids  - Once intake of meals and fluids increases, wean TPN    Communication of Recommendations: reviewed with patient/caregiver, reviewed with pharmacist    Nutrition Assessment     Malnutrition Assessment/Nutrition-Focused Physical Exam    Malnutrition in the context of acute illness or injury  Degree of Malnutrition: severe malnutrition  Energy Intake: < 75% of estimated energy requirement for > 7 days  Interpretation of Weight Loss: >5% in 1 month  Body Fat:moderate depletion  Area of Body Fat Loss: orbital region  and upper arm region - triceps / biceps  Muscle Mass Loss: moderate depletion  Area of Muscle Mass Loss: temple region -  temporalis muscle, clavicle bone region - pectoralis major, deltoid, trapezius muscles, clavicle and acromion bone region - deltoid muscle, and scapular bone region - trapezius, supraspinus, infraspinus muscles  Fluid Accumulation: moderate to severe  Edema: ascites   Reduced  Strength: unable to obtain  A minimum of two characteristics is recommended for diagnosis of either severe or non-severe malnutrition.    Chart Review    Reason Seen: continuous nutrition monitoring and follow-up    Diagnosis:  Fever and Leukocytosis  Abdominal aortic dissection, s/p repair  Abdominal compartment syndrome, s/p ex-lap x2 last on  with abdominal closure on 10/1  SLE with active flare  History of transverse myelitis  History of lupus nephritis  TAY now on HD  VHD, s/p mechanical AVR  Chest tube in place  DM2  Necrotizing pancreatitis  Ascites  Bacterial peritonitis    Relevant Medical History: HTN, lupus    Nutrition-Related Medications: aspirin, hydralazine, IL custom TPN, lactulose, prednisone, warfarin, sodium bicarbonate, insulin  Calorie Containing IV Medications: no significant kcals from medications at this time    Nutrition-Related Labs:   BUN 27, Crea 3.06, Glu 174, Phos 6.2   Na 128, K 3.3, Cl 96, BUN 21.5, Crea 3.28, Glu 205  10/4 Na 127, Cl 95, BUN 57.3, Crea 5.16, Glu 152, Phos 5  10/7 K 5.6, BUN 55.2, Crea 4.57, Glu 347, Phos 8  10/11: Na 126, K 6.1, Cl 91, BUN 49, Crea 3.18, GFR 25, Glu 139, Ca 7.9, Phos 7.4  10/14: Na 125, K 5.4, Cl 91, BUN 56.1, Crea 2.76, GFR 30, Ca 8.1  10/18: Na 128, Cl 92, Ca 7.9, BUN 50.9, Cr 2.32   10/21: Na 127, Cl 93, BUN 68.1, Crea 2.35, GFR 37, Glu 335  10/25: Na 128, Cl 96, BUN 65.1, Crea 1.62, GFR 57, Glu 39; CB-124  10/28: Na 128, Cl 96, BUN 62.9, Crea 1.62, GFR 57, glu 304, Ca 7.9  10/31: Na 129, K 6.1, BUN 73.5, Crea 1.67, GFR 55, Glu 285, Alb 2.4  : Na 130, K 5.2, phos 5.7, GFR 57  : WBC 21.2, RBC 3.68, Na 129, Cl 94, .9, Cr 1.59, Glu 247, AP  158, Alb 1.9, Mg/Phos WNL     Diet/PN Order: DIET RENAL NON-DIALYSIS  TPN ADULT CENTRAL LINE CUSTOM  TPN ADULT CENTRAL LINE CUSTOM  Oral Supplement Order: none  Tube Feeding Order: none at this time  Appetite/Oral Intake: poor/0-25% of meals  Factors Affecting Nutritional Intake: decreased appetite and nausea  Food/Orthodox/Cultural Preferences: none reported    Skin Integrity: incision  Wound(s):   incision noted    Comments    9/26/22: Discussed with RN. Will provide tube feeding recommendations for when appropriate to start tube feeding. Receiving kcal from meds. Noted Phos, will monitor for need for renal formula.   9/30/22: Discussed with RN. Need to consider TPN since pt now LOS day 7. If starting tube feeding, recs provided. If able to extubate, advance diet when appropriate.   10/4/22: Pt previously eating 100% po intake of meals, good appetite. Currently NPO for procedure, plans to restart diet per RN. Will add ONS for added protein and kcal.  10/7/22: Pt now with decreased appetite/po intake. Noted elevated K and Phos, likely not diet related since pt with poor po intake of full liquid diet.   10/11: pt sleeping, nurse reports tolerating full liquid diet, some abdominal distention noted, reports unsure of plans to advance diet as this time  10/14: pt advanced to soft diet today; ate about 40% of lunch tray, drinking Boost, would like 2 per meal in vanilla flavor  10/18: Pt advanced to regular texture renal/diabetic diet, he reports that his appetite is fair, drinks boost, feels he is chewing/swallowing well, does reports some abdominal pain 30-90 minutes after eating, feels better after several hours of no eating.   10/21: appetite remains the same, drinking boost  10/25: fair appetite, says he is drinking some boost, would like to continue flavor    10/28: pt with decreased appetite, noted new dx necrotizing pancreatits; paracentesis done today; not drinking boost at this time either; plans to try and  "start eating more and drinking boost, says he feels a little better this afternoon. MD discussed starting TPN until pt able to tolerate increased oral intake. Still on dialysis as needed    10/31: now NPO with NG with LIWS after abdominal washout with surgery yesterday, possible necrotizing fascitis, will switch to custom TPN today due to abnormal electrolytes    : Pt's diet was advanced yesterday; however, pt reports still just taking in liquids and no solids. Pt reports vomiting last night. TPN running as ordered.     22: Pt reports that he ate 100% of all meals yesterday and tolerated them well, skipped breakfast this morning due to feeling unwell and some mild nausea, no GI complaints during our visit, PO intake encouraged.      Anthropometrics    Height: 5' 5" (165.1 cm) Height Method: Estimated  Last Weight: 56.6 kg (124 lb 12.5 oz) (10/31/22 0932) Weight Method: Bed Scale  BMI (Calculated): 20.8  BMI Classification: normal (BMI 18.5-24.9)        Ideal Body Weight (IBW), Male: 136 lb     % Ideal Body Weight, Male (lb): 91.75 %                 Usual Body Weight (UBW), k kg  % Usual Body Weight: 94.53  % Weight Change From Usual Weight: -5.67 %  Usual Weight Provided By: unable to obtain usual weight at this time    Wt Readings from Last 5 Encounters:   10/31/22 56.6 kg (124 lb 12.5 oz)   22 60 kg (132 lb 4.4 oz)   21 48 kg (105 lb 13.1 oz)     Weight Change(s) Since Admission:  Admit Weight: 60 kg (132 lb 4.4 oz) (22 1219)  10/4/22: no new wt  10/7/22: no new wt  10/12/22: 65kg; weight gain noted  10/15/22: 56.6kg; fluctuating weights possible due to fluid  10/22/22: 56.6kg  22: no new wt noted    Estimated Needs    Weight Used For Calorie Calculations: 56.6 kg (124 lb 12.5 oz)  Energy Calorie Requirements (kcal): 2011kcal (1.4stress factor)  Energy Need Method: [place]-St Jeor  Weight Used For Protein Calculations: 56.6 kg (124 lb 12.5 oz)  Protein Requirements: 85-96gm " (1.5-1.7gm/kg)  Fluid Requirements (mL): 1000ml + urinary output  Temp: 98.9 °F (37.2 °C)    Enteral Nutrition    Patient not receiving enteral nutrition at this time.    Parenteral Nutrition    Standard Formula: not applicable  Custom Formula:  633 ml 15% amino acids and 428 ml 70% dextrose  Additives: none  Rate/Volume: 60  Lipids: 250 ml 20% IV lipid emulsion three times per week  Total Nutrition Provided by Parenteral Nutrition:  Calories Provided  1614 kcal/d, 80% needs   Protein Provided  95 g/d, 100% needs   Dextrose Provided  300 g/d, GIR 3.6 mg CHO/kg/min   Fluid Provided  1440 ml/d, 100% needs       Evaluation of Received Nutrient Intake    Calories: meeting estimated needs  Protein: meeting estimated needs    Patient Education    Not applicable.    Nutrition Diagnosis     PES: Inadequate oral intake related to current condition as evidenced by <75% intake of meals. (continues)    PES: Malnutrition related to pancreatitis, infection as evidenced by weight loss >5% in 1 month, appetite loss <50% intake of meals, severe fat and muscle loss. (continues)     Interventions/Goals     Intervention(s): general/healthful diet, modified composition of parenteral nutrition, modified rate of parenteral nutrition, commercial beverage, and collaboration with other providers  Goal: Meet greater than 75% of nutritional needs by follow-up. (goal progressing)    Monitoring & Evaluation     Dietitian will monitor energy intake, weight change, electrolyte/renal panel, and glucose/endocrine profile.  Nutrition Risk/Follow-Up: high (follow-up in 1-4 days)

## 2022-11-04 NOTE — PT/OT/SLP PROGRESS
Attempted to see pt for PT tx. Pt declining to get OOB with PT 2/2 new suspected abscess on lower L abdomen and him having increased pain at this time. NSG confirmed and stated pt scheduled to have US later today. PT to f/u as schedule allows.

## 2022-11-05 NOTE — PROGRESS NOTES
Acute Care Surgery   Progress Note  Admit Date: 9/23/2022  HD#43  POD#6 Days Post-Op    Subjective:   Interval history:  NAEON  T max of 103.1  Pulse tachy up to 103  Desat to 84%  All other VSS     Scheduled Meds:   albumin human 25%  12.5 g Intravenous Once    aspirin  81 mg Oral Daily    carvediloL  25 mg Oral BID    cloNIDine  0.1 mg Oral BID    doxycycline (VIBRAMYCIN) IVPB  100 mg Intravenous Q12H    enoxaparin  1 mg/kg Subcutaneous Q12H    fat emulsion 20%  250 mL Intravenous Every Tues, Thurs, Sat    fluconazole (DIFLUCAN) IV (PEDS and ADULTS)  400 mg Intravenous Every Tues, Thurs, Sat    gabapentin  100 mg Oral TID    hydrALAZINE  100 mg Oral Q8H    insulin detemir U-100  15 Units Subcutaneous BID    lactulose 10 gram/15 ml  10 g Oral BID    meropenem (MERREM) IVPB  500 mg Intravenous Q24H    methylnaltrexone  8 mg Subcutaneous Every other day    methylPREDNISolone sodium succinate injection  40 mg Intravenous Daily    morphine  30 mg Oral Q12H    NIFEdipine  90 mg Oral Daily    pantoprazole  40 mg Intravenous Daily    sodium bicarbonate  1,300 mg Oral Daily    sodium chloride 0.9%  10 mL Intravenous Q6H    sodium zirconium cyclosilicate  10 g Oral Daily    warfarin  3 mg Oral Daily     Continuous Infusions:   TPN ADULT CENTRAL LINE CUSTOM 60 mL/hr at 11/04/22 2128    TPN ADULT CENTRAL LINE CUSTOM       PRN Meds:acetaminophen, albuterol, bisacodyL, dextrose 10 % in water (D10W), dextrose 10 % in water (D10W), dextrose 10%, glucagon (human recombinant), glucose, glucose, hydrALAZINE, HYDROmorphone, insulin aspart U-100, labetaloL, melatonin, nitroGLYCERIN, ondansetron, polyethylene glycol, sodium chloride 0.9%, sodium chloride 0.9%, Flushing PICC Protocol **AND** sodium chloride 0.9% **AND** sodium chloride 0.9%     Objective:     VITAL SIGNS: 24 HR MIN & MAX LAST   Temp  Min: 98.4 °F (36.9 °C)  Max: 103.1 °F (39.5 °C)  99.8 °F (37.7 °C)   BP  Min: 115/68  Max: 154/83  119/82    Pulse  Min: 94  Max: 103   "103    Resp  Min: 16  Max: 22  19    SpO2  Min: 84 %  Max: 94 %  (!) 92 %      HT: 5' 5" (165.1 cm)  WT: 56.6 kg (124 lb 12.5 oz)  BMI: 20.8     Intake/output:  I/O last 3 completed shifts:  In: 1980 [P.O.:1980]  Out: 3000 [Urine:2000; Other:1000]  I/O this shift:  In: 120 [P.O.:120]  Out: 2775 [Urine:275; Other:2500]    Intake/Output Summary (Last 24 hours) at 11/5/2022 1051  Last data filed at 11/5/2022 0941  Gross per 24 hour   Intake 900 ml   Output 4575 ml   Net -3675 ml          Lines/drains/airway:  PICC Double Lumen 10/25/22 1520 right brachial (Active)   Line Necessity Review Longterm central access required 11/03/22 2000   Site Assessment No drainage;No redness;No swelling;No warmth 11/03/22 2000   Extremity Assessment Distal to IV No abnormal discoloration 11/03/22 2000   Line Securement Device Secured with sutureless device 11/03/22 2000   Dressing Type Central line dressing 11/03/22 2000   Dressing Status Clean;Dry;Intact 11/03/22 2000   Dressing Intervention Integrity maintained 11/03/22 2000   Date on Dressing 11/02/22 11/03/22 0846   Dressing Due to be Changed 11/09/22 11/03/22 0846   Left Lumen Patency/Care Blood return present;Flushed w/o difficulty;Normal saline locked;Other (Comment) 11/02/22 1247   Right Lumen Patency/Care Blood return present;Flushed w/o difficulty;Infusing;Other (Comment) 11/02/22 1247   Number of days: 9       Trialysis (Dialysis) Catheter 10/04/22 1300 right internal jugular (Active)   Line Necessity Review CRRT/HD 11/03/22 0846   Verification by X-ray Yes 11/03/22 0846   Site Assessment No drainage;No redness;No swelling;No warmth 11/03/22 2000   Line Securement Device Secured with sutureless device 11/03/22 2000   Dressing Type Biopatch in place 11/03/22 2000   Dressing Status Clean;Dry;Intact 11/03/22 2000   Dressing Intervention Integrity maintained 11/03/22 2000   Date on Dressing 11/02/22 11/03/22 0846   Dressing Due to be Changed 11/09/22 11/03/22 0846   Venous " Patency/Care normal saline locked 10/30/22 0800   Arterial Patency/Care normal saline locked 10/30/22 0800   Distal Patency/Care normal saline locked 10/30/22 0800   Number of days: 30       Physical examination:  Gen: NAD, AAOx3, answering questions appropriately  CV: RR  Resp: NWOB  Abd: depressible, distended, rigid, tender to palpation, midline staples in place c/d/I,  Ext: moving all extremities spontaneously and purposefully  Neuro: CN II-XII grossly intact    Labs:  Renal:  Recent Labs     11/03/22 0349 11/04/22  0409 11/05/22  0549   BUN 84.9* 102.8* 68.6*   CREATININE 1.80* 1.59* 1.19*       FENGI:  Recent Labs     11/03/22 0349 11/04/22 0409 11/05/22  0549   * 129* 130*   K 4.9 4.3 4.1   CO2 27 25 25   CALCIUM 8.2* 8.6 7.9*   MG 1.90 1.60 1.80   PHOS 4.7 3.8 2.4   ALBUMIN 1.8* 1.9* 1.7*   BILITOT 0.9 1.0 0.9   AST 21 26 28   ALKPHOS 134 158* 138   ALT 8 10 8       Heme:  Recent Labs     11/03/22 0349 11/04/22 0408 11/04/22  0409 11/05/22  0549   HGB 9.1* 10.6*  --  8.5*   HCT 27.6* 31.5*  --  26.1*    312  --  264   INR 1.26  --  1.31*  --        ID:  Recent Labs     11/03/22 0349 11/04/22  0408 11/05/22  0549   WBC 19.6* 21.2* 14.3*       Cardiovascular:  Recent Labs   Lab 10/30/22  0707   TROPONINI <0.010       I have reviewed all pertinent lab results within the past 24 hours.    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 He developed abdominal compartment syndrome s/p multiple exlaps (9/25, 9/27, 9/29) now on HD for renal failure with re-accumulation of ascites despite IR paracentesis. CT has shown concern for necrotizing fascitis. Cultures from paracentesis resulted in Klebsiella growth. S/p washout 10/30.      - Nutrition optimization  - Midline staples out 11/13  - Continue ongoing care per primary  - Will continue to  follow    Blayne Franklin MD   11/5/2022 5:55 AM

## 2022-11-05 NOTE — PROGRESS NOTES
[facility]  Hospital Medicine Progress Note        Chief Complaint: Inpatient Follow-up for Pancreatitis     HPI:   Stuart Guevara is a 32 y.o. male who has PMH which includes HTN, SLE,  hepatitis B, type A aortic dissection with repair + mechanical aortic valve placement 2/2021 on coumadin, RUE DVT s/p Xarelto, class V lupus nephritis; presented to the ED at [facility] on 9/23/2022  with reports of  shortness of breath and chest pain radiating to his back. Work up revealed a type B aortic dissection and significant hypertension noted on presentation.  He failed medical management and had progression of his symptoms despite adequate control of hemodynamic parameters.  PT was transferred from [facility] to [facility] for CV surgery evaluation which he was taken  to the OR and had TEVAR with left carotid to subclavian artery bypass on 9/23.   Later Patient developed abdominal distension and rigidity of his abdomen and developed a lactic acidosis.  He had a repeat CT angiography on 9/25 of the chest/abdomen/pelvis without evidence of worsening dissection, but evidence of significant intra-abdominal pathology with some hemoperitoneum, signs of hypoperfusion of the abdominal organs (compartment syndrome)  free blood in the abdomen. He underwent exploratory laparotomy on 9/25 with evidence of viable bowel and no significant bleeding identified, abd was closed with flakito drain left in situ chest tube was placed. Had repeat ex-laps on 9/27, 9/29. Postoperatively, he developed severe unstable bradycardia, and a transvenous pacemaker was temporarily placed by Cardiology, with the bradycardia resolving within he next couple days.  Shortly afterwards, he was noted to have significant worsening of his renal failure and critical hyperkalemia. Renal services consulted and HD catheter was placed and he was started on HD treatments. Patient was cleared for transfer out of ICU to the floor.  He started having worsening  abdominal pain. He was seen by rheumatology for possible lupus flare up and serositis. He was started on steroids and other immunomodulating drugs. Drugs have since been discontinued due to concern of infection.  Repeat CT showed multiple loculated fluid collections, necrotizing pancreatitis, possible peritonitis. Surgery team is following with plan for IR drainage, which was performed October 28.  Body fluid culture grew out Klebsiella.  Surgical hospitalist took back to the OR October 30th due to continued pain in peritonitis with positive cultures, only found about 3 L of venita ascites, no other sign of infection, necrotic tissue, bowel issues or hematoma.    Interval Hx:   Patient has a bad night. He became hypoxic and had to be placed on a non rebreather, 15 liters. ABG showed Ph 7.48/PCO2 37/PO2 55/ HCO3 27. CXR showed jesus pulmonary edema. He also spiked a temp. C/o only SOB and mild cough. No chest pain, chills or palpitation.   He was given 40 mg iv lasix and put out 500 ml urine. He is now in HD and we have requested nephrology team to pull out extra fluid. He now feels very comfortable. Speaking in full sentences and denies any SOB or chest pain.     Objective/physical exam:  General: In no acute distress, frail  Chest: Clear to auscultation bilaterally  Heart: RRR, +S1, S2, no appreciable murmur  Abdomen: + distension, Firm, + tenderness in LLQ + Bowel sounds   MSK: severe muscle masting    : penile and scrotal edema   Neurologic: Alert and oriented x4, Cranial nerve II-XII intact, Strength 5/5 in all 4 extremities    VITAL SIGNS: 24 HRS MIN & MAX LAST   Temp  Min: 98.4 °F (36.9 °C)  Max: 103.1 °F (39.5 °C) 99.8 °F (37.7 °C)   BP  Min: 115/68  Max: 154/83 119/82     Pulse  Min: 94  Max: 105  103   Resp  Min: 16  Max: 22 19   SpO2  Min: 84 %  Max: 94 % (!) 92 %       Recent Labs   Lab 11/03/22  0349 11/04/22  0408 11/05/22  0549   WBC 19.6* 21.2* 14.3*   RBC 3.22* 3.68* 3.08*   HGB 9.1* 10.6* 8.5*   HCT  27.6* 31.5* 26.1*   MCV 85.7 85.6 84.7   MCH 28.3 28.8 27.6   MCHC 33.0 33.7 32.6*   RDW 15.0 15.0 14.9    312 264   MPV 11.0* 10.8* 10.9*       Recent Labs   Lab 11/03/22  0349 11/04/22  0409 11/05/22  0142 11/05/22 0549   * 129*  --  130*   K 4.9 4.3  --  4.1   CO2 27 25  --  25   BUN 84.9* 102.8*  --  68.6*   CREATININE 1.80* 1.59*  --  1.19*   CALCIUM 8.2* 8.6  --  7.9*   PH  --   --  7.48*  --    MG 1.90 1.60  --  1.80   ALBUMIN 1.8* 1.9*  --  1.7*   ALKPHOS 134 158*  --  138   ALT 8 10  --  8   AST 21 26  --  28   BILITOT 0.9 1.0  --  0.9          Microbiology Results (last 7 days)       Procedure Component Value Units Date/Time    Rapid Influenza A/B [[id number]] Collected: 11/05/22 0612    Order Status: Canceled Specimen: Nasopharyngeal from Nasal Swab Updated: 11/05/22 0616    Blood Culture [[id number]] Collected: 11/05/22 0549    Order Status: Sent Specimen: Blood Updated: 11/05/22 0557    Blood Culture [[id number]] Collected: 11/05/22 0549    Order Status: Sent Specimen: Blood Updated: 11/05/22 0557    Body Fluid Culture [[id number]] Collected: 10/28/22 0940    Order Status: Completed Specimen: Peritoneal Fluid from Abdomen Updated: 11/02/22 1130     Body Fluid Culture Final Report: At 5 days. No growth    Anaerobic Culture [[id number]] Collected: 10/28/22 0940    Order Status: Completed Specimen: Body Fluid from Abdomen Updated: 10/31/22 0843     Anaerobe Culture No Anaerobes Isolated    Anaerobic Culture [[id number]] Collected: 10/28/22 0940    Order Status: Completed Specimen: Body Fluid from Peritoneal Updated: 10/31/22 0842     Anaerobe Culture No Anaerobes Isolated    Body Fluid Culture [[id number]]  (Abnormal)  (Susceptibility) Collected: 10/28/22 0940    Order Status: Completed Specimen: Peritoneal Fluid from Abdomen Updated: 10/30/22 1351     Body Fluid Culture Many Klebsiella pneumoniae    Gram Stain [[id number]] Collected: 10/28/22 0940    Order Status: Completed Specimen: Peritoneal  Fluid from Abdomen Updated: 10/29/22 1130     GRAM STAIN No WBCs, No bacteria seen    Gram Stain [[id number]] Collected: 10/28/22 0940    Order Status: Completed Specimen: Peritoneal Fluid from Abdomen Updated: 10/29/22 1122     GRAM STAIN Rare WBC observed      Few Gram Negative Rods             See below for Radiology    Scheduled Med:   aspirin  81 mg Oral Daily    carvediloL  25 mg Oral BID    cloNIDine  0.1 mg Oral BID    doxycycline (VIBRAMYCIN) IVPB  100 mg Intravenous Q12H    enoxaparin  1 mg/kg Subcutaneous Q12H    fluconazole (DIFLUCAN) IV (PEDS and ADULTS)  400 mg Intravenous Every Tues, Thurs, Sat    gabapentin  100 mg Oral TID    hydrALAZINE  100 mg Oral Q8H    insulin detemir U-100  15 Units Subcutaneous BID    lactulose 10 gram/15 ml  10 g Oral BID    meropenem (MERREM) IVPB  500 mg Intravenous Q24H    methylnaltrexone  8 mg Subcutaneous Every other day    methylPREDNISolone sodium succinate injection  40 mg Intravenous Daily    morphine  30 mg Oral Q12H    NIFEdipine  90 mg Oral Daily    pantoprazole  40 mg Intravenous Daily    sodium bicarbonate  1,300 mg Oral Daily    sodium chloride 0.9%  10 mL Intravenous Q6H    sodium zirconium cyclosilicate  10 g Oral Daily    warfarin  3 mg Oral Daily        Continuous Infusions:   TPN ADULT CENTRAL LINE CUSTOM 60 mL/hr at 11/04/22 2128        PRN Meds:  acetaminophen, albuterol, bisacodyL, dextrose 10 % in water (D10W), dextrose 10 % in water (D10W), dextrose 10%, glucagon (human recombinant), glucose, glucose, hydrALAZINE, HYDROmorphone, insulin aspart U-100, labetaloL, melatonin, nitroGLYCERIN, ondansetron, polyethylene glycol, sodium chloride 0.9%, sodium chloride 0.9%, Flushing PICC Protocol **AND** sodium chloride 0.9% **AND** sodium chloride 0.9%       Assessment/Plan:  Acute respiratory failure with hypoxia secondary to flash pulmonary edema   Severe necrotizing pancreatitis with loculated intra-abdominal fluid collection-status post IR drainage October  28  Intra-abdominal abscess versus infected necrotizing pancreatitis  Hospital acquired pneumonia-right-sided  Sepsis  Anasarca  Acute kidney injury ATN  Critical illness myopathy  Severe protein caloric malnutrition     History of:  Type A aortic dissection status post repair with mechanical aortic valve conduit, recent type B aortic dissection status post TAVR, lupus nephritis class 5, mixed connective tissue disorder predominant lupus, transverse myelitis recovered    Plan:  Patient last night went into volume overload and pulmonary edema  Responded well to lasix. Now on HD.   Will closely monitor patients daily weight, urine out put, renal parameters and volume status    VQ scan and US legs have been ordered by nocturnist team to r/o PE   He is on FD lovenox, now BID dose, cont coumadin and monitor INR  After receiving lasix last night he is now feeling much better. Looks comfortable and his lung were clear this morning   Wean O2 to keep SaO2 >92%    Patient still tenderness in the LLQ and now has a small superficial swelling. US report did not show any fluid collection   He is being followed by surgery team  No BM so far, Will cont relistor q 48 hrs and Cont other scheduled laxatives     He spike a temp last night. Now has a cough and clear sputum, not sure if he is developing a new lung infection   Will add IV Doxy day 1/7  Cont IV merrem and diflucan per ID team     He has anasarca and will Cont HD per renal team     He has poor nutritional status, cont TPN for now   Now tolerating po diet, cont protein shakes     Pain now well controlled  Encouraged patient to start participating more with PT  Cont supportive care     He is on low dose steroids per rheumatology, will see if this can be stopped for now     Continue strict aspiration, fall and decubitus precautions      Unfortunately over all prognosis looks poor     Labs in am    Critical care note:  Critical care diagnosis: Sepsis needing iv antibiotics    Critical care interventions: Hands-on evaluation, review of labs/radiographs/records and discussion with patient and family if present  Critical care time spent: 35 minutes       VTE prophylaxis: Lovenox    Patient condition:  Guarded    Anticipated discharge and Disposition:   LTAC, in formed CM      All diagnosis and differential diagnosis have been reviewed; assessment and plan has been documented; I have personally reviewed the labs and test results that are presently available; I have reviewed the patients medication list; I have reviewed the consulting providers response and recommendations. I have reviewed or attempted to review medical records based upon their availability    All of the patient's questions have been  addressed and answered. Patient's is agreeable to the above stated plan. I will continue to monitor closely and make adjustments to medical management as needed.  _____________________________________________________________________    Nutrition Status:    Radiology:  X-Ray Chest 1 View  START OF REPORT:  Technique: 1 portable AP view of the chest was performed.    Comparison: Comparison is with prior study dated â2022-11-02 13:31:31â.    Clinical History: SOB low oxygen.    Findings:  Soft Tissues: Unremarkable.  Lines and Tubes: A PICC line is seen with its tip in the SVC from a right sided approach. A right internal jugular, catheter is present with its tip in the right atrium.  Mediastinum: The cardiomediastinal silhouette is stable after accounting for patient position, technique, and patient body habitus.  Pulmonary Arteries: The pulmonary arteries are within normal limits.  Lungs: Compared to the prior study there continues to be hazy opacity in the bilateral lungs with some central predominance suggesting possible interstitial edema with possible improvement at the left base.  Pleura: No pneumothorax is identified. Tiny bilateral pleural effusions may be present.  Bony Structures:  The visualized bony structures appear stable.    Impression:  1. A PICC line is seen with its tip in the SVC from a right sided approach. A right internal jugular, catheter is present with its tip in the right atrium.  2. Compared to the prior study there continues to be hazy opacity in the bilateral lungs with some central predominance suggesting possible interstitial edema with possible improvement at the left base. Correlate with clinical and laboratory findings as regards additional evaluation and follow-up.  3. Details and other findings as noted above.      Saurabh Curry MD   11/05/2022

## 2022-11-05 NOTE — CARE UPDATE
Nursing staff notified me tonight of patient's worsening respiratory status and afebrile 103.1.  Chest x-ray shows pulmonary vascular congestion, ABG with hypoxia on 15L oxymask  Patient currently on meropenem and Diflucan 4 necrotizing pancreatitis/peritonitis. He has a mechanical aortic valve and is currently being bridged with Lovenox and warfarin however INR 1.3 , currently on 1mg/kg lovenox q24h (renal dosing, due to previous TAY though now its improving and GFR >30). Discussed case with Dr. Giles.  Patient currently on a non-rebreather at 8 L with 02 98%. Lasix 40 mg x 1 given, full-dose Lovenox changed to BID, first dose now. Unable to obtain CTA to r/o PE, will get VQ scan in AM to r/o PE and venous US to r/o DVT. Repeat Blood Cultures, Flu A&B and COVID ordered.

## 2022-11-05 NOTE — NURSING
11/05/22 0941   Post-Hemodialysis Assessment   Blood Volume Processed (Liters) 52.9 L   Dialyzer Clearance Clear   Total UF (mL) 2500 mL   Patient Response to Treatment Pt tolerated HD tx well; NAD/VSS. Total tx length 3hrs with 2.5 LIter UF goal .   Post-Hemodialysis Comments Pt deaccessed per P and P

## 2022-11-05 NOTE — PROGRESS NOTES
Infectious Diseases Progress Note  32-year-old male with past medical history of SLE, HTN, hepatitis-B, aortic dissection with repair in February 2021, DVT on Xarelto, initially presented to the [facility] ED on 09/23/2022 with shortness of breath and chest pain.  Evaluation revealed a aortic dissection type B, with progressive course requiring transferred to [facility], promptly taken to OR for TEVAR with left carotid to subclavian artery bypass.  He had a postoperative complicated course with development of compartment syndrome found on CT angiography of chest/abdomen and pelvis with hemoperitoneum.  This required exploratory laparotomy, source apparently unstable bradycardia requiring pacemaker and then acute kidney injury and initiation of hemodialysis.  He had been hospitalized in the ICU but since downgraded to the floor and out on the floor.  CT scan of the abdomen and pelvis done on 10/26 showed no normal pancreatic parenchyma seen with a large fluid collection with a few foci of gas noted at the level of the pancreas, loculated, faintly peripherally enhancing fluid collection extending along the pericolic gutter as findings compatible with sequela of severe necrotizing pancreatitis with walled-off necrosis, mass effect on bowel loops from abdominal fluid collections, anasarca.  Ultrasound of the scrotum with noted edema.  He has been pretty much afebrile lately with a low-grade temp of 99.5 noted on 10/25 with associated leukocytosis of 27.5 which worse today at 33.4.    He is currently on antimicrobial coverage with meropenem    Subjective:  No new complaints, no fevers, doing about the same.  Lying in bed in no acute distress      Past Medical History:   Diagnosis Date    Aortic dissection 9/23/2022    Hypertension     Systemic lupus erythematosus, organ or system involvement unspecified     Systemic lupus erythematosus, organ or system involvement unspecified      Past Surgical  History:   Procedure Laterality Date    APPLICATION OF WOUND VACUUM-ASSISTED CLOSURE DEVICE  9/27/2022    Procedure: APPLICATION, WOUND VAC;  Surgeon: Christopher Espinal MD;  Location: [facility];  Service: General;;    CARDIAC SURGERY      CREATION OF BYPASS FROM INTERNAL CAROTID ARTERY TO SUBCLAVIAN ARTERY Left 9/23/2022    Procedure: CREATION, BYPASS, ARTERIAL, INTERNAL CAROTID TO SUBCLAVIAN;  Surgeon: Vic Martines MD;  Location: [facility] OR;  Service: Cardiothoracic;  Laterality: Left;    ENDOVASCULAR REPAIR OF THORACIC AORTA N/A 9/23/2022    Procedure: REPAIR, AORTA, THORACIC, ENDOVASCULAR;  Surgeon: Vic Martines MD;  Location: [facility] OR;  Service: Cardiology;  Laterality: N/A;    FINGER AMPUTATION      INSERTION OF TUNNELED CENTRAL VENOUS HEMODIALYSIS CATHETER N/A 10/24/2022    Procedure: INSERTION, CATHETER, CENTRAL VENOUS, HEMODIALYSIS, TUNNELED;  Surgeon: Yogesh Melton DO;  Location: [facility] CATH LAB;  Service: Nephrology;  Laterality: N/A;  TUNNELED CATH INSERTION    THROMBECTOMY Right     TOE AMPUTATION       Social History     Socioeconomic History    Marital status: Single   Tobacco Use    Smoking status: Every Day     Types: Cigarettes    Smokeless tobacco: Never     Social Determinants of Health     Food Insecurity: Unknown    Worried About Running Out of Food in the Last Year: Never true   Transportation Needs: Unknown    Lack of Transportation (Medical): No   Housing Stability: Unknown    Unable to Pay for Housing in the Last Year: No       ROS  Constitutional:  Positive for malaise/fatigue.   HENT: Negative.     Respiratory: Negative.     Gastrointestinal:  Positive for abdominal pain and nausea.   Genitourinary: Negative.    Musculoskeletal: Negative.    Neurological:  Positive for weakness.   Endo/Heme/Allergies: Negative.    Psychiatric/Behavioral: Negative.    All other Systems review done and negative.    Review of patient's allergies indicates:   Allergen Reactions    Levofloxacin      "Sulfamethoxazole-trimethoprim          Scheduled Meds:   aspirin  81 mg Oral Daily    carvediloL  25 mg Oral BID    cloNIDine  0.1 mg Oral BID    enoxaparin  1 mg/kg Subcutaneous Q24H    [START ON 11/5/2022] fluconazole (DIFLUCAN) IV (PEDS and ADULTS)  400 mg Intravenous Every Tues, Thurs, Sat    gabapentin  100 mg Oral TID    hydrALAZINE  100 mg Oral Q8H    insulin detemir U-100  15 Units Subcutaneous BID    lactulose 10 gram/15 ml  10 g Oral BID    meropenem (MERREM) IVPB  500 mg Intravenous Q24H    [START ON 11/5/2022] methylnaltrexone  8 mg Subcutaneous Every other day    methylPREDNISolone sodium succinate injection  40 mg Intravenous Daily    morphine  30 mg Oral Q12H    NIFEdipine  90 mg Oral Daily    pantoprazole  40 mg Intravenous Daily    sodium bicarbonate  1,300 mg Oral Daily    sodium chloride 0.9%  10 mL Intravenous Q6H    sodium zirconium cyclosilicate  10 g Oral Daily    warfarin  3 mg Oral Daily     Continuous Infusions:   TPN ADULT CENTRAL LINE CUSTOM 60 mL/hr at 11/03/22 2307    TPN ADULT CENTRAL LINE CUSTOM 60 mL/hr at 11/04/22 2128     PRN Meds:acetaminophen, albuterol, bisacodyL, dextrose 10 % in water (D10W), dextrose 10 % in water (D10W), dextrose 10%, glucagon (human recombinant), glucose, glucose, hydrALAZINE, HYDROmorphone, insulin aspart U-100, labetaloL, melatonin, nitroGLYCERIN, ondansetron, polyethylene glycol, sodium chloride 0.9%, sodium chloride 0.9%, Flushing PICC Protocol **AND** sodium chloride 0.9% **AND** sodium chloride 0.9%    Objective:  /69   Pulse 94   Temp 98.5 °F (36.9 °C) (Oral)   Resp 16   Ht 5' 5" (1.651 m)   Wt 56.6 kg (124 lb 12.5 oz)   SpO2 (!) 86%   BMI 20.76 kg/m²     Physical Exam:   Physical Exam  Vitals reviewed.   Constitutional:       General: He is not in acute distress.  HENT:      Head: Normocephalic and atraumatic.  Neck:      Comments: Right IJ vascular access noted  Cardiovascular:      Rate and Rhythm: Normal rate and regular rhythm.      " Heart sounds: Normal heart sounds.   Pulmonary:      Effort: No respiratory distress.      Breath sounds: Normal breath sounds.   Abdominal:      General: Bowel sounds are normal.     Palpations: Abdomen is soft.      Tenderness: There is abdominal tenderness.   Genitourinary:     Comments: Scrotal edema  Musculoskeletal:         General: No deformity.      Cervical back: Neck supple.      Right lower leg: Edema present.      Left lower leg: Edema present.   Skin:     Findings: No erythema or rash.   Neurological:      Mental Status: He is alert.    Imaging       Lab Review   Recent Results (from the past 24 hour(s))   CBC with Differential    Collection Time: 11/04/22  4:08 AM   Result Value Ref Range    WBC 21.2 (H) 4.5 - 11.5 x10(3)/mcL    RBC 3.68 (L) 4.70 - 6.10 x10(6)/mcL    Hgb 10.6 (L) 14.0 - 18.0 gm/dL    Hct 31.5 (L) 42.0 - 52.0 %    MCV 85.6 80.0 - 94.0 fL    MCH 28.8 27.0 - 31.0 pg    MCHC 33.7 33.0 - 36.0 mg/dL    RDW 15.0 11.5 - 17.0 %    Platelet 312 130 - 400 x10(3)/mcL    MPV 10.8 (H) 7.4 - 10.4 fL    Neut % 91.8 %    Lymph % 3.9 %    Mono % 3.0 %    Eos % 0.0 %    Basophil % 0.2 %    Lymph # 0.82 0.6 - 4.6 x10(3)/mcL    Neut # 19.4 (H) 2.1 - 9.2 x10(3)/mcL    Mono # 0.63 0.1 - 1.3 x10(3)/mcL    Eos # 0.01 0 - 0.9 x10(3)/mcL    Baso # 0.04 0 - 0.2 x10(3)/mcL    IG# 0.24 (H) 0 - 0.04 x10(3)/mcL    IG% 1.1 %    NRBC% 0.0 %   Comprehensive Metabolic Panel    Collection Time: 11/04/22  4:09 AM   Result Value Ref Range    Sodium Level 129 (L) 136 - 145 mmol/L    Potassium Level 4.3 3.5 - 5.1 mmol/L    Chloride 94 (L) 98 - 107 mmol/L    Carbon Dioxide 25 22 - 29 mmol/L    Glucose Level 247 (H) 74 - 100 mg/dL    Blood Urea Nitrogen 102.8 (H) 8.9 - 20.6 mg/dL    Creatinine 1.59 (H) 0.73 - 1.18 mg/dL    Calcium Level Total 8.6 8.4 - 10.2 mg/dL    Protein Total 5.9 (L) 6.4 - 8.3 gm/dL    Albumin Level 1.9 (L) 3.5 - 5.0 gm/dL    Globulin 4.0 (H) 2.4 - 3.5 gm/dL    Albumin/Globulin Ratio 0.5 (L) 1.1 - 2.0 ratio     Bilirubin Total 1.0 <=1.5 mg/dL    Alkaline Phosphatase 158 (H) 40 - 150 unit/L    Alanine Aminotransferase 10 0 - 55 unit/L    Aspartate Aminotransferase 26 5 - 34 unit/L    eGFR 58 mls/min/1.73/m2   Magnesium    Collection Time: 11/04/22  4:09 AM   Result Value Ref Range    Magnesium Level 1.60 1.60 - 2.60 mg/dL   Phosphorus    Collection Time: 11/04/22  4:09 AM   Result Value Ref Range    Phosphorus Level 3.8 2.3 - 4.7 mg/dL   Protime-INR    Collection Time: 11/04/22  4:09 AM   Result Value Ref Range    PT 16.1 (H) 12.5 - 14.5 seconds    INR 1.31 (H) 0.00 - 1.30   POCT glucose    Collection Time: 11/04/22  5:08 AM   Result Value Ref Range    POCT Glucose 245 (H) 70 - 110 mg/dL   POCT glucose    Collection Time: 11/04/22 11:04 AM   Result Value Ref Range    POCT Glucose 278 (H) 70 - 110 mg/dL   POCT glucose    Collection Time: 11/04/22  9:24 PM   Result Value Ref Range    POCT Glucose 135 (H) 70 - 110 mg/dL             Assessment/Plan:  1. Sepsis syndrome with severe leukocytosis  2.  Intra-abdominal infection/peritonitis/abscesses-Klebsiella  3.  Severe necrotizing pancreatitis  4.  Acute kidney injury on hemodialysis  5. Abdominal compartment syndrome  6.  Anemia  7.  Protein calorie malnutrition  8.  History of hepatitis-B   9. Thoracic aortic dissection     -Continue Merrem #9 and Diflucan #7  -No fevers and with leukocytosis trending up, on steroids, follow  -S/p CT abscess aspiration, with free fluid noted on the right and thick gelatinous loculated fluid on the left, cultures with Klebsiella  -11/2 chest x-ray with increasing right opacity and left effusion  -10/28 Procalcitonin elevated at 1.6  -Hemodialysis per Nephrology  -MRSA PCR (-)  -Seen by GI, pulmonology, and surgery with inputs noted  -Hepatitis B evaluation and management to continue as outpatient  -Discussed with patient and nursing staff

## 2022-11-05 NOTE — PROGRESS NOTES
Nephrology consult follow up note    HPI:      Stuart Guevara is a 32 y.o. male with TAY following TEVAR for a type B aortic dissection on 09/23/2022.  (patient had had a type A repair via TEVAR in February 2022).    During the type B repair he also required a left carotid to left subclavian artery bypass for subclavian artery occlusion.    Since then he has required several exploratory laparotomies due to concerns for ischemic /necrotizing bowel and abdominal compartment syndrome on 9/25, 9/27 and 9/29. He has been maintained on TTS dialysis schedule via right IJ temp catheter originally placed 2 weeks ago and exchanged on November 2nd over a guidewire by Dr. Melton due to concern for the potential for catheter-related bloodstream infection.    Interval history:     No acute events overnight. Seen on HD.      Review of Systems:     Past medical, family, surgical, and social history reviewed and unchanged from initial consult note.     Objective:       VITAL SIGNS: 24 HR MIN & MAX LAST    Temp  Min: 98.4 °F (36.9 °C)  Max: 103.1 °F (39.5 °C)  99.8 °F (37.7 °C)        BP  Min: 115/68  Max: 154/83  119/82     Pulse  Min: 94  Max: 105  103     Resp  Min: 16  Max: 22  19    SpO2  Min: 84 %  Max: 94 %  (!) 92 %      GEN: Chronically ill appearing AAM in NAD  CV: RRR +S1,S2 without murmur  PULM: On non-rebreather, Clear anteriorly  ABD: Soft, NT/ND abdomen with NABS  EXT: No cyanosis or edema  SKIN: Warm and dry  PSYCH: Awake, alert and appropriately conversant.   Vascular access: RIJ permcath            Component Value Date/Time     (L) 11/05/2022 0549     (L) 11/04/2022 0409    K 4.1 11/05/2022 0549    K 4.3 11/04/2022 0409    CHLORIDE 99 11/05/2022 0549    CHLORIDE 94 (L) 11/04/2022 0409    CO2 25 11/05/2022 0549    CO2 25 11/04/2022 0409    BUN 68.6 (H) 11/05/2022 0549    .8 (H) 11/04/2022 0409    CREATININE 1.19 (H) 11/05/2022 0549    CREATININE 1.59 (H) 11/04/2022 0409    CALCIUM 7.9 (L)  11/05/2022 0549    CALCIUM 8.6 11/04/2022 0409    PHOS 2.4 11/05/2022 0549            Component Value Date/Time    WBC 14.3 (H) 11/05/2022 0549    WBC 21.2 (H) 11/04/2022 0408    HGB 8.5 (L) 11/05/2022 0549    HGB 10.6 (L) 11/04/2022 0408    HCT 26.1 (L) 11/05/2022 0549    HCT 31.5 (L) 11/04/2022 0408    HCT 28 (L) 09/23/2022 0304    HCT 30.0 (L) 02/15/2021 1751     11/05/2022 0549     11/04/2022 0408         Imaging reviewed      Assessment / Plan:       Active Hospital Problems    Diagnosis  POA    Dissection of thoracoabdominal aorta [I71.03]  Unknown    Serositis [K65.8]  Yes    Systemic lupus erythematosus [M32.9]  Yes     Chronic    HTN (hypertension) [I10]  Yes     Chronic    History of DVT in adulthood [Z86.718]  Not Applicable     Chronic    Hypertensive emergency [I16.1]  Yes      Resolved Hospital Problems    Diagnosis Date Resolved POA    *Aortic dissection [I71.00] 10/30/2022 Yes       TAY- nonoliguric.  Dialysis dependent  Systemic lupus erythematosus with serositis/pancreatitis  Status post multiple abdominal washouts for inflammatory serositis  Status post type B aortic dissection repair 09/23/2022   Hyperkalemia on Lokelma  Hypertension  Protein calorie malnutrition    Plan:  Seen on HD at 8:30am. Tolerating ok but hypotension may limit our ability to UF. 2-3L UF as tolerated.     Douglas Landis DO  Nephrology  [facility]  Clinic number: [phone]

## 2022-11-06 NOTE — PT/OT/SLP PROGRESS
Physical Therapy      Patient Name:  Stuart Guevara   MRN:  [id number]    Patient not seen today secondary to Nurse/ NICOLE hold. Will follow-up 11/7/2022.

## 2022-11-06 NOTE — PROGRESS NOTES
[facility]  Hospital Medicine Progress Note        Chief Complaint: Inpatient Follow-up for Pancreatitis     HPI:   Stuart Guevara is a 32 y.o. male who has PMH which includes HTN, SLE,  hepatitis B, type A aortic dissection with repair + mechanical aortic valve placement 2/2021 on coumadin, RUE DVT s/p Xarelto, class V lupus nephritis; presented to the ED at [facility] on 9/23/2022  with reports of  shortness of breath and chest pain radiating to his back. Work up revealed a type B aortic dissection and significant hypertension noted on presentation.  He failed medical management and had progression of his symptoms despite adequate control of hemodynamic parameters.  PT was transferred from [facility] to [facility] for CV surgery evaluation which he was taken  to the OR and had TEVAR with left carotid to subclavian artery bypass on 9/23.   Later Patient developed abdominal distension and rigidity of his abdomen and developed a lactic acidosis.  He had a repeat CT angiography on 9/25 of the chest/abdomen/pelvis without evidence of worsening dissection, but evidence of significant intra-abdominal pathology with some hemoperitoneum, signs of hypoperfusion of the abdominal organs (compartment syndrome)  free blood in the abdomen. He underwent exploratory laparotomy on 9/25 with evidence of viable bowel and no significant bleeding identified, abd was closed with flakito drain left in situ chest tube was placed. Had repeat ex-laps on 9/27, 9/29. Postoperatively, he developed severe unstable bradycardia, and a transvenous pacemaker was temporarily placed by Cardiology, with the bradycardia resolving within he next couple days.  Shortly afterwards, he was noted to have significant worsening of his renal failure and critical hyperkalemia. Renal services consulted and HD catheter was placed and he was started on HD treatments. Patient was cleared for transfer out of ICU to the floor.  He started having worsening  abdominal pain. He was seen by rheumatology for possible lupus flare up and serositis. He was started on steroids and other immunomodulating drugs. Drugs have since been discontinued due to concern of infection.  Repeat CT showed multiple loculated fluid collections, necrotizing pancreatitis, possible peritonitis. Surgery team is following with plan for IR drainage, which was performed October 28.  Body fluid culture grew out Klebsiella.  Surgical hospitalist took back to the OR October 30th due to continued pain in peritonitis with positive cultures, only found about 3 L of venita ascites, no other sign of infection, necrotic tissue, bowel issues or hematoma.    Patient was continued on iv merrem. He had severe PCM and was started on TPN. He was also tolerating po diet. He was followed by renal team and HD was continued.     Interval Hx:   Patient awake but lethargic. States abdominal pain is still there but a bit better. He is eating well and drinking his protein shakes. Has been afebrile.   Had a BM yesterday.   Still needing O2 support. Encouraged to ambulate and use incentive spirometry     Objective/physical exam:  General: In no acute distress, frail  Chest: Clear to auscultation bilaterally  Heart: RRR, +S1, S2, no appreciable murmur  Abdomen: + distension, soft, + tenderness in LLQ and RLQ + Bowel sounds   MSK: severe muscle masting    : penile and scrotal edema   Neurologic: Alert and oriented x4, Cranial nerve II-XII intact, Strength 5/5 in all 4 extremities    VITAL SIGNS: 24 HRS MIN & MAX LAST   Temp  Min: 98.1 °F (36.7 °C)  Max: 101.5 °F (38.6 °C) 98.3 °F (36.8 °C)   BP  Min: 103/63  Max: 151/85 135/72     Pulse  Min: 92  Max: 110  101   Resp  Min: 16  Max: 19 18   SpO2  Min: 87 %  Max: 96 % (!) 88 %       Recent Labs   Lab 11/04/22  0408 11/05/22  0549 11/06/22  0629   WBC 21.2* 14.3* 17.8*   RBC 3.68* 3.08* 2.70*   HGB 10.6* 8.5* 7.6*   HCT 31.5* 26.1* 24.2*   MCV 85.6 84.7 89.6   MCH 28.8 27.6 28.1    MCHC 33.7 32.6* 31.4*   RDW 15.0 14.9 15.8    264 229   MPV 10.8* 10.9* 11.8*       Recent Labs   Lab 11/04/22  0409 11/05/22  0142 11/05/22 0549 11/06/22 0824   *  --  130* 130*   K 4.3  --  4.1 3.7   CO2 25  --  25 24   .8*  --  68.6* 63.0*   CREATININE 1.59*  --  1.19* 1.46*   CALCIUM 8.6  --  7.9* 8.1*   PH  --  7.48*  --   --    MG 1.60  --  1.80 2.40   ALBUMIN 1.9*  --  1.7* 1.7*   ALKPHOS 158*  --  138 141   ALT 10  --  8 9   AST 26  --  28 27   BILITOT 1.0  --  0.9 0.9          Microbiology Results (last 7 days)       Procedure Component Value Units Date/Time    Blood Culture [[id number]]  (Normal) Collected: 11/05/22 0549    Order Status: Completed Specimen: Blood Updated: 11/06/22 1000     CULTURE, BLOOD ([facility]) No Growth At 24 Hours    Blood Culture [[id number]]  (Normal) Collected: 11/05/22 0549    Order Status: Completed Specimen: Blood Updated: 11/06/22 1000     CULTURE, BLOOD ([facility]) No Growth At 24 Hours    Rapid Influenza A/B [[id number]] Collected: 11/05/22 0612    Order Status: Canceled Specimen: Nasopharyngeal from Nasal Swab Updated: 11/05/22 0616    Body Fluid Culture [[id number]] Collected: 10/28/22 0940    Order Status: Completed Specimen: Peritoneal Fluid from Abdomen Updated: 11/02/22 1130     Body Fluid Culture Final Report: At 5 days. No growth    Anaerobic Culture [[id number]] Collected: 10/28/22 0940    Order Status: Completed Specimen: Body Fluid from Abdomen Updated: 10/31/22 0843     Anaerobe Culture No Anaerobes Isolated    Anaerobic Culture [[id number]] Collected: 10/28/22 0940    Order Status: Completed Specimen: Body Fluid from Peritoneal Updated: 10/31/22 0842     Anaerobe Culture No Anaerobes Isolated             See below for Radiology    Scheduled Med:   aspirin  81 mg Oral Daily    carvediloL  25 mg Oral BID    cloNIDine  0.1 mg Oral BID    doxycycline (VIBRAMYCIN) IVPB  100 mg Intravenous Q12H    enoxaparin  1 mg/kg Subcutaneous Q12H    fluconazole (DIFLUCAN)  IV (PEDS and ADULTS)  400 mg Intravenous Every Tues, Thurs, Sat    gabapentin  100 mg Oral TID    hydrALAZINE  100 mg Oral Q8H    insulin detemir U-100  15 Units Subcutaneous BID    lactulose 10 gram/15 ml  10 g Oral BID    meropenem (MERREM) IVPB  500 mg Intravenous Q24H    methylnaltrexone  8 mg Subcutaneous Every other day    methylPREDNISolone sodium succinate injection  40 mg Intravenous Daily    morphine  30 mg Oral Q12H    NIFEdipine  90 mg Oral Daily    pantoprazole  40 mg Intravenous Daily    sodium bicarbonate  1,300 mg Oral Daily    sodium chloride 0.9%  10 mL Intravenous Q6H    sodium zirconium cyclosilicate  10 g Oral Daily    warfarin  3 mg Oral Daily        Continuous Infusions:   TPN ADULT CENTRAL LINE CUSTOM 60 mL/hr at 11/05/22 2300    TPN ADULT CENTRAL LINE CUSTOM          PRN Meds:  acetaminophen, albuterol, bisacodyL, dextrose 10 % in water (D10W), dextrose 10 % in water (D10W), dextrose 10%, glucagon (human recombinant), glucose, glucose, hydrALAZINE, HYDROmorphone, insulin aspart U-100, labetaloL, melatonin, nitroGLYCERIN, ondansetron, polyethylene glycol, sodium chloride 0.9%, sodium chloride 0.9%, Flushing PICC Protocol **AND** sodium chloride 0.9% **AND** sodium chloride 0.9%       Assessment/Plan:  Acute respiratory failure with hypoxia secondary to flash pulmonary edema   Severe necrotizing pancreatitis with loculated intra-abdominal fluid collection-status post IR drainage October 28  Intra-abdominal abscess versus infected necrotizing pancreatitis  Hospital acquired pneumonia-right-sided  Sepsis  Anasarca  Acute kidney injury ATN  Critical illness myopathy  Severe protein caloric malnutrition     History of:  Type A aortic dissection status post repair with mechanical aortic valve conduit, recent type B aortic dissection status post TAVR, lupus nephritis class 5, mixed connective tissue disorder predominant lupus, transverse myelitis recovered    Plan:  Patient having persistent  abdominal pain and tenderness  Has been afebrile. CT shows loculated ascitic fluid, may have to ask IR to place a drain   Cont IV merrem and diflucan for intraabdominal infection, Doxy day 2 for ?  Lung infection     Cont HD and UF to remove excess fluid   CT shows moderate pleural effusion   Will closely monitor patients daily weight, urine out put, renal parameters and volume status  Use O2 supplementation and Incentive spirometry   PT to mobilize patient as tolerated       Patient has a mechanical valve and now He is on FD lovenox, now BID dose, cont coumadin and monitor INR 1.8  Hb Now 7.6, transfuse only if Hb <7 or if patient becomes symptomatic     Had BM, Cont other scheduled laxatives     He has poor nutritional status  Now tolerating po diet, cont protein shakes   May have to wean off TPN    Pain now well controlled  Encouraged patient to start participating more with PT  Cont supportive care     He is on low dose steroids per rheumatology, will see if this can be stopped for now     Continue strict aspiration, fall and decubitus precautions      Unfortunately over all prognosis looks poor     Surgery team following patient     Labs in am    Critical care note:  Critical care diagnosis: Sepsis needing iv antibiotics   Critical care interventions: Hands-on evaluation, review of labs/radiographs/records and discussion with patient and family if present  Critical care time spent: 35 minutes       VTE prophylaxis: Lovenox    Patient condition:  Guarded    Anticipated discharge and Disposition:   LTAC, in formed CM      All diagnosis and differential diagnosis have been reviewed; assessment and plan has been documented; I have personally reviewed the labs and test results that are presently available; I have reviewed the patients medication list; I have reviewed the consulting providers response and recommendations. I have reviewed or attempted to review medical records based upon their availability    All of  the patient's questions have been  addressed and answered. Patient's is agreeable to the above stated plan. I will continue to monitor closely and make adjustments to medical management as needed.  _____________________________________________________________________    Nutrition Status:    Radiology:  CT Chest Abdomen Pelvis With Contrast (xpd)  Narrative: EXAMINATION:  CT CHEST ABDOMEN PELVIS WITH CONTRAST (XPD)    CLINICAL HISTORY:  GI bleed;Peritonitis or perforation suspected;Pancreatitis, acute, severe;Abdominal pain, acute, nonlocalized;    TECHNIQUE:  Helical acquisition from the thoracic inlet through the ischia with  IV contrast. Three plane reconstructions made available for review.  mGycm. Automatic exposure control, adjustment of mA/kV or iterative reconstruction technique was used to reduce radiation.    COMPARISON:  26 October 2022.    3 October 2022    FINDINGS:  Chest.    Heart is mildly enlarged.  No pericardial effusion.  Stable appearance stent graft repair of the thoracic aorta with dissection flap distal descending thoracic aorta.  Again the distal aspect of the stent is not well opposed and there is opacification of the false lumen along the descending thoracic aorta.  This is similar to prior.  No large central pulmonary embolus.    No significantly enlarged thoracic lymph nodes are seen.    There are small to moderate bilateral pleural effusions similar to prior.  There are new diffuse irregular ground-glass predominant opacities in the lungs.  There is some subpleural sparing.  No pneumothorax is seen.    Abdomen and pelvis.    No acute findings liver, gallbladder, spleen or adrenals.  Patent portal vein.    Little if any normal pancreatic tissue is seen.  Similar large fluid collection the expected area of the pancreas.    There is no hydronephrosis.  Stable subcentimeter hyperdensity upper pole right kidney.    There is no bowel obstruction.  Moderate stool in the colon.  No free  air is seen today.  There is moderate to large volume loculated ascites.  This is similar to prior.    Urinary bladder unremarkable.  Abdominal aorta normal in caliber.  No retroperitoneal adenopathy.    There are no acute osseous findings.  Again some fluid extends along the inguinal canals.  There is diffuse subcutaneous edema.  Impression: 1. Diffuse irregular ground-glass predominant opacities in the lungs are new compared to prior.  This could relate to an infectious/inflammatory process or pulmonary edema.  2. Similar moderate bilateral pleural effusions with compressive atelectasis.  3. Similar moderate to large volume loculated ascites.  4. Essentially no normal pancreatic tissue is seen.  Similar fluid collection in the expected area of the pancreas.    Electronically signed by: Kapil Ayers  Date:    11/06/2022  Time:    11:45  X-Ray Chest 1 View  Narrative: EXAMINATION:  XR CHEST 1 VIEW    CPT 01645    CLINICAL HISTORY:  Hypoxia;    COMPARISON:  November 5, 2022    FINDINGS:  Cardiomediastinal silhouette and pleuroparenchymal changes are essentially unchanged as compared with the previous exam when accounting for difference in technique.    Support catheters remain in place  Impression: No significant change    Electronically signed by: Ramón Ying  Date:    11/06/2022  Time:    09:52      Saurabh Curry MD   11/06/2022

## 2022-11-06 NOTE — PROGRESS NOTES
"   Acute Care Surgery   Progress Note  Admit Date: 9/23/2022  HD#44  POD#7 Days Post-Op    Subjective:   Interval history:  NAEON  T max of 101.5  Pulse tachy up to 101  All other VSS  Minor drainage from L flank site      Scheduled Meds:   aspirin  81 mg Oral Daily    carvediloL  25 mg Oral BID    cloNIDine  0.1 mg Oral BID    doxycycline (VIBRAMYCIN) IVPB  100 mg Intravenous Q12H    enoxaparin  1 mg/kg Subcutaneous Q12H    fluconazole (DIFLUCAN) IV (PEDS and ADULTS)  400 mg Intravenous Every Tues, Thurs, Sat    gabapentin  100 mg Oral TID    hydrALAZINE  100 mg Oral Q8H    insulin detemir U-100  15 Units Subcutaneous BID    lactulose 10 gram/15 ml  10 g Oral BID    meropenem (MERREM) IVPB  500 mg Intravenous Q24H    methylnaltrexone  8 mg Subcutaneous Every other day    methylPREDNISolone sodium succinate injection  40 mg Intravenous Daily    morphine  30 mg Oral Q12H    NIFEdipine  90 mg Oral Daily    pantoprazole  40 mg Intravenous Daily    sodium bicarbonate  1,300 mg Oral Daily    sodium chloride 0.9%  10 mL Intravenous Q6H    sodium zirconium cyclosilicate  10 g Oral Daily    warfarin  3 mg Oral Daily     Continuous Infusions:   TPN ADULT CENTRAL LINE CUSTOM 60 mL/hr at 11/05/22 2300     PRN Meds:acetaminophen, albuterol, bisacodyL, dextrose 10 % in water (D10W), dextrose 10 % in water (D10W), dextrose 10%, glucagon (human recombinant), glucose, glucose, hydrALAZINE, HYDROmorphone, insulin aspart U-100, labetaloL, melatonin, nitroGLYCERIN, ondansetron, polyethylene glycol, sodium chloride 0.9%, sodium chloride 0.9%, Flushing PICC Protocol **AND** sodium chloride 0.9% **AND** sodium chloride 0.9%     Objective:     VITAL SIGNS: 24 HR MIN & MAX LAST   Temp  Min: 98.1 °F (36.7 °C)  Max: 101.5 °F (38.6 °C)  98.9 °F (37.2 °C)   BP  Min: 103/63  Max: 151/85  (!) 151/85    Pulse  Min: 92  Max: 110  93    Resp  Min: 16  Max: 19  18    SpO2  Min: 89 %  Max: 99 %  (!) 90 %      HT: 5' 5" (165.1 cm)  WT: 60.4 kg (133 lb " 2.5 oz)  BMI: 22.2     Intake/output:  I/O last 3 completed shifts:  In: 2240 [P.O.:1760]  Out: 3175 [Urine:675; Other:2500]  No intake/output data recorded.    Intake/Output Summary (Last 24 hours) at 11/6/2022 0804  Last data filed at 11/6/2022 0600  Gross per 24 hour   Intake 1640 ml   Output 2500 ml   Net -860 ml          Lines/drains/airway:  PICC Double Lumen 10/25/22 1520 right brachial (Active)   Line Necessity Review Longterm central access required 11/03/22 2000   Site Assessment No drainage;No redness;No swelling;No warmth 11/03/22 2000   Extremity Assessment Distal to IV No abnormal discoloration 11/03/22 2000   Line Securement Device Secured with sutureless device 11/03/22 2000   Dressing Type Central line dressing 11/03/22 2000   Dressing Status Clean;Dry;Intact 11/03/22 2000   Dressing Intervention Integrity maintained 11/03/22 2000   Date on Dressing 11/02/22 11/03/22 0846   Dressing Due to be Changed 11/09/22 11/03/22 0846   Left Lumen Patency/Care Blood return present;Flushed w/o difficulty;Normal saline locked;Other (Comment) 11/02/22 1247   Right Lumen Patency/Care Blood return present;Flushed w/o difficulty;Infusing;Other (Comment) 11/02/22 1247   Number of days: 9       Trialysis (Dialysis) Catheter 10/04/22 1300 right internal jugular (Active)   Line Necessity Review CRRT/HD 11/03/22 0846   Verification by X-ray Yes 11/03/22 0846   Site Assessment No drainage;No redness;No swelling;No warmth 11/03/22 2000   Line Securement Device Secured with sutureless device 11/03/22 2000   Dressing Type Biopatch in place 11/03/22 2000   Dressing Status Clean;Dry;Intact 11/03/22 2000   Dressing Intervention Integrity maintained 11/03/22 2000   Date on Dressing 11/02/22 11/03/22 0846   Dressing Due to be Changed 11/09/22 11/03/22 0846   Venous Patency/Care normal saline locked 10/30/22 0800   Arterial Patency/Care normal saline locked 10/30/22 0800   Distal Patency/Care normal saline locked 10/30/22 0800    Number of days: 30       Physical examination:  Gen: NAD, AAOx3, answering questions appropriately  CV: RR  Resp: NWOB, on non-rebreather  Abd: depressible, distended, rigid, tender to palpation in RLQ and LLQ, midline staples in place c/d/I,  Ext: moving all extremities spontaneously and purposefully  Neuro: CN II-XII grossly intact    Labs:  Renal:  Recent Labs     11/04/22 0409 11/05/22  0549   .8* 68.6*   CREATININE 1.59* 1.19*       FENGI:  Recent Labs     11/04/22 0409 11/05/22  0549   * 130*   K 4.3 4.1   CO2 25 25   CALCIUM 8.6 7.9*   MG 1.60 1.80   PHOS 3.8 2.4   ALBUMIN 1.9* 1.7*   BILITOT 1.0 0.9   AST 26 28   ALKPHOS 158* 138   ALT 10 8       Heme:  Recent Labs     11/04/22 0408 11/04/22  0409 11/05/22  0549 11/06/22  0629   HGB 10.6*  --  8.5* 7.6*   HCT 31.5*  --  26.1* 24.2*     --  264 229   INR  --  1.31*  --   --        ID:  Recent Labs     11/04/22 0408 11/05/22  0549 11/06/22  0629   WBC 21.2* 14.3* 17.8*       Cardiovascular:  No results for input(s): TROPONINI, CKTOTAL, CKMB, BNP in the last 168 hours.    I have reviewed all pertinent lab results within the past 24 hours.    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 He developed abdominal compartment syndrome s/p multiple exlaps (9/25, 9/27, 9/29) now on HD for renal failure with re-accumulation of ascites despite IR paracentesis. CT has shown concern for necrotizing fascitis. Cultures from paracentesis resulted in Klebsiella growth. S/p washout 10/30.      - recommend CT Ab/Pel with IV and oral contrast  - Nutrition optimization  - Midline staples out 11/13  - Continue ongoing care per primary  - Will continue to follow    Blayne Franklin MD   11/6/2022 5:55 AM

## 2022-11-07 NOTE — PROGRESS NOTES
Trauma/Acute Care Surgery   Daily Progress Note     HD#45  POD#8 Days Post-Op    Subjective  Required BiPAP.  White blood cell count slightly elevated from yesterday.  Patient states that he did not eat yesterday but this was due to dialysis.  Reports his pain is stable.  Penis and scrotum still swollen.  Abdomen remains distended.     Scheduled Meds:   aspirin  81 mg Oral Daily    carvediloL  25 mg Oral BID    cloNIDine  0.1 mg Oral BID    doxycycline (VIBRAMYCIN) IVPB  100 mg Intravenous Q12H    enoxaparin  1 mg/kg Subcutaneous Q12H    fluconazole (DIFLUCAN) IV (PEDS and ADULTS)  400 mg Intravenous Every Tues, Thurs, Sat    gabapentin  100 mg Oral TID    hydrALAZINE  100 mg Oral Q8H    insulin detemir U-100  15 Units Subcutaneous BID    lactulose 10 gram/15 ml  10 g Oral BID    meropenem (MERREM) IVPB  500 mg Intravenous Q24H    methylnaltrexone  8 mg Subcutaneous Every other day    methylPREDNISolone sodium succinate injection  40 mg Intravenous Daily    morphine  30 mg Oral Q12H    NIFEdipine  90 mg Oral Daily    pantoprazole  40 mg Intravenous Daily    sodium bicarbonate  1,300 mg Oral Daily    sodium chloride 0.9%  10 mL Intravenous Q6H    sodium zirconium cyclosilicate  10 g Oral Daily    warfarin  3 mg Oral Daily       Continuous Infusions:   TPN ADULT CENTRAL LINE CUSTOM 60 mL/hr at 11/07/22 0002       PRN Meds:acetaminophen, albuterol, bisacodyL, dextrose 10 % in water (D10W), dextrose 10 % in water (D10W), dextrose 10%, glucagon (human recombinant), glucose, glucose, hydrALAZINE, HYDROmorphone, insulin aspart U-100, labetaloL, melatonin, nitroGLYCERIN, ondansetron, polyethylene glycol, sodium chloride 0.9%, sodium chloride 0.9%, Flushing PICC Protocol **AND** sodium chloride 0.9% **AND** sodium chloride 0.9%     Objective  Temp:  [97.8 °F (36.6 °C)-98.7 °F (37.1 °C)] 97.8 °F (36.6 °C)  Pulse:  [] 79  Resp:  [16-19] 19  SpO2:  [84 %-99 %] 99 %  BP: (121-138)/(65-78) 121/71     I/O last 3 completed  shifts:  In: 3320 [P.O.:2120; I.V.:720]  Out: 3375 [Urine:875; Other:2500]  I/O this shift:  In: 480 [P.O.:480]  Out: -   PICC Double Lumen 10/25/22 1520 right brachial (Active)   Line Necessity Review Longterm central access required 11/03/22 2000   Site Assessment No drainage;No redness;No swelling;No warmth 11/06/22 2000   Extremity Assessment Distal to IV No abnormal discoloration;No redness;No swelling;No warmth 11/06/22 2000   Line Securement Device Secured with sutureless device 11/06/22 2000   Dressing Type Central line dressing 11/06/22 2000   Dressing Status Clean;Dry;Intact 11/06/22 2000   Dressing Intervention Integrity maintained 11/06/22 2000   Date on Dressing 11/02/22 11/05/22 0800   Dressing Due to be Changed 11/09/22 11/05/22 0800   Left Lumen Patency/Care Flushed w/o difficulty 11/06/22 2000   Right Lumen Patency/Care Flushed w/o difficulty 11/06/22 2000   Number of days: 12       Trialysis (Dialysis) Catheter 10/04/22 1300 right internal jugular (Active)   Line Necessity Review CRRT/HD 11/03/22 0846   Verification by X-ray Yes 11/03/22 0846   Site Assessment No drainage;No redness;No swelling;No warmth 11/06/22 2000   Line Securement Device Secured with sutureless device 11/06/22 2000   Dressing Type Biopatch in place 11/04/22 2000   Dressing Status Clean;Dry;Intact 11/06/22 2000   Dressing Intervention Integrity maintained 11/06/22 2000   Date on Dressing 11/02/22 11/03/22 0846   Dressing Due to be Changed 11/09/22 11/03/22 0846   Venous Patency/Care normal saline locked 10/30/22 0800   Arterial Patency/Care normal saline locked 10/30/22 0800   Distal Patency/Care normal saline locked 10/30/22 0800   Number of days: 33        Gen: NAD, AAOx3, answering questions appropriately  CV: RR  Resp: NWOB, on non-rebreather  Abd: depressible, distended, firm but not rigid, tender to palpation in RLQ and LLQ, midline staples in place c/d/I,  Ext: moving all extremities spontaneously and  purposefully  Neuro: CN II-XII grossly intact  : Swelling        Labs  Recent Labs     11/05/22  0549 11/06/22  0629 11/07/22  0533   WBC 14.3* 17.8* 19.8*   HGB 8.5* 7.6* 7.8*   HCT 26.1* 24.2* 23.7*    229 249   INR  --  1.80*  --      Recent Labs     11/05/22  0549 11/06/22  0824   * 130*   K 4.1 3.7   CO2 25 24   BUN 68.6* 63.0*   CREATININE 1.19* 1.46*   CALCIUM 7.9* 8.1*   MG 1.80 2.40   PHOS 2.4 3.2   ALBUMIN 1.7* 1.7*   BILITOT 0.9 0.9   AST 28 27   ALKPHOS 138 141   ALT 8 9       Imaging  X-Ray Chest 1 View    Result Date: 11/6/2022  No significant change Electronically signed by: Ramón Ying Date:    11/06/2022 Time:    09:52    CT Chest Abdomen Pelvis With Contrast (xpd)    Result Date: 11/6/2022  1. Diffuse irregular ground-glass predominant opacities in the lungs are new compared to prior.  This could relate to an infectious/inflammatory process or pulmonary edema. 2. Similar moderate bilateral pleural effusions with compressive atelectasis. 3. Similar moderate to large volume loculated ascites. 4. Essentially no normal pancreatic tissue is seen.  Similar fluid collection in the expected area of the pancreas. Electronically signed by: Kapil Ayers Date:    11/06/2022 Time:    11:45         Assessment/Plan  Stuart Guevara is a 32M with HTN, SLE, HBV, aortic dissection s/p repair 2/2021, 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 He developed abdominal compartment syndrome s/p multiple exlaps (9/25, 9/27, 9/29) now on HD for renal failure with re-accumulation of ascites despite IR paracentesis. CT has shown concern for necrotizing fascitis. Cultures from paracentesis resulted in Klebsiella growth. S/p washout 10/30.      - Nutrition optimization  - Midline staples out 11/13 if abdomen not too tight  - Continue ongoing care per primary  - Will continue to follow  - WBC noted    Kale Gooden  Trauma/Acute Care Surgery   c -  [phone]    11/7/2022  6:47 AM

## 2022-11-07 NOTE — PROGRESS NOTES
Inpatient Nutrition Assessment    Admit Date: 9/23/2022   Total duration of encounter: 45 days     Nutrition Recommendation/Prescription     - Continue current diet as tolerated.     - Novasource Renal BID. Provides 475 kcals and 22 g protein per serving.     Pt eating very well some days. When he does not eat as well, he said it is because he does not feel well. While he could benefit from the extra energy and protein found in the TPN, he is often experiencing significant hyperglycemia per his labs, and reports that he is experiencing persistent and dramatic dry mouth despite drinking large amounts of fluid and receiving fluid infusion through TPN. I am concerned that this hyperglycemia could be contributing to some degree to his clinical picture. Also, now his AP and AST are climbing, and albumin is steadily decreasing suggesting increasing inflammation.     -Given 100% some days, consider weaning TPN after this bag, and seeing how he does with po only. We can always re-start the TPN if consistency of PO intake does not improve.    Current TPN:  AA15% 95gm, D70 300gm, IL 20% IVPB 3x/week 50gm @60ml/hr  Provides:  1614kcal (80% est needs)  95gm protein (100% est needs)  1440ml fluid  300gm CHO  GIR: 3.6  -Electrolytes per pharmacy.   - Monitor labs, oral intake of meals/fluids  - Once intake of meals and fluids increases, wean TPN.     Communication of Recommendations: reviewed with patient/caregiver, reviewed with pharmacist    Nutrition Assessment     Malnutrition Assessment/Nutrition-Focused Physical Exam    Malnutrition in the context of acute illness or injury  Degree of Malnutrition: severe malnutrition  Energy Intake: < 75% of estimated energy requirement for > 7 days  Interpretation of Weight Loss: >5% in 1 month  Body Fat:moderate depletion  Area of Body Fat Loss: orbital region  and upper arm region - triceps / biceps  Muscle Mass Loss: moderate depletion  Area of Muscle Mass Loss: temple region -  temporalis muscle, clavicle bone region - pectoralis major, deltoid, trapezius muscles, clavicle and acromion bone region - deltoid muscle, and scapular bone region - trapezius, supraspinus, infraspinus muscles  Fluid Accumulation: moderate to severe  Edema: ascites   Reduced  Strength: unable to obtain  A minimum of two characteristics is recommended for diagnosis of either severe or non-severe malnutrition.    Chart Review    Reason Seen: continuous nutrition monitoring and follow-up    Diagnosis:  Fever and Leukocytosis  Abdominal aortic dissection, s/p repair  Abdominal compartment syndrome, s/p ex-lap x2 last on  with abdominal closure on 10/1  SLE with active flare  History of transverse myelitis  History of lupus nephritis  TAY now on HD  VHD, s/p mechanical AVR  Chest tube in place  DM2  Necrotizing pancreatitis  Ascites  Bacterial peritonitis    Relevant Medical History: HTN, lupus    Nutrition-Related Medications: aspirin, hydralazine, IL custom TPN, lactulose, prednisone, warfarin, sodium bicarbonate, insulin  Calorie Containing IV Medications: no significant kcals from medications at this time    Nutrition-Related Labs:   BUN 27, Crea 3.06, Glu 174, Phos 6.2   Na 128, K 3.3, Cl 96, BUN 21.5, Crea 3.28, Glu 205  10/4 Na 127, Cl 95, BUN 57.3, Crea 5.16, Glu 152, Phos 5  10/7 K 5.6, BUN 55.2, Crea 4.57, Glu 347, Phos 8  10/11: Na 126, K 6.1, Cl 91, BUN 49, Crea 3.18, GFR 25, Glu 139, Ca 7.9, Phos 7.4  10/14: Na 125, K 5.4, Cl 91, BUN 56.1, Crea 2.76, GFR 30, Ca 8.1  10/18: Na 128, Cl 92, Ca 7.9, BUN 50.9, Cr 2.32   10/21: Na 127, Cl 93, BUN 68.1, Crea 2.35, GFR 37, Glu 335  10/25: Na 128, Cl 96, BUN 65.1, Crea 1.62, GFR 57, Glu 39; CB-124  10/28: Na 128, Cl 96, BUN 62.9, Crea 1.62, GFR 57, glu 304, Ca 7.9  10/31: Na 129, K 6.1, BUN 73.5, Crea 1.67, GFR 55, Glu 285, Alb 2.4  : Na 130, K 5.2, phos 5.7, GFR 57  : WBC 21.2, RBC 3.68, Na 129, Cl 94, .9, Cr 1.59, Glu 247, AP  158, Alb 1.9, Mg/Phos WNL   11/7: WBC 19.8, RBC 2.77. Na 127, Cl 94, BUN 79.4, Cr 1.67, Glu 107, Ca 8.3, Mg 3.0, , Alb 1.6, AST 36     Diet/PN Order: DIET RENAL NON-DIALYSIS  TPN ADULT CENTRAL LINE CUSTOM  TPN ADULT CENTRAL LINE CUSTOM  Oral Supplement Order: none  Tube Feeding Order: none at this time  Appetite/Oral Intake: good/% of meals  Factors Affecting Nutritional Intake: decreased appetite and nausea  Food/Cheondoism/Cultural Preferences: none reported    Skin Integrity: wound, incision  Wound(s):   11/5/22: partial thickness tissue loss to sacral spine    Comments    9/26/22: Discussed with RN. Will provide tube feeding recommendations for when appropriate to start tube feeding. Receiving kcal from meds. Noted Phos, will monitor for need for renal formula.   9/30/22: Discussed with RN. Need to consider TPN since pt now LOS day 7. If starting tube feeding, recs provided. If able to extubate, advance diet when appropriate.   10/4/22: Pt previously eating 100% po intake of meals, good appetite. Currently NPO for procedure, plans to restart diet per RN. Will add ONS for added protein and kcal.  10/7/22: Pt now with decreased appetite/po intake. Noted elevated K and Phos, likely not diet related since pt with poor po intake of full liquid diet.   10/11: pt sleeping, nurse reports tolerating full liquid diet, some abdominal distention noted, reports unsure of plans to advance diet as this time  10/14: pt advanced to soft diet today; ate about 40% of lunch tray, drinking Boost, would like 2 per meal in vanilla flavor  10/18: Pt advanced to regular texture renal/diabetic diet, he reports that his appetite is fair, drinks boost, feels he is chewing/swallowing well, does reports some abdominal pain 30-90 minutes after eating, feels better after several hours of no eating.   10/21: appetite remains the same, drinking boost  10/25: fair appetite, says he is drinking some boost, would like to continue  "flavor    10/28: pt with decreased appetite, noted new dx necrotizing pancreatits; paracentesis done today; not drinking boost at this time either; plans to try and start eating more and drinking boost, says he feels a little better this afternoon. MD discussed starting TPN until pt able to tolerate increased oral intake. Still on dialysis as needed    10/31: now NPO with NG with LIWS after abdominal washout with surgery yesterday, possible necrotizing fascitis, will switch to custom TPN today due to abnormal electrolytes    : Pt's diet was advanced yesterday; however, pt reports still just taking in liquids and no solids. Pt reports vomiting last night. TPN running as ordered.     22: Pt reports that he ate 100% of all meals yesterday and tolerated them well, skipped breakfast this morning due to feeling unwell and some mild nausea, no GI complaints during our visit, PO intake encouraged.      22: Pt not in room, having test, nurse reports good po intake/appetite today, was not as good yesterday, TPN still infusing, AST and AP increasing, glucose better today.     Anthropometrics    Height: 5' 5" (165.1 cm) Height Method: Estimated  Last Weight: 60.4 kg (133 lb 2.5 oz) (22 0100) Weight Method: Bed Scale  BMI (Calculated): 22.2  BMI Classification: normal (BMI 18.5-24.9)        Ideal Body Weight (IBW), Male: 136 lb     % Ideal Body Weight, Male (lb): 91.75 %                 Usual Body Weight (UBW), k kg  % Usual Body Weight: 94.53  % Weight Change From Usual Weight: -5.67 %  Usual Weight Provided By: unable to obtain usual weight at this time    Wt Readings from Last 5 Encounters:   22 60.4 kg (133 lb 2.5 oz)   22 60 kg (132 lb 4.4 oz)   21 48 kg (105 lb 13.1 oz)     Weight Change(s) Since Admission:  Admit Weight: 60 kg (132 lb 4.4 oz) (22 1219)  10/4/22: no new wt  10/7/22: no new wt  10/12/22: 65kg; weight gain noted  10/15/22: 56.6kg; fluctuating weights possible " due to fluid  10/22/22: 56.6kg  11/02/22: no new wt noted  11/6: 60.4 kg    Estimated Needs    Weight Used For Calorie Calculations: 56.6 kg (124 lb 12.5 oz)  Energy Calorie Requirements (kcal): 2011kcal (1.4stress factor)  Energy Need Method: [place] Jeor  Weight Used For Protein Calculations: 56.6 kg (124 lb 12.5 oz)  Protein Requirements: 85-96gm (1.5-1.7gm/kg)  Fluid Requirements (mL): 1000ml + urinary output  Temp: 98.1 °F (36.7 °C)    Enteral Nutrition    Patient not receiving enteral nutrition at this time.    Parenteral Nutrition    Standard Formula: not applicable  Custom Formula:  633 ml 15% amino acids and 428 ml 70% dextrose  Additives: none  Rate/Volume: 60  Lipids: 250 ml 20% IV lipid emulsion three times per week  Total Nutrition Provided by Parenteral Nutrition:  Calories Provided  1614 kcal/d, 80% needs   Protein Provided  95 g/d, 100% needs   Dextrose Provided  300 g/d, GIR 3.6 mg CHO/kg/min   Fluid Provided  1440 ml/d, 100% needs       Evaluation of Received Nutrient Intake    Calories: meeting estimated needs  Protein: meeting estimated needs    Patient Education    Not applicable.    Nutrition Diagnosis     PES: Inadequate oral intake related to current condition as evidenced by <75% intake of meals. (continues)    PES: Malnutrition related to pancreatitis, infection as evidenced by weight loss >5% in 1 month, appetite loss <50% intake of meals, severe fat and muscle loss. (continues)     Interventions/Goals     Intervention(s): general/healthful diet, modified composition of parenteral nutrition, modified rate of parenteral nutrition, commercial beverage, and collaboration with other providers  Goal: Meet greater than 75% of nutritional needs by follow-up. (goal progressing)    Monitoring & Evaluation     Dietitian will monitor energy intake, weight change, electrolyte/renal panel, and glucose/endocrine profile.  Nutrition Risk/Follow-Up: high (follow-up in 1-4 days)

## 2022-11-07 NOTE — PT/OT/SLP PROGRESS
Occupational Therapy  Treatment    Stuart Guevara   MRN: [id number]   Admitting Diagnosis: Aortic dissection    OT Date of Treatment: 11/07/22   OT Start Time: 1030  OT Stop Time: 1053  OT Total Time (min): 23 min    Billable Minutes:  Self Care/Home Management 10 and Therapeutic Exercise 13    OT/ADONAY: OT     ADONAY Visit Number: 1    General Precautions: Standard, fall  Orthopedic Precautions:    Braces:    Respiratory Status:  CPAP, 70% flow, resp rate 20 initially but increased to 48 at times with mobility and noted anxiety, O2 sat fluctuated 92-96% throughout session with fair endurance         Subjective:  Communicated with nurse prior to session.  On initial attempt , nurse Darling Herndon entering room to tf pt from 15 L oxymask to CPAP d/t O2 sats 85%.  OT held tx at this time for pt recovery; at second attempt pt O2 sat 96% on CPAP.  Pt initially reluctant but agreed to participate with encouragement throughout.  Pain/Comfort  Pain Rating 1: 7/10  Location - Orientation 1: generalized  Pain Addressed 1: Reposition, Distraction    Objective:  Patient found with: pulse ox (continuous), BIPAP, peripheral IV     Functional Mobility:  Bed Mobility:  max A x2 supine to sitting EOB to manage trunk into upright position and mod A initial sitting balance but improved with positioning and pt decreasing anxiety to CGA/SBA sitting balance for activities EOB, max A x2 sitting to supine        Transfers: sit to stand from EOB max A x2 handheld        Functional Ambulation: NA    Activities of Daily Living:     UB dressing mod A seated EOB; pt required support of at least one UE at bed at all times to maintain postural control      Therapeutic Activities and Exercises:  Bed mobility training, sitting balance training to increase unsupported sitting balance for ADLs seated EOB, and BUE thera ex in supine with HOB elevated, o/h and chest presses 10reps x3 each direction    AM-PAC 6 CLICK ADL   How much help from another person does  "this patient currently need?   1 = Unable, Total/Dependent Assistance  2 = A lot, Maximum/Moderate Assistance  3 = A little, Minimum/Contact Guard/Supervision  4 = None, Modified [place]/Independent          AM-PAC Raw Score CMS "G-Code Modifier Level of Impairment Assistance   6 % Total / Unable   7 - 8 CM 80 - 100% Maximal Assist   9-13 CL 60 - 80% Moderate Assist   14 - 19 CK 40 - 60% Moderate Assist   20 - 22 CJ 20 - 40% Minimal Assist   23 CI 1-20% SBA / CGA   24 CH 0% Independent/ Mod I       Patient left HOB elevated with all lines intact and call button in reach    ASSESSMENT:  Stuart Guevara is a 32 y.o. male with a medical diagnosis of Aortic dissection and presents with significant functional decline, anxiety limiting mobility and participation, poor motivation to increase activity or time OOB, significant general weakness and endurance deficits.    Rehab identified problem list/impairments: Rehab identified problem list/impairments: weakness, impaired endurance, impaired self care skills, impaired functional mobility, decreased safety awareness, pain, abnormal tone, decreased ROM, edema, impaired skin, impaired cardiopulmonary response to activity    Rehab potential is fair.    Activity tolerance: Poor    Discharge recommendations: Discharge Facility/Level of Care Needs: nursing facility, skilled, LTACH (long-term acute care hospital)     Barriers to discharge: Barriers to Discharge: Other (Comment) (severity of deficits)    Equipment recommendations:       GOALS:   Multidisciplinary Problems       Occupational Therapy Goals          Problem: Occupational Therapy    Goal Priority Disciplines Outcome Interventions   Occupational Therapy Goal     OT, PT/OT Ongoing, Progressing    Description: Goals to be met by: 11/17/2022  Goals Met and Upgraded/New goals added 10/21/2022  Care Plan Revised due to patient decline in functional status following surgery 11/3/2022    Patient will increase " functional independence with ADLs by performing:      UE dressing with Modified [place]. - continue  LB Dressing with Minimal Assistance and dressing AEDs. - continue  Grooming while EOB with Modified [place]. - new goal  Sitting at edge of bed x10 minutes with Modified [place] - new goal  Bathing from  shower chair/bench with Moderate Assistance. - continue  Toileting from bedside commode with Moderate Assistance for hygiene and clothing management. - new goal  Toilet transfer to bedside commode with Minimum Assistance. - new goal                           Plan:  Patient to be seen 5 x/week to address the above listed problems via self-care/home management, therapeutic activities, therapeutic exercises  Plan of Care expires: 11/17/22  Plan of Care reviewed with: patient         11/07/2022

## 2022-11-07 NOTE — PT/OT/SLP PROGRESS
Physical Therapy         Treatment        Stuart Guevara   MRN: [id number]     PT Received On: 11/07/22  PT Start Time: 1032     PT Stop Time: 1055    PT Total Time (min): 23 min       Billable Minutes:  Therapeutic Activity 23  Total Minutes: 23    Treatment Type: Treatment  PT/PTA: PTA     PTA Visit Number: 2       General Precautions: Standard, fall  Orthopedic Precautions: Orthopedic Precautions : N/A   Braces:      Spiritual, Cultural Beliefs, Catholic Practices, Values that Affect Care: no    Subjective:  Communicated with NSG prior to session.    Pain/Comfort  Location - Orientation 1: lower  Location 1: abdomen  Pain Addressed 1: Reposition, Distraction  Location - Side 2: Bilateral  Location 2: leg  Pain Addressed 2: Reposition, Distraction    Objective:  Patient found in bed with HOB elevated, with Patient found with: peripheral IV, pulse ox (continuous), telemetry, BIPAP    O2: BiPAP 70%, RR 20 (prior to mobility) and 48 (with mobility)    Functional Mobility:  Bed Mobility:   Supine to sit: Maximum Assistance   Sit to supine: Maximum Assistance   Rolling: Activity did not occur   Scooting: Moderate Assistance    Balance:   Static Sit: FAIR-: Maintains without assist but inconsistent . Pt able to remain sitting for roughly 12min.   Dynamic Sit:  FAIR+: Maintains balance through MINIMAL excursions of active trunk motion. Pt able to sit without BUE support to doff/don gown. Pt had one arm supporting him at all times.   Static Stand: 0: Needs MAXIMAL assist to maintain     Transfer Training:  Sit to stand:Maximum Assistance with BL HHA .pt BL feet/knees blocked during trial.  Pt only able to remain standing for roughly 3 seconds 2/2 fatigue.       Additional Treatment:    Activity Tolerance:  Patient limited by fatigue and Patient limited by pain. Pt with increased pain/fatigue during mobility. Pt required multiple rest breaks throughout tx.    Patient left with bed in chair position with all lines intact  and call button in reach.     Assessment:  Stuart Guevara is a 32 y.o. male with a medical diagnosis of Aortic dissection. He presents with decreased safety awareness and remains a fall risk. Pt also with decreased BUE/BLE strength. Pt requires increased time for all activities.   Pt would benefit from further rehab therapy services to increase overall independence level and assist in getting pt closer to PLOF.      Rehab potential is good.    Activity tolerance: Good    Discharge recommendations: Discharge Facility/Level of Care Needs: rehabilitation facility     Equipment recommendations: Equipment Needed After Discharge: walker, rolling     GOALS:   Multidisciplinary Problems       Physical Therapy Goals          Problem: Physical Therapy    Goal Priority Disciplines Outcome Goal Variances Interventions   Physical Therapy Goal     PT, PT/OT Ongoing, Progressing     Description: Goals to be met by: 22     Patient will increase functional independence with mobility by performin. Supine to sit with brandon  2. Sit to supine with brandon  3. Sit <> stand with CGA using RW/LRAD  4. Bed to chair with CGA via squat pivot/stand pivot  5.Ambulate 200 ft with rolling walker and contact guard assistance                           PLAN:    Patient to be seen 6 x/week  to address the above listed problems via gait training, therapeutic activities, therapeutic exercises  Plan of Care expires: 22  Plan of Care reviewed with: patient         2022

## 2022-11-07 NOTE — PROGRESS NOTES
[facility]  Hospital Medicine Progress Note        Chief Complaint: Inpatient Follow-up for Pancreatitis     HPI:   Stuart Guevara is a 32 y.o. male who has PMH which includes HTN, SLE,  hepatitis B, type A aortic dissection with repair + mechanical aortic valve placement 2/2021 on coumadin, RUE DVT s/p Xarelto, class V lupus nephritis; presented to the ED at [facility] on 9/23/2022  with reports of  shortness of breath and chest pain radiating to his back. Work up revealed a type B aortic dissection and significant hypertension noted on presentation.  He failed medical management and had progression of his symptoms despite adequate control of hemodynamic parameters.  PT was transferred from [facility] to [facility] for CV surgery evaluation which he was taken  to the OR and had TEVAR with left carotid to subclavian artery bypass on 9/23.   Later Patient developed abdominal distension and rigidity of his abdomen and developed a lactic acidosis.  He had a repeat CT angiography on 9/25 of the chest/abdomen/pelvis without evidence of worsening dissection, but evidence of significant intra-abdominal pathology with some hemoperitoneum, signs of hypoperfusion of the abdominal organs (compartment syndrome)  free blood in the abdomen. He underwent exploratory laparotomy on 9/25 with evidence of viable bowel and no significant bleeding identified, abd was closed with flakito drain left in situ chest tube was placed. Had repeat ex-laps on 9/27, 9/29. Postoperatively, he developed severe unstable bradycardia, and a transvenous pacemaker was temporarily placed by Cardiology, with the bradycardia resolving within he next couple days.  Shortly afterwards, he was noted to have significant worsening of his renal failure and critical hyperkalemia. Renal services consulted and HD catheter was placed and he was started on HD treatments. Patient was cleared for transfer out of ICU to the floor.  He started having worsening  abdominal pain. He was seen by rheumatology for possible lupus flare up and serositis. He was started on steroids and other immunomodulating drugs. Drugs have since been discontinued due to concern of infection.  Repeat CT showed multiple loculated fluid collections, necrotizing pancreatitis, possible peritonitis. Surgery team is following with plan for IR drainage, which was performed October 28.  Body fluid culture grew out Klebsiella.  Surgical hospitalist took back to the OR October 30th due to continued pain in peritonitis with positive cultures, only found about 3 L of venita ascites, no other sign of infection, necrotic tissue, bowel issues or hematoma.    Patient was continued on iv merrem. He had severe PCM and was started on TPN. He was also tolerating po diet. He was followed by renal team and HD was continued.     Interval Hx:   Patient awake but lethargic. Over night he was placed on BIPAP support secondary to SOB. ABG reviewed. He has persistent lower abdominal pain and right hip pain. Now unable to get out of bed.     Objective/physical exam:  General: In no acute distress, frail  Chest: Clear to auscultation bilaterally  Heart: RRR, +S1, S2, no appreciable murmur  Abdomen: + distension, soft, + tenderness in LLQ and RLQ + Bowel sounds   MSK: severe muscle Wasting. Right hip pain.    : penile and scrotal edema   Neurologic: Alert and oriented x4, Cranial nerve II-XII intact, Strength 5/5 in all 4 extremities    VITAL SIGNS: 24 HRS MIN & MAX LAST   Temp  Min: 97.8 °F (36.6 °C)  Max: 98.3 °F (36.8 °C) 98.1 °F (36.7 °C)   BP  Min: 108/65  Max: 135/72 125/68     Pulse  Min: 79  Max: 104  79   Resp  Min: 16  Max: 20 20   SpO2  Min: 84 %  Max: 99 % 97 %       Recent Labs   Lab 11/05/22  0549 11/06/22  0629 11/07/22  0533   WBC 14.3* 17.8* 19.8*   RBC 3.08* 2.70* 2.77*   HGB 8.5* 7.6* 7.8*   HCT 26.1* 24.2* 23.7*   MCV 84.7 89.6 85.6   MCH 27.6 28.1 28.2   MCHC 32.6* 31.4* 32.9*   RDW 14.9 15.8 15.4   PLT  264 229 249   MPV 10.9* 11.8* 11.3*       Recent Labs   Lab 11/05/22  0142 11/05/22  0549 11/06/22  0824 11/07/22  0059 11/07/22  0533   NA  --  130* 130*  --  127*   K  --  4.1 3.7  --  3.9   CO2  --  25 24  --  24   BUN  --  68.6* 63.0*  --  79.4*   CREATININE  --  1.19* 1.46*  --  1.67*   CALCIUM  --  7.9* 8.1*  --  8.3*   PH 7.48*  --   --  7.41  --    MG  --  1.80 2.40  --  3.00*   ALBUMIN  --  1.7* 1.7*  --  1.6*   ALKPHOS  --  138 141  --  170*   ALT  --  8 9  --  8   AST  --  28 27  --  36*   BILITOT  --  0.9 0.9  --  0.9          Microbiology Results (last 7 days)       Procedure Component Value Units Date/Time    Blood Culture [[id number]]  (Normal) Collected: 11/05/22 0549    Order Status: Completed Specimen: Blood Updated: 11/07/22 1000     CULTURE, BLOOD ([facility]) No Growth At 48 Hours    Blood Culture [[id number]]  (Normal) Collected: 11/05/22 0549    Order Status: Completed Specimen: Blood Updated: 11/07/22 1000     CULTURE, BLOOD ([facility]) No Growth At 48 Hours    Rapid Influenza A/B [[id number]] Collected: 11/05/22 0612    Order Status: Canceled Specimen: Nasopharyngeal from Nasal Swab Updated: 11/05/22 0616    Body Fluid Culture [[id number]] Collected: 10/28/22 0940    Order Status: Completed Specimen: Peritoneal Fluid from Abdomen Updated: 11/02/22 1130     Body Fluid Culture Final Report: At 5 days. No growth             See below for Radiology    Scheduled Med:   aspirin  81 mg Oral Daily    carvediloL  25 mg Oral BID    doxycycline (VIBRAMYCIN) IVPB  100 mg Intravenous Q12H    enoxaparin  1 mg/kg Subcutaneous Q12H    fluconazole (DIFLUCAN) IV (PEDS and ADULTS)  400 mg Intravenous Every Tues, Thurs, Sat    gabapentin  100 mg Oral TID    insulin detemir U-100  15 Units Subcutaneous BID    lactulose 10 gram/15 ml  10 g Oral BID    meropenem (MERREM) IVPB  500 mg Intravenous Q24H    methylnaltrexone  8 mg Subcutaneous Every other day    methylPREDNISolone sodium succinate injection  40 mg Intravenous Daily     morphine  30 mg Oral Q12H    pantoprazole  40 mg Intravenous Daily    sodium bicarbonate  1,300 mg Oral Daily    sodium chloride 0.9%  10 mL Intravenous Q6H    sodium zirconium cyclosilicate  10 g Oral Daily    warfarin  3 mg Oral Daily    zinc oxide-cod liver oil   Topical (Top) BID        Continuous Infusions:   TPN ADULT CENTRAL LINE CUSTOM 60 mL/hr at 11/07/22 0002    TPN ADULT CENTRAL LINE CUSTOM          PRN Meds:  acetaminophen, albuterol, bisacodyL, dextrose 10 % in water (D10W), dextrose 10 % in water (D10W), dextrose 10%, glucagon (human recombinant), glucose, glucose, hydrALAZINE, HYDROmorphone, insulin aspart U-100, labetaloL, melatonin, nitroGLYCERIN, ondansetron, polyethylene glycol, sodium chloride 0.9%, sodium chloride 0.9%, Flushing PICC Protocol **AND** sodium chloride 0.9% **AND** sodium chloride 0.9%       Assessment/Plan:  Acute respiratory failure with hypoxia secondary to flash pulmonary edema   Severe necrotizing pancreatitis with loculated intra-abdominal fluid collection-status post IR drainage October 28  Intra-abdominal abscess versus infected necrotizing pancreatitis  Hospital acquired pneumonia-right-sided  Sepsis  Anasarca  Acute kidney injury ATN  Critical illness myopathy  Severe protein caloric malnutrition     History of:  Type A aortic dissection status post repair with mechanical aortic valve conduit, recent type B aortic dissection status post TAVR, lupus nephritis class 5, mixed connective tissue disorder predominant lupus, transverse myelitis recovered    Plan:  Patient now getting more SOB and needing NIPPV  CXR shows jesus prominent broncho vascular markings. Will need more UF with HD  Renal team monitoring volume status     Has been afebrile. CT shows loculated ascitic fluid, may have to ask IR to place a drain   Cont IV merrem and diflucan for intraabdominal infection, Doxy day 3 for ?  Lung infection     Cont HD and UF to remove excess fluid   CT shows moderate pleural  effusion   Will closely monitor patients daily weight, urine out put, renal parameters and volume status    He is now having a new right hip pain. Will get a CT right hip if there is no improvement in the next 24 hrs   Cont po/iv prn pain meds     Patient has a mechanical valve and now He is on FD lovenox, now BID dose, cont coumadin and monitor INR 1.9  Hb low but stable, transfuse only if Hb <7 or if patient becomes symptomatic     Had BM, Cont other scheduled laxatives     He has poor nutritional status  Now tolerating po diet, cont protein shakes   May have to wean off TPN    Pain now well controlled  Encouraged patient to start participating more with PT  Cont supportive care     He is on low dose steroids per rheumatology, will see if this can be stopped for now     Continue strict aspiration, fall and decubitus precautions      Unfortunately over all prognosis looks poor     Surgery team following patient     Labs, CXR in am in am    Critical care note:  Critical care diagnosis: Sepsis needing iv antibiotics   Critical care interventions: Hands-on evaluation, review of labs/radiographs/records and discussion with patient and family if present  Critical care time spent: 35 minutes       VTE prophylaxis: Lovenox    Patient condition:  Guarded    Anticipated discharge and Disposition:   LTAC, in formed CM      All diagnosis and differential diagnosis have been reviewed; assessment and plan has been documented; I have personally reviewed the labs and test results that are presently available; I have reviewed the patients medication list; I have reviewed the consulting providers response and recommendations. I have reviewed or attempted to review medical records based upon their availability    All of the patient's questions have been  addressed and answered. Patient's is agreeable to the above stated plan. I will continue to monitor closely and make adjustments to medical management as  needed.  _____________________________________________________________________    Nutrition Status:    Radiology:  CT Chest Abdomen Pelvis With Contrast (xpd)  Narrative: EXAMINATION:  CT CHEST ABDOMEN PELVIS WITH CONTRAST (XPD)    CLINICAL HISTORY:  GI bleed;Peritonitis or perforation suspected;Pancreatitis, acute, severe;Abdominal pain, acute, nonlocalized;    TECHNIQUE:  Helical acquisition from the thoracic inlet through the ischia with  IV contrast. Three plane reconstructions made available for review.  mGycm. Automatic exposure control, adjustment of mA/kV or iterative reconstruction technique was used to reduce radiation.    COMPARISON:  26 October 2022.    3 October 2022    FINDINGS:  Chest.    Heart is mildly enlarged.  No pericardial effusion.  Stable appearance stent graft repair of the thoracic aorta with dissection flap distal descending thoracic aorta.  Again the distal aspect of the stent is not well opposed and there is opacification of the false lumen along the descending thoracic aorta.  This is similar to prior.  No large central pulmonary embolus.    No significantly enlarged thoracic lymph nodes are seen.    There are small to moderate bilateral pleural effusions similar to prior.  There are new diffuse irregular ground-glass predominant opacities in the lungs.  There is some subpleural sparing.  No pneumothorax is seen.    Abdomen and pelvis.    No acute findings liver, gallbladder, spleen or adrenals.  Patent portal vein.    Little if any normal pancreatic tissue is seen.  Similar large fluid collection the expected area of the pancreas.    There is no hydronephrosis.  Stable subcentimeter hyperdensity upper pole right kidney.    There is no bowel obstruction.  Moderate stool in the colon.  No free air is seen today.  There is moderate to large volume loculated ascites.  This is similar to prior.    Urinary bladder unremarkable.  Abdominal aorta normal in caliber.  No retroperitoneal  adenopathy.    There are no acute osseous findings.  Again some fluid extends along the inguinal canals.  There is diffuse subcutaneous edema.  Impression: 1. Diffuse irregular ground-glass predominant opacities in the lungs are new compared to prior.  This could relate to an infectious/inflammatory process or pulmonary edema.  2. Similar moderate bilateral pleural effusions with compressive atelectasis.  3. Similar moderate to large volume loculated ascites.  4. Essentially no normal pancreatic tissue is seen.  Similar fluid collection in the expected area of the pancreas.    Electronically signed by: Kapil Ayers  Date:    11/06/2022  Time:    11:45  X-Ray Chest 1 View  Narrative: EXAMINATION:  XR CHEST 1 VIEW    CPT 16706    CLINICAL HISTORY:  Hypoxia;    COMPARISON:  November 5, 2022    FINDINGS:  Cardiomediastinal silhouette and pleuroparenchymal changes are essentially unchanged as compared with the previous exam when accounting for difference in technique.    Support catheters remain in place  Impression: No significant change    Electronically signed by: Ramón Ying  Date:    11/06/2022  Time:    09:52      Saurabh Curry MD   11/07/2022

## 2022-11-07 NOTE — PROGRESS NOTES
Nephrology consult follow up note    HPI:      Stuart Guevara is a 32 y.o. male with TAY following TEVAR for a type B aortic dissection on 09/23/2022.  (patient had had a type A repair via TEVAR in February 2022).    During the type B repair he also required a left carotid to left subclavian artery bypass for subclavian artery occlusion.    Since then he has required several exploratory laparotomies due to concerns for ischemic /necrotizing bowel and abdominal compartment syndrome on 9/25, 9/27 and 9/29. He has been maintained on TTS dialysis schedule via right IJ temp catheter originally placed 2 weeks ago and exchanged on November 2nd over a guidewire by Dr. Melton due to concern for the potential for catheter-related bloodstream infection.    Interval history:     11/7/22 now on BiPAP.  Awake alert oriented.  Responds appropriately.  He reports that he ate some food this morning.  Also nurses report that she gave all his morning medicines to the patient.     Review of Systems:     Past medical, family, surgical, and social history reviewed and unchanged from initial consult note.     Objective:     Awake alert oriented to time person place.  VITAL SIGNS: 24 HR MIN & MAX LAST    Temp  Min: 97.8 °F (36.6 °C)  Max: 98.4 °F (36.9 °C)  98 °F (36.7 °C)        BP  Min: 108/65  Max: 135/72  108/65     Pulse  Min: 79  Max: 105  80     Resp  Min: 16  Max: 20  20    SpO2  Min: 84 %  Max: 99 %  96 %      GEN: Chronically ill appearing AAM in NAD  CV: RRR without rub or gallop.  PULM: On non-rebreather, Clear anteriorly  ABD:  Distended.  Tender diffusely more so in the lower abdomen area.  Some guarding noted.  Bowel sounds are positive.  EXT: No cyanosis, 1 to 2+ edema noted.  SKIN: Warm and dry  PSYCH: Awake, alert and appropriately conversant.   Vascular access: RIJ permcath            Component Value Date/Time     (L) 11/07/2022 0533     (L) 11/06/2022 0824    K 3.9 11/07/2022 0533    K 3.7 11/06/2022 0824     CHLORIDE 94 (L) 11/07/2022 0533    CHLORIDE 98 11/06/2022 0824    CO2 24 11/07/2022 0533    CO2 24 11/06/2022 0824    BUN 79.4 (H) 11/07/2022 0533    BUN 63.0 (H) 11/06/2022 0824    CREATININE 1.67 (H) 11/07/2022 0533    CREATININE 1.46 (H) 11/06/2022 0824    CALCIUM 8.3 (L) 11/07/2022 0533    CALCIUM 8.1 (L) 11/06/2022 0824    PHOS 3.3 11/07/2022 0533            Component Value Date/Time    WBC 19.8 (H) 11/07/2022 0533    WBC 17.8 (H) 11/06/2022 0629    HGB 7.8 (L) 11/07/2022 0533    HGB 7.6 (L) 11/06/2022 0629    HCT 23.7 (L) 11/07/2022 0533    HCT 24.2 (L) 11/06/2022 0629    HCT 28 (L) 09/23/2022 0304    HCT 30.0 (L) 02/15/2021 1751     11/07/2022 0533     11/06/2022 0629         Imaging reviewed      Assessment / Plan:       Active Hospital Problems    Diagnosis  POA    Dissection of thoracoabdominal aorta [I71.03]  Unknown    Serositis [K65.8]  Yes    Systemic lupus erythematosus [M32.9]  Yes     Chronic    HTN (hypertension) [I10]  Yes     Chronic    History of DVT in adulthood [Z86.718]  Not Applicable     Chronic    Hypertensive emergency [I16.1]  Yes      Resolved Hospital Problems    Diagnosis Date Resolved POA    *Aortic dissection [I71.00] 10/30/2022 Yes       TAY- nonoliguric.  Requires dialysis on as-needed basis.  Worsening of the respiratory status with possibility of some pulmonary edema.  Systemic lupus erythematosus with serositis/pancreatitis  Status post multiple abdominal washouts for inflammatory serositis  Status post type B aortic dissection repair 09/23/2022   Hyperkalemia on Lokelma  Hypertension  Protein calorie malnutrition    Plan:  Patient has already received all his morning medicines which also include hydralazine clonidine and nifedipine.  Patient is dialysis and some ultrafiltration of the fluid which might have helped patient respiratory status.  I will still give him dialysis and try to remove some fluids today.  I will discontinue clonidine hydralazine  nifedipine  .  Check labs tomorrow and if needed some ultrafiltration it can be done again tomorrow as most of the medications should be out of his system by the time.

## 2022-11-07 NOTE — NURSING
WOCN consult-33 y/o male  in since 09-.   Awake alert oriented in with thoracic aortic dissection.  He is immobil from the waist down,emaciated,  incontinent and total assist with all care.    He has systemic lupus erythematosus.   Kidney issues also with dialysis being utilized  to remove fluid.   Skin assessment see buttock photo with macerated skin-zn oxide top cr with covering added to care.  He has an abd staple line that is still draining fluid from the lower aspect of the wound-Dressing applied with orders.    He also has some dried scabs to bilat posterior heels see photos.  Offloading boots in place.     Low airloss support added  and made nurse Codie aware.    Will follow up in a few days.    11/07/22 1100        Incision/Site 10/30/22 1651 Abdomen   Date First Assessed/Time First Assessed: 10/30/22 1651   Location: Abdomen   Wound Image    Dressing Appearance Open to air   Drainage Amount Small   Drainage Characteristics/Odor Serous;No odor   Appearance Staples intact   Periwound Area Intact   Wound Length (cm) 18 cm   Care Cleansed with:;Sterile normal saline   Dressing Gauze        Altered Skin Integrity 11/05/22 1253 posterior Sacral spine #1 Shearing Partial thickness tissue loss. Shallow open ulcer with a red or pink wound bed, without slough. Intact or Open/Ruptured Serum-filled blister.   Date First Assessed/Time First Assessed: 11/05/22 1253   Altered Skin Integrity Present on Admission: suspected hospital acquired  Orientation: posterior  Location: Sacral spine  Wound Number: #1  Is this injury device related?: No  Primary Wound Type...   Wound Image    Description of Altered Skin Integrity Partial thickness tissue loss. Shallow open ulcer with a red or pink wound bed, without slough. Intact or Open/Ruptured Serum-filled blister.   Dressing Appearance Open to air  (maceration)   Drainage Amount Scant   Drainage Characteristics/Odor Serosanguineous;No odor   Appearance  Red;Moist;Epithelialization   Tissue loss description Partial thickness   Red (%), Wound Tissue Color 100 %   Periwound Area Dry   Wound Edges Irregular   Care Cleansed with:;Wound cleanser;Applied:;Skin Barrier   Dressing Absorptive Pad   Safety Management   Safety Promotion/Fall Prevention assistive device/personal item within reach;side rails raised x 2   Patient Rounds bed in low position;bed wheels locked;call light in patient/parent reach;clutter free environment maintained;ID band on;placement of personal items at bedside

## 2022-11-08 NOTE — NURSING
11/07/22 1924   Post-Hemodialysis Assessment   Blood Volume Processed (Liters) 56 L   Duration of Treatment 180 minutes   Total UF (mL) 1000 mL   Post-Hemodialysis Comments 3 hr.  1000ml. vss.

## 2022-11-08 NOTE — PROGRESS NOTES
Nephrology consult follow up note    HPI:      Stuart Guevara is a 32 y.o. male with TAY following TEVAR for a type B aortic dissection on 09/23/2022.  (patient had had a type A repair via TEVAR in February 2022).    During the type B repair he also required a left carotid to left subclavian artery bypass for subclavian artery occlusion.    Since then he has required several exploratory laparotomies due to concerns for ischemic /necrotizing bowel and abdominal compartment syndrome on 9/25, 9/27 and 9/29. He has been maintained on TTS dialysis schedule via right IJ temp catheter originally placed 2 weeks ago and exchanged on November 2nd over a guidewire by Dr. Melton due to concern for the potential for catheter-related bloodstream infection.    Interval history:     11/7/22 now on BiPAP.  Awake alert oriented.  Responds appropriately.  He reports that he ate some food this morning.  Also nurses report that she gave all his morning medicines to the patient.    11/8/22 he is off BiPAP.  But still requires 100% non-rebreather.  He is able to tolerate oral nutrition according to him.  He says he does make urine.  He also reports that his abdominal pain has eased up quite a bit now.     Review of Systems:     Past medical, family, surgical, and social history reviewed and unchanged from initial consult note.     Objective:     Awake alert oriented to time person place.  VITAL SIGNS: 24 HR MIN & MAX LAST    Temp  Min: 97.8 °F (36.6 °C)  Max: 98.4 °F (36.9 °C)  98.4 °F (36.9 °C)        BP  Min: 112/74  Max: 126/68  125/74     Pulse  Min: 79  Max: 92  92     Resp  Min: 16  Max: 20  20    SpO2  Min: 93 %  Max: 97 %  (!) 94 %      GEN: Chronically ill appearing AAM in NAD  CV: RRR without rub or gallop.  PULM: On non-rebreather, Clear anteriorly  ABD:  Distended.  Lot less tender Tender  in the lower abdomen area.  Some guarding noted.  Bowel sounds are positive.  2+ anterior abdominal wall edema noted.  EXT: No cyanosis, 1  to 2+ edema noted.  SKIN: Warm and dry  PSYCH: Awake, alert and appropriately conversant.   Vascular access: RIJ permcath            Component Value Date/Time     (L) 11/08/2022 0343     (L) 11/07/2022 0533    K 3.9 11/08/2022 0343    K 3.9 11/07/2022 0533    CHLORIDE 95 (L) 11/08/2022 0343    CHLORIDE 94 (L) 11/07/2022 0533    CO2 26 11/08/2022 0343    CO2 24 11/07/2022 0533    BUN 49.7 (H) 11/08/2022 0343    BUN 79.4 (H) 11/07/2022 0533    CREATININE 1.43 (H) 11/08/2022 0343    CREATININE 1.67 (H) 11/07/2022 0533    CALCIUM 7.9 (L) 11/08/2022 0343    CALCIUM 8.3 (L) 11/07/2022 0533    PHOS 3.3 11/07/2022 0533            Component Value Date/Time    WBC 18.6 (H) 11/08/2022 0343    WBC 19.8 (H) 11/07/2022 0533    HGB 8.5 (L) 11/08/2022 0343    HGB 7.8 (L) 11/07/2022 0533    HCT 26.6 (L) 11/08/2022 0343    HCT 23.7 (L) 11/07/2022 0533    HCT 28 (L) 09/23/2022 0304    HCT 30.0 (L) 02/15/2021 1751     11/08/2022 0343     11/07/2022 0533         Imaging reviewed      Assessment / Plan:       Active Hospital Problems    Diagnosis  POA    Dissection of thoracoabdominal aorta [I71.03]  Unknown    Serositis [K65.8]  Yes    Systemic lupus erythematosus [M32.9]  Yes     Chronic    HTN (hypertension) [I10]  Yes     Chronic    History of DVT in adulthood [Z86.718]  Not Applicable     Chronic    Hypertensive emergency [I16.1]  Yes      Resolved Hospital Problems    Diagnosis Date Resolved POA    *Aortic dissection [I71.00] 10/30/2022 Yes       TAY- nonoliguric.  Requires dialysis on as-needed basis.  No dialysis needed today.  Improving respiratory status.  Systemic lupus erythematosus with serositis/pancreatitis  Status post multiple abdominal washouts for inflammatory serositis  Status post type B aortic dissection repair 09/23/2022   Hypertension  Protein calorie malnutrition    Plan:  Now off all blood pressure medications and the blood pressure is somewhat improved.  I will give him some Lasix 40  mg IV push 6 hours apart 2 doses.  Discontinue Lokelma as his serum potassium is on the low-normal side.  If he responds to Lasix then we can continue this Lasix to get all the total body fluid excess off him.  Check labs tomorrow

## 2022-11-08 NOTE — PLAN OF CARE
Problem: Adult Inpatient Plan of Care  Goal: Plan of Care Review  Outcome: Ongoing, Progressing  Flowsheets (Taken 11/8/2022 1335)  Plan of Care Reviewed With: patient  Goal: Absence of Hospital-Acquired Illness or Injury  Outcome: Ongoing, Progressing  Intervention: Prevent and Manage VTE (Venous Thromboembolism) Risk  Flowsheets (Taken 11/8/2022 1335)  Activity Management: Rolling - L1  Goal: Optimal Comfort and Wellbeing  Outcome: Ongoing, Progressing  Intervention: Monitor Pain and Promote Comfort  Flowsheets (Taken 11/8/2022 1335)  Pain Management Interventions:   pillow support provided   position adjusted   quiet environment facilitated   relaxation techniques promoted   medication offered  Goal: Readiness for Transition of Care  Outcome: Ongoing, Progressing     Problem: Infection  Goal: Absence of Infection Signs and Symptoms  Outcome: Ongoing, Progressing  Intervention: Prevent or Manage Infection  Flowsheets (Taken 11/8/2022 1335)  Fever Reduction/Comfort Measures:   lightweight clothing   lightweight bedding  Infection Management: aseptic technique maintained  Isolation Precautions: protective     Problem: Skin Injury Risk Increased  Goal: Skin Health and Integrity  Outcome: Ongoing, Progressing  Intervention: Optimize Skin Protection  Flowsheets (Taken 11/8/2022 1335)  Pressure Reduction Techniques: frequent weight shift encouraged  Pressure Reduction Devices: positioning supports utilized  Skin Protection:   skin-to-device areas padded   tubing/devices free from skin contact   transparent dressing maintained   adhesive use limited  Head of Bed (HOB) Positioning: HOB at 30-45 degrees     Problem: Fall Injury Risk  Goal: Absence of Fall and Fall-Related Injury  Outcome: Ongoing, Progressing  Intervention: Identify and Manage Contributors  Flowsheets (Taken 11/8/2022 1335)  Self-Care Promotion:   independence encouraged   safe use of adaptive equipment encouraged   BADL personal objects within  reach  Medication Review/Management: medications reviewed     Problem: Device-Related Complication Risk (Hemodialysis)  Goal: Safe, Effective Therapy Delivery  Outcome: Ongoing, Progressing  Intervention: Optimize Device Care and Function  Flowsheets (Taken 11/8/2022 1335)  Circuit Management: air detection alarms on  Medication Review/Management: medications reviewed     Problem: Infection (Hemodialysis)  Goal: Absence of Infection Signs and Symptoms  Outcome: Ongoing, Progressing     Problem: Impaired Wound Healing  Goal: Optimal Wound Healing  Outcome: Ongoing, Progressing  Intervention: Promote Wound Healing  Flowsheets (Taken 11/8/2022 1335)  Oral Nutrition Promotion:   safe use of adaptive equipment encouraged   rest periods promoted   physical activity promoted  Sleep/Rest Enhancement: awakenings minimized  Activity Management: Rolling - L1  Pain Management Interventions:   pillow support provided   position adjusted   quiet environment facilitated   relaxation techniques promoted   medication offered

## 2022-11-08 NOTE — PT/OT/SLP PROGRESS
Occupational Therapy  Treatment    Stuart Guevara   MRN: [id number]   Admitting Diagnosis: Aortic dissection    OT Date of Treatment: 11/08/22   OT Start Time: 1049  OT Stop Time: 1136  OT Total Time (min): 47 min      OT/ADONAY: ADONAY     ADONAY Visit Number: 2    General Precautions: Standard, fall  Orthopedic Precautions:    Braces:    Respiratory: 15L non-rebreather    Spiritual, Cultural Beliefs, Baptism Practices, Values that Affect Care: no    Subjective:  Communicated with RN prior to session.         Objective:     Patient performing sponge bathing activity requiring assistance with setup of task and total A for posterior care. Patient requiring Max A for rolling L and R in bed for bed changing activity. Patient participated in lateral transfer from one bed to the next.     Patient left HOB elevated with all lines intact and call button in reach    ASSESSMENT:  Stuart Guevara is a 32 y.o. male with a medical diagnosis of Aortic dissection.    Rehab potential is poor    Activity tolerance: Poor    Discharge recommendations: nursing facility, skilled, LTACH (long-term acute care hospital)     Equipment recommendations:       GOALS:   Multidisciplinary Problems       Occupational Therapy Goals          Problem: Occupational Therapy    Goal Priority Disciplines Outcome Interventions   Occupational Therapy Goal     OT, PT/OT Ongoing, Progressing    Description: Goals to be met by: 11/17/2022  Goals Met and Upgraded/New goals added 10/21/2022  Care Plan Revised due to patient decline in functional status following surgery 11/3/2022    Patient will increase functional independence with ADLs by performing:      UE dressing with Modified [facility]. - continue  LB Dressing with Minimal Assistance and dressing AEDs. - continue  Grooming while EOB with Modified [facility]. - new goal  Sitting at edge of bed x10 minutes with Modified [facility] - new goal  Bathing from  shower chair/bench with Moderate Assistance. -  continue  Toileting from bedside commode with Moderate Assistance for hygiene and clothing management. - new goal  Toilet transfer to bedside commode with Minimum Assistance. - new goal                           Plan:  Patient to be seen 5 x/week to address the above listed problems via self-care/home management, therapeutic activities, therapeutic exercises  Plan of Care expires: 11/17/22  Plan of Care reviewed with: patient         11/08/2022

## 2022-11-08 NOTE — PROGRESS NOTES
TRAUMA / ACUTE CARE SURGERY   PROGRESS NOTE    Admit Date: 9/23/2022  Hospital Day: 46     Subjective:  NAEON per nursing staff  Remains on BiPAP, sleeping comfortably     Physical Exam:  GEN: NAD, sleeping comfortably  HEENT: Bipap mask in place  Abd: deferred as patient was sleeping comfortably     Inpatient Data    Vitals:   Temp:  [97.8 °F (36.6 °C)-98.1 °F (36.7 °C)]   Pulse:  [79-81]   Resp:  [16-20]   BP: (108-126)/(65-74)   SpO2:  [93 %-97 %]     DIET RENAL NON-DIALYSIS   Intake/Output Summary (Last 24 hours) at 11/8/2022 0739  Last data filed at 11/8/2022 0623  Gross per 24 hour   Intake 660 ml   Output 1000 ml   Net -340 ml          Recent Labs     11/06/22  0629 11/06/22  0824 11/07/22  0533 11/08/22  0343   WBC 17.8*  --  19.8* 18.6*   HGB 7.6*  --  7.8* 8.5*   HCT 24.2*  --  23.7* 26.6*     --  249 279   NA  --  130* 127* 128*   K  --  3.7 3.9 3.9   CO2  --  24 24 26   BUN  --  63.0* 79.4* 49.7*   CREATININE  --  1.46* 1.67* 1.43*   BILITOT  --  0.9 0.9 1.0   AST  --  27 36* 33   ALT  --  9 8 8   ALKPHOS  --  141 170* 177*   CALCIUM  --  8.1* 8.3* 7.9*   ALBUMIN  --  1.7* 1.6* 1.7*   MG  --  2.40 3.00*  --    PHOS  --  3.2 3.3  --      Scheduled Meds: aspirin, 81 mg, Daily  carvediloL, 25 mg, BID  doxycycline (VIBRAMYCIN) IVPB, 100 mg, Q12H  enoxaparin, 1 mg/kg, Q12H  fluconazole (DIFLUCAN) IV (PEDS and ADULTS), 400 mg, Every Tues, Thurs, Sat  gabapentin, 100 mg, TID  insulin detemir U-100, 15 Units, BID  lactulose 10 gram/15 ml, 10 g, BID  meropenem (MERREM) IVPB, 500 mg, Q24H  methylnaltrexone, 8 mg, Every other day  methylPREDNISolone sodium succinate injection, 40 mg, Daily  morphine, 30 mg, Q12H  pantoprazole, 40 mg, Daily  sodium bicarbonate, 1,300 mg, Daily  sodium chloride 0.9%, 10 mL, Q6H  sodium zirconium cyclosilicate, 10 g, Daily  warfarin, 3 mg, Daily  zinc oxide-cod liver oil, , BID      ASSESSMENT/PLAN:  32M with HTN, SLE, HBV, aortic dissection s/p repair 2/2021, RUE DVT on  xarelto who was admitted 9/23 with aortic dissection s/p TEVAR. He developed abdominal compartment syndrome s/p multiple exlaps (9/25, 9/27, 9/29) now on HD for renal failure with re-accumulation of ascites despite IR paracentesis. CT has shown concern for necrotizing fascitis. Cultures from paracentesis resulted in Klebsiella growth. S/p washout 10/30.      - Nutrition optimization  - Midline staples out 11/13 if abdomen not too tight  - Continue ongoing care per primary team, no anticipated surgical intervention  - If develops need for intra-abd fluid drainage would recommend GI or IR consultation for endoscopic / percutaneous drainage, respectively  - Will continue to follow  - WBC noted    Dewayne Cason MD  [employer] General Surgery PGY-4  11/08/2022, 7:39 AM

## 2022-11-08 NOTE — PROGRESS NOTES
[facility]  Hospital Medicine Progress Note        Chief Complaint: Inpatient Follow-up for Pancreatitis     HPI:   Stuart Guevara is a 32 y.o. male who has PMH which includes HTN, SLE,  hepatitis B, type A aortic dissection with repair + mechanical aortic valve placement 2/2021 on coumadin, RUE DVT s/p Xarelto, class V lupus nephritis; presented to the ED at [facility] on 9/23/2022  with reports of  shortness of breath and chest pain radiating to his back. Work up revealed a type B aortic dissection and significant hypertension noted on presentation.  He failed medical management and had progression of his symptoms despite adequate control of hemodynamic parameters.  PT was transferred from [facility] to [facility] for CV surgery evaluation which he was taken  to the OR and had TEVAR with left carotid to subclavian artery bypass on 9/23.   Later Patient developed abdominal distension and rigidity of his abdomen and developed a lactic acidosis.  He had a repeat CT angiography on 9/25 of the chest/abdomen/pelvis without evidence of worsening dissection, but evidence of significant intra-abdominal pathology with some hemoperitoneum, signs of hypoperfusion of the abdominal organs (compartment syndrome)  free blood in the abdomen. He underwent exploratory laparotomy on 9/25 with evidence of viable bowel and no significant bleeding identified, abd was closed with flakito drain left in situ chest tube was placed. Had repeat ex-laps on 9/27, 9/29. Postoperatively, he developed severe unstable bradycardia, and a transvenous pacemaker was temporarily placed by Cardiology, with the bradycardia resolving within he next couple days.  Shortly afterwards, he was noted to have significant worsening of his renal failure and critical hyperkalemia. Renal services consulted and HD catheter was placed and he was started on HD treatments. Patient was cleared for transfer out of ICU to the floor.  He started having worsening  abdominal pain. He was seen by rheumatology for possible lupus flare up and serositis. He was started on steroids and other immunomodulating drugs. Drugs have since been discontinued due to concern of infection.  Repeat CT showed multiple loculated fluid collections, necrotizing pancreatitis, possible peritonitis. Surgery team is following with plan for IR drainage, which was performed October 28.  Body fluid culture grew out Klebsiella.  Surgical hospitalist took back to the OR October 30th due to continued pain in peritonitis with positive cultures, only found about 3 L of venita ascites, no other sign of infection, necrotic tissue, bowel issues or hematoma.    Patient was continued on iv merrem. He had severe PCM and was started on TPN. He was also tolerating po diet. He was followed by renal team and HD was continued. He went into volume overload and UF was done with HD. He needed BIPAP for few days. Rpt CT done and showed persistent large fluid collection around the pancreas. Surgery team is following patient.     Interval Hx:   Patient awake and more alert today. Tolerating enteral nutrition. Has abdominal pain but controlled with iv/po prn pain meds. Denies any fever, chills or SOB. Encouraged to ambulate with PT. Family at bedside. Explained patients condition, labs and treatment plan.     Objective/physical exam:  General: In no acute distress, frail  Chest: Clear to auscultation bilaterally  Heart: RRR, +S1, S2, no appreciable murmur  Abdomen: + distension, soft, + tenderness in LLQ and RLQ + Bowel sounds   MSK: severe muscle Wasting. Right hip pain.    : penile and scrotal edema   Neurologic: Alert and oriented x4, Cranial nerve II-XII intact, Strength 5/5 in all 4 extremities    VITAL SIGNS: 24 HRS MIN & MAX LAST   Temp  Min: 97.8 °F (36.6 °C)  Max: 98.4 °F (36.9 °C) 98.4 °F (36.9 °C)   BP  Min: 112/74  Max: 126/68 125/74     Pulse  Min: 79  Max: 92  92   Resp  Min: 16  Max: 20 20   SpO2  Min: 93 %   Max: 97 % (!) 94 %       Recent Labs   Lab 11/06/22  0629 11/07/22  0533 11/08/22 0343   WBC 17.8* 19.8* 18.6*   RBC 2.70* 2.77* 3.08*   HGB 7.6* 7.8* 8.5*   HCT 24.2* 23.7* 26.6*   MCV 89.6 85.6 86.4   MCH 28.1 28.2 27.6   MCHC 31.4* 32.9* 32.0*   RDW 15.8 15.4 15.9    249 279   MPV 11.8* 11.3* 11.1*       Recent Labs   Lab 11/05/22  0142 11/05/22  0549 11/06/22 0824 11/07/22  0059 11/07/22 0533 11/08/22 0343   NA  --  130* 130*  --  127* 128*   K  --  4.1 3.7  --  3.9 3.9   CO2  --  25 24  --  24 26   BUN  --  68.6* 63.0*  --  79.4* 49.7*   CREATININE  --  1.19* 1.46*  --  1.67* 1.43*   CALCIUM  --  7.9* 8.1*  --  8.3* 7.9*   PH 7.48*  --   --  7.41  --   --    MG  --  1.80 2.40  --  3.00*  --    ALBUMIN  --  1.7* 1.7*  --  1.6* 1.7*   ALKPHOS  --  138 141  --  170* 177*   ALT  --  8 9  --  8 8   AST  --  28 27  --  36* 33   BILITOT  --  0.9 0.9  --  0.9 1.0          Microbiology Results (last 7 days)       Procedure Component Value Units Date/Time    Blood Culture [[id number]]  (Normal) Collected: 11/05/22 0549    Order Status: Completed Specimen: Blood Updated: 11/07/22 1000     CULTURE, BLOOD ([facility]) No Growth At 48 Hours    Blood Culture [[id number]]  (Normal) Collected: 11/05/22 0549    Order Status: Completed Specimen: Blood Updated: 11/07/22 1000     CULTURE, BLOOD ([facility]) No Growth At 48 Hours    Rapid Influenza A/B [[id number]] Collected: 11/05/22 0612    Order Status: Canceled Specimen: Nasopharyngeal from Nasal Swab Updated: 11/05/22 0616    Body Fluid Culture [[id number]] Collected: 10/28/22 0940    Order Status: Completed Specimen: Peritoneal Fluid from Abdomen Updated: 11/02/22 1130     Body Fluid Culture Final Report: At 5 days. No growth             See below for Radiology    Scheduled Med:   aspirin  81 mg Oral Daily    carvediloL  25 mg Oral BID    doxycycline (VIBRAMYCIN) IVPB  100 mg Intravenous Q12H    fluconazole (DIFLUCAN) IV (PEDS and ADULTS)  400 mg Intravenous Every Tues, Thurs, Sat     gabapentin  100 mg Oral TID    insulin detemir U-100  15 Units Subcutaneous BID    lactulose 10 gram/15 ml  10 g Oral BID    meropenem (MERREM) IVPB  500 mg Intravenous Q24H    methylnaltrexone  8 mg Subcutaneous Every other day    methylPREDNISolone sodium succinate injection  40 mg Intravenous Daily    morphine  30 mg Oral Q12H    pantoprazole  40 mg Intravenous Daily    sodium bicarbonate  1,300 mg Oral Daily    sodium chloride 0.9%  10 mL Intravenous Q6H    sodium zirconium cyclosilicate  10 g Oral Daily    warfarin  3 mg Oral Daily    zinc oxide-cod liver oil   Topical (Top) BID        Continuous Infusions:         PRN Meds:  acetaminophen, albuterol, bisacodyL, dextrose 10 % in water (D10W), dextrose 10 % in water (D10W), dextrose 10%, glucagon (human recombinant), glucose, glucose, hydrALAZINE, HYDROmorphone, insulin aspart U-100, labetaloL, melatonin, nitroGLYCERIN, ondansetron, polyethylene glycol, sodium chloride 0.9%, sodium chloride 0.9%, Flushing PICC Protocol **AND** sodium chloride 0.9% **AND** sodium chloride 0.9%       Assessment/Plan:  Acute respiratory failure with hypoxia secondary to flash pulmonary edema : improved   Severe necrotizing pancreatitis with loculated intra-abdominal fluid collection-status post IR drainage October 28  Intra-abdominal abscess + Klebsiella   Hospital acquired pneumonia-right-sided  Sepsis  Anasarca  Acute kidney injury ATN  Critical illness myopathy  Severe protein caloric malnutrition     History of:  Type A aortic dissection status post repair with mechanical aortic valve conduit, recent type B aortic dissection status post TAVR, lupus nephritis class 5, mixed connective tissue disorder predominant lupus, transverse myelitis recovered    Plan:  Patient more alert and looks comfortable. Now on NRBM and O2 stats are stable   Cont UF with HD, Renal team monitoring volume status     Has been afebrile. CT shows loculated ascitic fluid, may have to ask IR to place a  drain if there is no improvement in symptoms and labs   Cont IV merrem and diflucan for intraabdominal infection per ID, Doxy day 3/5 for ?  Lung infection     CT shows moderate pleural effusion   Will closely monitor patients daily weight, urine out put, renal parameters and volume status    He is now having a new right hip pain. His right hip ROM looks ok. Will need more PT to mobilize patient     Patient has a mechanical valve and INR is now therapeutic. Dc FD lovenox today   Monitor H&H     Had BM, Cont other scheduled laxatives     He has poor nutritional status  Now tolerating po diet, cont protein shakes   Stopped TPN    Pain now well controlled  Encouraged patient to start participating more with PT  Cont supportive care     He is on low dose steroids per rheumatology, will see if this can be stopped for now     Continue strict aspiration, fall and decubitus precautions      Unfortunately over all prognosis looks poor     Surgery team following patient     Labs in am     Critical care note:  Critical care diagnosis: Sepsis needing iv antibiotics   Critical care interventions: Hands-on evaluation, review of labs/radiographs/records and discussion with patient and family if present  Critical care time spent: 35 minutes       VTE prophylaxis: Lovenox    Patient condition:  Guarded    Anticipated discharge and Disposition:   LTAC, informed CM      All diagnosis and differential diagnosis have been reviewed; assessment and plan has been documented; I have personally reviewed the labs and test results that are presently available; I have reviewed the patients medication list; I have reviewed the consulting providers response and recommendations. I have reviewed or attempted to review medical records based upon their availability    All of the patient's questions have been  addressed and answered. Patient's is agreeable to the above stated plan. I will continue to monitor closely and make adjustments to medical  management as needed.  _____________________________________________________________________    Nutrition Status:    Radiology:  NM Lung Scan Ventilation Perfusion  Narrative: EXAMINATION:  NM LUNG VENTILATION AND PERFUSION IMAGING    CLINICAL HISTORY:  Pulmonary embolism (PE) suspected, high prob;    TECHNIQUE:  36.5 mCi of Tc-99m-DTPA were placed in the nebulizer. Following the inhalation Tc-99m-DTPA in aerosol and the subsequent IV administration of 4.7 mCi of Tc-99m-MAA, multiple images of the thorax were obtained in various projections.    COMPARISON:  None.    FINDINGS:  Perfusion: Normal perfusion is seen with no segmental or subsegmental defects seen.    Ventilation: Normal uptake is seen in left lung with slightly decreased uptake seen on the right with respect of the left lung..    CXR diffuse bilateral interstitial infiltrates are seen and bilateral pleural effusions are seen..  Impression: This represents a low probability pulmonary embolism    Decreased ventilation in the right lung with respect to the left most likely related to the airspace disease..    Electronically signed by: Too Reyes  Date:    11/07/2022  Time:    16:08      Saurabh Curry MD   11/08/2022

## 2022-11-08 NOTE — PT/OT/SLP PROGRESS
Physical Therapy         Treatment        Stuart Guevara   MRN: [id number]     PT Received On: 11/08/22  PT Start Time: 1427     PT Stop Time: 1457    PT Total Time (min): 30 min       Billable Minutes:  Therapeutic Activity 30  Total Minutes: 30    Treatment Type: Treatment  PT/PTA: PTA     PTA Visit Number: 3       General Precautions: Standard, fall  Orthopedic Precautions: Orthopedic Precautions : N/A   Braces:      Spiritual, Cultural Beliefs, Caodaism Practices, Values that Affect Care: no    Subjective:  Communicated with NSG prior to session.    Pain/Comfort  Location - Side 1: Right  Location - Orientation 1: lower  Location 1: abdomen  Pain Addressed 1: Reposition, Distraction  Location - Side 2: Right  Location 2: leg  Pain Addressed 2: Reposition, Distraction    Objective:  Patient found in bed with HOB slightly elevated, with Patient found with: pulse ox (continuous), peripheral IV, Other (comments) (nonrebreather)    Functional Mobility:  Bed Mobility:   Supine to sit: Moderate Assistance. modAx2   Sit to supine: Moderate Assistance. modAx2   Rolling: Moderate Assistance   Scooting: Minimal Assistance    Balance:   Static Sit: FAIR: Maintains without assist, but unable to take any challenges   Dynamic Sit:  FAIR: Cannot move trunk without losing balance  Static Stand: 0: Needs MAXIMAL assist to maintain   Dynamic stand: 0: N/A    Transfer Training:  Sit to stand:Maximum Assistance with No Assistive Device and Rolling Walker . 1 trial completed with RW and 3 trials with no AD (blocked pt BL knee to prevent buckling). Pt required increased time and rest breaks.     T/F training:  Attempted chair T/F via stand step or stand pivot. Pt became very anxious, BLE buckled, and he landed uncontrolled onto EOB. Therapist quickly blocked pt BL knees and assisted him with scooting back more onto EOB. Pt declined to attempt T/F again today.      Additional Treatment:    Activity Tolerance:  Patient limited by  fatigue and Patient limited by pain. Pt fatigues very quickly and he requires frequent rest breaks to recover.     Patient left with bed in chair position with all lines intact, call button in reach, and BLE elevated and in PRAFOs .    Assessment:  Stuart Guevara is a 32 y.o. male with a medical diagnosis of Aortic dissection. He presents with decreased safety awareness and remains a fall risk. Pt with decreased BLE strength and impaired functional mobility.   Pt would benefit from further LTACH or SNF therapy services to increase overall independence level and assist in getting pt closer to PLOF.      Plan to coordinate with OT tomorrow and T/F pt [facility] and let him sit as tolerated. Pt educated very heavily on what would happen if he did not mobilize more with therapy. Pt states he understands.     Rehab potential is good.    Activity tolerance: Good    Discharge recommendations: Discharge Facility/Level of Care Needs: nursing facility, skilled, LTACH (long-term acute care hospital)     Equipment recommendations: Equipment Needed After Discharge: walker, leonel, bedside commode     GOALS:   Multidisciplinary Problems       Physical Therapy Goals          Problem: Physical Therapy    Goal Priority Disciplines Outcome Goal Variances Interventions   Physical Therapy Goal     PT, PT/OT Ongoing, Progressing     Description: Goals to be met by: 22     Patient will increase functional independence with mobility by performin. Supine to sit with brandon  2. Sit to supine with brandon  3. Sit <> stand with CGA using RW/LRAD  4. Bed to chair with CGA via squat pivot/stand pivot  5.Ambulate 200 ft with rolling walker and contact guard assistance                           PLAN:    Patient to be seen 6 x/week  to address the above listed problems via gait training, therapeutic activities, therapeutic exercises  Plan of Care expires: 22  Plan of Care reviewed with: patient         2022

## 2022-11-09 NOTE — PROGRESS NOTES
Nephrology consult follow up note    HPI:      Stuart Guevara is a 32 y.o. male with TAY following TEVAR for a type B aortic dissection on 09/23/2022.  (patient had had a type A repair via TEVAR in February 2022).    During the type B repair he also required a left carotid to left subclavian artery bypass for subclavian artery occlusion.    Since then he has required several exploratory laparotomies due to concerns for ischemic /necrotizing bowel and abdominal compartment syndrome on 9/25, 9/27 and 9/29. He has been maintained on TTS dialysis schedule via right IJ temp catheter originally placed 2 weeks ago and exchanged on November 2nd over a guidewire by Dr. Melton due to concern for the potential for catheter-related bloodstream infection.    Interval history:     11/7/22 now on BiPAP.  Awake alert oriented.  Responds appropriately.  He reports that he ate some food this morning.  Also nurses report that she gave all his morning medicines to the patient.    11/8/22 he is off BiPAP.  But still requires 100% non-rebreather.  He is able to tolerate oral nutrition according to him.  He says he does make urine.  He also reports that his abdominal pain has eased up quite a bit now.    11/9/22: Last HD was 11/7. He was given Lasix twice yesterday with <1L output. Remains in significant amount of oxygen although lower than prior. He is tolerated solids now. There is some pain to palpation of R hip. He is markedly edematous.      Objective:       VITAL SIGNS: 24 HR MIN & MAX LAST    Temp  Min: 97.6 °F (36.4 °C)  Max: 98.6 °F (37 °C)  97.9 °F (36.6 °C)        BP  Min: 127/65  Max: 133/72  130/83     Pulse  Min: 78  Max: 82  80     Resp  Min: 16  Max: 20  20    SpO2  Min: 96 %  Max: 100 %  96 %      GEN: Chronically ill appearing AAM in NAD  CV: RRR without rub or gallop.  PULM: oxymizer at 6LPM, bilateral crackles  ABD:   Lot less tender Tender  in the lower abdomen area. Bowel sounds are positive.  2+ anterior abdominal  wall edema noted.  EXT: No cyanosis, 2+ pitting anasarca  SKIN: Warm and dry  PSYCH: Awake, alert and appropriately conversant.   Vascular access: RIJ permcath            Component Value Date/Time     (L) 11/09/2022 0419     (L) 11/08/2022 0343    K 4.9 11/09/2022 0419    K 3.9 11/08/2022 0343    CHLORIDE 95 (L) 11/09/2022 0419    CHLORIDE 95 (L) 11/08/2022 0343    CO2 23 11/09/2022 0419    CO2 26 11/08/2022 0343    BUN 69.7 (H) 11/09/2022 0419    BUN 49.7 (H) 11/08/2022 0343    CREATININE 1.91 (H) 11/09/2022 0419    CREATININE 1.43 (H) 11/08/2022 0343    CALCIUM 8.1 (L) 11/09/2022 0419    CALCIUM 7.9 (L) 11/08/2022 0343    PHOS 3.3 11/07/2022 0533        Assessment / Plan:   TAY- nonoliguric.    Improving respiratory status.  Systemic lupus erythematosus with serositis/pancreatitis  Status post multiple abdominal washouts for inflammatory serositis  Status post type B aortic dissection repair 09/23/2022   Hypertension  Protein calorie malnutrition    Plan:  Not much response to Lasix. I will order UF only treatment today with goal of 2.5 L

## 2022-11-09 NOTE — PROGRESS NOTES
Infectious Diseases Progress Note  32-year-old male with past medical history of SLE, HTN, hepatitis-B, aortic dissection with repair in February 2021, DVT on Xarelto, initially presented to the [facility] [place] ED on 09/23/2022 with shortness of breath and chest pain.  Evaluation revealed a aortic dissection type B, with progressive course requiring transferred to [facility], promptly taken to OR for TEVAR with left carotid to subclavian artery bypass.  He had a postoperative complicated course with development of compartment syndrome found on CT angiography of chest/abdomen and pelvis with hemoperitoneum.  This required exploratory laparotomy, source apparently unstable bradycardia requiring pacemaker and then acute kidney injury and initiation of hemodialysis.  He had been hospitalized in the ICU but since downgraded to the floor and out on the floor.  CT scan of the abdomen and pelvis done on 10/26 showed no normal pancreatic parenchyma seen with a large fluid collection with a few foci of gas noted at the level of the pancreas, loculated, faintly peripherally enhancing fluid collection extending along the pericolic gutter as findings compatible with sequela of severe necrotizing pancreatitis with walled-off necrosis, mass effect on bowel loops from abdominal fluid collections, anasarca.  Ultrasound of the scrotum with noted edema.  He has been pretty much afebrile lately with a low-grade temp of 99.5 noted on 10/25 with associated leukocytosis of 27.5 which worse today at 33.4.    He is currently on antimicrobial coverage with meropenem, Doxycycline and Diflucan    Subjective:  No new complaints, no fevers, doing about the same. Lying in bed in no acute distress    ROS  Constitutional:  Positive for malaise/fatigue.   HENT: Negative.     Respiratory: Negative.     Gastrointestinal:  Positive for abdominal pain and nausea.   Genitourinary: Negative.    Musculoskeletal: Negative.    Neurological:   Positive for weakness.   Endo/Heme/Allergies: Negative.    Psychiatric/Behavioral: Negative.    All other Systems review done and negative    Review of patient's allergies indicates:   Allergen Reactions    Levofloxacin     Sulfamethoxazole-trimethoprim        Past Medical History:   Diagnosis Date    Aortic dissection 9/23/2022    Hypertension     Systemic lupus erythematosus, organ or system involvement unspecified     Systemic lupus erythematosus, organ or system involvement unspecified        Past Surgical History:   Procedure Laterality Date    APPLICATION OF WOUND VACUUM-ASSISTED CLOSURE DEVICE  9/27/2022    Procedure: APPLICATION, WOUND VAC;  Surgeon: Christopher Espinal MD;  Location: [facility];  Service: General;;    CARDIAC SURGERY      CREATION OF BYPASS FROM INTERNAL CAROTID ARTERY TO SUBCLAVIAN ARTERY Left 9/23/2022    Procedure: CREATION, BYPASS, ARTERIAL, INTERNAL CAROTID TO SUBCLAVIAN;  Surgeon: Vic Martines MD;  Location: [facility];  Service: Cardiothoracic;  Laterality: Left;    ENDOVASCULAR REPAIR OF THORACIC AORTA N/A 9/23/2022    Procedure: REPAIR, AORTA, THORACIC, ENDOVASCULAR;  Surgeon: Vic Martines MD;  Location: [facility];  Service: Cardiology;  Laterality: N/A;    FINGER AMPUTATION      INSERTION OF TUNNELED CENTRAL VENOUS HEMODIALYSIS CATHETER N/A 10/24/2022    Procedure: INSERTION, CATHETER, CENTRAL VENOUS, HEMODIALYSIS, TUNNELED;  Surgeon: Yogesh Melton DO;  Location: [facility] CATH LAB;  Service: Nephrology;  Laterality: N/A;  TUNNELED CATH INSERTION    THROMBECTOMY Right     TOE AMPUTATION         Social History     Socioeconomic History    Marital status: Single   Tobacco Use    Smoking status: Every Day     Types: Cigarettes    Smokeless tobacco: Never     Social Determinants of Health     Food Insecurity: Unknown    Worried About Running Out of Food in the Last Year: Never true   Transportation Needs: Unknown    Lack of Transportation (Medical): No   Housing Stability: Unknown    Unable  "to Pay for Housing in the Last Year: No         Scheduled Meds:   aspirin  81 mg Oral Daily    carvediloL  25 mg Oral BID    doxycycline (VIBRAMYCIN) IVPB  100 mg Intravenous Q12H    fluconazole (DIFLUCAN) IV (PEDS and ADULTS)  400 mg Intravenous Every Tues, Thurs, Sat    gabapentin  100 mg Oral TID    insulin detemir U-100  15 Units Subcutaneous BID    lactulose 10 gram/15 ml  10 g Oral BID    meropenem (MERREM) IVPB  500 mg Intravenous Q24H    methylnaltrexone  8 mg Subcutaneous Every other day    methylPREDNISolone sodium succinate injection  40 mg Intravenous Daily    morphine  30 mg Oral Q12H    pantoprazole  40 mg Intravenous Daily    sodium bicarbonate  1,300 mg Oral Daily    sodium chloride 0.9%  10 mL Intravenous Q6H    warfarin  3 mg Oral Daily    zinc oxide-cod liver oil   Topical (Top) BID     Continuous Infusions:  PRN Meds:acetaminophen, albuterol, bisacodyL, dextrose 10 % in water (D10W), dextrose 10 % in water (D10W), dextrose 10%, glucagon (human recombinant), glucose, glucose, hydrALAZINE, HYDROmorphone, insulin aspart U-100, labetaloL, melatonin, nitroGLYCERIN, ondansetron, polyethylene glycol, sodium chloride 0.9%, sodium chloride 0.9%, Flushing PICC Protocol **AND** sodium chloride 0.9% **AND** sodium chloride 0.9%    Objective:  /71   Pulse 82   Temp 97.6 °F (36.4 °C) (Oral)   Resp 16   Ht 5' 5" (1.651 m)   Wt 59.4 kg (131 lb)   SpO2 96%   BMI 21.80 kg/m²     Physical Exam:   Physical Exam  Vitals reviewed.   Constitutional:       General: He is not in acute distress.  HENT:      Head: Normocephalic and atraumatic.  Neck:      Comments: Right IJ vascular access noted  Cardiovascular:      Rate and Rhythm: Normal rate and regular rhythm.      Heart sounds: Normal heart sounds.   Pulmonary:      Effort: No respiratory distress.      Breath sounds: Normal breath sounds.   Abdominal:      General: Bowel sounds are normal.     Palpations: Abdomen is soft.      Tenderness: There is " abdominal tenderness.   Genitourinary:     Comments: Scrotal edema  Musculoskeletal:         General: No deformity.      Cervical back: Neck supple.      Right lower leg: Edema present.      Left lower leg: Edema present.   Skin:     Findings: No erythema or rash.   Neurological:      Mental Status: He is alert.    Imaging  11/7/22 NM VQ scan  Impression:       This represents a low probability pulmonary embolism     Decreased ventilation in the right lung with respect to the left most likely related to the airspace disease..      11/6/22 CT Chest, Abdomen/Pelvis with  Impression:       1. Diffuse irregular ground-glass predominant opacities in the lungs are new compared to prior.  This could relate to an infectious/inflammatory process or pulmonary edema.   2. Similar moderate bilateral pleural effusions with compressive atelectasis.   3. Similar moderate to large volume loculated ascites.   4. Essentially no normal pancreatic tissue is seen.  Similar fluid collection in the expected area of the pancreas.      Lab Review   Recent Results (from the past 24 hour(s))   POCT glucose    Collection Time: 11/07/22  9:56 PM   Result Value Ref Range    POCT Glucose 64 (L) 70 - 110 mg/dL   Comprehensive Metabolic Panel    Collection Time: 11/08/22  3:43 AM   Result Value Ref Range    Sodium Level 128 (L) 136 - 145 mmol/L    Potassium Level 3.9 3.5 - 5.1 mmol/L    Chloride 95 (L) 98 - 107 mmol/L    Carbon Dioxide 26 22 - 29 mmol/L    Glucose Level 71 (L) 74 - 100 mg/dL    Blood Urea Nitrogen 49.7 (H) 8.9 - 20.6 mg/dL    Creatinine 1.43 (H) 0.73 - 1.18 mg/dL    Calcium Level Total 7.9 (L) 8.4 - 10.2 mg/dL    Protein Total 4.6 (L) 6.4 - 8.3 gm/dL    Albumin Level 1.7 (L) 3.5 - 5.0 gm/dL    Globulin 2.9 2.4 - 3.5 gm/dL    Albumin/Globulin Ratio 0.6 (L) 1.1 - 2.0 ratio    Bilirubin Total 1.0 <=1.5 mg/dL    Alkaline Phosphatase 177 (H) 40 - 150 unit/L    Alanine Aminotransferase 8 0 - 55 unit/L    Aspartate Aminotransferase 33 5 -  34 unit/L    eGFR >60 mls/min/1.73/m2   Protime-INR    Collection Time: 11/08/22  3:43 AM   Result Value Ref Range    PT 24.2 (H) 12.5 - 14.5 seconds    INR 2.22 (H) 0.00 - 1.30   CBC with Differential    Collection Time: 11/08/22  3:43 AM   Result Value Ref Range    WBC 18.6 (H) 4.5 - 11.5 x10(3)/mcL    RBC 3.08 (L) 4.70 - 6.10 x10(6)/mcL    Hgb 8.5 (L) 14.0 - 18.0 gm/dL    Hct 26.6 (L) 42.0 - 52.0 %    MCV 86.4 80.0 - 94.0 fL    MCH 27.6 27.0 - 31.0 pg    MCHC 32.0 (L) 33.0 - 36.0 mg/dL    RDW 15.9 11.5 - 17.0 %    Platelet 279 130 - 400 x10(3)/mcL    MPV 11.1 (H) 7.4 - 10.4 fL    Neut % 89.8 %    Lymph % 5.2 %    Mono % 3.7 %    Eos % 0.2 %    Basophil % 0.1 %    Lymph # 0.96 0.6 - 4.6 x10(3)/mcL    Neut # 16.7 (H) 2.1 - 9.2 x10(3)/mcL    Mono # 0.68 0.1 - 1.3 x10(3)/mcL    Eos # 0.03 0 - 0.9 x10(3)/mcL    Baso # 0.02 0 - 0.2 x10(3)/mcL    IG# 0.19 (H) 0 - 0.04 x10(3)/mcL    IG% 1.0 %    NRBC% 0.0 %   POCT glucose    Collection Time: 11/08/22  5:30 AM   Result Value Ref Range    POCT Glucose 72 70 - 110 mg/dL   POCT glucose    Collection Time: 11/08/22  8:42 AM   Result Value Ref Range    POCT Glucose 62 (L) 70 - 110 mg/dL   POCT glucose    Collection Time: 11/08/22 10:59 AM   Result Value Ref Range    POCT Glucose 55 (L) 70 - 110 mg/dL   POCT glucose    Collection Time: 11/08/22  1:22 PM   Result Value Ref Range    POCT Glucose 78 70 - 110 mg/dL   POCT glucose    Collection Time: 11/08/22  4:48 PM   Result Value Ref Range    POCT Glucose 147 (H) 70 - 110 mg/dL     Assessment/Plan:  1. Sepsis syndrome with severe leukocytosis  2.  Intra-abdominal infection/peritonitis/abscesses-Klebsiella  3.  Severe necrotizing pancreatitis  4.  Acute kidney injury on hemodialysis  5.  Abdominal compartment syndrome  6.  Anemia  7.  Protein calorie malnutrition  8.  History of hepatitis-B   9. Thoracic aortic dissection  10. B/L pneumonitis/pleural effusions/pulmonary edema     -Continue Merrem #13 and Diflucan #4. Doxycycline  #4 was added by primary  -No fevers and with leukocytosis trending down, on steroids, follow  -11/5 SARS-CoV-2 PCR (-), Influenza A/B Ag (-)  -11/5 Blood cultures negative thus far  -S/p CT abscess aspiration, with free fluid noted on the right and thick gelatinous loculated fluid on the left, cultures with Klebsiella  -11/2 chest x-ray with increasing right opacity and left effusion  -11/3 Procalcitonin level 0.68 from 1.6  -Hemodialysis per Nephrology  -MRSA PCR (-)  -Seen by GI, pulmonology, and surgery with inputs noted  -Hepatitis B evaluation and management to continue as outpatient  -Discussed with patient and nursing staff

## 2022-11-09 NOTE — PROGRESS NOTES
[facility]  HOSPITAL MEDICINE  PROGRESS NOTE        CHIEF COMPLAINT   Hospital follow up    HOSPITAL COURSE   Stuart Guevara is a 32 y.o. male who has PMH which includes HTN, SLE,  hepatitis B, type A aortic dissection with repair + mechanical aortic valve placement 2/2021 on coumadin, RUE DVT s/p Xarelto, class V lupus nephritis; presented to the ED at [facility] on 9/23/2022  with reports of  shortness of breath and chest pain radiating to his back. Work up revealed a type B aortic dissection and significant hypertension noted on presentation.  He failed medical management and had progression of his symptoms despite adequate control of hemodynamic parameters.  PT was transferred from [facility] to [facility] for CV surgery evaluation which he was taken  to the OR and had TEVAR with left carotid to subclavian artery bypass on 9/23.   Later Patient developed abdominal distension and rigidity of his abdomen and developed a lactic acidosis.  He had a repeat CT angiography on 9/25 of the chest/abdomen/pelvis without evidence of worsening dissection, but evidence of significant intra-abdominal pathology with some hemoperitoneum, signs of hypoperfusion of the abdominal organs (compartment syndrome)  free blood in the abdomen. He underwent exploratory laparotomy on 9/25 with evidence of viable bowel and no significant bleeding identified, abd was closed with flakito drain left in situ chest tube was placed. Had repeat ex-laps on 9/27, 9/29. Postoperatively, he developed severe unstable bradycardia, and a transvenous pacemaker was temporarily placed by Cardiology, with the bradycardia resolving within he next couple days.  Shortly afterwards, he was noted to have significant worsening of his renal failure and critical hyperkalemia. Renal services consulted and HD catheter was placed and he was started on HD treatments. Patient was cleared for transfer out of ICU to the floor.  He started having worsening abdominal  pain. He was seen by rheumatology for possible lupus flare up and serositis. He was started on steroids and other immunomodulating drugs. Drugs have since been discontinued due to concern of infection.  Repeat CT showed multiple loculated fluid collections, necrotizing pancreatitis, possible peritonitis. Surgery team is following with plan for IR drainage, which was performed October 28.  Body fluid culture grew out Klebsiella.  Surgical hospitalist took back to the OR October 30th due to continued pain in peritonitis with positive cultures, only found about 3 L of venita ascites, no other sign of infection, necrotic tissue, bowel issues or hematoma.    Today  Seen this morning sitting up in the chair and on 10 L Oxymizer.  Should be able to wean down a little bit.  He states that he is eating almost all of his meals.  Discussed with nurse as well.        OBJECTIVE/PHYSICAL EXAM     VITAL SIGNS (MOST RECENT):  Temp: 98.2 °F (36.8 °C) (11/09/22 0700)  Pulse: 79 (11/09/22 0700)  Resp: 20 (11/09/22 1020)  BP: 132/79 (11/09/22 0700)  SpO2: 97 % (11/09/22 0825)   VITAL SIGNS (24 HOUR RANGE):  Temp:  [97.6 °F (36.4 °C)-98.6 °F (37 °C)] 98.2 °F (36.8 °C)  Pulse:  [78-82] 79  Resp:  [16-20] 20  SpO2:  [96 %-100 %] 97 %  BP: (127-133)/(65-79) 132/79   GENERAL: In no acute distress, afebrile, malnourished weight loss  HEENT:  CHEST: Clear to auscultation bilaterally  HEART: S1, S2, no appreciable murmur  ABDOMEN: diffuse tenderness more so on the right side lower, slightly distended, BS +, significant gravity dependent edema in the pelvis  MSK: Warm, 2+ pitting edema both legs, no clubbing or cyanosis  NEUROLOGIC: Alert and oriented x4, both legs 2/5 strength, 5/5 upper extremity  INTEGUMENTARY:  Anasarca  PSYCHIATRY:        ASSESSMENT/PLAN   Severe necrotizing pancreatitis with loculated intra-abdominal fluid collection-status post IR drainage October 28  Intra-abdominal abscess versus infected necrotizing  pancreatitis  Hospital acquired pneumonia-right-sided  Sepsis  Acute respiratory failure with hypoxia  Pulmonary edema  Severely edematous/anasarca  Acute kidney injury ATN  Critical illness myopathy  Severe protein caloric malnutrition     History of:  Type A aortic dissection status post repair with mechanical aortic valve conduit, recent type B aortic dissection status post TAVR, lupus nephritis class 5, mixed connective tissue disorder predominant lupus, transverse myelitis recovered        General surgery following.  Remove staples 13th if not too tight.  Gastroenterology signed off.    Nephrology following.  I will order for 1 dose metolazone and torsemide for additional fluid removal.  May need Lasix albumin drip.  ID following.  Continue meropenem, fluconazole, doxycycline.  Pending end date.  Rheumatology following.  Continue Solu-Medrol 40 daily., all other immunosuppressant discontinued.    Glucose control with Levemir and sliding scale.  Continue antihypertensives and adjust as needed.    Continue Coumadin.  Continue to monitor INR.  Goal of 2 to 3.  Patient remains critically ill and condition remains guarded.  Many poor prognostic factors playing a role here.     Critical care diagnosis: All of the above  Critical care time spent:  35 minutes    Anticipated discharge and disposition:   __________________________________________________________________________    LABS/MICRO/MEDS/DIAGNOSTICS       LABS  Recent Labs     11/09/22 0419   *   K 4.9   CHLORIDE 95*   CO2 23   BUN 69.7*   CREATININE 1.91*   GLUCOSE 181*   CALCIUM 8.1*   ALKPHOS 209*   AST 36*   ALT 11   ALBUMIN 1.7*     Recent Labs     11/09/22 0419   WBC 18.7*   RBC 3.01*   HCT 26.4*   MCV 87.7          MICROBIOLOGY  Microbiology Results (last 7 days)       Procedure Component Value Units Date/Time    Blood Culture [[id number]]  (Normal) Collected: 11/05/22 0549    Order Status: Completed Specimen: Blood Updated: 11/09/22 1000      CULTURE, BLOOD ([facility]) No Growth At 96 Hours    Blood Culture [[id number]]  (Normal) Collected: 11/05/22 0549    Order Status: Completed Specimen: Blood Updated: 11/09/22 1000     CULTURE, BLOOD ([facility]) No Growth At 96 Hours    Rapid Influenza A/B [[id number]] Collected: 11/05/22 0612    Order Status: Canceled Specimen: Nasopharyngeal from Nasal Swab Updated: 11/05/22 0616    Body Fluid Culture [[id number]] Collected: 10/28/22 0940    Order Status: Completed Specimen: Peritoneal Fluid from Abdomen Updated: 11/02/22 1130     Body Fluid Culture Final Report: At 5 days. No growth            MEDICATIONS   aspirin  81 mg Oral Daily    carvediloL  25 mg Oral BID    doxycycline (VIBRAMYCIN) IVPB  100 mg Intravenous Q12H    fluconazole (DIFLUCAN) IV (PEDS and ADULTS)  400 mg Intravenous Every Tues, Thurs, Sat    gabapentin  100 mg Oral TID    insulin detemir U-100  15 Units Subcutaneous BID    lactulose 10 gram/15 ml  10 g Oral BID    meropenem (MERREM) IVPB  500 mg Intravenous Q24H    methylPREDNISolone sodium succinate injection  40 mg Intravenous Daily    morphine  30 mg Oral Q12H    pantoprazole  40 mg Intravenous Daily    sodium bicarbonate  1,300 mg Oral Daily    sodium chloride 0.9%  10 mL Intravenous Q6H    warfarin  2.5 mg Oral Daily    zinc oxide-cod liver oil   Topical (Top) BID      INFUSIONS        DIAGNOSTIC TESTS  X-Ray Chest 1 View   Final Result      No adverse interval change.         Electronically signed by: Jakob Boland   Date:    11/09/2022   Time:    09:09      NM Lung Scan Ventilation Perfusion   Final Result      This represents a low probability pulmonary embolism      Decreased ventilation in the right lung with respect to the left most likely related to the airspace disease..         Electronically signed by: Too Reyes   Date:    11/07/2022   Time:    16:08      CT Chest Abdomen Pelvis With Contrast (xpd)   Final Result      1. Diffuse irregular ground-glass predominant opacities in the  lungs are new compared to prior.  This could relate to an infectious/inflammatory process or pulmonary edema.   2. Similar moderate bilateral pleural effusions with compressive atelectasis.   3. Similar moderate to large volume loculated ascites.   4. Essentially no normal pancreatic tissue is seen.  Similar fluid collection in the expected area of the pancreas.         Electronically signed by: Kapil Ayers   Date:    11/06/2022   Time:    11:45      X-Ray Chest 1 View   Final Result      No significant change         Electronically signed by: Ramón Ying   Date:    11/06/2022   Time:    09:52      X-Ray Chest 1 View   Final Result      Worsening of confluent airspace opacities mostly in both perihilar regions and upper lobes.      Otherwise no other change in distribution.      No other significant change         Electronically signed by: Ramón Ying   Date:    11/05/2022   Time:    07:55      US Abdomen Limited_for_Ascites   Final Result      Small volume ascites.         Electronically signed by: Kapil Ayers   Date:    11/04/2022   Time:    18:39      X-Ray Chest 1 View   Final Result      No adverse interval change.         Electronically signed by: Jakob Boland   Date:    11/02/2022   Time:    13:48      X-Ray Chest 1 View   Final Result      Postsurgical changes again noted with increasing right lung opacification in left pleural effusion.  Developing infectious process is not excluded.         Electronically signed by: Jakob Boland   Date:    11/02/2022   Time:    08:52      X-Ray Abdomen Flat And Erect   Final Result      Postsurgical changes. NG tube projects over the right upper quadrant in the expected location of the stomach.         Electronically signed by: Jakob Boland   Date:    11/02/2022   Time:    08:39      X-Ray Abdomen AP 1 View   Final Result      As above.         Electronically signed by: Jakob Boland   Date:    10/31/2022   Time:    07:36      CTA Chest Abdomen Pelvis    Final Result   Impression:      1.  An aortic stent is again noted.  Caudal extension of the [place] type B aortic dissection 37.1 mm from the inferior margin of the graft is noted (Series 18, Image 55). Dilatation of the descending thoracic aorta is again noted, measuring 41.3 mm at its widest. The distal descending segment measures 23.4 mm at the diaphragmatic hiatus. The origin of the left subclavian, left common carotid and right brachiocephalic arteries are unremarkable.      2. Mild diffuse interlobular interstitial septal thickening in both lungs may be present. An element of congestive failure is not excluded. There is dense opacity at the dependent lung bases consistent with compressive atelectasis with the possibility of an infectious component not entirely excluded. Correlate clinically.      3. The IVC is nonenhanced and appears narrowed.      4. There is a pronounced ascites which is new compared to the prior study. Mild layering hyperdense fluid pelvic fluid is seen, likely reflecting hemoperitoneum. There is a large heterogeneous, predominantly hyperdense mass/collection along the left paracolic gutter and pelvic sidewall (Series 14 Image 90 and Series 22, Image 46), measuring 97 x 62 x 170 mm (AP, transverse, craniocaudal dimensions). This may reflect hematoma formation. No focal contrast collection or pooling is identified to suggest active hemorrhage at the time of imaging. There is a possible left rectus sheath hematoma seen on image 116 of series 14. Correlate with clinical and laboratory findings as regards additional evaluation and follow-up.      5.  Persistence of bilateral pleural effusions which are moderate-sized. Follow-up as clinically indicated.      6. Details and other findings as discussed above.      No significant discrepancy with overnight report.         Electronically signed by: Jameson Brumfield   Date:    10/30/2022   Time:    09:57      CT Abscess Aspiration without Catheter    Final Result      Successful aspiration of bilateral fluid with simple free fluid identified on the right and thick gelatinous loculated fluid identified in the left.  All fluid sent for laboratory analysis and clearly labeled.         Electronically signed by: Jakob Boland   Date:    10/31/2022   Time:    07:39      US Guided Needle Placement   Final Result      Successful aspiration of bilateral fluid with simple free fluid identified on the right and thick gelatinous loculated fluid identified in the left.  All fluid sent for laboratory analysis and clearly labeled.         Electronically signed by: Jakob Boland   Date:    10/31/2022   Time:    07:39      US Scrotum And Testicles   Final Result      CT Abdomen Pelvis With Contrast   Final Result      1. No normal pancreatic parenchyma is seen.  At the level of the pancreas there is a large fluid collection with a few foci of gas.  Loculated, faintly peripherally enhancing fluid collections extend along the pericolic gutters.  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  Foci of gas are nonspecific in the setting of prior intervention.   2. Extensive loculated peritoneal collections with peripheral enhancement compatible with peritonitis.Left inguinal hernia contains ascitic fluid with some peripheral enhancement similar to the peritoneal fluid.  There is no herniated bowel loop   3. There is a moderate colonic stool burden.  There is some mass effect on the bowel loops due to the loculated nature of the abdominal fluid collections.   4. Anasarca   5. Partially imaged known thoracic aortic dissection status post intervention.         Electronically signed by: Rocio White   Date:    10/26/2022   Time:    12:37      X-Ray Chest 1 View for Line/Tube Placement   Final Result      Right PICC tip overlies the mid SVC.         Electronically signed by: Kapil Ayers   Date:    10/25/2022   Time:    16:56       CT Abdomen Pelvis  Without Contrast   Final Result      Bilateral pleural effusions with moderate ascites and diffuse anasarca.  The epigastric and left mid abdominal ascites appears loculated and peritonitis cannot be excluded.         Electronically signed by: Grabiel Singleton MD   Date:    10/24/2022   Time:    23:30      X-Ray Abdomen AP 1 View   Final Result      Residual feces         Electronically signed by: Ramón Ying   Date:    10/24/2022   Time:    09:46      X-Ray Chest 1 View   Final Result      No significant interval change.         Electronically signed by: Kapil Ayers   Date:    10/22/2022   Time:    15:13      X-Ray Chest 1 View   Final Result      Persistent increased left retrocardiac density and silhouetting of the left hemidiaphragm.      No other focal consolidative changes.      No other change         Electronically signed by: Ramón Ying   Date:    10/12/2022   Time:    08:15      Fl Modified Barium Swallow Speech   Final Result      X-Ray Chest 1 View   Final Result      Unchanged retrocardiac opacity suggesting atelectasis         Electronically signed by: Rocio White   Date:    10/05/2022   Time:    06:11      X-Ray Chest 1 View   Final Result      New central venous catheter terminates within the proximal right atrium.         Electronically signed by: Jameson Brumfield   Date:    10/04/2022   Time:    14:16      X-Ray Chest 1 View   Final Result      No significant interval change.         Electronically signed by: Kapil Ayers   Date:    10/04/2022   Time:    06:54      CT Chest Abdomen Pelvis Without Contrast (XPD)   Final Result      Very limited study due the lack of IV contrast.      Anasarca.      Small amount of ascites.      Small left-sided pleural effusion with bibasilar atelectasis versus infiltrate.         Electronically signed by: Joey Vidal MD   Date:    10/03/2022   Time:    21:30      X-Ray Chest 1 View   Final Result      As above.         Electronically  signed by: Jameson Brumfield   Date:    10/03/2022   Time:    07:09      X-Ray Chest 1 View   Final Result      As above.         Electronically signed by: Jakob Boland   Date:    10/02/2022   Time:    14:50      X-ray Abdomen for NG Tube Placement (Nursing should notify Radiology after placement)   Final Result      Optimal placement of the nasogastric tube.         Electronically signed by: Jameson Brumfield   Date:    10/01/2022   Time:    11:48      X-Ray Chest 1 View   Final Result      No significant change         Electronically signed by: Ramón Ying   Date:    10/01/2022   Time:    08:05      X-Ray Chest 1 View   Final Result      No acute findings or significant interval change.         Electronically signed by: Kapil Ayers   Date:    09/30/2022   Time:    07:35      X-Ray Chest 1 View   Final Result      Stable exam without significant interval change.         Electronically signed by: Claribel Deal   Date:    09/29/2022   Time:    16:14      X-Ray Chest 1 View   Final Result      Possible mild left perihilar/lower lung atelectasis.  Suspect tiny right apical pneumothorax.         Electronically signed by: Darius Rosario   Date:    09/29/2022   Time:    06:09      X-Ray Chest 1 View   Final Result      No significant interval change.         Electronically signed by: Jameson Brumfield   Date:    09/28/2022   Time:    16:25      X-Ray Chest 1 View   Final Result      Similar appearance to prior exam with retrocardiac atelectasis         Electronically signed by: Rocio White   Date:    09/28/2022   Time:    06:05      X-Ray Chest 1 View   Final Result      No significant change         Electronically signed by: Ramón Ying   Date:    09/27/2022   Time:    08:47      X-Ray Abdomen AP 1 View   Final Result      Satisfactory central femoral line placement.         Electronically signed by: Emily Candelario MD   Date:    09/26/2022   Time:    13:54      X-Ray Chest 1 View   Final Result      Interval  retraction of endotracheal tube, satisfactory position.  Cardiac lead placement in the interval.  No other significant change.         Electronically signed by: Emily Candelario MD   Date:    09/26/2022   Time:    08:00      X-Ray Chest 1 View   Final Result      Increased volume status      Interval intubation with endotracheal tube at the josie.  I recommend retracting      Right-sided chest tube in good position         Electronically signed by: Too Reyes   Date:    09/25/2022   Time:    15:49      X-Ray Abdomen AP 1 View   Final Result      There is slight increased density of the kidneys of questionable etiology and or significance.      Otherwise nonspecific gas pattern         Electronically signed by: Ramón Ying   Date:    09/25/2022   Time:    10:51      CTA Chest Abdomen Non Coronary   Final Result      1. Thoracic aortic endograft placement since 09/23/2022 with the dissection flap extending about 3 cm inferior to the distal portion of the graft.  Opacification of the false lumen along the mid and distal portions of the graft.   2. Moderate volume hemoperitoneum.  Changes involving the liver, spleen, pancreas and bowel are suggestive of overall hypoperfusion.  Hyperdense kidneys suggest acute kidney injury.   3. Small to moderate bilateral pleural effusions, larger on the right.   Findings discussed with Mr. Guevara ICU nurse, [place] at 08:35 hours on 09/25/2022.         Electronically signed by: Darius Rosario   Date:    09/25/2022   Time:    08:42      X-Ray Chest 1 View   Final Result      Changes suggestive of right-sided pleural effusion.      Increased left retrocardiac density and silhouetting of the left hemidiaphragm which might be related to an infiltrate/atelectasis.      Interval placement of thoracic endograft.      No focal consolidative changes         Electronically signed by: Ramón Ying   Date:    09/25/2022   Time:    09:10      IR Abdominal Aortobifemoral    Final Result      Fluoroscopic assistance provided as above.         Electronically signed by: Kapil Ayers   Date:    09/26/2022   Time:    09:56               All diagnosis and differential diagnosis have been reviewed; assessment and plan has been documented. I have personally reviewed the labs and test results that are presently available; I have reviewed the patients medication list. I have reviewed the consulting providers response and recommendations. I have reviewed or attempted to review medical records based upon their availability.  All of the patient's questions have been addressed and answered. Patient's is agreeable to the above stated plan. I will continue to monitor closely and make adjustments to medical management as needed.  This document was created using Locally*Modal Fluency Direct.  Transcription errors may have been made.  Please contact me if any questions may rise regarding documentation to clarify verbiage.        Luis F Hutson MD   11/09/2022   Internal Medicine

## 2022-11-09 NOTE — NURSING
11/09/22 1732   Post-Hemodialysis Assessment   Blood Volume Processed (Liters) 62.5 L   Dialyzer Clearance Lightly streaked   Duration of Treatment 210 minutes   Total UF (mL) 3500 mL   Patient Response to Treatment pt tolerated hd well with 25 gm of albumin given during treatment

## 2022-11-09 NOTE — PT/OT/SLP PROGRESS
Physical Therapy         Treatment        Stuart Guevara   MRN: [id number]     PT Received On: 11/09/22  PT Start Time: 0940     PT Stop Time: 1003    PT Total Time (min): 23 min       Billable Minutes:  Therapeutic Activity 23  Total Minutes: 23    Treatment Type: Treatment  PT/PTA: PTA     PTA Visit Number: 4       General Precautions: Standard, fall  Orthopedic Precautions: Orthopedic Precautions : N/A   Braces:      Spiritual, Cultural Beliefs, Islam Practices, Values that Affect Care: no    Subjective:  Communicated with NSG prior to session.    Pain/Comfort  Location - Side 1: Bilateral  Location 1: leg  Pain Addressed 1: Reposition, Distraction    Objective:  Patient found in bed with HOB elevated, with Patient found with: pulse ox (continuous), oxygen    O2: oxymizer 10L  98-94%      Functional Mobility:  Bed Mobility:   Supine to sit: Moderate Assistance   Sit to supine: Activity did not occur   Rolling: Activity did not occur   Scooting: Standby Assistance. Pt able to perform 4 scooting trials towards HOB and 2 more to position his hip in preparation for T/F. Pt required increased time to complete.     Balance:   Static Sit: FAIR: Maintains without assist, but unable to take any challenges   Dynamic Sit:  FAIR+: Maintains balance through MINIMAL excursions of active trunk motion    Transfer Training:  Bed <> Chair:  Squat Pivot with Maximum Assistance with No Assistive Device . Pt T/F EOB>bedside chair maxAx2.       Additional Treatment:    Activity Tolerance:  Patient limited by fatigue and Patient limited by pain. Pt having pain with all mobility. Rest breaks given to assist pt in recovering.     Patient left up in chair with all lines intact and call button in reach.    Assessment:  Stuart Guevara is a 32 y.o. male with a medical diagnosis of Aortic dissection. He presents with decreased safety awareness and remains a fall risk.   Pt would benefit from further rehab therapy services to increase  overall independence level and assist in getting pt closer to PLOF.      Pt left UIC to sit and OT to assist pt B2B.    Rehab potential is good.    Activity tolerance: Good    Discharge recommendations: Discharge Facility/Level of Care Needs: LTACH (long-term acute care hospital), nursing facility, skilled     Equipment recommendations: Equipment Needed After Discharge: walker, leonel, bedside commode     GOALS:   Multidisciplinary Problems       Physical Therapy Goals          Problem: Physical Therapy    Goal Priority Disciplines Outcome Goal Variances Interventions   Physical Therapy Goal     PT, PT/OT Ongoing, Progressing     Description: Goals to be met by: 22     Patient will increase functional independence with mobility by performin. Supine to sit with brandon  2. Sit to supine with brandon  3. Sit <> stand with CGA using RW/LRAD  4. Bed to chair with CGA via squat pivot/stand pivot  5.Ambulate 200 ft with rolling walker and contact guard assistance                           PLAN:    Patient to be seen 6 x/week  to address the above listed problems via gait training, therapeutic activities, therapeutic exercises  Plan of Care expires: 22  Plan of Care reviewed with: patient         2022

## 2022-11-09 NOTE — PLAN OF CARE
Problem: Adult Inpatient Plan of Care  Goal: Plan of Care Review  Outcome: Ongoing, Progressing  Flowsheets (Taken 11/9/2022 1401)  Plan of Care Reviewed With: patient  Goal: Absence of Hospital-Acquired Illness or Injury  Outcome: Ongoing, Progressing  Intervention: Prevent Skin Injury  Flowsheets (Taken 11/9/2022 1401)  Skin Protection:   skin-to-device areas padded   tubing/devices free from skin contact   transparent dressing maintained   adhesive use limited  Intervention: Prevent and Manage VTE (Venous Thromboembolism) Risk  Flowsheets (Taken 11/9/2022 1401)  Activity Management: Rolling - L1  VTE Prevention/Management:   remove, assess skin, and reapply sequential compression device   bleeding risk assessed  Range of Motion: active ROM (range of motion) encouraged  Goal: Optimal Comfort and Wellbeing  Outcome: Ongoing, Progressing  Intervention: Monitor Pain and Promote Comfort  Flowsheets (Taken 11/9/2022 1401)  Pain Management Interventions:   quiet environment facilitated   relaxation techniques promoted   pillow support provided     Problem: Infection  Goal: Absence of Infection Signs and Symptoms  Outcome: Ongoing, Progressing  Intervention: Prevent or Manage Infection  Flowsheets (Taken 11/9/2022 1401)  Fever Reduction/Comfort Measures:   lightweight bedding   lightweight clothing  Infection Management: aseptic technique maintained  Isolation Precautions: protective     Problem: Skin Injury Risk Increased  Goal: Skin Health and Integrity  Outcome: Ongoing, Progressing  Intervention: Optimize Skin Protection  Flowsheets (Taken 11/9/2022 1401)  Pressure Reduction Techniques:   frequent weight shift encouraged   weight shift assistance provided  Pressure Reduction Devices:   foam padding utilized   specialty bed utilized  Skin Protection:   skin-to-device areas padded   tubing/devices free from skin contact   transparent dressing maintained   adhesive use limited  Head of Bed (HOB) Positioning: HOB at  30-45 degrees     Problem: Fall Injury Risk  Goal: Absence of Fall and Fall-Related Injury  Outcome: Ongoing, Progressing  Intervention: Identify and Manage Contributors  Flowsheets (Taken 11/9/2022 1401)  Self-Care Promotion:   independence encouraged   safe use of adaptive equipment encouraged   BADL personal objects within reach  Medication Review/Management: medications reviewed     Problem: Infection (Hemodialysis)  Goal: Absence of Infection Signs and Symptoms  Outcome: Ongoing, Progressing     Problem: Hyperglycemia  Goal: Blood Glucose Level Within Targeted Range  Outcome: Ongoing, Progressing  Intervention: Optimize Glycemic Control  Flowsheets (Taken 11/9/2022 1401)  Glycemic Management: blood glucose monitored     Problem: Impaired Wound Healing  Goal: Optimal Wound Healing  Outcome: Ongoing, Progressing  Intervention: Promote Wound Healing  Flowsheets (Taken 11/9/2022 1401)  Oral Nutrition Promotion:   safe use of adaptive equipment encouraged   rest periods promoted  Activity Management: Rolling - L1  Pain Management Interventions:   quiet environment facilitated   relaxation techniques promoted   pillow support provided

## 2022-11-09 NOTE — PT/OT/SLP PROGRESS
"Occupational Therapy  Treatment    Stuart Guevara   MRN: [id number]   Admitting Diagnosis: Aortic dissection    OT Date of Treatment: 11/09/22   OT Start Time: 1111  OT Stop Time: 1123  OT Total Time (min): 12 min    Billable Minutes:  Therapeutic Activity 12    OT/ADONAY: OT     ADONAY Visit Number: 3    General Precautions: Standard, fall  Orthopedic Precautions:    Braces:    Respiratory Status:  95% sat on 9L oximizer         Subjective:  Communicated with nurse prior to session.  Pt stated to PT he would sit up 30 min at recliner chair, OT checked in at 30 min jimena and pt stated willing to tolerate longer, ultimately tolerated just over an hour up to recliner chair.  Pt limited with sitting by c/o gluteal wound pain after an hour.  Pain/Comfort  Pain Rating 1: 5/10  Location 1: gluteal (with noted gluteal wound, bandaged)  Pain Addressed 1: Reposition, Distraction, Cessation of Activity, Pre-medicate for activity  Pain Rating Post-Intervention 1: 3/10    Objective:  Patient found with: pulse ox (continuous), oxygen, peripheral IV     Functional Mobility:  Bed Mobility:  max A sitting EOB to supine, rolling to L side with min A to manage RLE       Transfers: max/total A stand pivot tf recliner chair to EOB        Functional Ambulation: NA      Therapeutic Activities and Exercises:  Transfer training, sitting balance training EOB to build endurance for ADL tasks seated EOB, bed mobility training with improving bed mobility noted; limited by BLE weakness and c/o pain at buttocks.    AM-PAC 6 CLICK ADL   How much help from another person does this patient currently need?   1 = Unable, Total/Dependent Assistance  2 = A lot, Maximum/Moderate Assistance  3 = A little, Minimum/Contact Guard/Supervision  4 = None, Modified [place]/Independent          AM-PAC Raw Score CMS "G-Code Modifier Level of Impairment Assistance   6 % Total / Unable   7 - 8 CM 80 - 100% Maximal Assist   9-13 CL 60 - 80% Moderate Assist   14 " - 19 CK 40 - 60% Moderate Assist   20 - 22 CJ 20 - 40% Minimal Assist   23 CI 1-20% SBA / CGA   24 CH 0% Independent/ Mod I       Patient left left sidelying with all lines intact, call button in reach, and CNA notified    ASSESSMENT:  Stuart Guevara is a 32 y.o. male with a medical diagnosis of Aortic dissection and presents with BLE weakness, significant deconditioning, lack of motivation along with anxiety have been limiting factors for pt's progress with therapy however pt progressed today to tolerating 1 hour up OOB at recliner chair.    Rehab identified problem list/impairments: Rehab identified problem list/impairments: weakness, impaired endurance, impaired self care skills, impaired functional mobility, impaired balance, decreased safety awareness, pain, decreased ROM, impaired fine motor, impaired skin, edema, impaired cardiopulmonary response to activity    Rehab potential is fair.    Activity tolerance: Poor    Discharge recommendations: Discharge Facility/Level of Care Needs: LTACH (long-term acute care hospital), nursing facility, skilled     Barriers to discharge: Barriers to Discharge: Other (Comment) (severity of deficits)    Equipment recommendations:       GOALS:   Multidisciplinary Problems       Occupational Therapy Goals          Problem: Occupational Therapy    Goal Priority Disciplines Outcome Interventions   Occupational Therapy Goal     OT, PT/OT Ongoing, Progressing    Description: Goals to be met by: 11/17/2022  Goals Met and Upgraded/New goals added 10/21/2022  Care Plan Revised due to patient decline in functional status following surgery 11/3/2022    Patient will increase functional independence with ADLs by performing:      UE dressing with Modified [place]. - continue  LB Dressing with Minimal Assistance and dressing AEDs. - continue  Grooming while EOB with Modified [place]. - new goal  Sitting at edge of bed x10 minutes with Modified [place] - new goal  Bathing  from  shower chair/bench with Moderate Assistance. - continue  Toileting from bedside commode with Moderate Assistance for hygiene and clothing management. - new goal  Toilet transfer to bedside commode with Minimum Assistance. - new goal                           Plan:  Patient to be seen 5 x/week to address the above listed problems via self-care/home management, therapeutic activities, therapeutic exercises  Plan of Care expires: 11/17/22  Plan of Care reviewed with: patient         11/09/2022

## 2022-11-10 NOTE — PROGRESS NOTES
Nephrology consult follow up note    HPI:      Stuart Guevara is a 32 y.o. male with TAY following TEVAR for a type B aortic dissection on 09/23/2022.  (patient had had a type A repair via TEVAR in February 2022).    During the type B repair he also required a left carotid to left subclavian artery bypass for subclavian artery occlusion.    Since then he has required several exploratory laparotomies due to concerns for ischemic /necrotizing bowel and abdominal compartment syndrome on 9/25, 9/27 and 9/29. He has been maintained on TTS dialysis schedule via right IJ temp catheter originally placed 2 weeks ago and exchanged on November 2nd over a guidewire by Dr. Melton due to concern for the potential for catheter-related bloodstream infection.    Interval history:     11/7/22 now on BiPAP.  Awake alert oriented.  Responds appropriately.  He reports that he ate some food this morning.  Also nurses report that she gave all his morning medicines to the patient.    11/8/22 he is off BiPAP.  But still requires 100% non-rebreather.  He is able to tolerate oral nutrition according to him.  He says he does make urine.  He also reports that his abdominal pain has eased up quite a bit now.    11/9/22: Last HD was 11/7. He was given Lasix twice yesterday with <1L output. Remains in significant amount of oxygen although lower than prior. He is tolerated solids now. There is some pain to palpation of R hip. He is markedly edematous.     11/10/22: Patient on dialysis now with pain to right hip despite pain medication and repositioning      Objective:       VITAL SIGNS: 24 HR MIN & MAX LAST    Temp  Min: 97.9 °F (36.6 °C)  Max: 101.1 °F (38.4 °C)  (!) 101.1 °F (38.4 °C)        BP  Min: 130/83  Max: 165/80  (!) 165/80     Pulse  Min: 79  Max: 92  92     Resp  Min: 16  Max: 20  20    SpO2  Min: 82 %  Max: 100 %  (!) 93 %      GEN: Chronically ill appearing AAM in NAD, discomfort apparent   CV: RRR without rub or gallop.  PULM:  oxymizer at 3LPM with improved breath sounds now CTA  ABD:     2+ anterior abdominal wall edema noted.  EXT: No cyanosis, 2+ pitting anasarca  SKIN: Warm and dry  PSYCH: Awake, alert and appropriately conversant.   Vascular access: RIJ permcath    Recent Labs   Lab 11/07/22  0533 11/08/22 0343 11/09/22 0419   *   < > 127*   K 3.9   < > 4.9   CO2 24   < > 23   BUN 79.4*   < > 69.7*   CREATININE 1.67*   < > 1.91*   GLUCOSE 107*   < > 181*   CALCIUM 8.3*   < > 8.1*   PHOS 3.3  --   --     < > = values in this interval not displayed.     Recent Labs   Lab 11/09/22 0419   WBC 18.7*   HGB 8.5*   HCT 26.4*            Assessment / Plan:   TAY- nonoliguric.    Improving respiratory status.  Systemic lupus erythematosus with serositis/pancreatitis  Status post multiple abdominal washouts for inflammatory serositis  Status post type B aortic dissection repair 09/23/2022   Hypertension  Protein calorie malnutrition    Plan:  Continue dialysis on TTS schedule   Right hip pain is significant. Consider further imaging especially in light of fever this morning.

## 2022-11-10 NOTE — PLAN OF CARE
Problem: Adult Inpatient Plan of Care  Goal: Plan of Care Review  Outcome: Ongoing, Progressing  Goal: Patient-Specific Goal (Individualized)  Outcome: Ongoing, Progressing  Goal: Absence of Hospital-Acquired Illness or Injury  Outcome: Ongoing, Progressing  Goal: Optimal Comfort and Wellbeing  Outcome: Ongoing, Progressing  Goal: Readiness for Transition of Care  Outcome: Ongoing, Progressing     Problem: Infection  Goal: Absence of Infection Signs and Symptoms  Outcome: Ongoing, Progressing     Problem: Skin Injury Risk Increased  Goal: Skin Health and Integrity  Outcome: Ongoing, Progressing     Problem: Fall Injury Risk  Goal: Absence of Fall and Fall-Related Injury  Outcome: Ongoing, Progressing     Problem: Nutrition Impairment (Mechanical Ventilation, Invasive)  Goal: Optimal Nutrition Delivery  Outcome: Ongoing, Progressing     Problem: Device-Related Complication Risk (Hemodialysis)  Goal: Safe, Effective Therapy Delivery  Outcome: Ongoing, Progressing     Problem: Hemodynamic Instability (Hemodialysis)  Goal: Effective Tissue Perfusion  Outcome: Ongoing, Progressing     Problem: Infection (Hemodialysis)  Goal: Absence of Infection Signs and Symptoms  Outcome: Ongoing, Progressing     Problem: Hyperglycemia  Goal: Blood Glucose Level Within Targeted Range  Outcome: Ongoing, Progressing     Problem: Impaired Wound Healing  Goal: Optimal Wound Healing  Outcome: Ongoing, Progressing

## 2022-11-10 NOTE — PROGRESS NOTES
11/10/22 1129   Post-Hemodialysis Assessment   Blood Volume Processed (Liters) 53.1 L   Dialyzer Clearance Lightly streaked   Duration of Treatment 180 minutes   Additional Fluid Intake (mL) 500 mL   Total UF (mL) 3333 mL   Net Fluid Removal 2833   Patient Response to Treatment Tolerated treatment well, lines flushed and then packed with saline to filling volume with new end caps   Post-Hemodialysis Comments no distress noted

## 2022-11-10 NOTE — PROGRESS NOTES
[facility]  HOSPITAL MEDICINE  PROGRESS NOTE        CHIEF COMPLAINT   Hospital follow up    HOSPITAL COURSE   Stuart Guevara is a 32 y.o. male who has PMH which includes HTN, SLE,  hepatitis B, type A aortic dissection with repair + mechanical aortic valve placement 2/2021 on coumadin, RUE DVT s/p Xarelto, class V lupus nephritis; presented to the ED at [facility] on 9/23/2022  with reports of  shortness of breath and chest pain radiating to his back. Work up revealed a type B aortic dissection and significant hypertension noted on presentation.  He failed medical management and had progression of his symptoms despite adequate control of hemodynamic parameters.  PT was transferred from [facility] to [facility] for CV surgery evaluation which he was taken  to the OR and had TEVAR with left carotid to subclavian artery bypass on 9/23.   Later Patient developed abdominal distension and rigidity of his abdomen and developed a lactic acidosis.  He had a repeat CT angiography on 9/25 of the chest/abdomen/pelvis without evidence of worsening dissection, but evidence of significant intra-abdominal pathology with some hemoperitoneum, signs of hypoperfusion of the abdominal organs (compartment syndrome)  free blood in the abdomen. He underwent exploratory laparotomy on 9/25 with evidence of viable bowel and no significant bleeding identified, abd was closed with flakito drain left in situ chest tube was placed. Had repeat ex-laps on 9/27, 9/29. Postoperatively, he developed severe unstable bradycardia, and a transvenous pacemaker was temporarily placed by Cardiology, with the bradycardia resolving within he next couple days.  Shortly afterwards, he was noted to have significant worsening of his renal failure and critical hyperkalemia. Renal services consulted and HD catheter was placed and he was started on HD treatments. Patient was cleared for transfer out of ICU to the floor.  He started having worsening abdominal  pain. He was seen by rheumatology for possible lupus flare up and serositis. He was started on steroids and other immunomodulating drugs. Drugs have since been discontinued due to concern of infection.  Repeat CT showed multiple loculated fluid collections, necrotizing pancreatitis, possible peritonitis. Surgery team is following with plan for IR drainage, which was performed October 28.  Body fluid culture grew out Klebsiella.  Surgical hospitalist took back to the OR October 30th due to continued pain in peritonitis with positive cultures, only found about 3 L of venita ascites, no other sign of infection, necrotic tissue, bowel issues or hematoma.    Today  Seen and examined this morning.  Yesterday 3-1/2 L removed with dialysis.  Thankfully he will be running again today for additional fluid removal.  He needs plenty of fluid removed.  Pain is improving with fluid removal and oxygen needs coming down as well.        OBJECTIVE/PHYSICAL EXAM     VITAL SIGNS (MOST RECENT):  Temp: (!) 101.1 °F (38.4 °C) (11/10/22 0756)  Pulse: 92 (11/10/22 0756)  Resp: 20 (11/10/22 0756)  BP: (!) 165/80 (11/10/22 0756)  SpO2: (!) 93 % (11/10/22 0756)   VITAL SIGNS (24 HOUR RANGE):  Temp:  [97.9 °F (36.6 °C)-101.1 °F (38.4 °C)] 101.1 °F (38.4 °C)  Pulse:  [79-92] 92  Resp:  [16-20] 20  SpO2:  [82 %-100 %] 93 %  BP: (130-165)/(80-85) 165/80   GENERAL: In no acute distress, afebrile, malnourished weight loss  HEENT:  CHEST: Clear to auscultation bilaterally  HEART: S1, S2, no appreciable murmur  ABDOMEN: diffuse tenderness more so on the right side lower, slightly distended, BS +, significant gravity dependent edema in the pelvis  MSK: Warm, 2+ pitting edema both legs, no clubbing or cyanosis  NEUROLOGIC: Alert and oriented x4, both legs 2/5 strength, 5/5 upper extremity  INTEGUMENTARY:  Anasarca  PSYCHIATRY:        ASSESSMENT/PLAN   Severe necrotizing pancreatitis with loculated intra-abdominal fluid collection-status post IR drainage  October 28  Intra-abdominal abscess versus infected necrotizing pancreatitis  Hospital acquired pneumonia-right-sided  Sepsis  Acute respiratory failure with hypoxia  Pulmonary edema  Severely edematous/anasarca  Acute kidney injury ATN  Critical illness myopathy  Severe protein caloric malnutrition     History of:  Type A aortic dissection status post repair with mechanical aortic valve conduit, recent type B aortic dissection status post TAVR, lupus nephritis class 5, mixed connective tissue disorder predominant lupus, transverse myelitis recovered        General surgery following.  Remove staples 13th if not too tight.  Gastroenterology signed off.    Nephrology following.  Continue aggressive fluid removal with HD.  ID following.  Continue meropenem, fluconazole, doxycycline.  Pending end date.  Rheumatology following.  Continue Solu-Medrol 40 daily., all other immunosuppressant discontinued.    Glucose control with Levemir and sliding scale.  Continue antihypertensives and adjust as needed.    Continue Coumadin.  Continue to monitor INR.  Goal of 2 to 3.  Patient remains critically ill and condition remains guarded.  Many poor prognostic factors playing a role here.     Critical care diagnosis: All of the above  Critical care time spent:  35 minutes    Anticipated discharge and disposition:   __________________________________________________________________________    LABS/MICRO/MEDS/DIAGNOSTICS       LABS  Recent Labs     11/09/22 0419   *   K 4.9   CHLORIDE 95*   CO2 23   BUN 69.7*   CREATININE 1.91*   GLUCOSE 181*   CALCIUM 8.1*   ALKPHOS 209*   AST 36*   ALT 11   ALBUMIN 1.7*     Recent Labs     11/09/22 0419   WBC 18.7*   RBC 3.01*   HCT 26.4*   MCV 87.7          MICROBIOLOGY  Microbiology Results (last 7 days)       Procedure Component Value Units Date/Time    Blood Culture [[id number]]  (Normal) Collected: 11/05/22 0549    Order Status: Completed Specimen: Blood Updated: 11/09/22 1000      CULTURE, BLOOD ([facility]) No Growth At 96 Hours    Blood Culture [[id number]]  (Normal) Collected: 11/05/22 0549    Order Status: Completed Specimen: Blood Updated: 11/09/22 1000     CULTURE, BLOOD ([facility]) No Growth At 96 Hours    Rapid Influenza A/B [[id number]] Collected: 11/05/22 0612    Order Status: Canceled Specimen: Nasopharyngeal from Nasal Swab Updated: 11/05/22 0616            MEDICATIONS   sodium chloride 0.9%   Intravenous Once    aspirin  81 mg Oral Daily    carvediloL  25 mg Oral BID    doxycycline (VIBRAMYCIN) IVPB  100 mg Intravenous Q12H    fluconazole (DIFLUCAN) IV (PEDS and ADULTS)  400 mg Intravenous Every Tues, Thurs, Sat    gabapentin  100 mg Oral TID    insulin detemir U-100  15 Units Subcutaneous BID    lipase-protease-amylase 12,000-38,000-60,000 units  3 capsule Oral TID WM    meropenem (MERREM) IVPB  500 mg Intravenous Q24H    methylPREDNISolone sodium succinate injection  40 mg Intravenous Daily    morphine  30 mg Oral Q12H    naloxegoL  25 mg Oral Daily    senna-docusate 8.6-50 mg  2 tablet Oral BID    sodium bicarbonate  1,300 mg Oral Daily    sodium chloride 0.9%  10 mL Intravenous Q6H    warfarin  2.5 mg Oral Daily    zinc oxide-cod liver oil   Topical (Top) BID      INFUSIONS        DIAGNOSTIC TESTS  X-Ray Chest 1 View   Final Result      No adverse interval change.         Electronically signed by: Jakob Boland   Date:    11/09/2022   Time:    09:09      NM Lung Scan Ventilation Perfusion   Final Result      This represents a low probability pulmonary embolism      Decreased ventilation in the right lung with respect to the left most likely related to the airspace disease..         Electronically signed by: Too Reyes   Date:    11/07/2022   Time:    16:08      CT Chest Abdomen Pelvis With Contrast (xpd)   Final Result      1. Diffuse irregular ground-glass predominant opacities in the lungs are new compared to prior.  This could relate to an infectious/inflammatory process or  pulmonary edema.   2. Similar moderate bilateral pleural effusions with compressive atelectasis.   3. Similar moderate to large volume loculated ascites.   4. Essentially no normal pancreatic tissue is seen.  Similar fluid collection in the expected area of the pancreas.         Electronically signed by: Kapil Ayers   Date:    11/06/2022   Time:    11:45      X-Ray Chest 1 View   Final Result      No significant change         Electronically signed by: Ramón Ying   Date:    11/06/2022   Time:    09:52      X-Ray Chest 1 View   Final Result      Worsening of confluent airspace opacities mostly in both perihilar regions and upper lobes.      Otherwise no other change in distribution.      No other significant change         Electronically signed by: Ramón Ying   Date:    11/05/2022   Time:    07:55      US Abdomen Limited_for_Ascites   Final Result      Small volume ascites.         Electronically signed by: Kapil Ayers   Date:    11/04/2022   Time:    18:39      X-Ray Chest 1 View   Final Result      No adverse interval change.         Electronically signed by: Jakob Boland   Date:    11/02/2022   Time:    13:48      X-Ray Chest 1 View   Final Result      Postsurgical changes again noted with increasing right lung opacification in left pleural effusion.  Developing infectious process is not excluded.         Electronically signed by: Jakob Boland   Date:    11/02/2022   Time:    08:52      X-Ray Abdomen Flat And Erect   Final Result      Postsurgical changes. NG tube projects over the right upper quadrant in the expected location of the stomach.         Electronically signed by: Jakob Boland   Date:    11/02/2022   Time:    08:39      X-Ray Abdomen AP 1 View   Final Result      As above.         Electronically signed by: Jakob Boland   Date:    10/31/2022   Time:    07:36      CTA Chest Abdomen Pelvis   Final Result   Impression:      1.  An aortic stent is again noted.  Caudal extension of  the Jacob type B aortic dissection 37.1 mm from the inferior margin of the graft is noted (Series 18, Image 55). Dilatation of the descending thoracic aorta is again noted, measuring 41.3 mm at its widest. The distal descending segment measures 23.4 mm at the diaphragmatic hiatus. The origin of the left subclavian, left common carotid and right brachiocephalic arteries are unremarkable.      2. Mild diffuse interlobular interstitial septal thickening in both lungs may be present. An element of congestive failure is not excluded. There is dense opacity at the dependent lung bases consistent with compressive atelectasis with the possibility of an infectious component not entirely excluded. Correlate clinically.      3. The IVC is nonenhanced and appears narrowed.      4. There is a pronounced ascites which is new compared to the prior study. Mild layering hyperdense fluid pelvic fluid is seen, likely reflecting hemoperitoneum. There is a large heterogeneous, predominantly hyperdense mass/collection along the left paracolic gutter and pelvic sidewall (Series 14 Image 90 and Series 22, Image 46), measuring 97 x 62 x 170 mm (AP, transverse, craniocaudal dimensions). This may reflect hematoma formation. No focal contrast collection or pooling is identified to suggest active hemorrhage at the time of imaging. There is a possible left rectus sheath hematoma seen on image 116 of series 14. Correlate with clinical and laboratory findings as regards additional evaluation and follow-up.      5.  Persistence of bilateral pleural effusions which are moderate-sized. Follow-up as clinically indicated.      6. Details and other findings as discussed above.      No significant discrepancy with overnight report.         Electronically signed by: Jameson Brumfield   Date:    10/30/2022   Time:    09:57      CT Abscess Aspiration without Catheter   Final Result      Successful aspiration of bilateral fluid with simple free fluid  identified on the right and thick gelatinous loculated fluid identified in the left.  All fluid sent for laboratory analysis and clearly labeled.         Electronically signed by: Jakob Boland   Date:    10/31/2022   Time:    07:39      US Guided Needle Placement   Final Result      Successful aspiration of bilateral fluid with simple free fluid identified on the right and thick gelatinous loculated fluid identified in the left.  All fluid sent for laboratory analysis and clearly labeled.         Electronically signed by: Jakob Boland   Date:    10/31/2022   Time:    07:39      US Scrotum And Testicles   Final Result      CT Abdomen Pelvis With Contrast   Final Result      1. No normal pancreatic parenchyma is seen.  At the level of the pancreas there is a large fluid collection with a few foci of gas.  Loculated, faintly peripherally enhancing fluid collections extend along the pericolic gutters.  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  Foci of gas are nonspecific in the setting of prior intervention.   2. Extensive loculated peritoneal collections with peripheral enhancement compatible with peritonitis.Left inguinal hernia contains ascitic fluid with some peripheral enhancement similar to the peritoneal fluid.  There is no herniated bowel loop   3. There is a moderate colonic stool burden.  There is some mass effect on the bowel loops due to the loculated nature of the abdominal fluid collections.   4. Anasarca   5. Partially imaged known thoracic aortic dissection status post intervention.         Electronically signed by: Rocio White   Date:    10/26/2022   Time:    12:37      X-Ray Chest 1 View for Line/Tube Placement   Final Result      Right PICC tip overlies the mid SVC.         Electronically signed by: Kapil Ayers   Date:    10/25/2022   Time:    16:56      CT Abdomen Pelvis  Without Contrast   Final Result      Bilateral pleural  effusions with moderate ascites and diffuse anasarca.  The epigastric and left mid abdominal ascites appears loculated and peritonitis cannot be excluded.         Electronically signed by: Grabiel Singleton MD   Date:    10/24/2022   Time:    23:30      X-Ray Abdomen AP 1 View   Final Result      Residual feces         Electronically signed by: Ramón Ying   Date:    10/24/2022   Time:    09:46      X-Ray Chest 1 View   Final Result      No significant interval change.         Electronically signed by: Kapil Ayers   Date:    10/22/2022   Time:    15:13      X-Ray Chest 1 View   Final Result      Persistent increased left retrocardiac density and silhouetting of the left hemidiaphragm.      No other focal consolidative changes.      No other change         Electronically signed by: Ramón Ying   Date:    10/12/2022   Time:    08:15      Fl Modified Barium Swallow Speech   Final Result      X-Ray Chest 1 View   Final Result      Unchanged retrocardiac opacity suggesting atelectasis         Electronically signed by: Rocio White   Date:    10/05/2022   Time:    06:11      X-Ray Chest 1 View   Final Result      New central venous catheter terminates within the proximal right atrium.         Electronically signed by: Jameson Brumfield   Date:    10/04/2022   Time:    14:16      X-Ray Chest 1 View   Final Result      No significant interval change.         Electronically signed by: Kapil Ayers   Date:    10/04/2022   Time:    06:54      CT Chest Abdomen Pelvis Without Contrast (XPD)   Final Result      Very limited study due the lack of IV contrast.      Anasarca.      Small amount of ascites.      Small left-sided pleural effusion with bibasilar atelectasis versus infiltrate.         Electronically signed by: Joey Vidal MD   Date:    10/03/2022   Time:    21:30      X-Ray Chest 1 View   Final Result      As above.         Electronically signed by: Jameson Brumfield   Date:    10/03/2022   Time:    07:09      X-Ray  Chest 1 View   Final Result      As above.         Electronically signed by: Jakob Boland   Date:    10/02/2022   Time:    14:50      X-ray Abdomen for NG Tube Placement (Nursing should notify Radiology after placement)   Final Result      Optimal placement of the nasogastric tube.         Electronically signed by: Jameson Brumfield   Date:    10/01/2022   Time:    11:48      X-Ray Chest 1 View   Final Result      No significant change         Electronically signed by: Ramón Ying   Date:    10/01/2022   Time:    08:05      X-Ray Chest 1 View   Final Result      No acute findings or significant interval change.         Electronically signed by: Kapil Ayers   Date:    09/30/2022   Time:    07:35      X-Ray Chest 1 View   Final Result      Stable exam without significant interval change.         Electronically signed by: Claribel Deal   Date:    09/29/2022   Time:    16:14      X-Ray Chest 1 View   Final Result      Possible mild left perihilar/lower lung atelectasis.  Suspect tiny right apical pneumothorax.         Electronically signed by: Darius Rosario   Date:    09/29/2022   Time:    06:09      X-Ray Chest 1 View   Final Result      No significant interval change.         Electronically signed by: Jameson Brumfield   Date:    09/28/2022   Time:    16:25      X-Ray Chest 1 View   Final Result      Similar appearance to prior exam with retrocardiac atelectasis         Electronically signed by: Rocio hWite   Date:    09/28/2022   Time:    06:05      X-Ray Chest 1 View   Final Result      No significant change         Electronically signed by: Ramón Ying   Date:    09/27/2022   Time:    08:47      X-Ray Abdomen AP 1 View   Final Result      Satisfactory central femoral line placement.         Electronically signed by: Emily Candelario MD   Date:    09/26/2022   Time:    13:54      X-Ray Chest 1 View   Final Result      Interval retraction of endotracheal tube, satisfactory position.  Cardiac lead  placement in the interval.  No other significant change.         Electronically signed by: Emily Candelario MD   Date:    09/26/2022   Time:    08:00      X-Ray Chest 1 View   Final Result      Increased volume status      Interval intubation with endotracheal tube at the josie.  I recommend retracting      Right-sided chest tube in good position         Electronically signed by: Too Reyes   Date:    09/25/2022   Time:    15:49      X-Ray Abdomen AP 1 View   Final Result      There is slight increased density of the kidneys of questionable etiology and or significance.      Otherwise nonspecific gas pattern         Electronically signed by: Ramón Ying   Date:    09/25/2022   Time:    10:51      CTA Chest Abdomen Non Coronary   Final Result      1. Thoracic aortic endograft placement since 09/23/2022 with the dissection flap extending about 3 cm inferior to the distal portion of the graft.  Opacification of the false lumen along the mid and distal portions of the graft.   2. Moderate volume hemoperitoneum.  Changes involving the liver, spleen, pancreas and bowel are suggestive of overall hypoperfusion.  Hyperdense kidneys suggest acute kidney injury.   3. Small to moderate bilateral pleural effusions, larger on the right.   Findings discussed with Mr. Guevara ICU nurseDulce at 08:35 hours on 09/25/2022.         Electronically signed by: Darius Rosario   Date:    09/25/2022   Time:    08:42      X-Ray Chest 1 View   Final Result      Changes suggestive of right-sided pleural effusion.      Increased left retrocardiac density and silhouetting of the left hemidiaphragm which might be related to an infiltrate/atelectasis.      Interval placement of thoracic endograft.      No focal consolidative changes         Electronically signed by: Ramón Ying   Date:    09/25/2022   Time:    09:10      IR Abdominal Aortobifemoral   Final Result      Fluoroscopic assistance provided as above.          Electronically signed by: Kapil Ayers   Date:    09/26/2022   Time:    09:56               All diagnosis and differential diagnosis have been reviewed; assessment and plan has been documented. I have personally reviewed the labs and test results that are presently available; I have reviewed the patients medication list. I have reviewed the consulting providers response and recommendations. I have reviewed or attempted to review medical records based upon their availability.  All of the patient's questions have been addressed and answered. Patient's is agreeable to the above stated plan. I will continue to monitor closely and make adjustments to medical management as needed.  This document was created using [employer] Fluency Direct.  Transcription errors may have been made.  Please contact me if any questions may rise regarding documentation to clarify verbiage.        Luis F Hutson MD   11/10/2022   Internal Medicine

## 2022-11-11 NOTE — PROGRESS NOTES
[facility]  HOSPITAL MEDICINE  PROGRESS NOTE        CHIEF COMPLAINT   Hospital follow up    HOSPITAL COURSE   Stuart Guevara is a 32 y.o. male who has PMH which includes HTN, SLE,  hepatitis B, type A aortic dissection with repair + mechanical aortic valve placement 2/2021 on coumadin, RUE DVT s/p Xarelto, class V lupus nephritis; presented to the ED at [facility] on 9/23/2022  with reports of  shortness of breath and chest pain radiating to his back. Work up revealed a type B aortic dissection and significant hypertension noted on presentation.  He failed medical management and had progression of his symptoms despite adequate control of hemodynamic parameters.  PT was transferred from [facility] to [facility] for CV surgery evaluation which he was taken  to the OR and had TEVAR with left carotid to subclavian artery bypass on 9/23.   Later Patient developed abdominal distension and rigidity of his abdomen and developed a lactic acidosis.  He had a repeat CT angiography on 9/25 of the chest/abdomen/pelvis without evidence of worsening dissection, but evidence of significant intra-abdominal pathology with some hemoperitoneum, signs of hypoperfusion of the abdominal organs (compartment syndrome)  free blood in the abdomen. He underwent exploratory laparotomy on 9/25 with evidence of viable bowel and no significant bleeding identified, abd was closed with flakito drain left in situ chest tube was placed. Had repeat ex-laps on 9/27, 9/29. Postoperatively, he developed severe unstable bradycardia, and a transvenous pacemaker was temporarily placed by Cardiology, with the bradycardia resolving within he next couple days.  Shortly afterwards, he was noted to have significant worsening of his renal failure and critical hyperkalemia. Renal services consulted and HD catheter was placed and he was started on HD treatments. Patient was cleared for transfer out of ICU to the floor.  He started having worsening abdominal  pain. He was seen by rheumatology for possible lupus flare up and serositis. He was started on steroids and other immunomodulating drugs. Drugs have since been discontinued due to concern of infection.  Repeat CT showed multiple loculated fluid collections, necrotizing pancreatitis, possible peritonitis. Surgery team is following with plan for IR drainage, which was performed October 28.  Body fluid culture grew out Klebsiella.  Surgical hospitalist took back to the OR October 30th due to continued pain in peritonitis with positive cultures, only found about 3 L of venita ascites, no other sign of infection, necrotic tissue, bowel issues or hematoma.    Today  Seen examined this morning.  Started having fevers yesterday brain.  Repeat blood cultures, UA had been ordered.  Chest x-ray remains unchanged.  His lower abdominal area suprapubic does have some brown discharge, notified surgery of this.  He will be going for MRI of the hip, added on pelvis as well.        OBJECTIVE/PHYSICAL EXAM     VITAL SIGNS (MOST RECENT):  Temp: 98.7 °F (37.1 °C) (11/11/22 0804)  Pulse: 82 (11/11/22 0804)  Resp: 18 (11/11/22 0828)  BP: (!) 149/84 (11/11/22 0827)  SpO2: (!) 93 % (11/11/22 0804)   VITAL SIGNS (24 HOUR RANGE):  Temp:  [97.3 °F (36.3 °C)-100.8 °F (38.2 °C)] 98.7 °F (37.1 °C)  Pulse:  [80-98] 82  Resp:  [18] 18  SpO2:  [93 %-97 %] 93 %  BP: (119-156)/(79-89) 149/84   GENERAL: In no acute distress, afebrile, malnourished weight loss  HEENT:  CHEST: Clear to auscultation bilaterally  HEART: S1, S2, no appreciable murmur  ABDOMEN: diffuse tenderness more so on the right side lower, slightly distended, BS +, significant gravity dependent edema in the pelvis, slight drainage in the suprapubic area small wound  MSK: Warm, 2+ pitting edema both legs, no clubbing or cyanosis  NEUROLOGIC: Alert and oriented x4, both legs 2/5 strength, 5/5 upper extremity  INTEGUMENTARY:  Anasarca  PSYCHIATRY:        ASSESSMENT/PLAN   Severe  necrotizing pancreatitis with loculated intra-abdominal fluid collection-status post IR drainage October 28  Intra-abdominal abscess versus infected necrotizing pancreatitis  Hospital acquired pneumonia-right-sided  Sepsis  Acute respiratory failure with hypoxia  Pulmonary edema  Severely edematous/anasarca  Acute kidney injury ATN  Critical illness myopathy  Severe protein caloric malnutrition     History of:  Type A aortic dissection status post repair with mechanical aortic valve conduit, recent type B aortic dissection status post TAVR, lupus nephritis class 5, mixed connective tissue disorder predominant lupus, transverse myelitis recovered        General surgery following.  Remove staples 13th if not too tight.  Gastroenterology signed off.    Nephrology following.  Continue aggressive fluid removal with HD.  ID following.  Continue meropenem, fluconazole, doxycycline.  Repeat infectious workup given fevers.  MRI a right hip and pelvis ordered.  Rheumatology following.  Prednisone 20 b.i.d., all other immunosuppressants discontinued.  Further tapering to be done in the clinic.  Glucose control with Levemir and sliding scale.  Continue antihypertensives and adjust as needed.    Hold coumadin, give one dose oral Vit K 5mg.  Continue to monitor INR.  Goal of 2 to 3.  Patient remains critically ill and condition remains guarded.  Many poor prognostic factors playing a role here.     Critical care diagnosis: All of the above  Critical care time spent:  35 minutes    Anticipated discharge and disposition:   __________________________________________________________________________    LABS/MICRO/MEDS/DIAGNOSTICS       LABS  Recent Labs     11/09/22  0419 11/11/22 0514   * 132*   K 4.9 3.8   CHLORIDE 95* 98   CO2 23 24   BUN 69.7* 46.8*   CREATININE 1.91* 1.59*   GLUCOSE 181* 48*   CALCIUM 8.1* 8.1*   ALKPHOS 209*  --    AST 36*  --    ALT 11  --    ALBUMIN 1.7*  --      Recent Labs     11/11/22  0514   WBC  22.6*   RBC 2.72*   HCT 23.2*   MCV 85.3          MICROBIOLOGY  Microbiology Results (last 7 days)       Procedure Component Value Units Date/Time    Blood Culture [[id number]] Collected: 11/10/22 2120    Order Status: Resulted Specimen: Blood Updated: 11/10/22 2213    Blood Culture [[id number]] Collected: 11/10/22 2120    Order Status: Resulted Specimen: Blood Updated: 11/10/22 2213    Blood Culture [[id number]]  (Normal) Collected: 11/05/22 0549    Order Status: Completed Specimen: Blood Updated: 11/10/22 1000     CULTURE, BLOOD ([facility]) No Growth at 5 days    Blood Culture [[id number]]  (Normal) Collected: 11/05/22 0549    Order Status: Completed Specimen: Blood Updated: 11/10/22 1000     CULTURE, BLOOD ([facility]) No Growth at 5 days    Rapid Influenza A/B [[id number]] Collected: 11/05/22 0612    Order Status: Canceled Specimen: Nasopharyngeal from Nasal Swab Updated: 11/05/22 0616            MEDICATIONS   aspirin  81 mg Oral Daily    carvediloL  25 mg Oral BID    doxycycline (VIBRAMYCIN) IVPB  100 mg Intravenous Q12H    fluconazole (DIFLUCAN) IV (PEDS and ADULTS)  400 mg Intravenous Every Tues, Thurs, Sat    gabapentin  100 mg Oral TID    insulin detemir U-100  15 Units Subcutaneous BID    lipase-protease-amylase 12,000-38,000-60,000 units  3 capsule Oral TID WM    meropenem (MERREM) IVPB  500 mg Intravenous Q24H    morphine  30 mg Oral Q12H    naloxegoL  25 mg Oral Daily    predniSONE  20 mg Oral BID    senna-docusate 8.6-50 mg  2 tablet Oral BID    sodium bicarbonate  1,300 mg Oral Daily    sodium chloride 0.9%  10 mL Intravenous Q6H    warfarin  2.5 mg Oral Daily    zinc oxide-cod liver oil   Topical (Top) BID      INFUSIONS        DIAGNOSTIC TESTS  X-Ray Chest 1 View   Final Result      No significant change         Electronically signed by: Ramón Ying   Date:    11/11/2022   Time:    09:37      X-Ray Chest 1 View   Final Result      No adverse interval change.         Electronically signed by: Jakob  Krystian   Date:    11/09/2022   Time:    09:09      NM Lung Scan Ventilation Perfusion   Final Result      This represents a low probability pulmonary embolism      Decreased ventilation in the right lung with respect to the left most likely related to the airspace disease..         Electronically signed by: Too Reyes   Date:    11/07/2022   Time:    16:08      CT Chest Abdomen Pelvis With Contrast (xpd)   Final Result      1. Diffuse irregular ground-glass predominant opacities in the lungs are new compared to prior.  This could relate to an infectious/inflammatory process or pulmonary edema.   2. Similar moderate bilateral pleural effusions with compressive atelectasis.   3. Similar moderate to large volume loculated ascites.   4. Essentially no normal pancreatic tissue is seen.  Similar fluid collection in the expected area of the pancreas.         Electronically signed by: Kapil Ayers   Date:    11/06/2022   Time:    11:45      X-Ray Chest 1 View   Final Result      No significant change         Electronically signed by: Ramón Ying   Date:    11/06/2022   Time:    09:52      X-Ray Chest 1 View   Final Result      Worsening of confluent airspace opacities mostly in both perihilar regions and upper lobes.      Otherwise no other change in distribution.      No other significant change         Electronically signed by: Ramón Ying   Date:    11/05/2022   Time:    07:55      US Abdomen Limited_for_Ascites   Final Result      Small volume ascites.         Electronically signed by: Kapil Ayers   Date:    11/04/2022   Time:    18:39      X-Ray Chest 1 View   Final Result      No adverse interval change.         Electronically signed by: Jakob Boland   Date:    11/02/2022   Time:    13:48      X-Ray Chest 1 View   Final Result      Postsurgical changes again noted with increasing right lung opacification in left pleural effusion.  Developing infectious process is not excluded.          Electronically signed by: Jakob Boland   Date:    11/02/2022   Time:    08:52      X-Ray Abdomen Flat And Erect   Final Result      Postsurgical changes. NG tube projects over the right upper quadrant in the expected location of the stomach.         Electronically signed by: Jakob Boland   Date:    11/02/2022   Time:    08:39      X-Ray Abdomen AP 1 View   Final Result      As above.         Electronically signed by: Jakob Boland   Date:    10/31/2022   Time:    07:36      CTA Chest Abdomen Pelvis   Final Result   Impression:      1.  An aortic stent is again noted.  Caudal extension of the [place] type B aortic dissection 37.1 mm from the inferior margin of the graft is noted (Series 18, Image 55). Dilatation of the descending thoracic aorta is again noted, measuring 41.3 mm at its widest. The distal descending segment measures 23.4 mm at the diaphragmatic hiatus. The origin of the left subclavian, left common carotid and right brachiocephalic arteries are unremarkable.      2. Mild diffuse interlobular interstitial septal thickening in both lungs may be present. An element of congestive failure is not excluded. There is dense opacity at the dependent lung bases consistent with compressive atelectasis with the possibility of an infectious component not entirely excluded. Correlate clinically.      3. The IVC is nonenhanced and appears narrowed.      4. There is a pronounced ascites which is new compared to the prior study. Mild layering hyperdense fluid pelvic fluid is seen, likely reflecting hemoperitoneum. There is a large heterogeneous, predominantly hyperdense mass/collection along the left paracolic gutter and pelvic sidewall (Series 14 Image 90 and Series 22, Image 46), measuring 97 x 62 x 170 mm (AP, transverse, craniocaudal dimensions). This may reflect hematoma formation. No focal contrast collection or pooling is identified to suggest active hemorrhage at the time of imaging. There is a possible  left rectus sheath hematoma seen on image 116 of series 14. Correlate with clinical and laboratory findings as regards additional evaluation and follow-up.      5.  Persistence of bilateral pleural effusions which are moderate-sized. Follow-up as clinically indicated.      6. Details and other findings as discussed above.      No significant discrepancy with overnight report.         Electronically signed by: Jameson Brumfield   Date:    10/30/2022   Time:    09:57      CT Abscess Aspiration without Catheter   Final Result      Successful aspiration of bilateral fluid with simple free fluid identified on the right and thick gelatinous loculated fluid identified in the left.  All fluid sent for laboratory analysis and clearly labeled.         Electronically signed by: Jakob Boland   Date:    10/31/2022   Time:    07:39      US Guided Needle Placement   Final Result      Successful aspiration of bilateral fluid with simple free fluid identified on the right and thick gelatinous loculated fluid identified in the left.  All fluid sent for laboratory analysis and clearly labeled.         Electronically signed by: Jakob Boland   Date:    10/31/2022   Time:    07:39      US Scrotum And Testicles   Final Result      CT Abdomen Pelvis With Contrast   Final Result      1. No normal pancreatic parenchyma is seen.  At the level of the pancreas there is a large fluid collection with a few foci of gas.  Loculated, faintly peripherally enhancing fluid collections extend along the pericolic gutters.  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  Foci of gas are nonspecific in the setting of prior intervention.   2. Extensive loculated peritoneal collections with peripheral enhancement compatible with peritonitis.Left inguinal hernia contains ascitic fluid with some peripheral enhancement similar to the peritoneal fluid.  There is no herniated bowel loop   3. There is a moderate  colonic stool burden.  There is some mass effect on the bowel loops due to the loculated nature of the abdominal fluid collections.   4. Anasarca   5. Partially imaged known thoracic aortic dissection status post intervention.         Electronically signed by: Rocio White   Date:    10/26/2022   Time:    12:37      X-Ray Chest 1 View for Line/Tube Placement   Final Result      Right PICC tip overlies the mid SVC.         Electronically signed by: Kapil Ayers   Date:    10/25/2022   Time:    16:56      CT Abdomen Pelvis  Without Contrast   Final Result      Bilateral pleural effusions with moderate ascites and diffuse anasarca.  The epigastric and left mid abdominal ascites appears loculated and peritonitis cannot be excluded.         Electronically signed by: Grabiel Singleton MD   Date:    10/24/2022   Time:    23:30      X-Ray Abdomen AP 1 View   Final Result      Residual feces         Electronically signed by: Ramón Ying   Date:    10/24/2022   Time:    09:46      X-Ray Chest 1 View   Final Result      No significant interval change.         Electronically signed by: Kapil Ayers   Date:    10/22/2022   Time:    15:13      X-Ray Chest 1 View   Final Result      Persistent increased left retrocardiac density and silhouetting of the left hemidiaphragm.      No other focal consolidative changes.      No other change         Electronically signed by: Ramón Ying   Date:    10/12/2022   Time:    08:15      Fl Modified Barium Swallow Speech   Final Result      X-Ray Chest 1 View   Final Result      Unchanged retrocardiac opacity suggesting atelectasis         Electronically signed by: Rocio White   Date:    10/05/2022   Time:    06:11      X-Ray Chest 1 View   Final Result      New central venous catheter terminates within the proximal right atrium.         Electronically signed by: Jameson Brumfield   Date:    10/04/2022   Time:    14:16      X-Ray Chest 1 View   Final Result      No significant interval  change.         Electronically signed by: Kapil Ayers   Date:    10/04/2022   Time:    06:54      CT Chest Abdomen Pelvis Without Contrast (XPD)   Final Result      Very limited study due the lack of IV contrast.      Anasarca.      Small amount of ascites.      Small left-sided pleural effusion with bibasilar atelectasis versus infiltrate.         Electronically signed by: Joey Vidal MD   Date:    10/03/2022   Time:    21:30      X-Ray Chest 1 View   Final Result      As above.         Electronically signed by: Jameson Brumfield   Date:    10/03/2022   Time:    07:09      X-Ray Chest 1 View   Final Result      As above.         Electronically signed by: Jakob Boland   Date:    10/02/2022   Time:    14:50      X-ray Abdomen for NG Tube Placement (Nursing should notify Radiology after placement)   Final Result      Optimal placement of the nasogastric tube.         Electronically signed by: Jameson Brumfield   Date:    10/01/2022   Time:    11:48      X-Ray Chest 1 View   Final Result      No significant change         Electronically signed by: Ramón Ying   Date:    10/01/2022   Time:    08:05      X-Ray Chest 1 View   Final Result      No acute findings or significant interval change.         Electronically signed by: Kapil Ayers   Date:    09/30/2022   Time:    07:35      X-Ray Chest 1 View   Final Result      Stable exam without significant interval change.         Electronically signed by: Claribel Deal   Date:    09/29/2022   Time:    16:14      X-Ray Chest 1 View   Final Result      Possible mild left perihilar/lower lung atelectasis.  Suspect tiny right apical pneumothorax.         Electronically signed by: Darius Rosario   Date:    09/29/2022   Time:    06:09      X-Ray Chest 1 View   Final Result      No significant interval change.         Electronically signed by: Jameson Brumfield   Date:    09/28/2022   Time:    16:25      X-Ray Chest 1 View   Final Result      Similar appearance to prior exam with  retrocardiac atelectasis         Electronically signed by: Rocio White   Date:    09/28/2022   Time:    06:05      X-Ray Chest 1 View   Final Result      No significant change         Electronically signed by: Ramón Ying   Date:    09/27/2022   Time:    08:47      X-Ray Abdomen AP 1 View   Final Result      Satisfactory central femoral line placement.         Electronically signed by: Eimly Candelario MD   Date:    09/26/2022   Time:    13:54      X-Ray Chest 1 View   Final Result      Interval retraction of endotracheal tube, satisfactory position.  Cardiac lead placement in the interval.  No other significant change.         Electronically signed by: Emily Candelario MD   Date:    09/26/2022   Time:    08:00      X-Ray Chest 1 View   Final Result      Increased volume status      Interval intubation with endotracheal tube at the josie.  I recommend retracting      Right-sided chest tube in good position         Electronically signed by: Too Reyes   Date:    09/25/2022   Time:    15:49      X-Ray Abdomen AP 1 View   Final Result      There is slight increased density of the kidneys of questionable etiology and or significance.      Otherwise nonspecific gas pattern         Electronically signed by: Ramón Ying   Date:    09/25/2022   Time:    10:51      CTA Chest Abdomen Non Coronary   Final Result      1. Thoracic aortic endograft placement since 09/23/2022 with the dissection flap extending about 3 cm inferior to the distal portion of the graft.  Opacification of the false lumen along the mid and distal portions of the graft.   2. Moderate volume hemoperitoneum.  Changes involving the liver, spleen, pancreas and bowel are suggestive of overall hypoperfusion.  Hyperdense kidneys suggest acute kidney injury.   3. Small to moderate bilateral pleural effusions, larger on the right.   Findings discussed with Mr. Guevara ICU nurseDulce at 08:35 hours on 09/25/2022.          Electronically signed by: Darius Rosario   Date:    09/25/2022   Time:    08:42      X-Ray Chest 1 View   Final Result      Changes suggestive of right-sided pleural effusion.      Increased left retrocardiac density and silhouetting of the left hemidiaphragm which might be related to an infiltrate/atelectasis.      Interval placement of thoracic endograft.      No focal consolidative changes         Electronically signed by: Ramnó Ying   Date:    09/25/2022   Time:    09:10      IR Abdominal Aortobifemoral   Final Result      Fluoroscopic assistance provided as above.         Electronically signed by: Kapil Ayers   Date:    09/26/2022   Time:    09:56      MRI Hip Without Contrast Right    (Results Pending)   MRI Pelvis Without Contrast    (Results Pending)            All diagnosis and differential diagnosis have been reviewed; assessment and plan has been documented. I have personally reviewed the labs and test results that are presently available; I have reviewed the patients medication list. I have reviewed the consulting providers response and recommendations. I have reviewed or attempted to review medical records based upon their availability.  All of the patient's questions have been addressed and answered. Patient's is agreeable to the above stated plan. I will continue to monitor closely and make adjustments to medical management as needed.  This document was created using M*Modal Fluency Direct.  Transcription errors may have been made.  Please contact me if any questions may rise regarding documentation to clarify verbiage.        Luis F Hutson MD   11/11/2022   Internal Medicine

## 2022-11-11 NOTE — PROGRESS NOTES
Inpatient Nutrition Assessment    Admit Date: 9/23/2022   Total duration of encounter: 49 days     Nutrition Recommendation/Prescription     - Continue current diet as tolerated.   - Continue pancreatic enzymes with meals.   - Continue Boost Plus TID for additional nourishment; provides an additional 350 kcal and 14 gm protein per container.   - Monitor elevated phos; can consider phos binder with meals.   - Consider an appetite stimulant as medically feasible.     Communication of Recommendations: reviewed with nurse    Nutrition Assessment     Malnutrition Assessment/Nutrition-Focused Physical Exam    Malnutrition in the context of acute illness or injury  Degree of Malnutrition: severe malnutrition  Energy Intake: < 75% of estimated energy requirement for > 7 days  Interpretation of Weight Loss: >5% in 1 month  Body Fat:moderate depletion  Area of Body Fat Loss: orbital region  and upper arm region - triceps / biceps  Muscle Mass Loss: moderate depletion  Area of Muscle Mass Loss: temple region - temporalis muscle, clavicle bone region - pectoralis major, deltoid, trapezius muscles, clavicle and acromion bone region - deltoid muscle, and scapular bone region - trapezius, supraspinus, infraspinus muscles  Fluid Accumulation: moderate to severe  Edema: ascites   Reduced  Strength: unable to obtain  A minimum of two characteristics is recommended for diagnosis of either severe or non-severe malnutrition.    Chart Review    Reason Seen: continuous nutrition monitoring and follow-up    Diagnosis:  Fever and Leukocytosis  Abdominal aortic dissection, s/p repair  Abdominal compartment syndrome, s/p ex-lap x2 last on 9/27 with abdominal closure on 10/1  SLE with active flare  History of transverse myelitis  History of lupus nephritis  TAY now on HD  VHD, s/p mechanical AVR  Chest tube in place  DM2  Necrotizing pancreatitis  Ascites  Bacterial peritonitis    Relevant Medical History: HTN, lupus    Nutrition-Related  Medications: detemir insulin 15 units/day, pancreatic enzymes with meals, prednisone   Calorie Containing IV Medications: no significant kcals from medications at this time    Nutrition-Related Labs:   BUN 27, Crea 3.06, Glu 174, Phos 6.2   Na 128, K 3.3, Cl 96, BUN 21.5, Crea 3.28, Glu 205  10/4 Na 127, Cl 95, BUN 57.3, Crea 5.16, Glu 152, Phos 5  10/7 K 5.6, BUN 55.2, Crea 4.57, Glu 347, Phos 8  10/11: Na 126, K 6.1, Cl 91, BUN 49, Crea 3.18, GFR 25, Glu 139, Ca 7.9, Phos 7.4  10/14: Na 125, K 5.4, Cl 91, BUN 56.1, Crea 2.76, GFR 30, Ca 8.1  10/18: Na 128, Cl 92, Ca 7.9, BUN 50.9, Cr 2.32   10/21: Na 127, Cl 93, BUN 68.1, Crea 2.35, GFR 37, Glu 335  10/25: Na 128, Cl 96, BUN 65.1, Crea 1.62, GFR 57, Glu 39; CB-124  10/28: Na 128, Cl 96, BUN 62.9, Crea 1.62, GFR 57, glu 304, Ca 7.9  10/31: Na 129, K 6.1, BUN 73.5, Crea 1.67, GFR 55, Glu 285, Alb 2.4  : Na 130, K 5.2, phos 5.7, GFR 57  : WBC 21.2, RBC 3.68, Na 129, Cl 94, .9, Cr 1.59, Glu 247, , Alb 1.9, Mg/Phos WNL   : WBC 19.8, RBC 2.77. Na 127, Cl 94, BUN 79.4, Cr 1.67, Glu 107, Ca 8.3, Mg 3.0, , Alb 1.6, AST 36   : Na 132, phos 5, Glu 48, GFR 58    Diet/PN Order: DIET RENAL NON-DIALYSIS  Oral Supplement Order: Boost Plus  Tube Feeding Order: none at this time  Appetite/Oral Intake: poor/0-25% of meals  Factors Affecting Nutritional Intake: decreased appetite and nausea  Food/[employer]/Cultural Preferences: none reported    Skin Integrity: wound, incision  Wound(s):      Altered Skin Integrity 22 1253 posterior Sacral spine #1 Shearing Partial thickness tissue loss. Shallow open ulcer with a red or pink wound bed, without slough. Intact or Open/Ruptured Serum-filled blister.-Tissue loss description: Partial thickness       Altered Skin Integrity 22 1100 Left Achilles Abrasion(s)-Tissue loss description: Partial thickness       Altered Skin Integrity 22 1100 Right Achilles Abrasion(s)-Tissue loss  description: Partial thickness 11/5/22: partial thickness tissue loss to sacral spine    Comments    9/26/22: Discussed with RN. Will provide tube feeding recommendations for when appropriate to start tube feeding. Receiving kcal from meds. Noted Phos, will monitor for need for renal formula.   9/30/22: Discussed with RN. Need to consider TPN since pt now LOS day 7. If starting tube feeding, recs provided. If able to extubate, advance diet when appropriate.   10/4/22: Pt previously eating 100% po intake of meals, good appetite. Currently NPO for procedure, plans to restart diet per RN. Will add ONS for added protein and kcal.  10/7/22: Pt now with decreased appetite/po intake. Noted elevated K and Phos, likely not diet related since pt with poor po intake of full liquid diet.   10/11: pt sleeping, nurse reports tolerating full liquid diet, some abdominal distention noted, reports unsure of plans to advance diet as this time  10/14: pt advanced to soft diet today; ate about 40% of lunch tray, drinking Boost, would like 2 per meal in vanilla flavor  10/18: Pt advanced to regular texture renal/diabetic diet, he reports that his appetite is fair, drinks boost, feels he is chewing/swallowing well, does reports some abdominal pain 30-90 minutes after eating, feels better after several hours of no eating.   10/21: appetite remains the same, drinking boost  10/25: fair appetite, says he is drinking some boost, would like to continue flavor    10/28: pt with decreased appetite, noted new dx necrotizing pancreatits; paracentesis done today; not drinking boost at this time either; plans to try and start eating more and drinking boost, says he feels a little better this afternoon. MD discussed starting TPN until pt able to tolerate increased oral intake. Still on dialysis as needed    10/31: now NPO with NG with LIWS after abdominal washout with surgery yesterday, possible necrotizing fascitis, will switch to custom TPN today  "due to abnormal electrolytes    : Pt's diet was advanced yesterday; however, pt reports still just taking in liquids and no solids. Pt reports vomiting last night. TPN running as ordered.     22: Pt reports that he ate 100% of all meals yesterday and tolerated them well, skipped breakfast this morning due to feeling unwell and some mild nausea, no GI complaints during our visit, PO intake encouraged.      22: Pt not in room, having test, nurse reports good po intake/appetite today, was not as good yesterday, TPN still infusing, AST and AP increasing, glucose better today.     22: Pt no longer on TPN; not eating much of meals per RN but is drinking the Boost.     Anthropometrics    Height: 5' 5" (165.1 cm) Height Method: Estimated  Last Weight: 59.4 kg (131 lb) (22 0500) Weight Method: Bed Scale  BMI (Calculated): 21.8  BMI Classification: normal (BMI 18.5-24.9)        Ideal Body Weight (IBW), Male: 136 lb     % Ideal Body Weight, Male (lb): 91.75 %                 Usual Body Weight (UBW), k kg  % Usual Body Weight: 94.53  % Weight Change From Usual Weight: -5.67 %  Usual Weight Provided By: unable to obtain usual weight at this time    Wt Readings from Last 5 Encounters:   22 59.4 kg (131 lb)   22 60 kg (132 lb 4.4 oz)   21 48 kg (105 lb 13.1 oz)     Weight Change(s) Since Admission:  Admit Weight: 60 kg (132 lb 4.4 oz) (22 1219)  10/4/22: no new wt  10/7/22: no new wt  10/12/22: 65kg; weight gain noted  10/15/22: 56.6kg; fluctuating weights possible due to fluid  10/22/22: 56.6kg  22: no new wt noted  : 60.4 kg  : 59.4 kg    Estimated Needs    Weight Used For Calorie Calculations: 56.6 kg (124 lb 12.5 oz)  Energy Calorie Requirements (kcal): 2011kcal (1.4stress factor)  Energy Need Method: [facility]  Weight Used For Protein Calculations: 56.6 kg (124 lb 12.5 oz)  Protein Requirements: 85-96gm (1.5-1.7gm/kg)  Fluid Requirements (mL): 1000ml " + urinary output  Temp: 98.7 °F (37.1 °C)    Enteral Nutrition    Patient not receiving enteral nutrition at this time.    Parenteral Nutrition    Patient not receiving parenteral nutrition at this time.       Evaluation of Received Nutrient Intake    Calories: not meeting estimated needs  Protein: not meeting estimated needs    Patient Education    Not applicable.    Nutrition Diagnosis     PES: Inadequate oral intake related to current condition as evidenced by <75% intake of meals. (continues)    PES: Malnutrition related to pancreatitis, infection as evidenced by weight loss >5% in 1 month, appetite loss <50% intake of meals, severe fat and muscle loss. (continues)     Interventions/Goals     Intervention(s): general/healthful diet, modified composition of parenteral nutrition, modified rate of parenteral nutrition, commercial beverage, and collaboration with other providers  Goal: Meet greater than 75% of nutritional needs by follow-up. (goal progressing)    Monitoring & Evaluation     Dietitian will monitor energy intake, weight change, electrolyte/renal panel, and glucose/endocrine profile.  Nutrition Risk/Follow-Up: high (follow-up in 1-4 days)

## 2022-11-11 NOTE — PT/OT/SLP PROGRESS
Physical Therapy      Patient Name:  Stuart Guevara   MRN:  [id number]    Pt off floor for MRI. PT spoke with OT, and she states pt had refused session 2/2 abdominal pain. Will f/u tomorrow.

## 2022-11-11 NOTE — ASSESSMENT & PLAN NOTE
Secondary to systemic lupus, Lupus medications ( CellCept, Plaquenil and Benlysta ) have been discontinued d/t infection.Currently on prednisone 20mg BID with plans to continue until we are able to slowly re-introduce his lupus medications.

## 2022-11-11 NOTE — PROGRESS NOTES
Infectious Diseases Progress Note  32-year-old male with past medical history of SLE, HTN, hepatitis-B, aortic dissection with repair in February 2021, DVT on Xarelto, initially presented to the [facility] ED on 09/23/2022 with shortness of breath and chest pain.  Evaluation revealed a aortic dissection type B, with progressive course requiring transferred to [facility], promptly taken to OR for TEVAR with left carotid to subclavian artery bypass.  He had a postoperative complicated course with development of compartment syndrome found on CT angiography of chest/abdomen and pelvis with hemoperitoneum.  This required exploratory laparotomy, source apparently unstable bradycardia requiring pacemaker and then acute kidney injury and initiation of hemodialysis.  He had been hospitalized in the ICU but since downgraded to the floor and out on the floor.  CT scan of the abdomen and pelvis done on 10/26 showed no normal pancreatic parenchyma seen with a large fluid collection with a few foci of gas noted at the level of the pancreas, loculated, faintly peripherally enhancing fluid collection extending along the pericolic gutter as findings compatible with sequela of severe necrotizing pancreatitis with walled-off necrosis, mass effect on bowel loops from abdominal fluid collections, anasarca.  Ultrasound of the scrotum with noted edema.  He has been pretty much afebrile lately with a low-grade temp of 99.5 noted on 10/25 with associated leukocytosis of 27.5 which worse today at 33.4.    He is currently on antimicrobial coverage with meropenem, Doxycycline and Diflucan    Subjective:  No new complaints, doing about the same. Spiked a temp 101.1, lying in bed in no acute distress    ROS  Constitutional:  Positive for fever and malaise/fatigue.   HENT: Negative.     Respiratory: Negative.     Gastrointestinal:  Positive for abdominal pain and nausea.   Genitourinary: Negative.    Musculoskeletal:  Negative.    Neurological:  Positive for weakness.   Endo/Heme/Allergies: Negative.    Psychiatric/Behavioral: Negative.    All other Systems review done and negative    Review of patient's allergies indicates:   Allergen Reactions    Levofloxacin     Sulfamethoxazole-trimethoprim        Past Medical History:   Diagnosis Date    Aortic dissection 9/23/2022    Hypertension     Systemic lupus erythematosus, organ or system involvement unspecified     Systemic lupus erythematosus, organ or system involvement unspecified        Past Surgical History:   Procedure Laterality Date    APPLICATION OF WOUND VACUUM-ASSISTED CLOSURE DEVICE  9/27/2022    Procedure: APPLICATION, WOUND VAC;  Surgeon: Christopher Espinal MD;  Location: [facility];  Service: General;;    CARDIAC SURGERY      CREATION OF BYPASS FROM INTERNAL CAROTID ARTERY TO SUBCLAVIAN ARTERY Left 9/23/2022    Procedure: CREATION, BYPASS, ARTERIAL, INTERNAL CAROTID TO SUBCLAVIAN;  Surgeon: Vic Martines MD;  Location: [facility];  Service: Cardiothoracic;  Laterality: Left;    ENDOVASCULAR REPAIR OF THORACIC AORTA N/A 9/23/2022    Procedure: REPAIR, AORTA, THORACIC, ENDOVASCULAR;  Surgeon: Vic Martines MD;  Location: [facility];  Service: Cardiology;  Laterality: N/A;    FINGER AMPUTATION      INSERTION OF TUNNELED CENTRAL VENOUS HEMODIALYSIS CATHETER N/A 10/24/2022    Procedure: INSERTION, CATHETER, CENTRAL VENOUS, HEMODIALYSIS, TUNNELED;  Surgeon: Yogesh Melton DO;  Location: [facility] CATH LAB;  Service: Nephrology;  Laterality: N/A;  TUNNELED CATH INSERTION    THROMBECTOMY Right     TOE AMPUTATION         Social History     Socioeconomic History    Marital status: Single   Tobacco Use    Smoking status: Every Day     Types: Cigarettes    Smokeless tobacco: Never     Social Determinants of Health     Food Insecurity: Unknown    Worried About Running Out of Food in the Last Year: Never true   Transportation Needs: Unknown    Lack of Transportation (Medical): No   Housing  "Stability: Unknown    Unable to Pay for Housing in the Last Year: No         Scheduled Meds:   aspirin  81 mg Oral Daily    carvediloL  25 mg Oral BID    doxycycline (VIBRAMYCIN) IVPB  100 mg Intravenous Q12H    fluconazole (DIFLUCAN) IV (PEDS and ADULTS)  400 mg Intravenous Every Tues, Thurs, Sat    gabapentin  100 mg Oral TID    insulin detemir U-100  15 Units Subcutaneous BID    lipase-protease-amylase 12,000-38,000-60,000 units  3 capsule Oral TID WM    meropenem (MERREM) IVPB  500 mg Intravenous Q24H    morphine  30 mg Oral Q12H    naloxegoL  25 mg Oral Daily    predniSONE  20 mg Oral BID    senna-docusate 8.6-50 mg  2 tablet Oral BID    sodium bicarbonate  1,300 mg Oral Daily    sodium chloride 0.9%  10 mL Intravenous Q6H    warfarin  2.5 mg Oral Daily    zinc oxide-cod liver oil   Topical (Top) BID     Continuous Infusions:  PRN Meds:sodium chloride 0.9%, acetaminophen, albuterol, bisacodyL, dextrose 10 % in water (D10W), dextrose 10 % in water (D10W), dextrose 10%, glucagon (human recombinant), glucose, glucose, hydrALAZINE, HYDROmorphone, insulin aspart U-100, labetaloL, lactulose 10 gram/15 ml, melatonin, nitroGLYCERIN, ondansetron, polyethylene glycol, sodium chloride 0.9%, sodium chloride 0.9%, sodium chloride 0.9%, sodium chloride 0.9%, Flushing PICC Protocol **AND** sodium chloride 0.9% **AND** sodium chloride 0.9%    Objective:  /79   Pulse 93   Temp 97.3 °F (36.3 °C) (Oral)   Resp 18   Ht 5' 5" (1.651 m)   Wt 59.4 kg (131 lb)   SpO2 96%   BMI 21.80 kg/m²     Physical Exam:   Physical Exam  Vitals reviewed.   Constitutional:       General: He is not in acute distress.  HENT:      Head: Normocephalic and atraumatic.  Neck:      Comments: Right IJ vascular access noted  Cardiovascular:      Rate and Rhythm: Normal rate and regular rhythm.      Heart sounds: Normal heart sounds.   Pulmonary:      Effort: No respiratory distress.      Breath sounds: Normal breath sounds.   Abdominal:      " General: Bowel sounds are normal.     Palpations: Abdomen is soft.      Tenderness: There is abdominal tenderness.   Genitourinary:     Comments: Scrotal edema  Musculoskeletal:         General: No deformity.      Cervical back: Neck supple.      Right lower leg: Edema present.      Left lower leg: Edema present.   Skin:     Findings: No erythema or rash.   Neurological:      Mental Status: He is alert and oriented    Imaging  11/9/22 CXR  FINDINGS:   Right-sided central line remains in place with right-sided PICC.  Hazy opacification of the right greater than left lung is grossly unchanged.    Impression:       No adverse interval change.        Lab Review   Recent Results (from the past 24 hour(s))   POCT glucose    Collection Time: 11/09/22 10:06 PM   Result Value Ref Range    POCT Glucose 80 70 - 110 mg/dL   POCT glucose    Collection Time: 11/10/22  5:22 AM   Result Value Ref Range    POCT Glucose 92 70 - 110 mg/dL   POCT glucose    Collection Time: 11/10/22  5:29 PM   Result Value Ref Range    POCT Glucose 116 (H) 70 - 110 mg/dL     Assessment/Plan:  1. Sepsis syndrome with severe leukocytosis  2.  Intra-abdominal infection/peritonitis/abscesses-Klebsiella  3.  Severe necrotizing pancreatitis  4.  Acute kidney injury on hemodialysis  5.  Abdominal compartment syndrome  6.  Anemia  7.  Protein calorie malnutrition  8.  History of hepatitis-B   9. Thoracic aortic dissection  10. B/L pneumonitis/pleural effusions/pulmonary edema     -Continue Merrem #15 and Diflucan #6. Remains on Doxycycline #6   -Spiked a temp 101.1 this am and leukocytosis noted, on steroids, follow  -11/5 SARS-CoV-2 PCR (-), Influenza A/B Ag (-)  -11/5 Blood cultures negative   -S/p CT abscess aspiration, with free fluid noted on the right and thick gelatinous loculated fluid on the left, cultures with Klebsiella  -11/2 chest x-ray with increasing right opacity and left effusion  -11/3 Procalcitonin level 0.68 from 1.6  -Hemodialysis per  Nephrology  -MRSA PCR (-)  -Seen by GI, pulmonology, and surgery with inputs noted  -Hepatitis B evaluation and management to continue as outpatient  -Discussed with patient and nursing staff

## 2022-11-11 NOTE — PROGRESS NOTES
NEPHROLOGY: Milagro Guevara is a 32 y.o. male with TAY following TEVAR for a type B aortic dissection on 09/23/2022.  (patient had had a type A repair via TEVAR in February 2022).      During the type B repair he also required a left carotid to left subclavian artery bypass for subclavian artery occlusion.      Since then he has required several exploratory laparotomies due to concerns for ischemic /necrotizing bowel and abdominal compartment syndrome on 9/25, 9/27 and 9/29.  He has SLE with serositis and inflammatory pancreatitis and the last abdominal washout on October 30th required drainage and grew out Klebsiella.  Peritoneal fluid white count at that time was only 80.  He remains on broad-spectrum antibiotics and followed by ID.  He has been maintained on TTS dialysis schedule via right IJ temp catheter originally placed 2 weeks ago and exchanged on November 2nd over a guidewire by Dr. Melton due to concern for the potential for catheter-related bloodstream infection.  He is eating very little due to early satiety and continues to accumulate ascites.  Recently he has developed recurrent fevers and leukocytosis although he is on steroids.  He has required aggressive daily dialysis with ultrafiltration and assistance of albumin for fluid removal for anasarca.        Current Facility-Administered Medications:     0.9%  NaCl infusion, , Intravenous, PRN, DEBBIE Pearl    acetaminophen tablet 650 mg, 650 mg, Oral, Q6H PRN, Nolberto Calvillo MD, 650 mg at 11/05/22 1554    albuterol inhaler 2 puff, 2 puff, Inhalation, Q4H PRN, Evelio Marshall MD    aspirin EC tablet 81 mg, 81 mg, Oral, Daily, Luis F Hutson MD, 81 mg at 11/11/22 0827    bisacodyL suppository 10 mg, 10 mg, Rectal, Daily PRN, DENIA Caruso, 10 mg at 10/28/22 0400    carvediloL tablet 25 mg, 25 mg,  Oral, BID, Maximino Yeh, MARIANNE, 25 mg at 11/11/22 0827    dextrose 10 % infusion, 12.5 g, Intravenous, PRN, Dewayne Hughes PA-C, 12.5 g at 10/25/22 0317    dextrose 10 % infusion, 25 g, Intravenous, PRN, Dewayne Hughes PA-C    dextrose 10% bolus 125 mL, 12.5 g, Intravenous, PRN, Ivan Chopra MD, Stopped at 10/20/22 0953    doxycycline (VIBRAMYCIN) 100 mg in dextrose 5 % 250 mL IVPB, 100 mg, Intravenous, Q12H, DENIA Armando, Stopped at 11/11/22 0646    fluconazole (DIFLUCAN) IVPB 400 mg, 400 mg, Intravenous, Every Tues, Thurs, Sat, Aleah Calix MD, Stopped at 11/10/22 2315    gabapentin capsule 100 mg, 100 mg, Oral, TID, Shahzad Lundberg MD, 100 mg at 11/11/22 0827    glucagon (human recombinant) injection 1 mg, 1 mg, Intramuscular, PRN, Dewayne Hughes PA-C, 1 mg at 10/20/22 0925    glucose chewable tablet 16 g, 16 g, Oral, PRN, Dewayne Hughes PA-C, 16 g at 11/08/22 1104    glucose chewable tablet 24 g, 24 g, Oral, PRN, Dewayne Hughes PA-C, 24 g at 10/24/22 1709    hydrALAZINE injection 20 mg, 20 mg, Intravenous, Q4H PRN, Bin Bell MD, 20 mg at 10/22/22 0319    HYDROmorphone (PF) injection 1 mg, 1 mg, Intravenous, Q6H PRN, Luis F Hutson MD, 1 mg at 11/11/22 0545    insulin aspart U-100 injection 1-10 Units, 1-10 Units, Subcutaneous, QID (AC + HS) PRN, Ronda Hamilton MD, 4 Units at 11/09/22 0550    insulin detemir U-100 injection 15 Units, 15 Units, Subcutaneous, BID, Luis F Hutson MD, 15 Units at 11/11/22 0828    labetaloL injection 20 mg, 20 mg, Intravenous, Q8H PRN, Bin Bell MD, 20 mg at 10/03/22 0321    lactulose 10 gram/15 ml solution 10 g, 10 g, Oral, Q6H PRN, Luis F Hutson MD, 10 g at 11/09/22 1136    lipase-protease-amylase 12,000-38,000-60,000 units per capsule 3 capsule, 3 capsule, Oral, TID WM, Luis F Hutson MD, 3 capsule at 11/11/22 0827    melatonin tablet 6 mg, 6 mg, Oral, Nightly PRN, Michelle Ferrera, AGACNP-BC, 6 mg at 10/14/22 0000    meropenem (MERREM) 500 mg in sodium  "chloride 0.9 % 100 mL IVPB (MB+), 500 mg, Intravenous, Q24H, Saurabh Curry MD, Last Rate: 100 mL/hr at 11/10/22 1414, 500 mg at 11/10/22 1414    morphine 12 hr tablet 30 mg, 30 mg, Oral, Q12H, Shamar Stewart MD, 30 mg at 11/11/22 0828    naloxegoL (MOVANTIK) tablet 25 mg, 25 mg, Oral, Daily, Luis F Hutson MD, 25 mg at 11/11/22 0827    nitroGLYCERIN SL tablet 0.4 mg, 0.4 mg, Sublingual, Q5 Min PRN, Zach Shafer MD, 0.4 mg at 10/30/22 0713    ondansetron injection 4 mg, 4 mg, Intravenous, Q6H PRN, Evelio Marshall MD, 4 mg at 11/08/22 1053    polyethylene glycol packet 17 g, 17 g, Oral, BID PRN, Thalia Chawlaaver, AGNP    predniSONE tablet 20 mg, 20 mg, Oral, BID, Emilie Vazquez, FNP, 20 mg at 11/11/22 0827    senna-docusate 8.6-50 mg per tablet 2 tablet, 2 tablet, Oral, BID, Luis F Hutson MD, 2 tablet at 11/10/22 2116    sodium bicarbonate tablet 1,300 mg, 1,300 mg, Oral, Daily, Thalia S Renteria, AGNP, 1,300 mg at 11/11/22 0827    sodium chloride 0.9% bolus 250 mL, 250 mL, Intravenous, PRN, Thalia S Renteria, AGNP    sodium chloride 0.9% bolus 250 mL, 250 mL, Intravenous, PRN, Thalia S Renteria, AGNP    sodium chloride 0.9% bolus 250 mL, 250 mL, Intravenous, PRN, Shahzad Lundberg MD    sodium chloride 0.9% bolus 250 mL, 250 mL, Intravenous, PRN, Thalia S Renteria, AGNP    Flushing PICC Protocol, , , Until Discontinued **AND** sodium chloride 0.9% flush 10 mL, 10 mL, Intravenous, Q6H, 10 mL at 11/11/22 0546 **AND** sodium chloride 0.9% flush 10 mL, 10 mL, Intravenous, PRN, Ronda Hamilton MD    warfarin (COUMADIN) tablet 2.5 mg, 2.5 mg, Oral, Daily, Luis F Hutson MD    zinc oxide-cod liver oil 40 % paste, , Topical (Top), BID, Saurabh Curry MD, Given at 11/11/22 0828        BP (!) 149/84   Pulse 82   Temp 98.7 °F (37.1 °C) (Oral)   Resp 18   Ht 5' 5" (1.651 m)   Wt 59.4 kg (131 lb)   SpO2 (!) 93%   BMI 21.80 kg/m²     Physical Exam:    GEN: Awake and appropriate.   HEENT:  Temporal wasting " noted  NECK : No JVD, temporary right IJ catheter for noted  CARD : RRR s rub or gallop  LUNGS : Clear to auscultation  ABD :  Distended with 3+ fluid wave.  Midline staples still in place.  Lower abdominal previous drain site continues to drain ascites.  EXT :  No leg edema.  Pitting edema from the mid thigh up including abdominal wall and sacral areas.  His arms and legs are otherwise emaciated with severe muscle atrophy.  No pitting edema.   NEURO : No obvious focal deficits        Intake/Output Summary (Last 24 hours) at 11/11/2022 0916  Last data filed at 11/11/2022 0600  Gross per 24 hour   Intake 1653.33 ml   Output 3433 ml   Net -1779.67 ml         Laboratory:  Recent Results (from the past 24 hour(s))   POCT glucose    Collection Time: 11/10/22  5:29 PM   Result Value Ref Range    POCT Glucose 116 (H) 70 - 110 mg/dL   POCT glucose    Collection Time: 11/10/22  9:29 PM   Result Value Ref Range    POCT Glucose 170 (H) 70 - 110 mg/dL   COVID/FLU A&B PCR    Collection Time: 11/11/22  1:48 AM   Result Value Ref Range    Influenza A PCR Not Detected Not Detected    Influenza B PCR Not Detected Not Detected    SARS-CoV-2 PCR Not Detected Not Detected   Basic Metabolic Panel    Collection Time: 11/11/22  5:14 AM   Result Value Ref Range    Sodium Level 132 (L) 136 - 145 mmol/L    Potassium Level 3.8 3.5 - 5.1 mmol/L    Chloride 98 98 - 107 mmol/L    Carbon Dioxide 24 22 - 29 mmol/L    Glucose Level 48 (LL) 74 - 100 mg/dL    Blood Urea Nitrogen 46.8 (H) 8.9 - 20.6 mg/dL    Creatinine 1.59 (H) 0.73 - 1.18 mg/dL    BUN/Creatinine Ratio 29     Calcium Level Total 8.1 (L) 8.4 - 10.2 mg/dL    Anion Gap 10.0 mEq/L    eGFR 58 mls/min/1.73/m2   Phosphorus    Collection Time: 11/11/22  5:14 AM   Result Value Ref Range    Phosphorus Level 5.0 (H) 2.3 - 4.7 mg/dL   Magnesium    Collection Time: 11/11/22  5:14 AM   Result Value Ref Range    Magnesium Level 2.00 1.60 - 2.60 mg/dL   Protime-INR    Collection Time: 11/11/22  5:14  AM   Result Value Ref Range    PT 58.5 (H) 12.5 - 14.5 seconds    INR 6.99 (HH) 0.00 - 1.30   CBC with Differential    Collection Time: 11/11/22  5:14 AM   Result Value Ref Range    WBC 22.6 (H) 4.5 - 11.5 x10(3)/mcL    RBC 2.72 (L) 4.70 - 6.10 x10(6)/mcL    Hgb 7.6 (L) 14.0 - 18.0 gm/dL    Hct 23.2 (L) 42.0 - 52.0 %    MCV 85.3 80.0 - 94.0 fL    MCH 27.9 27.0 - 31.0 pg    MCHC 32.8 (L) 33.0 - 36.0 mg/dL    RDW 16.1 11.5 - 17.0 %    Platelet 288 130 - 400 x10(3)/mcL    MPV 10.6 (H) 7.4 - 10.4 fL    Neut % 91.7 %    Lymph % 5.2 %    Mono % 2.3 %    Eos % 0.0 %    Basophil % 0.1 %    Lymph # 1.17 0.6 - 4.6 x10(3)/mcL    Neut # 20.7 (H) 2.1 - 9.2 x10(3)/mcL    Mono # 0.52 0.1 - 1.3 x10(3)/mcL    Eos # 0.01 0 - 0.9 x10(3)/mcL    Baso # 0.03 0 - 0.2 x10(3)/mcL    IG# 0.16 (H) 0 - 0.04 x10(3)/mcL    IG% 0.7 %    NRBC% 0.0 %   POCT glucose    Collection Time: 11/11/22  6:01 AM   Result Value Ref Range    POCT Glucose 54 (L) 70 - 110 mg/dL         Assessment/Plan:   TAY-nonoliguric   SLE with serositis/ pancreatitis   Status post multiple abdominal washouts for inflammatory pancreatitis  Status post type B aortic dissection repair 09/23/2022  Hypertension  Severe protein calorie malnutrition      Volume status has significantly improved with daily dialysis x3.  I do not see an indication for dialysis today.  His ascites seems to be worsening and I am concerned that the abdominal wound may be his.  May need to consider consulting IR for paracentesis.  Prognosis is very guarded.    Shahzad Lundberg MD, BERNADETTE

## 2022-11-11 NOTE — PROGRESS NOTES
[facility] - 8th Floor Med Surg  Rheumatology  Progress Note    Patient Name: Stuart Guevara  MRN: [id number]  Admission Date: 9/23/2022  Hospital Length of Stay: 49 days  Code Status: Full Code   Attending Provider: Saurabh Curry MD  Primary Care Physician: Primary Doctor No  Principal Problem: Aortic dissection    Subjective:     HPI:  Joint pain and tenderness continues to improve and have significanly improved since restarting Cellcelpt and hydroxychloroquine on 10/4/22. Reports overall feeling better today. He received his first dose of Benlysta on Monday (10/17/22). Tunneled catheter placement has been cancelled. Lupus medications were previously discontinued at onset of symptoms of infection. Current status he is being treated with antimicrobials for infection.         Interval History: 11/10/22- switched solumedrol to PO prednisone 20mg BID    Current Facility-Administered Medications   Medication Frequency    0.9%  NaCl infusion PRN    acetaminophen tablet 650 mg Q6H PRN    albuterol inhaler 2 puff Q4H PRN    aspirin EC tablet 81 mg Daily    bisacodyL suppository 10 mg Daily PRN    carvediloL tablet 25 mg BID    dextrose 10 % infusion PRN    dextrose 10 % infusion PRN    dextrose 10% bolus 125 mL PRN    doxycycline (VIBRAMYCIN) 100 mg in dextrose 5 % 250 mL IVPB Q12H    fluconazole (DIFLUCAN) IVPB 400 mg Every Tues, Thurs, Sat    gabapentin capsule 100 mg TID    glucagon (human recombinant) injection 1 mg PRN    glucose chewable tablet 16 g PRN    glucose chewable tablet 24 g PRN    hydrALAZINE injection 20 mg Q4H PRN    HYDROmorphone (PF) injection 1 mg Q6H PRN    insulin aspart U-100 injection 1-10 Units QID (AC + HS) PRN    insulin detemir U-100 injection 15 Units BID    labetaloL injection 20 mg Q8H PRN    lactulose 10 gram/15 ml solution 10 g Q6H PRN    lipase-protease-amylase 12,000-38,000-60,000 units per capsule 3 capsule TID WM    melatonin tablet 6 mg  Nightly PRN    meropenem (MERREM) 500 mg in sodium chloride 0.9 % 100 mL IVPB (MB+) Q24H    morphine 12 hr tablet 30 mg Q12H    naloxegoL (MOVANTIK) tablet 25 mg Daily    nitroGLYCERIN SL tablet 0.4 mg Q5 Min PRN    ondansetron injection 4 mg Q6H PRN    polyethylene glycol packet 17 g BID PRN    predniSONE tablet 20 mg BID    senna-docusate 8.6-50 mg per tablet 2 tablet BID    sodium bicarbonate tablet 1,300 mg Daily    sodium chloride 0.9% bolus 250 mL PRN    sodium chloride 0.9% bolus 250 mL PRN    sodium chloride 0.9% bolus 250 mL PRN    sodium chloride 0.9% bolus 250 mL PRN    sodium chloride 0.9% flush 10 mL Q6H    And    sodium chloride 0.9% flush 10 mL PRN    warfarin (COUMADIN) tablet 2.5 mg Daily    zinc oxide-cod liver oil 40 % paste BID     Objective:     Vital Signs (Most Recent):  Temp: 98.7 °F (37.1 °C) (11/11/22 0804)  Pulse: 82 (11/11/22 0804)  Resp: (P) 18 (11/11/22 0828)  BP: (!) (P) 149/84 (11/11/22 0827)  SpO2: (!) 93 % (11/11/22 0804)  O2 Device (Oxygen Therapy): nasal cannula (11/11/22 0700)   Vital Signs (24h Range):  Temp:  [97.3 °F (36.3 °C)-100.8 °F (38.2 °C)] 98.7 °F (37.1 °C)  Pulse:  [80-98] 82  Resp:  [18] (P) 18  SpO2:  [93 %-97 %] 93 %  BP: (119-156)/(79-89) (P) 149/84     Weight: 59.4 kg (131 lb) (11/08/22 0500)  Body mass index is 21.8 kg/m².  Body surface area is 1.65 meters squared.      Intake/Output Summary (Last 24 hours) at 11/11/2022 0849  Last data filed at 11/11/2022 0600  Gross per 24 hour   Intake 1653.33 ml   Output 3433 ml   Net -1779.67 ml       Physical Exam   Constitutional: He is oriented to person, place, and time. He appears ill.   HENT:   Nose: Nose normal.   Pulmonary/Chest:   Pulmonary Comments: Per pulmonary; Currently on Oxygen  Abdominal: He exhibits distension.   Musculoskeletal:      Right lower leg: Edema present.      Left lower leg: Edema present.   Neurological: He is oriented to person, place, and time.   Skin: Skin is warm and dry.      Significant Labs:  All pertinent lab results from the last 24 hours have been reviewed.    Significant Imaging:  Imaging results within the past 24 hours have been reviewed.    Assessment/Plan:     Systemic lupus erythematosus  CellCept, Plaquenil and Benlysta were all discontinued due to infection. 11/10/22 IV Solu-Medrol discontinued. Started on Prednisone 20 mg BID. The plan is to continue these steroids until we are able to follow up when it is appropriate to slowly re-introduce his lupus medications.    Serositis  Secondary to systemic lupus, Lupus medications ( CellCept, Plaquenil and Benlysta ) have been discontinued d/t infection.Currently on prednisone 20mg BID with plans to continue until we are able to slowly re-introduce his lupus medications.            Emilie Vazquez, DENIA  Rheumatology  [facility] - 8th Floor Med Surg

## 2022-11-11 NOTE — SUBJECTIVE & OBJECTIVE
Interval History: 11/10/22- switched solumedrol to PO prednisone 20mg BID    Current Facility-Administered Medications   Medication Frequency    0.9%  NaCl infusion PRN    acetaminophen tablet 650 mg Q6H PRN    albuterol inhaler 2 puff Q4H PRN    aspirin EC tablet 81 mg Daily    bisacodyL suppository 10 mg Daily PRN    carvediloL tablet 25 mg BID    dextrose 10 % infusion PRN    dextrose 10 % infusion PRN    dextrose 10% bolus 125 mL PRN    doxycycline (VIBRAMYCIN) 100 mg in dextrose 5 % 250 mL IVPB Q12H    fluconazole (DIFLUCAN) IVPB 400 mg Every Tues, Thurs, Sat    gabapentin capsule 100 mg TID    glucagon (human recombinant) injection 1 mg PRN    glucose chewable tablet 16 g PRN    glucose chewable tablet 24 g PRN    hydrALAZINE injection 20 mg Q4H PRN    HYDROmorphone (PF) injection 1 mg Q6H PRN    insulin aspart U-100 injection 1-10 Units QID (AC + HS) PRN    insulin detemir U-100 injection 15 Units BID    labetaloL injection 20 mg Q8H PRN    lactulose 10 gram/15 ml solution 10 g Q6H PRN    lipase-protease-amylase 12,000-38,000-60,000 units per capsule 3 capsule TID WM    melatonin tablet 6 mg Nightly PRN    meropenem (MERREM) 500 mg in sodium chloride 0.9 % 100 mL IVPB (MB+) Q24H    morphine 12 hr tablet 30 mg Q12H    naloxegoL (MOVANTIK) tablet 25 mg Daily    nitroGLYCERIN SL tablet 0.4 mg Q5 Min PRN    ondansetron injection 4 mg Q6H PRN    polyethylene glycol packet 17 g BID PRN    predniSONE tablet 20 mg BID    senna-docusate 8.6-50 mg per tablet 2 tablet BID    sodium bicarbonate tablet 1,300 mg Daily    sodium chloride 0.9% bolus 250 mL PRN    sodium chloride 0.9% bolus 250 mL PRN    sodium chloride 0.9% bolus 250 mL PRN    sodium chloride 0.9% bolus 250 mL PRN    sodium chloride 0.9% flush 10 mL Q6H    And    sodium chloride 0.9% flush 10 mL PRN    warfarin (COUMADIN) tablet 2.5 mg Daily    zinc oxide-cod liver oil 40 % paste BID     Objective:     Vital Signs (Most Recent):  Temp: 98.7 °F (37.1 °C)  (11/11/22 0804)  Pulse: 82 (11/11/22 0804)  Resp: (P) 18 (11/11/22 0828)  BP: (!) (P) 149/84 (11/11/22 0827)  SpO2: (!) 93 % (11/11/22 0804)  O2 Device (Oxygen Therapy): nasal cannula (11/11/22 0700)   Vital Signs (24h Range):  Temp:  [97.3 °F (36.3 °C)-100.8 °F (38.2 °C)] 98.7 °F (37.1 °C)  Pulse:  [80-98] 82  Resp:  [18] (P) 18  SpO2:  [93 %-97 %] 93 %  BP: (119-156)/(79-89) (P) 149/84     Weight: 59.4 kg (131 lb) (11/08/22 0500)  Body mass index is 21.8 kg/m².  Body surface area is 1.65 meters squared.      Intake/Output Summary (Last 24 hours) at 11/11/2022 0849  Last data filed at 11/11/2022 0600  Gross per 24 hour   Intake 1653.33 ml   Output 3433 ml   Net -1779.67 ml       Physical Exam   Constitutional: He is oriented to person, place, and time. He appears ill.   HENT:   Nose: Nose normal.   Pulmonary/Chest:   Pulmonary Comments: Per pulmonary; Currently on Oxygen  Abdominal: He exhibits distension.   Musculoskeletal:      Right lower leg: Edema present.      Left lower leg: Edema present.   Neurological: He is oriented to person, place, and time.   Skin: Skin is warm and dry.     Significant Labs:  All pertinent lab results from the last 24 hours have been reviewed.    Significant Imaging:  Imaging results within the past 24 hours have been reviewed.

## 2022-11-11 NOTE — ASSESSMENT & PLAN NOTE
CellCept, Plaquenil and Benlysta were all discontinued due to infection. 11/10/22 IV Solu-Medrol discontinued. Started on Prednisone 20 mg BID. The plan is to continue these steroids until we are able to follow up when it is appropriate to slowly re-introduce his lupus medications.

## 2022-11-11 NOTE — PT/OT/SLP PROGRESS
"Occupational Therapy      Patient Name:  Stuart Guevara   MRN:  [id number]    Patient not seen today secondary to abdominal pain associated with BM x 2. Pt stated that "I know I am in denial, but I am going to have to refuse today." Pt was in discomfort and unable to participate or complete meal. Will follow-up as schedule permits.    11/11/2022  "

## 2022-11-12 NOTE — PROGRESS NOTES
TRAUMA / ACUTE CARE SURGERY   PROGRESS NOTE    Admit Date: 9/23/2022  Hospital Day: 50     Subjective:  NAEON per patient  Nursing staff reporting increased drainage from LLQ wound and now from lower area of midline abd wound  He remains AF, HDS  Tolerating regular diet without N/V  Multiple BMs yesterday     Physical Exam:  GEN: NAD, awake/alert, interactive  HEENT: NCAT, EOMI, MMM   CV: regular rate  PULM: normal WOB on NC O2  ABD: soft, NT, edematous  WOUNDS: mildline abd wound and LLQ wound with slow drainage of thin, brown, proteinaceous fluid. No pus. Not foul smelling. Unable to tolerate bedside wound exploration.   MSK/EXT: moves all ext spontaneously    Inpatient Data    Vitals:   Temp:  [98 °F (36.7 °C)-98.5 °F (36.9 °C)]   Pulse:  [78-85]   Resp:  [16-20]   BP: (145-173)/(84-97)   SpO2:  [94 %-97 %]     Diet NPO   Intake/Output Summary (Last 24 hours) at 11/12/2022 0939  Last data filed at 11/12/2022 0733  Gross per 24 hour   Intake 720 ml   Output 0 ml   Net 720 ml        Recent Labs     11/11/22  0514 11/12/22  0437   WBC 22.6* 24.6*   HGB 7.6* 7.2*   HCT 23.2* 22.1*    287   * 128*   K 3.8 4.3   CO2 24 24   BUN 46.8* 56.7*   CREATININE 1.59* 1.60*   BILITOT  --  1.3   AST  --  28   ALT  --  9   ALKPHOS  --  254*   CALCIUM 8.1* 8.1*   ALBUMIN  --  1.8*   MG 2.00 1.80   PHOS 5.0* 5.2*     Scheduled Meds: aspirin, 81 mg, Daily  carvediloL, 25 mg, BID  doxycycline (VIBRAMYCIN) IVPB, 100 mg, Q12H  fluconazole (DIFLUCAN) IV (PEDS and ADULTS), 400 mg, Every Tues, Thurs, Sat  lipase-protease-amylase 12,000-38,000-60,000 units, 3 capsule, TID WM  meropenem (MERREM) IVPB, 500 mg, Q24H  morphine, 30 mg, Q12H  naloxegoL, 25 mg, Daily  phytonadione ((AQUA-MEPHYTON) IVPB, 10 mg, Once  predniSONE, 20 mg, BID  sodium bicarbonate, 1,300 mg, Daily  sodium chloride 0.9%, 10 mL, Q6H  zinc oxide-cod liver oil, , BID     dextrose 10 % in water (D10W) 35 mL/hr at 11/12/22 0627       ASSESSMENT/PLAN:  32M with  HTN, SLE, HBV, aortic dissection s/p repair 2/2021, 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 He developed abdominal compartment syndrome s/p multiple exlaps (9/25, 9/27, 9/29) now on HD for renal failure with re-accumulation of ascites despite IR paracentesis. CT has shown concern for necrotizing fascitis. Cultures from paracentesis resulted in Klebsiella growth. S/p washout 10/30.      - now with developing skin/soft tissue infection in the LLQ and midline abd wound   - OR today for wound washout  - needs reversal of coumadin prior to OR, Kcentra to be ordered by medicine team. Repeat INR following administration/prior to OR.   - operative plan discussed with patient; will obtain informed consent  - NPO until after OR     Dewayne Cason MD  [employer] General Surgery PGY-4  11/12/2022, 9:39 AM

## 2022-11-12 NOTE — ANESTHESIA PREPROCEDURE EVALUATION
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 He developed abdominal compartment syndrome s/p multiple exlaps (9/25, 9/27, 9/29) now on HD for renal failure with re-accumulation of ascites despite IR paracentesis. CT has shown concern for necrotizing fascitis. Cultures from paracentesis resulted in Klebsiella growth. S/p washout 10/30.      - now with developing skin/soft tissue infection in the LLQ and midline abd wound   - OR today for wound washout  - needs reversal of coumadin prior to OR, Kcentra to be ordered by medicine team. Repeat INR following administration/prior to OR.   - operative plan discussed with patient; will obtain informed consent  - NPO until after OR                                                                                                      11/12/2022  Stuart Guevara is a 32 y.o., male.  INCISION AND DRAINAGE      Pre-op Assessment          Review of Systems  Cardiovascular:   Hypertension    Severe necrotizing pancreatitis with loculated intra-abdominal fluid collection-status post IR drainage October 28  Intra-abdominal abscess versus infected necrotizing pancreatitis  Hospital acquired pneumonia-right-sided  Sepsis  Acute respiratory failure with hypoxia  Pulmonary edema  Severely edematous/anasarca  Acute kidney injury ATN  Critical illness myopathy  Severe protein caloric malnutrition  Acute hypoglycemia  Supratherapeutic INR     History of:  Type A aortic dissection status post repair with mechanical aortic valve conduit, recent type B aortic dissection status post TAVR, lupus nephritis class 5, mixed connective tissue disorder predominant lupus, transverse myelitis recovered        General surgery following.  May need to take back to the OR today for exploratory laparotomy    Physical Exam  General: Alert, Oriented, Well nourished, Cooperative, Cachexia and  Lethargic    Airway:  Mallampati: II   Mouth Opening: Normal  TM Distance: Normal  Tongue: Normal  Neck ROM: Normal ROM    Dental:  Intact    Chest/Lungs:  Clear to auscultation, Normal Respiratory Rate  Cardiopulmonary status much improved , no difficulty at time of surg, oxygenating well sats wnl  Heart:  Rate: Normal  Rhythm: Regular Rhythm       Latest Reference Range & Units 11/12/22 04:37   WBC 4.5 - 11.5 x10(3)/mcL 24.6 (H)   RBC 4.70 - 6.10 x10(6)/mcL 2.59 (L)   Hemoglobin 14.0 - 18.0 gm/dL 7.2 (L)   Hematocrit 42.0 - 52.0 % 22.1 (L)   MCV 80.0 - 94.0 fL 85.3   MCH 27.0 - 31.0 pg 27.8   MCHC 33.0 - 36.0 mg/dL 32.6 (L)   RDW 11.5 - 17.0 % 16.2   Platelets 130 - 400 x10(3)/mcL 287   MPV 7.4 - 10.4 fL 10.6 (H)   Neut % % 93.4   LYMPH % % 3.7   Mono % % 1.6   Eosinophil % % 0.2   Basophil % % 0.2   Immature Granulocytes % 0.9   Neut # 2.1 - 9.2 x10(3)/mcL 22.9 (H)   Lymph # 0.6 - 4.6 x10(3)/mcL 0.92   Mono # 0.1 - 1.3 x10(3)/mcL 0.39   Eos # 0 - 0.9 x10(3)/mcL 0.05   Baso # 0 - 0.2 x10(3)/mcL 0.04   Immature Grans (Abs) 0 - 0.04 x10(3)/mcL 0.23 (H)   nRBC % 0.0   Sed Rate 0 - 15 mm/hr 14   Protime 12.5 - 14.5 seconds 58.3 (H)   INR 0.00 - 1.30  7.00 (HH)   Sodium 136 - 145 mmol/L 128 (L)   Potassium 3.5 - 5.1 mmol/L 4.3   Chloride 98 - 107 mmol/L 96 (L)   CO2 22 - 29 mmol/L 24   BUN 8.9 - 20.6 mg/dL 56.7 (H)   Creatinine 0.73 - 1.18 mg/dL 1.60 (H)   eGFR mls/min/1.73/m2 58   Glucose 74 - 100 mg/dL 173 (H)   Calcium 8.4 - 10.2 mg/dL 8.1 (L)   Phosphorus 2.3 - 4.7 mg/dL 5.2 (H)   Magnesium 1.60 - 2.60 mg/dL 1.80   Alkaline Phosphatase 40 - 150 unit/L 254 (H)   PROTEIN TOTAL 6.4 - 8.3 gm/dL 5.0 (L)   Albumin 3.5 - 5.0 gm/dL 1.8 (L)   Albumin/Globulin Ratio 1.1 - 2.0 ratio 0.6 (L)   BILIRUBIN TOTAL <=1.5 mg/dL 1.3   AST 5 - 34 unit/L 28   ALT 0 - 55 unit/L 9   CRP <5.00 mg/L 58.70 (H)   Globulin, Total 2.4 - 3.5 gm/dL 3.2   (HH): Data is critically high  (H): Data is abnormally high  (L): Data is abnormally  low    Anesthesia Plan  Type of Anesthesia, risks & benefits discussed:    Anesthesia Type: Gen ETT, Gen Supraglottic Airway  Intra-op Monitoring Plan: Standard ASA Monitors  Post Op Pain Control Plan: multimodal analgesia  Induction:  IV and Inhalation  Airway Plan: Direct  Informed Consent: Informed consent signed with the Patient and all parties understand the risks and agree with anesthesia plan.  All questions answered. Patient consented to blood products? Yes  ASA Score: 4  Day of Surgery Review of History & Physical: H&P Update referred to the surgeon/provider.    Ready For Surgery From Anesthesia Perspective.   Summary     The left ventricle is normal in size with moderate concentric hypertrophy and normal systolic function.   The estimated ejection fraction is 55%.   Indeterminate left ventricular diastolic function.   Normal right ventricular size with normal right ventricular systolic function.   Mild left atrial enlargement.       .

## 2022-11-12 NOTE — PROGRESS NOTES
[facility]  HOSPITAL MEDICINE  PROGRESS NOTE        CHIEF COMPLAINT   Hospital follow up    HOSPITAL COURSE   Stuart Guevara is a 32 y.o. male who has PMH which includes HTN, SLE,  hepatitis B, type A aortic dissection with repair + mechanical aortic valve placement 2/2021 on coumadin, RUE DVT s/p Xarelto, class V lupus nephritis; presented to the ED at [facility] on 9/23/2022  with reports of  shortness of breath and chest pain radiating to his back. Work up revealed a type B aortic dissection and significant hypertension noted on presentation.  He failed medical management and had progression of his symptoms despite adequate control of hemodynamic parameters.  PT was transferred from [facility] to [facility] for CV surgery evaluation which he was taken  to the OR and had TEVAR with left carotid to subclavian artery bypass on 9/23.   Later Patient developed abdominal distension and rigidity of his abdomen and developed a lactic acidosis.  He had a repeat CT angiography on 9/25 of the chest/abdomen/pelvis without evidence of worsening dissection, but evidence of significant intra-abdominal pathology with some hemoperitoneum, signs of hypoperfusion of the abdominal organs (compartment syndrome)  free blood in the abdomen. He underwent exploratory laparotomy on 9/25 with evidence of viable bowel and no significant bleeding identified, abd was closed with flakito drain left in situ chest tube was placed. Had repeat ex-laps on 9/27, 9/29. Postoperatively, he developed severe unstable bradycardia, and a transvenous pacemaker was temporarily placed by Cardiology, with the bradycardia resolving within he next couple days.  Shortly afterwards, he was noted to have significant worsening of his renal failure and critical hyperkalemia. Renal services consulted and HD catheter was placed and he was started on HD treatments. Patient was cleared for transfer out of ICU to the floor.  He started having worsening abdominal  pain. He was seen by rheumatology for possible lupus flare up and serositis. He was started on steroids and other immunomodulating drugs. Drugs have since been discontinued due to concern of infection.  Repeat CT showed multiple loculated fluid collections, necrotizing pancreatitis, possible peritonitis. Surgery team is following with plan for IR drainage, which was performed October 28.  Body fluid culture grew out Klebsiella.  Surgical hospitalist took back to the OR October 30th due to continued pain in peritonitis with positive cultures, only found about 3 L of venita ascites, no other sign of infection, necrotic tissue, bowel issues or hematoma.    Today  Has been fever free for the last 24 hours however on exam his abdomen lower incision is having dark green brown drainage, I spoke with Dr. Marr with surgical team and plans to evaluate.  Spoke with radiologist yesterday who did not note any specific signs of infection on the MRI but it was difficult because of recent surgeries.        OBJECTIVE/PHYSICAL EXAM     VITAL SIGNS (MOST RECENT):  Temp: 98.1 °F (36.7 °C) (11/12/22 0700)  Pulse: 80 (11/12/22 0700)  Resp: 18 (11/12/22 0700)  BP: (!) 173/97 (11/12/22 0700)  SpO2: 97 % (11/12/22 0700)   VITAL SIGNS (24 HOUR RANGE):  Temp:  [98 °F (36.7 °C)-98.5 °F (36.9 °C)] 98.1 °F (36.7 °C)  Pulse:  [78-85] 80  Resp:  [16-20] 18  SpO2:  [94 %-97 %] 97 %  BP: (145-173)/(84-97) 173/97   GENERAL: In no acute distress, afebrile, malnourished weight loss  HEENT:  CHEST: Clear to auscultation bilaterally  HEART: S1, S2, no appreciable murmur  ABDOMEN: diffuse tenderness more so on the right side lower, distended, lower incision site and left lower area wound with dark green brown drainage  MSK: Warm, 2+ pitting edema both legs, no clubbing or cyanosis  NEUROLOGIC: Alert and oriented x4, both legs 2/5 strength, 5/5 upper extremity  INTEGUMENTARY:  Anasarca  PSYCHIATRY:        ASSESSMENT/PLAN   Severe necrotizing pancreatitis  with loculated intra-abdominal fluid collection-status post IR drainage October 28  Intra-abdominal abscess versus infected necrotizing pancreatitis  Hospital acquired pneumonia-right-sided  Sepsis  Acute respiratory failure with hypoxia  Pulmonary edema  Severely edematous/anasarca  Acute kidney injury ATN  Critical illness myopathy  Severe protein caloric malnutrition  Acute hypoglycemia  Supratherapeutic INR     History of:  Type A aortic dissection status post repair with mechanical aortic valve conduit, recent type B aortic dissection status post TAVR, lupus nephritis class 5, mixed connective tissue disorder predominant lupus, transverse myelitis recovered        General surgery following.  May need to take back to the OR today for exploratory laparotomy.  Gastroenterology signed off.    Nephrology following.  Continue aggressive fluid removal with HD.  ID following.  Continue meropenem, fluconazole, doxycycline.  Follow blood cultures.  Rheumatology following.  Prednisone 20 b.i.d., all other immunosuppressants discontinued.  Further tapering to be done in the clinic.  Continue D10 drip for now, wean as tolerated.  Hold insulin therapy.  Give 1 dose IV vitamin K 10 mg in preparation for possible surgery.  Will likely need Kcentra as well if definitively going for surgery, defer Kcentra administration/ordering to surgery team.  Monitor INR.  Patient remains critically ill and condition remains guarded.  Many poor prognostic factors playing a role here.     Critical care diagnosis: All of the above  Critical care time spent:  50 minutes    Anticipated discharge and disposition:   __________________________________________________________________________    LABS/MICRO/MEDS/DIAGNOSTICS       LABS  Recent Labs     11/12/22  0433   *   K 4.3   CHLORIDE 96*   CO2 24   BUN 56.7*   CREATININE 1.60*   GLUCOSE 173*   CALCIUM 8.1*   ALKPHOS 254*   AST 28   ALT 9   ALBUMIN 1.8*     Recent Labs     11/12/22  1077    WBC 24.6*   RBC 2.59*   HCT 22.1*   MCV 85.3          MICROBIOLOGY  Microbiology Results (last 7 days)       Procedure Component Value Units Date/Time    Blood Culture [[id number]]  (Normal) Collected: 11/10/22 2120    Order Status: Completed Specimen: Blood Updated: 11/11/22 2300     CULTURE, BLOOD ([facility]) No Growth At 24 Hours    Blood Culture [[id number]]  (Normal) Collected: 11/10/22 2120    Order Status: Completed Specimen: Blood Updated: 11/11/22 2300     CULTURE, BLOOD ([facility]) No Growth At 24 Hours    Blood Culture [[id number]]  (Normal) Collected: 11/05/22 0549    Order Status: Completed Specimen: Blood Updated: 11/10/22 1000     CULTURE, BLOOD ([facility]) No Growth at 5 days    Blood Culture [[id number]]  (Normal) Collected: 11/05/22 0549    Order Status: Completed Specimen: Blood Updated: 11/10/22 1000     CULTURE, BLOOD ([facility]) No Growth at 5 days            MEDICATIONS   aspirin  81 mg Oral Daily    carvediloL  25 mg Oral BID    doxycycline (VIBRAMYCIN) IVPB  100 mg Intravenous Q12H    fluconazole (DIFLUCAN) IV (PEDS and ADULTS)  400 mg Intravenous Every Tues, Thurs, Sat    lipase-protease-amylase 12,000-38,000-60,000 units  3 capsule Oral TID WM    meropenem (MERREM) IVPB  500 mg Intravenous Q24H    morphine  30 mg Oral Q12H    naloxegoL  25 mg Oral Daily    phytonadione ((AQUA-MEPHYTON) IVPB  10 mg Intravenous Once    predniSONE  20 mg Oral BID    sodium bicarbonate  1,300 mg Oral Daily    sodium chloride 0.9%  10 mL Intravenous Q6H    zinc oxide-cod liver oil   Topical (Top) BID      INFUSIONS   dextrose 10 % in water (D10W) 35 mL/hr at 11/12/22 0627         DIAGNOSTIC TESTS  MRI Pelvis Without Contrast   Final Result      Diffuse fluid overload with fluid collections within the peritoneum, retroperitoneum, musculature, inguinal canals and soft tissues.  There is variable degree of complex proteinaceous debris within these fluid collections.  Unable to differentiate infection from sterile inflammation in  this patient with history multiple prior procedures and peritonitis.      Small, symmetric bilateral hip effusions without accompanying osteitis         Electronically signed by: Rocio White   Date:    11/11/2022   Time:    15:20      X-Ray Chest 1 View   Final Result      No significant change         Electronically signed by: Ramón Ying   Date:    11/11/2022   Time:    09:37      X-Ray Chest 1 View   Final Result      No adverse interval change.         Electronically signed by: Jakob Boland   Date:    11/09/2022   Time:    09:09      NM Lung Scan Ventilation Perfusion   Final Result      This represents a low probability pulmonary embolism      Decreased ventilation in the right lung with respect to the left most likely related to the airspace disease..         Electronically signed by: Too Reyes   Date:    11/07/2022   Time:    16:08      CT Chest Abdomen Pelvis With Contrast (xpd)   Final Result      1. Diffuse irregular ground-glass predominant opacities in the lungs are new compared to prior.  This could relate to an infectious/inflammatory process or pulmonary edema.   2. Similar moderate bilateral pleural effusions with compressive atelectasis.   3. Similar moderate to large volume loculated ascites.   4. Essentially no normal pancreatic tissue is seen.  Similar fluid collection in the expected area of the pancreas.         Electronically signed by: Kapil Ayers   Date:    11/06/2022   Time:    11:45      X-Ray Chest 1 View   Final Result      No significant change         Electronically signed by: Ramón Ying   Date:    11/06/2022   Time:    09:52      X-Ray Chest 1 View   Final Result      Worsening of confluent airspace opacities mostly in both perihilar regions and upper lobes.      Otherwise no other change in distribution.      No other significant change         Electronically signed by: Ramón Ying   Date:    11/05/2022   Time:    07:55      US Abdomen  Limited_for_Ascites   Final Result      Small volume ascites.         Electronically signed by: Kapil Ayers   Date:    11/04/2022   Time:    18:39      X-Ray Chest 1 View   Final Result      No adverse interval change.         Electronically signed by: Jakob Boland   Date:    11/02/2022   Time:    13:48      X-Ray Chest 1 View   Final Result      Postsurgical changes again noted with increasing right lung opacification in left pleural effusion.  Developing infectious process is not excluded.         Electronically signed by: Jakob Boland   Date:    11/02/2022   Time:    08:52      X-Ray Abdomen Flat And Erect   Final Result      Postsurgical changes. NG tube projects over the right upper quadrant in the expected location of the stomach.         Electronically signed by: Jakob Boland   Date:    11/02/2022   Time:    08:39      X-Ray Abdomen AP 1 View   Final Result      As above.         Electronically signed by: Jakob Boland   Date:    10/31/2022   Time:    07:36      CTA Chest Abdomen Pelvis   Final Result   Impression:      1.  An aortic stent is again noted.  Caudal extension of the [place] type B aortic dissection 37.1 mm from the inferior margin of the graft is noted (Series 18, Image 55). Dilatation of the descending thoracic aorta is again noted, measuring 41.3 mm at its widest. The distal descending segment measures 23.4 mm at the diaphragmatic hiatus. The origin of the left subclavian, left common carotid and right brachiocephalic arteries are unremarkable.      2. Mild diffuse interlobular interstitial septal thickening in both lungs may be present. An element of congestive failure is not excluded. There is dense opacity at the dependent lung bases consistent with compressive atelectasis with the possibility of an infectious component not entirely excluded. Correlate clinically.      3. The IVC is nonenhanced and appears narrowed.      4. There is a pronounced ascites which is new compared to the  prior study. Mild layering hyperdense fluid pelvic fluid is seen, likely reflecting hemoperitoneum. There is a large heterogeneous, predominantly hyperdense mass/collection along the left paracolic gutter and pelvic sidewall (Series 14 Image 90 and Series 22, Image 46), measuring 97 x 62 x 170 mm (AP, transverse, craniocaudal dimensions). This may reflect hematoma formation. No focal contrast collection or pooling is identified to suggest active hemorrhage at the time of imaging. There is a possible left rectus sheath hematoma seen on image 116 of series 14. Correlate with clinical and laboratory findings as regards additional evaluation and follow-up.      5.  Persistence of bilateral pleural effusions which are moderate-sized. Follow-up as clinically indicated.      6. Details and other findings as discussed above.      No significant discrepancy with overnight report.         Electronically signed by: Jameson Brumfield   Date:    10/30/2022   Time:    09:57      CT Abscess Aspiration without Catheter   Final Result      Successful aspiration of bilateral fluid with simple free fluid identified on the right and thick gelatinous loculated fluid identified in the left.  All fluid sent for laboratory analysis and clearly labeled.         Electronically signed by: Jakob Boland   Date:    10/31/2022   Time:    07:39      US Guided Needle Placement   Final Result      Successful aspiration of bilateral fluid with simple free fluid identified on the right and thick gelatinous loculated fluid identified in the left.  All fluid sent for laboratory analysis and clearly labeled.         Electronically signed by: Jakob Boland   Date:    10/31/2022   Time:    07:39      US Scrotum And Testicles   Final Result      CT Abdomen Pelvis With Contrast   Final Result      1. No normal pancreatic parenchyma is seen.  At the level of the pancreas there is a large fluid collection with a few foci of gas.  Loculated, faintly peripherally  enhancing fluid collections extend along the pericolic gutters.  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  Foci of gas are nonspecific in the setting of prior intervention.   2. Extensive loculated peritoneal collections with peripheral enhancement compatible with peritonitis.Left inguinal hernia contains ascitic fluid with some peripheral enhancement similar to the peritoneal fluid.  There is no herniated bowel loop   3. There is a moderate colonic stool burden.  There is some mass effect on the bowel loops due to the loculated nature of the abdominal fluid collections.   4. Anasarca   5. Partially imaged known thoracic aortic dissection status post intervention.         Electronically signed by: Rocio White   Date:    10/26/2022   Time:    12:37      X-Ray Chest 1 View for Line/Tube Placement   Final Result      Right PICC tip overlies the mid SVC.         Electronically signed by: Kapil Ayers   Date:    10/25/2022   Time:    16:56      CT Abdomen Pelvis  Without Contrast   Final Result      Bilateral pleural effusions with moderate ascites and diffuse anasarca.  The epigastric and left mid abdominal ascites appears loculated and peritonitis cannot be excluded.         Electronically signed by: Grabiel Singleton MD   Date:    10/24/2022   Time:    23:30      X-Ray Abdomen AP 1 View   Final Result      Residual feces         Electronically signed by: Ramón Ying   Date:    10/24/2022   Time:    09:46      X-Ray Chest 1 View   Final Result      No significant interval change.         Electronically signed by: Kapil Ayers   Date:    10/22/2022   Time:    15:13      X-Ray Chest 1 View   Final Result      Persistent increased left retrocardiac density and silhouetting of the left hemidiaphragm.      No other focal consolidative changes.      No other change         Electronically signed by: Ramón Ying   Date:    10/12/2022   Time:    08:15      Fl  Modified Barium Swallow Speech   Final Result      X-Ray Chest 1 View   Final Result      Unchanged retrocardiac opacity suggesting atelectasis         Electronically signed by: Rocio White   Date:    10/05/2022   Time:    06:11      X-Ray Chest 1 View   Final Result      New central venous catheter terminates within the proximal right atrium.         Electronically signed by: Jameson Brumfield   Date:    10/04/2022   Time:    14:16      X-Ray Chest 1 View   Final Result      No significant interval change.         Electronically signed by: Kapil Ayers   Date:    10/04/2022   Time:    06:54      CT Chest Abdomen Pelvis Without Contrast (XPD)   Final Result      Very limited study due the lack of IV contrast.      Anasarca.      Small amount of ascites.      Small left-sided pleural effusion with bibasilar atelectasis versus infiltrate.         Electronically signed by: Joey Vidal MD   Date:    10/03/2022   Time:    21:30      X-Ray Chest 1 View   Final Result      As above.         Electronically signed by: Jameson Brumfield   Date:    10/03/2022   Time:    07:09      X-Ray Chest 1 View   Final Result      As above.         Electronically signed by: Jakob Boland   Date:    10/02/2022   Time:    14:50      X-ray Abdomen for NG Tube Placement (Nursing should notify Radiology after placement)   Final Result      Optimal placement of the nasogastric tube.         Electronically signed by: Jameson Brumfield   Date:    10/01/2022   Time:    11:48      X-Ray Chest 1 View   Final Result      No significant change         Electronically signed by: Ramón Ying   Date:    10/01/2022   Time:    08:05      X-Ray Chest 1 View   Final Result      No acute findings or significant interval change.         Electronically signed by: Kapil Ayers   Date:    09/30/2022   Time:    07:35      X-Ray Chest 1 View   Final Result      Stable exam without significant interval change.         Electronically signed by: Claribel Deal    Date:    09/29/2022   Time:    16:14      X-Ray Chest 1 View   Final Result      Possible mild left perihilar/lower lung atelectasis.  Suspect tiny right apical pneumothorax.         Electronically signed by: Darius Rosario   Date:    09/29/2022   Time:    06:09      X-Ray Chest 1 View   Final Result      No significant interval change.         Electronically signed by: Jameson Brumfield   Date:    09/28/2022   Time:    16:25      X-Ray Chest 1 View   Final Result      Similar appearance to prior exam with retrocardiac atelectasis         Electronically signed by: Rocio White   Date:    09/28/2022   Time:    06:05      X-Ray Chest 1 View   Final Result      No significant change         Electronically signed by: Ramón Ying   Date:    09/27/2022   Time:    08:47      X-Ray Abdomen AP 1 View   Final Result      Satisfactory central femoral line placement.         Electronically signed by: Emily Candelario MD   Date:    09/26/2022   Time:    13:54      X-Ray Chest 1 View   Final Result      Interval retraction of endotracheal tube, satisfactory position.  Cardiac lead placement in the interval.  No other significant change.         Electronically signed by: Emily Candelario MD   Date:    09/26/2022   Time:    08:00      X-Ray Chest 1 View   Final Result      Increased volume status      Interval intubation with endotracheal tube at the josie.  I recommend retracting      Right-sided chest tube in good position         Electronically signed by: Too Reyes   Date:    09/25/2022   Time:    15:49      X-Ray Abdomen AP 1 View   Final Result      There is slight increased density of the kidneys of questionable etiology and or significance.      Otherwise nonspecific gas pattern         Electronically signed by: Ramón Ying   Date:    09/25/2022   Time:    10:51      CTA Chest Abdomen Non Coronary   Final Result      1. Thoracic aortic endograft placement since 09/23/2022 with the dissection flap  extending about 3 cm inferior to the distal portion of the graft.  Opacification of the false lumen along the mid and distal portions of the graft.   2. Moderate volume hemoperitoneum.  Changes involving the liver, spleen, pancreas and bowel are suggestive of overall hypoperfusion.  Hyperdense kidneys suggest acute kidney injury.   3. Small to moderate bilateral pleural effusions, larger on the right.   Findings discussed with Mr. Guevara ICU nurse, [place] at 08:35 hours on 09/25/2022.         Electronically signed by: Darius Rosario   Date:    09/25/2022   Time:    08:42      X-Ray Chest 1 View   Final Result      Changes suggestive of right-sided pleural effusion.      Increased left retrocardiac density and silhouetting of the left hemidiaphragm which might be related to an infiltrate/atelectasis.      Interval placement of thoracic endograft.      No focal consolidative changes         Electronically signed by: Ramón Ying   Date:    09/25/2022   Time:    09:10      IR Abdominal Aortobifemoral   Final Result      Fluoroscopic assistance provided as above.         Electronically signed by: Kapil Ayers   Date:    09/26/2022   Time:    09:56               All diagnosis and differential diagnosis have been reviewed; assessment and plan has been documented. I have personally reviewed the labs and test results that are presently available; I have reviewed the patients medication list. I have reviewed the consulting providers response and recommendations. I have reviewed or attempted to review medical records based upon their availability.  All of the patient's questions have been addressed and answered. Patient's is agreeable to the above stated plan. I will continue to monitor closely and make adjustments to medical management as needed.  This document was created using M*Modal Fluency Direct.  Transcription errors may have been made.  Please contact me if any questions may rise regarding documentation to  clarify shahlaiage.        Luis F Hutson MD   11/12/2022   Internal Medicine

## 2022-11-12 NOTE — PROGRESS NOTES
NEPHROLOGY PROGRESS NOTE      Patient Demographics:  Name:  Stuart Guevara  Age: 32 y.o.  MRN:  [id number]  Admission Date: 9/23/2022      Subjective:      Doing ok  Having a lot of leaking from abdominal incision    Will need repeat washout  INR is 7 today    Plan HD today mainly for volume    Current Facility-Administered Medications   Medication Dose Route Frequency Provider Last Rate Last Admin    0.9%  NaCl infusion   Intravenous PRN DEBBIE Pearl        acetaminophen tablet 650 mg  650 mg Oral Q6H PRN Nolberto Calvillo MD   650 mg at 11/05/22 1554    albuterol inhaler 2 puff  2 puff Inhalation Q4H PRN Evelio Marshall MD        aspirin EC tablet 81 mg  81 mg Oral Daily Luis F Hutson MD   81 mg at 11/11/22 0827    bisacodyL suppository 10 mg  10 mg Rectal Daily PRN DENIA Caruso   10 mg at 10/28/22 0400    carvediloL tablet 25 mg  25 mg Oral BID Maximino Yeh, ANP   25 mg at 11/12/22 1006    dextrose 10 % infusion  12.5 g Intravenous PRN Dewayne Hughes PA-C   12.5 g at 10/25/22 0317    dextrose 10 % infusion  25 g Intravenous PRN Dewayne Hughes PA-C        dextrose 10 % infusion   Intravenous Continuous Luis F Hutson MD 35 mL/hr at 11/12/22 0627 Rate Change at 11/12/22 0627    dextrose 10% bolus 125 mL  12.5 g Intravenous PRN Ivan Chopra MD   Stopped at 10/20/22 0953    doxycycline (VIBRAMYCIN) 100 mg in dextrose 5 % 250 mL IVPB  100 mg Intravenous Q12H DENIA Armando   Stopped at 11/12/22 0635    fluconazole (DIFLUCAN) IVPB 400 mg  400 mg Intravenous Every Tues, Thurs, Sat Aleah Calix MD   Stopped at 11/10/22 2315    glucagon (human recombinant) injection 1 mg  1 mg Intramuscular PRN Dewayne Hughes PA-C   1 mg at 10/20/22 0925    glucose chewable tablet 16 g  16 g Oral PRN Dewayne Hughes PA-C   16 g at 11/08/22 1104    glucose chewable tablet 24 g  24 g Oral PRN Dewayne Hughes PA-C   24 g at 10/24/22 1709    hydrALAZINE injection 20 mg  20 mg Intravenous Q4H PRN Bin  MD Ray   20 mg at 10/22/22 0319    HYDROmorphone (PF) injection 1 mg  1 mg Intravenous Q6H PRN Luis F Hutson MD   1 mg at 11/12/22 0613    insulin aspart U-100 injection 1-10 Units  1-10 Units Subcutaneous QID (AC + HS) PRN Ronda Hamilton MD   4 Units at 11/09/22 0550    labetaloL injection 20 mg  20 mg Intravenous Q8H PRN Bin Bell MD   20 mg at 10/03/22 0321    lactulose 10 gram/15 ml solution 10 g  10 g Oral Q6H PRN Luis F Hutson MD   10 g at 11/09/22 1136    lipase-protease-amylase 12,000-38,000-60,000 units per capsule 3 capsule  3 capsule Oral TID WM Luis F Hutson MD   3 capsule at 11/11/22 1717    melatonin tablet 6 mg  6 mg Oral Nightly PRN VIRGILIO Boothe-BC   6 mg at 10/14/22 0000    meropenem (MERREM) 500 mg in sodium chloride 0.9 % 100 mL IVPB (MB+)  500 mg Intravenous Q24H Saurahb Curry MD   Stopped at 11/11/22 1313    morphine 12 hr tablet 30 mg  30 mg Oral Q12H Shamar Stewart MD   30 mg at 11/12/22 1006    naloxegoL (MOVANTIK) tablet 25 mg  25 mg Oral Daily Luis F Hutson MD   25 mg at 11/12/22 1006    nitroGLYCERIN SL tablet 0.4 mg  0.4 mg Sublingual Q5 Min PRN Zach Shafer MD   0.4 mg at 10/30/22 0713    ondansetron injection 4 mg  4 mg Intravenous Q6H PRN Evelio Marshall MD   4 mg at 11/08/22 1053    phytonadione vitamin k (AQUA-MEPHYTON) 10 mg in dextrose 5 % 50 mL IVPB  10 mg Intravenous Once Luis F Hutson MD 50 mL/hr at 11/12/22 1034 10 mg at 11/12/22 1034    polyethylene glycol packet 17 g  17 g Oral BID PRN DEBBIE Pearl        predniSONE tablet 20 mg  20 mg Oral BID DENIA Mg   20 mg at 11/12/22 1006    sodium bicarbonate tablet 1,300 mg  1,300 mg Oral Daily KARISHMA PearlP   1,300 mg at 11/12/22 1006    sodium chloride 0.9% bolus 250 mL  250 mL Intravenous PRN Thalia Renteria, KARISHMAP        sodium chloride 0.9% bolus 250 mL  250 mL Intravenous PRN Thalia Renteria, KARISHMAP        sodium chloride 0.9% bolus 250 mL  250 mL Intravenous PRN  "Shahzad Lundberg MD        sodium chloride 0.9% bolus 250 mL  250 mL Intravenous PRN Thalia Renteria, AGNP        sodium chloride 0.9% flush 10 mL  10 mL Intravenous Q6H Ronda Hamilton MD   10 mL at 11/12/22 0600    And    sodium chloride 0.9% flush 10 mL  10 mL Intravenous PRN Ronda Hamilton MD        zinc oxide-cod liver oil 40 % paste   Topical (Top) BID Saurabh Curry MD   Given at 11/11/22 2100           Review of Systems   Constitutional:  Positive for malaise/fatigue.   HENT: Negative.     Eyes: Negative.    Respiratory: Negative.     Cardiovascular:  Positive for leg swelling.   Gastrointestinal:  Positive for abdominal pain.   Genitourinary: Negative.    Musculoskeletal: Negative.    Skin: Negative.    Neurological:  Positive for weakness.       Objective:    BP (!) 173/97   Pulse 80   Temp 98.1 °F (36.7 °C) (Oral)   Resp 18   Ht 5' 5" (1.651 m)   Wt 60.3 kg (133 lb)   SpO2 97%   BMI 22.13 kg/m²       Intake/Output Summary (Last 24 hours) at 11/12/2022 1119  Last data filed at 11/12/2022 0733  Gross per 24 hour   Intake 720 ml   Output 0 ml   Net 720 ml             Physical Exam  Vitals reviewed.   Constitutional:       Appearance: Normal appearance.   HENT:      Head: Normocephalic and atraumatic.      Nose: Nose normal.   Eyes:      Extraocular Movements: Extraocular movements intact.      Pupils: Pupils are equal, round, and reactive to light.   Cardiovascular:      Rate and Rhythm: Normal rate and regular rhythm.      Pulses: Normal pulses.      Heart sounds: Normal heart sounds.   Pulmonary:      Effort: Pulmonary effort is normal.      Breath sounds: Normal breath sounds.   Abdominal:      General: Bowel sounds are normal. There is distension.      Palpations: Abdomen is soft.      Tenderness: There is abdominal tenderness.      Comments: Dressing D/I   Musculoskeletal:         General: Normal range of motion.      Cervical back: Normal range of motion.      Right lower leg: Edema " present.      Left lower leg: Edema present.      Comments: Some deconditioning   Skin:     General: Skin is warm and dry.   Neurological:      General: No focal deficit present.      Mental Status: He is alert and oriented to person, place, and time. Mental status is at baseline.   Psychiatric:         Mood and Affect: Mood normal.          Inpatient Diagnostics:  Recent Results (from the past 24 hour(s))   POCT glucose    Collection Time: 11/11/22 12:24 PM   Result Value Ref Range    POCT Glucose 35 (LL) 70 - 110 mg/dL   POCT glucose    Collection Time: 11/11/22 12:37 PM   Result Value Ref Range    POCT Glucose 39 (LL) 70 - 110 mg/dL   POCT glucose    Collection Time: 11/11/22  1:01 PM   Result Value Ref Range    POCT Glucose 59 (L) 70 - 110 mg/dL   POCT glucose    Collection Time: 11/11/22  4:23 PM   Result Value Ref Range    POCT Glucose 36 (LL) 70 - 110 mg/dL   POCT glucose    Collection Time: 11/11/22  4:47 PM   Result Value Ref Range    POCT Glucose 215 (H) 70 - 110 mg/dL   POCT glucose    Collection Time: 11/11/22 10:08 PM   Result Value Ref Range    POCT Glucose 47 (LL) 70 - 110 mg/dL   POCT glucose    Collection Time: 11/11/22 11:26 PM   Result Value Ref Range    POCT Glucose 60 (L) 70 - 110 mg/dL   Comprehensive Metabolic Panel    Collection Time: 11/12/22  4:37 AM   Result Value Ref Range    Sodium Level 128 (L) 136 - 145 mmol/L    Potassium Level 4.3 3.5 - 5.1 mmol/L    Chloride 96 (L) 98 - 107 mmol/L    Carbon Dioxide 24 22 - 29 mmol/L    Glucose Level 173 (H) 74 - 100 mg/dL    Blood Urea Nitrogen 56.7 (H) 8.9 - 20.6 mg/dL    Creatinine 1.60 (H) 0.73 - 1.18 mg/dL    Calcium Level Total 8.1 (L) 8.4 - 10.2 mg/dL    Protein Total 5.0 (L) 6.4 - 8.3 gm/dL    Albumin Level 1.8 (L) 3.5 - 5.0 gm/dL    Globulin 3.2 2.4 - 3.5 gm/dL    Albumin/Globulin Ratio 0.6 (L) 1.1 - 2.0 ratio    Bilirubin Total 1.3 <=1.5 mg/dL    Alkaline Phosphatase 254 (H) 40 - 150 unit/L    Alanine Aminotransferase 9 0 - 55 unit/L     Aspartate Aminotransferase 28 5 - 34 unit/L    eGFR 58 mls/min/1.73/m2   Phosphorus    Collection Time: 11/12/22  4:37 AM   Result Value Ref Range    Phosphorus Level 5.2 (H) 2.3 - 4.7 mg/dL   Magnesium    Collection Time: 11/12/22  4:37 AM   Result Value Ref Range    Magnesium Level 1.80 1.60 - 2.60 mg/dL   Protime-INR    Collection Time: 11/12/22  4:37 AM   Result Value Ref Range    PT 58.3 (H) 12.5 - 14.5 seconds    INR 7.00 (HH) 0.00 - 1.30   C-Reactive Protein    Collection Time: 11/12/22  4:37 AM   Result Value Ref Range    C-Reactive Protein 58.70 (H) <5.00 mg/L   Sedimentation Rate    Collection Time: 11/12/22  4:37 AM   Result Value Ref Range    Sed Rate 14 0 - 15 mm/hr   CBC with Differential    Collection Time: 11/12/22  4:37 AM   Result Value Ref Range    WBC 24.6 (H) 4.5 - 11.5 x10(3)/mcL    RBC 2.59 (L) 4.70 - 6.10 x10(6)/mcL    Hgb 7.2 (L) 14.0 - 18.0 gm/dL    Hct 22.1 (L) 42.0 - 52.0 %    MCV 85.3 80.0 - 94.0 fL    MCH 27.8 27.0 - 31.0 pg    MCHC 32.6 (L) 33.0 - 36.0 mg/dL    RDW 16.2 11.5 - 17.0 %    Platelet 287 130 - 400 x10(3)/mcL    MPV 10.6 (H) 7.4 - 10.4 fL    Neut % 93.4 %    Lymph % 3.7 %    Mono % 1.6 %    Eos % 0.2 %    Basophil % 0.2 %    Lymph # 0.92 0.6 - 4.6 x10(3)/mcL    Neut # 22.9 (H) 2.1 - 9.2 x10(3)/mcL    Mono # 0.39 0.1 - 1.3 x10(3)/mcL    Eos # 0.05 0 - 0.9 x10(3)/mcL    Baso # 0.04 0 - 0.2 x10(3)/mcL    IG# 0.23 (H) 0 - 0.04 x10(3)/mcL    IG% 0.9 %    NRBC% 0.0 %   POCT glucose    Collection Time: 11/12/22  5:44 AM   Result Value Ref Range    POCT Glucose 233 (H) 70 - 110 mg/dL   POCT glucose    Collection Time: 11/12/22  8:49 AM   Result Value Ref Range    POCT Glucose 323 (H) 70 - 110 mg/dL       A/P--NE 11/12    1---TAY req HD---HD today mainly for volume/ Output is very poor--Creat low but muscle mass is very low  2---10/30 s/p Abdominal Washout--per Gen Sx--Will need repeat Washout when INR improved  3---SLE--Stable  4---Ascites---Paracentesis when INR  improved  5---Anemia secondary to Acute Illness/ Blood Loss--Transfuse with HD today/ Follow H&H    IV--D10 at 50cc/hr    ORDERS:  HD today  Transfuse with HD  CBC/CMP/INR in am  Type/ Cross now                          Patient stable.  Discussed with NP.  Patient remains in surgery at rounds.  Plan repeat HD with transfusion tomnorrow.  Continue supportive care.  Will follow.

## 2022-11-12 NOTE — NURSING
Received critical INR for pt of 7.0.Left a voice message and text message for Hospitalist NP to call back. Awaiting response.

## 2022-11-13 NOTE — OP NOTE
[facility] - 8th Floor Med Surg  Operative Note      Date of Procedure: 11/12/2022     Procedure:   Excisional debridement of skin and subcutaneous tissue   Placement of negative pressure therapy    Surgeon(s) and Role:     * Saran Marr MD - Primary    Assisting Surgeon: MD Blayne Mota MD    Pre-Operative Diagnosis: Post-traumatic wound infection [T14.8XXA, L08.9]    Post-Operative Diagnosis: Same    Anesthesia: General/MAC    Estimated Blood Loss (EBL): 100 mL           Specimens: Wound C&S and lipase     Description of Technical Procedures:  After informed consent was obtained, patient was brought to the operating room and placed in the supine position.  General anesthesia was administered by member of the anesthesia team.  The abdomen was prepped and draped in sterile surgical fashion.  Thin grayish brown fluid was noted to be draining from the inferior aspect of his midline incision as well as from a skin opening in the left lower quadrant abdominal wall.  The fluid did not have any odor.  There was a tract from the midline incision that extended towards the left abdominal wall.  This was opened up with electrocautery and appeared to be within the rectus sheath.  The left lower quadrant wound was explored and noted to track towards the left hip and inferiorly.  The skin and subcutaneous tissue was sharply excised with electrocautery.  The subcutaneous tissue appeared necrotic so this was excised to viable tissue with electrocautery and curved Graham scissors.  The underlying muscle and fascia was healthy and viable.  Ultimately the wound measured 23 x 17 cm.  Wound cultures were obtained and sent to microbiology.  Fluid was also obtained and sent for lipase level as the wound was noted to track up the left flank and into the retroperitoneal space.  The wound was irrigated with copious amounts of saline and hemostasis was achieved with spot cautery.  Negative pressure  therapy was applied to the wound and left flank tract.  Patient tolerated the procedure well and there were no complications.  He was awakened in the operating room and transferred to the ICU in stable condition.  He will have to returned to the operating room in the next 24-48 hours.

## 2022-11-13 NOTE — BRIEF OP NOTE
[facility] - 7th Floor ICU  Brief Operative Note    SUMMARY     Surgery Date: 11/13/2022     Surgeon(s) and Role:     * Dayron Delcid MD - Primary    Assisting Surgeon: Dewayne Cason MD PGY4    Pre-op Diagnosis:  Post-traumatic wound infection [T14.8XXA, L08.9]  Coagulopathy [D68.9]    Post-op Diagnosis:  Post-Op Diagnosis Codes:     * Post-traumatic wound infection [T14.8XXA, L08.9]     * Coagulopathy [D68.9]    Procedure(s) (LRB):  INCISION AND DRAINAGE (N/A)  Wound vac removal    Anesthesia: General    Operative Findings: large clot underneath wound vac black sponge, approximately 250cc worth. Coagulopathic bleeding diffusely with a focal area of increased bleeding in the right flank; to better explore this area we opened up the left flank area of skin necrosis. Wound vac removed and replaced with wet-to-dry dressing. Pressors weaned with administration of blood products during the case.    Estimated Blood Loss: 350cc  Blood Products: 2u pRBC given prior to OR, 2u pRBC, 1u FFP, 1u plt, and 1 dose of DDAVP given intra-op.          Specimens:   Specimen (24h ago, onward)      None          Formal operative dictation to follow.    Dewayne Cason MD  [facility] General Surgery PGY-4  11/13/2022, 6:42 AM    [id number]

## 2022-11-13 NOTE — NURSING
Spoke with Dr. Cason, orders to give 1 FFP and 1 PLT. New orders noted. Wound vac dressing completely saturated with bloody drainage on gown ( football size) and on chux. states will be by to see pt.

## 2022-11-13 NOTE — PT/OT/SLP PROGRESS
Physical Therapy      Patient Name:  Stuart Guevara   MRN:  [id number]    Patient t/f to ICU and is not intubated and sedate. Will hold therapy and notify therapist.

## 2022-11-13 NOTE — NURSING
Called and spoke with mother, updates provided, questions answered. Mother states to keep her updated.

## 2022-11-13 NOTE — NURSING
Pt transferred to holding for procedure. Report given to holding nurse Matthew Cantu RN, questions answered.

## 2022-11-13 NOTE — PLAN OF CARE
Problem: Adult Inpatient Plan of Care  Goal: Plan of Care Review  Outcome: Ongoing, Progressing  Goal: Patient-Specific Goal (Individualized)  Outcome: Ongoing, Progressing  Goal: Absence of Hospital-Acquired Illness or Injury  Outcome: Ongoing, Progressing  Goal: Optimal Comfort and Wellbeing  Outcome: Ongoing, Progressing  Goal: Readiness for Transition of Care  Outcome: Ongoing, Progressing     Problem: Infection  Goal: Absence of Infection Signs and Symptoms  Outcome: Ongoing, Progressing     Problem: Skin Injury Risk Increased  Goal: Skin Health and Integrity  Outcome: Ongoing, Progressing     Problem: Fall Injury Risk  Goal: Absence of Fall and Fall-Related Injury  Outcome: Ongoing, Progressing     Problem: Communication Impairment (Mechanical Ventilation, Invasive)  Goal: Effective Communication  Outcome: Ongoing, Progressing     Problem: Nutrition Impairment (Mechanical Ventilation, Invasive)  Goal: Optimal Nutrition Delivery  Outcome: Ongoing, Progressing     Problem: Skin and Tissue Injury (Mechanical Ventilation, Invasive)  Goal: Absence of Device-Related Skin and Tissue Injury  Outcome: Ongoing, Progressing     Problem: Ventilator-Induced Lung Injury (Mechanical Ventilation, Invasive)  Goal: Absence of Ventilator-Induced Lung Injury  Outcome: Ongoing, Progressing     Problem: Skin and Tissue Injury (Artificial Airway)  Goal: Absence of Device-Related Skin or Tissue Injury  Outcome: Ongoing, Progressing     Problem: Hyperglycemia  Goal: Blood Glucose Level Within Targeted Range  Outcome: Ongoing, Progressing     Problem: Impaired Wound Healing  Goal: Optimal Wound Healing  Outcome: Ongoing, Progressing

## 2022-11-13 NOTE — NURSING
Spoke with Dr Cason in formed of BP Hypotensive 66/46 after completion of 1st PRBC , 2nd unit started at 0019 and increased Levo to 0.18mcg BP increased to 93/63. Informed of Wound vac dressing saturated bloody drainage with cannister at 250cc since changing at 0000. Orders to rapidly infuse 2nd unit of blood.new orders noted. States she will be by to see pt.

## 2022-11-13 NOTE — BRIEF OP NOTE
[facility] - 8th Floor Med Surg  Brief Operative Note    SUMMARY     Surgery Date: 11/12/2022     Surgeon(s) and Role:     * Saran Marr MD - Primary    Assisting Surgeon: Dewayne Cason MD PGY-4    Pre-op Diagnosis:  Post-traumatic wound infection [T14.8XXA, L08.9]    Post-op Diagnosis:  Post-Op Diagnosis Codes:     * Post-traumatic wound infection [T14.8XXA, L08.9]    Procedure(s) (LRB):  INCISION AND DRAINAGE (N/A)  Wound skin/soft tissue debridement, abdomen  Placement of negative pressure wound vac system     Anesthesia: General/MAC    Operative Findings: large subctuaneous fluid collection at the inferior and left aspects of the midline abdominal wound; all fat below this area was liquified and a dark brown/green color but there was notably no foul odor indicating a necrotizing soft tissue infection. Additionally the fascia and muscle layers are all intact and healthy-appearing. Additionally on the let side of the abdomen this fluid collection tunneled to what seems to be the retroperitoneum where a copious amount of uninfected, clear, proteinaceous, dark brown fluid was evacuated. The skin overlying the frankly necrotic areas of fat was debrided and a wound vac was placed. There are additionally 2 white sponges over the bilateral cord structures. Wound size [id number].    Estimated Blood Loss: 50mL blood loss ETA, with additional 1-1.5L ascitic/serosanguinous fluid   - 1u pRBC given intra-op     Estimated Blood Loss has been documented.         Specimens:   Specimen (24h ago, onward)      None          [id number]    Step up to MICU given extensive nature of debridement and intermittent need for pressors during the procedure. This was discussed with the patient's primary physician immediately post-op. Will order STAT post-op CBC and CMP.  Formal operative dictation to follow.     Dewayne Cason MD  [facility] General Surgery PGY-4  11/12/2022, 6:58 PM

## 2022-11-13 NOTE — ANESTHESIA PROCEDURE NOTES
Intubation    Date/Time: 11/13/2022 1:56 AM  Performed by: Olegario Dixon CRNA  Authorized by: Kris Brothers MD     Intubation:     Induction:  Intravenous    Intubated:  Postinduction    Mask Ventilation:  Easy mask    Attempts:  1    Attempted By:  CRNA    Method of Intubation:  Direct    Blade:  Noland 2    Laryngeal View Grade: Grade I - full view of cords      Difficult Airway Encountered?: No      Complications:  None    Airway Device:  Oral endotracheal tube    Airway Device Size:  8.0    Style/Cuff Inflation:  Cuffed    Tube secured:  22    Secured at:  The lips    Placement Verified By:  Capnometry and Revisualization with laryngoscopy    Complicating Factors:  None    Findings Post-Intubation:  BS equal bilateral

## 2022-11-13 NOTE — NURSING
Dr. Cason at bedside, assessed pt. States will call out OR/Surgery as well as pt mother to inform pt has to go back to surgery due to bleeding from abd.

## 2022-11-13 NOTE — PROGRESS NOTES
NEPHROLOGY PROGRESS NOTE      Patient Demographics:  Name:  Stuart Guevara  Age: 32 y.o.  MRN:  [id number]  Admission Date: 9/23/2022      Subjective:      Repeat abdominal washout done yesterday    Now sedated on vent  Repeat washout planned by tomorrow    HD not done yesterday in light of surgery plan    Current Facility-Administered Medications   Medication Dose Route Frequency Provider Last Rate Last Admin    0.9%  NaCl infusion (for blood administration)   Intravenous Q24H PRN Deidre Newton MD        0.9%  NaCl infusion (for blood administration)   Intravenous Q24H PRN Dewayne Cason MD        0.9%  NaCl infusion (for blood administration)   Intravenous Q24H PRN Dewayne Cason MD        0.9%  NaCl infusion (for blood administration)   Intravenous Q24H PRN Dayron Delcid MD        0.9%  NaCl infusion (for blood administration)   Intravenous Q24H PRN Dewayne Cason MD        0.9%  NaCl infusion   Intravenous PRN Thalia Renteria, AGNP        albumin human 25% bottle 25 g  25 g Intravenous Once PRN MARIANNE Galvez        albuterol inhaler 2 puff  2 puff Inhalation Q4H PRN Evelio Marshall MD        aspirin EC tablet 81 mg  81 mg Oral Daily Luis F Hutson MD   81 mg at 11/11/22 0827    bisacodyL suppository 10 mg  10 mg Rectal Daily PRN Keena Shafer FNP   10 mg at 10/28/22 0400    carvediloL tablet 25 mg  25 mg Oral BID Maximino eYh ANP   25 mg at 11/12/22 1006    clevidipine (CLEVIPREX) 25 mg/50 mL infusion  0-21 mg/hr Intravenous Continuous Deidre Newton MD 4 mL/hr at 11/13/22 0720 2 mg/hr at 11/13/22 0720    dexmedetomidine (PRECEDEX) 400mcg/100mL 0.9% NaCL infusion  0-1.4 mcg/kg/hr Intravenous Continuous Deidre Newton MD   Stopped at 11/13/22 0600    dextrose 10 % infusion  12.5 g Intravenous PRN Dewayne Hughes PA-C   12.5 g at 10/25/22 0317    dextrose 10 % infusion  25 g Intravenous PRN Dewayne Hughes PA-C        dextrose 10% bolus 125 mL  12.5 g Intravenous PRN  Ivan Chopra MD   Stopped at 10/20/22 0953    doxycycline (VIBRAMYCIN) 100 mg in dextrose 5 % 250 mL IVPB  100 mg Intravenous Q12H DENIA Armando 250 mL/hr at 11/12/22 2018 100 mg at 11/12/22 2018    fentaNYL 2500 mcg in 0.9% sodium chloride 250 mL infusion premix (titrating)  0-200 mcg/hr Intravenous Continuous Deidre Newton MD 2.5 mL/hr at 11/13/22 0506 25 mcg/hr at 11/13/22 0506    fluconazole (DIFLUCAN) IVPB 400 mg  400 mg Intravenous Every Tues, Thurs, Sat Aleah Calix MD   Stopped at 11/12/22 2325    glucagon (human recombinant) injection 1 mg  1 mg Intramuscular PRN Dewayne Hughes PA-C   1 mg at 10/20/22 0925    glucose chewable tablet 16 g  16 g Oral PRN Dewayne Hughes PA-C   16 g at 11/08/22 1104    glucose chewable tablet 24 g  24 g Oral PRN Dewayne Hughes PA-C   24 g at 10/24/22 1709    hydrALAZINE injection 20 mg  20 mg Intravenous Q4H PRN Bin Bell MD   20 mg at 11/13/22 0535    HYDROmorphone (PF) injection 0.5 mg  0.5 mg Intravenous Q5 Min PRN Kris Brothers MD   0.5 mg at 11/12/22 2000    insulin aspart U-100 injection 1-10 Units  1-10 Units Subcutaneous QID (AC + HS) PRN Ronda Hamilton MD   10 Units at 11/12/22 1134    labetaloL injection 20 mg  20 mg Intravenous Q8H PRN Bin Bell MD   20 mg at 10/03/22 0321    lactated ringers bolus 1,000 mL  1,000 mL Intravenous Once Martín Hernandez MD        lactulose 10 gram/15 ml solution 10 g  10 g Oral Q6H PRN Luis F Hutson MD   10 g at 11/09/22 1136    lipase-protease-amylase 12,000-38,000-60,000 units per capsule 3 capsule  3 capsule Oral TID WM Luis F Hutson MD   3 capsule at 11/11/22 1717    melatonin tablet 6 mg  6 mg Oral Nightly PRN Michelle Ferrera, AGACNP-BC   6 mg at 10/14/22 0000    meropenem (MERREM) 500 mg in sodium chloride 0.9 % 100 mL IVPB (MB+)  500 mg Intravenous Q24H Saurabh Curry MD   Stopped at 11/12/22 1234    morphine 12 hr tablet 30 mg  30 mg Oral Q12H Shamar Stewart MD   30 mg at 11/12/22 1057     naloxegoL (MOVANTIK) tablet 25 mg  25 mg Oral Daily Luis F Hutson MD   25 mg at 11/12/22 1006    nitroGLYCERIN SL tablet 0.4 mg  0.4 mg Sublingual Q5 Min PRN Zach Shafer MD   0.4 mg at 10/30/22 0713    NORepinephrine 8 mg in dextrose 5% 250 mL infusion  0-3 mcg/kg/min Intravenous Continuous Martín Hernandez MD   Stopped at 11/13/22 0237    ondansetron injection 4 mg  4 mg Intravenous Q6H PRN Evelio Marshall MD   4 mg at 11/08/22 1053    polyethylene glycol packet 17 g  17 g Oral BID PRN Thalia S Renteria, AGNP        predniSONE tablet 20 mg  20 mg Oral BID YAS MgP   20 mg at 11/12/22 2124    propofol (DIPRIVAN) 10 mg/mL infusion  0-50 mcg/kg/min Intravenous Continuous Deidre Newton MD 18.1 mL/hr at 11/13/22 0800 50 mcg/kg/min at 11/13/22 0800    sodium bicarbonate tablet 1,300 mg  1,300 mg Oral Daily Thalia S Renteria, AGNP   1,300 mg at 11/12/22 1006    sodium chloride 0.9% bolus 250 mL  250 mL Intravenous PRN Thalia S Renteria, AGNP        sodium chloride 0.9% bolus 250 mL  250 mL Intravenous PRN Thalia S Renteria, AGNP        sodium chloride 0.9% bolus 250 mL  250 mL Intravenous PRN Shahzad Lundberg MD        sodium chloride 0.9% bolus 250 mL  250 mL Intravenous PRN Thalia S Renteria, AGNP        sodium chloride 0.9% flush 10 mL  10 mL Intravenous Q6H Ronda Hamilton MD   10 mL at 11/13/22 0600    And    sodium chloride 0.9% flush 10 mL  10 mL Intravenous PRN Ronda Hamilton MD        sodium chloride 0.9% flush 10 mL  10 mL Intravenous PRN Kris Brothers MD        sodium chloride 0.9% flush 10 mL  10 mL Intravenous PRN Martín Hernandez MD        sodium chloride 0.9% flush 10 mL  10 mL Intravenous PRN Dayron Delcid MD        zinc oxide-cod liver oil 40 % paste   Topical (Top) BID Saurabh Curry MD   Given at 11/12/22 2100           Review of Systems   Unable to perform ROS: Intubated       Objective:    /66 (BP Location: Left arm, Patient Position: Lying)   Pulse 60   Temp  "98.6 °F (37 °C) (Oral)   Resp (!) 24   Ht 5' 5" (1.651 m)   Wt 60.3 kg (133 lb)   SpO2 100%   BMI 22.13 kg/m²       Intake/Output Summary (Last 24 hours) at 11/13/2022 0857  Last data filed at 11/13/2022 0600  Gross per 24 hour   Intake 7785.77 ml   Output 3000 ml   Net 4785.77 ml             Physical Exam  Vitals reviewed.   Constitutional:       Appearance: Normal appearance.   HENT:      Head: Normocephalic and atraumatic.      Nose: Nose normal.   Cardiovascular:      Rate and Rhythm: Normal rate and regular rhythm.      Pulses: Normal pulses.      Heart sounds: Normal heart sounds.   Pulmonary:      Comments: On vent  Diminished bases bilat  Abdominal:      Palpations: Abdomen is soft.      Comments: No BS  Large dressing in place   Genitourinary:     Comments: Scrotal edema  Musculoskeletal:         General: Normal range of motion.      Cervical back: Normal range of motion.      Comments: Some deconditioning   Skin:     General: Skin is warm and dry.          Inpatient Diagnostics:  Recent Results (from the past 24 hour(s))   POCT glucose    Collection Time: 11/12/22 11:14 AM   Result Value Ref Range    POCT Glucose 392 (H) 70 - 110 mg/dL   Prepare RBC 1 Unit    Collection Time: 11/12/22 11:39 AM   Result Value Ref Range    UNIT NUMBER [id number]     UNIT ABO/RH O POS     DISPENSE STATUS Transfused     Unit Expiration [id number]     Product Code [id number]     Unit Blood Type Code 5100     CROSSMATCH INTERPRETATION Compatible    Type & Screen    Collection Time: 11/12/22 11:39 AM   Result Value Ref Range    Group & Rh O POS     Indirect Bishop GEL NEG    Prepare RBC 2 Units; emergency    Collection Time: 11/12/22 11:39 AM   Result Value Ref Range    UNIT NUMBER [id number]     UNIT ABO/RH O POS     DISPENSE STATUS Transfused     Unit Expiration [id number]     Product Code V4263N86     Unit Blood Type Code 5100     CROSSMATCH INTERPRETATION Compatible     UNIT NUMBER [id number]     UNIT ABO/RH O " POS     DISPENSE STATUS Issued     Unit Expiration [id number]     Product Code L4524K98     Unit Blood Type Code 5100     CROSSMATCH INTERPRETATION Compatible    Prepare RBC 2 Units; to keep ahead    Collection Time: 11/12/22 11:39 AM   Result Value Ref Range    UNIT NUMBER [id number]     UNIT ABO/RH O POS     DISPENSE STATUS Selected     Unit Expiration [id number]     Product Code J3079E84     Unit Blood Type Code 5100     CROSSMATCH INTERPRETATION Compatible     UNIT NUMBER [id number]     UNIT ABO/RH O POS     DISPENSE STATUS Selected     Unit Expiration [id number]     Product Code P2020U01     Unit Blood Type Code 5100     CROSSMATCH INTERPRETATION Compatible    Prepare Fresh Frozen Plasma 1 Unit    Collection Time: 11/12/22 11:39 AM   Result Value Ref Range    UNIT NUMBER [id number]     UNIT ABO/RH O POS     DISPENSE STATUS Issued     Unit Expiration 911614112162     Product Code V5311M69     Unit Blood Type Code 5100     CROSSMATCH INTERPRETATION Not required    Prepare Cryoprecipitate 1 Dose    Collection Time: 11/12/22 11:39 AM   Result Value Ref Range    UNIT NUMBER [id number]     UNIT ABO/RH O POS     DISPENSE STATUS Issued     Unit Expiration 289988911854     Product Code I7553N26     Unit Blood Type Code 5100     CROSSMATCH INTERPRETATION Not required    Prepare Platelets 1 Dose    Collection Time: 11/12/22 11:39 AM   Result Value Ref Range    UNIT NUMBER [id number]     UNIT ABO/RH A POS     DISPENSE STATUS Issued     Unit Expiration 090830420796     Product Code M0504B75     Unit Blood Type Code 6200     CROSSMATCH INTERPRETATION Not required    Prepare RBC 6 Units; Massive Transfusion    Collection Time: 11/12/22 11:39 AM   Result Value Ref Range    UNIT NUMBER [id number]     UNIT ABO/RH O POS     DISPENSE STATUS Issued     Unit Expiration 932476019813     Product Code Z5348U87     Unit Blood Type Code 5100     CROSSMATCH INTERPRETATION Compatible     UNIT NUMBER [id number]      UNIT ABO/RH O POS     DISPENSE STATUS Issued     Unit Expiration 202212192359     Product Code R3604X41     Unit Blood Type Code 5100     CROSSMATCH INTERPRETATION Compatible     UNIT NUMBER [id number]     UNIT ABO/RH O POS     DISPENSE STATUS Issued     Unit Expiration 202212192359     Product Code V2025M92     Unit Blood Type Code 5100     CROSSMATCH INTERPRETATION Compatible     UNIT NUMBER [id number]     UNIT ABO/RH O POS     DISPENSE STATUS Selected     Unit Expiration 202212192359     Product Code X0078X66     Unit Blood Type Code 5100     CROSSMATCH INTERPRETATION Compatible     UNIT NUMBER [id number]     UNIT ABO/RH O POS     DISPENSE STATUS Selected     Unit Expiration 202212192359     Product Code E0845Y80     Unit Blood Type Code 5100     CROSSMATCH INTERPRETATION Compatible     UNIT NUMBER [id number]     UNIT ABO/RH O POS     DISPENSE STATUS Selected     Unit Expiration 202212192359     Product Code F1951D85     Unit Blood Type Code 5100     CROSSMATCH INTERPRETATION Compatible    Prepare Fresh Frozen Plasma 4 Units    Collection Time: 11/12/22 11:39 AM   Result Value Ref Range    UNIT NUMBER [id number]     UNIT ABO/RH A POS     DISPENSE STATUS Issued     Unit Expiration 202211162359     Product Code C3673W35     Unit Blood Type Code 6200     CROSSMATCH INTERPRETATION Not required     UNIT NUMBER [id number]     UNIT ABO/RH A POS     DISPENSE STATUS Issued     Unit Expiration 202211162359     Product Code Z7561O86     Unit Blood Type Code 6200     CROSSMATCH INTERPRETATION Not required     UNIT NUMBER [id number]     UNIT ABO/RH A POS     DISPENSE STATUS Issued     Unit Expiration 202211162359     Product Code X8177U67     Unit Blood Type Code 6200     CROSSMATCH INTERPRETATION Not required     UNIT NUMBER [id number]     UNIT ABO/RH A NEG     DISPENSE STATUS Issued     Unit Expiration 202211162359     Product Code O8078H42     Unit Blood Type Code 0600     CROSSMATCH  INTERPRETATION Not required     UNIT NUMBER [id number]     UNIT ABO/RH A POS     DISPENSE STATUS Released     Unit Expiration 793849439532     Product Code G2700J75     Unit Blood Type Code 6200     CROSSMATCH INTERPRETATION Not required     UNIT NUMBER [id number]     UNIT ABO/RH A POS     DISPENSE STATUS Released     Unit Expiration 637160893542     Product Code J9746N68     Unit Blood Type Code 6200     CROSSMATCH INTERPRETATION Not required    Prepare Cryoprecipitate 1 Dose    Collection Time: 11/12/22 11:39 AM   Result Value Ref Range    UNIT NUMBER [id number]     UNIT ABO/RH O POS     DISPENSE STATUS Selected     Unit Expiration 927259239897     Product Code A1263F38     Unit Blood Type Code 5100     CROSSMATCH INTERPRETATION Not required    APTT    Collection Time: 11/12/22  1:14 PM   Result Value Ref Range    PTT 37.1 (H) 23.2 - 33.7 seconds   Protime-INR    Collection Time: 11/12/22  1:14 PM   Result Value Ref Range    PT 15.0 (H) 12.5 - 14.5 seconds    INR 1.19 0.00 - 1.30   Protime-INR    Collection Time: 11/12/22  1:40 PM   Result Value Ref Range    PT 15.0 (H) 12.5 - 14.5 seconds    INR 1.19 0.00 - 1.30   APTT    Collection Time: 11/12/22  1:40 PM   Result Value Ref Range    PTT 36.9 (H) 23.2 - 33.7 seconds   POCT glucose    Collection Time: 11/12/22  1:45 PM   Result Value Ref Range    POCT Glucose 285 (H) 70 - 110 mg/dL   Body Fluid Culture    Collection Time: 11/12/22  5:20 PM    Specimen: Abdomen; Body Fluid   Result Value Ref Range    Body Fluid Culture No Growth At 24 Hours    Gram Stain    Collection Time: 11/12/22  5:20 PM    Specimen: Abdomen; Body Fluid   Result Value Ref Range    GRAM STAIN No WBCs, No bacteria seen    Comprehensive Metabolic Panel    Collection Time: 11/12/22  7:20 PM   Result Value Ref Range    Sodium Level 127 (L) 136 - 145 mmol/L    Potassium Level 4.4 3.5 - 5.1 mmol/L    Chloride 97 (L) 98 - 107 mmol/L    Carbon Dioxide 22 22 - 29 mmol/L    Glucose Level 147 (H)  74 - 100 mg/dL    Blood Urea Nitrogen 61.2 (H) 8.9 - 20.6 mg/dL    Creatinine 1.43 (H) 0.73 - 1.18 mg/dL    Calcium Level Total 7.3 (L) 8.4 - 10.2 mg/dL    Protein Total 3.9 (L) 6.4 - 8.3 gm/dL    Albumin Level 1.6 (L) 3.5 - 5.0 gm/dL    Globulin 2.3 (L) 2.4 - 3.5 gm/dL    Albumin/Globulin Ratio 0.7 (L) 1.1 - 2.0 ratio    Bilirubin Total 1.2 <=1.5 mg/dL    Alkaline Phosphatase 206 (H) 40 - 150 unit/L    Alanine Aminotransferase 9 0 - 55 unit/L    Aspartate Aminotransferase 20 5 - 34 unit/L    eGFR >60 mls/min/1.73/m2   CBC with Differential    Collection Time: 11/12/22  7:20 PM   Result Value Ref Range    WBC 19.5 (H) 4.5 - 11.5 x10(3)/mcL    RBC 2.78 (L) 4.70 - 6.10 x10(6)/mcL    Hgb 7.7 (L) 14.0 - 18.0 gm/dL    Hct 24.2 (L) 42.0 - 52.0 %    MCV 87.1 80.0 - 94.0 fL    MCH 27.7 27.0 - 31.0 pg    MCHC 31.8 (L) 33.0 - 36.0 mg/dL    RDW 15.9 11.5 - 17.0 %    Platelet 214 130 - 400 x10(3)/mcL    MPV 10.6 (H) 7.4 - 10.4 fL    Neut % 94.0 %    Lymph % 3.5 %    Mono % 1.3 %    Eos % 0.0 %    Basophil % 0.2 %    Lymph # 0.68 0.6 - 4.6 x10(3)/mcL    Neut # 18.3 (H) 2.1 - 9.2 x10(3)/mcL    Mono # 0.26 0.1 - 1.3 x10(3)/mcL    Eos # 0.00 0 - 0.9 x10(3)/mcL    Baso # 0.03 0 - 0.2 x10(3)/mcL    IG# 0.20 (H) 0 - 0.04 x10(3)/mcL    IG% 1.0 %    NRBC% 0.0 %   Hemoglobin and Hematocrit    Collection Time: 11/12/22  9:37 PM   Result Value Ref Range    Hgb 5.8 (LL) 14.0 - 18.0 gm/dL    Hct 17.4 (LL) 42.0 - 52.0 %   POCT glucose    Collection Time: 11/12/22  9:52 PM   Result Value Ref Range    POCT Glucose 161 (H) 70 - 110 mg/dL   Protime-INR    Collection Time: 11/12/22 10:30 PM   Result Value Ref Range    PT 18.4 (H) 12.5 - 14.5 seconds    INR 1.56 (H) 0.00 - 1.30   Comprehensive metabolic panel    Collection Time: 11/13/22  5:46 AM   Result Value Ref Range    Sodium Level 129 (L) 136 - 145 mmol/L    Potassium Level 4.4 3.5 - 5.1 mmol/L    Chloride 97 (L) 98 - 107 mmol/L    Carbon Dioxide 20 (L) 22 - 29 mmol/L    Glucose Level 176 (H)  74 - 100 mg/dL    Blood Urea Nitrogen 58.4 (H) 8.9 - 20.6 mg/dL    Creatinine 1.46 (H) 0.73 - 1.18 mg/dL    Calcium Level Total 8.0 (L) 8.4 - 10.2 mg/dL    Protein Total 4.7 (L) 6.4 - 8.3 gm/dL    Albumin Level 2.3 (L) 3.5 - 5.0 gm/dL    Globulin 2.4 2.4 - 3.5 gm/dL    Albumin/Globulin Ratio 1.0 (L) 1.1 - 2.0 ratio    Bilirubin Total 1.4 <=1.5 mg/dL    Alkaline Phosphatase 112 40 - 150 unit/L    Alanine Aminotransferase 19 0 - 55 unit/L    Aspartate Aminotransferase 30 5 - 34 unit/L    eGFR >60 mls/min/1.73/m2   Magnesium    Collection Time: 11/13/22  5:46 AM   Result Value Ref Range    Magnesium Level 1.80 1.60 - 2.60 mg/dL   Phosphorus    Collection Time: 11/13/22  5:46 AM   Result Value Ref Range    Phosphorus Level 7.0 (H) 2.3 - 4.7 mg/dL   CBC with Differential    Collection Time: 11/13/22  5:46 AM   Result Value Ref Range    WBC 20.3 (H) 4.5 - 11.5 x10(3)/mcL    RBC 2.88 (L) 4.70 - 6.10 x10(6)/mcL    Hgb 8.5 (L) 14.0 - 18.0 gm/dL    Hct 24.5 (L) 42.0 - 52.0 %    MCV 85.1 80.0 - 94.0 fL    MCH 29.5 27.0 - 31.0 pg    MCHC 34.7 33.0 - 36.0 mg/dL    RDW 17.8 (H) 11.5 - 17.0 %    Platelet 73 (L) 130 - 400 x10(3)/mcL    MPV 10.9 (H) 7.4 - 10.4 fL    IG# 0.19 (H) 0 - 0.04 x10(3)/mcL    IG% 0.9 %    NRBC% 0.0 %   Protime-INR    Collection Time: 11/13/22  5:46 AM   Result Value Ref Range    PT 15.7 (H) 12.5 - 14.5 seconds    INR 1.27 0.00 - 1.30   APTT    Collection Time: 11/13/22  5:46 AM   Result Value Ref Range    PTT 35.4 (H) 23.2 - 33.7 seconds   Manual Differential    Collection Time: 11/13/22  5:46 AM   Result Value Ref Range    Neut Man 94 %    Lymph Man 2 %    Monocyte Man 4 %    Abs Neut      Anisocyte 1+ (A) (none)    Poik 2+ (A) (none)    Microcyte 1+ (A) (none)    [place] Cells 1+ (A) (none)    Stomatocytes 1+ (A) (none)   POCT glucose    Collection Time: 11/13/22  6:16 AM   Result Value Ref Range    POCT Glucose 188 (H) 70 - 110 mg/dL   POCT ARTERIAL BLOOD GAS    Collection Time: 11/13/22  6:18 AM   Result  Value Ref Range    POC PH 7.43 7.35 - 7.45    POC PCO2 33 (A) 35 - 45 mmHg    POC PO2 191 (A) 80.0 - 100 mmHg    POC HEMOGLOBIN 8.7 (A) 12 - 16 g/dL    POC SATURATED O2 99.7 %    POC O2Hb 97.6 (A) 94.0 - 97.0 %    POC COHb 1.9 %    POC MetHb 1.2 0.40 - 1.5 %    POC Potassium 4.1 3.5 - 5.0 mmol/l    POC Sodium 126 (A) 137 - 145 mmol/l    POC Ionized Calcium 1.04 (A) 1.12 - 1.23 mmol/l    Correct Temperature (PH) 7.43 7.35 - 7.45    Corrected Temperature (pCO2) 33 (A) 35 - 45 mmHg    Corrected Temperature (pO2) 191 (A) 80.0 - 100 mmHg    POC HCO3 21.9 (A) 22.0 - 26.0 mmol/l    Base Deficit -2.0 -2.0 - 2.0 mmol/l    POC Temp 37.0 °C    Specimen source Arterial sample        A/P--NE 11/13    1---TAY req HD---HD today mainly for volume --Output is very poor--Creat low but muscle mass is very low  2---11/12 s/p Repeat Abdominal Washout/ Debridement--per Gen Sx--Will need repeat Washout in next 24 hours  3---SLE--Stable  4---Ascites---Improved  5---Anemia secondary to Acute Illness/ Blood Loss--Transfused yesterday/ Follow H&H     IV--Cleveprex gtt        Diprivan gtt        Fentanyl gtt        ORDERS:  CBC/CMP/INR in am  HD today                    Agree with above.  Patient examined.  Orders reviewed.  Intubated/sedated  CTAB  No edema    Repeat HD today.  UF as tolerated.  Continue supportive care.  Will follow.

## 2022-11-13 NOTE — H&P
[facility] - 8th Floor Med Surg  Pulmonary Critical Care Note    Patient Name: Stuart Guevara  MRN: [id number]  Admission Date: 9/23/2022  Hospital Length of Stay: 50 days  Code Status: Full Code  Attending Provider: Saurabh Curry MD  Primary Care Provider: Primary Doctor No     Subjective:     HPI:  This is a 32-year-old male he was admitted to the ICU following excisional debridement of an abdominal wound for concerns of intermittent hypotension requiring vasopressors and possible airway compromise given fluid overload.  The patient was originally admitted to the [facility] ICU on 09/23 for a Yulan type B aortic dissection.  Patient failed medical management and was transferred to [facility] for CT surgery evaluation.  Was taken to the OR for TEVAR with left carotid to subclavian artery bypass.  During this admission, the patient was noted to have progressively worsening abdominal pain and lactic acidosis.  CT angiography showed evidence of significant intra-abdominal pathology with some hemoperitoneum and signs of hypoperfusion of the abdominal organs and free blood in the abdomen.  He underwent multiple exploratory laparotomies (9/25, 9/27 and 9/29).  Afterwards, the patient developed unstable bradycardia and had a temporary transvenous pacemaker placed by Cardiology, which resolved the bradycardia within a couple of days.  The patient was also noted to have progressive renal failure and critical hyperkalemia requiring HD catheter placement and initiation of hemodialysis.  Dictation again started to have worsening abdominal pain as evaluated by Rheumatology for possible lupus flare up and serositis.  Patient was started on Solu-Medrol, CellCept and Plaquenil.  However, the medication was stopped due to concern for infection.  CT abdomen showed multiple loculated fluid collections, necrotizing pancreatitis and possible peritonitis.  Patient had IR drainage on 10/28 and by fluid cultures showed  Klebsiella.  Patient continued to have abdominal pain and was brought back to the OR on 10/30.  At that time, patient had 3 days of venita ascites but no signs of necrotic tissue.  Today, the patient was brought back to the OR for wound washout.  Per chart review, in the OR, there is significant dark brown/green liquid but no foul odor, concerning for necrotizing tissue.  The patient was debrided and had a wound VAC placed.  However, patient did intermittent use vasopressors there was no had become fluid overloaded following surgery.  Patient was then transferred to ICU.  Patient noted to be awake alert and not on any vasopressors or sedation on the PACU      Past Medical History:   Diagnosis Date    Aortic dissection 9/23/2022    Hypertension     Systemic lupus erythematosus, organ or system involvement unspecified     Systemic lupus erythematosus, organ or system involvement unspecified        Social History     Socioeconomic History    Marital status: Single   Tobacco Use    Smoking status: Every Day     Types: Cigarettes    Smokeless tobacco: Never     Social Determinants of Health     Food Insecurity: Unknown    Worried About Running Out of Food in the Last Year: Never true   Transportation Needs: Unknown    Lack of Transportation (Medical): No   Housing Stability: Unknown    Unable to Pay for Housing in the Last Year: No           Objective:     Current Outpatient Medications   Medication Instructions    albuterol (PROVENTIL/VENTOLIN HFA) 90 mcg/actuation inhaler 2 puffs, Inhalation, Every 4 hours PRN, Rescue    amLODIPine (NORVASC) 10 mg, Oral, Daily    aspirin (ECOTRIN) 81 mg, Oral, Daily    belimumab (BENLYSTA) 120 mg SolR Benlysta Take No date recorded No form recorded No frequency recorded No route recorded No set duration recorded No set duration amount recorded active No dosage strength recorded No dosage strength units of measure recorded    famotidine (PEPCID) 20 mg, Oral, 2 times daily    gemfibroziL  (LOPID) 600 mg, Oral    hydrALAZINE (APRESOLINE) 10 mg, Oral    labetaloL (NORMODYNE) 300 mg, Oral, 2 times daily    lisinopriL (PRINIVIL,ZESTRIL) 40 mg, Oral, Daily    metoprolol tartrate (LOPRESSOR) 50 mg, Oral, 2 times daily    mycophenolate (CELLCEPT) 1,000 mg, Oral, 2 times daily    MEÑO 2 mg TbEC 1 tablet, Oral, Daily    rivaroxaban (XARELTO ORAL) Xarelto Take No date recorded No form recorded No frequency recorded No route recorded No set duration recorded No set duration amount recorded active No dosage strength recorded No dosage strength units of measure recorded    sucralfate (CARAFATE) 1 g, Oral, 2 times daily    warfarin (COUMADIN) 3 mg, Oral, Daily       Current Inpatient Medications   aspirin  81 mg Oral Daily    carvediloL  25 mg Oral BID    doxycycline (VIBRAMYCIN) IVPB  100 mg Intravenous Q12H    [START ON 11/13/2022] enoxaparin  40 mg Subcutaneous Daily    fluconazole (DIFLUCAN) IV (PEDS and ADULTS)  400 mg Intravenous Every Tues, Thurs, Sat    HYDROmorphone (PF)        lipase-protease-amylase 12,000-38,000-60,000 units  3 capsule Oral TID WM    meropenem (MERREM) IVPB  500 mg Intravenous Q24H    morphine  30 mg Oral Q12H    naloxegoL  25 mg Oral Daily    PHENYLephrine        predniSONE  20 mg Oral BID    sodium bicarbonate  1,300 mg Oral Daily    sodium chloride 0.9%  10 mL Intravenous Q6H    zinc oxide-cod liver oil   Topical (Top) BID           Intake/Output Summary (Last 24 hours) at 11/12/2022 2021  Last data filed at 11/12/2022 1831  Gross per 24 hour   Intake 1420 ml   Output 750 ml   Net 670 ml       Review of Systems   Constitutional:  Negative for chills and fever.   Respiratory:  Negative for shortness of breath.    Cardiovascular:  Negative for chest pain.   Gastrointestinal:  Positive for abdominal pain. Negative for heartburn and nausea.   Genitourinary:  Negative for frequency.   Musculoskeletal:  Negative for neck pain.   Neurological:  Negative for headaches.      Vital Signs  (Most Recent):  Temp: 97.7 °F (36.5 °C) (11/12/22 2019)  Pulse: (!) 58 (11/12/22 2019)  Resp: 18 (11/12/22 2019)  BP: 109/62 (11/12/22 2019)  SpO2: 98 % (11/12/22 2019)    Body mass index is 22.13 kg/m².  Weight: 60.3 kg (133 lb) Vital Signs (24h Range):  Temp:  [97.7 °F (36.5 °C)-98.8 °F (37.1 °C)] 97.7 °F (36.5 °C)  Pulse:  [58-87] 58  Resp:  [11-18] 18  SpO2:  [94 %-100 %] 98 %  BP: (109-193)/() 109/62     Physical Exam  Vitals and nursing note reviewed.   Constitutional:       General: He is not in acute distress.     Comments: Patient is lying in bed and clearly uncomfortable but not in acute distress   HENT:      Head: Normocephalic and atraumatic.      Nose: Nose normal. No congestion or rhinorrhea.   Eyes:      Extraocular Movements: Extraocular movements intact.      Pupils: Pupils are equal, round, and reactive to light.      Comments: Pupils are 3 mm bilaterally   Cardiovascular:      Rate and Rhythm: Normal rate and regular rhythm.      Pulses: Normal pulses.      Heart sounds: No murmur heard.     Comments: Bilateral 2+ pitting edema to the lower extremities  Pulmonary:      Effort: Pulmonary effort is normal. No respiratory distress.      Breath sounds: Normal breath sounds.   Abdominal:      General: Abdomen is flat. There is no distension.      Palpations: Abdomen is soft.   Musculoskeletal:         General: Normal range of motion.   Skin:     General: Skin is warm and dry.      Capillary Refill: Capillary refill takes 2 to 3 seconds.      Coloration: Skin is not pale.   Neurological:      Mental Status: He is alert.      Comments: Patient is moving all 4 extremities comfortably and is alert and oriented x4         Lines/Drains/Airways       Peripherally Inserted Central Catheter Line  Duration             PICC Double Lumen 10/25/22 1520 right brachial 18 days              Central Venous Catheter Line  Duration                  Hemodialysis Catheter right internal jugular -- days               Peripheral Intravenous Line  Duration                  External Jugular IV 11/02/22 0800 10 days                    Significant Labs:    Lab Results   Component Value Date    WBC 19.5 (H) 11/12/2022    HGB 7.7 (L) 11/12/2022    HCT 24.2 (L) 11/12/2022    MCV 87.1 11/12/2022     11/12/2022         BMP  Lab Results   Component Value Date     (L) 11/12/2022    K 4.4 11/12/2022    CO2 22 11/12/2022    BUN 61.2 (H) 11/12/2022    CREATININE 1.43 (H) 11/12/2022    CALCIUM 7.3 (L) 11/12/2022    EGFRNONAA 80 01/31/2022       ABG  Recent Labs   Lab 11/07/22  0059   PH 7.41   PO2 75*   PCO2 44   HCO3 27.9*           Significant Imaging:  I have reviewed all pertinent imaging within the past 24 hours.        Assessment/Plan:     Assessment  Necrotizing pancreatitis and loculated intra-abdominal fluid collection   S/p IR drainage on 10/28  S/p exploratory laparotomy on 11/12  Shock, likely hypovolemic  Hospital-acquired pneumonia  Acute kidney injury  Hypochloremic hyponatremia  Lupus flare up  Anasarca  Normocytic anemia      Plan  Will monitor blood pressure restart vasopressors as needed. MAP goal > 65 mm Hg. Will add a fluid bolus but need to hydrate carefully.  Appears to have high output from wound vac. Will get H/H every 8 hours.  Currently on Doxy (day 8) and meropenem (day 8). Will continue.  Suspect hyponatremia is due to volume depletion. Monitoring closely.  Completing prednisone taper. Rheumatology is following.  Will off on diuresis until BP improves.       Greater than 30 minutes of critical care was time spent personally by me on the following activities: development of treatment plan with patient or surrogate and bedside caregivers, discussions with consultants, evaluation of patient's response to treatment, examination of patient, ordering and performing treatments and interventions, ordering and review of laboratory studies, ordering and review of radiographic studies, pulse oximetry,  re-evaluation of patient's condition.  This critical care time did not overlap with that of any other provider or involve time for any procedures.     Martín Hernandez MD  Pulmonary Critical Care Medicine  [facility] - 8th Floor Med Surg

## 2022-11-13 NOTE — TRANSFER OF CARE
"Anesthesia Transfer of Care Note    Patient: Stuart Guevara    Procedure(s) Performed: Procedure(s) (LRB):  INCISION AND DRAINAGE (N/A)    Patient location: PACU    Anesthesia Type: general    Transport from OR: Transported from OR on room air with adequate spontaneous ventilation    Post pain: adequate analgesia    Post assessment: no apparent anesthetic complications    Post vital signs: stable    Level of consciousness: sedated    Nausea/Vomiting: no nausea/vomiting    Complications: none    Transfer of care protocol was followed      Last vitals:   Visit Vitals  BP (!) 162/95   Pulse 77   Temp 37 °C (98.6 °F) (Oral)   Resp 18   Ht 5' 5" (1.651 m)   Wt 60.3 kg (133 lb)   SpO2 98%   BMI 22.13 kg/m²     "

## 2022-11-13 NOTE — PROGRESS NOTES
TRAUMA / ACUTE CARE SURGERY   PROGRESS NOTE    Admit Date: 9/23/2022  Hospital Day: 51     Subjective:  S/p wound debridement yesterday evening  Overnight had high wound vac output and bleeding requiring OR takeback; diffuse medical bleeding found. Vac replaced with wet-to-dry dressings with packing to zaheer medical bleeding.   Transfused 4u pRBC, 1u plt, 1u FFP since first OR trip yesterday  Initially extubated following first case, but remains intubated following second trip to OR due to concerns for volume overload per anesthesia report  Continues to ooze from wound dressings though wound was thoroughly inspected prior to leaving OR and was hemostatic     Physical Exam:  GEN: NAD, arousable, intubated  HEENT: NCAT, EOMI, MMM, ETT/OGT in place  CV: regular rate, levo gtt  PULM: intubated   ABD: abdominal wet-to-dry dressing in place, saturated with serosang fluid. Superiorly, abd exam is soft, mildly distended consistent w/ history of ascites.  MSK/EXT: moves all ext spontaneously    Inpatient Data    Vitals:   Temp:  [97.2 °F (36.2 °C)-98.8 °F (37.1 °C)]   Pulse:  [56-93]   Resp:  [7-24]   BP: ()/()   SpO2:  [83 %-100 %]     Diet NPO Except for: Medication   Intake/Output Summary (Last 24 hours) at 11/13/2022 0637  Last data filed at 11/13/2022 0338  Gross per 24 hour   Intake 4837.51 ml   Output 3000 ml   Net 1837.51 ml          Recent Labs     11/11/22  0514 11/12/22  0437 11/12/22  1920 11/12/22  2137 11/13/22  0546   WBC 22.6* 24.6* 19.5*  --  20.3*   HGB 7.6* 7.2* 7.7* 5.8* 8.5*   HCT 23.2* 22.1* 24.2* 17.4* 24.5*    287 214  --  73*   * 128* 127*  --  129*   K 3.8 4.3 4.4  --  4.4   CO2 24 24 22  --  20*   BUN 46.8* 56.7* 61.2*  --  58.4*   CREATININE 1.59* 1.60* 1.43*  --  1.46*   BILITOT  --  1.3 1.2  --  1.4   AST  --  28 20  --  30   ALT  --  9 9  --  19   ALKPHOS  --  254* 206*  --  112   CALCIUM 8.1* 8.1* 7.3*  --  8.0*   ALBUMIN  --  1.8* 1.6*  --  2.3*   MG 2.00 1.80  --    --  1.80   PHOS 5.0* 5.2*  --   --  7.0*     Scheduled Meds: aspirin, 81 mg, Daily  carvediloL, 25 mg, BID  doxycycline (VIBRAMYCIN) IVPB, 100 mg, Q12H  fluconazole (DIFLUCAN) IV (PEDS and ADULTS), 400 mg, Every Tues, Thurs, Sat  HYDROmorphone (PF), ,   lactated ringers, 1,000 mL, Once  lipase-protease-amylase 12,000-38,000-60,000 units, 3 capsule, TID WM  meropenem (MERREM) IVPB, 500 mg, Q24H  morphine, 30 mg, Q12H  naloxegoL, 25 mg, Daily  predniSONE, 20 mg, BID  sodium bicarbonate, 1,300 mg, Daily  sodium chloride 0.9%, 10 mL, Q6H  zinc oxide-cod liver oil, , BID     clevidipine 2 mg/hr (11/13/22 0550)    dexmedetomidine (PRECEDEX) infusion 0.2 mcg/kg/hr (11/13/22 0440)    fentanyl 25 mcg/hr (11/13/22 0506)    NORepinephrine bitartrate-D5W Stopped (11/13/22 0237)    propofoL 5 mcg/kg/min (11/13/22 0430)     ASSESSMENT/PLAN:  32M with HTN, SLE, HBV, aortic dissection s/p repair 2/2021, 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 He developed abdominal compartment syndrome s/p multiple exlaps (9/25, 9/27, 9/29) now on HD for renal failure with re-accumulation of ascites despite IR paracentesis. CT has shown concern for necrotizing fascitis. Cultures from paracentesis resulted in Klebsiella growth. S/p washout 10/30. Subsequently started having dark, thin, proteinaceous fluid draining from his lower midline and a site in the LLQ for which he is s/p wound debridement with wound vac placement 11/12. This was complicated by immediate bleeding requiring significant transfusion post-op and he was thus taken back to the operating room on 11/13 for wound vac removal, wound washout, and wet-to-dry dressing application.   Wound notably hemostatic at the conclusion of the most recent case; suspect dressing saturation may be a result of the patient being turned in order to be transferred from OR table to ICU bed with drainage of some of his subcutaneous fluid and/or ascitic fluid into the  woun.  Continue q4hr CBC with transfusions PRN; has received 4u pRBC, 1u plt, 1u FFP, and DDAVP in past 24hr. TEG 11/13 normal.   Gravity of current situation discussed with patient's mother; he would require extremely extensive wound debridement to the bilateral abdomen and bilateral flanks and likely the scrotum. They report understanding and are processing this information.  Recommend palliative care consultation for goals of care discussion  Please continue to hold anticoagulation at this time given bleeding  Remainder of plan of care per primary team, surgery will continue to follow along closely     Dewayne Cason MD  [employer] General Surgery PGY-4  11/13/2022, 8:17 AM

## 2022-11-13 NOTE — NURSING
Dr. Cason at bedside assessing pt, informed of latest H&H 5.8/17.4, wound vac output total since arriving 1000ml bright red blood. Dr. Cason decreased wound vac suction from 125 to 50. Informed of vitals, pt currently on Levo at 1mcg. Orders for PT/INR TEG and to get lab to prepare FFP and Cryoprecipitate. Orders to call with H&H post 2units of Prbc's. New orders noted.

## 2022-11-13 NOTE — TRANSFER OF CARE
"Anesthesia Transfer of Care Note    Patient: Stuart Guevara    Procedure(s) Performed: Procedure(s) (LRB):  INCISION AND DRAINAGE (N/A)    Patient location: ICU    Anesthesia Type: general    Transport from OR: Transported from OR intubated on 100% O2 by AMBU with assisted ventilation    Post pain: adequate analgesia    Post assessment: no apparent anesthetic complications    Post vital signs: stable    Level of consciousness: sedated    Nausea/Vomiting: no nausea/vomiting    Complications: none    Transfer of care protocol was followed      Last vitals:   Visit Vitals  BP (!) 161/92 (BP Location: Left arm, Patient Position: Lying)   Pulse (!) 56   Temp 36.3 °C (97.3 °F)   Resp 20   Ht 5' 5" (1.651 m)   Wt 60.3 kg (133 lb)   SpO2 100%   BMI 22.13 kg/m²     "

## 2022-11-13 NOTE — NURSING
11/13/22 1235   Post-Hemodialysis Assessment   Blood Volume Processed (Liters) 50.8 L   Dialyzer Clearance Lightly streaked   Duration of Treatment 180 minutes   Total UF (mL) 2500 mL   Patient Response to Treatment Tolerated well   Post-Hemodialysis Comments Treatment completed. No issues during tx. Right CVC functioned well

## 2022-11-13 NOTE — ANESTHESIA PREPROCEDURE EVALUATION
11/13/2022  Stuart Guevara is a 32 y.o., male.    Stuart Guevara    Pre-op Diagnosis: Post-traumatic wound infection [T14.8XXA, L08.9]  Coagulopathy [D68.9]    Procedure(s): INCISION AND DRAINAGE   Assessment  1. Necrotizing pancreatitis and loculated intra-abdominal fluid collection   a. S/p IR drainage on 10/28  b. S/p exploratory laparotomy on 11/12  2. Shock, likely hypovolemic  3. Hospital-acquired pneumonia  4. Acute kidney injury  5. Hypochloremic hyponatremia  6. Lupus flare up  7. Anasarca  8. Normocytic anemia  H/O Hypertension    Intra-abdominal abscess versus infected necrotizing pancreatitis  Sepsis  Acute respiratory failure with hypoxia  Pulmonary edema  Severely edematous/anasarca  Critical illness myopathy  Severe protein caloric malnutrition  Acute hypoglycemia  Supratherapeutic INR      EXTENSIVE POSTOP BLEEDING; 2 UNITS PRBC transfused in SICU, 1 Liter crystalloid, 1 UNIT FFP started  LAST DIALYSIS 11/10/2022    HPI:  This is a 32-year-old male he was admitted to the ICU following excisional debridement of an abdominal wound for concerns of intermittent hypotension requiring vasopressors and possible airway compromise given fluid overload.  The patient was originally admitted to the [facility] ICU on 09/23 for a [place] type B aortic dissection.  Patient failed medical management and was transferred to [facility] for CT surgery evaluation.  Was taken to the OR for TEVAR with left carotid to subclavian artery bypass.  During this admission, the patient was noted to have progressively worsening abdominal pain and lactic acidosis.  CT angiography showed evidence of significant intra-abdominal pathology with some hemoperitoneum and signs of hypoperfusion of the abdominal organs and free blood in the abdomen.  He underwent multiple exploratory laparotomies (9/25, 9/27 and 9/29).  Afterwards, the patient  developed unstable bradycardia and had a temporary transvenous pacemaker placed by Cardiology, which resolved the bradycardia within a couple of days.  The patient was also noted to have progressive renal failure and critical hyperkalemia requiring HD catheter placement and initiation of hemodialysis.  Dictation again started to have worsening abdominal pain as evaluated by Rheumatology for possible lupus flare up and serositis.  Patient was started on Solu-Medrol, CellCept and Plaquenil.  However, the medication was stopped due to concern for infection.  CT abdomen showed multiple loculated fluid collections, necrotizing pancreatitis and possible peritonitis.  Patient had IR drainage on 10/28 and by fluid cultures showed Klebsiella.  Patient continued to have abdominal pain and was brought back to the OR on 10/30.  At that time, patient had 3 days of venita ascites but no signs of necrotic tissue.  Today, the patient was brought back to the OR for wound washout.  Per chart review, in the OR, there is significant dark brown/green liquid but no foul odor, concerning for necrotizing tissue.  The patient was debrided and had a wound VAC placed.  However, patient did intermittent use vasopressors there was no had become fluid overloaded following surgery.  Patient was then transferred to ICU.     Review of patient's allergies indicates:   Allergen Reactions    Levofloxacin     Sulfamethoxazole-trimethoprim        Current Outpatient Medications   Medication Instructions    albuterol (PROVENTIL/VENTOLIN HFA) 90 mcg/actuation inhaler 2 puffs, Inhalation, Every 4 hours PRN, Rescue    amLODIPine (NORVASC) 10 mg, Oral, Daily    aspirin (ECOTRIN) 81 mg, Oral, Daily    belimumab (BENLYSTA) 120 mg SolR Benlysta Take No date recorded No form recorded No frequency recorded No route recorded No set duration recorded No set duration amount recorded active No dosage strength recorded No dosage strength units of measure recorded     famotidine (PEPCID) 20 mg, Oral, 2 times daily    gemfibroziL (LOPID) 600 mg, Oral    hydrALAZINE (APRESOLINE) 10 mg, Oral    labetaloL (NORMODYNE) 300 mg, Oral, 2 times daily    lisinopriL (PRINIVIL,ZESTRIL) 40 mg, Oral, Daily    metoprolol tartrate (LOPRESSOR) 50 mg, Oral, 2 times daily    mycophenolate (CELLCEPT) 1,000 mg, Oral, 2 times daily    MEÑO 2 mg TbEC 1 tablet, Oral, Daily    rivaroxaban (XARELTO ORAL) Xarelto Take No date recorded No form recorded No frequency recorded No route recorded No set duration recorded No set duration amount recorded active No dosage strength recorded No dosage strength units of measure recorded    sucralfate (CARAFATE) 1 g, Oral, 2 times daily    warfarin (COUMADIN) 3 mg, Oral, Daily       Past Medical History:   Diagnosis Date    Aortic dissection 9/23/2022    Hypertension     Systemic lupus erythematosus, organ or system involvement unspecified     Systemic lupus erythematosus, organ or system involvement unspecified        Past Surgical History:   Procedure Laterality Date    APPLICATION OF WOUND VACUUM-ASSISTED CLOSURE DEVICE  9/27/2022    Procedure: APPLICATION, WOUND VAC;  Surgeon: Christopher Espinal MD;  Location: [facility] OR;  Service: General;;    CARDIAC SURGERY      CREATION OF BYPASS FROM INTERNAL CAROTID ARTERY TO SUBCLAVIAN ARTERY Left 9/23/2022    Procedure: CREATION, BYPASS, ARTERIAL, INTERNAL CAROTID TO SUBCLAVIAN;  Surgeon: Vic Martines MD;  Location: [facility] OR;  Service: Cardiothoracic;  Laterality: Left;    ENDOVASCULAR REPAIR OF THORACIC AORTA N/A 9/23/2022    Procedure: REPAIR, AORTA, THORACIC, ENDOVASCULAR;  Surgeon: Vic Martines MD;  Location: [facility] OR;  Service: Cardiology;  Laterality: N/A;    FINGER AMPUTATION      INSERTION OF TUNNELED CENTRAL VENOUS HEMODIALYSIS CATHETER N/A 10/24/2022    Procedure: INSERTION, CATHETER, CENTRAL VENOUS, HEMODIALYSIS, TUNNELED;  Surgeon: Yogesh Melton DO;  Location: [facility] CATH LAB;  Service:  Nephrology;  Laterality: N/A;  TUNNELED CATH INSERTION    THROMBECTOMY Right     TOE AMPUTATION     TTE 10/28/2022      Pre-op Assessment          Review of Systems  Cardiovascular:   Hypertension    Renal/:   Chronic Renal Disease, ARF  Kidney Function/Disease    Hepatic/GI:  Liver Disease (anasarca)  Pancreatic Disease (necrotizing pancreatitis)       Physical Exam  General: Well nourished, Alert and Oriented    Airway:  Mallampati: III   Mouth Opening: Normal  TM Distance: Normal  Tongue: Normal  Neck ROM: Normal ROM    Dental:POOR DENTITION, MULTIPLE MISSING TEETH INCLUDING CENTRAL INCISOR  Chest/Lungs:  Clear to auscultation  Decreased Breath Sounds: left  DECREASED BS L BASE  Heart:  Rate: Normal  Rhythm: Regular Rhythm  No pretibial edema  No carotid bruits    Lab Results   Component Value Date    WBC 19.5 (H) 11/12/2022    HGB 5.8 (LL) 11/12/2022    HCT 17.4 (LL) 11/12/2022    MCV 87.1 11/12/2022     11/12/2022   BMP  Lab Results   Component Value Date     (L) 11/12/2022    K 4.4 11/12/2022    CO2 22 11/12/2022    BUN 61.2 (H) 11/12/2022    CREATININE 1.43 (H) 11/12/2022    CALCIUM 7.3 (L) 11/12/2022    EGFRNONAA 80 01/31/2022 11/12/2022 19:20 LABS  ALB 1.6, AST 20, ALT 9, CA 7.3    Anesthesia Plan  Type of Anesthesia, risks & benefits discussed:    Anesthesia Type: Gen ETT  Intra-op Monitoring Plan: Standard ASA Monitors and Art Line  Post Op Pain Control Plan: multimodal analgesia  Induction:  IV  Airway Plan: Direct, Post-Induction  Informed Consent: Informed consent signed with the Patient and all parties understand the risks and agree with anesthesia plan.  All questions answered. Patient consented to blood products? Yes  ASA Score: 4 Emergent  Day of Surgery Review of History & Physical: H&P Update referred to the surgeon/provider.  Anesthesia Plan Notes: GETA, MODIFIED RSI, A LINE PLACEMENT  PROBABLE POSTOP VENTILATION    Ready For Surgery From Anesthesia Perspective.     .

## 2022-11-13 NOTE — ANESTHESIA POSTPROCEDURE EVALUATION
Anesthesia Post Evaluation    Patient: Stuart Guevara    Procedure(s) Performed: Procedure(s) (LRB):  INCISION AND DRAINAGE (N/A)    Final Anesthesia Type: general      Patient location during evaluation: PACU  Patient participation: Yes- Able to Participate  Level of consciousness: awake and alert and oriented  Post-procedure vital signs: reviewed and stable  Pain management: adequate  Airway patency: patent    PONV status at discharge: No PONV  Anesthetic complications: no      Cardiovascular status: hemodynamically stable  Respiratory status: unassisted  Hydration status: euvolemic  Follow-up not needed.          Vitals Value Taken Time   BP 97/54 11/12/22 2020   Temp 37.1 °C (98.8 °F) 11/12/22 1835   Pulse 82 11/12/22 2028   Resp 17 11/12/22 2028   SpO2 97 % 11/12/22 2028   Vitals shown include unvalidated device data.      No case tracking events are documented in the log.      Pain/Marcin Score: Pain Rating Prior to Med Admin: 6 (11/12/2022  8:00 PM)  Pain Rating Post Med Admin: 2 (11/12/2022 12:38 PM)

## 2022-11-14 NOTE — PROGRESS NOTES
NEPHROLOGY: Milagro Guevara is a 32 y.o. male with TAY following TEVAR for a type B aortic dissection on 09/23/2022.  (patient had had a type A repair via TEVAR in February 2022).      During the type B repair he also required a left carotid to left subclavian artery bypass for subclavian artery occlusion.      Since then he has required several exploratory laparotomies due to concerns for ischemic /necrotizing bowel and abdominal compartment syndrome on 9/25, 9/27 and 9/29.  He has SLE with serositis and inflammatory pancreatitis and the last abdominal washout on October 30th required drainage and grew out Klebsiella.  Peritoneal fluid white count at that time was only 80.  He remains on broad-spectrum antibiotics and followed by ID.  He has been maintained on TTS dialysis schedule via right IJ temp catheter originally placed 2 weeks ago and exchanged on November 2nd over a guidewire by Dr. Melton due to concern for the potential for catheter-related bloodstream infection.  He is eating very little due to early satiety and continues to accumulate ascites.  Recently he has developed recurrent fevers and leukocytosis although he is on steroids.  He has required aggressive daily dialysis with ultrafiltration and assistance of albumin for fluid removal for anasarca.    11/14/22 weekend events noted and it looks like he had lot of surgical interventions done to clean up his intra-abdominal problems.  Now he is on the ventilator.  Partially sedated.  Requiring Cleviprex to control blood pressure.  Patient still responds appropriately.  No urine output noted.  He was dialyzed yesterday and 2500 cc fluid was removed.      Current Facility-Administered Medications:     0.9%  NaCl infusion (for blood administration), , Intravenous, Q24H PRN, Deidre Newton MD    0.9%  NaCl infusion  (for blood administration), , Intravenous, Q24H PRN, Dewayne Cason MD    0.9%  NaCl infusion (for blood administration), , Intravenous, Q24H PRN, Dewayne Cason MD    0.9%  NaCl infusion (for blood administration), , Intravenous, Q24H PRN, Dayron Delcid MD    0.9%  NaCl infusion (for blood administration), , Intravenous, Q24H PRN, Dewayne Cason MD    0.9%  NaCl infusion, , Intravenous, PRN, Thalia Renteria, AGNP    albumin human 25% bottle 25 g, 25 g, Intravenous, Once PRN, Jess Pizarro, MARIANNE    albuterol inhaler 2 puff, 2 puff, Inhalation, Q4H PRN, Evelio Marshall MD    aspirin EC tablet 81 mg, 81 mg, Oral, Daily, Luis F Hutson MD, 81 mg at 11/11/22 0827    atropine injection 0.5 mg, 0.5 mg, Intravenous, Once, Martín Hernandez MD    bisacodyL suppository 10 mg, 10 mg, Rectal, Daily PRN, Keena Shafer, FNP, 10 mg at 10/28/22 0400    carvediloL tablet 25 mg, 25 mg, Oral, BID, Maximino Yeh, ANP, 25 mg at 11/12/22 1006    clevidipine (CLEVIPREX) 25 mg/50 mL infusion, 0-21 mg/hr, Intravenous, Continuous, Deidre Newton MD, Last Rate: 8 mL/hr at 11/14/22 0603, 4 mg/hr at 11/14/22 0603    dexmedetomidine (PRECEDEX) 400mcg/100mL 0.9% NaCL infusion, 0-1.4 mcg/kg/hr, Intravenous, Continuous, Deidre Newton MD, Stopped at 11/13/22 0600    dextrose 10 % infusion, 12.5 g, Intravenous, PRN, Dewayne Hughes PA-C, 12.5 g at 10/25/22 0317    dextrose 10 % infusion, 25 g, Intravenous, PRN, Dewayne Hughes PA-C    dextrose 10% bolus 125 mL, 12.5 g, Intravenous, PRN, Ivan Chopra MD, Stopped at 10/20/22 0953    doxycycline (VIBRAMYCIN) 100 mg in dextrose 5 % 250 mL IVPB, 100 mg, Intravenous, Q12H, DENIA Armando, Last Rate: 250 mL/hr at 11/14/22 0559, 100 mg at 11/14/22 0559    fentaNYL 2500 mcg in 0.9% sodium chloride 250 mL infusion premix (titrating), 0-200 mcg/hr, Intravenous, Continuous, Deidre Newton MD, Last Rate: 7.5 mL/hr at 11/13/22 1756, 75 mcg/hr at 11/13/22 1756    fluconazole  (DIFLUCAN) IVPB 400 mg, 400 mg, Intravenous, Every Tues, Thurs, Sat, Aleah Calix MD, Stopped at 11/12/22 2325    glucagon (human recombinant) injection 1 mg, 1 mg, Intramuscular, PRN, Dewayne Hughes PA-C, 1 mg at 10/20/22 0925    glucose chewable tablet 16 g, 16 g, Oral, PRN, Dewayne Hughes PA-C, 16 g at 11/08/22 1104    glucose chewable tablet 24 g, 24 g, Oral, PRN, Dewayne Hughes PA-C, 24 g at 10/24/22 1709    hydrALAZINE injection 20 mg, 20 mg, Intravenous, Q4H PRN, Bin Bell MD, 20 mg at 11/13/22 0535    HYDROmorphone (PF) injection 0.5 mg, 0.5 mg, Intravenous, Q5 Min PRN, Kris Brothers MD, 0.5 mg at 11/12/22 2000    insulin aspart U-100 injection 1-10 Units, 1-10 Units, Subcutaneous, QID (AC + HS) PRN, Ronda Hamilton MD, 4 Units at 11/14/22 0606    labetaloL injection 20 mg, 20 mg, Intravenous, Q8H PRN, Bin Bell MD, 20 mg at 10/03/22 0321    lactated ringers bolus 1,000 mL, 1,000 mL, Intravenous, Once, Martín Hernandez MD    lactulose 10 gram/15 ml solution 10 g, 10 g, Oral, Q6H PRN, Luis F Hutson MD, 10 g at 11/09/22 1136    lipase-protease-amylase 12,000-38,000-60,000 units per capsule 3 capsule, 3 capsule, Oral, TID WM, Luis F Hutson MD, 3 capsule at 11/11/22 1717    melatonin tablet 6 mg, 6 mg, Oral, Nightly PRN, Michelle Ferrera, AGACNP-BC, 6 mg at 10/14/22 0000    meropenem (MERREM) 500 mg in sodium chloride 0.9 % 100 mL IVPB (MB+), 500 mg, Intravenous, Q24H, Saurabh Curry MD, Stopped at 11/12/22 1234    morphine 12 hr tablet 30 mg, 30 mg, Oral, Q12H, Shamar Stewart MD, 30 mg at 11/12/22 2124    naloxegoL (MOVANTIK) tablet 25 mg, 25 mg, Oral, Daily, Luis F Hutson MD, 25 mg at 11/13/22 0921    nitroGLYCERIN SL tablet 0.4 mg, 0.4 mg, Sublingual, Q5 Min PRN, Zach Shafer MD, 0.4 mg at 10/30/22 0713    NORepinephrine 8 mg in dextrose 5% 250 mL infusion, 0-3 mcg/kg/min, Intravenous, Continuous, Martín Hernandez MD, Stopped at 11/13/22 0237    ondansetron injection 4 mg, 4 mg,  "Intravenous, Q6H PRN, Evelio Marshall MD, 4 mg at 11/08/22 1053    polyethylene glycol packet 17 g, 17 g, Oral, BID PRN, Thalia OWUSU Renteria, AGNP    predniSONE tablet 20 mg, 20 mg, Oral, BID, Emilie Lenzkpatrick, FNP, 20 mg at 11/13/22 2041    propofol (DIPRIVAN) 10 mg/mL infusion, 0-50 mcg/kg/min, Intravenous, Continuous, Deidre Newton MD, Last Rate: 14.5 mL/hr at 11/14/22 0603, 40 mcg/kg/min at 11/14/22 0603    sodium bicarbonate tablet 1,300 mg, 1,300 mg, Oral, Daily, Thalia S Renteria, AGNP, 1,300 mg at 11/13/22 0921    sodium chloride 0.9% bolus 250 mL, 250 mL, Intravenous, PRN, Thalia S Renteria, AGNP    sodium chloride 0.9% bolus 250 mL, 250 mL, Intravenous, PRN, Thalia S Renteria, AGNP    sodium chloride 0.9% bolus 250 mL, 250 mL, Intravenous, PRN, Shahzad Lundberg MD    sodium chloride 0.9% bolus 250 mL, 250 mL, Intravenous, PRN, Thalia S Renteria, AGNP    Flushing PICC Protocol, , , Until Discontinued **AND** sodium chloride 0.9% flush 10 mL, 10 mL, Intravenous, Q6H, 10 mL at 11/14/22 0600 **AND** sodium chloride 0.9% flush 10 mL, 10 mL, Intravenous, PRN, Ronda Hamilton MD    sodium chloride 0.9% flush 10 mL, 10 mL, Intravenous, PRN, Kris Brothers MD    sodium chloride 0.9% flush 10 mL, 10 mL, Intravenous, PRN, Martín Hernandez MD    sodium chloride 0.9% flush 10 mL, 10 mL, Intravenous, PRN, Dayron Delcid MD    zinc oxide-cod liver oil 40 % paste, , Topical (Top), BID, Saurabh Curry MD, Given at 11/12/22 2100        BP (!) 129/59   Pulse 61   Temp 98.3 °F (36.8 °C) (Oral)   Resp 20   Ht 5' 5" (1.651 m)   Wt 60.3 kg (133 lb)   SpO2 100%   BMI 22.13 kg/m²     Physical Exam:    GEN: Awake and appropriate.  On the ventilator.  Partially sedated.  HEENT:  Temporal wasting noted  NECK : No JVD, temporary right IJ catheter for noted  CARD : RRR s rub or gallop  LUNGS : Clear to auscultation  ABD :  Still distended.  Lot of dressings all around the abdominal area and leakage of the dressing going " on on the left side and the nurses plan to change the dressing today.  EXT :  No cyanosis, 1+ edema  NEURO : No obvious focal deficits        Intake/Output Summary (Last 24 hours) at 11/14/2022 0853  Last data filed at 11/14/2022 0600  Gross per 24 hour   Intake 38074.72 ml   Output 2500 ml   Net 46009.72 ml         Laboratory:  Recent Results (from the past 24 hour(s))   CBC with Differential    Collection Time: 11/13/22 12:46 PM   Result Value Ref Range    WBC 13.8 (H) 4.5 - 11.5 x10(3)/mcL    RBC 3.17 (L) 4.70 - 6.10 x10(6)/mcL    Hgb 9.2 (L) 14.0 - 18.0 gm/dL    Hct 26.6 (L) 42.0 - 52.0 %    MCV 83.9 80.0 - 94.0 fL    MCH 29.0 27.0 - 31.0 pg    MCHC 34.6 33.0 - 36.0 mg/dL    RDW 18.1 (H) 11.5 - 17.0 %    Platelet 75 (L) 130 - 400 x10(3)/mcL    MPV 11.2 (H) 7.4 - 10.4 fL    Neut % 88.6 %    Lymph % 6.0 %    Mono % 4.4 %    Eos % 0.0 %    Basophil % 0.3 %    Lymph # 0.82 0.6 - 4.6 x10(3)/mcL    Neut # 12.2 (H) 2.1 - 9.2 x10(3)/mcL    Mono # 0.61 0.1 - 1.3 x10(3)/mcL    Eos # 0.00 0 - 0.9 x10(3)/mcL    Baso # 0.04 0 - 0.2 x10(3)/mcL    IG# 0.09 (H) 0 - 0.04 x10(3)/mcL    IG% 0.7 %    NRBC% 0.0 %   Basic Metabolic Panel    Collection Time: 11/13/22  1:44 PM   Result Value Ref Range    Sodium Level 132 (L) 136 - 145 mmol/L    Potassium Level 3.7 3.5 - 5.1 mmol/L    Chloride 97 (L) 98 - 107 mmol/L    Carbon Dioxide 22 22 - 29 mmol/L    Glucose Level 147 (H) 74 - 100 mg/dL    Blood Urea Nitrogen 28.4 (H) 8.9 - 20.6 mg/dL    Creatinine 0.95 0.73 - 1.18 mg/dL    BUN/Creatinine Ratio 30     Calcium Level Total 8.1 (L) 8.4 - 10.2 mg/dL    Anion Gap 13.0 mEq/L    eGFR >60 mls/min/1.73/m2   Magnesium    Collection Time: 11/13/22  1:44 PM   Result Value Ref Range    Magnesium Level 1.90 1.60 - 2.60 mg/dL   Phosphorus    Collection Time: 11/13/22  1:44 PM   Result Value Ref Range    Phosphorus Level 4.1 2.3 - 4.7 mg/dL   CBC with Differential    Collection Time: 11/13/22  4:21 PM   Result Value Ref Range    WBC 14.9 (H) 4.5 -  11.5 x10(3)/mcL    RBC 2.95 (L) 4.70 - 6.10 x10(6)/mcL    Hgb 8.8 (L) 14.0 - 18.0 gm/dL    Hct 24.8 (L) 42.0 - 52.0 %    MCV 84.1 80.0 - 94.0 fL    MCH 29.8 27.0 - 31.0 pg    MCHC 35.5 33.0 - 36.0 mg/dL    RDW 18.3 (H) 11.5 - 17.0 %    Platelet 84 (L) 130 - 400 x10(3)/mcL    MPV 12.0 (H) 7.4 - 10.4 fL    Neut % 90.2 %    Lymph % 5.0 %    Mono % 3.6 %    Eos % 0.2 %    Basophil % 0.3 %    Lymph # 0.75 0.6 - 4.6 x10(3)/mcL    Neut # 13.4 (H) 2.1 - 9.2 x10(3)/mcL    Mono # 0.54 0.1 - 1.3 x10(3)/mcL    Eos # 0.03 0 - 0.9 x10(3)/mcL    Baso # 0.04 0 - 0.2 x10(3)/mcL    IG# 0.10 (H) 0 - 0.04 x10(3)/mcL    IG% 0.7 %    NRBC% 0.0 %   CBC with Differential    Collection Time: 11/13/22  8:17 PM   Result Value Ref Range    WBC 15.7 (H) 4.5 - 11.5 x10(3)/mcL    RBC 3.03 (L) 4.70 - 6.10 x10(6)/mcL    Hgb 8.9 (L) 14.0 - 18.0 gm/dL    Hct 25.5 (L) 42.0 - 52.0 %    MCV 84.2 80.0 - 94.0 fL    MCH 29.4 27.0 - 31.0 pg    MCHC 34.9 33.0 - 36.0 mg/dL    RDW 18.2 (H) 11.5 - 17.0 %    Platelet 92 (L) 130 - 400 x10(3)/mcL    MPV 11.1 (H) 7.4 - 10.4 fL    Neut % 90.8 %    Lymph % 4.6 %    Mono % 3.7 %    Eos % 0.0 %    Basophil % 0.2 %    Lymph # 0.72 0.6 - 4.6 x10(3)/mcL    Neut # 14.3 (H) 2.1 - 9.2 x10(3)/mcL    Mono # 0.58 0.1 - 1.3 x10(3)/mcL    Eos # 0.00 0 - 0.9 x10(3)/mcL    Baso # 0.03 0 - 0.2 x10(3)/mcL    IG# 0.11 (H) 0 - 0.04 x10(3)/mcL    IG% 0.7 %    NRBC% 0.0 %   POCT glucose    Collection Time: 11/13/22  8:55 PM   Result Value Ref Range    POCT Glucose 231 (H) 70 - 110 mg/dL   CBC with Differential    Collection Time: 11/14/22 12:27 AM   Result Value Ref Range    WBC 15.0 (H) 4.5 - 11.5 x10(3)/mcL    RBC 2.72 (L) 4.70 - 6.10 x10(6)/mcL    Hgb 8.0 (L) 14.0 - 18.0 gm/dL    Hct 22.9 (L) 42.0 - 52.0 %    MCV 84.2 80.0 - 94.0 fL    MCH 29.4 27.0 - 31.0 pg    MCHC 34.9 33.0 - 36.0 mg/dL    RDW 18.0 (H) 11.5 - 17.0 %    Platelet 90 (L) 130 - 400 x10(3)/mcL    MPV 11.4 (H) 7.4 - 10.4 fL    Neut % 88.3 %    Lymph % 5.9 %    Mono % 4.8  %    Eos % 0.1 %    Basophil % 0.2 %    Lymph # 0.88 0.6 - 4.6 x10(3)/mcL    Neut # 13.3 (H) 2.1 - 9.2 x10(3)/mcL    Mono # 0.72 0.1 - 1.3 x10(3)/mcL    Eos # 0.01 0 - 0.9 x10(3)/mcL    Baso # 0.03 0 - 0.2 x10(3)/mcL    IG# 0.10 (H) 0 - 0.04 x10(3)/mcL    IG% 0.7 %    NRBC% 0.0 %   Phosphorus    Collection Time: 11/14/22  4:31 AM   Result Value Ref Range    Phosphorus Level 6.1 (H) 2.3 - 4.7 mg/dL   Magnesium    Collection Time: 11/14/22  4:31 AM   Result Value Ref Range    Magnesium Level 1.90 1.60 - 2.60 mg/dL   Comprehensive metabolic panel    Collection Time: 11/14/22  4:31 AM   Result Value Ref Range    Sodium Level 130 (L) 136 - 145 mmol/L    Potassium Level 4.1 3.5 - 5.1 mmol/L    Chloride 96 (L) 98 - 107 mmol/L    Carbon Dioxide 19 (L) 22 - 29 mmol/L    Glucose Level 239 (H) 74 - 100 mg/dL    Blood Urea Nitrogen 39.5 (H) 8.9 - 20.6 mg/dL    Creatinine 1.42 (H) 0.73 - 1.18 mg/dL    Calcium Level Total 8.1 (L) 8.4 - 10.2 mg/dL    Protein Total 4.7 (L) 6.4 - 8.3 gm/dL    Albumin Level 2.5 (L) 3.5 - 5.0 gm/dL    Globulin 2.2 (L) 2.4 - 3.5 gm/dL    Albumin/Globulin Ratio 1.1 1.1 - 2.0 ratio    Bilirubin Total 1.1 <=1.5 mg/dL    Alkaline Phosphatase 129 40 - 150 unit/L    Alanine Aminotransferase 12 0 - 55 unit/L    Aspartate Aminotransferase 15 5 - 34 unit/L    eGFR >60 mls/min/1.73/m2   CBC with Differential    Collection Time: 11/14/22  4:31 AM   Result Value Ref Range    WBC 15.4 (H) 4.5 - 11.5 x10(3)/mcL    RBC 2.74 (L) 4.70 - 6.10 x10(6)/mcL    Hgb 8.1 (L) 14.0 - 18.0 gm/dL    Hct 23.1 (L) 42.0 - 52.0 %    MCV 84.3 80.0 - 94.0 fL    MCH 29.6 27.0 - 31.0 pg    MCHC 35.1 33.0 - 36.0 mg/dL    RDW 18.0 (H) 11.5 - 17.0 %    Platelet 113 (L) 130 - 400 x10(3)/mcL    MPV 11.3 (H) 7.4 - 10.4 fL    Neut % 91.3 %    Lymph % 3.8 %    Mono % 4.0 %    Eos % 0.1 %    Basophil % 0.2 %    Lymph # 0.59 (L) 0.6 - 4.6 x10(3)/mcL    Neut # 14.0 (H) 2.1 - 9.2 x10(3)/mcL    Mono # 0.62 0.1 - 1.3 x10(3)/mcL    Eos # 0.01 0 - 0.9  x10(3)/mcL    Baso # 0.03 0 - 0.2 x10(3)/mcL    IG# 0.09 (H) 0 - 0.04 x10(3)/mcL    IG% 0.6 %    NRBC% 0.1 %   POCT glucose    Collection Time: 11/14/22  5:57 AM   Result Value Ref Range    POCT Glucose 233 (H) 70 - 110 mg/dL         Assessment/Plan:   TAY-oliguric again  SLE with serositis/ pancreatitis   Status post multiple abdominal washouts for inflammatory pancreatitis  Status post type B aortic dissection repair 09/23/2022  Hypertension  Severe protein calorie malnutrition    Recommendations  1. No acute indication for any dialysis therapy today  2. Nurses plan to give patient his oral medications which also includes sodium bicarb to help correct metabolic acidosis.  3. If patient needs to stay on the ventilator, then patient will either need TPN versus tube feedings.  For nutritional support.

## 2022-11-14 NOTE — PROGRESS NOTES
[facility] General - 7th Floor ICU  Pulmonary Critical Care Note    Patient Name: Stuart Guevara  MRN: [id number]  Admission Date: 9/23/2022  Hospital Length of Stay: 52 days  Code Status: Full Code  Attending Provider: William Proctor MD  Primary Care Provider: Primary Doctor No     Subjective:     HPI:   This is a 32-year-old male he was admitted to the ICU following excisional debridement of an abdominal wound for concerns of intermittent hypotension requiring vasopressors and possible airway compromise given fluid overload.  The patient was originally admitted to the [facility] ICU on 09/23 for a [place] type B aortic dissection.  Patient failed medical management and was transferred to [facility] for CT surgery evaluation.  Was taken to the OR for TEVAR with left carotid to subclavian artery bypass.  During this admission, the patient was noted to have progressively worsening abdominal pain and lactic acidosis.  CT angiography showed evidence of significant intra-abdominal pathology with some hemoperitoneum and signs of hypoperfusion of the abdominal organs and free blood in the abdomen.  He underwent multiple exploratory laparotomies (9/25, 9/27 and 9/29).  Afterwards, the patient developed unstable bradycardia and had a temporary transvenous pacemaker placed by Cardiology, which resolved the bradycardia within a couple of days.  The patient was also noted to have progressive renal failure and critical hyperkalemia requiring HD catheter placement and initiation of hemodialysis.  Dictation again started to have worsening abdominal pain as evaluated by Rheumatology for possible lupus flare up and serositis.  Patient was started on Solu-Medrol, CellCept and Plaquenil.  However, the medication was stopped due to concern for infection.  CT abdomen showed multiple loculated fluid collections, necrotizing pancreatitis and possible peritonitis.  Patient had IR drainage on 10/28 and by fluid cultures showed Klebsiella.   Patient continued to have abdominal pain and was brought back to the OR on 10/30.  At that time, patient had 3 days of venita ascites but no signs of necrotic tissue. ON 11/12, the patient was brought back to the OR for wound washout.  Per chart review, in the OR, there is significant dark brown/green liquid but no foul odor, concerning for necrotizing tissue.  The patient was debrided and had a wound VAC placed.  However, patient did intermittent use vasopressors there was no had become fluid overloaded following surgery.  Patient was then transferred to ICU.  Patient noted to be awake alert and not on any vasopressors or sedation in the PACU    Hospital Course/Significant events:  11/13: washout, I&D, wound vac removal    24 Hour Interval History:  Afebrile. Hemodynamically stable. Sedated on 45 propofol and 125 fentanyl. Intubated on stable vent settings with thin secretions. Last HD session yesterday 11/13 with 2500 cc total UF, tolerated well.    SIMV: Vt 500, rate 20, PSV 10, PEEP 5, FiO2 30%    Past Medical History:   Diagnosis Date    Aortic dissection 9/23/2022    Hypertension     Systemic lupus erythematosus, organ or system involvement unspecified     Systemic lupus erythematosus, organ or system involvement unspecified        Past Surgical History:   Procedure Laterality Date    APPLICATION OF WOUND VACUUM-ASSISTED CLOSURE DEVICE  9/27/2022    Procedure: APPLICATION, WOUND VAC;  Surgeon: Christopher Espinal MD;  Location: [facility];  Service: General;;    CARDIAC SURGERY      CREATION OF BYPASS FROM INTERNAL CAROTID ARTERY TO SUBCLAVIAN ARTERY Left 9/23/2022    Procedure: CREATION, BYPASS, ARTERIAL, INTERNAL CAROTID TO SUBCLAVIAN;  Surgeon: Vic Martines MD;  Location: [facility];  Service: Cardiothoracic;  Laterality: Left;    ENDOVASCULAR REPAIR OF THORACIC AORTA N/A 9/23/2022    Procedure: REPAIR, AORTA, THORACIC, ENDOVASCULAR;  Surgeon: Vic Martines MD;  Location: [facility];  Service: Cardiology;   Laterality: N/A;    FINGER AMPUTATION      INSERTION OF TUNNELED CENTRAL VENOUS HEMODIALYSIS CATHETER N/A 10/24/2022    Procedure: INSERTION, CATHETER, CENTRAL VENOUS, HEMODIALYSIS, TUNNELED;  Surgeon: Yogesh Melton DO;  Location: [facility] CATH LAB;  Service: Nephrology;  Laterality: N/A;  TUNNELED CATH INSERTION    THROMBECTOMY Right     TOE AMPUTATION         Social History     Socioeconomic History    Marital status: Single   Tobacco Use    Smoking status: Every Day     Types: Cigarettes    Smokeless tobacco: Never     Social Determinants of Health     Food Insecurity: Unknown    Worried About Running Out of Food in the Last Year: Never true   Transportation Needs: Unknown    Lack of Transportation (Medical): No   Housing Stability: Unknown    Unable to Pay for Housing in the Last Year: No           Current Outpatient Medications   Medication Instructions    albuterol (PROVENTIL/VENTOLIN HFA) 90 mcg/actuation inhaler 2 puffs, Inhalation, Every 4 hours PRN, Rescue    amLODIPine (NORVASC) 10 mg, Oral, Daily    aspirin (ECOTRIN) 81 mg, Oral, Daily    belimumab (BENLYSTA) 120 mg SolR Benlysta Take No date recorded No form recorded No frequency recorded No route recorded No set duration recorded No set duration amount recorded active No dosage strength recorded No dosage strength units of measure recorded    famotidine (PEPCID) 20 mg, Oral, 2 times daily    gemfibroziL (LOPID) 600 mg, Oral    hydrALAZINE (APRESOLINE) 10 mg, Oral    labetaloL (NORMODYNE) 300 mg, Oral, 2 times daily    lisinopriL (PRINIVIL,ZESTRIL) 40 mg, Oral, Daily    metoprolol tartrate (LOPRESSOR) 50 mg, Oral, 2 times daily    mycophenolate (CELLCEPT) 1,000 mg, Oral, 2 times daily    MEÑO 2 mg TbEC 1 tablet, Oral, Daily    rivaroxaban (XARELTO ORAL) Xarelto Take No date recorded No form recorded No frequency recorded No route recorded No set duration recorded No set duration amount recorded active No dosage strength recorded No dosage strength units of  measure recorded    sucralfate (CARAFATE) 1 g, Oral, 2 times daily    warfarin (COUMADIN) 3 mg, Oral, Daily       Current Inpatient Medications   aspirin  81 mg Oral Daily    atropine  0.5 mg Intravenous Once    carvediloL  25 mg Oral BID    doxycycline (VIBRAMYCIN) IVPB  100 mg Intravenous Q12H    fluconazole (DIFLUCAN) IV (PEDS and ADULTS)  400 mg Intravenous Every Tues, Thurs, Sat    lactated ringers  1,000 mL Intravenous Once    lipase-protease-amylase 12,000-38,000-60,000 units  3 capsule Oral TID WM    meropenem (MERREM) IVPB  500 mg Intravenous Q24H    morphine  30 mg Oral Q12H    naloxegoL  25 mg Oral Daily    predniSONE  20 mg Oral BID    sodium bicarbonate  1,300 mg Oral Daily    sodium chloride 0.9%  10 mL Intravenous Q6H    zinc oxide-cod liver oil   Topical (Top) BID       Current Intravenous Infusions   clevidipine 4 mg/hr (11/14/22 0603)    dexmedetomidine (PRECEDEX) infusion Stopped (11/13/22 0600)    fentanyl 75 mcg/hr (11/13/22 1756)    NORepinephrine bitartrate-D5W Stopped (11/13/22 0237)    propofoL 40 mcg/kg/min (11/14/22 0603)         Review of Systems   Constitutional: Negative.    HENT: Negative.     Eyes: Negative.    Respiratory: Negative.     Cardiovascular:  Negative for chest pain and palpitations.   Gastrointestinal:  Positive for abdominal pain. Negative for nausea and vomiting.   Musculoskeletal:  Positive for myalgias.        LUE and BLE pain   Neurological:  Negative for headaches.        Objective:       Intake/Output Summary (Last 24 hours) at 11/14/2022 1111  Last data filed at 11/14/2022 0600  Gross per 24 hour   Intake 1291.56 ml   Output 2500 ml   Net -1208.44 ml         Vital Signs (Most Recent):  Temp: 98.3 °F (36.8 °C) (11/14/22 0400)  Pulse: 61 (11/14/22 0645)  Resp: 20 (11/14/22 0645)  BP: (!) 129/59 (11/14/22 0645)  SpO2: 100 % (11/14/22 0907)    Body mass index is 22.13 kg/m².  Weight: 60.3 kg (133 lb) Vital Signs (24h Range):  Temp:  [96.5 °F (35.8 °C)-98.6 °F (37 °C)]  98.3 °F (36.8 °C)  Pulse:  [48-78] 61  Resp:  [16-24] 20  SpO2:  [95 %-100 %] 100 %  BP: (105-162)/(59-86) 129/59  Arterial Line BP: (128-156)/(50-75) 136/52     Physical Exam  Constitutional:       General: He is not in acute distress.     Appearance: He is ill-appearing.      Comments: Opens eyes to voice   HENT:      Head: Normocephalic and atraumatic.   Eyes:      Extraocular Movements: Extraocular movements intact.   Cardiovascular:      Rate and Rhythm: Regular rhythm. Bradycardia present.   Pulmonary:      Comments: Intubated, mechanically ventilated, coarse breath sounds bilaterally  Abdominal:      General: There is no distension.      Palpations: Abdomen is soft.      Tenderness: There is abdominal tenderness.      Comments: Midline incision with staples in place c/d/i, majority of lower abdomen covered with foam tape   Musculoskeletal:      Right lower leg: Edema present.      Left lower leg: Edema present.      Comments: 3+ pitting edema of LUE and BLE, surgically absent right second toe   Neurological:      Comments: Alert, follows commands         Lines/Drains/Airways       Peripherally Inserted Central Catheter Line  Duration             PICC Double Lumen 10/25/22 1520 right brachial 19 days              Central Venous Catheter Line  Duration                  Hemodialysis Catheter right internal jugular -- days              Drain  Duration                  NG/OG Tube 11/13/22 0330 [place] [facility] mouth 1 day              Airway  Duration                  Airway - Non-Surgical 11/13/22 0156 1 day              Arterial Line  Duration             Arterial Line 11/13/22 0204 Left Radial 1 day              Peripheral Intravenous Line  Duration                  External Jugular IV 11/02/22 0800 12 days                    Significant Labs:    Lab Results   Component Value Date    WBC 16.5 (H) 11/14/2022    HGB 8.3 (L) 11/14/2022    HCT 23.1 (L) 11/14/2022    MCV 83.7 11/14/2022     (L) 11/14/2022          BMP  Lab Results   Component Value Date     (L) 11/14/2022    K 4.1 11/14/2022    CO2 19 (L) 11/14/2022    BUN 39.5 (H) 11/14/2022    CREATININE 1.42 (H) 11/14/2022    CALCIUM 8.1 (L) 11/14/2022    EGFRNONAA 80 01/31/2022       ABG  Recent Labs   Lab 11/13/22 0618   PH 7.43   PO2 191*   PCO2 33*   HCO3 21.9*       Mechanical Ventilation Support:  Vent Mode: SIMV (11/14/22 0907)  Set Rate: 20 BPM (11/14/22 0907)  Vt Set: 500 mL (11/14/22 0907)  Pressure Support: 10 cmH20 (11/14/22 0907)  PEEP/CPAP: 5 cmH20 (11/14/22 0907)  Oxygen Concentration (%): 30 (11/14/22 0907)  Peak Airway Pressure: 16 cmH2O (11/14/22 0907)  Total Ve: 9.1 L/m (11/14/22 0502)  F/VT Ratio<105 (RSBI): (!) 43.48 (11/14/22 0502)    Significant Imaging:  I have reviewed the pertinent imaging within the past 24 hours.        Assessment/Plan:     Assessment  Necrotizing pancreatitis and loculated intra-abdominal fluid collection, post op wound complication with associated coagulopathy. Persistent leukocytosis and thrombocytopenia which is improving. TEG normal. Hgb current stable following pre-op transfusion of multiple blood products and intraop ddavp on 11/13.  S/p IR drainage on 10/28  S/p exploratory laparotomy on 11/12  S/p washout, I&D, wound vac removal on 11/13, improved hemodynamic status weaned off pressors intraop following blood product administration  Shock, likely hypovolemic  Hospital-acquired pneumonia  Acute kidney injury, Nephrology following, last HD on 11/13 with total UF 2500 cc. HD not indicated today, continue PO sodium bicarb given metabolic acidosis with bicarb of 19.  Hypochloremic hyponatremia  Lupus flare up. Rheumatology is following, patient last evaluated on 11/11 with plans to continue prednisone 20 mg BID   Anasarca  Normocytic anemia        Plan  Will monitor blood pressure restart vasopressors as needed. MAP goal > 65 mm Hg. C  Wean sedation as tolerated.   Continue to mo  Currently on Diflucan (day  10), Doxy (day 10), and meropenem (day 10). Will continue.  Suspect hyponatremia is due to volume depletion. Monitoring closely.   Completing prednisone taper. Continue prednisone 20 mg BID. Rheumatology is following.  Will hold off on diuresis until BP improves.      DVT Prophylaxis: Chemical prophylaxis held per surgery recs as of 11/13     32 minutes of critical care was time spent personally by me on the following activities: development of treatment plan with patient or surrogate and bedside caregivers, discussions with consultants, evaluation of patient's response to treatment, examination of patient, ordering and performing treatments and interventions, ordering and review of laboratory studies, ordering and review of radiographic studies, pulse oximetry, re-evaluation of patient's condition.  This critical care time did not overlap with that of any other provider or involve time for any procedures.     Blossom Nicholas MD  Pulmonary Critical Care Medicine  [facility] - 7th Floor ICU

## 2022-11-14 NOTE — PROGRESS NOTES
Inpatient Nutrition Assessment    Admit Date: 9/23/2022   Total duration of encounter: 52 days     Nutrition Recommendation/Prescription     When appropriate, tube feeding as tolerated  Peptamen Intense VHP goal rate 50 ml/hr to provide  1000 kcal/d, 108% needs with kcals from medications considered  93 g protein/d, 100% needs  840 ml free water/d  1500 mg potassium/d  680 mg phosphorus/d  (calculations based on estimated 20 hr/d run time)    Communication of Recommendations: reviewed with nurse    Nutrition Assessment     Malnutrition Assessment/Nutrition-Focused Physical Exam    Malnutrition in the context of acute illness or injury  Degree of Malnutrition: severe malnutrition  Energy Intake: < 75% of estimated energy requirement for > 7 days  Interpretation of Weight Loss: >5% in 1 month  Body Fat:moderate depletion  Area of Body Fat Loss: orbital region  and upper arm region - triceps / biceps  Muscle Mass Loss: moderate depletion  Area of Muscle Mass Loss: temple region - temporalis muscle, clavicle bone region - pectoralis major, deltoid, trapezius muscles, clavicle and acromion bone region - deltoid muscle, and scapular bone region - trapezius, supraspinus, infraspinus muscles  Fluid Accumulation: moderate to severe  Edema: ascites   Reduced  Strength: unable to obtain  A minimum of two characteristics is recommended for diagnosis of either severe or non-severe malnutrition.    Chart Review    Reason Seen: continuous nutrition monitoring and follow-up    Diagnosis:  aortic dissection s/p TEVAR  abdominal compartment syndrome s/p multiple exlaps (9/25, 9/27, 9/29)  renal failure/HD  CT has shown concern for necrotizing fascitis  paracentesis culture + Klebsiella  s/p washout 10/30  LLQ wound vac placement 11/12 with bleeding requiring significant transfusion post-op  wound vac removal 11/13 with wound washout    Relevant Medical History: HTN, SLE, HBV    Nutrition-Related Medications: Cleviprex, Diprivan,  pancreatic enzymes, prednisone, sodium bicarbonate  Calorie Containing IV Medications: Diprivan @ 14.5 ml/hr (provides 383 kcal/d) and Cleviprex @ 8 ml/hr (provides 384 kcal/d)    Nutrition-Related Labs:  10/31/22 PAB 13.4 (L)  11/12/22 CRP 58.7 (H)  11/14/22 Na 130 (L), GFR WNL, Glu 239 (H), Ca 8.1 (L), Phos 6.1 (H), Alb 2.5 (L)    Diet/PN Order: Diet NPO Except for: Medication  Oral Supplement Order: Boost Plus  Tube Feeding Order: none at this time  Appetite/Oral Intake: NPO/not applicable  Factors Affecting Nutritional Intake: NPO and on mechanical ventilation  Food/Orthodox/Cultural Preferences: none reported    Skin Integrity: incision  Wound(s):      Altered Skin Integrity 11/05/22 1253 posterior Sacral spine #1 Shearing Partial thickness tissue loss. Shallow open ulcer with a red or pink wound bed, without slough. Intact or Open/Ruptured Serum-filled blister.-Tissue loss description: Partial thickness       Altered Skin Integrity 11/07/22 1100 Left Achilles Abrasion(s)-Tissue loss description: Partial thickness       Altered Skin Integrity 11/07/22 1100 Right Achilles Abrasion(s)-Tissue loss description: Partial thickness    Comments    9/26: Discussed with RN. Will provide tube feeding recommendations for when appropriate to start tube feeding. Receiving kcal from meds. Noted Phos, will monitor for need for renal formula.   9/30: Discussed with RN. Need to consider TPN since pt now LOS day 7. If starting tube feeding, recs provided. If able to extubate, advance diet when appropriate.   10/4: Pt previously eating 100% po intake of meals, good appetite. Currently NPO for procedure, plans to restart diet per RN. Will add ONS for added protein and kcal.  10/7: Pt now with decreased appetite/po intake. Noted elevated K and Phos, likely not diet related since pt with poor po intake of full liquid diet.   10/11: pt sleeping, nurse reports tolerating full liquid diet, some abdominal distention noted, reports  "unsure of plans to advance diet as this time  10/14: pt advanced to soft diet today; ate about 40% of lunch tray, drinking Boost, would like 2 per meal in vanilla flavor  10/18: Pt advanced to regular texture renal/diabetic diet, he reports that his appetite is fair, drinks boost, feels he is chewing/swallowing well, does reports some abdominal pain 30-90 minutes after eating, feels better after several hours of no eating.   10/21: appetite remains the same, drinking boost  10/25: fair appetite, says he is drinking some boost, would like to continue flavor  10/28: pt with decreased appetite, noted new dx necrotizing pancreatits; paracentesis done today; not drinking boost at this time either; plans to try and start eating more and drinking boost, says he feels a little better this afternoon. MD discussed starting TPN until pt able to tolerate increased oral intake. Still on dialysis as needed  10/31: now NPO with NG with LIWS after abdominal washout with surgery yesterday, possible necrotizing fascitis, will switch to custom TPN today due to abnormal electrolytes  11/2: Pt's diet was advanced yesterday; however, pt reports still just taking in liquids and no solids. Pt reports vomiting last night. TPN running as ordered.   11/4: Pt reports that he ate 100% of all meals yesterday and tolerated them well, skipped breakfast this morning due to feeling unwell and some mild nausea, no GI complaints during our visit, PO intake encouraged.    11/7: Pt not in room, having test, nurse reports good po intake/appetite today, was not as good yesterday, TPN still infusing, AST and AP increasing, glucose better today.   11/11: Pt no longer on TPN; not eating much of meals per RN but is drinking the Boost.     11/14: Patient in ICU on ventilator, receiving kcals from Diprivan and Cleviprex (767 total kcals), will provide tube feeding recommendations.    Anthropometrics    Height: 5' 5" (165.1 cm) Height Method: Estimated  Last " Weight: 60.3 kg (133 lb) (22 0500) Weight Method: Bed Scale  BMI (Calculated): 22.1  BMI Classification: normal (BMI 18.5-24.9)        Ideal Body Weight (IBW), Male: 136 lb     % Ideal Body Weight, Male (lb): 91.75 %                 Usual Body Weight (UBW), k kg  % Usual Body Weight: 94.53  % Weight Change From Usual Weight: -5.67 %  Usual Weight Provided By: unable to obtain usual weight at this time    Wt Readings from Last 5 Encounters:   22 60.3 kg (133 lb)   22 60 kg (132 lb 4.4 oz)   21 48 kg (105 lb 13.1 oz)     Weight Change(s) Since Admission:  Admit Weight: 60 kg (132 lb 4.4 oz) (22 1219)  10/12/22: 65 kg; weight gain noted  10/15/22: 56.6 kg; fluctuating weights possible due to fluid  10/22/22: 56.6 kg  22: 60.4 kg  22: 59.4 kg  22: 60.3 kg    Estimated Needs    Weight Used For Calorie Calculations: 56.6 kg (124 lb 12.5 oz)  Energy Calorie Requirements (kcal): 1633 kcal/d  Energy Need Method: [facility]  Weight Used For Protein Calculations: 56.6 kg (124 lb 12.5 oz)  Protein Requirements:  g/d, 1.5-2.0 g/kg  Fluid Requirements (mL): 1000 ml + ml urine output  Temp: 98.3 °F (36.8 °C)    Enteral Nutrition    Patient not receiving enteral nutrition at this time.    Parenteral Nutrition    Patient not receiving parenteral nutrition at this time.     Evaluation of Received Nutrient Intake    Calories: not meeting estimated needs  Protein: not meeting estimated needs    Patient Education    Not applicable.    Nutrition Diagnosis     PES: Inadequate oral intake related to current condition as evidenced by <75% intake of meals. (continues)    PES: Malnutrition related to pancreatitis, infection as evidenced by weight loss >5% in 1 month, appetite loss <50% intake of meals, severe fat and muscle loss. (continues)     Interventions/Goals     Intervention(s): collaboration with other providers  Goal: Meet greater than 75% of nutritional needs by follow-up.  (goal not met)    Monitoring & Evaluation     Dietitian will monitor energy intake, weight change, electrolyte/renal panel, and glucose/endocrine profile.  Nutrition Risk/Follow-Up: high (follow-up in 1-4 days)

## 2022-11-14 NOTE — PROGRESS NOTES
TRAUMA / ACUTE CARE SURGERY   PROGRESS NOTE    Admit Date: 9/23/2022  Hospital Day: 52     Subjective:  NAEON per nursing staff  All vasoactives weaned  Awake/interactive despite sedating gtts  ABD pads changed 2x during night shift    Physical Exam:  GEN: NAD, awake/alert, interactive  HEENT: NCAT, EOMI, MMM, ETT/OG in place   CV: regular rate, no gtts  PULM: normal WOB  ABD: superiorly abdomen is soft, milddly distended, non-tender. Inferiourly his abd dressing is in place with serosang strikethrough and foam tape in place.   MSK/EXT: moves all ext spontaneously    Inpatient Data    Vitals:   Temp:  [96.5 °F (35.8 °C)-98.6 °F (37 °C)]   Pulse:  [48-78]   Resp:  [16-24]   BP: (105-162)/(59-86)   SpO2:  [95 %-100 %]   Arterial Line BP: (128-156)/(50-75)     Diet NPO Except for: Medication   Intake/Output Summary (Last 24 hours) at 11/14/2022 1130  Last data filed at 11/14/2022 0600  Gross per 24 hour   Intake 1291.56 ml   Output 2500 ml   Net -1208.44 ml          Recent Labs     11/12/22  1920 11/12/22  2137 11/13/22  0546 11/13/22  0821 11/13/22  1344 11/13/22  1621 11/14/22  0027 11/14/22  0431 11/14/22  0853   WBC 19.5*  --  20.3*   < >  --    < > 15.0* 15.4* 16.5*   HGB 7.7*   < > 8.5*   < >  --    < > 8.0* 8.1* 8.3*   HCT 24.2*   < > 24.5*   < >  --    < > 22.9* 23.1* 23.1*     --  73*   < >  --    < > 90* 113* 108*   *  --  129*  --  132*  --   --  130*  --    K 4.4  --  4.4  --  3.7  --   --  4.1  --    CO2 22  --  20*  --  22  --   --  19*  --    BUN 61.2*  --  58.4*  --  28.4*  --   --  39.5*  --    CREATININE 1.43*  --  1.46*  --  0.95  --   --  1.42*  --    BILITOT 1.2  --  1.4  --   --   --   --  1.1  --    AST 20  --  30  --   --   --   --  15  --    ALT 9  --  19  --   --   --   --  12  --    ALKPHOS 206*  --  112  --   --   --   --  129  --    CALCIUM 7.3*  --  8.0*  --  8.1*  --   --  8.1*  --    ALBUMIN 1.6*  --  2.3*  --   --   --   --  2.5*  --    MG  --   --  1.80  --  1.90  --   --   1.90  --    PHOS  --   --  7.0*  --  4.1  --   --  6.1*  --     < > = values in this interval not displayed.     Scheduled Meds: aspirin, 81 mg, Daily  atropine, 0.5 mg, Once  carvediloL, 25 mg, BID  doxycycline (VIBRAMYCIN) IVPB, 100 mg, Q12H  fluconazole (DIFLUCAN) IV (PEDS and ADULTS), 400 mg, Every Tues, Thurs, Sat  lactated ringers, 1,000 mL, Once  lipase-protease-amylase 12,000-38,000-60,000 units, 3 capsule, TID WM  meropenem (MERREM) IVPB, 500 mg, Q24H  morphine, 30 mg, Q12H  naloxegoL, 25 mg, Daily  predniSONE, 20 mg, BID  sodium bicarbonate, 1,300 mg, Daily  sodium chloride 0.9%, 10 mL, Q6H  zinc oxide-cod liver oil, , BID     clevidipine 4 mg/hr (11/14/22 0603)    dexmedetomidine (PRECEDEX) infusion Stopped (11/13/22 0600)    fentanyl 75 mcg/hr (11/13/22 1756)    NORepinephrine bitartrate-D5W Stopped (11/13/22 0237)    propofoL 40 mcg/kg/min (11/14/22 0603)       ASSESSMENT:  32M with HTN, SLE, HBV, aortic dissection s/p repair 2/2021, 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 He developed abdominal compartment syndrome s/p multiple exlaps (9/25, 9/27, 9/29) now on HD for renal failure with re-accumulation of ascites despite IR paracentesis. CT has shown concern for necrotizing fascitis. Cultures from paracentesis resulted in Klebsiella growth. S/p washout 10/30. Subsequently started having dark, thin, proteinaceous fluid draining from his lower midline and a site in the LLQ for which he is s/p wound debridement with wound vac placement 11/12. This was complicated by immediate bleeding requiring significant transfusion post-op and he was thus taken back to the operating room on 11/13 for wound vac removal, wound washout, and wet-to-dry dressing application.     PLAN:  OR tomorrow for repeat wound debridement vs. washout  Please keep NPO after midnight   OK for DVT PPx dose lovenox/heparin but continue to hold coumadin   2u pRBC and 2u plt ordered for on-call to  OR  Recommend palliative care consultation   Remainder of plan of care per primary team  Will obtain informed consent     Dewayne Cason MD  [employer] General Surgery PGY-4  11/14/2022, 11:30 AM

## 2022-11-14 NOTE — PT/OT/SLP DISCHARGE
Physical Therapy Discharge Summary    Name: Stuart Guevara  MRN: [id number]   Principal Problem: Aortic dissection     Patient Discharged from acute Physical Therapy on 22.     Assessment:     Pt now sedated and intubated. PT to sign off at this time 2/2 decline in medical status.     Objective:     GOALS:   Multidisciplinary Problems       Physical Therapy Goals          Problem: Physical Therapy    Goal Priority Disciplines Outcome Goal Variances Interventions   Physical Therapy Goal     PT, PT/OT Ongoing, Progressing     Description: Goals to be met by: 22     Patient will increase functional independence with mobility by performin. Supine to sit with brandon  2. Sit to supine with brandon  3. Sit <> stand with CGA using RW/LRAD  4. Bed to chair with CGA via squat pivot/stand pivot  5.Ambulate 200 ft with rolling walker and contact guard assistance                           Reasons for Discontinuation of Therapy Services  Transfer to alternate level of care.      Plan:     Please re-consult when pt is medically stable to participate in PT. Thank you.      2022

## 2022-11-14 NOTE — PROGRESS NOTES
Infectious Disease  Progress Note    Patient Name: Stuart Guevara   MRN: [id number]   Admission Date: 9/23/2022   Hospital Length of Stay: 52 days  Attending Physician: William Proctor MD   Primary Care Provider: Primary Doctor No     Isolation Status: No active isolations       Subjective:     Principal Problem: Aortic dissection     Interval History:     Patient known to our service from earlier in his hospitalization, I appreciate Dr. Calix's care in my absence, our team will assume care for this patient today.    Complex case of a 32-year-old male with past medical history of SLE, HTN, hepatitis-B, aortic dissection with repair in February 2021, DVT on Xarelto, initially presented to the [facility] ED on 09/23/2022 with shortness of breath and chest pain.  Evaluation revealed a aortic dissection type B, with progressive course requiring transferred to [facility], promptly taken to OR for TEVAR with left carotid to subclavian artery bypass.      He had a postoperative complicated course with development of compartment syndrome found on CT angiography of chest/abdomen and pelvis with hemoperitoneum.  This required exploratory laparotomy, source apparently unstable bradycardia requiring pacemaker and then acute kidney injury and initiation of hemodialysis.      We were initially consulted for leukocytosis and abdominal pain, evaluation initially revealed no evidence of active infection and rather a more concerning picture for an SLE flare with serositis.  Rheumatology was consulted, initiated patient on high-dose steroids as well as additional immune suppressants as documented in their notes for concern of active lupus flare.      Patient did well for the next 14 days, from my review of the chart, it appears that on 10/24-10/25, he started having significant deterioration with hypoglycemia, leukocytosis, had CT of the abdomen showing evidence of potential necrotizing pancreatitis with  multiple abdominal abscesses and reaccumulation intra-abdominal fluid, he underwent IR drainage of abdominal collections on 10/28/2022 which grew Klebsiella, this prompted an exploratory laparotomy for concern of intra-abdominal abscess/infected hematoma on 10/30/2022.  Unfortunately no cultures were collected during this operative intervention.  On 11/05/2022, patient had recurrent fever as well as cough and doxycycline was added to his previous regimen of meropenem and fluconazole.  He was noted to have worsening leukocytosis again in on 11/07/2022, had lethargy with worsening leukocytosis and a CT scan of the chest showing bilateral pleural effusion, multiple areas of ascites, and ground-glass opacities in the lungs.  Had an MRI of the pelvis on 11/11/2022 showing multiple fluid collections with proteinaceous contents and some hip effusions.  Most recently, patient underwent a operative debridement for concern of areas of skin necrosis and tunneling of the wound on 11/12/2022, cultures remain negative at this time.    On my evaluation, the patient remains intubated, is awake though and able to participate in evaluation.  Reports some abdominal pain as well as left shoulder pain.  Denies any pain in the hips.  He is planned for repeat surgical intervention on 11/15/2022 for exploration of the abdominal cavity as well as the abdominal wound.  At this time however he remains without fevers, leukocytosis is 15-17k.     Review of Systems   Review of Systems   All other systems reviewed and are negative.     Objective:     Vital Signs (Most Recent):  Temp: 98.3 °F (36.8 °C) (11/14/22 0400)  Pulse: (!) 59 (11/14/22 1145)  Resp: 20 (11/14/22 1145)  BP: 129/64 (11/14/22 0830)  SpO2: 100 % (11/14/22 1232)    Vital Signs (24h Range):  Temp:  [96.5 °F (35.8 °C)-98.6 °F (37 °C)] 98.3 °F (36.8 °C)  Pulse:  [50-78] 59  Resp:  [16-24] 20  SpO2:  [95 %-100 %] 100 %  BP: (116-143)/(59-79) 129/64  Arterial Line BP:  (128-156)/(50-75) 143/56      Weight:   Wt Readings from Last 1 Encounters:   11/12/22 60.3 kg (133 lb)      Body mass index is Body mass index is 22.13 kg/m².     Estimated Creatinine Clearance: Estimated Creatinine Clearance: 63.1 mL/min (A) (based on SCr of 1.42 mg/dL (H)).     Lines/Drains/Airways       Peripherally Inserted Central Catheter Line  Duration             PICC Double Lumen 10/25/22 1520 right brachial 20 days              Central Venous Catheter Line  Duration                  Hemodialysis Catheter right internal jugular -- days              Drain  Duration                  NG/OG Tube 11/13/22 0330 [place] [facility] mouth 1 day              Airway  Duration                  Airway - Non-Surgical 11/13/22 0156 1 day              Arterial Line  Duration             Arterial Line 11/13/22 0204 Left Radial 1 day              Peripheral Intravenous Line  Duration                  External Jugular IV 11/02/22 0800 12 days                     Physical Exam  Physical Exam  Constitutional:       Appearance: He is ill-appearing.   HENT:      Head: Normocephalic and atraumatic.      Mouth/Throat:      Pharynx: No oropharyngeal exudate or posterior oropharyngeal erythema.   Eyes:      Extraocular Movements: Extraocular movements intact.      Pupils: Pupils are equal, round, and reactive to light.   Cardiovascular:      Rate and Rhythm: Normal rate and regular rhythm.      Heart sounds: No murmur heard.  Pulmonary:      Effort: No respiratory distress.      Breath sounds: Rhonchi present. No wheezing or rales.      Comments: Sedated and intubated  Abdominal:      General: There is distension.      Tenderness: There is abdominal tenderness.      Comments: Large abdominal wound with post operative dressing   Musculoskeletal:         General: Swelling present. No tenderness.      Cervical back: Neck supple. No rigidity or tenderness.   Lymphadenopathy:      Cervical: No cervical adenopathy.   Skin:     Findings: No  lesion or rash.   Neurological:      General: No focal deficit present.      Mental Status: He is alert and oriented to person, place, and time.      Cranial Nerves: No cranial nerve deficit.      Motor: Weakness present.   Psychiatric:         Mood and Affect: Mood normal.         Behavior: Behavior normal.        Significant Labs: CBC:   Recent Labs   Lab 11/14/22  0431 11/14/22  0853 11/14/22  1231   WBC 15.4* 16.5* 17.0*   HGB 8.1* 8.3* 8.5*   HCT 23.1* 23.1* 23.0*   * 108* 114*     CMP:   Recent Labs   Lab 11/12/22  1920 11/13/22  0546 11/13/22  1344 11/14/22  0431   * 129* 132* 130*   K 4.4 4.4 3.7 4.1   CO2 22 20* 22 19*   BUN 61.2* 58.4* 28.4* 39.5*   CREATININE 1.43* 1.46* 0.95 1.42*   CALCIUM 7.3* 8.0* 8.1* 8.1*   ALBUMIN 1.6* 2.3*  --  2.5*   BILITOT 1.2 1.4  --  1.1   ALKPHOS 206* 112  --  129   AST 20 30  --  15   ALT 9 19  --  12       Microbiology Results (last 7 days)       Procedure Component Value Units Date/Time    Body Fluid Culture [[id number]] Collected: 11/12/22 1720    Order Status: Completed Specimen: Body Fluid from Abdomen Updated: 11/14/22 0820     Body Fluid Culture No Growth At 48 Hours    Anaerobic Culture [[id number]] Collected: 11/12/22 1720    Order Status: Completed Specimen: Body Fluid from Abdominal Fluid Updated: 11/14/22 0804     Anaerobe Culture No Anaerobes Isolated    Blood Culture [[id number]]  (Normal) Collected: 11/10/22 2120    Order Status: Completed Specimen: Blood Updated: 11/13/22 2300     CULTURE, BLOOD ([facility]) No Growth At 72 Hours    Blood Culture [[id number]]  (Normal) Collected: 11/10/22 2120    Order Status: Completed Specimen: Blood Updated: 11/13/22 2300     CULTURE, BLOOD ([facility]) No Growth At 72 Hours    Gram Stain [[id number]] Collected: 11/12/22 1720    Order Status: Completed Specimen: Body Fluid from Abdomen Updated: 11/13/22 0749     GRAM STAIN No WBCs, No bacteria seen    Fungal Culture [[id number]] Collected: 11/12/22 1720    Order Status: Sent  Specimen: Body Fluid from Abdomen Updated: 11/12/22 1737    Blood Culture [[id number]]  (Normal) Collected: 11/05/22 0549    Order Status: Completed Specimen: Blood Updated: 11/10/22 1000     CULTURE, BLOOD ([facility]) No Growth at 5 days    Blood Culture [[id number]]  (Normal) Collected: 11/05/22 0549    Order Status: Completed Specimen: Blood Updated: 11/10/22 1000     CULTURE, BLOOD ([facility]) No Growth at 5 days             Significant Imaging: I have reviewed all pertinent imaging results/findings within the past 24 hours.    Assessment/Plan:        32-year-old male with past medical history of SLE, HTN, hepatitis-B, aortic dissection s/p repair in 02/2022 and again presented with recurrent dissection 09/2022 with stay complicated by TAY now on HD, SLE flare, ascites, serositis, necrotizing pancreatitis, bacterial peritonitis now on Meropenem, Fluconazole, Doxycycline. Id is consulted for assistance in management    Peritonitis  Necrotizing pancreatitis with pancreatic duct leak  Ascites  Infected abdominal wound with tunneling   HBV  TAY on HD  Anemia  Pneumonitis/Pulmonary edema    -Discussed with surgeon Dr. Barnhart and plans for surgical debridement and exploration 11/15/2022  -He is afebrile at this time and has negative cultures from most recent exploration on 11/12/2022  -Hemodynamically stable as well  -Ongoing abdominal pain  -Suspect pancreatic inflammation as result of autoimmune disease or medication contributed to development of intra-abdominal infected ascites   -Unclear role of infection in the most recently noted abdominal wound complications as cultures are negative but we have noted evidence of necrosis of soft tissue and tunneling process into the rectus sheath and possibly towards the L hip    Plan:  -For now will continue current regimen with Meropenem 500mg IV q24h (renal dosing) Fluconazole 400mg IV on HD days post HD and Doxycycline  -Will await results of operative evaluation in am to re-evaluate the  need to adjust antimicrobials  -Will likely be able to discontinue doxycycline if patient remains stable and unchanged OR findings  -Will also add HBV labs and PCR given patient with evidence of prior infection and has been on significant immune suppression, may need to be suppressed now  -Since adding HBV labs will add STI evaluation as well  -Discussed with surgery and ICU attending     ID will continue following. Please contact us with any questions or concerns.    45 minutes of critical care was time spent personally by me on the following activities: development of treatment plan with patient or surrogate and bedside caregivers, discussions with consultants and or primary team, evaluation of patient's response to treatment, examination of patient, ordering and performing treatments and interventions, ordering and review of laboratory studies, ordering and review of radiographic studies.  This critical care time did not overlap with that of any other provider or involve time for any procedures.      Layton Nathan MD  Infectious Disease  [facility]

## 2022-11-14 NOTE — PT/OT/SLP DISCHARGE
"Occupational Therapy Discharge Summary    Stuart Guevara  MRN: [id number]   Principal Problem: Aortic dissection      Patient Discharged from acute Occupational Therapy on 11/14/22.  Pt with decline in medical status, transfer to OR and ICU.    Assessment:      Pt now sedated and intubated.      Objective:     GOALS:   Multidisciplinary Problems       Occupational Therapy Goals          Problem: Occupational Therapy    Goal Priority Disciplines Outcome Interventions   Occupational Therapy Goal     OT, PT/OT Ongoing, Progressing    Description: Goals to be met by: 11/17/2022  Goals Met and Upgraded/New goals added 10/21/2022  Care Plan Revised due to patient decline in functional status following surgery 11/3/2022    Patient will increase functional independence with ADLs by performing:      UE dressing with Modified Sprakers. - continue  LB Dressing with Minimal Assistance and dressing AEDs. - continue  Grooming while EOB with Modified Sprakers. - new goal  Sitting at edge of bed x10 minutes with Modified Sprakers - new goal  Bathing from  shower chair/bench with Moderate Assistance. - continue  Toileting from bedside commode with Moderate Assistance for hygiene and clothing management. - new goal  Toilet transfer to bedside commode with Minimum Assistance. - new goal                           Reasons for Discontinuation of Therapy Services"     Plan:     Patient Discharged, please re-consult when medically stable and able to participate in therapy services.    11/14/2022    "

## 2022-11-14 NOTE — ANESTHESIA POSTPROCEDURE EVALUATION
Anesthesia Post Evaluation    Patient: Stuart Guevara    Procedure(s) Performed: Procedure(s) (LRB):  INCISION AND DRAINAGE (N/A)    Final Anesthesia Type: general      Patient location during evaluation: ICU  Patient participation: No - Unable to Participate, Sedation  Level of consciousness: sedated  Post-procedure vital signs: reviewed and stable  Pain management: adequate  SATISH mitigation strategies: Multimodal analgesia  PONV status at discharge: No PONV  Anesthetic complications: no      Cardiovascular status: stable  Respiratory status: ventilator and intubated  Hydration status: euvolemic  Follow-up not needed.          Vitals Value Taken Time   /64 11/13/22 1801   Temp 36.4 °C (97.5 °F) 11/13/22 1600   Pulse 53 11/13/22 1810   Resp 20 11/13/22 1810   SpO2 100 % 11/13/22 1810   Vitals shown include unvalidated device data.      No case tracking events are documented in the log.      Pain/Marcin Score: Pain Rating Prior to Med Admin: 10 (11/13/2022  1:45 PM)  Pain Rating Post Med Admin: 2 (11/12/2022 12:38 PM)

## 2022-11-15 NOTE — CONSULTS
The patient was by himself at the time of my visit. I provided compassionate care and support. I prayed and anointed him. He needs constant visit and prayers. 11/15/22

## 2022-11-15 NOTE — ANESTHESIA POSTPROCEDURE EVALUATION
Anesthesia Post Evaluation    Patient: Stuart Guevara    Procedure(s) Performed: Procedure(s) (LRB):  INCISION AND DRAINAGE (N/A)    Final Anesthesia Type: general      Patient location during evaluation: ICU  Patient participation: Yes- Able to Participate  Level of consciousness: awake and alert  Post-procedure vital signs: reviewed and stable  Pain management: adequate  Airway patency: patent    PONV status at discharge: No PONV  Anesthetic complications: no      Cardiovascular status: hemodynamically stable  Respiratory status: ETT and intubated  Hydration status: euvolemic  Follow-up not needed.          Vitals Value Taken Time   /65 11/15/22 1601   Temp 36.7 °C (98.1 °F) 11/15/22 0913   Pulse 67 11/15/22 1631   Resp 16 11/15/22 1631   SpO2 100 % 11/15/22 1644   Vitals shown include unvalidated device data.      No case tracking events are documented in the log.      Pain/Marcin Score: Pain Rating Prior to Med Admin: 0 (11/15/2022  4:07 AM)  Pain Rating Post Med Admin: 0 (11/15/2022  4:18 AM)

## 2022-11-15 NOTE — CONSULTS
"Consults    Patient Name: Stuart Guevara   MRN: [id number]   Admission Date: 9/23/2022   Hospital Length of Stay: 53   Attending Provider: William Proctor MD   Consulting Provider: Maria Luisa LOZA  Reason for Consult: Goals of Care  Primary Care Physician: Primary Doctor No     Principal Problem: Aortic dissection     Patient information was obtained from parent and ER records.      Final diagnoses:  [I71.00] Aortic dissection     Assessment/Plan:     I reviewed the patient and family's understanding of the seriousness of the illness and its expected prognosis. We discussed the patient's goals of care and treatment preferences. I clarified current code status. I identified the surrogate decision maker or health care POA. I answered all questions and we formulated a plan including recommendations for symptom management and how to best achieve goals of care.       Met with patient and patient's mother--introduced service and discussed patient's history and current condition.  Patient able to write and communicate that he was in pain and was diagnosed with lupus at the age of 21.  States that she has visited for the first time today and is getting regular updates.  I asked about her conversation with physicians, to which she became tearful and informed that she did not understand the patient's physical condition and that she was "disabled." Offered support and informed that I could assist.  Discussed the events of patient's hospitalization--she expressed that she understands that he is in serious condition.  Mother would like to have her sister come with her so that she could help her with conversations with MD's.  RN suggested meeting with surgery team--informed I would contact them to meet with family at 1300 visiting tomorrow.  Discussed  and spiritual support to which they were both in agreement. Offered support and informed I would continue to follow.         Notifed Kale CANO and Ilene TOBIAS of family " request.      History of Present Illness:     Patient is a 33 yo AA male with a PMHx of lupus, type A1 aortic dissection s/p repair 02/2021 and RUE DVT who originally presented to the [facility] ED on 09/23 with complaint of ripping, tearing chest pain with /126 and witnessed syncopal event, after which imaging revealed a Jacob type B aortic dissection.  He was subsequently transferred to [facility] ICU with consult for CT surgery and taken to OR for TEVAR with left carotid to SC artery bypass. Patient then began to experience worsening abdominal pain and lactic acidosis, after which CT angiography showed evidence of significant intra-abdominal pathology with hemoperitoneum and signs of hypoperfusion of the abdominal organs with free blood in abdomen.  He underwent multiple exploratory laparotomies (9/25.9/27 and 9/29) then developed unstable bradycardia for which he received transvenous pacemaker placement.      Hospital course further complicated by progressive renal failure and critical hyperkalemia requiring HD catheter and initiation of hemodialysis.  Rheumatology consulted who placed patient on Solu-Medrol, CellCept and Plaquenil for possible lupus flare-up and serositis, after which imaging showed multiple loculated fluid collections, necrotizing pancreatitis possible peritonitis.  Patient underwent drainage per IR on 10/28 with fluid culture showing Klebsiella.  He continued to have abdominal pain and was brought back to the OR on 10/30 and again on 11/12 for abdominal washout.  Per RN, there was family disturbance in patient's room, after which they were not allowed to visit patient.  Mother, however, is now visiting as she was not involved in prior incident.  Patient is intubated and awake with fentanyl and cleviprex in progress. Palliative care consulted for support and goals of care discussion.       Active Ambulatory Problems     Diagnosis Date Noted    No Active Ambulatory Problems     Resolved Ambulatory  Problems     Diagnosis Date Noted    No Resolved Ambulatory Problems     Past Medical History:   Diagnosis Date    Aortic dissection 9/23/2022    Hypertension     Systemic lupus erythematosus, organ or system involvement unspecified     Systemic lupus erythematosus, organ or system involvement unspecified         Past Surgical History:   Procedure Laterality Date    APPLICATION OF WOUND VACUUM-ASSISTED CLOSURE DEVICE  9/27/2022    Procedure: APPLICATION, WOUND VAC;  Surgeon: Christopher Espinal MD;  Location: [facility];  Service: General;;    CARDIAC SURGERY      CREATION OF BYPASS FROM INTERNAL CAROTID ARTERY TO SUBCLAVIAN ARTERY Left 9/23/2022    Procedure: CREATION, BYPASS, ARTERIAL, INTERNAL CAROTID TO SUBCLAVIAN;  Surgeon: Vic Martines MD;  Location: [facility];  Service: Cardiothoracic;  Laterality: Left;    ENDOVASCULAR REPAIR OF THORACIC AORTA N/A 9/23/2022    Procedure: REPAIR, AORTA, THORACIC, ENDOVASCULAR;  Surgeon: Vic Martines MD;  Location: [facility];  Service: Cardiology;  Laterality: N/A;    FINGER AMPUTATION      INCISION AND DRAINAGE OF ABDOMEN N/A 11/12/2022    Procedure: INCISION AND DRAINAGE, ABDOMEN;  Surgeon: Saran Marr MD;  Location: [facility];  Service: General;  Laterality: N/A;  debridement of skin & subcutaneous tisse. Wound vac placement    INCISION AND DRAINAGE OF ABDOMEN N/A 11/13/2022    Procedure: INCISION AND DRAINAGE, ABDOMEN;  Surgeon: Dayron Delcid MD;  Location: [facility];  Service: General;  Laterality: N/A;    INSERTION OF TUNNELED CENTRAL VENOUS HEMODIALYSIS CATHETER N/A 10/24/2022    Procedure: INSERTION, CATHETER, CENTRAL VENOUS, HEMODIALYSIS, TUNNELED;  Surgeon: Yogesh Melton DO;  Location: [facility] CATH LAB;  Service: Nephrology;  Laterality: N/A;  TUNNELED CATH INSERTION    THROMBECTOMY Right     TOE AMPUTATION          Review of patient's allergies indicates:   Allergen Reactions    Levofloxacin     Sulfamethoxazole-trimethoprim           Current  Facility-Administered Medications:     0.9%  NaCl infusion (for blood administration), , Intravenous, Q24H PRN, Deidre Newton MD    0.9%  NaCl infusion (for blood administration), , Intravenous, Q24H PRN, Dewayne Cason MD    0.9%  NaCl infusion (for blood administration), , Intravenous, Q24H PRN, Dewayne Cason MD    0.9%  NaCl infusion (for blood administration), , Intravenous, Q24H PRN, Dayron Delcid MD    0.9%  NaCl infusion (for blood administration), , Intravenous, Q24H PRN, Dewayne Cason MD    0.9%  NaCl infusion, , Intravenous, PRN, Thalia Renteria, AGNP    albumin human 25% bottle 25 g, 25 g, Intravenous, Once PRN, MARIANNE Galvez    albuterol inhaler 2 puff, 2 puff, Inhalation, Q4H PRN, Evelio Marshall MD    aspirin EC tablet 81 mg, 81 mg, Oral, Daily, Luis F Hutson MD, 81 mg at 11/11/22 0827    atropine injection 0.5 mg, 0.5 mg, Intravenous, Once, Martín Hernandez MD    bisacodyL suppository 10 mg, 10 mg, Rectal, Daily PRN, Keena Shafer, FNP, 10 mg at 10/28/22 0400    carvediloL tablet 25 mg, 25 mg, Oral, BID, MARIANNE Castillo, 25 mg at 11/15/22 1100    clevidipine (CLEVIPREX) 25 mg/50 mL infusion, 0-21 mg/hr, Intravenous, Continuous, Deidre Newtno MD, Last Rate: 6 mL/hr at 11/15/22 0532, 3 mg/hr at 11/15/22 0532    dexmedetomidine (PRECEDEX) 400mcg/100mL 0.9% NaCL infusion, 0-1.4 mcg/kg/hr, Intravenous, Continuous, Deidre Newton MD, Stopped at 11/13/22 0600    dextrose 10 % infusion, 12.5 g, Intravenous, PRN, Dewayne Hughes PA-C, 12.5 g at 10/25/22 0317    dextrose 10 % infusion, 25 g, Intravenous, PRN, Dewayne Hughes PA-C    dextrose 10% bolus 125 mL, 12.5 g, Intravenous, PRN, Ivan Chopra MD, Stopped at 10/20/22 0953    fentaNYL 2500 mcg in 0.9% sodium chloride 250 mL infusion premix (titrating), 0-200 mcg/hr, Intravenous, Continuous, Deidre Newton MD, Last Rate: 12.5 mL/hr at 11/15/22 0407, 125 mcg/hr at 11/15/22 0407    fluconazole (DIFLUCAN) IVPB  400 mg, 400 mg, Intravenous, Every Tues, Thurs, Sat, Aleah Calix MD, Stopped at 11/12/22 2325    glucagon (human recombinant) injection 1 mg, 1 mg, Intramuscular, PRN, Dewayne Hughes PA-C, 1 mg at 10/20/22 0925    glucose chewable tablet 16 g, 16 g, Oral, PRN, Dewayne Hughes PA-C, 16 g at 11/08/22 1104    glucose chewable tablet 24 g, 24 g, Oral, PRN, Dewayne Hughes PA-C, 24 g at 10/24/22 1709    hydrALAZINE injection 20 mg, 20 mg, Intravenous, Q4H PRN, Bin Bell MD, 20 mg at 11/13/22 0535    HYDROmorphone (PF) injection 0.5 mg, 0.5 mg, Intravenous, Q5 Min PRN, Kris Brothers MD, 0.5 mg at 11/12/22 2000    insulin aspart U-100 injection 1-10 Units, 1-10 Units, Subcutaneous, QID (AC + HS) PRN, Ronda Hamilton MD, 4 Units at 11/14/22 2128    labetaloL injection 20 mg, 20 mg, Intravenous, Q8H PRN, Bin Bell MD, 20 mg at 10/03/22 0321    lactated ringers bolus 1,000 mL, 1,000 mL, Intravenous, Once, Martín Hernandez MD    lactulose 10 gram/15 ml solution 10 g, 10 g, Oral, Q6H PRN, Luis F Hutson MD, 10 g at 11/09/22 1136    lipase-protease-amylase 12,000-38,000-60,000 units per capsule 3 capsule, 3 capsule, Oral, TID WM, Luis F Hutson MD, 3 capsule at 11/15/22 1200    melatonin tablet 6 mg, 6 mg, Oral, Nightly PRN, Michelle Ferrera, AGACNP-BC, 6 mg at 10/14/22 0000    meropenem (MERREM) 500 mg in sodium chloride 0.9 % 100 mL IVPB (MB+), 500 mg, Intravenous, Q24H, Saurabh Curry MD, Stopped at 11/15/22 1200    morphine 12 hr tablet 30 mg, 30 mg, Oral, Q12H, Shamar Stewart MD, 30 mg at 11/12/22 2124    naloxegoL (MOVANTIK) tablet 25 mg, 25 mg, Oral, Daily, Luis F Hutson MD, 25 mg at 11/15/22 0900    nitroGLYCERIN SL tablet 0.4 mg, 0.4 mg, Sublingual, Q5 Min PRN, Zach Shafer MD, 0.4 mg at 10/30/22 0713    NORepinephrine 8 mg in dextrose 5% 250 mL infusion, 0-3 mcg/kg/min, Intravenous, Continuous, Martín Hernandez MD, Stopped at 11/13/22 0237    ondansetron injection 4 mg, 4 mg, Intravenous, Q6H PRN,  Evelio Marshall MD, 4 mg at 11/08/22 1053    polyethylene glycol packet 17 g, 17 g, Oral, BID PRN, Thalia S Renteria, AGNP    predniSONE tablet 20 mg, 20 mg, Oral, BID, Emilie PELLETIER Taylor, FNP, 20 mg at 11/15/22 0900    propofol (DIPRIVAN) 10 mg/mL infusion, 0-50 mcg/kg/min, Intravenous, Continuous, Deidre Newton MD, Last Rate: 16.3 mL/hr at 11/15/22 0532, 45 mcg/kg/min at 11/15/22 0532    sodium bicarbonate tablet 1,300 mg, 1,300 mg, Oral, Daily, Thalia S Renteria, AGNP, 1,300 mg at 11/15/22 0900    sodium chloride 0.9% bolus 250 mL, 250 mL, Intravenous, PRN, Thalia S Renteria, AGNP    sodium chloride 0.9% bolus 250 mL, 250 mL, Intravenous, PRN, Thalia S Renteria, AGNP    sodium chloride 0.9% bolus 250 mL, 250 mL, Intravenous, PRN, Shahzad Lundberg MD    sodium chloride 0.9% bolus 250 mL, 250 mL, Intravenous, PRN, Thalia S Renteria, AGNP    Flushing PICC Protocol, , , Until Discontinued **AND** sodium chloride 0.9% flush 10 mL, 10 mL, Intravenous, Q6H, 10 mL at 11/15/22 0600 **AND** sodium chloride 0.9% flush 10 mL, 10 mL, Intravenous, PRN, Ronda Haimlton MD    sodium chloride 0.9% flush 10 mL, 10 mL, Intravenous, PRN, Kris Brothers MD    sodium chloride 0.9% flush 10 mL, 10 mL, Intravenous, PRN, Martín Hernandez MD    sodium chloride 0.9% flush 10 mL, 10 mL, Intravenous, PRN, Dayron Delcid MD    zinc oxide-cod liver oil 40 % paste, , Topical (Top), BID, Saurabh Curry MD, Given at 11/15/22 0900     sodium chloride, sodium chloride, sodium chloride, sodium chloride, sodium chloride, sodium chloride 0.9%, albumin human 25%, albuterol, bisacodyL, dextrose 10 % in water (D10W), dextrose 10 % in water (D10W), dextrose 10%, glucagon (human recombinant), glucose, glucose, hydrALAZINE, HYDROmorphone, insulin aspart U-100, labetaloL, lactulose 10 gram/15 ml, melatonin, nitroGLYCERIN, ondansetron, polyethylene glycol, sodium chloride 0.9%, sodium chloride 0.9%, sodium chloride 0.9%, sodium chloride 0.9%,  "Flushing PICC Protocol **AND** sodium chloride 0.9% **AND** sodium chloride 0.9%, sodium chloride 0.9%, sodium chloride 0.9%, sodium chloride 0.9%     History reviewed. No pertinent family history.     Review of Systems   Unable to perform ROS: Intubated      Patient is able to write        Objective:   /70   Pulse (!) 57   Temp 98.1 °F (36.7 °C)   Resp 20   Ht 5' 5" (1.651 m)   Wt 60.3 kg (133 lb)   SpO2 100%   BMI 22.13 kg/m²      Physical Exam  Constitutional:       Appearance: He is ill-appearing.   HENT:      Head: Normocephalic.   Eyes:      Pupils: Pupils are equal, round, and reactive to light.      Comments: ET and OG tube in place   Cardiovascular:      Rate and Rhythm: Normal rate and regular rhythm.   Pulmonary:      Breath sounds: Rhonchi present.   Abdominal:      Palpations: Abdomen is soft.   Musculoskeletal:      Comments: sarcopenia   Neurological:      Mental Status: He is alert.      Comments: To person and place         FAMILY CONTACTS:     Review of Symptoms  Review of Symptoms      Symptom Assessment (ESAS 0-10 Scale)  Pain:  0  Dyspnea:  0  Anxiety:  0  Nausea:  0  Depression:  0  Anorexia:  0  Fatigue:  0  Insomnia:  0  Restlessness:  0  Agitation:  0         Bowel Management Plan (BMP):  Yes      Performance Status:  20    Living Arrangements:  Lives alone    Psychosocial/Cultural: Patient is single and has 3 children under the age of 18.  His mother is living and father is living but has Alzheimer's. Has a sister who lives in [place] and one who lives in [place]. 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      Spiritual:  F - Farhana and Belief:  Restoration  I - Importance:  Yes  C - Community:  Yes  A - Address in Care:  Yes    Advance Care Planning   Advance Directives:   Living Will: No    Do Not Resuscitate Status: No    Medical Power of : No      Decision Making:  Family answered questions and Patient answered questions  Goals of Care: The " family endorses that what is most important right now is to focus on extending life as long as possible, even it it means sacrificing quality    Accordingly, we have decided that the best plan to meet the patient's goals includes continuing with treatment        PAINAD: NA    Caregiver burden formerly assessed: yes        > 50% of 70 min of encounter was spent in chart review, face to face discussion of goals of care, symptom assessment, coordination of care and emotional support.         Maria Luisa Diane FNP, [facility]  Palliative Medicine  [facility]

## 2022-11-15 NOTE — ANESTHESIA PREPROCEDURE EVALUATION
11/15/2022  Stuart Guevara is a 32 y.o., male.  32-year-old male with past medical history of SLE, HTN, hepatitis-B, aortic dissection with repair in February 2021, DVT on Xarelto, initially presented to the [facility] ED on 09/23/2022 with shortness of breath and chest pain.  Evaluation revealed a aortic dissection type B, with progressive course requiring transferred to [facility], promptly taken to OR for TEVAR with left carotid to subclavian artery bypass.      He had a postoperative complicated course with development of compartment syndrome found on CT angiography of chest/abdomen and pelvis with hemoperitoneum.  This required exploratory laparotomy, source apparently unstable bradycardia requiring pacemaker and then acute kidney injury and initiation of hemodialysis.     He had a postoperative complicated course with development of compartment syndrome found on CT angiography of chest/abdomen and pelvis with hemoperitoneum.  This required exploratory laparotomy, source apparently unstable bradycardia requiring pacemaker and then acute kidney injury and initiation of hemodialysis.     the patient remains intubated, is awake though and able to participate in evaluation.  Reports some abdominal pain as well as left shoulder pain.the patient remains intubated, is awake though and able to participate in evaluation.  Reports some abdominal pain as well as left shoulder pain.  Diagnosis:        Open wound of anterior abdominal wall, subsequent encounter       (Open wound of anterior abdominal wall, subsequent encounter [S31.984D])    He returns to [facility] OR for the noted procedure under GETA.  Procedure:   INCISION AND DRAINAGE    PMHx:  Assessment  1.         Necrotizing pancreatitis and loculated intra-abdominal fluid collection   a.         S/p IR drainage on 10/28  b.         S/p  "exploratory laparotomy on 11/12  2.         Shock, likely hypovolemic  3.         Hospital-acquired pneumonia  4.         Acute kidney injury  5.         Hypochloremic hyponatremia  6.         Lupus flare up  7.         Anasarca  8.         Normocytic anemia  H/O Hypertension    Intra-abdominal abscess versus infected necrotizing pancreatitis  Sepsis  Acute respiratory failure with hypoxia  Pulmonary edema  Severely edematous/anasarca  Critical illness myopathy  Severe protein caloric malnutrition  Acute hypoglycemia  Supratherapeutic INR    Other Medical History   Hypertension Systemic lupus erythematosus, organ or system involvement unspecified   Systemic lupus erythematosus, organ or system involvement unspecified Aortic dissection           PSHx:  Surgical History    CARDIAC SURGERY TOE AMPUTATION   FINGER AMPUTATION THROMBECTOMY   APPLICATION OF WOUND VACUUM-ASSISTED CLOSURE DEVICE ENDOVASCULAR REPAIR OF THORACIC AORTA   CREATION OF BYPASS FROM INTERNAL CAROTID ARTERY TO SUBCLAVIAN ARTERY INSERTION OF TUNNELED CENTRAL VENOUS HEMODIALYSIS CATHETER   INCISION AND DRAINAGE OF ABDOMEN INCISION AND DRAINAGE OF ABDOMEN         Vital signs:  Pre Vitals  Current as of 11/15/22 0624  BP: 136/71 Pulse: 62   Resp: 19 SpO2: 100   Temp:    Height: 5' 5" (1.651 m) (11/14/22) Weight: 60.3 kg (133 lb) (11/12/22)   BMI: 22.13 IBW: 61.5 kg (135 lb 9.3 oz)   Last edited 11/15/22 0500 by DE    Lab Data:        EKG:      CAROLINE:(10/28/22)          Pre-op Assessment    I have reviewed the Patient Summary Reports.     I have reviewed the Nursing Notes. I have reviewed the NPO Status.   I have reviewed the Medications.     Review of Systems  Anesthesia Hx:  No problems with previous Anesthesia    Social:  Non-Smoker    Hematology/Oncology:  Hematology Normal   Oncology Normal     EENT/Dental:EENT/Dental Normal   Cardiovascular:   Exercise tolerance: good Hypertension  Functional Capacity good / => 4 METS    Pulmonary:  Pulmonary Normal  "   Renal/:  Renal/ Normal     Hepatic/GI:  Hepatic/GI Normal    Musculoskeletal:  Musculoskeletal Normal    Neurological:  Neurology Normal    Endocrine:  Endocrine Normal    Dermatological:  Skin Normal    Psych:  Psychiatric Normal           Physical Exam  General: Alert, Oriented, Well nourished and Cooperative    Airway:  Mallampati: II   Mouth Opening: Normal  TM Distance: Normal  Tongue: Normal  Neck ROM: Normal ROM  Pre-Existing Airway: Oral Endotracheal tube    Dental:  Intact    Chest/Lungs:  Clear to auscultation, Normal Respiratory Rate    Heart:  Rate: Normal  Rhythm: Regular Rhythm        Anesthesia Plan  Type of Anesthesia, risks & benefits discussed:    Anesthesia Type: Gen ETT  Intra-op Monitoring Plan: Standard ASA Monitors  Post Op Pain Control Plan: IV/PO Opioids PRN  Induction:  IV and Inhalation  Airway Plan: Direct  Informed Consent: Informed consent signed with the Patient and all parties understand the risks and agree with anesthesia plan.  All questions answered. Patient consented to blood products? Yes  ASA Score: 4  Day of Surgery Review of History & Physical: H&P Update referred to the surgeon/provider.    Ready For Surgery From Anesthesia Perspective.     .

## 2022-11-15 NOTE — PROGRESS NOTES
TRAUMA / ACUTE CARE SURGERY   PROGRESS NOTE    Admit Date: 9/23/2022  Hospital Day: 53     Subjective:  NAEON per nursing staff  On cleviprex, fentanyl, propofol gtts  ABD pads changed PRN, no ilya blood    Physical Exam:  GEN: NAD, awake/alert, interactive  HEENT: NCAT, EOMI, MMM, ETT/OG in place   CV: regular rate, no gtts  PULM: normal WOB  ABD: superiorly abdomen is soft, milddly distended, non-tender. Inferiourly his abd dressing is in place with foam tape in place.   MSK/EXT: moves all ext spontaneously    Inpatient Data    Vitals:   Temp:  [97.8 °F (36.6 °C)-98.4 °F (36.9 °C)]   Pulse:  [54-96]   Resp:  [14-21]   BP: (127-165)/(59-98)   SpO2:  [96 %-100 %]   Arterial Line BP: (131-273)/()     Diet NPO Except for: Medication   Intake/Output Summary (Last 24 hours) at 11/15/2022 0618  Last data filed at 11/15/2022 0536  Gross per 24 hour   Intake 660 ml   Output --   Net 660 ml            Recent Labs     11/12/22  1920 11/12/22  2137 11/13/22  0546 11/13/22  0821 11/13/22  1344 11/13/22  1621 11/14/22  0431 11/14/22  0853 11/14/22  1656 11/14/22  2017 11/15/22  0006   WBC 19.5*  --  20.3*   < >  --    < > 15.4*   < > 17.1* 16.5* 18.0*   HGB 7.7*   < > 8.5*   < >  --    < > 8.1*   < > 8.3* 8.4* 8.8*   HCT 24.2*   < > 24.5*   < >  --    < > 23.1*   < > 22.7* 22.5* 23.8*     --  73*   < >  --    < > 113*   < > 114* 111* 114*   *  --  129*  --  132*  --  130*  --   --   --   --    K 4.4  --  4.4  --  3.7  --  4.1  --   --   --   --    CO2 22  --  20*  --  22  --  19*  --   --   --   --    BUN 61.2*  --  58.4*  --  28.4*  --  39.5*  --   --   --   --    CREATININE 1.43*  --  1.46*  --  0.95  --  1.42*  --   --   --   --    BILITOT 1.2  --  1.4  --   --   --  1.1  --   --   --   --    AST 20  --  30  --   --   --  15  --   --   --   --    ALT 9  --  19  --   --   --  12  --   --   --   --    ALKPHOS 206*  --  112  --   --   --  129  --   --   --   --    CALCIUM 7.3*  --  8.0*  --  8.1*  --  8.1*   --   --   --   --    ALBUMIN 1.6*  --  2.3*  --   --   --  2.5*  --   --   --   --    MG  --   --  1.80  --  1.90  --  1.90  --   --   --   --    PHOS  --   --  7.0*  --  4.1  --  6.1*  --   --   --   --     < > = values in this interval not displayed.       Scheduled Meds: aspirin, 81 mg, Daily  atropine, 0.5 mg, Once  carvediloL, 25 mg, BID  doxycycline (VIBRAMYCIN) IVPB, 100 mg, Q12H  fluconazole (DIFLUCAN) IV (PEDS and ADULTS), 400 mg, Every Tues, Thurs, Sat  lactated ringers, 1,000 mL, Once  lipase-protease-amylase 12,000-38,000-60,000 units, 3 capsule, TID WM  meropenem (MERREM) IVPB, 500 mg, Q24H  morphine, 30 mg, Q12H  naloxegoL, 25 mg, Daily  predniSONE, 20 mg, BID  sodium bicarbonate, 1,300 mg, Daily  sodium chloride 0.9%, 10 mL, Q6H  zinc oxide-cod liver oil, , BID   clevidipine 3 mg/hr (11/15/22 0532)    dexmedetomidine (PRECEDEX) infusion Stopped (11/13/22 0600)    fentanyl 125 mcg/hr (11/15/22 0407)    NORepinephrine bitartrate-D5W Stopped (11/13/22 0237)    propofoL 45 mcg/kg/min (11/15/22 0532)       ASSESSMENT:  32M with HTN, SLE, HBV, aortic dissection s/p repair 2/2021, 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 He developed abdominal compartment syndrome s/p multiple exlaps (9/25, 9/27, 9/29) now on HD for renal failure with re-accumulation of ascites despite IR paracentesis. CT has shown concern for necrotizing fascitis. Cultures from paracentesis resulted in Klebsiella growth. S/p washout 10/30. Subsequently started having dark, thin, proteinaceous fluid draining from his lower midline and a site in the LLQ for which he is s/p wound debridement with wound vac placement 11/12. This was complicated by immediate bleeding requiring significant transfusion post-op and he was thus taken back to the operating room on 11/13 for wound vac removal, wound washout, and wet-to-dry dressing application.     PLAN:  OR today for repeat wound debridement vs. washout, possible  scrotal exploration - informed consent obtained   OK for DVT PPx dose lovenox/heparin but continue to hold coumadin   2u pRBC and 2u plt ordered for on-call to OR  Recommend palliative care consultation   Remainder of plan of care per primary team    Dewayne Cason MD  [employer] General Surgery PGY-4  11/15/2022, 11:30 AM

## 2022-11-15 NOTE — PROGRESS NOTES
Pharmacokinetic Initial Assessment: IV Vancomycin    Assessment/Plan:    Initiate intravenous vancomycin with loading dose of 1000 mg once followed by a maintenance dose of vancomycin 1000mg IV every 18 hours  Desired empiric serum trough concentration is 15 to 20 mcg/mL  Draw vancomycin trough level 60 min prior to third dose on 11/17 at approximately 0300  Pharmacy will continue to follow and monitor vancomycin.      Please contact pharmacy at extension [phone] with any questions regarding this assessment.     Thank you for the consult,   Dash Bess       Patient brief summary:  Stuart Guevara is a 32 y.o. male initiated on antimicrobial therapy with IV Vancomycin for treatment of suspected intra-abdominal infection    Drug Allergies:   Review of patient's allergies indicates:   Allergen Reactions    Levofloxacin     Sulfamethoxazole-trimethoprim        Actual Body Weight:   60 kg    Renal Function:   Estimated Creatinine Clearance: 53.7 mL/min (A) (based on SCr of 1.67 mg/dL (H)).,     Dialysis Method (if applicable):  N/A    CBC (last 72 hours):  Recent Labs   Lab Result Units 11/12/22  1920 11/12/22  2137 11/13/22  0546 11/13/22  0821 11/13/22  1246 11/13/22  1621 11/13/22 2017 11/14/22  0027 11/14/22  0431 11/14/22  0853 11/14/22  1231 11/14/22  1656 11/14/22  2017 11/15/22  0006 11/15/22  0526 11/15/22  0938 11/15/22  1133   WBC x10(3)/mcL 19.5*  --  20.3* 16.0* 13.8* 14.9* 15.7* 15.0* 15.4* 16.5* 17.0* 17.1* 16.5* 18.0* 17.8* 12.7* 18.6*   Hgb gm/dL 7.7* 5.8* 8.5* 8.5* 9.2* 8.8* 8.9* 8.0* 8.1* 8.3* 8.5* 8.3* 8.4* 8.8* 8.7* 8.3* 8.6*   Hct % 24.2* 17.4* 24.5* 24.6* 26.6* 24.8* 25.5* 22.9* 23.1* 23.1* 23.0* 22.7* 22.5* 23.8* 23.5* 22.6* 23.1*   Platelet x10(3)/mcL 214  --  73* 79* 75* 84* 92* 90* 113* 108* 114* 114* 111* 114* 117* 100* 96*   Mono % % 1.3  --   --  3.9 4.4 3.6 3.7 4.8 4.0 4.1 3.2 3.5 3.9 3.6 3.0 3.4 3.0   Monocyte Man %  --   --  4  --   --   --   --   --   --   --   --   --   --   --   --    --   --    Eos % % 0.0  --   --  0.0 0.0 0.2 0.0 0.1 0.1 0.1 0.1 0.1 0.1 0.1 0.1 0.2 0.2   Basophil % % 0.2  --   --  0.2 0.3 0.3 0.2 0.2 0.2 0.2 0.1 0.1 0.1 0.1 0.2 0.2 0.1       Metabolic Panel (last 72 hours):  Recent Labs   Lab Result Units 11/12/22  1920 11/13/22  0546 11/13/22  1344 11/14/22  0431 11/15/22  0526   Sodium Level mmol/L 127* 129* 132* 130* 127*   Potassium Level mmol/L 4.4 4.4 3.7 4.1 3.8   Chloride mmol/L 97* 97* 97* 96* 95*   Carbon Dioxide mmol/L 22 20* 22 19* 20*   Glucose Level mg/dL 147* 176* 147* 239* 216*   Blood Urea Nitrogen mg/dL 61.2* 58.4* 28.4* 39.5* 44.4*   Creatinine mg/dL 1.43* 1.46* 0.95 1.42* 1.67*   Albumin Level gm/dL 1.6* 2.3*  --  2.5* 2.2*   Bilirubin Total mg/dL 1.2 1.4  --  1.1 1.0   Alkaline Phosphatase unit/L 206* 112  --  129 164*   Aspartate Aminotransferase unit/L 20 30  --  15 15   Alanine Aminotransferase unit/L 9 19  --  12 9   Magnesium Level mg/dL  --  1.80 1.90 1.90 1.80   Phosphorus Level mg/dL  --  7.0* 4.1 6.1* 5.9*       Drug levels (last 3 results):  No results for input(s): VANCOMYCINRA, VANCORANDOM, VANCOMYCINPE, VANCOPEAK, VANCOMYCINTR, VANCOTROUGH in the last 72 hours.    Microbiologic Results:  Microbiology Results (last 7 days)       Procedure Component Value Units Date/Time    Anaerobic Culture [[id number]] Collected: 11/12/22 1720    Order Status: Completed Specimen: Body Fluid from Abdominal Fluid Updated: 11/15/22 1026     Anaerobe Culture No Anaerobes Isolated    Fungal Culture [[id number]] Collected: 11/15/22 0827    Order Status: Sent Specimen: Body Fluid from Abdominal Fluid Updated: 11/15/22 0846    Anaerobic Culture [[id number]] Collected: 11/15/22 0827    Order Status: Sent Specimen: Body Fluid from Abdominal Fluid Updated: 11/15/22 0846    Gram Stain [[id number]] Collected: 11/15/22 0827    Order Status: Sent Specimen: Body Fluid from Abdominal Fluid Updated: 11/15/22 0846    Body Fluid Culture [[id number]] Collected: 11/15/22 0827    Order  Status: Sent Specimen: Body Fluid from Abdominal Fluid Updated: 11/15/22 0846    Body Fluid Culture [[id number]] Collected: 11/12/22 1720    Order Status: Completed Specimen: Body Fluid from Abdomen Updated: 11/15/22 0711     Body Fluid Culture No Growth At 72 Hours    Blood Culture [[id number]]  (Normal) Collected: 11/10/22 2120    Order Status: Completed Specimen: Blood Updated: 11/14/22 2300     CULTURE, BLOOD ([facility]) No Growth At 96 Hours    Blood Culture [[id number]]  (Normal) Collected: 11/10/22 2120    Order Status: Completed Specimen: Blood Updated: 11/14/22 2300     CULTURE, BLOOD ([facility]) No Growth At 96 Hours    Gram Stain [[id number]] Collected: 11/12/22 1720    Order Status: Completed Specimen: Body Fluid from Abdomen Updated: 11/13/22 0749     GRAM STAIN No WBCs, No bacteria seen    Fungal Culture [[id number]] Collected: 11/12/22 1720    Order Status: Sent Specimen: Body Fluid from Abdomen Updated: 11/12/22 1737    Blood Culture [[id number]]  (Normal) Collected: 11/05/22 0549    Order Status: Completed Specimen: Blood Updated: 11/10/22 1000     CULTURE, BLOOD ([facility]) No Growth at 5 days    Blood Culture [[id number]]  (Normal) Collected: 11/05/22 0549    Order Status: Completed Specimen: Blood Updated: 11/10/22 1000     CULTURE, BLOOD ([facility]) No Growth at 5 days

## 2022-11-15 NOTE — TRANSFER OF CARE
"Anesthesia Transfer of Care Note    Patient: Stuart Guevara    Procedure(s) Performed: Procedure(s) (LRB):  INCISION AND DRAINAGE (N/A)    Patient location: OPS    Anesthesia Type: general    Transport from OR: Transported from OR intubated on 100% O2 by AMBU with assisted ventilation. Upon arrival to PACU/ICU, patient attached to ventilator and auscultated to confirm bilateral breath sounds and adequate TV. Continuous ECG monitoring in transport. Continuous SpO2 monitoring in transport. Continuos invasive BP monitoring in transport    Post pain: adequate analgesia    Post assessment: no apparent anesthetic complications    Post vital signs: stable    Level of consciousness: sedated    Nausea/Vomiting: no nausea/vomiting    Complications: none    Transfer of care protocol was followed      Last vitals:   Visit Vitals  /71   Pulse (!) 54   Temp 36.7 °C (98.1 °F)   Resp 20   Ht 5' 5" (1.651 m)   Wt 60.3 kg (133 lb)   SpO2 100%   BMI 22.13 kg/m²     "

## 2022-11-15 NOTE — PROGRESS NOTES
PROGRESS NOTE         SUBJECTIVE: No acute issues overnight. Had repeat washout earlier today.         MEDICATIONS:   Reviewed in EMR        OBJECTIVE:     Vitals:    11/15/22 1230   BP:    Pulse: (!) 57   Resp: 20   Temp:           GENERAL: intubated, tolerating mechanical ventilation  SKIN: no rash  HEENT: sclera non-icteric; PERRL; OETT to external os  NECK: supple; no LAD  CHEST: Coarse, diminished in bases; nonlabored, equal expansion  CARDIOVASCULAR: RRR, S1S2; no murmur; equal peripheral pulses  ABDOMEN:  hypoactive bowel sounds; abdomen soft, nondistended  EXTREMITIES: no cyanosis or clubbing  NEURO: Sedated       LABORATORY DATA:  Reviewed            RADIOLOGICAL DATA: Reviewed            ASSESSMENT:   32-year-old male with past medical history of SLE, HTN, hepatitis-B, aortic dissection s/p repair in 02/2022 and again presented with recurrent dissection 09/2022 with stay complicated by TAY now on HD, SLE flare, ascites, serositis, necrotizing pancreatitis, bacterial peritonitis now on Meropenem, Fluconazole, Doxycycline. Id is consulted for assistance in management     Peritonitis  Necrotizing pancreatitis with pancreatic duct leak  Ascites  Infected abdominal wound with tunneling   HBV  TAY on HD  Anemia  Pneumonitis/Pulmonary edema      PLAN:   S/p repeat debridement today - cultures pending.   Continue meropenem and fluconazole.  Change doxycycline to vancomycin.  F/u HBV VL although appears immune from past cleared infection.  Follow am labs.  Palliative met with family earlier - wishing to continue aggressive measures.  Discussed with nursing.

## 2022-11-15 NOTE — PROGRESS NOTES
[facility] - 7th Floor ICU  Pulmonary Critical Care Note    Patient Name: Stuart Guevara  MRN: [id number]  Admission Date: 9/23/2022  Hospital Length of Stay: 53 days  Code Status: Full Code  Attending Provider: William Proctor MD  Primary Care Provider: Primary Doctor No     Subjective:     HPI:   This is a 32-year-old male he was admitted to the ICU following excisional debridement of an abdominal wound for concerns of intermittent hypotension requiring vasopressors and possible airway compromise given fluid overload.  The patient was originally admitted to the [facility] ICU on 09/23 for a [place] type B aortic dissection.  Patient failed medical management and was transferred to [facility] for CT surgery evaluation.  Was taken to the OR for TEVAR with left carotid to subclavian artery bypass.  During this admission, the patient was noted to have progressively worsening abdominal pain and lactic acidosis.  CT angiography showed evidence of significant intra-abdominal pathology with some hemoperitoneum and signs of hypoperfusion of the abdominal organs and free blood in the abdomen.  He underwent multiple exploratory laparotomies (9/25, 9/27 and 9/29).  Afterwards, the patient developed unstable bradycardia and had a temporary transvenous pacemaker placed by Cardiology, which resolved the bradycardia within a couple of days.  The patient was also noted to have progressive renal failure and critical hyperkalemia requiring HD catheter placement and initiation of hemodialysis.  Dictation again started to have worsening abdominal pain as evaluated by Rheumatology for possible lupus flare up and serositis.  Patient was started on Solu-Medrol, CellCept and Plaquenil.  However, the medication was stopped due to concern for infection.  CT abdomen showed multiple loculated fluid collections, necrotizing pancreatitis and possible peritonitis.  Patient had IR drainage on 10/28 and by fluid cultures showed Klebsiella.   Patient continued to have abdominal pain and was brought back to the OR on 10/30.  At that time, patient had 3 days of venita ascites but no signs of necrotic tissue. ON 11/12, the patient was brought back to the OR for wound washout.  Per chart review, in the OR, there is significant dark brown/green liquid but no foul odor, concerning for necrotizing tissue.  The patient was debrided and had a wound VAC placed.  However, patient did intermittent use vasopressors there was no had become fluid overloaded following surgery.  Patient was then transferred to ICU.  Patient noted to be awake alert and not on any vasopressors or sedation in the PACU    Hospital Course/Significant events:  11/13: washout, I&D, wound vac removal  11/15: repeat wound debridement vs. washout    24 Hour Interval History:  Patient underwent repeat washout/debridement this morning with surgery team. Patient remains sedated on Propofol 50mcg/kg/min. He is on cleviprex gtt at 3mg/h. Patient's leukoctosis is slightly increased to to 17.8. He remains on meropenem 500mg 24h (day 11) and doxycyline 100mg BID (day 11). Volume status is Output: 0cc, Intake: 39.2cc, Net: + 39.2cc.    SIMV: Vt 500, rate 20, PSV 10, PEEP 5, FiO2 30%    Past Medical History:   Diagnosis Date    Aortic dissection 9/23/2022    Hypertension     Systemic lupus erythematosus, organ or system involvement unspecified     Systemic lupus erythematosus, organ or system involvement unspecified        Past Surgical History:   Procedure Laterality Date    APPLICATION OF WOUND VACUUM-ASSISTED CLOSURE DEVICE  9/27/2022    Procedure: APPLICATION, WOUND VAC;  Surgeon: Christopher Espinal MD;  Location: [facility];  Service: General;;    CARDIAC SURGERY      CREATION OF BYPASS FROM INTERNAL CAROTID ARTERY TO SUBCLAVIAN ARTERY Left 9/23/2022    Procedure: CREATION, BYPASS, ARTERIAL, INTERNAL CAROTID TO SUBCLAVIAN;  Surgeon: Vic Martines MD;  Location: [facility];  Service: Cardiothoracic;   Laterality: Left;    ENDOVASCULAR REPAIR OF THORACIC AORTA N/A 9/23/2022    Procedure: REPAIR, AORTA, THORACIC, ENDOVASCULAR;  Surgeon: Vic Martines MD;  Location: [facility] OR;  Service: Cardiology;  Laterality: N/A;    FINGER AMPUTATION      INCISION AND DRAINAGE OF ABDOMEN N/A 11/12/2022    Procedure: INCISION AND DRAINAGE, ABDOMEN;  Surgeon: Saran Marr MD;  Location: [facility] OR;  Service: General;  Laterality: N/A;  debridement of skin & subcutaneous tisse. Wound vac placement    INCISION AND DRAINAGE OF ABDOMEN N/A 11/13/2022    Procedure: INCISION AND DRAINAGE, ABDOMEN;  Surgeon: Dayron Delcid MD;  Location: [facility] OR;  Service: General;  Laterality: N/A;    INSERTION OF TUNNELED CENTRAL VENOUS HEMODIALYSIS CATHETER N/A 10/24/2022    Procedure: INSERTION, CATHETER, CENTRAL VENOUS, HEMODIALYSIS, TUNNELED;  Surgeon: Yogesh Melton DO;  Location: [facility] CATH LAB;  Service: Nephrology;  Laterality: N/A;  TUNNELED CATH INSERTION    THROMBECTOMY Right     TOE AMPUTATION         Social History     Socioeconomic History    Marital status: Single   Tobacco Use    Smoking status: Every Day     Types: Cigarettes    Smokeless tobacco: Never     Social Determinants of Health     Food Insecurity: Unknown    Worried About Running Out of Food in the Last Year: Never true   Transportation Needs: Unknown    Lack of Transportation (Medical): No   Housing Stability: Unknown    Unable to Pay for Housing in the Last Year: No           Current Outpatient Medications   Medication Instructions    albuterol (PROVENTIL/VENTOLIN HFA) 90 mcg/actuation inhaler 2 puffs, Inhalation, Every 4 hours PRN, Rescue    amLODIPine (NORVASC) 10 mg, Oral, Daily    aspirin (ECOTRIN) 81 mg, Oral, Daily    belimumab (BENLYSTA) 120 mg SolR Benlysta Take No date recorded No form recorded No frequency recorded No route recorded No set duration recorded No set duration amount recorded active No dosage strength recorded No dosage strength units of  measure recorded    famotidine (PEPCID) 20 mg, Oral, 2 times daily    gemfibroziL (LOPID) 600 mg, Oral    hydrALAZINE (APRESOLINE) 10 mg, Oral    labetaloL (NORMODYNE) 300 mg, Oral, 2 times daily    lisinopriL (PRINIVIL,ZESTRIL) 40 mg, Oral, Daily    metoprolol tartrate (LOPRESSOR) 50 mg, Oral, 2 times daily    mycophenolate (CELLCEPT) 1,000 mg, Oral, 2 times daily    MEÑO 2 mg TbEC 1 tablet, Oral, Daily    rivaroxaban (XARELTO ORAL) Xarelto Take No date recorded No form recorded No frequency recorded No route recorded No set duration recorded No set duration amount recorded active No dosage strength recorded No dosage strength units of measure recorded    sucralfate (CARAFATE) 1 g, Oral, 2 times daily    warfarin (COUMADIN) 3 mg, Oral, Daily       Current Inpatient Medications   aspirin  81 mg Oral Daily    atropine  0.5 mg Intravenous Once    carvediloL  25 mg Oral BID    doxycycline (VIBRAMYCIN) IVPB  100 mg Intravenous Q12H    fluconazole (DIFLUCAN) IV (PEDS and ADULTS)  400 mg Intravenous Every Tues, Thurs, Sat    lactated ringers  1,000 mL Intravenous Once    lipase-protease-amylase 12,000-38,000-60,000 units  3 capsule Oral TID WM    meropenem (MERREM) IVPB  500 mg Intravenous Q24H    morphine  30 mg Oral Q12H    naloxegoL  25 mg Oral Daily    predniSONE  20 mg Oral BID    sodium bicarbonate  1,300 mg Oral Daily    sodium chloride 0.9%  10 mL Intravenous Q6H    zinc oxide-cod liver oil   Topical (Top) BID       Current Intravenous Infusions   clevidipine 3 mg/hr (11/15/22 0532)    dexmedetomidine (PRECEDEX) infusion Stopped (11/13/22 0600)    fentanyl 125 mcg/hr (11/15/22 0407)    NORepinephrine bitartrate-D5W Stopped (11/13/22 0237)    propofoL 45 mcg/kg/min (11/15/22 0532)         Review of Systems   Constitutional: Negative.    HENT: Negative.     Eyes: Negative.    Respiratory: Negative.     Cardiovascular:  Negative for chest pain and palpitations.   Gastrointestinal:  Positive for abdominal pain.  Negative for nausea and vomiting.   Musculoskeletal:  Positive for myalgias.        LUE and BLE pain   Neurological:  Negative for headaches.        Objective:       Intake/Output Summary (Last 24 hours) at 11/15/2022 1002  Last data filed at 11/15/2022 0909  Gross per 24 hour   Intake 699.17 ml   Output --   Net 699.17 ml           Vital Signs (Most Recent):  Temp: 98.1 °F (36.7 °C) (11/15/22 0913)  Pulse: (!) 54 (11/15/22 0913)  Resp: 20 (11/15/22 0913)  BP: 138/71 (11/15/22 0700)  SpO2: 100 % (11/15/22 0913)    Body mass index is 22.13 kg/m².  Weight: 60.3 kg (133 lb) Vital Signs (24h Range):  Temp:  [97.8 °F (36.6 °C)-98.4 °F (36.9 °C)] 98.1 °F (36.7 °C)  Pulse:  [54-96] 54  Resp:  [14-21] 20  SpO2:  [96 %-100 %] 100 %  BP: (132-165)/(67-98) 138/71  Arterial Line BP: (137-273)/() 159/64     Physical Exam  Constitutional:       General: He is not in acute distress.     Appearance: He is ill-appearing.      Comments: Minimally responsive to pain stimuli   HENT:      Head: Normocephalic and atraumatic.   Eyes:      Extraocular Movements: Extraocular movements intact.   Cardiovascular:      Rate and Rhythm: Regular rhythm.      Pulses: Normal pulses.      Heart sounds: Normal heart sounds.   Pulmonary:      Effort: Pulmonary effort is normal.      Breath sounds: Normal breath sounds.      Comments: Intubated, mechanically ventilated, coarse breath sounds bilaterally  Abdominal:      General: There is no distension.      Palpations: Abdomen is soft.      Tenderness: There is abdominal tenderness.      Comments: Midline incision with staples in place c/d/i, majority of lower abdomen covered with foam tape   Musculoskeletal:      Right lower leg: Edema present.      Left lower leg: Edema present.      Comments: 2+ pitting LE edema   Neurological:      Comments: Does not follow commands          Lines/Drains/Airways       Peripherally Inserted Central Catheter Line  Duration             PICC Double Lumen 10/25/22  1520 right brachial 20 days              Central Venous Catheter Line  Duration                  Hemodialysis Catheter right internal jugular -- days              Drain  Duration                  NG/OG Tube 11/13/22 0330 [place] [facility] mouth 2 days              Airway  Duration                  Airway - Non-Surgical 11/13/22 0156 2 days              Arterial Line  Duration             Arterial Line 11/13/22 0204 Left Radial 2 days              Peripheral Intravenous Line  Duration                  External Jugular IV 11/02/22 0800 13 days                    Significant Labs:    Lab Results   Component Value Date    WBC 17.8 (H) 11/15/2022    HGB 8.7 (L) 11/15/2022    HCT 23.5 (L) 11/15/2022    MCV 83.3 11/15/2022     (L) 11/15/2022         BMP  Lab Results   Component Value Date     (L) 11/15/2022    K 3.8 11/15/2022    CO2 20 (L) 11/15/2022    BUN 44.4 (H) 11/15/2022    CREATININE 1.67 (H) 11/15/2022    CALCIUM 7.9 (L) 11/15/2022    EGFRNONAA 80 01/31/2022       ABG  Recent Labs   Lab 11/13/22 0618   PH 7.43   PO2 191*   PCO2 33*   HCO3 21.9*         Mechanical Ventilation Support:  Vent Mode: SIMV (11/15/22 0908)  Set Rate: 20 BPM (11/15/22 0908)  Vt Set: 500 mL (11/15/22 0908)  Pressure Support: 10 cmH20 (11/15/22 0908)  PEEP/CPAP: 5 cmH20 (11/15/22 0908)  Oxygen Concentration (%): 30 (11/15/22 0908)  Peak Airway Pressure: 23 cmH2O (11/15/22 0908)  Total Ve: 9.2 L/m (11/15/22 0908)  F/VT Ratio<105 (RSBI): (!) 43.48 (11/15/22 0439)    Significant Imaging:  I have reviewed the pertinent imaging within the past 24 hours.        Assessment/Plan:     Assessment  Necrotizing pancreatitis and loculated intra-abdominal fluid collection, post op wound complication with associated coagulopathy. Persistent leukocytosis worsening today and thrombocytopenia which is improving. TEG normal. Hgb current stable following pre-op transfusion of multiple blood products and intraop ddavp on 11/13.  S/p IR drainage on  10/28  S/p exploratory laparotomy on 11/12  S/p washout, I&D, wound vac removal on 11/13, improved hemodynamic status weaned off pressors intraop following blood product administration  S/p washout and debridement   Shock, likely hypovolemic  Hospital-acquired pneumonia  Acute kidney injury, Nephrology following, last HD on 11/13 with total UF 2500 cc. HD not indicated today, continue PO sodium bicarb given metabolic acidosis with bicarb of 19.  Hypochloremic hyponatremia  Lupus flare up. Rheumatology is following, patient last evaluated on 11/11 with plans to continue prednisone 20 mg BID   Anasarca  Normocytic anemia  Hyponatremia         Plan  Will monitor blood pressure restart vasopressors as needed. MAP goal > 65 mm Hg. C  Wean sedation as tolerated, currently on Propofol 50   Continue to mo  Currently on Diflucan (day 11), Doxy (day 11), and meropenem (day 11). Will continue.  Suspect hyponatremia is due to volume depletion. Monitoring closely.   Completing prednisone taper. Continue prednisone 20 mg BID. Rheumatology is following.  Continue on Clevidipine gtt 3mg/h  Continue surgical recommendations, appreciate assistance       DVT Prophylaxis: Chemical prophylaxis held per surgery recs as of 11/13     32 minutes of critical care was time spent personally by me on the following activities: development of treatment plan with patient or surrogate and bedside caregivers, discussions with consultants, evaluation of patient's response to treatment, examination of patient, ordering and performing treatments and interventions, ordering and review of laboratory studies, ordering and review of radiographic studies, pulse oximetry, re-evaluation of patient's condition.  This critical care time did not overlap with that of any other provider or involve time for any procedures.     Saran Albert,   Pulmonary Critical Care Medicine  [facility] - 7th Floor ICU

## 2022-11-15 NOTE — NURSING
Re eval of buttock maceration and bilat posterior heels and mid abd wd.  Pt was taken down today for wash out of lower abd wd and just returned according to nurse hale .    Packing to low abd in place.   Bilat heels continue to peel-they both remain dry and stable.     Buttock maceration has stablilize.   Most of the peeling skin has resolved-  combined to 2ulcers now noted.   Care continued with instructions to    11/15/22 1134   Cognitive   Level of Consciousness (AVPU) alert   Breath Sounds   All Lung Fields Breath Sounds diminished        Airway - Non-Surgical 11/13/22 0156   Placement Date/Time: 11/13/22 (c) 0156   Method of Intubation: Direct laryngoscopy  Mask Ventilation: Easy  Intubated: Postinduction  Blade: Noland #2  Airway Device Size: 8.0  Placement Verified By: Capnometry  Complicating Factors: None  Findings Po...   Secured at 24 cm   Measured At Teeth   Secured Location Right   Secured by Commercial tube villaseñor   Bite Block right;secure and patent   Site Condition Dry   Status Intact;Secured;Patent   Site Assessment Clean;Dry   Oxygen Therapy   Oxygen Concentration (%) 30        Incision/Site 10/30/22 1651 Abdomen   Date First Assessed/Time First Assessed: 10/30/22 1651   Location: Abdomen   Wound Image   (re-wash out of abd today with dressing packing to lower abd)        Altered Skin Integrity 11/05/22 1253 posterior Sacral spine #1 Shearing Partial thickness tissue loss. Shallow open ulcer with a red or pink wound bed, without slough. Intact or Open/Ruptured Serum-filled blister.   Date First Assessed/Time First Assessed: 11/05/22 1253   Altered Skin Integrity Present on Admission: suspected hospital acquired  Orientation: posterior  Location: Sacral spine  Wound Number: #1  Is this injury device related?: No  Primary Wound Type...   Wound Image   (photo malfx)   Description of Altered Skin Integrity Partial thickness tissue loss. Shallow open ulcer with a red or pink wound bed, without slough.  Intact or Open/Ruptured Serum-filled blister.   Dressing Appearance Moist drainage   Drainage Amount Small   Drainage Characteristics/Odor Sanguineous;No odor   Appearance Pink;Red;White;Moist  (2 distinct ulcers now)   Tissue loss description Partial thickness   Red (%), Wound Tissue Color 90 %   Yellow (%), Wound Tissue Color 10 %   Periwound Area Dry   Wound Edges Irregular   Wound Length (cm) 10 cm  (both lesions)   Wound Width (cm) 10 cm   Wound Surface Area (cm^2) 100 cm^2   Care Cleansed with:;Wound cleanser;Applied:;Skin Barrier   Dressing Absorptive Pad        Altered Skin Integrity 11/07/22 1100 Left Achilles Abrasion(s)   Date First Assessed/Time First Assessed: 11/07/22 1100   Altered Skin Integrity Present on Admission: yes  Side: Left  Location: Achilles  Primary Wound Type: (c) Abrasion(s)   Wound Image   (dry peeling no drainage)        Altered Skin Integrity 11/07/22 1100 Right Achilles Abrasion(s)   Date First Assessed/Time First Assessed: 11/07/22 1100   Altered Skin Integrity Present on Admission: yes  Side: Right  Location: Achilles  Primary Wound Type: Abrasion(s)   Wound Image   (dry pink)   nurse .

## 2022-11-16 NOTE — PROGRESS NOTES
Infectious Disease  Progress Note    Patient Name: Stuart Guevara   MRN: [id number]   Admission Date: 9/23/2022   Hospital Length of Stay: 54 days  Attending Physician: Alvin Barnhart MD   Primary Care Provider: Primary Doctor No     Isolation Status: No active isolations       Subjective:     Principal Problem: Aortic dissection     Interval History:   Intubated however awake, no new concerns, hemodynamically stable. Plan for extubation today    Review of Systems   Review of Systems   All other systems reviewed and are negative.     Objective:     Vital Signs (Most Recent):  Temp: 98 °F (36.7 °C) (11/16/22 1600)  Pulse: 80 (11/16/22 1700)  Resp: 14 (11/16/22 1700)  BP: 137/85 (11/16/22 1400)  SpO2: 100 % (11/16/22 1700)  Vital Signs (24h Range):  Temp:  [97.8 °F (36.6 °C)-99 °F (37.2 °C)] 98 °F (36.7 °C)  Pulse:  [] 80  Resp:  [12-24] 14  SpO2:  [92 %-100 %] 100 %  BP: (116-159)/(66-96) 137/85  Arterial Line BP: (143-180)/() 163/77      Weight:   Wt Readings from Last 1 Encounters:   11/12/22 60.3 kg (133 lb)      Body mass index is Body mass index is 22.13 kg/m².     Estimated Creatinine Clearance: Estimated Creatinine Clearance: 48.2 mL/min (A) (based on SCr of 1.86 mg/dL (H)).     Lines/Drains/Airways       Peripherally Inserted Central Catheter Line  Duration             PICC Double Lumen 10/25/22 1520 right brachial 22 days              Central Venous Catheter Line  Duration                  Hemodialysis Catheter right internal jugular -- days              Drain  Duration             Male External Urinary Catheter 11/16/22 1130 <1 day              Arterial Line  Duration             Arterial Line 11/13/22 0204 Left Radial 3 days              Peripheral Intravenous Line  Duration                  External Jugular IV 11/02/22 0800 14 days                     Physical Exam  Physical Exam  Constitutional:       Appearance: Normal appearance. He is ill-appearing.   HENT:      Head: Normocephalic and  atraumatic.      Mouth/Throat:      Pharynx: No oropharyngeal exudate or posterior oropharyngeal erythema.   Eyes:      Extraocular Movements: Extraocular movements intact.      Pupils: Pupils are equal, round, and reactive to light.   Cardiovascular:      Rate and Rhythm: Normal rate and regular rhythm.      Heart sounds: No murmur heard.  Pulmonary:      Effort: No respiratory distress.      Breath sounds: No wheezing, rhonchi or rales.      Comments: Intubated on my exam  Abdominal:      General: Bowel sounds are normal. There is no distension.      Palpations: Abdomen is soft.      Tenderness: There is no abdominal tenderness. There is no right CVA tenderness or left CVA tenderness.      Comments: Large abdominal wound with dressing in place   Musculoskeletal:         General: No swelling or tenderness.      Cervical back: Neck supple. No rigidity or tenderness.   Lymphadenopathy:      Cervical: No cervical adenopathy.   Skin:     Findings: No lesion or rash.   Neurological:      General: No focal deficit present.      Mental Status: He is alert and oriented to person, place, and time. Mental status is at baseline.      Cranial Nerves: No cranial nerve deficit.      Motor: Weakness present.      Comments: Awake and alert, able to move all extremities and follow commands   Psychiatric:         Mood and Affect: Mood normal.         Behavior: Behavior normal.        Significant Labs: CBC:   Recent Labs   Lab 11/15/22  0938 11/15/22  1133 11/16/22 0228   WBC 12.7* 18.6* 12.0*   HGB 8.3* 8.6* 11.2*   HCT 22.6* 23.1* 30.0*   * 96* 66*     CMP:   Recent Labs   Lab 11/15/22  0526 11/16/22 0228   * 127*   K 3.8 3.7   CO2 20* 19*   BUN 44.4* 50.7*   CREATININE 1.67* 1.86*   CALCIUM 7.9* 7.7*   ALBUMIN 2.2*  --    BILITOT 1.0  --    ALKPHOS 164*  --    AST 15  --    ALT 9  --        Microbiology Results (last 7 days)       Procedure Component Value Units Date/Time    Body Fluid Culture [[id number]] Collected:  11/12/22 1720    Order Status: Completed Specimen: Body Fluid from Abdomen Updated: 11/16/22 1037     Body Fluid Culture No growth at 4 days    Gram Stain [[id number]] Collected: 11/15/22 0827    Order Status: Completed Specimen: Body Fluid from Abdominal Fluid Updated: 11/16/22 0754     GRAM STAIN No WBCs, No bacteria seen    Body Fluid Culture [[id number]] Collected: 11/15/22 0827    Order Status: Completed Specimen: Body Fluid from Abdominal Fluid Updated: 11/16/22 0712     Body Fluid Culture No Growth At 24 Hours    Blood Culture [[id number]]  (Normal) Collected: 11/10/22 2120    Order Status: Completed Specimen: Blood Updated: 11/15/22 2300     CULTURE, BLOOD ([facility]) No Growth at 5 days    Blood Culture [[id number]]  (Normal) Collected: 11/10/22 2120    Order Status: Completed Specimen: Blood Updated: 11/15/22 2300     CULTURE, BLOOD ([facility]) No Growth at 5 days    Anaerobic Culture [[id number]] Collected: 11/12/22 1720    Order Status: Completed Specimen: Body Fluid from Abdominal Fluid Updated: 11/15/22 1026     Anaerobe Culture No Anaerobes Isolated    Fungal Culture [[id number]] Collected: 11/15/22 0827    Order Status: Sent Specimen: Body Fluid from Abdominal Fluid Updated: 11/15/22 0846    Anaerobic Culture [[id number]] Collected: 11/15/22 0827    Order Status: Sent Specimen: Body Fluid from Abdominal Fluid Updated: 11/15/22 0846    Gram Stain [[id number]] Collected: 11/12/22 1720    Order Status: Completed Specimen: Body Fluid from Abdomen Updated: 11/13/22 0749     GRAM STAIN No WBCs, No bacteria seen    Fungal Culture [[id number]] Collected: 11/12/22 1720    Order Status: Sent Specimen: Body Fluid from Abdomen Updated: 11/12/22 1737    Blood Culture [[id number]]  (Normal) Collected: 11/05/22 0549    Order Status: Completed Specimen: Blood Updated: 11/10/22 1000     CULTURE, BLOOD ([facility]) No Growth at 5 days    Blood Culture [[id number]]  (Normal) Collected: 11/05/22 0549    Order Status: Completed Specimen: Blood  Updated: 11/10/22 1000     CULTURE, BLOOD ([facility]) No Growth at 5 days             Significant Imaging: I have reviewed all pertinent imaging results/findings within the past 24 hours.    Assessment/Plan:        32-year-old male with past medical history of SLE, HTN, hepatitis-B, aortic dissection s/p repair in 02/2022 and again presented with recurrent dissection 09/2022 with stay complicated by TAY now on HD, SLE flare, ascites, serositis, necrotizing pancreatitis, bacterial peritonitis now on Meropenem, Fluconazole, Doxycycline. Id is consulted for assistance in management     Peritonitis  Necrotizing pancreatitis with pancreatic duct leak  Ascites  Infected abdominal wound with tunneling   HBV  TAY on HD  Anemia  Pneumonitis/Pulmonary edema        PLAN:   -Continue Meropenem, Fluconazole and Vancomycin for now  -Follow up on pending cultures from most recent debridement on 11/15/2022  -Extubated this am (was still intubated on my evaluation)  -Large wound with dressing in place, would appreciate images being uploaded into the chart at next wound dressing change  -ICU team, renal, Surgery, Rheumatology following  -Will determine duration for antibiotics based on most recent culture data and clinical evolution  -Prognosis guarded       ID will continue following. Please contact us with any questions or concerns.    35 minutes of critical care was time spent personally by me on the following activities: development of treatment plan with patient or surrogate and bedside caregivers, discussions with consultants and or primary team, evaluation of patient's response to treatment, examination of patient, ordering and performing treatments and interventions, ordering and review of laboratory studies, ordering and review of radiographic studies.  This critical care time did not overlap with that of any other provider or involve time for any procedures.      Layton Nathan MD  Infectious Disease  [facility]

## 2022-11-16 NOTE — PROGRESS NOTES
[facility] - 7th Floor ICU  Pulmonary Critical Care Note    Patient Name: Stuart Guevara  MRN: [id number]  Admission Date: 9/23/2022  Hospital Length of Stay: 54 days  Code Status: Full Code  Attending Provider: Alvin Barnhart MD  Primary Care Provider: Primary Doctor No     Subjective:     HPI:   This is a 32-year-old male he was admitted to the ICU following excisional debridement of an abdominal wound for concerns of intermittent hypotension requiring vasopressors and possible airway compromise given fluid overload.  The patient was originally admitted to the [facility] ICU on 09/23 for a Baring type B aortic dissection.  Patient failed medical management and was transferred to [facility] for CT surgery evaluation. He was taken to the OR for TEVAR with left carotid to subclavian artery bypass.  During this admission, the patient was noted to have progressively worsening abdominal pain and lactic acidosis.  CT angiography showed evidence of significant intra-abdominal pathology with some hemoperitoneum and signs of hypoperfusion of the abdominal organs and free blood in the abdomen.  He underwent multiple exploratory laparotomies (9/25, 9/27 and 9/29).  Afterwards, the patient developed unstable bradycardia and had a temporary transvenous pacemaker placed by Cardiology, which resolved the bradycardia within a couple of days.  The patient was also noted to have progressive renal failure and critical hyperkalemia requiring HD catheter placement and initiation of hemodialysis. HE started to have worsening abdominal pain was evaluated by Rheumatology for possible lupus flare up and serositis.  Patient was started on Solu-Medrol, CellCept and Plaquenil.  However, the medication was stopped due to concern for infection.  CT abdomen showed multiple loculated fluid collections, necrotizing pancreatitis and possible peritonitis.  Patient had IR drainage on 10/28 and by fluid cultures showed Klebsiella.  Patient  continued to have abdominal pain and was brought back to the OR on 10/30.  At that time, patient had 3 days of venita ascites but no signs of necrotic tissue. ON 11/12, the patient was brought back to the OR for wound washout.  Per chart review, in the OR, there is significant dark brown/green liquid but no foul odor, concerning for necrotizing tissue.  The patient was debrided and had a wound VAC placed.  However, patient did intermittent use vasopressors.  Patient was then transferred to ICU.     Hospital Course/Significant events:  11/13: washout, I&D, wound vac removal  11/15: repeat wound debridement vs. washout    24 Hour Interval History:  Patient remains sedated on Propofol 50mcg/kg/min. He is on cleviprex gtt at 3mg/h. Patient's leukoctosis is slightly increased to to 17.8. He remains on meropenem 500mg 24h (day 11) and doxycyline 100mg BID (day 11). Volume status is Output: 0cc, Intake: 39.2cc, Net: + 39.2cc.    SIMV: Vt 500, rate 20, PSV 10, PEEP 5, FiO2 30%    Past Medical History:   Diagnosis Date    Aortic dissection 9/23/2022    Hypertension     Systemic lupus erythematosus, organ or system involvement unspecified     Systemic lupus erythematosus, organ or system involvement unspecified        Past Surgical History:   Procedure Laterality Date    APPLICATION OF WOUND VACUUM-ASSISTED CLOSURE DEVICE  9/27/2022    Procedure: APPLICATION, WOUND VAC;  Surgeon: Christopher Espinal MD;  Location: [facility];  Service: General;;    CARDIAC SURGERY      CREATION OF BYPASS FROM INTERNAL CAROTID ARTERY TO SUBCLAVIAN ARTERY Left 9/23/2022    Procedure: CREATION, BYPASS, ARTERIAL, INTERNAL CAROTID TO SUBCLAVIAN;  Surgeon: Vic Martines MD;  Location: [facility];  Service: Cardiothoracic;  Laterality: Left;    ENDOVASCULAR REPAIR OF THORACIC AORTA N/A 9/23/2022    Procedure: REPAIR, AORTA, THORACIC, ENDOVASCULAR;  Surgeon: Vic Martines MD;  Location: [facility];  Service: Cardiology;  Laterality: N/A;    FINGER AMPUTATION       INCISION AND DRAINAGE OF ABDOMEN N/A 11/12/2022    Procedure: INCISION AND DRAINAGE, ABDOMEN;  Surgeon: Saran Marr MD;  Location: [facility] OR;  Service: General;  Laterality: N/A;  debridement of skin & subcutaneous tisse. Wound vac placement    INCISION AND DRAINAGE OF ABDOMEN N/A 11/13/2022    Procedure: INCISION AND DRAINAGE, ABDOMEN;  Surgeon: Dayron Delcid MD;  Location: [facility] OR;  Service: General;  Laterality: N/A;    INSERTION OF TUNNELED CENTRAL VENOUS HEMODIALYSIS CATHETER N/A 10/24/2022    Procedure: INSERTION, CATHETER, CENTRAL VENOUS, HEMODIALYSIS, TUNNELED;  Surgeon: Yogesh Melton DO;  Location: [facility] CATH LAB;  Service: Nephrology;  Laterality: N/A;  TUNNELED CATH INSERTION    THROMBECTOMY Right     TOE AMPUTATION         Social History     Socioeconomic History    Marital status: Single   Tobacco Use    Smoking status: Every Day     Types: Cigarettes    Smokeless tobacco: Never     Social Determinants of Health     Food Insecurity: Unknown    Worried About Running Out of Food in the Last Year: Never true   Transportation Needs: Unknown    Lack of Transportation (Medical): No   Housing Stability: Unknown    Unable to Pay for Housing in the Last Year: No       Current Outpatient Medications   Medication Instructions    albuterol (PROVENTIL/VENTOLIN HFA) 90 mcg/actuation inhaler 2 puffs, Inhalation, Every 4 hours PRN, Rescue    amLODIPine (NORVASC) 10 mg, Oral, Daily    aspirin (ECOTRIN) 81 mg, Oral, Daily    belimumab (BENLYSTA) 120 mg SolR Benlysta Take No date recorded No form recorded No frequency recorded No route recorded No set duration recorded No set duration amount recorded active No dosage strength recorded No dosage strength units of measure recorded    famotidine (PEPCID) 20 mg, Oral, 2 times daily    gemfibroziL (LOPID) 600 mg, Oral    hydrALAZINE (APRESOLINE) 10 mg, Oral    labetaloL (NORMODYNE) 300 mg, Oral, 2 times daily    lisinopriL (PRINIVIL,ZESTRIL) 40 mg, Oral, Daily     metoprolol tartrate (LOPRESSOR) 50 mg, Oral, 2 times daily    mycophenolate (CELLCEPT) 1,000 mg, Oral, 2 times daily    MEÑO 2 mg TbEC 1 tablet, Oral, Daily    rivaroxaban (XARELTO ORAL) Xarelto Take No date recorded No form recorded No frequency recorded No route recorded No set duration recorded No set duration amount recorded active No dosage strength recorded No dosage strength units of measure recorded    sucralfate (CARAFATE) 1 g, Oral, 2 times daily    warfarin (COUMADIN) 3 mg, Oral, Daily       Current Inpatient Medications   aspirin  81 mg Oral Daily    atropine  0.5 mg Intravenous Once    carvediloL  25 mg Oral BID    fluconazole (DIFLUCAN) IV (PEDS and ADULTS)  400 mg Intravenous Every Tues, Thurs, Sat    lactated ringers  1,000 mL Intravenous Once    lipase-protease-amylase 12,000-38,000-60,000 units  3 capsule Oral TID WM    meropenem (MERREM) IVPB  500 mg Intravenous Q24H    morphine  30 mg Oral Q12H    naloxegoL  25 mg Oral Daily    predniSONE  20 mg Oral BID    sodium bicarbonate  1,300 mg Oral Daily    sodium chloride 0.9%  10 mL Intravenous Q6H    vancomycin (VANCOCIN) IVPB  1,000 mg Intravenous Q18H    zinc oxide-cod liver oil   Topical (Top) BID       Current Intravenous Infusions   clevidipine 2 mg/hr (11/15/22 2129)    dexmedetomidine (PRECEDEX) infusion Stopped (11/13/22 0600)    fentanyl 150 mcg/hr (11/16/22 0046)    NORepinephrine bitartrate-D5W Stopped (11/13/22 0237)    propofoL 50 mcg/kg/min (11/16/22 0258)         Review of Systems   Constitutional: Negative.    HENT: Negative.     Eyes: Negative.    Respiratory: Negative.     Cardiovascular:  Negative for chest pain and palpitations.   Gastrointestinal:  Positive for abdominal pain. Negative for nausea and vomiting.   Musculoskeletal:  Positive for myalgias.        LUE and BLE pain   Neurological:  Negative for headaches.        Objective:       Intake/Output Summary (Last 24 hours) at 11/16/2022 0834  Last data filed at 11/16/2022  0619  Gross per 24 hour   Intake 1121.61 ml   Output 0 ml   Net 1121.61 ml           Vital Signs (Most Recent):  Temp: 98.2 °F (36.8 °C) (11/16/22 0400)  Pulse: 65 (11/16/22 0615)  Resp: 20 (11/16/22 0615)  BP: 135/80 (11/16/22 0615)  SpO2: (!) 92 % (11/16/22 0615)    Body mass index is 22.13 kg/m².  Weight: 60.3 kg (133 lb) Vital Signs (24h Range):  Temp:  [97.8 °F (36.6 °C)-98.2 °F (36.8 °C)] 98.2 °F (36.8 °C)  Pulse:  [54-86] 65  Resp:  [16-22] 20  SpO2:  [92 %-100 %] 92 %  BP: (106-165)/(62-90) 135/80  Arterial Line BP: (137-184)/() 152/59     Physical Exam  Constitutional:       General: He is not in acute distress.     Appearance: He is ill-appearing.      Comments: Minimally responsive to pain stimuli   HENT:      Head: Normocephalic and atraumatic.   Eyes:      Extraocular Movements: Extraocular movements intact.   Cardiovascular:      Rate and Rhythm: Regular rhythm.      Pulses: Normal pulses.      Heart sounds: Normal heart sounds.   Pulmonary:      Effort: Pulmonary effort is normal.      Breath sounds: Normal breath sounds.      Comments: Intubated, mechanically ventilated, coarse breath sounds bilaterally  Abdominal:      General: There is no distension.      Palpations: Abdomen is soft.      Tenderness: There is abdominal tenderness.      Comments: Midline incision with staples in place c/d/i, majority of lower abdomen covered with foam tape   Musculoskeletal:      Right lower leg: Edema present.      Left lower leg: Edema present.      Comments: 2+ pitting LE edema   Neurological:      Comments: Does not follow commands          Lines/Drains/Airways       Peripherally Inserted Central Catheter Line  Duration             PICC Double Lumen 10/25/22 1520 right brachial 21 days              Central Venous Catheter Line  Duration                  Hemodialysis Catheter right internal jugular -- days              Drain  Duration                  NG/OG Tube 11/13/22 0330 [facility] mouth 3 days               Airway  Duration                  Airway - Non-Surgical 11/13/22 0156 3 days              Arterial Line  Duration             Arterial Line 11/13/22 0204 Left Radial 3 days              Peripheral Intravenous Line  Duration                  External Jugular IV 11/02/22 0800 14 days                    Significant Labs:    Lab Results   Component Value Date    WBC 12.0 (H) 11/16/2022    HGB 11.2 (L) 11/16/2022    HCT 30.0 (L) 11/16/2022    MCV 82.6 11/16/2022    PLT 66 (L) 11/16/2022         BMP  Lab Results   Component Value Date     (L) 11/16/2022    K 3.7 11/16/2022    CO2 19 (L) 11/16/2022    BUN 50.7 (H) 11/16/2022    CREATININE 1.86 (H) 11/16/2022    CALCIUM 7.7 (L) 11/16/2022    EGFRNONAA 80 01/31/2022       ABG  Recent Labs   Lab 11/13/22 0618   PH 7.43   PO2 191*   PCO2 33*   HCO3 21.9*         Mechanical Ventilation Support:  Vent Mode: SIMV (11/16/22 0457)  Set Rate: 20 BPM (11/16/22 0457)  Vt Set: 500 mL (11/16/22 0457)  Pressure Support: 10 cmH20 (11/16/22 0457)  PEEP/CPAP: 5 cmH20 (11/16/22 0457)  Oxygen Concentration (%): 35 (11/16/22 0457)  Peak Airway Pressure: 21 cmH2O (11/16/22 0457)  Total Ve: 9.2 L/m (11/16/22 0457)  F/VT Ratio<105 (RSBI): (!) 44.44 (11/16/22 0457)    Significant Imaging:  No imaging this AM to review     Assessment/Plan:     Assessment  Necrotizing pancreatitis and loculated intra-abdominal fluid collection, post op wound complication with associated coagulopathy. Persistent leukocytosis worsening today and thrombocytopenia which is improving. TEG normal. Hgb current stable following pre-op transfusion of multiple blood products and intraop ddavp on 11/13.  S/p IR drainage on 10/28  S/p exploratory laparotomy on 11/12  S/p washout, I&D, wound vac removal on 11/13, improved hemodynamic status weaned off pressors intraop following blood product administration  S/p washout and debridement on 11/15  Shock, likely hypovolemic  Hospital-acquired pneumonia  Acute  kidney injury, Nephrology following, last HD on 11/13 with total UF 2500 cc.   Lupus flare up. Rheumatology is intermittently following, patient last evaluated on 11/11 with plans to continue prednisone 20 mg BID   Anasarca  Normocytic anemia  Hyponatremia         Plan  Continue attempts to wean Cleviprex will add Catapres patch   Wean sedation as tolerated; plans to attempt extubation    Renal is following- HD per there recs- last treatment was 11/13  ID following- Currently on Vancomycin, Merrem, and Fluconazole   Completing prednisone taper. Continue prednisone 20 mg BID. Rheumatology is following; however I do not see a note from for the last several days  Continue surgical recommendations, appreciate assistance   Add p.r.n. pain meds as the patient was on fentanyl drip      DVT Prophylaxis: Chemical prophylaxis held per surgery recs as of 11/13.  Platelets are still low     Marcelina Duvall, ANP  Pulmonary Critical Care Medicine  [facility] - 7th Floor ICU

## 2022-11-16 NOTE — PROGRESS NOTES
Inpatient Nutrition Assessment    Admit Date: 9/23/2022   Total duration of encounter: 54 days     Nutrition Recommendation/Prescription     When feasible, oral diet as tolerated, consistency per SLP.    If adequate oral intake not feasible, recommend NG tube feeding as tolerated.  Impact Peptide 1.5 goal rate 60 ml/hr to provide  1800 kcal/d, 91% needs  113 g protein/d, 100% needs  924 ml free water/d  (calculations based on estimated 20 hr/d run time)    If unable to tolerate adequate oral/enteral nutrition, recommend TPN as tolerated.  Minimum volume custom TPN with multivitamin, trace elements, folic acid, thiamin, zinc; electrolytes per pharmacy  750 ml 15% AA (113 g, 450 kcal)  500 ml D70W (350 g, 1190 kcal, GIR 4.3)  250 ml 20% ILE piggyback (50 g, 500 kcal) twice weekly when no longer receiving Cleviprex    Communication of Recommendations: reviewed with nurse, pharmacist, and NP    Nutrition Assessment     Malnutrition Assessment/Nutrition-Focused Physical Exam    Malnutrition in the context of acute illness or injury  Degree of Malnutrition: severe malnutrition  Energy Intake: < 75% of estimated energy requirement for > 7 days  Interpretation of Weight Loss: >5% in 1 month  Body Fat:moderate depletion  Area of Body Fat Loss: orbital region  and upper arm region - triceps / biceps  Muscle Mass Loss: moderate depletion  Area of Muscle Mass Loss: temple region - temporalis muscle, clavicle bone region - pectoralis major, deltoid, trapezius muscles, clavicle and acromion bone region - deltoid muscle, and scapular bone region - trapezius, supraspinus, infraspinus muscles  Fluid Accumulation: moderate to severe  Edema: ascites   Reduced  Strength: unable to obtain  A minimum of two characteristics is recommended for diagnosis of either severe or non-severe malnutrition.    Chart Review    Reason Seen: continuous nutrition monitoring, physician consult, and follow-up    Diagnosis:  aortic dissection s/p  TEVAR  abdominal compartment syndrome s/p multiple exlaps (9/25, 9/27, 9/29)  renal failure/HD  CT has shown concern for necrotizing fascitis  paracentesis culture + Klebsiella  s/p washout 10/30  LLQ wound vac placement 11/12 with bleeding requiring significant transfusion post-op  wound vac removal 11/13 with wound washout    Relevant Medical History: HTN, SLE, HBV    Nutrition-Related Medications: Cleviprex, pancreatic enzymes, prednisone, sodium bicarbonate  Calorie Containing IV Medications: Cleviprex @ 6 ml/hr (provides 288 kcal/d)    Nutrition-Related Labs:  10/31/22 PAB 13.4 (L)  11/12/22 CRP 58.7 (H)  11/14/22 Na 130 (L), GFR WNL, Glu 239 (H), Ca 8.1 (L), Phos 6.1 (H), Alb 2.5 (L)  11/16/22 Na 127 (L), GFR 48, Glu 194 (H), Ca 7.7 (L), Phos 5.9 (H), Alb 2.2 (L)    Diet/PN Order: Diet NPO Except for: Medication  Oral Supplement Order: Boost Plus  Tube Feeding Order: none at this time  Appetite/Oral Intake: NPO/not applicable  Factors Affecting Nutritional Intake: NPO  Food/Restoration/Cultural Preferences: none reported    Skin Integrity: incision, drain/device(s)  Wound(s):      Altered Skin Integrity 11/05/22 1253 posterior Sacral spine #1 Shearing Partial thickness tissue loss. Shallow open ulcer with a red or pink wound bed, without slough. Intact or Open/Ruptured Serum-filled blister.-Tissue loss description: Partial thickness       Altered Skin Integrity 11/07/22 1100 Left Achilles Abrasion(s)-Tissue loss description: Partial thickness       Altered Skin Integrity 11/07/22 1100 Right Achilles Abrasion(s)-Tissue loss description: Partial thickness    Comments    9/26: Discussed with RN. Will provide tube feeding recommendations for when appropriate to start tube feeding. Receiving kcal from meds. Noted Phos, will monitor for need for renal formula.   9/30: Discussed with RN. Need to consider TPN since pt now LOS day 7. If starting tube feeding, recs provided. If able to extubate, advance diet when  appropriate.   10/4: Pt previously eating 100% po intake of meals, good appetite. Currently NPO for procedure, plans to restart diet per RN. Will add ONS for added protein and kcal.  10/7: Pt now with decreased appetite/po intake. Noted elevated K and Phos, likely not diet related since pt with poor po intake of full liquid diet.   10/11: pt sleeping, nurse reports tolerating full liquid diet, some abdominal distention noted, reports unsure of plans to advance diet as this time  10/14: pt advanced to soft diet today; ate about 40% of lunch tray, drinking Boost, would like 2 per meal in vanilla flavor  10/18: Pt advanced to regular texture renal/diabetic diet, he reports that his appetite is fair, drinks boost, feels he is chewing/swallowing well, does reports some abdominal pain 30-90 minutes after eating, feels better after several hours of no eating.   10/21: appetite remains the same, drinking boost  10/25: fair appetite, says he is drinking some boost, would like to continue flavor  10/28: pt with decreased appetite, noted new dx necrotizing pancreatits; paracentesis done today; not drinking boost at this time either; plans to try and start eating more and drinking boost, says he feels a little better this afternoon. MD discussed starting TPN until pt able to tolerate increased oral intake. Still on dialysis as needed  10/31: now NPO with NG with LIWS after abdominal washout with surgery yesterday, possible necrotizing fascitis, will switch to custom TPN today due to abnormal electrolytes  11/2: Pt's diet was advanced yesterday; however, pt reports still just taking in liquids and no solids. Pt reports vomiting last night. TPN running as ordered.   11/4: Pt reports that he ate 100% of all meals yesterday and tolerated them well, skipped breakfast this morning due to feeling unwell and some mild nausea, no GI complaints during our visit, PO intake encouraged.    11/7: Pt not in room, having test, nurse reports  "good po intake/appetite today, was not as good yesterday, TPN still infusing, AST and AP increasing, glucose better today.   : Pt no longer on TPN; not eating much of meals per RN but is drinking the Boost.     : Patient in ICU on ventilator, receiving kcals from Diprivan and Cleviprex (767 total kcals), will provide tube feeding recommendations.    : Patient extubated, remains in ICU, consult received for TPN. Patient was on oral diet prior to intubation, ok to start a diet if cleared by SLP per surgery notes. Discussed with NP, ok to hold off on TPN due to functional GI tract. If oral intake inadequate, would recommend trying NG tube feeding; if unable to tolerate adequate oral/enteral nutrition, would then recommend TPN.     Anthropometrics    Height: 5' 5" (165.1 cm) Height Method: Estimated  Last Weight: 60.3 kg (133 lb) (22 0500) Weight Method: Bed Scale  BMI (Calculated): 22.1  BMI Classification: normal (BMI 18.5-24.9)        Ideal Body Weight (IBW), Male: 136 lb     % Ideal Body Weight, Male (lb): 91.75 %                 Usual Body Weight (UBW), k kg  % Usual Body Weight: 94.53  % Weight Change From Usual Weight: -5.67 %  Usual Weight Provided By: unable to obtain usual weight at this time    Wt Readings from Last 5 Encounters:   22 60.3 kg (133 lb)   22 60 kg (132 lb 4.4 oz)   21 48 kg (105 lb 13.1 oz)     Weight Change(s) Since Admission:  Admit Weight: 60 kg (132 lb 4.4 oz) (22 1219)  10/12/22: 65 kg; weight gain noted  10/15/22: 56.6 kg; fluctuating weights possible due to fluid  10/22/22: 56.6 kg  22: 60.4 kg  22: 59.4 kg  22: 60.3 kg    Estimated Needs    Weight Used For Calorie Calculations: 56.6 kg (124 lb 12.5 oz)  Energy Calorie Requirements (kcal): 1720-2259 kcal/d, 35-40 kcal/kg  Energy Need Method: Kcal/kg  Weight Used For Protein Calculations: 56.6 kg (124 lb 12.5 oz)  Protein Requirements:  g/d, 1.5-2.0 g/kg  Fluid " Requirements (mL): 1000 ml + ml urine output  Temp: 99 °F (37.2 °C)    Enteral Nutrition    Patient not receiving enteral nutrition at this time.    Parenteral Nutrition    Patient not receiving parenteral nutrition at this time.     Evaluation of Received Nutrient Intake    Calories: not meeting estimated needs  Protein: not meeting estimated needs    Patient Education    Not applicable.    Nutrition Diagnosis     PES: Inadequate oral intake related to current condition as evidenced by <75% intake of meals. (continues)    PES: Malnutrition related to pancreatitis/infection as evidenced by weight loss >5% in 1 month, appetite loss <50% intake of meals, severe fat and muscle loss. (continues)     Interventions/Goals     Intervention(s): collaboration with other providers  Goal: Meet greater than 75% of nutritional needs by follow-up. (goal not met)    Monitoring & Evaluation     Dietitian will monitor energy intake, weight change, electrolyte/renal panel, and glucose/endocrine profile.  Nutrition Risk/Follow-Up: high (follow-up in 1-4 days)

## 2022-11-16 NOTE — PLAN OF CARE
Clinical updates sent to [facility] for review.     Ronn Bailey RN Case Manager

## 2022-11-16 NOTE — PROGRESS NOTES
NEPHROLOGY: Milagro Guevara is a 32 y.o. male with TAY following TEVAR for a type B aortic dissection on 09/23/2022.  (patient had had a type A repair via TEVAR in February 2022).      During the type B repair he also required a left carotid to left subclavian artery bypass for subclavian artery occlusion.      Since then he has required several exploratory laparotomies due to concerns for ischemic /necrotizing bowel and abdominal compartment syndrome on 9/25, 9/27 and 9/29.  He has SLE with serositis and inflammatory pancreatitis and the last abdominal washout on October 30th required drainage and grew out Klebsiella.  Peritoneal fluid white count at that time was only 80.  He remains on broad-spectrum antibiotics and followed by ID.  He has been maintained on TTS dialysis schedule via right IJ temp catheter originally placed 2 weeks ago and exchanged on November 2nd over a guidewire by Dr. Melton due to concern for the potential for catheter-related bloodstream infection.  He is eating very little due to early satiety and continues to accumulate ascites.  Recently he has developed recurrent fevers and leukocytosis although he is on steroids.  He has required aggressive daily dialysis with ultrafiltration and assistance of albumin for fluid removal for anasarca.    11/14/22 weekend events noted and it looks like he had lot of surgical interventions done to clean up his intra-abdominal problems.  Now he is on the ventilator.  Partially sedated.  Requiring Cleviprex to control blood pressure.  Patient still responds appropriately.  No urine output noted.  He was dialyzed yesterday and 2500 cc fluid was removed.    11/16/22: Patient had abdominal washout yesterday with removal of wound VAC. All things considering he is resting comfortably on Cleviprex. He has been extubated and  appears stable on NC. Abdominal dressings are saturated.     Current Facility-Administered Medications:     0.9%  NaCl infusion (for blood administration), , Intravenous, Q24H PRN, Deidre Newton MD    0.9%  NaCl infusion (for blood administration), , Intravenous, Q24H PRN, Dewayne Cason MD    0.9%  NaCl infusion (for blood administration), , Intravenous, Q24H PRN, Dewayne Cason MD    0.9%  NaCl infusion (for blood administration), , Intravenous, Q24H PRN, Dayron Delcid MD    0.9%  NaCl infusion (for blood administration), , Intravenous, Q24H PRN, Dewayne Cason MD    0.9%  NaCl infusion, , Intravenous, PRN, Thalia Renteria, AGNP    albumin human 25% bottle 25 g, 25 g, Intravenous, Once PRN, MARIANNE Galvez    albuterol inhaler 2 puff, 2 puff, Inhalation, Q4H PRN, Evelio Marshall MD    aspirin EC tablet 81 mg, 81 mg, Oral, Daily, Luis F Hutson MD, 81 mg at 11/16/22 0834    atropine injection 0.5 mg, 0.5 mg, Intravenous, Once, Martín Hernandez MD    bisacodyL suppository 10 mg, 10 mg, Rectal, Daily PRN, Keena Shafer, FNP, 10 mg at 10/28/22 0400    carvediloL tablet 25 mg, 25 mg, Oral, BID, MARIANNE Castillo, 25 mg at 11/16/22 0834    clevidipine (CLEVIPREX) 25 mg/50 mL infusion, 0-21 mg/hr, Intravenous, Continuous, Deidre Newton MD, Last Rate: 6 mL/hr at 11/16/22 1015, 3 mg/hr at 11/16/22 1015    cloNIDine 0.2 mg/24 hr td ptwk 1 patch, 1 patch, Transdermal, Q7 Days, MARIANNE Nino, 1 patch at 11/16/22 1022    dextrose 10 % infusion, 12.5 g, Intravenous, PRN, JATINDER Urbina-HOSEA, 12.5 g at 10/25/22 0317    dextrose 10 % infusion, 25 g, Intravenous, PRN, Dewayne Hughes PA-C    dextrose 10% bolus 125 mL, 12.5 g, Intravenous, PRN, Ivan Chopra MD, Stopped at 10/20/22 0953    fluconazole (DIFLUCAN) IVPB 400 mg, 400 mg, Intravenous, Every Tues, Thurs, Sat, Aleah Calix MD, Stopped at 11/16/22 0001    glucagon (human recombinant) injection 1 mg, 1 mg, Intramuscular, PRN,  Dewayne Hughes PA-C, 1 mg at 10/20/22 0925    glucose chewable tablet 16 g, 16 g, Oral, PRN, Dewayne Hughes PA-C, 16 g at 11/08/22 1104    glucose chewable tablet 24 g, 24 g, Oral, PRN, Dewayne Hughes PA-C, 24 g at 10/24/22 1709    hydrALAZINE injection 20 mg, 20 mg, Intravenous, Q4H PRN, Bin Bell MD, 20 mg at 11/16/22 0901    insulin aspart U-100 injection 1-10 Units, 1-10 Units, Subcutaneous, QID (AC + HS) PRN, Ronda Hamilton MD, 4 Units at 11/15/22 2156    labetaloL injection 20 mg, 20 mg, Intravenous, Q8H PRN, Bin Bell MD, 20 mg at 10/03/22 0321    lactated ringers bolus 1,000 mL, 1,000 mL, Intravenous, Once, Martín Hernandez MD    lactulose 10 gram/15 ml solution 10 g, 10 g, Oral, Q6H PRN, Luis F Hutson MD, 10 g at 11/09/22 1136    lipase-protease-amylase 12,000-38,000-60,000 units per capsule 3 capsule, 3 capsule, Oral, TID WM, Luis F Hutson MD, 3 capsule at 11/15/22 1200    melatonin tablet 6 mg, 6 mg, Oral, Nightly PRN, Michelle Ferrera, [facility], 6 mg at 10/14/22 0000    meropenem (MERREM) 500 mg in sodium chloride 0.9 % 100 mL IVPB (MB+), 500 mg, Intravenous, Q24H, Saurabh Curry MD, Stopped at 11/15/22 1200    morphine 12 hr tablet 30 mg, 30 mg, Oral, Q12H, Shamar Stewart MD, 30 mg at 11/16/22 1021    morphine injection 2 mg, 2 mg, Intravenous, Q4H PRN, MARIANNE Nino, 2 mg at 11/16/22 0936    naloxegoL (MOVANTIK) tablet 25 mg, 25 mg, Oral, Daily, Luis F Hutson MD, 25 mg at 11/16/22 0836    nitroGLYCERIN SL tablet 0.4 mg, 0.4 mg, Sublingual, Q5 Min PRN, Zach Shafer MD, 0.4 mg at 10/30/22 0713    NORepinephrine 8 mg in dextrose 5% 250 mL infusion, 0-3 mcg/kg/min, Intravenous, Continuous, Martín Hernandez MD, Stopped at 11/13/22 0237    ondansetron injection 4 mg, 4 mg, Intravenous, Q6H PRN, Evelio Marshall MD, 4 mg at 11/08/22 1053    polyethylene glycol packet 17 g, 17 g, Oral, BID PRN, Thalia Renteria, DEBBIE    predniSONE tablet 20 mg, 20 mg, Oral, BID, Emilie Vazquez, YASP, 20  "mg at 11/16/22 0834    sodium bicarbonate tablet 1,300 mg, 1,300 mg, Oral, Daily, Thalia S Renteria, AGNP, 1,300 mg at 11/16/22 0834    sodium chloride 0.9% bolus 250 mL, 250 mL, Intravenous, PRN, Thalia S Renteria, AGNP    sodium chloride 0.9% bolus 250 mL, 250 mL, Intravenous, PRN, Thalia S Renteria, AGNP    sodium chloride 0.9% bolus 250 mL, 250 mL, Intravenous, PRN, Shahzad Lundberg MD    sodium chloride 0.9% bolus 250 mL, 250 mL, Intravenous, PRN, Thalia S Renteria, AGNP    Flushing PICC Protocol, , , Until Discontinued **AND** sodium chloride 0.9% flush 10 mL, 10 mL, Intravenous, Q6H, 10 mL at 11/16/22 0600 **AND** sodium chloride 0.9% flush 10 mL, 10 mL, Intravenous, PRN, Ronda Hamilton MD    sodium chloride 0.9% flush 10 mL, 10 mL, Intravenous, PRN, Kris Brothers MD    sodium chloride 0.9% flush 10 mL, 10 mL, Intravenous, PRN, Martín Hernandez MD    sodium chloride 0.9% flush 10 mL, 10 mL, Intravenous, PRN, Dayron Delcid MD    vancomycin in dextrose 5 % 1 gram/250 mL IVPB 1,000 mg, 1,000 mg, Intravenous, Q18H, Kay Calvillo, YASP, Last Rate: 166.7 mL/hr at 11/16/22 1022, 1,000 mg at 11/16/22 1022    zinc oxide-cod liver oil 40 % paste, , Topical (Top), BID, Saurabh Curry MD, Given at 11/15/22 2100        /67   Pulse 80   Temp 98.2 °F (36.8 °C) (Oral)   Resp 14   Ht 5' 5" (1.651 m)   Wt 60.3 kg (133 lb)   SpO2 95%   BMI 22.13 kg/m²     Physical Exam:    GEN: Awake and appropriate.  Now extubated   HEENT:  Temporal wasting noted  NECK : No JVD, temporary right IJ catheter  CARD : RRR s rub or gallop  LUNGS : Clear to auscultation, NC  ABD :  still distended appearing with large abdominal dressing saturated with serosanguineous drainage    EXT :  No cyanosis, 3+ pitting edema to abdominal wall, hips, thighs  NEURO : No obvious focal deficits        Intake/Output Summary (Last 24 hours) at 11/16/2022 1127  Last data filed at 11/16/2022 0619  Gross per 24 hour   Intake 1082.44 ml   Output 0 " ml   Net 1082.44 ml           Laboratory:  Recent Results (from the past 24 hour(s))   CBC with Differential    Collection Time: 11/15/22 11:33 AM   Result Value Ref Range    WBC 18.6 (H) 4.5 - 11.5 x10(3)/mcL    RBC 2.78 (L) 4.70 - 6.10 x10(6)/mcL    Hgb 8.6 (L) 14.0 - 18.0 gm/dL    Hct 23.1 (L) 42.0 - 52.0 %    MCV 83.1 80.0 - 94.0 fL    MCH 30.9 27.0 - 31.0 pg    MCHC 37.2 (H) 33.0 - 36.0 mg/dL    RDW 17.5 (H) 11.5 - 17.0 %    Platelet 96 (L) 130 - 400 x10(3)/mcL    MPV 11.2 (H) 7.4 - 10.4 fL    Neut % 89.8 %    Lymph % 5.7 %    Mono % 3.0 %    Eos % 0.2 %    Basophil % 0.1 %    Lymph # 1.06 0.6 - 4.6 x10(3)/mcL    Neut # 16.7 (H) 2.1 - 9.2 x10(3)/mcL    Mono # 0.56 0.1 - 1.3 x10(3)/mcL    Eos # 0.04 0 - 0.9 x10(3)/mcL    Baso # 0.02 0 - 0.2 x10(3)/mcL    IG# 0.22 (H) 0 - 0.04 x10(3)/mcL    IG% 1.2 %    NRBC% 0.0 %   POCT glucose    Collection Time: 11/15/22  9:49 PM   Result Value Ref Range    POCT Glucose 333 (H) 70 - 110 mg/dL   CBC Without Differential    Collection Time: 11/16/22  2:28 AM   Result Value Ref Range    WBC 12.0 (H) 4.5 - 11.5 x10(3)/mcL    RBC 3.63 (L) 4.70 - 6.10 x10(6)/mcL    Hgb 11.2 (L) 14.0 - 18.0 gm/dL    Hct 30.0 (L) 42.0 - 52.0 %    Platelet 66 (L) 130 - 400 x10(3)/mcL    MCV 82.6 80.0 - 94.0 fL    MCH 30.9 27.0 - 31.0 pg    MCHC 37.3 (H) 33.0 - 36.0 mg/dL    RDW 17.6 (H) 11.5 - 17.0 %    MPV 11.8 (H) 7.4 - 10.4 fL    NRBC% 0.2 %   Basic Metabolic Panel    Collection Time: 11/16/22  2:28 AM   Result Value Ref Range    Sodium Level 127 (L) 136 - 145 mmol/L    Potassium Level 3.7 3.5 - 5.1 mmol/L    Chloride 96 (L) 98 - 107 mmol/L    Carbon Dioxide 19 (L) 22 - 29 mmol/L    Glucose Level 194 (H) 74 - 100 mg/dL    Blood Urea Nitrogen 50.7 (H) 8.9 - 20.6 mg/dL    Creatinine 1.86 (H) 0.73 - 1.18 mg/dL    BUN/Creatinine Ratio 27     Calcium Level Total 7.7 (L) 8.4 - 10.2 mg/dL    Anion Gap 12.0 mEq/L    eGFR 48 mls/min/1.73/m2         Assessment/Plan:   TAY-oliguric again  SLE with serositis/  pancreatitis   Status post multiple abdominal washouts for inflammatory pancreatitis  Status post type B aortic dissection repair 09/23/2022  Hypertension  Severe protein calorie malnutrition    Recommendations  1. No acute indication for any dialysis therapy today  2. Patient appeared to be anuric for some time then spontaneously urinated seemingly large volume cola colored urine in the bed so volume could not be measured. Urinal placed at bedside. Encourage use if able   Continue  to monitor renal indices and UOP and dialyze as needed

## 2022-11-16 NOTE — PHYSICIAN QUERY
PT Name: Stuart Guevara  MR #: [id number]     Documentation Clarification      CDS/: Noemi Dockery RN CDIS             Contact information: [email or web address]  This form is a permanent document in the medical record.     Query Date: November 16, 2022    By submitting this query, we are merely seeking further clarification of documentation. Please utilize your independent clinical judgment when addressing the question(s) below.    The Medical Record reflects the following:    Supporting Clinical Findings Location in Medical Record   Post-traumatic wound infection     He developed abdominal compartment syndrome s/p multiple exlaps (9/25, 9/27, 9/29)    now with developing skin/soft tissue infection in the LLQ and midline abd wound   - OR today for wound washout Brief Op note 11/13 Op note 11/12   Gen Surg 11/12       Gen Surg 11/12    Has been fever free for the last 24 hours however on exam his abdomen lower incision is having dark green brown drainage, I spoke with Dr. Marr with surgical team and plans to evaluate.  Spoke with radiologist yesterday who did not note any specific signs of infection on the MRI but it was difficult because of recent surgeries.    Thin grayish brown fluid was noted to be draining from the inferior aspect of his midline incision as well as from a skin opening in the left lower quadrant abdominal wall.  The fluid did not have any odor.  There was a tract from the midline incision that extended towards the left abdominal wall.  This was opened up with electrocautery and appeared to be within the rectus sheath.  The left lower quadrant wound was explored and noted to track towards the left hip and inferiorly.  The skin and subcutaneous tissue was sharply excised with electrocautery.  The subcutaneous tissue appeared necrotic so this was excised to viable tissue with electrocautery and curved Graham scissors.  The underlying muscle and fascia was healthy and viable.  Ultimately  the wound measured 23 x 17 cm. HM note 11/12           Op note 11/12       Please clarify the post traumatic wound infection :     [   ] Post operative infection of the ABD incision    [   ] Traumatic wound infection to the ABD due to : _________________   [  X ] Other (please specify): pancreatic infection which spread to the subcutaneous tissue from the retroperitoneum____________   [  ] Clinically undetermined

## 2022-11-16 NOTE — PLAN OF CARE
Problem: Adult Inpatient Plan of Care  Goal: Plan of Care Review  Outcome: Ongoing, Progressing  Goal: Patient-Specific Goal (Individualized)  Outcome: Ongoing, Progressing  Goal: Absence of Hospital-Acquired Illness or Injury  Outcome: Ongoing, Progressing  Goal: Optimal Comfort and Wellbeing  Outcome: Ongoing, Progressing  Goal: Readiness for Transition of Care  Outcome: Ongoing, Progressing     Problem: Infection  Goal: Absence of Infection Signs and Symptoms  Outcome: Ongoing, Progressing     Problem: Skin Injury Risk Increased  Goal: Skin Health and Integrity  Outcome: Ongoing, Progressing     Problem: Fall Injury Risk  Goal: Absence of Fall and Fall-Related Injury  Outcome: Ongoing, Progressing     Problem: Communication Impairment (Mechanical Ventilation, Invasive)  Goal: Effective Communication  Outcome: Ongoing, Progressing     Problem: Nutrition Impairment (Mechanical Ventilation, Invasive)  Goal: Optimal Nutrition Delivery  Outcome: Ongoing, Progressing     Problem: Skin and Tissue Injury (Mechanical Ventilation, Invasive)  Goal: Absence of Device-Related Skin and Tissue Injury  Outcome: Ongoing, Progressing     Problem: Ventilator-Induced Lung Injury (Mechanical Ventilation, Invasive)  Goal: Absence of Ventilator-Induced Lung Injury  Outcome: Ongoing, Progressing     Problem: Skin and Tissue Injury (Artificial Airway)  Goal: Absence of Device-Related Skin or Tissue Injury  Outcome: Ongoing, Progressing     Problem: Device-Related Complication Risk (CRRT (Continuous Renal Replacement Therapy))  Goal: Safe, Effective Therapy Delivery  Outcome: Ongoing, Progressing     Problem: Hypothermia (CRRT (Continuous Renal Replacement Therapy))  Goal: Body Temperature Maintained in Desired Range  Outcome: Ongoing, Progressing     Problem: Infection (CRRT (Continuous Renal Replacement Therapy))  Goal: Absence of Infection Signs and Symptoms  Outcome: Ongoing, Progressing     Problem: Device-Related Complication Risk  (Hemodialysis)  Goal: Safe, Effective Therapy Delivery  Outcome: Ongoing, Progressing     Problem: Hemodynamic Instability (Hemodialysis)  Goal: Effective Tissue Perfusion  Outcome: Ongoing, Progressing     Problem: Infection (Hemodialysis)  Goal: Absence of Infection Signs and Symptoms  Outcome: Ongoing, Progressing     Problem: Hyperglycemia  Goal: Blood Glucose Level Within Targeted Range  Outcome: Ongoing, Progressing     Problem: Impaired Wound Healing  Goal: Optimal Wound Healing  Outcome: Ongoing, Progressing     Problem: Coping Ineffective  Goal: Effective Coping  Outcome: Ongoing, Progressing

## 2022-11-16 NOTE — PROGRESS NOTES
TRAUMA / ACUTE CARE SURGERY   PROGRESS NOTE    Admit Date: 9/23/2022  Hospital Day: 54     Subjective:  S/p wound washout/debridement yesterday, tolerated well   NAEON per nursing staff  Remains on cleviprex, fentanyl, propofol gtts    Physical Exam:  GEN: NAD, awake/alert, interactive  HEENT: NCAT, EOMI, MMM, ETT/OG in place   CV: regular rate, no gtts  PULM: normal WOB  ABD: superiorly abdomen is soft, milddly distended, non-tender. Inferiourly his abd dressing is in place with foam tape in place.   MSK/EXT: moves all ext spontaneously    Inpatient Data    Vitals:   Temp:  [97.8 °F (36.6 °C)-98.2 °F (36.8 °C)]   Pulse:  [54-86]   Resp:  [16-22]   BP: (106-165)/(62-90)   SpO2:  [93 %-100 %]   Arterial Line BP: (137-184)/()     Diet NPO Except for: Medication   Intake/Output Summary (Last 24 hours) at 11/16/2022 0550  Last data filed at 11/15/2022 2201  Gross per 24 hour   Intake 629.38 ml   Output --   Net 629.38 ml            Recent Labs     11/13/22  1344 11/13/22  1621 11/14/22  0431 11/14/22  0853 11/15/22  0526 11/15/22  0938 11/15/22  1133 11/16/22  0228   WBC  --    < > 15.4*   < > 17.8* 12.7* 18.6* 12.0*   HGB  --    < > 8.1*   < > 8.7* 8.3* 8.6* 11.2*   HCT  --    < > 23.1*   < > 23.5* 22.6* 23.1* 30.0*   PLT  --    < > 113*   < > 117* 100* 96* 66*   *  --  130*  --  127*  --   --  127*   K 3.7  --  4.1  --  3.8  --   --  3.7   CO2 22  --  19*  --  20*  --   --  19*   BUN 28.4*  --  39.5*  --  44.4*  --   --  50.7*   CREATININE 0.95  --  1.42*  --  1.67*  --   --  1.86*   BILITOT  --   --  1.1  --  1.0  --   --   --    AST  --   --  15  --  15  --   --   --    ALT  --   --  12  --  9  --   --   --    ALKPHOS  --   --  129  --  164*  --   --   --    CALCIUM 8.1*  --  8.1*  --  7.9*  --   --  7.7*   ALBUMIN  --   --  2.5*  --  2.2*  --   --   --    MG 1.90  --  1.90  --  1.80  --   --   --    PHOS 4.1  --  6.1*  --  5.9*  --   --   --     < > = values in this interval not displayed.        Scheduled Meds: aspirin, 81 mg, Daily  atropine, 0.5 mg, Once  carvediloL, 25 mg, BID  fluconazole (DIFLUCAN) IV (PEDS and ADULTS), 400 mg, Every Tues, Thurs, Sat  lactated ringers, 1,000 mL, Once  lipase-protease-amylase 12,000-38,000-60,000 units, 3 capsule, TID WM  meropenem (MERREM) IVPB, 500 mg, Q24H  morphine, 30 mg, Q12H  naloxegoL, 25 mg, Daily  predniSONE, 20 mg, BID  sodium bicarbonate, 1,300 mg, Daily  sodium chloride 0.9%, 10 mL, Q6H  vancomycin (VANCOCIN) IVPB, 1,000 mg, Q18H  zinc oxide-cod liver oil, , BID   clevidipine 2 mg/hr (11/15/22 2129)    dexmedetomidine (PRECEDEX) infusion Stopped (11/13/22 0600)    fentanyl 150 mcg/hr (11/16/22 0046)    NORepinephrine bitartrate-D5W Stopped (11/13/22 0237)    propofoL 50 mcg/kg/min (11/16/22 0258)       ASSESSMENT:  32M with HTN, SLE, HBV, aortic dissection s/p repair 2/2021, 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 He developed abdominal compartment syndrome s/p multiple exlaps (9/25, 9/27, 9/29) now on HD for renal failure with re-accumulation of ascites despite IR paracentesis. CT has shown concern for necrotizing fascitis. Cultures from paracentesis resulted in Klebsiella growth. S/p washout 10/30. Subsequently started having dark, thin, proteinaceous fluid draining from his lower midline and a site in the LLQ for which he is s/p wound debridement with wound vac placement 11/12. This was complicated by immediate bleeding requiring significant transfusion post-op and he was thus taken back to the operating room on 11/13 for wound vac removal, wound washout, and wet-to-dry dressing application. S/p wound washout/dressing change 11/15.    PLAN:  Repeat cultures taken in OR 11/15, pending  OK for DVT PPx dose lovenox/heparin but continue to hold coumadin at this time  OK to extubate from surgery team perspective but will defer this to the primary team  Family coming by today for discussions/updates  Remainder of plan  of care per primary team  Will follow     Dewayne Cason MD  [employer] General Surgery PGY-4  11/16/2022, 11:30 AM

## 2022-11-16 NOTE — CONSULTS
Consults    Patient Name: Stuart Guevara   MRN: [id number]   Admission Date: 9/23/2022   Hospital Length of Stay: 54   Attending Provider: Alvin Barnhart MD   Consulting Provider: Maria Luisa LOZA  Reason for Consult: Goals of Care  Primary Care Physician:  Primary Doctor No     Principal Problem: Aortic dissection       Final diagnoses:  [I71.00] Aortic dissection      Assessment/Plan:     I reviewed the patient and family's understanding of the seriousness of the illness and its expected prognosis. We discussed the patient's goals of care and treatment preferences.        Met with Dr. Houston and patient's mother and cousin.  Dr. Houston gave extensive update concerning patient's history and current condition.  Discussed challenges of recovery and patient's risk for decompensation.  Mother reports that she understands somewhat better.  Mother became tearful--discussed that patient's condition remains serious but he has experienced some improvements such as being extubated.  Patient's girlfriend on phone asking about visitation.  RN informed her that only patient's mother and her  are allowed to visit at this time.  Encouraged mother, cousin and patient that patient should be communicating with those who are concerned about his health and well-being  and have his best interests at heart.  They verbalized understanding.  Offered extensive support and informed I would continue to follow.            Interval History:     Patient awake and oriented and is tolerating extubation.  Remains hypertensive and currently on propofol and cleviprex.  I am meeting with family today with surgery to provide update for patient's mother.      Active Ambulatory Problems     Diagnosis Date Noted    No Active Ambulatory Problems     Resolved Ambulatory Problems     Diagnosis Date Noted    No Resolved Ambulatory Problems     Past Medical History:   Diagnosis Date    Aortic dissection 9/23/2022    Hypertension     Systemic lupus  erythematosus, organ or system involvement unspecified     Systemic lupus erythematosus, organ or system involvement unspecified         Past Surgical History:   Procedure Laterality Date    APPLICATION OF WOUND VACUUM-ASSISTED CLOSURE DEVICE  9/27/2022    Procedure: APPLICATION, WOUND VAC;  Surgeon: Christopher Espinal MD;  Location: [facility] OR;  Service: General;;    CARDIAC SURGERY      CREATION OF BYPASS FROM INTERNAL CAROTID ARTERY TO SUBCLAVIAN ARTERY Left 9/23/2022    Procedure: CREATION, BYPASS, ARTERIAL, INTERNAL CAROTID TO SUBCLAVIAN;  Surgeon: Vic Martines MD;  Location: [facility] OR;  Service: Cardiothoracic;  Laterality: Left;    ENDOVASCULAR REPAIR OF THORACIC AORTA N/A 9/23/2022    Procedure: REPAIR, AORTA, THORACIC, ENDOVASCULAR;  Surgeon: Vic Martines MD;  Location: [facility] OR;  Service: Cardiology;  Laterality: N/A;    FINGER AMPUTATION      INCISION AND DRAINAGE OF ABDOMEN N/A 11/12/2022    Procedure: INCISION AND DRAINAGE, ABDOMEN;  Surgeon: Saran Marr MD;  Location: [facility] OR;  Service: General;  Laterality: N/A;  debridement of skin & subcutaneous tisse. Wound vac placement    INCISION AND DRAINAGE OF ABDOMEN N/A 11/13/2022    Procedure: INCISION AND DRAINAGE, ABDOMEN;  Surgeon: Dayron Delcid MD;  Location: [facility] OR;  Service: General;  Laterality: N/A;    INSERTION OF TUNNELED CENTRAL VENOUS HEMODIALYSIS CATHETER N/A 10/24/2022    Procedure: INSERTION, CATHETER, CENTRAL VENOUS, HEMODIALYSIS, TUNNELED;  Surgeon: Yogesh Melton DO;  Location: [facility] CATH LAB;  Service: Nephrology;  Laterality: N/A;  TUNNELED CATH INSERTION    THROMBECTOMY Right     TOE AMPUTATION          Review of patient's allergies indicates:   Allergen Reactions    Levofloxacin     Sulfamethoxazole-trimethoprim           Current Facility-Administered Medications:     0.9%  NaCl infusion (for blood administration), , Intravenous, Q24H PRN, Deidre Newton MD    0.9%  NaCl infusion (for blood administration), ,  Intravenous, Q24H PRN, Dewayne Cason MD    0.9%  NaCl infusion (for blood administration), , Intravenous, Q24H PRN, Dewayne Cason MD    0.9%  NaCl infusion (for blood administration), , Intravenous, Q24H PRN, Dayron Delcid MD    0.9%  NaCl infusion (for blood administration), , Intravenous, Q24H PRN, Dewayne Cason MD    0.9%  NaCl infusion, , Intravenous, PRN, Thalia Renteria, AGNP    albumin human 25% bottle 25 g, 25 g, Intravenous, Once PRN, MARIANNE Galvez    albuterol inhaler 2 puff, 2 puff, Inhalation, Q4H PRN, Evelio Marshall MD    aspirin EC tablet 81 mg, 81 mg, Oral, Daily, Luis F Hutson MD, 81 mg at 11/16/22 0834    atropine injection 0.5 mg, 0.5 mg, Intravenous, Once, Martín Hernandez MD    bisacodyL suppository 10 mg, 10 mg, Rectal, Daily PRN, Keena Shafer, FNP, 10 mg at 10/28/22 0400    carvediloL tablet 25 mg, 25 mg, Oral, BID, MARIANNE Castillo, 25 mg at 11/16/22 0834    clevidipine (CLEVIPREX) 25 mg/50 mL infusion, 0-21 mg/hr, Intravenous, Continuous, Deidre Newton MD, Last Rate: 4 mL/hr at 11/16/22 1430, 2 mg/hr at 11/16/22 1430    cloNIDine 0.2 mg/24 hr td ptwk 1 patch, 1 patch, Transdermal, Q7 Days, MARIANNE Nino, 1 patch at 11/16/22 1022    dextrose 10 % infusion, 12.5 g, Intravenous, PRN, Dewayne Hughes PA-C, 12.5 g at 10/25/22 0317    dextrose 10 % infusion, 25 g, Intravenous, PRN, Dewayne Hughes PA-C    dextrose 10% bolus 125 mL, 12.5 g, Intravenous, PRN, Ivan Chopra MD, Stopped at 10/20/22 0953    fluconazole (DIFLUCAN) IVPB 400 mg, 400 mg, Intravenous, Every Tues, Thurs, Sat, Aleah Calix MD, Stopped at 11/16/22 0001    glucagon (human recombinant) injection 1 mg, 1 mg, Intramuscular, PRN, Dewayne Hughes PA-C, 1 mg at 10/20/22 0925    glucose chewable tablet 16 g, 16 g, Oral, PRN, Dewayne Hughes PA-C, 16 g at 11/08/22 1104    glucose chewable tablet 24 g, 24 g, Oral, PRN, Dewayne Hughes PA-C, 24 g at 10/24/22 1709    hydrALAZINE injection 20  mg, 20 mg, Intravenous, Q4H PRN, Bin Bell MD, 20 mg at 11/16/22 0901    insulin aspart U-100 injection 1-10 Units, 1-10 Units, Subcutaneous, QID (AC + HS) PRN, Ronda Hamilton MD, 6 Units at 11/16/22 1203    labetaloL injection 20 mg, 20 mg, Intravenous, Q8H PRN, Bin Bell MD, 20 mg at 10/03/22 0321    lactated ringers bolus 1,000 mL, 1,000 mL, Intravenous, Once, Martín Hernandez MD    lactulose 10 gram/15 ml solution 10 g, 10 g, Oral, Q6H PRN, Luis F Hutson MD, 10 g at 11/09/22 1136    lipase-protease-amylase 12,000-38,000-60,000 units per capsule 3 capsule, 3 capsule, Oral, TID WM, Luis F Hutson MD, 3 capsule at 11/15/22 1200    melatonin tablet 6 mg, 6 mg, Oral, Nightly PRN, Michelle Ferrera, [facility], 6 mg at 10/14/22 0000    meropenem (MERREM) 500 mg in sodium chloride 0.9 % 100 mL IVPB (MB+), 500 mg, Intravenous, Q24H, Saruabh Curry MD, Stopped at 11/16/22 1259    morphine 12 hr tablet 30 mg, 30 mg, Oral, Q12H, Shamar Stewart MD, 30 mg at 11/16/22 1021    morphine injection 2 mg, 2 mg, Intravenous, Q2H PRN, DENIA Ash, 2 mg at 11/16/22 1331    naloxegoL (MOVANTIK) tablet 25 mg, 25 mg, Oral, Daily, Luis F Hutson MD, 25 mg at 11/16/22 0836    nitroGLYCERIN SL tablet 0.4 mg, 0.4 mg, Sublingual, Q5 Min PRN, Zach Shafer MD, 0.4 mg at 10/30/22 0713    NORepinephrine 8 mg in dextrose 5% 250 mL infusion, 0-3 mcg/kg/min, Intravenous, Continuous, Martín Hernandez MD, Stopped at 11/13/22 0237    ondansetron injection 4 mg, 4 mg, Intravenous, Q6H PRN, Evelio Marshall MD, 4 mg at 11/16/22 1203    polyethylene glycol packet 17 g, 17 g, Oral, BID PRN, DEBBIE Pearl    predniSONE tablet 20 mg, 20 mg, Oral, BID, Emilie Vazquez, FNP, 20 mg at 11/16/22 0834    sodium bicarbonate tablet 1,300 mg, 1,300 mg, Oral, Daily, DEBBIE Pearl, 1,300 mg at 11/16/22 0834    sodium chloride 0.9% bolus 250 mL, 250 mL, Intravenous, PRN, DEBBIE Pearl    sodium chloride 0.9% bolus 250 mL,  250 mL, Intravenous, PRN, Thalia Renteria, AGNP    sodium chloride 0.9% bolus 250 mL, 250 mL, Intravenous, PRN, Shahzad Lundberg MD    sodium chloride 0.9% bolus 250 mL, 250 mL, Intravenous, PRN, Thalia Renteria, AGNP    Flushing PICC Protocol, , , Until Discontinued **AND** sodium chloride 0.9% flush 10 mL, 10 mL, Intravenous, Q6H, 10 mL at 11/16/22 1256 **AND** sodium chloride 0.9% flush 10 mL, 10 mL, Intravenous, PRN, Ronda Hamilton MD    sodium chloride 0.9% flush 10 mL, 10 mL, Intravenous, PRN, Kris Brothers MD    sodium chloride 0.9% flush 10 mL, 10 mL, Intravenous, PRN, Martín Hernandez MD    sodium chloride 0.9% flush 10 mL, 10 mL, Intravenous, PRN, Dayron Delcid MD    vancomycin in dextrose 5 % 1 gram/250 mL IVPB 1,000 mg, 1,000 mg, Intravenous, Q18H, DENIA Jason, Stopped at 11/16/22 1152    zinc oxide-cod liver oil 40 % paste, , Topical (Top), BID, Saurabh Curry MD, Given at 11/16/22 1256     sodium chloride, sodium chloride, sodium chloride, sodium chloride, sodium chloride, sodium chloride 0.9%, albumin human 25%, albuterol, bisacodyL, dextrose 10 % in water (D10W), dextrose 10 % in water (D10W), dextrose 10%, glucagon (human recombinant), glucose, glucose, hydrALAZINE, insulin aspart U-100, labetaloL, lactulose 10 gram/15 ml, melatonin, morphine, nitroGLYCERIN, ondansetron, polyethylene glycol, sodium chloride 0.9%, sodium chloride 0.9%, sodium chloride 0.9%, sodium chloride 0.9%, Flushing PICC Protocol **AND** sodium chloride 0.9% **AND** sodium chloride 0.9%, sodium chloride 0.9%, sodium chloride 0.9%, sodium chloride 0.9%     History reviewed. No pertinent family history.       Review of Systems   Constitutional:  Positive for activity change, appetite change and fatigue.   Gastrointestinal:  Positive for abdominal pain.   Musculoskeletal:  Positive for back pain.          Objective:   /85 (BP Location: Right arm, Patient Position: Lying)   Pulse 81   Temp 99 °F  "(37.2 °C) (Oral)   Resp 18   Ht 5' 5" (1.651 m)   Wt 60.3 kg (133 lb)   SpO2 96%   BMI 22.13 kg/m²      Physical Exam   Constitutional: He is oriented to person, place, and time. He appears ill.   HENT:   Head: Normocephalic.   Eyes: Pupils are equal, round, and reactive to light.   Cardiovascular: Regular rhythm. Tachycardia present. Pulmonary:      Breath sounds: Rhonchi present.     Abdominal: Soft.   Musculoskeletal:      Comments: sarcopenia   Neurological: He is alert and oriented to person, place, and time.        Review of Symptoms  Review of Symptoms      Symptom Assessment (ESAS 0-10 Scale)  Pain:  0  Dyspnea:  0  Anxiety:  0  Nausea:  0  Depression:  0  Anorexia:  0  Fatigue:  0  Insomnia:  0  Restlessness:  0  Agitation:  0         Performance Status:  30    Advance Care Planning   Advance Directives:   Do Not Resuscitate Status: No      Decision Making:  Patient answered questions  Goals of Care: What is most important right now is to focus on extending life as long as possible, even it it means sacrificing quality. Accordingly, we have decided that the best plan to meet the patient's goals includes continuing with treatment.        PAINAD: NA    Caregiver burden formerly assessed: yes      No results displayed because visit has over 200 results.               > 50% of 45 min of encounter was spent in chart review, face to face discussion of goals of care, symptom assessment, coordination of care and emotional support.    Maria Luisa Diane FNP, [facility]  Palliative Medicine  [facility]   "

## 2022-11-17 NOTE — PROGRESS NOTES
Pharmacokinetic Assessment Follow Up: IV Vancomycin    Vancomycin serum concentration assessment(s):    The trough level was drawn correctly and can be used to guide therapy at this time. The measurement is above the desired definitive target range of 15 to 20 mcg/mL.    Vancomycin Regimen Plan:    Discontinue the scheduled vancomycin regimen and re-dose when the random level is less than 20 mcg/mL, next level to be drawn at 0300 on 11/18.    Drug levels (last 3 results):  Recent Labs   Lab Result Units 11/17/22  0120   Vancomycin Trough ug/ml 29.1*       Pharmacy will continue to follow and monitor vancomycin.    Please contact pharmacy at extension [phone] for questions regarding this assessment.    Thank you for the consult,   Christopher Christopher       Patient brief summary:  Stuart Guevara is a 32 y.o. male initiated on antimicrobial therapy with IV Vancomycin for treatment of intra-abdominal infection    The patient's current regimen is pulse dosing    Drug Allergies:   Review of patient's allergies indicates:   Allergen Reactions    Levofloxacin     Sulfamethoxazole-trimethoprim        Actual Body Weight:   60.3kg    Renal Function:   Estimated Creatinine Clearance: 51.5 mL/min (A) (based on SCr of 1.74 mg/dL (H)).,     Dialysis Method (if applicable):  intermittent HD    CBC (last 72 hours):  Recent Labs   Lab Result Units 11/14/22  0431 11/14/22  0853 11/14/22  1231 11/14/22  1656 11/14/22  2017 11/15/22  0006 11/15/22  0526 11/15/22  0938 11/15/22  1133 11/16/22  0228 11/17/22  0120   WBC x10(3)/mcL 15.4* 16.5* 17.0* 17.1* 16.5* 18.0* 17.8* 12.7* 18.6* 12.0* 17.6*   Hgb gm/dL 8.1* 8.3* 8.5* 8.3* 8.4* 8.8* 8.7* 8.3* 8.6* 11.2* 3.6*   Hct % 23.1* 23.1* 23.0* 22.7* 22.5* 23.8* 23.5* 22.6* 23.1* 30.0* 10.4*   Platelet x10(3)/mcL 113* 108* 114* 114* 111* 114* 117* 100* 96* 66* 102*   Mono % % 4.0 4.1 3.2 3.5 3.9 3.6 3.0 3.4 3.0  --  4.3   Eos % % 0.1 0.1 0.1 0.1 0.1 0.1 0.1 0.2 0.2  --  0.1   Basophil % % 0.2 0.2 0.1  0.1 0.1 0.1 0.2 0.2 0.1  --  0.1       Metabolic Panel (last 72 hours):  Recent Labs   Lab Result Units 11/14/22  0431 11/15/22  0526 11/16/22  0228 11/17/22  0120   Sodium Level mmol/L 130* 127* 127* 127*   Potassium Level mmol/L 4.1 3.8 3.7 4.2   Chloride mmol/L 96* 95* 96* 97*   Carbon Dioxide mmol/L 19* 20* 19* 19*   Glucose Level mg/dL 239* 216* 194* 165*   Blood Urea Nitrogen mg/dL 39.5* 44.4* 50.7* 53.9*   Creatinine mg/dL 1.42* 1.67* 1.86* 1.74*   Albumin Level gm/dL 2.5* 2.2*  --  1.6*   Bilirubin Total mg/dL 1.1 1.0  --  0.8   Alkaline Phosphatase unit/L 129 164*  --  124   Aspartate Aminotransferase unit/L 15 15  --  13   Alanine Aminotransferase unit/L 12 9  --  7   Magnesium Level mg/dL 1.90 1.80  --   --    Phosphorus Level mg/dL 6.1* 5.9*  --   --        Vancomycin Administrations:  vancomycin given in the last 96 hours                     vancomycin in dextrose 5 % 1 gram/250 mL IVPB 1,000 mg (mg) 1,000 mg New Bag 11/16/22 1022     1,000 mg New Bag 11/15/22 1600                    Microbiologic Results:  Microbiology Results (last 7 days)       Procedure Component Value Units Date/Time    Body Fluid Culture [[id number]] Collected: 11/12/22 1720    Order Status: Completed Specimen: Body Fluid from Abdomen Updated: 11/16/22 1037     Body Fluid Culture No growth at 4 days    Gram Stain [[id number]] Collected: 11/15/22 0827    Order Status: Completed Specimen: Body Fluid from Abdominal Fluid Updated: 11/16/22 0754     GRAM STAIN No WBCs, No bacteria seen    Body Fluid Culture [[id number]] Collected: 11/15/22 0827    Order Status: Completed Specimen: Body Fluid from Abdominal Fluid Updated: 11/16/22 0712     Body Fluid Culture No Growth At 24 Hours    Blood Culture [[id number]]  (Normal) Collected: 11/10/22 2120    Order Status: Completed Specimen: Blood Updated: 11/15/22 2300     CULTURE, BLOOD ([facility]) No Growth at 5 days    Blood Culture [[id number]]  (Normal) Collected: 11/10/22 2120    Order Status:  Completed Specimen: Blood Updated: 11/15/22 2300     CULTURE, BLOOD ([facility]) No Growth at 5 days    Anaerobic Culture [[id number]] Collected: 11/12/22 1720    Order Status: Completed Specimen: Body Fluid from Abdominal Fluid Updated: 11/15/22 1026     Anaerobe Culture No Anaerobes Isolated    Fungal Culture [[id number]] Collected: 11/15/22 0827    Order Status: Sent Specimen: Body Fluid from Abdominal Fluid Updated: 11/15/22 0846    Anaerobic Culture [[id number]] Collected: 11/15/22 0827    Order Status: Sent Specimen: Body Fluid from Abdominal Fluid Updated: 11/15/22 0846    Gram Stain [[id number]] Collected: 11/12/22 1720    Order Status: Completed Specimen: Body Fluid from Abdomen Updated: 11/13/22 0749     GRAM STAIN No WBCs, No bacteria seen    Fungal Culture [[id number]] Collected: 11/12/22 1720    Order Status: Sent Specimen: Body Fluid from Abdomen Updated: 11/12/22 1737    Blood Culture [[id number]]  (Normal) Collected: 11/05/22 0549    Order Status: Completed Specimen: Blood Updated: 11/10/22 1000     CULTURE, BLOOD ([facility]) No Growth at 5 days    Blood Culture [[id number]]  (Normal) Collected: 11/05/22 0549    Order Status: Completed Specimen: Blood Updated: 11/10/22 1000     CULTURE, BLOOD ([facility]) No Growth at 5 days

## 2022-11-17 NOTE — ANESTHESIA PROCEDURE NOTES
Intubation    Date/Time: 11/17/2022 7:50 AM  Performed by: Magda Higginbotham CRNA  Authorized by: Lior Bishop MD     Intubation:     Induction:  Intravenous    Intubated:  Postinduction    Mask Ventilation:  Easy mask    Attempts:  1    Attempted By:  CRNA    Method of Intubation:  Direct    Blade:  Noland 2    Laryngeal View Grade: Grade I - full view of cords      Difficult Airway Encountered?: No      Complications:  None    Airway Device:  Oral endotracheal tube    Airway Device Size:  7.5    Style/Cuff Inflation:  Cuffed    Inflation Amount (mL):  7    Tube secured:  22    Secured at:  The lips    Placement Verified By:  Capnometry    Complicating Factors:  None    Findings Post-Intubation:  BS equal bilateral and atraumatic/condition of teeth unchanged

## 2022-11-17 NOTE — PLAN OF CARE
Problem: Adult Inpatient Plan of Care  Goal: Plan of Care Review  Outcome: Ongoing, Progressing  Goal: Patient-Specific Goal (Individualized)  Outcome: Ongoing, Progressing  Goal: Absence of Hospital-Acquired Illness or Injury  Outcome: Ongoing, Progressing  Goal: Optimal Comfort and Wellbeing  Outcome: Ongoing, Progressing     Problem: Infection  Goal: Absence of Infection Signs and Symptoms  Outcome: Ongoing, Progressing     Problem: Fall Injury Risk  Goal: Absence of Fall and Fall-Related Injury  Outcome: Ongoing, Progressing     Problem: Device-Related Complication Risk (Hemodialysis)  Goal: Safe, Effective Therapy Delivery  Outcome: Ongoing, Progressing     Problem: Hemodynamic Instability (Hemodialysis)  Goal: Effective Tissue Perfusion  Outcome: Ongoing, Progressing     Problem: Infection (Hemodialysis)  Goal: Absence of Infection Signs and Symptoms  Outcome: Ongoing, Progressing     Problem: Hyperglycemia  Goal: Blood Glucose Level Within Targeted Range  Outcome: Ongoing, Progressing     Problem: Impaired Wound Healing  Goal: Optimal Wound Healing  Outcome: Ongoing, Progressing     Problem: Coping Ineffective  Goal: Effective Coping  Outcome: Ongoing, Progressing     Problem: Adult Inpatient Plan of Care  Goal: Readiness for Transition of Care  Outcome: Ongoing, Not Progressing     Problem: Skin Injury Risk Increased  Goal: Skin Health and Integrity  Outcome: Ongoing, Not Progressing     Problem: Communication Impairment (Mechanical Ventilation, Invasive)  Goal: Effective Communication  Outcome: Met     Problem: Nutrition Impairment (Mechanical Ventilation, Invasive)  Goal: Optimal Nutrition Delivery  Outcome: Met     Problem: Skin and Tissue Injury (Mechanical Ventilation, Invasive)  Goal: Absence of Device-Related Skin and Tissue Injury  Outcome: Met     Problem: Ventilator-Induced Lung Injury (Mechanical Ventilation, Invasive)  Goal: Absence of Ventilator-Induced Lung Injury  Outcome: Met     Problem:  Skin and Tissue Injury (Artificial Airway)  Goal: Absence of Device-Related Skin or Tissue Injury  Outcome: Met     Problem: Device-Related Complication Risk (CRRT (Continuous Renal Replacement Therapy))  Goal: Safe, Effective Therapy Delivery  Outcome: Met     Problem: Hypothermia (CRRT (Continuous Renal Replacement Therapy))  Goal: Body Temperature Maintained in Desired Range  Outcome: Met     Problem: Infection (CRRT (Continuous Renal Replacement Therapy))  Goal: Absence of Infection Signs and Symptoms  Outcome: Met

## 2022-11-17 NOTE — PROGRESS NOTES
Inpatient Nutrition Assessment    Admit Date: 9/23/2022   Total duration of encounter: 55 days     Nutrition Recommendation/Prescription     When feasible, oral diet as tolerated, consistency per SLP.    If adequate oral intake not feasible, recommend NG tube feeding as tolerated.  Impact Peptide 1.5 goal rate 60 ml/hr to provide  1800 kcal/d, 91% needs  113 g protein/d, 100% needs  924 ml free water/d  (calculations based on estimated 20 hr/d run time)    Will start TPN until adequate oral/enteral nutrition feasible.  Minimum volume custom TPN with multivitamin, trace elements, folic acid, thiamin; electrolytes per pharmacy  750 ml 15% AA (113 g, 450 kcal)  500 ml D70W (350 g, 1190 kcal, GIR 4.3)    Communication of Recommendations: reviewed with nurse & pharmacist    Nutrition Assessment     Malnutrition Assessment/Nutrition-Focused Physical Exam    Malnutrition in the context of acute illness or injury  Degree of Malnutrition: severe malnutrition  Energy Intake: < 75% of estimated energy requirement for > 7 days  Interpretation of Weight Loss: >5% in 1 month  Body Fat:moderate depletion  Area of Body Fat Loss: orbital region  and upper arm region - triceps / biceps  Muscle Mass Loss: moderate depletion  Area of Muscle Mass Loss: temple region - temporalis muscle, clavicle bone region - pectoralis major, deltoid, trapezius muscles, clavicle and acromion bone region - deltoid muscle, and scapular bone region - trapezius, supraspinus, infraspinus muscles  Fluid Accumulation: moderate to severe  Edema: ascites   Reduced  Strength: unable to obtain  A minimum of two characteristics is recommended for diagnosis of either severe or non-severe malnutrition.    Chart Review    Reason Seen: continuous nutrition monitoring, physician consult, and follow-up    Diagnosis:  aortic dissection s/p TEVAR  abdominal compartment syndrome s/p multiple exlaps (9/25, 9/27, 9/29)  renal failure/HD  CT has shown concern for  necrotizing fascitis  paracentesis culture + Klebsiella  s/p washout 10/30  LLQ wound vac placement 11/12 with bleeding requiring significant transfusion post-op  wound vac removal 11/13 with wound washout    Relevant Medical History: HTN, SLE, HBV    Nutrition-Related Medications: Cleviprex, pancreatic enzymes, prednisone, sodium bicarbonate  Calorie Containing IV Medications: no significant kcals from medications at this time    Nutrition-Related Labs:  9/25/22  (H)  10/31/22 PAB 13.4 (L)  11/12/22 CRP 58.7 (H)  11/14/22 Na 130 (L), GFR WNL, Glu 239 (H), Ca 8.1 (L), Phos 6.1 (H), Alb 2.5 (L)  11/15/22 Phos 5.9 (H)  11/16/22 Na 127 (L), GFR 48, Glu 194 (H), Ca 7.7 (L), Alb 2.2 (L)  11/17/22 Na 127 (L), GFR 52, Glu 165 (H), Ca 7.1 (L), Alb 1.6 (L), H/H 3.6 (L)/10.4 (L)    Diet/PN Order: Diet NPO Except for: Medication  TPN ADULT CENTRAL LINE CUSTOM  Oral Supplement Order: Boost Plus  Tube Feeding Order: none at this time  Appetite/Oral Intake: NPO/not applicable  Factors Affecting Nutritional Intake: NPO  Food/Rastafarian/Cultural Preferences: none reported    Skin Integrity: incision  Wound(s):      Altered Skin Integrity 11/05/22 1253 posterior Sacral spine #1 Shearing Partial thickness tissue loss. Shallow open ulcer with a red or pink wound bed, without slough. Intact or Open/Ruptured Serum-filled blister.-Tissue loss description: Full thickness       Altered Skin Integrity 11/07/22 1100 Left Achilles Abrasion(s)-Tissue loss description: Partial thickness       Altered Skin Integrity 11/07/22 1100 Right Achilles Abrasion(s)-Tissue loss description: Partial thickness    Comments    9/26: Discussed with RN. Will provide tube feeding recommendations for when appropriate to start tube feeding. Receiving kcal from meds. Noted Phos, will monitor for need for renal formula.   9/30: Discussed with RN. Need to consider TPN since pt now LOS day 7. If starting tube feeding, recs provided. If able to extubate, advance  diet when appropriate.   10/4: Pt previously eating 100% po intake of meals, good appetite. Currently NPO for procedure, plans to restart diet per RN. Will add ONS for added protein and kcal.  10/7: Pt now with decreased appetite/po intake. Noted elevated K and Phos, likely not diet related since pt with poor po intake of full liquid diet.   10/11: pt sleeping, nurse reports tolerating full liquid diet, some abdominal distention noted, reports unsure of plans to advance diet as this time  10/14: pt advanced to soft diet today; ate about 40% of lunch tray, drinking Boost, would like 2 per meal in vanilla flavor  10/18: Pt advanced to regular texture renal/diabetic diet, he reports that his appetite is fair, drinks boost, feels he is chewing/swallowing well, does reports some abdominal pain 30-90 minutes after eating, feels better after several hours of no eating.   10/21: appetite remains the same, drinking boost  10/25: fair appetite, says he is drinking some boost, would like to continue flavor  10/28: pt with decreased appetite, noted new dx necrotizing pancreatits; paracentesis done today; not drinking boost at this time either; plans to try and start eating more and drinking boost, says he feels a little better this afternoon. MD discussed starting TPN until pt able to tolerate increased oral intake. Still on dialysis as needed  10/31: now NPO with NG with LIWS after abdominal washout with surgery yesterday, possible necrotizing fascitis, will switch to custom TPN today due to abnormal electrolytes  11/2: Pt's diet was advanced yesterday; however, pt reports still just taking in liquids and no solids. Pt reports vomiting last night. TPN running as ordered.   11/4: Pt reports that he ate 100% of all meals yesterday and tolerated them well, skipped breakfast this morning due to feeling unwell and some mild nausea, no GI complaints during our visit, PO intake encouraged.    11/7: Pt not in room, having test, nurse  "reports good po intake/appetite today, was not as good yesterday, TPN still infusing, AST and AP increasing, glucose better today.   : Pt no longer on TPN; not eating much of meals per RN but is drinking the Boost.     : Patient in ICU on ventilator, receiving kcals from Diprivan and Cleviprex (767 total kcals), will provide tube feeding recommendations.    : Patient extubated, remains in ICU, consult received for TPN. Patient was on oral diet prior to intubation, ok to start a diet if cleared by SLP per surgery notes. Discussed with NP, ok to hold off on TPN due to functional GI tract. If oral intake inadequate, would recommend trying NG tube feeding; if unable to tolerate adequate oral/enteral nutrition, would then recommend TPN.     : Patient returned to OR for abdominal wall surgery again today due to critically low H/H. He remains NPO without nutrition support. Surgery reports GI tract is functional. Ideally, would recommend utilizing GI tract for nutrition and will provide recommendations for oral/enteral nutrition in addition to TPN orders. Will hold off on lipids for now due to recently requiring Cleviprex.    Anthropometrics    Height: 5' 5" (165.1 cm) Height Method: Estimated  Last Weight: 60.3 kg (133 lb) (22 0500) Weight Method: Bed Scale  BMI (Calculated): 22.1  BMI Classification: normal (BMI 18.5-24.9)        Ideal Body Weight (IBW), Male: 136 lb     % Ideal Body Weight, Male (lb): 91.75 %                 Usual Body Weight (UBW), k kg  % Usual Body Weight: 94.53  % Weight Change From Usual Weight: -5.67 %  Usual Weight Provided By: unable to obtain usual weight at this time    Wt Readings from Last 5 Encounters:   22 60.3 kg (133 lb)   22 60 kg (132 lb 4.4 oz)   21 48 kg (105 lb 13.1 oz)     Weight Change(s) Since Admission:  Admit Weight: 60 kg (132 lb 4.4 oz) (22 1219)  10/12/22: 65 kg; weight gain noted  10/15/22: 56.6 kg; fluctuating weights " possible due to fluid  10/22/22: 56.6 kg  11/6/22: 60.4 kg  11/11/22: 59.4 kg  11/12/22: 60.3 kg    Estimated Needs    Weight Used For Calorie Calculations: 56.6 kg (124 lb 12.5 oz)  Energy Calorie Requirements (kcal): 8113-6764 kcal/d, 35-40 kcal/kg  Energy Need Method: Kcal/kg  Weight Used For Protein Calculations: 56.6 kg (124 lb 12.5 oz)  Protein Requirements:  g/d, 1.5-2.0 g/kg  Fluid Requirements (mL): 1000 ml + ml urine output  Temp: (!) 94.4 °F (34.7 °C)    Enteral Nutrition    Patient not receiving enteral nutrition at this time.    Parenteral Nutrition    Patient not receiving parenteral nutrition at this time.     Evaluation of Received Nutrient Intake    Calories: not meeting estimated needs  Protein: not meeting estimated needs    Patient Education    Not applicable.    Nutrition Diagnosis     PES: Inadequate oral intake related to current condition as evidenced by <75% intake of meals. (continues)    PES: Malnutrition related to pancreatitis/infection as evidenced by weight loss >5% in 1 month, appetite loss <50% intake of meals, severe fat and muscle loss. (continues)     Interventions/Goals     Intervention(s): modified composition of parenteral nutrition, modified rate of parenteral nutrition, multivitamin/mineral supplement therapy, and collaboration with other providers  Goal: Meet greater than 75% of nutritional needs by follow-up. (goal not met)    Monitoring & Evaluation     Dietitian will monitor energy intake, weight change, electrolyte/renal panel, and glucose/endocrine profile.  Nutrition Risk/Follow-Up: high (follow-up in 1-4 days)

## 2022-11-17 NOTE — ANESTHESIA PREPROCEDURE EVALUATION
11/17/2022  Stuart Guevara is a 32 y.o., male who presents with h/o Compartment syndrome, see below.  32-year-old male with past medical history of SLE, HTN, hepatitis-B, aortic dissection with repair in February 2021, DVT on Xarelto, initially presented to the [facility] ED on 09/23/2022 with shortness of breath and chest pain.  Evaluation revealed a aortic dissection type B, with progressive course requiring transferred to [facility], promptly taken to OR for TEVAR with left carotid to subclavian artery bypass.       He had a postoperative complicated course with development of compartment syndrome found on CT angiography of chest/abdomen and pelvis with hemoperitoneum.  This required exploratory laparotomy, source apparently unstable bradycardia requiring pacemaker and then acute kidney injury and initiation of hemodialysis.      He had a postoperative complicated course with development of compartment syndrome found on CT angiography of chest/abdomen and pelvis with hemoperitoneum.  This required exploratory laparotomy, source apparently unstable bradycardia requiring pacemaker and then acute kidney injury and initiation of hemodialysis.      Now extubated, is awake though and able to participate in evaluation.  Reports some abdominal pain as well as left shoulder pain.Reports some abdominal pain as well as left shoulder pain and intermittent Chest pain/Angina?.     He is s/p multiple wound washouts and debridements,wound Vac changes. Most recent change was on 11/15.    Diagnosis:    Severe Anemia (3.6/10.4)      Open wound of anterior abdominal wall, subsequent encounter       (Open wound of anterior abdominal wall, subsequent encounter [S31.256D])    He comes to [facility] OR for the noted procedure under GETA.  Procedure: EXPLORATION,  "WOUND      PMHx:  Assessment  1.         Necrotizing pancreatitis and loculated intra-abdominal fluid collection   a.         S/p IR drainage on 10/28  b.         S/p exploratory laparotomy on 11/12  2.         Shock, likely hypovolemic  3.         Hospital-acquired pneumonia  4.         Acute kidney injury  5.         Hypochloremic hyponatremia  6.         Lupus flare up  7.         Anasarca  8.         Normocytic anemia  H/O Hypertension    Intra-abdominal abscess versus infected necrotizing pancreatitis  Sepsis  Acute respiratory failure with hypoxia  Pulmonary edema  Severely edematous/anasarca  Critical illness myopathy  Severe protein caloric malnutrition  Acute hypoglycemia  Supratherapeutic INR         Other Medical History   Hypertension Systemic lupus erythematosus, organ or system involvement unspecified   Systemic lupus erythematosus, organ or system involvement unspecified Aortic dissection               PSHx:  Surgical History     CARDIAC SURGERY TOE AMPUTATION   FINGER AMPUTATION THROMBECTOMY   APPLICATION OF WOUND VACUUM-ASSISTED CLOSURE DEVICE ENDOVASCULAR REPAIR OF THORACIC AORTA   CREATION OF BYPASS FROM INTERNAL CAROTID ARTERY TO SUBCLAVIAN ARTERY INSERTION OF TUNNELED CENTRAL VENOUS HEMODIALYSIS CATHETER   INCISION AND DRAINAGE OF ABDOMEN INCISION AND DRAINAGE OF ABDOMEN                  Vital signs:  Pre Vitals  Current as of 11/17/22 0530  BP: 135/88 Pulse: 68   Resp: 12 SpO2: 100   Temp: 36.7 °C (98 °F)   Height: 5' 5" (1.651 m) (11/14/22) Weight: 60.3 kg (133 lb) (11/12/22)   BMI: 22.13 IBW: 61.5 kg (135 lb 9.3 oz)   Last edited 11/17/22 0500 by DE        Lab Data:            Pre-op Assessment    I have reviewed the Patient Summary Reports.     I have reviewed the Nursing Notes. I have reviewed the NPO Status.   I have reviewed the Medications.     Review of Systems  Anesthesia Hx:  No problems with previous Anesthesia    Social:  Non-Smoker    Hematology/Oncology:     Oncology Normal    -- " Anemia:   EENT/Dental:EENT/Dental Normal   Cardiovascular:   Exercise tolerance: good Hypertension  Functional Capacity good / => 4 METS    Pulmonary:  Pulmonary Normal    Renal/:  Renal/ Normal     Hepatic/GI:  Hepatic/GI Normal    Musculoskeletal:  Musculoskeletal Normal    Neurological:  Neurology Normal    Endocrine:  Endocrine Normal    Dermatological:  Skin Normal    Psych:  Psychiatric Normal           Physical Exam  General: Alert, Oriented, Well nourished and Cooperative    Airway:  Mallampati: II   Mouth Opening: Normal  TM Distance: Normal  Tongue: Normal  Neck ROM: Normal ROM    Dental:  Intact    Chest/Lungs:  Clear to auscultation, Normal Respiratory Rate    Heart:  Rate: Normal  Rhythm: Regular Rhythm        Anesthesia Plan  Type of Anesthesia, risks & benefits discussed:    Anesthesia Type: Gen ETT  Intra-op Monitoring Plan: Standard ASA Monitors  Post Op Pain Control Plan: IV/PO Opioids PRN  Induction:  IV and Inhalation  Airway Plan: Direct  Informed Consent: Informed consent signed with the Patient and all parties understand the risks and agree with anesthesia plan.  All questions answered. Patient consented to blood products? Yes  ASA Score: 3  Day of Surgery Review of History & Physical: H&P Update referred to the surgeon/provider.    Ready For Surgery From Anesthesia Perspective.     .

## 2022-11-17 NOTE — TRANSFER OF CARE
"Anesthesia Transfer of Care Note    Patient: Stuart Guevara    Procedure(s) Performed: Procedure(s) (LRB):  EXPLORATION, WOUND (N/A)    Patient location: PACU    Anesthesia Type: general    Transport from OR: Transported from OR on room air with adequate spontaneous ventilation    Post pain: adequate analgesia    Post assessment: no apparent anesthetic complications and tolerated procedure well    Post vital signs: stable    Level of consciousness: responds to stimulation    Nausea/Vomiting: no nausea/vomiting    Complications: none    Transfer of care protocol was followed      Last vitals:   Visit Vitals  /80   Pulse (!) 58   Temp 37.4 °C (99.3 °F)   Resp 15   Ht 5' 5" (1.651 m)   Wt 60.3 kg (133 lb)   SpO2 100%   BMI 22.13 kg/m²     "

## 2022-11-17 NOTE — NURSING
Dr. Nga Steinberg came to bedside @ 2:20 and began to pack wound, pt wound was actively bleeding. Pt given 50 mcg Fentanyl and blood transfusion was initated. MD stated to prepare pt to go to surgery in AM. MD also stated to hold off on FFP and platelets until after TEG results since pt was stable. Will continue to monitor

## 2022-11-17 NOTE — NURSING
Nurses Note -- 4 Eyes      11/17/2022   10:40 AM      Skin assessed during: Transfer      [] No Pressure Injuries Present    []Prevention Measures Documented      [x] Yes- Altered Skin Integrity Present or Discovered   [] LDA Added if Not in Epic (Describe Wound)   [] New Altered Skin Integrity was Present on Admit and Documented in LDA   [] Wound Image Taken    Wound Care Consulted? Yes    Attending Nurse:  Kandis Sesay RN     Second RN/Staff Member:  Nadine PENALOZA RN

## 2022-11-17 NOTE — OP NOTE
[facility] - 7th Floor ICU  General Surgery  Operative Note    SUMMARY     Date of Procedure: 11/15/2022     Procedure: Procedure(s) (LRB):  EXPLORATION, WOUND (N/A)       Surgeon(s) and Role:     * Alvin Barnhart MD - Primary    Assisting Surgeon: Nga Steinberg, PGY2    Pre-Operative Diagnosis: Open wound of anterior abdominal wall, subsequent encounter [S31.109D]    Post-Operative Diagnosis: Post-Op Diagnosis Codes:     * Open wound of anterior abdominal wall, subsequent encounter [S31.109D]    Anesthesia: General    Operative Findings (including complications, if any): Small areas of necrotic tissue were debrided from the abdominal wall; hemostasis was achieved using both bovie cauterization and silk ties. On the left flank the wound appeared to be tracking into the retroperitoneum with evidence of necrotic tissue.     Description of Technical Procedures: The patient arrived to the OR intubated and general anesthesia was induced. The patient was placed in lithotomy position and prepped and draped in the usual sterile fashion. The scrotum was explored and there was no evidence of wound involvement. Next our attention was turned to the left flank where some evidence of bleeding was recognized. This was controlled with Bovie cauterization. In the left flank the wound appeared to be tracking posteriorly and medially with some evidence of involvement of perhaps necrotic tissue in the retroperitoneum. What was seen was debrided in this space. Anteriorly some ebn of necrotic tissue were debrided. A superficial vessel was tied off with silk ties. At the end of the case evidence of venous oozing was noted again near the left flank. The abdomen was packed with Kerlix covered in Betadine and the ABD pads on top. A compression dressing was applied. The patient was brought back to the ICU intubated in critical condition.     Estimated Blood Loss (EBL): * No values recorded between 11/15/2022  8:08 AM and  11/15/2022  8:59 AM *           Implants: * No implants in log *    Specimens:   Specimen (24h ago, onward)      None                    Condition: Critical    Disposition: ICU - intubated and hemodynamically stable.    Attestation:  Dr. Barnhart was present and scrubbed for the entire procedure.

## 2022-11-17 NOTE — NURSING
WOCN follow-up- for abd wd assessment--Pt off floor.  Currently in surgery per nurse for abd wd.   Issues with bleeding noted.  Will return tomorrow post sx.

## 2022-11-17 NOTE — PROGRESS NOTES
TRAUMA / ACUTE CARE SURGERY   PROGRESS NOTE    Admit Date: 9/23/2022  Hospital Day: 55     Subjective:  Wound unpacked at bedside with nursing overnight, subsequently has had significant bleeding and labs revealing Hgb 3.6 - thus taking to OR emergently today for dressing change.   4u pRBC ordered    Physical Exam:  Deferred as patient is currently being transported to pre-op/holding area     Inpatient Data    Vitals:   Temp:  [97.2 °F (36.2 °C)-99.3 °F (37.4 °C)]   Pulse:  []   Resp:  [6-24]   BP: ()/(43-96)   SpO2:  [92 %-100 %]   Arterial Line BP: (124-199)/(51-96)     Diet NPO Except for: Medication   Intake/Output Summary (Last 24 hours) at 11/17/2022 0725  Last data filed at 11/17/2022 0530  Gross per 24 hour   Intake 1460 ml   Output 0 ml   Net 1460 ml            Recent Labs     11/15/22  0526 11/15/22  0938 11/15/22  1133 11/16/22  0228 11/17/22  0120   WBC 17.8*   < > 18.6* 12.0* 17.6*   HGB 8.7*   < > 8.6* 11.2* 3.6*   HCT 23.5*   < > 23.1* 30.0* 10.4*   *   < > 96* 66* 102*   *  --   --  127* 127*   K 3.8  --   --  3.7 4.2   CO2 20*  --   --  19* 19*   BUN 44.4*  --   --  50.7* 53.9*   CREATININE 1.67*  --   --  1.86* 1.74*   BILITOT 1.0  --   --   --  0.8   AST 15  --   --   --  13   ALT 9  --   --   --  7   ALKPHOS 164*  --   --   --  124   CALCIUM 7.9*  --   --  7.7* 7.1*   ALBUMIN 2.2*  --   --   --  1.6*   MG 1.80  --   --   --   --    PHOS 5.9*  --   --   --   --     < > = values in this interval not displayed.       Scheduled Meds: atropine, 0.5 mg, Once  fluconazole (DIFLUCAN) IV (PEDS and ADULTS), 400 mg, Every Tues, Thurs, Sat  lactated ringers, 1,000 mL, Once  lipase-protease-amylase 12,000-38,000-60,000 units, 3 capsule, TID WM  meropenem (MERREM) IVPB, 500 mg, Q24H  morphine, 30 mg, Q12H  naloxegoL, 25 mg, Daily  predniSONE, 20 mg, BID  sodium bicarbonate, 1,300 mg, Daily  sodium chloride 0.9%, 10 mL, Q6H  zinc oxide-cod liver oil, , BID   clevidipine Stopped  (11/16/22 1615)    NORepinephrine bitartrate-D5W Stopped (11/13/22 9427)       ASSESSMENT:  32M with HTN, SLE, HBV, aortic dissection s/p repair 2/2021, 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 He developed abdominal compartment syndrome s/p multiple exlaps (9/25, 9/27, 9/29) now on HD for renal failure with re-accumulation of ascites despite IR paracentesis. CT has shown concern for necrotizing fascitis. Cultures from paracentesis resulted in Klebsiella growth. S/p washout 10/30. Subsequently started having dark, thin, proteinaceous fluid draining from his lower midline and a site in the LLQ for which he is s/p wound debridement with wound vac placement 11/12. This was complicated by immediate bleeding requiring significant transfusion post-op and he was thus taken back to the operating room on 11/13 for wound vac removal, wound washout, and wet-to-dry dressing application. S/p wound washout/ dressing change 11/15.     PLAN:  OR today for dressing change given bleeding and significant anemia  4u pRBC ordered  Anti-hypertensives and ASA81 discontinued at this time pending OR  Remainder of plan of care per primary team  See operative report/brief op note for operative updates    Dewayne Cason MD  [employer] General Surgery PGY-4  11/17/2022, 11:30 AM

## 2022-11-17 NOTE — OP NOTE
[facility] - 7th Floor ICU  General Surgery  Operative Note    SUMMARY     Date of Procedure: 11/17/2022     Procedure: Procedure(s) (LRB):  EXPLORATION, WOUND (N/A)       Surgeon(s) and Role:     * Alvin Barnhart MD - Primary    Assisting Surgeon: None    Pre-Operative Diagnosis: Open wound of anterior abdominal wall, subsequent encounter [S31.109D]    Post-Operative Diagnosis: Post-Op Diagnosis Codes:     * Open wound of anterior abdominal wall, subsequent encounter [S31.109D]    Anesthesia: General    Operative Findings (including complications, if any): Clot seen immediately on the anterior abdominal wall. No obvious are of active bleeding was seen initially. The wound was thoroughly washed out. A few small skin bleeders were controlled with Bovie cauterization. In the left flank green fluid was expressed and washed out. A 19 Lao drain was placed. 3 rolls of Kerlix packing was placed inside the wound. ABDs were applied on top    Description of Technical Procedures: Patient was take to the operating room where the patient was induced with general anesthesia and intubated. The patient was placed supine on the operating table and abdominal wound dressings were removed. The patient was then prepped and draped in the usual sterile fashion with Betadine solution. A time out was performed. Clot was evacuated from the anterior abdominal wall. No active bleeding was identified. The wound was thoroughly washed out. A few small skin bleeders were controlled with Bovie cauterization. In the left flank green fluid was expressed and washed out. A 19 Lao drain was placed. 3 rolls of Kerlix packing was placed inside the wound. ABDs were applied on top. The patient was then extubated and taken in stable condition to ICU for further resuscitation.     Estimated Blood Loss (EBL): 20 mL            Implants: 19 Lao drain     Specimens:   Specimen (24h ago, onward)      None                    Condition:  Serious    Disposition: ICU - extubated and stable.    Attestation: Alvin Barnhart was scrubbed

## 2022-11-17 NOTE — ANESTHESIA POSTPROCEDURE EVALUATION
Anesthesia Post Evaluation    Patient: Stuart Guevara    Procedure(s) Performed: Procedure(s) (LRB):  EXPLORATION, WOUND (N/A)    Final Anesthesia Type: general      Patient location during evaluation: PACU  Patient participation: Yes- Able to Participate  Level of consciousness: awake and alert and oriented  Post-procedure vital signs: reviewed and stable  Pain management: adequate  Airway patency: patent  SATISH mitigation strategies: Verification of full reversal of neuromuscular block  PONV status at discharge: No PONV  Anesthetic complications: no      Cardiovascular status: blood pressure returned to baseline and stable  Respiratory status: spontaneous ventilation and unassisted  Hydration status: euvolemic  Follow-up not needed.  Comments: [facility]          Vitals Value Taken Time   /87 11/17/22 1101   Temp 34.7 °C (94.4 °F) 11/17/22 1045   Pulse 67 11/17/22 1114   Resp 12 11/17/22 1114   SpO2 99 % 11/17/22 1114   Vitals shown include unvalidated device data.      Event Time   Out of Recovery 11/17/2022 09:50:00         Pain/Marcin Score: Pain Rating Prior to Med Admin: 10 (11/17/2022  2:30 AM)  Pain Rating Post Med Admin: 4 (11/17/2022  3:00 AM)  Marcin Score: 9 (11/17/2022  9:50 AM)

## 2022-11-17 NOTE — NURSING
Lab called and stated that TEG machine broke while running pt sample and asked for redraw, redraw sent down

## 2022-11-17 NOTE — PROGRESS NOTES
[facility] - 7th Floor ICU  Pulmonary Critical Care Note    Patient Name: Stuart Guevara  MRN: [id number]  Admission Date: 9/23/2022  Hospital Length of Stay: 55 days  Code Status: Full Code  Attending Provider: Alvin Barnhart MD  Primary Care Provider: Primary Doctor No     Subjective:     HPI:   This is a 32-year-old male he was admitted to the ICU following excisional debridement of an abdominal wound for concerns of intermittent hypotension requiring vasopressors and possible airway compromise given fluid overload.  The patient was originally admitted to the [facility] ICU on 09/23 for a [place] type B aortic dissection.  Patient failed medical management and was transferred to [facility] for CT surgery evaluation. He was taken to the OR for TEVAR with left carotid to subclavian artery bypass.  During this admission, the patient was noted to have progressively worsening abdominal pain and lactic acidosis.  CT angiography showed evidence of significant intra-abdominal pathology with some hemoperitoneum and signs of hypoperfusion of the abdominal organs and free blood in the abdomen.  He underwent multiple exploratory laparotomies (9/25, 9/27 and 9/29).  Afterwards, the patient developed unstable bradycardia and had a temporary transvenous pacemaker placed by Cardiology, which resolved the bradycardia within a couple of days.  The patient was also noted to have progressive renal failure and critical hyperkalemia requiring HD catheter placement and initiation of hemodialysis. HE started to have worsening abdominal pain was evaluated by Rheumatology for possible lupus flare up and serositis.  Patient was started on Solu-Medrol, CellCept and Plaquenil.  However, the medication was stopped due to concern for infection.  CT abdomen showed multiple loculated fluid collections, necrotizing pancreatitis and possible peritonitis.  Patient had IR drainage on 10/28 and by fluid cultures showed Klebsiella.  Patient  continued to have abdominal pain and was brought back to the OR on 10/30.  At that time, patient had 3 days of venita ascites but no signs of necrotic tissue. ON 11/12, the patient was brought back to the OR for wound washout.  Per chart review, in the OR, there is significant dark brown/green liquid but no foul odor, concerning for necrotizing tissue.  The patient was debrided and had a wound VAC placed.  However, patient did intermittent use vasopressors.  Patient was then transferred to ICU.     Hospital Course/Significant events:  11/13: washout, I&D, wound vac removal  11/15: repeat wound debridement vs. washout  11/17: dressing change in the OR    24 Hour Interval History:  NAEO, patient went to OR today for dressing change. Patient is on room air this morning. Patient is no longer on Cleviprex. CT abd/pelvis pending this morning. Patient was transfused 4 units pRBCs will repeat H&H this morning.     Past Medical History:   Diagnosis Date    Aortic dissection 9/23/2022    Hypertension     Systemic lupus erythematosus, organ or system involvement unspecified     Systemic lupus erythematosus, organ or system involvement unspecified        Past Surgical History:   Procedure Laterality Date    APPLICATION OF WOUND VACUUM-ASSISTED CLOSURE DEVICE  9/27/2022    Procedure: APPLICATION, WOUND VAC;  Surgeon: Christopher Espinal MD;  Location: [facility] OR;  Service: General;;    CARDIAC SURGERY      CREATION OF BYPASS FROM INTERNAL CAROTID ARTERY TO SUBCLAVIAN ARTERY Left 9/23/2022    Procedure: CREATION, BYPASS, ARTERIAL, INTERNAL CAROTID TO SUBCLAVIAN;  Surgeon: Vic Martines MD;  Location: [facility] OR;  Service: Cardiothoracic;  Laterality: Left;    ENDOVASCULAR REPAIR OF THORACIC AORTA N/A 9/23/2022    Procedure: REPAIR, AORTA, THORACIC, ENDOVASCULAR;  Surgeon: Vic Martines MD;  Location: [facility] OR;  Service: Cardiology;  Laterality: N/A;    FINGER AMPUTATION      INCISION AND DRAINAGE OF ABDOMEN N/A 11/12/2022    Procedure:  INCISION AND DRAINAGE, ABDOMEN;  Surgeon: Saran Marr MD;  Location: [facility] OR;  Service: General;  Laterality: N/A;  debridement of skin & subcutaneous tisse. Wound vac placement    INCISION AND DRAINAGE OF ABDOMEN N/A 11/13/2022    Procedure: INCISION AND DRAINAGE, ABDOMEN;  Surgeon: Dayron Delcid MD;  Location: [facility] OR;  Service: General;  Laterality: N/A;    INSERTION OF TUNNELED CENTRAL VENOUS HEMODIALYSIS CATHETER N/A 10/24/2022    Procedure: INSERTION, CATHETER, CENTRAL VENOUS, HEMODIALYSIS, TUNNELED;  Surgeon: Yogesh Melton DO;  Location: [facility] CATH LAB;  Service: Nephrology;  Laterality: N/A;  TUNNELED CATH INSERTION    THROMBECTOMY Right     TOE AMPUTATION      WOUND EXPLORATION N/A 11/15/2022    Procedure: EXPLORATION, WOUND;  Surgeon: Alvin Barnhart MD;  Location: [facility] OR;  Service: General;  Laterality: N/A;  EXPLORATION ABDOMINAL WALL WOUND       Social History     Socioeconomic History    Marital status: Single   Tobacco Use    Smoking status: Every Day     Types: Cigarettes    Smokeless tobacco: Never     Social Determinants of Health     Food Insecurity: Unknown    Worried About Running Out of Food in the Last Year: Never true   Transportation Needs: Unknown    Lack of Transportation (Medical): No   Housing Stability: Unknown    Unable to Pay for Housing in the Last Year: No       Current Outpatient Medications   Medication Instructions    albuterol (PROVENTIL/VENTOLIN HFA) 90 mcg/actuation inhaler 2 puffs, Inhalation, Every 4 hours PRN, Rescue    amLODIPine (NORVASC) 10 mg, Oral, Daily    aspirin (ECOTRIN) 81 mg, Oral, Daily    belimumab (BENLYSTA) 120 mg SolR Benlysta Take No date recorded No form recorded No frequency recorded No route recorded No set duration recorded No set duration amount recorded active No dosage strength recorded No dosage strength units of measure recorded    famotidine (PEPCID) 20 mg, Oral, 2 times daily    gemfibroziL (LOPID) 600 mg, Oral    hydrALAZINE  (APRESOLINE) 10 mg, Oral    labetaloL (NORMODYNE) 300 mg, Oral, 2 times daily    lisinopriL (PRINIVIL,ZESTRIL) 40 mg, Oral, Daily    metoprolol tartrate (LOPRESSOR) 50 mg, Oral, 2 times daily    mycophenolate (CELLCEPT) 1,000 mg, Oral, 2 times daily    MEÑO 2 mg TbEC 1 tablet, Oral, Daily    rivaroxaban (XARELTO ORAL) Xarelto Take No date recorded No form recorded No frequency recorded No route recorded No set duration recorded No set duration amount recorded active No dosage strength recorded No dosage strength units of measure recorded    sucralfate (CARAFATE) 1 g, Oral, 2 times daily    warfarin (COUMADIN) 3 mg, Oral, Daily       Current Inpatient Medications   atropine  0.5 mg Intravenous Once    fluconazole (DIFLUCAN) IV (PEDS and ADULTS)  400 mg Intravenous Every Tues, Thurs, Sat    lactated ringers  1,000 mL Intravenous Once    lipase-protease-amylase 12,000-38,000-60,000 units  3 capsule Oral TID WM    meropenem (MERREM) IVPB  500 mg Intravenous Q24H    morphine  30 mg Oral Q12H    naloxegoL  25 mg Oral Daily    predniSONE  20 mg Oral BID    sodium bicarbonate  1,300 mg Oral Daily    sodium chloride 0.9%  10 mL Intravenous Q6H    zinc oxide-cod liver oil   Topical (Top) BID       Current Intravenous Infusions   clevidipine Stopped (11/16/22 1615)    NORepinephrine bitartrate-D5W Stopped (11/13/22 0237)    TPN ADULT CENTRAL LINE CUSTOM           Review of Systems   Constitutional: Negative.    HENT: Negative.     Eyes: Negative.    Respiratory: Negative.     Cardiovascular:  Negative for chest pain and palpitations.   Gastrointestinal:  Positive for abdominal pain. Negative for nausea and vomiting.   Musculoskeletal:  Positive for myalgias.        LUE and BLE pain   Neurological:  Negative for headaches.        Objective:       Intake/Output Summary (Last 24 hours) at 11/17/2022 1044  Last data filed at 11/17/2022 0843  Gross per 24 hour   Intake 1960 ml   Output 0 ml   Net 1960 ml           Vital Signs  (Most Recent):  Temp: (!) 94.5 °F (34.7 °C) (11/17/22 1036)  Pulse: 66 (11/17/22 1030)  Resp: 15 (11/17/22 1036)  BP: (!) 149/82 (11/17/22 1030)  SpO2: 97 % (11/17/22 1030)    Body mass index is 22.13 kg/m².  Weight: 60.3 kg (133 lb) Vital Signs (24h Range):  Temp:  [94.5 °F (34.7 °C)-99.3 °F (37.4 °C)] 94.5 °F (34.7 °C)  Pulse:  [56-96] 66  Resp:  [6-22] 15  SpO2:  [92 %-100 %] 97 %  BP: ()/(43-95) 149/82  Arterial Line BP: (124-199)/(51-96) 158/75     Physical Exam  Constitutional:       General: He is not in acute distress.     Appearance: He is ill-appearing.      Comments: Minimally responsive to pain stimuli   HENT:      Head: Normocephalic and atraumatic.   Eyes:      Extraocular Movements: Extraocular movements intact.   Cardiovascular:      Rate and Rhythm: Regular rhythm.      Pulses: Normal pulses.      Heart sounds: Normal heart sounds.   Pulmonary:      Effort: Pulmonary effort is normal.      Breath sounds: Normal breath sounds.   Abdominal:      General: There is no distension.      Palpations: Abdomen is soft.      Tenderness: There is abdominal tenderness.      Comments: Midline incision with staples in place c/d/i, majority of lower abdomen covered with foam tape   Musculoskeletal:      Right lower leg: Edema present.      Left lower leg: Edema present.      Comments: 2+ pitting LE edema   Neurological:      Comments: Follows commands         Lines/Drains/Airways       Peripherally Inserted Central Catheter Line  Duration             PICC Double Lumen 10/25/22 1520 right brachial 22 days              Central Venous Catheter Line  Duration                  Hemodialysis Catheter right internal jugular -- days              Drain  Duration                  Closed/Suction Drain 11/17/22 0827 Left;Anterior LLQ Bulb 19 Fr. <1 day    Male External Urinary Catheter 11/16/22 1130 <1 day              Arterial Line  Duration             Arterial Line 11/13/22 0204 Left Radial 4 days               Peripheral Intravenous Line  Duration                  External Jugular IV 11/02/22 0800 15 days         Peripheral IV - Single Lumen 11/17/22 0708 18 G Hand <1 day                    Significant Labs:    Lab Results   Component Value Date    WBC 17.6 (H) 11/17/2022    HGB 3.6 (LL) 11/17/2022    HCT 29 (L) 11/17/2022    MCV 83.9 11/17/2022     (L) 11/17/2022         BMP  Lab Results   Component Value Date     (L) 11/17/2022    K 4.2 11/17/2022    CO2 19 (L) 11/17/2022    BUN 53.9 (H) 11/17/2022    CREATININE 1.74 (H) 11/17/2022    CALCIUM 7.1 (L) 11/17/2022    EGFRNONAA 80 01/31/2022       ABG  Recent Labs   Lab 11/13/22 0618   PH 7.43   PO2 191*   PCO2 33*   HCO3 21.9*         Mechanical Ventilation Support:  Vent Mode: CPAP (11/16/22 0848)  Set Rate: 20 BPM (11/16/22 0457)  Vt Set: 500 mL (11/16/22 0457)  Pressure Support: 10 cmH20 (11/16/22 0848)  PEEP/CPAP: 5 cmH20 (11/16/22 0848)  Oxygen Concentration (%): 35 (11/16/22 0900)  Peak Airway Pressure: 15 cmH2O (11/16/22 0848)  Total Ve: 8 L/m (11/16/22 0848)  F/VT Ratio<105 (RSBI): (!) 44.44 (11/16/22 0457)    Significant Imaging:  No imaging this AM to review     Assessment/Plan:     Assessment  Necrotizing pancreatitis and loculated intra-abdominal fluid collection, post op wound complication with associated coagulopathy. Persistent leukocytosis worsening today and thrombocytopenia which is improving. TEG normal. Hgb current stable following pre-op transfusion of multiple blood products and intraop ddavp on 11/13.  S/p IR drainage on 10/28  S/p exploratory laparotomy on 11/12  S/p washout, I&D, wound vac removal on 11/13, improved hemodynamic status weaned off pressors intraop following blood product administration  S/p washout and debridement on 11/15  OR for dressing change on 11/17  Shock, likely hypovolemic  Hospital-acquired pneumonia  Acute kidney injury, Nephrology following, last HD on 11/13 with total UF 2500 cc.   Lupus flare up.  Rheumatology is intermittently following, patient last evaluated on 11/11 with plans to continue prednisone 20 mg BID   Anasarca  Normocytic anemia  Hyponatremia   Thrombocytopenia         Plan  Keep SBP <180 and DBP <100 currently off Cleviprex continue Catapres patch   Dressing change in OR this morning completed, repeat H&H, BMP, Mg, and Phos this morning     H&H 3.6/10.4 this morning, transfused 4 units pRBCs, 2 FFP, and 1 platelets, repeat H&H this morning  Renal is following- HD per there recs- last treatment was 11/13  ID following- Currently on Vancomycin, Merrem, and Fluconazole   Completing prednisone taper. Continue prednisone 20 mg BID. Rheumatology is following; however I do not see a note from for the last several days  Continue surgical recommendations, appreciate assistance   Add p.r.n. pain meds as the patient was on fentanyl drip  Ordered peripheral smear to evaluate thrombocytopenia       DVT Prophylaxis: Chemical prophylaxis held per surgery recs as of 11/13.  Platelets improved to 102     Saran Albert,   Pulmonary Critical Care Medicine  [facility] - 7th Floor ICU

## 2022-11-17 NOTE — OP NOTE
EXPLORATION, WOUND  Procedure Note    Stuart Guevara  11/17/2022    Pre-op Diagnosis: Open wound of anterior abdominal wall, subsequent encounter [S31.109D]       Post-op Diagnosis: same    Procedure(s):  EXPLORATION, WOUND    Surgeon(s):  Alvin Barnhart MD    Anesthesia: General    Staff:   Circulator: Cori Youssef RN  Scrub Person: Loly Anne; ST Danay    Estimated Blood Loss: minimal               Specimens: none      Drains:  19 Bangladeshi Prabhu drain in the left flank.    Operative Technique:  Risks and benefits were discussed with patient and family at bedside, informed consent signed.  Patient was taken to the OR and placed supine, endotracheal intubation was successful.  The lower abdomen was then prepped and draped in sterile fashion.  A time out was completed to confirm correct patient, procedure, and all staff was in agreement.      The lower abdominal wound was inspected again.  Some areas of clot were identified in the lower midline abdomen.  A small area of venous oozing was identified and cauterized with the Bovie.  A 19 Bangladeshi Prabhu drain was placed in the left flank area.  The wound was irrigated and noted to be hemostatic.  The wound was then packed with Kerlix, covered with ABD pads, and covered with an compressive dressing.  Patient tolerated procedure and was transferred to PACU in stable condition.    SURGEON:  Alvin Barnhart MD    Date: 11/17/2022  Time: 11:07 AM         180.34 Yes

## 2022-11-17 NOTE — OP NOTE
EXPLORATION, WOUND  Procedure Note    Stuart Guevara  11/15/2022    Pre-op Diagnosis: Open wound of anterior abdominal wall, subsequent encounter [S32.109N]       Post-op Diagnosis: same    Procedure(s):  EXPLORATION, WOUND    Surgeon(s):  Alvin Barnhart MD    Assisting Surgeon: Nga Steinberg, PGY2    Anesthesia: General    Staff:   Circulator: Cori Youssef RN  Scrub Person: Loly Anne; ST Danay    Estimated Blood Loss: minimal          Specimens: none      Drains: None    Operative Findings (including complications, if any): Small areas of necrotic tissue were debrided from the abdominal wall; hemostasis was achieved using both bovie cauterization and silk ties. On the left flank the wound appeared to be tracking into the retroperitoneum with evidence of necrotic tissue.      Description of Technical Procedures:   Risks and benefits were discussed with patient and family at bedside, informed consent signed.  Patient was taken to the OR and placed supine, endotracheal intubation was already established.  The lower abdomen was then prepped and draped in sterile fashion.  A time out was completed to confirm correct patient, procedure, and all staff was in agreement.      The lower abdominal wound was inspected noted to have areas of necrotic tissue and fibrinous material.  This was sharply debrided with scissors and Bovie electrocautery.  The left flank aspect of the wound was noted to track superior laterally and was inspected and noted to have good amount of necrotic tissue.  This was sharply debrided as well with scissors and Bovie electrocautery.  Hemostasis was assured at completion of irrigation.  The wound was then packed with multiple Kerlix tied together.  ABD pads were applied and a compressive dressing was applied.  Patient tolerated procedure and was returned to the ICU intubated in stable but critical condition.    SURGEON:  Alvin Barnhart MD

## 2022-11-18 PROBLEM — Z51.5 COMFORT MEASURES ONLY STATUS: Status: ACTIVE | Noted: 2022-01-01

## 2022-11-18 NOTE — PROGRESS NOTES
PROGRESS NOTE         SUBJECTIVE: AF, VSS.  C/o some pain at present.  No events overnight.          MEDICATIONS:   Reviewed in EMR        OBJECTIVE:     Vitals:    11/18/22 1115   BP:    Pulse: 74   Resp: 16   Temp:         GENERAL: Chronically ill; awake and alert; NAD  SKIN: no rash  HEENT: sclera non-icteric; PERRL; NC in place  NECK: supple; no LAD  CHEST: Coarse, diminished in bases; nonlabored, equal expansion  CARDIOVASCULAR: RRR, S1S2; no murmur; equal peripheral pulses  ABDOMEN:  hypoactive bowel sounds; abdominal dressing in place  EXTREMITIES: no cyanosis or clubbing  NEURO: AAOx4       LABORATORY DATA:  Reviewed            RADIOLOGICAL DATA: Reviewed            ASSESSMENT:   32-year-old male with past medical history of SLE, HTN, hepatitis-B, aortic dissection s/p repair in 02/2022 and again presented with recurrent dissection 09/2022 with stay complicated by TAY now on HD, SLE flare, ascites, serositis, necrotizing pancreatitis, bacterial peritonitis now on Meropenem, Fluconazole, Doxycycline. Id is consulted for assistance in management     Peritonitis  Necrotizing pancreatitis with pancreatic duct leak  Ascites  Infected abdominal wound with tunneling   HBV  TAY on HD  Anemia  Pneumonitis/Pulmonary edema      PLAN:   Recent cx from washout w/GPCs - f/u ID / sensi.  Noted plan for possible IR drainage of ascites today - f/u results.   Continue meropenem, fluconazole, and vancomycin for now.   Discussed with patient and nursing.

## 2022-11-18 NOTE — NURSING
11/18/22 1118   Post-Hemodialysis Assessment   Blood Volume Processed (Liters) 72 L   Dialyzer Clearance Clear   Duration of Treatment 180 minutes   Total UF (mL) 2500 mL   Patient Response to Treatment tolerated well   Post-Hemodialysis Comments Tx ended, Pt reinfused.

## 2022-11-18 NOTE — ANESTHESIA PREPROCEDURE EVALUATION
11/18/2022  Stuart Guevara is a 32 y.o., male.  Complicated hosp course  Aortic dissection, multiple and sx-Pancreatitits        Pre-op Assessment    I have reviewed the Patient Summary Reports.     I have reviewed the Nursing Notes. I have reviewed the NPO Status.   I have reviewed the Medications.     Review of Systems  Anesthesia Hx:  No problems with previous Anesthesia    Social:  Non-Smoker    Cardiovascular:   Hypertension        Physical Exam  General: Well nourished, Cooperative, Alert and Oriented    Airway:  Mallampati: II   Mouth Opening: Normal  TM Distance: Normal  Tongue: Normal  Neck ROM: Normal ROM  Pre-Existing Airway: Oral Endotracheal tube        Anesthesia Plan  Type of Anesthesia, risks & benefits discussed:    Anesthesia Type: Gen ETT  Intra-op Monitoring Plan: Standard ASA Monitors  Post Op Pain Control Plan: multimodal analgesia  Induction:  IV  Airway Plan: Direct, Post-Induction  Informed Consent: Informed consent signed with the Patient and all parties understand the risks and agree with anesthesia plan.  All questions answered. Patient consented to blood products? Yes  ASA Score: 4  Day of Surgery Review of History & Physical: H&P Update referred to the surgeon/provider.    Ready For Surgery From Anesthesia Perspective.     .

## 2022-11-18 NOTE — PROGRESS NOTES
TRAUMA / ACUTE CARE SURGERY   PROGRESS NOTE    Admit Date: 9/23/2022  Hospital Day: 56     Subjective:  S/p dressing change in OR yesterday AM due to concerns for bleeding  Hgb dylan appropriately post-op  CT abd/pelvis revealed persistent, large intra-abdominal fluid collections  IR consulted to consider percutaneous drainage given this patient's incredibly high risk of morbidity/mortality with operative exploration for drainage, they are rgoing to evaluate him today     Physical Exam:  GEN: NAD, awake/alert, interactive  HEENT: NCAT, EOMI, MMM   CV: regular rate, cleviprex at 0.1  PULM: normal WOB on RA   ABD: soft, baseline distension, inferior dressings in place without strikethrough. L flank LINNETTE in place with dark, thin, green output in bulb. Holding suction.   MSK/EXT: moves all ext spontaneously    Inpatient Data    Vitals:   Temp:  [94.4 °F (34.7 °C)-99.3 °F (37.4 °C)]   Pulse:  []   Resp:  [7-23]   BP: ()/()   SpO2:  [93 %-100 %]   Arterial Line BP: ()/(44-87)     Diet NPO Except for: Medication  TPN ADULT CENTRAL LINE CUSTOM   Intake/Output Summary (Last 24 hours) at 11/18/2022 0546  Last data filed at 11/18/2022 0528  Gross per 24 hour   Intake 1567.87 ml   Output 740 ml   Net 827.87 ml            Recent Labs     11/17/22  0120 11/17/22  0726 11/17/22  1157 11/17/22  1604 11/17/22 2022 11/18/22  0312   WBC 17.6*  --  16.5*  --   --  14.4*   HGB 3.6*  --  8.7*  8.6* 10.6* 10.2* 10.0*   HCT 10.4*   < > 25.5*  25.1* 30.3* 29.5* 28.8*   *  --  94*  --   --  145   *  --  130*  --   --  127*   K 4.2  --  3.5  --   --  4.6   CO2 19*  --  15*  --   --  18*   BUN 53.9*  --  47.3*  --   --  58.8*   CREATININE 1.74*  --  1.52*  --   --  2.26*   BILITOT 0.8  --   --   --   --  0.9   AST 13  --   --   --   --  15   ALT 7  --   --   --   --  9   ALKPHOS 124  --   --   --   --  145   CALCIUM 7.1*  --  6.3*  --   --  7.6*   ALBUMIN 1.6*  --   --   --   --  1.8*   MG  --   --  1.40*   --   --  2.00   PHOS  --   --  7.7*  --   --  9.1*    < > = values in this interval not displayed.       Scheduled Meds: atropine, 0.5 mg, Once  fluconazole (DIFLUCAN) IV (PEDS and ADULTS), 400 mg, Every Tues, Thurs, Sat  lactated ringers, 1,000 mL, Once  LIDOcaine (PF) 10 mg/ml (1%), 1 mL, Once  LIDOcaine (PF) 10 mg/ml (1%), 1 mL, Once  lipase-protease-amylase 12,000-38,000-60,000 units, 3 capsule, TID WM  meropenem (MERREM) IVPB, 500 mg, Q24H  morphine, 30 mg, Q12H  naloxegoL, 25 mg, Daily  ondansetron, 4 mg, Once  ondansetron, 4 mg, Once  predniSONE, 20 mg, BID  sodium bicarbonate, 1,300 mg, Daily  sodium chloride 0.9%, 10 mL, Q6H  zinc oxide-cod liver oil, , BID   clevidipine 1 mg/hr (11/18/22 0318)    electrolyte-A      electrolyte-A      NORepinephrine bitartrate-D5W Stopped (11/13/22 0237)    TPN ADULT CENTRAL LINE CUSTOM 55 mL/hr at 11/17/22 2123       ASSESSMENT:  32M with HTN, SLE, HBV, aortic dissection s/p repair 2/2021, 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 He developed abdominal compartment syndrome s/p multiple exlaps (9/25, 9/27, 9/29) now on HD for renal failure with re-accumulation of ascites despite IR paracentesis. CT has shown concern for necrotizing fascitis. Cultures from paracentesis resulted in Klebsiella growth. S/p washout 10/30. Subsequently started having dark, thin, proteinaceous fluid draining from his lower midline and a site in the LLQ for which he is s/p wound debridement with wound vac placement 11/12. This was complicated by immediate bleeding requiring significant transfusion post-op and he was thus taken back to the operating room on 11/13 for wound vac removal, wound washout, and wet-to-dry dressing application. S/p wound washout/ dressing change 11/15, 11/17.    PLAN:  S/p repeat OR 11/17 for dressing change  IR to eval today for possible drainage of intra-abd fluid collection(s)  Do not change packing, OK to change ABD pads  Continue  routine drain care to L flank drain which is to suction   Will discuss timing of next dressing change with staff  Remainder of plan of care per primary team  Will continue to follow along     Dewayne Cason MD  [employer] General Surgery PGY-4  11/18/2022, 11:30 AM

## 2022-11-18 NOTE — PROGRESS NOTES
NEPHROLOGY: Progress         Stuart Guevara is a 32 y.o. male with TAY following TEVAR for a type B aortic dissection on 09/23/2022.  (patient had had a type A repair via TEVAR in February 2022).      During the type B repair he also required a left carotid to left subclavian artery bypass for subclavian artery occlusion.      Since then he has required several exploratory laparotomies due to concerns for ischemic /necrotizing bowel and abdominal compartment syndrome on 9/25, 9/27 and 9/29.  He has SLE with serositis and inflammatory pancreatitis with necrosis and pancreatic duct leak. Abdominal washout on October 30th required drainage and grew out Klebsiella.  Peritoneal fluid white count at that time was only 80.  He remains on broad-spectrum antibiotics and followed by ID.  He has been maintained on TTS dialysis schedule via right IJ temp catheter originally placed 2 weeks ago and exchanged on November 2nd over a guidewire by Dr. Melton due to concern for the potential for catheter-related bloodstream infection.  He is eating very little due to early satiety and continues to accumulate ascites.  Recently he has developed recurrent fevers and leukocytosis although he is on steroids.  He has required aggressive daily dialysis with ultrafiltration and assistance of albumin for fluid removal for anasarca.    Patient underwent a repeat abdominal washout on the 12th .   Abdominal wound and abdominal wall necrosis have prompted repeat exploratories for debridement and washout.  Currently receiving full TPN at 55 cc/hour.  Very low-dose Cleviprex in progress.      Current Facility-Administered Medications:     0.9%  NaCl infusion (for blood administration), , Intravenous, Q24H PRN, Deidre Newton MD    0.9%  NaCl infusion (for blood administration), , Intravenous, Q24H PRN, Dewayne Cason MD     0.9%  NaCl infusion (for blood administration), , Intravenous, Q24H PRN, Dewayne Cason MD    0.9%  NaCl infusion (for blood administration), , Intravenous, Q24H PRN, Dayron Delcid MD    0.9%  NaCl infusion (for blood administration), , Intravenous, Q24H PRN, Dewayne Cason MD    0.9%  NaCl infusion (for blood administration), , Intravenous, Q24H PRN, Deidre Newton MD    0.9%  NaCl infusion (for blood administration), , Intravenous, Q24H PRN, Nga Steinberg MD    0.9%  NaCl infusion (for blood administration), , Intravenous, Q24H PRN, Nga Steinberg MD    0.9%  NaCl infusion (for blood administration), , Intravenous, Q24H PRN, Alvin Barnhart MD    0.9%  NaCl infusion, , Intravenous, PRN, Thalia Renteria, AGNP    albumin human 25% bottle 25 g, 25 g, Intravenous, Once PRN, MARIANNE Galvez    albuterol inhaler 2 puff, 2 puff, Inhalation, Q4H PRN, Evelio Marshall MD    atropine injection 0.5 mg, 0.5 mg, Intravenous, Once, Martín Hernandez MD    bisacodyL suppository 10 mg, 10 mg, Rectal, Daily PRN, Keena Shafer, FNP, 10 mg at 10/28/22 0400    clevidipine (CLEVIPREX) 25 mg/50 mL infusion, 0-21 mg/hr, Intravenous, Continuous, Deidre Newton MD, Last Rate: 2 mL/hr at 11/18/22 0318, 1 mg/hr at 11/18/22 0318    dextrose 10 % infusion, 12.5 g, Intravenous, PRN, Dewayne Hughes PA-C, 12.5 g at 10/25/22 0317    dextrose 10 % infusion, 25 g, Intravenous, PRN, Dewayne Hughes PA-C    dextrose 10% bolus 125 mL, 12.5 g, Intravenous, PRN, Ivan Chopra MD, Stopped at 10/20/22 0953    diphenhydrAMINE injection 25 mg, 25 mg, Intravenous, Q6H PRN, Lior Bishop MD    electrolyte-A infusion, , Intravenous, Continuous, Kris Bishop MD    electrolyte-A infusion, , Intravenous, Continuous, Lior Bishop MD    fluconazole (DIFLUCAN) IVPB 400 mg, 400 mg, Intravenous, Every Tues, Thurs, Sat, Aleah Calix MD, Stopped at 11/17/22 1865    glucagon (human recombinant) injection 1 mg, 1 mg,  Intramuscular, PRN, Dewayne Hughes PA-C, 1 mg at 10/20/22 0925    glucose chewable tablet 16 g, 16 g, Oral, PRN, Dewayne Hughes PA-C, 16 g at 11/08/22 1104    glucose chewable tablet 24 g, 24 g, Oral, PRN, Dewayne Hughes PA-C, 24 g at 10/24/22 1709    hydrALAZINE injection 20 mg, 20 mg, Intravenous, Q4H PRN, Bin Bell MD, 20 mg at 11/16/22 0901    HYDROmorphone (PF) injection 0.2 mg, 0.2 mg, Intravenous, Q5 Min PRN, Lior Bishop MD    HYDROmorphone (PF) injection 1 mg, 1 mg, Intravenous, Q6H PRN, Saran Albert DO, 1 mg at 11/17/22 2122    insulin aspart U-100 injection 1-10 Units, 1-10 Units, Subcutaneous, QID (AC + HS) PRN, Ronda Hamilton MD, 10 Units at 11/18/22 0523    labetaloL injection 20 mg, 20 mg, Intravenous, Q8H PRN, Bin Bell MD, 20 mg at 10/03/22 0321    lactated ringers bolus 1,000 mL, 1,000 mL, Intravenous, Once, Martín Hernandez MD    lactulose 10 gram/15 ml solution 10 g, 10 g, Oral, Q6H PRN, Luis F Hutson MD, 10 g at 11/09/22 1136    LIDOcaine (PF) 10 mg/ml (1%) injection 10 mg, 1 mL, Intradermal, Once, Kris Bishop MD    LIDOcaine (PF) 10 mg/ml (1%) injection 10 mg, 1 mL, Intradermal, Once, Lior Bishop MD    lipase-protease-amylase 12,000-38,000-60,000 units per capsule 3 capsule, 3 capsule, Oral, TID WM, Luis F Hutson MD, 3 capsule at 11/15/22 1200    melatonin tablet 6 mg, 6 mg, Oral, Nightly PRN, Michelle Ferrera, AGACNP-BC, 6 mg at 11/16/22 2054    meropenem (MERREM) 500 mg in sodium chloride 0.9 % 100 mL IVPB (MB+), 500 mg, Intravenous, Q24H, Saurabh Curry MD, Stopped at 11/17/22 1437    metoclopramide HCl injection 10 mg, 10 mg, Intravenous, Q10 Min PRN, Lior Bishop MD    midazolam (VERSED) 1 mg/mL injection 2 mg, 2 mg, Intravenous, Once PRN, Kris Bishop MD    midazolam (VERSED) 1 mg/mL injection 2 mg, 2 mg, Intravenous, Once PRN, Lior Bishop MD    morphine 12 hr tablet 30 mg, 30 mg, Oral, Q12H, Shamar Stewart MD, 30 mg at 11/17/22 2121     morphine injection 2 mg, 2 mg, Intravenous, Q2H PRN, Emilie Elliott, FNP, 2 mg at 11/18/22 0311    naloxegoL (MOVANTIK) tablet 25 mg, 25 mg, Oral, Daily, Luis F Hutson MD, 25 mg at 11/16/22 0836    nitroGLYCERIN SL tablet 0.4 mg, 0.4 mg, Sublingual, Q5 Min PRN, Zach Shafer MD, 0.4 mg at 11/17/22 0725    NORepinephrine 8 mg in dextrose 5% 250 mL infusion, 0-3 mcg/kg/min, Intravenous, Continuous, Martín Hernandez MD, Stopped at 11/13/22 0237    ondansetron disintegrating tablet 4 mg, 4 mg, Oral, Once, Kris Bishop MD    ondansetron disintegrating tablet 4 mg, 4 mg, Oral, Once, Lior Bishop MD    ondansetron injection 4 mg, 4 mg, Intravenous, Q6H PRN, Evelio Marshall MD, 4 mg at 11/17/22 2351    ondansetron injection 4 mg, 4 mg, Intravenous, Daily PRN, Lior Bishop MD    polyethylene glycol packet 17 g, 17 g, Oral, BID PRN, Thalia Renteria, AGNP    predniSONE tablet 20 mg, 20 mg, Oral, BID, Emilie Vazquez, FNP, 20 mg at 11/17/22 2122    sodium bicarbonate tablet 1,300 mg, 1,300 mg, Oral, Daily, Thalia S Renteria, AGNP, 1,300 mg at 11/16/22 0834    sodium chloride 0.9% bolus 250 mL, 250 mL, Intravenous, PRN, Thalia S Renteria, AGNP    sodium chloride 0.9% bolus 250 mL, 250 mL, Intravenous, PRN, Thalia S Renteria, AGNP    sodium chloride 0.9% bolus 250 mL, 250 mL, Intravenous, PRN, Shahzad Lundberg MD    sodium chloride 0.9% bolus 250 mL, 250 mL, Intravenous, PRN, Thalia S Renteria, AGNP    Flushing PICC Protocol, , , Until Discontinued **AND** sodium chloride 0.9% flush 10 mL, 10 mL, Intravenous, Q6H, 10 mL at 11/18/22 0000 **AND** sodium chloride 0.9% flush 10 mL, 10 mL, Intravenous, PRN, Ronda Hamilton MD    sodium chloride 0.9% flush 10 mL, 10 mL, Intravenous, PRN, Kris Brothers MD    sodium chloride 0.9% flush 10 mL, 10 mL, Intravenous, PRN, Martín Hernandez MD    sodium chloride 0.9% flush 10 mL, 10 mL, Intravenous, PRN, Dayron Delcid MD    TPN ADULT CENTRAL LINE CUSTOM, , Intravenous,  "Continuous, William Proctor MD, Last Rate: 55 mL/hr at 11/17/22 2123, New Bag at 11/17/22 2123    vancomycin - pharmacy to dose, , Intravenous, pharmacy to manage frequency, Layton Nathan MD    zinc oxide-cod liver oil 40 % paste, , Topical (Top), BID, Saurabh Curry MD, Given at 11/17/22 2127        BP (!) 159/94   Pulse 77   Temp 98.4 °F (36.9 °C) (Oral)   Resp 14   Ht 5' 5" (1.651 m)   Wt 60.3 kg (133 lb)   SpO2 (!) 92%   BMI 22.13 kg/m²     Physical Exam:    GEN: Awake and appropriate.  Now extubated   HEENT:  Temporal wasting noted  NECK : No JVD, temporary right IJ catheter  CARD :  Tachycardic with S3 gallop and systolic murmur  LUNGS : Clear to auscultation, NC  ABD : Distended, diffusely tender to palpation. Large abdominal dressing saturated with serosanguineous drainage    EXT :  No cyanosis, 2-3+ pitting edema to abdominal wall, hips, thighs  NEURO : No obvious focal deficits        Intake/Output Summary (Last 24 hours) at 11/18/2022 0705  Last data filed at 11/18/2022 0528  Gross per 24 hour   Intake 1567.87 ml   Output 740 ml   Net 827.87 ml         Laboratory:  Recent Results (from the past 24 hour(s))   ISTAT CHEM8    Collection Time: 11/17/22  7:26 AM   Result Value Ref Range    POC Glucose 170 (H) 70 - 110 mg/dL    POC BUN 50 (H) 6 - 30 mg/dL    POC Creatinine 2.3 (H) 0.5 - 1.4 mg/dL    POC Sodium 127 (L) 136 - 145 mmol/L    POC Potassium 4.3 3.5 - 5.1 mmol/L    POC Chloride 94 (L) 95 - 110 mmol/L    POC TCO2 (MEASURED) 21 (L) 23 - 29 mmol/L    POC Anion Gap 18 (H) 8 - 16 mmol/L    POC Ionized Calcium 0.99 (L) 1.06 - 1.42 mmol/L    POC Hematocrit 29 (L) 36 - 54 %PCV    POC HEMOGLOBIN 10 g/dL    Sample VENOUS    CBC with Differential    Collection Time: 11/17/22 11:57 AM   Result Value Ref Range    WBC 16.5 (H) 4.5 - 11.5 x10(3)/mcL    RBC 2.91 (L) 4.70 - 6.10 x10(6)/mcL    Hgb 8.6 (L) 14.0 - 18.0 gm/dL    Hct 25.1 (L) 42.0 - 52.0 %    MCV 87.6 80.0 - 94.0 fL    MCH 29.9 27.0 - 31.0 pg    " MCHC 34.1 33.0 - 36.0 mg/dL    RDW 20.9 (H) 11.5 - 17.0 %    Platelet 94 (L) 130 - 400 x10(3)/mcL    MPV 9.8 7.4 - 10.4 fL    Neut % 86.1 %    Lymph % 5.9 %    Mono % 6.4 %    Eos % 0.1 %    Basophil % 0.2 %    Lymph # 0.97 0.6 - 4.6 x10(3)/mcL    Neut # 14.2 (H) 2.1 - 9.2 x10(3)/mcL    Mono # 1.06 0.1 - 1.3 x10(3)/mcL    Eos # 0.01 0 - 0.9 x10(3)/mcL    Baso # 0.03 0 - 0.2 x10(3)/mcL    IG# 0.21 (H) 0 - 0.04 x10(3)/mcL    IG% 1.3 %    NRBC% 0.2 %   Hemoglobin and Hematocrit    Collection Time: 11/17/22 11:57 AM   Result Value Ref Range    Hgb 8.7 (L) 14.0 - 18.0 gm/dL    Hct 25.5 (L) 42.0 - 52.0 %   Basic Metabolic Panel    Collection Time: 11/17/22 11:57 AM   Result Value Ref Range    Sodium Level 130 (L) 136 - 145 mmol/L    Potassium Level 3.5 3.5 - 5.1 mmol/L    Chloride 104 98 - 107 mmol/L    Carbon Dioxide 15 (L) 22 - 29 mmol/L    Glucose Level 145 (H) 74 - 100 mg/dL    Blood Urea Nitrogen 47.3 (H) 8.9 - 20.6 mg/dL    Creatinine 1.52 (H) 0.73 - 1.18 mg/dL    BUN/Creatinine Ratio 31     Calcium Level Total 6.3 (LL) 8.4 - 10.2 mg/dL    Anion Gap 11.0 mEq/L    eGFR >60 mls/min/1.73/m2   Magnesium    Collection Time: 11/17/22 11:57 AM   Result Value Ref Range    Magnesium Level 1.40 (L) 1.60 - 2.60 mg/dL   Phosphorus    Collection Time: 11/17/22 11:57 AM   Result Value Ref Range    Phosphorus Level 7.7 (H) 2.3 - 4.7 mg/dL   Protime-INR    Collection Time: 11/17/22 11:57 AM   Result Value Ref Range    PT 19.9 (H) 12.5 - 14.5 seconds    INR 1.73 (H) 0.00 - 1.30   Hemoglobin and Hematocrit    Collection Time: 11/17/22  4:04 PM   Result Value Ref Range    Hgb 10.6 (L) 14.0 - 18.0 gm/dL    Hct 30.3 (L) 42.0 - 52.0 %   POCT glucose    Collection Time: 11/17/22  6:08 PM   Result Value Ref Range    POCT Glucose 197 (H) 70 - 110 mg/dL   Hemoglobin and Hematocrit    Collection Time: 11/17/22  8:22 PM   Result Value Ref Range    Hgb 10.2 (L) 14.0 - 18.0 gm/dL    Hct 29.5 (L) 42.0 - 52.0 %   Phosphorus    Collection Time:  11/18/22  3:12 AM   Result Value Ref Range    Phosphorus Level 9.1 (HH) 2.3 - 4.7 mg/dL   Magnesium    Collection Time: 11/18/22  3:12 AM   Result Value Ref Range    Magnesium Level 2.00 1.60 - 2.60 mg/dL   Comprehensive Metabolic Panel    Collection Time: 11/18/22  3:12 AM   Result Value Ref Range    Sodium Level 127 (L) 136 - 145 mmol/L    Potassium Level 4.6 3.5 - 5.1 mmol/L    Chloride 97 (L) 98 - 107 mmol/L    Carbon Dioxide 18 (L) 22 - 29 mmol/L    Glucose Level 338 (H) 74 - 100 mg/dL    Blood Urea Nitrogen 58.8 (H) 8.9 - 20.6 mg/dL    Creatinine 2.26 (H) 0.73 - 1.18 mg/dL    Calcium Level Total 7.6 (L) 8.4 - 10.2 mg/dL    Protein Total 4.2 (L) 6.4 - 8.3 gm/dL    Albumin Level 1.8 (L) 3.5 - 5.0 gm/dL    Globulin 2.4 2.4 - 3.5 gm/dL    Albumin/Globulin Ratio 0.8 (L) 1.1 - 2.0 ratio    Bilirubin Total 0.9 <=1.5 mg/dL    Alkaline Phosphatase 145 40 - 150 unit/L    Alanine Aminotransferase 9 0 - 55 unit/L    Aspartate Aminotransferase 15 5 - 34 unit/L    eGFR 38 mls/min/1.73/m2   Vancomycin, Random    Collection Time: 11/18/22  3:12 AM   Result Value Ref Range    Vanc Lvl Random 23.9 (H) 15.0 - 20.0 ug/ml   CBC Without Differential    Collection Time: 11/18/22  3:12 AM   Result Value Ref Range    WBC 14.4 (H) 4.5 - 11.5 x10(3)/mcL    RBC 3.33 (L) 4.70 - 6.10 x10(6)/mcL    Hgb 10.0 (L) 14.0 - 18.0 gm/dL    Hct 28.8 (L) 42.0 - 52.0 %    Platelet 145 130 - 400 x10(3)/mcL    MCV 86.5 80.0 - 94.0 fL    MCH 30.0 27.0 - 31.0 pg    MCHC 34.7 33.0 - 36.0 mg/dL    RDW 20.8 (H) 11.5 - 17.0 %    MPV 9.7 7.4 - 10.4 fL    NRBC% 0.2 %   POCT glucose    Collection Time: 11/18/22  5:18 AM   Result Value Ref Range    POCT Glucose 441 (H) 70 - 110 mg/dL         Assessment/Plan:  TAY-borderline oliguric   SLE with serositis/ pancreatitis-on prednisone 20 b.i.d.   Status post multiple abdominal washouts for inflammatory pancreatitis  Status post type B aortic dissection repair 09/23/2022  Hypertension  Severe protein calorie  malnutrition    Recommendations  Patient will be dialyzed today and tomorrow primarily for volume control.  Not stable enough for routine dialysis schedule. Continue PRN RRT.

## 2022-11-18 NOTE — CONSULTS
"Consults    Patient Name: Stuart Guevara   MRN: [id number]   Admission Date: 9/23/2022   Hospital Length of Stay: 56   Attending Provider: Alvin Barnhart MD   Consulting Provider: Maria Luisa LOZA  Reason for Consult: Goals of Care  Primary Care Physician:  Primary Doctor No     Principal Problem: Aortic dissection       Final diagnoses:  [I71.00] Aortic dissection      Assessment/Plan:     I reviewed the patient and family's understanding of the seriousness of the illness and its expected prognosis. We discussed the patient's goals of care and treatment preferences.        Met with patient with RN present who was discussing plan concerning surgery, IR and nutrition.  I asked patient if his mother would be his spokesperson, and he stated that he was agreeable to his mother or his sister making decisions for him.  Reports that he continues to have pain but is "working through it."  States feeling better on current regimen of MS contin and dilaudid/morphine IV for breakthrough.      Discussed case with staff RN and dietary who are evaluating patient for further nutritional support.  Surgery is considering G or J tube for nutritional support.  RD informed that patient was eating before being intubated.  Discussed case with patient who states that he is agreeable to all aggressive measures with the goal of recovery.  He informed that he prefers to eat but would be agreeable to NG or PEG tube if he could make improvement.  Offered support and informed that I would continue to follow.     Recommendations:     Pain:  continue MS contin and IV morphine/dilaudid for pain.  Will assess for need for increase of long-acting pain med.       Interval History:     Patient lying in bed undergoing hemodialysis. Taken to surgery yesterday for dressing changes d/t concern for bleeding with post op rise in hemoglobin.  CT abdomen/pelvis continue to reveal persistent, large intra-abdominal fluid collections.  IR to evaluate today " for percutaneous drainage.  I am continuing to follow for symptom management and support.        Active Ambulatory Problems     Diagnosis Date Noted    No Active Ambulatory Problems     Resolved Ambulatory Problems     Diagnosis Date Noted    No Resolved Ambulatory Problems     Past Medical History:   Diagnosis Date    Aortic dissection 9/23/2022    Hypertension     Systemic lupus erythematosus, organ or system involvement unspecified     Systemic lupus erythematosus, organ or system involvement unspecified         Past Surgical History:   Procedure Laterality Date    APPLICATION OF WOUND VACUUM-ASSISTED CLOSURE DEVICE  9/27/2022    Procedure: APPLICATION, WOUND VAC;  Surgeon: Christopher Espinal MD;  Location: [facility] OR;  Service: General;;    CARDIAC SURGERY      CREATION OF BYPASS FROM INTERNAL CAROTID ARTERY TO SUBCLAVIAN ARTERY Left 9/23/2022    Procedure: CREATION, BYPASS, ARTERIAL, INTERNAL CAROTID TO SUBCLAVIAN;  Surgeon: Vic Martines MD;  Location: [facility] OR;  Service: Cardiothoracic;  Laterality: Left;    ENDOVASCULAR REPAIR OF THORACIC AORTA N/A 9/23/2022    Procedure: REPAIR, AORTA, THORACIC, ENDOVASCULAR;  Surgeon: Vic Martines MD;  Location: [facility] OR;  Service: Cardiology;  Laterality: N/A;    FINGER AMPUTATION      INCISION AND DRAINAGE OF ABDOMEN N/A 11/12/2022    Procedure: INCISION AND DRAINAGE, ABDOMEN;  Surgeon: Saran Marr MD;  Location: [facility] OR;  Service: General;  Laterality: N/A;  debridement of skin & subcutaneous tisse. Wound vac placement    INCISION AND DRAINAGE OF ABDOMEN N/A 11/13/2022    Procedure: INCISION AND DRAINAGE, ABDOMEN;  Surgeon: Dayron Delcid MD;  Location: [facility] OR;  Service: General;  Laterality: N/A;    INSERTION OF TUNNELED CENTRAL VENOUS HEMODIALYSIS CATHETER N/A 10/24/2022    Procedure: INSERTION, CATHETER, CENTRAL VENOUS, HEMODIALYSIS, TUNNELED;  Surgeon: Yogesh Melton DO;  Location: [facility] CATH LAB;  Service: Nephrology;  Laterality: N/A;  TUNNELED CATH  INSERTION    THROMBECTOMY Right     TOE AMPUTATION      WOUND EXPLORATION N/A 11/15/2022    Procedure: EXPLORATION, WOUND;  Surgeon: Alvin Barnhart MD;  Location: [facility] OR;  Service: General;  Laterality: N/A;  EXPLORATION ABDOMINAL WALL WOUND        Review of patient's allergies indicates:   Allergen Reactions    Levofloxacin     Sulfamethoxazole-trimethoprim           Current Facility-Administered Medications:     0.9%  NaCl infusion (for blood administration), , Intravenous, Q24H PRN, Deidre Newton MD    0.9%  NaCl infusion (for blood administration), , Intravenous, Q24H PRN, Dewayne Cason MD    0.9%  NaCl infusion (for blood administration), , Intravenous, Q24H PRN, Dewayne Cason MD    0.9%  NaCl infusion (for blood administration), , Intravenous, Q24H PRN, Dayron Delcid MD    0.9%  NaCl infusion (for blood administration), , Intravenous, Q24H PRN, Dewayne Cason MD    0.9%  NaCl infusion (for blood administration), , Intravenous, Q24H PRN, Deidre Newton MD    0.9%  NaCl infusion (for blood administration), , Intravenous, Q24H PRN, Nga Steinberg MD    0.9%  NaCl infusion (for blood administration), , Intravenous, Q24H PRN, Nga Steinberg MD    0.9%  NaCl infusion (for blood administration), , Intravenous, Q24H PRN, Alvin Barnhart MD    0.9%  NaCl infusion, , Intravenous, PRN, Thalia Renteria, AGNP    albumin human 25% bottle 25 g, 25 g, Intravenous, Once PRN, Jess Pizarro, MARIANNE    albuterol inhaler 2 puff, 2 puff, Inhalation, Q4H PRN, Evelio Marshall MD    atropine injection 0.5 mg, 0.5 mg, Intravenous, Once, Martín Hernandez MD    bisacodyL suppository 10 mg, 10 mg, Rectal, Daily PRN, Keena Shafer, FNP, 10 mg at 10/28/22 0400    clevidipine (CLEVIPREX) 25 mg/50 mL infusion, 0-21 mg/hr, Intravenous, Continuous, Deidre Newton MD, Last Rate: 2 mL/hr at 11/18/22 0318, 1 mg/hr at 11/18/22 0318    dextrose 10 % infusion, 12.5 g, Intravenous, PRN, Dewayne Hughes PA-C, 12.5 g at 10/25/22  0317    dextrose 10 % infusion, 25 g, Intravenous, PRN, Dewayne Hughes PA-C    dextrose 10% bolus 125 mL, 12.5 g, Intravenous, PRN, Ivan Chopra MD, Stopped at 10/20/22 0953    diphenhydrAMINE injection 25 mg, 25 mg, Intravenous, Q6H PRN, Lior Bishop MD    electrolyte-A infusion, , Intravenous, Continuous, Kris Bishop MD    electrolyte-A infusion, , Intravenous, Continuous, Lior Bishop MD    fluconazole (DIFLUCAN) IVPB 400 mg, 400 mg, Intravenous, Every Tues, Thurs, Sat, Aleah Calix MD, Stopped at 11/17/22 2335    glucagon (human recombinant) injection 1 mg, 1 mg, Intramuscular, PRN, Dewayne Hughes PA-C, 1 mg at 10/20/22 0925    glucose chewable tablet 16 g, 16 g, Oral, PRN, Dewayne Hughes PA-C, 16 g at 11/08/22 1104    glucose chewable tablet 24 g, 24 g, Oral, PRN, Dewayne Hughes PA-C, 24 g at 10/24/22 1709    hydrALAZINE injection 20 mg, 20 mg, Intravenous, Q4H PRN, Bin Bell MD, 20 mg at 11/16/22 0901    HYDROmorphone (PF) injection 0.2 mg, 0.2 mg, Intravenous, Q5 Min PRN, Lior Bishop MD    HYDROmorphone (PF) injection 1 mg, 1 mg, Intravenous, Q6H PRN, Saran Albert DO, 1 mg at 11/17/22 2122    insulin aspart U-100 injection 1-10 Units, 1-10 Units, Subcutaneous, QID (AC + HS) PRN, Ronda Hamilton MD, 10 Units at 11/18/22 0523    labetaloL injection 20 mg, 20 mg, Intravenous, Q8H PRN, Bin Bell MD, 20 mg at 10/03/22 0321    lactated ringers bolus 1,000 mL, 1,000 mL, Intravenous, Once, Martín Hernandez MD    lactulose 10 gram/15 ml solution 10 g, 10 g, Oral, Q6H PRN, Luis F Hutson MD, 10 g at 11/09/22 1136    LIDOcaine (PF) 10 mg/ml (1%) injection 10 mg, 1 mL, Intradermal, Once, Kris Bishop MD    LIDOcaine (PF) 10 mg/ml (1%) injection 10 mg, 1 mL, Intradermal, Once, Lior Bishop MD    lipase-protease-amylase 12,000-38,000-60,000 units per capsule 3 capsule, 3 capsule, Oral, TID WM, Luis F Hutson MD, 3 capsule at 11/15/22 1200    melatonin tablet 6 mg, 6 mg,  Oral, Nightly PRN, Michelle Ferrera, AGACNP-BC, 6 mg at 11/16/22 2054    meropenem (MERREM) 500 mg in sodium chloride 0.9 % 100 mL IVPB (MB+), 500 mg, Intravenous, Q24H, Saurabh Curry MD, Stopped at 11/17/22 1437    metoclopramide HCl injection 10 mg, 10 mg, Intravenous, Q10 Min PRN, Lior Bishop MD    midazolam (VERSED) 1 mg/mL injection 2 mg, 2 mg, Intravenous, Once PRN, Kris Bishop MD    midazolam (VERSED) 1 mg/mL injection 2 mg, 2 mg, Intravenous, Once PRN, Lior Bishop MD    morphine 12 hr tablet 30 mg, 30 mg, Oral, Q12H, Shamar Stewart MD, 30 mg at 11/18/22 0801    morphine injection 2 mg, 2 mg, Intravenous, Q2H PRN, YAS AshP, 2 mg at 11/18/22 0311    naloxegoL (MOVANTIK) tablet 25 mg, 25 mg, Oral, Daily, Luis F Hutson MD, 25 mg at 11/18/22 0900    nitroGLYCERIN SL tablet 0.4 mg, 0.4 mg, Sublingual, Q5 Min PRN, Zach Shafer MD, 0.4 mg at 11/17/22 0725    NORepinephrine 8 mg in dextrose 5% 250 mL infusion, 0-3 mcg/kg/min, Intravenous, Continuous, Martín Hernandez MD, Stopped at 11/13/22 0237    ondansetron disintegrating tablet 4 mg, 4 mg, Oral, Once, Kris Bishop MD    ondansetron disintegrating tablet 4 mg, 4 mg, Oral, Once, Lior Bishop MD    ondansetron injection 4 mg, 4 mg, Intravenous, Q6H PRN, Evelio Marshall MD, 4 mg at 11/17/22 7961    ondansetron injection 4 mg, 4 mg, Intravenous, Daily PRN, Lior Bishop MD    polyethylene glycol packet 17 g, 17 g, Oral, BID PRN, Thalia Renteria, AGNP    predniSONE tablet 20 mg, 20 mg, Oral, BID, DENIA Mg, 20 mg at 11/18/22 0801    sodium bicarbonate tablet 1,300 mg, 1,300 mg, Oral, Daily, Thalia Renteria AGNP, 1,300 mg at 11/18/22 0801    sodium chloride 0.9% bolus 250 mL, 250 mL, Intravenous, PRN, Thalia Renteria, AGNP    sodium chloride 0.9% bolus 250 mL, 250 mL, Intravenous, PRN, Thalia Renteria, AGNP    sodium chloride 0.9% bolus 250 mL, 250 mL, Intravenous, PRN, Shahzad Lundberg MD    sodium chloride  0.9% bolus 250 mL, 250 mL, Intravenous, PRN, Thalia Renteria, AGNP    sodium chloride 0.9% bolus 250 mL, 250 mL, Intravenous, PRN, Shahzad Lundberg MD    Flushing PICC Protocol, , , Until Discontinued **AND** sodium chloride 0.9% flush 10 mL, 10 mL, Intravenous, Q6H, 10 mL at 11/18/22 0000 **AND** sodium chloride 0.9% flush 10 mL, 10 mL, Intravenous, PRN, Ronda Hamilton MD    sodium chloride 0.9% flush 10 mL, 10 mL, Intravenous, PRN, Kris Brothers MD    sodium chloride 0.9% flush 10 mL, 10 mL, Intravenous, PRN, Martín Hernandez MD    sodium chloride 0.9% flush 10 mL, 10 mL, Intravenous, PRN, Dayron Delcid MD    TPN ADULT CENTRAL LINE CUSTOM, , Intravenous, Continuous, William Proctor MD, Last Rate: 55 mL/hr at 11/17/22 2123, New Bag at 11/17/22 2123    TPN ADULT CENTRAL LINE CUSTOM, , Intravenous, Continuous, William Proctor MD    vancomycin - pharmacy to dose, , Intravenous, pharmacy to manage frequency, Layton Nathan MD    zinc oxide-cod liver oil 40 % paste, , Topical (Top), BID, Saurabh Curry MD, Given at 11/18/22 0818     sodium chloride, sodium chloride, sodium chloride, sodium chloride, sodium chloride, sodium chloride, sodium chloride, sodium chloride, sodium chloride, sodium chloride 0.9%, albumin human 25%, albuterol, bisacodyL, dextrose 10 % in water (D10W), dextrose 10 % in water (D10W), dextrose 10%, diphenhydrAMINE, glucagon (human recombinant), glucose, glucose, hydrALAZINE, HYDROmorphone, HYDROmorphone, insulin aspart U-100, labetaloL, lactulose 10 gram/15 ml, melatonin, metoclopramide HCl, midazolam, midazolam, morphine, nitroGLYCERIN, ondansetron, ondansetron, polyethylene glycol, sodium chloride 0.9%, sodium chloride 0.9%, sodium chloride 0.9%, sodium chloride 0.9%, sodium chloride 0.9%, Flushing PICC Protocol **AND** sodium chloride 0.9% **AND** sodium chloride 0.9%, sodium chloride 0.9%, sodium chloride 0.9%, sodium chloride 0.9%, vancomycin - pharmacy to dose     History reviewed. No  "pertinent family history.       Review of Systems   Constitutional:  Positive for appetite change and fatigue.   Gastrointestinal:  Positive for abdominal pain.          Objective:   BP (!) 148/85   Pulse 89   Temp 98.7 °F (37.1 °C) (Oral)   Resp 15   Ht 5' 5" (1.651 m)   Wt 60.3 kg (133 lb)   SpO2 (!) 93%   BMI 22.13 kg/m²      Physical Exam   Constitutional: He is oriented to person, place, and time. He appears ill.   HENT:   Head: Normocephalic.   Eyes: Pupils are equal, round, and reactive to light.   Cardiovascular: Normal rate and regular rhythm.   Abdominal: Soft. He exhibits distension.   Incision dressed with LINNETTE   Musculoskeletal:      Comments: sarcopenia   Neurological: He is alert and oriented to person, place, and time.   Skin: Skin is warm and dry.        Review of Symptoms  Review of Symptoms      Symptom Assessment (ESAS 0-10 Scale)  Pain:  0  Dyspnea:  0  Anxiety:  0  Nausea:  0  Depression:  0  Anorexia:  0  Fatigue:  0  Insomnia:  0  Restlessness:  0  Agitation:  0         Bowel Management Plan (BMP):  Yes      Performance Status:  30    Advance Care Planning   Advance Directives:   Living Will: No    Do Not Resuscitate Status: No    Medical Power of : No      Decision Making:  Patient answered questions  Goals of Care: What is most important right now is to focus on extending life as long as possible, even it it means sacrificing quality. Accordingly, we have decided that the best plan to meet the patient's goals includes continuing with treatment.        PAINAD: NA    Caregiver burden formerly assessed: yes      No results displayed because visit has over 200 results.               > 50% of 45 min of encounter was spent in chart review, face to face discussion of goals of care, symptom assessment, coordination of care and emotional support.    Maria Luisa Diane FNP, [facility]  Palliative Medicine  [facility] - Observation Unit   "

## 2022-11-18 NOTE — CONSULTS
[facility] - Emergency Dept  History & Physical - Short Stay  Interventional Radiology    SUBJECTIVE:     History of Present Illness:  Stuart Guevara is a 32 y.o. male with multiple abdominal surgeries and persistent complex ascites.    Service consulted for possibility of percutaneous drainage.    Scheduled Meds:    atropine  0.5 mg Intravenous Once    fluconazole (DIFLUCAN) IV (PEDS and ADULTS)  400 mg Intravenous Every Tues, Thurs, Sat    lactated ringers  1,000 mL Intravenous Once    LIDOcaine (PF) 10 mg/ml (1%)  1 mL Intradermal Once    LIDOcaine (PF) 10 mg/ml (1%)  1 mL Intradermal Once    lipase-protease-amylase 12,000-38,000-60,000 units  3 capsule Oral TID WM    meropenem (MERREM) IVPB  500 mg Intravenous Q24H    morphine  30 mg Oral Q12H    naloxegoL  25 mg Oral Daily    ondansetron  4 mg Oral Once    ondansetron  4 mg Oral Once    predniSONE  20 mg Oral BID    sodium bicarbonate  1,300 mg Oral Daily    sodium chloride 0.9%  10 mL Intravenous Q6H    zinc oxide-cod liver oil   Topical (Top) BID       Continuous Infusions:    clevidipine Stopped (11/18/22 1012)    electrolyte-A      electrolyte-A      NORepinephrine bitartrate-D5W Stopped (11/13/22 0237)    TPN ADULT CENTRAL LINE CUSTOM 55 mL/hr at 11/17/22 2123    TPN ADULT CENTRAL LINE CUSTOM         PRN Meds:  sodium chloride, sodium chloride, sodium chloride, sodium chloride, sodium chloride, sodium chloride, sodium chloride, sodium chloride, sodium chloride, sodium chloride 0.9%, albumin human 25%, albuterol, bisacodyL, dextrose 10 % in water (D10W), dextrose 10 % in water (D10W), dextrose 10%, diphenhydrAMINE, glucagon (human recombinant), glucose, glucose, hydrALAZINE, HYDROmorphone, HYDROmorphone, insulin aspart U-100, labetaloL, lactulose 10 gram/15 ml, melatonin, metoclopramide HCl, midazolam, midazolam, morphine, nitroGLYCERIN, ondansetron, ondansetron, polyethylene glycol, sodium chloride 0.9%, sodium chloride 0.9%, sodium  chloride 0.9%, sodium chloride 0.9%, sodium chloride 0.9%, Flushing PICC Protocol **AND** sodium chloride 0.9% **AND** sodium chloride 0.9%, sodium chloride 0.9%, sodium chloride 0.9%, sodium chloride 0.9%, vancomycin - pharmacy to dose    Review of patient's allergies indicates:   Allergen Reactions    Levofloxacin     Sulfamethoxazole-trimethoprim        Past Medical History:   Diagnosis Date    Aortic dissection 9/23/2022    Hypertension     Systemic lupus erythematosus, organ or system involvement unspecified     Systemic lupus erythematosus, organ or system involvement unspecified        Review of Systems: Intake ROS reviewed; no significant interval changes.    OBJECTIVE:     Vital Signs (Most Recent):  Temp: 98.7 °F (37.1 °C) (11/18/22 0800)  Pulse: 74 (11/18/22 1115)  Resp: 16 (11/18/22 1115)  BP: (!) 123/90 (11/18/22 1015)  SpO2: 99 % (11/18/22 1115)    Laboratory  CBC:   Lab Results   Component Value Date/Time    WBC 14.4 (H) 11/18/2022 03:12 AM    HGB 10.0 (L) 11/18/2022 03:12 AM    HCT 28.8 (L) 11/18/2022 03:12 AM    HCT 29 (L) 11/17/2022 07:26 AM       COAGS:  Lab Results   Component Value Date/Time     11/18/2022 03:12 AM    INR 1.73 (H) 11/17/2022 11:57 AM       Imaging:  CT c/a/p dated 17 Nov 22 reviewed. Multi-septated appearing fluid throughout the abdomen, should be accessible for percutaneous drainage.    ASSESSMENT/PLAN:   32 y.o. male with persistent complex appearing ascites.    IR plan/recs: Will proceed with plan for CT guided drain placement.    Thank you for including us in the care of this patient.    Bean Landaverde MD  Radiology  [phone] // [phone] // [phone]

## 2022-11-18 NOTE — PROGRESS NOTES
[facility] - 7th Floor ICU  Pulmonary Critical Care Note    Patient Name: Stuart Guevara  MRN: [id number]  Admission Date: 9/23/2022  Hospital Length of Stay: 56 days  Code Status: Full Code  Attending Provider: Saran Carrillo MD  Primary Care Provider: Primary Doctor No     Subjective:     HPI:   This is a 32-year-old male he was admitted to the ICU following excisional debridement of an abdominal wound for concerns of intermittent hypotension requiring vasopressors and possible airway compromise given fluid overload.  The patient was originally admitted to the [facility] ICU on 09/23 for a Gans type B aortic dissection.  Patient failed medical management and was transferred to [facility] for CT surgery evaluation. He was taken to the OR for TEVAR with left carotid to subclavian artery bypass.  During this admission, the patient was noted to have progressively worsening abdominal pain and lactic acidosis.  CT angiography showed evidence of significant intra-abdominal pathology with some hemoperitoneum and signs of hypoperfusion of the abdominal organs and free blood in the abdomen.  He underwent multiple exploratory laparotomies (9/25, 9/27 and 9/29).  Afterwards, the patient developed unstable bradycardia and had a temporary transvenous pacemaker placed by Cardiology, which resolved the bradycardia within a couple of days.  The patient was also noted to have progressive renal failure and critical hyperkalemia requiring HD catheter placement and initiation of hemodialysis. HE started to have worsening abdominal pain was evaluated by Rheumatology for possible lupus flare up and serositis.  Patient was started on Solu-Medrol, CellCept and Plaquenil.  However, the medication was stopped due to concern for infection.  CT abdomen showed multiple loculated fluid collections, necrotizing pancreatitis and possible peritonitis.  Patient had IR drainage on 10/28 and by fluid cultures showed Klebsiella.  Patient  continued to have abdominal pain and was brought back to the OR on 10/30.  At that time, patient had 3 days of venita ascites but no signs of necrotic tissue. ON 11/12, the patient was brought back to the OR for wound washout.  Per chart review, in the OR, there is significant dark brown/green liquid but no foul odor, concerning for necrotizing tissue.  The patient was debrided and had a wound VAC placed.  However, patient did intermittent use vasopressors.  Patient was then transferred to ICU.     Hospital Course/Significant events:  11/13: washout, I&D, wound vac removal  11/15: repeat wound debridement vs. washout  11/17: transfused 4u pRBC overnight, taken to OR for washout and dressing change    24 Hour Interval History:  No acute events overnight. S/p abdominal wound washout 11/17. Continues to require cleviprex. TPN started overnight. Nurse reports high pancreatic drain output. Patient remains anuric. Patient reports mild abdominal discomfort but states pain is well controlled. No new complaints at this time.    Past Medical History:   Diagnosis Date    Aortic dissection 9/23/2022    Hypertension     Systemic lupus erythematosus, organ or system involvement unspecified     Systemic lupus erythematosus, organ or system involvement unspecified        Past Surgical History:   Procedure Laterality Date    APPLICATION OF WOUND VACUUM-ASSISTED CLOSURE DEVICE  9/27/2022    Procedure: APPLICATION, WOUND VAC;  Surgeon: Christopher Espinal MD;  Location: [facility];  Service: General;;    CARDIAC SURGERY      CREATION OF BYPASS FROM INTERNAL CAROTID ARTERY TO SUBCLAVIAN ARTERY Left 9/23/2022    Procedure: CREATION, BYPASS, ARTERIAL, INTERNAL CAROTID TO SUBCLAVIAN;  Surgeon: Vic Martines MD;  Location: [facility];  Service: Cardiothoracic;  Laterality: Left;    ENDOVASCULAR REPAIR OF THORACIC AORTA N/A 9/23/2022    Procedure: REPAIR, AORTA, THORACIC, ENDOVASCULAR;  Surgeon: Vic Martines MD;  Location: [facility];  Service:  Cardiology;  Laterality: N/A;    FINGER AMPUTATION      INCISION AND DRAINAGE OF ABDOMEN N/A 11/12/2022    Procedure: INCISION AND DRAINAGE, ABDOMEN;  Surgeon: Saran Marr MD;  Location: [facility] OR;  Service: General;  Laterality: N/A;  debridement of skin & subcutaneous tisse. Wound vac placement    INCISION AND DRAINAGE OF ABDOMEN N/A 11/13/2022    Procedure: INCISION AND DRAINAGE, ABDOMEN;  Surgeon: Dayron Delcid MD;  Location: [facility] OR;  Service: General;  Laterality: N/A;    INSERTION OF TUNNELED CENTRAL VENOUS HEMODIALYSIS CATHETER N/A 10/24/2022    Procedure: INSERTION, CATHETER, CENTRAL VENOUS, HEMODIALYSIS, TUNNELED;  Surgeon: Yogesh Melton DO;  Location: [facility] CATH LAB;  Service: Nephrology;  Laterality: N/A;  TUNNELED CATH INSERTION    THROMBECTOMY Right     TOE AMPUTATION      WOUND EXPLORATION N/A 11/15/2022    Procedure: EXPLORATION, WOUND;  Surgeon: Alvin Barnhart MD;  Location: [facility] OR;  Service: General;  Laterality: N/A;  EXPLORATION ABDOMINAL WALL WOUND       Social History     Socioeconomic History    Marital status: Single   Tobacco Use    Smoking status: Every Day     Types: Cigarettes    Smokeless tobacco: Never     Social Determinants of Health     Food Insecurity: Unknown    Worried About Running Out of Food in the Last Year: Never true   Transportation Needs: Unknown    Lack of Transportation (Medical): No   Housing Stability: Unknown    Unable to Pay for Housing in the Last Year: No           Current Outpatient Medications   Medication Instructions    albuterol (PROVENTIL/VENTOLIN HFA) 90 mcg/actuation inhaler 2 puffs, Inhalation, Every 4 hours PRN, Rescue    amLODIPine (NORVASC) 10 mg, Oral, Daily    aspirin (ECOTRIN) 81 mg, Oral, Daily    belimumab (BENLYSTA) 120 mg SolR Benlysta Take No date recorded No form recorded No frequency recorded No route recorded No set duration recorded No set duration amount recorded active No dosage strength recorded No dosage strength units of  measure recorded    famotidine (PEPCID) 20 mg, Oral, 2 times daily    gemfibroziL (LOPID) 600 mg, Oral    hydrALAZINE (APRESOLINE) 10 mg, Oral    labetaloL (NORMODYNE) 300 mg, Oral, 2 times daily    lisinopriL (PRINIVIL,ZESTRIL) 40 mg, Oral, Daily    metoprolol tartrate (LOPRESSOR) 50 mg, Oral, 2 times daily    mycophenolate (CELLCEPT) 1,000 mg, Oral, 2 times daily    MEÑO 2 mg TbEC 1 tablet, Oral, Daily    rivaroxaban (XARELTO ORAL) Xarelto Take No date recorded No form recorded No frequency recorded No route recorded No set duration recorded No set duration amount recorded active No dosage strength recorded No dosage strength units of measure recorded    sucralfate (CARAFATE) 1 g, Oral, 2 times daily    warfarin (COUMADIN) 3 mg, Oral, Daily       Current Inpatient Medications   atropine  0.5 mg Intravenous Once    fluconazole (DIFLUCAN) IV (PEDS and ADULTS)  400 mg Intravenous Every Tues, Thurs, Sat    lactated ringers  1,000 mL Intravenous Once    LIDOcaine (PF) 10 mg/ml (1%)  1 mL Intradermal Once    LIDOcaine (PF) 10 mg/ml (1%)  1 mL Intradermal Once    lipase-protease-amylase 12,000-38,000-60,000 units  3 capsule Oral TID WM    meropenem (MERREM) IVPB  500 mg Intravenous Q24H    morphine  30 mg Oral Q12H    naloxegoL  25 mg Oral Daily    ondansetron  4 mg Oral Once    ondansetron  4 mg Oral Once    predniSONE  20 mg Oral BID    sodium bicarbonate  1,300 mg Oral Daily    sodium chloride 0.9%  10 mL Intravenous Q6H    zinc oxide-cod liver oil   Topical (Top) BID       Current Intravenous Infusions   clevidipine 1 mg/hr (11/18/22 0318)    electrolyte-A      electrolyte-A      NORepinephrine bitartrate-D5W Stopped (11/13/22 0237)    TPN ADULT CENTRAL LINE CUSTOM 55 mL/hr at 11/17/22 2123    TPN ADULT CENTRAL LINE CUSTOM             Objective:       Intake/Output Summary (Last 24 hours) at 11/18/2022 0924  Last data filed at 11/18/2022 0800  Gross per 24 hour   Intake 1067.87 ml   Output 820 ml   Net 247.87 ml          Vital Signs (Most Recent):  Temp: 98.7 °F (37.1 °C) (11/18/22 0800)  Pulse: 89 (11/18/22 0815)  Resp: 15 (11/18/22 0815)  BP: (!) 148/85 (11/18/22 0800)  SpO2: (!) 93 % (11/18/22 0815)    Body mass index is 22.13 kg/m².  Weight: 60.3 kg (133 lb) Vital Signs (24h Range):  Temp:  [94.4 °F (34.7 °C)-98.7 °F (37.1 °C)] 98.7 °F (37.1 °C)  Pulse:  [] 89  Resp:  [7-23] 15  SpO2:  [92 %-100 %] 93 %  BP: (124-184)/() 148/85  Arterial Line BP: ()/(44-87) 155/70     Physical Exam  Constitutional:       Appearance: He is ill-appearing.      Comments: HD ongoing via [facility] cath   HENT:      Head: Normocephalic and atraumatic.   Eyes:      Extraocular Movements: Extraocular movements intact.      Conjunctiva/sclera: Conjunctivae normal.   Cardiovascular:      Rate and Rhythm: Normal rate and regular rhythm.      Pulses: Normal pulses.   Pulmonary:      Effort: Pulmonary effort is normal. No respiratory distress.      Breath sounds: No wheezing.   Abdominal:      Comments: Foam tape dressing in place with serosanguinous drainage, pancreatic drain in place   Musculoskeletal:      Right lower leg: Edema present.      Left lower leg: Edema present.      Comments: Improving LUE non-pitting edema    Skin:     General: Skin is warm and dry.   Neurological:      General: No focal deficit present.      Mental Status: He is alert and oriented to person, place, and time.         Lines/Drains/Airways       Peripherally Inserted Central Catheter Line  Duration             PICC Double Lumen 10/25/22 1520 right brachial 23 days              Central Venous Catheter Line  Duration                  Hemodialysis Catheter right internal jugular -- days              Drain  Duration                  Closed/Suction Drain 11/17/22 0827 Left;Anterior LLQ Bulb 19 Fr. 1 day              Arterial Line  Duration             Arterial Line 11/13/22 0204 Left Radial 5 days              Peripheral Intravenous Line  Duration                   Peripheral IV - Single Lumen 11/17/22 0708 18 G Hand 1 day                    Significant Labs:    Lab Results   Component Value Date    WBC 14.4 (H) 11/18/2022    HGB 10.0 (L) 11/18/2022    HCT 28.8 (L) 11/18/2022    MCV 86.5 11/18/2022     11/18/2022         BMP  Lab Results   Component Value Date     (L) 11/18/2022    K 4.6 11/18/2022    CO2 18 (L) 11/18/2022    BUN 58.8 (H) 11/18/2022    CREATININE 2.26 (H) 11/18/2022    CALCIUM 7.6 (L) 11/18/2022    EGFRNONAA 80 01/31/2022       ABG  Recent Labs   Lab 11/13/22 0618   PH 7.43   PO2 191*   PCO2 33*   HCO3 21.9*       Mechanical Ventilation Support:  Vent Mode: CPAP (11/16/22 0848)  Set Rate: 20 BPM (11/16/22 0457)  Vt Set: 500 mL (11/16/22 0457)  Pressure Support: 10 cmH20 (11/16/22 0848)  PEEP/CPAP: 5 cmH20 (11/16/22 0848)  Oxygen Concentration (%): 35 (11/16/22 0900)  Peak Airway Pressure: 15 cmH2O (11/16/22 0848)  Total Ve: 8 L/m (11/16/22 0848)  F/VT Ratio<105 (RSBI): (!) 44.44 (11/16/22 0457)    Significant Imaging:  I have reviewed the pertinent imaging within the past 24 hours.        Assessment/Plan:     Assessment  Necrotizing pancreatitis and loculated intra-abdominal fluid collection, post op wound complication with associated coagulopathy. Persistent leukocytosis worsening today and thrombocytopenia which is improving. TEG normal. Hgb current stable following pre-op transfusion of multiple blood products and intraop ddavp on 11/13. Significant bleed from abdominal wound 11/16 with Hgb 3.6 s/p 4u RBC and OR dressing change on 11/17. Pancreatic drain with high output.  S/p IR drainage on 10/28  S/p exploratory laparotomy on 11/12  S/p washout, I&D, wound vac removal on 11/13, improved hemodynamic status weaned off pressors intraop following blood product administration  S/p washout and debridement on 11/15  OR for dressing change on 11/17  Shock, likely hypovolemic  Hospital-acquired pneumonia  Acute kidney injury, Nephrology following,  last HD on 11/17  Lupus flare up. Rheumatology is intermittently following, patient last evaluated on 11/11 with plans to continue prednisone 20 mg BID   Anasarca  Normocytic anemia  Hyponatremia   Thrombocytopenia         Plan  Keep SBP <180 and DBP <100 currently on Cleviprex   Dressing change in OR 11/17, Surgery planning for take back Sunday 11/20, discussed placing g or j tube at that time  H&H 10.0/28.8 this morning, stable, continue to monitor  Renal is following- HD per there recs- last treatment was 11/17  ID following- Currently on Vancomycin, Merrem, and Fluconazole   Completing prednisone taper. Continue prednisone 20 mg BID. Rheumatology is following; however I do not see a note from for the last several days  Continue surgical recommendations, appreciate assistance   TPN started yesterday, 11/17, recommend enteral nutrition, agree with j tube placement  Continue p.r.n. pain meds         DVT Prophylaxis: Chemical prophylaxis held per surgery recs as of 11/13.  Platelets improved to 145 as off 11/18.     32 minutes of critical care was time spent personally by me on the following activities: development of treatment plan with patient or surrogate and bedside caregivers, discussions with consultants, evaluation of patient's response to treatment, examination of patient, ordering and performing treatments and interventions, ordering and review of laboratory studies, ordering and review of radiographic studies, pulse oximetry, re-evaluation of patient's condition.  This critical care time did not overlap with that of any other provider or involve time for any procedures.     Blossom Nicholas MD  Pulmonary Critical Care Medicine  [facility] - 7th Floor ICU

## 2022-11-18 NOTE — ANESTHESIA PROCEDURE NOTES
Intubation    Date/Time: 11/18/2022 1:08 PM  Performed by: Nolberto Hanson CRNA  Authorized by: Nolberto Hanson CRNA     Intubation:     Induction:  Rapid sequence induction    Intubated:  Postinduction    Mask Ventilation:  Easy with oral airway    Attempts:  1    Attempted By:  CRNA    Method of Intubation:  Direct    Blade:  Noland 2    Laryngeal View Grade: Grade IIA - cords partially seen      Difficult Airway Encountered?: No      Complications:  None    Airway Device:  Oral endotracheal tube    Airway Device Size:  8.0    Style/Cuff Inflation:  Cuffed (inflated to minimal occlusive pressure)    Inflation Amount (mL):  6    Tube secured:  23    Secured at:  The lips    Placement Verified By:  Capnometry    Complicating Factors:  None    Findings Post-Intubation:  BS equal bilateral

## 2022-11-18 NOTE — ANESTHESIA PROCEDURE NOTES
Arterial    Diagnosis: Lupus    Patient location during procedure: done in OR  Procedure start time: 11/18/2022 2:11 PM  Timeout: 11/18/2022 2:10 PM  Procedure end time: 11/18/2022 2:15 PM    Staffing  Authorizing Provider: Nelia Santoyo MD  Performing Provider: Nolberto Hanson CRNA    Arterial  Skin Prep: chlorhexidine gluconate  Local Infiltration: none  Orientation: left  Location: radial    Catheter Size: 20 G  Catheter placement by Ultrasound guidance. Heme positive aspiration all ports.   Vessel Caliber: small, patent, compressibility poor  Vascular Doppler:  not done  Needle advanced into vessel with real time Ultrasound guidance.  Guidewire confirmed in vessel.Insertion Attempts: 2  Assessment  Dressing: secured with tape and tegaderm

## 2022-11-18 NOTE — TRANSFER OF CARE
"Anesthesia Transfer of Care Note    Patient: Stuart Guevara    Procedure(s) Performed: Procedure(s) (LRB):  LAPAROTOMY, EXPLORATORY (N/A)  APPLICATION, WOUND VAC (N/A)    Patient location: ICU    Anesthesia Type: general    Transport from OR: Transported from OR intubated on 100% O2 by AMBU with adequate controlled ventilation    Post pain: adequate analgesia    Post assessment: no apparent anesthetic complications    Post vital signs: stable    Level of consciousness: sedated    Nausea/Vomiting: no nausea/vomiting    Complications: none    Transfer of care protocol was followed      Last vitals:   Visit Vitals  BP (!) 123/90   Pulse 74   Temp 37.1 °C (98.7 °F) (Oral)   Resp 16   Ht 5' 5" (1.651 m)   Wt 60.3 kg (133 lb)   SpO2 99%   BMI 22.13 kg/m²     "

## 2022-11-18 NOTE — PROGRESS NOTES
Pharmacokinetic Assessment Follow Up: IV Vancomycin    Vancomycin serum concentration assessment(s):    The random level was drawn correctly and can be used to guide therapy at this time. The measurement is above the desired definitive target range of 15 to 20 mcg/mL.    Vancomycin Regimen Plan:    Re-dose when the random level is less than 20 mcg/mL, next level to be drawn at 11/19 on 0300    Scheduled Administration Times    No Doses for today    Drug levels (last 3 results):  Recent Labs   Lab Result Units 11/17/22  0120 11/18/22  0312   Vanc Lvl Random ug/ml  --  23.9*   Vancomycin Trough ug/ml 29.1*  --        Vancomycin Administrations:  vancomycin given in the last 96 hours                     vancomycin in dextrose 5 % 1 gram/250 mL IVPB 1,000 mg (mg) 1,000 mg New Bag 11/16/22 1022     1,000 mg New Bag 11/15/22 1600                    Pharmacy will continue to follow and monitor vancomycin.    Please contact pharmacy at extension [phone] for questions regarding this assessment.    Thank you for the consult,   Noemi Rosario       Patient brief summary:  Stuart Guevara is a 32 y.o. male initiated on antimicrobial therapy with IV Vancomycin for treatment of intra-abdominal infection    The patient's current regimen is pulse dosing once trough < 20    Drug Allergies:   Review of patient's allergies indicates:   Allergen Reactions    Levofloxacin     Sulfamethoxazole-trimethoprim        Actual Body Weight:   60.3 kg    Renal Function:   Estimated Creatinine Clearance: 39.7 mL/min (A) (based on SCr of 2.26 mg/dL (H)).,     Dialysis Method (if applicable):  Daily dialysis for today and tomorrow     CBC (last 72 hours):  Recent Labs   Lab Result Units 11/15/22  0938 11/15/22  1133 11/16/22  0228 11/17/22  0120 11/17/22  1157 11/17/22  1604 11/17/22 2022 11/18/22  0312   WBC x10(3)/mcL 12.7* 18.6* 12.0* 17.6* 16.5*  --   --  14.4*   Hgb gm/dL 8.3* 8.6* 11.2* 3.6* 8.7*  8.6* 10.6* 10.2* 10.0*   Hct % 22.6* 23.1*  30.0* 10.4* 25.5*  25.1* 30.3* 29.5* 28.8*   Platelet x10(3)/mcL 100* 96* 66* 102* 94*  --   --  145   Mono % % 3.4 3.0  --  4.3 6.4  --   --   --    Eos % % 0.2 0.2  --  0.1 0.1  --   --   --    Basophil % % 0.2 0.1  --  0.1 0.2  --   --   --        Metabolic Panel (last 72 hours):  Recent Labs   Lab Result Units 11/16/22  0228 11/17/22  0120 11/17/22  1157 11/18/22  0312   Sodium Level mmol/L 127* 127* 130* 127*   Potassium Level mmol/L 3.7 4.2 3.5 4.6   Chloride mmol/L 96* 97* 104 97*   Carbon Dioxide mmol/L 19* 19* 15* 18*   Glucose Level mg/dL 194* 165* 145* 338*   Blood Urea Nitrogen mg/dL 50.7* 53.9* 47.3* 58.8*   Creatinine mg/dL 1.86* 1.74* 1.52* 2.26*   Albumin Level gm/dL  --  1.6*  --  1.8*   Bilirubin Total mg/dL  --  0.8  --  0.9   Alkaline Phosphatase unit/L  --  124  --  145   Aspartate Aminotransferase unit/L  --  13  --  15   Alanine Aminotransferase unit/L  --  7  --  9   Magnesium Level mg/dL  --   --  1.40* 2.00   Phosphorus Level mg/dL  --   --  7.7* 9.1*       Microbiologic Results:  Microbiology Results (last 7 days)       Procedure Component Value Units Date/Time    Body Fluid Culture [[id number]] Collected: 11/12/22 1720    Order Status: Completed Specimen: Body Fluid from Abdomen Updated: 11/17/22 1238     Body Fluid Culture Final Report: At 5 days. No growth    Body Fluid Culture [[id number]]  (Abnormal) Collected: 11/15/22 0827    Order Status: Completed Specimen: Body Fluid from Abdominal Fluid Updated: 11/17/22 1014     Body Fluid Culture Many Gram-positive Cocci    Gram Stain [[id number]] Collected: 11/15/22 0827    Order Status: Completed Specimen: Body Fluid from Abdominal Fluid Updated: 11/16/22 0754     GRAM STAIN No WBCs, No bacteria seen    Blood Culture [[id number]]  (Normal) Collected: 11/10/22 2120    Order Status: Completed Specimen: Blood Updated: 11/15/22 2300     CULTURE, BLOOD ([facility]) No Growth at 5 days    Blood Culture [[id number]]  (Normal) Collected: 11/10/22 2120    Order  Status: Completed Specimen: Blood Updated: 11/15/22 2300     CULTURE, BLOOD ([facility]) No Growth at 5 days    Anaerobic Culture [[id number]] Collected: 11/12/22 1720    Order Status: Completed Specimen: Body Fluid from Abdominal Fluid Updated: 11/15/22 1026     Anaerobe Culture No Anaerobes Isolated    Fungal Culture [[id number]] Collected: 11/15/22 0827    Order Status: Sent Specimen: Body Fluid from Abdominal Fluid Updated: 11/15/22 0846    Anaerobic Culture [[id number]] Collected: 11/15/22 0827    Order Status: Resulted Specimen: Body Fluid from Abdominal Fluid Updated: 11/15/22 0846    Gram Stain [[id number]] Collected: 11/12/22 1720    Order Status: Completed Specimen: Body Fluid from Abdomen Updated: 11/13/22 0749     GRAM STAIN No WBCs, No bacteria seen    Fungal Culture [[id number]] Collected: 11/12/22 1720    Order Status: Sent Specimen: Body Fluid from Abdomen Updated: 11/12/22 1737

## 2022-11-18 NOTE — PROGRESS NOTES
Inpatient Nutrition Assessment    Admit Date: 9/23/2022   Total duration of encounter: 56 days     Nutrition Recommendation/Prescription     When feasible, oral diet as tolerated, goal: regular diet with Novasource Renal (provides 475 kcal, 22 g protein per serving).    If adequate oral intake not feasible, recommend tube feeding as tolerated.  Impact Peptide 1.5 goal rate 60 ml/hr to provide  1800 kcal/d, 91% needs  113 g protein/d, 100% needs  924 ml free water/d  2246 mg potassium/d  1200 mg phosphorus/d  (calculations based on estimated 20 hr/d run time)    TPN until adequate oral/enteral nutrition feasible.  Minimum volume custom TPN with multivitamin, trace elements, folic acid, thiamin; electrolytes per pharmacy  750 ml 15% AA (113 g, 450 kcal)  500 ml D70W (350 g, 1190 kcal, GIR 4.3)    Communication of Recommendations: reviewed with nurse    Nutrition Assessment     Malnutrition Assessment/Nutrition-Focused Physical Exam    Malnutrition in the context of acute illness or injury  Degree of Malnutrition: severe malnutrition  Energy Intake: < 75% of estimated energy requirement for > 7 days  Interpretation of Weight Loss: >5% in 1 month  Body Fat:moderate depletion  Area of Body Fat Loss: orbital region  and upper arm region - triceps / biceps  Muscle Mass Loss: moderate depletion  Area of Muscle Mass Loss: temple region - temporalis muscle, clavicle bone region - pectoralis major, deltoid, trapezius muscles, clavicle and acromion bone region - deltoid muscle, and scapular bone region - trapezius, supraspinus, infraspinus muscles  Fluid Accumulation: moderate to severe  Edema: ascites   Reduced  Strength: unable to obtain  A minimum of two characteristics is recommended for diagnosis of either severe or non-severe malnutrition.    Chart Review    Reason Seen: continuous nutrition monitoring, physician consult, and follow-up    Diagnosis:  aortic dissection s/p TEVAR  abdominal compartment syndrome s/p  multiple exlaps (9/25, 9/27, 9/29)  renal failure/HD  CT has shown concern for necrotizing fascitis  paracentesis culture + Klebsiella  s/p washout 10/30  LLQ wound vac placement 11/12 with bleeding requiring significant transfusion post-op  wound vac removal 11/13 with wound washout    Relevant Medical History: HTN, SLE, HBV    Nutrition-Related Medications: Cleviprex, prednisone, sodium bicarbonate  Calorie Containing IV Medications: Cleviprex @ 1 ml/hr (provides 48 kcal/d) and TPN    Nutrition-Related Labs:  9/25/22  (H)  10/31/22 PAB 13.4 (L)  11/12/22 CRP 58.7 (H)  11/14/22 Na 130 (L), GFR WNL, Glu 239 (H), Ca 8.1 (L), Phos 6.1 (H), Alb 2.5 (L)  11/15/22 Phos 5.9 (H)  11/16/22 Na 127 (L), GFR 48, Glu 194 (H), Ca 7.7 (L), Alb 2.2 (L)  11/17/22 Na 127 (L), GFR 52, Glu 165 (H), Ca 7.1 (L), Alb 1.6 (L), H/H 3.6 (L)/10.4 (L)  11/18/22 Na 127 (L), GFR 38, Glu 338 (H), Ca 7.6 (L), Alb 1.8 (L), Phos 9.1 (H)    Diet/PN Order: Diet NPO Except for: Medication  TPN ADULT CENTRAL LINE CUSTOM  TPN ADULT CENTRAL LINE CUSTOM  Oral Supplement Order: none  Tube Feeding Order: none at this time  Appetite/Oral Intake: NPO/not applicable  Factors Affecting Nutritional Intake: NPO  Food/Sikhism/Cultural Preferences: none reported    Skin Integrity: incision  Wound(s):      Altered Skin Integrity 11/05/22 1253 posterior Sacral spine #1 Shearing Partial thickness tissue loss. Shallow open ulcer with a red or pink wound bed, without slough. Intact or Open/Ruptured Serum-filled blister.-Tissue loss description: Partial thickness       Altered Skin Integrity 11/07/22 1100 Left Achilles Abrasion(s)-Tissue loss description: Partial thickness       Altered Skin Integrity 11/07/22 1100 Right Achilles Abrasion(s)-Tissue loss description: Partial thickness    Comments    9/26: Discussed with RN. Will provide tube feeding recommendations for when appropriate to start tube feeding. Receiving kcal from meds. Noted Phos, will monitor for  need for renal formula.   9/30: Discussed with RN. Need to consider TPN since pt now LOS day 7. If starting tube feeding, recs provided. If able to extubate, advance diet when appropriate.   10/4: Pt previously eating 100% po intake of meals, good appetite. Currently NPO for procedure, plans to restart diet per RN. Will add ONS for added protein and kcal.  10/7: Pt now with decreased appetite/po intake. Noted elevated K and Phos, likely not diet related since pt with poor po intake of full liquid diet.   10/11: pt sleeping, nurse reports tolerating full liquid diet, some abdominal distention noted, reports unsure of plans to advance diet as this time  10/14: pt advanced to soft diet today; ate about 40% of lunch tray, drinking Boost, would like 2 per meal in vanilla flavor  10/18: Pt advanced to regular texture renal/diabetic diet, he reports that his appetite is fair, drinks boost, feels he is chewing/swallowing well, does reports some abdominal pain 30-90 minutes after eating, feels better after several hours of no eating.   10/21: appetite remains the same, drinking boost  10/25: fair appetite, says he is drinking some boost, would like to continue flavor  10/28: pt with decreased appetite, noted new dx necrotizing pancreatits; paracentesis done today; not drinking boost at this time either; plans to try and start eating more and drinking boost, says he feels a little better this afternoon. MD discussed starting TPN until pt able to tolerate increased oral intake. Still on dialysis as needed  10/31: now NPO with NG with LIWS after abdominal washout with surgery yesterday, possible necrotizing fascitis, will switch to custom TPN today due to abnormal electrolytes  11/2: Pt's diet was advanced yesterday; however, pt reports still just taking in liquids and no solids. Pt reports vomiting last night. TPN running as ordered.   11/4: Pt reports that he ate 100% of all meals yesterday and tolerated them well, skipped  "breakfast this morning due to feeling unwell and some mild nausea, no GI complaints during our visit, PO intake encouraged.    11/7: Pt not in room, having test, nurse reports good po intake/appetite today, was not as good yesterday, TPN still infusing, AST and AP increasing, glucose better today.   11/11: Pt no longer on TPN; not eating much of meals per RN but is drinking the Boost.     11/14: Patient in ICU on ventilator, receiving kcals from Diprivan and Cleviprex (767 total kcals), will provide tube feeding recommendations.    11/16: Patient extubated, remains in ICU, consult received for TPN. Patient was on oral diet prior to intubation, ok to start a diet if cleared by SLP per surgery notes. Discussed with NP, ok to hold off on TPN due to functional GI tract. If oral intake inadequate, would recommend trying NG tube feeding; if unable to tolerate adequate oral/enteral nutrition, would then recommend TPN.     11/17: Patient returned to OR for abdominal wall surgery again today due to critically low H/H. He remains NPO without nutrition support. Surgery reports GI tract is functional. Ideally, would recommend utilizing GI tract for nutrition and will provide recommendations for oral/enteral nutrition in addition to TPN orders. Will hold off on lipids for now due to recently requiring Cleviprex.    11/18: TPN at goal during rounds. RN reports surgery considering oral diet versus PEG for nutrition, patient agreeable to either; ultimately, both oral/enteral intake may be best option to optimize nutrition and allow patient to eat when desired. Patient was taken for emergency surgery this afternoon due to bleeding. Patient still requiring Cleviprex (small amount).     Anthropometrics    Height: 5' 5" (165.1 cm) Height Method: Estimated  Last Weight: 60.3 kg (133 lb) (11/12/22 0500) Weight Method: Bed Scale  BMI (Calculated): 22.1  BMI Classification: normal (BMI 18.5-24.9)        Ideal Body Weight (IBW), Male: 136 " lb     % Ideal Body Weight, Male (lb): 91.75 %                 Usual Body Weight (UBW), k kg  % Usual Body Weight: 94.53  % Weight Change From Usual Weight: -5.67 %  Usual Weight Provided By: unable to obtain usual weight at this time    Wt Readings from Last 5 Encounters:   22 60.3 kg (133 lb)   22 60 kg (132 lb 4.4 oz)   21 48 kg (105 lb 13.1 oz)     Weight Change(s) Since Admission:  Admit Weight: 60 kg (132 lb 4.4 oz) (22 1219)  10/12/22: 65 kg; weight gain noted  10/15/22: 56.6 kg; fluctuating weights possible due to fluid  10/22/22: 56.6 kg  22: 60.4 kg  22: 59.4 kg  22: 60.3 kg  No new weights (2022), needs updated weight, discussed with RN who plans to zero bed and weigh patient later today    Estimated Needs    Weight Used For Calorie Calculations: 56.6 kg (124 lb 12.5 oz)  Energy Calorie Requirements (kcal): 7273-3740 kcal/d, 35-40 kcal/kg  Energy Need Method: Kcal/kg  Weight Used For Protein Calculations: 56.6 kg (124 lb 12.5 oz)  Protein Requirements:  g/d, 1.5-2.0 g/kg  Fluid Requirements (mL): 1000 ml + ml urine output  Temp: 98.7 °F (37.1 °C)    Enteral Nutrition    Patient not receiving enteral nutrition at this time.    Parenteral Nutrition    Standard Formula: not applicable  Custom Formula:  750 ml 15% amino acids and 500 ml 70% dextrose  Additives: multivitamin with vitamin K, trace elements (Zn, Cu, Mn, Se), folic acid, and thiamin  Rate/Volume: 55 ml/hr  Lipids: none  Total Nutrition Provided by Parenteral Nutrition:  Calories Provided  1640 kcal/d, 83% needs   Protein Provided  113 g/d, 100% needs   Dextrose Provided  350 g/d, GIR 4.3 mg CHO/kg/min   Fluid Provided  1320 ml/d     Evaluation of Received Nutrient Intake    Calories: meeting estimated needs  Protein: meeting estimated needs    Patient Education    Not applicable.    Nutrition Diagnosis     PES: Inadequate oral intake related to current condition as evidenced by <75% intake  of meals. (continues)    PES: Malnutrition related to pancreatitis/infection as evidenced by weight loss >5% in 1 month, appetite loss <50% intake of meals, severe fat and muscle loss. (continues)     Interventions/Goals     Intervention(s): multivitamin/mineral supplement therapy and collaboration with other providers  Goal: Meet greater than 75% of nutritional needs by follow-up. (goal met)    Monitoring & Evaluation     Dietitian will monitor energy intake, weight change, electrolyte/renal panel, and glucose/endocrine profile.  Nutrition Risk/Follow-Up: high (follow-up in 1-4 days)

## 2022-11-18 NOTE — NURSING
WOCN follow-up-for abdwd.   It seems surgery has had to take pt back to sx for wash outs  this week the 15th and the 17th due to persistant bleeding and ascites.   .   Consult for IR for percutaneous drain eval noted pr Dr. Dewayne Byrd note.   Her note stated do not remove packing to lower abd wd and that surgery to confer with staff for next dressing change.   Awaiting IR  eval at this  time.   Will follow up next week.

## 2022-11-18 NOTE — BRIEF OP NOTE
[facility] - 7th Floor ICU  Brief Operative Note    SUMMARY     Surgery Date: 11/18/2022     Surgeon(s) and Role:     * Alvin Barnhart MD - Primary     * Wily Mi Jr., MD - Co-Surgeon    Assisting Surgeon: Nga Steinberg    Pre-op Diagnosis:  Abdominal wound dehiscence, sequela [T81.30XS]    Post-op Diagnosis: Post-Op Diagnosis Codes:     * Abdominal wound dehiscence, sequela [T81.30XS]    Procedure Performed:    Procedure(s) (LRB):  LAPAROTOMY, EXPLORATORY (N/A)  APPLICATION, WOUND VAC (N/A)      Operative Findings: Necrotic bleeding tissue identified in the retroperitoneum. Abdomen was opened and bilious serous fluid was evacuated from the abdomen. An attempt was made to access the retroperitoneum in both the lesser sac and from lateral to medially. This was unsuccessful due to coagulopathic bleeding. The abdomen was examined and a hemostasis was achieved. An abthera wound vac was appleid     Estimated Blood Loss: * No values recorded between 11/18/2022  1:15 PM and 11/18/2022  3:34 PM *         Specimens:   Specimen (24h ago, onward)      None

## 2022-11-19 NOTE — PROGRESS NOTES
Unfortunately, Mr. Guevara developed significant intra-abdominal bleeding this morning requiring emergent trip to the operating room for exploratory laparotomy and re-exploration.  Per discussions with General surgery, it appears that he has had significant bile acid induced damage to his peritoneum and has significant, uncontrolled bleeding within his peritoneal cavity.  He has had wound VAC connected immediately postoperatively but has continued to have significant bright red bloody output.  After discussing with General surgery, there is no reasonable option for surgical correction of his bleeding.  Both General surgery and myself met with family at bedside this afternoon to explain the grim prognosis given these findings.  We explained that, given the extent of his damage and recurrent complications, there is no realistic surgical option for hemostasis within his peritoneum.  I explained to them options moving forward, including proceeding with aggressive care, knowing that he has a grim overall prognosis, or withdrawal of life-sustaining treatment, and allowing natural death.  After further discussions amongst themselves, they feel that he has suffered an inordinate amount and, given his ongoing clinical decline, they have elected to withdraw life-sustaining treatment following arrival of more family members.  In the interim, his code status has been changed to DO NOT RESUSCITATE with NO ESCALATION OF CARE.       I spent 35 minutes providing critical care services to this patient.  This does not include time spent for separately billed procedures.      Ivan Chopra MD  Pulmonary Disease and Critical Care Medicine

## 2022-11-19 NOTE — NURSING
Notified pt's mother, Darling @ 2100 that BP is dropping significantly. Family came to bedside. Pt intubated & able to nod appropriately. When asked if pt is hurting, he nodded yes. When asked if patient is ready to go he nodded yes. Pt's mother states she is ready for withdrawal with family, myself, and Dr. Rae @ 2220. Turned off propofol. Respiratory therapy extubated pt @ 2310. Pt is AAOx4 and talking. I informed pt about his family's decision for DNR, withdrawal, and comfort care in which he agreed. BP cuff & cardiac monitoring off per withdrawal protocol. Will not turn pt due to comfort care and high risk for bleeding out. He asked where his credit card is. At bedside, his mother states she took it home.

## 2022-11-19 NOTE — OP NOTE
[facility] - 7th Floor ICU  Operative Note     SUMMARY      Surgery Date: 11/18/2022      Surgeon(s) and Role:     * Alvin Barnhart MD - Primary     * Wily Mi Jr., MD - Co-Surgeon     Assisting Surgeon: Nga Steinberg     Pre-op Diagnosis:  Abdominal wound dehiscence, sequela [T81.30XS]     Post-op Diagnosis: Post-Op Diagnosis Codes:     * Abdominal wound dehiscence, sequela [T81.30XS]     Procedure Performed:     Procedure(s) (LRB):  LAPAROTOMY, EXPLORATORY (N/A)  APPLICATION, WOUND VAC (N/A)     Operative Findings: Necrotic bleeding tissue identified in the retroperitoneum. Abdomen was opened and bilious serous fluid was evacuated from the abdomen. An attempt was made to access the retroperitoneum in both the lesser sac and from lateral to medially. This was unsuccessful due to coagulopathic bleeding. The abdomen was examined and a hemostasis was achieved. An abthera wound vac was appleid     Operative Details:  Due to the emergent nature of the procedure emergency consent was obtained.  Patient was brought into the operating room placed supine on the operating room table, endotracheal intubation was successful.  The abdomen was then prepped and draped in sterile fashion.    The lower abdominal wound was explored and was noted to have diffuse bleeding particularly from the left flank which tracked posteriorly and superiorly towards the retroperitoneum.  This area was explored and noted to have frankly necrotic tissue which was sharply excised.  The bleeding continued and was quickly apparent that it was hard to visualize exactly where this bleeding was coming from.  At this point the decision was made to re-enter the abdomen.  His prior skin staples were removed and fascial stitches cut.  Immediately upon entering the abdomen there was a large gush of bilious appearing fluid.  This was suctioned out.  There were dense adhesions noted to the matted small bowel in the middle of the abdomen.   The left upper quadrant was then inspected and there was noted to be frankly necrotic appearing pancreatic tissue with no discernible pancreas.  A lateral to medial dissection on the left was attempted trying to take down the white line of Toldt but upon sharp and blunt dissection there was a significant amount of coagulopathic bleeding.  Attention turned to the inferior aspect of the stomach to attempt to enter the lesser sac where we encountered additional coagulopathic bleeding.   At this point given the amount of blood product needing to maintain his blood pressure decision was made to place an ABThera wound VAC and returned to the ICU to attempt to correct his coagulopathy.  Patient returned to the ICU intubated in critical condition.    Given the severity of the patient's disease process, immediately upon returning to the ICU a family meeting was called to discuss the patient's poor prognosis and to discuss futility of surgical care going forward.      SURGEON:  Alvin Barnhart MD    Date: 11/18/2022  Time: 3:37 PM

## 2022-11-19 NOTE — DISCHARGE SUMMARY
DEATH NOTE    Received a page from the nurse that Mr. Stuart Guevara's telemetry monitor was showing asystole. On arrival at bedside, he was unresponsive to verbal, tactile or painful stimuli. He  was not moving any of his extremities spontaneously. He had non-palpable bilateral  Carotid, Radial, and DP pulses. On auscultation of his precordium and anterior thorax, he did not have any audible heart sounds or breath sounds. He had absent pupillary light reflexes as well as absent corneal reflexes bilaterally. He was pronounced  at 0748 on 2022. Cause of death was cardiopulmonary arrest from acute uncontrolled blood loss from retroperitoneum.    Family was at bedside and informed of the patient's passing.     Time of Death: 748  Date of Death: 2022    Attending physician notified.    Brief Hospital Course:  Patient originally admitted to the ICU on  for type B aortic dissection.  Patient failed medical management was transferred to [facility] for CT surgery evaluation.  Patient was taken to OR for TAVR with left carotid to subclavian artery bypass.  During admission, patient was noted to have progressively worsening abdominal pain lactic acidosis.  CT had shown intra-abdominal pathology with hemoperitoneum and signs of hypoperfusion of abdominal organs and free blood in the abdomen.  The patient underwent multiple exploratory laparotomies on , , .  Patient had issues with unstable bradycardia after the surgeries and eventually underwent transvenous pacemaker placement by Cardiology, resolving bradycardia.  Patient was also noted to have progressive renal failure critical hyperkalemia requiring HD catheter placement and initiation of hemodialysis.  Patient also began to have lupus flare up and likely serositis.  Patient was restarted on Solu-Medrol, CellCept, Plaquenil.  These medications were stopped after they were further concerns for infection.  CT abdomen later showed necrotizing  pancreatitis and likely peritonitis.  IR drainage on 10/28 occurred, cultures at that time grew out Klebsiella.  Patient continued to have abdominal pain was taken back to the OR on 10/30.  There were no signs of necrotic tissue at that time, on 11/12 the patient was brought back to the OR for wound washout.  Throughout his stay, received multiple blood transfusions and remained hemodynamically unstable.  On 11/18 there began to be significant bleeding from lower abdominal wound.  Noted to have bright red blood saturating close and cause.  Patient was taken directly to the OR that time.  The abdomen was opened up and noted to have necrotic pancreatic tissue and large amounts of bilious appearing fluid.  It was clear that patient was having coagulopathic bleeding now is unable to be controlled.  Patient had wound VAC placed and returned to ICU to correct coagulopathy.  ICU staff updated family on critical status, informed them that after discussions with General surgery that there was no reasonable option for surgical correction of the bleeding.  Based on this grim prognosis, family made decision to make patient DNR and eventually withdrew care later during the night after family was present at bedside.  Patient continued to become more hypotensive throughout the night from the bleeding, eventually passed away at 7:48 a.m. the following morning    Luca Rae, DO  [employer] [facility] PGY2

## 2022-11-19 NOTE — PLAN OF CARE
Called to bedside to assess significant bleeding from lower abdominal wound.  Upon arrival there was bright red blood saturating his clothes, soaking through kerlex gauze, and clots seen in his bedding.  The surgical team immediately placed quick clot agent in the wound, held manual pressure at the sight of presumed bleeding, and mobilized the OR for emergent wound exploration.  Packed RBCs were hung and patient was wheeled directly to the OR.      Alvin Barnhart MD

## 2022-11-19 NOTE — PLAN OF CARE
Problem: Adult Inpatient Plan of Care  Goal: Plan of Care Review  Outcome: Ongoing, Progressing  Goal: Patient-Specific Goal (Individualized)  Outcome: Ongoing, Progressing  Goal: Absence of Hospital-Acquired Illness or Injury  Outcome: Ongoing, Progressing  Goal: Optimal Comfort and Wellbeing  Outcome: Ongoing, Progressing  Goal: Readiness for Transition of Care  Outcome: Ongoing, Progressing     Problem: Infection  Goal: Absence of Infection Signs and Symptoms  Outcome: Ongoing, Progressing     Problem: Skin Injury Risk Increased  Goal: Skin Health and Integrity  Outcome: Ongoing, Progressing     Problem: Fall Injury Risk  Goal: Absence of Fall and Fall-Related Injury  Outcome: Ongoing, Progressing     Problem: Device-Related Complication Risk (Hemodialysis)  Goal: Safe, Effective Therapy Delivery  Outcome: Ongoing, Progressing     Problem: Hemodynamic Instability (Hemodialysis)  Goal: Effective Tissue Perfusion  Outcome: Ongoing, Progressing     Problem: Infection (Hemodialysis)  Goal: Absence of Infection Signs and Symptoms  Outcome: Ongoing, Progressing     Problem: Impaired Wound Healing  Goal: Optimal Wound Healing  Outcome: Ongoing, Progressing     Problem: Coping Ineffective  Goal: Effective Coping  Outcome: Ongoing, Progressing     Problem: Palliative Care  Goal: Enhanced Quality of Life  Outcome: Ongoing, Progressing

## 2022-11-19 NOTE — DISCHARGE SUMMARY
[facility] - 7th Floor ICU  Pulmonology  Discharge Summary      Patient Name: Stuart Guevara  MRN: [id number]  Admission Date: 9/23/2022  Hospital Length of Stay: 57 days  Discharge Date and Time:  11/19/2022 0748  Attending Physician: Saran Carrillo MD   Discharging Provider: Saran Carrillo MD  Primary Care Provider: Primary Doctor No    HPI:   This is a 32-year-old male he was admitted to the ICU following excisional debridement of an abdominal wound for concerns of intermittent hypotension requiring vasopressors and possible airway compromise given fluid overload.  The patient was originally admitted to the [facility] ICU on 09/23 for a [place] type B aortic dissection.  Patient failed medical management and was transferred to [facility] for CT surgery evaluation.  Was taken to the OR for TEVAR with left carotid to subclavian artery bypass.  During this admission, the patient was noted to have progressively worsening abdominal pain and lactic acidosis.  CT angiography showed evidence of significant intra-abdominal pathology with some hemoperitoneum and signs of hypoperfusion of the abdominal organs and free blood in the abdomen.  He underwent multiple exploratory laparotomies (9/25, 9/27 and 9/29).  Afterwards, the patient developed unstable bradycardia and had a temporary transvenous pacemaker placed by Cardiology, which resolved the bradycardia within a couple of days.  The patient was also noted to have progressive renal failure and critical hyperkalemia requiring HD catheter placement and initiation of hemodialysis.  Dictation again started to have worsening abdominal pain as evaluated by Rheumatology for possible lupus flare up and serositis.  Patient was started on Solu-Medrol, CellCept and Plaquenil.  However, the medication was stopped due to concern for infection.  CT abdomen showed multiple loculated fluid collections, necrotizing pancreatitis and possible peritonitis.  Patient had IR drainage  on 10/28 and by fluid cultures showed Klebsiella.  Patient continued to have abdominal pain and was brought back to the OR on 10/30.  At that time, patient had 3 days of venita ascites but no signs of necrotic tissue.  Today, the patient was brought back to the OR for wound washout.  Per chart review, in the OR, there is significant dark brown/green liquid but no foul odor, concerning for necrotizing tissue.  The patient was debrided and had a wound VAC placed.  However, patient did intermittent use vasopressors there was no had become fluid overloaded following surgery.  Patient was then transferred to ICU.  Patient noted to be awake alert and not on any vasopressors or sedation on the PACU      Procedure(s) (LRB):  LAPAROTOMY, EXPLORATORY (N/A)  APPLICATION, WOUND VAC (N/A)    Indwelling Lines/Drains at Time of Discharge:   Lines/Drains/Airways       Peripherally Inserted Central Catheter Line  Duration             PICC Double Lumen 10/25/22 1520 right brachial 24 days              Central Venous Catheter Line  Duration                  Hemodialysis Catheter right internal jugular -- days              Drain  Duration                  Closed/Suction Drain 11/18/22 1453 LUQ Bulb 15 Fr. <1 day         Closed/Suction Drain 11/18/22 1453 RUQ Bulb 15 Fr. <1 day         NG/OG Tube 11/18/22 1350 [place] sump 18 Fr. Center mouth <1 day              Airway  Duration                  Airway - Non-Surgical 11/18/22 1308 <1 day              Arterial Line  Duration             Arterial Line 11/18/22 1411 Left Radial <1 day                    Hospital Course:   Patient originally admitted to the ICU on 09/23 for type B aortic dissection.  Patient failed medical management was transferred to [facility] for CT surgery evaluation.  Patient was taken to OR for TAVR with left carotid to subclavian artery bypass.  During admission, patient was noted to have progressively worsening abdominal pain lactic acidosis.  CT had shown intra-abdominal  pathology with hemoperitoneum and signs of hypoperfusion of abdominal organs and free blood in the abdomen.  The patient underwent multiple exploratory laparotomies on 09/25, 9/27, 9/29.  Patient had issues with unstable bradycardia after the surgeries and eventually underwent transvenous pacemaker placement by Cardiology, resolving bradycardia.  Patient was also noted to have progressive renal failure critical hyperkalemia requiring HD catheter placement and initiation of hemodialysis.  Patient also began to have lupus flare up and likely serositis.  Patient was restarted on Solu-Medrol, CellCept, Plaquenil.  These medications were stopped after they were further concerns for infection.  CT abdomen later showed necrotizing pancreatitis and likely peritonitis.  IR drainage on 10/28 occurred, cultures at that time grew out Klebsiella.  Patient continued to have abdominal pain was taken back to the OR on 10/30.  There were no signs of necrotic tissue at that time, on 11/12 the patient was brought back to the OR for wound washout.  Throughout his stay, received multiple blood transfusions and remained hemodynamically unstable.  On 11/18 there began to be significant bleeding from lower abdominal wound.  Noted to have bright red blood saturating close and cause.  Patient was taken directly to the OR that time.  The abdomen was opened up and noted to have necrotic pancreatic tissue and large amounts of bilious appearing fluid.  It was clear that patient was having coagulopathic bleeding now is unable to be controlled.  Patient had wound VAC placed and returned to ICU to correct coagulopathy.  ICU staff updated family on critical status, informed them that after discussions with General surgery that there was no reasonable option for surgical correction of the bleeding.  Based on this grim prognosis, family made decision to make patient DNR and eventually withdrew care later during the night after family was present at  bedside.  Patient continued to become more hypotensive throughout the night from the bleeding, eventually passed away at 7:48 a.m. the following morning        Consults (From admission, onward)          Status Ordering Provider     Inpatient consult to Social Work/Case Management  Once        Provider:  (Not yet assigned)    Acknowledged RENY EUGENE     Inpatient consult to Interventional Radiology  Once        Provider:  Jakob Boland MD    Completed KWESI BOSS     Inpatient consult to Registered Dietitian/Nutritionist  Once        Provider:  (Not yet assigned)    Completed LORIN MCINTOSH     Inpatient consult to Spiritual Care  Once        Provider:  (Not yet assigned)    Completed FADI RICKS     Pharmacy to dose Vancomycin consult  Once        Provider:  (Not yet assigned)    Acknowledged MARIO SHETH     Inpatient consult to Palliative Care  Once        Provider:  Ceasar Deal MD    Completed LORIN MCINTOSH     Inpatient consult to Respiratory Care  Once        Provider:  (Not yet assigned)    Acknowledged ZOILA MICHAEL     Inpatient consult to Registered Dietitian/Nutritionist  Once        Provider:  (Not yet assigned)    Completed MERY PAGE     Inpatient consult to Social Work/Case Management  Once        Provider:  (Not yet assigned)    Completed MERY PAGE     Inpatient consult to Vascular Surgery  Once        Provider:  Yogesh Melton DO    Acknowledged MING MULLINS     Inpatient consult to Cardiology  Once        Provider:  Ethan Ozuna MD    Acknowledged GUANAKO TERRELL     Inpatient consult to Cardiothoracic Surgery  Once        Provider:  Jonnie Carrion MD    Completed MICHELE SALOMON     Inpatient consult to Registered Dietitian/Nutritionist  Once        Provider:  (Not yet assigned)    Completed MICHELE SALOMON     Inpatient consult to Infectious Diseases  Once        Provider:  Aleah Calix MD    Completed MERY PAGE      Inpatient consult to Registered Dietitian/Nutritionist  Once        Provider:  (Not yet assigned)    Completed MERY PAGE     Inpatient consult to Gastroenterology  Once        Provider:  Olman Stout MD    Completed MERY PAGE     Inpatient consult to Interventional Radiology  Once        Provider:  Jakob Boland MD    Completed KRISTOPHER ALFREDO     Inpatient consult to General Surgery  Once        Provider:  Christopher Espinal MD    Acknowledged AMUSA, IRADAT A     Inpatient consult to Vascular Surgery  Once        Provider:  Jonnie Carrion MD    Completed ALLEY OLIVERA     Inpatient consult to Vascular Surgery  Once        Provider:  Joey Maravilla MD    Acknowledged AMUSA, IRADAT A     Inpatient consult to Rheumatology  Once        Provider:  Wing Thorpe MD    Acknowledged AMUSA, IRADAT A     Inpatient consult to Infectious Diseases  Once        Provider:  Layton Nathan MD    Completed AMUSA, IRADAT A     Inpatient consult to Cardiology  Once        Provider:  Ovidio Holm MD    Completed BELGICA MOCTEZUMA     Inpatient consult to Nephrology  Once        Provider:  Douglas Landis DO    Acknowledged STEVEN LOPEZ     Inpatient consult to General Surgery  Once        Provider:  Saran Marr MD    Completed STEVEN LOPEZ     Inpatient consult to Cardiothoracic Surgery  Once        Provider:  Vic Martines MD    Completed SUGEY MCDANIELS              Pending Diagnostic Studies:       Procedure Component Value Units Date/Time    CT Guided Abscess Drainage With Catheter [[id number]]     Order Status: Sent Lab Status: No result     Comprehensive Metabolic Panel [[id number]]     Order Status: Sent Lab Status: No result     Specimen: Blood     Lipase, Body Fluid Other (Specify) (Retroperitoneal fluid) [[id number]] Updated: 11/18/22 1407    Order Status: Sent Lab Status: No result     Specimen: Body Fluid     Phosphorus [[id number]]     Order Status: Sent Lab  Status: No result     Specimen: Blood     Vancomycin, Random [[id number]]     Order Status: Sent Lab Status: No result     Specimen: Blood           Final Active Diagnoses:    Diagnosis Date Noted POA    Comfort measures only status [Z51.5] 2022 Not Applicable    Dissection of thoracoabdominal aorta [I71.03] 10/30/2022 Unknown    Serositis [K65.8] 10/04/2022 Yes    Systemic lupus erythematosus [M32.9] 2022 Yes     Chronic    HTN (hypertension) [I10] 2022 Yes     Chronic    History of DVT in adulthood [Z86.718] 2022 Not Applicable     Chronic    Hypertensive emergency [I16.1] 2022 Yes      Problems Resolved During this Admission:    Diagnosis Date Noted Date Resolved POA    PRINCIPAL PROBLEM:  Aortic dissection [I71.00] 2022 10/30/2022 Yes       Discharged Condition:     Disposition:  - Place Unknown    Follow Up:   Follow-up Information       [facility] Follow up.    Contact information:  [address] [place] [address] [phone]                           Patient Instructions:   No discharge procedures on file.  Medications:  None    Saran Carrillo MD  Pulmonology  [facility] - 7th Floor ICU

## 2022-11-21 LAB
ABO + RH BLD: NORMAL
BLD PROD TYP BPU: NORMAL
BLOOD UNIT EXPIRATION DATE: NORMAL
BLOOD UNIT TYPE CODE: 5100
CROSSMATCH INTERPRETATION: NORMAL
DISPENSE STATUS: NORMAL
UNIT NUMBER: NORMAL

## 2022-11-22 NOTE — ANESTHESIA POSTPROCEDURE EVALUATION
Anesthesia Post Evaluation    Patient: Stuart Guevara    Procedure(s) Performed: Procedure(s) (LRB):  LAPAROTOMY, EXPLORATORY (N/A)  APPLICATION, WOUND VAC (N/A)    Final Anesthesia Type: general      Patient location during evaluation: ICU  Patient participation: No - Unable to Participate, Coma/Other Inability to Communicate  Level of consciousness: sedated  Post-procedure vital signs: reviewed and not stable      Anesthetic complications: no      Hydration status: euvolemic  Follow-up not needed.          Vitals Value Taken Time   BP 84/53 11/18/22 2301   Temp 36.2 °C (97.1 °F) 11/19/22 0400   Pulse 0 11/19/22 0741   Resp 1 11/19/22 0741   SpO2 77 % 11/19/22 0738         No case tracking events are documented in the log.      Pain/Marcin Score: No data recorded

## 2022-11-28 LAB
FUNGUS SPEC CULT: NORMAL
FUNGUS SPEC CULT: NORMAL

## 2022-12-12 LAB — FUNGUS SPEC CULT: NORMAL

## 2022-12-13 LAB
FIO2: 100
GLUCOSE SERPL-MCNC: 363 MG/DL (ref 70–110)
HCO3 UR-SCNC: 22.3 MMOL/L (ref 24–28)
HCT VFR BLD CALC: 35 %PCV (ref 36–54)
HGB BLD-MCNC: 12 G/DL
PCO2 BLDA: 38.1 MMHG (ref 35–45)
PH SMN: 7.38 [PH] (ref 7.35–7.45)
PO2 BLDA: 49 MMHG (ref 40–60)
POC BE: -3 MMOL/L
POC IONIZED CALCIUM: 0.68 MMOL/L (ref 1.06–1.42)
POC PCO2 TEMP: 36.9 MMHG
POC PH TEMP: 7.39
POC PO2 TEMP: 47 MMHG
POC SATURATED O2: 84 % (ref 95–100)
POC TCO2: 23 MMOL/L (ref 24–29)
POC TEMPERATURE: ABNORMAL
POTASSIUM BLD-SCNC: 4.9 MMOL/L (ref 3.5–5.1)
SAMPLE: ABNORMAL
SODIUM BLD-SCNC: 133 MMOL/L (ref 136–145)

## 2022-12-19 LAB — FUNGUS SPEC CULT: NORMAL

## 2024-12-09 NOTE — PROGRESS NOTES
Relayed to [facility]   [facility]  HOSPITAL MEDICINE  PROGRESS NOTE        CHIEF COMPLAINT   Hospital follow up    HOSPITAL COURSE   Stuart Guevara is a 32 y.o. male who has PMH which includes HTN, SLE,  hepatitis B, type A aortic dissection with repair + mechanical aortic valve placement 2/2021 on coumadin, RUE DVT s/p Xarelto, class V lupus nephritis; presented to the ED at [facility] on 9/23/2022  with reports of  shortness of breath and chest pain radiating to his back. Work up revealed a type B aortic dissection and significant hypertension noted on presentation.  He failed medical management and had progression of his symptoms despite adequate control of hemodynamic parameters.  PT was transferred from [facility] to [facility] for CV surgery evaluation which he was taken  to the OR and had TEVAR with left carotid to subclavian artery bypass on 9/23.   Later Patient developed abdominal distension and rigidity of his abdomen and developed a lactic acidosis.  He had a repeat CT angiography on 9/25 of the chest/abdomen/pelvis without evidence of worsening dissection, but evidence of significant intra-abdominal pathology with some hemoperitoneum, signs of hypoperfusion of the abdominal organs (compartment syndrome)  free blood in the abdomen. He underwent exploratory laparotomy on 9/25 with evidence of viable bowel and no significant bleeding identified, abd was closed with flakito drain left in situ chest tube was placed. Had repeat ex-laps on 9/27, 9/29. Postoperatively, he developed severe unstable bradycardia, and a transvenous pacemaker was temporarily placed by Cardiology, with the bradycardia resolving within he next couple days.  Shortly afterwards, he was noted to have significant worsening of his renal failure and critical hyperkalemia. Renal services consulted and HD catheter was placed and he was started on HD treatments. Patient was cleared for transfer out of ICU to the floor.  He started having worsening abdominal  pain. He was seen by rheumatology for possible lupus flare up and serositis. He was started on steroids and other immunomodulating drugs. Drugs have since been discontinued due to concern of infection.  Repeat CT showed multiple loculated fluid collections, necrotizing pancreatitis, possible peritonitis. Surgery team is following with plan for IR drainage, which was performed October 28.      Today  Abdominal fluid growing Klebsiella.  Complains of right groin pain.  CTA chest abdomen pelvis noting large left-sided abdominal hematoma, possibly even a hemoperitoneum.  Surgery wrote note from yesterday saying no surgical intervention at this time.        OBJECTIVE/PHYSICAL EXAM     VITAL SIGNS (MOST RECENT):  Temp: 98.8 °F (37.1 °C) (10/30/22 0713)  Pulse: 92 (10/30/22 0713)  Resp: 20 (10/30/22 0844)  BP: (!) 142/81 (10/30/22 0844)  SpO2: 96 % (10/30/22 0713)   VITAL SIGNS (24 HOUR RANGE):  Temp:  [98.1 °F (36.7 °C)-98.8 °F (37.1 °C)] 98.8 °F (37.1 °C)  Pulse:  [80-92] 92  Resp:  [16-20] 20  SpO2:  [95 %-99 %] 96 %  BP: (103-142)/(49-81) 142/81   GENERAL: In no acute distress, afebrile, malnourished weight loss  HEENT:  CHEST: Clear to auscultation bilaterally  HEART: S1, S2, no appreciable murmur  ABDOMEN: diffuse tenderness more so on the right side, slightly distended, BS +  MSK: Warm, 2+ pitting edema both legs, no clubbing or cyanosis  NEUROLOGIC: Alert and oriented x4, both legs 2/5 strength, 5/5 upper extremity  INTEGUMENTARY:  PSYCHIATRY:        ASSESSMENT/PLAN   Severe necrotizing pancreatitis with loculated intra-abdominal fluid collection-status post IR drainage October 28  Intra-abdominal abscess versus infected necrotizing pancreatitis  Sepsis  Acute kidney injury ATN  Critical illness myopathy  Severe protein caloric malnutrition     History of:  Type A aortic dissection status post repair with mechanical aortic valve conduit, recent type B aortic dissection status post TAVR, lupus nephritis class 5,  mixed connective tissue disorder predominant lupus, transverse myelitis recovered        General surgery following.    Gastroenterology following.    Nephrology following.  Fluid management per them.  Has a history of class 5 lupus nephritis.  Likely need some fluid removal with albumin.  ID following.  Follow-up on body fluid culture.  Continue meropenem and fluconazole.  Rheumatology following.  Continue Solu-Medrol 40 b.i.d., all other immunosuppressant discontinued.  I doubt that this was a lupus flare-up to begin with, C3 was low, C4 was normal.  Glucose control with Levemir and sliding scale.  Continue antihypertensives and adjust as needed.    Continue to monitor INR.  Goal of 2 to 3.  Will hold Coumadin for now in the setting of blood loss needing 2 units of blood yesterday, no reversing for now.   Continue TPN for IV nutrition.  He needs to also continue enteral nutrition.  Patient remains critically ill and condition remains guarded.  Many poor prognostic factors playing a role here.     Critical care diagnosis: All of the above  Critical care time spent:  50 minutes    Anticipated discharge and disposition:   __________________________________________________________________________    LABS/MICRO/MEDS/DIAGNOSTICS       LABS  Recent Labs     10/30/22  0611   *   K 5.8*   CHLORIDE 96*   CO2 22   BUN 93.1*   CREATININE 1.92*   GLUCOSE 172*   CALCIUM 9.0   ALKPHOS 120   AST 20   ALT 7   ALBUMIN 1.8*     Recent Labs     10/30/22  0611   WBC 25.0*   RBC 4.07*   HCT 34.2*   MCV 84.0          MICROBIOLOGY  Microbiology Results (last 7 days)       Procedure Component Value Units Date/Time    Anaerobic Culture [[id number]] Collected: 10/28/22 0940    Order Status: Completed Specimen: Body Fluid from Abdomen Updated: 10/30/22 0719     Anaerobe Culture No Anaerobes Isolated    Anaerobic Culture [[id number]] Collected: 10/28/22 0940    Order Status: Completed Specimen: Body Fluid from Peritoneal Updated:  10/30/22 0715     Anaerobe Culture No Anaerobes Isolated    Body Fluid Culture [[id number]] Collected: 10/28/22 0940    Order Status: Completed Specimen: Peritoneal Fluid from Abdomen Updated: 10/30/22 0649     Body Fluid Culture No Growth At 48 Hours    Body Fluid Culture [[id number]]  (Abnormal) Collected: 10/28/22 0940    Order Status: Completed Specimen: Peritoneal Fluid from Abdomen Updated: 10/29/22 1422     Body Fluid Culture Many Klebsiella pneumoniae    Gram Stain [[id number]] Collected: 10/28/22 0940    Order Status: Completed Specimen: Peritoneal Fluid from Abdomen Updated: 10/29/22 1130     GRAM STAIN No WBCs, No bacteria seen    Gram Stain [[id number]] Collected: 10/28/22 0940    Order Status: Completed Specimen: Peritoneal Fluid from Abdomen Updated: 10/29/22 1122     GRAM STAIN Rare WBC observed      Few Gram Negative Rods    Fungal Culture [[id number]] Collected: 10/28/22 0940    Order Status: Sent Specimen: Body Fluid from Peritoneal Updated: 10/28/22 1326    Fungal Culture [[id number]] Collected: 10/28/22 0940    Order Status: Sent Specimen: Body Fluid from Abdomen Updated: 10/28/22 1326    Anaerobic Culture [[id number]]     Order Status: Canceled Specimen: Body Fluid from Abdomen     Body Fluid Culture [[id number]]     Order Status: Canceled Specimen: Body Fluid from Abdomen     Fungal Culture [[id number]]     Order Status: Canceled Specimen: Body Fluid from Abdomen     Gram Stain [[id number]]     Order Status: Canceled Specimen: Body Fluid from Abdomen     Gram Stain [[id number]]     Order Status: Canceled Specimen: Peritoneal Fluid     Body Fluid Culture [[id number]]     Order Status: Canceled Specimen: Peritoneal Fluid     Fungal Culture [[id number]]     Order Status: Canceled Specimen: Body Fluid     Anaerobic Culture [[id number]]     Order Status: Canceled Specimen: Body Fluid     Fungal Culture [[id number]]  (Normal) Collected: 09/25/22 1200    Order Status: Completed Specimen: Body Fluid from Pleural  Fluid, Right Updated: 10/27/22 1254     Fungal Culture No Fungus Isolated at 4 Weeks            MEDICATIONS   carvediloL  25 mg Oral BID    cloNIDine  0.1 mg Oral BID    fat emulsion 20%  250 mL Intravenous Every Tues, Thurs, Sat    fluconazole (DIFLUCAN) IV (PEDS and ADULTS)  400 mg Intravenous Q24H    hydrALAZINE  100 mg Oral Q8H    insulin detemir U-100  10 Units Subcutaneous QHS    lactulose 10 gram/15 ml  10 g Oral BID    meropenem (MERREM) IVPB  1 g Intravenous Q12H    methylPREDNISolone sodium succinate injection  40 mg Intravenous Q12H    metoclopramide HCl  5 mg Intravenous QID (AC & HS)    morphine  30 mg Oral Q12H    NIFEdipine  90 mg Oral Daily    pantoprazole  40 mg Intravenous Daily    sodium bicarbonate  1,300 mg Oral Daily    sodium chloride 0.9%  10 mL Intravenous Q6H      INFUSIONS   amino acid 5 % in 15% dextrose 60 mL/hr at 10/29/22 1747       DIAGNOSTIC TESTS  CTA Chest Abdomen Pelvis         US Scrotum And Testicles   Final Result      CT Abdomen Pelvis With Contrast   Final Result      1. No normal pancreatic parenchyma is seen.  At the level of the pancreas there is a large fluid collection with a few foci of gas.  Loculated, faintly peripherally enhancing fluid collections extend along the pericolic gutters.  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  Foci of gas are nonspecific in the setting of prior intervention.   2. Extensive loculated peritoneal collections with peripheral enhancement compatible with peritonitis.Left inguinal hernia contains ascitic fluid with some peripheral enhancement similar to the peritoneal fluid.  There is no herniated bowel loop   3. There is a moderate colonic stool burden.  There is some mass effect on the bowel loops due to the loculated nature of the abdominal fluid collections.   4. Anasarca   5. Partially imaged known thoracic aortic dissection status post intervention.         Electronically signed  by: Rocio White   Date:    10/26/2022   Time:    12:37      X-Ray Chest 1 View for Line/Tube Placement   Final Result      Right PICC tip overlies the mid SVC.         Electronically signed by: Kapil Ayers   Date:    10/25/2022   Time:    16:56      CT Abdomen Pelvis  Without Contrast   Final Result      Bilateral pleural effusions with moderate ascites and diffuse anasarca.  The epigastric and left mid abdominal ascites appears loculated and peritonitis cannot be excluded.         Electronically signed by: Grabiel Singleton MD   Date:    10/24/2022   Time:    23:30      X-Ray Abdomen AP 1 View   Final Result      Residual feces         Electronically signed by: Ramón Ying   Date:    10/24/2022   Time:    09:46      X-Ray Chest 1 View   Final Result      No significant interval change.         Electronically signed by: Kapil Ayers   Date:    10/22/2022   Time:    15:13      X-Ray Chest 1 View   Final Result      Persistent increased left retrocardiac density and silhouetting of the left hemidiaphragm.      No other focal consolidative changes.      No other change         Electronically signed by: Ramón Ying   Date:    10/12/2022   Time:    08:15      Fl Modified Barium Swallow Speech   Final Result      X-Ray Chest 1 View   Final Result      Unchanged retrocardiac opacity suggesting atelectasis         Electronically signed by: Rocio White   Date:    10/05/2022   Time:    06:11      X-Ray Chest 1 View   Final Result      New central venous catheter terminates within the proximal right atrium.         Electronically signed by: Jameson Brumfield   Date:    10/04/2022   Time:    14:16      X-Ray Chest 1 View   Final Result      No significant interval change.         Electronically signed by: Kapil Ayers   Date:    10/04/2022   Time:    06:54      CT Chest Abdomen Pelvis Without Contrast (XPD)   Final Result      Very limited study due the lack of IV contrast.      Anasarca.      Small amount of  ascites.      Small left-sided pleural effusion with bibasilar atelectasis versus infiltrate.         Electronically signed by: Joey Vidal MD   Date:    10/03/2022   Time:    21:30      X-Ray Chest 1 View   Final Result      As above.         Electronically signed by: Jameson Brumfield   Date:    10/03/2022   Time:    07:09      X-Ray Chest 1 View   Final Result      As above.         Electronically signed by: Jakob Boland   Date:    10/02/2022   Time:    14:50      X-ray Abdomen for NG Tube Placement (Nursing should notify Radiology after placement)   Final Result      Optimal placement of the nasogastric tube.         Electronically signed by: Jameson Brumfield   Date:    10/01/2022   Time:    11:48      X-Ray Chest 1 View   Final Result      No significant change         Electronically signed by: Ramón Ying   Date:    10/01/2022   Time:    08:05      X-Ray Chest 1 View   Final Result      No acute findings or significant interval change.         Electronically signed by: Kapil Ayers   Date:    09/30/2022   Time:    07:35      X-Ray Chest 1 View   Final Result      Stable exam without significant interval change.         Electronically signed by: Claribel Deal   Date:    09/29/2022   Time:    16:14      X-Ray Chest 1 View   Final Result      Possible mild left perihilar/lower lung atelectasis.  Suspect tiny right apical pneumothorax.         Electronically signed by: Darius Rosario   Date:    09/29/2022   Time:    06:09      X-Ray Chest 1 View   Final Result      No significant interval change.         Electronically signed by: Jameson Brumfield   Date:    09/28/2022   Time:    16:25      X-Ray Chest 1 View   Final Result      Similar appearance to prior exam with retrocardiac atelectasis         Electronically signed by: Rocio White   Date:    09/28/2022   Time:    06:05      X-Ray Chest 1 View   Final Result      No significant change         Electronically signed by: Ramón Ying   Date:    09/27/2022    Time:    08:47      X-Ray Abdomen AP 1 View   Final Result      Satisfactory central femoral line placement.         Electronically signed by: Emily Candelario MD   Date:    09/26/2022   Time:    13:54      X-Ray Chest 1 View   Final Result      Interval retraction of endotracheal tube, satisfactory position.  Cardiac lead placement in the interval.  No other significant change.         Electronically signed by: Emily Candelario MD   Date:    09/26/2022   Time:    08:00      X-Ray Chest 1 View   Final Result      Increased volume status      Interval intubation with endotracheal tube at the josie.  I recommend retracting      Right-sided chest tube in good position         Electronically signed by: Too Reyes   Date:    09/25/2022   Time:    15:49      X-Ray Abdomen AP 1 View   Final Result      There is slight increased density of the kidneys of questionable etiology and or significance.      Otherwise nonspecific gas pattern         Electronically signed by: Ramón Ying   Date:    09/25/2022   Time:    10:51      CTA Chest Abdomen Non Coronary   Final Result      1. Thoracic aortic endograft placement since 09/23/2022 with the dissection flap extending about 3 cm inferior to the distal portion of the graft.  Opacification of the false lumen along the mid and distal portions of the graft.   2. Moderate volume hemoperitoneum.  Changes involving the liver, spleen, pancreas and bowel are suggestive of overall hypoperfusion.  Hyperdense kidneys suggest acute kidney injury.   3. Small to moderate bilateral pleural effusions, larger on the right.   Findings discussed with Mr. Guevara ICU nurse, [place] at 08:35 hours on 09/25/2022.         Electronically signed by: Darius Rosario   Date:    09/25/2022   Time:    08:42      X-Ray Chest 1 View   Final Result      Changes suggestive of right-sided pleural effusion.      Increased left retrocardiac density and silhouetting of the left hemidiaphragm which  might be related to an infiltrate/atelectasis.      Interval placement of thoracic endograft.      No focal consolidative changes         Electronically signed by: Ramón Ying   Date:    09/25/2022   Time:    09:10      IR Abdominal Aortobifemoral   Final Result      Fluoroscopic assistance provided as above.         Electronically signed by: Kapil Ayers   Date:    09/26/2022   Time:    09:56      CT Abscess Aspiration without Catheter    (Results Pending)   US Guided Needle Placement    (Results Pending)            All diagnosis and differential diagnosis have been reviewed; assessment and plan has been documented. I have personally reviewed the labs and test results that are presently available; I have reviewed the patients medication list. I have reviewed the consulting providers response and recommendations. I have reviewed or attempted to review medical records based upon their availability.  All of the patient's questions have been addressed and answered. Patient's is agreeable to the above stated plan. I will continue to monitor closely and make adjustments to medical management as needed.  This document was created using M*Modal Fluency Direct.  Transcription errors may have been made.  Please contact me if any questions may rise regarding documentation to clarify verbiage.        Luis F Hutson MD   10/30/2022   Internal Medicine

## (undated) DEVICE — BENZOIN TINCTURE 0.66ML

## (undated) DEVICE — TOWEL OR WHT XRAY 16X26 2/PK

## (undated) DEVICE — PAD DEFIB CADENCE ADULT R2

## (undated) DEVICE — DRAPE FLUID WARMER 44X44IN

## (undated) DEVICE — SOL NACL IRR 3000ML

## (undated) DEVICE — DRESSING ABTHERA SENSA TRAC

## (undated) DEVICE — SUT COATED VICRYL 3-0 1X27

## (undated) DEVICE — Device

## (undated) DEVICE — KIT SURGICAL TURNOVER

## (undated) DEVICE — CATH ANGLED GLIDE CATH 5FR

## (undated) DEVICE — SUT SILK 0 STRANDS 30IN BLK

## (undated) DEVICE — KIT HAND CONTROL HIGH PRESSUR

## (undated) DEVICE — PAD DEFIB RADIOLUCENT ADULT

## (undated) DEVICE — CATH THORACIC 28FR ST

## (undated) DEVICE — WIRE NEWTON 035/150/3J

## (undated) DEVICE — SUT 1 48IN PDS II VIO MONO

## (undated) DEVICE — COVER CAMERA/LASER 9X96IN

## (undated) DEVICE — DRESSING ANTIMICROBIAL 1 INCH

## (undated) DEVICE — SUT ABSRB PDS II 3-0 FS-2 27IN

## (undated) DEVICE — SUT 2-0 VICRYL / CT-1

## (undated) DEVICE — GLOVE PROTEXIS BLUE LATEX 7.5

## (undated) DEVICE — CANISTER INFOV.A.C WOUND 500ML

## (undated) DEVICE — DRESSING ADHESIVE ISLAND 3 X 6

## (undated) DEVICE — GLOVE PROTEXIS HYDROGEL SZ6.5

## (undated) DEVICE — GLOVE PROTEXIS BLUE LATEX 8

## (undated) DEVICE — SUT VICRYL 1 OB 36 CTX

## (undated) DEVICE — SET ANGIO ACIST CVI ANGIOTOUCH

## (undated) DEVICE — CULTSWAB+ AMIES W/O CHARC SNG

## (undated) DEVICE — CATH ELECTRODE BLLN 5FR 110CM

## (undated) DEVICE — TRAY SKIN SCRUB WET PREMIUM

## (undated) DEVICE — GLOVE PROTEXIS LTX MICRO  7.5

## (undated) DEVICE — TRAY CATH FOL SIL URIMTR 16FR

## (undated) DEVICE — DRAPE TOP 53X102IN

## (undated) DEVICE — SPONGE LAP STRL 18X18IN

## (undated) DEVICE — COVER CAMERA 21X16X19IN

## (undated) DEVICE — BANDAGE KERLIX AMD

## (undated) DEVICE — SEE L#159833

## (undated) DEVICE — ELECTRODE BLD EXT 6.50 ST DISP

## (undated) DEVICE — APPLICATOR CHLORAPREP ORN 26ML

## (undated) DEVICE — GUIDEWIRE INQWIRE .021IN 180CM

## (undated) DEVICE — CONTAINER SPECIMEN SCREW 4OZ

## (undated) DEVICE — SUT 1 36IN PDS II

## (undated) DEVICE — TUBING STERILE 3/8X3/32X8IN

## (undated) DEVICE — COVER TABLE HVY DTY 60X90IN

## (undated) DEVICE — KIT DRSNG GRNUFM MED 18X12X5CM

## (undated) DEVICE — GUIDEWIRE AMPLATZ .035X260

## (undated) DEVICE — CABLE PACING WIRE EPICARD 12FT

## (undated) DEVICE — EXTRACTOR STAPLE SQZ HANDLE

## (undated) DEVICE — BOWL STERILE LARGE 32OZ

## (undated) DEVICE — SYR ONLY LUER LOCK 20CC

## (undated) DEVICE — SUT SILK 2.0 BLK 18

## (undated) DEVICE — SHEATH SENTRANT HYDRO

## (undated) DEVICE — GUIDEWIRE STF .035X260CM ANG

## (undated) DEVICE — GLOVE PROTEXIS LTX MICRO  7

## (undated) DEVICE — SUT MONOCRYL PLUS UD 3-0 27

## (undated) DEVICE — KIT MANIFOLD LOW PRESS TUBING

## (undated) DEVICE — BLADE SURG STAINLESS STEEL #15

## (undated) DEVICE — STAPLER SKIN ROTATING HEAD

## (undated) DEVICE — DRAIN CHEST DRY SUCTION

## (undated) DEVICE — KIT MINI STK MAX COAX 5FR 10CM

## (undated) DEVICE — DRAPE FLUID WARMER ORS 44X44IN

## (undated) DEVICE — DRAPE STERI INSTRUMENT 1018

## (undated) DEVICE — YANKAUER OPEN TIP W/O VENT

## (undated) DEVICE — SUT VICRYL CTD 2-0 GI 27 SH

## (undated) DEVICE — GLOVE PROTEXIS NEOPRN SZ8

## (undated) DEVICE — KIT SYR REUSABLE

## (undated) DEVICE — NDL HYPO REG 25G X 1 1/2

## (undated) DEVICE — SUT SILK 3-0 STRANDS 30IN

## (undated) DEVICE — INTRODUCER CATH 8F 11CM

## (undated) DEVICE — SUT VICRYL PLUS 3-0 SH 18IN

## (undated) DEVICE — GLOVE PROTEXIS BLUE LATEX 7

## (undated) DEVICE — SUT PROLENE 5-0 36IN C-1

## (undated) DEVICE — NDL PERC ENTRY BSDN 18-7.0

## (undated) DEVICE — STAPLER SKIN WIDE

## (undated) DEVICE — DRAIN SURG HUBLESS 30CM 15FR

## (undated) DEVICE — SYR 10CC LUER LOCK

## (undated) DEVICE — KIT C.A.T.S. FAST START 4/CASE

## (undated) DEVICE — RESERVOIR JACKSON-PRATT 100CC

## (undated) DEVICE — TAPE MEDIPORE 4IN X 2YDS

## (undated) DEVICE — SUT VICRYL 3-0 27 SH

## (undated) DEVICE — DRAPE C-ARM COVER EZ 36X28IN

## (undated) DEVICE — DRESSING VAC WHITE FOAM SMALL

## (undated) DEVICE — IRRIGATION SET Y-TYPE TUR/BLAD

## (undated) DEVICE — DRESSING ADH ISLAND 3.6 X 14

## (undated) DEVICE — ELECTRODE EXTENDED BLADE

## (undated) DEVICE — SUT PDS PLUS 1 TP1 96IN

## (undated) DEVICE — CLIP LIGATING MEDIUM

## (undated) DEVICE — SUT SILK 3-0 SH DETACH 30IN

## (undated) DEVICE — SOL BETADINE 5%

## (undated) DEVICE — PAD ABD 8X10 STERILE

## (undated) DEVICE — DRESSING INFOVAC LARGE BLK

## (undated) DEVICE — CLIP LIGATING HEMOCLP SMALL

## (undated) DEVICE — SPONGE LAP XRAY STERILE 18X18

## (undated) DEVICE — SOL IRRI STRL WATER 1000ML

## (undated) DEVICE — DEVICE BASIXCOMPAK INFL 20ML

## (undated) DEVICE — SWAB CULTURETTE SINGLE

## (undated) DEVICE — KIT SURGIFLO HEMOSTATIC MATRIX

## (undated) DEVICE — SYR IRRIGATION BULB STER 60ML

## (undated) DEVICE — DRAPE STERI INCISE 23X23IN

## (undated) DEVICE — WIRE LUNDERQUIST 260

## (undated) DEVICE — HEMOSTAT SURGICEL FIBRLR 2X4IN

## (undated) DEVICE — DRESSING VERSA-FOAM W/O TUBE

## (undated) DEVICE — ELECTRODE PATIENT RETURN DISP

## (undated) DEVICE — HOLDER STRIP-T SELF ADH 2X10IN

## (undated) DEVICE — SUT VICRYL PLUS 2-0 SH 27IN

## (undated) DEVICE — GLOVE PROTEXIS HYDROGEL SZ7.5

## (undated) DEVICE — SOL NACL IRR 1000ML BTL

## (undated) DEVICE — TRAP MUCUS SPECIMEN 80CC

## (undated) DEVICE — DRAIN JACKSON PRATT TRCR 19FR

## (undated) DEVICE — SUT SILK 3-0 SH 18IN BLACK

## (undated) DEVICE — TUBE SUCTION MEDI-VAC STERILE

## (undated) DEVICE — SOL 1L ACD-A

## (undated) DEVICE — SUT SILK 2-0 STRANDS 30IN

## (undated) DEVICE — STAPLER PRECISE VISTA SKIN 35W

## (undated) DEVICE — BAG MEDI-PLAST DECANTER C-FLOW

## (undated) DEVICE — SHEATH INTRODUCER 5FR 10CM

## (undated) DEVICE — DRESSING TELFA + BARR 4X6IN

## (undated) DEVICE — STAPLER SKIN PROXIMATE WIDE

## (undated) DEVICE — SYR 50CC LL

## (undated) DEVICE — SPONGE KERLIX ANTIMIC 6X6.75IN

## (undated) DEVICE — SUT VICRYL PLUS 0 CTX 36IN

## (undated) DEVICE — GUIDEWIRE STD .035X260CM ANG

## (undated) DEVICE — SOL NORMAL USPCA 0.9%

## (undated) DEVICE — SEALER LIGASURE IMPACT 18CM